# Patient Record
Sex: MALE | Race: WHITE | NOT HISPANIC OR LATINO | Employment: OTHER | URBAN - METROPOLITAN AREA
[De-identification: names, ages, dates, MRNs, and addresses within clinical notes are randomized per-mention and may not be internally consistent; named-entity substitution may affect disease eponyms.]

---

## 2017-09-21 ENCOUNTER — RECORDS - HEALTHEAST (OUTPATIENT)
Dept: LAB | Facility: CLINIC | Age: 67
End: 2017-09-21

## 2017-09-21 LAB
CHOLEST SERPL-MCNC: 130 MG/DL
FASTING STATUS PATIENT QL REPORTED: NO
HDLC SERPL-MCNC: 39 MG/DL
LDLC SERPL CALC-MCNC: 56 MG/DL
TRIGL SERPL-MCNC: 173 MG/DL

## 2018-10-29 ENCOUNTER — RECORDS - HEALTHEAST (OUTPATIENT)
Dept: LAB | Facility: CLINIC | Age: 68
End: 2018-10-29

## 2018-10-29 LAB
ALBUMIN SERPL-MCNC: 4 G/DL (ref 3.5–5)
ALP SERPL-CCNC: 72 U/L (ref 45–120)
ALT SERPL W P-5'-P-CCNC: 28 U/L (ref 0–45)
ANION GAP SERPL CALCULATED.3IONS-SCNC: 10 MMOL/L (ref 5–18)
AST SERPL W P-5'-P-CCNC: 14 U/L (ref 0–40)
BILIRUB SERPL-MCNC: 0.4 MG/DL (ref 0–1)
BUN SERPL-MCNC: 19 MG/DL (ref 8–22)
CALCIUM SERPL-MCNC: 9.5 MG/DL (ref 8.5–10.5)
CHLORIDE BLD-SCNC: 109 MMOL/L (ref 98–107)
CHOLEST SERPL-MCNC: 136 MG/DL
CO2 SERPL-SCNC: 22 MMOL/L (ref 22–31)
CREAT SERPL-MCNC: 0.93 MG/DL (ref 0.7–1.3)
FASTING STATUS PATIENT QL REPORTED: YES
GFR SERPL CREATININE-BSD FRML MDRD: >60 ML/MIN/1.73M2
GLUCOSE BLD-MCNC: 123 MG/DL (ref 70–125)
HDLC SERPL-MCNC: 43 MG/DL
LDLC SERPL CALC-MCNC: 64 MG/DL
POTASSIUM BLD-SCNC: 4.7 MMOL/L (ref 3.5–5)
PROT SERPL-MCNC: 6.8 G/DL (ref 6–8)
SODIUM SERPL-SCNC: 141 MMOL/L (ref 136–145)
TRIGL SERPL-MCNC: 147 MG/DL

## 2019-11-18 ENCOUNTER — RECORDS - HEALTHEAST (OUTPATIENT)
Dept: LAB | Facility: CLINIC | Age: 69
End: 2019-11-18

## 2019-11-18 LAB
CHOLEST SERPL-MCNC: 145 MG/DL
FASTING STATUS PATIENT QL REPORTED: YES
HDLC SERPL-MCNC: 43 MG/DL
LDLC SERPL CALC-MCNC: 73 MG/DL
TRIGL SERPL-MCNC: 147 MG/DL

## 2020-01-21 ENCOUNTER — RECORDS - HEALTHEAST (OUTPATIENT)
Dept: LAB | Facility: CLINIC | Age: 70
End: 2020-01-21

## 2020-01-21 LAB
ANION GAP SERPL CALCULATED.3IONS-SCNC: 13 MMOL/L (ref 5–18)
BUN SERPL-MCNC: 18 MG/DL (ref 8–22)
CALCIUM SERPL-MCNC: 9.4 MG/DL (ref 8.5–10.5)
CHLORIDE BLD-SCNC: 106 MMOL/L (ref 98–107)
CO2 SERPL-SCNC: 23 MMOL/L (ref 22–31)
CREAT SERPL-MCNC: 1.03 MG/DL (ref 0.7–1.3)
GFR SERPL CREATININE-BSD FRML MDRD: >60 ML/MIN/1.73M2
GLUCOSE BLD-MCNC: 86 MG/DL (ref 70–125)
POTASSIUM BLD-SCNC: 5.1 MMOL/L (ref 3.5–5)
SODIUM SERPL-SCNC: 142 MMOL/L (ref 136–145)
TSH SERPL DL<=0.005 MIU/L-ACNC: 1.81 UIU/ML (ref 0.3–5)

## 2020-01-31 ENCOUNTER — RECORDS - HEALTHEAST (OUTPATIENT)
Dept: ADMINISTRATIVE | Facility: OTHER | Age: 70
End: 2020-01-31

## 2020-01-31 ENCOUNTER — AMBULATORY - HEALTHEAST (OUTPATIENT)
Dept: CARDIOLOGY | Facility: CLINIC | Age: 70
End: 2020-01-31

## 2020-02-18 ENCOUNTER — OFFICE VISIT - HEALTHEAST (OUTPATIENT)
Dept: CARDIOLOGY | Facility: CLINIC | Age: 70
End: 2020-02-18

## 2020-02-18 DIAGNOSIS — I48.0 PAROXYSMAL ATRIAL FIBRILLATION (H): ICD-10-CM

## 2020-02-18 DIAGNOSIS — E11.9 TYPE 2 DIABETES MELLITUS WITHOUT COMPLICATION, WITHOUT LONG-TERM CURRENT USE OF INSULIN (H): ICD-10-CM

## 2020-02-18 DIAGNOSIS — Z91.81 AT RISK FOR FALLS: ICD-10-CM

## 2020-02-18 ASSESSMENT — MIFFLIN-ST. JEOR: SCORE: 1713.31

## 2020-02-20 LAB
ATRIAL RATE - MUSE: 87 BPM
DIASTOLIC BLOOD PRESSURE - MUSE: NORMAL
INTERPRETATION ECG - MUSE: NORMAL
P AXIS - MUSE: 18 DEGREES
PR INTERVAL - MUSE: 118 MS
QRS DURATION - MUSE: 72 MS
QT - MUSE: 356 MS
QTC - MUSE: 428 MS
R AXIS - MUSE: 7 DEGREES
SYSTOLIC BLOOD PRESSURE - MUSE: NORMAL
T AXIS - MUSE: 43 DEGREES
VENTRICULAR RATE- MUSE: 87 BPM

## 2020-02-26 ENCOUNTER — AMBULATORY - HEALTHEAST (OUTPATIENT)
Dept: CARDIOLOGY | Facility: CLINIC | Age: 70
End: 2020-02-26

## 2020-02-28 ENCOUNTER — HOSPITAL ENCOUNTER (OUTPATIENT)
Dept: CARDIOLOGY | Facility: HOSPITAL | Age: 70
Discharge: HOME | End: 2020-02-28
Attending: INTERNAL MEDICINE

## 2020-02-28 DIAGNOSIS — I48.0 PAROXYSMAL ATRIAL FIBRILLATION (H): ICD-10-CM

## 2020-02-28 ASSESSMENT — MIFFLIN-ST. JEOR: SCORE: 1719.83

## 2020-03-02 LAB
AORTIC ROOT: 3.3 CM
AORTIC VALVE MEAN VELOCITY: 114 CM/S
AV CUSP SEPERATION: 1.9 CM
AV CUSP SEPERATION: 1.9 CM
AV DIMENSIONLESS INDEX VTI: 0.8
AV MEAN GRADIENT: 6 MMHG
AV PEAK GRADIENT: 9.7 MMHG
AV VALVE AREA: 3.5 CM2
AV VELOCITY RATIO: 0.6
BSA FOR ECHO PROCEDURE: 2.16 M2
CV ECHO HEIGHT: 71.8 IN
CV ECHO WEIGHT: 203 LBS
DOP CALC AO PEAK VEL: 156 CM/S
DOP CALC AO VTI: 31.1 CM
DOP CALC LVOT AREA: 4.52 CM2
DOP CALC LVOT DIAMETER: 2.4 CM
DOP CALC LVOT PEAK VEL: 97.5 CM/S
DOP CALC LVOT STROKE VOLUME: 109 CM3
DOP CALC MV VTI: 26.6 CM
DOP CALCLVOT PEAK VEL VTI: 24.1 CM
EJECTION FRACTION: 67 % (ref 55–75)
FRACTIONAL SHORTENING: 37.5 % (ref 28–44)
INTERVENTRICULAR SEPTUM IN END DIASTOLE: 1.05 CM (ref 0.6–1)
IVS/PW RATIO: 1.1
LA AREA 1: 20.7 CM2
LA AREA 2: 26.1 CM2
LEFT ATRIUM SIZE: 3.5 CM
LEFT VENTRICLE CARDIAC INDEX: 3.4 L/MIN/M2
LEFT VENTRICLE CARDIAC OUTPUT: 7.3 L/MIN
LEFT VENTRICLE DIASTOLIC VOLUME INDEX: 39.4 CM3/M2 (ref 34–74)
LEFT VENTRICLE DIASTOLIC VOLUME: 85 CM3 (ref 62–150)
LEFT VENTRICLE HEART RATE: 67 BPM
LEFT VENTRICLE MASS INDEX: 76.5 G/M2
LEFT VENTRICLE SYSTOLIC VOLUME INDEX: 13 CM3/M2 (ref 11–31)
LEFT VENTRICLE SYSTOLIC VOLUME: 28 CM3 (ref 21–61)
LEFT VENTRICULAR INTERNAL DIMENSION IN DIASTOLE: 4.75 CM (ref 4.2–5.8)
LEFT VENTRICULAR INTERNAL DIMENSION IN SYSTOLE: 2.97 CM (ref 2.5–4)
LEFT VENTRICULAR MASS: 165.2 G
LEFT VENTRICULAR OUTFLOW TRACT MEAN GRADIENT: 2 MMHG
LEFT VENTRICULAR OUTFLOW TRACT MEAN VELOCITY: 72.4 CM/S
LEFT VENTRICULAR OUTFLOW TRACT PEAK GRADIENT: 4 MMHG
LEFT VENTRICULAR POSTERIOR WALL IN END DIASTOLE: 0.93 CM (ref 0.6–1)
LV STROKE VOLUME INDEX: 50.4 ML/M2
MITRAL VALVE DECELERATION SLOPE: 4610 MM/S2
MITRAL VALVE E/A RATIO: 0.6
MITRAL VALVE MEAN INFLOW VELOCITY: 47.6 CM/S
MITRAL VALVE PEAK VELOCITY: 111 CM/S
MITRAL VALVE PRESSURE HALF-TIME: 59 MS
MV AREA VTI: 4.1 CM2
MV AVERAGE E/E' RATIO: 8.8 CM/S
MV DECELERATION TIME: 215 MS
MV E'TISSUE VEL-LAT: 7.64 CM/S
MV E'TISSUE VEL-MED: 7.83 CM/S
MV LATERAL E/E' RATIO: 8.9
MV MEAN GRADIENT: 1 MMHG
MV MEDIAL E/E' RATIO: 8.7
MV PEAK A VELOCITY: 108 CM/S
MV PEAK E VELOCITY: 67.9 CM/S
MV PEAK GRADIENT: 4.9 MMHG
MV VALVE AREA BY CONTINUITY EQUATION: 4.1 CM2
MV VALVE AREA PRESSURE 1/2 METHOD: 3.7 CM2
NUC REST DIASTOLIC VOLUME INDEX: 3248 LBS
NUC REST SYSTOLIC VOLUME INDEX: 71.75 IN
TRICUSPID REGURGITATION PEAK PRESSURE GRADIENT: 36.2 MMHG
TRICUSPID VALVE ANULAR PLANE SYSTOLIC EXCURSION: 2.1 CM
TRICUSPID VALVE PEAK REGURGITANT VELOCITY: 301 CM/S

## 2020-03-24 ENCOUNTER — AMBULATORY - HEALTHEAST (OUTPATIENT)
Dept: CARDIOLOGY | Facility: CLINIC | Age: 70
End: 2020-03-24

## 2020-03-24 ENCOUNTER — RECORDS - HEALTHEAST (OUTPATIENT)
Dept: ADMINISTRATIVE | Facility: OTHER | Age: 70
End: 2020-03-24

## 2020-03-25 ENCOUNTER — RECORDS - HEALTHEAST (OUTPATIENT)
Dept: LAB | Facility: CLINIC | Age: 70
End: 2020-03-25

## 2020-03-25 LAB
ALBUMIN SERPL-MCNC: 4 G/DL (ref 3.5–5)
ALP SERPL-CCNC: 60 U/L (ref 45–120)
ALT SERPL W P-5'-P-CCNC: 15 U/L (ref 0–45)
ANION GAP SERPL CALCULATED.3IONS-SCNC: 14 MMOL/L (ref 5–18)
AST SERPL W P-5'-P-CCNC: 12 U/L (ref 0–40)
BILIRUB SERPL-MCNC: 0.6 MG/DL (ref 0–1)
BUN SERPL-MCNC: 15 MG/DL (ref 8–22)
CALCIUM SERPL-MCNC: 9.3 MG/DL (ref 8.5–10.5)
CHLORIDE BLD-SCNC: 104 MMOL/L (ref 98–107)
CO2 SERPL-SCNC: 24 MMOL/L (ref 22–31)
CREAT SERPL-MCNC: 0.81 MG/DL (ref 0.7–1.3)
GFR SERPL CREATININE-BSD FRML MDRD: >60 ML/MIN/1.73M2
GLUCOSE BLD-MCNC: 142 MG/DL (ref 70–125)
LIPASE SERPL-CCNC: 29 U/L (ref 0–52)
POTASSIUM BLD-SCNC: 5.2 MMOL/L (ref 3.5–5)
PROT SERPL-MCNC: 6.5 G/DL (ref 6–8)
SODIUM SERPL-SCNC: 142 MMOL/L (ref 136–145)

## 2020-04-02 ENCOUNTER — RECORDS - HEALTHEAST (OUTPATIENT)
Dept: LAB | Facility: CLINIC | Age: 70
End: 2020-04-02

## 2020-04-03 LAB — BACTERIA SPEC CULT: NO GROWTH

## 2020-04-09 ENCOUNTER — RECORDS - HEALTHEAST (OUTPATIENT)
Dept: LAB | Facility: CLINIC | Age: 70
End: 2020-04-09

## 2020-04-09 LAB
ALBUMIN UR-MCNC: ABNORMAL MG/DL
APPEARANCE UR: ABNORMAL
BACTERIA #/AREA URNS HPF: ABNORMAL HPF
BILIRUB UR QL STRIP: NEGATIVE
COLOR UR AUTO: YELLOW
GLUCOSE UR STRIP-MCNC: NEGATIVE MG/DL
GRAN CASTS #/AREA URNS LPF: ABNORMAL LPF
HGB UR QL STRIP: ABNORMAL
KETONES UR STRIP-MCNC: NEGATIVE MG/DL
LEUKOCYTE ESTERASE UR QL STRIP: ABNORMAL
MUCOUS THREADS #/AREA URNS LPF: ABNORMAL LPF
NITRATE UR QL: NEGATIVE
PH UR STRIP: 5.5 [PH] (ref 4.5–8)
RBC #/AREA URNS AUTO: ABNORMAL HPF
SP GR UR STRIP: 1.02 (ref 1–1.03)
SQUAMOUS #/AREA URNS AUTO: ABNORMAL LPF
UROBILINOGEN UR STRIP-ACNC: ABNORMAL
WBC #/AREA URNS AUTO: ABNORMAL HPF
WBC CLUMPS #/AREA URNS HPF: PRESENT /[HPF]

## 2020-04-12 ENCOUNTER — ANESTHESIA - HEALTHEAST (OUTPATIENT)
Dept: SURGERY | Facility: HOSPITAL | Age: 70
End: 2020-04-12

## 2020-04-12 ENCOUNTER — SURGERY - HEALTHEAST (OUTPATIENT)
Dept: SURGERY | Facility: HOSPITAL | Age: 70
End: 2020-04-12

## 2020-04-12 ENCOUNTER — HOSPITAL ENCOUNTER (INPATIENT)
Dept: MEDSURG UNIT | Facility: HOSPITAL | Age: 70
Discharge: NURSING, REHAB OR LONG-TERM CARE | End: 2020-04-18
Attending: INTERNAL MEDICINE | Admitting: INTERNAL MEDICINE

## 2020-04-12 DIAGNOSIS — E11.22 TYPE 2 DIABETES MELLITUS WITH CHRONIC KIDNEY DISEASE, WITHOUT LONG-TERM CURRENT USE OF INSULIN, UNSPECIFIED CKD STAGE (H): ICD-10-CM

## 2020-04-12 DIAGNOSIS — F32.9 MAJOR DEPRESSIVE DISORDER, REMISSION STATUS UNSPECIFIED, UNSPECIFIED WHETHER RECURRENT: ICD-10-CM

## 2020-04-12 DIAGNOSIS — N17.9 ACUTE RENAL FAILURE, UNSPECIFIED ACUTE RENAL FAILURE TYPE (H): ICD-10-CM

## 2020-04-12 DIAGNOSIS — N20.1 CALCULUS OF URETER: ICD-10-CM

## 2020-04-12 DIAGNOSIS — K92.0 HEMATEMESIS, PRESENCE OF NAUSEA NOT SPECIFIED: ICD-10-CM

## 2020-04-12 DIAGNOSIS — E87.20 LACTIC ACIDOSIS: ICD-10-CM

## 2020-04-12 DIAGNOSIS — J96.90 RESPIRATORY FAILURE REQUIRING INTUBATION (H): ICD-10-CM

## 2020-04-12 DIAGNOSIS — Z87.19 HISTORY OF HEMATEMESIS: ICD-10-CM

## 2020-04-12 DIAGNOSIS — K62.5 BRBPR (BRIGHT RED BLOOD PER RECTUM): ICD-10-CM

## 2020-04-12 DIAGNOSIS — E83.42 HYPOMAGNESEMIA: ICD-10-CM

## 2020-04-12 LAB
ABO/RH(D): NORMAL
ALBUMIN SERPL-MCNC: 2.3 G/DL (ref 3.5–5)
ALBUMIN SERPL-MCNC: 3.5 G/DL (ref 3.5–5)
ALBUMIN UR-MCNC: ABNORMAL MG/DL
ALP SERPL-CCNC: 52 U/L (ref 45–120)
ALP SERPL-CCNC: 75 U/L (ref 45–120)
ALT SERPL W P-5'-P-CCNC: 13 U/L (ref 0–45)
ALT SERPL W P-5'-P-CCNC: <9 U/L (ref 0–45)
ANION GAP SERPL CALCULATED.3IONS-SCNC: 31 MMOL/L (ref 5–18)
ANION GAP SERPL CALCULATED.3IONS-SCNC: 36 MMOL/L (ref 5–18)
ANION GAP SERPL CALCULATED.3IONS-SCNC: >33 MMOL/L (ref 5–18)
ANION GAP SERPL CALCULATED.3IONS-SCNC: >41 MMOL/L (ref 5–18)
ANTIBODY SCREEN: NEGATIVE
APPEARANCE UR: ABNORMAL
APTT PPP: 30 SECONDS (ref 24–37)
AST SERPL W P-5'-P-CCNC: 14 U/L (ref 0–40)
AST SERPL W P-5'-P-CCNC: 18 U/L (ref 0–40)
BACTERIA #/AREA URNS HPF: ABNORMAL HPF
BASE EXCESS BLDA CALC-SCNC: -12.4 MMOL/L
BASE EXCESS BLDA CALC-SCNC: -25 MMOL/L
BASE EXCESS BLDA CALC-SCNC: -28.9 MMOL/L
BASE EXCESS BLDA CALC-SCNC: -29.8 MMOL/L
BASE EXCESS BLDA CALC-SCNC: -9.5 MMOL/L
BASOPHILS # BLD AUTO: 0.1 THOU/UL (ref 0–0.2)
BASOPHILS NFR BLD AUTO: 1 % (ref 0–2)
BILIRUB DIRECT SERPL-MCNC: 0.1 MG/DL
BILIRUB SERPL-MCNC: 0.2 MG/DL (ref 0–1)
BILIRUB SERPL-MCNC: 0.3 MG/DL (ref 0–1)
BILIRUB UR QL STRIP: NEGATIVE
BUN SERPL-MCNC: 34 MG/DL (ref 8–22)
BUN SERPL-MCNC: 63 MG/DL (ref 8–22)
BUN SERPL-MCNC: 66 MG/DL (ref 8–22)
BUN SERPL-MCNC: 72 MG/DL (ref 8–22)
CALCIUM SERPL-MCNC: 10.3 MG/DL (ref 8.5–10.5)
CALCIUM SERPL-MCNC: 8.1 MG/DL (ref 8.5–10.5)
CALCIUM SERPL-MCNC: 8.1 MG/DL (ref 8.5–10.5)
CALCIUM SERPL-MCNC: 8.4 MG/DL (ref 8.5–10.5)
CALCIUM, IONIZED MEASURED: 1.06 MMOL/L (ref 1.11–1.3)
CHLORIDE BLD-SCNC: 102 MMOL/L (ref 98–107)
CHLORIDE BLD-SCNC: 96 MMOL/L (ref 98–107)
CHLORIDE BLD-SCNC: 96 MMOL/L (ref 98–107)
CHLORIDE BLD-SCNC: 99 MMOL/L (ref 98–107)
CK SERPL-CCNC: 47 U/L (ref 30–190)
CO2 SERPL-SCNC: 12 MMOL/L (ref 22–31)
CO2 SERPL-SCNC: 7 MMOL/L (ref 22–31)
CO2 SERPL-SCNC: <6 MMOL/L (ref 22–31)
CO2 SERPL-SCNC: <6 MMOL/L (ref 22–31)
COHGB MFR BLD: 100 % (ref 95–96)
COHGB MFR BLD: 100 % (ref 95–96)
COHGB MFR BLD: 99.4 % (ref 95–96)
COHGB MFR BLD: 99.6 % (ref 95–96)
COHGB MFR BLD: 99.8 % (ref 95–96)
COLOR UR AUTO: ABNORMAL
CREAT BLD-MCNC: 6.4 MG/DL (ref 0.7–1.3)
CREAT SERPL-MCNC: 2.86 MG/DL (ref 0.7–1.3)
CREAT SERPL-MCNC: 5.17 MG/DL (ref 0.7–1.3)
CREAT SERPL-MCNC: 5.23 MG/DL (ref 0.7–1.3)
CREAT SERPL-MCNC: 6.14 MG/DL (ref 0.7–1.3)
EOSINOPHIL # BLD AUTO: 0 THOU/UL (ref 0–0.4)
EOSINOPHIL NFR BLD AUTO: 0 % (ref 0–6)
ERYTHROCYTE [DISTWIDTH] IN BLOOD BY AUTOMATED COUNT: 15 % (ref 11–14.5)
ERYTHROCYTE [DISTWIDTH] IN BLOOD BY AUTOMATED COUNT: 15.2 % (ref 11–14.5)
GFR SERPL CREATININE-BSD FRML MDRD: 11 ML/MIN/1.73M2
GFR SERPL CREATININE-BSD FRML MDRD: 11 ML/MIN/1.73M2
GFR SERPL CREATININE-BSD FRML MDRD: 22 ML/MIN/1.73M2
GFR SERPL CREATININE-BSD FRML MDRD: 9 ML/MIN/1.73M2
GFR SERPL CREATININE-BSD FRML MDRD: 9 ML/MIN/1.73M2
GLUCOSE BLD-MCNC: 222 MG/DL (ref 70–125)
GLUCOSE BLD-MCNC: 243 MG/DL (ref 70–125)
GLUCOSE BLD-MCNC: 351 MG/DL (ref 70–125)
GLUCOSE BLD-MCNC: 360 MG/DL (ref 70–125)
GLUCOSE BLDC GLUCOMTR-MCNC: 143 MG/DL (ref 70–139)
GLUCOSE BLDC GLUCOMTR-MCNC: 151 MG/DL (ref 70–139)
GLUCOSE BLDC GLUCOMTR-MCNC: 161 MG/DL (ref 70–139)
GLUCOSE BLDC GLUCOMTR-MCNC: 165 MG/DL (ref 70–139)
GLUCOSE BLDC GLUCOMTR-MCNC: 173 MG/DL (ref 70–139)
GLUCOSE BLDC GLUCOMTR-MCNC: 193 MG/DL (ref 70–139)
GLUCOSE BLDC GLUCOMTR-MCNC: 194 MG/DL (ref 70–139)
GLUCOSE BLDC GLUCOMTR-MCNC: 195 MG/DL (ref 70–139)
GLUCOSE BLDC GLUCOMTR-MCNC: 212 MG/DL (ref 70–139)
GLUCOSE BLDC GLUCOMTR-MCNC: 236 MG/DL (ref 70–139)
GLUCOSE BLDC GLUCOMTR-MCNC: 257 MG/DL (ref 70–139)
GLUCOSE BLDC GLUCOMTR-MCNC: 286 MG/DL (ref 70–139)
GLUCOSE UR STRIP-MCNC: ABNORMAL MG/DL
HBV SURFACE AG SERPL QL IA: NEGATIVE
HCO3, ARTERIAL CALC - HISTORICAL: 1.7 MMOL/L (ref 23–29)
HCO3, ARTERIAL CALC - HISTORICAL: 15.3 MMOL/L (ref 23–29)
HCO3, ARTERIAL CALC - HISTORICAL: 17.5 MMOL/L (ref 23–29)
HCO3, ARTERIAL CALC - HISTORICAL: 2.4 MMOL/L (ref 23–29)
HCO3, ARTERIAL CALC - HISTORICAL: 5.5 MMOL/L (ref 23–29)
HCT VFR BLD AUTO: 33.5 % (ref 40–54)
HCT VFR BLD AUTO: 45.1 % (ref 40–54)
HGB BLD-MCNC: 10.1 G/DL (ref 14–18)
HGB BLD-MCNC: 11.6 G/DL (ref 14–18)
HGB BLD-MCNC: 13.3 G/DL (ref 14–18)
HGB UR QL STRIP: ABNORMAL
INR PPP: 1.81 (ref 0.9–1.1)
INR PPP: 1.98 (ref 0.9–1.1)
ION CA PH 7.4: 0.91 MMOL/L (ref 1.11–1.3)
KETONES UR STRIP-MCNC: ABNORMAL MG/DL
LACTATE SERPL-SCNC: 15 MMOL/L (ref 0.5–2.2)
LACTATE SERPL-SCNC: 17.3 MMOL/L (ref 0.5–2.2)
LEUKOCYTE ESTERASE UR QL STRIP: ABNORMAL
LYMPHOCYTES # BLD AUTO: 2.1 THOU/UL (ref 0.8–4.4)
LYMPHOCYTES NFR BLD AUTO: 12 % (ref 20–40)
MAGNESIUM SERPL-MCNC: 2.8 MG/DL (ref 1.8–2.6)
MCH RBC QN AUTO: 31.7 PG (ref 27–34)
MCH RBC QN AUTO: 32 PG (ref 27–34)
MCHC RBC AUTO-ENTMCNC: 29.5 G/DL (ref 32–36)
MCHC RBC AUTO-ENTMCNC: 30.1 G/DL (ref 32–36)
MCV RBC AUTO: 106 FL (ref 80–100)
MCV RBC AUTO: 108 FL (ref 80–100)
MONOCYTES # BLD AUTO: 0.6 THOU/UL (ref 0–0.9)
MONOCYTES NFR BLD AUTO: 3 % (ref 2–10)
NEUTROPHILS # BLD AUTO: 14.5 THOU/UL (ref 2–7.7)
NEUTROPHILS NFR BLD AUTO: 82 % (ref 50–70)
NITRATE UR QL: NEGATIVE
O2/TOTAL GAS SETTING VFR VENT: 40 %
O2/TOTAL GAS SETTING VFR VENT: 40 %
O2/TOTAL GAS SETTING VFR VENT: 50 %
O2/TOTAL GAS SETTING VFR VENT: 50 %
O2/TOTAL GAS SETTING VFR VENT: ABNORMAL %
OXYHEMOGLOBIN - HISTORICAL: 96.9 % (ref 95–96)
OXYHEMOGLOBIN - HISTORICAL: 97.1 % (ref 95–96)
OXYHEMOGLOBIN - HISTORICAL: 97.4 % (ref 95–96)
OXYHEMOGLOBIN - HISTORICAL: 97.4 % (ref 95–96)
OXYHEMOGLOBIN - HISTORICAL: 98 % (ref 95–96)
PCO2 BLD: 19 MM HG (ref 35–45)
PCO2 BLD: 20 MM HG (ref 35–45)
PCO2 BLD: 24 MM HG (ref 35–45)
PCO2 BLD: 26 MM HG (ref 35–45)
PCO2 BLD: <19 MM HG (ref 35–45)
PEEP: 5 CM H2O
PEEP: 8 CM H2O
PH BLD: 6.82 [PH] (ref 7.37–7.44)
PH BLD: 6.85 [PH] (ref 7.37–7.44)
PH BLD: 7 [PH] (ref 7.37–7.44)
PH BLD: 7.31 [PH] (ref 7.37–7.44)
PH BLD: 7.41 [PH] (ref 7.37–7.44)
PH UR STRIP: 5.5 [PH] (ref 4.5–8)
PH: 7.03 (ref 7.35–7.45)
PHOSPHATE SERPL-MCNC: 3.8 MG/DL (ref 2.5–4.5)
PLATELET # BLD AUTO: 302 THOU/UL (ref 140–440)
PLATELET # BLD AUTO: 445 THOU/UL (ref 140–440)
PMV BLD AUTO: 9.4 FL (ref 8.5–12.5)
PMV BLD AUTO: 9.6 FL (ref 8.5–12.5)
PO2 BLD: 127 MM HG (ref 75–85)
PO2 BLD: 141 MM HG (ref 75–85)
PO2 BLD: 141 MM HG (ref 75–85)
PO2 BLD: 160 MM HG (ref 75–85)
PO2 BLD: 182 MM HG (ref 75–85)
POTASSIUM BLD-SCNC: 4.4 MMOL/L (ref 3.5–5)
POTASSIUM BLD-SCNC: 5.7 MMOL/L (ref 3.5–5)
POTASSIUM BLD-SCNC: 5.8 MMOL/L (ref 3.5–5)
POTASSIUM BLD-SCNC: 6.8 MMOL/L (ref 3.5–5)
POTASSIUM BLD-SCNC: 7 MMOL/L (ref 3.5–5)
PROT SERPL-MCNC: 4.6 G/DL (ref 6–8)
PROT SERPL-MCNC: 6.8 G/DL (ref 6–8)
RATE: 24 RR/MIN
RATE: 28 RR/MIN
RATE: 30 RR/MIN
RATE: 30 RR/MIN
RBC # BLD AUTO: 3.16 MILL/UL (ref 4.4–6.2)
RBC # BLD AUTO: 4.19 MILL/UL (ref 4.4–6.2)
RBC #/AREA URNS AUTO: >100 HPF
SODIUM SERPL-SCNC: 139 MMOL/L (ref 136–145)
SODIUM SERPL-SCNC: 141 MMOL/L (ref 136–145)
SODIUM SERPL-SCNC: 142 MMOL/L (ref 136–145)
SODIUM SERPL-SCNC: 143 MMOL/L (ref 136–145)
SP GR UR STRIP: 1.01 (ref 1–1.03)
SQUAMOUS #/AREA URNS AUTO: ABNORMAL LPF
TEMPERATURE: 37 DEGREES C
TROPONIN I SERPL-MCNC: 0.07 NG/ML (ref 0–0.29)
UROBILINOGEN UR STRIP-ACNC: ABNORMAL
VENTILATION MODE: ABNORMAL
VENTILATOR TIDAL VOLUME: 550 ML
WBC #/AREA URNS AUTO: ABNORMAL HPF
WBC CLUMPS #/AREA URNS HPF: PRESENT /[HPF]
WBC: 16.1 THOU/UL (ref 4–11)
WBC: 17.6 THOU/UL (ref 4–11)

## 2020-04-12 ASSESSMENT — MIFFLIN-ST. JEOR
SCORE: 1614.47
SCORE: 1619.47
SCORE: 1614.47

## 2020-04-13 ENCOUNTER — SURGERY - HEALTHEAST (OUTPATIENT)
Dept: GASTROENTEROLOGY | Facility: HOSPITAL | Age: 70
End: 2020-04-13

## 2020-04-13 LAB
ALBUMIN SERPL-MCNC: 2.6 G/DL (ref 3.5–5)
ANION GAP SERPL CALCULATED.3IONS-SCNC: 21 MMOL/L (ref 5–18)
ANION GAP SERPL CALCULATED.3IONS-SCNC: 23 MMOL/L (ref 5–18)
BACTERIA SPEC CULT: NO GROWTH
BASE EXCESS BLDA CALC-SCNC: -0.3 MMOL/L
BASE EXCESS BLDA CALC-SCNC: 0.2 MMOL/L
BLD PROD TYP BPU: NORMAL
BLOOD TYPE: 6200
BUN SERPL-MCNC: 32 MG/DL (ref 8–22)
BUN SERPL-MCNC: 34 MG/DL (ref 8–22)
CALCIUM SERPL-MCNC: 7.6 MG/DL (ref 8.5–10.5)
CALCIUM SERPL-MCNC: 7.7 MG/DL (ref 8.5–10.5)
CHLORIDE BLD-SCNC: 95 MMOL/L (ref 98–107)
CHLORIDE BLD-SCNC: 98 MMOL/L (ref 98–107)
CO2 SERPL-SCNC: 19 MMOL/L (ref 22–31)
CO2 SERPL-SCNC: 21 MMOL/L (ref 22–31)
CODING SYSTEM: NORMAL
COHGB MFR BLD: 99.8 % (ref 95–96)
COHGB MFR BLD: 99.8 % (ref 95–96)
COMPONENT (HISTORICAL CONVERSION): NORMAL
CREAT SERPL-MCNC: 2.92 MG/DL (ref 0.7–1.3)
CREAT SERPL-MCNC: 2.98 MG/DL (ref 0.7–1.3)
CROSSMATCH: NORMAL
ERYTHROCYTE [DISTWIDTH] IN BLOOD BY AUTOMATED COUNT: 15.6 % (ref 11–14.5)
GFR SERPL CREATININE-BSD FRML MDRD: 21 ML/MIN/1.73M2
GFR SERPL CREATININE-BSD FRML MDRD: 22 ML/MIN/1.73M2
GLUCOSE BLD-MCNC: 112 MG/DL (ref 70–125)
GLUCOSE BLD-MCNC: 158 MG/DL (ref 70–125)
GLUCOSE BLDC GLUCOMTR-MCNC: 100 MG/DL (ref 70–139)
GLUCOSE BLDC GLUCOMTR-MCNC: 101 MG/DL (ref 70–139)
GLUCOSE BLDC GLUCOMTR-MCNC: 101 MG/DL (ref 70–139)
GLUCOSE BLDC GLUCOMTR-MCNC: 103 MG/DL (ref 70–139)
GLUCOSE BLDC GLUCOMTR-MCNC: 105 MG/DL (ref 70–139)
GLUCOSE BLDC GLUCOMTR-MCNC: 106 MG/DL (ref 70–139)
GLUCOSE BLDC GLUCOMTR-MCNC: 106 MG/DL (ref 70–139)
GLUCOSE BLDC GLUCOMTR-MCNC: 109 MG/DL (ref 70–139)
GLUCOSE BLDC GLUCOMTR-MCNC: 111 MG/DL (ref 70–139)
GLUCOSE BLDC GLUCOMTR-MCNC: 115 MG/DL (ref 70–139)
GLUCOSE BLDC GLUCOMTR-MCNC: 117 MG/DL (ref 70–139)
GLUCOSE BLDC GLUCOMTR-MCNC: 118 MG/DL (ref 70–139)
GLUCOSE BLDC GLUCOMTR-MCNC: 119 MG/DL (ref 70–139)
GLUCOSE BLDC GLUCOMTR-MCNC: 119 MG/DL (ref 70–139)
GLUCOSE BLDC GLUCOMTR-MCNC: 122 MG/DL (ref 70–139)
GLUCOSE BLDC GLUCOMTR-MCNC: 140 MG/DL (ref 70–139)
GLUCOSE BLDC GLUCOMTR-MCNC: 82 MG/DL (ref 70–139)
GLUCOSE BLDC GLUCOMTR-MCNC: 89 MG/DL (ref 70–139)
GLUCOSE BLDC GLUCOMTR-MCNC: 91 MG/DL (ref 70–139)
GLUCOSE BLDC GLUCOMTR-MCNC: 97 MG/DL (ref 70–139)
GLUCOSE BLDC GLUCOMTR-MCNC: 98 MG/DL (ref 70–139)
GLUCOSE BLDC GLUCOMTR-MCNC: 99 MG/DL (ref 70–139)
HCO3, ARTERIAL CALC - HISTORICAL: 24.7 MMOL/L (ref 23–29)
HCO3, ARTERIAL CALC - HISTORICAL: 25.1 MMOL/L (ref 23–29)
HCT VFR BLD AUTO: 34.1 % (ref 40–54)
HGB BLD-MCNC: 11.8 G/DL (ref 14–18)
ISSUE DATE AND TIME: NORMAL
LACTATE SERPL-SCNC: 1.7 MMOL/L (ref 0.5–2.2)
LACTATE SERPL-SCNC: 5.3 MMOL/L (ref 0.5–2.2)
MAGNESIUM SERPL-MCNC: 1.6 MG/DL (ref 1.8–2.6)
MCH RBC QN AUTO: 31.6 PG (ref 27–34)
MCHC RBC AUTO-ENTMCNC: 34.6 G/DL (ref 32–36)
MCV RBC AUTO: 91 FL (ref 80–100)
O2/TOTAL GAS SETTING VFR VENT: 30 %
O2/TOTAL GAS SETTING VFR VENT: 30 %
OXYHEMOGLOBIN - HISTORICAL: 97.4 % (ref 95–96)
OXYHEMOGLOBIN - HISTORICAL: 97.7 % (ref 95–96)
PCO2 BLD: 22 MM HG (ref 35–45)
PCO2 BLD: 26 MM HG (ref 35–45)
PEEP: 8 CM H2O
PEEP: 8 CM H2O
PH BLD: 7.53 [PH] (ref 7.37–7.44)
PH BLD: 7.57 [PH] (ref 7.37–7.44)
PHOSPHATE SERPL-MCNC: 2.6 MG/DL (ref 2.5–4.5)
PLATELET # BLD AUTO: 263 THOU/UL (ref 140–440)
PMV BLD AUTO: 9.1 FL (ref 8.5–12.5)
PO2 BLD: 132 MM HG (ref 75–85)
PO2 BLD: 144 MM HG (ref 75–85)
POTASSIUM BLD-SCNC: 3.8 MMOL/L (ref 3.5–5)
POTASSIUM BLD-SCNC: 3.9 MMOL/L (ref 3.5–5)
RATE: 16 RR/MIN
RATE: 20 RR/MIN
RBC # BLD AUTO: 3.73 MILL/UL (ref 4.4–6.2)
SODIUM SERPL-SCNC: 137 MMOL/L (ref 136–145)
SODIUM SERPL-SCNC: 140 MMOL/L (ref 136–145)
STATUS (HISTORICAL CONVERSION): NORMAL
TEMPERATURE: 37 DEGREES C
TEMPERATURE: 37 DEGREES C
UNIT ABO/RH (HISTORICAL CONVERSION): NORMAL
UNIT NUMBER: NORMAL
VENTILATION MODE: ABNORMAL
VENTILATION MODE: ABNORMAL
VENTILATOR TIDAL VOLUME: 550 ML
VENTILATOR TIDAL VOLUME: 550 ML
WBC: 12.6 THOU/UL (ref 4–11)

## 2020-04-13 ASSESSMENT — MIFFLIN-ST. JEOR
SCORE: 1702
SCORE: 1702

## 2020-04-14 LAB
ANION GAP SERPL CALCULATED.3IONS-SCNC: 9 MMOL/L (ref 5–18)
ATRIAL RATE - MUSE: 94 BPM
BLD PROD TYP BPU: NORMAL
BLOOD TYPE: 6200
BUN SERPL-MCNC: 28 MG/DL (ref 8–22)
BUN SERPL-MCNC: 32 MG/DL (ref 8–22)
CALCIUM SERPL-MCNC: 6.8 MG/DL (ref 8.5–10.5)
CALCIUM SERPL-MCNC: 7 MG/DL (ref 8.5–10.5)
CHLORIDE BLD-SCNC: 104 MMOL/L (ref 98–107)
CHLORIDE BLD-SCNC: 107 MMOL/L (ref 98–107)
CO2 SERPL-SCNC: 28 MMOL/L (ref 22–31)
CO2 SERPL-SCNC: 30 MMOL/L (ref 22–31)
CODING SYSTEM: NORMAL
COMPONENT (HISTORICAL CONVERSION): NORMAL
CREAT SERPL-MCNC: 2.3 MG/DL (ref 0.7–1.3)
CREAT SERPL-MCNC: 2.64 MG/DL (ref 0.7–1.3)
CROSSMATCH: NORMAL
DIASTOLIC BLOOD PRESSURE - MUSE: NORMAL
ERYTHROCYTE [DISTWIDTH] IN BLOOD BY AUTOMATED COUNT: 15.6 % (ref 11–14.5)
GFR SERPL CREATININE-BSD FRML MDRD: 24 ML/MIN/1.73M2
GFR SERPL CREATININE-BSD FRML MDRD: 28 ML/MIN/1.73M2
GLUCOSE BLD-MCNC: 118 MG/DL (ref 70–125)
GLUCOSE BLD-MCNC: 125 MG/DL (ref 70–125)
GLUCOSE BLDC GLUCOMTR-MCNC: 100 MG/DL (ref 70–139)
GLUCOSE BLDC GLUCOMTR-MCNC: 106 MG/DL (ref 70–139)
GLUCOSE BLDC GLUCOMTR-MCNC: 106 MG/DL (ref 70–139)
GLUCOSE BLDC GLUCOMTR-MCNC: 107 MG/DL (ref 70–139)
GLUCOSE BLDC GLUCOMTR-MCNC: 110 MG/DL (ref 70–139)
GLUCOSE BLDC GLUCOMTR-MCNC: 110 MG/DL (ref 70–139)
GLUCOSE BLDC GLUCOMTR-MCNC: 111 MG/DL (ref 70–139)
GLUCOSE BLDC GLUCOMTR-MCNC: 113 MG/DL (ref 70–139)
GLUCOSE BLDC GLUCOMTR-MCNC: 116 MG/DL (ref 70–139)
GLUCOSE BLDC GLUCOMTR-MCNC: 118 MG/DL (ref 70–139)
GLUCOSE BLDC GLUCOMTR-MCNC: 120 MG/DL (ref 70–139)
GLUCOSE BLDC GLUCOMTR-MCNC: 128 MG/DL (ref 70–139)
GLUCOSE BLDC GLUCOMTR-MCNC: 82 MG/DL (ref 70–139)
HCT VFR BLD AUTO: 32.7 % (ref 40–54)
HGB BLD-MCNC: 11.2 G/DL (ref 14–18)
INTERPRETATION ECG - MUSE: NORMAL
ISSUE DATE AND TIME: NORMAL
MAGNESIUM SERPL-MCNC: 1.8 MG/DL (ref 1.8–2.6)
MCH RBC QN AUTO: 31.5 PG (ref 27–34)
MCHC RBC AUTO-ENTMCNC: 34.3 G/DL (ref 32–36)
MCV RBC AUTO: 92 FL (ref 80–100)
P AXIS - MUSE: NORMAL
PHOSPHATE SERPL-MCNC: 2.6 MG/DL (ref 2.5–4.5)
PLATELET # BLD AUTO: 158 THOU/UL (ref 140–440)
PMV BLD AUTO: 8.8 FL (ref 8.5–12.5)
POTASSIUM BLD-SCNC: 2.9 MMOL/L (ref 3.5–5)
POTASSIUM BLD-SCNC: 3.2 MMOL/L (ref 3.5–5)
POTASSIUM BLD-SCNC: 3.3 MMOL/L (ref 3.5–5)
POTASSIUM BLD-SCNC: 3.5 MMOL/L (ref 3.5–5)
POTASSIUM BLD-SCNC: 3.5 MMOL/L (ref 3.5–5)
PR INTERVAL - MUSE: NORMAL
QRS DURATION - MUSE: 88 MS
QT - MUSE: 372 MS
QTC - MUSE: 467 MS
R AXIS - MUSE: 35 DEGREES
RBC # BLD AUTO: 3.56 MILL/UL (ref 4.4–6.2)
SODIUM SERPL-SCNC: 145 MMOL/L (ref 136–145)
SODIUM SERPL-SCNC: 146 MMOL/L (ref 136–145)
STATUS (HISTORICAL CONVERSION): NORMAL
SYSTOLIC BLOOD PRESSURE - MUSE: NORMAL
T AXIS - MUSE: 75 DEGREES
UNIT ABO/RH (HISTORICAL CONVERSION): NORMAL
UNIT NUMBER: NORMAL
VENTRICULAR RATE- MUSE: 95 BPM
WBC: 8.3 THOU/UL (ref 4–11)

## 2020-04-14 ASSESSMENT — MIFFLIN-ST. JEOR
SCORE: 1674
SCORE: 1679
SCORE: 1674

## 2020-04-15 LAB
ANION GAP SERPL CALCULATED.3IONS-SCNC: 13 MMOL/L (ref 5–18)
ANION GAP SERPL CALCULATED.3IONS-SCNC: 9 MMOL/L (ref 5–18)
BUN SERPL-MCNC: 24 MG/DL (ref 8–22)
BUN SERPL-MCNC: 24 MG/DL (ref 8–22)
CALCIUM SERPL-MCNC: 6 MG/DL (ref 8.5–10.5)
CALCIUM SERPL-MCNC: 6.9 MG/DL (ref 8.5–10.5)
CALCIUM, IONIZED MEASURED: 0.83 MMOL/L (ref 1.11–1.3)
CHLORIDE BLD-SCNC: 107 MMOL/L (ref 98–107)
CHLORIDE BLD-SCNC: 115 MMOL/L (ref 98–107)
CO2 SERPL-SCNC: 24 MMOL/L (ref 22–31)
CO2 SERPL-SCNC: 24 MMOL/L (ref 22–31)
CREAT SERPL-MCNC: 1.57 MG/DL (ref 0.7–1.3)
CREAT SERPL-MCNC: 1.57 MG/DL (ref 0.7–1.3)
GFR SERPL CREATININE-BSD FRML MDRD: 44 ML/MIN/1.73M2
GFR SERPL CREATININE-BSD FRML MDRD: 44 ML/MIN/1.73M2
GLUCOSE BLD-MCNC: 103 MG/DL (ref 70–125)
GLUCOSE BLD-MCNC: 212 MG/DL (ref 70–125)
GLUCOSE BLDC GLUCOMTR-MCNC: 101 MG/DL (ref 70–139)
GLUCOSE BLDC GLUCOMTR-MCNC: 133 MG/DL (ref 70–139)
GLUCOSE BLDC GLUCOMTR-MCNC: 167 MG/DL (ref 70–139)
GLUCOSE BLDC GLUCOMTR-MCNC: 222 MG/DL (ref 70–139)
GLUCOSE BLDC GLUCOMTR-MCNC: 248 MG/DL (ref 70–139)
GLUCOSE BLDC GLUCOMTR-MCNC: 87 MG/DL (ref 70–139)
GLUCOSE BLDC GLUCOMTR-MCNC: 88 MG/DL (ref 70–139)
GLUCOSE BLDC GLUCOMTR-MCNC: 96 MG/DL (ref 70–139)
ION CA PH 7.4: 0.83 MMOL/L (ref 1.11–1.3)
MAGNESIUM SERPL-MCNC: 1.3 MG/DL (ref 1.8–2.6)
PH: 7.39 (ref 7.35–7.45)
PHOSPHATE SERPL-MCNC: 3.3 MG/DL (ref 2.5–4.5)
POTASSIUM BLD-SCNC: 3.3 MMOL/L (ref 3.5–5)
POTASSIUM BLD-SCNC: 4.2 MMOL/L (ref 3.5–5)
POTASSIUM BLD-SCNC: 4.2 MMOL/L (ref 3.5–5)
SODIUM SERPL-SCNC: 144 MMOL/L (ref 136–145)
SODIUM SERPL-SCNC: 148 MMOL/L (ref 136–145)

## 2020-04-15 ASSESSMENT — MIFFLIN-ST. JEOR
SCORE: 1694.31
SCORE: 1670
SCORE: 1694.31
SCORE: 1699.31
SCORE: 1675
SCORE: 1670

## 2020-04-16 ENCOUNTER — COMMUNICATION - HEALTHEAST (OUTPATIENT)
Dept: CARDIOLOGY | Facility: CLINIC | Age: 70
End: 2020-04-16

## 2020-04-16 LAB
ALBUMIN SERPL-MCNC: 2.4 G/DL (ref 3.5–5)
ANION GAP SERPL CALCULATED.3IONS-SCNC: 10 MMOL/L (ref 5–18)
APTT PPP: 26 SECONDS (ref 24–37)
BUN SERPL-MCNC: 18 MG/DL (ref 8–22)
CALCIUM SERPL-MCNC: 7 MG/DL (ref 8.5–10.5)
CHLORIDE BLD-SCNC: 110 MMOL/L (ref 98–107)
CO2 SERPL-SCNC: 26 MMOL/L (ref 22–31)
CREAT SERPL-MCNC: 1.26 MG/DL (ref 0.7–1.3)
ERYTHROCYTE [DISTWIDTH] IN BLOOD BY AUTOMATED COUNT: 14.9 % (ref 11–14.5)
ERYTHROCYTE [DISTWIDTH] IN BLOOD BY AUTOMATED COUNT: 15.1 % (ref 11–14.5)
GFR SERPL CREATININE-BSD FRML MDRD: 57 ML/MIN/1.73M2
GLUCOSE BLD-MCNC: 163 MG/DL (ref 70–125)
GLUCOSE BLDC GLUCOMTR-MCNC: 140 MG/DL (ref 70–139)
GLUCOSE BLDC GLUCOMTR-MCNC: 146 MG/DL (ref 70–139)
GLUCOSE BLDC GLUCOMTR-MCNC: 158 MG/DL (ref 70–139)
GLUCOSE BLDC GLUCOMTR-MCNC: 203 MG/DL (ref 70–139)
HCT VFR BLD AUTO: 34.9 % (ref 40–54)
HCT VFR BLD AUTO: 35.7 % (ref 40–54)
HGB BLD-MCNC: 11.2 G/DL (ref 14–18)
HGB BLD-MCNC: 11.4 G/DL (ref 14–18)
HGB BLD-MCNC: 11.6 G/DL (ref 14–18)
INR PPP: 1.22 (ref 0.9–1.1)
MAGNESIUM SERPL-MCNC: 1.5 MG/DL (ref 1.8–2.6)
MCH RBC QN AUTO: 31.4 PG (ref 27–34)
MCH RBC QN AUTO: 31.5 PG (ref 27–34)
MCHC RBC AUTO-ENTMCNC: 32.5 G/DL (ref 32–36)
MCHC RBC AUTO-ENTMCNC: 32.7 G/DL (ref 32–36)
MCV RBC AUTO: 96 FL (ref 80–100)
MCV RBC AUTO: 97 FL (ref 80–100)
PHOSPHATE SERPL-MCNC: 2.6 MG/DL (ref 2.5–4.5)
PLATELET # BLD AUTO: 174 THOU/UL (ref 140–440)
PLATELET # BLD AUTO: 189 THOU/UL (ref 140–440)
PMV BLD AUTO: 9 FL (ref 8.5–12.5)
PMV BLD AUTO: 9.3 FL (ref 8.5–12.5)
POTASSIUM BLD-SCNC: 3.5 MMOL/L (ref 3.5–5)
POTASSIUM BLD-SCNC: 4.2 MMOL/L (ref 3.5–5)
RBC # BLD AUTO: 3.63 MILL/UL (ref 4.4–6.2)
RBC # BLD AUTO: 3.68 MILL/UL (ref 4.4–6.2)
SODIUM SERPL-SCNC: 146 MMOL/L (ref 136–145)
UFH PPP CHRO-ACNC: 0.25 IU/ML (ref 0.3–0.7)
WBC: 6.7 THOU/UL (ref 4–11)
WBC: 7 THOU/UL (ref 4–11)

## 2020-04-16 ASSESSMENT — MIFFLIN-ST. JEOR
SCORE: 1702.03
SCORE: 1697.03
SCORE: 1655.3
SCORE: 1697.03
SCORE: 1655.3
SCORE: 1660.3

## 2020-04-17 LAB
AEROBIC BLOOD CULTURE BOTTLE: NO GROWTH
AEROBIC BLOOD CULTURE BOTTLE: NO GROWTH
ANAEROBIC BLOOD CULTURE BOTTLE: NO GROWTH
ANAEROBIC BLOOD CULTURE BOTTLE: NO GROWTH
ANION GAP SERPL CALCULATED.3IONS-SCNC: 8 MMOL/L (ref 5–18)
BUN SERPL-MCNC: 12 MG/DL (ref 8–22)
CALCIUM SERPL-MCNC: 7 MG/DL (ref 8.5–10.5)
CHLORIDE BLD-SCNC: 113 MMOL/L (ref 98–107)
CO2 SERPL-SCNC: 27 MMOL/L (ref 22–31)
CREAT SERPL-MCNC: 0.97 MG/DL (ref 0.7–1.3)
GFR SERPL CREATININE-BSD FRML MDRD: >60 ML/MIN/1.73M2
GLUCOSE BLD-MCNC: 130 MG/DL (ref 70–125)
GLUCOSE BLDC GLUCOMTR-MCNC: 115 MG/DL (ref 70–139)
GLUCOSE BLDC GLUCOMTR-MCNC: 122 MG/DL (ref 70–139)
GLUCOSE BLDC GLUCOMTR-MCNC: 146 MG/DL (ref 70–139)
GLUCOSE BLDC GLUCOMTR-MCNC: 154 MG/DL (ref 70–139)
HGB BLD-MCNC: 11.2 G/DL (ref 14–18)
MAGNESIUM SERPL-MCNC: 1.5 MG/DL (ref 1.8–2.6)
POTASSIUM BLD-SCNC: 4 MMOL/L (ref 3.5–5)
SODIUM SERPL-SCNC: 148 MMOL/L (ref 136–145)
UFH PPP CHRO-ACNC: 0.24 IU/ML (ref 0.3–0.7)
UFH PPP CHRO-ACNC: 0.4 IU/ML (ref 0.3–0.7)

## 2020-04-18 LAB
GLUCOSE BLDC GLUCOMTR-MCNC: 125 MG/DL (ref 70–139)
GLUCOSE BLDC GLUCOMTR-MCNC: 170 MG/DL (ref 70–139)
HGB BLD-MCNC: 10.6 G/DL (ref 14–18)
MAGNESIUM SERPL-MCNC: 1.7 MG/DL (ref 1.8–2.6)
POTASSIUM BLD-SCNC: 3.6 MMOL/L (ref 3.5–5)
SODIUM SERPL-SCNC: 147 MMOL/L (ref 136–145)

## 2020-04-20 ENCOUNTER — RECORDS - HEALTHEAST (OUTPATIENT)
Dept: LAB | Facility: CLINIC | Age: 70
End: 2020-04-20

## 2020-04-20 LAB
ANION GAP SERPL CALCULATED.3IONS-SCNC: 7 MMOL/L (ref 5–18)
BASOPHILS # BLD AUTO: 0 THOU/UL (ref 0–0.2)
BASOPHILS NFR BLD AUTO: 0 % (ref 0–2)
BUN SERPL-MCNC: 12 MG/DL (ref 8–22)
CALCIUM SERPL-MCNC: 7.8 MG/DL (ref 8.5–10.5)
CHLORIDE BLD-SCNC: 112 MMOL/L (ref 98–107)
CO2 SERPL-SCNC: 27 MMOL/L (ref 22–31)
CREAT SERPL-MCNC: 0.94 MG/DL (ref 0.7–1.3)
EOSINOPHIL # BLD AUTO: 0.3 THOU/UL (ref 0–0.4)
EOSINOPHIL NFR BLD AUTO: 5 % (ref 0–6)
ERYTHROCYTE [DISTWIDTH] IN BLOOD BY AUTOMATED COUNT: 14.4 % (ref 11–14.5)
GFR SERPL CREATININE-BSD FRML MDRD: >60 ML/MIN/1.73M2
GLUCOSE BLD-MCNC: 123 MG/DL (ref 70–125)
HCT VFR BLD AUTO: 32.9 % (ref 40–54)
HGB BLD-MCNC: 10.3 G/DL (ref 14–18)
LYMPHOCYTES # BLD AUTO: 1.3 THOU/UL (ref 0.8–4.4)
LYMPHOCYTES NFR BLD AUTO: 18 % (ref 20–40)
MAGNESIUM SERPL-MCNC: 1.7 MG/DL (ref 1.8–2.6)
MCH RBC QN AUTO: 31.1 PG (ref 27–34)
MCHC RBC AUTO-ENTMCNC: 31.3 G/DL (ref 32–36)
MCV RBC AUTO: 99 FL (ref 80–100)
MONOCYTES # BLD AUTO: 0.8 THOU/UL (ref 0–0.9)
MONOCYTES NFR BLD AUTO: 12 % (ref 2–10)
NEUTROPHILS # BLD AUTO: 4.7 THOU/UL (ref 2–7.7)
NEUTROPHILS NFR BLD AUTO: 65 % (ref 50–70)
PLATELET # BLD AUTO: 195 THOU/UL (ref 140–440)
PMV BLD AUTO: 9.6 FL (ref 8.5–12.5)
POTASSIUM BLD-SCNC: 3.9 MMOL/L (ref 3.5–5)
RBC # BLD AUTO: 3.31 MILL/UL (ref 4.4–6.2)
SODIUM SERPL-SCNC: 146 MMOL/L (ref 136–145)
WBC: 7.3 THOU/UL (ref 4–11)

## 2020-04-21 ENCOUNTER — OFFICE VISIT - HEALTHEAST (OUTPATIENT)
Dept: GERIATRICS | Facility: CLINIC | Age: 70
End: 2020-04-21

## 2020-04-21 DIAGNOSIS — E11.22 TYPE 2 DIABETES MELLITUS WITH CHRONIC KIDNEY DISEASE, WITHOUT LONG-TERM CURRENT USE OF INSULIN, UNSPECIFIED CKD STAGE (H): ICD-10-CM

## 2020-04-21 DIAGNOSIS — I48.0 PAROXYSMAL ATRIAL FIBRILLATION (H): ICD-10-CM

## 2020-04-21 DIAGNOSIS — J96.01 ACUTE RESPIRATORY FAILURE WITH HYPOXEMIA (H): ICD-10-CM

## 2020-04-21 DIAGNOSIS — R65.10 SIRS (SYSTEMIC INFLAMMATORY RESPONSE SYNDROME) (H): ICD-10-CM

## 2020-04-21 DIAGNOSIS — K92.2 ACUTE UPPER GI BLEED: ICD-10-CM

## 2020-04-21 DIAGNOSIS — N20.1 CALCULUS OF URETER: ICD-10-CM

## 2020-04-21 DIAGNOSIS — N17.0 ACUTE RENAL FAILURE WITH TUBULAR NECROSIS (H): ICD-10-CM

## 2020-04-21 DIAGNOSIS — K92.0 HEMATEMESIS, PRESENCE OF NAUSEA NOT SPECIFIED: ICD-10-CM

## 2020-04-22 ENCOUNTER — RECORDS - HEALTHEAST (OUTPATIENT)
Dept: LAB | Facility: CLINIC | Age: 70
End: 2020-04-22

## 2020-04-22 ENCOUNTER — OFFICE VISIT - HEALTHEAST (OUTPATIENT)
Dept: GERIATRICS | Facility: CLINIC | Age: 70
End: 2020-04-22

## 2020-04-22 DIAGNOSIS — Z87.39 HX OF GOUT: ICD-10-CM

## 2020-04-22 DIAGNOSIS — M79.672 LEFT FOOT PAIN: ICD-10-CM

## 2020-04-22 LAB
BASOPHILS # BLD AUTO: 0.1 THOU/UL (ref 0–0.2)
BASOPHILS NFR BLD AUTO: 1 % (ref 0–2)
EOSINOPHIL # BLD AUTO: 0.2 THOU/UL (ref 0–0.4)
EOSINOPHIL NFR BLD AUTO: 2 % (ref 0–6)
LYMPHOCYTES # BLD AUTO: 1.5 THOU/UL (ref 0.8–4.4)
LYMPHOCYTES NFR BLD AUTO: 16 % (ref 20–40)
MONOCYTES # BLD AUTO: 1 THOU/UL (ref 0–0.9)
MONOCYTES NFR BLD AUTO: 11 % (ref 2–10)
NEUTROPHILS # BLD AUTO: 6.8 THOU/UL (ref 2–7.7)
NEUTROPHILS NFR BLD AUTO: 71 % (ref 50–70)
URATE SERPL-MCNC: 5.1 MG/DL (ref 3–8)
WBC: 9.7 THOU/UL (ref 4–11)

## 2020-04-23 ENCOUNTER — OFFICE VISIT - HEALTHEAST (OUTPATIENT)
Dept: GERIATRICS | Facility: CLINIC | Age: 70
End: 2020-04-23

## 2020-04-23 DIAGNOSIS — L03.116 CELLULITIS OF LEFT FOOT: ICD-10-CM

## 2020-04-23 DIAGNOSIS — R65.10 SIRS (SYSTEMIC INFLAMMATORY RESPONSE SYNDROME) (H): ICD-10-CM

## 2020-04-23 DIAGNOSIS — E11.22 TYPE 2 DIABETES MELLITUS WITH CHRONIC KIDNEY DISEASE, WITHOUT LONG-TERM CURRENT USE OF INSULIN, UNSPECIFIED CKD STAGE (H): ICD-10-CM

## 2020-04-23 DIAGNOSIS — K92.2 ACUTE UPPER GI BLEED: ICD-10-CM

## 2020-04-23 DIAGNOSIS — N17.0 ACUTE RENAL FAILURE WITH TUBULAR NECROSIS (H): ICD-10-CM

## 2020-04-23 DIAGNOSIS — I48.0 PAROXYSMAL ATRIAL FIBRILLATION (H): ICD-10-CM

## 2020-04-24 ENCOUNTER — OFFICE VISIT - HEALTHEAST (OUTPATIENT)
Dept: GERIATRICS | Facility: CLINIC | Age: 70
End: 2020-04-24

## 2020-04-24 DIAGNOSIS — K21.9 GASTROESOPHAGEAL REFLUX DISEASE WITHOUT ESOPHAGITIS: ICD-10-CM

## 2020-04-24 DIAGNOSIS — E11.22 TYPE 2 DIABETES MELLITUS WITH CHRONIC KIDNEY DISEASE, WITHOUT LONG-TERM CURRENT USE OF INSULIN, UNSPECIFIED CKD STAGE (H): ICD-10-CM

## 2020-04-24 DIAGNOSIS — R53.1 GENERAL WEAKNESS: ICD-10-CM

## 2020-04-28 ENCOUNTER — OFFICE VISIT - HEALTHEAST (OUTPATIENT)
Dept: GERIATRICS | Facility: CLINIC | Age: 70
End: 2020-04-28

## 2020-04-28 DIAGNOSIS — K92.2 ACUTE UPPER GI BLEED: ICD-10-CM

## 2020-04-28 DIAGNOSIS — L03.116 CELLULITIS OF LEFT FOOT: ICD-10-CM

## 2020-04-28 DIAGNOSIS — N17.0 ACUTE RENAL FAILURE WITH TUBULAR NECROSIS (H): ICD-10-CM

## 2020-04-28 DIAGNOSIS — I48.0 PAROXYSMAL ATRIAL FIBRILLATION (H): ICD-10-CM

## 2020-04-28 DIAGNOSIS — E11.22 TYPE 2 DIABETES MELLITUS WITH CHRONIC KIDNEY DISEASE, WITHOUT LONG-TERM CURRENT USE OF INSULIN, UNSPECIFIED CKD STAGE (H): ICD-10-CM

## 2020-04-28 DIAGNOSIS — R65.10 SIRS (SYSTEMIC INFLAMMATORY RESPONSE SYNDROME) (H): ICD-10-CM

## 2020-04-29 ENCOUNTER — COMMUNICATION - HEALTHEAST (OUTPATIENT)
Dept: UROLOGY | Facility: CLINIC | Age: 70
End: 2020-04-29

## 2020-04-30 ENCOUNTER — OFFICE VISIT - HEALTHEAST (OUTPATIENT)
Dept: GERIATRICS | Facility: CLINIC | Age: 70
End: 2020-04-30

## 2020-04-30 ENCOUNTER — COMMUNICATION - HEALTHEAST (OUTPATIENT)
Dept: GERIATRICS | Facility: CLINIC | Age: 70
End: 2020-04-30

## 2020-04-30 DIAGNOSIS — K92.2 ACUTE UPPER GI BLEED: ICD-10-CM

## 2020-04-30 DIAGNOSIS — I48.0 PAROXYSMAL ATRIAL FIBRILLATION (H): ICD-10-CM

## 2020-04-30 DIAGNOSIS — E11.22 TYPE 2 DIABETES MELLITUS WITH CHRONIC KIDNEY DISEASE, WITHOUT LONG-TERM CURRENT USE OF INSULIN, UNSPECIFIED CKD STAGE (H): ICD-10-CM

## 2020-04-30 DIAGNOSIS — R65.10 SIRS (SYSTEMIC INFLAMMATORY RESPONSE SYNDROME) (H): ICD-10-CM

## 2020-04-30 DIAGNOSIS — L03.116 CELLULITIS OF LEFT FOOT: ICD-10-CM

## 2020-04-30 DIAGNOSIS — N17.0 ACUTE RENAL FAILURE WITH TUBULAR NECROSIS (H): ICD-10-CM

## 2020-05-01 ENCOUNTER — OFFICE VISIT - HEALTHEAST (OUTPATIENT)
Dept: UROLOGY | Facility: CLINIC | Age: 70
End: 2020-05-01

## 2020-05-01 ENCOUNTER — AMBULATORY - HEALTHEAST (OUTPATIENT)
Dept: GERIATRICS | Facility: CLINIC | Age: 70
End: 2020-05-01

## 2020-05-01 ENCOUNTER — AMBULATORY - HEALTHEAST (OUTPATIENT)
Dept: UROLOGY | Facility: CLINIC | Age: 70
End: 2020-05-01

## 2020-05-01 DIAGNOSIS — N20.1 CALCULUS OF URETER: ICD-10-CM

## 2020-05-01 RX ORDER — ASPIRIN 81 MG/1
81 TABLET, CHEWABLE ORAL DAILY
Status: SHIPPED | COMMUNITY
Start: 2020-05-01 | End: 2024-07-26

## 2020-05-11 ENCOUNTER — AMBULATORY - HEALTHEAST (OUTPATIENT)
Dept: SURGERY | Facility: CLINIC | Age: 70
End: 2020-05-11

## 2020-05-11 DIAGNOSIS — Z11.59 ENCOUNTER FOR SCREENING FOR OTHER VIRAL DISEASES: ICD-10-CM

## 2020-05-12 ENCOUNTER — OFFICE VISIT - HEALTHEAST (OUTPATIENT)
Dept: FAMILY MEDICINE | Facility: CLINIC | Age: 70
End: 2020-05-12

## 2020-05-12 DIAGNOSIS — Z11.59 ENCOUNTER FOR SCREENING FOR OTHER VIRAL DISEASES: ICD-10-CM

## 2020-05-14 ENCOUNTER — SURGERY - HEALTHEAST (OUTPATIENT)
Dept: SURGERY | Facility: CLINIC | Age: 70
End: 2020-05-14

## 2020-05-14 ENCOUNTER — ANESTHESIA - HEALTHEAST (OUTPATIENT)
Dept: SURGERY | Facility: CLINIC | Age: 70
End: 2020-05-14

## 2020-05-14 ASSESSMENT — MIFFLIN-ST. JEOR: SCORE: 1593.61

## 2020-05-20 ENCOUNTER — RECORDS - HEALTHEAST (OUTPATIENT)
Dept: LAB | Facility: CLINIC | Age: 70
End: 2020-05-20

## 2020-05-20 LAB
ALBUMIN SERPL-MCNC: 3.5 G/DL (ref 3.5–5)
ALP SERPL-CCNC: 64 U/L (ref 45–120)
ALT SERPL W P-5'-P-CCNC: 13 U/L (ref 0–45)
ANION GAP SERPL CALCULATED.3IONS-SCNC: 10 MMOL/L (ref 5–18)
AST SERPL W P-5'-P-CCNC: 9 U/L (ref 0–40)
BILIRUB SERPL-MCNC: 0.5 MG/DL (ref 0–1)
BUN SERPL-MCNC: 11 MG/DL (ref 8–22)
CALCIUM SERPL-MCNC: 9 MG/DL (ref 8.5–10.5)
CHLORIDE BLD-SCNC: 110 MMOL/L (ref 98–107)
CO2 SERPL-SCNC: 25 MMOL/L (ref 22–31)
CREAT SERPL-MCNC: 0.94 MG/DL (ref 0.7–1.3)
GFR SERPL CREATININE-BSD FRML MDRD: >60 ML/MIN/1.73M2
GLUCOSE BLD-MCNC: 144 MG/DL (ref 70–125)
POTASSIUM BLD-SCNC: 4.8 MMOL/L (ref 3.5–5)
PROT SERPL-MCNC: 6 G/DL (ref 6–8)
SODIUM SERPL-SCNC: 145 MMOL/L (ref 136–145)

## 2020-05-28 ENCOUNTER — AMBULATORY - HEALTHEAST (OUTPATIENT)
Dept: UROLOGY | Facility: CLINIC | Age: 70
End: 2020-05-28

## 2020-05-28 DIAGNOSIS — N20.1 CALCULUS OF URETER: ICD-10-CM

## 2020-06-22 ENCOUNTER — RECORDS - HEALTHEAST (OUTPATIENT)
Dept: RADIOLOGY | Facility: CLINIC | Age: 70
End: 2020-06-22

## 2020-06-23 ENCOUNTER — OFFICE VISIT - HEALTHEAST (OUTPATIENT)
Dept: UROLOGY | Facility: CLINIC | Age: 70
End: 2020-06-23

## 2020-06-23 DIAGNOSIS — N20.0 CALCULUS OF KIDNEY: ICD-10-CM

## 2020-06-23 RX ORDER — ARIPIPRAZOLE 2 MG/1
2 TABLET ORAL AT BEDTIME
Status: SHIPPED | COMMUNITY
Start: 2020-05-01

## 2020-07-02 ENCOUNTER — HOME CARE/HOSPICE - HEALTHEAST (OUTPATIENT)
Dept: HOME HEALTH SERVICES | Facility: HOME HEALTH | Age: 70
End: 2020-07-02

## 2021-01-25 ENCOUNTER — COMMUNICATION - HEALTHEAST (OUTPATIENT)
Dept: CARDIOLOGY | Facility: CLINIC | Age: 71
End: 2021-01-25

## 2021-01-25 DIAGNOSIS — I48.0 PAROXYSMAL ATRIAL FIBRILLATION (H): ICD-10-CM

## 2021-01-29 ENCOUNTER — AMBULATORY - HEALTHEAST (OUTPATIENT)
Dept: CARDIOLOGY | Facility: CLINIC | Age: 71
End: 2021-01-29

## 2021-01-29 ENCOUNTER — RECORDS - HEALTHEAST (OUTPATIENT)
Dept: ADMINISTRATIVE | Facility: OTHER | Age: 71
End: 2021-01-29

## 2021-02-04 ENCOUNTER — COMMUNICATION - HEALTHEAST (OUTPATIENT)
Dept: CARDIOLOGY | Facility: CLINIC | Age: 71
End: 2021-02-04

## 2021-02-05 ENCOUNTER — COMMUNICATION - HEALTHEAST (OUTPATIENT)
Dept: ANTICOAGULATION | Facility: CLINIC | Age: 71
End: 2021-02-05

## 2021-02-05 ENCOUNTER — OFFICE VISIT - HEALTHEAST (OUTPATIENT)
Dept: CARDIOLOGY | Facility: CLINIC | Age: 71
End: 2021-02-05

## 2021-02-05 DIAGNOSIS — Z91.89 INTERMEDIATE RISK FOR CORONARY ARTERY DISEASE: ICD-10-CM

## 2021-02-05 DIAGNOSIS — I48.0 PAROXYSMAL ATRIAL FIBRILLATION (H): ICD-10-CM

## 2021-02-05 DIAGNOSIS — I25.10 CORONARY ARTERY CALCIFICATION SEEN ON CT SCAN: ICD-10-CM

## 2021-02-05 DIAGNOSIS — R55 SYNCOPE AND COLLAPSE: ICD-10-CM

## 2021-02-05 ASSESSMENT — MIFFLIN-ST. JEOR: SCORE: 1687.05

## 2021-02-08 ENCOUNTER — COMMUNICATION - HEALTHEAST (OUTPATIENT)
Dept: CARDIOLOGY | Facility: CLINIC | Age: 71
End: 2021-02-08

## 2021-02-12 ENCOUNTER — HOSPITAL ENCOUNTER (OUTPATIENT)
Dept: CARDIOLOGY | Facility: HOSPITAL | Age: 71
Discharge: HOME | End: 2021-02-12
Attending: INTERNAL MEDICINE

## 2021-02-12 DIAGNOSIS — I48.0 PAROXYSMAL ATRIAL FIBRILLATION (H): ICD-10-CM

## 2021-02-12 DIAGNOSIS — R55 SYNCOPE AND COLLAPSE: ICD-10-CM

## 2021-02-17 ENCOUNTER — COMMUNICATION - HEALTHEAST (OUTPATIENT)
Dept: CARDIOLOGY | Facility: CLINIC | Age: 71
End: 2021-02-17

## 2021-02-18 ENCOUNTER — COMMUNICATION - HEALTHEAST (OUTPATIENT)
Dept: ANTICOAGULATION | Facility: CLINIC | Age: 71
End: 2021-02-18

## 2021-02-18 ENCOUNTER — AMBULATORY - HEALTHEAST (OUTPATIENT)
Dept: CARDIOLOGY | Facility: CLINIC | Age: 71
End: 2021-02-18

## 2021-02-18 DIAGNOSIS — I48.0 PAROXYSMAL ATRIAL FIBRILLATION (H): ICD-10-CM

## 2021-02-18 LAB — POC INR - HE - HISTORICAL: 1 (ref 0.9–1.1)

## 2021-02-19 ENCOUNTER — COMMUNICATION - HEALTHEAST (OUTPATIENT)
Dept: CARDIOLOGY | Facility: CLINIC | Age: 71
End: 2021-02-19

## 2021-02-22 ENCOUNTER — COMMUNICATION - HEALTHEAST (OUTPATIENT)
Dept: CARDIOLOGY | Facility: CLINIC | Age: 71
End: 2021-02-22

## 2021-02-22 ENCOUNTER — COMMUNICATION - HEALTHEAST (OUTPATIENT)
Dept: ANTICOAGULATION | Facility: CLINIC | Age: 71
End: 2021-02-22

## 2021-02-22 ENCOUNTER — AMBULATORY - HEALTHEAST (OUTPATIENT)
Dept: CARDIOLOGY | Facility: CLINIC | Age: 71
End: 2021-02-22

## 2021-02-22 DIAGNOSIS — I48.0 PAROXYSMAL ATRIAL FIBRILLATION (H): ICD-10-CM

## 2021-02-22 LAB — POC INR - HE - HISTORICAL: 2 (ref 0.9–1.1)

## 2021-02-24 ENCOUNTER — COMMUNICATION - HEALTHEAST (OUTPATIENT)
Dept: CARDIOLOGY | Facility: CLINIC | Age: 71
End: 2021-02-24

## 2021-02-25 ENCOUNTER — AMBULATORY - HEALTHEAST (OUTPATIENT)
Dept: CARDIOLOGY | Facility: CLINIC | Age: 71
End: 2021-02-25

## 2021-02-25 ENCOUNTER — COMMUNICATION - HEALTHEAST (OUTPATIENT)
Dept: ANTICOAGULATION | Facility: CLINIC | Age: 71
End: 2021-02-25

## 2021-02-25 DIAGNOSIS — I48.0 PAROXYSMAL ATRIAL FIBRILLATION (H): ICD-10-CM

## 2021-02-25 LAB — POC INR - HE - HISTORICAL: 2.3 (ref 0.9–1.1)

## 2021-03-01 ENCOUNTER — COMMUNICATION - HEALTHEAST (OUTPATIENT)
Dept: CARDIOLOGY | Facility: CLINIC | Age: 71
End: 2021-03-01

## 2021-03-02 ENCOUNTER — AMBULATORY - HEALTHEAST (OUTPATIENT)
Dept: CARDIOLOGY | Facility: CLINIC | Age: 71
End: 2021-03-02

## 2021-03-02 ENCOUNTER — COMMUNICATION - HEALTHEAST (OUTPATIENT)
Dept: ANTICOAGULATION | Facility: CLINIC | Age: 71
End: 2021-03-02

## 2021-03-02 DIAGNOSIS — I48.0 PAROXYSMAL ATRIAL FIBRILLATION (H): ICD-10-CM

## 2021-03-02 LAB — POC INR - HE - HISTORICAL: 2.7 (ref 0.9–1.1)

## 2021-03-06 ENCOUNTER — COMMUNICATION - HEALTHEAST (OUTPATIENT)
Dept: CARDIOLOGY | Facility: CLINIC | Age: 71
End: 2021-03-06

## 2021-03-06 DIAGNOSIS — I48.0 PAROXYSMAL ATRIAL FIBRILLATION (H): ICD-10-CM

## 2021-03-08 ENCOUNTER — COMMUNICATION - HEALTHEAST (OUTPATIENT)
Dept: CARDIOLOGY | Facility: CLINIC | Age: 71
End: 2021-03-08

## 2021-03-09 ENCOUNTER — AMBULATORY - HEALTHEAST (OUTPATIENT)
Dept: CARDIOLOGY | Facility: CLINIC | Age: 71
End: 2021-03-09

## 2021-03-09 ENCOUNTER — COMMUNICATION - HEALTHEAST (OUTPATIENT)
Dept: ANTICOAGULATION | Facility: CLINIC | Age: 71
End: 2021-03-09

## 2021-03-09 DIAGNOSIS — I48.0 PAROXYSMAL ATRIAL FIBRILLATION (H): ICD-10-CM

## 2021-03-09 LAB — POC INR - HE - HISTORICAL: 2.5 (ref 0.9–1.1)

## 2021-03-09 RX ORDER — WARFARIN SODIUM 5 MG/1
5-7.5 TABLET ORAL DAILY
Qty: 120 TABLET | Refills: 1 | Status: SHIPPED | OUTPATIENT
Start: 2021-03-09 | End: 2021-10-14

## 2021-03-22 ENCOUNTER — COMMUNICATION - HEALTHEAST (OUTPATIENT)
Dept: CARDIOLOGY | Facility: CLINIC | Age: 71
End: 2021-03-22

## 2021-03-23 ENCOUNTER — AMBULATORY - HEALTHEAST (OUTPATIENT)
Dept: CARDIOLOGY | Facility: CLINIC | Age: 71
End: 2021-03-23

## 2021-03-23 ENCOUNTER — COMMUNICATION - HEALTHEAST (OUTPATIENT)
Dept: ANTICOAGULATION | Facility: CLINIC | Age: 71
End: 2021-03-23

## 2021-03-23 DIAGNOSIS — I48.0 PAROXYSMAL ATRIAL FIBRILLATION (H): ICD-10-CM

## 2021-03-23 LAB — POC INR - HE - HISTORICAL: 1.7 (ref 0.9–1.1)

## 2021-03-28 ENCOUNTER — COMMUNICATION - HEALTHEAST (OUTPATIENT)
Dept: SCHEDULING | Facility: CLINIC | Age: 71
End: 2021-03-28

## 2021-03-30 ENCOUNTER — HOSPITAL ENCOUNTER (OUTPATIENT)
Dept: CT IMAGING | Facility: CLINIC | Age: 71
Discharge: HOME | End: 2021-03-30
Attending: INTERNAL MEDICINE

## 2021-03-30 ENCOUNTER — RECORDS - HEALTHEAST (OUTPATIENT)
Dept: LAB | Facility: CLINIC | Age: 71
End: 2021-03-30

## 2021-03-30 ENCOUNTER — COMMUNICATION - HEALTHEAST (OUTPATIENT)
Dept: ANTICOAGULATION | Facility: CLINIC | Age: 71
End: 2021-03-30

## 2021-03-30 DIAGNOSIS — I48.0 PAROXYSMAL ATRIAL FIBRILLATION (H): ICD-10-CM

## 2021-03-31 ENCOUNTER — OFFICE VISIT - HEALTHEAST (OUTPATIENT)
Dept: GERIATRICS | Facility: CLINIC | Age: 71
End: 2021-03-31

## 2021-03-31 DIAGNOSIS — N10 ACUTE PYELONEPHRITIS: ICD-10-CM

## 2021-03-31 DIAGNOSIS — R53.1 GENERAL WEAKNESS: ICD-10-CM

## 2021-03-31 DIAGNOSIS — R65.10 SIRS (SYSTEMIC INFLAMMATORY RESPONSE SYNDROME) (H): ICD-10-CM

## 2021-03-31 DIAGNOSIS — I48.0 PAROXYSMAL ATRIAL FIBRILLATION (H): ICD-10-CM

## 2021-03-31 DIAGNOSIS — E11.65 TYPE 2 DIABETES MELLITUS WITH HYPERGLYCEMIA, UNSPECIFIED WHETHER LONG TERM INSULIN USE (H): ICD-10-CM

## 2021-03-31 DIAGNOSIS — I10 ESSENTIAL HYPERTENSION: ICD-10-CM

## 2021-04-01 ENCOUNTER — COMMUNICATION - HEALTHEAST (OUTPATIENT)
Dept: ANTICOAGULATION | Facility: CLINIC | Age: 71
End: 2021-04-01

## 2021-04-01 DIAGNOSIS — I48.0 PAROXYSMAL ATRIAL FIBRILLATION (H): ICD-10-CM

## 2021-04-01 LAB
ANION GAP SERPL CALCULATED.3IONS-SCNC: 9 MMOL/L (ref 5–18)
BUN SERPL-MCNC: 11 MG/DL (ref 8–22)
CALCIUM SERPL-MCNC: 9.1 MG/DL (ref 8.5–10.5)
CHLORIDE BLD-SCNC: 110 MMOL/L (ref 98–107)
CO2 SERPL-SCNC: 25 MMOL/L (ref 22–31)
CREAT SERPL-MCNC: 0.73 MG/DL (ref 0.7–1.3)
GFR SERPL CREATININE-BSD FRML MDRD: >60 ML/MIN/1.73M2
GLUCOSE BLD-MCNC: 71 MG/DL (ref 70–125)
INR PPP: 1.9 (ref 0.9–1.1)
POTASSIUM BLD-SCNC: 4.1 MMOL/L (ref 3.5–5)
SODIUM SERPL-SCNC: 144 MMOL/L (ref 136–145)

## 2021-04-02 ENCOUNTER — OFFICE VISIT - HEALTHEAST (OUTPATIENT)
Dept: GERIATRICS | Facility: CLINIC | Age: 71
End: 2021-04-02

## 2021-04-02 DIAGNOSIS — N20.0 NEPHROLITHIASIS: ICD-10-CM

## 2021-04-02 DIAGNOSIS — G47.33 OBSTRUCTIVE SLEEP APNEA: ICD-10-CM

## 2021-04-02 DIAGNOSIS — E11.9 TYPE 2 DIABETES MELLITUS WITHOUT COMPLICATION, WITHOUT LONG-TERM CURRENT USE OF INSULIN (H): ICD-10-CM

## 2021-04-02 DIAGNOSIS — N12 PYELONEPHRITIS: ICD-10-CM

## 2021-04-02 ASSESSMENT — MIFFLIN-ST. JEOR: SCORE: 1713.36

## 2021-04-05 ENCOUNTER — COMMUNICATION - HEALTHEAST (OUTPATIENT)
Dept: ANTICOAGULATION | Facility: CLINIC | Age: 71
End: 2021-04-05

## 2021-04-05 ENCOUNTER — OFFICE VISIT - HEALTHEAST (OUTPATIENT)
Dept: GERIATRICS | Facility: CLINIC | Age: 71
End: 2021-04-05

## 2021-04-05 DIAGNOSIS — F41.8 DEPRESSION WITH ANXIETY: ICD-10-CM

## 2021-04-05 DIAGNOSIS — N12 PYELONEPHRITIS: ICD-10-CM

## 2021-04-05 DIAGNOSIS — N20.1 CALCULUS OF URETER: ICD-10-CM

## 2021-04-05 DIAGNOSIS — I48.0 PAROXYSMAL ATRIAL FIBRILLATION (H): ICD-10-CM

## 2021-04-05 DIAGNOSIS — E11.9 DIET-CONTROLLED DIABETES MELLITUS (H): ICD-10-CM

## 2021-04-05 LAB — INR PPP: 2.5 (ref 0.9–1.1)

## 2021-04-05 ASSESSMENT — MIFFLIN-ST. JEOR: SCORE: 1713.36

## 2021-04-07 ENCOUNTER — RECORDS - HEALTHEAST (OUTPATIENT)
Dept: ADMINISTRATIVE | Facility: OTHER | Age: 71
End: 2021-04-07

## 2021-04-07 ENCOUNTER — OFFICE VISIT - HEALTHEAST (OUTPATIENT)
Dept: GERIATRICS | Facility: CLINIC | Age: 71
End: 2021-04-07

## 2021-04-07 ENCOUNTER — COMMUNICATION - HEALTHEAST (OUTPATIENT)
Dept: ANTICOAGULATION | Facility: CLINIC | Age: 71
End: 2021-04-07

## 2021-04-07 DIAGNOSIS — N12 PYELONEPHRITIS: ICD-10-CM

## 2021-04-07 DIAGNOSIS — F60.89 MIXED PERSONALITY DISORDER IN ADULT (H): ICD-10-CM

## 2021-04-07 DIAGNOSIS — K80.80 BILIARY CALCULUS OF OTHER SITE WITHOUT OBSTRUCTION: ICD-10-CM

## 2021-04-07 DIAGNOSIS — I48.0 PAROXYSMAL ATRIAL FIBRILLATION (H): ICD-10-CM

## 2021-04-07 DIAGNOSIS — H40.9 GLAUCOMA, UNSPECIFIED GLAUCOMA TYPE, UNSPECIFIED LATERALITY: ICD-10-CM

## 2021-04-07 LAB — INR PPP: 2.8 (ref 0.9–1.1)

## 2021-04-08 ENCOUNTER — AMBULATORY - HEALTHEAST (OUTPATIENT)
Dept: GERIATRICS | Facility: CLINIC | Age: 71
End: 2021-04-08

## 2021-04-15 ENCOUNTER — RECORDS - HEALTHEAST (OUTPATIENT)
Dept: LAB | Facility: CLINIC | Age: 71
End: 2021-04-15

## 2021-04-15 LAB — VIT B12 SERPL-MCNC: 881 PG/ML (ref 213–816)

## 2021-04-20 ENCOUNTER — COMMUNICATION - HEALTHEAST (OUTPATIENT)
Dept: SCHEDULING | Facility: CLINIC | Age: 71
End: 2021-04-20

## 2021-04-20 DIAGNOSIS — I48.0 PAROXYSMAL ATRIAL FIBRILLATION (H): ICD-10-CM

## 2021-04-20 LAB — INR PPP: 3.4 (ref 0.9–1.1)

## 2021-04-27 ENCOUNTER — COMMUNICATION - HEALTHEAST (OUTPATIENT)
Dept: SCHEDULING | Facility: CLINIC | Age: 71
End: 2021-04-27

## 2021-04-27 DIAGNOSIS — I48.0 PAROXYSMAL ATRIAL FIBRILLATION (H): ICD-10-CM

## 2021-04-27 LAB — INR PPP: 2.7 (ref 0.9–1.1)

## 2021-05-04 ENCOUNTER — COMMUNICATION - HEALTHEAST (OUTPATIENT)
Dept: ADMINISTRATIVE | Facility: CLINIC | Age: 71
End: 2021-05-04

## 2021-05-04 DIAGNOSIS — I48.0 PAROXYSMAL ATRIAL FIBRILLATION (H): ICD-10-CM

## 2021-05-04 LAB — INR PPP: 2.3 (ref 0.9–1.1)

## 2021-05-07 ENCOUNTER — COMMUNICATION - HEALTHEAST (OUTPATIENT)
Dept: ADMINISTRATIVE | Facility: CLINIC | Age: 71
End: 2021-05-07

## 2021-05-27 ENCOUNTER — RECORDS - HEALTHEAST (OUTPATIENT)
Dept: ADMINISTRATIVE | Facility: CLINIC | Age: 71
End: 2021-05-27

## 2021-05-28 ENCOUNTER — RECORDS - HEALTHEAST (OUTPATIENT)
Dept: ADMINISTRATIVE | Facility: CLINIC | Age: 71
End: 2021-05-28

## 2021-05-28 ENCOUNTER — AMBULATORY - HEALTHEAST (OUTPATIENT)
Dept: CARDIOLOGY | Facility: CLINIC | Age: 71
End: 2021-05-28

## 2021-05-28 DIAGNOSIS — I48.0 PAROXYSMAL ATRIAL FIBRILLATION (H): ICD-10-CM

## 2021-05-29 ENCOUNTER — RECORDS - HEALTHEAST (OUTPATIENT)
Dept: ADMINISTRATIVE | Facility: CLINIC | Age: 71
End: 2021-05-29

## 2021-05-30 ENCOUNTER — RECORDS - HEALTHEAST (OUTPATIENT)
Dept: ADMINISTRATIVE | Facility: CLINIC | Age: 71
End: 2021-05-30

## 2021-05-31 ENCOUNTER — RECORDS - HEALTHEAST (OUTPATIENT)
Dept: ADMINISTRATIVE | Facility: CLINIC | Age: 71
End: 2021-05-31

## 2021-06-01 ENCOUNTER — RECORDS - HEALTHEAST (OUTPATIENT)
Dept: ADMINISTRATIVE | Facility: CLINIC | Age: 71
End: 2021-06-01

## 2021-06-02 ENCOUNTER — COMMUNICATION - HEALTHEAST (OUTPATIENT)
Dept: ANTICOAGULATION | Facility: CLINIC | Age: 71
End: 2021-06-02

## 2021-06-03 ENCOUNTER — AMBULATORY - HEALTHEAST (OUTPATIENT)
Dept: ANTICOAGULATION | Facility: CLINIC | Age: 71
End: 2021-06-03

## 2021-06-04 VITALS — HEIGHT: 72 IN | WEIGHT: 203 LBS | BODY MASS INDEX: 27.5 KG/M2

## 2021-06-04 VITALS — BODY MASS INDEX: 25.6 KG/M2 | HEIGHT: 72 IN | WEIGHT: 189 LBS

## 2021-06-05 VITALS — BODY MASS INDEX: 27.37 KG/M2 | HEIGHT: 72 IN | WEIGHT: 201.8 LBS | BODY MASS INDEX: 27.37 KG/M2

## 2021-06-06 NOTE — PROGRESS NOTES
Pt referred by general cardiologist for possible LAAC consult.  Pt's chart screened for possible candidacy.   Pt does not currently meet the CMS guidelines for implant.  Provider notified.

## 2021-06-07 NOTE — PROGRESS NOTES
Spiritual Care Note    Spiritual Assessment:      visited patient due to length of stay.  Patient shared about medical condition and history, and was upbeat and optimistic about his recovery.    His wife and two daughters are source of support.  He expressed concern for his wife, who is alone at home while he is in the hospital.     Patient comes from Buddhism garcía background and derives meaning, purpose, and comfort from garcía. He shared about his garcía journey, and his conversion to Catholicism when he got . He has had most recent connection to Infogram but has not been going in recent years due to his wife's physical limitations. He stated that he has had a hard time praying lately, that he would start saying the Our Father but was unable to finish it. He says he has never lost his garcía, and strongly believes in God's goodness and love.     Care Provided:     Introduced self and role of Spiritual Care     Empathic listening and presence     Helped patient in processing of emotions     Guided patient in exploration of beliefs     Offered prayer     Plan of Care: A  will continue to visit as able or per request by patient/family/staff.      Chaplain Zeke Hamilton MDIV, [facility]

## 2021-06-07 NOTE — PROGRESS NOTES
Clinical Nutrition Therapy Follow Up Note    S: pt extubated, diet advancing per MD.     Nutrition History:  Food allergies or intolerances: nkfa     Current Nutrition Prescription:   Diet:   Diet Supplements:   IV dextrose or Fluids:sodium chloride 0.9%, Last Rate: 75 mL/hr (04/15/20 0221)      Current Nutrition Intake:  Pt's diet advanced this morning. No po intake documented.     Anthropometrics:  Height: 6' (182.9 cm)  Admission weight: 199 lb 4.7 oz   Weight: 192 lb 3.9 oz (87.2 kg)    RD Nutrition Focused Physical Exam:  The patient has the following physical signs which could indicate malnutrition: did not assess due to social distancing protocol.     GI Status/Output:   GI symptoms include:WDL per nurse  Bowel Sounds hypoactive per nurse  Last BM: not noted     Skin/Wound:  Eugenio Scale Score: 13  No wounds noted.    Medications:  Medications reviewed.    Labs:  Labs reviewed  Na 148 H  K 3.3 L   Mg 1.3 L  Phos 3.3 WNL    Estimated Nutrition Needs:  Assessment weight is 90 kg, current weight    Energy Needs: 6288-4882 kcals daily, 25-30 kcal/kg  Protein Needs: 135-180 g daily, 1.5-2 g/kg.  Fluid Needs: 2250+ mls daily, 25+ mls/kg    Malnutrition: Not noted    Nutrition Risk Level: moderate risk    Nutrition dx:   Swallowing difficulty r/t current illness  as evidenced by mechanical ventilation. - resolved     Goal:   Meet estimated nutrition needs within 3-5 days.  - met     Intervention:   Encourage PO intake     Monitoring/Evaluation:   Plan of care   Weight/diet history PTA.     Electronically signed by:  Kamala Plasencia RD

## 2021-06-07 NOTE — PROGRESS NOTES
Diabetes Care Screen Note  Situation:  Diabetes blood glucose screen    Background:  Noted blood sugars remain elevated above hospital goal of < 180 for improved outcomes.  Home PTA diabetes meds:  Glipizide ER 5 mg daily  Metformin 1000 mg two times a day  Actos 30 mg daily  Current Inpatient diabetes meds:  10 units Lantus in am  Standard Novolog correction scale at meals  Values;  A1C: 7.4 Sept, need current          GFR:44        BMI:26.8       Intake documented 75-80%    Assessment: Will need mealtime medication as eating now      Recommendations:  Diabetes Care in the Hospital: ADA Standards of Medical Care in Diabetes 2018  See Recommendations for Management of Hospitalized Patient link accessible from the Diabetes Management Order Set for dosing guidelines: Basal, nutrition and correction insulin    An insulin regimen with basal, nutritional, and correction components is the preferred treatment for non critically ill hospitalized patient with good nutritional intake.    [facility] BG goals < 180 for improved outcomes. There is impaired immune function at BG levels above 180 mg/dl.    Thanks.     Jocy Spencer RN, Certified Diabetes Care and   [address] [address]  [email or web address]  [email or web address]   Office: [phone]  Pager: [phone]                                                     no

## 2021-06-07 NOTE — PROGRESS NOTES
Received call from Nanette with admissions at [facility] and they anticipate bed available for pt at [place].  REJI Garza  [facility]  4/16/2020   3:49 PM

## 2021-06-07 NOTE — PROGRESS NOTES
Pharmacy Consult: Vancomycin Dosing    Pharmacist consulted to dose vancomycin for Rakesh Samuels, a 69 y.o. male.    Ordering provider: Dr. Hernandez    Indication for vancomycin therapy: Sepsis    Goal Trough Range:  15-20 mcg/mL based on indication    Other current antimicrobials              vancomycin 1,250 mg in sodium chloride 0.9% 500 mL (VANCOCIN)  Once          piperacillin-tazobactam 3.375 g in NaCl 0.9 % 50 mL (MINI-BAG Plus) (ZOSYN)  Every 12 hours                   Subjective/Objective:    Patient was admitted for Acute renal failure (ARF) (H) on 4/12/2020    Height: 6' (1.829 m)    Actual Body Weight (ABW): 90.4 kg (199 lb 4.7 oz)    Ideal body weight: 77.6 kg (171 lb 1.2 oz)  Adjusted ideal body weight: 82.7 kg (182 lb 5.8 oz)    BMI: Body mass index is 27.03 kg/m .    Allergies   Allergen Reactions     Adhesive Tape-Silicones        Patient Active Problem List   Diagnosis     SIRS (systemic inflammatory response syndrome) (H)     Adrenal incidentaloma (H)     Diet-controlled diabetes mellitus (H)     Pericardial effusion     Lactic acidosis     Diabetes mellitus, type 2 (H)     Paroxysmal atrial fibrillation (H)     Calculus of ureter     Acute renal failure, unspecified acute renal failure type (H)     Acute renal failure (ARF) (H)     ATN (acute tubular necrosis) (H)     Sepsis due to urinary tract infection (H)     Septic shock (H)     Metformin overdose of undetermined intent, initial encounter     Acute respiratory failure with hypoxemia (H)     Metabolic acidosis     Hyperkalemia     Hematemesis, presence of nausea not specified    Past Medical History:   Diagnosis Date     Diabetes mellitus, type 2 (H) 4/12/2020     Paroxysmal atrial fibrillation (H) 2/18/2020        Temp Readings from Current Encounter:     04/13/20 0500 04/13/20 0600 04/13/20 0700   Temp: 99.7  F (37.6  C) 99.5  F (37.5  C) 99.5  F (37.5  C)     Net Intake/Output (last 24 hours):  I/O last 3 completed shifts:  In: 4476  [I.V.:6035; Blood:650; IV Piggyback:50]  Out: 5076 [Urine:3825; Emesis/NG output:100; Other:300; Blood:851]    Recent Labs     04/12/20  0905 04/12/20  1050 04/12/20  1428 04/12/20  1429 04/12/20  1429 04/12/20  1757 04/12/20  2351 04/12/20  2351 04/13/20  0553 04/13/20  0553   WBC 17.6*  --   --   --  16.1*  --   --   --   --  12.6*   NEUTROABS 14.5*  --   --   --   --   --   --   --   --   --    LACTICACID 15.0*  --   --  17.3*  --   --   --  5.3* 1.7  --    BUN 72* 63* 66*  --   --  34* 32*  --   --  34*   CREATININE 6.14* 5.23* 5.17*  --   --  2.86* 2.92*  --   --  2.98*     Estimated Creatinine Clearance: 30.3 mL/min (A) (by C-G formula based on SCr of 2.98 mg/dL (H)).    No results for input(s): CULTURE in the last 72 hours.    No results found for any visits on 04/12/20.    No results for input(s): VANCOMYCIN in the last 168 hours.    Vancomycin administrations: (last 120 hours)     Date/Time Action Medication Dose Rate    04/13/20 1016 New Bag    vancomycin 1,250 mg in sodium chloride 0.9% 500 mL (VANCOCIN) 1,250 mg 262.5 mL/hr    04/12/20 1100 New Bag    vancomycin 1,500 mg in sodium chloride 0.9% 500 mL (VANCOCIN) 1,500 mg 176.7 mL/hr          Assessment/Plan:    Pharmacist consulted to dose vancomycin for Sepsis, goal trough range 15-20 mcg/mL.  1. Received initial dose vancomycin 1500 mg (16 mg/kg) 4/12am, HD 4/12pm without redosing post-HD. 4/13 -- Cre improved, no further plans for HD at this time. Administer 1250 mg (13.8 mg/kg) x1. Anticipate further improvement in renal function, reassess SCr tomorrow to determine further plan.  2. No vancomycin level available for assessment.  3. Pharmacist will plan to check a vancomycin trough level in the next 1-2 days depending on renal function.  4. Pharmacist will continue to follow.    Thank you for the consult.  Anisa Benjamin, PharmD 4/13/2020 12:15 PM

## 2021-06-07 NOTE — H&P
Procedure Name: EGD - Anemia; Upper GI Bleeding  Date/Time: 4/13/2020 1:14 PM    Verbal consent obtained?: Yes  Written consent obtained?: Yes  Risks and benefits: Risks, benefits and alternatives were discussed  Consent given by: spouse  Expected level of sedation: moderate  ASA Class: Class 4- Severe systemic disease, acute unstable problems  Mallampati: N/A- Alternate secured airway  Patient states understanding of procedure being performed: Yes  Patient's understanding of procedure matches consent: Yes  Procedure consent matches procedure scheduled: Yes  Appropriately NPO: yes  Lungs: crackles left base and crackles right base  Heart: normal heart sounds and rate and systolic murmur  History & Physical reviewed: History and physical reviewed and no updates needed  Statement of review: I have reviewed the lab findings, diagnostic data, medications, and the plan for sedation

## 2021-06-07 NOTE — PLAN OF CARE
Goal: Patient s discharge needs are met.  Outcome: Care Progression reviewed with Hospitalist, Care Manager.  Discharge Disposition: Discussed and plan to discharge to: [facility] T[facility]   Planned Discharge Date: 1-2 days  Problem: Barriers to discharge include: monitor for bleeding, increase strength and PO intake  Transportation needs/Ride Time: family (daughter Nanette) will transport, please call wife to arrange time once given OK to discharge    Per Nanette in admissions at [facility] T[facility], facility will accept patient and OK to transfer today or over the weekend- CM provided update that patient NOT ready for discharge today.     CM met with patient, also talked with wife (Safia [phone]) via phone twice. Both now in agreement with TCU placement at [facility].     Patient and wife request for wife to be updated on discharge planning- she will arrange transport with daughter once discharge confirmed, daughter is a nurse and lives close by.     CM provided update to Charge RN and MD.   CM provided update to Nanette in admissions at [facility]- Bonita will be working in admissions over weekend and CM to update regarding discharge date.     Will need PAS.

## 2021-06-07 NOTE — PROGRESS NOTES
Clinical Nutrition Therapy Assessment Note    S: Spoke with RN, no feeding plans today. RD will need consult for TF if enteral nutrition desired.     Reason for Assessment:   Rakesh Samuels is a 69 y.o. male assessed by the registered Dietitian for consult and protocol/pathway order for ICU admission. PMH includes DM, adrenal icidentalomas, afib on eliquis, presented to the ED from home with fall, hypoglycemia, weakness. Found to have severe metabolic acidosis due to high AG, lactic acidosis, CHIDI/ATN, obstructing left ureteral stone. Intubated for worsening mental and resp status.     Nutrition History:  Information from chart. Pt intubated, did not get a hold of wife via telephone. Per chart, pt has lost about 30 lbs recently, no reason noted.   Food allergies or intolerances: nkfa     Current Nutrition Prescription:   Diet: NPO   Diet Supplements:   IV dextrose or Fluids:insulin infusion (1 unit/mL), Last Rate: 1.5 Units/hr (04/13/20 0912)  norepinephrine, Last Rate: 0.06 mcg/kg/min (04/13/20 0752)  pantoprozole (PROTONIX) infusion, Last Rate: 8 mg/hr (04/13/20 0752)  propofol, Last Rate: 30 mcg/kg/min (04/13/20 0552)  propofol, Last Rate: 25 mcg/kg/min (04/13/20 0752)  sodium chloride 0.9%  sodium chloride 0.9%, Last Rate: 100 mL/hr (04/13/20 0800)  vasopressin, Last Rate: Stopped (04/12/20 1500)      Current Nutrition Intake:  NPO does not meet estimated needs  Enteral access is an OG placed 4/12   Propofol at 25 mcg/kg/min provides 358     Anthropometrics:  Height: 6' (182.9 cm)  Admission weight: 199 lb 4.7 oz   Weight: 199 lb 4.7 oz (90.4 kg)  BMI (Calculated): 24.4  BMI indication: 18.5-24.9 normal weight  Ideal body weight 178 lbs +/- 10%   % Ideal body weight 112%   Weight History: Per chart review, wife reports pt has lost 30 lbs in past few months, no reason stated. If this weight loss is unintentional, pt has lost 23 lbs/10% body weight in 7 months, which is not severe weight loss in that timeframe.    Wt Readings from Last 10 Encounters:   04/13/20 199 lb 4.7 oz (90.4 kg)   02/28/20 203 lb (92.1 kg)   02/18/20 203 lb (92.1 kg)   09/09/19 (!) 222 lb 6.4 oz (100.9 kg)     RD Nutrition Focused Physical Exam:  The patient has the following physical signs which could indicate malnutrition: did not assess due to social distancing protocol.     GI Status/Output:   GI symptoms include:WDL per nurse  Bowel Sounds hypoactive per nurse  Last BM: not noted     Skin/Wound:  Eugenio Scale Score: 12    No wounds noted.    Medications:  Medications reviewed.  IV- zozyn, vancomycin, norepinephrine, protonix, propofol  PO - pericolace, senokot    Labs:  Labs reviewed  Mg 1.6 L  Phos 2.6 WNL    Estimated Nutrition Needs:  Assessment weight is 90 kg, current weight    Energy Needs: 9065-2884 kcals daily, 25-30 kcal/kg  Protein Needs: 135-180 g daily, 1.5-2 g/kg.  Fluid Needs: 2250+ mls daily, 25+ mls/kg    Malnutrition: Not noted    Nutrition Risk Level: high risk    Nutrition dx:   Swallowing difficulty r/t current illness  as evidenced by mechanical ventilation.     Goal:   Meet estimated nutrition needs within 3-5 days.      Intervention:   No plans for TF at this time. If plans for TF arise, RD will need consult to initiate and manage.     Monitoring/Evaluation:   Plan of care   Weight/diet history PTA.     Electronically signed by:  Kamala Plasencia RD

## 2021-06-07 NOTE — TELEPHONE ENCOUNTER
----- Message from Maddie Pan sent at 4/16/2020  2:34 PM CDT -----  Regarding: Incoming call re: HARIKA pt  Caller: Afsaneh (spouse)    Primary cardiologist: Dr. Eric Pickett     Detailed reason for call: Afsaneh states Rakesh is currently admitted to [facility] in [place] and that he is just out of the ICU today. They took him off of Eloquis Rx about 3 days ago, he had blood in his stomach and acute kidney failure. He is due for kidney surgery in a few days. Please call back if questions.     Best phone number: Afsaneh  [phone]     Best time to contact: Any    Ok to leave a detailed message? Y    Device? N

## 2021-06-07 NOTE — PLAN OF CARE
Problem: Occupational Therapy  Goal: OT Goals  Description: Patient will demonstrate the following by 4/18/20, in order to maximize independence with ADL/IADL performance:    -Demonstrate safety with Bed/Chair/Toilet transfer with SBA   -Complete LB dressing with SBA, using appropriate adaptive equipment PRN   -Participate in grooming/hygiene tasks with SBA    Goals entered on 4/15/2020 by Winifred Grant OT      Outcome: Completed    Occupational Therapy Discharge Summary    Date of OT Discharge: 4/18/2020  Refer to daily doc flowsheet for equipment issued and current functional status.  Discharge Destination: TCU  Discharge Comments: Pt needs further therapy at a TCU to increase skills to highest level for a safe d/c plan.      4/18/2020 by Analy Finn OT

## 2021-06-07 NOTE — PLAN OF CARE
Goal: Patient s discharge needs are met.  Outcome: Care Progression reviewed with Hospitalist, Care Manager.  Discharge Disposition: Discussed and plan to discharge to: TBD, comes from home with wife who is WC bound and his adult daughters assist as needed  Planned Discharge Date: days  Problem: Barriers to discharge include: ADAT, monitor labs, Urology following, Nephrology following, requiring O2, IV abx  Transportation needs/Ride Time: TBD        Chart reviewed, noted patient off vent, advancing diet, GI signed off. Care Management wondering if patient would be appropriate to start PT/OT soon when medically ready?

## 2021-06-07 NOTE — PROGRESS NOTES
Review chart.  Patient is able to be DC today to [facility].  [facility] has a bed for patient.  Called and spoke with wife.  She agrees with patient DC today.  Her daughter will transport patient at 1 pm.  [facility] is aware of DC plans.  PAS done.  Patient is aware of DC time and plan.    JENNY Cortez [facility] 4/18/2020 9:42 AM

## 2021-06-07 NOTE — PROGRESS NOTES
Code Status:  FULL CODE  Visit Type: Problem Visit     Facility:  [facility] [[id number]]             History of Present Illness: Rakesh Samuels is a 69 y.o. male who I am seeing today for follow-up on the TCU. Pt recently hospitalized on 4/12/2020.  Past medical history includes atrial fibril on Eliquis, diabetes type 2, hyperlipidemia and glaucoma.  Patient presented to the hospital with upper GI bleed.  He underwent EGD on 4/14 showing esophagitis and gastritis with no active bleeding.  Initially his Eliquis was held.  He was treated with heparin.  Also placed on omeprazole 2 times daily.  Patient found also to have circulatory shock felt to be secondary to left nephrolithiasis.  TTE shows normal LV EF.  Questionable source due to sepsis versus GI blood loss.  He was initially treated with IV Zosyn however negative culture results returned so antibiotics discontinued.  He developed acute kidney injury requiring hemodialysis and respiratory failure requiring intubation.  Patient with blood-tinged urine secondary to anticoagulation in the setting of ureteral stent.  He is followed by urology.  History of obstructive uropathy status post cystoscopy with left ureteral stent placement.  Acute injury improved with fluids and hemodialysis.  Acute respiratory failure.  He was extubated on 4/14.  He is off oxygen.  Diabetes mellitus type 2.  His p.o. meds were stopped.  Hospitalization secondary to acute kidney injury.  He was placed on NovoLog sliding scale and Lantus.    Today patient lying in bed.  Patient with recent increasing pain in his left foot at the base of his toes.  X-ray negative for acute fracture.  Venous Doppler negative for DVT.  He did have some cracks between the toes.  Continues on Keflex for cellulitis.  Redness improving.  No warmth on today's exam.  Slight swelling.  Uric acid 5.1.  Patient tolerating antibiotic.  No diarrhea.  Patient voiding adequately.  He did report some blood  x1 yesterday however no further bleeding.  No flank pain or abdominal discomfort.  Recent laboratory unremarkable.  Diabetes mellitus type 2.  Occasionally elevated blood sugars in the 200s.  I did recently increase his insulin.  He was taken off his oral medications during hospitalization secondary acute kidney injury.      Active Ambulatory Problems     Diagnosis Date Noted     SIRS (systemic inflammatory response syndrome) (H) 09/09/2019     Adrenal incidentaloma (H)      Diet-controlled diabetes mellitus (H)      Pericardial effusion      Lactic acidosis      Diabetes mellitus, type 2 (H) 04/12/2020     Paroxysmal atrial fibrillation (H) 02/18/2020     Calculus of ureter 04/12/2020     Acute renal failure, unspecified acute renal failure type (H) 04/12/2020     Acute renal failure (ARF) (H) 04/12/2020     ATN (acute tubular necrosis) (H) 04/12/2020     Sepsis due to urinary tract infection (H)      Shock circulatory (H)      Metformin overdose of undetermined intent, initial encounter      Acute respiratory failure with hypoxemia (H)      Metabolic acidosis      Hyperkalemia      Hematemesis, presence of nausea not specified 04/12/2020     Resolved Ambulatory Problems     Diagnosis Date Noted     No Resolved Ambulatory Problems     No Additional Past Medical History     Current Outpatient Medications   Medication Sig     apixaban ANTICOAGULANT (ELIQUIS) 5 mg Tab tablet Take 1 tablet (5 mg total) by mouth 2 (two) times a day.     ARIPiprazole (ABILIFY) 2 MG tablet Take 1 tablet (2 mg total) by mouth every evening.     atorvastatin (LIPITOR) 10 MG tablet Take 10 mg by mouth at bedtime.     cephalexin (KEFLEX) 500 MG capsule Take 250 mg by mouth 3 (three) times a day.      dorzolamide-timolol (COSOPT) 22.3-6.8 mg/mL ophthalmic solution Administer 1 drop to both eyes 2 (two) times a day.     fluvoxaMINE (LUVOX) 100 MG tablet Take 0.5 tablets (50 mg total) by mouth at bedtime for 14 days.     LANTUS SOLOSTAR U-100  INSULIN 100 unit/mL (3 mL) pen Inject 10 Units under the skin at bedtime. 11.65 Type 2 with hyperglycemia  Contact provider if insulin prescribed is not the preferred insulin per insurance.     latanoprostene bunod 0.024 % Drop Administer 1 drop to both eyes at bedtime.     multivitamin therapeutic tablet Take 1 tablet by mouth daily.     netarsudiL (RHOPRESSA) 0.02 % Drop Administer 1 drop to both eyes daily.     NOVOLOG FLEXPEN U-100 INSULIN 100 unit/mL (3 mL) injection pen Check blood sugar four (4) times daily.  11.65 Type 2 with hyperglycemia  BD Ultra-fine Adina Pen Needles - NDC [address]-97 - dispense 1 case,  refill PRN for 1 year     omeprazole (PRILOSEC OTC) 20 MG tablet Take 1 tablet (20 mg total) by mouth 2 (two) times a day before meals.     VENTOLIN HFA 90 mcg/actuation inhaler Inhale 2 puffs every 4 (four) hours as needed.       Allergies   Allergen Reactions     Adhesive Tape-Silicones          Review of Systems  No fevers or chills. No headache, lightheadedness or dizziness. No SOB, chest pains or palpitations. Appetite is good. No nausea, vomiting, constipation or diarrhea. No dysuria, frequency, burning or pain with urination.  No evidence of acute bleed.  Pain in the left foot.  Reports history of gout.  Otherwise review of systems are negative.     Physical Exam  PHYSICAL EXAMINATION:  Vital signs: /63, pulse 45, respirations 18, temperature 98.1, O2 sat 96% on room air.  Weight 181.2 pounds.  General: Awake, Alert, oriented x3, appropriately, follows simple commands, conversant  HEENT Pink conjunctiva on the left with slightly red conjunctiva on the right.  No drainage, anicteric sclerae, moist oral mucosa.  History of glaucoma with vision loss in the right eye.   NECK: Supple  CARDIOVASCULAR: S1-S2 without murmur gallop.   RESPIRATORY: No wheezes rales or rhonchi  BACK: No kyphosis of the thoracic spine  EXTREMITIES: Good range of motion on both upper and lower extremities, trace pedal  edema on the left lower extremity, no calf tenderness.    SKIN: Left foot with slight redness and swelling at the forefoot.  No warmth.  Small fissures between the second and third and third and fourth toes now closed.  NEUROLOGIC: Intact, pulses palpable  PSYCHIATRIC: Cognition intact      Labs:    Results for orders placed or performed in visit on 04/20/20   Basic Metabolic Panel   Result Value Ref Range    Sodium 146 (H) 136 - 145 mmol/L    Potassium 3.9 3.5 - 5.0 mmol/L    Chloride 112 (H) 98 - 107 mmol/L    CO2 27 22 - 31 mmol/L    Anion Gap, Calculation 7 5 - 18 mmol/L    Glucose 123 70 - 125 mg/dL    Calcium 7.8 (L) 8.5 - 10.5 mg/dL    BUN 12 8 - 22 mg/dL    Creatinine 0.94 0.70 - 1.30 mg/dL    GFR MDRD Af Amer >60 >60 mL/min/1.73m2    GFR MDRD Non Af Amer >60 >60 mL/min/1.73m2     Lab Results   Component Value Date    WBC 9.7 04/22/2020    HGB 10.3 (L) 04/20/2020    HCT 32.9 (L) 04/20/2020    MCV 99 04/20/2020     04/20/2020           Assessment/Plan:  1. Cellulitis of left foot   continue Keflex until 5/2.  Cracks between toes healed.  Redness and swelling dissipating.  Recent laboratory unremarkable.   2. Acute upper GI bleed   no evidence of bleed.   3. SIRS (systemic inflammatory response syndrome) (H)   resolved.   4. Acute renal failure with tubular necrosis (H)   renal function improved.  GFR) within normal limits.   5. Type 2 diabetes mellitus with chronic kidney disease, without long-term current use of insulin, unspecified CKD stage (H)   blood sugar slightly elevated occasionally in the 200s. He continues on Lantus.  Recent increase to 13 units.  He is also on sliding scale.  Will attempt to reintroduce oral medications.   6. Paroxysmal atrial fibrillation (H)   continues on Eliquis.       Electronically signed by: Kylie Gomez, CNP

## 2021-06-07 NOTE — PLAN OF CARE
Denies pain. Up in chair for supper. Amb. the patten with walker and 1 assist x 1.  and 140. Ang. Improving. Ate  25 carbs. Hep. Drip @ 12 u/kg/hr. No sign's of active bleeding. Hgb 11.2, Had a loose brown stool. Urine dark jasvir. Incont. X 1. Recheck K 4.2. Plan: Increase activity. Monitor for sign's of bleeding.

## 2021-06-07 NOTE — PROGRESS NOTES
Pt's spouse updated on pt's current condition.  Notified that visitor policy states no visitors unless situation changes and pt becomes terminal.

## 2021-06-07 NOTE — PLAN OF CARE
Problem: Pain  Goal: Patient's pain/discomfort is manageable  Outcome: Progressing   No c/o pain or discomfort.   Problem: Potential for Compromised Skin Integrity  Goal: Skin integrity is maintained or improved  Outcome: Progressing   Skin integrity maintained. Incontinent skin cares done with each brief change.

## 2021-06-07 NOTE — DISCHARGE SUMMARY
[facility] MEDICINE  DISCHARGE SUMMARY     Primary Care Physician: Michelet Restrepo MD  Admission Date: 4/12/2020   Discharge Provider: Amanda Alvarado Discharge Date: 4/18/2020   Diet:  Discharge Diet Order (720h ago, onward)    None           Code Status: Full Code   Activity:   Discharge Activity Order (720h ago, onward)    None              Condition at Discharge: Stable      REASON FOR PRESENTATION(See Admission Note for Details)   Principal Problem:    Acute renal failure (ARF) (H)  Active Problems:    Diabetes mellitus, type 2 (H)    Paroxysmal atrial fibrillation (H)    Calculus of ureter    Acute renal failure, unspecified acute renal failure type (H)    ATN (acute tubular necrosis) (H)    Sepsis due to urinary tract infection (H)    Shock circulatory (H)    Metformin overdose of undetermined intent, initial encounter    Acute respiratory failure with hypoxemia (H)    Metabolic acidosis    Hyperkalemia    Hematemesis, presence of nausea not specified      PRINCIPAL & ACTIVE DISCHARGE DIAGNOSES     Principal Problem:    Acute renal failure (ARF) (H)  Active Problems:    Diabetes mellitus, type 2 (H)    Paroxysmal atrial fibrillation (H)    Calculus of ureter    Acute renal failure, unspecified acute renal failure type (H)    ATN (acute tubular necrosis) (H)    Sepsis due to urinary tract infection (H)    Shock circulatory (H)    Metformin overdose of undetermined intent, initial encounter    Acute respiratory failure with hypoxemia (H)    Metabolic acidosis    Hyperkalemia    Hematemesis, presence of nausea not specified      SIGNIFICANT FINDINGS (Imaging, labs):   Ct Chest Abdomen Pelvis Without Oral Without Iv Contrast    Result Date: 4/12/2020  EXAM: CT CHEST ABDOMEN PELVIS WO ORAL WO IV CONTRAST LOCATION: [facility] DATE/TIME: 4/12/2020 9:59 AM INDICATION: Syncope. Weakness. Fall. Anticoagulated. COMPARISON: 03/26/2020. TECHNIQUE: CT scan of the chest, abdomen, and pelvis was  performed without IV contrast. Multiplanar reformats were obtained. Dose reduction techniques were used. CONTRAST: None. FINDINGS: LUNGS AND PLEURA: Normal. MEDIASTINUM/AXILLAE: Normal. HEPATOBILIARY: Multiple gallstones. Otherwise negative. PANCREAS: Normal. SPLEEN: Normal. ADRENAL GLANDS: Benign adrenal adenomas bilaterally unchanged. No follow-up needed. KIDNEYS/BLADDER: New moderate left hydronephrosis with obstructing stone at the ureteral pelvic junction measures 5 mm. No other stones on the left side. Couple tiny nonobstructing stones right kidney with moderate atrophy right kidney as seen previously. BOWEL: Normal. LYMPH NODES: Normal. VASCULATURE: Unremarkable. PELVIC ORGANS: Normal. MUSCULOSKELETAL: Generalized degenerative change throughout the spine and hips. No concerning bone lesions.     1.  New moderately obstructing stone proximal left ureter at ureterovesical junction measuring 5 mm. 2.  No other significant new findings. 3.  No hemorrhage. 4.  Nonobstructing intrarenal stones. In the moderately atrophic right kidney unchanged. 5.  Gallstones. NOTE: ABNORMAL REPORT THE DICTATION ABOVE DESCRIBES AN ABNORMALITY FOR WHICH FOLLOW-UP IS NEEDED.     Xr Chest 1 View Portable    Result Date: 4/12/2020  EXAM: XR CHEST 1 VIEW PORTABLE LOCATION: [facility] DATE/TIME: 4/12/2020 11:51 AM INDICATION: intubation COMPARISON: 09/08/2019     ET tube 1.7 cm above the base of the steven. Pulling this back 2 cm would be more ideal. PICC tip in the SVC. Lungs are clear. NOTE: ABNORMAL REPORT THE DICTATION ABOVE DESCRIBES AN ABNORMALITY FOR WHICH FOLLOW-UP IS NEEDED.     Ct Head Without Contrast    Result Date: 4/12/2020  EXAM: CT HEAD WO CONTRAST, CT CERVICAL SPINE WO CONTRAST LOCATION: [facility] DATE/TIME: 4/12/2020 9:45 AM INDICATION: Headache and neck pain after trauma on blood thinners. COMPARISON: CT head dated 12/26/2016 TECHNIQUE: HEAD CT: Without IV contrast. Multiplanar reformats. Dose  reduction techniques were used. CERVICAL SPINE CT: Routine without IV contrast. Multiplanar reformats. Dose reduction techniques were used. FINDINGS: HEAD CT: INTRACRANIAL CONTENTS: No acute intracranial hemorrhage. Bilateral basal ganglia mineralization. No CT evidence of acute infarct. Sequelae of mild chronic microangiopathy. Mild cerebral volume loss without hydrocephalus. No extra-axial fluid collections.   Patent basal cisterns. VISUALIZED ORBITS/SINUSES/MASTOIDS: Right globe prosthetic and postoperative change of the bilateral lenses, otherwise the orbits are unremarkable. The visualized paranasal sinuses and temporal bone structures are well-aerated. BONES/SOFT TISSUES: The calvarium and skull base are unremarkable. CERVICAL SPINE CT: VERTEBRA: The spine is imaged from the skull base through T2. Straightening of the usual cervical lordosis without significant spondylolisthesis. Vertebral body heights are maintained without evidence of acute fracture. No aggressive osseous lesion.   CANAL/FORAMINA: Multilevel degenerative changes without significant spinal canal stenosis or high-grade neural foraminal narrowing. If there is concern for ligamentous or cord injury MRI could be helpful in further evaluation. PARASPINAL: No prevertebral or paravertebral soft tissue swelling.  Visualized lungs are clear. The thyroid gland is unremarkable. Mild carotid artery bifurcation calcification     HEAD CT: 1. Senescent changes and sequelae of chronic microangiopathy without acute intracranial abnormality. CERVICAL SPINE CT: 1. No acute traumatic injury of the cervical spine. If there is concern for ligamentous or cord injury MRI could be helpful in further evaluation.    Ct Cervical Spine Without Contrast    Result Date: 4/12/2020  EXAM: CT HEAD WO CONTRAST, CT CERVICAL SPINE WO CONTRAST LOCATION: [facility] DATE/TIME: 4/12/2020 9:45 AM INDICATION: Headache and neck pain after trauma on blood thinners. COMPARISON:  CT head dated 12/26/2016 TECHNIQUE: HEAD CT: Without IV contrast. Multiplanar reformats. Dose reduction techniques were used. CERVICAL SPINE CT: Routine without IV contrast. Multiplanar reformats. Dose reduction techniques were used. FINDINGS: HEAD CT: INTRACRANIAL CONTENTS: No acute intracranial hemorrhage. Bilateral basal ganglia mineralization. No CT evidence of acute infarct. Sequelae of mild chronic microangiopathy. Mild cerebral volume loss without hydrocephalus. No extra-axial fluid collections.   Patent basal cisterns. VISUALIZED ORBITS/SINUSES/MASTOIDS: Right globe prosthetic and postoperative change of the bilateral lenses, otherwise the orbits are unremarkable. The visualized paranasal sinuses and temporal bone structures are well-aerated. BONES/SOFT TISSUES: The calvarium and skull base are unremarkable. CERVICAL SPINE CT: VERTEBRA: The spine is imaged from the skull base through T2. Straightening of the usual cervical lordosis without significant spondylolisthesis. Vertebral body heights are maintained without evidence of acute fracture. No aggressive osseous lesion.   CANAL/FORAMINA: Multilevel degenerative changes without significant spinal canal stenosis or high-grade neural foraminal narrowing. If there is concern for ligamentous or cord injury MRI could be helpful in further evaluation. PARASPINAL: No prevertebral or paravertebral soft tissue swelling.  Visualized lungs are clear. The thyroid gland is unremarkable. Mild carotid artery bifurcation calcification     HEAD CT: 1. Senescent changes and sequelae of chronic microangiopathy without acute intracranial abnormality. CERVICAL SPINE CT: 1. No acute traumatic injury of the cervical spine. If there is concern for ligamentous or cord injury MRI could be helpful in further evaluation.    Ct Lumbar Spine Without Contrast    Result Date: 4/12/2020  EXAM: CT LUMBAR SPINE WO CONTRAST LOCATION: [facility] DATE/TIME: 4/12/2020 10:00 AM  INDICATION: Back pain COMPARISON: CT of the abdomen pelvis dated 4/12/2020 and 3/26/2020 TECHNIQUE: Routine without IV contrast. Multiplanar reformats.  Dose reduction techniques were used. FINDINGS: VERTEBRA: The spine is imaged from inferior endplate of T12-S3. There are 5 lumbar type vertebral bodies. Straightening of the usual spinal curvature without significant spondylolisthesis. Vertebral body heights are maintained without evidence of acute fracture. No aggressive osseous lesion.   CANAL/FORAMINA: Multilevel degenerative changes without significant spinal canal stenosis or high-grade neural foraminal narrowing. PARASPINAL: 5-6 mm obstructing calculus at the left ureteropelvic junction resulting in severe left renal hydronephrosis, which is better characterized on the CT of the abdomen pelvis. Partially visualized left adrenal myelolipoma. Nonobstructing right renal calculi.     1.  No traumatic injury of the lumbar spine. 2. Development of a 5-6 mm obstructing calculus at the left ureteropelvic junction resulting in severe left hydronephrosis. - - - - - - - - - - - - - - - - - - - - - - - - - - - - - - - - - - - - - - - - - - - - - - - - - - - - - - - - - - - - - - - - - - - - - - - - - - - - The results above were discussed with RONNIE GOLDSMITH on 4/12/2020 10:10 AM by Dr. Hi Noyola. - - - - - - - - - - - - - - - - - - - - - - - - - - - - - - - - - - - - - - - - - - - - - - - - - - - - - - - - - - - - - - - - - - - - - - - - - - - -    Xr Retrograde Pyelogram W Or Wo Kub Intraoperative    Result Date: 4/12/2020  EXAM: XR RETROGRADE PYELOGRAM W OR WO KUB INTRAOPERATIVE LOCATION: [facility] DATE/TIME: 4/12/2020 12:50 PM INDICATION: left ureteral stent COMPARISON: 04/12/2020 CT TECHNIQUE: Exam performed by Urologist. FLUOROSCOPIC TIME: 0.3 minutes NUMBER OF IMAGES: 4 FINDINGS: Left ureteral stent in good position.    Results from last 7 days   Lab Units 04/18/20  0554 04/17/20  0653  04/16/20 2053 04/16/20  1004 04/16/20  0610 04/14/20  0358   LN-WHITE BLOOD CELL COUNT thou/uL  --   --   --  7.0 6.7 8.3   LN-HEMOGLOBIN g/dL 10.6* 11.2* 11.2* 11.6* 11.4* 11.2*   LN-HEMATOCRIT %  --   --   --  35.7* 34.9* 32.7*   LN-PLATELET COUNT thou/uL  --   --   --  189 174 158       Results from last 7 days   Lab Units 04/18/20  0554 04/17/20  0653 04/16/20  1744 04/16/20  0610  04/15/20  1122  04/13/20  0553  04/12/20  1428  04/12/20  0905   LN-SODIUM mmol/L 147* 148*  --  146*  --  144   < > 140   < > 142   < > 143   LN-POTASSIUM mmol/L 3.6 4.0 4.2 3.5   < > 4.2   < > 3.8   < > 7.0*   < > 6.8*   LN-CHLORIDE mmol/L  --  113*  --  110*  --  107   < > 98   < > 99   < > 96*   LN-CO2 mmol/L  --  27  --  26  --  24   < > 21*   < > 7*   < > <6*   LN-BLOOD UREA NITROGEN mg/dL  --  12  --  18  --  24*   < > 34*   < > 66*   < > 72*   LN-CREATININE mg/dL  --  0.97  --  1.26  --  1.57*   < > 2.98*   < > 5.17*   < > 6.14*   LN-CALCIUM mg/dL  --  7.0*  --  7.0*  --  6.9*   < > 7.6*   < > 8.1*   < > 10.3   LN-ALBUMIN g/dL  --   --   --  2.4*  --   --   --  2.6*  --  2.3*  --  3.5   LN-PROTEIN TOTAL g/dL  --   --   --   --   --   --   --   --   --  4.6*  --  6.8   LN-BILIRUBIN TOTAL mg/dL  --   --   --   --   --   --   --   --   --  0.2  --  0.3   LN-ALKALINE PHOSPHATASE U/L  --   --   --   --   --   --   --   --   --  52  --  75   LN-ALT (SGPT) U/L  --   --   --   --   --   --   --   --   --  <9  --  13   LN-AST (SGOT) U/L  --   --   --   --   --   --   --   --   --  18  --  14    < > = values in this interval not displayed.     Results from last 7 days   Lab Units 04/16/20  1026 04/12/20  1428 04/12/20  0905   LN-INR  1.22* 1.81* 1.98*   LN-PARTIAL THROMBOPLASTIN TIME seconds 26  --  30       Recent INR results:   Results from last 7 days   Lab Units 04/16/20  1026 04/12/20  1428 04/12/20  0905   LN-INR  1.22* 1.81* 1.98*      Warfarin doses (if applicable) or name of other anticoagulant : eliquis  PENDING LABS          PROCEDURES ( this hospitalization only)      ESOPHAGOGASTRODUODENOSCOPY (EGD)    RECOMMENDATION FOR F/U VISIT     Discharge Orders   Admission H&P Valid:  Yes     Patient Aware of Diagnosis: Yes     Discharge Potential:  Length of Stay < 30 Days     Level of Care:  Skilled     Give 2-step Mantoux: Yes, unless current or contraindicated     Agency Standing Orders:  Yes     Discharge Condition:  Stabilized     Free of Communicable Disease:   Yes     Rehab Potential:  Good     Weight Bearing Status     Order Specific Question Answer Comments   Weight bearing status WBAT (Weight-bearing As Tolerated)      Continue diet upon discharge: Diet Diet Diabetic   Order Comments:   Diet  Diet Diabetic     Vital Signs Per Facility Protocol     Weights Per Facility Protocol     Blood Glucose Monitoring   Order Comments: Call the Physician if Blood Glucose is over 400 or if 2 more unexplained less than 70 BG in 1 week.     Order Specific Question Answer Comments   Frequency 4 times daily      Treatment Options: Full Resuscitation     Physical Therapy Eval and Treat     Occupational Therapy Eval and Treat     Follow-up: Next Physician Nursing Home Rounds   Order Comments: Restarted Abilify 2mg QHsand Luvox 50mg at bedtime, will need close monitoring due to was on 7mg at bedtime of Abilify PTA and 200mg of Luvox. Mat need to titrate up     Follow Up:  Update Status Report to Physician Within 1 Week     Follow Up:  With Primary MD in 1-2 Weeks   Order Comments: Needs further management on Depression medications and for diabetes management     Future Lab Orders at [facility]   Order Comments: Labs include: Basic Metabolic Profile and CBC, Mag in 2 days     Discharge Follow Up   Order Comments: Dr. Yates will contact to arrange stone removal, He is aware of pink tinged urine and is not concerned.     Electronically Signed       DISPOSITION     Discharge Information     Discharge Provider Date/Time Disposition Destination    (none)  04/18/20 1317 [facility] SNF[facility]TCU         Skilled Nursing Facility    SUMMARY OF HOSPITAL COURSE:    Rakesh Samuels is a 69 y.o. old male A. fib on Eliquis, DM 2 and hyperlipidemia presents with upper GI bleed and circulatory shock thought secondary to left nephrolithiasis.  Hospital course notable for CHIDI requiring hemodialysis and respiratory failure requiring intubation..      Upper GI bleed: Currently resolved.  Patient underwent  showing esophagitis and gastritis with no active bleeding.    - Patient was started on heparin drip 4/16/2020 and has had no clinical signs of GI blood loss  --resumed Eliquis and stop heparin drip  --Continue omeprazole two times a day   -- recheck CBC at TCU       Blood-tinged urine: Likely due to anticoagulation in the setting of a ureteral stent.  -- I Discussed with Dr. Yates, he is not concerned, patient should remain on anticoagulation for now Hgb stable recheck at TCU      Obstructive uropathy: Patient underwent cystoscopy and left ureteral stent placement on admission  --Dr. Yates planning on stone extraction in a few weeks as an outpatient.  He will coordinate this     CHIDI: Resolved. Patient underwent emergent hemodialysis for hyperkalemia.  - recheck BMp at TCU       History of atrial fibrillation: Currently holding Eliquis given above issues  --He otherwise is not on any rate controlling medication  - resumed eliquis      Circulatory shock: Resolved.  TTE shows normal LVEF.  --Question source due to sepsis versus GI blood loss  --Stop IV Zosyn given negative culture results no further abx      Acute respiratory failure requiring intubation, resolved.  Extubated 4/14  - no O2 requirements       DM2:  - stop PO meds  - novolog SS and Lantus 10 units  - may consider re challenging with oral meds in the future but would wait until able to have close f/u with PCP         Discharge Medications with Med changes:        Medication List      START taking these  medications    Lantus Solostar U-100 Insulin 100 unit/mL (3 mL) pen  Quantity:  5 adj dose pen  Dose:  10 Units  Generic drug:  insulin glargine  10 Units, Subcutaneous, Bedtime, 11.65 Type 2 with hyperglycemia Contact provider if insulin prescribed is not the preferred insulin per insurance.     magnesium oxide 400 mg (241.3 mg magnesium) tablet  Dose:  400 mg  Commonly known as:  MAG-OX  400 mg, Oral, 3 times daily     NovoLOG Flexpen U-100 Insulin 100 unit/mL (3 mL) injection pen  Quantity:  5 Pre-filled Pen Syringe  Generic drug:  insulin aspart U-100  Check blood sugar four (4) times daily. 11.65 Type 2 with hyperglycemia BD Ultra-fine Adina Pen Needles - NDC [address]-09 - dispense 1 case, refill PRN for 1 year     omeprazole 20 MG tablet  Quantity:  120 tablet  Dose:  20 mg  Commonly known as:  PriLOSEC OTC  20 mg, Oral, 2 times daily before meals        CHANGE how you take these medications    ARIPiprazole 2 MG tablet  Quantity:  30 tablet  Dose:  2 mg  Commonly known as:  ABILIFY  2 mg, Oral, Every evening  What changed:  Another medication with the same name was removed. Continue taking this medication, and follow the directions you see here.     fluvoxaMINE 100 MG tablet  Quantity:  7 tablet  Dose:  50 mg  Commonly known as:  LUVOX  50 mg, Oral, Bedtime  What changed:  how much to take        CONTINUE taking these medications    apixaban ANTICOAGULANT 5 mg Tab tablet  Quantity:  60 tablet  Dose:  5 mg  Commonly known as:  ELIQUIS  5 mg, Oral, 2 times daily     atorvastatin 10 MG tablet  Dose:  10 mg  Commonly known as:  LIPITOR  10 mg, Oral, Bedtime     dorzolamide-timoloL 22.3-6.8 mg/mL ophthalmic solution  Dose:  1 drop  Commonly known as:  COSOPT  1 drop, Both Eyes, 2 times daily     latanoprostene bunod 0.024 % Drop  Dose:  1 drop  1 drop, Both Eyes, Bedtime     multivitamin therapeutic tablet  Dose:  1 tablet  1 tablet, Oral, DAILY     Rhopressa 0.02 % Drop  Dose:  1 drop  Generic drug:   netarsudiL  1 drop, Both Eyes, DAILY     Ventolin HFA 90 mcg/actuation inhaler  Dose:  2 puff  Generic drug:  albuterol  2 puffs, Inhalation, Every 4 hours PRN        STOP taking these medications    aspirin 81 MG EC tablet     glipiZIDE 5 MG 24 hr tablet  Commonly known as:  GLUCOTROL XL     metFORMIN 1000 MG tablet  Commonly known as:  GLUCOPHAGE     pioglitazone 30 MG tablet  Commonly known as:  ACTOS     UNABLE TO FIND              Rationale for medication changes:    See above      Consults   cardiology, pulmonary/intensive care, GI and nephrology    Immunizations given this encounter         Examination     Vital Signs in last 24 hours:   Temp:  [97.9  F (36.6  C)-98.5  F (36.9  C)] 98.1  F (36.7  C)  Heart Rate:  [66-85] 85  Resp:  [18] 18  BP: (118-130)/(58-68) 118/68  SpO2:  [95 %-98 %] 95 %  General appearance: alert, appears stated age, cooperative and right eye red and tearing  Lungs: clear to auscultation bilaterally  Heart: regular rate and rhythm, S1, S2 normal, no murmur, click, rub or gallop  Abdomen: soft, non-tender; bowel sounds normal; no masses,  no organomegaly  Extremities: extremities normal, atraumatic, no cyanosis or edema  Pulses: 2+ and symmetric  Skin: Skin color, texture, turgor normal. No rashes or lesions  Neurologic: Grossly normal     Please see EMR for more detailed significant labs, imaging, consultant notes etc.  Total time spent on discharge: 35 minutes    Amanda Alvarado MD   [facility]: Ph:[phone]  CC:Michelet Restrepo MD

## 2021-06-07 NOTE — PLAN OF CARE
Problem: Pain  Goal: Patient's pain/discomfort is manageable  Outcome: Adequate for Discharge   Pt denies pain or discomfort.     Problem: Psychosocial Needs  Goal: Collaborate with patient/family/caregiver to identify patient specific goals for this hospitalization  Outcome: Adequate for Discharge   Pt was discharged to [facility] via daughter. Copies of current orders and progress notes sent with pt. Report called and given to nursing staff. Pt has his glasses and clothing and home medications returned to him and sent with.   Wife called and aware of discharge, updated on pt condition.

## 2021-06-07 NOTE — H&P
CRITICAL CARE CONSULT:    Assessment/Plan:  69M w/ hx of DM, adrenal icidentalomas, afib on eliquis, presented to the ED from home with fall, hypoglycemia, weakness. Found to have severe metabolic acidosis due tohigh AG, lactic acidosis, CHIDI/ATN, obstructing left ureteral stone. Intubated for worsening mental and resp status. Went to OR for stone extraction with urology.     NEURO:  Encephalopathy, sedated while on vent. Likely toxic-metabolic.    Cont propofol     RASS goal 0 to -1    Tylenol prn pain    Avoid benzo's    Cautious use narcotics    CV:  Shock, likely septic/vasodilatory state, hypovolemia. Lactate markedly up at 15. Received multiple IVF boluses. Echo Feb 2020 EF 67%, normal RV, RVSP 36, normal LV size/functino, no valve issues. Hx afib on eliquis.     MAP >65, wean NE as able. Add vaso if second pressor needed    Hold eliqiuis     Trend lactic acid q6h    RESP:  Acute resp failure with hypoxemia, intubated for worsening mental status in the setting of severe metabolic acidosis.     Cont current vent settings, 30/550/8/50%    Check ABG q2h for now while pH corrected    Titrate FiO2 for goal O2 sat 94-96%, avoid hyperoxia    GI:  No issues    NPO for now    PPI bolus and drip    Bowel regimen    RENAL:  Severe CHIDI/ATN, metabolic acidosis, lactic acidosis. Partly due to left ureteral stone obstruction with hydro; additionally with hyper K 6.8. possible metformin toxicity.    Emergent HD per renal, appreciate input    Cont bicarb drip    Olsen in place    Avoid nephrotoxins     ID:  Presented with leukocytosis, concern for sepsis, lactic acidosis.     Cont vanco + zosyn    F/u culture data    Check UA/urine culture    HEMATOLOGIC:  GI hemorrhage, possible UGI bleeding, ?ulcer vs. Gastritis vs. AVM. Sig hgb drop 13 -> 10 with coffee ground material coming through OG tube, ~1L. On eliquis.    Reverse eliquis with PCC STAT    2U PRBC STAT    Ensure 2 large bore IV's    Spoke to GI -> don't think he's  actively bleeding now, high risk for EGD with K 7, severe CHIDI and metabolic acidosis, they will do EGD if he has evidence of bleeding    S/p protonix 40mg bolus, start drip @8mg/hr    hgb checks q6h    Transfusion goal hgb >7    ENDOCRINE:  Hyperglycemia likely 2/2 criticla illness, possible DKA superimposed     FSBG checks, insulin SS/drip per ICU protocol    ICU PROPHYLAXIS:    PPI drip    No heparin products    SCDs    peridex    Lines/Drains/Tubes:  ETT 8.0mm  Olsen  Left axillary arterial line  Left femoral HD catheter (double lumen)    Restraints  Progress Note  Restraint Application    I recognize that restraints are physical and/or chemical interventions intended to restrict a person's movements. Restraints are currently needed to ensure the safety of this patient and/or others. My clinical rationale appears below.    Category/Type of Restraint     Non Violent:  Soft limb restraint x2  --  Behavior  Pulling at tubes/lines  --  Root Cause of the Behavior  Sedation/intubation  --  Less-Restrictive Measures that Failed  Non Violent Measures:  Close Observation  --  Response to the Restraint  Patient unable to pull at tubes/lines  --  Criteria for Release from the Restraint  Patient calm and off sedation    DISPO/CODE STATUS: full code    FAMILY COMMUNICATION: did not see family today    Luis (Yariel Hernandez MD  [facility] Pulmonary & Critical Care  Pager [phone]  Clinic [phone]      CCx: severe metabolic acidosis, acute resp failure with hypoxemia, metformin toxicity. Obstructing left ureteral stone with CHIDI    HPI: 69M w/ hx of DM, adrenal icidentalomas, afib on eliquis, presented to the ED from home with fall, hypoglycemia, weakness. Found to have severe metabolic acidosis due tohigh AG, lactic acidosis, CHIDI/ATN, obstructing left ureteral stone. Intubated for worsening mental and resp status.   Taken to OR where left ureteral stone was extracted by Dr. Yates under general  anesthesia.  Came to ICU intubated, hypotensive, fluids, bicarb running.  Multiple pushes of bicarb and calcium chloride given for hypotension.  Emergent HD catheter and arterial line were placed in the ICU.   He also is on eliquis for afib which he took last night.     Past Medical History:  Past Medical History:   Diagnosis Date     Diabetes mellitus, type 2 (H) 4/12/2020     Paroxysmal atrial fibrillation (H) 2/18/2020       Past Surgical History:  History reviewed. No pertinent surgical history.    Social History:  Social History     Socioeconomic History     Marital status:      Spouse name: Not on file     Number of children: Not on file     Years of education: Not on file     Highest education level: Not on file   Occupational History     Not on file   Social Needs     Financial resource strain: Not on file     Food insecurity     Worry: Not on file     Inability: Not on file     Transportation needs     Medical: Not on file     Non-medical: Not on file   Tobacco Use     Smoking status: Never Smoker     Smokeless tobacco: Never Used   Substance and Sexual Activity     Alcohol use: Yes     Frequency: Monthly or less     Binge frequency: Never     Drug use: Never     Sexual activity: Not Currently   Lifestyle     Physical activity     Days per week: Not on file     Minutes per session: Not on file     Stress: Not on file   Relationships     Social connections     Talks on phone: Not on file     Gets together: Not on file     Attends [employer] service: Not on file     Active member of club or organization: Not on file     Attends meetings of clubs or organizations: Not on file     Relationship status: Not on file     Intimate partner violence     Fear of current or ex partner: Not on file     Emotionally abused: Not on file     Physically abused: Not on file     Forced sexual activity: Not on file   Other Topics Concern     Not on file   Social History Narrative     Not on file       Family History:  No  family history on file.    Allergies:  Allergies   Allergen Reactions     Adhesive Tape-Silicones        MAR Reviewed      Physical Exam:  Vent settings for last 24 hours:  Vent Mode: VCV  FiO2 (%):  [40 %-50 %] 50 %  S RR:  [28-30] 30  S VT:  [550 mL] 550 mL  PEEP/CPAP (cm H2O):  [5 cm H2O] 5 cm H2O  Minute Ventilation (L/min):  [15.9 L/min] 15.9 L/min  PIP:  [22 cm H2O] 22 cm H2O  MAP (cm H2O):  [10] 10    /62   Pulse 100   Temp (!) 95.9  F (35.5  C) (Oral)   Resp 18   Ht 6' (1.829 m)   Wt 180 lb (81.6 kg)   SpO2 100%   BMI 24.41 kg/m      Intake/Output last 3 shifts:  No intake/output data recorded.  Intake/Output this shift:  I/O this shift:  In: 2479.7 [I.V.:2479.7]  Out: 1 [Blood:1]    Physical Exam  Gen: intubated, sedated  HEENT: NT, no TYSON  CV: RRR, no m/g/r  Resp: clear ant no wheezing or rhonchi.   Abd: soft, nontender, BS+  Skin: no rashes or lesions  Ext: no edema  Neuro: PERRL, nonfocal exam    LAB:  Recent Results (from the past 24 hour(s))   POCT Glucose    Specimen: Capillary; Blood   Result Value Ref Range    Glucose 151 (H) 70 - 139 mg/dL   Basic Metabolic Panel   Result Value Ref Range    Sodium 143 136 - 145 mmol/L    Potassium 6.8 (HH) 3.5 - 5.0 mmol/L    Chloride 96 (L) 98 - 107 mmol/L    CO2 <6 (LL) 22 - 31 mmol/L    Anion Gap, Calculation >41 (H) 5 - 18 mmol/L    Glucose 222 (H) 70 - 125 mg/dL    Calcium 10.3 8.5 - 10.5 mg/dL    BUN 72 (H) 8 - 22 mg/dL    Creatinine 6.14 (HH) 0.70 - 1.30 mg/dL    GFR MDRD Af Amer 11 (L) >60 mL/min/1.73m2    GFR MDRD Non Af Amer 9 (L) >60 mL/min/1.73m2   Hepatic Profile   Result Value Ref Range    Bilirubin, Total 0.3 0.0 - 1.0 mg/dL    Bilirubin, Direct 0.1 <=0.5 mg/dL    Protein, Total 6.8 6.0 - 8.0 g/dL    Albumin 3.5 3.5 - 5.0 g/dL    Alkaline Phosphatase 75 45 - 120 U/L    AST 14 0 - 40 U/L    ALT 13 0 - 45 U/L   APTT(PTT)   Result Value Ref Range    PTT 30 24 - 37 seconds   INR   Result Value Ref Range    INR 1.98 (H) 0.90 - 1.10   Type and  Screen   Result Value Ref Range    ABORh A POS     Antibody Screen Negative Negative   Troponin I   Result Value Ref Range    Troponin I 0.07 0.00 - 0.29 ng/mL   Magnesium   Result Value Ref Range    Magnesium 2.8 (H) 1.8 - 2.6 mg/dL   HM1 (CBC with Diff)   Result Value Ref Range    WBC 17.6 (H) 4.0 - 11.0 thou/uL    RBC 4.19 (L) 4.40 - 6.20 mill/uL    Hemoglobin 13.3 (L) 14.0 - 18.0 g/dL    Hematocrit 45.1 40.0 - 54.0 %     (H) 80 - 100 fL    MCH 31.7 27.0 - 34.0 pg    MCHC 29.5 (L) 32.0 - 36.0 g/dL    RDW 15.2 (H) 11.0 - 14.5 %    Platelets 445 (H) 140 - 440 thou/uL    MPV 9.4 8.5 - 12.5 fL    Neutrophils % 82 (H) 50 - 70 %    Lymphocytes % 12 (L) 20 - 40 %    Monocytes % 3 2 - 10 %    Eosinophils % 0 0 - 6 %    Basophils % 1 0 - 2 %    Neutrophils Absolute 14.5 (H) 2.0 - 7.7 thou/uL    Lymphocytes Absolute 2.1 0.8 - 4.4 thou/uL    Monocytes Absolute 0.6 0.0 - 0.9 thou/uL    Eosinophils Absolute 0.0 0.0 - 0.4 thou/uL    Basophils Absolute 0.1 0.0 - 0.2 thou/uL   Lactic Acid   Result Value Ref Range    Lactic Acid 15.0 (HH) 0.5 - 2.2 mmol/L   CK Total - evaluate for rhabdomyolysis   Result Value Ref Range    CK, Total 47 30 - 190 U/L   POCT Creatinine   Result Value Ref Range    iSTAT Creatinine 6.4 (HH) 0.7 - 1.3 mg/dL    iSTAT GFR MDRD Af Amer 11 (L) >60 mL/min/1.73m2    iSTAT GFR MDRD Non Af Amer 9 (L) >60 mL/min/1.73m2   POCT Glucose    Specimen: Blood   Result Value Ref Range    Glucose 143 (H) 70 - 139 mg/dL   Crossmatch   Result Value Ref Range    Crossmatch Compatible     Unit Type A Pos     Unit Number [id number]     Status Ready     Component Red Blood Cells     PRODUCT CODE [id number]     Blood Type 6200     CODING SYSTEM [id number]    Crossmatch   Result Value Ref Range    Crossmatch Compatible     Unit Type A Pos     Unit Number [id number]     Status Ready     Component Red Blood Cells     PRODUCT CODE [id number]     Blood Type 6200     CODING SYSTEM [id number]    Blood Gases, Arterial   Result  Value Ref Range    pH, Arterial 6.85 (LL) 7.37 - 7.44    pCO2, Arterial <19 (LL) 35 - 45 mm Hg    pO2, Arterial 127 (H) 75 - 85 mm Hg    Bicarbonate, Arterial Calc 1.7 (L) 23.0 - 29.0 mmol/L    O2 Sat, Arterial 99.4 (H) 95.0 - 96.0 %    Oxyhemoglobin 96.9 (H) 95.0 - 96.0 %    Base Excess, Arterial Calc -29.8 mmol/L    Ventilation Mode Room Air     FIO2      Sample Stabilized Temperature 37.0 degrees C   Potassium   Result Value Ref Range    Potassium 5.8 (H) 3.5 - 5.0 mmol/L   Basic Metabolic Panel   Result Value Ref Range    Sodium 141 136 - 145 mmol/L    Potassium 5.7 (H) 3.5 - 5.0 mmol/L    Chloride 102 98 - 107 mmol/L    CO2 <6 (LL) 22 - 31 mmol/L    Anion Gap, Calculation >33 (H) 5 - 18 mmol/L    Glucose 351 (H) 70 - 125 mg/dL    Calcium 8.4 (L) 8.5 - 10.5 mg/dL    BUN 63 (H) 8 - 22 mg/dL    Creatinine 5.23 (H) 0.70 - 1.30 mg/dL    GFR MDRD Af Amer 13 (L) >60 mL/min/1.73m2    GFR MDRD Non Af Amer 11 (L) >60 mL/min/1.73m2   POCT Glucose    Specimen: Blood   Result Value Ref Range    Glucose 194 (H) 70 - 139 mg/dL   Blood Gases, Arterial   Result Value Ref Range    pH, Arterial 6.82 (LL) 7.37 - 7.44    pCO2, Arterial 26 (L) 35 - 45 mm Hg    pO2, Arterial 141 (H) 75 - 85 mm Hg    Bicarbonate, Arterial Calc 2.4 (L) 23.0 - 29.0 mmol/L    O2 Sat, Arterial 99.6 (H) 95.0 - 96.0 %    Oxyhemoglobin 97.4 (H) 95.0 - 96.0 %    Base Excess, Arterial Calc -28.9 mmol/L    Ventilation Mode VCV     Rate 28 rr/min    FIO2 50.00     Peep 5 cm H2O    Sample Stabilized Temperature 37.0 degrees C    Ventilator Tidal Volume 550 mL   POCT Glucose    Specimen: Blood   Result Value Ref Range    Glucose 257 (H) 70 - 139 mg/dL       Micro  Blood cx pending    IMAGING:  Ct Chest Abdomen Pelvis Without Oral Without Iv Contrast    Result Date: 4/12/2020  EXAM: CT CHEST ABDOMEN PELVIS WO ORAL WO IV CONTRAST LOCATION: [facility] DATE/TIME: 4/12/2020 9:59 AM INDICATION: Syncope. Weakness. Fall. Anticoagulated. COMPARISON: 03/26/2020.  TECHNIQUE: CT scan of the chest, abdomen, and pelvis was performed without IV contrast. Multiplanar reformats were obtained. Dose reduction techniques were used. CONTRAST: None. FINDINGS: LUNGS AND PLEURA: Normal. MEDIASTINUM/AXILLAE: Normal. HEPATOBILIARY: Multiple gallstones. Otherwise negative. PANCREAS: Normal. SPLEEN: Normal. ADRENAL GLANDS: Benign adrenal adenomas bilaterally unchanged. No follow-up needed. KIDNEYS/BLADDER: New moderate left hydronephrosis with obstructing stone at the ureteral pelvic junction measures 5 mm. No other stones on the left side. Couple tiny nonobstructing stones right kidney with moderate atrophy right kidney as seen previously. BOWEL: Normal. LYMPH NODES: Normal. VASCULATURE: Unremarkable. PELVIC ORGANS: Normal. MUSCULOSKELETAL: Generalized degenerative change throughout the spine and hips. No concerning bone lesions.     1.  New moderately obstructing stone proximal left ureter at ureterovesical junction measuring 5 mm. 2.  No other significant new findings. 3.  No hemorrhage. 4.  Nonobstructing intrarenal stones. In the moderately atrophic right kidney unchanged. 5.  Gallstones. NOTE: ABNORMAL REPORT THE DICTATION ABOVE DESCRIBES AN ABNORMALITY FOR WHICH FOLLOW-UP IS NEEDED.     Xr Chest 1 View Portable    Result Date: 4/12/2020  EXAM: XR CHEST 1 VIEW PORTABLE LOCATION: [facility] DATE/TIME: 4/12/2020 11:51 AM INDICATION: intubation COMPARISON: 09/08/2019     ET tube 1.7 cm above the base of the steven. Pulling this back 2 cm would be more ideal. PICC tip in the SVC. Lungs are clear. NOTE: ABNORMAL REPORT THE DICTATION ABOVE DESCRIBES AN ABNORMALITY FOR WHICH FOLLOW-UP IS NEEDED.     Ct Head Without Contrast    Result Date: 4/12/2020  EXAM: CT HEAD WO CONTRAST, CT CERVICAL SPINE WO CONTRAST LOCATION: [facility] DATE/TIME: 4/12/2020 9:45 AM INDICATION: Headache and neck pain after trauma on blood thinners. COMPARISON: CT head dated 12/26/2016 TECHNIQUE: HEAD  CT: Without IV contrast. Multiplanar reformats. Dose reduction techniques were used. CERVICAL SPINE CT: Routine without IV contrast. Multiplanar reformats. Dose reduction techniques were used. FINDINGS: HEAD CT: INTRACRANIAL CONTENTS: No acute intracranial hemorrhage. Bilateral basal ganglia mineralization. No CT evidence of acute infarct. Sequelae of mild chronic microangiopathy. Mild cerebral volume loss without hydrocephalus. No extra-axial fluid collections.   Patent basal cisterns. VISUALIZED ORBITS/SINUSES/MASTOIDS: Right globe prosthetic and postoperative change of the bilateral lenses, otherwise the orbits are unremarkable. The visualized paranasal sinuses and temporal bone structures are well-aerated. BONES/SOFT TISSUES: The calvarium and skull base are unremarkable. CERVICAL SPINE CT: VERTEBRA: The spine is imaged from the skull base through T2. Straightening of the usual cervical lordosis without significant spondylolisthesis. Vertebral body heights are maintained without evidence of acute fracture. No aggressive osseous lesion.   CANAL/FORAMINA: Multilevel degenerative changes without significant spinal canal stenosis or high-grade neural foraminal narrowing. If there is concern for ligamentous or cord injury MRI could be helpful in further evaluation. PARASPINAL: No prevertebral or paravertebral soft tissue swelling.  Visualized lungs are clear. The thyroid gland is unremarkable. Mild carotid artery bifurcation calcification     HEAD CT: 1. Senescent changes and sequelae of chronic microangiopathy without acute intracranial abnormality. CERVICAL SPINE CT: 1. No acute traumatic injury of the cervical spine. If there is concern for ligamentous or cord injury MRI could be helpful in further evaluation.    Ct Cervical Spine Without Contrast    Result Date: 4/12/2020  EXAM: CT HEAD WO CONTRAST, CT CERVICAL SPINE WO CONTRAST LOCATION: [facility] DATE/TIME: 4/12/2020 9:45 AM INDICATION: Headache and  neck pain after trauma on blood thinners. COMPARISON: CT head dated 12/26/2016 TECHNIQUE: HEAD CT: Without IV contrast. Multiplanar reformats. Dose reduction techniques were used. CERVICAL SPINE CT: Routine without IV contrast. Multiplanar reformats. Dose reduction techniques were used. FINDINGS: HEAD CT: INTRACRANIAL CONTENTS: No acute intracranial hemorrhage. Bilateral basal ganglia mineralization. No CT evidence of acute infarct. Sequelae of mild chronic microangiopathy. Mild cerebral volume loss without hydrocephalus. No extra-axial fluid collections.   Patent basal cisterns. VISUALIZED ORBITS/SINUSES/MASTOIDS: Right globe prosthetic and postoperative change of the bilateral lenses, otherwise the orbits are unremarkable. The visualized paranasal sinuses and temporal bone structures are well-aerated. BONES/SOFT TISSUES: The calvarium and skull base are unremarkable. CERVICAL SPINE CT: VERTEBRA: The spine is imaged from the skull base through T2. Straightening of the usual cervical lordosis without significant spondylolisthesis. Vertebral body heights are maintained without evidence of acute fracture. No aggressive osseous lesion.   CANAL/FORAMINA: Multilevel degenerative changes without significant spinal canal stenosis or high-grade neural foraminal narrowing. If there is concern for ligamentous or cord injury MRI could be helpful in further evaluation. PARASPINAL: No prevertebral or paravertebral soft tissue swelling.  Visualized lungs are clear. The thyroid gland is unremarkable. Mild carotid artery bifurcation calcification     HEAD CT: 1. Senescent changes and sequelae of chronic microangiopathy without acute intracranial abnormality. CERVICAL SPINE CT: 1. No acute traumatic injury of the cervical spine. If there is concern for ligamentous or cord injury MRI could be helpful in further evaluation.    Ct Lumbar Spine Without Contrast    Result Date: 4/12/2020  EXAM: CT LUMBAR SPINE WO CONTRAST LOCATION: [facility] DATE/TIME: 4/12/2020 10:00 AM INDICATION: Back pain COMPARISON: CT of the abdomen pelvis dated 4/12/2020 and 3/26/2020 TECHNIQUE: Routine without IV contrast. Multiplanar reformats.  Dose reduction techniques were used. FINDINGS: VERTEBRA: The spine is imaged from inferior endplate of T12-S3. There are 5 lumbar type vertebral bodies. Straightening of the usual spinal curvature without significant spondylolisthesis. Vertebral body heights are maintained without evidence of acute fracture. No aggressive osseous lesion.   CANAL/FORAMINA: Multilevel degenerative changes without significant spinal canal stenosis or high-grade neural foraminal narrowing. PARASPINAL: 5-6 mm obstructing calculus at the left ureteropelvic junction resulting in severe left renal hydronephrosis, which is better characterized on the CT of the abdomen pelvis. Partially visualized left adrenal myelolipoma. Nonobstructing right renal calculi.     1.  No traumatic injury of the lumbar spine. 2. Development of a 5-6 mm obstructing calculus at the left ureteropelvic junction resulting in severe left hydronephrosis. - - - - - - - - - - - - - - - - - - - - - - - - - - - - - - - - - - - - - - - - - - - - - - - - - - - - - - - - - - - - - - - - - - - - - - - - - - - - The results above were discussed with RONNIE GOLDSMITH on 4/12/2020 10:10 AM by Dr. Hi Noyola. - - - - - - - - - - - - - - - - - - - - - - - - - - - - - - - - - - - - - - - - - - - - - - - - - - - - - - - - - - - - - - - - - - - - - - - - - - - -    Xr Retrograde Pyelogram W Or Wo Kub Intraoperative    Result Date: 4/12/2020  EXAM: XR RETROGRADE PYELOGRAM W OR WO KUB INTRAOPERATIVE LOCATION: [facility] DATE/TIME: 4/12/2020 12:50 PM INDICATION: left ureteral stent COMPARISON: 04/12/2020 CT TECHNIQUE: Exam performed by Urologist. FLUOROSCOPIC TIME: 0.3 minutes NUMBER OF IMAGES: 4 FINDINGS: Left ureteral stent in good position.      Critical care  attestation: 45 minutes spent managing the following issues: acute respiratory failure requiring intubation/IMV, circulatory shock requiring continuous vasopressor infusions, acute kidney injury, hyperkalemia requiring emergent hemodialysis, GI bleeding possible hemorrhagic shock, NOAC therapy with life-threatening bleedingHigh risk for organ deterioration and death requiring ICU level care.

## 2021-06-07 NOTE — PLAN OF CARE
Problem: Hemodynamic Status  Goal: Patient's vitals signs are stable  Outcome: Progressing  - Heparin infusion initiated per order.    Problem: Daily Care  Goal: Daily care needs are met  Outcome: Progressing  - Patient worked with therapy this shift.   - Denied pain this shift.  - Magnesium and potassium replaced per protocol.  - Intermittently incontinent of urine this shift.  - Water intake encouraged throughout shift.

## 2021-06-07 NOTE — CONSULTS
[facility] Consult       Name: Rakesh Samuels    Medical Record #: [id number]    YOB: 1950    Date/Time: 4/12/2020/3:06 PM    Reason for Consultation: Luis Hernandez MD has asked me to evaluate Rakesh Samuels regarding upper gi bleed.    HPI: Patient is 70 yo M w/ pmh sf dm, paroxysmal afib on asa/eliquis, presented following syncopal episode, found to be hypoglycemic by EMS. In ER an episode of bloody emesis was reported. Patient also c/o left flank pain, and hematuria. CT revealed 10 mm left UPJ stone with severe left hydronephrosis. He was intubated for worsening mental status and respiratory status. He was urgently taken to OR and is s/p left ureteral stent placement. Upon xfer to ICU OG tube was placed for further eval of reported bloody emesis with ~ 1L coffee ground output. He is currently intubated, sedated, on pressor support with hgb 10. Lactic acid 17. INR is 1.8. Normal platelets. His potassium is 7. BUN/Cr are elevated. Plan is to initiate dialysis. He is receiving prbc and kcentra to reverse eliquis. I called and spoke to wife. She denied any prior history of upper gi bleed. He has had history of intermittent brbpr for many years which has been attributed to hemorrhoids. She reported he has had 30 lb weight loss over the last few months. He does not drink etoh. Other than asa/eliquis no additional blood thinners. Last colonoscopy was 2011 and showed precancerous polyps, diverticulosis in the entire colon, internal and external hemorrhoids. Repeat was recommended in 3 years but this does not appear to have been done.     Patient Active Problem List   Diagnosis     SIRS (systemic inflammatory response syndrome) (H)     Adrenal incidentaloma (H)     Diet-controlled diabetes mellitus (H)     Pericardial effusion     Lactic acidosis     Diabetes mellitus, type 2 (H)     Paroxysmal atrial fibrillation (H)     Calculus of ureter     Acute renal failure, unspecified acute renal  failure type (H)     Acute renal failure (ARF) (H)     ATN (acute tubular necrosis) (H)     Sepsis due to urinary tract infection (H)     Septic shock (H)     Metformin overdose of undetermined intent, initial encounter     Acute respiratory failure with hypoxemia (H)     Metabolic acidosis     Hyperkalemia          Review of Systems (ROS): Review of systems not obtained due to inability to communicate with the patient.     Past Medical History:  Past Medical History:   Diagnosis Date     Diabetes mellitus, type 2 (H) 4/12/2020     Paroxysmal atrial fibrillation (H) 2/18/2020       Medications:   Current Facility-Administered Medications   Medication Dose Route Frequency Provider Last Rate Last Dose     bacitracin ointment packet 1 packet  1 packet Topical Once PRN Christopher Yates MD         benzocaine-menthoL lozenge 1 lozenge (CEPACOL)  1 lozenge Oral Q1H PRN Christopher Yates MD         bisacodyL suppository 10 mg (DULCOLAX)  10 mg Rectal Daily PRN Christopher Yates MD         calcium chloride 100 mg/mL (10 %) injection 1 g  1 g Intravenous Once Luis Hernandez MD         chlorhexidine 0.12 % solution 15 mL (PERIDEX)  15 mL Swish & Spit BID Christopher Yates MD         [MAR Hold] dextrose 10%  75 mL/hr Intravenous Continuous Carmen, Rosy Stanley MD   Stopped at 04/12/20 1400     dextrose 50 % (D50W) syringe 20-50 mL  20-50 mL Intravenous Q15 Min PRN Christopher Yates MD         dextrose 50 % (D50W) syringe 20-50 mL  20-50 mL Intravenous Q15 Min PRN Christopher Yates MD         famotidine 20 mg injection  20 mg Intravenous DAILY Christopher Yates MD        Or     famotidine tablet 20 mg (PEPCID)  20 mg Oral DAILY Christopher Yates MD         glucagon (human recombinant) injection 1 mg  1 mg Subcutaneous Q15 Min PRN Christopher Yates MD         glucagon (human recombinant) injection 1 mg  1 mg Subcutaneous Q15 Min PRN Christopher Yates MD         heparin ANTICOAGULANT injection 1,000-6,000 Units  1,000-6,000  Units Intracatheter PRN for dialysis Homer Ricci MD         insulin regular 1 Units/mL in sodium chloride 0.9% 250 mL  0-24 Units/hr Intravenous Continuous PRN [place], Christopher LAZO MD         magnesium hydroxide suspension 30 mL (MILK OF MAG)  30 mL Oral Daily PRN TrudyChristopher funk MD         naloxone injection 0.2-0.4 mg (NARCAN)  0.2-0.4 mg Intravenous PRN TrudyChristopher funk MD        Or     naloxone injection 0.2-0.4 mg (NARCAN)  0.2-0.4 mg Intramuscular PRN [place], Christopher LAZO MD         norepinephrine 4 mg/250 ml in NS (0.016 mg/ml)  0.01-0.4 mcg/kg/min Intravenous Continuous TrudyChristopher funk MD 45.9 mL/hr at 04/12/20 1430 0.15 mcg/kg/min at 04/12/20 1430     ondansetron injection 4 mg (ZOFRAN)  4 mg Intravenous Once PRN CarmenRosy MD         ondansetron injection 4 mg (ZOFRAN)  4 mg Intravenous Q4H PRN Christopher Yates MD        Or     ondansetron tablet 8 mg (ZOFRAN)  8 mg Oral Q8H PRN Christopher Yates MD         piperacillin-tazobactam 3.375 g in NaCl 0.9 % 50 mL (MINI-BAG Plus) (ZOSYN)  3.375 g Intravenous Q12H Luis Hernandez MD         polyvinyl alcohol 1.4 % ophthalmic solution 1-2 drop (LIQUIFILM TEARS)  1-2 drop Both Eyes Q1H PRN Christopher Yates MD         propofoL injection (DIPRIVAN)  5-75 mcg/kg/min Intravenous Continuous TrudyChristopher funk MD 2.4 mL/hr at 04/12/20 1140 5 mcg/kg/min at 04/12/20 1140     propofoL injection (DIPRIVAN)  5-75 mcg/kg/min Intravenous Continuous [place] fun[place] MD 19.6 mL/hr at 04/12/20 1320 40 mcg/kg/min at 04/12/20 1320     prothrombin complex human 50 Units/kg = 4,080 Units in sterile water for injection (PF)  50 Units/kg Intravenous Once Luis Hernandez MD         senna-docusate 8.6-50 mg tablet 1 tablet (PERICOLACE)  1 tablet Oral BID Christopher Yates MD        Or     sennosides syrup 8.8 mg (for SENOKOT)  8.8 mg Enteral Tube BID Christopher Yates MD         sodium bicarbonate 150 mEq in dextrose 5% 1,000 mL  1-500 mL/hr Intravenous Continuous [place],  Christopher LAZO  mL/hr at 04/12/20 1208 248 mL/hr at 04/12/20 1208     sodium chloride 0.9% 100-500 mL  100-500 mL Intravenous PRN Homer Ricci MD         sodium chloride 0.9%  10 mL/hr Intravenous Continuous TrudyChristopher funk MD         sodium chloride bacteriostatic 0.9 % injection 1-5 mL  1-5 mL Intradermal Once PRN [place] funk MD         sodium chloride flush 10-20 mL (NS)  10-20 mL Intravenous PRN TrudyChristopher MD         sodium chloride flush 10-30 mL (NS)  10-30 mL Intravenous PRN Trudy, Christopher LAZO MD         sodium chloride flush 10-30 mL (NS)  10-30 mL Intravenous Q8H FIXED TIMES TrudyChristopher funk MD         sodium chloride flush 20 mL (NS)  20 mL Intravenous PRN TrudyChristopher funk MD         vancomycin intermittent dosing   Other Med Consult or Protocol Luis Hernandez MD         vasopressin standard infusion 20 units in D5W 100 mL  2.4 Units/hr Intravenous Continuous Luis Hernandez MD           Allergies: Adhesive tape-silicones    Family History:  No family history on file.    Social History:  Social History     Socioeconomic History     Marital status:      Spouse name: Not on file     Number of children: Not on file     Years of education: Not on file     Highest education level: Not on file   Occupational History     Not on file   Social Needs     Financial resource strain: Not on file     Food insecurity     Worry: Not on file     Inability: Not on file     Transportation needs     Medical: Not on file     Non-medical: Not on file   Tobacco Use     Smoking status: Never Smoker     Smokeless tobacco: Never Used   Substance and Sexual Activity     Alcohol use: Yes     Frequency: Monthly or less     Binge frequency: Never     Drug use: Never     Sexual activity: Not Currently   Lifestyle     Physical activity     Days per week: Not on file     Minutes per session: Not on file     Stress: Not on file   Relationships     Social connections     Talks on phone: Not on file     Gets  together: Not on file     Attends [employer] service: Not on file     Active member of club or organization: Not on file     Attends meetings of clubs or organizations: Not on file     Relationship status: Not on file     Intimate partner violence     Fear of current or ex partner: Not on file     Emotionally abused: Not on file     Physically abused: Not on file     Forced sexual activity: Not on file   Other Topics Concern     Not on file   Social History Narrative     Not on file       PHYSICAL EXAMINATION:  /62   Pulse 100   Temp (!) 95.9  F (35.5  C) (Oral)   Resp 18   Ht 6' (1.829 m)   Wt 180 lb (81.6 kg)   SpO2 100%   BMI 24.41 kg/m   Body mass index is 24.41 kg/m .  General: intubated, sedated  HEENT: Sclera non-icteric.  Moist mucous membranes. Oropharynx clear.   Lymph: No cervical lymphadenopathy.  Pulm: Lungs clear to ausculation bilaterally.  Cardio: Regular rate and rhythm   Gastrointestinal:  +bs, soft, nt, nd  Musculoskeletal: no lower extremity edema..  Skin: No suspicious lesions or rashes.    I have reviewed recent laboratory studies:    LABORATORY DATA:  Results from last 7 days   Lab Units 04/12/20  1429 04/12/20  0905   LN-WHITE BLOOD CELL COUNT thou/uL 16.1* 17.6*   LN-HEMOGLOBIN g/dL 10.1* 13.3*   LN-HEMATOCRIT % 33.5* 45.1   LN-PLATELET COUNT thou/uL 302 445*       Results from last 7 days   Lab Units 04/12/20  1428 04/12/20  1050 04/12/20  0905   LN-SODIUM mmol/L 142 141 143   LN-POTASSIUM mmol/L 7.0* 5.7*  5.8* 6.8*   LN-CHLORIDE mmol/L 99 102 96*   LN-CO2 mmol/L 7* <6* <6*   LN-BLOOD UREA NITROGEN mg/dL 66* 63* 72*   LN-CREATININE mg/dL 5.17* 5.23* 6.14*   LN-CALCIUM mg/dL 8.1* 8.4* 10.3       Results from last 7 days   Lab Units 04/12/20  1428 04/12/20  0905   LN-ALKALINE PHOSPHATASE U/L 52 75   LN-BILIRUBIN TOTAL mg/dL 0.2 0.3   LN-BILIRUBIN DIRECT mg/dL  --  0.1   LN-PROTEIN TOTAL g/dL 4.6* 6.8   LN-ALT (SGPT) U/L <9 13   LN-AST (SGOT) U/L 18 14     Results from last 7  days   Lab Units 04/12/20  1428 04/12/20  0905   LN-INR  1.81* 1.98*   LN-PARTIAL THROMBOPLASTIN TIME seconds  --  30         Radiology:   EXAM: CT CHEST ABDOMEN PELVIS WO ORAL WO IV CONTRAST   LOCATION: [facility]   DATE/TIME: 4/12/2020 9:59 AM     INDICATION: Syncope. Weakness. Fall. Anticoagulated.   COMPARISON: 03/26/2020.   TECHNIQUE: CT scan of the chest, abdomen, and pelvis was performed without IV contrast. Multiplanar reformats were obtained. Dose reduction techniques were used.   CONTRAST: None.     FINDINGS:   LUNGS AND PLEURA: Normal.     MEDIASTINUM/AXILLAE: Normal.     HEPATOBILIARY: Multiple gallstones. Otherwise negative.     PANCREAS: Normal.     SPLEEN: Normal.     ADRENAL GLANDS: Benign adrenal adenomas bilaterally unchanged. No follow-up needed.     KIDNEYS/BLADDER: New moderate left hydronephrosis with obstructing stone at the ureteral pelvic junction measures 5 mm. No other stones on the left side. Couple tiny nonobstructing stones right kidney with moderate atrophy right kidney as seen   previously.     BOWEL: Normal.     LYMPH NODES: Normal.     VASCULATURE: Unremarkable.     PELVIC ORGANS: Normal.     MUSCULOSKELETAL: Generalized degenerative change throughout the spine and hips. No concerning bone lesions.    Impression:      1.  New moderately obstructing stone proximal left ureter at ureterovesical junction measuring 5 mm.     2.  No other significant new findings.     3.  No hemorrhage.     4.  Nonobstructing intrarenal stones. In the moderately atrophic right kidney unchanged.     5.  Gallstones.          Impression:   1. Upper gi bleed - on asa/eliquis with hgb 10, nl plts, inr 1.8; no history cirrhosis, no other nsaids. There was ~1L coffee ground OG output suggestive of old blood. Possible etiologies include PUD, gastritis, esophagitis, malignancy not excluded. Plan was for EGD today however with K 7 and plan for dialysis will defer until his K is corrected and he is  resuscitated  2. Sepsis - possible urosepsis in setting of severe L hydro with obstructing stone s/p L ureteral stent; wbc 16, lactate 17  3. CHIDI - bun/cr 66/5.17 plan for emergent dialysis per renal, upper gi bleed likely contributing to elevation in BUN  4. Weight loss       Recommendation:   1. Follow hgb, xfuse as per primary  2. ICU reversing eliquis with kcentra  3. Give FFP  4. Continue PPI  5. EGD once K (7) corrected and he has been resuscitated - if he remains stable would plan for EGD tomorrow; if evidence active bleed with fresh blood from OG and/or hemodynamic instability felt due to gi bleed let us know for more urgent eval  5. Antibiotics, supportive care as per ICU  6. Likely outpatient colonoscopy (appears overdue for surveillance, history weight loss)    Will follow.    Marci Roberts MD  4/12/2020/3:06 PM  [facility]

## 2021-06-07 NOTE — PROGRESS NOTES
Code Status:  FULL CODE  Visit Type: Discharge Summary     Facility:  [facility] [[id number]]             History of Present Illness: Rakesh Samuels is a 69 y.o. male who I am seeing today for discharge from the TCU. Pt recently hospitalized on 4/12/2020.  Past medical history includes atrial fibril on Eliquis, diabetes type 2, hyperlipidemia and glaucoma.  Patient presented to the hospital with upper GI bleed.  He underwent EGD on 4/14 showing esophagitis and gastritis with no active bleeding.  Initially his Eliquis was held.  He was treated with heparin.  Also placed on omeprazole 2 times daily.  Patient found also to have circulatory shock felt to be secondary to left nephrolithiasis.  TTE shows normal LV EF.  Questionable source due to sepsis versus GI blood loss.  He was initially treated with IV Zosyn however negative culture results returned so antibiotics discontinued.  He developed acute kidney injury requiring hemodialysis and respiratory failure requiring intubation.  Patient with blood-tinged urine secondary to anticoagulation in the setting of ureteral stent.  He is followed by urology.  History of obstructive uropathy status post cystoscopy with left ureteral stent placement.  Acute injury improved with fluids and hemodialysis.  Acute respiratory failure.  He was extubated on 4/14.  He is off oxygen.  Diabetes mellitus type 2.  His p.o. meds were stopped.  Hospitalization secondary to acute kidney injury.  He was placed on NovoLog sliding scale and Lantus.    Today patient lying in bed. Pt with recent cellulitis of the left foot due to open area between toes. He continues on Keflex. Stop date of 5/2/20. Redness, swelling and pain greatly improved. VS doppler negative for DVT. Uric acid level normal. Pt with DM type II. His oral diabetic meds was discontinued during hospitalization due to CHIDI. He has continued on Lantus. Initially blood sugars in the 200s. He continues with occasionally  elevated blood sugars. His renal function has returned to normal. I talked with his wife on the phone today. She has declined home care services including nursing to come in to the home due to COVID-19. She prefers to remain quarantined and reduce risk. I explained with switching to orals he will need close monitoring. I will for now leave him on insulin. He has demonstrated ability to dial up and administer. He needs to follow up with PCP and access returning to oral diabetic meds. I have asked his wife to take BS four times a day and log to follow up with his PCP in 1 week.   Patient voiding adequately.   No flank pain or abdominal discomfort.  Recent laboratory unremarkable.     Active Ambulatory Problems     Diagnosis Date Noted     SIRS (systemic inflammatory response syndrome) (H) 09/09/2019     Adrenal incidentaloma (H)      Diet-controlled diabetes mellitus (H)      Pericardial effusion      Lactic acidosis      Diabetes mellitus, type 2 (H) 04/12/2020     Paroxysmal atrial fibrillation (H) 02/18/2020     Calculus of ureter 04/12/2020     Acute renal failure, unspecified acute renal failure type (H) 04/12/2020     Acute renal failure (ARF) (H) 04/12/2020     ATN (acute tubular necrosis) (H) 04/12/2020     Sepsis due to urinary tract infection (H)      Shock circulatory (H)      Metformin overdose of undetermined intent, initial encounter      Acute respiratory failure with hypoxemia (H)      Metabolic acidosis      Hyperkalemia      Hematemesis, presence of nausea not specified 04/12/2020     Resolved Ambulatory Problems     Diagnosis Date Noted     No Resolved Ambulatory Problems     No Additional Past Medical History     Current Outpatient Medications   Medication Sig     apixaban ANTICOAGULANT (ELIQUIS) 5 mg Tab tablet Take 1 tablet (5 mg total) by mouth 2 (two) times a day.     ARIPiprazole (ABILIFY) 2 MG tablet Take 1 tablet (2 mg total) by mouth every evening.     atorvastatin (LIPITOR) 10 MG tablet  Take 10 mg by mouth at bedtime.     blood glucose test (ONETOUCH ULTRA BLUE TEST STRIP) strips Use 1 each As Directed 4 (four) times a day. Test BS four times a day     cephalexin (KEFLEX) 500 MG capsule Take 250 mg by mouth 3 (three) times a day.      dorzolamide-timolol (COSOPT) 22.3-6.8 mg/mL ophthalmic solution Administer 1 drop to both eyes 2 (two) times a day.     fluvoxaMINE (LUVOX) 100 MG tablet Take 0.5 tablets (50 mg total) by mouth at bedtime for 14 days.     lancets (ULTRA THIN LANCETS) 30 gauge Misc Use 1 each As Directed 4 (four) times a day. Test BS four times a day     LANTUS SOLOSTAR U-100 INSULIN 100 unit/mL (3 mL) pen Inject 10 Units under the skin at bedtime. 11.65 Type 2 with hyperglycemia  Contact provider if insulin prescribed is not the preferred insulin per insurance.     latanoprostene bunod 0.024 % Drop Administer 1 drop to both eyes at bedtime.     multivitamin therapeutic tablet Take 1 tablet by mouth daily.     netarsudiL (RHOPRESSA) 0.02 % Drop Administer 1 drop to both eyes daily.     NOVOLOG FLEXPEN U-100 INSULIN 100 unit/mL (3 mL) injection pen Check blood sugar four (4) times daily.  11.65 Type 2 with hyperglycemia  BD Ultra-fine Adina Pen Needles - NDC [address]-78 - dispense 1 case,  refill PRN for 1 year     omeprazole (PRILOSEC OTC) 20 MG tablet Take 1 tablet (20 mg total) by mouth 2 (two) times a day before meals.     VENTOLIN HFA 90 mcg/actuation inhaler Inhale 2 puffs every 4 (four) hours as needed.       Allergies   Allergen Reactions     Adhesive Tape-Silicones          Review of Systems  No fevers or chills. No headache, lightheadedness or dizziness. No SOB, chest pains or palpitations. Appetite is good. No nausea, vomiting, constipation or diarrhea. No dysuria, frequency, burning or pain with urination.  No evidence of acute bleed.  Pain in the left foot.  Reports history of gout.  Otherwise review of systems are negative.     Physical Exam  PHYSICAL EXAMINATION:  Vital  signs: /68, pulse 73, respirations 18, temperature 97.9, O2 sat 96% on room air.  Weight 180.1 pounds.  General: Awake, Alert, oriented x3, appropriately, follows simple commands, conversant  HEENT Pink conjunctiva on the left with slightly red conjunctiva on the right.  No drainage, anicteric sclerae, moist oral mucosa.  History of glaucoma with vision loss in the right eye.   NECK: Supple  CARDIOVASCULAR: S1-S2 without murmur gallop.   RESPIRATORY: No wheezes rales or rhonchi  BACK: No kyphosis of the thoracic spine  EXTREMITIES: Good range of motion on both upper and lower extremities, trace pedal edema on the left lower extremity, no calf tenderness.    SKIN: Left foot with slight redness and swelling at the forefoot.  No warmth.  Small fissures between the second and third and third and fourth toes now closed.  NEUROLOGIC: Intact, pulses palpable  PSYCHIATRIC: Cognition intact      Labs:    Results for orders placed or performed in visit on 04/20/20   Basic Metabolic Panel   Result Value Ref Range    Sodium 146 (H) 136 - 145 mmol/L    Potassium 3.9 3.5 - 5.0 mmol/L    Chloride 112 (H) 98 - 107 mmol/L    CO2 27 22 - 31 mmol/L    Anion Gap, Calculation 7 5 - 18 mmol/L    Glucose 123 70 - 125 mg/dL    Calcium 7.8 (L) 8.5 - 10.5 mg/dL    BUN 12 8 - 22 mg/dL    Creatinine 0.94 0.70 - 1.30 mg/dL    GFR MDRD Af Amer >60 >60 mL/min/1.73m2    GFR MDRD Non Af Amer >60 >60 mL/min/1.73m2     Lab Results   Component Value Date    WBC 9.7 04/22/2020    HGB 10.3 (L) 04/20/2020    HCT 32.9 (L) 04/20/2020    MCV 99 04/20/2020     04/20/2020           Assessment/Plan:  1. Cellulitis of left foot   continue Keflex until 5/2.  Cracks between toes healed.  Redness and swelling dissipating.  Recent laboratory unremarkable.   2. Acute upper GI bleed   no evidence of bleed.   3. SIRS (systemic inflammatory response syndrome) (H)   resolved.   4. Acute renal failure with tubular necrosis (H)   renal function improved.   GFR) within normal limits.  Follow up with nephrology tomorrow.    5. Type 2 diabetes mellitus with chronic kidney disease, without long-term current use of insulin, unspecified CKD stage (H)   blood sugar slightly elevated occasionally in the 200s. D/c home on Lantus and SS.   Log BS four times a day at home.   PCP to decide restarting of oral diabetic meds.   If so he will need follow up of renal function.    6. Paroxysmal atrial fibrillation (H)   continues on Eliquis.     Ok to D/C home with current meds and treatments. Recommending home care however wife declined. Follow up PCP in 1 week. Follow up with nephrology in am.     DISCHARGE PLAN/FACE TO FACE:  I certify that this patient is under my care and that I, or a nurse practitioner or physician's assistant working with me, had a face-to-face encounter that meets the physician face-to-face encounter requirements with this patient.       I certify that, based on my findings, the following services are medically necessary home health services.    My clinical findings support the need for the above skilled services.    This patient is homebound because: recent GI bleed.     The patient is, or has been, under my care and I have initiated the establishment of the plan of care. This patient will be followed by a physician who will periodically review the plan of care.      Total time spent for this visit was 45 minutes which included counseling and coordination of care, reviewing with pt diabetes management. Also speaking with wife regarding home care, diabetes management and follow up.     Electronically signed by: Kylie Gomez CNP

## 2021-06-07 NOTE — ED NOTES
This nurse spoke to patient's daughter, Nanette, at [phone], she is updated regarding patient's status and regards to surgery.  She states that the patient is on special eye drops for the pressure in his eyes and when he is admitted, she usually has to bring them in.  She left her number and I will call the pharmacist.

## 2021-06-07 NOTE — PROGRESS NOTES
69 y.o. male with a. Fib, DM 2, HPL admitted with UGIB, shock with obstructing left ureteral stone, acute renal failure, severe lactic acidosis.  Intubated in ED taken to OR for left ureteral stent then ICU where HD catheter placed and received emergency dialysis.  Issues:    Circulatory Shock. [id number], now normal. Received multiple IVF boluses. Echo Feb 2020 EF 67%, normal RV, RVSP 36, normal LV size/functino, no valve issues. Hx afib on eliquis.   Likely hemorrhagic shock from bleeding + septic/vasoldilatory, + hypovolemic shock from dehydration.  Off vasopressors    Acute resp failure with hypoxemia, intubated for worsening mental status in the setting of severe metabolic acidosis.  ICU team managing. Weaining trials daily.    Possible sepsis: leukocytosis, lactic acidosis, shock, obstructing left ureterolithiasis: s/p left ureteral stent.  UC x2 negative. BC's NGTD.   vanc/zosyn    Severe CHIDI/ATN, metabolic acidosis, lactic acidosis. Partly due to left ureteral stone obstruction with hydro; additionally with hyper K 6.8. possible metformin toxicity. All resolved with HD yesterday and vent management. Nephrology following for any additional HD need.    GI hemorrhage: Sig hgb drop 13 -> 10 with coffee ground material coming through OG tube, ~1L. On eliquis s/p Kcentra for reversal + pRBC.  EGD 4/14 Esophagitis at the distal esophagus, small superficial esophageal ulcerations, mild gastritis, normal duodenum to D2. No active bleeding seen during exam. GI followqing. PPI.     ICU team managing. Assistance appreciated. HMS will follow.

## 2021-06-07 NOTE — PROGRESS NOTES
[facility] Progress Note       ASSESSMENT:    Principal Problem:    Acute renal failure (ARF) (H)  Active Problems:    Diabetes mellitus, type 2 (H)    Paroxysmal atrial fibrillation (H)    Calculus of ureter    Acute renal failure, unspecified acute renal failure type (H)    ATN (acute tubular necrosis) (H)    Sepsis due to urinary tract infection (H)    Shock circulatory (H)    Metformin overdose of undetermined intent, initial encounter    Acute respiratory failure with hypoxemia (H)    Metabolic acidosis    Hyperkalemia    Hematemesis, presence of nausea not specified      PLAN:   69-year-old male with history of A. fib on Eliquis, DM 2 and hyperlipidemia presents with upper GI bleed and circulatory shock thought secondary to left nephrolithiasis.  Hospital course notable for CHIDI requiring hemodialysis and respiratory failure requiring intubation..      Upper GI bleed: Currently resolved.  Patient underwent  showing esophagitis and gastritis with no active bleeding.  --Start IV heparin drip, monitor for any recurrence of GI bleeding while on anticoagulation for atrial fibrillation.-  --Continue PPI  --Monitor hemoglobin every 12 hours for now      CHIDI: Resolving patient underwent emergent hemodialysis for hyperkalemia.  --Renal function recovering, trend GFR in the a.m.      History of atrial fibrillation: Currently holding Eliquis given above issues  --Start IV heparin drip without bolus and monitor for any GI blood loss, if stable over next 24 hours could restart Eliquis and discharge to home  --He otherwise is not on any rate controlling medication    Circulatory shock: Resolved.  TTE shows normal LVEF.  --Question source due to sepsis versus GI blood loss  --Stop IV Zosyn given negative culture results\      Acute respiratory failure requiring intubation, resolved.  Extubated 414      Obstructive uropathy: Patient underwent cystoscopy and left ureteral stent placement on admission  --Stop  Zosyn, urine cultures remain negative-  --we will discuss with urology regarding stone clearance, patient currently medically stable.  If he is stable today while on IV heparin, we could easily stop heparin drip in the a.m. for stone extraction and then start Eliquis and discharge home      DM2: Continue current management        DVT PPX:  Starting IV heparin    Needs for Discharge: No GI bleeding on anticoagulation, stone clearance    ESTIMATED DISCHARGE: ?1 to 2 days        Tu Ruby D.O.  [phone]             -------------------------------------------------------------------------------------------------------------  SUBJECTIVE: NAD. Denies any nausea, vomiting, abdominal pain, chest pain, SOB, ROSARIO, orthopnea, new swelling, fevers, chills, confusion or headache.     Exam:  /78 (Patient Position: Sitting)   Pulse 99   Temp 97.5  F (36.4  C) (Oral)   Resp 18   Ht 6' (1.829 m)   Wt 198 lb 3.2 oz (89.9 kg)   SpO2 99%   BMI 26.88 kg/m    General: NAD  RESPIRATORY: Clear to auscultation   CARDIOVASCULAR: S1, S2, without murmur. No le edema bilat.   ABDOMEN: soft and non-tender  NEUROLOGIC: NMotor and sensory intact, speech clear  PSYCHIATRIC: Oriented X 3, without confusion     Diagnostics Reviewed:      Recent Results (from the past 24 hour(s))   Potassium    Collection Time: 04/15/20  3:50 PM   Result Value Ref Range    Potassium 4.2 3.5 - 5.0 mmol/L   POCT Glucose    Collection Time: 04/15/20  4:31 PM    Specimen: Blood   Result Value Ref Range    Glucose 248 (H) 70 - 139 mg/dL   POCT Glucose    Collection Time: 04/15/20  9:19 PM    Specimen: Blood   Result Value Ref Range    Glucose 222 (H) 70 - 139 mg/dL   Magnesium    Collection Time: 04/16/20  6:10 AM   Result Value Ref Range    Magnesium 1.5 (L) 1.8 - 2.6 mg/dL   Renal Function Profile    Collection Time: 04/16/20  6:10 AM   Result Value Ref Range    Albumin 2.4 (L) 3.5 - 5.0 g/dL    Calcium 7.0 (L) 8.5 - 10.5 mg/dL    Phosphorus 2.6  2.5 - 4.5 mg/dL    Glucose 163 (H) 70 - 125 mg/dL    BUN 18 8 - 22 mg/dL    Creatinine 1.26 0.70 - 1.30 mg/dL    Sodium 146 (H) 136 - 145 mmol/L    Potassium 3.5 3.5 - 5.0 mmol/L    Chloride 110 (H) 98 - 107 mmol/L    CO2 26 22 - 31 mmol/L    Anion Gap, Calculation 10 5 - 18 mmol/L    GFR MDRD Af Amer >60 >60 mL/min/1.73m2    GFR MDRD Non Af Amer 57 (L) >60 mL/min/1.73m2   HM2(CBC w/o Differential)    Collection Time: 04/16/20  6:10 AM   Result Value Ref Range    WBC 6.7 4.0 - 11.0 thou/uL    RBC 3.63 (L) 4.40 - 6.20 mill/uL    Hemoglobin 11.4 (L) 14.0 - 18.0 g/dL    Hematocrit 34.9 (L) 40.0 - 54.0 %    MCV 96 80 - 100 fL    MCH 31.4 27.0 - 34.0 pg    MCHC 32.7 32.0 - 36.0 g/dL    RDW 14.9 (H) 11.0 - 14.5 %    Platelets 174 140 - 440 thou/uL    MPV 9.3 8.5 - 12.5 fL   POCT Glucose    Collection Time: 04/16/20  8:13 AM    Specimen: Blood   Result Value Ref Range    Glucose 158 (H) 70 - 139 mg/dL   HM2 (CBC W/O DIFF)    Collection Time: 04/16/20 10:04 AM   Result Value Ref Range    WBC 7.0 4.0 - 11.0 thou/uL    RBC 3.68 (L) 4.40 - 6.20 mill/uL    Hemoglobin 11.6 (L) 14.0 - 18.0 g/dL    Hematocrit 35.7 (L) 40.0 - 54.0 %    MCV 97 80 - 100 fL    MCH 31.5 27.0 - 34.0 pg    MCHC 32.5 32.0 - 36.0 g/dL    RDW 15.1 (H) 11.0 - 14.5 %    Platelets 189 140 - 440 thou/uL    MPV 9.0 8.5 - 12.5 fL   Protime-INR    Collection Time: 04/16/20 10:26 AM   Result Value Ref Range    INR 1.22 (H) 0.90 - 1.10   aPTT    Collection Time: 04/16/20 10:26 AM   Result Value Ref Range    PTT 26 24 - 37 seconds   POCT Glucose    Collection Time: 04/16/20 12:01 PM    Specimen: Blood   Result Value Ref Range    Glucose 203 (H) 70 - 139 mg/dL

## 2021-06-07 NOTE — PROGRESS NOTES
RESPIRATORY CARE NOTE     Patient Name: Rakesh Samuels  Today's Date: 4/13/2020     Pt continues on the following settings:  Vent Mode: VCV  FiO2 (%):  [30 %-50 %] 30 %  S RR:  [16-30] 16  S VT:  [550 mL] 550 mL  PEEP/CPAP (cm H2O):  [5 cm H2O-8 cm H2O] 8 cm H2O  Minute Ventilation (L/min):  [9.2 L/min-18 L/min] 9.2 L/min  PIP:  [22 cm H2O-26 cm H2O] 24 cm H2O  MAP (cm H2O):  [10-13] 11   Plateau pressure: 16 cm H2O     Pt is intubated with  # 8.0 ETT secured  26 at the teeth. Pt's respiratory status is stable. RT will continue to follow per MD's orders.     /55   Pulse 79   Temp 99.7  F (37.6  C) (Esophageal)   Resp 16   Ht 6' (1.829 m)   Wt 199 lb 4.7 oz (90.4 kg)   SpO2 100%   BMI 27.03 kg/m        MARA TylerT

## 2021-06-07 NOTE — PLAN OF CARE
Problem: Impaired Gas Exchange  Goal: Demonstrate improved ventilation and adequate oxygenation of tissues as evidenced by absence of respiratory distress  Outcome: Progressing     Problem: Mechanical Ventilation  Goal: Patient will maintain patent airway  Outcome: Progressing     Problem: Mechanical Ventilation  Goal: ET tube will be managed safely  Outcome: Progressing    Patient remains on ventilator settings of VCV 14, 550, +5, 30%. PIP 20/21, plateau pressures 12/10, RR 14, Spo2 100%, ETCO2 27/28. Breath sounds clear and diminished. Suctioning scant-small amounts of thick, tan secretions. 8.0 ETT/26 cm at teeth, re-positioning Q2H per RT/RN. Will continue to monitor closely.

## 2021-06-07 NOTE — PLAN OF CARE
Problem: Pain  Goal: Patient's pain/discomfort is manageable  Outcome: Progressing   Pt denies pain or discomfort this shift.     Problem: Discharge Barriers  Goal: Patient's discharge needs are met  Outcome: Progressing   IV heparin was discontinued this am and Eliquis restarted. Pt is on potassium and magnesium protocols, medication given per orders. Blood sugars monitored, sliding scale insulin given. Pt is tolerating a diabetic diet.   Pt has had pink tinged urine, is incontinent at times. Md aware, pt has been increasing his oral intake. Will continue to monitor.

## 2021-06-07 NOTE — PATIENT INSTRUCTIONS - HE
Patient Stated Goal: Know what to expect after surgery  Ureteroscopy    Ureteroscopy is a procedure which is done for clearance of stones from the ureter, kidney or both. There are no incisions involved. The procedure involves your surgeon placing a small scope into your urethra. This is the opening where urine leaves your body.  The surgeon watches as they carefully guide the scope to the stone(s).  Modern flexible ureteroscopes can be used to reach virtually any location within the urinary tract.     The size, shape and location of the stone determines how best to treat the stone(s).  Whenever possible, stones are removed in one piece.  Larger stones need to be broken using a laser before removing in smaller pieces.  The goal is to remove all stones and stone fragments from that side of the body in a single treatment.  Complete stone clearance is an important step to prevent future kidney stone episodes.    Surgery:    Same day outpatient procedure    30-60 minutes    Procedure done in hospital surgical suite    General anesthesia (you will be asleep during the procedure)     Antibiotic prior to surgery to prevent infection    Physician will visit with you and respond to any questions or concerns and consent will be signed prior to going to the operating room    Risks:    Infection - Preoperative antibiotics should prevent new infections but it is possible that unanticipated bacteria may be introduced at time of surgery or that the stones were actually chronically infected before surgery      Injury - The ureter may be injured during this procedure.  This is most likely to happen if the ureter was very inflamed before surgery or if a stone is very tightly impacted.  The surgeon will not aggressively treat a stone if this creates a risk of injury.        Inaccessible Stones -A single procedure is effective in 95% of cases, but if your ureter is very narrow or your kidney stone is very impacted, a stent will be  placed and the procedure stopped.  In 1-2 weeks after the ureter has relaxed, the patient will be brought back to surgery and the procedure can be safely performed.      Incomplete stone clearance -Occasionally stone or stone fragments may not be completely cleared.  These may pass on their own, which may cause discomfort.  Our goal is to remove all possible stones and fragments.    Stent:      An internal soft tube will be placed between the kidney and the bladder while in surgery (after the stone is cleared). The stent will keep the kidney draining.    What should I expect?     It is common for a stent to cause some irritation and discomfort.   You may have:      The need to urinate suddenly     The need to urinate often     Pain during urination     A dull backache, which may get worse during urination     Blood stained urine (like fruit punch) and occasional small clots    It s important to remember the stent is necessary and only temporary. To feel more comfortable:      Drink more than you normally would but you do not have to constantly  flush your kidneys     Limiting your activity may decrease irritation or bleeding    Ibuprofen - 2 tablets every 6-8 hours     Use pain medications as directed.    When is the stent removed?    Most stents are removed within 5 days to 2 weeks after a procedure.     How is the stent removed?     Your stent will be removed in the [facility] with a small telescope and a grasping tool.  It usually takes less than 1 minute to remove the stent.    What should I expect after the stent is removed?     You should feel normal by the next day    Some patients find:    An increase in back pain about an hour after the stent is removed as the kidney fills up with urine before it starts to empty.  It can be as uncomfortable as your initial stone episode.  Taking pain medications before stent removal may be helpful, but you would need someone else to drive you to and from your  appointment.    Bladder symptoms usually disappear by the next morning.    Small amounts of blood in the urine may be seen occasionally for up to a week.    Diet:      After surgery, there are no dietary restrictions - Drink to thirst, there is no need to increase intake of fluids, as this may increase nausea symptoms. Try to eat smaller, more frequent snacks, instead of large meals.    Activity:    Many people return to work within 1-2 days. Fatigue is normal for a couple of weeks following surgery. With increased activity you may experience more discomfort and you may notice more blood in your urine.      Post-Operative Symptom Control    While you recover from your procedure, you can take steps to ease your recovery.    Medications that prevent further episodes of severe pain and help stones pass: Take these as prescribed on a regular basis even if you are NOT in pain      Ibuprofen (Advil or Motrin) - Is available over the counter Take 2 (200mg) tablets every 6 hours until the stone passes.  o prevents spasm of the ureter.    o Decreases pain      Dramamine - (drowsy version, non-generic formulation) Is available over the counter and decreases spasm of the ureter.  Take 50mg at bedtime every night until the stone passes. In addition, take every 6 hours as needed.  Dramamine:  o Decreases nausea  o Decreases acute pain  o Decreases recurrence of pain for next 24 hours  o Will help you sleep        *This medication will cause increased drowsiness, do not drive or operate machinery for 6 hours      Flomax- Studies show that Flomax decreases irritation from stents.   o Take every day with food until stone passes even if you do not have pain  o Flomax does not relieve pain.        *This medication may cause nasal congestion or light-headedness      Detrol ( Tolterodine) - After surgery Detrol may decrease stent irritation and pelvic pain  o Take as prescribed     *This medication may cause dry mouth, constipation or  blurry vision. Stop medication if unable to urinate.    Medication that are taken as needed to manage break through symptoms: Take these ONLY as required and hopefully not at all      Narcotics (Percocet, Vicodin, Dilaudid)- take as prescribed for severe pain unrelieved by ibuprofen and dramamine  o Take as prescribed for severe pain  o Narcotics have significant side effects and only  cover-up  pain. They have no effect on cause of pain.  o Common side effects:  - Confusion, disorientation and sedation - DO NOT DRIVE OR OPERATE MACHINERY WITHIN 24 HOURS  - Nausea - take Dramamine or Zofran  or Haldol to help control  - Constipation  - Sleep disturbances      Ondansetron (Zofran)-  o Take as prescribed  o Reserve for severe nausea  o May cause constipation, start over the counter Miralax if needed to treat this    Haldol-  o Take as prescribed  o Reserve for severe nausea    Warning Signs/Symptoms - Please call the [facility] 24 hours a day at [phone] IMMEDIATELY if you experience any of these:    Fever greater than 100.1     Chills    Pain NOT CONTROLLED by pain medications    Heavy bleeding or large clots in urine (small clots can be normal)    Persistent nausea and/or vomiting    Post-Operative Follow up:    The stone(s) will be sent from surgery to a lab for composition analysis.  These results are usually available before a one month post-operative visit.  If you had laser treatment to break up your stone, you will usually be scheduled for a low dose CT scan prior to your one month appointment.  This scan allows your surgeon to confirm that all stone fragments were cleared at time of surgery and that there have been no complications.  These results along with possible labs and urine studies will help us develop an individualized plan to prevent new stones from forming and keep existing stones from enlarging.  This visit is usually scheduled about 1 month after your original surgery.    The  [facility] can respond to your questions or concerns 24 hours a day at [phone].

## 2021-06-07 NOTE — PROGRESS NOTES
Physical Therapy         04/16/20 1030   Visit Specifics   Eval Type Initial eval   Inital PT Consult 04/16/20   Bed/Tabs/Pad Alarm Applied Yes   Subjective Patient Comments tired, but agreeable   General   Onset date 04/12/20   Chart Reviewed Yes   PT/OT Patient/Caregiver Stated Goals none stated   Family/Caregiver Present No  (asking for phone number of wife- RN found it)   Treatment Time   Gait Training 10   Home Living   Type of Home House   Home Layout Ramped entrance  (goes downstairs for bed/bath. 2 sets 6 steps with rail)   Mobility Equipment Cane   Additional Comments reports he uses cane due to issues with vision  (see OT eval for more detail)   Prior Status   Independent With All ADL's;Laundry;Driving  (frozen meals, drives less and less due to vision)   Needs Assistance With Medication set up  (wife sets up medications)   Lives With Spouse  ( 46 years later this month)   Receives Help From Family  (no home care, 2 kids close by- 2 blocks away and 5 min)   Comments wife is wheelchair bound  (see OT eval for more detail)   Cognition   Overall Cognitive Status WFL   RLE Assessment   RLE Assessment WFL  (MMT grossly 4/5)   LLE Assessment   LLE Assessment WFL  (MMT grossly 4/5)   Sensory   Sensory Impairment Light touch   Light Touch No apparent deficits  (B LE quick screen)   Skin Integrity   Edema Generalized swelling  (R foot>L foot. also noted poor foot care/toe nails)   Bed Mobility   Supine to Sit Min assist;With rail   Bed Mobility Comments increased time and effort to get to EOB   Transfer    Sit To Stand Min assist;Verbal cues   Stand To Sit Min assist;Verbal cues   Transfer Comments instructed patient in safe hand placement for transfers   Ambulation    Distance (ft)  50   Assistance CGA;Chair follow   Assistive Device Rolling walker   Quality of Gait/Comment unsteady, no overt LOB   Pattern Decreased step length;Decreased pace;Path deviation  (mild path deviation)   Neuro Re-Ed   Neuro Re-Ed  Static Sitting Balance   Static Sitting Balance   Level of Assistance SBA   Location Edge of bed   Time 5 min   Patient Response  tolerated well, no LOB, no dizziness.   Fall Risk   Fall Risk Medium   Other Comments   Comments tired, declined sitting up in chair wanted to lay back down   Plan   Treatment/Interventions Functional transfer training;Gait/stair training;Strengthening/ROM   PT Frequency Daily   Assessment   Prognosis Good   Problem List Decreased endurance   Barriers to Discharge Decreased caregiver support  (wife is WC bound)   Recommendation   PT Discharge Recommendation TCU   PT Equipment Recommendation Rolling walker / FWW   Treatment Suggestions for Next Session ambulate FWW/chair follow. fatigues easily.    PT Care Plan REVIEWED DAILY Yes, goals remain appropriate

## 2021-06-07 NOTE — PROGRESS NOTES
Pharmacy Consult: Vancomycin Dosing    Pharmacist consulted to dose vancomycin for Rakesh Samuels, a 69 y.o. male.    Ordering provider: Dr. Hernandez    Indication for vancomycin therapy: Sepsis    Goal Trough Range:  15-20 mcg/mL based on indication    Other current antimicrobials              piperacillin-tazobactam 3.375 g in NaCl 0.9 % 50 mL (MINI-BAG Plus) (ZOSYN)  Every 12 hours                   Subjective/Objective:    Patient was admitted for Acute renal failure (ARF) (H) on 4/12/2020    Height: 6' (1.829 m)    Actual Body Weight (ABW): 81.6 kg (180 lb)    Ideal body weight: 77.6 kg (171 lb 1.2 oz)  Adjusted ideal body weight: 79.2 kg (174 lb 10.3 oz)    BMI: Body mass index is 24.41 kg/m .    Allergies   Allergen Reactions     Adhesive Tape-Silicones        Patient Active Problem List   Diagnosis     SIRS (systemic inflammatory response syndrome) (H)     Adrenal incidentaloma (H)     Diet-controlled diabetes mellitus (H)     Pericardial effusion     Lactic acidosis     Diabetes mellitus, type 2 (H)     Paroxysmal atrial fibrillation (H)     Calculus of ureter     Acute renal failure, unspecified acute renal failure type (H)     Acute renal failure (ARF) (H)     ATN (acute tubular necrosis) (H)    Past Medical History:   Diagnosis Date     Diabetes mellitus, type 2 (H) 4/12/2020     Paroxysmal atrial fibrillation (H) 2/18/2020        Temp Readings from Current Encounter:     04/12/20 1030 04/12/20 1322   Temp: (!) 94.5  F (34.7  C) (!) 95.9  F (35.5  C)     Net Intake/Output (last 24 hours):  No intake/output data recorded.    Recent Labs     04/12/20  0905 04/12/20  1050 04/12/20  1428 04/12/20  1429 04/12/20  1429   WBC 17.6*  --   --   --  16.1*   NEUTROABS 14.5*  --   --   --   --    LACTICACID 15.0*  --   --  17.3*  --    BUN 72* 63* 66*  --   --    CREATININE 6.14* 5.23* 5.17*  --   --      Estimated Creatinine Clearance: 15.8 mL/min (A) (by C-G formula based on SCr of 5.17 mg/dL (H)).    No results for  input(s): CULTURE in the last 72 hours.    No results found for any visits on 04/12/20.    No results for input(s): VANCOMYCIN in the last 168 hours.    Vancomycin administrations: (last 120 hours)     Date/Time Action Medication Dose Rate    04/12/20 1100 New Bag    vancomycin 1,500 mg in sodium chloride 0.9% 500 mL (VANCOCIN) 1,500 mg 176.7 mL/hr          Assessment/Plan:    Pharmacist consulted to dose vancomycin for Sepsis, goal trough range 15-20 mcg/mL.  1. Continue vancomycin therapy with intermittent dosing.  2. Anticipate renal function to improve post ureteral stent.  Pharmacy will evaluate renal function in AM 4/13 to determine whether to redose or check a vanco level.  3. Pharmacist will continue to follow.    Thank you for the consult.  Sean Buckner, PharmD 4/12/2020 2:57 PM

## 2021-06-07 NOTE — PROGRESS NOTES
Appreciate Critical Care and Nephrology efforts    Will stand by for stone clearance when current medical issues resolve    Olsen catheter may be removed when accurate monitoring of urine output no longer required

## 2021-06-07 NOTE — PROGRESS NOTES
RESPIRATORY CARE NOTE    Pt. was placed on CPAP 5/ PS 5-10 multiple times this evening, due to long periods of apnea/ RR 2-4, pt. was placed back on full vent support, RT following     RJ Buck

## 2021-06-07 NOTE — PLAN OF CARE
Problem: Pain  Goal: Patient's pain/discomfort is manageable  Outcome: Progressing   Pt denies any pain or discomfort overnight. When rounded upon pt has been sleeping.     Problem: Potential for Compromised Skin Integrity  Goal: Skin integrity is maintained or improved  Outcome: Progressing  Pt is able to reposition in bed independently. Stood at the side of the bed with A-1. Brief changed as needed as pt can be incontinent at times.    Anti Xa 0.24. Received bolus dose of heparin and then rate increased to 13u/k/hr. Recheck antixa this am at 0700.    Will continue to monitor pt status.  Nohemy Lindquist RN

## 2021-06-07 NOTE — PLAN OF CARE
Problem: Potential for Compromised Skin Integrity  Goal: Nutritional status is improving  Outcome: Progressing       Problem: Insufficient Nutritional Intake  Goal: Patient's nutritional intake is adequate  Outcome: Progressing    Tolerated a diabetic diet well. PO fluids provided and encouraged.     Up to chair for meals with 2 assist and walker.     Transferred to P2 via wheelchair. Report called to Kira MCGRAW. Belongings sent with patient.

## 2021-06-07 NOTE — PLAN OF CARE
Goal: Patient s discharge needs are met.  Outcome: Care Progression reviewed with Hospitalist, Care Manager.  Discharge Disposition: Discussed and plan to discharge to: TBD, comes from home with wife who is WC bound and his adult daughters assist as needed  Planned Discharge Date: days  Problem: Barriers to discharge include: medical ICU status, NPO and intubated- extubated successfully today, IV antibiotics, labs  Transportation needs/Ride Time: [facility]

## 2021-06-07 NOTE — ANESTHESIA CARE TRANSFER NOTE
Last vitals:   Vitals:    04/12/20 1322   BP: 138/62   Pulse: 96   Resp: 18   Temp: (!) 35.5  C (95.9  F)   SpO2: 100%     Patient's level of consciousness is sedated on propofol infusion and remains intubated  Spontaneous respirations: no: ETT in place and he is synchronized to the ventilator settings  Maintains airway independently: no: ETT in place  Dentition unchanged: yes  Oropharynx: oropharynx clear of all foreign objects and endotracheal tube in place    QCDR Measures:  ASA# 20 - Surgical Safety Checklist: WHO surgical safety checklist completed prior to induction    PQRS# 430 - Adult PONV Prevention: 4558F - Pt received => 2 anti-emetic agents (different classes) preop & intraop  ASA# 8 - Peds PONV Prevention: NA - Not pediatric patient, not GA or 2 or more risk factors NOT present  PQRS# 424 - Ambar-op Temp Management: 4559F - At least one body temp DOCUMENTED => 35.5C or 95.9F within required timeframe  PQRS# 426 - PACU Transfer Protocol: - Transfer of care checklist used  ASA# 14 - Acute Post-op Pain: ASA14B - Patient did NOT experience pain >= 7 out of 10

## 2021-06-07 NOTE — PLAN OF CARE
Problem: Insufficient Fluid Volume  Goal: Fluid and electrolyte balance are achieved/maintained  Outcome: Progressing  Note: Potassium replaced per protocol, will follow      Problem: Mechanical Ventilation  Goal: Mobility/activity is maintained at optimum level for patient  Outcome: Progressing  Note: Patient tolerating ventilator well. Propofol remains off, patient calm and appropriate. Follows command and is able to answer yes/no by nodding or giving thumbs up. Lung sounds clear/diminished, scant secretions from ET tube.      Problem: Hemodynamic Status  Goal: Patient's vitals signs are stable  Outcome: Progressing  Note: Levophed remains off, vitals stable. BPs had a brief drop with repositioning, recovered quickly with MAP >65.

## 2021-06-07 NOTE — PLAN OF CARE
Physical Therapy Discharge Summary    Date of PT Discharge: 4/18/2020  Recommended Equipment: FWW  Discharge Destination: TCU  Discharge Comments: goals partially met, to progress at TCU.      4/19/2020 by Anisa Garcia, PT    Problem: Physical Therapy  Goal: PT Goals  Description: Patient will demonstrate the following by 4/23/2020, in order to maximize independence with functional mobility to facilitate safe discharge:   -Supine<>sit with head of bed flat, no rail, I  -Sit<>stand with least restrictive assistive device, SBA  -Ambulate 300 feet with least restrictive assistive device, SBA     Goals entered on 4/16/2020 by Anisa Garcia, PT     Outcome: Completed

## 2021-06-07 NOTE — PROGRESS NOTES
RESPIRATORY CARE NOTE    Arterial Blood Gas result:  pH 7.41; pCO2 20; pO2 182; HCO3 18, %O2 Sat 97.    Vent Mode: VCV  FiO2 (%):  [40 %-50 %] 40 %  S RR:  [20-30] 20  S VT:  [550 mL] 550 mL  PEEP/CPAP (cm H2O):  [5 cm H2O-8 cm H2O] 8 cm H2O  Minute Ventilation (L/min):  [11.4 L/min-18 L/min] 11.4 L/min  PIP:  [22 cm H2O-26 cm H2O] 23 cm H2O  MAP (cm H2O):  [10-13] 11    BS clear and diminished, Sxn scent blood ting, Pt tolerated well.       Hilario Hastings, LRT

## 2021-06-07 NOTE — PLAN OF CARE
Problem: Pain  Goal: Patient's pain/discomfort is manageable  Outcome: Progressing   Patient denies pain or discomfort, continue to monitor.  Problem: Safety  Goal: Patient will be injury free during hospitalization  Outcome: Progressing   Patient is safety aware, expresses preferences and needs appropriately.   Problem: Daily Care  Goal: Daily care needs are met  Outcome: Progressing   ADL's completed, assist as needed.   Problem: Psychosocial Needs  Goal: Demonstrates ability to cope with hospitalization/illness  Outcome: Progressing  Patient expresses gratitude for all assistance.   Goal: Collaborate with patient/family/caregiver to identify patient specific goals for this hospitalization  Outcome: Progressing     Problem: Discharge Barriers  Goal: Patient's discharge needs are met  Outcome: Progressing     Problem: Knowledge Deficit  Goal: Patient/family/caregiver demonstrates understanding of disease process, treatment plan, medications, and discharge instructions  Outcome: Progressing   Explain via AIDET who we are, what we are there to do, what that means and how long it will take to do it. Offer to assist with any concerns or needs.   Problem: Potential for Compromised Skin Integrity  Goal: Skin integrity is maintained or improved  Outcome: Progressing  Goal: Nutritional status is improving  Outcome: Progressing   Patient states the texture of meat was difficult to swallow due to tenderness at back of mouth. Redness noted and documented in patient chart.   Problem: Urinary Incontinence  Goal: Perineal skin integrity is maintained or improved  Outcome: Progressing     Problem: Potential for Falls  Goal: Patient will remain free of falls  Outcome: Progressing     Problem: Insufficient Fluid Volume  Goal: Fluid and electrolyte balance are achieved/maintained  Outcome: Progressing     Problem: Infection  Goal: Signs and symptoms of infections are decreased or avoided  Outcome: Progressing     Problem:  Insufficient Nutritional Intake  Goal: Patient's nutritional intake is adequate  Outcome: Progressing     Problem: Breathing  Goal: Patient will utilize incentive spirometer  Outcome: Progressing     Problem: Knowlegde Deficit  Goal: Verbalize understanding of condition/disease process and treatment, participate in lifestyle changes and treatment regimen  Outcome: Progressing  Goal: Demonstrate technique for CPAP/BiPAP  Outcome: Progressing     Problem: Risk for Infection  Goal: Identify and demonstrate techniques, lifestyle changes to prevent/reduce risk of infection and promote safe environment  Outcome: Progressing     Problem: Excessive Fluid Volume  Goal: Patient will achieve/maintain normal respiratory rate/effort  Outcome: Progressing     Problem: Hemodynamic Status  Goal: Patient's vitals signs are stable  Outcome: Progressing     Problem: Potential for Infection  Goal: Remains infection free  Outcome: Progressing     Problem: Glucose Imbalance  Goal: Achieve optimal glucose control  Outcome: Progressing

## 2021-06-07 NOTE — PLAN OF CARE
Problem: Ineffective Airway Clearance  Goal: Maintain airway patency  Outcome: Progressing     Problem: Breathing  Goal: Patient will maintain patent airway  Outcome: Completed     Problem: Mechanical Ventilation  Goal: Patient will maintain patent airway  Outcome: Completed  Goal: Respiratory status - ventilation  Description: Movement of air in and out of the lungs.    Liberate from ventilator  Outcome: Completed  Goal: ET tube will be managed safely  Outcome: Completed   69yr M, patient admitted on 4/12/2020 for fall and CHIDI.  Patient was intubated in ED then  To surgery for removal of left uretal calculus.  Remained intubated post-op.  Patient weaned and extubated to to a 4 L NC.  RR 11, Vt 322, RSBI 31 this morning.  Now on 4 L NC SaO2 100%.    Monitor for respiratory distress and hypoxemia.

## 2021-06-07 NOTE — PROGRESS NOTES
Code Status:  FULL CODE  Visit Type: H & P     Facility:  [facility] [[id number]]             History of Present Illness: Rakesh Samuels is a 69 y.o. male who I am seeing today for follow admit to the tcu. Pt recently hospitalized on 4/12/2020.  Past medical history includes atrial fibril on Eliquis, diabetes type 2, hyperlipidemia and glaucoma.  Patient presented to the hospital with upper GI bleed.  He underwent EGD on 4/14 showing esophagitis and gastritis with no active bleeding.  Initially his Eliquis was held.  He was treated with heparin.  Also placed on omeprazole 2 times daily.  Patient found also to have circulatory shock felt to be secondary to left nephrolithiasis.  TTE shows normal LV EF.  Questionable source due to sepsis versus GI blood loss.  He was initially treated with IV Zosyn however negative culture results returned so antibiotics discontinued.  He developed acute kidney injury requiring hemodialysis and respiratory failure requiring intubation.  Patient with blood-tinged urine secondary to anticoagulation in the setting of ureteral stent.  He is followed by urology.  History of obstructive uropathy status post cystoscopy with left ureteral stent placement.  Acute injury improved with fluids and hemodialysis.  Acute respiratory failure.  He was extubated on 4/14.  He is off oxygen.  Diabetes mellitus type 2.  His p.o. meds were stopped.  Hospitalization secondary to acute kidney injury.  He was placed on NovoLog sliding scale and Lantus.    Today patient sitting up on the side of the bed.  Underlying diabetes type 2.  Blood sugars consistently 200s.  I also note pulses in the 40s to 50s.  He is asymptomatic.  Currently on no beta-blocker or rate control medication.  He does have underlying atrial fib.  Continues on Eliquis.  No evidence of acute bleed.  He tells me he is voiding adequately.  Patient complaining of some pain in his left foot at the base of the toes.  He does  have some slight swelling in that foot.  He tells me he has had gout in the past.  No redness.  No warmth.  However this could have been exacerbated secondary to his acute kidney injury.  Patient denies any shortness of breath or chest pain.  No overt respiratory symptoms including cough, fever chills or sore throat.  Hemoglobin 10.3.  Sodium continues to be slightly elevated dated at 146.  Creatinine 0.94.  GFR greater than 60.    Active Ambulatory Problems     Diagnosis Date Noted     SIRS (systemic inflammatory response syndrome) (H) 09/09/2019     Adrenal incidentaloma (H)      Diet-controlled diabetes mellitus (H)      Pericardial effusion      Lactic acidosis      Diabetes mellitus, type 2 (H) 04/12/2020     Paroxysmal atrial fibrillation (H) 02/18/2020     Calculus of ureter 04/12/2020     Acute renal failure, unspecified acute renal failure type (H) 04/12/2020     Acute renal failure (ARF) (H) 04/12/2020     ATN (acute tubular necrosis) (H) 04/12/2020     Sepsis due to urinary tract infection (H)      Shock circulatory (H)      Metformin overdose of undetermined intent, initial encounter      Acute respiratory failure with hypoxemia (H)      Metabolic acidosis      Hyperkalemia      Hematemesis, presence of nausea not specified 04/12/2020     Resolved Ambulatory Problems     Diagnosis Date Noted     No Resolved Ambulatory Problems     No Additional Past Medical History     Current Outpatient Medications   Medication Sig     apixaban ANTICOAGULANT (ELIQUIS) 5 mg Tab tablet Take 1 tablet (5 mg total) by mouth 2 (two) times a day.     ARIPiprazole (ABILIFY) 2 MG tablet Take 1 tablet (2 mg total) by mouth every evening.     atorvastatin (LIPITOR) 10 MG tablet Take 10 mg by mouth at bedtime.     dorzolamide-timolol (COSOPT) 22.3-6.8 mg/mL ophthalmic solution Administer 1 drop to both eyes 2 (two) times a day.     fluvoxaMINE (LUVOX) 100 MG tablet Take 0.5 tablets (50 mg total) by mouth at bedtime for 14 days.      LANTUS SOLOSTAR U-100 INSULIN 100 unit/mL (3 mL) pen Inject 10 Units under the skin at bedtime. 11.65 Type 2 with hyperglycemia  Contact provider if insulin prescribed is not the preferred insulin per insurance.     latanoprostene bunod 0.024 % Drop Administer 1 drop to both eyes at bedtime.     multivitamin therapeutic tablet Take 1 tablet by mouth daily.     netarsudiL (RHOPRESSA) 0.02 % Drop Administer 1 drop to both eyes daily.     NOVOLOG FLEXPEN U-100 INSULIN 100 unit/mL (3 mL) injection pen Check blood sugar four (4) times daily.  11.65 Type 2 with hyperglycemia  BD Ultra-fine Adina Pen Needles - NDC [address]-82 - dispense 1 case,  refill PRN for 1 year     omeprazole (PRILOSEC OTC) 20 MG tablet Take 1 tablet (20 mg total) by mouth 2 (two) times a day before meals.     VENTOLIN HFA 90 mcg/actuation inhaler Inhale 2 puffs every 4 (four) hours as needed.       Allergies   Allergen Reactions     Adhesive Tape-Silicones          Review of Systems  No fevers or chills. No headache, lightheadedness or dizziness. No SOB, chest pains or palpitations. Appetite is good. No nausea, vomiting, constipation or diarrhea. No dysuria, frequency, burning or pain with urination.  No evidence of acute bleed.  Pain in the left foot.  Reports history of gout.  Otherwise review of systems are negative.     Physical Exam  PHYSICAL EXAMINATION:  Vital signs: /64, pulse 42, respirations 16, temperature 98.4, O2 sat 96% on room air.  Weight 187 pounds.  General: Awake, Alert, oriented x3, appropriately, follows simple commands, conversant  HEENT:PERRLA, Pink conjunctiva on the left with slightly red conjunctiva on the right.  No drainage, anicteric sclerae, moist oral mucosa.  History of glaucoma with vision loss in the right eye.   NECK: Supple, without any lymphadenopathy, or masses  CVS:  S1  S2, without murmur or gallop.   LUNG: Clear to auscultation, No wheezes, rales or rhonci.  BACK: No kyphosis of the thoracic  spine  ABDOMEN: Soft, nontender to palpation, with positive bowel sounds  EXTREMITIES: Good range of motion on both upper and lower extremities, 1+ pedal edema on the left lower extremity, no calf tenderness.  Tenderness at the base of the toes on the left foot with some swelling.  No redness or warmth.  SKIN: Warm and dry, no rashes or erythema noted  NEUROLOGIC: Intact, pulses palpable  PSYCHIATRIC: Cognition intact      Labs:    Results for orders placed or performed in visit on 04/20/20   Basic Metabolic Panel   Result Value Ref Range    Sodium 146 (H) 136 - 145 mmol/L    Potassium 3.9 3.5 - 5.0 mmol/L    Chloride 112 (H) 98 - 107 mmol/L    CO2 27 22 - 31 mmol/L    Anion Gap, Calculation 7 5 - 18 mmol/L    Glucose 123 70 - 125 mg/dL    Calcium 7.8 (L) 8.5 - 10.5 mg/dL    BUN 12 8 - 22 mg/dL    Creatinine 0.94 0.70 - 1.30 mg/dL    GFR MDRD Af Amer >60 >60 mL/min/1.73m2    GFR MDRD Non Af Amer >60 >60 mL/min/1.73m2     Lab Results   Component Value Date    WBC 7.3 04/20/2020    HGB 10.3 (L) 04/20/2020    HCT 32.9 (L) 04/20/2020    MCV 99 04/20/2020     04/20/2020           Assessment/Plan:  1. Acute renal failure with tubular necrosis (H)   temporary dialysis during hospitalization.  GFR greater than 60, creatinine 0.94.  Slight elevated sodium at 146.  Fluids encouraged.   2. Acute upper GI bleed   continues on PPI twice daily.  No evidence of bleed.  Recent hemoglobin 10.3.   3. SIRS (systemic inflammatory response syndrome) (H)   resolved.   4. Type 2 diabetes mellitus with chronic kidney disease, without long-term current use of insulin, unspecified CKD stage (H)   blood sugars elevated in the 200s.  He was started on Lantus and sliding scale during hospitalization.  His oral medications were discontinued secondary to acute kidney failure.  Increase Lantus to 13 units.   5. Paroxysmal atrial fibrillation (H)   continues on Eliquis.  Heart rates occasionally in the 40s.  Asymptomatic.  Currently on med  beta-blocker or Rate control meds.     consult cardiology.   6. Calculus of ureter   status post stent placement.  Voiding adequately.   7. Acute respiratory failure with hypoxemia (H)   off oxygen.   8.     Foot pain            differentials include muscle skeletal pain versus gout.  Obtain uric acid level.  Start muscle rub 4 times daily to left foot.         35 minutes spent of which greater than 50% was face to face interviewing patient, nursing staff and therapy.    Electronically signed by: Kylie Gomez CNP

## 2021-06-07 NOTE — PLAN OF CARE
Problem: Insufficient Fluid Volume  Goal: Fluid and electrolyte balance are achieved/maintained  Outcome: Progressing     Problem: Infection  Goal: Signs and symptoms of infections are decreased or avoided  Outcome: Progressing     Problem: Hemodynamic Status  Goal: Patient's vitals signs are stable  Outcome: Progressing

## 2021-06-07 NOTE — OR NURSING
Information for patient admit completed through verification with ICU RN, second GI RN, and GI MD since patient is sedated due to intubation/vent

## 2021-06-07 NOTE — PLAN OF CARE
Problem: Potential for Compromised Skin Integrity  Goal: Skin integrity is maintained or improved  Outcome: Progressing  Note: Offered repositioning every 2 hours, at times patient states he is comfortable and does not want to be turned, has used call light when he is ready to move. Skin remains intact, no breakdown noted.     Problem: Insufficient Fluid Volume  Goal: Fluid and electrolyte balance are achieved/maintained  Outcome: Progressing  Note: Potassium replaced per protocol.     Problem: Potential for Compromised Skin Integrity  Goal: Nutritional status is improving  Outcome: Not Progressing  Note: Patient failed bedside swallow following extubation, repeated this AM with no coughing and clear speech noted. CNP updated, diet advanced to full liquid. Insulin drip off at 0200 per insulin infusion protocol. Per CNP drip was discontinued and accuchecks changed to Q4H with sliding scale coverage. Lantus ordered for AM.

## 2021-06-07 NOTE — PROGRESS NOTES
Care Management [facility] Assessment Note:    ED assessed.     Patient lives with his wife who is wheelchair bound. They live in a single family home and he has assist as needed from adult daughters.     In September 2019 when discharged, referral to TCU was advised, but patient was able to ambulate and thus went home with his wife.    He has had significant decline in past 3 -4 months.       Patricia Colmenares RN  Care Manager [facility]ETH/ED  [facility].

## 2021-06-07 NOTE — PROGRESS NOTES
Code Status:  FULL CODE  Visit Type: Problem Visit (left foot pain )     Facility:  [facility] [[id number]]             History of Present Illness: Rakesh Samuels is a 69 y.o. male who I am seeing today at the request of the nursing staff due to pain in his left foot. Pt recently hospitalized on 4/12/2020.  Past medical history includes atrial fibril on Eliquis, diabetes type 2, hyperlipidemia and glaucoma.  Patient presented to the hospital with upper GI bleed.  He underwent EGD on 4/14 showing esophagitis and gastritis with no active bleeding.  Initially his Eliquis was held.  He was treated with heparin.  Also placed on omeprazole 2 times daily.  Patient found also to have circulatory shock felt to be secondary to left nephrolithiasis.  TTE shows normal LV EF.  Questionable source due to sepsis versus GI blood loss.  He was initially treated with IV Zosyn however negative culture results returned so antibiotics discontinued.  He developed acute kidney injury requiring hemodialysis and respiratory failure requiring intubation.  Patient with blood-tinged urine secondary to anticoagulation in the setting of ureteral stent.  He is followed by urology.  History of obstructive uropathy status post cystoscopy with left ureteral stent placement.  Acute injury improved with fluids and hemodialysis.  Acute respiratory failure.  He was extubated on 4/14.  He is off oxygen.  Diabetes mellitus type 2.  His p.o. meds were stopped.  Hospitalization secondary to acute kidney injury.  He was placed on NovoLog sliding scale and Lantus.    Today patient lying in bed. Pt with increasing pain in his left foot at the base of his toes. He denies any injury. He is a febrile. Yesterday muscle rub ordered four times a day. Today foot with swelling, redness and warmth. Pt reports hx of gout. He is able to bear weight and tolerated dorsi and plantar flexion with little pain. Pulses palpable.       Active Ambulatory Problems      Diagnosis Date Noted     SIRS (systemic inflammatory response syndrome) (H) 09/09/2019     Adrenal incidentaloma (H)      Diet-controlled diabetes mellitus (H)      Pericardial effusion      Lactic acidosis      Diabetes mellitus, type 2 (H) 04/12/2020     Paroxysmal atrial fibrillation (H) 02/18/2020     Calculus of ureter 04/12/2020     Acute renal failure, unspecified acute renal failure type (H) 04/12/2020     Acute renal failure (ARF) (H) 04/12/2020     ATN (acute tubular necrosis) (H) 04/12/2020     Sepsis due to urinary tract infection (H)      Shock circulatory (H)      Metformin overdose of undetermined intent, initial encounter      Acute respiratory failure with hypoxemia (H)      Metabolic acidosis      Hyperkalemia      Hematemesis, presence of nausea not specified 04/12/2020     Resolved Ambulatory Problems     Diagnosis Date Noted     No Resolved Ambulatory Problems     No Additional Past Medical History     Current Outpatient Medications   Medication Sig     cephalexin (KEFLEX) 500 MG capsule Take 500 mg by mouth 3 (three) times a day.     apixaban ANTICOAGULANT (ELIQUIS) 5 mg Tab tablet Take 1 tablet (5 mg total) by mouth 2 (two) times a day.     ARIPiprazole (ABILIFY) 2 MG tablet Take 1 tablet (2 mg total) by mouth every evening.     atorvastatin (LIPITOR) 10 MG tablet Take 10 mg by mouth at bedtime.     dorzolamide-timolol (COSOPT) 22.3-6.8 mg/mL ophthalmic solution Administer 1 drop to both eyes 2 (two) times a day.     fluvoxaMINE (LUVOX) 100 MG tablet Take 0.5 tablets (50 mg total) by mouth at bedtime for 14 days.     LANTUS SOLOSTAR U-100 INSULIN 100 unit/mL (3 mL) pen Inject 10 Units under the skin at bedtime. 11.65 Type 2 with hyperglycemia  Contact provider if insulin prescribed is not the preferred insulin per insurance.     latanoprostene bunod 0.024 % Drop Administer 1 drop to both eyes at bedtime.     multivitamin therapeutic tablet Take 1 tablet by mouth daily.     netarsudiL  (RHOPRESSA) 0.02 % Drop Administer 1 drop to both eyes daily.     NOVOLOG FLEXPEN U-100 INSULIN 100 unit/mL (3 mL) injection pen Check blood sugar four (4) times daily.  11.65 Type 2 with hyperglycemia  BD Ultra-fine Adina Pen Needles - NDC [address]-53 - dispense 1 case,  refill PRN for 1 year     omeprazole (PRILOSEC OTC) 20 MG tablet Take 1 tablet (20 mg total) by mouth 2 (two) times a day before meals.     VENTOLIN HFA 90 mcg/actuation inhaler Inhale 2 puffs every 4 (four) hours as needed.       Allergies   Allergen Reactions     Adhesive Tape-Silicones          Review of Systems  No fevers or chills. No headache, lightheadedness or dizziness. No SOB, chest pains or palpitations. Appetite is good. No nausea, vomiting, constipation or diarrhea. No dysuria, frequency, burning or pain with urination.  No evidence of acute bleed.  Pain in the left foot.  Reports history of gout.  Otherwise review of systems are negative.     Physical Exam  PHYSICAL EXAMINATION:  Vital signs: /64, pulse 76, respirations 16, temperature 97.7, O2 sat 97% on room air.  Weight 187 pounds.  General: Awake, Alert, oriented x3, appropriately, follows simple commands, conversant  HEENT Pink conjunctiva on the left with slightly red conjunctiva on the right.  No drainage, anicteric sclerae, moist oral mucosa.  History of glaucoma with vision loss in the right eye.   NECK: Supple  BACK: No kyphosis of the thoracic spine  EXTREMITIES: Good range of motion on both upper and lower extremities, 1+ pedal edema on the left lower extremity, no calf tenderness.  Tenderness at the base of the toes on the left foot. Today with increased swelling, redness and warmth.   SKIN: see above.   NEUROLOGIC: Intact, pulses palpable  PSYCHIATRIC: Cognition intact      Labs:    Results for orders placed or performed in visit on 04/20/20   Basic Metabolic Panel   Result Value Ref Range    Sodium 146 (H) 136 - 145 mmol/L    Potassium 3.9 3.5 - 5.0 mmol/L     Chloride 112 (H) 98 - 107 mmol/L    CO2 27 22 - 31 mmol/L    Anion Gap, Calculation 7 5 - 18 mmol/L    Glucose 123 70 - 125 mg/dL    Calcium 7.8 (L) 8.5 - 10.5 mg/dL    BUN 12 8 - 22 mg/dL    Creatinine 0.94 0.70 - 1.30 mg/dL    GFR MDRD Af Amer >60 >60 mL/min/1.73m2    GFR MDRD Non Af Amer >60 >60 mL/min/1.73m2     Lab Results   Component Value Date    WBC 9.7 04/22/2020    HGB 10.3 (L) 04/20/2020    HCT 32.9 (L) 04/20/2020    MCV 99 04/20/2020     04/20/2020           Assessment/Plan:  1. Left foot pain  Increasing left foot pain.   Xray of left foot.   Tylenol 650 mg Q 4 hours prn.   Increasing redness and warmth.   Check CBC with diff.   Give 500 mg of Keflex X 1 now after blood draw.    2. Hx of gout  Check Uric acid.      Electronically signed by: Kylie Gomez, CNP

## 2021-06-07 NOTE — ED TRIAGE NOTES
"He has been dealing with fatigue and weight loss for the last four months. He has been following with primary for this. Today he was feeling quite weak, fell and called for EMS. They found him to have blood sugar of \"low.\" EMS started IV gave D10 and blood sugar returned to 214 before they arrived in the ED.    He was up to use the restroom and felt strange and fell. He did not strike his head. Denies LOC. Denies cough. Denies abdominal pain. He did vomit this morning. He is on a blood thinner. Provider in the room called for trauma alert.  "

## 2021-06-07 NOTE — PROGRESS NOTES
Called to PT bedside to assist in intubation.  PT was intubated by ED physician with an 8.0 ETT 26@T later pulled to 25@T.  Placed on initial vent settings of  30 +5 40% with an SpO2 of 100%.  Breath sounds clear bilaterally.  Transported to OR via Ambu bag without incident.    Kiko Carney  4/12/2020

## 2021-06-07 NOTE — OP NOTE
UPPER ENDOSCOPY PROCEDURE NOTE     PATIENT NAME   Rakesh Samuels     ENDOSCOPIST   Robert Rico MD     PROCEDURE   Upper Endoscopy     PREOPERATIVE DIAGNOSIS   Hematemesis     POSTOPERATIVE DIAGNOSIS   Severe esophagitis  Mild to moderate gastritis     MEDICATIONS ADMINISTERED    Monitored anesthesia care per anesthesia.     PROCEDURE DETAILS   The patient was informed of the risks, benefits and alternatives and gave informed consent. The patient had stable cardiovascular status and was judged to be adequate for sedation. A timeout was performed.    The patient was placed in the left lateral decubitus position. The endoscope was introduced through the mouth and advanced to second duodenum. Careful inspection was made upon withdrawal. Retroflexion was performed.     FINDINGS   Esophagus  Esophagitis at the distal esophagus.  Small superficial ulcerations.  GE junction at 39 cm from the incisors.    Stomach       Mild gastritis throughout the stomach.     Duodenum  Normal examined duodenum to the second duodenum.    No active bleeding was seen during this examination.  A small amount of old red blood was seen in the stomach.       ESTIMATED BLOOD LOSS   None     SPECIMENS   None     COMPLICATIONS   None     IMPRESSION   Moderate esophagitis at the distal esophagus with small superficial ulcerations.  Gastritis throughout the stomach.     RECOMMENDATIONS   Ongoing ICU cares.  Continue to monitor the patient.  Monitor hemoglobin and transfuse to keep greater than 7.0.     Robert Rico M.D.  [facility]  Thank you for the opportunity to participate in the care of this patient.   Please feel free to call me with any questions or concerns.

## 2021-06-07 NOTE — ED NOTES
I spoke to the pharmacist, she does need the daughter to bring the eye drops in, I updated the daughter regarding this.

## 2021-06-07 NOTE — PROGRESS NOTES
Chart reviewed. ICU team managing at this time. [facility] will continue to follow.

## 2021-06-07 NOTE — PLAN OF CARE
Goal: Patient s discharge needs are met.  Outcome: Care Progression reviewed with Hospitalist, Care Manager.  Discharge Disposition: Discussed and plan to discharge to: home with spouse vs TCU  Planned Discharge Date: 4/17-4/18  Problem: Barriers to discharge include: medical progression  Transportation needs/Ride Time:  family    CM spoke with patient via telephone to discuss discharge planning & reintroduce care management. CM informed patient that physical & occupational therapy are recommending a transitional care unit at time of discharge. Patient was agreeable to a referral being made to [facility] for placement. He voiced he would like CM to touch base with his wife related to plan. CM spoke with patient's wife, Safia via telephone to update that a transitional care unit is recommended at time of discharge. Safia was also in agreement that a referral may be sent to [facility]. She voiced that she will be talking with patient as family is not sure if they are in agreement with patient going to a transitional care unit due to COVID 19 & patient being at high risk. Safia voiced she is aware that patient needs assist & is weak. She shared that family would be able to help him with ADL's & a home exercise program. Safia stated if patient is to return home they are not open to any homecare services coming in the home again due to the risk of COVID 19. CM will continue to follow to assist with discharge planning.     REJI Lindsay,   Care Manager  4/16/2020   11:39 AM

## 2021-06-07 NOTE — PROCEDURES
CENTRAL LINE INSERTION PROCEDURE NOTE  (NON-OR)    Procedure Date: 4/12/2020   Performing Physician: Luis Hernandez    Procedure: insertion of left femoral  vein double lumen dialysis line   Indications: metabolic acidosis, renal failure, hyperkalemia    Estimated Blood Loss: minimal  Complications: none immediate    Findings: normal compressible left femoral vein with adjacent visualized carotid artery medially    Procedure Details:   Procedure was done as an emergency: yes  The patient was identified as Rakesh Samuels with date of birth 1950 and the procedure verified as insertion of left femoral vein double lumen central line. A time out was held and the above information confirmed.    Under sterile conditions the skin over the left femoral region  was prepped with chlorhexidine and covered with a sterile drape. Strict sterile conditions were maintained: cap, mask, and sterile gloves were worn by all participants. 5 ml of Lidocaine 1% local anesthetic was infiltrated into the skin and subcutaneous tissues. Under continuous ultrasound guidance using a sterile probe cover, the left femoral vein was accessed with a needle. A wire was fed over the needle. The needle was removed. A nick was made in the skin adjacent to the wire with a scalpel. The tract was dilated. A pre-flushed triple-lumen catheter was placed into the vein to a depth of 18 cm. The wire was removed. The line was sutured in place with 4 sutures. A bio patch and sterile transparent dressing were placed.    Number of attempts: 1    No CXR needed.  The line is ready for immediate use.       Condition: critical and unchanged    Luis (Fabrizio) MD [facility]  [facility]  Pager [phone]  Clinic [phone]

## 2021-06-07 NOTE — PROGRESS NOTES
PULMONARY / CRITICAL CARE PROGRESS NOTE    Date / Time of Admission:  4/12/2020  8:46 AM    Assessment:   Principal Problem:    Acute renal failure (ARF) (H)  Active Problems:    Diabetes mellitus, type 2 (H)    Paroxysmal atrial fibrillation (H)    Calculus of ureter    Acute renal failure, unspecified acute renal failure type (H)    ATN (acute tubular necrosis) (H)    Sepsis due to urinary tract infection (H)    Shock circulatory (H)    Metformin overdose of undetermined intent, initial encounter    Acute respiratory failure with hypoxemia (H)    Metabolic acidosis    Hyperkalemia    Hematemesis, presence of nausea not specified        Advance Directives:  Full code      Plan:   Neuro:  Off sedation.  Awake and following commands.    Cardiovascular:  Off pressors.  Shock resolved.  History of A. fib on apixaban.    Respiratory:  Weaned yesterday but was having frequent respiratory pauses.  He has been off sedation overnight and we are reattempting wean.  Is to extubate today.    GI:  Hematemesis secondary to esophagitis and gastritis.  On Protonix twice daily.  Will discuss with GI when we can resume apixaban    :  Acute renal failure secondary to ATN complicated by severe lactic acidosis.  Concern for metformin toxicity.  Status post hemodialysis.  This point he is making urine and his electrolytes are looking good.  I presume the hemodialysis catheter is going to be removed today.  Will discuss with nephrology.    Obstructive uropathy status post cystoscopy and left ureteral stent placement.  Cultures remain negative.  Stop vancomycin.  Continue Zosyn.    Heme:  Acute blood loss anemia secondary to upper GI bleed.  Patient on apixaban at home.  Received Kcentra and PRBCs.  Hemoglobin stable.        ICU DAILY CHECKLIST                           Can patient transfer out of MICU? no    FAST HUG:    Feeding:  Feeding: No.  Patient is receiving NPO    Olsen:Yes  Analgesia/Sedation: no  Thromboembolic prophylaxis:  yes; Mode:  SCDs  HOB>30:  Yes  Stress Ulcer Protocol Active: yes; Mode: PPI  Glycemic Control: Any glucose > 180 no; Mode of Insulin Therapy: Sliding Scale Insulin    INTUBATED:  Can patient have daily waking:  not applicable  Can patient have spontaneous breathing trial:  yes    Restraints? Yes    PROVIDER RESTRAINT FOR NON-VIOLENT BEHAVIOR FACE TO FACE EVALUATION    Patient's Immediate Situation:  Patient demonstrated the following behaviors: Pulling/tugging at invasive lines or tubes and does not respond to verbal/non-verbal redirection    Patient's Reaction to the intervention:  Does patient understand the reason for restraint/seclusion? Yes    Medical Condition:  Is there any evidence of compromise of Skin integrity, Respiratory, Cardiovascular, Musculoskeletal, Hydration? No    Behavioral Condition:  In consultation with the RN, is there a need to continue this restraint or seclusion? Yes    See Restraint Flowsheet for complete restraint documentation and assessment.    Noah Rivera MD        PHYSICAL THERAPY AND MOBILITY:  Can patient have PT and mobility trial: no  Activity: Bed Rest    Critical Care Time greater than: 45 Minutes  Total time spent with patient greater than: 45 Minutes        Subjective:   HPI:  Rakesh Samuels is a 69 y.o. male with A. fib, DM, hyperlipidemia who presented with upper GI bleed, shock, lactic acidosis.  He was found to have an obstructive left ureteral stone.  Intubated in the ED and taken to the OR where he had a left ureteral stent placed.  After he was brought to the ICU he had a hemodialysis catheter placed and got dialyzed.    Principal Problem:    Acute renal failure (ARF) (H)  Active Problems:    Diabetes mellitus, type 2 (H)    Paroxysmal atrial fibrillation (H)    Calculus of ureter    Acute renal failure, unspecified acute renal failure type (H)    ATN (acute tubular necrosis) (H)    Sepsis due to urinary tract infection (H)    Shock circulatory (H)     Metformin overdose of undetermined intent, initial encounter    Acute respiratory failure with hypoxemia (H)    Metabolic acidosis    Hyperkalemia    Hematemesis, presence of nausea not specified      Allergies: Adhesive tape-silicones     MEDS:  Scheduled Meds:    chlorhexidine  15 mL Swish & Spit BID     dorzolamide-timoloL  1 drop Both Eyes BID     latanoprostene bunod  1 drop Both Eyes QHS     netarsudiL  1 drop Both Eyes DAILY     pantoprazole  40 mg Intravenous Q12H     piperacillin-tazobactam  3.375 g Intravenous Q8H     senna-docusate  1 tablet Oral BID    Or     senna (SENOKOT) syrup  8.8 mg Enteral Tube BID     sodium chloride  10-30 mL Intravenous Q8H FIXED TIMES     Continuous Infusions:    insulin infusion (1 unit/mL) 1.5 Units/hr (04/14/20 0700)     norepinephrine Stopped (04/13/20 1354)     propofol 30 mcg/kg/min (04/13/20 0552)     sodium chloride 0.9%       sodium chloride 0.9% 75 mL/hr (04/14/20 0700)     vasopressin Stopped (04/12/20 1500)     PRN Meds:.bacitracin, benzocaine-menthoL, bisacodyL, dextrose 50 % (D50W), glucagon (human recombinant), insulin infusion (1 unit/mL), magnesium hydroxide, naloxone **OR** naloxone, ondansetron **OR** ondansetron, polyvinyl alcohol, sodium chloride, sodium chloride, sodium chloride      Objective:   VITALS:  /74   Pulse 90   Temp 98.1  F (36.7  C) (Oral)   Resp 11   Ht 6' (1.829 m)   Wt 193 lb 2 oz (87.6 kg)   SpO2 100%   BMI 26.19 kg/m    EXAM:  General appearance: alert, appears stated age and cooperative  Head: Normocephalic, without obvious abnormality, atraumatic  Lungs: clear to auscultation bilaterally  Heart: irregularly irregular rhythm  Abdomen: soft, non-tender; bowel sounds normal; no masses,  no organomegaly  Extremities: extremities normal, atraumatic, no cyanosis or edema  Neurologic: Grossly normal    I&O:      Intake/Output Summary (Last 24 hours) at 4/14/2020 0959  Last data filed at 4/14/2020 0800  Gross per 24 hour   Intake  2843.23 ml   Output 4950 ml   Net -2106.77 ml       Data Review:    CBC:   Lab Results   Component Value Date    WBC 8.3 04/14/2020    WBC 7.7 04/10/2015    RBC 3.56 (L) 04/14/2020     BMP:   Lab Results   Component Value Date    CO2 28 04/14/2020    BUN 32 (H) 04/14/2020    CREATININE 2.64 (H) 04/14/2020    CALCIUM 7.0 (L) 04/14/2020     Serum Glucose range:Invalid input(s): GLUCOSEPOCT      By:  Noah Rivera, 4/14/2020, 9:59 AM    Primary Care Physician:  Michelet Restrepo MD

## 2021-06-07 NOTE — PROCEDURES
Hemodialysis procedure:   Lungs are clear anterior    Initial treatment today.    Access:  Patient has a Left non-tunneled catheter in groin.  Lumens aspirated and flushed well.    BFR of 300 easily achieved and maintained.  Handing over treatment to MARILUZ Garza from dialysis to finish treatment

## 2021-06-07 NOTE — CONSULTS
RENAL CONSULTATION:    Date of Consultation:  4/12/2020    Requesting Physician: Dr. Carmen in [facility] ED    Reason for Consult:  CHIDI    Assessment/ Recommendations:  1. CHIDI: pre-renal vs. ATN. Obstructing stone in left ureter with left hydronephrosis   -Continue bicarb gtt for IVF until able to have HD ASAP following OR with urology   -HD for metformin toxicity.   -Daily renal function labs, weights, I/Os    2. Hyperkalemia: in setting of CHIDI and metabolic acidosis, improved with medical management. ?GI B also contributing.   -Treated with medical management protocol in the ED(Discussed with Dr. Carmen)   -Repeat renal panel    3. Metabolic acidosis: high anion gap due to lactic acidosis from hypoperfusion. This is probably from metformin use in setting of unrecognized CHIDI leading up to hospitalization. No hypotension. No reported ingestions.    -Continue bicarb gtt until able to have HD   -Repeat renal panel    -Arrange for HD today given severe metformin toxicity.    4. DM2: hold metformin, actos, and glipizide. Hypoglycemia due to metformin accumulation from CHIDI leading up to hospitalization. Insulin per hospitalist team.    5. BP is up. Not on BP meds at home. Trend.     6. Nephrolithiasis: with Obstructing stone in left ureter with left hydronephrosis. Urology consulted. Going to OR.    Discussed with Marti aCrmen and Mary.     Homer Ricci MD  [facility]  Pager: [phone]   Office: [phone]        History of present illness:  Mr. Rakesh Samuels is a 69 year-old man who I am asked to see for CHIDI/hyperkalemia/metabolic acidosis. He presented to the ED with generalized weakness via EMS. He reported a fall in the bathroom today with LOC. EMS found him to be hypoglycemic-treated. He also reported bloody emesis X 1 today. GI consulted by ED. His creatinine was 0.81mg/dl on 3/25 and now up to 6.14mg/dl today. His serum potassium is elevated at 6.8. His serum bicarb is  undetectable. CK normal. Lactic acid is high at 15.   He is currently being intubated in the ED. He was on metformin for his DM2. noncontrast CT showed New moderate left hydronephrosis with obstructing stone at the ureteral pelvic junction measures 5 mm. No other stones on the left side. Couple tiny nonobstructing stones right kidney with moderate atrophy right kidney.   ROS not obtainable due to sedation/intubation.     Past Medical History:   Diagnosis Date     Diabetes mellitus, type 2 (H) 4/12/2020     Paroxysmal atrial fibrillation (H) 2/18/2020       Medications: Scheduled Meds:    HYDROmorphone  0.5 mg Intravenous Once     piperacillin-tazobactam  3.375 g Intravenous Once     vancomycin  1,500 mg Intravenous Once     Continuous Infusions:    dextrose 10%       sodium bicarbonate IV infusion in D5W       PRN Meds:.lidocaine (PF), naloxone **OR** naloxone, sodium chloride bacteriostatic    Allergies   Allergen Reactions     Adhesive Tape-Silicones        Social History     Socioeconomic History     Marital status:      Spouse name: Not on file     Number of children: Not on file     Years of education: Not on file     Highest education level: Not on file   Occupational History     Not on file   Social Needs     Financial resource strain: Not on file     Food insecurity     Worry: Not on file     Inability: Not on file     Transportation needs     Medical: Not on file     Non-medical: Not on file   Tobacco Use     Smoking status: Never Smoker     Smokeless tobacco: Never Used   Substance and Sexual Activity     Alcohol use: Yes     Frequency: Monthly or less     Binge frequency: Never     Drug use: Never     Sexual activity: Not Currently   Lifestyle     Physical activity     Days per week: Not on file     Minutes per session: Not on file     Stress: Not on file   Relationships     Social connections     Talks on phone: Not on file     Gets together: Not on file     Attends Orthodox service: Not on file      Active member of club or organization: Not on file     Attends meetings of clubs or organizations: Not on file     Relationship status: Not on file     Intimate partner violence     Fear of current or ex partner: Not on file     Emotionally abused: Not on file     Physically abused: Not on file     Forced sexual activity: Not on file   Other Topics Concern     Not on file   Social History Narrative     Not on file       Family History:  No family history on file.      Review of Systems:ROS not obtainable due to sedation/intubation    BP (!) 175/99   Pulse 97   Resp (!) 32   Ht 6' (1.829 m)   Wt 180 lb (81.6 kg)   SpO2 99%   BMI 24.41 kg/m    No intake or output data in the 24 hours ending 04/12/20 1027  Physical Exam:   GENERAL: Calm, sedated  HEENT: NC/AT, ETT in place, sclerae not icteric.  RESP: Coarse breath sounds bilat.  CV: Regular rhythm, normal rate, no rub. no leg edema.    GI:  Soft, NT/ND, no masses or HSM  Musculoskeletal: Normal muscle bulk/ tone; No gross joint abnormalities  SKIN: No rash, warm/ dry  PSYCH: Deferred due to sedation/intubation.  Lymph: No cervical adenopathy    LABS:  Results from last 7 days   Lab Units 04/12/20  0905   LN-SODIUM mmol/L 143   LN-POTASSIUM mmol/L 6.8*   LN-CHLORIDE mmol/L 96*   LN-CO2 mmol/L <6*   LN-BLOOD UREA NITROGEN mg/dL 72*   LN-CREATININE mg/dL 6.14*   LN-CALCIUM mg/dL 10.3   LN-ALBUMIN g/dL 3.5   LN-PROTEIN TOTAL g/dL 6.8   LN-BILIRUBIN TOTAL mg/dL 0.3   LN-ALKALINE PHOSPHATASE U/L 75   LN-ALT (SGPT) U/L 13   LN-AST (SGOT) U/L 14     Results from last 7 days   Lab Units 04/12/20  0905   LN-WHITE BLOOD CELL COUNT thou/uL 17.6*   LN-HEMOGLOBIN g/dL 13.3*   LN-HEMATOCRIT % 45.1   LN-PLATELET COUNT thou/uL 445*

## 2021-06-07 NOTE — PROGRESS NOTES
PULMONARY / CRITICAL CARE PROGRESS NOTE    Date / Time of Admission:  4/12/2020  8:46 AM    Assessment:   Principal Problem:    Acute renal failure (ARF) (H)  Active Problems:    Diabetes mellitus, type 2 (H)    Paroxysmal atrial fibrillation (H)    Calculus of ureter    Acute renal failure, unspecified acute renal failure type (H)    ATN (acute tubular necrosis) (H)    Sepsis due to urinary tract infection (H)    Shock circulatory (H)    Metformin overdose of undetermined intent, initial encounter    Acute respiratory failure with hypoxemia (H)    Metabolic acidosis    Hyperkalemia    Hematemesis, presence of nausea not specified        Advance Directives:  Full code      Plan:   Neuro:  Alert, oriented.     Cardiovascular:  Shock resolved.  History of A. fib on apixaban.  Can't restart yet due to hematuria    Respiratory:  Extubated yesterday. Doing fine respiratory wise    GI:  Hematemesis secondary to esophagitis and gastritis.  On Protonix twice daily.  GI OK re introducing anticoagulation carefully.     :  Acute renal failure secondary to ATN complicated by severe lactic acidosis.  Concern for metformin toxicity.  Status post hemodialysis.  Making good urine.   HD catheter removed.  Hematuria. Will hold off starting anticoagulation.    Obstructive uropathy status post cystoscopy and left ureteral stent placement.  Cultures remain negative.  Stop vancomycin.  Continue Zosyn.    Heme:  Acute blood loss anemia secondary to upper GI bleed.  Patient on apixaban at home.  Received Kcentra and PRBCs.  Hemoglobin stable.        25 min spent with the patient.      Subjective:   HPI:  Rakesh Samuels is a 69 y.o. male with A. fib, DM, hyperlipidemia who presented with upper GI bleed, shock, lactic acidosis.  He was found to have an obstructive left ureteral stone.  Intubated in the ED and taken to the OR where he had a left ureteral stent placed.  After he was brought to the ICU he had a hemodialysis catheter placed  and got dialyzed.    Principal Problem:    Acute renal failure (ARF) (H)  Active Problems:    Diabetes mellitus, type 2 (H)    Paroxysmal atrial fibrillation (H)    Calculus of ureter    Acute renal failure, unspecified acute renal failure type (H)    ATN (acute tubular necrosis) (H)    Sepsis due to urinary tract infection (H)    Shock circulatory (H)    Metformin overdose of undetermined intent, initial encounter    Acute respiratory failure with hypoxemia (H)    Metabolic acidosis    Hyperkalemia    Hematemesis, presence of nausea not specified      Allergies: Adhesive tape-silicones     MEDS:  Scheduled Meds:    dorzolamide-timoloL  1 drop Both Eyes BID     insulin aspart (NovoLOG) injection   Subcutaneous Q4H     insulin glargine  10 Units Subcutaneous QAM     latanoprostene bunod  1 drop Both Eyes QHS     magnesium sulfate IVPB  4 g Intravenous Once     netarsudiL  1 drop Both Eyes DAILY     pantoprazole  40 mg Intravenous Q12H     piperacillin-tazobactam  3.375 g Intravenous Q8H     senna-docusate  1 tablet Oral BID    Or     senna (SENOKOT) syrup  8.8 mg Enteral Tube BID     sodium chloride  10-30 mL Intravenous Q8H FIXED TIMES     Continuous Infusions:    sodium chloride 0.9% 75 mL/hr (04/15/20 0221)     PRN Meds:.bacitracin, benzocaine-menthoL, bisacodyL, dextrose 50 % (D50W), glucagon (human recombinant), magnesium hydroxide, naloxone **OR** naloxone, ondansetron **OR** ondansetron, polyvinyl alcohol, sodium chloride, sodium chloride, sodium chloride      Objective:   VITALS:  /84   Pulse 97   Temp 98  F (36.7  C) (Axillary)   Resp 14   Ht 6' (1.829 m)   Wt 192 lb 3.9 oz (87.2 kg)   SpO2 97%   BMI 26.07 kg/m    EXAM:  General appearance: alert, appears stated age and cooperative  Head: Normocephalic, without obvious abnormality, atraumatic  Lungs: clear to auscultation bilaterally  Heart: irregularly irregular rhythm  Abdomen: soft, non-tender; bowel sounds normal; no masses,  no  organomegaly  Extremities: extremities normal, atraumatic, no cyanosis or edema  Neurologic: Grossly normal    I&O:      Intake/Output Summary (Last 24 hours) at 4/15/2020 1258  Last data filed at 4/15/2020 0930  Gross per 24 hour   Intake 2814.5 ml   Output 2735 ml   Net 79.5 ml       Data Review:    CBC:   Lab Results   Component Value Date    WBC 8.3 04/14/2020    WBC 7.7 04/10/2015    RBC 3.56 (L) 04/14/2020     BMP:   Lab Results   Component Value Date    CO2 24 04/15/2020    BUN 24 (H) 04/15/2020    CREATININE 1.57 (H) 04/15/2020    CALCIUM 6.9 (L) 04/15/2020     Serum Glucose range:Invalid input(s): GLUCOSEPOCT      By:  Noah Rivera, 4/15/2020, 9:59 AM    Primary Care Physician:  Michelet Restrepo MD

## 2021-06-07 NOTE — PROGRESS NOTES
Code Status:  FULL CODE  Visit Type: Problem Visit     Facility:  [facility] operated by [facility] [[id number]]             History of Present Illness: Rakesh Samuels is a 69 y.o. male who I am seeing today for follow-up on the TCU. Pt recently hospitalized on 4/12/2020.  Past medical history includes atrial fibril on Eliquis, diabetes type 2, hyperlipidemia and glaucoma.  Patient presented to the hospital with upper GI bleed.  He underwent EGD on 4/14 showing esophagitis and gastritis with no active bleeding.  Initially his Eliquis was held.  He was treated with heparin.  Also placed on omeprazole 2 times daily.  Patient found also to have circulatory shock felt to be secondary to left nephrolithiasis.  TTE shows normal LV EF.  Questionable source due to sepsis versus GI blood loss.  He was initially treated with IV Zosyn however negative culture results returned so antibiotics discontinued.  He developed acute kidney injury requiring hemodialysis and respiratory failure requiring intubation.  Patient with blood-tinged urine secondary to anticoagulation in the setting of ureteral stent.  He is followed by urology.  History of obstructive uropathy status post cystoscopy with left ureteral stent placement.  Acute injury improved with fluids and hemodialysis.  Acute respiratory failure.  He was extubated on 4/14.  He is off oxygen.  Diabetes mellitus type 2.  His p.o. meds were stopped.  Hospitalization secondary to acute kidney injury.  He was placed on NovoLog sliding scale and Lantus.    Today patient lying in bed.  Patient with rate since increasing pain in his left foot at the base of his toes.  He is continued with some redness and swelling.  Uric acid obtained which was normal at 5.1.  X-ray also obtained which was negative for any acute injury or fracture.  Patient spiked a temp yesterday of 100.4.  He has been given 2 doses of Keflex.  Earlier this a.m. had some diarrhea.  No blood in his stools.  Today he reports  he was able to stand on his foot.  Pulses are palpable and bounding.  Edema appears somewhat less.  He does have 2 open areas between the toes 2 and 3 and 3 and 4.  Afebrile this morning.  White count obtained which was 7.3.  Patient voiding adequately.  Denies any hematuria.  Diabetes.  Blood sugar slightly elevated in the 200s.  Recent increase in his insulin.  He was taken off his p.o. meds secondary to acute kidney injury.  Kidney function has returned to normal.  I did talk with his wife on the phone.  Reviewed the laboratory results as well as x-ray.  She is very worried patient has a blood clot.  I did review the fact that he is on anticoagulation.  Pulses are palpable.    Active Ambulatory Problems     Diagnosis Date Noted     SIRS (systemic inflammatory response syndrome) (H) 09/09/2019     Adrenal incidentaloma (H)      Diet-controlled diabetes mellitus (H)      Pericardial effusion      Lactic acidosis      Diabetes mellitus, type 2 (H) 04/12/2020     Paroxysmal atrial fibrillation (H) 02/18/2020     Calculus of ureter 04/12/2020     Acute renal failure, unspecified acute renal failure type (H) 04/12/2020     Acute renal failure (ARF) (H) 04/12/2020     ATN (acute tubular necrosis) (H) 04/12/2020     Sepsis due to urinary tract infection (H)      Shock circulatory (H)      Metformin overdose of undetermined intent, initial encounter      Acute respiratory failure with hypoxemia (H)      Metabolic acidosis      Hyperkalemia      Hematemesis, presence of nausea not specified 04/12/2020     Resolved Ambulatory Problems     Diagnosis Date Noted     No Resolved Ambulatory Problems     No Additional Past Medical History     Current Outpatient Medications   Medication Sig     apixaban ANTICOAGULANT (ELIQUIS) 5 mg Tab tablet Take 1 tablet (5 mg total) by mouth 2 (two) times a day.     ARIPiprazole (ABILIFY) 2 MG tablet Take 1 tablet (2 mg total) by mouth every evening.     atorvastatin (LIPITOR) 10 MG tablet Take  10 mg by mouth at bedtime.     cephalexin (KEFLEX) 500 MG capsule Take 250 mg by mouth 3 (three) times a day.      dorzolamide-timolol (COSOPT) 22.3-6.8 mg/mL ophthalmic solution Administer 1 drop to both eyes 2 (two) times a day.     fluvoxaMINE (LUVOX) 100 MG tablet Take 0.5 tablets (50 mg total) by mouth at bedtime for 14 days.     LANTUS SOLOSTAR U-100 INSULIN 100 unit/mL (3 mL) pen Inject 10 Units under the skin at bedtime. 11.65 Type 2 with hyperglycemia  Contact provider if insulin prescribed is not the preferred insulin per insurance.     latanoprostene bunod 0.024 % Drop Administer 1 drop to both eyes at bedtime.     multivitamin therapeutic tablet Take 1 tablet by mouth daily.     netarsudiL (RHOPRESSA) 0.02 % Drop Administer 1 drop to both eyes daily.     NOVOLOG FLEXPEN U-100 INSULIN 100 unit/mL (3 mL) injection pen Check blood sugar four (4) times daily.  11.65 Type 2 with hyperglycemia  BD Ultra-fine Adina Pen Needles - NDC [address]-97 - dispense 1 case,  refill PRN for 1 year     omeprazole (PRILOSEC OTC) 20 MG tablet Take 1 tablet (20 mg total) by mouth 2 (two) times a day before meals.     VENTOLIN HFA 90 mcg/actuation inhaler Inhale 2 puffs every 4 (four) hours as needed.       Allergies   Allergen Reactions     Adhesive Tape-Silicones          Review of Systems  No fevers or chills. No headache, lightheadedness or dizziness. No SOB, chest pains or palpitations. Appetite is good. No nausea, vomiting, constipation or diarrhea. No dysuria, frequency, burning or pain with urination.  No evidence of acute bleed.  Pain in the left foot.  Reports history of gout.  Otherwise review of systems are negative.     Physical Exam  PHYSICAL EXAMINATION:  Vital signs: /56, pulse 52, respirations 18, temperature 98.2, O2 sat 97% on room air.  Weight 187 pounds.  General: Awake, Alert, oriented x3, appropriately, follows simple commands, conversant  HEENT Pink conjunctiva on the left with slightly red  conjunctiva on the right.  No drainage, anicteric sclerae, moist oral mucosa.  History of glaucoma with vision loss in the right eye.   NECK: Supple  CARDIOVASCULAR: S1-S2 without murmur gallop.   RESPIRATORY: No wheezes rales or rhonchi  BACK: No kyphosis of the thoracic spine  EXTREMITIES: Good range of motion on both upper and lower extremities, 1+ pedal edema on the left lower extremity, no calf tenderness.  Tenderness at the base of the toes on the left foot.   SKIN: Left foot with redness and swelling at the forefoot.  Small fissures between the second and third and third and fourth toes.  NEUROLOGIC: Intact, pulses palpable  PSYCHIATRIC: Cognition intact      Labs:    Results for orders placed or performed in visit on 04/20/20   Basic Metabolic Panel   Result Value Ref Range    Sodium 146 (H) 136 - 145 mmol/L    Potassium 3.9 3.5 - 5.0 mmol/L    Chloride 112 (H) 98 - 107 mmol/L    CO2 27 22 - 31 mmol/L    Anion Gap, Calculation 7 5 - 18 mmol/L    Glucose 123 70 - 125 mg/dL    Calcium 7.8 (L) 8.5 - 10.5 mg/dL    BUN 12 8 - 22 mg/dL    Creatinine 0.94 0.70 - 1.30 mg/dL    GFR MDRD Af Amer >60 >60 mL/min/1.73m2    GFR MDRD Non Af Amer >60 >60 mL/min/1.73m2     Lab Results   Component Value Date    WBC 9.7 04/22/2020    HGB 10.3 (L) 04/20/2020    HCT 32.9 (L) 04/20/2020    MCV 99 04/20/2020     04/20/2020           Assessment/Plan:  1. Cellulitis of left foot   give 1 g of IM Rocephin with lidocaine x1.  Keflex 250 mg 3 times daily x10 days.  Follow-up CBC and BMP on Monday.  Obtain venous Doppler to left lower extremity.   2. Acute upper GI bleed   no evidence of bleed.   3. SIRS (systemic inflammatory response syndrome) (H)   resolved.   4. Acute renal failure with tubular necrosis (H)   renal function improved.  GFR) within normal limits.   5. Type 2 diabetes mellitus with chronic kidney disease, without long-term current use of insulin, unspecified CKD stage (H)   blood sugar slightly elevated  occasionally in the 200s.  This could be due to infection.  He was taken off his orals during hospitalization.  He continues on Lantus.  Recent increase to 13 units.  He is also on sliding scale.   6. Paroxysmal atrial fibrillation (H)   continues on Eliquis.       Electronically signed by: Kylie Gomez, LUANA

## 2021-06-07 NOTE — ANESTHESIA PREPROCEDURE EVALUATION
Anesthesia Evaluation      Patient summary reviewed   No history of anesthetic complications     Airway   Comment: ETT in situ   Pulmonary - normal exam     ROS comment: Severe metabolic acidosis, unable to compensate despite marked tachypnea, pCO2 19. Intubated in ED. Lungs clear on CT.                         Cardiovascular      ROS comment: Hemodynamically stable, not on pressors  Currently.  PE comment: Sinus tachycardia 120s, BP normal range,     Neuro/Psych      Comments: Sedated, on propofol    Endo/Other    (+) diabetes mellitus,      GI/Hepatic/Renal    (+)   chronic renal disease (Kidney stone with CHIDI & hydronephrosis, sepsis, severe acidosis),     Comments: K 6.8 in ED, received 1g Ca, amp of bicarb, 10 units insulin +D50, now on bicarb drip.     Other findings: Results for orders placed or performed during the hospital encounter of 20  -Basic Metabolic Panel      Result                      Value             Ref Range           Sodium                      143               136 - 145 mm*       Potassium                   6.8 (HH)          3.5 - 5.0 mm*       Chloride                    96 (L)            98 - 107 mmo*       CO2                         <6 (LL)           22 - 31 mmol*       Anion Gap, Calculation      >41 (H)           5 - 18 mmol/L       Glucose                     222 (H)           70 - 125 mg/*       Calcium                     10.3              8.5 - 10.5 m*       BUN                         72 (H)            8 - 22 mg/dL        Creatinine                  6.14 (HH)         0.70 - 1.30 *       GFR MDRD Af Amer            11 (L)            >60 mL/min/1*       GFR MDRD Non Af Amer        9 (L)             >60 mL/min    Lab             20                       0905          LN-LACTIC A* 15.0*           AB/12/20                       1010          PHART        6.85*         OXYHB        96.9*         BEARTCALC    -29.8         TEMP         37.0                 Dental                         Anesthesia Plan  Planned anesthetic: general endotracheal  HME in-line. GA infectious precautions in OR per [place] policy. Not a covid PUI at this point -- respiratory failure appears secondary to severe metabolic acidosis & urosepsis, negative pulmonary findings on chest CT.    Propofol + inhaled agent as needed    PICC in place. To ICU intubated direct transfer postop.  ASA 4 - emergent   Induction: intravenous   Anesthetic plan and risks discussed with: spouse (phone consent)    Post-op plan: extended intubation/vent support

## 2021-06-07 NOTE — PROGRESS NOTES
[facility] PROGRESS NOTE    Assessment/Plan  Principal Problem:    Acute renal failure (ARF) (H)  Active Problems:    Diabetes mellitus, type 2 (H)    Paroxysmal atrial fibrillation (H)    Calculus of ureter    Acute renal failure, unspecified acute renal failure type (H)    ATN (acute tubular necrosis) (H)    Sepsis due to urinary tract infection (H)    Shock circulatory (H)    Metformin overdose of undetermined intent, initial encounter    Acute respiratory failure with hypoxemia (H)    Metabolic acidosis    Hyperkalemia    Hematemesis, presence of nausea not specified    69 y.o. old male with a. Fib, DM 2, HPL admitted with UGIB, shock with obstructing left ureteral stone, acute renal failure, severe lactic acidosis.  Intubated in ED taken to OR for left ureteral stent then ICU where HD catheter placed and received emergency dialysis.      Circulatory Shock. [id number], now normal. Received multiple IVF boluses. Echo Feb 2020 EF 67%, normal RV, RVSP 36, normal LV size/functino, no valve issues. Hx afib on eliquis.   Likely hemorrhagic shock from bleeding + septic/vasoldilatory, + hypovolemic shock from dehydration.    - now off vasopressors  - continue Zosyn vanco stopped   - no fluids   -Blood cx NGTD      Acute resp failure with hypoxemia, intubated for worsening mental status in the setting of severe metabolic acidosis.   - extubated 4/14 no O2 needed       Severe CHIDI/ATN, metabolic acidosis, lactic acidosis. hyper K 6.8. possible metformin toxicity.   - emergent HD and only one run and all resolved with HD yesterday   - nephrology pulled dialysis catheter   - trend renal function and potasium        GI hemorrhage: Sig hgb drop 13 -> 10 with coffee ground material coming through OG tube, ~1L. On eliquis s/p Kcentra for reversal + pRBC.  EGD 4/14 Esophagitis at the distal esophagus, small superficial esophageal ulcerations, mild gastritis, normal duodenum to D2. No active bleeding seen during exam.   - GI followqing.    - continue PPI.   - monitor Hgb    PAF   - on apixaban which has been on hold due to hematuria and can not restart yet  - Start Heparin drip without bolus tomorrow if no further hematuria and monitor closely     Obstructive uropathy status post cystoscopy and left ureteral stent placement.  Cultures remain negative.  Continue Zosyn.  - urology following  - james out     DM  - holding home meds  - novolog SS     Diet: Dm  Drains/tubes: PICC  Weight bearing restrictions: WBAT PT/OT   Disposition/Barriers to discharge: pending resolution of blood loss and tolerating anticoagulation   Consults: pulmonary/intensive care, GI, nephrology and urology  Full Code  Subjective  Patient weepy during discussion hes grateful for his speedy recovery.     Objective  Vital signs in last 24 hours  Vitals:    04/15/20 1715   BP: 112/77   Pulse: 98   Resp: 18   Temp: 98.5  F (36.9  C)   SpO2: 96%     Wt Readings from Last 1 Encounters:   04/15/20 0000 192 lb 3.9 oz (87.2 kg)   04/14/20 0000 193 lb 2 oz (87.6 kg)   04/13/20 0000 199 lb 4.7 oz (90.4 kg)   04/12/20 1550 180 lb (81.6 kg)   04/12/20 0854 180 lb (81.6 kg)   Weight change: -14.1 oz (-0.4 kg)  Body mass index is 26.07 kg/m .  Intake/Output this shift:    Intake/Output Summary (Last 24 hours) at 4/15/2020 1741  Last data filed at 4/15/2020 1551  Gross per 24 hour   Intake 4406.5 ml   Output 2910 ml   Net 1496.5 ml     I/O last 3 completed shifts:  In: 4166.5 [P.O.:1882; I.V.:1777.5; Other:150; IV Piggyback:357]  Out: 2560 [Urine:2560]  Physical Exam  General appearance: alert, appears stated age and cooperative  HEENT:  Normocephalic without obvious abnormality, atraumatic  Lungs: CTA b/l No W/R/R  Heart: irreg irreg no M/R/G  Abdomen: soft,  +bowel sounds tender  Extremities: No E/C/C  Pulses: 2+ and symmetric  Skin: Skin color, texture, turgor normal. No rashes or lesions  Neurologic: Grossly normal  Current Medications    dorzolamide-timoloL  1 drop Both Eyes BID      insulin aspart (NovoLOG) injection   Subcutaneous TID with meals     insulin glargine  10 Units Subcutaneous QAM     latanoprostene bunod  1 drop Both Eyes QHS     netarsudiL  1 drop Both Eyes DAILY     pantoprazole  40 mg Intravenous Q12H     piperacillin-tazobactam  3.375 g Intravenous Q8H     senna-docusate  1 tablet Oral BID    Or     senna (SENOKOT) syrup  8.8 mg Enteral Tube BID     sodium chloride  10-30 mL Intravenous Q8H FIXED TIMES       acetaminophen, bacitracin, benzocaine-menthoL, bisacodyL, dextrose 50 % (D50W), glucagon (human recombinant), magnesium hydroxide, melatonin, naloxone **OR** naloxone, ondansetron **OR** ondansetron, polyvinyl alcohol, sodium chloride, sodium chloride, sodium chloride  Pertinent Labs   Results from last 7 days   Lab Units 04/14/20  0358 04/13/20  0553 04/12/20  2007 04/12/20  1429   LN-WHITE BLOOD CELL COUNT thou/uL 8.3 12.6*  --  16.1*   LN-HEMOGLOBIN g/dL 11.2* 11.8* 11.6* 10.1*   LN-HEMATOCRIT % 32.7* 34.1*  --  33.5*   LN-PLATELET COUNT thou/uL 158 263  --  302       Results from last 7 days   Lab Units 04/15/20  1550 04/15/20  1122 04/15/20  0358  04/14/20  1342  04/13/20  0553  04/12/20  1428  04/12/20  0905   LN-SODIUM mmol/L  --  144 148*  --  146*   < > 140   < > 142   < > 143   LN-POTASSIUM mmol/L 4.2 4.2 3.3*   < > 3.3*   < > 3.8   < > 7.0*   < > 6.8*   LN-CHLORIDE mmol/L  --  107 115*  --  107   < > 98   < > 99   < > 96*   LN-CO2 mmol/L  --  24 24  --  30   < > 21*   < > 7*   < > <6*   LN-BLOOD UREA NITROGEN mg/dL  --  24* 24*  --  28*   < > 34*   < > 66*   < > 72*   LN-CREATININE mg/dL  --  1.57* 1.57*  --  2.30*   < > 2.98*   < > 5.17*   < > 6.14*   LN-CALCIUM mg/dL  --  6.9* 6.0*  --  6.8*   < > 7.6*   < > 8.1*   < > 10.3   LN-ALBUMIN g/dL  --   --   --   --   --   --  2.6*  --  2.3*  --  3.5   LN-PROTEIN TOTAL g/dL  --   --   --   --   --   --   --   --  4.6*  --  6.8   LN-BILIRUBIN TOTAL mg/dL  --   --   --   --   --   --   --   --  0.2  --  0.3    LN-ALKALINE PHOSPHATASE U/L  --   --   --   --   --   --   --   --  52  --  75   LN-ALT (SGPT) U/L  --   --   --   --   --   --   --   --  <9  --  13   LN-AST (SGOT) U/L  --   --   --   --   --   --   --   --  18  --  14    < > = values in this interval not displayed.     Results from last 7 days   Lab Units 04/12/20  1428 04/12/20  0905   LN-INR  1.81* 1.98*   LN-PARTIAL THROMBOPLASTIN TIME seconds  --  30         Total time for this visit is 35 minutes with greater than 50% of time spent in counseling and coordination of care with patient/family, discussion with RN, consultants, reviewing labs and chart.    Amanda Alvarado MD.   [facility] Medicine Service   [phone]   Pager [phone]

## 2021-06-07 NOTE — PLAN OF CARE
Problem: Pain  Goal: Patient's pain/discomfort is manageable  Outcome: Progressing   Denied pain.   Problem: Safety  Goal: Patient will be injury free during hospitalization  Outcome: Progressing   Bed alarm on. Pt uses call light for help.   Problem: Daily Care  Goal: Daily care needs are met  Outcome: Progressing   Care needs met. Anticipating dc today to tcu.

## 2021-06-07 NOTE — H&P
ADMISSION HISTORY & PHYSICAL      Michelet Restrepo MD, [phone]  ASSESSMENT AND PLAN:  69 y.o. male presenting with:    Generalized weakness/fall:  -Due to acute medical conditions as below.  -CT head- no acute intracranial abnormalities  -PT/OT assessment when appropriate    Sepsis: Likely urosepsis  -CT abdomen and pelvis showed new left hydronephrosis (severe) with 5-6 mm obstructing stone at the left UPJ  -Abnormal UA noted. U/C pending. Started on empiric antibiotics  -Urology consulted. Going to OR for urgent urologic procedure  -Hypothermia likely due to the same.    Severe AG metabolic acidosis:  -With non-detectable bicarb, lactate of 15 and pH of 6.8.   -Suspect all due to sepsis, CHIDI, metformin use  -Started on bicarb infusion. Appreciate nephrology consult.   -Monitor labs closely  -Patient intubated in ED for urologic procedure/airway protection. Will go to ICU intubated. Discussed with Dr Dinh. Will likely extubate him soon.    CHIDI/hyperkalemia:  -Pre-renal vs ATN. Olsen for u/o monitoring  -Hyperkalemia treated in ED. Last potassium 5.8  -Nephrology planning for HD today after the urologic procedure  -Hold metformin. Avoid nephrotoxic meds    GI bleeding:  -Patient noted to have an episode of hematemesis and BRBPR in ED.  -Patient on eliquis for h/o PAF  -Hold eliquis due to GI bleeding. GI consulted from ED. GI signed off (?)  -Monitor hgb. Will insert OG tube and see if there is any ongoing bleeding. Will re-tag GI if there is e/o continued bleeding    Addendum:  OG tube with coffee ground material. Case discussed with Dr Roberts. Dr Roberts arranging for urgent EGD. Case discussed with Dr Hernandez. Will give him K centra to reverse eliquis.    DM-II, with documented hypoglycemia:  -Hypoglycemia at home, treated on the site by EMS  -Accuchecks and SSI per ICU protocol    H/O PAF:  -Seen by Dr Pickett on 2/28/20, the note of whom was reviewed  -Not on any rate controlling meds. Hold eliquis for GI  "bleeding    Disposition:  -Anticipated Length of Stay in midnights and medical necessity (including a midnight in the Emergency Department after triage if applicable): >2  -Discharge barriers: several days      CHIEF COMPLAINT:  Generalized weakness and fall    HISTORY OF PRESENTING ILLNESS:  Patient is a 69 y.o. male with history significant for diabetes mellitus, PAF on eliquis and dyslipidemia who was brought to our ED for evaluation of weakness, fall and hypoglycemia.    Patient is intubated. So, most of the information in this note has been obtained from the ED physician and chart review. Apparently, the patient has been experiencing weakness and fatigue for months. He reported 30lbs weight loss since Nov 2019. He reported seeing some blood in his urine about couple of wks ago. This morning, he was sit to his stomach, and vomited once at home. His wife called EMS as the patient fell in the bathroom, and was unable to assist him up. When paramedics arrived he was laying on the bathroom floor. He was awake and able to talk to them. He was noted to have \"low\" blood sugar for which he was given D10. Blood sugar came up to 200s. When he was brought to our ED, he was hypertensive, but hypothermic, tachypneic and tachycardic. On further work up, he was found to have urosepsis due to an obstructed stone in the left PUJ, CHIDI, hyperkalemia, severe AG metabolic acidosis.     PMH/PSH:  Patient Active Problem List   Diagnosis     SIRS (systemic inflammatory response syndrome) (H)     Adrenal incidentaloma (H)     Diet-controlled diabetes mellitus (H)     Pericardial effusion     Lactic acidosis     Diabetes mellitus, type 2 (H)     Paroxysmal atrial fibrillation (H)     Calculus of ureter     Acute renal failure, unspecified acute renal failure type (H)     Acute renal failure (ARF) (H)       ALLERGIES:  Allergies   Allergen Reactions     Adhesive Tape-Silicones        MEDICATIONS:  Reviewed.  No current " facility-administered medications on file prior to encounter.      Current Outpatient Medications on File Prior to Encounter   Medication Sig Dispense Refill     apixaban ANTICOAGULANT (ELIQUIS) 5 mg Tab tablet Take 1 tablet (5 mg total) by mouth 2 (two) times a day. 60 tablet 11     ARIPiprazole (ABILIFY) 5 MG tablet Take 5 mg by mouth daily.  2     aspirin 81 MG EC tablet Take 81 mg by mouth daily.       atorvastatin (LIPITOR) 10 MG tablet Take 10 mg by mouth at bedtime.  1     buPROPion (WELLBUTRIN XL) 150 MG 24 hr tablet Take 150 mg by mouth daily.  1     dorzolamide-timolol (COSOPT) 22.3-6.8 mg/mL ophthalmic solution Administer 1 drop to both eyes 2 (two) times a day.  3     fluvoxaMINE (LUVOX) 100 MG tablet Take 200 mg by mouth at bedtime.  1     glipiZIDE (GLUCOTROL XL) 5 MG 24 hr tablet Take 5 mg by mouth 2 (two) times a day with meals.       latanoprost (XALATAN) 0.005 % ophthalmic solution Administer 1 drop to both eyes at bedtime.  2     latanoprostene bunod 0.024 % Drop Administer 1 drop to both eyes at bedtime.       metFORMIN (GLUCOPHAGE) 1000 MG tablet Take 1,000 mg by mouth 2 (two) times a day with meals.  3     multivitamin therapeutic tablet Take 1 tablet by mouth daily.       netarsudiL (RHOPRESSA) 0.02 % Drop Administer 1 drop to both eyes daily.       pioglitazone (ACTOS) 30 MG tablet Take 30 mg by mouth daily.  0     UNABLE TO FIND Med Name: I-methylfolate 15 mg daily       VENTOLIN HFA 90 mcg/actuation inhaler Inhale 2 puffs every 4 (four) hours as needed.  11       SOCIAL HISTORY:  Social History     Socioeconomic History     Marital status:      Spouse name: Not on file     Number of children: Not on file     Years of education: Not on file     Highest education level: Not on file   Occupational History     Not on file   Social Needs     Financial resource strain: Not on file     Food insecurity     Worry: Not on file     Inability: Not on file     Transportation needs     Medical:  Not on file     Non-medical: Not on file   Tobacco Use     Smoking status: Never Smoker     Smokeless tobacco: Never Used   Substance and Sexual Activity     Alcohol use: Yes     Frequency: Monthly or less     Binge frequency: Never     Drug use: Never     Sexual activity: Not Currently   Lifestyle     Physical activity     Days per week: Not on file     Minutes per session: Not on file     Stress: Not on file   Relationships     Social connections     Talks on phone: Not on file     Gets together: Not on file     Attends Rastafarian service: Not on file     Active member of club or organization: Not on file     Attends meetings of clubs or organizations: Not on file     Relationship status: Not on file     Intimate partner violence     Fear of current or ex partner: Not on file     Emotionally abused: Not on file     Physically abused: Not on file     Forced sexual activity: Not on file   Other Topics Concern     Not on file   Social History Narrative     Not on file       FAMILY HISTORY:  Unobtainable due to patient being intubated    ROS:  Review of systems is unobtainable because of patient's mental status.        PHYSICAL EXAM:  /75   Pulse (!) 116   Temp (!) 94.5  F (34.7  C) (Rectal)   Resp (!) 30   Ht 6' (1.829 m)   Wt 180 lb (81.6 kg)   SpO2 99%   BMI 24.41 kg/m    No intake/output data recorded.  I/O this shift:  In: 800 [I.V.:800]  Out: -   GENRL: Intubated and sedated. Ill appearing.  HEENT: NC/AT      Pupils- round and reactive to light bilaterally      Neck- supple, no JVP elevation, no lymphadenopathy or thyromegaly      Sclera- anicteric  CHEST: Clear to auscultation bilaterally  HEART: S1S2 regular. No murmurs, rubs or gallops  ABDMN: Soft. Non-tender, non-distended. No organomegaly. No guarding or rigidity. Bowel sounds- active  EXTRM: No pedal oedema  NEURO: Intubated and sedated.      DIAGNOSTIC DATA:  Recent Results (from the past 24 hour(s))   POCT Glucose    Collection Time:  04/12/20  8:54 AM    Specimen: Capillary; Blood   Result Value Ref Range    Glucose 151 (H) 70 - 139 mg/dL   Basic Metabolic Panel    Collection Time: 04/12/20  9:05 AM   Result Value Ref Range    Sodium 143 136 - 145 mmol/L    Potassium 6.8 (HH) 3.5 - 5.0 mmol/L    Chloride 96 (L) 98 - 107 mmol/L    CO2 <6 (LL) 22 - 31 mmol/L    Anion Gap, Calculation >41 (H) 5 - 18 mmol/L    Glucose 222 (H) 70 - 125 mg/dL    Calcium 10.3 8.5 - 10.5 mg/dL    BUN 72 (H) 8 - 22 mg/dL    Creatinine 6.14 (HH) 0.70 - 1.30 mg/dL    GFR MDRD Af Amer 11 (L) >60 mL/min/1.73m2    GFR MDRD Non Af Amer 9 (L) >60 mL/min/1.73m2   Hepatic Profile    Collection Time: 04/12/20  9:05 AM   Result Value Ref Range    Bilirubin, Total 0.3 0.0 - 1.0 mg/dL    Bilirubin, Direct 0.1 <=0.5 mg/dL    Protein, Total 6.8 6.0 - 8.0 g/dL    Albumin 3.5 3.5 - 5.0 g/dL    Alkaline Phosphatase 75 45 - 120 U/L    AST 14 0 - 40 U/L    ALT 13 0 - 45 U/L   APTT(PTT)    Collection Time: 04/12/20  9:05 AM   Result Value Ref Range    PTT 30 24 - 37 seconds   INR    Collection Time: 04/12/20  9:05 AM   Result Value Ref Range    INR 1.98 (H) 0.90 - 1.10   Type and Screen    Collection Time: 04/12/20  9:05 AM   Result Value Ref Range    ABORh A POS     Antibody Screen Negative Negative   Troponin I    Collection Time: 04/12/20  9:05 AM   Result Value Ref Range    Troponin I 0.07 0.00 - 0.29 ng/mL   Magnesium    Collection Time: 04/12/20  9:05 AM   Result Value Ref Range    Magnesium 2.8 (H) 1.8 - 2.6 mg/dL   HM1 (CBC with Diff)    Collection Time: 04/12/20  9:05 AM   Result Value Ref Range    WBC 17.6 (H) 4.0 - 11.0 thou/uL    RBC 4.19 (L) 4.40 - 6.20 mill/uL    Hemoglobin 13.3 (L) 14.0 - 18.0 g/dL    Hematocrit 45.1 40.0 - 54.0 %     (H) 80 - 100 fL    MCH 31.7 27.0 - 34.0 pg    MCHC 29.5 (L) 32.0 - 36.0 g/dL    RDW 15.2 (H) 11.0 - 14.5 %    Platelets 445 (H) 140 - 440 thou/uL    MPV 9.4 8.5 - 12.5 fL    Neutrophils % 82 (H) 50 - 70 %    Lymphocytes % 12 (L) 20 - 40 %     Monocytes % 3 2 - 10 %    Eosinophils % 0 0 - 6 %    Basophils % 1 0 - 2 %    Neutrophils Absolute 14.5 (H) 2.0 - 7.7 thou/uL    Lymphocytes Absolute 2.1 0.8 - 4.4 thou/uL    Monocytes Absolute 0.6 0.0 - 0.9 thou/uL    Eosinophils Absolute 0.0 0.0 - 0.4 thou/uL    Basophils Absolute 0.1 0.0 - 0.2 thou/uL   Lactic Acid    Collection Time: 04/12/20  9:05 AM   Result Value Ref Range    Lactic Acid 15.0 (HH) 0.5 - 2.2 mmol/L   CK Total - evaluate for rhabdomyolysis    Collection Time: 04/12/20  9:05 AM   Result Value Ref Range    CK, Total 47 30 - 190 U/L   POCT Creatinine    Collection Time: 04/12/20  9:32 AM   Result Value Ref Range    iSTAT Creatinine 6.4 (HH) 0.7 - 1.3 mg/dL    iSTAT GFR MDRD Af Amer 11 (L) >60 mL/min/1.73m2    iSTAT GFR MDRD Non Af Amer 9 (L) >60 mL/min/1.73m2   POCT Glucose    Collection Time: 04/12/20 10:03 AM    Specimen: Blood   Result Value Ref Range    Glucose 143 (H) 70 - 139 mg/dL   Crossmatch    Collection Time: 04/12/20 10:09 AM   Result Value Ref Range    Crossmatch Compatible     Unit Type A Pos     Unit Number [id number]     Status Ready     Component Red Blood Cells     PRODUCT CODE [id number]     Blood Type 6200     CODING SYSTEM [id number]    Crossmatch    Collection Time: 04/12/20 10:09 AM   Result Value Ref Range    Crossmatch Compatible     Unit Type A Pos     Unit Number [id number]     Status Ready     Component Red Blood Cells     PRODUCT CODE [id number]     Blood Type 6200     CODING SYSTEM [id number]    Blood Gases, Arterial    Collection Time: 04/12/20 10:10 AM   Result Value Ref Range    pH, Arterial 6.85 (LL) 7.37 - 7.44    pCO2, Arterial <19 (LL) 35 - 45 mm Hg    pO2, Arterial 127 (H) 75 - 85 mm Hg    Bicarbonate, Arterial Calc 1.7 (L) 23.0 - 29.0 mmol/L    O2 Sat, Arterial 99.4 (H) 95.0 - 96.0 %    Oxyhemoglobin 96.9 (H) 95.0 - 96.0 %    Base Excess, Arterial Calc -29.8 mmol/L    Ventilation Mode Room Air     FIO2      Sample Stabilized Temperature 37.0 degrees C    Potassium    Collection Time: 04/12/20 10:50 AM   Result Value Ref Range    Potassium 5.8 (H) 3.5 - 5.0 mmol/L   POCT Glucose    Collection Time: 04/12/20 11:26 AM    Specimen: Blood   Result Value Ref Range    Glucose 194 (H) 70 - 139 mg/dL     All lab studies reviewed personally  Radiology report reviewed.

## 2021-06-07 NOTE — PROGRESS NOTES
[facility] [place] Internal Medicine Progress Note       ASSESSMENT:    Principal Problem:    Acute renal failure (ARF) (H)  Active Problems:    Diabetes mellitus, type 2 (H)    Paroxysmal atrial fibrillation (H)    Calculus of ureter    Acute renal failure, unspecified acute renal failure type (H)    ATN (acute tubular necrosis) (H)    Sepsis due to urinary tract infection (H)    Shock circulatory (H)    Metformin overdose of undetermined intent, initial encounter    Acute respiratory failure with hypoxemia (H)    Metabolic acidosis    Hyperkalemia    Hematemesis, presence of nausea not specified      PLAN:   69-year-old male with history of A. fib on Eliquis, DM 2 and hyperlipidemia presents with upper GI bleed and circulatory shock thought secondary to left nephrolithiasis.  Hospital course notable for CHIDI requiring hemodialysis and respiratory failure requiring intubation..      Upper GI bleed: Currently resolved.  Patient underwent  showing esophagitis and gastritis with no active bleeding.  Patient was started on heparin drip 4/16/2020 and has had no clinical signs of GI blood loss  --Start Eliquis, stop heparin drip  --Continue PPI  --Monitor hemoglobin in the a.m.      Blood-tinged urine: Likely due to anticoagulation in the setting of a ureteral stent.  --Discussed with Dr. Yates, he is not concerned, patient should remain on anticoagulation for now      Obstructive uropathy: Patient underwent cystoscopy and left ureteral stent placement on admission  --Dr. Yates planning on stone extraction in a few weeks as an outpatient.  He will coordinate this      CHIDI: Resolved. Patient underwent emergent hemodialysis for hyperkalemia.      History of atrial fibrillation: Currently holding Eliquis given above issues  --Restart home dose Eliquis, stop heparin drip  --He otherwise is not on any rate controlling medication    Circulatory shock: Resolved.  TTE shows normal LVEF.  --Question source due to sepsis versus GI blood  loss  --Stop IV Zosyn given negative culture results\      Acute respiratory failure requiring intubation, resolved.  Extubated 4/14      DM2: Continue current management      Dispo: Both PT and OT are recommending TCU.  Patient and family are hesitant to pursue TCU placement as they do not feel the patient needs it.  Patient states he is gaining strength every day and feels he is near his baseline and safe to return home however late in the day on 4/17 they have decided on TCU placement so will plan DC to TCU tomorrow      DVT PPX:  On Eliquis    Needs for Discharge: No GI bleeding on anticoagulation, improved strength    ESTIMATED DISCHARGE: 1 to 2 days to home        Tu Ruby D.O.  [phone]             -------------------------------------------------------------------------------------------------------------  SUBJECTIVE: NAD. Denies any nausea, vomiting, abdominal pain, chest pain, SOB, ROSARIO, orthopnea, new swelling, fevers, chills, confusion or headache. Having some blood tinged urine today    Exam:  /55 (Patient Position: Lying)   Pulse 78   Temp 97.8  F (36.6  C) (Oral)   Resp 19   Ht 6' (1.829 m)   Wt 189 lb (85.7 kg)   SpO2 95%   BMI 25.63 kg/m    General: NAD  RESPIRATORY: Clear to auscultation   CARDIOVASCULAR: S1, S2, without murmur. No le edema bilat.   ABDOMEN: soft and non-tender  NEUROLOGIC: NMotor and sensory intact, speech clear  PSYCHIATRIC: Oriented X 3, without confusion     Diagnostics Reviewed:      Recent Results (from the past 24 hour(s))   POCT Glucose    Collection Time: 04/16/20  4:18 PM    Specimen: Blood   Result Value Ref Range    Glucose 146 (H) 70 - 139 mg/dL   Anti-Xa Heparin Level    Collection Time: 04/16/20  5:44 PM   Result Value Ref Range    Anti-Xa Heparin Assay 0.25 (L) 0.30 - 0.70 IU/mL   Potassium    Collection Time: 04/16/20  5:44 PM   Result Value Ref Range    Potassium 4.2 3.5 - 5.0 mmol/L   Hemoglobin    Collection Time: 04/16/20  8:53 PM    Result Value Ref Range    Hemoglobin 11.2 (L) 14.0 - 18.0 g/dL   POCT Glucose    Collection Time: 04/16/20  9:03 PM    Specimen: Blood   Result Value Ref Range    Glucose 140 (H) 70 - 139 mg/dL   Anti-Xa Heparin Level    Collection Time: 04/17/20 12:13 AM   Result Value Ref Range    Anti-Xa Heparin Assay 0.24 (L) 0.30 - 0.70 IU/mL   Basic Metabolic Panel    Collection Time: 04/17/20  6:53 AM   Result Value Ref Range    Sodium 148 (H) 136 - 145 mmol/L    Potassium 4.0 3.5 - 5.0 mmol/L    Chloride 113 (H) 98 - 107 mmol/L    CO2 27 22 - 31 mmol/L    Anion Gap, Calculation 8 5 - 18 mmol/L    Glucose 130 (H) 70 - 125 mg/dL    Calcium 7.0 (L) 8.5 - 10.5 mg/dL    BUN 12 8 - 22 mg/dL    Creatinine 0.97 0.70 - 1.30 mg/dL    GFR MDRD Af Amer >60 >60 mL/min/1.73m2    GFR MDRD Non Af Amer >60 >60 mL/min/1.73m2   Magnesium    Collection Time: 04/17/20  6:53 AM   Result Value Ref Range    Magnesium 1.5 (L) 1.8 - 2.6 mg/dL   Anti-Xa Heparin Level    Collection Time: 04/17/20  6:53 AM   Result Value Ref Range    Anti-Xa Heparin Assay 0.40 0.30 - 0.70 IU/mL   Hemoglobin    Collection Time: 04/17/20  6:53 AM   Result Value Ref Range    Hemoglobin 11.2 (L) 14.0 - 18.0 g/dL   POCT Glucose    Collection Time: 04/17/20  7:47 AM    Specimen: Blood   Result Value Ref Range    Glucose 122 70 - 139 mg/dL   POCT Glucose    Collection Time: 04/17/20 11:37 AM    Specimen: Blood   Result Value Ref Range    Glucose 146 (H) 70 - 139 mg/dL

## 2021-06-07 NOTE — ANESTHESIA POSTPROCEDURE EVALUATION
Patient: Rakesh Samuels  Procedure(s):  CYSTOSCOPY, WITH URETERAL STENT INSERTION (Left)  Anesthesia type: general    Patient location: ICU  Last vitals:   Vitals Value   /61   Temp 37.5  C (99.5  F)   Pulse 83   Resp 14   SpO2 100 %   Vitals shown include unvalidated device data.  Post vital signs: stable on norepi  Level of consciousness: sedated  Post-anesthesia pain: pain controlled  Post-anesthesia nausea and vomiting: no (sedated & intubated)  Pulmonary: ETT, ventilator  Cardiovascular: on norepi  Hydration: adequate  Anesthetic events: no    QCDR Measures:  ASA# 11 - Ambar-op Cardiac Arrest: ASA11B - Patient did NOT experience unanticipated cardiac arrest  ASA# 12 - Ambar-op Mortality Rate: ASA12B - Patient did NOT die  ASA# 13 - PACU Re-Intubation Rate: ASA13X - Exclusion: organ donor or direct ICU transfer  ASA# 10 - Composite Anes Safety: ASA10A - No serious adverse event    Additional Notes:  Getting dialysis for CHIDI and severe acidosis

## 2021-06-07 NOTE — OP NOTE
Operative Note    Name:  Rakesh Samuels  Location: [facility] Main OR  Procedure Date:  4/12/2020  PCP:  Michelet Restrepo MD      CYSTOSCOPY, WITH URETERAL STENT INSERTION (Left)  1. Cystoscopy        2. Ureteral stent insertion  :left        Pre-Procedure Diagnosis:  Calculus of ureter [N20.1]  Acute renal failure, unspecified acute renal failure type (H) [N17.9]     Post-Procedure Diagnosis:    1. Stone: left ureter (proximal)  2.   Acute renal failure      Surgeon(s):  Crhistopher Yates MD    Anesthesia Type:  General    Procedural Summary:      Estimation of stone clearance: drainage procedure only  Subjective stone composition: calcium  Renal papillae involvement with Adrian's plaque:  not observed  Unanticipated event/findings: none  Post-operative plan: Return to OR in 2 weeks for definitive stone clearance.    Narrative:     Successful insertion of left ureteral stent for severe acute renal failure and obstructing stone. Stone is moderately impacted but retropulses nicely back into kidney. To ICU for further management and nephrology care.    Procedural Details:    Patient is brought to operating room where anesthesia is induced.  They are prepped and draped in standard fashion for cystoscopic procedure in lithotomy position.    Cystoscopy: Flexible cystoscopy is performed.  Anterior, posterior urethra are normal.  Prostate demonstrates moderate adenopathy.  Bladder is normal.    Ureteral Access: Left ureteral access is initiated with Sensor wire. Ureteral stone is moderately impacted.  Guide wire access to the kidney is assured. 8F dilator is inserted with minimal resistance. 10F dilator is inserted with minimal resistance.     Ureteral Stent Insertion: Left 7F 28 cm stent is inserted with good coil in kidney and bladder under fluoroscopic guidance.     Olsen catheter is inserted.    The patient was then taken to the recovery room in good condition.     Past Medical History:   Diagnosis Date     Diabetes  mellitus, type 2 (H) 4/12/2020     Paroxysmal atrial fibrillation (H) 2/18/2020       Patient Active Problem List    Diagnosis Date Noted     Diabetes mellitus, type 2 (H) 04/12/2020     Calculus of ureter 04/12/2020     Acute renal failure, unspecified acute renal failure type (H) 04/12/2020     Acute renal failure (ARF) (H) 04/12/2020     Paroxysmal atrial fibrillation (H) 02/18/2020     SIRS (systemic inflammatory response syndrome) (H) 09/09/2019     Adrenal incidentaloma (H)      Diet-controlled diabetes mellitus (H)      Pericardial effusion      Lactic acidosis        Estimated Blood Loss:   * No blood loss documented between In Room and Out of Room log events - 4/12/2020 12:07 PM to 4/12/2020  1:01 PM *    Specimens:    [unfilled]       Urethral Catheter Straight-tip 16 Fr. (Active)   Site Skin Assessment Clean;Intact 04/12/20 1044   Securement Method Stabilization device 04/12/20 1044       Complications:    None    Christopher Yates     Date: 4/12/2020  Time: 1:01 PM

## 2021-06-07 NOTE — PROCEDURES
Hemodialysis Treatment Note:    Access:   Alarmed frequently with high arterial pressure, but managed to maintain .     Run Summary:   K2 bath 3hr treatment with no net UF. Pt was hemodynamically stable during dialysis.  Pt completed dialysis treatment without issues.  Report given to primary nurse, MARILUZ Tafoya.      Access (post dialysis) :   Ports flushed with NS, and locked with Heparin 1:1000 for specific amount for cathter. Ports wrapped and secured with tape.     Interventions:  VS check q15min   Critline used for blood volume monitoring.    Plans:  Per renal team.    Greg Stevens RN   [facility] Acute Dialysis ............ 4/12/2020   7:20PM

## 2021-06-07 NOTE — H&P
Assessment/Plan:        Acute renal failure and left proximal ureteral stone -> emergent ureteral stent, ICU, deferred stone clearaance        Subjective:      HPI  Mr. Rakesh Samuels is a 69 y.o.  male presenting to the [facility] for a new problem.    He is a remotely recurrent unidentified composition stone former who has required stone clearance procedures. He has participated in stone risk evaluation in the remote past with uncertain findings. He has no identified modifiable stone risk factors. He has identified non-modifiable stone risks including:  multiple stones at presentation and bilateral stones.    Mr Samuels presented to ED with left flank pain, hematuria, and increasing distress. In ED he was found to have severe acute renal failure and required intubation for respiratory distress secondary to tachypnea associated with severe acidosis. No COVID concerns. History obtained from wife as patient is unable to communicate. Long history of stones with last treatment in 2008 by me. Known solitary functional left kidney. Has passed several stones in the interim and is reluctant to seek medical attention. Has had hematuria, left flank pain and concerns while self quarantining because of COVID concerns for at least 2 weeks. Wife reports that he has been voiding very sparsely for 2-3 days. Has been in tele-contact with primary care.    CT scan is personally reviewed and demonstrates a 10 mm left UPJ stone with severe left hydronephrosis. Right kidney is atrophic and has a few small peripheral calcifications.    Significant labs from presentation include markedly elevated creatinine (6.1) and markedly elevated potassium (6.8). Creatinine was 0.8 on 3/25/2020. Urine demonstrated no obvious signs of UTI on 4/9/2020.    PLAN    Will place stent urgently and transfer to ICU. Anticipate good recovery of renal function. Will clear stone when clinically appropriate.     ROS   Review of  Systems  Review of systems not obtained due to inability to communicate with the patient.     Past Medical History:   Diagnosis Date     Diabetes mellitus, type 2 (H) 4/12/2020     Paroxysmal atrial fibrillation (H) 2/18/2020       History reviewed. No pertinent surgical history.    Current Facility-Administered Medications   Medication Dose Route Frequency Provider Last Rate Last Dose     [MAR Hold] bacitracin ointment packet 1 packet  1 packet Topical Once PRN Rosy Carmen MD         [MAR Hold] dextrose 10%  75 mL/hr Intravenous Continuous Rosy Carmen MD 75 mL/hr at 04/12/20 1208       [MAR Hold] naloxone injection 0.2-0.4 mg (NARCAN)  0.2-0.4 mg Intravenous PRN Rosy Carmen MD        Or     [MAR Hold] naloxone injection 0.2-0.4 mg (NARCAN)  0.2-0.4 mg Intramuscular PRN Rosy Carmen MD         [MAR Hold] propofoL injection (DIPRIVAN)  5-75 mcg/kg/min Intravenous Continuous Rosy Carmen MD 2.4 mL/hr at 04/12/20 1140 5 mcg/kg/min at 04/12/20 1140     [MAR Hold] propofoL injection (DIPRIVAN)  5-75 mcg/kg/min Intravenous Continuous Rosy Carmen MD 19.6 mL/hr at 04/12/20 1221 40 mcg/kg/min at 04/12/20 1221     [MAR Hold] sodium bicarbonate 150 mEq in dextrose 5% 1,000 mL  1-500 mL/hr Intravenous Continuous Rosy Carmen MD 75 mL/hr at 04/12/20 1208 75 mL/hr at 04/12/20 1208     [MAR Hold] sodium chloride bacteriostatic 0.9 % injection 1-5 mL  1-5 mL Intradermal Once PRN Rosy Carmen MD         [MAR Hold] sodium chloride flush 10-20 mL (NS)  10-20 mL Intravenous PRN Rosy Carmen MD         [MAR Hold] sodium chloride flush 10-30 mL (NS)  10-30 mL Intravenous PRN Rosy Carmen MD         [MAR Hold] sodium chloride flush 10-30 mL (NS)  10-30 mL Intravenous Q8H FIXED TIMES Rosy Carmen MD         [MAR Hold] sodium chloride flush 20 mL (NS)  20 mL Intravenous PRN Rosy Carmen MD         [MAR Hold] vancomycin  1,500 mg in sodium chloride 0.9% 500 mL (VANCOCIN)  1,500 mg Intravenous Once Rosy Carmen MD         Facility-Administered Medications Ordered in Other Encounters   Medication Dose Route Frequency Provider Last Rate Last Dose     dexamethasone injection (DECADRON)    PRN Mabis-Costello, Helio L, CRNA   4 mg at 04/12/20 1211     fentaNYL pf injection (SUBLIMAZE)    PRN Mabis-Costello, Helio L, CRNA   50 mcg at 04/12/20 1231     lidocaine 20 mg/mL (2 %) injection    PRN Mabis-Costello, Helio L, CRNA   60 mg at 04/12/20 1211     ondansetron injection (ZOFRAN)    PRN Mabis-Costello, Helio L, CRNA   4 mg at 04/12/20 1211     phenylephrine in 0.9% NaCl (PF) syringe    PRN Mabis-Costello, Helio L, CRNA   100 mcg at 04/12/20 1238       Allergies   Allergen Reactions     Adhesive Tape-Silicones        Social History     Socioeconomic History     Marital status:      Spouse name: Not on file     Number of children: Not on file     Years of education: Not on file     Highest education level: Not on file   Occupational History     Not on file   Social Needs     Financial resource strain: Not on file     Food insecurity     Worry: Not on file     Inability: Not on file     Transportation needs     Medical: Not on file     Non-medical: Not on file   Tobacco Use     Smoking status: Never Smoker     Smokeless tobacco: Never Used   Substance and Sexual Activity     Alcohol use: Yes     Frequency: Monthly or less     Binge frequency: Never     Drug use: Never     Sexual activity: Not Currently   Lifestyle     Physical activity     Days per week: Not on file     Minutes per session: Not on file     Stress: Not on file   Relationships     Social connections     Talks on phone: Not on file     Gets together: Not on file     Attends [employer] service: Not on file     Active member of club or organization: Not on file     Attends meetings of clubs or organizations: Not on file     Relationship status: Not on file     Intimate partner  violence     Fear of current or ex partner: Not on file     Emotionally abused: Not on file     Physically abused: Not on file     Forced sexual activity: Not on file   Other Topics Concern     Not on file   Social History Narrative     Not on file       No family history on file.    Objective:      Physical Exam  Vitals:    04/12/20 1153   BP:    Pulse:    Resp:    Temp:    SpO2: 99%     General - well developed, well nourished, appropriate for age. Non-responsive at this time and intubated   Heart - regular rate and rhythm, no murmur  Respiratory - intubated and ventilated  Abdomen - moderately obese soft, non-tender, no hepatosplenomegaly, no masses.   - non-responsive  Skin - intact, no bruising, no gouty tophi        Labs  Urinalysis POC (Office):  Nitrite, UA   Date Value Ref Range Status   04/09/2020 Negative Negative Final   09/08/2019 Negative Negative Final       Lab Urinalysis:  Blood, UA   Date Value Ref Range Status   04/09/2020 Large (!) Negative Final   09/08/2019 Small (!) Negative Final     Nitrite, UA   Date Value Ref Range Status   04/09/2020 Negative Negative Final   09/08/2019 Negative Negative Final     Leukocytes, UA   Date Value Ref Range Status   04/09/2020 Moderate (!) Negative Final   09/08/2019 Negative Negative Final     pH, UA   Date Value Ref Range Status   04/09/2020 5.5 4.5 - 8.0 Final   09/08/2019 5.5 4.5 - 8.0 Final    and Acute Labs   CBC   WBC   Date Value Ref Range Status   04/12/2020 17.6 (H) 4.0 - 11.0 thou/uL Final   09/10/2019 7.9 4.0 - 11.0 thou/uL Final   09/09/2019 12.4 (H) 4.0 - 11.0 thou/uL Final   04/10/2015 7.7 4.0 - 11.0 thou/uL Final     Hemoglobin   Date Value Ref Range Status   04/12/2020 13.3 (L) 14.0 - 18.0 g/dL Final   09/10/2019 11.4 (L) 14.0 - 18.0 g/dL Final   09/09/2019 12.1 (L) 14.0 - 18.0 g/dL Final     Platelets   Date Value Ref Range Status   04/12/2020 445 (H) 140 - 440 thou/uL Final   09/10/2019 166 140 - 440 thou/uL Final   09/09/2019 203 503 - 379  thou/uL Final   , C Reactive Protein  No results found for: CRP, Renal Panel  K[facility]  Creatinine   Date Value Ref Range Status   04/12/2020 6.14 (HH) 0.70 - 1.30 mg/dL Final   03/25/2020 0.81 0.70 - 1.30 mg/dL Final   01/21/2020 1.03 0.70 - 1.30 mg/dL Final     Potassium   Date Value Ref Range Status   04/12/2020 5.8 (H) 3.5 - 5.0 mmol/L Final   04/12/2020 6.8 (HH) 3.5 - 5.0 mmol/L Final   03/25/2020 5.2 (H) 3.5 - 5.0 mmol/L Final     Calcium   Date Value Ref Range Status   04/12/2020 10.3 8.5 - 10.5 mg/dL Final   03/25/2020 9.3 8.5 - 10.5 mg/dL Final   01/21/2020 9.4 8.5 - 10.5 mg/dL Final    and Urine Culture    Culture   Date Value Ref Range Status   04/02/2020 No Growth  Final

## 2021-06-07 NOTE — PROGRESS NOTES
"Assessment/Plan:        Diagnoses and all orders for this visit:    Calculus of ureter  -     Patient Stated Goal: Know what to expect after surgery  -     Ureteroscopy Education    Other orders  -     aspirin 81 mg chewable tablet; 1 tab(s)  -     dorzolamide-timolol, PF, 2-0.5 % Drop; 1 gtt  -     latanoprost (XALATAN) 0.005 % ophthalmic solution; 1 gtt  -     mv-min-folic acid-lutein (CENTRUM SILVER) 400-250 mcg Chew; 1 tab(s)  -     levomefolate calcium 15 mg Tab; 1 tab(s)      Stone Management Plan  [facility] 5/1/2020   Urinary Tract Infection No suspicion of infection   Renal Colic Well controlled symptoms   Renal Failure No suspicion of renal failure   Current CT date 4/12/2020   Left sided stones? Yes   L Number of ureteral stones 1   L GSD of ureteral stones 5   L Location of ureteral stone Proximal   L Hydronephrosis Moderate             Phone call duration: 10 minutes    Christopher Yates MD     Subjective:      The patient has been notified of following:     \"This telephone visit will be conducted via a call between you and your physician/provider. We have found that certain health care needs can be provided without the need for a physical exam.  This service lets us provide the care you need with a short phone conversation.  If a prescription is necessary we can send it directly to your pharmacy.  If labs and/or imaging are needed, we can place orders so you can have the test (s) done at a later time.    If during the course of the call the physician/provider feels a telephone visit is not appropriate, you will not be charged for this service.\"     HPI  Mr. Rakesh Samuels is a 69 y.o.  male returning to the [facility] for follow up of his stone disease.    He has improved remarkably since his very serious emergent presentation with acute renal failure associated with obstructing stone in his solitary left kidney. Minimal symptoms with his indwelling ureteral " stent.    Will proceed with stone clearance in the next week or two. OK to remain on Eliquis throughout. He will have an encounter with his PCP next week.       ROS   Review of systems is negative except for HPI.    Past Medical History:   Diagnosis Date     Diabetes mellitus, type 2 (H) 4/12/2020     Paroxysmal atrial fibrillation (H) 2/18/2020       Past Surgical History:   Procedure Laterality Date     TN CYSTOSCOPY,INSERT URETERAL STENT Left 4/12/2020    Procedure: CYSTOSCOPY, WITH URETERAL STENT INSERTION;  Surgeon: Christopher Yates MD;  Location: [facility] OR;  Service: Urology     TN ESOPHAGOGASTRODUODENOSCOPY TRANSORAL DIAGNOSTIC N/A 4/13/2020    Procedure: ESOPHAGOGASTRODUODENOSCOPY (EGD);  Surgeon: Robert Rico MD;  Location: [facility] GI;  Service: Gastroenterology       Current Outpatient Medications   Medication Sig Dispense Refill     apixaban ANTICOAGULANT (ELIQUIS) 5 mg Tab tablet Take 1 tablet (5 mg total) by mouth 2 (two) times a day. 60 tablet 11     ARIPiprazole (ABILIFY) 2 MG tablet Take 1 tablet (2 mg total) by mouth every evening. 30 tablet 0     aspirin 81 mg chewable tablet 1 tab(s)       atorvastatin (LIPITOR) 10 MG tablet Take 10 mg by mouth at bedtime.  1     cephalexin (KEFLEX) 500 MG capsule Take 250 mg by mouth 3 (three) times a day.        dorzolamide-timolol (COSOPT) 22.3-6.8 mg/mL ophthalmic solution Administer 1 drop to both eyes 2 (two) times a day.  3     dorzolamide-timolol, PF, 2-0.5 % Drop 1 gtt       fluvoxaMINE (LUVOX) 100 MG tablet Take 0.5 tablets (50 mg total) by mouth at bedtime for 14 days. 7 tablet 0     LANTUS SOLOSTAR U-100 INSULIN 100 unit/mL (3 mL) pen Inject 10 Units under the skin at bedtime. 11.65 Type 2 with hyperglycemia  Contact provider if insulin prescribed is not the preferred insulin per insurance. 5 adj dose pen PRN     latanoprost (XALATAN) 0.005 % ophthalmic solution 1 gtt       latanoprostene bunod 0.024 % Drop Administer 1 drop to both  eyes at bedtime.       levomefolate calcium 15 mg Tab 1 tab(s)       multivitamin therapeutic tablet Take 1 tablet by mouth daily.       mv-min-folic acid-lutein (CENTRUM SILVER) 400-250 mcg Chew 1 tab(s)       netarsudiL (RHOPRESSA) 0.02 % Drop Administer 1 drop to both eyes daily.       NOVOLOG FLEXPEN U-100 INSULIN 100 unit/mL (3 mL) injection pen Check blood sugar four (4) times daily.  11.65 Type 2 with hyperglycemia  BD Ultra-fine Adina Pen Needles - NDC [address]-57 - dispense 1 case,  refill PRN for 1 year 5 Pre-filled Pen Syringe PRN     omeprazole (PRILOSEC OTC) 20 MG tablet Take 1 tablet (20 mg total) by mouth 2 (two) times a day before meals. 120 tablet 0     VENTOLIN HFA 90 mcg/actuation inhaler Inhale 2 puffs every 4 (four) hours as needed.  11     No current facility-administered medications for this visit.        Allergies   Allergen Reactions     Adhesive Tape-Silicones        Social History     Socioeconomic History     Marital status:      Spouse name: Not on file     Number of children: Not on file     Years of education: Not on file     Highest education level: Not on file   Occupational History     Not on file   Social Needs     Financial resource strain: Not on file     Food insecurity     Worry: Not on file     Inability: Not on file     Transportation needs     Medical: Not on file     Non-medical: Not on file   Tobacco Use     Smoking status: Never Smoker     Smokeless tobacco: Never Used   Substance and Sexual Activity     Alcohol use: Yes     Frequency: Monthly or less     Binge frequency: Never     Drug use: Never     Sexual activity: Not Currently   Lifestyle     Physical activity     Days per week: Not on file     Minutes per session: Not on file     Stress: Not on file   Relationships     Social connections     Talks on phone: Not on file     Gets together: Not on file     Attends [employer] service: Not on file     Active member of club or organization: Not on file     Attends  meetings of clubs or organizations: Not on file     Relationship status: Not on file     Intimate partner violence     Fear of current or ex partner: Not on file     Emotionally abused: Not on file     Physically abused: Not on file     Forced sexual activity: Not on file   Other Topics Concern     Not on file   Social History Narrative     Not on file       No family history on file.    Objective:      Labs   Urinalysis POC (Office):  Nitrite, UA   Date Value Ref Range Status   04/12/2020 Negative Negative Final   04/09/2020 Negative Negative Final   09/08/2019 Negative Negative Final       Lab Urinalysis:  Blood, UA   Date Value Ref Range Status   04/12/2020 Large (!) Negative Final   04/09/2020 Large (!) Negative Final   09/08/2019 Small (!) Negative Final     Nitrite, UA   Date Value Ref Range Status   04/12/2020 Negative Negative Final   04/09/2020 Negative Negative Final   09/08/2019 Negative Negative Final     Leukocytes, UA   Date Value Ref Range Status   04/12/2020 Moderate (!) Negative Final   04/09/2020 Moderate (!) Negative Final   09/08/2019 Negative Negative Final     pH, UA   Date Value Ref Range Status   04/12/2020 5.5 4.5 - 8.0 Final   04/09/2020 5.5 4.5 - 8.0 Final   09/08/2019 5.5 4.5 - 8.0 Final    and Acute Labs   CBC   WBC   Date Value Ref Range Status   04/22/2020 9.7 4.0 - 11.0 thou/uL Final   04/20/2020 7.3 4.0 - 11.0 thou/uL Final   04/16/2020 7.0 4.0 - 11.0 thou/uL Final   04/10/2015 7.7 4.0 - 11.0 thou/uL Final     Hemoglobin   Date Value Ref Range Status   04/20/2020 10.3 (L) 14.0 - 18.0 g/dL Final   04/18/2020 10.6 (L) 14.0 - 18.0 g/dL Final   04/17/2020 11.2 (L) 14.0 - 18.0 g/dL Final     Platelets   Date Value Ref Range Status   04/20/2020 195 140 - 440 thou/uL Final   04/16/2020 189 140 - 440 thou/uL Final   04/16/2020 174 140 - 440 thou/uL Final   , C Reactive Protein  No results found for: CRP, Renal Panel  [facility]  Creatinine   Date Value Ref Range Status   04/20/2020 0.94 0.70 - 1.30  mg/dL Final   04/17/2020 0.97 0.70 - 1.30 mg/dL Final   04/16/2020 1.26 0.70 - 1.30 mg/dL Final     Potassium   Date Value Ref Range Status   04/20/2020 3.9 3.5 - 5.0 mmol/L Final   04/18/2020 3.6 3.5 - 5.0 mmol/L Final   04/17/2020 4.0 3.5 - 5.0 mmol/L Final     Calcium   Date Value Ref Range Status   04/20/2020 7.8 (L) 8.5 - 10.5 mg/dL Final   04/17/2020 7.0 (L) 8.5 - 10.5 mg/dL Final   04/16/2020 7.0 (L) 8.5 - 10.5 mg/dL Final    and Urine Culture    Culture   Date Value Ref Range Status   04/12/2020 No Growth  Final   04/02/2020 No Growth  Final

## 2021-06-07 NOTE — ED PROVIDER NOTES
EMERGENCY DEPARTMENT ENCOUNTER      NAME: Rakesh Samuels  AGE: 69 y.o. male  YOB: 1950  MRN: [id number]  EVALUATION DATE & TIME: 2020  8:46 AM    PCP: Michelet Restrepo MD    ED PROVIDER: Rosy Carmen M.D.      CHIEF COMPLAINT     Chief Complaint   Patient presents with     Fall         FINAL IMPRESSION:     1. Calculus of ureter    2. Acute renal failure, unspecified acute renal failure type (H)    3. Calculus of ureter    4. Acute renal failure, unspecified acute renal failure type (H)    5. Lactic acidosis    6. BRBPR (bright red blood per rectum)    7. History of hematemesis    8. Respiratory failure requiring intubation (H)          MEDICAL DECISION MAKING:       Pertinent Labs & Imaging studies reviewed. (See chart for details)    69 y.o. male presents to the Emergency Department for evaluation of weakness.  Presents via EMS from home.  He said he was feeling very weak tried to go to the bathroom and fell.  Stating passed out.  Wife opened the door per EMS.  They found him to be hypoglycemic.  Was given glucose.  Glucose increased.  Patient state he has not felt well since November.  Generalized weakness.  Noticed one episode of vomiting today that was bloody.  Perhaps some blood in the urine but denies any black stools or blood in the stools.    During the examination  and much older than stated age ill.  Pale cyanotic at times slow answering questions but answer questions appropriately.  Very dry mucous membranes.  Planing of lower lumbar abdominal pain.  No abdominal tenderness palpation.  Gross blood on rectal examination.  Patient hypothermic.    2 large-bore IV was stopped this patient was placed in cardiac pulse ox and blood pressure monitor.  Trauma letter placed.  Patient consented for blood transfusion.  Currently on Eliquis awaiting lab to see if PCC will be started.  To obtain a CT head CT C-spine chest and abdomen and lower back.  Patient's complaint is lower back pain  "given morphine and Zofran.    CT head CT C-spine negative for acute CT chest negative CT abdomen and pelvis reveals obstructing stone negative lumbar spine.  Patient notified of this.  Given Dilaudid for pain.  Notify of findings.  Appears more awake alert more comfortable after pain medications.  I spoke with Dr. Yates who plans to take patient to the operating room.  Also spoke with Dr. Ricci nephrologist who thinks is likely the metformin that is causing acute renal failure and he plans to dialyze patient.    I was going to admit patient he appears more altered.  Not is awake and responsive as before therefore he was intubated due altered mental status.  No hypotensive episodes.  No hypoxic episodes.  Patient tachypneic vent was adjusted to the new with this tachypnea.  Into the operating room for stenting.  Subsequently will be dialyzed.  And will go to the ICU.  Nurse updated patient's wife.  Patient admitted to the OR in guarded condition.      Differential Diagnosis (include but not limited to)  Upper GI bleed, lower GI bleed, sepsis, ruptured AAA, aortic dissection, intracranial hemorrhage, fracture, among others.      Vital Signs: Hypertensive hypothermic tachypneic   EKG: A. fib, hyperacute T waves in V2 V3 V4.  ST segment depression V5 V6  Imaging: CT lumbar spine negative obstructing left UVJ stone hydro-lungs clear the head and CT C-spine negative for acute trauma.  Home Meds: Reviewed  ED meds/abx: Hyperkalemia protocol morphine Dilaudid  Fluids: 2 L normal saline    Labs  K 6.8  HCO3 <6  AG >41  Cr 6.14  Mg 2.8  INR 1.98  Wbc 17.6  hgb 13.3  Platelets 445  Lactic acid 15  Glucose 222         Review of Previous Records  Patient presented to [facility] ED on 9/8/2019 for SIRS criteria and was admitted for adrenal incidentaloma. Per hospital course summary, \"SIRS: source unclear. Suspect viral URI.  CT of the neck is unrevealing. CTA chest shows small small pericardial effusion with no evidence of " "infiltrate.  --Patient improved with IV ceftriaxone.  White count fell from 12.6-7.9.  The patient remained afebrile 24 hours prior to discharge.  Given improvement with IV ceftriaxone and suspicion of tonsillitis we will place him on a limited course of oral Augmentin after discharge.  --Follow-up final blood cultures as outpatient    H/O DM2: Continue current SSI and home oral hypoglycemics  Bilateral adrenal incidentaloma: outpatient workup  Small pericardial effusion: Monitor clinically for any signs of hemodynamic instability  HLP: continue statin  Depression: continue home meds\"    At Cardiology Consult on 2/18/2020, Dr Pickett noted that \"Mr. Samuels had a recent sleep study and was noted to have an irregular rhythm with possible SVT during the study. He was advised to see Dr. Restrepo where he was discovered to have atrial fibrillation with controlled heart rate. He was started on xarelto for stroke prevention. He is asymptomatic from the afib without palpitations or light headedness. He does have ROSARIO, likely due to RADHA. He is in normal rhythm today.\"  Assessment:  \"1. Paroxysmal atrial fibrillation - asymptomatic. In sinus rhythm on anticoagulation that is changing to eliquis  2. DMII - on oral therapy  3. 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   CHADS2-Vasc score - 2 (age, DM)  Plan:  1. Discussed the pathophysiology, natural progression, and treatment options for atrial fibrillation. This discussion included, but was not limited to, rate vs rhythm control, stroke risk, and anticoagulation recommendations based on CHADS2-Vasc score compared with bleeding risk, and the risks and benefits of each of these treatment options.  2. Change xarelto to eliquis due to cost  3. Refer to [facility] group for consideration of watchman  4. TTE  5. Follow up in 3 months or sooner if needed\"    Consults  Pharmacy  GI - Dr Roberts, [facility]  Intensivist - Dr" Mary  Radiology  Nephrology - Dr Ricci and   Urologist, Dr Wheeler  Hospitalist - Dr Gracia    ED COURSE   8:44 AM I met patient and EMS and performed my initial exam. Diagnostic and treatment options in the emergency department were discussed at this time.    9:04 AM I rechecked and updated patient. He reports low back pain at this time.    9:07 AM Patient consented for blood transfusion.    9:12 AM Spoke to pharmacist on the phone.     9:42 AM I paged GI, nephrology, intensivist.    9:44 AM I rechecked patient in CT.    9:51 AM Spoke to Dr Roberts, [facility], on the phone.     9:52 AM I rechecked and updated patient. Reports he is full code.     9:57 AM Spoke to Dr Hernandez, Intensivist, on the phone.     10:03 AM Spoke to Dr Ricci, Nephrology, on the phone.     10:06 AM I rechecked patient.    10:11 AM I rechecked and updated patient.    10:13 AM Spoke to Radiology on the phone.     10:14 AM paged DENISHA. Spoke to Dr Hernandez again on the phone.     10:16 AM Updated patient.     10:20 AM Paged Dr Hernandez. He will call me back.    10:22 AM Spoke to DENISHA [place]    10:26 AM Spoke to Dr Hernandez again on the phone. Discussed plan to admit to OR.  Patient was notified of the plan.  He is comfortable with that, he appears more comfortable after Dilaudid    10:35 AM Olsen placed, no urine output.     10:48 AM I spoke to Dr Gracia, hospitalist, on the phone and they accept patient for admission    11:13 AM I rechecked and updated patient.    12:00 PM Dr. Hernandez updated     At the conclusion of the encounter I discussed the results of all of the tests and the disposition. The questions were answered. The patient (and wife over the phone) acknowledged understanding and was agreeable with the care plan.       90  minutes of critical care time     MEDICATIONS GIVEN IN THE EMERGENCY:     Medications   naloxone injection 0.2-0.4 mg (NARCAN) ( Intravenous MAR Hold 4/12/20 9539)     Or   naloxone injection 0.2-0.4 mg (NARCAN) ( Intramuscular MAR  Hold 4/12/20 1226)   dextrose 10% ( Intravenous MAR Hold 4/12/20 1226)   sodium chloride bacteriostatic 0.9 % injection 1-5 mL ( Intradermal MAR Hold 4/12/20 1226)   sodium bicarbonate 150 mEq in dextrose 5% 1,000 mL ( Intravenous MAR Hold 4/12/20 1226)   vancomycin 1,500 mg in sodium chloride 0.9% 500 mL (VANCOCIN) ( Intravenous MAR Hold 4/12/20 1226)   sodium chloride flush 10-20 mL (NS) ( Intravenous MAR Hold 4/12/20 1226)   sodium chloride flush 20 mL (NS) ( Intravenous MAR Hold 4/12/20 1226)   sodium chloride flush 10-30 mL (NS) ( Intravenous MAR Hold 4/12/20 1226)   sodium chloride flush 10-30 mL (NS) ( Intravenous Automatically Held 4/16/20 2200)   bacitracin ointment packet 1 packet ( Topical MAR Hold 4/12/20 1226)   propofoL injection (DIPRIVAN) ( Intravenous MAR Hold 4/12/20 1226)   propofoL injection (DIPRIVAN) ( Intravenous MAR Hold 4/12/20 1226)   norepinephrine 4 mg/250 ml in NS (0.016 mg/ml) (has no administration in time range)   sodium chloride 0.9% 1,000 mL (1,000 mL Intravenous New Bag 4/12/20 0905)   pantoprazole 40 mg injection (40 mg Intravenous Given 4/12/20 0912)   ondansetron injection 4 mg (ZOFRAN) (4 mg Intravenous Given 4/12/20 0915)   morphine injection 2 mg (2 mg Intravenous Given 4/12/20 0918)   calcium gluconate 100 mg/mL (10%) injection 1 g (1 g Intravenous Given 4/12/20 1003)   albuterol nebulizer solution 10 mg (10 mg Nebulization Given 4/12/20 1017)   sodium bicarbonate 1 mEq/mL (8.4 %) IV Infusion 50 mEq (50 mEq Intravenous Given 4/12/20 1009)   insulin regular injection 10 Units (NovoLIN R) (10 Units Intravenous Given 4/12/20 1013)   dextrose 50 % (D50W) syringe 50 mL (50 mL Intravenous Given 4/12/20 1013)   piperacillin-tazobactam 3.375 g in NaCl 0.9 % 50 mL (MINI-BAG Plus) (ZOSYN) (0 g Intravenous Stopped 4/12/20 8680)   sodium chloride 0.9% 1,000 mL (1,000 mL Intravenous New Bag 4/12/20 5884)   lidocaine (PF) 10 mg/mL (1 %) injection 1-5 mL (XYLOCAINE-MPF) (1 mL Intradermal  Given 4/12/20 0900)   HYDROmorphone injection 0.5 mg (DILAUDID) (0.5 mg Intravenous Given 4/12/20 1031)   sodium chloride 0.9% 1,000 mL ( Intravenous Anesthesia Volume Adjustment 4/12/20 1242)   etomidate injection 24.4 mg (AMIDATE) (20 mg Intravenous Given 4/12/20 1135)   rocuronium injection 100 mg (100 mg Intravenous Given 4/12/20 1135)       NEW PRESCRIPTIONS STARTED AT TODAY'S ER VISIT     Current Discharge Medication List      CONTINUE these medications which have NOT CHANGED    Details   apixaban ANTICOAGULANT (ELIQUIS) 5 mg Tab tablet Take 1 tablet (5 mg total) by mouth 2 (two) times a day.  Qty: 60 tablet, Refills: 11    Associated Diagnoses: Paroxysmal atrial fibrillation (H)      !! ARIPiprazole (ABILIFY) 2 MG tablet Take 2 mg by mouth every evening.      !! ARIPiprazole (ABILIFY) 5 MG tablet Take 5 mg by mouth every evening.   Refills: 2      aspirin 81 MG EC tablet Take 81 mg by mouth daily.      atorvastatin (LIPITOR) 10 MG tablet Take 10 mg by mouth at bedtime.  Refills: 1      dorzolamide-timolol (COSOPT) 22.3-6.8 mg/mL ophthalmic solution Administer 1 drop to both eyes 2 (two) times a day.  Refills: 3      fluvoxaMINE (LUVOX) 100 MG tablet Take 200 mg by mouth at bedtime.  Refills: 1      glipiZIDE (GLUCOTROL XL) 5 MG 24 hr tablet Take 5 mg by mouth daily.       latanoprostene bunod 0.024 % Drop Administer 1 drop to both eyes at bedtime.      metFORMIN (GLUCOPHAGE) 1000 MG tablet Take 1,000 mg by mouth 2 (two) times a day with meals.  Refills: 3      multivitamin therapeutic tablet Take 1 tablet by mouth daily.      netarsudiL (RHOPRESSA) 0.02 % Drop Administer 1 drop to both eyes daily.      pioglitazone (ACTOS) 30 MG tablet Take 30 mg by mouth daily.  Refills: 0      UNABLE TO FIND Take 1.25 mg by mouth daily. Med Name: I-methylfolate      VENTOLIN HFA 90 mcg/actuation inhaler Inhale 2 puffs every 4 (four) hours as needed.  Refills: 11    Comments: May substitute the equivalent medication per  "insurance preference.       !! - Potential duplicate medications found. Please discuss with provider.             =================================================================    HPI     Patient information was obtained from: EMS and patient     Use of : N/A       Rakesh Samuels is a 69 y.o. male who presents by EMS from home for evaluation of fall.    Per EMS, patient called EMS today for weakness after falling and being unable to get up off the floor. Patient's wife let EMS in and they found patient on the floor in the basement.   Patient denied loss of consciousness.     Patient told EMS he has lost 30 lbs. since November 2019 and has had generalized weakness and fatigue for months.   Patient also reported to EMS he has had blood in his urine for the past 2 weeks.   Because he felt \"ill,\" patient reported not sleeping well last night.  This morning, he had nausea and 1x episode of vomiting.    EMS found patient's blood sugar to be \"low\" and his blood pressure to be 160s/90s.   Patient was alert and oriented for EMS.   EMS started an IV, gave patient D10 and as a result, patient's blood sugar returned to 214 prior to arrival in the ED.    Per patient, he vomited this morning and noted some blood in emesis.  Shortly after, he was going to the bathroom when he fainted and fell.     Patient reports low back pain at this time.  He denies loss of consciousness, head trauma, headache, chest pain, cough, shortness of breath, diarrhea, leg swelling, rashes, or any other medical concerns at this time.    Patient's socks are wet from walking outside on the wet ground.    Patient is full code.    PCP: Michelet Restrepo MD. Phone: [phone]; Fax: [phone].    REVIEW OF SYSTEMS   Review of Systems   Constitutional: Positive for fatigue and unexpected weight change (reported weight loss of 30 lbs in the last 6 months).        Positive for generalized weakness   HENT:        Negative for head trauma "   Respiratory: Negative for cough and shortness of breath.    Cardiovascular: Negative for chest pain and leg swelling.   Gastrointestinal: Positive for nausea and vomiting (1x episode of bloody emesis this morning). Negative for diarrhea.   Genitourinary: Positive for hematuria (for approximately 2x weeks).   Musculoskeletal: Positive for back pain (low back pain secondary to fall).        Positive for fall   Skin: Negative for rash.   Neurological: Positive for syncope. Negative for headaches.        Negative for loss of consciousness.   All other systems reviewed and are negative.       PAST MEDICAL HISTORY:     Past Medical History:   Diagnosis Date     Diabetes mellitus, type 2 (H) 4/12/2020     Paroxysmal atrial fibrillation (H) 2/18/2020       PAST SURGICAL HISTORY:   History reviewed. No pertinent surgical history.      CURRENT MEDICATIONS:     No current facility-administered medications on file prior to encounter.      Current Outpatient Medications on File Prior to Encounter   Medication Sig     apixaban ANTICOAGULANT (ELIQUIS) 5 mg Tab tablet Take 1 tablet (5 mg total) by mouth 2 (two) times a day.     ARIPiprazole (ABILIFY) 2 MG tablet Take 2 mg by mouth every evening.     ARIPiprazole (ABILIFY) 5 MG tablet Take 5 mg by mouth every evening.      aspirin 81 MG EC tablet Take 81 mg by mouth daily.     atorvastatin (LIPITOR) 10 MG tablet Take 10 mg by mouth at bedtime.     dorzolamide-timolol (COSOPT) 22.3-6.8 mg/mL ophthalmic solution Administer 1 drop to both eyes 2 (two) times a day.     fluvoxaMINE (LUVOX) 100 MG tablet Take 200 mg by mouth at bedtime.     glipiZIDE (GLUCOTROL XL) 5 MG 24 hr tablet Take 5 mg by mouth daily.      latanoprostene bunod 0.024 % Drop Administer 1 drop to both eyes at bedtime.     metFORMIN (GLUCOPHAGE) 1000 MG tablet Take 1,000 mg by mouth 2 (two) times a day with meals.     multivitamin therapeutic tablet Take 1 tablet by mouth daily.     netarsudiL (RHOPRESSA) 0.02 % Drop  Administer 1 drop to both eyes daily.     pioglitazone (ACTOS) 30 MG tablet Take 30 mg by mouth daily.     UNABLE TO FIND Take 1.25 mg by mouth daily. Med Name: I-methylfolate     VENTOLIN HFA 90 mcg/actuation inhaler Inhale 2 puffs every 4 (four) hours as needed.     [DISCONTINUED] buPROPion (WELLBUTRIN XL) 150 MG 24 hr tablet Take 150 mg by mouth daily.     [DISCONTINUED] latanoprost (XALATAN) 0.005 % ophthalmic solution Administer 1 drop to both eyes at bedtime.       ALLERGIES:     Allergies   Allergen Reactions     Adhesive Tape-Silicones        FAMILY HISTORY:   No family history on file.    SOCIAL HISTORY:     Social History     Socioeconomic History     Marital status:      Spouse name: None     Number of children: None     Years of education: None     Highest education level: None   Occupational History     None   Social Needs     Financial resource strain: None     Food insecurity     Worry: None     Inability: None     Transportation needs     Medical: None     Non-medical: None   Tobacco Use     Smoking status: Never Smoker     Smokeless tobacco: Never Used   Substance and Sexual Activity     Alcohol use: Yes     Frequency: Monthly or less     Binge frequency: Never     Drug use: Never     Sexual activity: Not Currently   Lifestyle     Physical activity     Days per week: None     Minutes per session: None     Stress: None   Relationships     Social connections     Talks on phone: None     Gets together: None     Attends Worship service: None     Active member of club or organization: None     Attends meetings of clubs or organizations: None     Relationship status: None     Intimate partner violence     Fear of current or ex partner: None     Emotionally abused: None     Physically abused: None     Forced sexual activity: None   Other Topics Concern     None   Social History Narrative     None       VITALS:     Patient Vitals for the past 24 hrs:   BP Temp Temp src Pulse Resp SpO2 Height Weight    04/12/20 1153 -- -- -- -- -- 99 % -- --   04/12/20 1145 145/75 -- -- (!) 116 (!) 30 99 % -- --   04/12/20 1130 157/80 -- -- (!) 106 26 100 % -- --   04/12/20 1115 163/87 -- -- (!) 105 (!) 56 97 % -- --   04/12/20 1100 168/83 -- -- (!) 105 (!) 44 97 % -- --   04/12/20 1045 150/76 -- -- (!) 106 (!) 54 98 % -- --   04/12/20 1030 161/82 (!) 94.5  F (34.7  C) Rectal (!) 104 (!) 44 99 % -- --   04/12/20 1015 (!) 177/95 -- -- (!) 107 (!) 40 99 % -- --   04/12/20 1012 -- -- -- -- -- 99 % -- --   04/12/20 1000 (!) 181/97 -- -- 97 (!) 40 99 % -- --   04/12/20 0915 (!) 175/99 -- -- 97 (!) 32 98 % -- --   04/12/20 0900 -- -- -- 91 (!) 30 99 % -- --   04/12/20 0854 (!) 176/100 -- -- 90 28 99 % 6' (1.829 m) 180 lb (81.6 kg)       PHYSICAL EXAM     Physical Exam   Constitutional:   Pale, older than stated age, not chronically ill-appearing   HENT:   Very dry mucous membranes.  No Hemotympanum     Eyes:   Matting of bilateral eyes.  No conjunctiva injection.  No hyphema   Genitourinary:    Genitourinary Comments: Bright red blood per rectum.     Nursing note and vitals reviewed.      Physical Exam   Constitutional: chronically ill appearing     Head: Atraumatic.     Nose: Nose normal.     Mouth/Throat: Oropharynx is clear poor dentition very dry mucus membranes     Eyes: cloudiness right eye (states previous surgery) wearing glasses, no hyphema    Neck: Normal range of motion. Neck supple.     Cardiovascular: Irregularly irregular    Pulmonary/Chest:  clear breath sounds bilaterally, tachypneic    Abdominal: Soft. Bowel sounds are normal.  No masses    Musculoskeletal: Normal range of motion.  No Midline cervical thoracic tenderness palpation. +lower lumbar tenderness palpation no step-off    Neurological: Answer questions appropriately but slowly move upper and lower extremities equally.    Lymphatics: No edema    Skin: Skin is warm and dry.  Pale, cool lower extremities.  cyanosis of the extremities.  His socks are  wet.    Psychiatric: Normal mood and affect. Behavior is normal.       LAB:     All pertinent labs reviewed and interpreted.  Results for orders placed or performed during the hospital encounter of 04/12/20   Basic Metabolic Panel   Result Value Ref Range    Sodium 143 136 - 145 mmol/L    Potassium 6.8 (HH) 3.5 - 5.0 mmol/L    Chloride 96 (L) 98 - 107 mmol/L    CO2 <6 (LL) 22 - 31 mmol/L    Anion Gap, Calculation >41 (H) 5 - 18 mmol/L    Glucose 222 (H) 70 - 125 mg/dL    Calcium 10.3 8.5 - 10.5 mg/dL    BUN 72 (H) 8 - 22 mg/dL    Creatinine 6.14 (HH) 0.70 - 1.30 mg/dL    GFR MDRD Af Amer 11 (L) >60 mL/min/1.73m2    GFR MDRD Non Af Amer 9 (L) >60 mL/min/1.73m2   Hepatic Profile   Result Value Ref Range    Bilirubin, Total 0.3 0.0 - 1.0 mg/dL    Bilirubin, Direct 0.1 <=0.5 mg/dL    Protein, Total 6.8 6.0 - 8.0 g/dL    Albumin 3.5 3.5 - 5.0 g/dL    Alkaline Phosphatase 75 45 - 120 U/L    AST 14 0 - 40 U/L    ALT 13 0 - 45 U/L   APTT(PTT)   Result Value Ref Range    PTT 30 24 - 37 seconds   INR   Result Value Ref Range    INR 1.98 (H) 0.90 - 1.10   Type and Screen   Result Value Ref Range    ABORh A POS     Antibody Screen Negative Negative   Troponin I   Result Value Ref Range    Troponin I 0.07 0.00 - 0.29 ng/mL   Magnesium   Result Value Ref Range    Magnesium 2.8 (H) 1.8 - 2.6 mg/dL   HM1 (CBC with Diff)   Result Value Ref Range    WBC 17.6 (H) 4.0 - 11.0 thou/uL    RBC 4.19 (L) 4.40 - 6.20 mill/uL    Hemoglobin 13.3 (L) 14.0 - 18.0 g/dL    Hematocrit 45.1 40.0 - 54.0 %     (H) 80 - 100 fL    MCH 31.7 27.0 - 34.0 pg    MCHC 29.5 (L) 32.0 - 36.0 g/dL    RDW 15.2 (H) 11.0 - 14.5 %    Platelets 445 (H) 140 - 440 thou/uL    MPV 9.4 8.5 - 12.5 fL    Neutrophils % 82 (H) 50 - 70 %    Lymphocytes % 12 (L) 20 - 40 %    Monocytes % 3 2 - 10 %    Eosinophils % 0 0 - 6 %    Basophils % 1 0 - 2 %    Neutrophils Absolute 14.5 (H) 2.0 - 7.7 thou/uL    Lymphocytes Absolute 2.1 0.8 - 4.4 thou/uL    Monocytes Absolute 0.6 0.0  - 0.9 thou/uL    Eosinophils Absolute 0.0 0.0 - 0.4 thou/uL    Basophils Absolute 0.1 0.0 - 0.2 thou/uL   Lactic Acid   Result Value Ref Range    Lactic Acid 15.0 (HH) 0.5 - 2.2 mmol/L   CK Total - evaluate for rhabdomyolysis   Result Value Ref Range    CK, Total 47 30 - 190 U/L   Blood Gases, Arterial   Result Value Ref Range    pH, Arterial 6.85 (LL) 7.37 - 7.44    pCO2, Arterial <19 (LL) 35 - 45 mm Hg    pO2, Arterial 127 (H) 75 - 85 mm Hg    Bicarbonate, Arterial Calc 1.7 (L) 23.0 - 29.0 mmol/L    O2 Sat, Arterial 99.4 (H) 95.0 - 96.0 %    Oxyhemoglobin 96.9 (H) 95.0 - 96.0 %    Base Excess, Arterial Calc -29.8 mmol/L    Ventilation Mode Room Air     FIO2      Sample Stabilized Temperature 37.0 degrees C   Crossmatch   Result Value Ref Range    Crossmatch Compatible     Unit Type A Pos     Unit Number [id number]     Status Ready     Component Red Blood Cells     PRODUCT CODE I3957P09     Blood Type 6200     CODING SYSTEM [id number]    Crossmatch   Result Value Ref Range    Crossmatch Compatible     Unit Type A Pos     Unit Number [id number]     Status Ready     Component Red Blood Cells     PRODUCT CODE C1368A57     Blood Type 6200     CODING SYSTEM [id number]    Potassium   Result Value Ref Range    Potassium 5.8 (H) 3.5 - 5.0 mmol/L   Basic Metabolic Panel   Result Value Ref Range    Sodium 141 136 - 145 mmol/L    Potassium 5.7 (H) 3.5 - 5.0 mmol/L    Chloride 102 98 - 107 mmol/L    CO2 <6 (LL) 22 - 31 mmol/L    Anion Gap, Calculation >33 (H) 5 - 18 mmol/L    Glucose 351 (H) 70 - 125 mg/dL    Calcium 8.4 (L) 8.5 - 10.5 mg/dL    BUN 63 (H) 8 - 22 mg/dL    Creatinine 5.23 (H) 0.70 - 1.30 mg/dL    GFR MDRD Af Amer 13 (L) >60 mL/min/1.73m2    GFR MDRD Non Af Amer 11 (L) >60 mL/min/1.73m2   POCT Glucose    Specimen: Capillary; Blood   Result Value Ref Range    Glucose 151 (H) 70 - 139 mg/dL   POCT Creatinine   Result Value Ref Range    iSTAT Creatinine 6.4 (HH) 0.7 - 1.3 mg/dL    iSTAT GFR MDRD Af Amer 11 (L) >60  mL/min/1.73m2    iSTAT GFR MDRD Non Af Amer 9 (L) >60 mL/min/1.73m2   POCT Glucose    Specimen: Blood   Result Value Ref Range    Glucose 143 (H) 70 - 139 mg/dL   POCT Glucose    Specimen: Blood   Result Value Ref Range    Glucose 194 (H) 70 - 139 mg/dL       RADIOLOGY:     Reviewed all pertinent imaging. Please see official radiology report.  Ct Chest Abdomen Pelvis Without Oral Without Iv Contrast    Result Date: 4/12/2020  EXAM: CT CHEST ABDOMEN PELVIS WO ORAL WO IV CONTRAST LOCATION: [facility] DATE/TIME: 4/12/2020 9:59 AM INDICATION: Syncope. Weakness. Fall. Anticoagulated. COMPARISON: 03/26/2020. TECHNIQUE: CT scan of the chest, abdomen, and pelvis was performed without IV contrast. Multiplanar reformats were obtained. Dose reduction techniques were used. CONTRAST: None. FINDINGS: LUNGS AND PLEURA: Normal. MEDIASTINUM/AXILLAE: Normal. HEPATOBILIARY: Multiple gallstones. Otherwise negative. PANCREAS: Normal. SPLEEN: Normal. ADRENAL GLANDS: Benign adrenal adenomas bilaterally unchanged. No follow-up needed. KIDNEYS/BLADDER: New moderate left hydronephrosis with obstructing stone at the ureteral pelvic junction measures 5 mm. No other stones on the left side. Couple tiny nonobstructing stones right kidney with moderate atrophy right kidney as seen previously. BOWEL: Normal. LYMPH NODES: Normal. VASCULATURE: Unremarkable. PELVIC ORGANS: Normal. MUSCULOSKELETAL: Generalized degenerative change throughout the spine and hips. No concerning bone lesions.     1.  New moderately obstructing stone proximal left ureter at ureterovesical junction measuring 5 mm. 2.  No other significant new findings. 3.  No hemorrhage. 4.  Nonobstructing intrarenal stones. In the moderately atrophic right kidney unchanged. 5.  Gallstones. NOTE: ABNORMAL REPORT THE DICTATION ABOVE DESCRIBES AN ABNORMALITY FOR WHICH FOLLOW-UP IS NEEDED.     Xr Chest 1 View Portable    Result Date: 4/12/2020  EXAM: XR CHEST 1 VIEW PORTABLE LOCATION:  [facility] DATE/TIME: 4/12/2020 11:51 AM INDICATION: intubation COMPARISON: 09/08/2019     ET tube 1.7 cm above the base of the steven. Pulling this back 2 cm would be more ideal. PICC tip in the SVC. Lungs are clear. NOTE: ABNORMAL REPORT THE DICTATION ABOVE DESCRIBES AN ABNORMALITY FOR WHICH FOLLOW-UP IS NEEDED.     Ct Head Without Contrast    Result Date: 4/12/2020  EXAM: CT HEAD WO CONTRAST, CT CERVICAL SPINE WO CONTRAST LOCATION: [facility] DATE/TIME: 4/12/2020 9:45 AM INDICATION: Headache and neck pain after trauma on blood thinners. COMPARISON: CT head dated 12/26/2016 TECHNIQUE: HEAD CT: Without IV contrast. Multiplanar reformats. Dose reduction techniques were used. CERVICAL SPINE CT: Routine without IV contrast. Multiplanar reformats. Dose reduction techniques were used. FINDINGS: HEAD CT: INTRACRANIAL CONTENTS: No acute intracranial hemorrhage. Bilateral basal ganglia mineralization. No CT evidence of acute infarct. Sequelae of mild chronic microangiopathy. Mild cerebral volume loss without hydrocephalus. No extra-axial fluid collections.   Patent basal cisterns. VISUALIZED ORBITS/SINUSES/MASTOIDS: Right globe prosthetic and postoperative change of the bilateral lenses, otherwise the orbits are unremarkable. The visualized paranasal sinuses and temporal bone structures are well-aerated. BONES/SOFT TISSUES: The calvarium and skull base are unremarkable. CERVICAL SPINE CT: VERTEBRA: The spine is imaged from the skull base through T2. Straightening of the usual cervical lordosis without significant spondylolisthesis. Vertebral body heights are maintained without evidence of acute fracture. No aggressive osseous lesion.   CANAL/FORAMINA: Multilevel degenerative changes without significant spinal canal stenosis or high-grade neural foraminal narrowing. If there is concern for ligamentous or cord injury MRI could be helpful in further evaluation. PARASPINAL: No prevertebral or paravertebral  soft tissue swelling.  Visualized lungs are clear. The thyroid gland is unremarkable. Mild carotid artery bifurcation calcification     HEAD CT: 1. Senescent changes and sequelae of chronic microangiopathy without acute intracranial abnormality. CERVICAL SPINE CT: 1. No acute traumatic injury of the cervical spine. If there is concern for ligamentous or cord injury MRI could be helpful in further evaluation.    Ct Cervical Spine Without Contrast    Result Date: 4/12/2020  EXAM: CT HEAD WO CONTRAST, CT CERVICAL SPINE WO CONTRAST LOCATION: [facility] DATE/TIME: 4/12/2020 9:45 AM INDICATION: Headache and neck pain after trauma on blood thinners. COMPARISON: CT head dated 12/26/2016 TECHNIQUE: HEAD CT: Without IV contrast. Multiplanar reformats. Dose reduction techniques were used. CERVICAL SPINE CT: Routine without IV contrast. Multiplanar reformats. Dose reduction techniques were used. FINDINGS: HEAD CT: INTRACRANIAL CONTENTS: No acute intracranial hemorrhage. Bilateral basal ganglia mineralization. No CT evidence of acute infarct. Sequelae of mild chronic microangiopathy. Mild cerebral volume loss without hydrocephalus. No extra-axial fluid collections.   Patent basal cisterns. VISUALIZED ORBITS/SINUSES/MASTOIDS: Right globe prosthetic and postoperative change of the bilateral lenses, otherwise the orbits are unremarkable. The visualized paranasal sinuses and temporal bone structures are well-aerated. BONES/SOFT TISSUES: The calvarium and skull base are unremarkable. CERVICAL SPINE CT: VERTEBRA: The spine is imaged from the skull base through T2. Straightening of the usual cervical lordosis without significant spondylolisthesis. Vertebral body heights are maintained without evidence of acute fracture. No aggressive osseous lesion.   CANAL/FORAMINA: Multilevel degenerative changes without significant spinal canal stenosis or high-grade neural foraminal narrowing. If there is concern for ligamentous or cord  injury MRI could be helpful in further evaluation. PARASPINAL: No prevertebral or paravertebral soft tissue swelling.  Visualized lungs are clear. The thyroid gland is unremarkable. Mild carotid artery bifurcation calcification     HEAD CT: 1. Senescent changes and sequelae of chronic microangiopathy without acute intracranial abnormality. CERVICAL SPINE CT: 1. No acute traumatic injury of the cervical spine. If there is concern for ligamentous or cord injury MRI could be helpful in further evaluation.    Ct Lumbar Spine Without Contrast    Result Date: 4/12/2020  EXAM: CT LUMBAR SPINE WO CONTRAST LOCATION: [facility] DATE/TIME: 4/12/2020 10:00 AM INDICATION: Back pain COMPARISON: CT of the abdomen pelvis dated 4/12/2020 and 3/26/2020 TECHNIQUE: Routine without IV contrast. Multiplanar reformats.  Dose reduction techniques were used. FINDINGS: VERTEBRA: The spine is imaged from inferior endplate of T12-S3. There are 5 lumbar type vertebral bodies. Straightening of the usual spinal curvature without significant spondylolisthesis. Vertebral body heights are maintained without evidence of acute fracture. No aggressive osseous lesion.   CANAL/FORAMINA: Multilevel degenerative changes without significant spinal canal stenosis or high-grade neural foraminal narrowing. PARASPINAL: 5-6 mm obstructing calculus at the left ureteropelvic junction resulting in severe left renal hydronephrosis, which is better characterized on the CT of the abdomen pelvis. Partially visualized left adrenal myelolipoma. Nonobstructing right renal calculi.     1.  No traumatic injury of the lumbar spine. 2. Development of a 5-6 mm obstructing calculus at the left ureteropelvic junction resulting in severe left hydronephrosis. - - - - - - - - - - - - - - - - - - - - - - - - - - - - - - - - - - - - - - - - - - - - - - - - - - - - - - - - - - - - - - - - - - - - - - - - - - - - The results above were discussed with RONNIE GOLDSMITH on  4/12/2020 10:10 AM by Dr. Hi Noyola. - - - - - - - - - - - - - - - - - - - - - - - - - - - - - - - - - - - - - - - - - - - - - - - - - - - - - - - - - - - - - - - - - - - - - - - - - - - -    Xr Retrograde Pyelogram W Or Wo Kub Intraoperative    Result Date: 4/12/2020  EXAM: XR RETROGRADE PYELOGRAM W OR WO KUB INTRAOPERATIVE LOCATION: [facility] DATE/TIME: 4/12/2020 12:50 PM INDICATION: left ureteral stent COMPARISON: 04/12/2020 CT TECHNIQUE: Exam performed by Urologist. FLUOROSCOPIC TIME: 0.3 minutes NUMBER OF IMAGES: 4 FINDINGS: Left ureteral stent in good position.      EKG:     EKG #1  Atrial fibrillation.  ST segment depression in V3 V4 V5 V6.    Time:[id number]    Ventricular rate 95 bmp  Axis normal axis  MI interval ms  QRS duration 88 ms  QT//467 ms    Compared to previous EKG on February 18, 2020 A. fib.  ST segment depression laterally not as seen as before.  PACs.  I have independently reviewed and interpreted the EKG(s) documented above.      PROCEDURES:     -Intubation    Date/Time: 4/12/2020 1:13 PM  Performed by: Rosy Carmen MD  Authorized by: Rosy Carmen MD       Pre-procedure details:   Patient status:  Altered mental status  Mallampati score:  II  Pretreatment medications:  None  Paralytics:  Rocuronium    Procedure details:   Preoxygenation:  Nonrebreather mask    CPR in progress: no    Intubation method:  Oral  Oral intubation technique:  Video-assisted  Tube size (mm):  8.0  Tube type:  Cuffed  Number of attempts:  1    Ventilation between attempts: no      Cricoid pressure: no    Tube visualized through cords: yes      Placement assessment:   ETT to lip:  27  ETT to teeth:  26  Tube secured with:  Adhesive tape and ETT monae  Breath sounds:  Equal  Placement verification: chest rise, condensation, CXR verification, direct visualization, ETCO2 detector, fiberoptic scope and tube exhalation    CXR findings:  ETT in proper place (see  comment)    Post-procedure    Description of procedure: ETT withdraw 1 cm    Patient tolerance: Patient tolerated the procedure well with no immediate complications   Length of time physician present for 1:1 monitoring during sedation: 10Critical Care  Performed by: Rosy Carmen MD  Authorized by: Rosy Carmen MD   Total critical care time: 90 minutes  Critical care time was exclusive of separately billable procedures and treating other patients and teaching time.  Critical care was necessary to treat or prevent imminent or life-threatening deterioration of the following conditions: metabolic crisis, sepsis, CNS failure or compromise, renal failure and respiratory failure.  Critical care was time spent personally by me on the following activities: blood draw for specimens, development of treatment plan with patient or surrogate, discussions with consultants, discussions with primary provider, interpretation of cardiac output measurements, evaluation of patient's response to treatment, examination of patient, obtaining history from patient or surrogate, ordering and performing treatments and interventions, ordering and review of laboratory studies, ordering and review of radiographic studies, pulse oximetry, re-evaluation of patient's condition and review of old charts.            I, Yeni Fragoso, am serving as a scribe to document services personally performed by Dr. Carmen based on my observation and the provider's statements to me. I, Rosy Carmen MD attest that eYni Fragoso is acting in a scribe capacity, has observed my performance of the services and has documented them in accordance with my direction.    Rosy Carmen M.D.  Emergency Medicine  [facility] [place]  [address] [place] [address]  Dept: [phone]  Loc: [phone]       Rosy Carmen MD  04/12/20 1323

## 2021-06-07 NOTE — PROGRESS NOTES
[facility]    Facility:   [facility] [[id number]]   Code Status: FULL CODE      CHIEF COMPLAINT/REASON FOR VISIT:  Chief Complaint   Patient presents with     Problem Visit     cell, foot, doppler,       HISTORY:      HPI: Rakesh is a 69 y.o. male who I was asked to see not only secondary to his hospitalization April 12 through April 18, 2020 secondary to presenting with an upper GI bleed and circulatory shock thought to be secondary to left nephrolithiasis along with acute kidney injury requiring hemodialysis and respiratory failure requiring intubation but also in discussion of his left foot cellulitis.  Had a chance to talk about the current rehabilitation process and he does feel that he is still about 30% better but making slow progressive improvements with his rehabilitation strength and energy.  He is a very delightful gentleman.  He did work for [employer] and had a wonderful conversation about that plus he talks nearly about his wife of 46 years he also does have some disabilities.  At any rate his appetite is slowly improving.  He is being treated for left foot cellulitis he currently is on cephalexin 250 mg 3 times daily through March 2, 2020.  The left foot actually looks really good there is no obvious sores or other issues with the left foot he does have some redness and puffiness to the dorsal foot but otherwise it does look much improved in comparison to previous presentation.  The Doppler ultrasound was negative for DVT yesterday.  He has been normotensive and afebrile and also on room air.  He does have diabetes currently on Lantus 13 units at bedtime as well as sliding scale insulin and his morning time sugars have been running 117-190 9 in the AM and then towards the p.m. the range 177-275 so we will keep a close eye on that without any further changes.  He is on omeprazole twice daily he is not having any abdominal issues at this time.   There is no current pain issues.  Denies any colds or flus.  His lungs remain clear and he is in good spirits.    Past Medical History:   Diagnosis Date     Diabetes mellitus, type 2 (H) 4/12/2020     Paroxysmal atrial fibrillation (H) 2/18/2020             No family history on file.  Social History     Socioeconomic History     Marital status:      Spouse name: Not on file     Number of children: Not on file     Years of education: Not on file     Highest education level: Not on file   Occupational History     Not on file   Social Needs     Financial resource strain: Not on file     Food insecurity     Worry: Not on file     Inability: Not on file     Transportation needs     Medical: Not on file     Non-medical: Not on file   Tobacco Use     Smoking status: Never Smoker     Smokeless tobacco: Never Used   Substance and Sexual Activity     Alcohol use: Yes     Frequency: Monthly or less     Binge frequency: Never     Drug use: Never     Sexual activity: Not Currently   Lifestyle     Physical activity     Days per week: Not on file     Minutes per session: Not on file     Stress: Not on file   Relationships     Social connections     Talks on phone: Not on file     Gets together: Not on file     Attends Sabianism service: Not on file     Active member of club or organization: Not on file     Attends meetings of clubs or organizations: Not on file     Relationship status: Not on file     Intimate partner violence     Fear of current or ex partner: Not on file     Emotionally abused: Not on file     Physically abused: Not on file     Forced sexual activity: Not on file   Other Topics Concern     Not on file   Social History Narrative     Not on file         Review of Systems  He currently denies chills and fever coughing wheezing chest pain dizziness or vertigo nausea vomiting diarrhea dysuria flulike symptoms headache or stiff neck.  History of diabetes current left foot cellulitis A. fib upper GI bleed  obstructive uropathy.  CPAP.      Current Outpatient Medications:      apixaban ANTICOAGULANT (ELIQUIS) 5 mg Tab tablet, Take 1 tablet (5 mg total) by mouth 2 (two) times a day., Disp: 60 tablet, Rfl: 11     ARIPiprazole (ABILIFY) 2 MG tablet, Take 1 tablet (2 mg total) by mouth every evening., Disp: 30 tablet, Rfl: 0     atorvastatin (LIPITOR) 10 MG tablet, Take 10 mg by mouth at bedtime., Disp: , Rfl: 1     cephalexin (KEFLEX) 500 MG capsule, Take 250 mg by mouth 3 (three) times a day. , Disp: , Rfl:      dorzolamide-timolol (COSOPT) 22.3-6.8 mg/mL ophthalmic solution, Administer 1 drop to both eyes 2 (two) times a day., Disp: , Rfl: 3     fluvoxaMINE (LUVOX) 100 MG tablet, Take 0.5 tablets (50 mg total) by mouth at bedtime for 14 days., Disp: 7 tablet, Rfl: 0     LANTUS SOLOSTAR U-100 INSULIN 100 unit/mL (3 mL) pen, Inject 10 Units under the skin at bedtime. 11.65 Type 2 with hyperglycemia Contact provider if insulin prescribed is not the preferred insulin per insurance., Disp: 5 adj dose pen, Rfl: PRN     latanoprostene bunod 0.024 % Drop, Administer 1 drop to both eyes at bedtime., Disp: , Rfl:      multivitamin therapeutic tablet, Take 1 tablet by mouth daily., Disp: , Rfl:      netarsudiL (RHOPRESSA) 0.02 % Drop, Administer 1 drop to both eyes daily., Disp: , Rfl:      NOVOLOG FLEXPEN U-100 INSULIN 100 unit/mL (3 mL) injection pen, Check blood sugar four (4) times daily. 11.65 Type 2 with hyperglycemia BD Ultra-fine Adina Pen Needles - NDC [address]-39 - dispense 1 case, refill PRN for 1 year, Disp: 5 Pre-filled Pen Syringe, Rfl: PRN     omeprazole (PRILOSEC OTC) 20 MG tablet, Take 1 tablet (20 mg total) by mouth 2 (two) times a day before meals., Disp: 120 tablet, Rfl: 0     VENTOLIN HFA 90 mcg/actuation inhaler, Inhale 2 puffs every 4 (four) hours as needed., Disp: , Rfl: 11    There were no vitals filed for this visit.  Blood pressure 122/63 pulse 90 respirations 18 temperature 98.8 saturation room air  95%  Physical Exam  Head is normocephalic.  Neck is supple without adenopathy.  Lung sounds are clear throughout.  Cardiovascular is normal without murmurs.  No significant lower extremity edema otherwise he does have left dorsal foot puffiness and redness but nothing too significant it does appear to be resolving.  Gastrointestinal soft nontender.  Musculoskeletal strong upper and lower extremities able to sit and stand for psychiatric: Pleasant affect.  LABS:   Lab Results   Component Value Date    WBC 9.7 04/22/2020    HGB 10.3 (L) 04/20/2020    HCT 32.9 (L) 04/20/2020    MCV 99 04/20/2020     04/20/2020     Results for orders placed or performed in visit on 04/20/20   Basic Metabolic Panel   Result Value Ref Range    Sodium 146 (H) 136 - 145 mmol/L    Potassium 3.9 3.5 - 5.0 mmol/L    Chloride 112 (H) 98 - 107 mmol/L    CO2 27 22 - 31 mmol/L    Anion Gap, Calculation 7 5 - 18 mmol/L    Glucose 123 70 - 125 mg/dL    Calcium 7.8 (L) 8.5 - 10.5 mg/dL    BUN 12 8 - 22 mg/dL    Creatinine 0.94 0.70 - 1.30 mg/dL    GFR MDRD Af Amer >60 >60 mL/min/1.73m2    GFR MDRD Non Af Amer >60 >60 mL/min/1.73m2       Lab Results   Component Value Date    HGBA1C 7.4 (H) 09/09/2019         ASSESSMENT:      ICD-10-CM    1. Abscess or cellulitis of foot  L03.119     L02.619    2. Type 2 diabetes mellitus with chronic kidney disease, without long-term current use of insulin, unspecified CKD stage (H)  E11.22    3. Gastroesophageal reflux disease without esophagitis  K21.9    4. General weakness  R53.1        PLAN:    Continue with the cephalexin and continue with the monitoring of his blood sugars making a change as necessary.  He does feel comfortable with the rehabilitation process as well as the current plan of care he is also aware of the negative Doppler ultrasound of his leg.  He did not have any other questions.  Continue to monitor and follow.    For documentation purposes total visit 35 minutes which were 50% was spent  with the patient going over his care his medications treatment modalities therapy and coordination of care.        Electronically signed by: Michael Duane Johnson, CNP

## 2021-06-07 NOTE — PLAN OF CARE
Problem: Pain  Goal: Patient's pain/discomfort is manageable  Outcome: Progressing     Problem: Ineffective Airway Clearance  Goal: Maintain airway patency  Outcome: Completed     Problem: Impaired Gas Exchange  Goal: Demonstrate improved ventilation and adequate oxygenation of tissues as evidenced by absence of respiratory distress  Outcome: Completed     Problem: Breathing  Goal: Patient is able to maintain stable respiratory status with long term  tracheostomy  Outcome: Completed     Patient extubated at 1115 4/14/2020 to 4L Nasal canula.  Tolerated well.  Patient able to vocalize almost immediately, after an hour he was asking when he could eat or drink.  Dysphagia screening to be completed.  Patient denies pain throughout the shift, will continue to monitor.

## 2021-06-07 NOTE — PLAN OF CARE
Problem: Risk for Infection  Goal: Identify and demonstrate techniques, lifestyle changes to prevent/reduce risk of infection and promote safe environment  Outcome: Progressing  Note: On vanco and zosyn     Problem: Hemodynamic Status  Goal: Patient's vitals signs are stable  Outcome: Progressing  Note: Levophed off, afebrile HR WNL.     Weaning trial at 1445 today.   Insulin drip at 1.5 units per hour.

## 2021-06-07 NOTE — PLAN OF CARE
Problem: Discharge Barriers  Goal: Patient's discharge needs are met  Outcome: Progressing   Goal: Patient s discharge needs are met.  Outcome: Care Progression reviewed with Hospitalist, Care Manager.  Discharge Disposition: Discussed and plan to discharge to: TBD, comes from home with wife   Planned Discharge Date: days   Problem: Barriers to discharge include: intubated, IV abx  Transportation needs/Ride Time: [facility]

## 2021-06-07 NOTE — PROGRESS NOTES
CRITICAL CARE PROGRESS NOTE:    Assessment/Plan:  69M w/ hx of DM, adrenal incidentalomas, afib on eliquis, presented to the ED from home with fall, hypoglycemia, weakness. Found to have severe metabolic acidosis due tohigh AG, lactic acidosis, CHIDI/ATN, obstructing left ureteral stone. Intubated for worsening mental and resp status. Went to OR for stone extraction with urology.      NEURO:  Encephalopathy, sedated while on vent. Likely toxic-metabolic. Per report he does awake briefly and follow commands when sedation lightened.     Cont propofol     RASS goal 0 to -1    Sedation vacation today for extubation after EGD    Tylenol prn pain    Avoid benzo's    Cautious use narcotics    Holding home abilify, fluvox for now.     CV:  Circulator Shock. Lactate markedly up at 15, now normal. Received multiple IVF boluses. Echo Feb 2020 EF 67%, normal RV, RVSP 36, normal LV size/functino, no valve issues. Hx afib on eliquis. Likely hemorrhagic shock from bleeding + septic/vasoldilatory, + hypovolemic shock from dehydration    MAP >65, wean NE as able. Has not needed Vaso. NE coming down slowly.    Continue to hold Eliquis    No need to trend LA further    Hold any home anti-hypertensives    Holding home statin for now.      RESP:  Acute resp failure with hypoxemia, intubated for worsening mental status in the setting of severe metabolic acidosis.     On minimal vent settings, ABG alkalemic, oxygenation OK. Reduced RR to 14bpm.    No need for further ABG's at this point as anticipate SBT and extubation after EGD today.    Titrate FiO2 for goal O2 sat 94-96%, avoid hyperoxia     GI:  No issues    NPO for now    Appreciate GI input - plan for EGD today.    Continue PPI drip    Bowel regimen     RENAL:  Severe CHIDI/ATN, metabolic acidosis, lactic acidosis. Partly due to left ureteral stone obstruction with hydro; additionally with hyper K 6.8. possible metformin toxicity. All resolved with HD yesterday and vent management.   Still has slighrt AG of 21, much better than >41 yesterday, unclear what this is from as lactic acidosis resolved, could be some remaining DKA/organic acids    Appreciate renal input, metabolic derangements appear to have resolved, no plans for additional HD.     Bicarb drip stopped.     Olsen in place - maintain and follow UOP. Watch for post-obstructive diuresis.     Avoid nephrotoxins     Cont to hold metformin.     ID:  Presented with leukocytosis, concern for sepsis, lactic acidosis.     Cont vanco + zosyn empirically.    F/u culture data     HEMATOLOGIC:  GI hemorrhage, possible UGI bleeding, ?ulcer vs. Gastritis vs. AVM. Sig hgb drop 13 -> 10 with coffee ground material coming through OG tube, ~1L. On eliquis s/p Kcentra for reversal + pRBC    Check hgb daily, stable for now    EGD by GI as above.     Ensure 2 large bore IV's     Cont PPI drip as above    Transfusion goal hgb >7. We did give 2U pRBC yesterday and hgb only went from 10.1 -> 11.6 so he probably was bleeding abit.      ENDOCRINE:  Hyperglycemia likely 2/2 critical illness, possible DKA superimposed. This AM glucose 112 but still has AG, though improved.    FSBG checks, insulin SS/drip per ICU protocol (non-DKA protocol for now)     ICU PROPHYLAXIS:    PPI drip    SCDs, no heparin products    peridex     Lines/Drains/Tubes:  ETT 8.0mm  Olsen  Left axillary arterial line - not working, remove today.  Left femoral HD catheter (double lumen) - keep for now, d/c later today if no plans for HD and extubated/stable.      Restraints  Progress Note  Restraint Application     I recognize that restraints are physical and/or chemical interventions intended to restrict a person's movements. Restraints are currently needed to ensure the safety of this patient and/or others. My clinical rationale appears below.     Category/Type of Restraint     Non Violent:  Soft limb restraint x2  --  Behavior  Pulling at tubes/lines  --  Root Cause of the  Behavior  Sedation/intubation  --  Less-Restrictive Measures that Failed  Non Violent Measures:  Close Observation  --  Response to the Restraint  Patient unable to pull at tubes/lines  --  Criteria for Release from the Restraint  Patient calm and off sedation     DISPO/CODE STATUS: full code     FAMILY COMMUNICATION: did not see family today     MD LALO Carlson (Avi) [facility]  Pager [phone]  Clinic [phone]    Overnight events:  No major events  On low dose NE  Making urine  Sedated on prop  No obvious GI bleeding  Lactate normalized, hgb stable  ABG now alkalemic.    Subjective:  Unable to assess    Objective:  Physical Exam:  Vent settings for last 24 hours:  Vent Mode: VCV  FiO2 (%):  [30 %-50 %] 30 %  S RR:  [14-30] 14  S VT:  [550 mL] 550 mL  PEEP/CPAP (cm H2O):  [5 cm H2O-8 cm H2O] 5 cm H2O  Minute Ventilation (L/min):  [8.1 L/min-18 L/min] 8.2 L/min  PIP:  [20 cm H2O-26 cm H2O] 21 cm H2O  MAP (cm H2O):  [7-13] 7    /58   Pulse 82   Temp 99.5  F (37.5  C) (Esophageal)   Resp 14   Ht 6' (1.829 m)   Wt 199 lb 4.7 oz (90.4 kg)   SpO2 99%   BMI 27.03 kg/m      Intake/Output last 3 shifts:  I/O last 3 completed shifts:  In: 6735 [I.V.:6035; Blood:650; IV Piggyback:50]  Out: 5076 [Urine:3825; Emesis/NG output:100; Other:300; Blood:851]  Intake/Output this shift:  I/O this shift:  In: 774.6 [I.V.:704.9; IV Piggyback:69.7]  Out: 1425 [Urine:1375; Emesis/NG output:50]    Physical Exam  Gen: intubated, sedated  HEENT: no OP lesions, no TYSON  CV: RRR, no m/g/r  Resp: clear ant no wheezing, no rhonchi  Abd: soft, nontender, BS+  Neuro: PERRL, nonfocal  Ext: no edema    LAB:  Results from last 7 days   Lab Units 04/13/20  0553   LN-WHITE BLOOD CELL COUNT thou/uL 12.6*   LN-HEMOGLOBIN g/dL 11.8*   LN-HEMATOCRIT % 34.1*   LN-PLATELET COUNT thou/uL 263     Results from last 7 days   Lab Units 04/13/20  0553 04/12/20  2351 04/12/20  1757 04/12/20  1428   04/12/20  0905   LN-SODIUM mmol/L 140 137 139 142   < > 143   LN-POTASSIUM mmol/L 3.8 3.9 4.4 7.0*   < > 6.8*   LN-CHLORIDE mmol/L 98 95* 96* 99   < > 96*   LN-CO2 mmol/L 21* 19* 12* 7*   < > <6*   LN-BLOOD UREA NITROGEN mg/dL 34* 32* 34* 66*   < > 72*   LN-CREATININE mg/dL 2.98* 2.92* 2.86* 5.17*   < > 6.14*   LN-CALCIUM mg/dL 7.6* 7.7* 8.1* 8.1*   < > 10.3   LN-PROTEIN TOTAL g/dL  --   --   --  4.6*  --  6.8   LN-BILIRUBIN TOTAL mg/dL  --   --   --  0.2  --  0.3   LN-ALKALINE PHOSPHATASE U/L  --   --   --  52  --  75   LN-ALT (SGPT) U/L  --   --   --  <9  --  13   LN-AST (SGOT) U/L  --   --   --  18  --  14    < > = values in this interval not displayed.       Micro  Blood pending  Urine pending    Current Facility-Administered Medications   Medication Dose Route Frequency Provider Last Rate Last Dose     bacitracin ointment packet 1 packet  1 packet Topical Once PRN Christopher Yates MD         benzocaine-menthoL lozenge 1 lozenge (CEPACOL)  1 lozenge Oral Q1H PRN Christopher Yates MD         bisacodyL suppository 10 mg (DULCOLAX)  10 mg Rectal Daily PRN Christopher Yates MD         chlorhexidine 0.12 % solution 15 mL (PERIDEX)  15 mL Swish & Spit BID Christopher Yates MD   15 mL at 04/13/20 0920     dextrose 50 % (D50W) syringe 20-50 mL  20-50 mL Intravenous Q15 Min PRN Christopher Yates MD         dextrose 50 % (D50W) syringe 20-50 mL  20-50 mL Intravenous Q15 Min PRN Christopher Yates MD         dorzolamide-timoloL 22.3-6.8 mg/mL ophthalmic solution 1 drop (COSOPT)  1 drop Both Eyes BID Luis Hernandez MD   1 drop at 04/13/20 0921     glucagon (human recombinant) injection 1 mg  1 mg Subcutaneous Q15 Min PRN Christopher Yates MD         glucagon (human recombinant) injection 1 mg  1 mg Subcutaneous Q15 Min PRN Christopher Yates MD         insulin regular 1 Units/mL in sodium chloride 0.9% 250 mL  0-24 Units/hr Intravenous Continuous PRN Christopher Yates MD 1.5 mL/hr at 04/13/20 1016 1.5 Units/hr at 04/13/20  1016     latanoprostene bunod 0.024 % Drop 1 drop  1 drop Both Eyes QHS Luis Hernandez MD   1 drop at 04/12/20 2152     magnesium hydroxide suspension 30 mL (MILK OF MAG)  30 mL Oral Daily PRN Christopher Yates MD         naloxone injection 0.2-0.4 mg (NARCAN)  0.2-0.4 mg Intravenous PRN Christopher Yates MD        Or     naloxone injection 0.2-0.4 mg (NARCAN)  0.2-0.4 mg Intramuscular PRN Christopher Yates MD         netarsudiL 0.02 % Drop 1 drop  1 drop Both Eyes DAILY Luis Hernandez MD   1 drop at 04/13/20 0921     norepinephrine 4 mg/250 ml in NS (0.016 mg/ml)  0.01-0.4 mcg/kg/min Intravenous Continuous Christopher Yates MD 18.4 mL/hr at 04/13/20 1018 0.06 mcg/kg/min at 04/13/20 1018     ondansetron injection 4 mg (ZOFRAN)  4 mg Intravenous Q4H PRN Christopher Yates MD        Or     ondansetron tablet 8 mg (ZOFRAN)  8 mg Oral Q8H PRN Christopher Yates MD         pantoprazole 80 mg in sodium chloride 0.9% 100 mL (PROTONIX) infusion  8 mg/hr Intravenous Continuous Luis Hernandez MD 10 mL/hr at 04/13/20 1018 8 mg/hr at 04/13/20 1018     piperacillin-tazobactam 3.375 g in NaCl 0.9 % 50 mL (MINI-BAG Plus) (ZOSYN)  3.375 g Intravenous Q12H Luis Hernandez MD 12.5 mL/hr at 04/13/20 0601 3.375 g at 04/13/20 0601     polyvinyl alcohol 1.4 % ophthalmic solution 1-2 drop (LIQUIFILM TEARS)  1-2 drop Both Eyes Q1H PRN Christopher Yates MD   2 drop at 04/12/20 2015     propofoL injection (DIPRIVAN)  5-75 mcg/kg/min Intravenous Continuous Christopher Yates MD 14.7 mL/hr at 04/13/20 0552 30 mcg/kg/min at 04/13/20 0552     propofoL injection (DIPRIVAN)  5-75 mcg/kg/min Intravenous Continuous Christopher Yates MD 9.8 mL/hr at 04/13/20 1016 20 mcg/kg/min at 04/13/20 1016     senna-docusate 8.6-50 mg tablet 1 tablet (PERICOLACE)  1 tablet Oral BID Christopher Yates MD        Or     sennosides syrup 8.8 mg (for SENOKOT)  8.8 mg Enteral Tube BID Christopher Yates MD         sodium chloride 0.9%  10 mL/hr Intravenous Continuous  Christopher Yates MD         sodium chloride 0.9%  100 mL/hr Intravenous Continuous Leroy Hannah,  mL/hr at 04/13/20 1102 100 mL/hr at 04/13/20 1102     sodium chloride bacteriostatic 0.9 % injection 1-5 mL  1-5 mL Intradermal Once PRN Christopher Yates MD         sodium chloride flush 10-20 mL (NS)  10-20 mL Intravenous PRN BerwindChristopher funk MD         sodium chloride flush 10-30 mL (NS)  10-30 mL Intravenous PRN BerwindChristopher funk MD         sodium chloride flush 10-30 mL (NS)  10-30 mL Intravenous Q8H FIXED TIMES Christopher Yates MD         sodium chloride flush 20 mL (NS)  20 mL Intravenous PRN TrudyChristopher funk MD         vancomycin 1,250 mg in sodium chloride 0.9% 500 mL (VANCOCIN)  1,250 mg Intravenous Once Luis Hernandez .5 mL/hr at 04/13/20 1016 1,250 mg at 04/13/20 1016     vancomycin intermittent dosing   Other Med Consult or Protocol Luis Hernandez MD         vasopressin standard infusion 20 units in D5W 100 mL  2.4 Units/hr Intravenous Continuous Luis Hernandez MD   Stopped at 04/12/20 1500       Critical care attestation: 40 minutes spent managing the following issues: acute respiratory failure requiring intubation/IMV, circulatory shock requiring continuous vasopressor infusions, severe metabolic acidosis and acute kidney injury requiring emergent hemodialysis, life-threatning upper GI bleeding. High risk for organ deterioration and death requiring ICU level care.

## 2021-06-07 NOTE — PROGRESS NOTES
GI CHART NOTE  4/12/2020  Rakesh Samuels  1950  [id number]    We were called by the ED to see Mr. Samuels for hematemesis. Per chart review, he presented after a fall due to loss of consciousness and was found to be hypoglycemic with CHIDI. Lactate elevated at 15. CT scan showed left hydronephrosis with left ureter obstructing stone. He was intubated with plans for surgery today.     He was intubated before I could see him. ED notes report one episode of vomiting with blood. Hemoglobin 13.3. No signs of GI bleed in ER. Suspect LOC due to hypoglycemia as opposed to acute blood loss.     We will not formally see Mr. Samuels at this time. Please consult us if there are any further questions or signs of overt GI bleeding.     Stephania Singh PA-C  [facility]  [phone]

## 2021-06-07 NOTE — PROGRESS NOTES
.rx  Pharmacy Consult: Vancomycin Dosing in the Emergency Department    Pharmacist consulted by Dr Carmen to dose vancomycin for Rakesh Samuels, a 69 y.o. male.    Indication for vancomycin therapy: Sepsis    Other current antimicrobials              vancomycin 1,500 mg in sodium chloride 0.9% 500 mL (VANCOCIN)  Once          piperacillin-tazobactam 3.375 g in NaCl 0.9 % 50 mL (MINI-BAG Plus) (ZOSYN)  Once                   Assessment/Plan    1. Vancomycin 1500 mg IV once in the ED (18.4 mg/kg actual body weight).  2. If the patient is admitted to the hospital and vancomycin therapy should continue, please re-consult pharmacy.    Subjective/Objective    Rakesh Samuels presented to the ED on 4/12/2020 for Fall    Height: 6' (1.829 m)  Actual Body Weight (ABW): 81.6 kg (180 lb)    Ideal body weight: 77.6 kg (171 lb 1.2 oz)  Adjusted ideal body weight: 79.2 kg (174 lb 10.3 oz)    BMI: Body mass index is 24.41 kg/m .    Allergies   Allergen Reactions     Adhesive Tape-Silicones        Patient Active Problem List   Diagnosis     SIRS (systemic inflammatory response syndrome) (H)     Adrenal incidentaloma (H)     Diet-controlled diabetes mellitus (H)     Pericardial effusion     Lactic acidosis     Diabetes mellitus, type 2 (H)     Paroxysmal atrial fibrillation (H)    Past Medical History:   Diagnosis Date     Diabetes mellitus, type 2 (H) 4/12/2020     Paroxysmal atrial fibrillation (H) 2/18/2020        There were no vitals filed for this visit.    Recent Labs     04/12/20  0905   WBC 17.6*   NEUTROABS 14.5*   LACTICACID 15.0*     Recent Labs     04/12/20  0905   BUN 72*   CREATININE 6.14*       Estimated Creatinine Clearance: 13.3 mL/min (A) (by C-G formula based on SCr of 6.14 mg/dL (HH)).    Thank you for the consult,  Chapis Higgins RPh  4/12/2020  10:06 AM

## 2021-06-07 NOTE — PLAN OF CARE
Problem: Mechanical Ventilation  Goal: Mobility/activity is maintained at optimum level for patient  Outcome: Progressing     Problem: Mechanical Ventilation  Goal: ET tube will be managed safely  Outcome: Progressing     Vent Mode: VCV  FiO2 (%):  [30 %-50 %] 30 %  S RR:  [14-30] 14  S VT:  [550 mL] 550 mL  PEEP/CPAP (cm H2O):  [5 cm H2O-8 cm H2O] 5 cm H2O  Minute Ventilation (L/min):  [8.1 L/min-18 L/min] 8.2 L/min  PIP:  [20 cm H2O-26 cm H2O] 21 cm H2O  MAP (cm H2O):  [7-13] 7     Pt. remains on full vent support, settings above, ETT 8.0, 26@ teeth. BS diminished, suctioning scant amount of secretions via ETT, PIP 21, plats 13, RT following    Azeb Hook, LRT

## 2021-06-07 NOTE — PROCEDURES
ARTERIAL LINE INSERTION PROCEDURE NOTE  (NON-OR)    Procedure Date:  4/12/2020   Performing Physician:  Luis Hernandez    Procedure: placement of left axillary arterial line  Indications: hypotension, hemodynamic shock      Estimated Blood Loss: minimal   Complications: none immediate    Procedure Details: emergent procedure  The patient was identified as Rakesh Samuels with date of birth 1950 and the procedure verified as placement of left axillary arterial line. A time out was held and the above information confirmed.    In sterile fashion, the skin over the left axillary region was sterilized with chlorhexidine. Strict sterile conditions were maintained: cap, mask, and sterile gloves were worn by all participants. Under continuous ultrasound guidance using a sterile probe cover, the right radial artery cather was placed with an all-in-one needle-wire-catheter. The line was sutured in place. A bio patch and sterile dressing were placed. The total number of needle stick attempts was 1.    Condition: critical and unchanged    Luis (Fabrizio) MD Mary  [facility]  Pager [phone]  Clinic [phone]

## 2021-06-08 NOTE — ANESTHESIA PREPROCEDURE EVALUATION
Anesthesia Evaluation      Patient summary reviewed   No history of anesthetic complications     Airway   Mallampati: II  Neck ROM: full   Pulmonary - normal exam   (+) sleep apnea,                          Cardiovascular   Rhythm: irregular        Neuro/Psych      Endo/Other    (+) diabetes mellitus,      GI/Hepatic/Renal    (+)   chronic renal disease,           Dental - normal exam                        Anesthesia Plan  Planned anesthetic: general LMA    ASA 2   Induction: intravenous   Anesthetic plan and risks discussed with: patient  Anesthesia plan special considerations: antiemetics,   Post-op plan: routine recovery

## 2021-06-08 NOTE — ANESTHESIA POSTPROCEDURE EVALUATION
Patient: Rakesh Samuels  Procedure(s):  CYSTOSCOPY, WITH FLEXIBLE URETEROSCOPIC CALCULUS REMOVAL LASER LITHOTRIPSY  AND STENT INSERTION (Left)  Anesthesia type: general    Patient location: PACU  Last vitals:   Vitals Value Taken Time   /84 5/14/2020 11:45 AM   Temp 36.9  C (98.5  F) 5/14/2020 11:30 AM   Pulse 74 5/14/2020 11:47 AM   Resp 20 5/14/2020 11:45 AM   SpO2 97 % 5/14/2020 11:47 AM   Vitals shown include unvalidated device data.  Post vital signs: stable  Level of consciousness: awake and responds to simple questions  Post-anesthesia pain: pain controlled  Post-anesthesia nausea and vomiting: no  Pulmonary: unassisted, return to baseline  Cardiovascular: stable and blood pressure at baseline  Hydration: adequate  Anesthetic events: no    QCDR Measures:  ASA# 11 - Ambar-op Cardiac Arrest: ASA11B - Patient did NOT experience unanticipated cardiac arrest  ASA# 12 - Ambar-op Mortality Rate: ASA12B - Patient did NOT die  ASA# 13 - PACU Re-Intubation Rate: ASA13B - Patient did NOT require a new airway mgmt  ASA# 10 - Composite Anes Safety: ASA10A - No serious adverse event    Additional Notes:

## 2021-06-08 NOTE — PROGRESS NOTES
Assessment/Plan:        Diagnoses and all orders for this visit:    Calculus of ureter  -     Cancel: Urinalysis Macroscopic  -     Cancel: Culture, Urine- Future; Future; Expected date: 06/27/2020  -     ciprofloxacin HCl tablet 500 mg (CIPRO)  -     lidocaine HCL 2 % topical jelly 10 mL (UROJET)  -     Culture, Urine- Future  -     Cystoscopy with Stent Removal Education  -     Patient Stated Goal: Prevent further stones  -     CT Abdomen Pelvis Without Oral Without IV Contrast; Future; Expected date: 06/27/2020  -     CT Abdomen Pelvis Without Oral Without IV Contrast    Other orders  -     ciprofloxacin HCl (CIPRO) 500 MG tablet  -     lidocaine HCL (UROJET) 2 % topical jelly      Stone Management Plan  [facility] Stone Management 5/1/2020 5/28/2020   Urinary Tract Infection No suspicion of infection No suspicion of infection   Renal Colic Well controlled symptoms Well controlled symptoms   Renal Failure No suspicion of renal failure No suspicion of renal failure   Current CT date 4/12/2020 -   Left sided stones? Yes -   L Number of ureteral stones 1 -   L GSD of ureteral stones 5 -   L Location of ureteral stone Proximal -   L Hydronephrosis Moderate -   L Stone Event - Established event   Post-op status - Stent Removal             Subjective:      HPI  Mr. Rakesh Samuels is a 69 y.o.  male returning to the [facility] [place] for early postoperative follow up for anticipated stent removal.     He returns status post left ureteroscopic laser lithotripsy for proximal ureteral stone. He has had no unanticipated post-operative events.    He has had no symptoms suspicious for infection and stent was very well tolerated.     Flexible cystoscopy is performed and indwelling stent is removed without incident.    He will follow up in the office in one month with imaging.    Will carefully follow his solitary kidney and stone disease.     ROS   Review of systems is negative except for HPI.    Past Medical  History:   Diagnosis Date     A-fib (H)      Acute hemodialysis encounter (H)     Due to CHIDI     Acute kidney injury (H)      Acute respiratory failure with hypoxemia (H)      Adrenal incidentaloma (H)      Asbestosis (H)      ATN (acute tubular necrosis) (H)      Atrophy of right kidney      Basal cell carcinoma      BPH without urinary obstruction      Cellulitis     Left foot     Cholelithiasis      Depression with anxiety      Diabetes mellitus, type 2 (H) 4/12/2020     Esophagitis      Gastritis      Glaucoma      Hematemesis, presence of nausea not specified      Hyperkalemia      Hyperlipemia      Kidney stone      Lactic acidosis      Metabolic acidosis      Metformin overdose of undetermined intent      Paroxysmal atrial fibrillation (H) 2/18/2020     Pericardial effusion      PTSD (post-traumatic stress disorder)      Seasonal allergies      Sepsis due to urinary tract infection (H)      Shock circulatory (H)      SIRS (systemic inflammatory response syndrome) (H)      Sleep apnea     uses a machine at night.      Upper GI bleed        Past Surgical History:   Procedure Laterality Date     EYE SURGERY      congenital ptosis right upper lid     [place] CYSTOSCOPY,INSERT URETERAL STENT Left 4/12/2020    Procedure: CYSTOSCOPY, WITH URETERAL STENT INSERTION;  Surgeon: Christopher Yates MD;  Location: [facility] OR;  Service: Urology     [place] ESOPHAGOGASTRODUODENOSCOPY TRANSORAL DIAGNOSTIC N/A 4/13/2020    Procedure: ESOPHAGOGASTRODUODENOSCOPY (EGD);  Surgeon: Robert Rico MD;  Location: [facility] GI;  Service: Gastroenterology     REPLACEMENT TOTAL KNEE Left        Current Outpatient Medications   Medication Sig Dispense Refill     apixaban ANTICOAGULANT (ELIQUIS) 5 mg Tab tablet Take 1 tablet (5 mg total) by mouth 2 (two) times a day. 60 tablet 11     aspirin 81 mg chewable tablet 1 tab(s)       atorvastatin (LIPITOR) 10 MG tablet Take 10 mg by mouth at bedtime.  1     dorzolamide-timolol (COSOPT) 22.3-6.8  mg/mL ophthalmic solution Administer 1 drop to both eyes 2 (two) times a day.  3     dorzolamide-timolol, PF, 2-0.5 % Drop 1 gtt       fluvoxaMINE (LUVOX) 50 MG tablet Take 50 mg by mouth at bedtime. 14 days        glipiZIDE (GLUCOTROL) 5 MG tablet Take 5 mg by mouth 2 (two) times a day before meals.       LANTUS SOLOSTAR U-100 INSULIN 100 unit/mL (3 mL) pen Inject 10 Units under the skin at bedtime. 11.65 Type 2 with hyperglycemia  Contact provider if insulin prescribed is not the preferred insulin per insurance. 5 adj dose pen PRN     latanoprostene bunod 0.024 % Drop Administer 1 drop to both eyes at bedtime.       levomefolate calcium 15 mg Tab 15 mg daily.        multivitamin therapeutic tablet Take 1 tablet by mouth daily.       netarsudiL (RHOPRESSA) 0.02 % Drop Administer 1 drop to both eyes every evening.        omeprazole (PRILOSEC OTC) 20 MG tablet Take 1 tablet (20 mg total) by mouth 2 (two) times a day before meals. 120 tablet 0     VENTOLIN HFA 90 mcg/actuation inhaler Inhale 2 puffs every 4 (four) hours as needed.  11     Current Facility-Administered Medications   Medication Dose Route Frequency Provider Last Rate Last Dose     ciprofloxacin HCl (CIPRO) 500 MG tablet              lidocaine HCL (UROJET) 2 % topical jelly                No Known Allergies    Social History     Socioeconomic History     Marital status:      Spouse name: Not on file     Number of children: Not on file     Years of education: Not on file     Highest education level: Not on file   Occupational History     Not on file   Social Needs     Financial resource strain: Not on file     Food insecurity     Worry: Not on file     Inability: Not on file     Transportation needs     Medical: Not on file     Non-medical: Not on file   Tobacco Use     Smoking status: Never Smoker     Smokeless tobacco: Never Used   Substance and Sexual Activity     Alcohol use: Not Currently     Frequency: Monthly or less     Binge frequency:  Never     Drug use: Never     Sexual activity: Not Currently   Lifestyle     Physical activity     Days per week: Not on file     Minutes per session: Not on file     Stress: Not on file   Relationships     Social connections     Talks on phone: Not on file     Gets together: Not on file     Attends Yarsanism service: Not on file     Active member of club or organization: Not on file     Attends meetings of clubs or organizations: Not on file     Relationship status: Not on file     Intimate partner violence     Fear of current or ex partner: Not on file     Emotionally abused: Not on file     Physically abused: Not on file     Forced sexual activity: Not on file   Other Topics Concern     Not on file   Social History Narrative     Not on file       No family history on file.  Objective:      Physical Exam  Vitals:    05/28/20 1028   BP: 122/64   Pulse: 80   Temp: 98.4  F (36.9  C)     General - well developed, well nourished, appropriate for age. Appears no distress at this time  Abdomen - mildly obese soft, non-tender, no hepatosplenomegaly, no masses.   - no flank tenderness, no suprapubic tenderness, kidney and bladder non-palpable  MSK - normal spinal curvature. no spinal tenderness. normal gait. muscular strength intact.  Psych - oriented to time, place, and person, normal mood and affect.      Labs   Urinalysis POC (Office):  Nitrite, UA   Date Value Ref Range Status   04/12/2020 Negative Negative Final   04/09/2020 Negative Negative Final   09/08/2019 Negative Negative Final       Lab Urinalysis:  Blood, UA   Date Value Ref Range Status   04/12/2020 Large (!) Negative Final   04/09/2020 Large (!) Negative Final   09/08/2019 Small (!) Negative Final     Nitrite, UA   Date Value Ref Range Status   04/12/2020 Negative Negative Final   04/09/2020 Negative Negative Final   09/08/2019 Negative Negative Final     Leukocytes, UA   Date Value Ref Range Status   04/12/2020 Moderate (!) Negative Final   04/09/2020  Moderate (!) Negative Final   09/08/2019 Negative Negative Final     pH, UA   Date Value Ref Range Status   04/12/2020 5.5 4.5 - 8.0 Final   04/09/2020 5.5 4.5 - 8.0 Final   09/08/2019 5.5 4.5 - 8.0 Final    and Acute Labs   Urine Culture    Culture   Date Value Ref Range Status   04/12/2020 No Growth  Final   04/02/2020 No Growth  Final

## 2021-06-08 NOTE — PROGRESS NOTES
Patient educated regarding stent removal procedure and possible symptoms after removal.  Patient voiced understanding of information.  Handout given to patient.  Consent form signed.  Erika Yanes RN    [facility] Timeout    Correct patient?: Yes  Correct site?:  Yes  Correct procedure?:  Yes  Correct laterality?:  Left  Consents verified?:  Yes  Relevant lab results available?:  Yes        Erika Yanes RN

## 2021-06-08 NOTE — PATIENT INSTRUCTIONS - HE
Patient Stated Goal: Prevent further stones  Cystoscopy with Stent Removal    Cystoscopy is used to help diagnose urinary problems, or to remove a ureteral stent.    During a cystoscopy, your doctor examines the inside of your bladder with an instrument called a cystoscope. A cystoscope is a long, thin flexible tube with a camera at the end.    Your doctor will insert the scope into your urethra allowing him to visualize and evaluate the inside of the bladder for possible abnormalities. The urethra is the tube that carries urine to the outside of your body.    How is the stent removed?    Your stent will be removed in the [facility] with a small telescope and a grasping tool.  It usually takes less than 1 minute to remove the stent.    What should I expect after the stent is removed?     You should feel normal by the next day.    Some patients find:      An increase in back pain about an hour after the stent is removed as the kidney fills up with urine before it starts to empty.  It can be as uncomfortable as your initial stone episode.  Taking pain medications before stent removal may be helpful, but you would need someone else to drive you to and from your appointment.    Bladder symptoms usually disappear by the next morning.    Small amounts of blood in the urine may be seen occasionally for up to a week.    At Home:      It is important to drink plenty of fluids after your procedure    You may continue to use your pain medications as prescribed    What symptoms should I watch for?    Fever     Chills    Increasing back pain that is not relieved with pain medications    Large amounts of blood in the urine or large clots    Leakage of urine (incontinence)     Are not able to urinate for 8 hours    These symptoms may mean you have a blockage or infection. Call the [facility] 24 hours a day at [phone] immediately.

## 2021-06-08 NOTE — ANESTHESIA CARE TRANSFER NOTE
Last vitals:   Vitals:    05/14/20 0957   BP: 147/75   Pulse: 68   Resp: 16   Temp: 36.8  C (98.2  F)   SpO2: 98%     Patient's level of consciousness is awake and drowsy  Spontaneous respirations: yes  Maintains airway independently: yes  Dentition unchanged: yes  Oropharynx: oropharynx clear of all foreign objects    QCDR Measures:  ASA# 20 - Surgical Safety Checklist: WHO surgical safety checklist completed prior to induction    PQRS# 430 - Adult PONV Prevention: 4558F - Pt received => 2 anti-emetic agents (different classes) preop & intraop  ASA# 8 - Peds PONV Prevention: NA - Not pediatric patient, not GA or 2 or more risk factors NOT present  PQRS# 424 - Ambar-op Temp Management: 4559F - At least one body temp DOCUMENTED => 35.5C or 95.9F within required timeframe  PQRS# 426 - PACU Transfer Protocol: - Transfer of care checklist used  ASA# 14 - Acute Post-op Pain: ASA14B - Patient did NOT experience pain >= 7 out of 10

## 2021-06-09 NOTE — PROGRESS NOTES
"Assessment/Plan:        Diagnoses and all orders for this visit:    Calculus of kidney  -     Patient Stated Goal: Prevent further stones  -     24 Hour Urine Collection Steps Education  -     Magnesium, 24 Hour Urine; Future; Expected date: 07/07/2020  -     Stone Formation, 24 Hour Urine (does not include Magnesium); Standing  -     Uric Acid; Future; Expected date: 07/07/2020    Other orders  -     ARIPiprazole (ABILIFY) 2 MG tablet; 1 tab(s)  -     omeprazole (PRILOSEC) 20 MG capsule; 1 cap(s)      Stone Management Plan  [facility] Stone Management 5/1/2020 5/28/2020 6/23/2020   Urinary Tract Infection No suspicion of infection No suspicion of infection No suspicion of infection   Renal Colic Well controlled symptoms Well controlled symptoms Asymptomatic at this time   Renal Failure No suspicion of renal failure No suspicion of renal failure No suspicion of renal failure   Current CT date 4/12/2020 - 6/22/2020   Left sided stones? Yes - No   L Number of ureteral stones 1 - -   L GSD of ureteral stones 5 - -   L Location of ureteral stone Proximal - -   L Hydronephrosis Moderate - None   L Stone Event - Established event Resolved event   Resolved date - - 6/23/2020   Post-op status - Stent Removal -             Phone call duration: 14 minutes    Christopher Yates MD     Subjective:      The patient has been notified of following:     \"This telephone visit will be conducted via a call between you and your physician/provider. We have found that certain health care needs can be provided without the need for a physical exam.  This service lets us provide the care you need with a short phone conversation.  If a prescription is necessary we can send it directly to your pharmacy.  If labs and/or imaging are needed, we can place orders so you can have the test (s) done at a later time.    If during the course of the call the physician/provider feels a telephone visit is not appropriate, you will not be charged for this service.\" "     HPI  Mr. Rakesh Samuels is a 69 y.o.  male who is being evaluated via a billable telephone visit by [facility] [place] for late postoperative follow-up.     He returns status post Left ureteroscopic laser lithotripsy for proximal ureteral stone. He has had no unanticipated events.     He is asymptomatic at present. He denies symptoms of fever, chills, flank pain, nausea, vomiting, urinary frequency and dysuria.    New CT scan was personally reviewed and demonstrates complete clearance of targeted stone  with no hydronephrosis.     Stone composition was 70 % calcium oxalate and 30 % uric acid.     He is at risk for ongoing active stone disease and will initiate stone risk evaluation. Serum stone risk chemistries will be obtained before next visit. Two 24 hour urine collections and dietary journal will be obtained after waiting at least two weeks..       ROS   Review of systems is negative except for HPI.    Past Medical History:   Diagnosis Date     A-fib (H)      Acute hemodialysis encounter (H)     Due to CHIDI     Acute kidney injury (H)      Acute respiratory failure with hypoxemia (H)      Adrenal incidentaloma (H)      Asbestosis (H)      ATN (acute tubular necrosis) (H)      Atrophy of right kidney      Basal cell carcinoma      BPH without urinary obstruction      Cellulitis     Left foot     Cholelithiasis      Depression with anxiety      Diabetes mellitus, type 2 (H) 4/12/2020     Esophagitis      Gastritis      Glaucoma      Hematemesis, presence of nausea not specified      Hyperkalemia      Hyperlipemia      Kidney stone      Lactic acidosis      Metabolic acidosis      Metformin overdose of undetermined intent      Paroxysmal atrial fibrillation (H) 2/18/2020     Pericardial effusion      PTSD (post-traumatic stress disorder)      Seasonal allergies      Sepsis due to urinary tract infection (H)      Shock circulatory (H)      SIRS (systemic inflammatory response  syndrome) (H)      Sleep apnea     uses a machine at night.      Upper GI bleed        Past Surgical History:   Procedure Laterality Date     EYE SURGERY      congenital ptosis right upper lid     OR CYSTOSCOPY,INSERT URETERAL STENT Left 4/12/2020    Procedure: CYSTOSCOPY, WITH URETERAL STENT INSERTION;  Surgeon: Christopher Yates MD;  Location: [facility] OR;  Service: Urology     OR ESOPHAGOGASTRODUODENOSCOPY TRANSORAL DIAGNOSTIC N/A 4/13/2020    Procedure: ESOPHAGOGASTRODUODENOSCOPY (EGD);  Surgeon: Robert Rico MD;  Location: [facility] GI;  Service: Gastroenterology     REPLACEMENT TOTAL KNEE Left        Current Outpatient Medications   Medication Sig Dispense Refill     apixaban ANTICOAGULANT (ELIQUIS) 5 mg Tab tablet Take 1 tablet (5 mg total) by mouth 2 (two) times a day. 60 tablet 11     ARIPiprazole (ABILIFY) 2 MG tablet 1 tab(s)       aspirin 81 mg chewable tablet 1 tab(s)       atorvastatin (LIPITOR) 10 MG tablet Take 10 mg by mouth at bedtime.  1     dorzolamide-timolol (COSOPT) 22.3-6.8 mg/mL ophthalmic solution Administer 1 drop to both eyes 2 (two) times a day.  3     dorzolamide-timolol, PF, 2-0.5 % Drop 1 gtt       fluvoxaMINE (LUVOX) 50 MG tablet Take 50 mg by mouth at bedtime. 14 days        glipiZIDE (GLUCOTROL) 5 MG tablet Take 5 mg by mouth 2 (two) times a day before meals.       LANTUS SOLOSTAR U-100 INSULIN 100 unit/mL (3 mL) pen Inject 10 Units under the skin at bedtime. 11.65 Type 2 with hyperglycemia  Contact provider if insulin prescribed is not the preferred insulin per insurance. 5 adj dose pen PRN     latanoprostene bunod 0.024 % Drop Administer 1 drop to both eyes at bedtime.       levomefolate calcium 15 mg Tab 15 mg daily.        multivitamin therapeutic tablet Take 1 tablet by mouth daily.       netarsudiL (RHOPRESSA) 0.02 % Drop Administer 1 drop to both eyes every evening.        omeprazole (PRILOSEC) 20 MG capsule 1 cap(s)       VENTOLIN HFA 90 mcg/actuation inhaler  Inhale 2 puffs every 4 (four) hours as needed.  11     No current facility-administered medications for this visit.        No Known Allergies    Social History     Socioeconomic History     Marital status:      Spouse name: Not on file     Number of children: Not on file     Years of education: Not on file     Highest education level: Not on file   Occupational History     Not on file   Social Needs     Financial resource strain: Not on file     Food insecurity     Worry: Not on file     Inability: Not on file     Transportation needs     Medical: Not on file     Non-medical: Not on file   Tobacco Use     Smoking status: Never Smoker     Smokeless tobacco: Never Used   Substance and Sexual Activity     Alcohol use: Not Currently     Frequency: Monthly or less     Binge frequency: Never     Drug use: Never     Sexual activity: Not Currently   Lifestyle     Physical activity     Days per week: Not on file     Minutes per session: Not on file     Stress: Not on file   Relationships     Social connections     Talks on phone: Not on file     Gets together: Not on file     Attends Voodoo service: Not on file     Active member of club or organization: Not on file     Attends meetings of clubs or organizations: Not on file     Relationship status: Not on file     Intimate partner violence     Fear of current or ex partner: Not on file     Emotionally abused: Not on file     Physically abused: Not on file     Forced sexual activity: Not on file   Other Topics Concern     Not on file   Social History Narrative     Not on file       No family history on file.    Objective:      Labs   Urinalysis POC (Office):  Nitrite, UA   Date Value Ref Range Status   04/12/2020 Negative Negative Final   04/09/2020 Negative Negative Final   09/08/2019 Negative Negative Final       Lab Urinalysis:  Blood, UA   Date Value Ref Range Status   04/12/2020 Large (!) Negative Final   04/09/2020 Large (!) Negative Final   09/08/2019 Small  (!) Negative Final     Nitrite, UA   Date Value Ref Range Status   04/12/2020 Negative Negative Final   04/09/2020 Negative Negative Final   09/08/2019 Negative Negative Final     Leukocytes, UA   Date Value Ref Range Status   04/12/2020 Moderate (!) Negative Final   04/09/2020 Moderate (!) Negative Final   09/08/2019 Negative Negative Final     pH, UA   Date Value Ref Range Status   04/12/2020 5.5 4.5 - 8.0 Final   04/09/2020 5.5 4.5 - 8.0 Final   09/08/2019 5.5 4.5 - 8.0 Final    and Stone prevention labs   Serum chemistries   Creatinine   Date Value Ref Range Status   05/20/2020 0.94 0.70 - 1.30 mg/dL Final   04/20/2020 0.94 0.70 - 1.30 mg/dL Final   04/17/2020 0.97 0.70 - 1.30 mg/dL Final     Potassium   Date Value Ref Range Status   05/20/2020 4.8 3.5 - 5.0 mmol/L Final   04/20/2020 3.9 3.5 - 5.0 mmol/L Final   04/18/2020 3.6 3.5 - 5.0 mmol/L Final     Calcium   Date Value Ref Range Status   05/20/2020 9.0 8.5 - 10.5 mg/dL Final   04/20/2020 7.8 (L) 8.5 - 10.5 mg/dL Final   04/17/2020 7.0 (L) 8.5 - 10.5 mg/dL Final     Phosphorus   Date Value Ref Range Status   04/16/2020 2.6 2.5 - 4.5 mg/dL Final   04/15/2020 3.3 2.5 - 4.5 mg/dL Final   04/14/2020 2.6 2.5 - 4.5 mg/dL Final     Uric Acid   Date Value Ref Range Status   04/22/2020 5.1 3.0 - 8.0 mg/dL Final    and 24 hour urine No results found for: HDGCT45LVLS, CALCIUMUR, UW80PFQ, [id number], LNMND91R, LABPH, LABURIN

## 2021-06-10 ENCOUNTER — RECORDS - HEALTHEAST (OUTPATIENT)
Dept: LAB | Facility: CLINIC | Age: 71
End: 2021-06-10

## 2021-06-10 LAB
ALBUMIN SERPL-MCNC: 3.8 G/DL (ref 3.5–5)
ALP SERPL-CCNC: 74 U/L (ref 45–120)
ALT SERPL W P-5'-P-CCNC: 22 U/L (ref 0–45)
ANION GAP SERPL CALCULATED.3IONS-SCNC: 16 MMOL/L (ref 5–18)
AST SERPL W P-5'-P-CCNC: 14 U/L (ref 0–40)
BILIRUB SERPL-MCNC: 0.3 MG/DL (ref 0–1)
BUN SERPL-MCNC: 19 MG/DL (ref 8–22)
CALCIUM SERPL-MCNC: 9 MG/DL (ref 8.5–10.5)
CHLORIDE BLD-SCNC: 106 MMOL/L (ref 98–107)
CHOLEST SERPL-MCNC: 154 MG/DL
CO2 SERPL-SCNC: 21 MMOL/L (ref 22–31)
CREAT SERPL-MCNC: 0.96 MG/DL (ref 0.7–1.3)
FASTING STATUS PATIENT QL REPORTED: NO
GFR SERPL CREATININE-BSD FRML MDRD: >60 ML/MIN/1.73M2
GLUCOSE BLD-MCNC: 163 MG/DL (ref 70–125)
HDLC SERPL-MCNC: 50 MG/DL
LDLC SERPL CALC-MCNC: 90 MG/DL
POTASSIUM BLD-SCNC: 4.4 MMOL/L (ref 3.5–5)
PROT SERPL-MCNC: 6.7 G/DL (ref 6–8)
SODIUM SERPL-SCNC: 143 MMOL/L (ref 136–145)
TRIGL SERPL-MCNC: 72 MG/DL
VIT B12 SERPL-MCNC: 922 PG/ML (ref 213–816)

## 2021-06-11 ENCOUNTER — COMMUNICATION - HEALTHEAST (OUTPATIENT)
Dept: ANTICOAGULATION | Facility: CLINIC | Age: 71
End: 2021-06-11

## 2021-06-14 NOTE — TELEPHONE ENCOUNTER
----- Message from KRISTI Morse sent at 1/25/2021 12:29 PM CST -----  Regarding: HARIKA PATIENT  General phone call:    Caller: Patients wife    Primary cardiologist: HARIKA    Detailed reason for call: can not affordapixaban ANTICOAGULANT (ELIQUIS) 5 mg Tab tablet     Best phone number: [phone] or [phone]    Best time to contact: any    Ok to leave a detailed message? Yes    Device? no    Additional Info:        Spoke with patient's wife. They recently switched insurance plans and the cost of Eliquis went up from 120 to 350/month. Informed her that Pradaxa or Xarelto may have better coverage and suggested reaching out to her insurance to find out this information. Reviewed coumadin as an alternative as well. Lastly, mentioned the option of applying for financial assistance through [employer]. Safia will do some investigating and call back. Additionally, pt is way overdo to see Dr. Pickett for follow-up. Msg sent to scheduling team to arrange. -kcl

## 2021-06-15 NOTE — TELEPHONE ENCOUNTER
ANTICOAGULATION  MANAGEMENT    Assessment     Today's INR result of 2.0 is Therapeutic (goal INR of 2.0-3.0)    Day # 7 since warfarin start      Warfarin taken as previously instructed    No new diet changes affecting INR    No new medication/supplements affecting INR    Continues to tolerate warfarin with no reported s/s of bleeding or thromboembolism     Previous INR was Subtherapeutic    Plan:     Spoke on phone with patient's spouse Safia regarding INR result and instructed:      Warfarin Dosing Instructions:  Continue current warfarin dose    7.5 mg every Tue, Thu, Sat; 5 mg all other days      (0 % change)    Instructed patient to follow up no later than: 2/25 appointment made.    Education provided: importance of therapeutic range, target INR goal and significance of current INR result, importance of following up for INR monitoring at instructed interval and importance of taking warfarin as instructed    Safia verbalizes understanding and agrees to warfarin dosing plan.    Instructed to call the [facility] for any changes, questions or concerns. (#[phone])   ?   Ruby Manzanares RN    Subjective/Objective:      Rakesh Hendricksonabbie, a 70 y.o. male is on warfarin. Rakesh Cameron reports:     Home warfarin dose: verbally confirmed home dose with Safia and updated on anticoagulation calendar     Missed doses: No     Medication changes:  No     S/S of bleeding or thromboembolism:  No     New Injury or illness:  No     Changes in diet or alcohol consumption:  No     Upcoming surgery, procedure or cardioversion:  No    Anticoagulation Episode Summary     Current INR goal:  2.0-3.0   TTR:  0.0 % (4 d)   Next INR check:  2/25/2021   INR from last check:  2.00 (2/22/2021)   Weekly max warfarin dose:     Target end date:     INR check location:     Preferred lab:     Send INR reminders to:  [facility]    Indications    Paroxysmal atrial fibrillation (H) [I48.0]           Comments:            Anticoagulation Care Providers     Provider Role Specialty Phone number    Eric Pickett MD Referring Cardiology [phone]

## 2021-06-15 NOTE — TELEPHONE ENCOUNTER
ANTICOAGULATION  MANAGEMENT    Assessment     Today's INR result of 2.7 is Therapeutic (goal INR of 2.0-3.0)        Warfarin taken as previously instructed    No new diet changes affecting INR    No new medication/supplements affecting INR    Continues to tolerate warfarin with no reported s/s of bleeding or thromboembolism     Previous INR was Therapeutic     Provided information that the patient needs to be on warfarin for at least 3 months to qualify for home monitor- Safia stated that one their daughter is willing to pay out pocket- given contact information for [employer].    Plan:     Spoke on phone with patient's spouse Safia regarding INR result and instructed:      Warfarin Dosing Instructions:  Continue current warfarin dose    7.5 mg every Tue, Thu, Sat; 5 mg all other days      (0 % change)      Instructed patient to follow up no later than: one week- appointment made.    Education provided: importance of therapeutic range, target INR goal and significance of current INR result and information on home INR monitoring    Safia verbalizes understanding and agrees to warfarin dosing plan.    Instructed to call the [facility] for any changes, questions or concerns. (#[phone])   ?   Ruby Manzanares RN    Subjective/Objective:      Rakesh Samuels, a 70 y.o. male is on warfarin. Rakesh Cameron reports:     Home warfarin dose: verbally confirmed home dose with Safia and updated on anticoagulation calendar     Missed doses: No     Medication changes:  No     S/S of bleeding or thromboembolism:  No     New Injury or illness:  No     Changes in diet or alcohol consumption:  No     Upcoming surgery, procedure or cardioversion:  No    Anticoagulation Episode Summary     Current INR goal:  2.0-3.0   TTR:  64.5 % (1.6 wk)   Next INR check:  3/9/2021   INR from last check:  2.70 (3/2/2021)   Weekly max warfarin dose:     Target end date:     INR check location:     Preferred lab:     Send INR reminders to:   [facility]    Indications    Paroxysmal atrial fibrillation (H) [I48.0]           Comments:           Anticoagulation Care Providers     Provider Role Specialty Phone number    Eric Pickett MD Referring Cardiology [phone]

## 2021-06-15 NOTE — TELEPHONE ENCOUNTER
ANTICOAGULATION  MANAGEMENT    Assessment     Today's INR result of 2.5 is Therapeutic (goal INR of 2.0-3.0)        Warfarin taken as previously instructed    No new diet changes affecting INR    No new medication/supplements affecting INR    Continues to tolerate warfarin with no reported s/s of bleeding or thromboembolism     Previous INR was Therapeutic    Plan:     Spoke on phone with patient's spouse Safia regarding INR result and instructed:      Warfarin Dosing Instructions:  Continue current warfarin dose    7.5 mg every Tue, Thu, Sat; 5 mg all other day       (0 % change)    Instructed patient to follow up no later than: 2 weeks - appointment made.    Education provided: importance of therapeutic range, target INR goal and significance of current INR result, importance of following up for INR monitoring at instructed interval, importance of taking warfarin as instructed and monitoring for bleeding signs and symptoms    Safia verbalizes understanding and agrees to warfarin dosing plan.    Instructed to call the [facility] for any changes, questions or concerns. (#[phone])   ?   Ruby Manzanares RN    Subjective/Objective:      Rakesh Samuels, a 70 y.o. male is on warfarin. aRkesh Cameron reports:     Home warfarin dose: as updated on anticoagulation calendar per template     Missed doses: No     Medication changes:  No     S/S of bleeding or thromboembolism:  No     New Injury or illness:  No     Changes in diet or alcohol consumption:  No     Upcoming surgery, procedure or cardioversion:  No    Anticoagulation Episode Summary     Current INR goal:  2.0-3.0   TTR:  77.6 % (2.6 wk)   Next INR check:  3/23/2021   INR from last check:  2.50 (3/9/2021)   Weekly max warfarin dose:     Target end date:     INR check location:     Preferred lab:     Send INR reminders to:  [facility]ART [facility]    Indications    Paroxysmal atrial fibrillation (H) [I48.0]           Comments:            Anticoagulation Care Providers     Provider Role Specialty Phone number    Eric Pickett MD Referring Cardiology [phone]

## 2021-06-15 NOTE — TELEPHONE ENCOUNTER
ACN called and spoke with patient's spouse Afsaneh and she reported that the patient will complete Eliquis doses on Sunday Am ( 2/14)    Instructed Safia to have the patient start warfarin 5 mg daily on 2/15  then recheck INR on 2/18. Appointment made at  [facility].    Made aware that warfarin rx will be sent to patient's pharmacy today.    Per Safia, she will look into having the patient go to [facility] in [place] for his INR due to it is closer to their location. She will update ACN once she gets information with [employer].    Instructed to call ACN at  for any questions or concerns.    Safia verbalized understanding and agrees to plan.    Ruby Manzanares RN

## 2021-06-15 NOTE — TELEPHONE ENCOUNTER
Who is calling:  Safia   Reason for Call:  Safia was calling back to speak with ACN regarding patient's Warfarin dose for tonight (02/22) as she had questions.   Date of last appointment with primary care:   Okay to leave a detailed message: Yes

## 2021-06-15 NOTE — TELEPHONE ENCOUNTER

## 2021-06-15 NOTE — TELEPHONE ENCOUNTER

## 2021-06-15 NOTE — TELEPHONE ENCOUNTER
Who is calling:  Safia  Reason for Call:  Safia returning call to Ruby Cameron.  Date of last appointment with primary care: n/a  Okay to leave a detailed message: Yes

## 2021-06-15 NOTE — TELEPHONE ENCOUNTER

## 2021-06-15 NOTE — TELEPHONE ENCOUNTER
Spoke with wife and let her know that a pharm from [facility] will be calling. Wife agreed with plan.

## 2021-06-15 NOTE — TELEPHONE ENCOUNTER
----- Message from KRISTI Morse sent at 2/8/2021  4:22 PM CST -----  Regarding: HARIKA PATIENT  General phone call:    Caller: GERARD, PATIENT WIFE    Primary cardiologist: HARIKA    Detailed reason for call: PATIENT WANTING TO KNOW WHEN THE ELIQUIS WILL BE CHANGED TO WARFARIN.     Best phone number: [phone]    Best time to contact: ANY    Ok to leave a detailed message? YES    Device? NO    Additional Info:

## 2021-06-15 NOTE — TELEPHONE ENCOUNTER
ACN called and spoke with patient's spouse Safia.  She reported that the patient has 14 tablets of Eliquis left at this time.    Made aware that the patient needs to have INR's done at least 2 x a week in the first weeks of starting warfarin - she said that she was told that the patient can have a home monitor to use. Explained to Afsaneh that patient needs to be on warfarin for at least 3 months before he qualifies to have the home monitor.    Safia stated that they use [facility] for their appointments and she stated that patient can go to [facility] lab for his INR's.     Instructed Afsaneh to have the patient continue taking Eliquis and then ACN or [facility] Pharmacist will follow up with her the warfarin transition plan next week.    Safia verbalized understanding and agrees to plan.    Ruby Manzanares RN

## 2021-06-15 NOTE — TELEPHONE ENCOUNTER
Anticoagulation Management    Rakesh Samuels, 70 y.o., male is on Eliquis. Requested to create plan for conversion to warfarin due to cost of Eliquis    Indication for anticoagulation:  Atrial Fibrillation with [id number] = 2 (Age, DM +/- CAD)    Goal INR Range: 2-3    Wt Readings from Last 2 Encounters:   02/05/21 196 lb (88.9 kg)   07/02/20 168 lb 3.2 oz (76.3 kg)     Lab Results   Component Value Date    HGB 12.0 (L) 06/30/2020    HCT 36.4 (L) 06/30/2020     06/30/2020     Lab Results   Component Value Date    CREATININE 0.94 06/30/2020    CREATININE 0.94 05/20/2020     Lab Results   Component Value Date    ALT 13 05/20/2020    ALT <9 04/12/2020     Lab Results   Component Value Date    ALBUMIN 3.5 05/20/2020     Potential medication Interactions with warfarin: glipizide, omeprazlke, MVI; aspirin and fluvoxamine (due to anit-platelet effects)    Pertinent hx: Falls risk noted by Dr. Pickett 2/5/21      Recommendation     Complete remaining supply of Eliquis then start warfarin 5 mg daily.  Check INR 3-4 days after starting warfarin.  No overlap of blood thinners planned due to noted falls risk and [id number] 2-3.    Tati Devine, PharmD

## 2021-06-15 NOTE — TELEPHONE ENCOUNTER
Who is calling:  Afsaneh  Reason for Call:  Patients wife returning call to Ruby Jeong.  Date of last appointment with primary care: n/a  Okay to leave a detailed message: Yes

## 2021-06-15 NOTE — TELEPHONE ENCOUNTER

## 2021-06-15 NOTE — TELEPHONE ENCOUNTER
Lab Results   Component Value Date    INR 2.50 (!) 03/09/2021    INR 2.70 (!) 03/02/2021    INR 2.30 (!) 02/25/2021       Patient's current Warfarin doses: 7.5 mg on Tues,Thurs,Sat;5 mg all other days      Next INR check is on 3/23/2021      Patient's last OV with HCC was on 2/5/2021    Warfarin prescription 3 month supply and one refill sent to patient's pharmacy today.    Ruby Manzanares RN

## 2021-06-15 NOTE — TELEPHONE ENCOUNTER
ACN called Safia back and she just wanted to confirm doses as she is inputting it on an excel file.Given informtion below:    Warfarin Dosing Instructions:  Continue current warfarin dose     7.5 mg every Tue, Thu, Sat; 5 mg all other days     She has has no other concern at this time.    Ruby Manzanares RN

## 2021-06-15 NOTE — TELEPHONE ENCOUNTER
ANTICOAGULATION  MANAGEMENT    Assessment     Today's INR result of 2.3 is Therapeutic (goal INR of 2.0-3.0)        Warfarin taken as previously instructed    No new diet changes affecting INR    No new medication/supplements affecting INR    Continues to tolerate warfarin with no reported s/s of bleeding or thromboembolism     Previous INR was Therapeutic    Plan:     Spoke on phone with patient's spouse Safia regarding INR result and instructed:      Warfarin Dosing Instructions:  Continue current warfarin dose    7.5 mg every Tue, Thu, Sat; 5 mg all other days        (0 % change)  Safia stated that she wrote dosing instructions in the calendar that  patient brought in today.    Instructed patient to follow up no later than: 3/2 appointment made    Education provided: importance of therapeutic range and target INR goal and significance of current INR result    Safia verbalizes understanding and agrees to warfarin dosing plan.    Instructed to call the [facility] for any changes, questions or concerns. (#[phone])   ?   Ruby Manzanares RN    Subjective/Objective:      Rakesh Samuels, a 70 y.o. male is on warfarin. aRkesh Cameron reports:     Home warfarin dose: as updated on scanned calendar attached with template.     Missed doses: No     Medication changes:  No     S/S of bleeding or thromboembolism:  No     New Injury or illness:  No     Changes in diet or alcohol consumption:  No     Upcoming surgery, procedure or cardioversion:  No    Anticoagulation Episode Summary     Current INR goal:  2.0-3.0   TTR:  39.4 % (1 wk)   Next INR check:  3/2/2021   INR from last check:  2.30 (2/25/2021)   Weekly max warfarin dose:     Target end date:     INR check location:     Preferred lab:     Send INR reminders to:  [facility] HEART CARE    Indications    Paroxysmal atrial fibrillation (H) [I48.0]           Comments:           Anticoagulation Care Providers     Provider Role Specialty Phone number     Eric Pickett MD Referring Cardiology [phone]

## 2021-06-16 PROBLEM — G47.33 OBSTRUCTIVE SLEEP APNEA: Status: ACTIVE | Noted: 2021-03-27

## 2021-06-16 PROBLEM — R55 SYNCOPE AND COLLAPSE: Status: ACTIVE | Noted: 2020-06-30

## 2021-06-16 PROBLEM — R63.0 LOSS OF APPETITE: Status: ACTIVE | Noted: 2021-03-27

## 2021-06-16 PROBLEM — K92.0 HEMATEMESIS, PRESENCE OF NAUSEA NOT SPECIFIED: Status: ACTIVE | Noted: 2020-04-12

## 2021-06-16 PROBLEM — R65.10 SIRS (SYSTEMIC INFLAMMATORY RESPONSE SYNDROME) (H): Status: ACTIVE | Noted: 2019-09-09

## 2021-06-16 PROBLEM — K64.9 HEMORRHOIDS WITHOUT COMPLICATION: Status: ACTIVE | Noted: 2021-03-27

## 2021-06-16 PROBLEM — G47.61 PERIODIC LIMB MOVEMENT DISORDER: Status: ACTIVE | Noted: 2021-03-27

## 2021-06-16 PROBLEM — F60.89 MIXED PERSONALITY DISORDER IN ADULT (H): Status: ACTIVE | Noted: 2018-06-08

## 2021-06-16 PROBLEM — F33.41 MAJOR DEPRESSIVE DISORDER, RECURRENT EPISODE, IN PARTIAL REMISSION (H): Status: ACTIVE | Noted: 2018-06-08

## 2021-06-16 PROBLEM — N17.9 ACUTE RENAL FAILURE, UNSPECIFIED ACUTE RENAL FAILURE TYPE (H): Status: ACTIVE | Noted: 2020-04-12

## 2021-06-16 PROBLEM — H25.9 AGE-RELATED CATARACT: Status: ACTIVE | Noted: 2021-03-27

## 2021-06-16 PROBLEM — I48.0 PAROXYSMAL ATRIAL FIBRILLATION (H): Status: ACTIVE | Noted: 2020-02-18

## 2021-06-16 PROBLEM — N17.0 ATN (ACUTE TUBULAR NECROSIS) (H): Status: ACTIVE | Noted: 2020-04-12

## 2021-06-16 PROBLEM — F33.9 RECURRENT MAJOR DEPRESSION (H): Status: ACTIVE | Noted: 2021-03-27

## 2021-06-16 PROBLEM — N17.9 ACUTE RENAL FAILURE (ARF) (H): Status: ACTIVE | Noted: 2020-04-12

## 2021-06-16 PROBLEM — N12 PYELONEPHRITIS: Status: ACTIVE | Noted: 2021-03-27

## 2021-06-16 PROBLEM — N20.0 NEPHROLITHIASIS: Status: ACTIVE | Noted: 2021-03-27

## 2021-06-16 PROBLEM — M17.9 OSTEOARTHRITIS OF KNEE: Status: ACTIVE | Noted: 2021-03-27

## 2021-06-16 PROBLEM — K80.20 CHOLELITHIASIS WITHOUT OBSTRUCTION: Status: ACTIVE | Noted: 2021-03-27

## 2021-06-16 PROBLEM — Z96.0 S/P URETERAL STENT PLACEMENT: Status: ACTIVE | Noted: 2021-03-27

## 2021-06-16 PROBLEM — N27.0 UNILATERAL SMALL KIDNEY: Status: ACTIVE | Noted: 2021-03-27

## 2021-06-16 PROBLEM — N48.1 BALANITIS: Status: ACTIVE | Noted: 2021-03-27

## 2021-06-16 PROBLEM — N20.1 CALCULUS OF URETER: Status: ACTIVE | Noted: 2020-04-12

## 2021-06-16 NOTE — TELEPHONE ENCOUNTER
ANTICOAGULATION  MANAGEMENT: Discharge Review    Rakesh Samuels chart reviewed for anticoagulation continuity of care    Hospital admission on  3/27 to 3/30 for Pyelonephritis.    Discharge disposition: Long Term Care Facility [facility]    INR Results:       Recent labs: (last 7 days)     03/27/21  1709 03/28/21  0535 03/29/21  0610 03/30/21  0601   INR 2.06* 2.34* 2.96* 2.67*       Warfarin inpatient management: less warfarin administered than maintenance regimen    Warfarin discharge instructions: home regimen continued     Medication Changes Affecting Anticoagulation: Yes: cefdnir    Additional Factors Affecting Anticoagulation: No    Plan     Recommend to check INR on 4/1 due to less warfarin given while hospitalized    Spoke with nurse Callie at [facility]      Anticoagulation calendar updated    Xochitl Moody, RN

## 2021-06-16 NOTE — PROGRESS NOTES
[facility]    Facility:   [facility] [place] [facility] [[id number]]   Code Status: DNR      CHIEF COMPLAINT/REASON FOR VISIT:  Chief Complaint   Patient presents with     Problem Visit     dm, rehab,kidneys       HISTORY:      HPI: Rakesh is a 70 y.o. male who was hospitalized March 27, 2021 through March 30, 2021 secondary to pyelonephritis with the urine culture with mixed microorganisms.  The CT of the abdomen and pelvis did show right kidney is diminutive with multiple nonobstructing calculi which was present similar to the ones in June 2020.  He does have normal size left kidney without nephrolithiasis or obstructive uropathy.  He also has a history of diabetes and the Actos was discontinued.  History of A. fib currently on warfarin.  History of hypertension as well as GERD, sleep apnea-uses CPAP, dyslipidemia.  He is now in the transitional care unit and is actually feeling good and making excellent progress.  He has been normotensive with systolic blood pressures ranging 120-150 also on room air and afebrile.  Is finishing up his antibiotic on April 9.  Blood sugars in the morning ranging 106-202 and towards the p.m. the range of 2-196.  No pain although he takes about 1 Tylenol per day.  His BMP on the April 1 was unremarkable and see results below.  Getting a regular diet and also no issues with constipation and no issues with urination no hematuria and no noticed stones.  He is also on warfarin secondary to A. fib which is being managed by the Coumadin clinic.  Does have a CPAP machine secondary to sleep apnea.    Past Medical History:   Diagnosis Date     A-fib (H)      Acute hemodialysis encounter (H)     Due to CHIDI     Acute kidney injury (H)      Acute respiratory failure with hypoxemia (H)      Adrenal incidentaloma (H)      Asbestosis (H)      ATN (acute tubular necrosis) (H)      Atrophy of right kidney      Basal cell carcinoma      BPH without urinary obstruction       Cellulitis     Left foot     Cholelithiasis      Depression with anxiety      Diabetes mellitus, type 2 (H) 4/12/2020     Esophagitis      Gastritis      Glaucoma      Hematemesis, presence of nausea not specified      Hyperkalemia      Hyperlipemia      Kidney stone      Lactic acidosis      Metabolic acidosis      Metformin overdose of undetermined intent      Paroxysmal atrial fibrillation (H) 2/18/2020     Pericardial effusion      PTSD (post-traumatic stress disorder)      Seasonal allergies      Sepsis due to urinary tract infection (H)      Shock circulatory (H)      SIRS (systemic inflammatory response syndrome) (H)      Sleep apnea     uses a machine at night.      Upper GI bleed              No family history on file.  Social History     Socioeconomic History     Marital status:      Spouse name: Not on file     Number of children: Not on file     Years of education: Not on file     Highest education level: Not on file   Occupational History     Not on file   Social Needs     Financial resource strain: Not on file     Food insecurity     Worry: Not on file     Inability: Not on file     Transportation needs     Medical: Not on file     Non-medical: Not on file   Tobacco Use     Smoking status: Never Smoker     Smokeless tobacco: Never Used   Substance and Sexual Activity     Alcohol use: Not Currently     Frequency: Monthly or less     Binge frequency: Never     Drug use: Never     Sexual activity: Not Currently   Lifestyle     Physical activity     Days per week: Not on file     Minutes per session: Not on file     Stress: Not on file   Relationships     Social connections     Talks on phone: Not on file     Gets together: Not on file     Attends Bahai service: Not on file     Active member of club or organization: Not on file     Attends meetings of clubs or organizations: Not on file     Relationship status: Not on file     Intimate partner violence     Fear of current or ex partner: Not on  file     Emotionally abused: Not on file     Physically abused: Not on file     Forced sexual activity: Not on file   Other Topics Concern     Not on file   Social History Narrative     Not on file         Review of Systems  He currently denies any new symptoms of fever chills fatigue cough or cold sore throat postnasal drip wheezing chest pain dizziness vertigo nausea vomiting diarrhea dysuria unusual myalgias or arthralgias.      Current Outpatient Medications:      acetaminophen (TYLENOL) 500 MG tablet, Take 500 mg by mouth every 6 (six) hours as needed for pain., Disp: , Rfl:      amoxicillin (AMOXIL) 500 MG capsule, Take 2,000 mg by mouth once as needed (Prior to dental work)., Disp: , Rfl:      ARIPiprazole (ABILIFY) 2 MG tablet, Take 2 mg by mouth at bedtime. , Disp: , Rfl:      aspirin 81 mg chewable tablet, Chew 81 mg at bedtime. , Disp: , Rfl:      atorvastatin (LIPITOR) 10 MG tablet, Take 10 mg by mouth at bedtime., Disp: , Rfl: 1     brimonidine (ALPHAGAN) 0.2 % ophthalmic solution, Administer 1 drop to both eyes 2 (two) times a day. , Disp: , Rfl:      cefdinir (OMNICEF) 300 MG capsule, Take 1 capsule (300 mg total) by mouth 2 (two) times a day for 10 days., Disp: 20 capsule, Rfl: 0     dorzolamide-timolol (COSOPT) 22.3-6.8 mg/mL ophthalmic solution, Administer 1 drop to both eyes 2 (two) times a day. , Disp: , Rfl: 3     fluvoxaMINE (LUVOX) 50 MG tablet, Take 50 mg by mouth at bedtime. , Disp: , Rfl:      glipiZIDE (GLUCOTROL XL) 5 MG 24 hr tablet, Take 5 mg by mouth 2 (two) times a day before meals. , Disp: , Rfl:      latanoprostene bunod 0.024 % Drop, Administer 1 drop to both eyes at bedtime., Disp: , Rfl:      levomefolate calcium (L-METHYLFOLATE ORAL), Take 1.25 mg by mouth daily. , Disp: , Rfl:      metFORMIN (GLUCOPHAGE) 1000 MG tablet, Take 1,000 mg by mouth 2 (two) times a day. , Disp: , Rfl:      multivitamin therapeutic tablet, Take 1 tablet by mouth daily., Disp: , Rfl:      netarsudiL  (RHOPRESSA) 0.02 % Drop, Administer 1 drop to both eyes every evening. , Disp: , Rfl:      omeprazole (PRILOSEC) 20 MG capsule, Take 20 mg by mouth daily before breakfast. , Disp: , Rfl:      warfarin ANTICOAGULANT (COUMADIN/JANTOVEN) 5 MG tablet, Take 1-1.5 tablets (5-7.5 mg total) by mouth daily. Adjust dose per INR results as instructed. (Patient taking differently: Take 5-7.5 mg by mouth daily. Adjust dose per INR results as instructed. 5 mg Mon, Wed, Fri and 7.5 mg all other days of the week), Disp: 120 tablet, Rfl: 1    Vitals:    04/02/21 1138   BP: 158/77   Pulse: 81   Resp: 16   Temp: 98.8  F (37.1  C)   SpO2: 99%   Weight: 201 lb 12.8 oz (91.5 kg)   Height: 6' (1.829 m)       Physical Exam  Pleasant gentleman in no acute distress.  Head is normocephalic.  Conjunctiva is pink and sclerae clear.  Neck is supple without adenopathy.  Lung sounds are clear throughout.  Cardiovascular does have an irregularity.  No lower extremity edema.  Gastrointestinal soft nontender with positive bowel sounds and nondistended.  Musculoskeletal he does have a history of osteoarthrosis through his major joints otherwise no new pain issues.  Psychiatric: Pleasant affect.  LABS:   Results for orders placed or performed in visit on 04/01/21   Basic Metabolic Panel   Result Value Ref Range    Sodium 144 136 - 145 mmol/L    Potassium 4.1 3.5 - 5.0 mmol/L    Chloride 110 (H) 98 - 107 mmol/L    CO2 25 22 - 31 mmol/L    Anion Gap, Calculation 9 5 - 18 mmol/L    Glucose 71 70 - 125 mg/dL    Calcium 9.1 8.5 - 10.5 mg/dL    BUN 11 8 - 22 mg/dL    Creatinine 0.73 0.70 - 1.30 mg/dL    GFR MDRD Af Amer >60 >60 mL/min/1.73m2    GFR MDRD Non Af Amer >60 >60 mL/min/1.73m2           ASSESSMENT:      ICD-10-CM    1. Pyelonephritis  N12    2. Nephrolithiasis  N20.0    3. Obstructive sleep apnea  G47.33    4. Type 2 diabetes mellitus without complication, without long-term current use of insulin (H)  E11.9        PLAN:    Had a chance to go over  his stay on the transitional care unit as well as recent laboratory studies and blood sugars as well as blood pressures.  Once again he has been asymptomatic without any hematuria or noticed stones.  He will follow up with urology as previously arranged.  Apparently there will be a discharge coming up soon early next week.  He did not have any other questions.      Electronically signed by: Michael Duane Johnson, CNP

## 2021-06-16 NOTE — TELEPHONE ENCOUNTER
Incoming fax from [facility]    INR of 1.9 today    Confirms 7.5 mg on 3/30 and 5 mg 3/31    Did not answer questions.

## 2021-06-16 NOTE — TELEPHONE ENCOUNTER
FYI - Status Update  Who is Calling: Spouse  Update: Returned call, ACN not availble for transfer at this time. Spouse is leaving for an appointment so wont be available until after. She will call again for ACN later when she returns home, probably between 3:00 and 4:00.  Okay to leave a detailed message?:  No return call needed at this time.

## 2021-06-16 NOTE — TELEPHONE ENCOUNTER
ANTICOAGULATION  MANAGEMENT    Assessment     Today's INR result of 2.5 is Therapeutic (goal INR of 2.0-3.0)        Previous INR was Therapeutic    Warfarin given as previously instructed    No new health/diet changes affecting INR    Interaction between cefdnir and warfarin may be affecting INR - will finish 4/9    Continues to tolerate warfarin with no reported s/s of bleeding or thromboembolism       Plan:     Warfarin Dosing Orders:  Continue current warfarin dose 5 mg daily on Mon, Wed, Fri; and 7.5 mg daily rest of week  (0 % change)    Next INR: u 4/8    Telephone orders given to nurseChanda.  Orders read back correctly.     Xochitl Moody RN    Subjective/Objective:      Rakesh Samuels, a 70 y.o. male is on warfarin. Facility nurse reports for Rakesh:    Other anticoagulants: Yes: ASA    Medication changes: Yes: cefdnir until 4/9     Missed warfarin doses since last INR: No     Abnormal bleeding since last INR: No    New symptoms, injury or illness: No     Upcoming surgery, procedure or cardioversion: No    Recent INR Results:    Lab Results   Component Value Date    INR 2.50 (!) 04/05/2021    INR 1.90 (!) 04/01/2021    INR 2.67 (H) 03/30/2021       Anticoagulation Episode Summary     Current INR goal:  2.0-3.0   TTR:  66.7 % (1.4 mo)   Next INR check:  4/8/2021   INR from last check:  2.50 (4/5/2021)   Weekly max warfarin dose:     Target end date:     INR check location:     Preferred lab:     Send INR reminders to:  [facility] (TCU/L[facility])    Indications    Paroxysmal atrial fibrillation (H) [I48.0]           Comments:           Anticoagulation Care Providers     Provider Role Specialty Phone number    Eric Pickett MD Referring Cardiology [phone]

## 2021-06-16 NOTE — PROGRESS NOTES
[facility]      Facility:    [facility] [[id number]]  Code Status: UNKNOWN      Chief Complaint/Reason for Visit:  Chief Complaint   Patient presents with     H & P     Inflammatory response syndrome, acute pyelonephritis, type 2 diabetes without complications, paroxysmal atrial fibrillation, history of kidney calculus, obstructive sleep apnea,.       HPI:   Rakesh is a 70 y.o. male who was admitted to the hospital on 3/27/2021.  He does a history of nonobstructive calculi present and right perinephritic and periureteral inflammatory stranding on the right kidney.  He is admitted to the hospital for pyelonephritis urine culture mixed organisms with CT scan did show features of pyelonephritis.  Signs of subjective at that time and 10-day course of cefdinir was recommended.  He does have type 2 diabetes this did stop Actos continue Metformin and sliding scale insulin.  He does atrial fibrillation Coumadin management and essential hypertension.  He did improve however he still remains weakness and his weakness declined bilateral in his hands and arms progression of cervical spinal stenosis is likely.  He does have GERD stable with a PPI and he was treated properly and transferred here to the TCU at [facility] in stable condition.    Patient is doing fine however he feels frustrated because he has a TV problem here.  We will straighten that out at this time but he says his pain is well managed and he urinated without difficulty moving his bowels without difficulties no fevers chills nausea vomiting diarrhea.  He says he is doing well he is going to undergo physical therapy here.    Past Medical History:  Past Medical History:   Diagnosis Date     A-fib (H)      Acute hemodialysis encounter (H)     Due to CHIDI     Acute kidney injury (H)      Acute respiratory failure with hypoxemia (H)      Adrenal incidentaloma (H)      Asbestosis (H)      ATN (acute tubular  necrosis) (H)      Atrophy of right kidney      Basal cell carcinoma      BPH without urinary obstruction      Cellulitis     Left foot     Cholelithiasis      Depression with anxiety      Diabetes mellitus, type 2 (H) 4/12/2020     Esophagitis      Gastritis      Glaucoma      Hematemesis, presence of nausea not specified      Hyperkalemia      Hyperlipemia      Kidney stone      Lactic acidosis      Metabolic acidosis      Metformin overdose of undetermined intent      Paroxysmal atrial fibrillation (H) 2/18/2020     Pericardial effusion      PTSD (post-traumatic stress disorder)      Seasonal allergies      Sepsis due to urinary tract infection (H)      Shock circulatory (H)      SIRS (systemic inflammatory response syndrome) (H)      Sleep apnea     uses a machine at night.      Upper GI bleed            Surgical History:  Past Surgical History:   Procedure Laterality Date     EYE SURGERY      congenital ptosis right upper lid     NV CYSTOSCOPY,INSERT URETERAL STENT Left 4/12/2020    Procedure: CYSTOSCOPY, WITH URETERAL STENT INSERTION;  Surgeon: Christopher Yates MD;  Location: [facility];  Service: Urology     NV ESOPHAGOGASTRODUODENOSCOPY TRANSORAL DIAGNOSTIC N/A 4/13/2020    Procedure: ESOPHAGOGASTRODUODENOSCOPY (EGD);  Surgeon: Robert Rico MD;  Location: [facility];  Service: Gastroenterology     REPLACEMENT TOTAL KNEE Left        Family History:   History reviewed. No pertinent family history.    Social History:    Social History     Socioeconomic History     Marital status:      Spouse name: None     Number of children: None     Years of education: None     Highest education level: None   Occupational History     None   Social Needs     Financial resource strain: None     Food insecurity     Worry: None     Inability: None     Transportation needs     Medical: None     Non-medical: None   Tobacco Use     Smoking status: Never Smoker     Smokeless tobacco: Never Used   Substance and  Sexual Activity     Alcohol use: Not Currently     Frequency: Monthly or less     Binge frequency: Never     Drug use: Never     Sexual activity: Not Currently   Lifestyle     Physical activity     Days per week: None     Minutes per session: None     Stress: None   Relationships     Social connections     Talks on phone: None     Gets together: None     Attends Caodaism service: None     Active member of club or organization: None     Attends meetings of clubs or organizations: None     Relationship status: None     Intimate partner violence     Fear of current or ex partner: None     Emotionally abused: None     Physically abused: None     Forced sexual activity: None   Other Topics Concern     None   Social History Narrative     None          Review of Systems   Constitutional:        Patient denies any pain fevers chills nausea vomit diarrhea change in vision hearing taste or smell weakness one-sided chest pain shortness of breath.  Denies any current shortness stool polyphagia polydipsia polyuria depression or anxiety and the main review of systems is negative.       Vitals:    03/31/21 0935   BP: 144/78   Pulse: (!) 55   Resp: 16   Temp: 98.6  F (37  C)   SpO2: 100%       Physical Exam  Constitutional:       General: He is not in acute distress.     Appearance: He is not ill-appearing, toxic-appearing or diaphoretic.   HENT:      Head: Normocephalic.      Nose: Nose normal.   Eyes:      General:         Right eye: No discharge.         Left eye: No discharge.   Cardiovascular:      Comments: Heart sounds were irregularly irregular with adequate rate control.  Pulmonary:      Effort: Pulmonary effort is normal. No respiratory distress.      Breath sounds: Normal breath sounds. No wheezing.   Abdominal:      General: There is no distension.      Tenderness: There is no abdominal tenderness.   Musculoskeletal:      Right lower leg: No edema.      Left lower leg: No edema.   Skin:     General: Skin is warm and  dry.   Neurological:      Mental Status: He is alert. Mental status is at baseline.   Psychiatric:         Mood and Affect: Mood normal.         Behavior: Behavior normal.         Medication List:  Current Outpatient Medications   Medication Sig     acetaminophen (TYLENOL) 500 MG tablet Take 500 mg by mouth every 6 (six) hours as needed for pain.     amoxicillin (AMOXIL) 500 MG capsule Take 2,000 mg by mouth once as needed (Prior to dental work).     ARIPiprazole (ABILIFY) 2 MG tablet Take 2 mg by mouth at bedtime.      aspirin 81 mg chewable tablet Chew 81 mg at bedtime.      atorvastatin (LIPITOR) 10 MG tablet Take 10 mg by mouth at bedtime.     brimonidine (ALPHAGAN) 0.2 % ophthalmic solution Administer 1 drop to both eyes 2 (two) times a day.      cefdinir (OMNICEF) 300 MG capsule Take 1 capsule (300 mg total) by mouth 2 (two) times a day for 10 days.     dorzolamide-timolol (COSOPT) 22.3-6.8 mg/mL ophthalmic solution Administer 1 drop to both eyes 2 (two) times a day.      fluvoxaMINE (LUVOX) 50 MG tablet Take 50 mg by mouth at bedtime.      glipiZIDE (GLUCOTROL XL) 5 MG 24 hr tablet Take 5 mg by mouth 2 (two) times a day before meals.      latanoprostene bunod 0.024 % Drop Administer 1 drop to both eyes at bedtime.     levomefolate calcium (L-METHYLFOLATE ORAL) Take 1.25 mg by mouth daily.      metFORMIN (GLUCOPHAGE) 1000 MG tablet Take 1,000 mg by mouth 2 (two) times a day.      multivitamin therapeutic tablet Take 1 tablet by mouth daily.     netarsudiL (RHOPRESSA) 0.02 % Drop Administer 1 drop to both eyes every evening.      omeprazole (PRILOSEC) 20 MG capsule Take 20 mg by mouth daily before breakfast.      warfarin ANTICOAGULANT (COUMADIN/JANTOVEN) 5 MG tablet Take 1-1.5 tablets (5-7.5 mg total) by mouth daily. Adjust dose per INR results as instructed. (Patient taking differently: Take 5-7.5 mg by mouth daily. Adjust dose per INR results as instructed.  5 mg Mon, Wed, Fri and 7.5 mg all other days of  the week)       Labs: INR in the hospital 2.67, 2.96, 2.342.06.      Assessment:    ICD-10-CM    1. SIRS (systemic inflammatory response syndrome) (H)  R65.10    2. Acute pyelonephritis  N10    3. Type 2 diabetes mellitus with hyperglycemia, unspecified whether long term insulin use (H)  E11.65    4. Essential hypertension  I10    5. Paroxysmal atrial fibrillation (H)  I48.0    6. General weakness  R53.1        Plan: He is frustrated over the TV which is his main problem and I will have maintenance fix that.  We will get the labs from the hospital done but in the meantime I would recommend that we do a basic metabolic profile at some point.  I will wait to see the labs so do not repeat labs at this time.  But we need to get them from the hospital.  Otherwise she seems to be doing okay at this time we will continue with physical and occupational therapy and Coumadin clinic will manage his INR.  We will check his blood sugars 4 times daily at this time and no other changes to care plan at this time.        Electronically signed by: Robert Richmond DO

## 2021-06-16 NOTE — TELEPHONE ENCOUNTER
ANTICOAGULATION  MANAGEMENT    Assessment     Today's INR result of 2.8 is Therapeutic (goal INR of 2.0-3.0)        Previous INR was Therapeutic    Warfarin given as previously instructed    No new health/diet changes affecting INR    No new medication/supplements affecting INR    Continues to tolerate warfarin with no reported s/s of bleeding or thromboembolism     Discharging today from [facility], will have [facility], Jany is ordering next inr with them      Plan:     Warfarin Dosing Orders:  Continue current warfarin dose 5 mg daily on mon/wed/fri; and 7.5 mg daily rest of week  (0 % change)    Next INR: two weeks with home care ([facility])    Telephone orders given to nurseJany.  Orders read back correctly.     Maryuri Moore RN    Subjective/Objective:      Rakesh Samuels, a 70 y.o. male is on warfarin. Facility nurse reports for Rakesh:    Other anticoagulants: No    Medication changes: No     Missed warfarin doses since last INR: No     Abnormal bleeding since last INR: No    New symptoms, injury or illness: No     Upcoming surgery, procedure or cardioversion: No    Recent INR Results:    Lab Results   Component Value Date    INR 2.80 (!) 04/07/2021    INR 2.50 (!) 04/05/2021    INR 1.90 (!) 04/01/2021       Anticoagulation Episode Summary     Current INR goal:  2.0-3.0   TTR:  68.2 % (1.4 mo)   Next INR check:  4/21/2021   INR from last check:  2.80 (4/7/2021)   Weekly max warfarin dose:     Target end date:     INR check location:     Preferred lab:     Send INR reminders to:  [facility]    Indications    Paroxysmal atrial fibrillation (H) [I48.0]           Comments:           Anticoagulation Care Providers     Provider Role Specialty Phone number    Eric Pickett MD Referring Cardiology [phone]

## 2021-06-16 NOTE — TELEPHONE ENCOUNTER
INR result is 3.4   INR   Date Value Ref Range Status   04/07/2021 2.80 (!) 0.90 - 1.10 Final       Will the patient be seen, or did they already see, MD or CNP today? No    Most Recent Warfarin dose day/week  Sunday Monday Tuesday Wednesday Thursday Friday Saturday     7.5 mg 5 mg 7.5 mg 5 mg 7.5 mg     Sunday Monday Tuesday Wednesday Thursday Friday Saturday   7.5 mg 5 mg            Has the patient missed any doses of Coumadin, Warfarin, Jantoven in the past 7 days? No    Has the patients medications changed since the last visit? No    Has the patient experienced any bleeding recently? No    Has the patient experienced any injuries or illness recently? No    Has the patient experienced any 'new' shortness of breath, severe headaches, or changes in vision recently? No    Has the patient had any changes in their diet, or alcohol consumption? No    Is the patient here today to prepare for any type of upcoming surgery, procedure, or for a cardioversion procedure? No    What phone number can we reach the patient at today? [phone] Zaira.

## 2021-06-16 NOTE — TELEPHONE ENCOUNTER
Incoming fax from [facility]    INR OF 2.5 today    Confirms 5 mg on Fri; 7.5 mg Thu/Sat/Sun    Reports ASA and Cefdnir until 4/9

## 2021-06-16 NOTE — TELEPHONE ENCOUNTER
Call from Callie, nurse at [facility],  Pt was just admitted to their facility after discharge from [facility].  Reports warfarin orders are unclear. Looking for warfarin orders.

## 2021-06-16 NOTE — PROGRESS NOTES
[facility]    Facility:   [facility] [[id number]]   Code Status: DNR      CHIEF COMPLAINT/REASON FOR VISIT:  Chief Complaint   Patient presents with     Problem Visit     dm, rehab, htn       HISTORY:      HPI: Rakesh is a 70 y.o. male who I had the pleasure of revisiting with once again today not only secondary to his hospitalization March 27 through March 30, 2021 secondary to pyelonephritis but also discussion of his rehabilitation, diabetes and blood pressures.  Regarding therapy he feels like he is making pretty good progress and there is a rumor that he will be discharging back to the assisted living facility this week.  Finishing up his cefdinir on April 9.  Otherwise asymptomatic.  Morning time blood sugars ranging  and towards the p.m. the range  and is on Metformin and glipizide.  No heartburn or reflux currently on omeprazole 20 mg.  For pain is on Tylenol as needed usually 1-2 doses per day.  Is also on warfarin which is being managed by the Coumadin clinic.  For sleep apnea is on CPAP machine.  Had a pleasant visit today.  He does feel pretty good overall about the progress that he has made.    Past Medical History:   Diagnosis Date     A-fib (H)      Acute hemodialysis encounter (H)     Due to CHIDI     Acute kidney injury (H)      Acute respiratory failure with hypoxemia (H)      Adrenal incidentaloma (H)      Asbestosis (H)      ATN (acute tubular necrosis) (H)      Atrophy of right kidney      Basal cell carcinoma      BPH without urinary obstruction      Cellulitis     Left foot     Cholelithiasis      Depression with anxiety      Diabetes mellitus, type 2 (H) 4/12/2020     Esophagitis      Gastritis      Glaucoma      Hematemesis, presence of nausea not specified      Hyperkalemia      Hyperlipemia      Kidney stone      Lactic acidosis      Metabolic acidosis      Metformin overdose of undetermined intent      Paroxysmal atrial fibrillation (H)  2/18/2020     Pericardial effusion      PTSD (post-traumatic stress disorder)      Seasonal allergies      Sepsis due to urinary tract infection (H)      Shock circulatory (H)      SIRS (systemic inflammatory response syndrome) (H)      Sleep apnea     uses a machine at night.      Upper GI bleed              No family history on file.  Social History     Socioeconomic History     Marital status:      Spouse name: Not on file     Number of children: Not on file     Years of education: Not on file     Highest education level: Not on file   Occupational History     Not on file   Social Needs     Financial resource strain: Not on file     Food insecurity     Worry: Not on file     Inability: Not on file     Transportation needs     Medical: Not on file     Non-medical: Not on file   Tobacco Use     Smoking status: Never Smoker     Smokeless tobacco: Never Used   Substance and Sexual Activity     Alcohol use: Not Currently     Frequency: Monthly or less     Binge frequency: Never     Drug use: Never     Sexual activity: Not Currently   Lifestyle     Physical activity     Days per week: Not on file     Minutes per session: Not on file     Stress: Not on file   Relationships     Social connections     Talks on phone: Not on file     Gets together: Not on file     Attends [employer] service: Not on file     Active member of club or organization: Not on file     Attends meetings of clubs or organizations: Not on file     Relationship status: Not on file     Intimate partner violence     Fear of current or ex partner: Not on file     Emotionally abused: Not on file     Physically abused: Not on file     Forced sexual activity: Not on file   Other Topics Concern     Not on file   Social History Narrative     Not on file         Review of Systems  He currently denies any new symptoms of fever chills fatigue cough or cold sore throat postnasal drip wheezing chest pain dizziness vertigo nausea vomiting diarrhea dysuria  unusual myalgias or arthralgias.    Current Outpatient Medications   Medication Sig     acetaminophen (TYLENOL) 500 MG tablet Take 500 mg by mouth every 6 (six) hours as needed for pain.     amoxicillin (AMOXIL) 500 MG capsule Take 2,000 mg by mouth once as needed (Prior to dental work).     ARIPiprazole (ABILIFY) 2 MG tablet Take 2 mg by mouth at bedtime.      aspirin 81 mg chewable tablet Chew 81 mg at bedtime.      atorvastatin (LIPITOR) 10 MG tablet Take 10 mg by mouth at bedtime.     brimonidine (ALPHAGAN) 0.2 % ophthalmic solution Administer 1 drop to both eyes 2 (two) times a day.      cefdinir (OMNICEF) 300 MG capsule Take 1 capsule (300 mg total) by mouth 2 (two) times a day for 10 days.     dorzolamide-timolol (COSOPT) 22.3-6.8 mg/mL ophthalmic solution Administer 1 drop to both eyes 2 (two) times a day.      fluvoxaMINE (LUVOX) 50 MG tablet Take 50 mg by mouth at bedtime.      glipiZIDE (GLUCOTROL XL) 5 MG 24 hr tablet Take 5 mg by mouth 2 (two) times a day before meals.      latanoprostene bunod 0.024 % Drop Administer 1 drop to both eyes at bedtime.     levomefolate calcium (L-METHYLFOLATE ORAL) Take 1.25 mg by mouth daily.      metFORMIN (GLUCOPHAGE) 1000 MG tablet Take 1,000 mg by mouth 2 (two) times a day.      multivitamin therapeutic tablet Take 1 tablet by mouth daily.     netarsudiL (RHOPRESSA) 0.02 % Drop Administer 1 drop to both eyes every evening.      omeprazole (PRILOSEC) 20 MG capsule Take 20 mg by mouth daily before breakfast.      warfarin ANTICOAGULANT (COUMADIN/JANTOVEN) 5 MG tablet Take 1-1.5 tablets (5-7.5 mg total) by mouth daily. Adjust dose per INR results as instructed. (Patient taking differently: Take 5-7.5 mg by mouth daily. Adjust dose per INR results as instructed.  5 mg Mon, Wed, Fri and 7.5 mg all other days of the week)       Vitals:    04/05/21 0837   BP: (!) 137/96   Pulse: 89   Resp: 16   Temp: 97.9  F (36.6  C)   SpO2: 98%   Weight: 201 lb 12.8 oz (91.5 kg)   Height:  6' (1.829 m)       Physical Exam  Pleasant gentleman in no acute distress.  Head is normocephalic.    Neck is supple without adenopathy.  Lung sounds are clear throughout.  Cardiovascular does have an irregularity.  No lower extremity edema.  Gastrointestinal soft nontender with positive bowel sounds and nondistended.  Musculoskeletal he does have a history of osteoarthrosis through his major joints otherwise no new pain issues.  Psychiatric: Pleasant affect.    LABS:   Lab Results   Component Value Date    WBC 6.7 03/30/2021    HGB 11.1 (L) 03/30/2021    HCT 34.7 (L) 03/30/2021    MCV 98 03/30/2021     03/30/2021         ASSESSMENT:      ICD-10-CM    1. Calculus of ureter  N20.1    2. Depression with anxiety  F41.8    3. Diet-controlled diabetes mellitus (H)  E11.9    4. Pyelonephritis  N12        PLAN:    He does have warfarin and he does have an INR for today which is being called into the Coumadin clinic.  Blood sugars look good.  Blood pressures look good.  Asymptomatic regarding his nephrolithiasis and finishing up his antibiotics on the ninth.  Looks like there is a potential for discharge later on this week.    Electronically signed by: Michael Duane Johnson, CNP

## 2021-06-16 NOTE — PROGRESS NOTES
[facility]    Facility:   [facility] [[id number]]   Code Status: DNR  PCP: Michelet Restrepo MD   Phone: [phone]   Fax: [phone]      CHIEF COMPLAINT/REASON FOR VISIT:  Chief Complaint   Patient presents with     Discharge Summary       HISTORY COURSE:  Rakesh is a 70 y.o. male who was hospitalized March 27, 2021 through March 30, 2021 secondary to pyelonephritis with the urine culture with mixed microorganisms.  The CT of the abdomen and pelvis did show right kidney is diminutive with multiple nonobstructing calculi which was present similar to the ones in June 2020.  He does have normal size left kidney without nephrolithiasis or obstructive uropathy.  He also has a history of diabetes and the Actos was discontinued.  History of A. fib currently on warfarin.  History of hypertension as well as GERD, sleep apnea-uses CPAP, dyslipidemia.  He has been able to successfully participate with the rehabilitation services.  He at this point is independent and able to ambulate with a cane up and down the hallway.  He is not having any discomfort.  No Tylenol as needed.  Blood sugars in the morning ranging 104-115 and then towards the p.m. the range 109-174.  Moods have been stable.  Finishing up his cefdinir on April 9.  Otherwise asymptomatic.  Getting eyedrops for glaucoma.  Also on warfarin which is being managed by the Coumadin clinic.  He has been a pleasure to get to know.  He did not have any questions or complications.  Review of Systems  He currently denies any new symptoms of fever chills fatigue cough or cold sore throat postnasal drip wheezing chest pain dizziness vertigo nausea vomiting diarrhea dysuria unusual myalgias or arthralgias.     There were no vitals filed for this visit.  Blood pressure 126/75, pulse 76, temperature 97.0  Physical Exam  Pleasant gentleman in no acute distress.  Head is normocephalic.  Conjunctiva is pink and sclerae clear.  Neck is  supple without adenopathy.  Lung sounds are clear throughout.  Cardiovascular does have an irregularity.  No lower extremity edema.  Gastrointestinal soft nontender with positive bowel sounds and nondistended.  Musculoskeletal he does have a history of osteoarthrosis through his major joints otherwise no new pain issues.  Psychiatric: Pleasant affect.  Lab Results   Component Value Date    WBC 6.7 03/30/2021    HGB 11.1 (L) 03/30/2021    HCT 34.7 (L) 03/30/2021    MCV 98 03/30/2021     03/30/2021     ,  Results for orders placed or performed in visit on 04/01/21   Basic Metabolic Panel   Result Value Ref Range    Sodium 144 136 - 145 mmol/L    Potassium 4.1 3.5 - 5.0 mmol/L    Chloride 110 (H) 98 - 107 mmol/L    CO2 25 22 - 31 mmol/L    Anion Gap, Calculation 9 5 - 18 mmol/L    Glucose 71 70 - 125 mg/dL    Calcium 9.1 8.5 - 10.5 mg/dL    BUN 11 8 - 22 mg/dL    Creatinine 0.73 0.70 - 1.30 mg/dL    GFR MDRD Af Amer >60 >60 mL/min/1.73m2    GFR MDRD Non Af Amer >60 >60 mL/min/1.73m2       Lab Results   Component Value Date    CHOL 145 11/18/2019    CHOL 136 10/29/2018    CHOL 130 09/21/2017     Lab Results   Component Value Date    HDL 43 11/18/2019    HDL 43 10/29/2018    HDL 39 (L) 09/21/2017     Lab Results   Component Value Date    LDLCALC 73 11/18/2019    LDLCALC 64 10/29/2018    LDLCALC 56 09/21/2017     Lab Results   Component Value Date    TRIG 147 11/18/2019    TRIG 147 10/29/2018    TRIG 173 (H) 09/21/2017     No components found for: CHOLHDL  Lab Results   Component Value Date    ALT 10 03/30/2021    AST 10 03/30/2021    ALKPHOS 47 03/30/2021    BILITOT 0.3 03/30/2021       MEDICATION LIST:  Current Outpatient Medications   Medication Sig     acetaminophen (TYLENOL) 500 MG tablet Take 500 mg by mouth every 6 (six) hours as needed for pain.     amoxicillin (AMOXIL) 500 MG capsule Take 2,000 mg by mouth once as needed (Prior to dental work).     ARIPiprazole (ABILIFY) 2 MG tablet Take 2 mg by mouth at  bedtime.      aspirin 81 mg chewable tablet Chew 81 mg at bedtime.      atorvastatin (LIPITOR) 10 MG tablet Take 10 mg by mouth at bedtime.     brimonidine (ALPHAGAN) 0.2 % ophthalmic solution Administer 1 drop to both eyes 2 (two) times a day.      dorzolamide-timolol (COSOPT) 22.3-6.8 mg/mL ophthalmic solution Administer 1 drop to both eyes 2 (two) times a day.      fluvoxaMINE (LUVOX) 50 MG tablet Take 50 mg by mouth at bedtime.      glipiZIDE (GLUCOTROL XL) 5 MG 24 hr tablet Take 5 mg by mouth 2 (two) times a day before meals.      latanoprostene bunod 0.024 % Drop Administer 1 drop to both eyes at bedtime.     levomefolate calcium (L-METHYLFOLATE ORAL) Take 1.25 mg by mouth daily.      metFORMIN (GLUCOPHAGE) 1000 MG tablet Take 1,000 mg by mouth 2 (two) times a day.      multivitamin therapeutic tablet Take 1 tablet by mouth daily.     netarsudiL (RHOPRESSA) 0.02 % Drop Administer 1 drop to both eyes every evening.      omeprazole (PRILOSEC) 20 MG capsule Take 20 mg by mouth daily before breakfast.      warfarin ANTICOAGULANT (COUMADIN/JANTOVEN) 5 MG tablet Take 1-1.5 tablets (5-7.5 mg total) by mouth daily. Adjust dose per INR results as instructed. (Patient taking differently: Take 5-7.5 mg by mouth daily. Adjust dose per INR results as instructed.  5 mg Mon, Wed, Fri and 7.5 mg all other days of the week)       DISCHARGE DIAGNOSIS:    ICD-10-CM    1. Biliary calculus of other site without obstruction  K80.80    2. Glaucoma, unspecified glaucoma type, unspecified laterality  H40.9    3. 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 Pyelonephritis  N12        MEDICAL EQUIPMENT NEEDS:  None    DISCHARGE PLAN/FACE TO FACE:  I certify that services are/were furnished while this patient was under the care of a physician and that a physician or an allowed non-physician practitioner (NPP), had a face-to-face encounter that meets the physician face-to-face encounter requirements. The encounter was in whole,  or in part, related to the primary reason for home health. The patient is confined to his/her home and needs intermittent skilled nursing, physical therapy, speech-language pathology, or the continued need for occupational therapy. A plan of care has been established by a physician and is periodically reviewed by a physician.  Date of Face-to-Face Encounter: April 7, 2021    I certify that, based on my findings, the following services are medically necessary home health services: He will be discharging to home with current medications with physical and occupational therapy home health aide and nursing.  The anticipated discharge date is Wednesday, April 7, 2021    My clinical findings support the need for the above skilled services because: (Please write a brief narrative summary that describes what the RN, PT, SLP, or other services will be doing in the home. A list of diagnoses in this section does not meet the CMS requirements.)  He will require the skilled services for continuation of the rehabilitation program and process along with having some basic self-care deficits as well as nursing for medication management.    This patient is homebound because: (Please write a brief narrative summary describing the functional limitations as to why this patient is homebound and specifically what makes this patient homebound.)  Secondary to multiple chronic medical conditions including his most recent hospitalization for pyelonephritis.  We will also require therapy for basic cares along with continuation of therapy process and home safety along with nursing for medication management including diabetes and glaucoma.    The patient is, or has been, under my care and I have initiated the establishment of the plan of care. This patient will be followed by a physician who will periodically review the plan of care.    Schedule follow up visit with primary care provider within 7 days to reestablish care.  He will follow up with  the primary care team to go over his medications as well as any future laboratory studies.  He has been a delight to get to know.  He will follow up with urology as previously arranged.  He did not have any other further questions.    Discharge coordination care greater than 30 minutes  Electronically signed by: Michael Duane Johnson, CNP

## 2021-06-17 NOTE — TELEPHONE ENCOUNTER
INR result is 2.3  INR   Date Value Ref Range Status   04/27/2021 2.70 (!) 0.90 - 1.10 Final       Will the patient be seen, or did they already see, MD or CNP today? No    Most Recent Warfarin dose day/week  Sunday Monday Tuesday Wednesday Thursday Friday Saturday     5 5 7.5 5 5     Sunday Monday Tuesday Wednesday Thursday Friday Saturday   7.5 5            Has the patient missed any doses of Coumadin, Warfarin, Jantoven in the past 7 days? No    Has the patients medications changed since the last visit? No    Has the patient experienced any bleeding recently? No    Has the patient experienced any injuries or illness recently? No    Has the patient experienced any 'new' shortness of breath, severe headaches, or changes in vision recently? No    Has the patient had any changes in their diet, or alcohol consumption? No    Is the patient here today to prepare for any type of upcoming surgery, procedure, or for a cardioversion procedure? No    What phone number can we reach the patient at today? home phone listed in demographics.

## 2021-06-17 NOTE — TELEPHONE ENCOUNTER
Telephone Encounter by Xochitl Moody RN at 4/1/2021 11:57 AM     Author: Xochitl Moody RN Service: -- Author Type: Registered Nurse    Filed: 4/1/2021 11:59 AM Encounter Date: 4/1/2021 Status: Signed    : Xochitl Moody RN (Registered Nurse)       ANTICOAGULATION  MANAGEMENT    Assessment     Today's INR result of 1.9 is Subtherapeutic (goal INR of 2.0-3.0)        Previous INR was Therapeutic    Warfarin given as previously instructed    No new health/diet changes affecting INR    Interaction between cefdnir and warfarin may be affecting INR    Continues to tolerate warfarin with no reported s/s of bleeding or thromboembolism       Plan:     Warfarin Dosing Orders:  Continue current warfarin dose 5 mg daily on Mon, Wed, Fri; and 7.5 mg daily rest of week  (0 % change)    Next INR: Mon 4/5    Telephone orders given to nurseIda.  Orders read back correctly.     Xochitl Moody RN    Subjective/Objective:      Rakesh Samuels, a 70 y.o. male is on warfarin. Facility nurse reports for Rakesh:    Other anticoagulants: No    Medication changes: No     Missed warfarin doses since last INR: No     Abnormal bleeding since last INR: No    New symptoms, injury or illness: No     Upcoming surgery, procedure or cardioversion: No    Recent INR Results:    Lab Results   Component Value Date    INR 1.90 (!) 04/01/2021    INR 2.67 (H) 03/30/2021    INR 2.96 (H) 03/29/2021       Anticoagulation Episode Summary     Current INR goal:  2.0-3.0   TTR:  64.9 % (1.2 mo)   Next INR check:  4/5/2021   INR from last check:  1.90 (4/1/2021)   Weekly max warfarin dose:     Target end date:     INR check location:     Preferred lab:     Send INR reminders to:  [facility] (TC[facility]/LTC/[facility])    Indications    Paroxysmal atrial fibrillation (H) [I48.0]           Comments:           Anticoagulation Care Providers     Provider Role Specialty Phone number    Eric Pickett MD Referring Cardiology  [phone]

## 2021-06-17 NOTE — TELEPHONE ENCOUNTER
INR result is   2.7    Will the patient be seen, or did they already see, MD or CNP today? No    Most Recent Warfarin dose day/week  Sunday Monday Tuesday Wednesday Thursday Friday Saturday     5 5 7.5 5 5     Sunday Monday Tuesday Wednesday Thursday Friday Saturday   7.5 5            Has the patient missed any doses of Coumadin, Warfarin, Jantoven in the past 7 days? No    Has the patients medications changed since the last visit? No    Has the patient experienced any bleeding recently? No    Has the patient experienced any injuries or illness recently? No    Has the patient experienced any 'new' shortness of breath, severe headaches, or changes in vision recently? No    Has the patient had any changes in their diet, or alcohol consumption? No    Is the patient here today to prepare for any type of upcoming surgery, procedure, or for a cardioversion procedure? No    What phone number can we reach the patient at today? [phone]

## 2021-06-17 NOTE — TELEPHONE ENCOUNTER
Who is calling:  Kaylin  Reason for Call:  Patient is being discharged from home care as of today, 5/7. Please advise nurse on patients next INR date.  Date of last appointment with primary care: n/a  Okay to leave a detailed message: Yes

## 2021-06-17 NOTE — TELEPHONE ENCOUNTER
Telephone Encounter by Ruby Manzanares RN at 2/9/2021  3:03 PM     Author: Ruby Manzanares RN Service: -- Author Type: Registered Nurse    Filed: 2/9/2021  3:03 PM Encounter Date: 2/5/2021 Status: Signed    : Ruby Manzanares RN (Registered Nurse)        Eric Pickett MD Draz, Tati CHILD, PharmD 6 hours ago (8:23 AM)     I agree with the plan.   Thank you   CLIFTON Pickett.    Message text

## 2021-06-17 NOTE — TELEPHONE ENCOUNTER
Telephone Encounter by Ruby Manzanares RN at 3/23/2021 11:10 AM     Author: Ruby Manzanares RN Service: -- Author Type: Registered Nurse    Filed: 3/23/2021  4:20 PM Encounter Date: 3/23/2021 Status: Signed    : Ruby Manzanares RN (Registered Nurse)       ANTICOAGULATION  MANAGEMENT    Assessment     Today's INR result of 1.7 is Subtherapeutic (goal INR of 2.0-3.0)        Warfarin taken as previously instructed    No new diet changes affecting INR    No new medication/supplements affecting INR    Continues to tolerate warfarin with no reported s/s of bleeding or thromboembolism     Previous INR was Therapeutic    Plan:     Spoke on phone with patient's spouse Safia regarding INR result and instructed:      Warfarin Dosing Instructions:  10 mg booster dose today then change warfarin dose to    5 mg every Mon, Wed, Fri; 7.5 mg all other days      (6 % change)    Safia likes to be given daily doses due to she to enters information on her excel spreadsheet - this helps help her be more organized in managing the patients meds.    Instructed patient to follow up no later than: 1-2 weeks appointment made for 4/5 at 0915    Education provided: importance of therapeutic range, target INR goal and significance of current INR result, importance of following up for INR monitoring at instructed interval and importance of taking warfarin as instructed    Safia verbalizes understanding and agrees to warfarin dosing plan.    Instructed to call the [facility] for any changes, questions or concerns. (#[phone])   ?   Ruby Manzanares RN    Subjective/Objective:      Rakesh SWANSON Emekaabbie, a 70 y.o. male is on warfarin. Rakesh Cameron reports:     Home warfarin dose: verbally confirmed home dose with Safia and updated on anticoagulation calendar     Missed doses: No     Medication changes:  No     S/S of bleeding or thromboembolism:  No     New Injury or illness:  No     Changes in diet or alcohol consumption:  No      Upcoming surgery, procedure or cardioversion:  CT Angio next Tues    Anticoagulation Episode Summary     Current INR goal:  2.0-3.0   TTR:  71.2 % (1.1 mo)   Next INR check:  4/6/2021   INR from last check:  1.70 (3/23/2021)   Weekly max warfarin dose:     Target end date:     INR check location:     Preferred lab:     Send INR reminders to:  [facility]    Indications    Paroxysmal atrial fibrillation (H) [I48.0]           Comments:           Anticoagulation Care Providers     Provider Role Specialty Phone number    Eric Pickett MD Referring Cardiology [phone]

## 2021-06-17 NOTE — TELEPHONE ENCOUNTER
Telephone Encounter by Ruby Manzanares RN at 2/18/2021  9:25 AM     Author: Ruby Manzanares RN Service: -- Author Type: Registered Nurse    Filed: 2/18/2021  2:22 PM Encounter Date: 2/18/2021 Status: Signed    : Ruby Manzanares RN (Registered Nurse)       ANTICOAGULATION  MANAGEMENT    Assessment     Today's INR result of 1.0 is Subtherapeutic (goal INR of 2.0-3.0)   Patient transitioned from Eliquis to warfarin.     Per Safia the patient took his last dose of Eliquis on 2/15 PM. Starrted warfarin 5 mg on 2/16    No new diet changes affecting INR    No new medication/supplements affecting INR    Continues to tolerate warfarin with no reported s/s of bleeding or thromboembolism     Previous INR was no baseline INR done     Discussed with Safia the importance of tracking the medication doses taken by patient- for safety and accuracy in dosing /adjustment    Plan:     Spoke on phone with Afsaneh regarding INR result and instructed:      Warfarin Dosing Instructions:  Change warfarin dose to    7.5 mg every Tue, Thu, Sat; 5 mg all other days     (21 % change)  Safia stated that she wrote the doses in the calendar for tracking.    Instructed patient to follow up no later than: 2/22 appointment made for 3 PM due to Safia still has to call [facility].    Education provided: importance of therapeutic range, target INR goal and significance of current INR result and importance of taking warfarin as instructed    Safia verbalizes understanding and agrees to warfarin dosing plan.    Instructed to call the [facility] for any changes, questions or concerns. (#[phone])   ?   Ruby Manzanares RN    Subjective/Objective:      Rakesh Samuels, a 70 y.o. male is on warfarin. Rakesh Cameron reports:     Home warfarin dose: started first dose of warfarin on 2/16     Missed doses: No     Medication changes:  No     S/S of bleeding or thromboembolism:  No     New Injury or illness:  No     Changes in diet or  alcohol consumption:  No     Upcoming surgery, procedure or cardioversion:  No    Anticoagulation Episode Summary     Current INR goal:  2.0-3.0   TTR:  --   Next INR check:  2/22/2021   INR from last check:  1.00 (2/18/2021)   Weekly max warfarin dose:     Target end date:     INR check location:     Preferred lab:     Send INR reminders to:  [facility]    Indications    Paroxysmal atrial fibrillation (H) [I48.0]           Comments:           Anticoagulation Care Providers     Provider Role Specialty Phone number    Eric Pickett MD Referring Cardiology [phone]

## 2021-06-17 NOTE — TELEPHONE ENCOUNTER
ANTICOAGULATION  MANAGEMENT    Assessment     Today's INR result of 2.7 is Therapeutic (goal INR of 2.0-3.0)        Warfarin taken as previously instructed    No new diet changes affecting INR    No new medication/supplements affecting INR    Continues to tolerate warfarin with no reported s/s of bleeding or thromboembolism     Previous INR was Supratherapeutic    Plan:     Spoke on phone with home care nurse Kaylin regarding INR result and instructed:      Warfarin Dosing Instructions:  Continue current warfarin dose 7.5 mg daily on Sunday/Thursday; and 5 mg daily rest of week  (0 % change)    Instructed patient to follow up no later than: 1 week    Education provided: importance of therapeutic range, target INR goal and significance of current INR result, importance of following up for INR monitoring at instructed interval and importance of taking warfarin as instructed    Kaylin home care nurse verbalizes understanding and agrees to warfarin dosing plan.    Instructed to call the [facility] for any changes, questions or concerns. (#[phone])   ?   Anisa Christian RN    Subjective/Objective:      Rakesh Samuels, a 70 y.o. male is on warfarin. Rakesh Cameron reports:     Home warfarin dose: verbally confirmed home dose with Kaylinhome care nurse and updated on anticoagulation calendar     Missed doses: No     Medication changes:  No     S/S of bleeding or thromboembolism:  No     New Injury or illness:  Yes: Seen in ED on 4/8/21 for head injury, no LOC. CT negative for hemorrhage     Changes in diet or alcohol consumption:  No     Upcoming surgery, procedure or cardioversion:  No    Anticoagulation Episode Summary     Current INR goal:  2.0-3.0   TTR:  58.2 % (2.1 mo)   Next INR check:  4/27/2021   INR from last check:  2.70 (4/27/2021)   Weekly max warfarin dose:     Target end date:     INR check location:     Preferred lab:     Send INR reminders to:  [facility]    Indications     Paroxysmal atrial fibrillation (H) [I48.0]           Comments:           Anticoagulation Care Providers     Provider Role Specialty Phone number    Eric Pickett MD Referring Cardiology [phone]

## 2021-06-17 NOTE — TELEPHONE ENCOUNTER
Telephone Encounter by Ruby Manzanares RN at 4/20/2021 12:50 PM     Author: Ruby Manzanares RN Service: -- Author Type: Registered Nurse    Filed: 4/20/2021 12:51 PM Encounter Date: 4/20/2021 Status: Signed    : Ruby Manzanares RN (Registered Nurse)       ANTICOAGULATION  MANAGEMENT- Home Care/Care Facility Result    Assessment     Today's INR result of 3.4 is Supratherapeutic (goal INR of 2.0-3.0)        Warfarin taken as previously instructed    No new diet changes affecting INR    No new medication/supplements affecting INR    Continues to tolerate warfarin with no reported s/s of bleeding or thromboembolism     Previous INR was Therapeutic    Plan:     Spoke with Home Care nurse Tequila discussed INR result and instructed:     Warfarin Dosing Instructions: Change warfarin dose to    7.5 mg every Sun, Thu; 5 mg all other days      (11 % change)    Next INR to be drawn: one week    Education provided: importance of therapeutic range, target INR goal and significance of current INR result and importance of following up for INR monitoring at instructed interval    Tequila verbalizes understanding and agrees to warfarin dosing plan.   ?   Ruby Manzanares RN    Subjective/Objective:      Rakesh Samuels, a 70 y.o. male is established on warfarin.     Home care/care facility RN's report of Rakesh INR, recent warfarin dosing, diet changes, medication changes, and symptoms is documented below.    Additional findings: none    Anticoagulation Episode Summary     Current INR goal:  2.0-3.0   TTR:  60.1 % (1.9 mo)   Next INR check:  4/27/2021   INR from last check:  3.40 (4/20/2021)   Weekly max warfarin dose:     Target end date:     INR check location:     Preferred lab:     [facility]    Indications    Paroxysmal atrial fibrillation (H) [I48.0]           Comments:           Anticoagulation Care Providers     Provider Role Specialty Phone number    NievesEric  MD [facility] Cardiology [phone]

## 2021-06-17 NOTE — TELEPHONE ENCOUNTER
ANTICOAGULATION  MANAGEMENT- Home Care/Care Facility Result    Assessment     Today's INR result of 2.3 is Therapeutic (goal INR of 2.0-3.0)        Warfarin taken as previously instructed    No new diet changes affecting INR    No new medication/supplements affecting INR    Continues to tolerate warfarin with no reported s/s of bleeding or thromboembolism     Previous INR was Therapeutic     Per Kaylin the patient will be discharging from Home Care after visit next week    Plan:     Spoke with Home Care Nurse Kaylin discussed INR result and instructed:     Warfarin Dosing Instructions: Continue current warfarin dose    7.5 mg every Sun, Thu; 5 mg all other days     (0 % change)    Next INR to be drawn: one week- last home care visit.    Education provided: importance of therapeutic range, target INR goal and significance of current INR result and importance of following up for INR monitoring at instructed interval    Kaylin verbalizes understanding and agrees to warfarin dosing plan.   ?   Ruby Manzanares RN    Subjective/Objective:      Rakesh Samuels, a 70 y.o. male is established on warfarin.     Home care/care facility RN's report of Rakesh INR, recent warfarin dosing, diet changes, medication changes, and symptoms is documented below.    Additional findings: none    Anticoagulation Episode Summary     Current INR goal:  2.0-3.0   TTR:  62.4 % (2.3 mo)   Next INR check:  5/11/2021   INR from last check:  2.30 (5/4/2021)   Weekly max warfarin dose:     Target end date:     INR check location:     Preferred lab:     Send INR reminders to:  [facility]    Indications    Paroxysmal atrial fibrillation (H) [I48.0]           Comments:           Anticoagulation Care Providers     Provider Role Specialty Phone number    Eric Pickett MD Referring Cardiology [phone]

## 2021-06-18 NOTE — PATIENT INSTRUCTIONS - HE
Patient Instructions by Eric Pickett MD at 2/5/2021 11:30 AM     Author: Eric Pickett MD Service: -- Author Type: Physician    Filed: 2/5/2021 12:06 PM Encounter Date: 2/5/2021 Status: Addendum    : Eric Pickett MD (Physician)    Related Notes: Original Note by Eric Pickett MD (Physician) filed at 2/5/2021 12:02 PM       It was a pleasure to meet with you today.      Below is a summary of your visit.   1. I have sent a referral to our anticoagulation clinic to change your blood thinner to warfarin  2. Continue your eliquis until you run out or you are changed to warfarin.  3. Schedule a CT coronary angiogram to look for coronary artery disease.  4. Wear a heart rhythm monitor for a week to look for a rhythm problem to explain your wooziness.  5. Follow up with me in 3 months or sooner if needed.    You should receive a phone call from this office informing you of test or procedure results within 3 business days of the test being performed.  If you do not hear from our office with the test results within 1 week please do not hesitate to call asking for these results.     Please do not hesitate to call the [facility] clinic with any questions or concerns at [phone]. You can also reach my nurse, Jael, during normal business hours at [phone].    Sincerely,

## 2021-06-18 NOTE — PATIENT INSTRUCTIONS - HE
Patient Instructions by Eric Pickett MD at 2/18/2020  9:10 AM     Author: Eric Pickett MD Service: -- Author Type: Physician    Filed: 2/18/2020 10:10 AM Encounter Date: 2/18/2020 Status: Signed    : Eric Pickett MD (Physician)       Below is a list of instructions we discussed today in clinic:   1. Finish your supply of xarelto but make sure you take it with a meal.  2. When your xarelto is finished, start taking Eliquis 5 mg twice daily.  3. Continue all other medications as prescribed.  4. I have placed a referral to our left atrial appendage closure team to consider placement of a Watchman device. You can find more information at [email or web address]  5. Schedule an echocardiogram to look at the structure and function of your heart.  6. When you get your CPAP to treat sleep apnea, it is VERY important that you wear it.   7. Follow up with me in about 3 months or sooner if needed.    You should receive a phone call from this office informing you of test or procedure results within 3 business days of the test being performed.  If you do not hear from our office with the test results within 1 week please do not hesitate to call asking for these results.     It was a pleasure to meet with you today in clinic.  Please do not hesitate to call the [facility] clinic with any questions or concerns at [phone].    Sincerely,

## 2021-06-20 NOTE — LETTER
Letter by Kylie Gomez CNP at      Author: Kylie Gomez CNP Service: -- Author Type: --    Filed:  Encounter Date: 4/23/2020 Status: (Other)         Patient: Rakesh Samuels   MR Number: [id number]   YOB: 1950   Date of Visit: 4/23/2020     Code Status:  FULL CODE  Visit Type: Problem Visit     Facility:  [facility] [[id number]]             History of Present Illness: Rakesh Samuels is a 69 y.o. male who I am seeing today for follow-up on the TCU. Pt recently hospitalized on 4/12/2020.  Past medical history includes atrial fibril on Eliquis, diabetes type 2, hyperlipidemia and glaucoma.  Patient presented to the hospital with upper GI bleed.  He underwent EGD on 4/14 showing esophagitis and gastritis with no active bleeding.  Initially his Eliquis was held.  He was treated with heparin.  Also placed on omeprazole 2 times daily.  Patient found also to have circulatory shock felt to be secondary to left nephrolithiasis.  TTE shows normal LV EF.  Questionable source due to sepsis versus GI blood loss.  He was initially treated with IV Zosyn however negative culture results returned so antibiotics discontinued.  He developed acute kidney injury requiring hemodialysis and respiratory failure requiring intubation.  Patient with blood-tinged urine secondary to anticoagulation in the setting of ureteral stent.  He is followed by urology.  History of obstructive uropathy status post cystoscopy with left ureteral stent placement.  Acute injury improved with fluids and hemodialysis.  Acute respiratory failure.  He was extubated on 4/14.  He is off oxygen.  Diabetes mellitus type 2.  His p.o. meds were stopped.  Hospitalization secondary to acute kidney injury.  He was placed on NovoLog sliding scale and Lantus.    Today patient lying in bed.  Patient with rate since increasing pain in his left foot at the base of his toes.  He is continued with some redness and swelling.  Uric  acid obtained which was normal at 5.1.  X-ray also obtained which was negative for any acute injury or fracture.  Patient spiked a temp yesterday of 100.4.  He has been given 2 doses of Keflex.  Earlier this a.m. had some diarrhea.  No blood in his stools.  Today he reports he was able to stand on his foot.  Pulses are palpable and bounding.  Edema appears somewhat less.  He does have 2 open areas between the toes 2 and 3 and 3 and 4.  Afebrile this morning.  White count obtained which was 7.3.  Patient voiding adequately.  Denies any hematuria.  Diabetes.  Blood sugar slightly elevated in the 200s.  Recent increase in his insulin.  He was taken off his p.o. meds secondary to acute kidney injury.  Kidney function has returned to normal.  I did talk with his wife on the phone.  Reviewed the laboratory results as well as x-ray.  She is very worried patient has a blood clot.  I did review the fact that he is on anticoagulation.  Pulses are palpable.    Active Ambulatory Problems     Diagnosis Date Noted   ? SIRS (systemic inflammatory response syndrome) (H) 09/09/2019   ? Adrenal incidentaloma (H)    ? Diet-controlled diabetes mellitus (H)    ? Pericardial effusion    ? Lactic acidosis    ? Diabetes mellitus, type 2 (H) 04/12/2020   ? Paroxysmal atrial fibrillation (H) 02/18/2020   ? Calculus of ureter 04/12/2020   ? Acute renal failure, unspecified acute renal failure type (H) 04/12/2020   ? Acute renal failure (ARF) (H) 04/12/2020   ? ATN (acute tubular necrosis) (H) 04/12/2020   ? Sepsis due to urinary tract infection (H)    ? Shock circulatory (H)    ? Metformin overdose of undetermined intent, initial encounter    ? Acute respiratory failure with hypoxemia (H)    ? Metabolic acidosis    ? Hyperkalemia    ? Hematemesis, presence of nausea not specified 04/12/2020     Resolved Ambulatory Problems     Diagnosis Date Noted   ? No Resolved Ambulatory Problems     No Additional Past Medical History     Current  Outpatient Medications   Medication Sig   ? apixaban ANTICOAGULANT (ELIQUIS) 5 mg Tab tablet Take 1 tablet (5 mg total) by mouth 2 (two) times a day.   ? ARIPiprazole (ABILIFY) 2 MG tablet Take 1 tablet (2 mg total) by mouth every evening.   ? atorvastatin (LIPITOR) 10 MG tablet Take 10 mg by mouth at bedtime.   ? cephalexin (KEFLEX) 500 MG capsule Take 250 mg by mouth 3 (three) times a day.    ? dorzolamide-timolol (COSOPT) 22.3-6.8 mg/mL ophthalmic solution Administer 1 drop to both eyes 2 (two) times a day.   ? fluvoxaMINE (LUVOX) 100 MG tablet Take 0.5 tablets (50 mg total) by mouth at bedtime for 14 days.   ? LANTUS SOLOSTAR U-100 INSULIN 100 unit/mL (3 mL) pen Inject 10 Units under the skin at bedtime. 11.65 Type 2 with hyperglycemia  Contact provider if insulin prescribed is not the preferred insulin per insurance.   ? latanoprostene bunod 0.024 % Drop Administer 1 drop to both eyes at bedtime.   ? multivitamin therapeutic tablet Take 1 tablet by mouth daily.   ? netarsudiL (RHOPRESSA) 0.02 % Drop Administer 1 drop to both eyes daily.   ? NOVOLOG FLEXPEN U-100 INSULIN 100 unit/mL (3 mL) injection pen Check blood sugar four (4) times daily.  11.65 Type 2 with hyperglycemia  BD Ultra-fine Adina Pen Needles - NDC [address]-81 - dispense 1 case,  refill PRN for 1 year   ? omeprazole (PRILOSEC OTC) 20 MG tablet Take 1 tablet (20 mg total) by mouth 2 (two) times a day before meals.   ? VENTOLIN HFA 90 mcg/actuation inhaler Inhale 2 puffs every 4 (four) hours as needed.       Allergies   Allergen Reactions   ? Adhesive Tape-Silicones          Review of Systems  No fevers or chills. No headache, lightheadedness or dizziness. No SOB, chest pains or palpitations. Appetite is good. No nausea, vomiting, constipation or diarrhea. No dysuria, frequency, burning or pain with urination.  No evidence of acute bleed.  Pain in the left foot.  Reports history of gout.  Otherwise review of systems are negative.     Physical  Exam  PHYSICAL EXAMINATION:  Vital signs: /56, pulse 52, respirations 18, temperature 98.2, O2 sat 97% on room air.  Weight 187 pounds.  General: Awake, Alert, oriented x3, appropriately, follows simple commands, conversant  HEENT Pink conjunctiva on the left with slightly red conjunctiva on the right.  No drainage, anicteric sclerae, moist oral mucosa.  History of glaucoma with vision loss in the right eye.   NECK: Supple  CARDIOVASCULAR: S1-S2 without murmur gallop.   RESPIRATORY: No wheezes rales or rhonchi  BACK: No kyphosis of the thoracic spine  EXTREMITIES: Good range of motion on both upper and lower extremities, 1+ pedal edema on the left lower extremity, no calf tenderness.  Tenderness at the base of the toes on the left foot.   SKIN: Left foot with redness and swelling at the forefoot.  Small fissures between the second and third and third and fourth toes.  NEUROLOGIC: Intact, pulses palpable  PSYCHIATRIC: Cognition intact      Labs:    Results for orders placed or performed in visit on 04/20/20   Basic Metabolic Panel   Result Value Ref Range    Sodium 146 (H) 136 - 145 mmol/L    Potassium 3.9 3.5 - 5.0 mmol/L    Chloride 112 (H) 98 - 107 mmol/L    CO2 27 22 - 31 mmol/L    Anion Gap, Calculation 7 5 - 18 mmol/L    Glucose 123 70 - 125 mg/dL    Calcium 7.8 (L) 8.5 - 10.5 mg/dL    BUN 12 8 - 22 mg/dL    Creatinine 0.94 0.70 - 1.30 mg/dL    GFR MDRD Af Amer >60 >60 mL/min/1.73m2    GFR MDRD Non Af Amer >60 >60 mL/min/1.73m2     Lab Results   Component Value Date    WBC 9.7 04/22/2020    HGB 10.3 (L) 04/20/2020    HCT 32.9 (L) 04/20/2020    MCV 99 04/20/2020     04/20/2020           Assessment/Plan:  1. Cellulitis of left foot   give 1 g of IM Rocephin with lidocaine x1.  Keflex 250 mg 3 times daily x10 days.  Follow-up CBC and BMP on Monday.  Obtain venous Doppler to left lower extremity.   2. Acute upper GI bleed   no evidence of bleed.   3. SIRS (systemic inflammatory response syndrome) (H)    resolved.   4. Acute renal failure with tubular necrosis (H)   renal function improved.  GFR) within normal limits.   5. Type 2 diabetes mellitus with chronic kidney disease, without long-term current use of insulin, unspecified CKD stage (H)   blood sugar slightly elevated occasionally in the 200s.  This could be due to infection.  He was taken off his orals during hospitalization.  He continues on Lantus.  Recent increase to 13 units.  He is also on sliding scale.   6. Paroxysmal atrial fibrillation (H)   continues on Eliquis.       Electronically signed by: Kylie Gomez, CNP

## 2021-06-20 NOTE — LETTER
Letter by Kylie Gomez CNP at      Author: Kylie Gomez CNP Service: -- Author Type: --    Filed:  Encounter Date: 4/30/2020 Status: (Other)         Patient: Rakesh Samuels   MR Number: [id number]   YOB: 1950   Date of Visit: 4/30/2020     Code Status:  FULL CODE  Visit Type: Discharge Summary     Facility:  [facility] [[id number]]             History of Present Illness: Rakesh Samuels is a 69 y.o. male who I am seeing today for discharge from the TCU. Pt recently hospitalized on 4/12/2020.  Past medical history includes atrial fibril on Eliquis, diabetes type 2, hyperlipidemia and glaucoma.  Patient presented to the hospital with upper GI bleed.  He underwent EGD on 4/14 showing esophagitis and gastritis with no active bleeding.  Initially his Eliquis was held.  He was treated with heparin.  Also placed on omeprazole 2 times daily.  Patient found also to have circulatory shock felt to be secondary to left nephrolithiasis.  TTE shows normal LV EF.  Questionable source due to sepsis versus GI blood loss.  He was initially treated with IV Zosyn however negative culture results returned so antibiotics discontinued.  He developed acute kidney injury requiring hemodialysis and respiratory failure requiring intubation.  Patient with blood-tinged urine secondary to anticoagulation in the setting of ureteral stent.  He is followed by urology.  History of obstructive uropathy status post cystoscopy with left ureteral stent placement.  Acute injury improved with fluids and hemodialysis.  Acute respiratory failure.  He was extubated on 4/14.  He is off oxygen.  Diabetes mellitus type 2.  His p.o. meds were stopped.  Hospitalization secondary to acute kidney injury.  He was placed on NovoLog sliding scale and Lantus.    Today patient lying in bed. Pt with recent cellulitis of the left foot due to open area between toes. He continues on Keflex. Stop date of 5/2/20. Redness,  swelling and pain greatly improved. VS doppler negative for DVT. Uric acid level normal. Pt with DM type II. His oral diabetic meds was discontinued during hospitalization due to CHIDI. He has continued on Lantus. Initially blood sugars in the 200s. He continues with occasionally elevated blood sugars. His renal function has returned to normal. I talked with his wife on the phone today. She has declined home care services including nursing to come in to the home due to COVID-19. She prefers to remain quarantined and reduce risk. I explained with switching to orals he will need close monitoring. I will for now leave him on insulin. He has demonstrated ability to dial up and administer. He needs to follow up with PCP and access returning to oral diabetic meds. I have asked his wife to take BS four times a day and log to follow up with his PCP in 1 week.   Patient voiding adequately.   No flank pain or abdominal discomfort.  Recent laboratory unremarkable.     Active Ambulatory Problems     Diagnosis Date Noted   ? SIRS (systemic inflammatory response syndrome) (H) 09/09/2019   ? Adrenal incidentaloma (H)    ? Diet-controlled diabetes mellitus (H)    ? Pericardial effusion    ? Lactic acidosis    ? Diabetes mellitus, type 2 (H) 04/12/2020   ? Paroxysmal atrial fibrillation (H) 02/18/2020   ? Calculus of ureter 04/12/2020   ? Acute renal failure, unspecified acute renal failure type (H) 04/12/2020   ? Acute renal failure (ARF) (H) 04/12/2020   ? ATN (acute tubular necrosis) (H) 04/12/2020   ? Sepsis due to urinary tract infection (H)    ? Shock circulatory (H)    ? Metformin overdose of undetermined intent, initial encounter    ? Acute respiratory failure with hypoxemia (H)    ? Metabolic acidosis    ? Hyperkalemia    ? Hematemesis, presence of nausea not specified 04/12/2020     Resolved Ambulatory Problems     Diagnosis Date Noted   ? No Resolved Ambulatory Problems     No Additional Past Medical History     Current  Outpatient Medications   Medication Sig   ? apixaban ANTICOAGULANT (ELIQUIS) 5 mg Tab tablet Take 1 tablet (5 mg total) by mouth 2 (two) times a day.   ? ARIPiprazole (ABILIFY) 2 MG tablet Take 1 tablet (2 mg total) by mouth every evening.   ? atorvastatin (LIPITOR) 10 MG tablet Take 10 mg by mouth at bedtime.   ? blood glucose test (ONETOUCH ULTRA BLUE TEST STRIP) strips Use 1 each As Directed 4 (four) times a day. Test BS four times a day   ? cephalexin (KEFLEX) 500 MG capsule Take 250 mg by mouth 3 (three) times a day.    ? dorzolamide-timolol (COSOPT) 22.3-6.8 mg/mL ophthalmic solution Administer 1 drop to both eyes 2 (two) times a day.   ? fluvoxaMINE (LUVOX) 100 MG tablet Take 0.5 tablets (50 mg total) by mouth at bedtime for 14 days.   ? lancets (ULTRA THIN LANCETS) 30 gauge Misc Use 1 each As Directed 4 (four) times a day. Test BS four times a day   ? LANTUS SOLOSTAR U-100 INSULIN 100 unit/mL (3 mL) pen Inject 10 Units under the skin at bedtime. 11.65 Type 2 with hyperglycemia  Contact provider if insulin prescribed is not the preferred insulin per insurance.   ? latanoprostene bunod 0.024 % Drop Administer 1 drop to both eyes at bedtime.   ? multivitamin therapeutic tablet Take 1 tablet by mouth daily.   ? netarsudiL (RHOPRESSA) 0.02 % Drop Administer 1 drop to both eyes daily.   ? NOVOLOG FLEXPEN U-100 INSULIN 100 unit/mL (3 mL) injection pen Check blood sugar four (4) times daily.  11.65 Type 2 with hyperglycemia  BD Ultra-fine Adina Pen Needles - NDC [address]-06 - dispense 1 case,  refill PRN for 1 year   ? omeprazole (PRILOSEC OTC) 20 MG tablet Take 1 tablet (20 mg total) by mouth 2 (two) times a day before meals.   ? VENTOLIN HFA 90 mcg/actuation inhaler Inhale 2 puffs every 4 (four) hours as needed.       Allergies   Allergen Reactions   ? Adhesive Tape-Silicones          Review of Systems  No fevers or chills. No headache, lightheadedness or dizziness. No SOB, chest pains or palpitations. Appetite  is good. No nausea, vomiting, constipation or diarrhea. No dysuria, frequency, burning or pain with urination.  No evidence of acute bleed.  Pain in the left foot.  Reports history of gout.  Otherwise review of systems are negative.     Physical Exam  PHYSICAL EXAMINATION:  Vital signs: /68, pulse 73, respirations 18, temperature 97.9, O2 sat 96% on room air.  Weight 180.1 pounds.  General: Awake, Alert, oriented x3, appropriately, follows simple commands, conversant  HEENT Pink conjunctiva on the left with slightly red conjunctiva on the right.  No drainage, anicteric sclerae, moist oral mucosa.  History of glaucoma with vision loss in the right eye.   NECK: Supple  CARDIOVASCULAR: S1-S2 without murmur gallop.   RESPIRATORY: No wheezes rales or rhonchi  BACK: No kyphosis of the thoracic spine  EXTREMITIES: Good range of motion on both upper and lower extremities, trace pedal edema on the left lower extremity, no calf tenderness.    SKIN: Left foot with slight redness and swelling at the forefoot.  No warmth.  Small fissures between the second and third and third and fourth toes now closed.  NEUROLOGIC: Intact, pulses palpable  PSYCHIATRIC: Cognition intact      Labs:    Results for orders placed or performed in visit on 04/20/20   Basic Metabolic Panel   Result Value Ref Range    Sodium 146 (H) 136 - 145 mmol/L    Potassium 3.9 3.5 - 5.0 mmol/L    Chloride 112 (H) 98 - 107 mmol/L    CO2 27 22 - 31 mmol/L    Anion Gap, Calculation 7 5 - 18 mmol/L    Glucose 123 70 - 125 mg/dL    Calcium 7.8 (L) 8.5 - 10.5 mg/dL    BUN 12 8 - 22 mg/dL    Creatinine 0.94 0.70 - 1.30 mg/dL    GFR MDRD Af Amer >60 >60 mL/min/1.73m2    GFR MDRD Non Af Amer >60 >60 mL/min/1.73m2     Lab Results   Component Value Date    WBC 9.7 04/22/2020    HGB 10.3 (L) 04/20/2020    HCT 32.9 (L) 04/20/2020    MCV 99 04/20/2020     04/20/2020           Assessment/Plan:  1. Cellulitis of left foot   continue Keflex until 5/2.  Cracks between  toes healed.  Redness and swelling dissipating.  Recent laboratory unremarkable.   2. Acute upper GI bleed   no evidence of bleed.   3. SIRS (systemic inflammatory response syndrome) (H)   resolved.   4. Acute renal failure with tubular necrosis (H)   renal function improved.  GFR) within normal limits.  Follow up with nephrology tomorrow.    5. Type 2 diabetes mellitus with chronic kidney disease, without long-term current use of insulin, unspecified CKD stage (H)   blood sugar slightly elevated occasionally in the 200s. D/c home on Lantus and SS.   Log BS four times a day at home.   PCP to decide restarting of oral diabetic meds.   If so he will need follow up of renal function.    6. Paroxysmal atrial fibrillation (H)   continues on Eliquis.     Ok to D/C home with current meds and treatments. Recommending home care however wife declined. Follow up PCP in 1 week. Follow up with nephrology in am.     DISCHARGE PLAN/FACE TO FACE:  I certify that this patient is under my care and that I, or a nurse practitioner or physician's assistant working with me, had a face-to-face encounter that meets the physician face-to-face encounter requirements with this patient.       I certify that, based on my findings, the following services are medically necessary home health services.    My clinical findings support the need for the above skilled services.    This patient is homebound because: recent GI bleed.     The patient is, or has been, under my care and I have initiated the establishment of the plan of care. This patient will be followed by a physician who will periodically review the plan of care.      Total time spent for this visit was 45 minutes which included counseling and coordination of care, reviewing with pt diabetes management. Also speaking with wife regarding home care, diabetes management and follow up.     Electronically signed by: Kylie Gomez, LUANA

## 2021-06-20 NOTE — LETTER
Letter by Kylie Gomez CNP at      Author: Kylie Gomez CNP Service: -- Author Type: --    Filed:  Encounter Date: 4/21/2020 Status: (Other)         Patient: Rakesh Samuels   MR Number: [id number]   YOB: 1950   Date of Visit: 4/21/2020     Code Status:  FULL CODE  Visit Type: H & P     Facility:  [facility] [[id number]]             History of Present Illness: Rakesh Samuels is a 69 y.o. male who I am seeing today for follow admit to the tcu. Pt recently hospitalized on 4/12/2020.  Past medical history includes atrial fibril on Eliquis, diabetes type 2, hyperlipidemia and glaucoma.  Patient presented to the hospital with upper GI bleed.  He underwent EGD on 4/14 showing esophagitis and gastritis with no active bleeding.  Initially his Eliquis was held.  He was treated with heparin.  Also placed on omeprazole 2 times daily.  Patient found also to have circulatory shock felt to be secondary to left nephrolithiasis.  TTE shows normal LV EF.  Questionable source due to sepsis versus GI blood loss.  He was initially treated with IV Zosyn however negative culture results returned so antibiotics discontinued.  He developed acute kidney injury requiring hemodialysis and respiratory failure requiring intubation.  Patient with blood-tinged urine secondary to anticoagulation in the setting of ureteral stent.  He is followed by urology.  History of obstructive uropathy status post cystoscopy with left ureteral stent placement.  Acute injury improved with fluids and hemodialysis.  Acute respiratory failure.  He was extubated on 4/14.  He is off oxygen.  Diabetes mellitus type 2.  His p.o. meds were stopped.  Hospitalization secondary to acute kidney injury.  He was placed on NovoLog sliding scale and Lantus.    Today patient sitting up on the side of the bed.  Underlying diabetes type 2.  Blood sugars consistently 200s.  I also note pulses in the 40s to 50s.  He is asymptomatic.   Currently on no beta-blocker or rate control medication.  He does have underlying atrial fib.  Continues on Eliquis.  No evidence of acute bleed.  He tells me he is voiding adequately.  Patient complaining of some pain in his left foot at the base of the toes.  He does have some slight swelling in that foot.  He tells me he has had gout in the past.  No redness.  No warmth.  However this could have been exacerbated secondary to his acute kidney injury.  Patient denies any shortness of breath or chest pain.  No overt respiratory symptoms including cough, fever chills or sore throat.  Hemoglobin 10.3.  Sodium continues to be slightly elevated dated at 146.  Creatinine 0.94.  GFR greater than 60.    Active Ambulatory Problems     Diagnosis Date Noted   ? SIRS (systemic inflammatory response syndrome) (H) 09/09/2019   ? Adrenal incidentaloma (H)    ? Diet-controlled diabetes mellitus (H)    ? Pericardial effusion    ? Lactic acidosis    ? Diabetes mellitus, type 2 (H) 04/12/2020   ? Paroxysmal atrial fibrillation (H) 02/18/2020   ? Calculus of ureter 04/12/2020   ? Acute renal failure, unspecified acute renal failure type (H) 04/12/2020   ? Acute renal failure (ARF) (H) 04/12/2020   ? ATN (acute tubular necrosis) (H) 04/12/2020   ? Sepsis due to urinary tract infection (H)    ? Shock circulatory (H)    ? Metformin overdose of undetermined intent, initial encounter    ? Acute respiratory failure with hypoxemia (H)    ? Metabolic acidosis    ? Hyperkalemia    ? Hematemesis, presence of nausea not specified 04/12/2020     Resolved Ambulatory Problems     Diagnosis Date Noted   ? No Resolved Ambulatory Problems     No Additional Past Medical History     Current Outpatient Medications   Medication Sig   ? apixaban ANTICOAGULANT (ELIQUIS) 5 mg Tab tablet Take 1 tablet (5 mg total) by mouth 2 (two) times a day.   ? ARIPiprazole (ABILIFY) 2 MG tablet Take 1 tablet (2 mg total) by mouth every evening.   ? atorvastatin (LIPITOR)  10 MG tablet Take 10 mg by mouth at bedtime.   ? dorzolamide-timolol (COSOPT) 22.3-6.8 mg/mL ophthalmic solution Administer 1 drop to both eyes 2 (two) times a day.   ? fluvoxaMINE (LUVOX) 100 MG tablet Take 0.5 tablets (50 mg total) by mouth at bedtime for 14 days.   ? LANTUS SOLOSTAR U-100 INSULIN 100 unit/mL (3 mL) pen Inject 10 Units under the skin at bedtime. 11.65 Type 2 with hyperglycemia  Contact provider if insulin prescribed is not the preferred insulin per insurance.   ? latanoprostene bunod 0.024 % Drop Administer 1 drop to both eyes at bedtime.   ? multivitamin therapeutic tablet Take 1 tablet by mouth daily.   ? netarsudiL (RHOPRESSA) 0.02 % Drop Administer 1 drop to both eyes daily.   ? NOVOLOG FLEXPEN U-100 INSULIN 100 unit/mL (3 mL) injection pen Check blood sugar four (4) times daily.  11.65 Type 2 with hyperglycemia  BD Ultra-fine Adina Pen Needles - NDC [address]-75 - dispense 1 case,  refill PRN for 1 year   ? omeprazole (PRILOSEC OTC) 20 MG tablet Take 1 tablet (20 mg total) by mouth 2 (two) times a day before meals.   ? VENTOLIN HFA 90 mcg/actuation inhaler Inhale 2 puffs every 4 (four) hours as needed.       Allergies   Allergen Reactions   ? Adhesive Tape-Silicones          Review of Systems  No fevers or chills. No headache, lightheadedness or dizziness. No SOB, chest pains or palpitations. Appetite is good. No nausea, vomiting, constipation or diarrhea. No dysuria, frequency, burning or pain with urination.  No evidence of acute bleed.  Pain in the left foot.  Reports history of gout.  Otherwise review of systems are negative.     Physical Exam  PHYSICAL EXAMINATION:  Vital signs: /64, pulse 42, respirations 16, temperature 98.4, O2 sat 96% on room air.  Weight 187 pounds.  General: Awake, Alert, oriented x3, appropriately, follows simple commands, conversant  HEENT:PERRLA, Pink conjunctiva on the left with slightly red conjunctiva on the right.  No drainage, anicteric sclerae, moist  oral mucosa.  History of glaucoma with vision loss in the right eye.   NECK: Supple, without any lymphadenopathy, or masses  CVS:  S1  S2, without murmur or gallop.   LUNG: Clear to auscultation, No wheezes, rales or rhonci.  BACK: No kyphosis of the thoracic spine  ABDOMEN: Soft, nontender to palpation, with positive bowel sounds  EXTREMITIES: Good range of motion on both upper and lower extremities, 1+ pedal edema on the left lower extremity, no calf tenderness.  Tenderness at the base of the toes on the left foot with some swelling.  No redness or warmth.  SKIN: Warm and dry, no rashes or erythema noted  NEUROLOGIC: Intact, pulses palpable  PSYCHIATRIC: Cognition intact      Labs:    Results for orders placed or performed in visit on 04/20/20   Basic Metabolic Panel   Result Value Ref Range    Sodium 146 (H) 136 - 145 mmol/L    Potassium 3.9 3.5 - 5.0 mmol/L    Chloride 112 (H) 98 - 107 mmol/L    CO2 27 22 - 31 mmol/L    Anion Gap, Calculation 7 5 - 18 mmol/L    Glucose 123 70 - 125 mg/dL    Calcium 7.8 (L) 8.5 - 10.5 mg/dL    BUN 12 8 - 22 mg/dL    Creatinine 0.94 0.70 - 1.30 mg/dL    GFR MDRD Af Amer >60 >60 mL/min/1.73m2    GFR MDRD Non Af Amer >60 >60 mL/min/1.73m2     Lab Results   Component Value Date    WBC 7.3 04/20/2020    HGB 10.3 (L) 04/20/2020    HCT 32.9 (L) 04/20/2020    MCV 99 04/20/2020     04/20/2020           Assessment/Plan:  1. Acute renal failure with tubular necrosis (H)   temporary dialysis during hospitalization.  GFR greater than 60, creatinine 0.94.  Slight elevated sodium at 146.  Fluids encouraged.   2. Acute upper GI bleed   continues on PPI twice daily.  No evidence of bleed.  Recent hemoglobin 10.3.   3. SIRS (systemic inflammatory response syndrome) (H)   resolved.   4. Type 2 diabetes mellitus with chronic kidney disease, without long-term current use of insulin, unspecified CKD stage (H)   blood sugars elevated in the 200s.  He was started on Lantus and sliding scale  during hospitalization.  His oral medications were discontinued secondary to acute kidney failure.  Increase Lantus to 13 units.   5. Paroxysmal atrial fibrillation (H)   continues on Eliquis.  Heart rates occasionally in the 40s.  Asymptomatic.  Currently on med beta-blocker or Rate control meds.     consult cardiology.   6. Calculus of ureter   status post stent placement.  Voiding adequately.   7. Acute respiratory failure with hypoxemia (H)   off oxygen.   8.     Foot pain            differentials include muscle skeletal pain versus gout.  Obtain uric acid level.  Start muscle rub 4 times daily to left foot.         35 minutes spent of which greater than 50% was face to face interviewing patient, nursing staff and therapy.    Electronically signed by: Kylie Gomez, LUANA

## 2021-06-20 NOTE — LETTER
Letter by Kylie Gomez CNP at      Author: Kylie Gomez CNP Service: -- Author Type: --    Filed:  Encounter Date: 4/28/2020 Status: (Other)         Patient: Rakesh Samuels   MR Number: [id number]   YOB: 1950   Date of Visit: 4/28/2020     Code Status:  FULL CODE  Visit Type: Problem Visit     Facility:  [facility] [[id number]]             History of Present Illness: Rakesh Samuels is a 69 y.o. male who I am seeing today for follow-up on the TCU. Pt recently hospitalized on 4/12/2020.  Past medical history includes atrial fibril on Eliquis, diabetes type 2, hyperlipidemia and glaucoma.  Patient presented to the hospital with upper GI bleed.  He underwent EGD on 4/14 showing esophagitis and gastritis with no active bleeding.  Initially his Eliquis was held.  He was treated with heparin.  Also placed on omeprazole 2 times daily.  Patient found also to have circulatory shock felt to be secondary to left nephrolithiasis.  TTE shows normal LV EF.  Questionable source due to sepsis versus GI blood loss.  He was initially treated with IV Zosyn however negative culture results returned so antibiotics discontinued.  He developed acute kidney injury requiring hemodialysis and respiratory failure requiring intubation.  Patient with blood-tinged urine secondary to anticoagulation in the setting of ureteral stent.  He is followed by urology.  History of obstructive uropathy status post cystoscopy with left ureteral stent placement.  Acute injury improved with fluids and hemodialysis.  Acute respiratory failure.  He was extubated on 4/14.  He is off oxygen.  Diabetes mellitus type 2.  His p.o. meds were stopped.  Hospitalization secondary to acute kidney injury.  He was placed on NovoLog sliding scale and Lantus.    Today patient lying in bed.  Patient with recent increasing pain in his left foot at the base of his toes.  X-ray negative for acute fracture.  Venous Doppler  negative for DVT.  He did have some cracks between the toes.  Continues on Keflex for cellulitis.  Redness improving.  No warmth on today's exam.  Slight swelling.  Uric acid 5.1.  Patient tolerating antibiotic.  No diarrhea.  Patient voiding adequately.  He did report some blood x1 yesterday however no further bleeding.  No flank pain or abdominal discomfort.  Recent laboratory unremarkable.  Diabetes mellitus type 2.  Occasionally elevated blood sugars in the 200s.  I did recently increase his insulin.  He was taken off his oral medications during hospitalization secondary acute kidney injury.      Active Ambulatory Problems     Diagnosis Date Noted   ? SIRS (systemic inflammatory response syndrome) (H) 09/09/2019   ? Adrenal incidentaloma (H)    ? Diet-controlled diabetes mellitus (H)    ? Pericardial effusion    ? Lactic acidosis    ? Diabetes mellitus, type 2 (H) 04/12/2020   ? Paroxysmal atrial fibrillation (H) 02/18/2020   ? Calculus of ureter 04/12/2020   ? Acute renal failure, unspecified acute renal failure type (H) 04/12/2020   ? Acute renal failure (ARF) (H) 04/12/2020   ? ATN (acute tubular necrosis) (H) 04/12/2020   ? Sepsis due to urinary tract infection (H)    ? Shock circulatory (H)    ? Metformin overdose of undetermined intent, initial encounter    ? Acute respiratory failure with hypoxemia (H)    ? Metabolic acidosis    ? Hyperkalemia    ? Hematemesis, presence of nausea not specified 04/12/2020     Resolved Ambulatory Problems     Diagnosis Date Noted   ? No Resolved Ambulatory Problems     No Additional Past Medical History     Current Outpatient Medications   Medication Sig   ? apixaban ANTICOAGULANT (ELIQUIS) 5 mg Tab tablet Take 1 tablet (5 mg total) by mouth 2 (two) times a day.   ? ARIPiprazole (ABILIFY) 2 MG tablet Take 1 tablet (2 mg total) by mouth every evening.   ? atorvastatin (LIPITOR) 10 MG tablet Take 10 mg by mouth at bedtime.   ? cephalexin (KEFLEX) 500 MG capsule Take 250 mg by  mouth 3 (three) times a day.    ? dorzolamide-timolol (COSOPT) 22.3-6.8 mg/mL ophthalmic solution Administer 1 drop to both eyes 2 (two) times a day.   ? fluvoxaMINE (LUVOX) 100 MG tablet Take 0.5 tablets (50 mg total) by mouth at bedtime for 14 days.   ? LANTUS SOLOSTAR U-100 INSULIN 100 unit/mL (3 mL) pen Inject 10 Units under the skin at bedtime. 11.65 Type 2 with hyperglycemia  Contact provider if insulin prescribed is not the preferred insulin per insurance.   ? latanoprostene bunod 0.024 % Drop Administer 1 drop to both eyes at bedtime.   ? multivitamin therapeutic tablet Take 1 tablet by mouth daily.   ? netarsudiL (RHOPRESSA) 0.02 % Drop Administer 1 drop to both eyes daily.   ? NOVOLOG FLEXPEN U-100 INSULIN 100 unit/mL (3 mL) injection pen Check blood sugar four (4) times daily.  11.65 Type 2 with hyperglycemia  BD Ultra-fine Adina Pen Needles - NDC [address]-89 - dispense 1 case,  refill PRN for 1 year   ? omeprazole (PRILOSEC OTC) 20 MG tablet Take 1 tablet (20 mg total) by mouth 2 (two) times a day before meals.   ? VENTOLIN HFA 90 mcg/actuation inhaler Inhale 2 puffs every 4 (four) hours as needed.       Allergies   Allergen Reactions   ? Adhesive Tape-Silicones          Review of Systems  No fevers or chills. No headache, lightheadedness or dizziness. No SOB, chest pains or palpitations. Appetite is good. No nausea, vomiting, constipation or diarrhea. No dysuria, frequency, burning or pain with urination.  No evidence of acute bleed.  Pain in the left foot.  Reports history of gout.  Otherwise review of systems are negative.     Physical Exam  PHYSICAL EXAMINATION:  Vital signs: /63, pulse 45, respirations 18, temperature 98.1, O2 sat 96% on room air.  Weight 181.2 pounds.  General: Awake, Alert, oriented x3, appropriately, follows simple commands, conversant  HEENT Pink conjunctiva on the left with slightly red conjunctiva on the right.  No drainage, anicteric sclerae, moist oral mucosa.   History of glaucoma with vision loss in the right eye.   NECK: Supple  CARDIOVASCULAR: S1-S2 without murmur gallop.   RESPIRATORY: No wheezes rales or rhonchi  BACK: No kyphosis of the thoracic spine  EXTREMITIES: Good range of motion on both upper and lower extremities, trace pedal edema on the left lower extremity, no calf tenderness.    SKIN: Left foot with slight redness and swelling at the forefoot.  No warmth.  Small fissures between the second and third and third and fourth toes now closed.  NEUROLOGIC: Intact, pulses palpable  PSYCHIATRIC: Cognition intact      Labs:    Results for orders placed or performed in visit on 04/20/20   Basic Metabolic Panel   Result Value Ref Range    Sodium 146 (H) 136 - 145 mmol/L    Potassium 3.9 3.5 - 5.0 mmol/L    Chloride 112 (H) 98 - 107 mmol/L    CO2 27 22 - 31 mmol/L    Anion Gap, Calculation 7 5 - 18 mmol/L    Glucose 123 70 - 125 mg/dL    Calcium 7.8 (L) 8.5 - 10.5 mg/dL    BUN 12 8 - 22 mg/dL    Creatinine 0.94 0.70 - 1.30 mg/dL    GFR MDRD Af Amer >60 >60 mL/min/1.73m2    GFR MDRD Non Af Amer >60 >60 mL/min/1.73m2     Lab Results   Component Value Date    WBC 9.7 04/22/2020    HGB 10.3 (L) 04/20/2020    HCT 32.9 (L) 04/20/2020    MCV 99 04/20/2020     04/20/2020           Assessment/Plan:  1. Cellulitis of left foot   continue Keflex until 5/2.  Cracks between toes healed.  Redness and swelling dissipating.  Recent laboratory unremarkable.   2. Acute upper GI bleed   no evidence of bleed.   3. SIRS (systemic inflammatory response syndrome) (H)   resolved.   4. Acute renal failure with tubular necrosis (H)   renal function improved.  GFR) within normal limits.   5. Type 2 diabetes mellitus with chronic kidney disease, without long-term current use of insulin, unspecified CKD stage (H)   blood sugar slightly elevated occasionally in the 200s. He continues on Lantus.  Recent increase to 13 units.  He is also on sliding scale.  Will attempt to reintroduce oral  medications.   6. Paroxysmal atrial fibrillation (H)   continues on Eliquis.       Electronically signed by: Kylie Gomez CNP

## 2021-06-20 NOTE — LETTER
Letter by Kylie Gomez CNP at      Author: Kylie Gomez CNP Service: -- Author Type: --    Filed:  Encounter Date: 4/22/2020 Status: (Other)         Patient: Rakesh Samuels   MR Number: [id number]   YOB: 1950   Date of Visit: 4/22/2020     Code Status:  FULL CODE  Visit Type: Problem Visit (left foot pain )     Facility:  [facility] [[id number]]             History of Present Illness: Rakesh Samuels is a 69 y.o. male who I am seeing today at the request of the nursing staff due to pain in his left foot. Pt recently hospitalized on 4/12/2020.  Past medical history includes atrial fibril on Eliquis, diabetes type 2, hyperlipidemia and glaucoma.  Patient presented to the hospital with upper GI bleed.  He underwent EGD on 4/14 showing esophagitis and gastritis with no active bleeding.  Initially his Eliquis was held.  He was treated with heparin.  Also placed on omeprazole 2 times daily.  Patient found also to have circulatory shock felt to be secondary to left nephrolithiasis.  TTE shows normal LV EF.  Questionable source due to sepsis versus GI blood loss.  He was initially treated with IV Zosyn however negative culture results returned so antibiotics discontinued.  He developed acute kidney injury requiring hemodialysis and respiratory failure requiring intubation.  Patient with blood-tinged urine secondary to anticoagulation in the setting of ureteral stent.  He is followed by urology.  History of obstructive uropathy status post cystoscopy with left ureteral stent placement.  Acute injury improved with fluids and hemodialysis.  Acute respiratory failure.  He was extubated on 4/14.  He is off oxygen.  Diabetes mellitus type 2.  His p.o. meds were stopped.  Hospitalization secondary to acute kidney injury.  He was placed on NovoLog sliding scale and Lantus.    Today patient lying in bed. Pt with increasing pain in his left foot at the base of his toes. He denies any  injury. He is a febrile. Yesterday muscle rub ordered four times a day. Today foot with swelling, redness and warmth. Pt reports hx of gout. He is able to bear weight and tolerated dorsi and plantar flexion with little pain. Pulses palpable.       Active Ambulatory Problems     Diagnosis Date Noted   ? SIRS (systemic inflammatory response syndrome) (H) 09/09/2019   ? Adrenal incidentaloma (H)    ? Diet-controlled diabetes mellitus (H)    ? Pericardial effusion    ? Lactic acidosis    ? Diabetes mellitus, type 2 (H) 04/12/2020   ? Paroxysmal atrial fibrillation (H) 02/18/2020   ? Calculus of ureter 04/12/2020   ? Acute renal failure, unspecified acute renal failure type (H) 04/12/2020   ? Acute renal failure (ARF) (H) 04/12/2020   ? ATN (acute tubular necrosis) (H) 04/12/2020   ? Sepsis due to urinary tract infection (H)    ? Shock circulatory (H)    ? Metformin overdose of undetermined intent, initial encounter    ? Acute respiratory failure with hypoxemia (H)    ? Metabolic acidosis    ? Hyperkalemia    ? Hematemesis, presence of nausea not specified 04/12/2020     Resolved Ambulatory Problems     Diagnosis Date Noted   ? No Resolved Ambulatory Problems     No Additional Past Medical History     Current Outpatient Medications   Medication Sig   ? cephalexin (KEFLEX) 500 MG capsule Take 500 mg by mouth 3 (three) times a day.   ? apixaban ANTICOAGULANT (ELIQUIS) 5 mg Tab tablet Take 1 tablet (5 mg total) by mouth 2 (two) times a day.   ? ARIPiprazole (ABILIFY) 2 MG tablet Take 1 tablet (2 mg total) by mouth every evening.   ? atorvastatin (LIPITOR) 10 MG tablet Take 10 mg by mouth at bedtime.   ? dorzolamide-timolol (COSOPT) 22.3-6.8 mg/mL ophthalmic solution Administer 1 drop to both eyes 2 (two) times a day.   ? fluvoxaMINE (LUVOX) 100 MG tablet Take 0.5 tablets (50 mg total) by mouth at bedtime for 14 days.   ? LANTUS SOLOSTAR U-100 INSULIN 100 unit/mL (3 mL) pen Inject 10 Units under the skin at bedtime. 11.65  Type 2 with hyperglycemia  Contact provider if insulin prescribed is not the preferred insulin per insurance.   ? latanoprostene bunod 0.024 % Drop Administer 1 drop to both eyes at bedtime.   ? multivitamin therapeutic tablet Take 1 tablet by mouth daily.   ? netarsudiL (RHOPRESSA) 0.02 % Drop Administer 1 drop to both eyes daily.   ? NOVOLOG FLEXPEN U-100 INSULIN 100 unit/mL (3 mL) injection pen Check blood sugar four (4) times daily.  11.65 Type 2 with hyperglycemia  BD Ultra-fine Adina Pen Needles - NDC [address]-22 - dispense 1 case,  refill PRN for 1 year   ? omeprazole (PRILOSEC OTC) 20 MG tablet Take 1 tablet (20 mg total) by mouth 2 (two) times a day before meals.   ? VENTOLIN HFA 90 mcg/actuation inhaler Inhale 2 puffs every 4 (four) hours as needed.       Allergies   Allergen Reactions   ? Adhesive Tape-Silicones          Review of Systems  No fevers or chills. No headache, lightheadedness or dizziness. No SOB, chest pains or palpitations. Appetite is good. No nausea, vomiting, constipation or diarrhea. No dysuria, frequency, burning or pain with urination.  No evidence of acute bleed.  Pain in the left foot.  Reports history of gout.  Otherwise review of systems are negative.     Physical Exam  PHYSICAL EXAMINATION:  Vital signs: /64, pulse 76, respirations 16, temperature 97.7, O2 sat 97% on room air.  Weight 187 pounds.  General: Awake, Alert, oriented x3, appropriately, follows simple commands, conversant  HEENT Pink conjunctiva on the left with slightly red conjunctiva on the right.  No drainage, anicteric sclerae, moist oral mucosa.  History of glaucoma with vision loss in the right eye.   NECK: Supple  BACK: No kyphosis of the thoracic spine  EXTREMITIES: Good range of motion on both upper and lower extremities, 1+ pedal edema on the left lower extremity, no calf tenderness.  Tenderness at the base of the toes on the left foot. Today with increased swelling, redness and warmth.   SKIN: see  above.   NEUROLOGIC: Intact, pulses palpable  PSYCHIATRIC: Cognition intact      Labs:    Results for orders placed or performed in visit on 04/20/20   Basic Metabolic Panel   Result Value Ref Range    Sodium 146 (H) 136 - 145 mmol/L    Potassium 3.9 3.5 - 5.0 mmol/L    Chloride 112 (H) 98 - 107 mmol/L    CO2 27 22 - 31 mmol/L    Anion Gap, Calculation 7 5 - 18 mmol/L    Glucose 123 70 - 125 mg/dL    Calcium 7.8 (L) 8.5 - 10.5 mg/dL    BUN 12 8 - 22 mg/dL    Creatinine 0.94 0.70 - 1.30 mg/dL    GFR MDRD Af Amer >60 >60 mL/min/1.73m2    GFR MDRD Non Af Amer >60 >60 mL/min/1.73m2     Lab Results   Component Value Date    WBC 9.7 04/22/2020    HGB 10.3 (L) 04/20/2020    HCT 32.9 (L) 04/20/2020    MCV 99 04/20/2020     04/20/2020           Assessment/Plan:  1. Left foot pain  Increasing left foot pain.   Xray of left foot.   Tylenol 650 mg Q 4 hours prn.   Increasing redness and warmth.   Check CBC with diff.   Give 500 mg of Keflex X 1 now after blood draw.    2. Hx of gout  Check Uric acid.      Electronically signed by: Kylie Gomez, LUANA

## 2021-06-20 NOTE — LETTER
Letter by Johnson, Michael Duane, CNP at      Author: Johnson, Michael Duane, CNP Service: -- Author Type: --    Filed:  Encounter Date: 4/24/2020 Status: (Other)         Patient: Rakesh Samuels   MR Number: [id number]   YOB: 1950   Date of Visit: 4/24/2020     [facility]    Facility:   [facility] [[id number]]   Code Status: FULL CODE      CHIEF COMPLAINT/REASON FOR VISIT:  Chief Complaint   Patient presents with   ? Problem Visit     cell, foot, doppler,       HISTORY:      HPI: Rakesh is a 69 y.o. male who I was asked to see not only secondary to his hospitalization April 12 through April 18, 2020 secondary to presenting with an upper GI bleed and circulatory shock thought to be secondary to left nephrolithiasis along with acute kidney injury requiring hemodialysis and respiratory failure requiring intubation but also in discussion of his left foot cellulitis.  Had a chance to talk about the current rehabilitation process and he does feel that he is still about 30% better but making slow progressive improvements with his rehabilitation strength and energy.  He is a very delightful gentleman.  He did work for [employer] and had a wonderful conversation about that plus he talks nearly about his wife of 46 years he also does have some disabilities.  At any rate his appetite is slowly improving.  He is being treated for left foot cellulitis he currently is on cephalexin 250 mg 3 times daily through March 2, 2020.  The left foot actually looks really good there is no obvious sores or other issues with the left foot he does have some redness and puffiness to the dorsal foot but otherwise it does look much improved in comparison to previous presentation.  The Doppler ultrasound was negative for DVT yesterday.  He has been normotensive and afebrile and also on room air.  He does have diabetes currently on Lantus 13 units at bedtime as well as  sliding scale insulin and his morning time sugars have been running 117-190 9 in the AM and then towards the p.m. the range 177-275 so we will keep a close eye on that without any further changes.  He is on omeprazole twice daily he is not having any abdominal issues at this time.  There is no current pain issues.  Denies any colds or flus.  His lungs remain clear and he is in good spirits.    Past Medical History:   Diagnosis Date   ? Diabetes mellitus, type 2 (H) 4/12/2020   ? Paroxysmal atrial fibrillation (H) 2/18/2020             No family history on file.  Social History     Socioeconomic History   ? Marital status:      Spouse name: Not on file   ? Number of children: Not on file   ? Years of education: Not on file   ? Highest education level: Not on file   Occupational History   ? Not on file   Social Needs   ? Financial resource strain: Not on file   ? Food insecurity     Worry: Not on file     Inability: Not on file   ? Transportation needs     Medical: Not on file     Non-medical: Not on file   Tobacco Use   ? Smoking status: Never Smoker   ? Smokeless tobacco: Never Used   Substance and Sexual Activity   ? Alcohol use: Yes     Frequency: Monthly or less     Binge frequency: Never   ? Drug use: Never   ? Sexual activity: Not Currently   Lifestyle   ? Physical activity     Days per week: Not on file     Minutes per session: Not on file   ? Stress: Not on file   Relationships   ? Social connections     Talks on phone: Not on file     Gets together: Not on file     Attends Voodoo service: Not on file     Active member of club or organization: Not on file     Attends meetings of clubs or organizations: Not on file     Relationship status: Not on file   ? Intimate partner violence     Fear of current or ex partner: Not on file     Emotionally abused: Not on file     Physically abused: Not on file     Forced sexual activity: Not on file   Other Topics Concern   ? Not on file   Social History Narrative    ? Not on file         Review of Systems  He currently denies chills and fever coughing wheezing chest pain dizziness or vertigo nausea vomiting diarrhea dysuria flulike symptoms headache or stiff neck.  History of diabetes current left foot cellulitis A. fib upper GI bleed obstructive uropathy.  CPAP.      Current Outpatient Medications:   ?  apixaban ANTICOAGULANT (ELIQUIS) 5 mg Tab tablet, Take 1 tablet (5 mg total) by mouth 2 (two) times a day., Disp: 60 tablet, Rfl: 11  ?  ARIPiprazole (ABILIFY) 2 MG tablet, Take 1 tablet (2 mg total) by mouth every evening., Disp: 30 tablet, Rfl: 0  ?  atorvastatin (LIPITOR) 10 MG tablet, Take 10 mg by mouth at bedtime., Disp: , Rfl: 1  ?  cephalexin (KEFLEX) 500 MG capsule, Take 250 mg by mouth 3 (three) times a day. , Disp: , Rfl:   ?  dorzolamide-timolol (COSOPT) 22.3-6.8 mg/mL ophthalmic solution, Administer 1 drop to both eyes 2 (two) times a day., Disp: , Rfl: 3  ?  fluvoxaMINE (LUVOX) 100 MG tablet, Take 0.5 tablets (50 mg total) by mouth at bedtime for 14 days., Disp: 7 tablet, Rfl: 0  ?  LANTUS SOLOSTAR U-100 INSULIN 100 unit/mL (3 mL) pen, Inject 10 Units under the skin at bedtime. 11.65 Type 2 with hyperglycemia Contact provider if insulin prescribed is not the preferred insulin per insurance., Disp: 5 adj dose pen, Rfl: PRN  ?  latanoprostene bunod 0.024 % Drop, Administer 1 drop to both eyes at bedtime., Disp: , Rfl:   ?  multivitamin therapeutic tablet, Take 1 tablet by mouth daily., Disp: , Rfl:   ?  netarsudiL (RHOPRESSA) 0.02 % Drop, Administer 1 drop to both eyes daily., Disp: , Rfl:   ?  NOVOLOG FLEXPEN U-100 INSULIN 100 unit/mL (3 mL) injection pen, Check blood sugar four (4) times daily. 11.65 Type 2 with hyperglycemia BD Ultra-fine Adina Pen Needles - NDC [address]-32 - dispense 1 case, refill PRN for 1 year, Disp: 5 Pre-filled Pen Syringe, Rfl: PRN  ?  omeprazole (PRILOSEC OTC) 20 MG tablet, Take 1 tablet (20 mg total) by mouth 2 (two) times a day  before meals., Disp: 120 tablet, Rfl: 0  ?  VENTOLIN HFA 90 mcg/actuation inhaler, Inhale 2 puffs every 4 (four) hours as needed., Disp: , Rfl: 11    There were no vitals filed for this visit.  Blood pressure 122/63 pulse 90 respirations 18 temperature 98.8 saturation room air 95%  Physical Exam  Head is normocephalic.  Neck is supple without adenopathy.  Lung sounds are clear throughout.  Cardiovascular is normal without murmurs.  No significant lower extremity edema otherwise he does have left dorsal foot puffiness and redness but nothing too significant it does appear to be resolving.  Gastrointestinal soft nontender.  Musculoskeletal strong upper and lower extremities able to sit and stand for psychiatric: Pleasant affect.  LABS:   Lab Results   Component Value Date    WBC 9.7 04/22/2020    HGB 10.3 (L) 04/20/2020    HCT 32.9 (L) 04/20/2020    MCV 99 04/20/2020     04/20/2020     Results for orders placed or performed in visit on 04/20/20   Basic Metabolic Panel   Result Value Ref Range    Sodium 146 (H) 136 - 145 mmol/L    Potassium 3.9 3.5 - 5.0 mmol/L    Chloride 112 (H) 98 - 107 mmol/L    CO2 27 22 - 31 mmol/L    Anion Gap, Calculation 7 5 - 18 mmol/L    Glucose 123 70 - 125 mg/dL    Calcium 7.8 (L) 8.5 - 10.5 mg/dL    BUN 12 8 - 22 mg/dL    Creatinine 0.94 0.70 - 1.30 mg/dL    GFR MDRD Af Amer >60 >60 mL/min/1.73m2    GFR MDRD Non Af Amer >60 >60 mL/min/1.73m2       Lab Results   Component Value Date    HGBA1C 7.4 (H) 09/09/2019         ASSESSMENT:      ICD-10-CM    1. Abscess or cellulitis of foot  L03.119     L02.619    2. Type 2 diabetes mellitus with chronic kidney disease, without long-term current use of insulin, unspecified CKD stage (H)  E11.22    3. Gastroesophageal reflux disease without esophagitis  K21.9    4. General weakness  R53.1        PLAN:    Continue with the cephalexin and continue with the monitoring of his blood sugars making a change as necessary.  He does feel comfortable  with the rehabilitation process as well as the current plan of care he is also aware of the negative Doppler ultrasound of his leg.  He did not have any other questions.  Continue to monitor and follow.    For documentation purposes total visit 35 minutes which were 50% was spent with the patient going over his care his medications treatment modalities therapy and coordination of care.        Electronically signed by: Michael Duane Johnson, CNP

## 2021-06-21 ENCOUNTER — COMMUNICATION - HEALTHEAST (OUTPATIENT)
Dept: ANTICOAGULATION | Facility: CLINIC | Age: 71
End: 2021-06-21

## 2021-06-21 DIAGNOSIS — I48.0 PAROXYSMAL ATRIAL FIBRILLATION (H): ICD-10-CM

## 2021-06-21 NOTE — LETTER
Letter by Robert Richmond DO at      Author: Robert Richmond DO Service: -- Author Type: --    Filed:  Encounter Date: 3/31/2021 Status: (Other)         Patient: Rakesh Samuels   MR Number: [id number]   YOB: 1950   Date of Visit: 3/31/2021     [facility]      Facility:    [facility] [[id number]]  Code Status: UNKNOWN      Chief Complaint/Reason for Visit:  Chief Complaint   Patient presents with   ? H & P     Inflammatory response syndrome, acute pyelonephritis, type 2 diabetes without complications, paroxysmal atrial fibrillation, history of kidney calculus, obstructive sleep apnea,.       HPI:   Rakesh is a 70 y.o. male who was admitted to the hospital on 3/27/2021.  He does a history of nonobstructive calculi present and right perinephritic and periureteral inflammatory stranding on the right kidney.  He is admitted to the hospital for pyelonephritis urine culture mixed organisms with CT scan did show features of pyelonephritis.  Signs of subjective at that time and 10-day course of cefdinir was recommended.  He does have type 2 diabetes this did stop Actos continue Metformin and sliding scale insulin.  He does atrial fibrillation Coumadin management and essential hypertension.  He did improve however he still remains weakness and his weakness declined bilateral in his hands and arms progression of cervical spinal stenosis is likely.  He does have GERD stable with a PPI and he was treated properly and transferred here to the TCU at [facility] in stable condition.    Patient is doing fine however he feels frustrated because he has a TV problem here.  We will straighten that out at this time but he says his pain is well managed and he urinated without difficulty moving his bowels without difficulties no fevers chills nausea vomiting diarrhea.  He says he is doing well he is going to undergo physical therapy here.    Past Medical  History:  Past Medical History:   Diagnosis Date   ? A-fib (H)    ? Acute hemodialysis encounter (H)     Due to CHIDI   ? Acute kidney injury (H)    ? Acute respiratory failure with hypoxemia (H)    ? Adrenal incidentaloma (H)    ? Asbestosis (H)    ? ATN (acute tubular necrosis) (H)    ? Atrophy of right kidney    ? Basal cell carcinoma    ? BPH without urinary obstruction    ? Cellulitis     Left foot   ? Cholelithiasis    ? Depression with anxiety    ? Diabetes mellitus, type 2 (H) 4/12/2020   ? Esophagitis    ? Gastritis    ? Glaucoma    ? Hematemesis, presence of nausea not specified    ? Hyperkalemia    ? Hyperlipemia    ? Kidney stone    ? Lactic acidosis    ? Metabolic acidosis    ? Metformin overdose of undetermined intent    ? Paroxysmal atrial fibrillation (H) 2/18/2020   ? Pericardial effusion    ? PTSD (post-traumatic stress disorder)    ? Seasonal allergies    ? Sepsis due to urinary tract infection (H)    ? Shock circulatory (H)    ? SIRS (systemic inflammatory response syndrome) (H)    ? Sleep apnea     uses a machine at night.    ? Upper GI bleed            Surgical History:  Past Surgical History:   Procedure Laterality Date   ? EYE SURGERY      congenital ptosis right upper lid   ? WA CYSTOSCOPY,INSERT URETERAL STENT Left 4/12/2020    Procedure: CYSTOSCOPY, WITH URETERAL STENT INSERTION;  Surgeon: Christopher Yates MD;  Location: [facility] OR;  Service: Urology   ? WA ESOPHAGOGASTRODUODENOSCOPY TRANSORAL DIAGNOSTIC N/A 4/13/2020    Procedure: ESOPHAGOGASTRODUODENOSCOPY (EGD);  Surgeon: Robert Rico MD;  Location: [facility];  Service: Gastroenterology   ? REPLACEMENT TOTAL KNEE Left        Family History:   History reviewed. No pertinent family history.    Social History:    Social History     Socioeconomic History   ? Marital status:      Spouse name: None   ? Number of children: None   ? Years of education: None   ? Highest education level: None   Occupational History   ? None    Social Needs   ? Financial resource strain: None   ? Food insecurity     Worry: None     Inability: None   ? Transportation needs     Medical: None     Non-medical: None   Tobacco Use   ? Smoking status: Never Smoker   ? Smokeless tobacco: Never Used   Substance and Sexual Activity   ? Alcohol use: Not Currently     Frequency: Monthly or less     Binge frequency: Never   ? Drug use: Never   ? Sexual activity: Not Currently   Lifestyle   ? Physical activity     Days per week: None     Minutes per session: None   ? Stress: None   Relationships   ? Social connections     Talks on phone: None     Gets together: None     Attends Church service: None     Active member of club or organization: None     Attends meetings of clubs or organizations: None     Relationship status: None   ? Intimate partner violence     Fear of current or ex partner: None     Emotionally abused: None     Physically abused: None     Forced sexual activity: None   Other Topics Concern   ? None   Social History Narrative   ? None          Review of Systems   Constitutional:        Patient denies any pain fevers chills nausea vomit diarrhea change in vision hearing taste or smell weakness one-sided chest pain shortness of breath.  Denies any current shortness stool polyphagia polydipsia polyuria depression or anxiety and the main review of systems is negative.       Vitals:    03/31/21 0935   BP: 144/78   Pulse: (!) 55   Resp: 16   Temp: 98.6  F (37  C)   SpO2: 100%       Physical Exam  Constitutional:       General: He is not in acute distress.     Appearance: He is not ill-appearing, toxic-appearing or diaphoretic.   HENT:      Head: Normocephalic.      Nose: Nose normal.   Eyes:      General:         Right eye: No discharge.         Left eye: No discharge.   Cardiovascular:      Comments: Heart sounds were irregularly irregular with adequate rate control.  Pulmonary:      Effort: Pulmonary effort is normal. No respiratory distress.       Breath sounds: Normal breath sounds. No wheezing.   Abdominal:      General: There is no distension.      Tenderness: There is no abdominal tenderness.   Musculoskeletal:      Right lower leg: No edema.      Left lower leg: No edema.   Skin:     General: Skin is warm and dry.   Neurological:      Mental Status: He is alert. Mental status is at baseline.   Psychiatric:         Mood and Affect: Mood normal.         Behavior: Behavior normal.         Medication List:  Current Outpatient Medications   Medication Sig   ? acetaminophen (TYLENOL) 500 MG tablet Take 500 mg by mouth every 6 (six) hours as needed for pain.   ? amoxicillin (AMOXIL) 500 MG capsule Take 2,000 mg by mouth once as needed (Prior to dental work).   ? ARIPiprazole (ABILIFY) 2 MG tablet Take 2 mg by mouth at bedtime.    ? aspirin 81 mg chewable tablet Chew 81 mg at bedtime.    ? atorvastatin (LIPITOR) 10 MG tablet Take 10 mg by mouth at bedtime.   ? brimonidine (ALPHAGAN) 0.2 % ophthalmic solution Administer 1 drop to both eyes 2 (two) times a day.    ? cefdinir (OMNICEF) 300 MG capsule Take 1 capsule (300 mg total) by mouth 2 (two) times a day for 10 days.   ? dorzolamide-timolol (COSOPT) 22.3-6.8 mg/mL ophthalmic solution Administer 1 drop to both eyes 2 (two) times a day.    ? fluvoxaMINE (LUVOX) 50 MG tablet Take 50 mg by mouth at bedtime.    ? glipiZIDE (GLUCOTROL XL) 5 MG 24 hr tablet Take 5 mg by mouth 2 (two) times a day before meals.    ? latanoprostene bunod 0.024 % Drop Administer 1 drop to both eyes at bedtime.   ? levomefolate calcium (L-METHYLFOLATE ORAL) Take 1.25 mg by mouth daily.    ? metFORMIN (GLUCOPHAGE) 1000 MG tablet Take 1,000 mg by mouth 2 (two) times a day.    ? multivitamin therapeutic tablet Take 1 tablet by mouth daily.   ? netarsudiL (RHOPRESSA) 0.02 % Drop Administer 1 drop to both eyes every evening.    ? omeprazole (PRILOSEC) 20 MG capsule Take 20 mg by mouth daily before breakfast.    ? warfarin ANTICOAGULANT  (COUMADIN/JANTOVEN) 5 MG tablet Take 1-1.5 tablets (5-7.5 mg total) by mouth daily. Adjust dose per INR results as instructed. (Patient taking differently: Take 5-7.5 mg by mouth daily. Adjust dose per INR results as instructed.  5 mg Mon, Wed, Fri and 7.5 mg all other days of the week)       Labs: INR in the hospital 2.67, 2.96, 2.342.06.      Assessment:    ICD-10-CM    1. SIRS (systemic inflammatory response syndrome) (H)  R65.10    2. Acute pyelonephritis  N10    3. Type 2 diabetes mellitus with hyperglycemia, unspecified whether long term insulin use (H)  E11.65    4. Essential hypertension  I10    5. Paroxysmal atrial fibrillation (H)  I48.0    6. General weakness  R53.1        Plan: He is frustrated over the TV which is his main problem and I will have maintenance fix that.  We will get the labs from the hospital done but in the meantime I would recommend that we do a basic metabolic profile at some point.  I will wait to see the labs so do not repeat labs at this time.  But we need to get them from the hospital.  Otherwise she seems to be doing okay at this time we will continue with physical and occupational therapy and Coumadin clinic will manage his INR.  We will check his blood sugars 4 times daily at this time and no other changes to care plan at this time.        Electronically signed by: Robert Richmond DO

## 2021-06-21 NOTE — LETTER
Letter by Johnson, Michael Duane, CNP at      Author: Johnson, Michael Duane, CNP Service: -- Author Type: --    Filed:  Encounter Date: 4/7/2021 Status: (Other)         Patient: Rakesh Samuels   MR Number: [id number]   YOB: 1950   Date of Visit: 4/7/2021     [facility]    Facility:   [facility] [[id number]]   Code Status: DNR  PCP: Michelet Restrepo MD   Phone: [phone]   Fax: [phone]      CHIEF COMPLAINT/REASON FOR VISIT:  Chief Complaint   Patient presents with   ? Discharge Summary       HISTORY COURSE:  Rakesh is a 70 y.o. male who was hospitalized March 27, 2021 through March 30, 2021 secondary to pyelonephritis with the urine culture with mixed microorganisms.  The CT of the abdomen and pelvis did show right kidney is diminutive with multiple nonobstructing calculi which was present similar to the ones in June 2020.  He does have normal size left kidney without nephrolithiasis or obstructive uropathy.  He also has a history of diabetes and the Actos was discontinued.  History of A. fib currently on warfarin.  History of hypertension as well as GERD, sleep apnea-uses CPAP, dyslipidemia.  He has been able to successfully participate with the rehabilitation services.  He at this point is independent and able to ambulate with a cane up and down the hallway.  He is not having any discomfort.  No Tylenol as needed.  Blood sugars in the morning ranging 104-115 and then towards the p.m. the range 109-174.  Moods have been stable.  Finishing up his cefdinir on April 9.  Otherwise asymptomatic.  Getting eyedrops for glaucoma.  Also on warfarin which is being managed by the Coumadin clinic.  He has been a pleasure to get to know.  He did not have any questions or complications.  Review of Systems  He currently denies any new symptoms of fever chills fatigue cough or cold sore throat postnasal drip wheezing chest pain dizziness vertigo nausea vomiting  diarrhea dysuria unusual myalgias or arthralgias.     There were no vitals filed for this visit.  Blood pressure 126/75, pulse 76, temperature 97.0  Physical Exam  Pleasant gentleman in no acute distress.  Head is normocephalic.  Conjunctiva is pink and sclerae clear.  Neck is supple without adenopathy.  Lung sounds are clear throughout.  Cardiovascular does have an irregularity.  No lower extremity edema.  Gastrointestinal soft nontender with positive bowel sounds and nondistended.  Musculoskeletal he does have a history of osteoarthrosis through his major joints otherwise no new pain issues.  Psychiatric: Pleasant affect.  Lab Results   Component Value Date    WBC 6.7 03/30/2021    HGB 11.1 (L) 03/30/2021    HCT 34.7 (L) 03/30/2021    MCV 98 03/30/2021     03/30/2021     ,  Results for orders placed or performed in visit on 04/01/21   Basic Metabolic Panel   Result Value Ref Range    Sodium 144 136 - 145 mmol/L    Potassium 4.1 3.5 - 5.0 mmol/L    Chloride 110 (H) 98 - 107 mmol/L    CO2 25 22 - 31 mmol/L    Anion Gap, Calculation 9 5 - 18 mmol/L    Glucose 71 70 - 125 mg/dL    Calcium 9.1 8.5 - 10.5 mg/dL    BUN 11 8 - 22 mg/dL    Creatinine 0.73 0.70 - 1.30 mg/dL    GFR MDRD Af Amer >60 >60 mL/min/1.73m2    GFR MDRD Non Af Amer >60 >60 mL/min/1.73m2       Lab Results   Component Value Date    CHOL 145 11/18/2019    CHOL 136 10/29/2018    CHOL 130 09/21/2017     Lab Results   Component Value Date    HDL 43 11/18/2019    HDL 43 10/29/2018    HDL 39 (L) 09/21/2017     Lab Results   Component Value Date    LDLCALC 73 11/18/2019    LDLCALC 64 10/29/2018    LDLCALC 56 09/21/2017     Lab Results   Component Value Date    TRIG 147 11/18/2019    TRIG 147 10/29/2018    TRIG 173 (H) 09/21/2017     No components found for: CHOLHDL  Lab Results   Component Value Date    ALT 10 03/30/2021    AST 10 03/30/2021    ALKPHOS 47 03/30/2021    BILITOT 0.3 03/30/2021       MEDICATION LIST:  Current Outpatient Medications    Medication Sig   ? acetaminophen (TYLENOL) 500 MG tablet Take 500 mg by mouth every 6 (six) hours as needed for pain.   ? amoxicillin (AMOXIL) 500 MG capsule Take 2,000 mg by mouth once as needed (Prior to dental work).   ? ARIPiprazole (ABILIFY) 2 MG tablet Take 2 mg by mouth at bedtime.    ? aspirin 81 mg chewable tablet Chew 81 mg at bedtime.    ? atorvastatin (LIPITOR) 10 MG tablet Take 10 mg by mouth at bedtime.   ? brimonidine (ALPHAGAN) 0.2 % ophthalmic solution Administer 1 drop to both eyes 2 (two) times a day.    ? dorzolamide-timolol (COSOPT) 22.3-6.8 mg/mL ophthalmic solution Administer 1 drop to both eyes 2 (two) times a day.    ? fluvoxaMINE (LUVOX) 50 MG tablet Take 50 mg by mouth at bedtime.    ? glipiZIDE (GLUCOTROL XL) 5 MG 24 hr tablet Take 5 mg by mouth 2 (two) times a day before meals.    ? latanoprostene bunod 0.024 % Drop Administer 1 drop to both eyes at bedtime.   ? levomefolate calcium (L-METHYLFOLATE ORAL) Take 1.25 mg by mouth daily.    ? metFORMIN (GLUCOPHAGE) 1000 MG tablet Take 1,000 mg by mouth 2 (two) times a day.    ? multivitamin therapeutic tablet Take 1 tablet by mouth daily.   ? netarsudiL (RHOPRESSA) 0.02 % Drop Administer 1 drop to both eyes every evening.    ? omeprazole (PRILOSEC) 20 MG capsule Take 20 mg by mouth daily before breakfast.    ? warfarin ANTICOAGULANT (COUMADIN/JANTOVEN) 5 MG tablet Take 1-1.5 tablets (5-7.5 mg total) by mouth daily. Adjust dose per INR results as instructed. (Patient taking differently: Take 5-7.5 mg by mouth daily. Adjust dose per INR results as instructed.  5 mg Mon, Wed, Fri and 7.5 mg all other days of the week)       DISCHARGE DIAGNOSIS:    ICD-10-CM    1. Biliary calculus of other site without obstruction  K80.80    2. Glaucoma, unspecified glaucoma type, unspecified laterality  H40.9    3. 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 Pyelonephritis  N12        MEDICAL EQUIPMENT NEEDS:  None    DISCHARGE PLAN/FACE TO FACE:  I  certify that services are/were furnished while this patient was under the care of a physician and that a physician or an allowed non-physician practitioner (NPP), had a face-to-face encounter that meets the physician face-to-face encounter requirements. The encounter was in whole, or in part, related to the primary reason for home health. The patient is confined to his/her home and needs intermittent skilled nursing, physical therapy, speech-language pathology, or the continued need for occupational therapy. A plan of care has been established by a physician and is periodically reviewed by a physician.  Date of Face-to-Face Encounter: April 7, 2021    I certify that, based on my findings, the following services are medically necessary home health services: He will be discharging to home with current medications with physical and occupational therapy home health aide and nursing.  The anticipated discharge date is Wednesday, April 7, 2021    My clinical findings support the need for the above skilled services because: (Please write a brief narrative summary that describes what the RN, PT, SLP, or other services will be doing in the home. A list of diagnoses in this section does not meet the CMS requirements.)  He will require the skilled services for continuation of the rehabilitation program and process along with having some basic self-care deficits as well as nursing for medication management.    This patient is homebound because: (Please write a brief narrative summary describing the functional limitations as to why this patient is homebound and specifically what makes this patient homebound.)  Secondary to multiple chronic medical conditions including his most recent hospitalization for pyelonephritis.  We will also require therapy for basic cares along with continuation of therapy process and home safety along with nursing for medication management including diabetes and glaucoma.    The patient is, or has  been, under my care and I have initiated the establishment of the plan of care. This patient will be followed by a physician who will periodically review the plan of care.    Schedule follow up visit with primary care provider within 7 days to reestablish care.  He will follow up with the primary care team to go over his medications as well as any future laboratory studies.  He has been a delight to get to know.  He will follow up with urology as previously arranged.  He did not have any other further questions.    Discharge coordination care greater than 30 minutes  Electronically signed by: Michael Duane Johnson, CNP

## 2021-06-25 NOTE — TELEPHONE ENCOUNTER
ANTICOAGULATION  MANAGEMENT PROGRAM    Rakesh Hendricksonabbie is overdue for INR check.     Left message to call and schedule INR appointment as soon as possible.      Yaima Ramsey RN

## 2021-06-25 NOTE — TELEPHONE ENCOUNTER
ANTICOAGULATION  MANAGEMENT PROGRAM    Rakesh Samuels is overdue for INR check.     Left message to call and schedule INR appointment as soon as possible.      Ruby Manzanares RN

## 2021-06-26 NOTE — TELEPHONE ENCOUNTER
Patient's spouse called back and told ACN that she is currently in a nursing home and will not be able to take care of the patient.  She advised to call the patient or their dtr Eloisa directly.She was not able to provide ACN with Dtr's contact information a the time of call.  Rakesh's number is     Ruby Manzanares RN

## 2021-06-26 NOTE — TELEPHONE ENCOUNTER
No call back received from patient after multiple messages left on voicemail.    Instructed to call  to schedule appointment.    Ruby Manzanares RN

## 2021-06-26 NOTE — TELEPHONE ENCOUNTER
ACN reviewed patient chart for overdue INR and noted that INR result dated 5/28/2021 on patient's chart. This result was not received by [facility] on date it was done.    Anticoagulation template was scanned under media with result of 2.2 ( goal range is 2.0-3.0 )

## 2021-06-28 NOTE — PROGRESS NOTES
Progress Notes by Eric Pickett MD at 2/18/2020  9:10 AM     Author: Eric Pickett MD Service: -- Author Type: Physician    Filed: 2/18/2020 10:22 AM Encounter Date: 2/18/2020 Status: Signed    : Eric Pickett MD (Physician)           Thank you, Michelet Gonzales MD, for asking the [facility] team to see Mr. Rakesh Samuels to evaluate Atrial Fibrillation.      Assessment/Recommendations   Assessment:    1. Paroxysmal atrial fibrillation - asymptomatic. In sinus rhythm on anticoagulation that is changing to eliquis  2. DMII - on oral therapy  3. 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     CHADS2-Vasc score - 2 (age, DM)    Plan:  1. Discussed the pathophysiology, natural progression, and treatment options for atrial fibrillation. This discussion included, but was not limited to, rate vs rhythm control, stroke risk, and anticoagulation recommendations based on CHADS2-Vasc score compared with bleeding risk, and the risks and benefits of each of these treatment options.  2. Change xarelto to eliquis due to cost  3. Refer to [facility] group for consideration of watchman  4. TTE  5. Follow up in 3 months or sooner if needed         History of Present Illness   Mr. Rakesh Samuels is a 69 y.o. male with a significant past history of DMII   who presents for evaluation of new onset atrial fibrillation.     Mr. Samuels had a recent sleep study and was noted to have an irregular rhythm with possible SVT during the study. He was advised to see Dr. Restrepo where he was discovered to have atrial fibrillation with controlled heart rate. He was started on xarelto for stroke prevention. He is asymptomatic from the afib without palpitations or light headedness. He does have ROSARIO, likely due to RADHA. He is in normal rhythm today.    He ambulates with a cane and is nearly blind in his right eye. He has poor depth perception. He  has not fallen but has had some close calls.       Other than noted above, Mr. Samuels denies any chest pain/pressure/tightness, shortness of breath at rest or with exertion, light headedness/dizziness, pre-syncope, syncope, lower extremity swelling, palpitations, paroxysmal nocturnal dyspnea (PND), or orthopnea.     Cardiac Problems and Cardiac Diagnostics     Most Recent Cardiac testing:  ECG dated 1/21/2020 (personaly reviewed and interpreted): atrial fibrillation, HR 81 bpm  ECG today demonstrates sinus rhythm with sinus arrhythmia and PACs.       Medications  Allergies   Current Outpatient Medications   Medication Sig Dispense Refill   ? ARIPiprazole (ABILIFY) 5 MG tablet Take 5 mg by mouth daily.  2   ? aspirin 81 MG EC tablet Take 81 mg by mouth daily.     ? atorvastatin (LIPITOR) 10 MG tablet Take 10 mg by mouth at bedtime.  1   ? dorzolamide-timolol (COSOPT) 22.3-6.8 mg/mL ophthalmic solution Administer 1 drop to both eyes 2 (two) times a day.  3   ? fluvoxaMINE (LUVOX) 100 MG tablet Take 200 mg by mouth at bedtime.  1   ? glipiZIDE (GLUCOTROL XL) 5 MG 24 hr tablet Take 5 mg by mouth 2 (two) times a day with meals.     ? latanoprost (XALATAN) 0.005 % ophthalmic solution Administer 1 drop to both eyes at bedtime.  2   ? latanoprostene bunod 0.024 % Drop Administer 1 drop to both eyes at bedtime.     ? metFORMIN (GLUCOPHAGE) 1000 MG tablet Take 1,000 mg by mouth 2 (two) times a day with meals.  3   ? multivitamin therapeutic tablet Take 1 tablet by mouth daily.     ? netarsudiL (RHOPRESSA) 0.02 % Drop Administer 1 drop to both eyes daily.     ? pioglitazone (ACTOS) 30 MG tablet Take 30 mg by mouth daily.  0   ? UNABLE TO FIND Med Name: I-methylfolate 15 mg daily     ? VENTOLIN HFA 90 mcg/actuation inhaler Inhale 2 puffs every 4 (four) hours as needed.  11   ? apixaban ANTICOAGULANT (ELIQUIS) 5 mg Tab tablet Take 1 tablet (5 mg total) by mouth 2 (two) times a day. 60 tablet 11   ? buPROPion (WELLBUTRIN XL) 150  MG 24 hr tablet Take 150 mg by mouth daily.  1     No current facility-administered medications for this visit.       Allergies   Allergen Reactions   ? Adhesive Tape-Silicones         Physical Examination Review of Systems   Vitals:    02/18/20 0932   BP: 106/70   Pulse: (!) 54   Resp: 14   SpO2: 98%     Body mass index is 27.8 kg/m .  Wt Readings from Last 3 Encounters:   02/18/20 203 lb (92.1 kg)   09/09/19 (!) 222 lb 6.4 oz (100.9 kg)       General Appearance:   Pleasant male, appears stated age. no acute distress, normal body habitus   ENT/Mouth: membranes moist, no apparent gingival bleeding.      EYES:  Right eye slightly injected.    Neck: no carotid bruits.    Respiratory:   lungs are clear to auscultation, no rales or wheezing, equal chest wall expansion    Cardiovascular:   Regular rhythm, normal rate. Normal first and second heart sounds with no murmurs, rubs, or gallops; the carotid, radial and posterior tibial pulses are intact, Jugular venous pressure normal, no edema bilaterally    Abdomen/GI:  Soft, non-tender   Extremities: no cyanosis or clubbing   Skin: no xanthelasma, warm.    Heme/lymph/ Immunology No apparent bleeding noted.   Neurologic: Alert and oriented. Ambulates with cane. no tremors     Psychiatric: Pleasant, calm, appropriate affect.    A complete 10 system review of systems was performed and is negative except as mentioned in the HPI or below:  General: Weight Loss  Eyes: WNL  Ears/Nose/Throat: Nosebleeds  Lungs: Shortness of Breath, Snoring  Heart: Shortness of Breath with activity, Irregular Heartbeat  Stomach: Heartburn  Bladder: WNL  Muscle/Joints: WNL  Skin: WNL  Nervous System: Daytime Sleepiness, Dizziness, Loss of Balance  Mental Health: Depression, Anxiety     Blood: Easy Bleeding       Past History   Past Medical History:   DMII  Right eye blindness  Atrial fibrillation    Past Surgical History:   Denies prior surgery    Family History:   Denies family history of any heart  disease.    Social History:   Social History     Socioeconomic History   ? Marital status:      Spouse name: Not on file   ? Number of children: Not on file   ? Years of education: Not on file   ? Highest education level: Not on file   Occupational History   ? Not on file   Social Needs   ? Financial resource strain: Not on file   ? Food insecurity:     Worry: Not on file     Inability: Not on file   ? Transportation needs:     Medical: Not on file     Non-medical: Not on file   Tobacco Use   ? Smoking status: Never Smoker   ? Smokeless tobacco: Never Used   Substance and Sexual Activity   ? Alcohol use: Yes     Frequency: Monthly or less     Binge frequency: Never   ? Drug use: Never   ? Sexual activity: Not Currently   Lifestyle   ? Physical activity:     Days per week: Not on file     Minutes per session: Not on file   ? Stress: Not on file   Relationships   ? Social connections:     Talks on phone: Not on file     Gets together: Not on file     Attends Cheondo[employer] service: Not on file     Active member of club or organization: Not on file     Attends meetings of clubs or organizations: Not on file     Relationship status: Not on file   ? Intimate partner violence:     Fear of current or ex partner: Not on file     Emotionally abused: Not on file     Physically abused: Not on file     Forced sexual activity: Not on file   Other Topics Concern   ? Not on file   Social History Narrative   ? Not on file              Lab Results    Chemistry/lipid CBC Cardiac Enzymes/BNP/TSH/INR   Lab Results   Component Value Date    CHOL 145 11/18/2019    HDL 43 11/18/2019    LDLCALC 73 11/18/2019    TRIG 147 11/18/2019    CREATININE 1.03 01/21/2020    BUN 18 01/21/2020    K 5.1 (H) 01/21/2020     01/21/2020     01/21/2020    CO2 23 01/21/2020    Lab Results   Component Value Date    WBC 7.9 09/10/2019    HGB 11.4 (L) 09/10/2019    HCT 34.7 (L) 09/10/2019    MCV 98 09/10/2019     09/10/2019    Lab Results    Component Value Date    TSH 1.81 01/21/2020    INR 0.99 04/10/2015

## 2021-06-29 NOTE — PROGRESS NOTES
Progress Notes by Aguila Wheeler MD at 4/15/2020  9:13 AM     Author: Aguila Wheeler MD Service: Nephrology Author Type: Physician    Filed: 4/15/2020  9:25 AM Date of Service: 4/15/2020  9:13 AM Status: Addendum    : Aguila Wheeler MD (Physician)    Related Notes: Original Note by Aguila Wheeler MD (Physician) filed at 4/15/2020  9:24 AM              RENAL ([facility]) progress note  CC: F/U CHIDI  S: Since last visit, patient feels better overall today with less throat pain.  No dyspnea.  Acknowledges that he has not chronically been drinking enough water given recurrent kidney stones.  Denies nausea/vomiting/flank pain.  Gross hematuria persists     A/P:   1. CHIDI: pre-renal vs. ATN as well as obstructing stone in left ureter with left hydronephrosis              - HD for metformin toxicity 4/12.  No further dialysis needed so femoral dialysis catheter removed 4/14/2020.              - Daily renal function labs, weights, I/Os     2. Hyperkalemia (K+ 7.0) : Resolved. Now low in setting of autodiuresis and resolving CHIDI. Now on K+ Replacement protocol.                 3. Metabolic acidosis: RESOLVED. High anion gap due to lactic acidosis from hypoperfusion/ metformin use in setting of unrecognized CHIDI leading up to hospitalization. No hypotension. No reported ingestions.               -S/P HD 4/12 x 1 --> resolved.                    4. DM2: holding metformin, actos, and glipizide.  Insulin per hospitalist team.   - OK to resume Metformin at time of D/C only if creatinine 1.5 or lower.     5. Shock. RESOLVED. UCX/BCx negative thus far.  On Zosyn empirically.        6. Nephrolithiasis with obstructing stone in left ureter with left hydronephrosis. S/P stenting 4/12.  Good UO but persistent gross hematuria.  Future stone extraction per Dr. Yates.  Okpravin to JASON Olsen from renal standpoint.  Encourage the patient to drink 3 L of water daily in order to attain 2 L urine output.    7.  Hyponatremia: DC IV  saline and increase water intake.  May need to start hypotonic saline tomorrow if continues to worsen.    Aguila Wheeler MD  [facility]  [phone] (off)       No interval changes to past medical history, social history or family history to report.    /72 (Patient Position: Sitting)   Pulse 83   Temp 98  F (36.7  C) (Axillary)   Resp 14   Ht 6' (1.829 m)   Wt 192 lb 3.9 oz (87.2 kg)   SpO2 100%   BMI 26.07 kg/m      I/O last 3 completed shifts:  In: 2354.5 [I.V.:1897.5; Other:150; IV Piggyback:307]  Out: 2985 [Urine:2985]    Physical Exam:   GENERAL: alert, NAD  EYES: pupils equal, sclerae not icteric.  ENT: Moist oral mucosa  RESP: Clear to auscultation bilaterally with no respiratory distress    CV: Bigeminy on monitor. Regular, no murmurs. no leg edema.    GI: Active BS, Soft, NT/ND, no masses or HSM  : Olsen with gross hematuria  SKIN: No rash, warm/ dry  NEURO: Moves all extremities, no tremor. Sensation grossly intact to LT  PSYCH: appropriate mood and affect    Results from last 7 days   Lab Units 04/15/20  0358 04/14/20  2125 04/14/20  1746 04/14/20  1342  04/14/20  0358 04/13/20  0553  04/12/20  1428  04/12/20  0905   LN-SODIUM mmol/L 148*  --   --  146*  --  145 140   < > 142   < > 143   LN-POTASSIUM mmol/L 3.3* 3.5 3.5 3.3*   < > 2.9* 3.8   < > 7.0*   < > 6.8*   LN-CHLORIDE mmol/L 115*  --   --  107  --  104 98   < > 99   < > 96*   LN-CO2 mmol/L 24  --   --  30  --  28 21*   < > 7*   < > <6*   LN-BLOOD UREA NITROGEN mg/dL 24*  --   --  28*  --  32* 34*   < > 66*   < > 72*   LN-CREATININE mg/dL 1.57*  --   --  2.30*  --  2.64* 2.98*   < > 5.17*   < > 6.14*   LN-CALCIUM mg/dL 6.0*  --   --  6.8*  --  7.0* 7.6*   < > 8.1*   < > 10.3   LN-ALBUMIN g/dL  --   --   --   --   --   --  2.6*  --  2.3*  --  3.5   LN-PROTEIN TOTAL g/dL  --   --   --   --   --   --   --   --  4.6*  --  6.8   LN-BILIRUBIN TOTAL mg/dL  --   --   --   --   --   --   --   --  0.2  --  0.3    LN-ALKALINE PHOSPHATASE U/L  --   --   --   --   --   --   --   --  52  --  75   LN-ALT (SGPT) U/L  --   --   --   --   --   --   --   --  <9  --  13   LN-AST (SGOT) U/L  --   --   --   --   --   --   --   --  18  --  14    < > = values in this interval not displayed.     Results from last 7 days   Lab Units 04/14/20  0358 04/13/20  0553 04/12/20 2007 04/12/20  1429   LN-WHITE BLOOD CELL COUNT thou/uL 8.3 12.6*  --  16.1*   LN-HEMOGLOBIN g/dL 11.2* 11.8* 11.6* 10.1*   LN-HEMATOCRIT % 32.7* 34.1*  --  33.5*   LN-PLATELET COUNT thou/uL 158 263  --  302     EXAM: CT CHEST ABDOMEN PELVIS WO ORAL WO IV CONTRAST  LOCATION: [facility]  DATE/TIME: 4/12/2020 9:59 AM     INDICATION: Syncope. Weakness. Fall. Anticoagulated.  COMPARISON: 03/26/2020.  TECHNIQUE: CT scan of the chest, abdomen, and pelvis was performed without IV contrast. Multiplanar reformats were obtained. Dose reduction techniques were used.   CONTRAST: None.     FINDINGS:   LUNGS AND PLEURA: Normal.     MEDIASTINUM/AXILLAE: Normal.     HEPATOBILIARY: Multiple gallstones. Otherwise negative.     PANCREAS: Normal.     SPLEEN: Normal.     ADRENAL GLANDS: Benign adrenal adenomas bilaterally unchanged. No follow-up needed.     KIDNEYS/BLADDER: New moderate left hydronephrosis with obstructing stone at the ureteral pelvic junction measures 5 mm. No other stones on the left side. Couple tiny nonobstructing stones right kidney with moderate atrophy right kidney as seen   previously.     BOWEL: Normal.     LYMPH NODES: Normal.     VASCULATURE: Unremarkable.     PELVIC ORGANS: Normal.     MUSCULOSKELETAL: Generalized degenerative change throughout the spine and hips. No concerning bone lesions.     IMPRESSION:   1.  New moderately obstructing stone proximal left ureter at ureterovesical junction measuring 5 mm.     2.  No other significant new findings.     3.  No hemorrhage.     4.  Nonobstructing intrarenal stones. In the moderately atrophic right kidney  unchanged.     5.  Gallstones.    Scheduled Meds:  ? dorzolamide-timoloL  1 drop Both Eyes BID   ? insulin aspart (NovoLOG) injection   Subcutaneous Q4H   ? insulin glargine  10 Units Subcutaneous QAM   ? latanoprostene bunod  1 drop Both Eyes QHS   ? magnesium sulfate IVPB  4 g Intravenous Once   ? netarsudiL  1 drop Both Eyes DAILY   ? pantoprazole  40 mg Intravenous Q12H   ? piperacillin-tazobactam  3.375 g Intravenous Q8H   ? senna-docusate  1 tablet Oral BID    Or   ? senna (SENOKOT) syrup  8.8 mg Enteral Tube BID   ? sodium chloride  10-30 mL Intravenous Q8H FIXED TIMES     Continuous Infusions:  ? sodium chloride 0.9% 75 mL/hr (04/15/20 0221)     PRN Meds:.bacitracin, benzocaine-menthoL, bisacodyL, dextrose 50 % (D50W), glucagon (human recombinant), magnesium hydroxide, naloxone **OR** naloxone, ondansetron **OR** ondansetron, polyvinyl alcohol, sodium chloride, sodium chloride, sodium chloride      Labs personally reviewed today during this evaluation at 10:42 AM

## 2021-06-29 NOTE — PROGRESS NOTES
Progress Notes by Aguila Wheeler MD at 4/13/2020 10:42 AM     Author: Aguila Wheeler MD Service: Nephrology Author Type: Physician    Filed: 4/13/2020 11:04 AM Date of Service: 4/13/2020 10:42 AM Status: Signed    : Aguila Wheeler MD (Physician)              RENAL ([facility]) progress note  CC: F/U CHIDI  S: Since last visit, patient remains intubated/sedated. Care d/w RN. Good UO overnight noted. BP stable.     A/P:   1. CHIDI: pre-renal vs. ATN as well as obstructing stone in left ureter with left hydronephrosis              - HD for metformin toxicity 4/12.              - Daily renal function labs, weights, I/Os     2. Hyperkalemia: IMPROVED.  in setting of CHIDI and metabolic acidosis, improved with medical management/ dialysis 4/12. GIB also likely contributed.               - Monitor off dialysis for now.                 3. Metabolic acidosis: high anion gap due to lactic acidosis from hypoperfusion. This is probably from metformin use in setting of unrecognized CHIDI leading up to hospitalization. No hypotension. No reported ingestions.               -S/P HD. Stable for now - trend.                   4. DM2: hold metformin, actos, and glipizide. Hypoglycemia due to metformin accumulation from CHIDI leading up to hospitalization. Insulin per hospitalist team.     5. Shock. Still ow dose levophed Not on BP meds at home. Trend.      6. Nephrolithiasis: with Obstructing stone in left ureter with left hydronephrosis. S/P stenting 4/12.  Good UO - trend.     Aguila Wheeler MD  [facility]  [phone] (off)       No interval changes to past medical history, social history or family history to report.    /74   Pulse 81   Temp 99.5  F (37.5  C) (Esophageal)   Resp 14   Ht 6' (1.829 m)   Wt 199 lb 4.7 oz (90.4 kg)   SpO2 99%   BMI 27.03 kg/m      I/O last 3 completed shifts:  In: 6735 [I.V.:6035; Blood:650; IV Piggyback:50]  Out: 5076 [Urine:3825; Emesis/NG output:100;  Other:300; Blood:851]    Physical Exam:   GENERAL: intubated/sedated.    EYES: pupils equal, sclerae not icteric.  ENT: ETT secure.  RESP: Clear to auscultation bilaterally with no respiratory distress on vent   CV: RRR, no murmurs. no leg edema.    GI: Active BS, Soft, NT/ND, no masses or HSM  : Olsen with gross hematuria  SKIN: No rash, warm/ dry  NEURO: Moves all extremities, no tremor. Sensation grossly intact to LT  PSYCH: sedated on vent - deferred    Results from last 7 days   Lab Units 04/13/20  0553 04/12/20  2351 04/12/20  1757 04/12/20  1428  04/12/20  0905   LN-SODIUM mmol/L 140 137 139 142   < > 143   LN-POTASSIUM mmol/L 3.8 3.9 4.4 7.0*   < > 6.8*   LN-CHLORIDE mmol/L 98 95* 96* 99   < > 96*   LN-CO2 mmol/L 21* 19* 12* 7*   < > <6*   LN-BLOOD UREA NITROGEN mg/dL 34* 32* 34* 66*   < > 72*   LN-CREATININE mg/dL 2.98* 2.92* 2.86* 5.17*   < > 6.14*   LN-CALCIUM mg/dL 7.6* 7.7* 8.1* 8.1*   < > 10.3   LN-ALBUMIN g/dL 2.6*  --   --  2.3*  --  3.5   LN-PROTEIN TOTAL g/dL  --   --   --  4.6*  --  6.8   LN-BILIRUBIN TOTAL mg/dL  --   --   --  0.2  --  0.3   LN-ALKALINE PHOSPHATASE U/L  --   --   --  52  --  75   LN-ALT (SGPT) U/L  --   --   --  <9  --  13   LN-AST (SGOT) U/L  --   --   --  18  --  14    < > = values in this interval not displayed.     Results from last 7 days   Lab Units 04/13/20  0553 04/12/20  2007 04/12/20  1429 04/12/20  0905   LN-WHITE BLOOD CELL COUNT thou/uL 12.6*  --  16.1* 17.6*   LN-HEMOGLOBIN g/dL 11.8* 11.6* 10.1* 13.3*   LN-HEMATOCRIT % 34.1*  --  33.5* 45.1   LN-PLATELET COUNT thou/uL 263  --  302 445*         Scheduled Meds:  ? chlorhexidine  15 mL Swish & Spit BID   ? dorzolamide-timoloL  1 drop Both Eyes BID   ? latanoprostene bunod  1 drop Both Eyes QHS   ? netarsudiL  1 drop Both Eyes DAILY   ? piperacillin-tazobactam  3.375 g Intravenous Q12H   ? senna-docusate  1 tablet Oral BID    Or   ? senna (SENOKOT) syrup  8.8 mg Enteral Tube BID   ? sodium chloride  10-30 mL  Intravenous Q8H FIXED TIMES   ? vancomycin  1,250 mg Intravenous Once   ? vancomycin intermittent dosing   Other Med Consult or Protocol     Continuous Infusions:  ? insulin infusion (1 unit/mL) 1.5 Units/hr (04/13/20 1016)   ? norepinephrine 0.06 mcg/kg/min (04/13/20 1018)   ? pantoprozole (PROTONIX) infusion 8 mg/hr (04/13/20 1018)   ? propofol 30 mcg/kg/min (04/13/20 0552)   ? propofol 20 mcg/kg/min (04/13/20 1016)   ? sodium chloride 0.9%     ? sodium chloride 0.9% 100 mL/hr (04/13/20 1018)   ? vasopressin Stopped (04/12/20 1500)     PRN Meds:.bacitracin, benzocaine-menthoL, bisacodyL, dextrose 50 % (D50W), dextrose 50 % (D50W), glucagon (human recombinant), glucagon (human recombinant), insulin infusion (1 unit/mL), magnesium hydroxide, naloxone **OR** naloxone, ondansetron **OR** ondansetron, polyvinyl alcohol, sodium chloride bacteriostatic, sodium chloride, sodium chloride, sodium chloride      Labs personally reviewed today during this evaluation at 10:42 AM

## 2021-06-29 NOTE — PROGRESS NOTES
Progress Notes by Yelitza Tipton MD at 4/18/2020 12:44 PM     Author: Yelitza Tipton MD Service: Nephrology Author Type: Physician    Filed: 4/18/2020 12:47 PM Date of Service: 4/18/2020 12:44 PM Status: Signed    : Yelitza Tipton MD (Physician)              RENAL ([facility]) progress note  CC: F/U CHIDI  S: Since last visit, patient feels anxious about d/c today.  No shortness of breath, tolerating po.  Going to tcu, discussed need to increase water intake.  A/P:   1. CHIDI: RESOLVED. Pre-renal vs. ATN as well as obstructing stone in left ureter with left hydronephrosis              - s/p HD for metformin toxicity 4/12.  No further dialysis needed so femoral dialysis catheter removed 4/14/2020.  Improved.       2. Hyperkalemia (K+ 7.0) : RESOLVED.              3. Metabolic acidosis: RESOLVED. High anion gap due to lactic acidosis from hypoperfusion/ metformin use in setting of unrecognized CHIID leading up to hospitalization. No hypotension. No reported ingestions.               -S/P HD 4/12 x 1 for metformin toxicity                    4. DM2: holding metformin, actos, and glipizide.  Insulin per hospitalist team.   - OK to resume Metformin at time of D/C. Defer to Dr Ruby.     5. Shock. RESOLVED. UCX/BCx from 4/12 negative thus far.  On Zosyn empirically.        6. Nephrolithiasis with obstructing stone in left ureter with left hydronephrosis. S/P stenting 4/12.  Encourage the patient to drink 3 L of water daily in order to attain 2 L urine output. Dr. Ruby will discuss timing of stone removal with Dr. Yates. (On heparin gtt and holding Xarelto - afib)    7.  Hypernatremia: encouraged more po water intake.  Ok for d/c from renal perspective.  Yelitza Tipton MD  [facility]  [phone]     No interval changes to past medical history, social history or family history to report.    /68 (Patient Position: Semi-langston)   Pulse 85   Temp 98.1  F (36.7  C) (Oral)   Resp 18    Ht 6' (1.829 m)   Wt 189 lb (85.7 kg)   SpO2 95%   BMI 25.63 kg/m      I/O last 3 completed shifts:  In: 2550.7 [P.O.:2400; I.V.:95.7; IV Piggyback:55]  Out: 350 [Urine:350]    Physical Exam:   GENERAL: alert, NAD  EYES: right conjunctival hemorrhage.  ENT: Moist oral mucosa  RESP: Clear to auscultation bilaterally with no respiratory distress    CV: Irreg, rate controlled No murmurs. no leg edema.    GI: Active BS, Soft, NT/ND, no masses or HSM  SKIN: No rash, warm/ dry  NEURO: Moves all extremities, no tremor. Sensation grossly intact to LT  PSYCH: appropriate mood and affect    Results from last 7 days   Lab Units 04/18/20  0554 04/17/20  0653 04/16/20  1744 04/16/20  0610  04/15/20  1122  04/13/20  0553  04/12/20  1428  04/12/20  0905   LN-SODIUM mmol/L 147* 148*  --  146*  --  144   < > 140   < > 142   < > 143   LN-POTASSIUM mmol/L 3.6 4.0 4.2 3.5   < > 4.2   < > 3.8   < > 7.0*   < > 6.8*   LN-CHLORIDE mmol/L  --  113*  --  110*  --  107   < > 98   < > 99   < > 96*   LN-CO2 mmol/L  --  27  --  26  --  24   < > 21*   < > 7*   < > <6*   LN-BLOOD UREA NITROGEN mg/dL  --  12  --  18  --  24*   < > 34*   < > 66*   < > 72*   LN-CREATININE mg/dL  --  0.97  --  1.26  --  1.57*   < > 2.98*   < > 5.17*   < > 6.14*   LN-CALCIUM mg/dL  --  7.0*  --  7.0*  --  6.9*   < > 7.6*   < > 8.1*   < > 10.3   LN-ALBUMIN g/dL  --   --   --  2.4*  --   --   --  2.6*  --  2.3*  --  3.5   LN-PROTEIN TOTAL g/dL  --   --   --   --   --   --   --   --   --  4.6*  --  6.8   LN-BILIRUBIN TOTAL mg/dL  --   --   --   --   --   --   --   --   --  0.2  --  0.3   LN-ALKALINE PHOSPHATASE U/L  --   --   --   --   --   --   --   --   --  52  --  75   LN-ALT (SGPT) U/L  --   --   --   --   --   --   --   --   --  <9  --  13   LN-AST (SGOT) U/L  --   --   --   --   --   --   --   --   --  18  --  14    < > = values in this interval not displayed.     Results from last 7 days   Lab Units 04/18/20  0554 04/17/20  0653 04/16/20  3701 04/16/20  1005  04/16/20  0610 04/14/20  0358   LN-WHITE BLOOD CELL COUNT thou/uL  --   --   --  7.0 6.7 8.3   LN-HEMOGLOBIN g/dL 10.6* 11.2* 11.2* 11.6* 11.4* 11.2*   LN-HEMATOCRIT %  --   --   --  35.7* 34.9* 32.7*   LN-PLATELET COUNT thou/uL  --   --   --  189 174 158     EXAM: CT CHEST ABDOMEN PELVIS WO ORAL WO IV CONTRAST  LOCATION: [facility]  DATE/TIME: 4/12/2020 9:59 AM     INDICATION: Syncope. Weakness. Fall. Anticoagulated.  COMPARISON: 03/26/2020.  TECHNIQUE: CT scan of the chest, abdomen, and pelvis was performed without IV contrast. Multiplanar reformats were obtained. Dose reduction techniques were used.   CONTRAST: None.     FINDINGS:   LUNGS AND PLEURA: Normal.     MEDIASTINUM/AXILLAE: Normal.     HEPATOBILIARY: Multiple gallstones. Otherwise negative.     PANCREAS: Normal.     SPLEEN: Normal.     ADRENAL GLANDS: Benign adrenal adenomas bilaterally unchanged. No follow-up needed.     KIDNEYS/BLADDER: New moderate left hydronephrosis with obstructing stone at the ureteral pelvic junction measures 5 mm. No other stones on the left side. Couple tiny nonobstructing stones right kidney with moderate atrophy right kidney as seen   previously.     BOWEL: Normal.     LYMPH NODES: Normal.     VASCULATURE: Unremarkable.     PELVIC ORGANS: Normal.     MUSCULOSKELETAL: Generalized degenerative change throughout the spine and hips. No concerning bone lesions.     IMPRESSION:   1.  New moderately obstructing stone proximal left ureter at ureterovesical junction measuring 5 mm.     2.  No other significant new findings.     3.  No hemorrhage.     4.  Nonobstructing intrarenal stones. In the moderately atrophic right kidney unchanged.     5.  Gallstones.    Scheduled Meds:  ? apixaban ANTICOAGULANT  5 mg Oral BID   ? dorzolamide-timoloL  1 drop Both Eyes BID   ? insulin aspart (NovoLOG) injection   Subcutaneous TID with meals   ? insulin glargine  10 Units Subcutaneous QAM   ? latanoprostene bunod  1 drop Both Eyes QHS   ?  magnesium oxide  400 mg Oral TID   ? netarsudiL  1 drop Both Eyes DAILY   ? pantoprazole  40 mg Intravenous Q12H   ? senna-docusate  1 tablet Oral BID    Or   ? senna (SENOKOT) syrup  8.8 mg Enteral Tube BID   ? sodium chloride  10-30 mL Intravenous Q8H FIXED TIMES     Continuous Infusions:    PRN Meds:.acetaminophen, bacitracin, benzocaine-menthoL, bisacodyL, dextrose 50 % (D50W), glucagon (human recombinant), magnesium hydroxide, melatonin, naloxone **OR** naloxone, ondansetron **OR** ondansetron, polyvinyl alcohol, sodium chloride, sodium chloride, sodium chloride      Labs personally reviewed today during this evaluation at 10:42 AM

## 2021-06-29 NOTE — PROGRESS NOTES
Progress Notes by Aguila Wheeler MD at 4/16/2020  1:07 PM     Author: Aguila Wheeler MD Service: Nephrology Author Type: Physician    Filed: 4/16/2020  1:41 PM Date of Service: 4/16/2020  1:07 PM Status: Signed    : Aguila Wheeler MD (Physician)              RENAL ([facility]) progress note  CC: F/U CHIDI  S: Since last visit, patient feels better overall. Denies pain, nausea, dizziness or dyspnea. Discussed timing of stone removal and further duration of hospitalization. Care d/w Dr. Ruby - suspect safe to proceed with stone removal if Dr. Yates agrees.       A/P:   1. CHIDI: RESOLVING. Pre-renal vs. ATN as well as obstructing stone in left ureter with left hydronephrosis              - HD for metformin toxicity 4/12.  No further dialysis needed so femoral dialysis catheter removed 4/14/2020.              - Daily renal function labs, weights, I/Os     2. Hyperkalemia (K+ 7.0) : RESOLVED. Now low in setting of autodiuresis and resolving CHIDI. Now on K+ Replacement protocol.                 3. Metabolic acidosis: RESOLVED. High anion gap due to lactic acidosis from hypoperfusion/ metformin use in setting of unrecognized CHIDI leading up to hospitalization. No hypotension. No reported ingestions.               -S/P HD 4/12 x 1 for metformin toxicity                    4. DM2: holding metformin, actos, and glipizide.  Insulin per hospitalist team.   - OK to resume Metformin at time of D/C. Defer to Dr Ruby.     5. Shock. RESOLVED. UCX/BCx from 4/12 negative thus far.  On Zosyn empirically.        6. Nephrolithiasis with obstructing stone in left ureter with left hydronephrosis. S/P stenting 4/12.  Encourage the patient to drink 3 L of water daily in order to attain 2 L urine output. Dr. Ruby will discuss timing of stone removal with Dr. Yates. (On heparin gtt and holding Xarelto - afib)    7.  Hypernatremia: Push p.o. water!    Aguila Wheeler MD  [facility]  [phone]  (off)       No interval changes to past medical history, social history or family history to report.    /78 (Patient Position: Sitting)   Pulse 99   Temp 97.5  F (36.4  C) (Oral)   Resp 18   Ht 6' (1.829 m)   Wt 198 lb 3.2 oz (89.9 kg)   SpO2 99%   BMI 26.88 kg/m      I/O last 3 completed shifts:  In: 3525 [P.O.:3082; I.V.:393; IV Piggyback:50]  Out: 1930 [Urine:1930]    Physical Exam:   GENERAL: alert, NAD  EYES: right conjunctival hemorrhage.  ENT: Moist oral mucosa  RESP: Clear to auscultation bilaterally with no respiratory distress    CV: Irreg, rate controlled No murmurs. no leg edema.    GI: Active BS, Soft, NT/ND, no masses or HSM  SKIN: No rash, warm/ dry  NEURO: Moves all extremities, no tremor. Sensation grossly intact to LT  PSYCH: appropriate mood and affect    Results from last 7 days   Lab Units 04/16/20  0610 04/15/20  1550 04/15/20  1122 04/15/20  0358  04/13/20  0553  04/12/20  1428  04/12/20  0905   LN-SODIUM mmol/L 146*  --  144 148*   < > 140   < > 142   < > 143   LN-POTASSIUM mmol/L 3.5 4.2 4.2 3.3*   < > 3.8   < > 7.0*   < > 6.8*   LN-CHLORIDE mmol/L 110*  --  107 115*   < > 98   < > 99   < > 96*   LN-CO2 mmol/L 26  --  24 24   < > 21*   < > 7*   < > <6*   LN-BLOOD UREA NITROGEN mg/dL 18  --  24* 24*   < > 34*   < > 66*   < > 72*   LN-CREATININE mg/dL 1.26  --  1.57* 1.57*   < > 2.98*   < > 5.17*   < > 6.14*   LN-CALCIUM mg/dL 7.0*  --  6.9* 6.0*   < > 7.6*   < > 8.1*   < > 10.3   LN-ALBUMIN g/dL 2.4*  --   --   --   --  2.6*  --  2.3*  --  3.5   LN-PROTEIN TOTAL g/dL  --   --   --   --   --   --   --  4.6*  --  6.8   LN-BILIRUBIN TOTAL mg/dL  --   --   --   --   --   --   --  0.2  --  0.3   LN-ALKALINE PHOSPHATASE U/L  --   --   --   --   --   --   --  52  --  75   LN-ALT (SGPT) U/L  --   --   --   --   --   --   --  <9  --  13   LN-AST (SGOT) U/L  --   --   --   --   --   --   --  18  --  14    < > = values in this interval not displayed.     Results from last 7 days   Lab Units  04/16/20  1004 04/16/20  0610 04/14/20  0358   LN-WHITE BLOOD CELL COUNT thou/uL 7.0 6.7 8.3   LN-HEMOGLOBIN g/dL 11.6* 11.4* 11.2*   LN-HEMATOCRIT % 35.7* 34.9* 32.7*   LN-PLATELET COUNT thou/uL 189 174 158     EXAM: CT CHEST ABDOMEN PELVIS WO ORAL WO IV CONTRAST  LOCATION: [facility]  DATE/TIME: 4/12/2020 9:59 AM     INDICATION: Syncope. Weakness. Fall. Anticoagulated.  COMPARISON: 03/26/2020.  TECHNIQUE: CT scan of the chest, abdomen, and pelvis was performed without IV contrast. Multiplanar reformats were obtained. Dose reduction techniques were used.   CONTRAST: None.     FINDINGS:   LUNGS AND PLEURA: Normal.     MEDIASTINUM/AXILLAE: Normal.     HEPATOBILIARY: Multiple gallstones. Otherwise negative.     PANCREAS: Normal.     SPLEEN: Normal.     ADRENAL GLANDS: Benign adrenal adenomas bilaterally unchanged. No follow-up needed.     KIDNEYS/BLADDER: New moderate left hydronephrosis with obstructing stone at the ureteral pelvic junction measures 5 mm. No other stones on the left side. Couple tiny nonobstructing stones right kidney with moderate atrophy right kidney as seen   previously.     BOWEL: Normal.     LYMPH NODES: Normal.     VASCULATURE: Unremarkable.     PELVIC ORGANS: Normal.     MUSCULOSKELETAL: Generalized degenerative change throughout the spine and hips. No concerning bone lesions.     IMPRESSION:   1.  New moderately obstructing stone proximal left ureter at ureterovesical junction measuring 5 mm.     2.  No other significant new findings.     3.  No hemorrhage.     4.  Nonobstructing intrarenal stones. In the moderately atrophic right kidney unchanged.     5.  Gallstones.    Scheduled Meds:  ? dorzolamide-timoloL  1 drop Both Eyes BID   ? insulin aspart (NovoLOG) injection   Subcutaneous TID with meals   ? insulin glargine  10 Units Subcutaneous QAM   ? latanoprostene bunod  1 drop Both Eyes QHS   ? magnesium oxide  400 mg Oral TID   ? netarsudiL  1 drop Both Eyes DAILY   ? pantoprazole   40 mg Intravenous Q12H   ? piperacillin-tazobactam  3.375 g Intravenous Q8H   ? potassium chloride liquid/packet  30 mEq Oral Q4H   ? senna-docusate  1 tablet Oral BID    Or   ? senna (SENOKOT) syrup  8.8 mg Enteral Tube BID   ? sodium chloride  10-30 mL Intravenous Q8H FIXED TIMES     Continuous Infusions:  ? heparin 12 Units/kg/hr (04/16/20 1100)     PRN Meds:.acetaminophen, bacitracin, benzocaine-menthoL, bisacodyL, dextrose 50 % (D50W), glucagon (human recombinant), magnesium hydroxide, melatonin, naloxone **OR** naloxone, ondansetron **OR** ondansetron, polyvinyl alcohol, sodium chloride, sodium chloride, sodium chloride      Labs personally reviewed today during this evaluation at 10:42 AM

## 2021-06-29 NOTE — PROGRESS NOTES
Progress Notes by Aguila Wheeler MD at 4/14/2020 11:51 AM     Author: Aguila Wheeler MD Service: Nephrology Author Type: Physician    Filed: 4/15/2020  9:24 AM Date of Service: 4/14/2020 11:51 AM Status: Addendum    : Aguila Wheeler MD (Physician)    Related Notes: Original Note by Aguila Wheeler MD (Physician) filed at 4/14/2020 12:26 PM              RENAL ([facility]) progress note  CC: F/U CHIDI  S: Since last visit, patient extubated earlier today.  States mild dyspnea and has a fullness/irritation in his throat from endotracheal tube but otherwise feels better.  Denies any pain, nausea or vomiting.  Wants to lay on his side.  Olsen catheter remains in place with gross hematuria evident.  No flank pain reported.    A/P:   1. CHIDI: pre-renal vs. ATN as well as obstructing stone in left ureter with left hydronephrosis              - HD for metformin toxicity 4/12.  No further dialysis needed so femoral dialysis catheter removed 4/14/2020.              - Daily renal function labs, weights, I/Os     2. Hyperkalemia (K+ 7.0) : Resolved. Now low in setting of autodiuresis and resolving CHIDI. Now on K+ Replacement protocol.                 3. Metabolic acidosis: RESOLVED. High anion gap due to lactic acidosis from hypoperfusion/ metformin use in setting of unrecognized CHIDI leading up to hospitalization. No hypotension. No reported ingestions.               -S/P HD 4/12 x 1 --> resolved.                    4. DM2: holding metformin, actos, and glipizide.  Insulin per hospitalist team.   - OK to resume Metformin at time of D/C only if creatinine 1.5 or lower.     5. Shock. RESOLVED. UCX/BCx negative thus far.  On Zosyn empirically.        6. Nephrolithiasis with obstructing stone in left ureter with left hydronephrosis. S/P stenting 4/12.  Good UO but persistent gross hematuria. Management per Urology. Continue  ml/hr.     Aguila Wheeler MD  [facility]  [phone] (off)        No interval changes to past medical history, social history or family history to report.    /67   Pulse 80   Temp 98.4  F (36.9  C) (Rectal)   Resp (!) 3   Ht 6' (1.829 m)   Wt 193 lb 2 oz (87.6 kg)   SpO2 100%   BMI 26.19 kg/m      I/O last 3 completed shifts:  In: 3193.3 [I.V.:2357.6; IV Piggyback:835.7]  Out: 5125 [Urine:5075; Emesis/NG output:50]    Physical Exam:   GENERAL: alert, AD but weak  EYES: pupils equal, sclerae not icteric.  ENT: Moist oral mucosa  RESP: Clear to auscultation bilaterally with no respiratory distress   CV: RRR, no murmurs. no leg edema.    GI: Active BS, Soft, NT/ND, no masses or HSM  : Olsen with gross hematuria  SKIN: No rash, warm/ dry  NEURO: Moves all extremities, no tremor. Sensation grossly intact to LT  PSYCH: appropriate mood and affect    Results from last 7 days   Lab Units 04/14/20  0811 04/14/20  0358 04/13/20  0553 04/12/20  2351  04/12/20  1428  04/12/20  0905   LN-SODIUM mmol/L  --  145 140 137   < > 142   < > 143   LN-POTASSIUM mmol/L 3.2* 2.9* 3.8 3.9   < > 7.0*   < > 6.8*   LN-CHLORIDE mmol/L  --  104 98 95*   < > 99   < > 96*   LN-CO2 mmol/L  --  28 21* 19*   < > 7*   < > <6*   LN-BLOOD UREA NITROGEN mg/dL  --  32* 34* 32*   < > 66*   < > 72*   LN-CREATININE mg/dL  --  2.64* 2.98* 2.92*   < > 5.17*   < > 6.14*   LN-CALCIUM mg/dL  --  7.0* 7.6* 7.7*   < > 8.1*   < > 10.3   LN-ALBUMIN g/dL  --   --  2.6*  --   --  2.3*  --  3.5   LN-PROTEIN TOTAL g/dL  --   --   --   --   --  4.6*  --  6.8   LN-BILIRUBIN TOTAL mg/dL  --   --   --   --   --  0.2  --  0.3   LN-ALKALINE PHOSPHATASE U/L  --   --   --   --   --  52  --  75   LN-ALT (SGPT) U/L  --   --   --   --   --  <9  --  13   LN-AST (SGOT) U/L  --   --   --   --   --  18  --  14    < > = values in this interval not displayed.     Results from last 7 days   Lab Units 04/14/20  0358 04/13/20  0553 04/12/20 2007 04/12/20  1429   LN-WHITE BLOOD CELL COUNT thou/uL 8.3 12.6*  --  16.1*   LN-HEMOGLOBIN  g/dL 11.2* 11.8* 11.6* 10.1*   LN-HEMATOCRIT % 32.7* 34.1*  --  33.5*   LN-PLATELET COUNT thou/uL 158 263  --  302     EXAM: CT CHEST ABDOMEN PELVIS WO ORAL WO IV CONTRAST  LOCATION: [facility]  DATE/TIME: 4/12/2020 9:59 AM     INDICATION: Syncope. Weakness. Fall. Anticoagulated.  COMPARISON: 03/26/2020.  TECHNIQUE: CT scan of the chest, abdomen, and pelvis was performed without IV contrast. Multiplanar reformats were obtained. Dose reduction techniques were used.   CONTRAST: None.     FINDINGS:   LUNGS AND PLEURA: Normal.     MEDIASTINUM/AXILLAE: Normal.     HEPATOBILIARY: Multiple gallstones. Otherwise negative.     PANCREAS: Normal.     SPLEEN: Normal.     ADRENAL GLANDS: Benign adrenal adenomas bilaterally unchanged. No follow-up needed.     KIDNEYS/BLADDER: New moderate left hydronephrosis with obstructing stone at the ureteral pelvic junction measures 5 mm. No other stones on the left side. Couple tiny nonobstructing stones right kidney with moderate atrophy right kidney as seen   previously.     BOWEL: Normal.     LYMPH NODES: Normal.     VASCULATURE: Unremarkable.     PELVIC ORGANS: Normal.     MUSCULOSKELETAL: Generalized degenerative change throughout the spine and hips. No concerning bone lesions.     IMPRESSION:   1.  New moderately obstructing stone proximal left ureter at ureterovesical junction measuring 5 mm.     2.  No other significant new findings.     3.  No hemorrhage.     4.  Nonobstructing intrarenal stones. In the moderately atrophic right kidney unchanged.     5.  Gallstones.    Scheduled Meds:  ? chlorhexidine  15 mL Swish & Spit BID   ? dorzolamide-timoloL  1 drop Both Eyes BID   ? latanoprostene bunod  1 drop Both Eyes QHS   ? netarsudiL  1 drop Both Eyes DAILY   ? pantoprazole  40 mg Intravenous Q12H   ? piperacillin-tazobactam  3.375 g Intravenous Q8H   ? potassium chloride  10 mEq Intravenous Q1H   ? senna-docusate  1 tablet Oral BID    Or   ? senna (SENOKOT) syrup  8.8 mg  Enteral Tube BID   ? sodium chloride  10-30 mL Intravenous Q8H FIXED TIMES     Continuous Infusions:  ? insulin infusion (1 unit/mL) 1.5 Units/hr (04/14/20 0700)   ? norepinephrine Stopped (04/13/20 1354)   ? propofol 30 mcg/kg/min (04/13/20 0552)   ? sodium chloride 0.9%     ? sodium chloride 0.9% 75 mL/hr (04/14/20 0700)   ? vasopressin Stopped (04/12/20 1500)     PRN Meds:.bacitracin, benzocaine-menthoL, bisacodyL, dextrose 50 % (D50W), glucagon (human recombinant), insulin infusion (1 unit/mL), magnesium hydroxide, naloxone **OR** naloxone, ondansetron **OR** ondansetron, polyvinyl alcohol, sodium chloride, sodium chloride, sodium chloride      Labs personally reviewed today during this evaluation at 10:42 AM

## 2021-06-29 NOTE — PROGRESS NOTES
Progress Notes by Robert Rico MD at 4/14/2020  2:16 PM     Author: Robert Rico MD Service: Gastroenterology Author Type: Physician    Filed: 4/14/2020  2:40 PM Date of Service: 4/14/2020  2:16 PM Status: Signed    : Robert Rico MD (Physician)         GASTROENTEROLOGY PROGRESS NOTE     SUBJECTIVE   The patient was extubated yesterday and seems to be doing well off of the vent.  He has not had any further issues with GI blood loss and his hemoglobin is remained stable.     OBJECTIVE     Vitals Blood pressure 103/56, pulse 86, temperature 98  F (36.7  C), temperature source Oral, resp. rate 21, height 6' (1.829 m), weight 193 lb 2 oz (87.6 kg), SpO2 99 %.          Physical Exam ? General: awake but very sleepy  ? Cardiovascular: Systolic murmur, mild lower extremity edema  ? Chest: Crackles at bases otherwise clear  ? Abdomen: soft, non-tender, non-distended, bowel sounds present  ? Neurologic: Moves all 4 extremities        LABORATORY    ELECTROLYTE PANEL   Results from last 7 days   Lab Units 04/14/20  1342 04/14/20  0811 04/14/20  0358 04/13/20  0553   LN-SODIUM mmol/L 146*  --  145 140   LN-POTASSIUM mmol/L 3.3* 3.2* 2.9* 3.8   LN-CHLORIDE mmol/L 107  --  104 98   LN-CO2 mmol/L 30  --  28 21*   LN-BLOOD UREA NITROGEN mg/dL 28*  --  32* 34*   LN-CREATININE mg/dL 2.30*  --  2.64* 2.98*   LN-CALCIUM mg/dL 6.8*  --  7.0* 7.6*        HEMATOLOGY PANEL   Results from last 7 days   Lab Units 04/14/20  0358 04/13/20  0553 04/12/20  2007 04/12/20  1429 04/12/20  1428 04/12/20  0905   LN-HEMOGLOBIN g/dL 11.2* 11.8* 11.6* 10.1*  --  13.3*   LN-MEAN CORPUSCULAR VOLUME fL 92 91  --  106*  --  108*   LN-WHITE BLOOD CELL COUNT thou/uL 8.3 12.6*  --  16.1*  --  17.6*   LN-PLATELET COUNT thou/uL 158 263  --  302  --  445*   LN-INR   --   --   --   --  1.81* 1.98*      LIVER AND PANCREAS PANEL   Results from last 7 days   Lab Units 04/12/20  1428 04/12/20  0905   LN-ALKALINE PHOSPHATASE U/L 52 75   LN-BILIRUBIN  TOTAL mg/dL 0.2 0.3   LN-BILIRUBIN DIRECT mg/dL  --  0.1   LN-PROTEIN TOTAL g/dL 4.6* 6.8   LN-ALT (SGPT) U/L <9 13   LN-AST (SGOT) U/L 18 14     IMAGING STUDIES      I have reviewed the current diagnostic and laboratory tests.           TRAVIS Samuels is a pleasant 69 y.o. male with shock, metabolic acidosis, hyperkalemia, acute kidney injury, and hematemesis.     RECOMMENDATION   ? Hematemesis   Status post EGD without any active bleeding.  The patient had esophagitis and gastritis which in the setting of anticoagulation likely resulted in the patient's upper GI bleeding.  PPI twice daily for now.  Monitor hemoglobin carefully.     ? Anticoagulation   From a GI standpoint it would be reasonable to restart anticoagulation very cautiously.  The patient should be monitored for signs of repeat GI bleeding.     ? Acute kidney injury   Careful monitoring of renal function as well as I's and O's.  Management per nephrology.     GASTROENTEROLOGY SIGN OFF      The gastroenterology service will sign off at this time. Please call us with any questions or concerns related to the care of this patient.             Robert Rico MD  Thank you for the opportunity to participate in the care of this patient.   Please feel free to call me with any questions or concerns.  Phone number [phone].

## 2021-06-29 NOTE — PROGRESS NOTES
Progress Notes by Aguila Wheeler MD at 4/17/2020  1:10 PM     Author: Aguila Wheeler MD Service: Nephrology Author Type: Physician    Filed: 4/17/2020  1:32 PM Date of Service: 4/17/2020  1:10 PM Status: Signed    : Aguila Wheeler MD (Physician)              RENAL ([facility]) progress note  CC: F/U CHIDI  S: Since last visit, patient feels better overall. Denies pain, nausea, dizziness or dyspnea. Discussed timing of stone removal and further duration of hospitalization. Care d/w Dr. Ruby - suspect safe to proceed with stone removal if Dr. Yates agrees.       A/P:   1. CHIDI: RESOLVED. Pre-renal vs. ATN as well as obstructing stone in left ureter with left hydronephrosis              - s/p HD for metformin toxicity 4/12.  No further dialysis needed so femoral dialysis catheter removed 4/14/2020.              - Daily renal function labs, weights, I/Os     2. Hyperkalemia (K+ 7.0) : RESOLVED. Now low in setting of autodiuresis and resolving CHIDI. Now on K+ Replacement protocol.                 3. Metabolic acidosis: RESOLVED. High anion gap due to lactic acidosis from hypoperfusion/ metformin use in setting of unrecognized CHIDI leading up to hospitalization. No hypotension. No reported ingestions.               -S/P HD 4/12 x 1 for metformin toxicity                    4. DM2: holding metformin, actos, and glipizide.  Insulin per hospitalist team.   - OK to resume Metformin at time of D/C. Defer to Dr Ruby.     5. Shock. RESOLVED. UCX/BCx from 4/12 negative thus far.  On Zosyn empirically.        6. Nephrolithiasis with obstructing stone in left ureter with left hydronephrosis. S/P stenting 4/12.  Encourage the patient to drink 3 L of water daily in order to attain 2 L urine output. Dr. Ruby will discuss timing of stone removal with Dr. Yates. (On heparin gtt and holding Xarelto - afib)    7.  Hypernatremia: Push more water! May need D5W if sodium continues to rise.        Aguila Wheeler MD  Kidney  [facility]  [phone] (off)       No interval changes to past medical history, social history or family history to report.    /55 (Patient Position: Lying)   Pulse 78   Temp 97.8  F (36.6  C) (Oral)   Resp 19   Ht 6' (1.829 m)   Wt 189 lb (85.7 kg)   SpO2 95%   BMI 25.63 kg/m      I/O last 3 completed shifts:  In: 2138.6 [P.O.:1990; I.V.:148.6]  Out: 481 [Urine:480; Stool:1]    Physical Exam:   GENERAL: alert, NAD  EYES: right conjunctival hemorrhage.  ENT: Moist oral mucosa  RESP: Clear to auscultation bilaterally with no respiratory distress    CV: Irreg, rate controlled No murmurs. no leg edema.    GI: Active BS, Soft, NT/ND, no masses or HSM  SKIN: No rash, warm/ dry  NEURO: Moves all extremities, no tremor. Sensation grossly intact to LT  PSYCH: appropriate mood and affect    Results from last 7 days   Lab Units 04/17/20  0653 04/16/20  1744 04/16/20  0610  04/15/20  1122  04/13/20  0553  04/12/20  1428  04/12/20  0905   LN-SODIUM mmol/L 148*  --  146*  --  144   < > 140   < > 142   < > 143   LN-POTASSIUM mmol/L 4.0 4.2 3.5   < > 4.2   < > 3.8   < > 7.0*   < > 6.8*   LN-CHLORIDE mmol/L 113*  --  110*  --  107   < > 98   < > 99   < > 96*   LN-CO2 mmol/L 27  --  26  --  24   < > 21*   < > 7*   < > <6*   LN-BLOOD UREA NITROGEN mg/dL 12  --  18  --  24*   < > 34*   < > 66*   < > 72*   LN-CREATININE mg/dL 0.97  --  1.26  --  1.57*   < > 2.98*   < > 5.17*   < > 6.14*   LN-CALCIUM mg/dL 7.0*  --  7.0*  --  6.9*   < > 7.6*   < > 8.1*   < > 10.3   LN-ALBUMIN g/dL  --   --  2.4*  --   --   --  2.6*  --  2.3*  --  3.5   LN-PROTEIN TOTAL g/dL  --   --   --   --   --   --   --   --  4.6*  --  6.8   LN-BILIRUBIN TOTAL mg/dL  --   --   --   --   --   --   --   --  0.2  --  0.3   LN-ALKALINE PHOSPHATASE U/L  --   --   --   --   --   --   --   --  52  --  75   LN-ALT (SGPT) U/L  --   --   --   --   --   --   --   --  <9  --  13   LN-AST (SGOT) U/L  --   --   --   --   --   --   --   --  18   --  14    < > = values in this interval not displayed.     Results from last 7 days   Lab Units 04/17/20  0653 04/16/20 2053 04/16/20  1004 04/16/20  0610 04/14/20  0358   LN-WHITE BLOOD CELL COUNT thou/uL  --   --  7.0 6.7 8.3   LN-HEMOGLOBIN g/dL 11.2* 11.2* 11.6* 11.4* 11.2*   LN-HEMATOCRIT %  --   --  35.7* 34.9* 32.7*   LN-PLATELET COUNT thou/uL  --   --  189 174 158     EXAM: CT CHEST ABDOMEN PELVIS WO ORAL WO IV CONTRAST  LOCATION: [facility]  DATE/TIME: 4/12/2020 9:59 AM     INDICATION: Syncope. Weakness. Fall. Anticoagulated.  COMPARISON: 03/26/2020.  TECHNIQUE: CT scan of the chest, abdomen, and pelvis was performed without IV contrast. Multiplanar reformats were obtained. Dose reduction techniques were used.   CONTRAST: None.     FINDINGS:   LUNGS AND PLEURA: Normal.     MEDIASTINUM/AXILLAE: Normal.     HEPATOBILIARY: Multiple gallstones. Otherwise negative.     PANCREAS: Normal.     SPLEEN: Normal.     ADRENAL GLANDS: Benign adrenal adenomas bilaterally unchanged. No follow-up needed.     KIDNEYS/BLADDER: New moderate left hydronephrosis with obstructing stone at the ureteral pelvic junction measures 5 mm. No other stones on the left side. Couple tiny nonobstructing stones right kidney with moderate atrophy right kidney as seen   previously.     BOWEL: Normal.     LYMPH NODES: Normal.     VASCULATURE: Unremarkable.     PELVIC ORGANS: Normal.     MUSCULOSKELETAL: Generalized degenerative change throughout the spine and hips. No concerning bone lesions.     IMPRESSION:   1.  New moderately obstructing stone proximal left ureter at ureterovesical junction measuring 5 mm.     2.  No other significant new findings.     3.  No hemorrhage.     4.  Nonobstructing intrarenal stones. In the moderately atrophic right kidney unchanged.     5.  Gallstones.    Scheduled Meds:  ? apixaban ANTICOAGULANT  5 mg Oral BID   ? dorzolamide-timoloL  1 drop Both Eyes BID   ? insulin aspart (NovoLOG) injection    Subcutaneous TID with meals   ? insulin glargine  10 Units Subcutaneous QAM   ? latanoprostene bunod  1 drop Both Eyes QHS   ? magnesium sulfate IVPB  4 g Intravenous Once   ? netarsudiL  1 drop Both Eyes DAILY   ? pantoprazole  40 mg Intravenous Q12H   ? senna-docusate  1 tablet Oral BID    Or   ? senna (SENOKOT) syrup  8.8 mg Enteral Tube BID   ? sodium chloride  10-30 mL Intravenous Q8H FIXED TIMES     Continuous Infusions:    PRN Meds:.acetaminophen, bacitracin, benzocaine-menthoL, bisacodyL, dextrose 50 % (D50W), glucagon (human recombinant), magnesium hydroxide, melatonin, naloxone **OR** naloxone, ondansetron **OR** ondansetron, polyvinyl alcohol, sodium chloride, sodium chloride, sodium chloride      Labs personally reviewed today during this evaluation at 10:42 AM

## 2021-06-30 NOTE — PROGRESS NOTES
"Progress Notes by Eric Pickett MD at 2/5/2021 11:30 AM     Author: Eric Pickett MD Service: -- Author Type: Physician    Filed: 2/5/2021 12:17 PM Encounter Date: 2/5/2021 Status: Signed    : Eric Pickett MD (Physician)           Thank you, Michelet Gonazles MD, for asking the [facility] team to see Mr. Rakesh Samuels to evaluate Follow-up.      Assessment/Recommendations   Assessment:    1. Paroxysmal atrial fibrillation - asymptomatic. In sinus rhythm on anticoagulation that is changing to eliquis  2. DMII - on oral therapy  3. 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 He had two falls over the past year with associated syncope. He is at high risk for bleeding complications from anticoagulation.  4. Coronary calcification - identified on CTA PE study in 2019. No symptoms of angina, but further clarifying the extent of his CAD could have several implications for treatment (ie intensification of statin therapy, risk for stroke from afib and qualification for watchman procedure).     CHADS2-Vasc score - 2 (age, DM) may have CAD    Plan:  1. CTA coronary angiogram to identify extent of coronary artery disease  2. 1 week MELISSA to look for arrhythmogenic cause of his syncope and recurrent \"wooziness\"  3. Transition eliquis to warfarin. Placed referral to anticoagluation clinic to initiate this.  4. If moderate or greater CAD is identified would refer for DOMENICO closure with Watchman device.  5. Follow up with me in 3 months or sooner if needed.         History of Present Illness   Mr. Rakesh Samuels is a 70 y.o. male with a significant past history of DMII   who presents for evaluation of new onset atrial fibrillation.     Mr. Samuels had a recent sleep study and was noted to have an irregular rhythm with possible SVT during the study. He was advised to see Dr. Restrepo where he was discovered to have atrial " "fibrillation with controlled heart rate. He was started on xarelto for stroke prevention. He ambulates with a cane and is nearly blind in his right eye. He has poor depth perception. He was hospitalized for syncope causing a fall in July of last year. Telemetry in the hospital did not reveal any arrhythmias.     Today, he reports significant financial burden with Eliquis. He has limited ability to exercise and does not report overt anginal symptoms. He does have nearly daily episodes of feeling \"woozy\". This is not vertigo, but a light headed/pre-syncope type symptom. He has chronic fatigue and lack of energy and does not sleep well at night despite using his BiPAP.    Other than noted above, Mr. Samuels denies any chest pain/pressure/tightness, shortness of breath at rest or with exertion, light headedness/dizziness, pre-syncope, syncope, lower extremity swelling, palpitations, paroxysmal nocturnal dyspnea (PND), or orthopnea.     Cardiac Problems and Cardiac Diagnostics     Most Recent Cardiac testing:  ECG dated 1/21/2020 (personaly reviewed and interpreted): atrial fibrillation, HR 81 bpm  ECG today demonstrates sinus rhythm with sinus arrhythmia and PACs.    TTE 2/28/2020 - reviewed today, 2/5/2021    No previous study for comparison.    Normal left ventricular size.    Left ventricle ejection fraction is normal. The calculated left ventricular ejection fraction is 67%.    Normal right ventricular systolic function. Cannot exclude mild right ventricular enlargement    Mild biatrial enlargement    Mild to moderate tricuspid regurgitation. Estimate of RV systolic pressure 36 mmHg plus right atrial pressure         Medications  Allergies   Current Outpatient Medications   Medication Sig Dispense Refill   ? apixaban ANTICOAGULANT (ELIQUIS) 5 mg Tab tablet Take 1 tablet (5 mg total) by mouth 2 (two) times a day. 180 tablet 0   ? ARIPiprazole (ABILIFY) 2 MG tablet Take 2 mg by mouth daily.      ? aspirin 81 mg " chewable tablet Chew 81 mg daily.      ? atorvastatin (LIPITOR) 10 MG tablet Take 10 mg by mouth at bedtime.  1   ? dorzolamide-timolol (COSOPT) 22.3-6.8 mg/mL ophthalmic solution Administer 1 drop to both eyes 2 (two) times a day.  3   ? fluvoxaMINE (LUVOX) 50 MG tablet Take 50 mg by mouth at bedtime. 14 days      ? glipiZIDE (GLUCOTROL XL) 5 MG 24 hr tablet Take 5 mg by mouth 2 (two) times a day before meals.      ? latanoprostene bunod 0.024 % Drop Administer 1 drop to both eyes at bedtime.     ? levomefolate calcium 15 mg Tab 15 mg daily.      ? metFORMIN (GLUCOPHAGE) 1000 MG tablet Take 1,000 mg by mouth 2 (two) times a day.     ? multivitamin therapeutic tablet Take 1 tablet by mouth daily.     ? netarsudiL (RHOPRESSA) 0.02 % Drop Administer 1 drop to both eyes every evening.      ? omeprazole (PRILOSEC) 20 MG capsule Take 20 mg by mouth daily before breakfast.      ? pioglitazone (ACTOS) 30 MG tablet Take 30 mg by mouth daily.     ? LANTUS SOLOSTAR U-100 INSULIN 100 unit/mL (3 mL) pen Inject 10 Units under the skin at bedtime. 11.65 Type 2 with hyperglycemia  Contact provider if insulin prescribed is not the preferred insulin per insurance. 5 adj dose pen PRN   ? VENTOLIN HFA 90 mcg/actuation inhaler Inhale 2 puffs every 4 (four) hours as needed.  11     No current facility-administered medications for this visit.       No Known Allergies     Physical Examination Review of Systems   Vitals:    02/05/21 1120   BP: 116/70   Pulse: 76   Resp: 16     Body mass index is 26.58 kg/m .  Wt Readings from Last 3 Encounters:   02/05/21 196 lb (88.9 kg)   07/02/20 168 lb 3.2 oz (76.3 kg)   05/14/20 175 lb 6.4 oz (79.6 kg)       General Appearance:   Pleasant male, appears stated age. no acute distress, normal body habitus   ENT/Mouth: membranes moist, no apparent gingival bleeding.      EYES:  Right eye slightly injected.    Neck: no carotid bruits.    Respiratory:   lungs are clear to auscultation, no rales or wheezing,  equal chest wall expansion    Cardiovascular:   Regular rhythm, normal rate. Normal first and second heart sounds with no murmurs, rubs, or gallops; the carotid, radial and posterior tibial pulses are intact, Jugular venous pressure normal, no edema bilaterally    Abdomen/GI:  Soft, non-tender   Extremities: no cyanosis or clubbing   Skin: no xanthelasma, warm.    Heme/lymph/ Immunology No apparent bleeding noted.   Neurologic: Alert and oriented. Ambulates with cane. no tremors     Psychiatric: Pleasant, calm, appropriate affect.    A complete 10 system review of systems was performed and is negative except as mentioned in the HPI or below:  General: WNL  Eyes: WNL  Ears/Nose/Throat: Hearing Loss  Lungs: WNL  Heart: Arm Pain  Stomach: WNL  Bladder: WNL  Muscle/Joints: Joint Pain, Muscle Weakness, Muscle Pain  Skin: WNL  Nervous System: Daytime Sleepiness, Dizziness, Loss of Balance  Mental Health: Confusion     Blood: WNL       Past History   Past Medical History:   DMII  Right eye blindness  Atrial fibrillation    Past Surgical History:   Denies prior surgery    Family History:   Denies family history of any heart disease.    Social History:   Social History     Socioeconomic History   ? Marital status:      Spouse name: Not on file   ? Number of children: Not on file   ? Years of education: Not on file   ? Highest education level: Not on file   Occupational History   ? Not on file   Social Needs   ? Financial resource strain: Not on file   ? Food insecurity     Worry: Not on file     Inability: Not on file   ? Transportation needs     Medical: Not on file     Non-medical: Not on file   Tobacco Use   ? Smoking status: Never Smoker   ? Smokeless tobacco: Never Used   Substance and Sexual Activity   ? Alcohol use: Not Currently     Frequency: Monthly or less     Binge frequency: Never   ? Drug use: Never   ? Sexual activity: Not Currently   Lifestyle   ? Physical activity     Days per week: Not on file      Minutes per session: Not on file   ? Stress: Not on file   Relationships   ? Social connections     Talks on phone: Not on file     Gets together: Not on file     Attends Mandaeism service: Not on file     Active member of club or organization: Not on file     Attends meetings of clubs or organizations: Not on file     Relationship status: Not on file   ? Intimate partner violence     Fear of current or ex partner: Not on file     Emotionally abused: Not on file     Physically abused: Not on file     Forced sexual activity: Not on file   Other Topics Concern   ? Not on file   Social History Narrative   ? Not on file              Lab Results    Chemistry/lipid CBC Cardiac Enzymes/BNP/TSH/INR   Lab Results   Component Value Date    CHOL 145 11/18/2019    HDL 43 11/18/2019    LDLCALC 73 11/18/2019    TRIG 147 11/18/2019    CREATININE 0.94 06/30/2020    BUN 17 06/30/2020    K 4.0 06/30/2020     06/30/2020     (H) 06/30/2020    CO2 29 06/30/2020    Lab Results   Component Value Date    WBC 8.9 06/30/2020    HGB 12.0 (L) 06/30/2020    HCT 36.4 (L) 06/30/2020    MCV 97 06/30/2020     06/30/2020    Lab Results   Component Value Date    CKTOTAL 47 04/12/2020    TROPONINI <0.01 06/30/2020    TSH 1.81 01/21/2020    INR 1.22 (H) 04/16/2020

## 2021-07-01 NOTE — PROCEDURES
"Procedures by Luz Jeong RN at 4/12/2020 11:42 AM     Author: Luz Jeong RN Service: -- Author Type: Registered Nurse    Filed: 4/12/2020 11:44 AM Date of Service: 4/12/2020 11:42 AM Status: Signed    : Luz Jeong RN (Registered Nurse)     Procedures    1. PICC AND MIDLINE TEAM LINE INSERTION [IVT34 (Custom)]             PICC Line Insertion Procedure Note  Pt. Name: Rakesh Samuels  MRN:        [id number]    Procedure: Insertion of a  triple Lumen  5 fr  Bard SOLO (valved) Power PICC, Lot number [id number]    Indications: Sepsis protocol    Contraindications : none    Procedure Details   Patient identified with 2 identifiers and \"Time Out\" conducted.  .     Central line insertion bundle followed: hand hygeine performed prior to procedure, site cleansed with cholraprep, hat, mask, sterile gloves,sterile gown worn, patient draped with maximum barrier head to toe drape, sterile field maintained.    The vein was assessed and found to be compressible and of adequate size. 1 ml 1% Lidocaine administered sq to the insertion site. A 5 Fr PICC was inserted into the brachial vein of the right arm with ultrasound guidance. 1 attempt(s) required to access vein.   Catheter threaded without difficulty. Good blood return noted.    Modified Seldinger Technique used for insertion.    The 8 sharps that are included in the PICC insertion kit were accounted for and disposed of in the sharps container prior to breakdown of the sterile field.    Catheter secured with Statlock, biopatch and Tegaderm dressing applied.    Findings:  Total catheter length  42 cm, with 0 cm exposed. Mid upper arm circumference is 30 cm. Catheter was flushed with 30 cc NS. Patient  tolerated procedure well.    Tip placement verified by 3CG technology . Tip placement in the SVC.    CLABSI prevention brochure left at bedside.    Patient's primary RN notified PICC is ready for use.    Comments:          Luz Jeong RN,BSN,[facility] " access [phone]

## 2021-07-02 ENCOUNTER — COMMUNICATION - HEALTHEAST (OUTPATIENT)
Dept: ANTICOAGULATION | Facility: CLINIC | Age: 71
End: 2021-07-02

## 2021-07-04 NOTE — TELEPHONE ENCOUNTER
Telephone Encounter by Ruby Manzanares RN at 7/2/2021 11:26 AM     Author: Ruby Manzanares RN Service: -- Author Type: Registered Nurse    Filed: 7/2/2021 11:55 AM Encounter Date: 7/2/2021 Status: Signed    : Ruby Manzanares RN (Registered Nurse)       ANTICOAGULATION  MANAGEMENT PROGRAM    Rakehs Samuels is overdue for INR check.     Left message to call and schedule INR appointment as soon as possible.      Ruby Manzanares RN

## 2021-07-08 ENCOUNTER — AMBULATORY - HEALTHEAST (OUTPATIENT)
Dept: CARDIOLOGY | Facility: CLINIC | Age: 71
End: 2021-07-08

## 2021-07-08 ENCOUNTER — COMMUNICATION - HEALTHEAST (OUTPATIENT)
Dept: ANTICOAGULATION | Facility: CLINIC | Age: 71
End: 2021-07-08

## 2021-07-08 DIAGNOSIS — I48.0 PAROXYSMAL ATRIAL FIBRILLATION (H): Primary | ICD-10-CM

## 2021-07-08 DIAGNOSIS — I48.0 PAROXYSMAL ATRIAL FIBRILLATION (H): ICD-10-CM

## 2021-07-08 LAB — POC INR - HE - HISTORICAL: 2.9 (ref 0.9–1.1)

## 2021-07-08 NOTE — TELEPHONE ENCOUNTER
Telephone Encounter by Kiah Bermudez at 7/8/2021  2:12 PM     Author: Kiah Bermudez Service: -- Author Type: Patient Access    Filed: 7/8/2021  2:14 PM Encounter Date: 7/8/2021 Status: Signed    : Kiah Bermudez (Patient Access)       Who is calling:  Patient's daughterNanette  Reason for Call:  Returning ACN's phone call with dosing information for past 7 days, daughter states patient took 5.0 on Monday, Wednesday and Friday and 7.5 all other days, daughter states patient might have missed one dose, unsure of what day.  Date of last appointment with primary care:   Okay to leave a detailed message: Yes

## 2021-07-08 NOTE — TELEPHONE ENCOUNTER
Telephone Encounter by Ruby Manzanares RN at 7/8/2021  1:13 PM     Author: Ruby Manzanares RN Service: -- Author Type: Registered Nurse    Filed: 7/8/2021  2:32 PM Encounter Date: 7/8/2021 Status: Signed    : Ruby Manzanares RN (Registered Nurse)       ANTICOAGULATION MANAGEMENT     Rakesh Samuels 70 y.o., male is on warfarin with Therapeutic INR result (goal range 2.0-3.0)    Recent labs: (last 7 days)     07/08/21  1047   INR 2.90*       ASSESSMENT     Source: Patient/Caregiver Call and Template      Warfarin dosing taken: Patient's dtr Nanette reported that the patient has been taking 5 mg Mon, Wed, Fri then 7.5 mg all other days since he was dichraged from TCU. She also reported that the patient most likely missed taking a dose this past week    Diet: No new diet changes affecting INR    Illness, Injury or hospitalization: No    Medication changes: None    Signs or symptoms of bleeding or clotting: No    Previous INR: therapeutic last 2(+) visits    Additional findings: Dtr Nanette is now managing patient's medications due to Afsaneh is now in nursing home     PLAN     Recommended plan for temporary change(s) affecting INR:     Dosing instructions: Continue your current warfarin dose 5 mg daily on Mon,Wed, Fri; and 7.5 mg daily rest of week (0% change from total taken by patient)    Follow up no later than: 3 weeks     Telephone call with patient's dtr. Nanette who verbalizes understanding and agrees to plan    Lab visit scheduled    Education provided: importance of therapeutic range, target INR goal and significance of current INR result and importance of following up for INR monitoring at instructed interval    Plan made per [facility] anticoagulation protocol    Ruby Manzanares  Anticoagulation Clinic   [phone]    Anticoagulation Episode Summary     Current INR goal:  2.0-3.0   TTR:  80.5 % (4.5 mo)   Next INR check:  7/29/2021   INR from last check:  2.90 (7/8/2021)   Weekly max warfarin dose:     Target  end date:     INR check location:     Preferred lab:     Send INR reminders to:  [facility]    Indications    Paroxysmal atrial fibrillation (H) [I48.0]           Comments:           Anticoagulation Care Providers     Provider Role Specialty Phone number    Eric Pickett MD Referring Cardiology [phone]

## 2021-07-29 ENCOUNTER — LAB (OUTPATIENT)
Dept: CARDIOLOGY | Facility: CLINIC | Age: 71
End: 2021-07-29

## 2021-07-29 ENCOUNTER — ANTICOAGULATION THERAPY VISIT (OUTPATIENT)
Dept: ANTICOAGULATION | Facility: CLINIC | Age: 71
End: 2021-07-29

## 2021-07-29 DIAGNOSIS — I48.0 PAROXYSMAL ATRIAL FIBRILLATION (H): Primary | ICD-10-CM

## 2021-07-29 DIAGNOSIS — Z79.01 LONG TERM CURRENT USE OF ANTICOAGULANT THERAPY: ICD-10-CM

## 2021-07-29 LAB — INR BLD: 3.3 (ref 2–3)

## 2021-07-29 PROCEDURE — 36416 COLLJ CAPILLARY BLOOD SPEC: CPT

## 2021-07-29 PROCEDURE — 85610 PROTHROMBIN TIME: CPT

## 2021-07-29 NOTE — PROGRESS NOTES
ANTICOAGULATION MANAGEMENT     Rakesh Samuels 70 year old male is on warfarin with supratherapeutic INR result. (Goal INR 2.0-3.0)    Recent labs: (last 7 days)     07/29/21  1058   INR 3.3*       ASSESSMENT     Source(s): Chart Review, Patient/Caregiver Call and Template       Warfarin doses taken: Warfarin taken as instructed    Diet: No new diet changes identified    New illness, injury, or hospitalization: No    Medication/supplement changes: None noted    Signs or symptoms of bleeding or clotting: No    Previous INR: Therapeutic last 2(+) visits    Additional findings: Will plan to adjust dose due to INR noted to be trending up.     PLAN     Recommended plan for no diet, medication or health factor changes affecting INR     Dosing Instructions: Partial hold then Decrease your warfarin dose (6% change) with next INR in 1- 2 weeks       Summary  As of 7/29/2021    Full warfarin instructions:  7/29: 5 mg; Otherwise 7.5 mg every Tue, Thu, Sat; 5 mg all other days   Next INR check:  8/12/2021             Telephone call with  patient's dtr Nanette who verbalizes understanding and agrees to plan    Lab visit scheduled    Education provided: Importance of therapeutic range, Importance of following up for INR monitoring at instructed interval and Importance of taking warfarin as instructed    Plan made per [facility] anticoagulation protocol    Ruby Manzanares RN  Anticoagulation Clinic  7/29/2021    _______________________________________________________________________     Anticoagulation Episode Summary     Current INR goal:  2.0-3.0   TTR:  68.1 % (4.1 mo)   Target end date:     Send INR reminders to:  [facility]    Indications    Paroxysmal atrial fibrillation (H) [I48.0]           Comments:           Anticoagulation Care Providers     Provider Role Specialty Phone number    Eric Pickett MD Referring Cardiovascular Disease [phone]

## 2021-08-12 ENCOUNTER — ANTICOAGULATION THERAPY VISIT (OUTPATIENT)
Dept: ANTICOAGULATION | Facility: CLINIC | Age: 71
End: 2021-08-12

## 2021-08-12 ENCOUNTER — LAB (OUTPATIENT)
Dept: CARDIOLOGY | Facility: CLINIC | Age: 71
End: 2021-08-12

## 2021-08-12 DIAGNOSIS — I48.0 PAROXYSMAL ATRIAL FIBRILLATION (H): Primary | ICD-10-CM

## 2021-08-12 DIAGNOSIS — I48.0 PAROXYSMAL ATRIAL FIBRILLATION (H): ICD-10-CM

## 2021-08-12 PROCEDURE — 36416 COLLJ CAPILLARY BLOOD SPEC: CPT

## 2021-08-12 PROCEDURE — 85610 PROTHROMBIN TIME: CPT

## 2021-08-12 NOTE — PROGRESS NOTES
ANTICOAGULATION MANAGEMENT     Rakesh Samuels 70 year old male is on warfarin with therapeutic INR result. (Goal INR 2.0-3.0)    INR result 3.0 per anticoagulation template    ASSESSMENT     Source(s): Chart Review and Patient/Caregiver Call       Warfarin doses taken: Warfarin taken as instructed and Template incorrect; verbally confirmed home dose with patient's dtr Nanette     Diet: No new diet changes identified    New illness, injury, or hospitalization: No    Medication/supplement changes: None noted    Signs or symptoms of bleeding or clotting: No    Previous INR: Supratherapeutic    Additional findings: None     PLAN     Recommended plan for no diet, medication or health factor changes affecting INR     Dosing Instructions: Continue your current warfarin dose with next INR in 2 weeks       Summary  As of 8/12/2021    Full warfarin instructions:  7.5 mg every Tue, Thu, Sat; 5 mg all other days   Next INR check:  8/26/2021             Telephone call with  patient's dtr. Nanette who verbalizes understanding and agrees to plan    Lab visit scheduled    Education provided: Importance of consistent vitamin K intake, Impact of vitamin K foods on INR, Importance of therapeutic range, Importance of following up for INR monitoring at instructed interval and Importance of taking warfarin as instructed    Plan made per [facility] anticoagulation protocol    Ruby Manzanares RN  Anticoagulation Clinic  8/12/2021    _______________________________________________________________________     Anticoagulation Episode Summary     Current INR goal:  2.0-3.0   TTR:  68.1 % (4.1 mo)   Target end date:     Send INR reminders to:  [facility]    Indications    Paroxysmal atrial fibrillation (H) [I48.0]           Comments:           Anticoagulation Care Providers     Provider Role Specialty Phone number    Eric Pickett MD Referring Cardiovascular Disease [phone]

## 2021-08-23 ENCOUNTER — LAB (OUTPATIENT)
Dept: CARDIOLOGY | Facility: CLINIC | Age: 71
End: 2021-08-23
Payer: MEDICARE

## 2021-08-23 ENCOUNTER — TELEPHONE (OUTPATIENT)
Dept: SCHEDULING | Facility: CLINIC | Age: 71
End: 2021-08-23

## 2021-08-23 ENCOUNTER — ANTICOAGULATION THERAPY VISIT (OUTPATIENT)
Dept: ANTICOAGULATION | Facility: CLINIC | Age: 71
End: 2021-08-23

## 2021-08-23 DIAGNOSIS — I48.0 PAROXYSMAL ATRIAL FIBRILLATION (H): Primary | ICD-10-CM

## 2021-08-23 DIAGNOSIS — Z79.01 LONG TERM (CURRENT) USE OF ANTICOAGULANTS: Primary | ICD-10-CM

## 2021-08-23 LAB — INR BLD: 2.8 (ref 2–3)

## 2021-08-23 PROCEDURE — 36416 COLLJ CAPILLARY BLOOD SPEC: CPT

## 2021-08-23 PROCEDURE — 85610 PROTHROMBIN TIME: CPT

## 2021-08-23 NOTE — TELEPHONE ENCOUNTER
Reason for Call:  Other returning call    Detailed comments: iNR     Phone Number Patient can be reached at: Other phone number:  [phone]    Best Time: ANY    Can we leave a detailed message on this number? YES    Call taken on 8/23/2021 at 3:20 PM by Kellen Ayala

## 2021-08-23 NOTE — PROGRESS NOTES
ANTICOAGULATION MANAGEMENT     Rakesh Samuels 70 year old male is on warfarin with therapeutic INR result. (Goal INR 2.0-3.0)    Recent labs: (last 7 days)     08/23/21  1124   INR 2.8       ASSESSMENT     Source(s): Chart Review and template       Warfarin doses taken: Warfarin taken as instructed as confirmed by dtr Nanette    Diet: 3 meals a day instead of 2 may be affecting diet and INR    New illness, injury, or hospitalization: No    Medication/supplement changes: None noted    Signs or symptoms of bleeding or clotting: No    Previous INR: Therapeutic last visit; previously outside of goal range    Additional findings: None     PLAN     Recommended plan for ongoing change(s) affecting INR     Dosing Instructions: Continue your current warfarin dose with next INR in 4 weeks       Summary  As of 8/23/2021    Full warfarin instructions:  7.5 mg every Tue, Thu, Sat; 5 mg all other days   Next INR check:  9/20/2021             Telephone call with  patient's dtr Nanette who verbalizes understanding and agrees to plan    Lab visit scheduled    Education provided: Goal range and significance of current result, Importance of therapeutic range, Importance of following up at instructed interval, Importance of taking warfarin as instructed and Importance of notifying clinic for changes in medications; a sooner lab recheck maybe needed.    Plan made per [facility] anticoagulation protocol    Ruby Manzanares RN  Anticoagulation Clinic  8/23/2021    _______________________________________________________________________     Anticoagulation Episode Summary     Current INR goal:  2.0-3.0   TTR:  63.4 % (4.9 mo)   Target end date:     Send INR reminders to:  [facility]    Indications    Paroxysmal atrial fibrillation (H) [I48.0]           Comments:           Anticoagulation Care Providers     Provider Role Specialty Phone number    Eric Pickett MD Referring Cardiovascular Disease [phone]

## 2021-08-23 NOTE — TELEPHONE ENCOUNTER
Anticoagulation Therapy encounter updated.    Ruby Manzanares RN [facility]    [phone]

## 2021-08-26 LAB — INR BLD: 3 (ref 2–3)

## 2021-09-20 ENCOUNTER — LAB (OUTPATIENT)
Dept: CARDIOLOGY | Facility: CLINIC | Age: 71
End: 2021-09-20
Payer: MEDICARE

## 2021-09-20 ENCOUNTER — ANTICOAGULATION THERAPY VISIT (OUTPATIENT)
Dept: ANTICOAGULATION | Facility: CLINIC | Age: 71
End: 2021-09-20

## 2021-09-20 DIAGNOSIS — I48.0 PAROXYSMAL ATRIAL FIBRILLATION (H): Primary | ICD-10-CM

## 2021-09-20 DIAGNOSIS — Z79.01 LONG TERM (CURRENT) USE OF ANTICOAGULANTS: Primary | ICD-10-CM

## 2021-09-20 LAB — INR BLD: 2.5 (ref 2–3)

## 2021-09-20 PROCEDURE — 36416 COLLJ CAPILLARY BLOOD SPEC: CPT

## 2021-09-20 PROCEDURE — 85610 PROTHROMBIN TIME: CPT

## 2021-09-20 NOTE — PROGRESS NOTES
ANTICOAGULATION MANAGEMENT     Rakesh Samuels 71 year old male is on warfarin with therapeutic INR result. (Goal INR 2.0-3.0)    Recent labs: (last 7 days)     09/20/21  1512   INR 2.5       ASSESSMENT     Source(s): Chart Review and Patient/Caregiver Call       Warfarin doses taken: Warfarin taken as instructed    Diet: No new diet changes identified    New illness, injury, or hospitalization: No    Medication/supplement changes: None noted    Signs or symptoms of bleeding or clotting: No    Previous INR: Therapeutic last 2(+) visits    Additional findings: None     PLAN     Recommended plan for no diet, medication or health factor changes affecting INR     Dosing Instructions: Continue your current warfarin dose with next INR in 4 weeks       Summary  As of 9/20/2021    Full warfarin instructions:  7.5 mg every Tue, Thu, Sat; 5 mg all other days   Next INR check:  10/18/2021             Telephone call with  Nanette who verbalizes understanding and agrees to plan    Lab visit scheduled    Education provided: Contact [phone] with any changes, questions or concerns.     Plan made per [facility] anticoagulation protocol    Carine Chi RN  Anticoagulation Clinic  9/20/2021    _______________________________________________________________________     Anticoagulation Episode Summary     Current INR goal:  2.0-3.0   TTR:  69.7 % (5.9 mo)   Target end date:     Send INR reminders to:  [facility]    Indications    Paroxysmal atrial fibrillation (H) [I48.0]           Comments:           Anticoagulation Care Providers     Provider Role Specialty Phone number    Eric Pickett MD Referring Cardiovascular Disease [phone]

## 2021-10-14 ENCOUNTER — DOCUMENTATION ONLY (OUTPATIENT)
Dept: ANTICOAGULATION | Facility: CLINIC | Age: 71
End: 2021-10-14

## 2021-10-14 DIAGNOSIS — I48.0 PAROXYSMAL ATRIAL FIBRILLATION (H): ICD-10-CM

## 2021-10-14 RX ORDER — WARFARIN SODIUM 5 MG/1
5-7.5 TABLET ORAL DAILY
Qty: 120 TABLET | Refills: 1 | Status: ON HOLD | OUTPATIENT
Start: 2021-10-14 | End: 2022-05-05

## 2021-10-14 NOTE — PROGRESS NOTES
Patient's current warfarin dose 7.5mg Tues, Thurs, Sat; 5 mg all other days    Next INR check is on 10/18/2021    Patient last OV with HCC was on 2/5/2021    Warfarin prescription 3 month supply and one refill ent to patient's pharmacy today      Ruby Manzanares RN [facility]     [phone]

## 2021-10-14 NOTE — PROGRESS NOTES
Received warfarin 5 mg refill request for Rakesh Samuels   from [id number]

## 2021-10-21 ENCOUNTER — LAB (OUTPATIENT)
Dept: CARDIOLOGY | Facility: CLINIC | Age: 71
End: 2021-10-21
Payer: MEDICARE

## 2021-10-21 ENCOUNTER — ANTICOAGULATION THERAPY VISIT (OUTPATIENT)
Dept: ANTICOAGULATION | Facility: CLINIC | Age: 71
End: 2021-10-21

## 2021-10-21 DIAGNOSIS — Z79.01 LONG TERM CURRENT USE OF ANTICOAGULANT THERAPY: Primary | ICD-10-CM

## 2021-10-21 DIAGNOSIS — I48.0 PAROXYSMAL ATRIAL FIBRILLATION (H): Primary | ICD-10-CM

## 2021-10-21 LAB — INR BLD: 1.6 (ref 2–3)

## 2021-10-21 PROCEDURE — 85610 PROTHROMBIN TIME: CPT

## 2021-10-21 PROCEDURE — 36416 COLLJ CAPILLARY BLOOD SPEC: CPT

## 2021-10-21 NOTE — PROGRESS NOTES
ANTICOAGULATION MANAGEMENT     Rakesh Samuels 71 year old male is on warfarin with subtherapeutic INR result. (Goal INR 2.0-3.0)    Recent labs: (last 7 days)     10/21/21  1322   INR 1.6*       ASSESSMENT     Source(s): Chart Review, Patient/Caregiver Call and Template       Warfarin doses taken: Warfarin taken as instructed    Diet: No new diet changes identified    New illness, injury, or hospitalization: No    Medication/supplement changes: None noted    Signs or symptoms of bleeding or clotting: No    Previous INR: Therapeutic last 2(+) visits    Additional findings: None     PLAN     Recommended plan for no diet, medication or health factor changes affecting INR     Dosing Instructions: Booster dose then Increase your warfarin dose (6% change) with next INR in 2 weeks       Summary  As of 10/21/2021    Full warfarin instructions:  10/21: 10 mg; Otherwise 5 mg every Mon, Wed, Fri; 7.5 mg all other days   Next INR check:  11/4/2021             Telephone call with  daughter Nanette who verbalizes understanding and agrees to plan    Lab visit scheduled    Education provided: Goal range and significance of current result    Plan made per [facility] anticoagulation protocol    Yaima Ramsey, RN  Anticoagulation Clinic  10/21/2021    _______________________________________________________________________     Anticoagulation Episode Summary     Current INR goal:  2.0-3.0   TTR:  67.6 % (6.9 mo)   Target end date:     Send INR reminders to:  [facility] HEART [facility]    Indications    Paroxysmal atrial fibrillation (H) [I48.0]           Comments:           Anticoagulation Care Providers     Provider Role Specialty Phone number    Eric Pickett MD Referring Cardiovascular Disease [phone]

## 2021-10-25 ENCOUNTER — TELEPHONE (OUTPATIENT)
Dept: SCHEDULING | Facility: CLINIC | Age: 71
End: 2021-10-25

## 2021-10-25 DIAGNOSIS — I48.0 PAROXYSMAL ATRIAL FIBRILLATION (H): Primary | ICD-10-CM

## 2021-10-25 NOTE — TELEPHONE ENCOUNTER
Addendum created for 10/21 anticoagulation encounter and dosing updated per daughter's report.   The difference between previously ordered dosing and what was being administered was 11%, which is within the range of dose adjustment for Rakesh's goal range of 2-3.  ACN advises to return to previously ordered dosing of 7.5mg Tu/Th/Sa and 5mg AOD, with recheck on 11/3 as previously planned.     Called and LVM for Nanette with this information.   Advised to call back if she had any further questions/concerns.      Luz Granda, RN  [facility] Anticoagulation  [phone]

## 2021-10-25 NOTE — TELEPHONE ENCOUNTER
Reason for Call:  Other dosing questions    Detailed comments: had inr on thursday the 21st- Has concerns that he might not have been dosed properly prior to the 21st- would like to verify the correct dosing for him.    He was supposed to be on 7.5 on Saturday, Tuesday and Thursday, but the other family member only gave him the 7.5 on Saturday, and 5 mg on all other days. This incorrect dose was going on for approximately 2 weeks prior to the 21st-    Phone Number Patient can be reached at: Other phone number:  [phone]    Best Time: after 11:30    Can we leave a detailed message on this number? YES    Call taken on 10/25/2021 at 10:26 AM by Tiffany Ferrer

## 2021-10-25 NOTE — PROGRESS NOTES
Daughter Nanette called 10/25/2021 and reported dosing was done incorrectly for the 2 weeks prior to the check on 10/21-- administered dosing updated in calendar.    Luz Granda RN  [facility]  [phone]

## 2021-11-03 ENCOUNTER — TELEPHONE (OUTPATIENT)
Dept: PULMONOLOGY | Facility: OTHER | Age: 71
End: 2021-11-03

## 2021-11-03 ENCOUNTER — DOCUMENTATION ONLY (OUTPATIENT)
Dept: ANTICOAGULATION | Facility: CLINIC | Age: 71
End: 2021-11-03

## 2021-11-03 ENCOUNTER — ANTICOAGULATION THERAPY VISIT (OUTPATIENT)
Dept: ANTICOAGULATION | Facility: CLINIC | Age: 71
End: 2021-11-03

## 2021-11-03 ENCOUNTER — LAB (OUTPATIENT)
Dept: CARDIOLOGY | Facility: CLINIC | Age: 71
End: 2021-11-03
Payer: MEDICARE

## 2021-11-03 DIAGNOSIS — I48.0 PAROXYSMAL ATRIAL FIBRILLATION (H): Primary | ICD-10-CM

## 2021-11-03 DIAGNOSIS — I48.0 PAROXYSMAL ATRIAL FIBRILLATION (H): ICD-10-CM

## 2021-11-03 LAB — INR POINT OF CARE: 2.3 (ref 0.86–1.14)

## 2021-11-03 PROCEDURE — 85610 PROTHROMBIN TIME: CPT | Performed by: INTERNAL MEDICINE

## 2021-11-03 PROCEDURE — 36416 COLLJ CAPILLARY BLOOD SPEC: CPT | Performed by: INTERNAL MEDICINE

## 2021-11-03 NOTE — PROGRESS NOTES
ANTICOAGULATION MANAGEMENT     Rakesh Samuels 71 year old male is on warfarin with therapeutic INR result. (Goal INR 2.0-3.0)    Recent labs: (last 7 days)     11/03/21  1343   INR 2.3*       ASSESSMENT     Source(s): Chart Review, Patient/Caregiver Call and Template       Warfarin doses taken: Warfarin taken differently, but did not change total weekly dose as confirmed by patient's dtr Nanette    Diet: No new diet changes identified    New illness, injury, or hospitalization: No    Medication/supplement changes: None noted    Signs or symptoms of bleeding or clotting: No    Previous INR: Subtherapeutic    Additional findings: Interested in Home monitor- application process started.     PLAN     Recommended plan for no diet, medication or health factor changes affecting INR     Dosing Instructions: Continue your current warfarin dose with next INR in 2 weeks       Summary  As of 11/3/2021    Full warfarin instructions:  7.5 mg every Sun, Tue, Thu; 5 mg all other days   Next INR check:  11/17/2021             Telephone call with  patient's dtr Nanette who verbalizes understanding and agrees to plan    Lab visit scheduled    Education provided: Goal range and significance of current result, Importance of therapeutic range, Importance of following up at instructed interval and Information on home INR monitoring    Plan made per [facility] anticoagulation protocol    Ruby Manzanares RN  Anticoagulation Clinic  11/3/2021    _______________________________________________________________________     Anticoagulation Episode Summary     Current INR goal:  2.0-3.0   TTR:  66.1 % (7.3 mo)   Target end date:     Send INR reminders to:  [facility] HEART [facility]    Indications    Paroxysmal atrial fibrillation (H) [I48.0]           Comments:           Anticoagulation Care Providers     Provider Role Specialty Phone number    Eric Pickett MD Referring Cardiovascular Disease [phone]

## 2021-11-03 NOTE — TELEPHONE ENCOUNTER
Reason for Call:  INR    Who is calling?  PT's daughter looking for results and dosage    Phone number:  [phone]    Fax number:  N/A    Name of caller: Nanette Drew    INR Value:  Unknown    Are there any other concerns:  Yes: PT's duaghter would like to know dosage to give    Can we leave a detailed message on this number? YES    Phone number patient can be reached at: Other phone number:  [phone]      Call taken on 11/3/2021 at 2:24 PM by Anita Gilbert

## 2021-11-03 NOTE — PROGRESS NOTES
Anticoagulation Management    Discussed INR home monitoring program with Rakesh Samuels reviewing:      [employer] requirements: >= 3 months of anticoagulation therapy, indication for chronic anticoagulation and order from provider    Required testing frequency (q1-2 weeks)    Home meters, testing supplies, meter training, and reporting of INR results done through an outside company. Patient would be contacted by home monitoring company to review insurance coverage with home monitoring company prior to enrolling.    [facility] would continue to receive and manage INR results.    Home monitoring application may take several weeks and must continue to follow up with recommended INR monitoring in clinic until receives monitor and training completed.     Home monitoring terms reviewed with patient      Patient agrees to frequency of testing as directed by referring provider ( weekly or biweekly) Yes    Testing to be performed during business hours of [facility] Yes    Patient agrees they have the skill (or a designated caregiver) necessary to perform the self test Yes    Patient agrees to report all INR results to INR home monitoring company Yes    Patient agrees to have additional INR test in clinic if a home result is critical Yes    Patient agrees to schedule an INR test at a [facility] clinic yearly for technique observation and quality check of INR results with their home meter Yes    Patient agrees to use a [facility] approved service provider and device for home monitoring Yes    [facility]    Referring provider: Dr Eric Pickett    Referring providers Clinic Fax number [phone]    Rakesh Samuels is interested home INR monitoring and requests order be submitted.

## 2021-11-03 NOTE — TELEPHONE ENCOUNTER
See Anticoagulation Therapy encounter dated today.    Ruby Manzanares RN [facility] Anticoagulation Clinic

## 2021-11-05 NOTE — PROGRESS NOTES
They declined services back in June/July because he didn't want to check his INR more than once a month.  Has this changed?  If so, they can call [facility] [phone] option 1 to review benefits again.

## 2021-11-08 ENCOUNTER — TELEPHONE (OUTPATIENT)
Dept: CARDIOLOGY | Facility: CLINIC | Age: 71
End: 2021-11-08
Payer: MEDICARE

## 2021-11-08 DIAGNOSIS — I48.0 PAROXYSMAL ATRIAL FIBRILLATION (H): Primary | ICD-10-CM

## 2021-11-08 NOTE — TELEPHONE ENCOUNTER
Reason for Call: Request for an order or referral:    Order or referral being requested: needing Hold/Bridging orders for patient as he is scheduled 12/02/21 for a Colonoscopy.    Date needed: at your convenience    Has the patient been seen by the PCP for this problem? Not Applicable    Additional comments: per Shirley with [facility] patient is scheduled 12/02 for colonoscopy. [facility] is seeking 4 day hold/bridging orders     Phone number caller can be reached at:  Other phone number:  [phone]    Best Time:  any    Can we leave a detailed message on this number?  YES    Call taken on 11/8/2021 at 11:22 AM by Jadyn Parada

## 2021-11-10 NOTE — PROGRESS NOTES
ACN called and spoke with patient's dtr Nanette and instructed to call [employer] to have them review benefits `give contact information as outlined below.    Ruby Manzanares RN Pending sale to [facility]ticoagulation [facility]

## 2021-11-12 NOTE — TELEPHONE ENCOUNTER
IVANIA-PROCEDURAL ANTICOAGULATION  MANAGEMENT    ASSESSMENT     Warfarin interruption plan for Colonoscopy on 12/2/21.    Indication for Anticoagulation: Atrial Fibrillation      Risk stratification for thromboembolism: low (2017 [facility] periprocedure pathway for NVAF Expert Consensus)    o [id number] = 2-3 (Age 65-74 and Diabetes +/- CAD)    NVAF: 2017 [facility] periprocedure pathway for NVAF advises NO bridge for low risk stratification ([id number] score <=4 and no prior hx of stroke, TIA or systemic embolism)     RECOMMENDATION       Pre-Procedure:  o Hold warfarin for 4 days, until after procedure starting: Sunday 11/28   o No Bridge      Post-Procedure:  o Resume warfarin dose if okay with provider doing procedure on night of procedure, 12/2 PM: 10 mg x 1 then resume current dose  o Recheck INR ~ 7 days after resuming warfarin     Plan routed to referring provider for approval  ?   Ttai Devine, [facility]    SUBJECTIVE/OBJECTIVE     Rakesh Samuels, a 71 year old male    Goal INR Range: 2.0-3.0     Patient bridged in past: No    Wt Readings from Last 3 Encounters:   04/07/21 91.5 kg (201 lb 12.8 oz)   04/05/21 91.5 kg (201 lb 12.8 oz)   04/02/21 91.5 kg (201 lb 12.8 oz)      Estimated body mass index is 27.37 kg/m  as calculated from the following:    Height as of 4/7/21: 1.829 m (6').    Weight as of 4/7/21: 91.5 kg (201 lb 12.8 oz).    Lab Results   Component Value Date    INR 2.3 (A) 11/03/2021    INR 1.6 (A) 10/21/2021    INR 2.5 09/20/2021     Lab Results   Component Value Date    HGB 11.1 03/30/2021    HCT 34.7 03/30/2021     03/30/2021     Lab Results   Component Value Date    CR 0.96 06/10/2021    CR 0.73 04/01/2021    CR 0.71 03/30/2021

## 2021-11-15 NOTE — TELEPHONE ENCOUNTER
Okay to hold warfarin as requested prior to GI procedure. No bridging anticoagulation needed.    HARIKA

## 2021-11-15 NOTE — TELEPHONE ENCOUNTER
IVANIA-PROCEDURAL ANTICOAGULATION MANAGEMENT    Returned call to [facility]. Spoke with MARILUZ Young and relayed pre-procedure orders:      Okay to hold warfarin 4 days prior to procedure    No bridge    Tati Devine, [facility] Anticoagulation

## 2021-11-16 NOTE — TELEPHONE ENCOUNTER
ACN called and spoke with patient's dtr Nanette and she confirmed that the patient is scheduled for INR check tomorrow.    Made aware that patient will be holding warfarin 4 days prior to Colonoscopy procedure and will give detailed instructions after his INR appointment.    Nanette verbalized understanding and agrees to plan.    Ruby Manzanares RN [facility] Anticoagulation Clinic

## 2021-11-17 ENCOUNTER — ANTICOAGULATION THERAPY VISIT (OUTPATIENT)
Dept: ANTICOAGULATION | Facility: CLINIC | Age: 71
End: 2021-11-17

## 2021-11-17 ENCOUNTER — LAB (OUTPATIENT)
Dept: CARDIOLOGY | Facility: CLINIC | Age: 71
End: 2021-11-17
Payer: MEDICARE

## 2021-11-17 DIAGNOSIS — Z79.01 LONG TERM CURRENT USE OF ANTICOAGULANT THERAPY: Primary | ICD-10-CM

## 2021-11-17 DIAGNOSIS — I48.0 PAROXYSMAL ATRIAL FIBRILLATION (H): Primary | ICD-10-CM

## 2021-11-17 LAB — INR BLD: 2.2 (ref 2–3)

## 2021-11-17 PROCEDURE — 85610 PROTHROMBIN TIME: CPT

## 2021-11-17 PROCEDURE — 36416 COLLJ CAPILLARY BLOOD SPEC: CPT

## 2021-11-17 NOTE — TELEPHONE ENCOUNTER
Instruction given to patient's dtr Nanette- see encounter dated 11/17/2021    Ruby Manzanares RN [facility] Anticoagulation Clinic    [phone]

## 2021-11-17 NOTE — PROGRESS NOTES
ANTICOAGULATION MANAGEMENT     Rakesh Samuels 71 year old male is on warfarin with therapeutic INR result. (Goal INR 2.0-3.0)    Recent labs: (last 7 days)     11/17/21  1417   INR 2.2       ASSESSMENT     Source(s): Chart Review and Patient/Caregiver Call       Warfarin doses taken: Warfarin taken as instructed    Diet: No new diet changes identified    New illness, injury, or hospitalization: No    Medication/supplement changes: None noted    Signs or symptoms of bleeding or clotting: No    Previous INR: Therapeutic last 2(+) visits    Additional findings: Upcoming surgery/procedure Colonoscopy on 12/2~ warfarin interruption plan is on encounter dated 11/8/2021     PLAN     Recommended plan for no diet, medication or health factor changes affecting INR     Dosing Instructions: Continue your current warfarin dose     Pre colonoscopy procedure: hold warfarin doses starting 11/28 to 12/1 12/2 Post procedure if given okay by proceduralist take 1 time booster dose of 10 mg then resume current doses then recheck INR 7 days after resuming warfarin.    Summary  As of 11/17/2021    Full warfarin instructions:  11/28: Hold; 11/29: Hold; 11/30: Hold; 12/1: Hold; 12/2: 10 mg; Otherwise 7.5 mg every Sun, Tue, Thu; 5 mg all other days   Next INR check:  12/9/2021             Telephone call with  patient's dtr Nanette who verbalizes understanding and agrees to plan    Lab visit scheduled    Education provided: Importance of therapeutic range, Importance of following up at instructed interval, Importance of taking warfarin as instructed, Monitoring for clotting signs and symptoms, When to seek medical attention/emergency care and Contact [phone] with any changes, questions or concerns.     Plan made per [facility] anticoagulation protocol    Ruby Manzanares, MARILUZ  Anticoagulation Clinic  11/17/2021    _______________________________________________________________________     Anticoagulation Episode Summary     Current INR goal:   2.0-3.0   TTR:  68.2 % (7.8 mo)   Target end date:     Send INR reminders to:  [facility]    Indications    Paroxysmal atrial fibrillation (H) [I48.0]           Comments:           Anticoagulation Care Providers     Provider Role Specialty Phone number    Eric Pickett MD Referring Cardiovascular Disease [phone]

## 2021-12-03 ENCOUNTER — TELEPHONE (OUTPATIENT)
Dept: SCHEDULING | Facility: CLINIC | Age: 71
End: 2021-12-03
Payer: MEDICARE

## 2021-12-03 DIAGNOSIS — I48.0 PAROXYSMAL ATRIAL FIBRILLATION (H): Primary | ICD-10-CM

## 2021-12-03 NOTE — TELEPHONE ENCOUNTER
ACN called patient's dtr back and she stated that the patient's Colonoscopy procedure scheduled on 12/2 was cancelled and will be rescheduled in January.    She also reported that the patient did not hold warfarin doses prior to scheduled procedure as planned.    INR appointment made for 12/15 which is 4 weeks from last INR was done.    Instructed to update [facility] once Colonoscopy is rescheduled.    She has no other concerns at this time.    Ruby Manzanares RN [facility] Anticoagulation Clinic    [phone]

## 2021-12-15 ENCOUNTER — ANTICOAGULATION THERAPY VISIT (OUTPATIENT)
Dept: ANTICOAGULATION | Facility: CLINIC | Age: 71
End: 2021-12-15

## 2021-12-15 ENCOUNTER — LAB (OUTPATIENT)
Dept: CARDIOLOGY | Facility: CLINIC | Age: 71
End: 2021-12-15
Payer: MEDICARE

## 2021-12-15 DIAGNOSIS — I48.0 PAROXYSMAL ATRIAL FIBRILLATION (H): Primary | ICD-10-CM

## 2021-12-15 DIAGNOSIS — Z79.01 LONG TERM (CURRENT) USE OF ANTICOAGULANTS: Primary | ICD-10-CM

## 2021-12-15 LAB — INR POINT OF CARE: 2.3 (ref 0.86–1.14)

## 2021-12-15 PROCEDURE — 36416 COLLJ CAPILLARY BLOOD SPEC: CPT

## 2021-12-15 PROCEDURE — 85610 PROTHROMBIN TIME: CPT | Mod: QW

## 2021-12-15 NOTE — PROGRESS NOTES
ANTICOAGULATION MANAGEMENT     Rakesh Samuels 71 year old male is on warfarin with therapeutic INR result. (Goal INR 2.0-3.0)    Recent labs: (last 7 days)     12/15/21  1322   INR 2.3*       ASSESSMENT     Source(s): Chart Review and Patient/Caregiver Call       Warfarin doses taken: Warfarin taken as instructed- as confirmed by Nanette    Diet: No new diet changes identified    New illness, injury, or hospitalization: No    Medication/supplement changes: None noted    Signs or symptoms of bleeding or clotting: No    Previous INR: Therapeutic last 2(+) visits    Additional findings: Colonoscopy procedure scheduled on 12/2 was cancelled and rescheduled   on Feb 6 th will confirm with [facility] Pharm D if warfarin interruption plan as outlined in encounter dated 11/8/2021 is still applicable for 2/6 procedure.     PLAN     Recommended plan for no diet, medication or health factor changes affecting INR     Dosing Instructions: Continue your current warfarin dose with next INR in 4 weeks       Summary  As of 12/15/2021    Full warfarin instructions:  7.5 mg every Sun, Tue, Thu; 5 mg all other days   Next INR check:  1/12/2022             Telephone call with  patient's dtr Nanette who verbalizes understanding and agrees to plan    Lab visit scheduled    Education provided: Importance of therapeutic range, Importance of following up at instructed interval and Importance of taking warfarin as instructed    Plan made per [facility] anticoagulation protocol    Ruby Manzanares RN  Anticoagulation Clinic  12/15/2021    _______________________________________________________________________     Anticoagulation Episode Summary     Current INR goal:  2.0-3.0   TTR:  71.5 % (8.7 mo)   Target end date:     Send INR reminders to:  [facility] HEART [facility]    Indications    Paroxysmal atrial fibrillation (H) [I48.0]           Comments:           Anticoagulation Care Providers     Provider Role Specialty Phone number    Eric Pickett MD  Referring Cardiovascular Disease [phone]

## 2021-12-27 ENCOUNTER — TRANSFERRED RECORDS (OUTPATIENT)
Dept: HEALTH INFORMATION MANAGEMENT | Facility: CLINIC | Age: 71
End: 2021-12-27
Payer: MEDICARE

## 2021-12-27 ENCOUNTER — ANTICOAGULATION THERAPY VISIT (OUTPATIENT)
Dept: ANTICOAGULATION | Facility: CLINIC | Age: 71
End: 2021-12-27
Payer: MEDICARE

## 2021-12-27 DIAGNOSIS — I48.0 PAROXYSMAL ATRIAL FIBRILLATION (H): Primary | ICD-10-CM

## 2021-12-27 LAB — INR (EXTERNAL): 2.7 (ref 0.9–1.1)

## 2021-12-27 NOTE — PROGRESS NOTES
ANTICOAGULATION MANAGEMENT     Rakesh Samuels 71 year old male is on warfarin with therapeutic INR result. (Goal INR 2.0-3.0)    Recent labs: (last 7 days)     12/27/21  0000   INR 2.7*       ASSESSMENT     Source(s): Chart Review and Patient/Caregiver Call       Warfarin doses taken: Warfarin taken as instructed    Diet: No new diet changes identified    New illness, injury, or hospitalization: No    Medication/supplement changes: None noted    Signs or symptoms of bleeding or clotting: No    Previous INR: Therapeutic last 2(+) visits    Additional findings: Received first INR result from [employer] ( [employer] ) ` made aware that ACN will continue to call regarding INR results until they are used to the home monitor process.     PLAN     Recommended plan for no diet, medication or health factor changes affecting INR     Dosing Instructions: Continue your current warfarin dose with next INR in1- 2 weeks       Summary  As of 12/27/2021    Full warfarin instructions:  7.5 mg every Sun, Tue, Thu; 5 mg all other days   Next INR check:  1/10/2022             Telephone call with  patient's dtr Nanette who verbalizes understanding and agrees to plan    Patient to recheck with home meter    Education provided: Importance of therapeutic range and Importance of following up at instructed interval    Plan made per [facility] anticoagulation protocol    Ruby Manzanares RN  Anticoagulation Clinic  12/27/2021    _______________________________________________________________________     Anticoagulation Episode Summary     Current INR goal:  2.0-3.0   TTR:  72.7 % (9.1 mo)   Target end date:     Send INR reminders to:  [facility]    Indications    Paroxysmal atrial fibrillation (H) [I48.0]           Comments:           Anticoagulation Care Providers     Provider Role Specialty Phone number    Eric Pickett MD Referring Cardiovascular Disease [phone]

## 2021-12-27 NOTE — PROGRESS NOTES
"ANTICOAGULATION  MANAGEMENT    Rakesh Samuels received home monitor and has submitted first result.  Reviewed home monitoring program management:      INR testing:    Ordered INR testing frequency is:  1-2 weeks    INR should be tested Monday-Friday prior to 2 pm and submitted directly to home monitoring company on day of testing    INR test and monitor review at [facility] required annually for technique observation and quality check.     Venous INR drawn in clinic/lab required if INR > 8    Patient will be discharged from home monitoring if they do not meet requirements of home monitoring company or in clinic check.       Management and Communication with [facility]:    Patient will be called regarding all out of range INRS on the business day result is received for assessment and dosing instructions. If no call received by 4 PM; please contact [facility] at: [phone]    [facility] typically does not call patient for therapeutic results. Patient is to continue current warfarin maintenance dose and continue testing INR at ordered frequency.     Patient must call and notify [facility] if new medication started, dose missed, s/sx of bleeding or clotting or upcoming procedure    Patient will receive a check in call at least once every 12 weeks if INRs remain in range.      Nanette verbalizes understanding and agrees to INR testing requirements; requests continued calls for all INR results    [facility] Health Maintenance Modifier: \"Anticoagulation: Home Monitoring\" added to chart    Ruby Manzanares RN              "

## 2021-12-27 NOTE — PROGRESS NOTES
"ANTICOAGULATION  MANAGEMENT    Rakesh Samuels received home monitor and has submitted first result.  Reviewed home monitoring program management:      INR testing:    Ordered INR testing frequency is:  1-2 weeks    INR should be tested Monday-Friday prior to 2 pm and submitted directly to home monitoring company on day of testing    INR test and monitor review at [facility] required annually for technique observation and quality check.     Venous INR drawn in clinic/lab required if INR > 8    Patient will be discharged from home monitoring if they do not meet requirements of home monitoring company or in clinic check.       Management and Communication with [facility]:    Patient will be called regarding all out of range INRS on the business day result is received for assessment and dosing instructions. If no call received by 4 PM; please contact [facility] at: [phone]    [facility] typically does not call patient for therapeutic results. Patient is to continue current warfarin maintenance dose and continue testing INR at ordered frequency.     Patient must call and notify [facility] if new medication started, dose missed, s/sx of bleeding or clotting or upcoming procedure    Patient will receive a check in call at least once every 12 weeks if INRs remain in range.      Nanette verbalizes understanding and agrees to INR testing requirements; requests continued calls for all INR results    [facility] Health Maintenance Modifier: \"Anticoagulation: Home Monitoring\" added to chart    Ruby Manzanares RN              "

## 2021-12-27 NOTE — PROGRESS NOTES
Received a faxed INR result for Rakesh Samuels    From [facility]    Result 12/27/2021 is 2.7 first result   Also due for renewal

## 2021-12-28 ENCOUNTER — DOCUMENTATION ONLY (OUTPATIENT)
Dept: ANTICOAGULATION | Facility: CLINIC | Age: 71
End: 2021-12-28
Payer: MEDICARE

## 2021-12-28 DIAGNOSIS — I48.0 PAROXYSMAL ATRIAL FIBRILLATION (H): Primary | ICD-10-CM

## 2021-12-28 LAB — INR HOME MONITORING: 2.7 (ref 2–3)

## 2021-12-28 NOTE — PROGRESS NOTES
ANTICOAGULATION MANAGEMENT      Rakesh Samuels due for annual renewal of referral to anticoagulation monitoring. Order pended for your review and signature.      ANTICOAGULATION SUMMARY      Warfarin indication(s)     Atrial fibrillation    Heart valve present?  NO       Current goal range   INR: 2.0-3.0     Goal appropriate for indication? Yes, INR 2-3 appropriate for hx of DVT, PE, hypercoagulable state, Afib, LVAD, or bileaflet AVR without risk factors     Current duration of therapy Indefinite/long term therapy   Time in Therapeutic Range (TTR)  (Goal > 60%) 73%       Office visit with referring provider's group within last year yes on 2/5/2021       Ruby Manzanares RN

## 2021-12-28 NOTE — PROGRESS NOTES
[employer] result from 12/27/2021 automatically populated into epic. [facility] should now automatically be receiving [employer] results.

## 2022-01-09 ENCOUNTER — TRANSFERRED RECORDS (OUTPATIENT)
Dept: HEALTH INFORMATION MANAGEMENT | Facility: CLINIC | Age: 72
End: 2022-01-09
Payer: MEDICARE

## 2022-01-09 LAB — INR HOME MONITORING: 2.8 (ref 2–3)

## 2022-01-10 ENCOUNTER — ANTICOAGULATION THERAPY VISIT (OUTPATIENT)
Dept: ANTICOAGULATION | Facility: CLINIC | Age: 72
End: 2022-01-10
Payer: MEDICARE

## 2022-01-10 DIAGNOSIS — I48.0 PAROXYSMAL ATRIAL FIBRILLATION (H): Primary | ICD-10-CM

## 2022-01-10 NOTE — PROGRESS NOTES
ANTICOAGULATION MANAGEMENT     Rakesh Samuels 71 year old male is on warfarin with Therapeutic INR result. (Goal INR 2.0-3.0)    Recent labs: (last 7 days)     01/09/22  0000   INR 2.80       ASSESSMENT     Source(s): Chart Review and Patient/Caregiver Call       Warfarin doses taken: Warfarin taken as instructed    Diet: No new diet changes identified    New illness, injury, or hospitalization: No    Medication/supplement changes: Patient prescribed hydroxyzine PRN by psychiatrist 2 weeks ago. Has not taken any of the medication.    Signs or symptoms of bleeding or clotting: No    Previous INR: Therapeutic last 2(+) visits    Additional findings: Patient plans to reschedule Colonoscopy for February. Nanette will call back when date has been determined.     PLAN     Recommended plan for no diet, medication or health factor changes affecting INR     Dosing Instructions: Continue your current warfarin dose with next INR in 2 weeks       Summary  As of 1/10/2022    Full warfarin instructions:  7.5 mg every Sun, Tue, Thu; 5 mg all other days   Next INR check:  1/31/2022             Telephone call with Rakesh who agrees to plan and repeated back plan correctly    Patient to recheck with home meter    Education provided: Please call back if any changes to your diet, medications or how you've been taking warfarin, Importance of therapeutic range, Potential interaction between warfarin and hydroxyzine not expected per [employer] and Importance of notifying clinic of upcoming surgeries and procedures 2 weeks in advance    Plan made per [facility] anticoagulation protocol    Alaina Linder RN  Anticoagulation Clinic  1/10/2022    _______________________________________________________________________     Anticoagulation Episode Summary     Current INR goal:  2.0-3.0   TTR:  74.0 % (9.6 mo)   Target end date:  Indefinite   Send INR reminders to:  DAYANNA RIVERS    Indications    Paroxysmal atrial fibrillation (H) [I48.0]            Comments:           Anticoagulation Care Providers     Provider Role Specialty Phone number    Eric Pickett MD Referring Cardiovascular Disease [phone]

## 2022-01-18 VITALS
HEIGHT: 72 IN | SYSTOLIC BLOOD PRESSURE: 144 MMHG | DIASTOLIC BLOOD PRESSURE: 96 MMHG | TEMPERATURE: 98.6 F | HEART RATE: 55 BPM | OXYGEN SATURATION: 98 % | DIASTOLIC BLOOD PRESSURE: 78 MMHG | TEMPERATURE: 97.9 F | BODY MASS INDEX: 27.33 KG/M2 | OXYGEN SATURATION: 100 % | WEIGHT: 201.8 LBS | SYSTOLIC BLOOD PRESSURE: 137 MMHG | HEART RATE: 89 BPM | RESPIRATION RATE: 16 BRPM | RESPIRATION RATE: 16 BRPM

## 2022-01-18 VITALS
SYSTOLIC BLOOD PRESSURE: 116 MMHG | BODY MASS INDEX: 26.55 KG/M2 | DIASTOLIC BLOOD PRESSURE: 70 MMHG | WEIGHT: 196 LBS | RESPIRATION RATE: 16 BRPM | HEART RATE: 76 BPM | HEIGHT: 72 IN

## 2022-01-18 VITALS
OXYGEN SATURATION: 98 % | DIASTOLIC BLOOD PRESSURE: 70 MMHG | RESPIRATION RATE: 14 BRPM | BODY MASS INDEX: 27.5 KG/M2 | HEART RATE: 54 BPM | SYSTOLIC BLOOD PRESSURE: 106 MMHG | WEIGHT: 203 LBS | HEIGHT: 72 IN

## 2022-01-18 VITALS — HEIGHT: 72 IN | BODY MASS INDEX: 23.76 KG/M2 | WEIGHT: 175.4 LBS

## 2022-01-18 VITALS
BODY MASS INDEX: 25.6 KG/M2 | BODY MASS INDEX: 25.6 KG/M2 | HEIGHT: 72 IN | HEIGHT: 72 IN | WEIGHT: 189 LBS | WEIGHT: 189 LBS

## 2022-01-18 VITALS
HEIGHT: 72 IN | BODY MASS INDEX: 27.33 KG/M2 | TEMPERATURE: 98.8 F | RESPIRATION RATE: 16 BRPM | SYSTOLIC BLOOD PRESSURE: 158 MMHG | DIASTOLIC BLOOD PRESSURE: 77 MMHG | OXYGEN SATURATION: 99 % | WEIGHT: 201.8 LBS | HEART RATE: 81 BPM

## 2022-01-18 VITALS — TEMPERATURE: 98.4 F | SYSTOLIC BLOOD PRESSURE: 122 MMHG | HEART RATE: 80 BPM | DIASTOLIC BLOOD PRESSURE: 64 MMHG

## 2022-01-23 ENCOUNTER — TRANSFERRED RECORDS (OUTPATIENT)
Dept: HEALTH INFORMATION MANAGEMENT | Facility: CLINIC | Age: 72
End: 2022-01-23
Payer: MEDICARE

## 2022-01-23 LAB — INR HOME MONITORING: 2.8 (ref 2–3)

## 2022-01-24 ENCOUNTER — ANTICOAGULATION THERAPY VISIT (OUTPATIENT)
Dept: ANTICOAGULATION | Facility: CLINIC | Age: 72
End: 2022-01-24
Payer: MEDICARE

## 2022-01-24 DIAGNOSIS — I48.0 PAROXYSMAL ATRIAL FIBRILLATION (H): Primary | ICD-10-CM

## 2022-01-24 NOTE — PROGRESS NOTES
Anticoagulation Management    Unable to reach Rakesh  today.    Today's INR result of 2.80 is therapeutic (goal INR of 2.0-3.0).  Result received from: Home Monitor    Follow up required to confirm warfarin dose taken and assess for changes    Left message requesting patient return call to [facility] for INR follow up assessment.       Anticoagulation clinic to follow up    Alaina Linder RN

## 2022-01-24 NOTE — PROGRESS NOTES
ANTICOAGULATION MANAGEMENT     Rakesh Samuels 71 year old male is on warfarin with therapeutic INR result. (Goal INR 2.0-3.0)    Recent labs: (last 7 days)     01/23/22  0000   INR 2.80       ASSESSMENT     Source(s): Chart Review and Patient/Caregiver Call       Warfarin doses taken: Warfarin taken as instructed    Diet: No new diet changes identified    New illness, injury, or hospitalization: No    Medication/supplement changes: None noted    Signs or symptoms of bleeding or clotting: No    Previous INR: Therapeutic last 2(+) visits    Additional findings: None     PLAN     Recommended plan for no diet, medication or health factor changes affecting INR     Dosing Instructions: Continue your current warfarin dose with next INR in 2 weeks       Summary  As of 1/24/2022    Full warfarin instructions:  7.5 mg every Sun, Tue, Thu; 5 mg all other days   Next INR check:  2/6/2022             Telephone call with  Nanette, daughter who verbalizes understanding and agrees to plan    Patient to recheck with home meter    Education provided: Please call back if any changes to your diet, medications or how you've been taking warfarin, Goal range and significance of current result and Importance of notifying clinic for changes in medications; a sooner lab recheck maybe needed.    Plan made per [facility] anticoagulation protocol    Alaina Linder RN  Anticoagulation Clinic  1/24/2022    _______________________________________________________________________     Anticoagulation Episode Summary     Current INR goal:  2.0-3.0   TTR:  75.2 % (10 mo)   Target end date:  Indefinite   Send INR reminders to:  DAYANNA RIVERS    Indications    Paroxysmal atrial fibrillation (H) [I48.0]           Comments:           Anticoagulation Care Providers     Provider Role Specialty Phone number    Eric Pickett MD Referring Cardiovascular Disease [phone]

## 2022-01-28 ENCOUNTER — TELEPHONE (OUTPATIENT)
Facility: CLINIC | Age: 72
End: 2022-01-28
Payer: MEDICARE

## 2022-01-28 NOTE — TELEPHONE ENCOUNTER
Spoke with Nanette. Pt is having colonoscopy on 02/02/2022 with MNMISBAH. This procedure was supposed to happen 12/02/2021 but it was canceled. A hold/bridge assessment had already been completed for that procedure (see TE from 11/08/2022). Nanette instructed to follow the same directions for the 02/02 colonoscopy:    4 day hold with no bridging. If ok with provider, take 10mg the evening of the procedure, then resume maintenance dose.     Instructions given to re check INR 02/07/2022.

## 2022-01-28 NOTE — TELEPHONE ENCOUNTER
Pt's daughter Nanette Drew left a voicemail at 2:21 pm stating that pt was scheduled for a colonoscopy on 2/2/22. Nanette wondering about warfarin dosing. Nanette's call back number is [phone]    Left a voicemail for Nanette that we did not have record of the colonoscopy and that usually pt's are required to HOLD warfarin 3-5 days before the procedure. Requested a call back to confirm and discuss.    Will route to [facility] for review and advisement of warfarin interruption plan

## 2022-01-28 NOTE — TELEPHONE ENCOUNTER
Reason for Call:  Other returning call    Detailed comments: Patient's daughter returning call regarding INR results/Medication.  Central Scheduling could not get through to number listed on encounter (Kept getting an off the hook sound/Busy signal).    Phone Number Patient can be reached at: Cell number on file:    Caren Fitzpatrick - [phone]    Best Time: Anytime    Can we leave a detailed message on this number? YES    Call taken on 1/28/2022 at 12:33 PM by Kylie Britton

## 2022-02-07 ENCOUNTER — ANTICOAGULATION THERAPY VISIT (OUTPATIENT)
Dept: ANTICOAGULATION | Facility: CLINIC | Age: 72
End: 2022-02-07
Payer: MEDICARE

## 2022-02-07 DIAGNOSIS — I48.0 PAROXYSMAL ATRIAL FIBRILLATION (H): Primary | ICD-10-CM

## 2022-02-07 LAB — INR HOME MONITORING: 2.3 (ref 2–3)

## 2022-02-07 NOTE — PROGRESS NOTES
ANTICOAGULATION MANAGEMENT     Rakesh Samuels 71 year old male is on warfarin with therapeutic INR result. (Goal INR 2.0-3.0)    Recent labs: (last 7 days)     02/07/22  0000   INR 2.30       ASSESSMENT     Source(s): Chart Review and Patient/Caregiver Call       Warfarin doses taken: Warfarin taken as instructed    Diet: No new diet changes identified    New illness, injury, or hospitalization: No    Medication/supplement changes: None noted    Signs or symptoms of bleeding or clotting: No    Previous INR: Therapeutic last 2(+) visits    Additional findings: Patient was scheduled to have a colonscopy on 2/2/22, patient held warfarin as instructed, but it was canceled the night before due to issues with prep. Daughter states the colonscopy has not been rescheduled yet. Daughter states she will be out of town for the next 10 days, so she cannot recheck patient's INR until 2/21/22     PLAN     Recommended plan for no diet, medication or health factor changes affecting INR     Dosing Instructions: Continue your current warfarin dose with next INR in 2 weeks. Writer recommended recheck sooner, but daughter will be out of town and unable to recheck INR until 2/21/22.    Summary  As of 2/7/2022    Full warfarin instructions:  7.5 mg every Sun, Tue, Thu; 5 mg all other days   Next INR check:               Telephone call with  Nanette who verbalizes understanding and agrees to plan    Patient to recheck with home meter    Education provided: Please call back if any changes to your diet, medications or how you've been taking warfarin, Monitoring for bleeding signs and symptoms, Monitoring for clotting signs and symptoms and When to seek medical attention/emergency care    Plan made per [facility] anticoagulation protocol    Ruby Brody, RN  Anticoagulation Clinic  2/7/2022    _______________________________________________________________________     Anticoagulation Episode Summary     Current INR goal:  2.0-3.0   TTR:  76.4 %  (10.5 mo)   Target end date:  Indefinite   Send INR reminders to:  DAYANNA RIVERS    Indications    Paroxysmal atrial fibrillation (H) [I48.0]           Comments:           Anticoagulation Care Providers     Provider Role Specialty Phone number    Eric Pickett MD Referring Cardiovascular Disease [phone]

## 2022-02-21 LAB — INR HOME MONITORING: 3.7 (ref 2–3)

## 2022-02-22 ENCOUNTER — ANTICOAGULATION THERAPY VISIT (OUTPATIENT)
Dept: ANTICOAGULATION | Facility: CLINIC | Age: 72
End: 2022-02-22
Payer: MEDICARE

## 2022-02-22 DIAGNOSIS — I48.0 PAROXYSMAL ATRIAL FIBRILLATION (H): Primary | ICD-10-CM

## 2022-02-22 NOTE — PROGRESS NOTES
ANTICOAGULATION MANAGEMENT     Rakesh Samuels 71 year old male is on warfarin with supratherapeutic INR result. (Goal INR 2.0-3.0)    Recent labs: (last 7 days)     02/21/22  0000   INR 3.70*       ASSESSMENT     Source(s): Chart Review and Patient/Caregiver Call       Warfarin doses taken: Warfarin taken as instructed    Diet: No new diet changes identified    New illness, injury, or hospitalization: No    Medication/supplement changes: None noted    Signs or symptoms of bleeding or clotting: No    Previous INR: Therapeutic last 2(+) visits    Additional findings: Very stable INR. No changes overall noted. Nanette used 2 test strips yesterday. she is concerned that machine may be acting up. Will do Machine Variance check at next INR. Patient to check INR at home and then clinic.      PLAN     Recommended plan for no diet, medication or health factor changes affecting INR     Dosing Instructions: Hold dose then continue your current warfarin dose with next INR in 2 weeks       Summary  As of 2/22/2022    Full warfarin instructions:  2/22: Hold; Otherwise 7.5 mg every Sun, Tue, Thu; 5 mg all other days   Next INR check:  3/7/2022             Telephone call with  Nanette Daughter who verbalizes understanding and agrees to plan    Patient to recheck with home meter    Education provided: Please call back if any changes to your diet, medications or how you've been taking warfarin, Monitoring for bleeding signs and symptoms and Monitoring for clotting signs and symptoms    Plan made per [facility] anticoagulation protocol    Isabela Fonseca, RN  Anticoagulation Clinic  2/22/2022    _______________________________________________________________________     Anticoagulation Episode Summary     Current INR goal:  2.0-3.0   TTR:  75.2 % (11 mo)   Target end date:  Indefinite   Send INR reminders to:  DAYANNA RIVERS    Indications    Paroxysmal atrial fibrillation (H) [I48.0]           Comments:           Anticoagulation Care  Providers     Provider Role Specialty Phone number    Eric Pickett MD Referring Cardiovascular Disease [phone]

## 2022-03-07 ENCOUNTER — ANTICOAGULATION THERAPY VISIT (OUTPATIENT)
Dept: ANTICOAGULATION | Facility: CLINIC | Age: 72
End: 2022-03-07

## 2022-03-07 ENCOUNTER — LAB (OUTPATIENT)
Dept: CARDIOLOGY | Facility: CLINIC | Age: 72
End: 2022-03-07
Payer: MEDICARE

## 2022-03-07 DIAGNOSIS — Z79.01 LONG TERM (CURRENT) USE OF ANTICOAGULANTS: Primary | ICD-10-CM

## 2022-03-07 LAB
INR BLD: 4 (ref 2–3)
INR HOME MONITORING: 3.8 (ref 2–3)

## 2022-03-07 PROCEDURE — 85610 PROTHROMBIN TIME: CPT

## 2022-03-07 PROCEDURE — 36416 COLLJ CAPILLARY BLOOD SPEC: CPT

## 2022-03-07 NOTE — PROGRESS NOTES
ANTICOAGULATION MANAGEMENT     Rakesh Samuels 71 year old male is on warfarin with supratherapeutic INR result. (Goal INR 2.0-3.0)    Recent labs: (last 7 days)     03/07/22  1551   INR 4.0*       ASSESSMENT       Source(s): Chart Review and Patients Nanette franco       Warfarin doses taken: Warfarin taken as instructed, patients daughter manages patients meds    Diet: No new diet changes identified, patient lives at facility that provides meals, declines any changes     New illness, injury, or hospitalization: No    Medication/supplement changes: None noted    Signs or symptoms of bleeding or clotting: No    Previous INR: Supratherapeutic    Additional findings:  Patients daughter was questioning the accuracy of home INR machine due to recent elevated INR's.  INR checked at home today and was 3.8 and then did one in the clinic.        PLAN     Recommended plan for no diet, medication or health factor changes affecting INR     Dosing Instructions:  Decrease your warfarin dose (11% change) with next INR in 7-10 days       Summary  As of 3/7/2022    Full warfarin instructions:  7.5 mg every Sun; 5 mg all other days   Next INR check:  3/21/2022             Telephone call with  patients daughterNanette who verbalizes understanding and agrees to plan    Patient to recheck with home meter    Education provided: Importance of consistent vitamin K intake, Goal range and significance of current result, Importance of therapeutic range and Monitoring for bleeding signs and symptoms    Plan made per [facility] anticoagulation protocol    Eliana Nina, RN  Anticoagulation Clinic  3/7/2022    _______________________________________________________________________     Anticoagulation Episode Summary     Current INR goal:  2.0-3.0   TTR:  72.2 % (11.5 mo)   Target end date:  Indefinite   Send INR reminders to:  DAYANNA RIVERS    Indications    Paroxysmal atrial fibrillation (H) [I48.0]           Comments:           Anticoagulation  Care Providers     Provider Role Specialty Phone number    Eric Pickett MD Referring Cardiovascular Disease [phone]

## 2022-03-14 ENCOUNTER — TELEPHONE (OUTPATIENT)
Dept: SCHEDULING | Facility: CLINIC | Age: 72
End: 2022-03-14
Payer: MEDICARE

## 2022-03-14 NOTE — TELEPHONE ENCOUNTER
Patient was 4.0 on 3/7 check, okay to recheck on 4/15 per protocol of 7-10days.  Left message for Nanette, daughter, to okay this recheck date and to have her call us back at [facility] if any bleeding or symptoms that would warrant a check sooner.    Heather Yanez RN

## 2022-03-14 NOTE — TELEPHONE ENCOUNTER
Reason for call:  Other   Patient called regarding (reason for call): call back  Additional comments: Nanette is wondering if the patients INR can wait until tomorrow, 3/15/22, to be checked.     Phone number to reach patient:  Other phone number:  [phone]    Best Time:  After 2    Can we leave a detailed message on this number?  YES    Travel screening: Not Applicable

## 2022-03-15 ENCOUNTER — ANTICOAGULATION THERAPY VISIT (OUTPATIENT)
Dept: ANTICOAGULATION | Facility: CLINIC | Age: 72
End: 2022-03-15
Payer: MEDICARE

## 2022-03-15 DIAGNOSIS — I48.0 PAROXYSMAL ATRIAL FIBRILLATION (H): Primary | ICD-10-CM

## 2022-03-15 LAB — INR HOME MONITORING: 3.3 (ref 2–3)

## 2022-03-15 NOTE — PROGRESS NOTES
ANTICOAGULATION MANAGEMENT     Rakesh Samuels 71 year old male is on warfarin with subtherapeutic INR result. (Goal INR 2.0-3.0)    Recent labs: (last 7 days)     03/15/22  0000   INR 3.30*       ASSESSMENT       Source(s): Chart Review and Patient/Caregiver Call       Warfarin doses taken: Warfarin taken as instructed    Diet: No new diet changes identified    New illness, injury, or hospitalization: No    Medication/supplement changes: None noted    Signs or symptoms of bleeding or clotting: No    Previous INR: Supratherapeutic    Additional findings: patient out of strips. Will check in Lab for 3/22.        PLAN     Recommended plan for no diet, medication or health factor changes affecting INR     Dosing Instructions: Hold 3/15 dose with next INR in 1 week       Summary  As of 3/15/2022    Full warfarin instructions:  3/15: Hold; Otherwise 7.5 mg every Sun; 5 mg all other days   Next INR check:  3/22/2022             Telephone call with Rakesh who verbalizes understanding and agrees to plan    Lab visit scheduled    Education provided: Please call back if any changes to your diet, medications or how you've been taking warfarin, Goal range and significance of current result and Importance of notifying clinic for changes in medications; a sooner lab recheck maybe needed.    Plan made per [facility] anticoagulation protocol    Alaina Linder RN  Anticoagulation Clinic  3/15/2022    _______________________________________________________________________     Anticoagulation Episode Summary     Current INR goal:  2.0-3.0   TTR:  70.5 % (11.7 mo)   Target end date:  Indefinite   Send INR reminders to:  [facility]    Indications    Paroxysmal atrial fibrillation (H) [I48.0]           Comments:           Anticoagulation Care Providers     Provider Role Specialty Phone number    Eric Pickett MD Referring Cardiovascular Disease [phone]

## 2022-03-22 ENCOUNTER — LAB (OUTPATIENT)
Dept: CARDIOLOGY | Facility: CLINIC | Age: 72
End: 2022-03-22
Payer: MEDICARE

## 2022-03-22 ENCOUNTER — ANTICOAGULATION THERAPY VISIT (OUTPATIENT)
Dept: ANTICOAGULATION | Facility: CLINIC | Age: 72
End: 2022-03-22

## 2022-03-22 DIAGNOSIS — I48.0 PAROXYSMAL ATRIAL FIBRILLATION (H): Primary | ICD-10-CM

## 2022-03-22 DIAGNOSIS — Z79.01 LONG TERM CURRENT USE OF ANTICOAGULANT THERAPY: Primary | ICD-10-CM

## 2022-03-22 LAB — INR BLD: 3.2 (ref 2–3)

## 2022-03-22 PROCEDURE — 36416 COLLJ CAPILLARY BLOOD SPEC: CPT

## 2022-03-22 PROCEDURE — 85610 PROTHROMBIN TIME: CPT

## 2022-03-22 NOTE — PROGRESS NOTES
ANTICOAGULATION MANAGEMENT     Rakesh Samuels 71 year old male is on warfarin with supratherapeutic INR result. (Goal INR 2.0-3.0)    Recent labs: (last 7 days)     03/22/22  1556   INR 3.2*       ASSESSMENT       Source(s): Chart Review and Patient/Caregiver Call       Warfarin doses taken: Warfarin taken as instructed    Diet: No new diet changes identified    New illness, injury, or hospitalization: No    Medication/supplement changes: None noted    Signs or symptoms of bleeding or clotting: No    Previous INR: Supratherapeutic    Additional findings: None       PLAN     Recommended plan for no diet, medication or health factor changes affecting INR     Dosing Instructions: Partial hold then Decrease your warfarin dose (7% change) with next INR in 1 week       Summary  As of 3/22/2022    Full warfarin instructions:  3/22: 2.5 mg; Otherwise 5 mg every day   Next INR check:  3/29/2022             Telephone call with  Daughter Nanette who verbalizes understanding and agrees to plan    Patient to recheck with home meter    Education provided: Goal range and significance of current result    Plan made with [facility] Pharmacist Zoraida Sky RN  Anticoagulation Clinic  3/22/2022    _______________________________________________________________________     Anticoagulation Episode Summary     Current INR goal:  2.0-3.0   TTR:  69.0 % (12 mo)   Target end date:  Indefinite   Send INR reminders to:  DAYANNA RIVERS    Indications    Paroxysmal atrial fibrillation (H) [I48.0]           Comments:           Anticoagulation Care Providers     Provider Role Specialty Phone number    Eric Pickett MD Referring Cardiovascular Disease [phone]

## 2022-03-22 NOTE — CONFIDENTIAL NOTE
Reason for Call:  Daughter, Nanette returning call    Detailed comments: Nanette is returning a call to [facility] regarding patient INR today 3/22. Please advise at the number provided.    Phone Number Patient can be reached at: Other phone number:  [phone] Nanette franco    Best Time: as soon as possible    Can we leave a detailed message on this number? YES    Call taken on 3/22/2022 at 4:25 PM by [employer]

## 2022-03-22 NOTE — PROGRESS NOTES
ANTICOAGULATION MANAGEMENT     Rakesh Samuels 71 year old male is on warfarin with supratherapeutic INR result. (Goal INR 2.0-3.0)    Recent labs: (last 7 days)     03/22/22  1556   INR 3.2*       ASSESSMENT       Source(s): Chart Review    Previous INR was Supratherapeutic    Medication, diet, health changes since last INR chart reviewed; none identified           PLAN     Unable to reach Rakesh today.    Left message to continue current dose of warfarin 5 mg tonight. Request call back for assessment.    Follow up required to confirm warfarin dose taken and assess for changes, looking at dropping to 5 mg daily.    Shirley Cruz, RN  Anticoagulation Clinic  3/22/2022

## 2022-03-29 ENCOUNTER — ANTICOAGULATION THERAPY VISIT (OUTPATIENT)
Dept: ANTICOAGULATION | Facility: CLINIC | Age: 72
End: 2022-03-29

## 2022-03-29 ENCOUNTER — LAB (OUTPATIENT)
Dept: CARDIOLOGY | Facility: CLINIC | Age: 72
End: 2022-03-29
Payer: MEDICARE

## 2022-03-29 DIAGNOSIS — I48.0 PAROXYSMAL ATRIAL FIBRILLATION (H): Primary | ICD-10-CM

## 2022-03-29 DIAGNOSIS — Z79.01 LONG TERM CURRENT USE OF ANTICOAGULANT THERAPY: Primary | ICD-10-CM

## 2022-03-29 LAB — INR POINT OF CARE: 2.4 (ref 0.9–1.1)

## 2022-03-29 PROCEDURE — 85610 PROTHROMBIN TIME: CPT

## 2022-03-29 PROCEDURE — 36416 COLLJ CAPILLARY BLOOD SPEC: CPT

## 2022-03-29 NOTE — PROGRESS NOTES
ANTICOAGULATION MANAGEMENT     Rakesh Samuels 71 year old male is on warfarin with therapeutic INR result. (Goal INR 2.0-3.0)    Recent labs: (last 7 days)     03/29/22  1600   INR 2.4*       ASSESSMENT       Source(s): Chart Review and Patient/Caregiver Call       Warfarin doses taken: Warfarin taken as instructed    Diet: No new diet changes identified    New illness, injury, or hospitalization: No    Medication/supplement changes: None noted    Signs or symptoms of bleeding or clotting: No    Previous INR: Supratherapeutic    Additional findings: pt still out of meter strips. Will go to [facility] in 2 weeks--if strips come in will check next week to ensure INR stays in range.       PLAN     Recommended plan for no diet, medication or health factor changes affecting INR     Dosing Instructions: Continue your current warfarin dose with next INR in 2 weeks       Summary  As of 3/29/2022    Full warfarin instructions:  5 mg every day   Next INR check:  4/12/2022             Telephone call with  Nanette who verbalizes understanding and agrees to plan and who agrees to plan and repeated back plan correctly    Patient elected to schedule next visit 4/12    Education provided: Please call back if any changes to your diet, medications or how you've been taking warfarin    Plan made per [facility] anticoagulation protocol    Carole Salinas, RN  Anticoagulation Clinic  3/29/2022    _______________________________________________________________________     Anticoagulation Episode Summary     Current INR goal:  2.0-3.0   TTR:  69.0 % (1 y)   Target end date:  Indefinite   Send INR reminders to:  DAYANNA RIVERS    Indications    Paroxysmal atrial fibrillation (H) [I48.0]           Comments:           Anticoagulation Care Providers     Provider Role Specialty Phone number    Eric Pickett MD Referring Cardiovascular Disease [phone]

## 2022-04-09 LAB — INR HOME MONITORING: 2.6 (ref 2–3)

## 2022-04-11 ENCOUNTER — ANTICOAGULATION THERAPY VISIT (OUTPATIENT)
Dept: ANTICOAGULATION | Facility: CLINIC | Age: 72
End: 2022-04-11
Payer: MEDICARE

## 2022-04-11 DIAGNOSIS — I48.0 PAROXYSMAL ATRIAL FIBRILLATION (H): Primary | ICD-10-CM

## 2022-04-11 NOTE — PROGRESS NOTES
ANTICOAGULATION MANAGEMENT     Rakesh Samuels 71 year old male is on warfarin with therapeutic INR result. (Goal INR 2.0-3.0)    Recent labs: (last 7 days)     04/09/22  0000   INR 2.60       ASSESSMENT       Source(s): Chart Review and Patient/Caregiver Call       Warfarin doses taken: Warfarin taken as instructed    Diet: No new diet changes identified    New illness, injury, or hospitalization: No    Medication/supplement changes: None noted    Signs or symptoms of bleeding or clotting: No    Previous INR: Therapeutic last visit; previously outside of goal range    Additional findings: None       PLAN     Recommended plan for no diet, medication or health factor changes affecting INR     Dosing Instructions: continue your current warfarin dose with next INR in 2 weeks       Summary  As of 4/11/2022    Full warfarin instructions:  5 mg every day   Next INR check:  4/21/2022             Telephone call with  Nanette, Daughter who agrees to plan and repeated back plan correctly    Patient to recheck with home meter    Education provided: Please call back if any changes to your diet, medications or how you've been taking warfarin and Contact [phone]  with any changes, questions or concerns.     Plan made per [facility] anticoagulation protocol    Alaina Linder RN  Anticoagulation Clinic  4/11/2022    _______________________________________________________________________     Anticoagulation Episode Summary     Current INR goal:  2.0-3.0   TTR:  69.1 % (1 y)   Target end date:  Indefinite   Send INR reminders to:  DAYANNA RIVERS    Indications    Paroxysmal atrial fibrillation (H) [I48.0]           Comments:           Anticoagulation Care Providers     Provider Role Specialty Phone number    Eric Pickett MD Referring Cardiovascular Disease [phone]

## 2022-04-18 LAB — PHQ9 SCORE: 8

## 2022-04-21 ENCOUNTER — ANTICOAGULATION THERAPY VISIT (OUTPATIENT)
Dept: ANTICOAGULATION | Facility: CLINIC | Age: 72
End: 2022-04-21
Payer: MEDICARE

## 2022-04-21 DIAGNOSIS — I48.0 PAROXYSMAL ATRIAL FIBRILLATION (H): Primary | ICD-10-CM

## 2022-04-21 LAB — INR HOME MONITORING: 3.5 (ref 2–3)

## 2022-04-21 NOTE — PROGRESS NOTES
ANTICOAGULATION MANAGEMENT     Rakesh Samuels 71 year old male is on warfarin with supratherapeutic INR result. (Goal INR 2.0-3.0)    Recent labs: (last 7 days)     04/21/22  0000   INR 3.50*       ASSESSMENT       Source(s): Chart Review and Patient/Caregiver Call       Warfarin doses taken: Warfarin taken as instructed    Diet: No new diet changes identified    New illness, injury, or hospitalization: No    Medication/supplement changes: None noted    Signs or symptoms of bleeding or clotting: No    Previous INR: Therapeutic last visit; previously outside of goal range    Additional findings: None       PLAN     Recommended plan for no diet, medication or health factor changes affecting INR     Dosing Instructions: partial hold then decrease your warfarin dose (7.1% change) with next INR in 1 week       Summary  As of 4/21/2022    Full warfarin instructions:  4/21: 2.5 mg; Otherwise 2.5 mg every Sat; 5 mg all other days   Next INR check:  4/28/2022             Telephone call with  Nanette, daughter, who agrees to plan and repeated back plan correctly    Patient to recheck with home meter    Education provided: Please call back if any changes to your diet, medications or how you've been taking warfarin    Plan made per [facility] anticoagulation protocol    Heather Yanez RN  Anticoagulation Clinic  4/21/2022    _______________________________________________________________________     Anticoagulation Episode Summary     Current INR goal:  2.0-3.0   TTR:  70.1 % (1 y)   Target end date:  Indefinite   Send INR reminders to:  DAYANNA RIVERS    Indications    Paroxysmal atrial fibrillation (H) [I48.0]           Comments:  [employer] home meter- Managed by [employer]         Anticoagulation Care Providers     Provider Role Specialty Phone number    Eric Pickett MD Referring Cardiovascular Disease [phone]

## 2022-04-29 ENCOUNTER — ANTICOAGULATION THERAPY VISIT (OUTPATIENT)
Dept: ANTICOAGULATION | Facility: CLINIC | Age: 72
End: 2022-04-29
Payer: MEDICARE

## 2022-04-29 DIAGNOSIS — I48.0 PAROXYSMAL ATRIAL FIBRILLATION (H): Primary | ICD-10-CM

## 2022-04-29 LAB — INR HOME MONITORING: 1.8 (ref 2–3)

## 2022-04-29 NOTE — PROGRESS NOTES
ANTICOAGULATION MANAGEMENT     Rakesh Samuels 71 year old male is on warfarin with subtherapeutic INR result. (Goal INR 2.0-3.0)    Recent labs: (last 7 days)     04/29/22  0000   INR 1.80*       ASSESSMENT       Source(s): Chart Review and Patient/Caregiver Call       Warfarin doses taken: Warfarin taken as instructed    Diet: Increased greens/vitamin K in diet; plans to resume previous intake  Patient had a caesar salad yesterday which he normally does not.    New illness, injury, or hospitalization: Yes: patient had a little stomach upset on Wednesday but its back to baseline    Medication/supplement changes: None noted    Signs or symptoms of bleeding or clotting: No    Previous INR: Supratherapeutic    Additional findings: None       PLAN     Recommended plan for temporary change(s) affecting INR     Dosing Instructions: continue your current warfarin dose with next INR in 1 week       Summary  As of 4/29/2022    Full warfarin instructions:  2.5 mg every Sat; 5 mg all other days   Next INR check:  5/6/2022             Telephone call with  chetan Fitzpatrick who agrees to plan and repeated back plan correctly    Patient to recheck with home meter    Education provided: Importance of consistent vitamin K intake, Impact of vitamin K foods on INR and Vitamin K content of foods    Plan made per [facility] anticoagulation protocol    Anisa Christian, RN  Anticoagulation Clinic  4/29/2022    _______________________________________________________________________     Anticoagulation Episode Summary     Current INR goal:  2.0-3.0   TTR:  69.9 % (1 y)   Target end date:  Indefinite   Send INR reminders to:  DAYANNA RIVERS    Indications    Paroxysmal atrial fibrillation (H) [I48.0]           Comments:  [employer] home meter- Managed by Exception         Anticoagulation Care Providers     Provider Role Specialty Phone number    Eric Pickett MD Referring Cardiovascular Disease [phone]

## 2022-04-30 ENCOUNTER — HOSPITAL ENCOUNTER (INPATIENT)
Facility: HOSPITAL | Age: 72
LOS: 5 days | Discharge: HOME | DRG: 872 | End: 2022-05-05
Attending: EMERGENCY MEDICINE | Admitting: INTERNAL MEDICINE
Payer: MEDICARE

## 2022-04-30 ENCOUNTER — APPOINTMENT (OUTPATIENT)
Dept: RADIOLOGY | Facility: HOSPITAL | Age: 72
DRG: 872 | End: 2022-04-30
Attending: EMERGENCY MEDICINE
Payer: MEDICARE

## 2022-04-30 ENCOUNTER — APPOINTMENT (OUTPATIENT)
Dept: CT IMAGING | Facility: HOSPITAL | Age: 72
DRG: 872 | End: 2022-04-30
Attending: EMERGENCY MEDICINE
Payer: MEDICARE

## 2022-04-30 DIAGNOSIS — N30.00 ACUTE CYSTITIS WITHOUT HEMATURIA: Primary | ICD-10-CM

## 2022-04-30 DIAGNOSIS — N39.0 URINARY TRACT INFECTION WITHOUT HEMATURIA, SITE UNSPECIFIED: ICD-10-CM

## 2022-04-30 DIAGNOSIS — I48.0 PAROXYSMAL ATRIAL FIBRILLATION (H): ICD-10-CM

## 2022-04-30 DIAGNOSIS — A41.9 SEPSIS, DUE TO UNSPECIFIED ORGANISM, UNSPECIFIED WHETHER ACUTE ORGAN DYSFUNCTION PRESENT (H): ICD-10-CM

## 2022-04-30 LAB
ALBUMIN SERPL-MCNC: 3.4 G/DL (ref 3.5–5)
ALBUMIN UR-MCNC: 30 MG/DL
ALP SERPL-CCNC: 94 U/L (ref 45–120)
ALT SERPL W P-5'-P-CCNC: 12 U/L (ref 0–45)
ANION GAP SERPL CALCULATED.3IONS-SCNC: 13 MMOL/L (ref 5–18)
APPEARANCE UR: CLEAR
AST SERPL W P-5'-P-CCNC: 9 U/L (ref 0–40)
ATRIAL RATE - MUSE: 241 BPM
BASOPHILS # BLD AUTO: 0 10E3/UL (ref 0–0.2)
BASOPHILS NFR BLD AUTO: 0 %
BILIRUB SERPL-MCNC: 0.5 MG/DL (ref 0–1)
BILIRUB UR QL STRIP: NEGATIVE
BNP SERPL-MCNC: 53 PG/ML (ref 0–67)
BUN SERPL-MCNC: 17 MG/DL (ref 8–28)
CALCIUM SERPL-MCNC: 8.8 MG/DL (ref 8.5–10.5)
CHLORIDE BLD-SCNC: 107 MMOL/L (ref 98–107)
CO2 SERPL-SCNC: 21 MMOL/L (ref 22–31)
COLOR UR AUTO: ABNORMAL
CREAT SERPL-MCNC: 0.95 MG/DL (ref 0.7–1.3)
DIASTOLIC BLOOD PRESSURE - MUSE: NORMAL MMHG
EOSINOPHIL # BLD AUTO: 0.2 10E3/UL (ref 0–0.7)
EOSINOPHIL NFR BLD AUTO: 1 %
ERYTHROCYTE [DISTWIDTH] IN BLOOD BY AUTOMATED COUNT: 13.8 % (ref 10–15)
FLUAV RNA SPEC QL NAA+PROBE: NEGATIVE
FLUBV RNA RESP QL NAA+PROBE: NEGATIVE
GFR SERPL CREATININE-BSD FRML MDRD: 86 ML/MIN/1.73M2
GLUCOSE BLD-MCNC: 153 MG/DL (ref 70–125)
GLUCOSE BLDC GLUCOMTR-MCNC: 102 MG/DL (ref 70–99)
GLUCOSE BLDC GLUCOMTR-MCNC: 168 MG/DL (ref 70–99)
GLUCOSE UR STRIP-MCNC: NEGATIVE MG/DL
HBA1C MFR BLD: 6.7 %
HCT VFR BLD AUTO: 40 % (ref 40–53)
HGB BLD-MCNC: 13 G/DL (ref 13.3–17.7)
HGB UR QL STRIP: ABNORMAL
IMM GRANULOCYTES # BLD: 0 10E3/UL
IMM GRANULOCYTES NFR BLD: 0 %
INR PPP: 1.58 (ref 0.85–1.15)
INTERPRETATION ECG - MUSE: NORMAL
KETONES UR STRIP-MCNC: NEGATIVE MG/DL
LACTATE SERPL-SCNC: 2.3 MMOL/L (ref 0.7–2)
LACTATE SERPL-SCNC: 3.5 MMOL/L (ref 0.7–2)
LEUKOCYTE ESTERASE UR QL STRIP: ABNORMAL
LIPASE SERPL-CCNC: 32 U/L (ref 0–52)
LYMPHOCYTES # BLD AUTO: 1.4 10E3/UL (ref 0.8–5.3)
LYMPHOCYTES NFR BLD AUTO: 12 %
MAGNESIUM SERPL-MCNC: 1.6 MG/DL (ref 1.8–2.6)
MCH RBC QN AUTO: 32 PG (ref 26.5–33)
MCHC RBC AUTO-ENTMCNC: 32.5 G/DL (ref 31.5–36.5)
MCV RBC AUTO: 99 FL (ref 78–100)
MONOCYTES # BLD AUTO: 1.1 10E3/UL (ref 0–1.3)
MONOCYTES NFR BLD AUTO: 10 %
MUCOUS THREADS #/AREA URNS LPF: PRESENT /LPF
NEUTROPHILS # BLD AUTO: 8.7 10E3/UL (ref 1.6–8.3)
NEUTROPHILS NFR BLD AUTO: 77 %
NITRATE UR QL: POSITIVE
NRBC # BLD AUTO: 0 10E3/UL
NRBC BLD AUTO-RTO: 0 /100
P AXIS - MUSE: NORMAL DEGREES
PH UR STRIP: 5.5 [PH] (ref 5–7)
PLATELET # BLD AUTO: 236 10E3/UL (ref 150–450)
POTASSIUM BLD-SCNC: 4 MMOL/L (ref 3.5–5)
PR INTERVAL - MUSE: NORMAL MS
PROT SERPL-MCNC: 6.6 G/DL (ref 6–8)
QRS DURATION - MUSE: 84 MS
QT - MUSE: 362 MS
QTC - MUSE: 422 MS
R AXIS - MUSE: -7 DEGREES
RBC # BLD AUTO: 4.06 10E6/UL (ref 4.4–5.9)
RBC URINE: 42 /HPF
SARS-COV-2 RNA RESP QL NAA+PROBE: NEGATIVE
SODIUM SERPL-SCNC: 141 MMOL/L (ref 136–145)
SP GR UR STRIP: 1.02 (ref 1–1.03)
SYSTOLIC BLOOD PRESSURE - MUSE: NORMAL MMHG
T AXIS - MUSE: 50 DEGREES
TROPONIN I SERPL-MCNC: <0.01 NG/ML (ref 0–0.29)
TSH SERPL DL<=0.005 MIU/L-ACNC: 3.68 UIU/ML (ref 0.3–5)
UROBILINOGEN UR STRIP-MCNC: <2 MG/DL
VENTRICULAR RATE- MUSE: 82 BPM
WBC # BLD AUTO: 11.3 10E3/UL (ref 4–11)
WBC CLUMPS #/AREA URNS HPF: PRESENT /HPF
WBC URINE: 135 /HPF

## 2022-04-30 PROCEDURE — 96365 THER/PROPH/DIAG IV INF INIT: CPT

## 2022-04-30 PROCEDURE — 85610 PROTHROMBIN TIME: CPT | Performed by: EMERGENCY MEDICINE

## 2022-04-30 PROCEDURE — 99223 1ST HOSP IP/OBS HIGH 75: CPT | Performed by: INTERNAL MEDICINE

## 2022-04-30 PROCEDURE — 93005 ELECTROCARDIOGRAM TRACING: CPT | Performed by: EMERGENCY MEDICINE

## 2022-04-30 PROCEDURE — 87636 SARSCOV2 & INF A&B AMP PRB: CPT | Performed by: EMERGENCY MEDICINE

## 2022-04-30 PROCEDURE — 87040 BLOOD CULTURE FOR BACTERIA: CPT | Performed by: EMERGENCY MEDICINE

## 2022-04-30 PROCEDURE — 250N000011 HC RX IP 250 OP 636: Performed by: EMERGENCY MEDICINE

## 2022-04-30 PROCEDURE — 120N000001 HC R&B MED SURG/OB

## 2022-04-30 PROCEDURE — 36415 COLL VENOUS BLD VENIPUNCTURE: CPT | Performed by: EMERGENCY MEDICINE

## 2022-04-30 PROCEDURE — 999N000157 HC STATISTIC RCP TIME EA 10 MIN

## 2022-04-30 PROCEDURE — 84484 ASSAY OF TROPONIN QUANT: CPT | Performed by: EMERGENCY MEDICINE

## 2022-04-30 PROCEDURE — 74177 CT ABD & PELVIS W/CONTRAST: CPT

## 2022-04-30 PROCEDURE — 250N000013 HC RX MED GY IP 250 OP 250 PS 637: Performed by: EMERGENCY MEDICINE

## 2022-04-30 PROCEDURE — 258N000003 HC RX IP 258 OP 636: Performed by: EMERGENCY MEDICINE

## 2022-04-30 PROCEDURE — 83880 ASSAY OF NATRIURETIC PEPTIDE: CPT | Performed by: EMERGENCY MEDICINE

## 2022-04-30 PROCEDURE — 83735 ASSAY OF MAGNESIUM: CPT | Performed by: EMERGENCY MEDICINE

## 2022-04-30 PROCEDURE — 94660 CPAP INITIATION&MGMT: CPT

## 2022-04-30 PROCEDURE — 5A09457 ASSISTANCE WITH RESPIRATORY VENTILATION, 24-96 CONSECUTIVE HOURS, CONTINUOUS POSITIVE AIRWAY PRESSURE: ICD-10-PCS | Performed by: INTERNAL MEDICINE

## 2022-04-30 PROCEDURE — 80053 COMPREHEN METABOLIC PANEL: CPT | Performed by: EMERGENCY MEDICINE

## 2022-04-30 PROCEDURE — 71045 X-RAY EXAM CHEST 1 VIEW: CPT

## 2022-04-30 PROCEDURE — 83605 ASSAY OF LACTIC ACID: CPT | Performed by: EMERGENCY MEDICINE

## 2022-04-30 PROCEDURE — 250N000013 HC RX MED GY IP 250 OP 250 PS 637: Performed by: INTERNAL MEDICINE

## 2022-04-30 PROCEDURE — 96368 THER/DIAG CONCURRENT INF: CPT

## 2022-04-30 PROCEDURE — 83690 ASSAY OF LIPASE: CPT | Performed by: EMERGENCY MEDICINE

## 2022-04-30 PROCEDURE — 83036 HEMOGLOBIN GLYCOSYLATED A1C: CPT | Performed by: INTERNAL MEDICINE

## 2022-04-30 PROCEDURE — 81001 URINALYSIS AUTO W/SCOPE: CPT | Performed by: EMERGENCY MEDICINE

## 2022-04-30 PROCEDURE — C9803 HOPD COVID-19 SPEC COLLECT: HCPCS

## 2022-04-30 PROCEDURE — 258N000003 HC RX IP 258 OP 636: Performed by: INTERNAL MEDICINE

## 2022-04-30 PROCEDURE — 85025 COMPLETE CBC W/AUTO DIFF WBC: CPT | Performed by: EMERGENCY MEDICINE

## 2022-04-30 PROCEDURE — 84443 ASSAY THYROID STIM HORMONE: CPT | Performed by: EMERGENCY MEDICINE

## 2022-04-30 PROCEDURE — 99285 EMERGENCY DEPT VISIT HI MDM: CPT | Mod: 25

## 2022-04-30 PROCEDURE — 87088 URINE BACTERIA CULTURE: CPT | Performed by: EMERGENCY MEDICINE

## 2022-04-30 RX ORDER — AMOXICILLIN 250 MG
1 CAPSULE ORAL 2 TIMES DAILY PRN
Status: DISCONTINUED | OUTPATIENT
Start: 2022-04-30 | End: 2022-05-05 | Stop reason: HOSPADM

## 2022-04-30 RX ORDER — ONDANSETRON 2 MG/ML
4 INJECTION INTRAMUSCULAR; INTRAVENOUS EVERY 6 HOURS PRN
Status: DISCONTINUED | OUTPATIENT
Start: 2022-04-30 | End: 2022-05-05 | Stop reason: HOSPADM

## 2022-04-30 RX ORDER — ACETAMINOPHEN 325 MG/1
650 TABLET ORAL EVERY 6 HOURS PRN
Status: DISCONTINUED | OUTPATIENT
Start: 2022-04-30 | End: 2022-05-05 | Stop reason: HOSPADM

## 2022-04-30 RX ORDER — MAGNESIUM OXIDE 400 MG/1
400 TABLET ORAL ONCE
Status: COMPLETED | OUTPATIENT
Start: 2022-04-30 | End: 2022-04-30

## 2022-04-30 RX ORDER — ARIPIPRAZOLE 2 MG/1
2 TABLET ORAL AT BEDTIME
Status: DISCONTINUED | OUTPATIENT
Start: 2022-04-30 | End: 2022-05-05 | Stop reason: HOSPADM

## 2022-04-30 RX ORDER — CEFTRIAXONE 1 G/1
1 INJECTION, POWDER, FOR SOLUTION INTRAMUSCULAR; INTRAVENOUS EVERY 24 HOURS
Status: DISCONTINUED | OUTPATIENT
Start: 2022-05-01 | End: 2022-05-04

## 2022-04-30 RX ORDER — ATORVASTATIN CALCIUM 10 MG/1
10 TABLET, FILM COATED ORAL AT BEDTIME
Status: DISCONTINUED | OUTPATIENT
Start: 2022-04-30 | End: 2022-05-05 | Stop reason: HOSPADM

## 2022-04-30 RX ORDER — SODIUM CHLORIDE 9 MG/ML
INJECTION, SOLUTION INTRAVENOUS CONTINUOUS
Status: DISCONTINUED | OUTPATIENT
Start: 2022-04-30 | End: 2022-05-01

## 2022-04-30 RX ORDER — PIPERACILLIN SODIUM, TAZOBACTAM SODIUM 3; .375 G/15ML; G/15ML
3.38 INJECTION, POWDER, LYOPHILIZED, FOR SOLUTION INTRAVENOUS ONCE
Status: COMPLETED | OUTPATIENT
Start: 2022-04-30 | End: 2022-04-30

## 2022-04-30 RX ORDER — WARFARIN SODIUM 5 MG/1
5 TABLET ORAL
Status: COMPLETED | OUTPATIENT
Start: 2022-04-30 | End: 2022-04-30

## 2022-04-30 RX ORDER — ASPIRIN 81 MG/1
81 TABLET, CHEWABLE ORAL DAILY
Status: DISCONTINUED | OUTPATIENT
Start: 2022-05-01 | End: 2022-05-05 | Stop reason: HOSPADM

## 2022-04-30 RX ORDER — IOPAMIDOL 755 MG/ML
100 INJECTION, SOLUTION INTRAVASCULAR ONCE
Status: COMPLETED | OUTPATIENT
Start: 2022-04-30 | End: 2022-04-30

## 2022-04-30 RX ORDER — NICOTINE POLACRILEX 4 MG
15-30 LOZENGE BUCCAL
Status: DISCONTINUED | OUTPATIENT
Start: 2022-04-30 | End: 2022-05-05 | Stop reason: HOSPADM

## 2022-04-30 RX ORDER — AMOXICILLIN 250 MG
2 CAPSULE ORAL 2 TIMES DAILY PRN
Status: DISCONTINUED | OUTPATIENT
Start: 2022-04-30 | End: 2022-05-05 | Stop reason: HOSPADM

## 2022-04-30 RX ORDER — FLUVOXAMINE MALEATE 50 MG
50 TABLET ORAL AT BEDTIME
Status: DISCONTINUED | OUTPATIENT
Start: 2022-04-30 | End: 2022-05-05 | Stop reason: HOSPADM

## 2022-04-30 RX ORDER — ACETAMINOPHEN 325 MG/1
650 TABLET ORAL ONCE
Status: COMPLETED | OUTPATIENT
Start: 2022-04-30 | End: 2022-04-30

## 2022-04-30 RX ORDER — BRIMONIDINE TARTRATE 2 MG/ML
1 SOLUTION/ DROPS OPHTHALMIC 2 TIMES DAILY
Status: DISCONTINUED | OUTPATIENT
Start: 2022-04-30 | End: 2022-05-05 | Stop reason: HOSPADM

## 2022-04-30 RX ORDER — ACETAMINOPHEN 650 MG/1
650 SUPPOSITORY RECTAL EVERY 6 HOURS PRN
Status: DISCONTINUED | OUTPATIENT
Start: 2022-04-30 | End: 2022-05-05 | Stop reason: HOSPADM

## 2022-04-30 RX ORDER — LIDOCAINE 40 MG/G
CREAM TOPICAL
Status: DISCONTINUED | OUTPATIENT
Start: 2022-04-30 | End: 2022-05-05 | Stop reason: HOSPADM

## 2022-04-30 RX ORDER — DEXTROSE MONOHYDRATE 25 G/50ML
25-50 INJECTION, SOLUTION INTRAVENOUS
Status: DISCONTINUED | OUTPATIENT
Start: 2022-04-30 | End: 2022-05-05 | Stop reason: HOSPADM

## 2022-04-30 RX ORDER — CEFTRIAXONE 1 G/1
1 INJECTION, POWDER, FOR SOLUTION INTRAMUSCULAR; INTRAVENOUS ONCE
Status: COMPLETED | OUTPATIENT
Start: 2022-04-30 | End: 2022-04-30

## 2022-04-30 RX ORDER — DORZOLAMIDE HYDROCHLORIDE AND TIMOLOL MALEATE 20; 5 MG/ML; MG/ML
1 SOLUTION/ DROPS OPHTHALMIC 2 TIMES DAILY
Status: DISCONTINUED | OUTPATIENT
Start: 2022-04-30 | End: 2022-05-05 | Stop reason: HOSPADM

## 2022-04-30 RX ORDER — ONDANSETRON 4 MG/1
4 TABLET, ORALLY DISINTEGRATING ORAL EVERY 6 HOURS PRN
Status: DISCONTINUED | OUTPATIENT
Start: 2022-04-30 | End: 2022-05-05 | Stop reason: HOSPADM

## 2022-04-30 RX ORDER — PIOGLITAZONEHYDROCHLORIDE 30 MG/1
10 TABLET ORAL DAILY
COMMUNITY
Start: 2022-03-08 | End: 2024-07-25

## 2022-04-30 RX ADMIN — IOPAMIDOL 100 ML: 755 INJECTION, SOLUTION INTRAVENOUS at 04:21

## 2022-04-30 RX ADMIN — FLUVOXAMINE MALEATE 50 MG: 50 TABLET, COATED ORAL at 21:07

## 2022-04-30 RX ADMIN — SODIUM CHLORIDE 1000 ML: 9 INJECTION, SOLUTION INTRAVENOUS at 04:04

## 2022-04-30 RX ADMIN — PIPERACILLIN AND TAZOBACTAM 3.38 G: 3; .375 INJECTION, POWDER, LYOPHILIZED, FOR SOLUTION INTRAVENOUS at 04:06

## 2022-04-30 RX ADMIN — LATANOPROSTENE BUNOD 1 DROP: 0.24 SOLUTION/ DROPS OPHTHALMIC at 22:15

## 2022-04-30 RX ADMIN — SODIUM CHLORIDE 500 ML: 9 INJECTION, SOLUTION INTRAVENOUS at 06:17

## 2022-04-30 RX ADMIN — ACETAMINOPHEN 650 MG: 325 TABLET ORAL at 03:24

## 2022-04-30 RX ADMIN — CEFTRIAXONE SODIUM 1 G: 1 INJECTION, POWDER, FOR SOLUTION INTRAMUSCULAR; INTRAVENOUS at 14:31

## 2022-04-30 RX ADMIN — ARIPIPRAZOLE 2 MG: 2 TABLET ORAL at 21:08

## 2022-04-30 RX ADMIN — Medication 400 MG: at 05:53

## 2022-04-30 RX ADMIN — SODIUM CHLORIDE: 9 INJECTION, SOLUTION INTRAVENOUS at 16:30

## 2022-04-30 RX ADMIN — ATORVASTATIN CALCIUM 10 MG: 10 TABLET, FILM COATED ORAL at 21:15

## 2022-04-30 RX ADMIN — BRIMONIDINE TARTRATE 1 DROP: 2 SOLUTION/ DROPS OPHTHALMIC at 22:14

## 2022-04-30 RX ADMIN — DORZOLAMIDE HYDROCHLORIDE AND TIMOLOL MALEATE 1 DROP: 20; 5 SOLUTION/ DROPS OPHTHALMIC at 22:15

## 2022-04-30 RX ADMIN — WARFARIN SODIUM 5 MG: 5 TABLET ORAL at 21:07

## 2022-04-30 ASSESSMENT — ACTIVITIES OF DAILY LIVING (ADL)
ADLS_ACUITY_SCORE: 13
ADLS_ACUITY_SCORE: 14
ADLS_ACUITY_SCORE: 13
ADLS_ACUITY_SCORE: 14
ADLS_ACUITY_SCORE: 14
ADLS_ACUITY_SCORE: 13

## 2022-04-30 ASSESSMENT — ENCOUNTER SYMPTOMS
ABDOMINAL PAIN: 0
DIARRHEA: 0
NAUSEA: 0
SHORTNESS OF BREATH: 0
VOMITING: 0
DIFFICULTY URINATING: 0
WEAKNESS: 1
COUGH: 0
FEVER: 0
CHILLS: 0

## 2022-04-30 NOTE — PHARMACY-ADMISSION MEDICATION HISTORY
Pharmacy Note - Admission Medication History    Pertinent Provider Information: Patient has not seen a dentist in 4 years.      ______________________________________________________________________    Prior To Admission (PTA) med list completed and updated in EMR.       PTA Med List   Medication Sig Last Dose     acetaminophen (TYLENOL) 500 MG tablet [ACETAMINOPHEN (TYLENOL) 500 MG TABLET] Take 500 mg by mouth every 6 (six) hours as needed for pain. Past Month at PRN     amoxicillin (AMOXIL) 500 MG capsule [AMOXICILLIN (AMOXIL) 500 MG CAPSULE] Take 2,000 mg by mouth once as needed (Prior to dental work). More than a month at 4 years ago     ARIPiprazole (ABILIFY) 2 MG tablet [ARIPIPRAZOLE (ABILIFY) 2 MG TABLET] Take 2 mg by mouth at bedtime.  4/29/2022 at HS     aspirin 81 mg chewable tablet Take 81 mg by mouth daily 4/30/2022 at AM     atorvastatin (LIPITOR) 10 MG tablet [ATORVASTATIN (LIPITOR) 10 MG TABLET] Take 10 mg by mouth at bedtime. 4/29/2022 at HS     brimonidine (ALPHAGAN) 0.2 % ophthalmic solution [BRIMONIDINE (ALPHAGAN) 0.2 % OPHTHALMIC SOLUTION] Administer 1 drop to both eyes 2 (two) times a day.  4/30/2022 at AM     dorzolamide-timolol (COSOPT) 22.3-6.8 mg/mL ophthalmic solution [DORZOLAMIDE-TIMOLOL (COSOPT) 22.3-6.8 MG/ML OPHTHALMIC SOLUTION] Administer 1 drop to both eyes 2 (two) times a day.  4/30/2022 at AM     fluvoxaMINE (LUVOX) 50 MG tablet [FLUVOXAMINE (LUVOX) 50 MG TABLET] Take 50 mg by mouth at bedtime.  4/29/2022 at HS     latanoprostene bunod 0.024 % Drop Place 1 drop into the right eye At Bedtime 4/29/2022 at HS     levomefolate calcium (L-METHYLFOLATE ORAL) [LEVOMEFOLATE CALCIUM (L-METHYLFOLATE ORAL)] Take 1.25 mg by mouth daily.  4/30/2022 at AM     metFORMIN (GLUCOPHAGE) 1000 MG tablet Take 1,000 mg by mouth 2 times daily (with meals) 4/30/2022 at AM     multivitamin therapeutic tablet [MULTIVITAMIN THERAPEUTIC TABLET] Take 1 tablet by mouth daily. 4/30/2022 at AM     netarsudiL  (RHOPRESSA) 0.02 % Drop [NETARSUDIL (RHOPRESSA) 0.02 % DROP] Administer 1 drop to both eyes every evening.  4/29/2022 at HS     pioglitazone (ACTOS) 30 MG tablet Take 30 mg by mouth daily 4/30/2022 at AM     warfarin ANTICOAGULANT (COUMADIN) 5 MG tablet Take 1-1.5 tablets (5-7.5 mg) by mouth daily Adjust dose per INR results as instructed. (Patient taking differently: Take 2.5-5 mg by mouth daily Take 1/2 tablet (2.5mg) by mouth on Saturdays & take 1 tablet (5mg) daily on all other days. Adjust dose per INR results as instructed.) 4/29/2022 at PM       Information source(s): Patient and CareEverywhere/SureScripts  Method of interview communication: in-person with surgical mask and faceshield    Summary of Changes to PTA Med List  New: Actos  Discontinued: omeprazole, glipizide  Changed: Latanoprostene both eyes to right eye, warfarin 5-7.5mg daily to 2.5mg Sat & 5mg other days    Patient was asked about OTC/herbal products specifically.  PTA med list reflects this.    Allergies were reviewed, assessed, and updated with the patient.      Medications currently not available for use during hospital stay. Family/Patient representative states they will bring 4 eye drops to [facility]. ED RN will call daughter to bring in 4 eye drops.     The information provided in this note is only as accurate as the sources available at the time of the update(s).    Thank you for the opportunity to participate in the care of this patient.    Jany Gonzalez RPH  4/30/2022 4:41 PM

## 2022-04-30 NOTE — ED TRIAGE NOTES
"Patient here via EMS for generalized weakness the last few days. Pt states \"I had cow pies for stools yesterday but not today\". Pt has been weak and had to \"drop to a knee because he couldn't walk with his walker\". Pt c/o right shoulder discomfort after having difficulty getting up earlier. Denies LOC or other injuries. Low grade fevers noted also. Has had more incontinence issues than normal.      Triage Assessment     Row Name 04/30/22 0150       Triage Assessment (Adult)    Airway WDL WDL       Respiratory WDL    Respiratory WDL WDL       Skin Circulation/Temperature WDL    Skin Circulation/Temperature WDL WDL       Cardiac WDL    Cardiac WDL WDL       Peripheral/Neurovascular WDL    Peripheral Neurovascular WDL WDL       Cognitive/Neuro/Behavioral WDL    Cognitive/Neuro/Behavioral WDL WDL              "

## 2022-04-30 NOTE — ED NOTES
[facility] ED Handoff Report    ED Chief Complaint: weakness    ED Diagnosis:  (N39.0) Urinary tract infection without hematuria, site unspecified  Comment:   Plan:     (A41.9) Sepsis, due to unspecified organism, unspecified whether acute organ dysfunction present (H)  Comment:   Plan:        PMH:    Past Medical History:   Diagnosis Date     A-fib (H)      Acute hemodialysis encounter (H)     Due to CHIDI     Acute kidney injury (H)      Acute respiratory failure with hypoxemia (H)      Adrenal incidentaloma (H)      Asbestosis (H)      ATN (acute tubular necrosis) (H)      Atrophy of right kidney      Basal cell carcinoma      BPH without urinary obstruction      Cellulitis     Left foot     Cholelithiasis      Depression with anxiety      Diabetes mellitus, type 2 (H) 4/12/2020     Esophagitis      Gastritis      Glaucoma      Hematemesis, presence of nausea not specified      Hyperkalemia      Hyperlipemia      Kidney stone      Lactic acidosis      Metabolic acidosis      Metformin overdose of undetermined intent      Paroxysmal atrial fibrillation (H) 2/18/2020     Pericardial effusion      PTSD (post-traumatic stress disorder)      Seasonal allergies      Sepsis due to urinary tract infection (H)      Shock circulatory (H)      SIRS (systemic inflammatory response syndrome) (H)      Sleep apnea     uses a machine at night.      Upper GI bleed         Code Status:  No CPR- Do NOT Intubate     Falls Risk: Yes Band: Applied    Current Living Situation/Residence: lives in an apartment     Elimination Status: Continent: external male catheter in place     Activity Level: SBA w/ walker    Patients Preferred Language:  English     Needed: No    Vital Signs:  /61   Pulse 73   Temp 99.2  F (37.3  C) (Temporal)   Resp 17   Ht 1.829 m (6')   Wt 106.1 kg (234 lb)   SpO2 96%   BMI 31.74 kg/m       Cardiac Rhythm: n/a    Pain Score: 1/10    Is the Patient Confused:  No    Last Food or Drink:  04/30/22 at 1100    Focused Assessment:  Pt here with generalized weakness, dx UTI. No pain at this time. Purewick in place and pt tolerating that well.     Tests Performed: Done: Labs and Imaging    Treatments Provided:  Fluids, anitibiotics    Family Dynamics/Concerns: No    Family Updated On Visitor Policy: Yes    Plan of Care Communicated to Family: Yes    Who Was Updated about Plan of Care: Daughters Nanette and Eloisa    Belongings Checklist Done and Signed by Patient: Yes    Medications sent with patient: no, daughter Eloisa brought them home. Daughter Nanette will be bringing pts eye drops to the hospital due to inability to get them here at the hospital for inpatient stay.     Covid: asymptomatic , negative    Additional Information:     RN: Anel Heath RN   4/30/2022 5:21 PM

## 2022-04-30 NOTE — H&P
ADMISSION HISTORY & PHYSICAL      Michelet Restrepo, [phone]  ASSESSMENT AND PLAN:  71 year old male with a history of gastritis and GI bleed, pyelonephritis, HLD, A. fib on warfarin, diabetes type 2, RADHA, atrophic right kidney, HTN, nephrolithiasis, depression with anxiety who presents with generlized weakness from his independent senior living facility.      Urinary tract infection   Follow-up urine culture   Received dose of Zosyn and ceftriaxone on 4/30.  Continue ceftriaxone to complete total of 5 days   IV fluids   Tylenol as needed   Review of chart reveals prior pyelonephritis in 2021 which grew mixed organisms   CT abdomen with no evidence of pyelonephritis.  Atrophic right kidney.  Nonobstructive nephrolithiasis.  Cholelithiasis with no cholecystitis.  Again noted benign adenoma of adrenal glands.   PT/OT     Lactic acidosis   IV fluids, trend    Chronic atrial fibrillation on warfarin   Currently rate controlled    Continue warfarin    Hypomagnesemia-replace per protocol    Known GERD with history of GI bleed   Continue PPI    HLD-continue home med    Diabetes type 2   Diabetes order set   Last A1c of 5.9 on 6/30/2020    HTN-well-controlled   Continue home meds    RADHA-CPAP    Mood disorder with depression anxiety    Code Status: DNR/DNI  DVT prophylaxis: Warfarin    Disposition:  -Anticipated Length of Stay in midnights and medical necessity (including a midnight in the Emergency Department after triage if applicable): 2 midnights  -Discharge barriers: IV antibiotics, urine culture      CHIEF COMPLAINT:  Generalized weakness    HISTORY OF PRESENTING ILLNESS:  Patient is a 71 year old male with history significant for gastritis and GI bleed, pyelonephritis, HLD, A. fib on warfarin, diabetes type 2, RADHA, atrophic right kidney, HTN, nephrolithiasis, depression with anxiety who presents with generlized weakness from his independent senior living facility.  Overnight patient states that he attempted to  urinate twice and was found to have significant weakness when trying to get out of bed.  He denies falling.  He notes no pain with urination.  Does complain that he was urinating more frequently.  Notes also 2 days ago a loose stool.  Denies any fever but complains of some chills.  No other complaints at this time.    PMH/PSH:  Patient Active Problem List   Diagnosis     SIRS (systemic inflammatory response syndrome) (H)     Adrenal incidentaloma (H)     Diet-controlled diabetes mellitus (H)     Pericardial effusion     Lactic acidosis     Type 2 diabetes mellitus without complication, without long-term current use of insulin (H)     Paroxysmal atrial fibrillation (H)     Calculus of ureter     Acute renal failure, unspecified acute renal failure type (H)     Acute renal failure (ARF) (H)     ATN (acute tubular necrosis) (H)     Sepsis due to urinary tract infection (H)     Shock circulatory (H)     Metformin overdose of undetermined intent, initial encounter     Acute respiratory failure with hypoxemia (H)     Metabolic acidosis     Hyperkalemia     Hematemesis, presence of nausea not specified     Calculus of kidney     Syncope and collapse     Hyperglycemia     After care     Age-related cataract     Anemia associated with acute blood loss     Balanitis     Cholelithiasis without obstruction     Constipation     Depression with anxiety     Glaucoma     Hemorrhoids without complication     Hyperlipidemia     Loss of appetite     Major depressive disorder, recurrent episode, in partial remission (H)     Mixed personality disorder in adult (H)     Obstructive sleep apnea     Osteoarthrosis involving lower leg     Periodic limb movement disorder     Recurrent major depression (H)     S/P ureteral stent placement     Unilateral small kidney     Nephrolithiasis     Type 2 diabetes mellitus (H)     Osteoarthritis of knee     Persistent atrial fibrillation (H)     Pyelonephritis     Urinary tract infection without  hematuria, site unspecified     Sepsis, due to unspecified organism, unspecified whether acute organ dysfunction present (H)       ALLERGIES:  No Known Allergies    MEDICATIONS:  Reviewed.  Current Facility-Administered Medications   Medication     acetaminophen (TYLENOL) tablet 650 mg    Or     acetaminophen (TYLENOL) Suppository 650 mg     [START ON 5/1/2022] cefTRIAXone (ROCEPHIN) 1 g vial to attach to  mL bag for ADULTS or NS 50 mL bag for PEDS     lidocaine (LMX4) cream     lidocaine 1 % 0.1-1 mL     melatonin tablet 1 mg     ondansetron (ZOFRAN-ODT) ODT tab 4 mg    Or     ondansetron (ZOFRAN) injection 4 mg     Patient is already receiving anticoagulation with heparin, enoxaparin (LOVENOX), warfarin (COUMADIN)  or other anticoagulant medication     senna-docusate (SENOKOT-S/PERICOLACE) 8.6-50 MG per tablet 1 tablet    Or     senna-docusate (SENOKOT-S/PERICOLACE) 8.6-50 MG per tablet 2 tablet     sodium chloride (PF) 0.9% PF flush 3 mL     sodium chloride (PF) 0.9% PF flush 3 mL     sodium chloride 0.9% infusion     Current Outpatient Medications   Medication     acetaminophen (TYLENOL) 500 MG tablet     amoxicillin (AMOXIL) 500 MG capsule     ARIPiprazole (ABILIFY) 2 MG tablet     aspirin 81 mg chewable tablet     atorvastatin (LIPITOR) 10 MG tablet     brimonidine (ALPHAGAN) 0.2 % ophthalmic solution     dorzolamide-timolol (COSOPT) 22.3-6.8 mg/mL ophthalmic solution     fluvoxaMINE (LUVOX) 50 MG tablet     glipiZIDE (GLUCOTROL XL) 5 MG 24 hr tablet     latanoprostene bunod 0.024 % Drop     levomefolate calcium (L-METHYLFOLATE ORAL)     metFORMIN (GLUCOPHAGE) 1000 MG tablet     multivitamin therapeutic tablet     netarsudiL (RHOPRESSA) 0.02 % Drop     omeprazole (PRILOSEC) 20 MG capsule     warfarin ANTICOAGULANT (COUMADIN) 5 MG tablet       SOCIAL HISTORY:  Social History     Socioeconomic History     Marital status:      Spouse name: Not on file     Number of children: Not on file     Years of  education: Not on file     Highest education level: Not on file   Occupational History     Not on file   Tobacco Use     Smoking status: Never Smoker     Smokeless tobacco: Never Used   Substance and Sexual Activity     Alcohol use: Not Currently     Drug use: Never     Sexual activity: Not Currently   Other Topics Concern     Not on file   Social History Narrative     Not on file     Social Determinants of Health     Financial Resource Strain: Not on file   Food Insecurity: Not on file   Transportation Needs: Not on file   Physical Activity: Not on file   Stress: Not on file   Social Connections: Not on file   Intimate Partner Violence: Not on file   Housing Stability: Not on file       FAMILY HISTORY:  Family History   Problem Relation Age of Onset     Diabetes Mother          ROS:  12 point review of systems is reviewed and is negative except for what has already been mention in the history of presenting illness.     PHYSICAL EXAM:  /61   Pulse 73   Temp 99.2  F (37.3  C) (Temporal)   Resp 17   Ht 1.829 m (6')   Wt 106.1 kg (234 lb)   SpO2 96%   BMI 31.74 kg/m    I/O last 3 completed shifts:  In: 500 [IV Piggyback:500]  Out: -   No intake/output data recorded.  GENRL: Alert and answering questions appropriately. Not in acute distress. Lying in bed   HEENT: no JVP elevation, no lymphadenopathy or thyromegaly  CHEST: Clear to auscultation bilaterally. No wheezes, rhonchi or crackles. Breathing easily   HEART: Irregular rhythm.  Rate controlled.  No murmur.     ABDMN: Soft. Non-tender, non-distended. No organomegaly. No guarding or rigidity. Bowel sounds present   EXTRM: No pedal edema, DP pulses 2+.   NEURO: Cranial nerves II-XII grossly intact. No focal neurological deficit. No involuntary movements. Normal mentation  PSYCH: Normal affect and mood.   INTGM: No skin rash, no cyanosis or clubbing      DIAGNOSTIC DATA:  Recent Results (from the past 24 hour(s))   ECG 12-LEAD WITH MUSE (E)    Collection  Time: 04/30/22  2:46 AM   Result Value Ref Range    Systolic Blood Pressure  mmHg    Diastolic Blood Pressure  mmHg    Ventricular Rate 82 BPM    Atrial Rate 241 BPM    KS Interval  ms    QRS Duration 84 ms     ms    QTc 422 ms    P Axis  degrees    R AXIS -7 degrees    T Axis 50 degrees    Interpretation ECG       Atrial fibrillation  Abnormal ECG  When compared with ECG of 30-JUN-2020 08:03,  Atrial fibrillation has replaced Sinus rhythm  Questionable change in QRS axis  Confirmed by SEE ED PROVIDER NOTE FOR, ECG INTERPRETATION ([id number]),  MOAN JAIN ([id number]) on 4/30/2022 11:49:21 AM     Comprehensive metabolic panel    Collection Time: 04/30/22  3:04 AM   Result Value Ref Range    Sodium 141 136 - 145 mmol/L    Potassium 4.0 3.5 - 5.0 mmol/L    Chloride 107 98 - 107 mmol/L    Carbon Dioxide (CO2) 21 (L) 22 - 31 mmol/L    Anion Gap 13 5 - 18 mmol/L    Urea Nitrogen 17 8 - 28 mg/dL    Creatinine 0.95 0.70 - 1.30 mg/dL    Calcium 8.8 8.5 - 10.5 mg/dL    Glucose 153 (H) 70 - 125 mg/dL    Alkaline Phosphatase 94 45 - 120 U/L    AST 9 0 - 40 U/L    ALT 12 0 - 45 U/L    Protein Total 6.6 6.0 - 8.0 g/dL    Albumin 3.4 (L) 3.5 - 5.0 g/dL    Bilirubin Total 0.5 0.0 - 1.0 mg/dL    GFR Estimate 86 >60 mL/min/1.73m2   Lipase    Collection Time: 04/30/22  3:04 AM   Result Value Ref Range    Lipase 32 0 - 52 U/L   Troponin I (now)    Collection Time: 04/30/22  3:04 AM   Result Value Ref Range    Troponin I <0.01 0.00 - 0.29 ng/mL   B-Type Natriuretic Peptide ([facility] Only)    Collection Time: 04/30/22  3:04 AM   Result Value Ref Range    BNP 53 0 - 67 pg/mL   Magnesium    Collection Time: 04/30/22  3:04 AM   Result Value Ref Range    Magnesium 1.6 (L) 1.8 - 2.6 mg/dL   TSH with free T4 reflex    Collection Time: 04/30/22  3:04 AM   Result Value Ref Range    TSH 3.68 0.30 - 5.00 uIU/mL   CBC with platelets and differential    Collection Time: 04/30/22  3:04 AM   Result Value Ref Range    WBC Count 11.3 (H) 4.0 -  11.0 10e3/uL    RBC Count 4.06 (L) 4.40 - 5.90 10e6/uL    Hemoglobin 13.0 (L) 13.3 - 17.7 g/dL    Hematocrit 40.0 40.0 - 53.0 %    MCV 99 78 - 100 fL    MCH 32.0 26.5 - 33.0 pg    MCHC 32.5 31.5 - 36.5 g/dL    RDW 13.8 10.0 - 15.0 %    Platelet Count 236 150 - 450 10e3/uL    % Neutrophils 77 %    % Lymphocytes 12 %    % Monocytes 10 %    % Eosinophils 1 %    % Basophils 0 %    % Immature Granulocytes 0 %    NRBCs per 100 WBC 0 <1 /100    Absolute Neutrophils 8.7 (H) 1.6 - 8.3 10e3/uL    Absolute Lymphocytes 1.4 0.8 - 5.3 10e3/uL    Absolute Monocytes 1.1 0.0 - 1.3 10e3/uL    Absolute Eosinophils 0.2 0.0 - 0.7 10e3/uL    Absolute Basophils 0.0 0.0 - 0.2 10e3/uL    Absolute Immature Granulocytes 0.0 <=0.4 10e3/uL    Absolute NRBCs 0.0 10e3/uL   INR    Collection Time: 04/30/22  3:12 AM   Result Value Ref Range    INR 1.58 (H) 0.85 - 1.15   Lactic acid whole blood    Collection Time: 04/30/22  3:12 AM   Result Value Ref Range    Lactic Acid 3.5 (H) 0.7 - 2.0 mmol/L   Symptomatic; Unknown Influenza A/B & SARS-CoV2 (COVID-19) Virus PCR Multiplex Nasopharyngeal    Collection Time: 04/30/22  3:16 AM    Specimen: Nasopharyngeal; Swab   Result Value Ref Range    Influenza A PCR Negative Negative    Influenza B PCR Negative Negative    SARS CoV2 PCR Negative Negative   UA with Microscopic reflex to Culture    Collection Time: 04/30/22  4:07 AM    Specimen: Urine, Catheter   Result Value Ref Range    Color Urine Light Yellow Colorless, Straw, Light Yellow, Yellow    Appearance Urine Clear Clear    Glucose Urine Negative Negative mg/dL    Bilirubin Urine Negative Negative    Ketones Urine Negative Negative mg/dL    Specific Gravity Urine 1.019 1.001 - 1.030    Blood Urine 0.2 mg/dL (A) Negative    pH Urine 5.5 5.0 - 7.0    Protein Albumin Urine 30  (A) Negative mg/dL    Urobilinogen Urine <2.0 <2.0 mg/dL    Nitrite Urine Positive (A) Negative    Leukocyte Esterase Urine 500 Janis/uL (A) Negative    WBC Clumps Urine Present (A)  None Seen /HPF    Mucus Urine Present (A) None Seen /LPF    RBC Urine 42 (H) <=2 /HPF    WBC Urine 135 (H) <=5 /HPF   Lactic acid whole blood    Collection Time: 04/30/22  5:53 AM   Result Value Ref Range    Lactic Acid 2.3 (H) 0.7 - 2.0 mmol/L     All lab studies reviewed personally  Radiology report reviewed.      Anisa Vasquez DO  [facility] Medicine Service  [phone]

## 2022-04-30 NOTE — ED PROVIDER NOTES
ED SIGNOUT  Date/Time:4/30/2022 7:00 AM    Patient signed out to me by my colleague, Zaira Cantor MD.  Please see their note for complete history and physical. Plan to follow up on admission     The creation of this record is based on the scribe s observations of the work being performed by Smooth Yanes DO and the provider s statements to them. It was created on their behalf by Rashida Everett a trained medical scribe. This document has been checked and approved by the attending provider.      REMAINING ED WORKUP:  Admission    We were not able to find bed placement elsewhere so the patient was ultimately kept here at [facility] and admitted to the hospital service.        Diagnosis:  1. Acute cystitis without hematuria    2. Urinary tract infection without hematuria, site unspecified    3. Sepsis, due to unspecified organism, unspecified whether acute organ dysfunction present (H)    4. Paroxysmal atrial fibrillation (H)        Smooth Yanes DO  [facility] Emergency Department          Smooth Yanes MD  05/13/22 0031

## 2022-04-30 NOTE — ED PROVIDER NOTES
EMERGENCY DEPARTMENT ENCOUNTER      NAME: Rakseh Samuels  AGE: 71 year old male  YOB: 1950  MRN: [id number]  EVALUATION DATE & TIME: 4/30/2022  2:38 AM    PCP: Michelet Restrepo    ED PROVIDER: Zaira Cantor M.D.      Chief Complaint   Patient presents with     Generalized Weakness         FINAL IMPRESSION:  1. Urinary tract infection without hematuria, site unspecified    2. Sepsis, due to unspecified organism, unspecified whether acute organ dysfunction present (H)        MEDICAL DECISION MAKING:    Pertinent Labs & Imaging studies reviewed. (See chart for details)  ED Course as of 04/30/22 2338   Sat Apr 30, 2022   0258 Oral temperature here is 99.2, otherwise vital signs with mild hypertension, pulse rate of 90.  Patient is coming in with generalized weakness.  Does report some looser stools yesterday, not diarrhea.  No pain associated with it.  No nausea or vomiting.  No difficulty urinating.  No dark or tarry stools or bloody stools.  States he has felt rundown over the last couple of days.  Lives in an assisted living and states that many people in his building have been sick with non-COVID illness.  No chest pain or shortness of breath.  No runny nose or sore throat.  No headaches.  With his weakness earlier today he did fall down to one of his knees while he was holding onto his walker.  Did not fall all the way to the ground or hit his head or lose consciousness.  Weakness is bilateral.  No recent changes to his medications.    Physical exam for patient here without any focal findings.    Will check labs for patient here, chest x-ray.  EKG, urinalysis.    Patient's EKG here shows atrial fibrillation a rate of 82.  There is no ST elevations or depressions.  No signs for acute ischemia.  QTC is 422.  As compared to his previous EKG from June 30, 2020 sinus rhythm at that time with PACs.  Atrial fibrillation has replaced sinus rhythm.   0301 Patient also has a history of paroxysmal atrial  fibrillation.  He is on warfarin for blood thinner medications.   0533 Patient's labs back here with lactic acid 3.5, started on broad-spectrum antibiotics, given fluids as well.  Magnesium slightly low, will replete orally.  Urinalysis did come back positive for infection with nitrate, leuk esterase positive.  Had already received broad-spectrum antibiotics, slight leukocytosis.  We will repeat lactic acid, given weakness will need admission for sepsis secondary to UTI and generalized weakness.   0534 Attempting to call bedboard for bed availability.   0534 Patient's lactic acid is coming down, 2.3 after liter of fluid.  Vital signs of improved.  Afebrile, heart rate down to the 70s.  We will give additional fluid bolus.       Patient was signed out to dayshift physician pending possible bed placement.  They are awaiting call to the bed management office at 730 to see if there are any beds available in the system.  Otherwise patient would be admitted in-house   Otherwise he is resting comfortably.    Critical care: 0 minutes excluding separately billable procedures.  Includes bedside management, time reviewing test results, review of records, discussing the case with staff, documenting the medical record and time spent with family members (or surrogate decision makers) discussing specific treatment issues.          ED COURSE:  2:35 AMI met with the patient, obtained history, performed an initial exam, and discussed options and plan for diagnostics and treatment here in the ED. PPE worn: N95 mask, surgical mask, hair cap and gloves.     The importance of close follow up was discussed. We reviewed warning signs and symptoms, and I instructed Mr. Samuels to return to the emergency department immediately if he develops any new or worsening symptoms. I provided additional verbal discharge instructions. Mr. Samuels expressed understanding and agreement with this plan of care, his questions were answered, and he was  discharged in stable condition.     MEDICATIONS GIVEN IN THE EMERGENCY:  Medications   lidocaine 1 % 0.1-1 mL (has no administration in time range)   lidocaine (LMX4) cream (has no administration in time range)   sodium chloride (PF) 0.9% PF flush 3 mL (3 mLs Intracatheter Not Given 4/30/22 1725)   sodium chloride (PF) 0.9% PF flush 3 mL (has no administration in time range)   acetaminophen (TYLENOL) tablet 650 mg (has no administration in time range)     Or   acetaminophen (TYLENOL) Suppository 650 mg (has no administration in time range)   melatonin tablet 1 mg (has no administration in time range)   ondansetron (ZOFRAN-ODT) ODT tab 4 mg (has no administration in time range)     Or   ondansetron (ZOFRAN) injection 4 mg (has no administration in time range)   Patient is already receiving anticoagulation with heparin, enoxaparin (LOVENOX), warfarin (COUMADIN)  or other anticoagulant medication (has no administration in time range)   sodium chloride 0.9% infusion ( Intravenous New Bag 4/30/22 1630)   senna-docusate (SENOKOT-S/PERICOLACE) 8.6-50 MG per tablet 1 tablet (has no administration in time range)     Or   senna-docusate (SENOKOT-S/PERICOLACE) 8.6-50 MG per tablet 2 tablet (has no administration in time range)   cefTRIAXone (ROCEPHIN) 1 g vial to attach to  mL bag for ADULTS or NS 50 mL bag for PEDS (has no administration in time range)   Warfarin Therapy Reminder (Check START DATE - warfarin may be starting in the FUTURE) (has no administration in time range)   glucose gel 15-30 g (has no administration in time range)     Or   dextrose 50 % injection 25-50 mL (has no administration in time range)     Or   glucagon injection 1 mg (has no administration in time range)   insulin aspart (NovoLOG) injection (RAPID ACTING) (1 Units Subcutaneous Not Given 4/30/22 2047)   insulin aspart (NovoLOG) injection (RAPID ACTING) (1 Units Subcutaneous Not Given 4/30/22 2106)   ARIPiprazole (ABILIFY) tablet 2 mg (2 mg  Oral Given 4/30/22 2108)   aspirin (ASA) chewable tablet 81 mg (has no administration in time range)   atorvastatin (LIPITOR) tablet 10 mg (10 mg Oral Given 4/30/22 2115)   brimonidine (ALPHAGAN) 0.2 % ophthalmic solution 1 drop (1 drop Both Eyes Given 4/30/22 2214)   dorzolamide-timolol (COSOPT) ophthalmic solution 1 drop (1 drop Both Eyes Given 4/30/22 2215)   fluvoxaMINE (LUVOX) tablet 50 mg (50 mg Oral Given 4/30/22 2107)   latanoprostene bunod (VYZULTA) 0.024 % ophthalmic solution 1 drop (1 drop Both Eyes Given 4/30/22 2215)   netarsudil (RHOPRESSA) 0.02 % ophthalmic solution 1 drop (1 drop Right Eye Given 4/30/22 2215)   acetaminophen (TYLENOL) tablet 650 mg (650 mg Oral Given 4/30/22 0324)   piperacillin-tazobactam (ZOSYN) 3.375 g vial to attach to  mL bag (0 g Intravenous Stopped 4/30/22 0454)   0.9% sodium chloride BOLUS (0 mLs Intravenous Stopped 4/30/22 0553)   iopamidol (ISOVUE-370) solution 100 mL (100 mLs Intravenous Given 4/30/22 0421)   magnesium oxide (MAG-OX) tablet 400 mg (400 mg Oral Given 4/30/22 0553)   0.9% sodium chloride BOLUS (0 mLs Intravenous Stopped 4/30/22 0720)   cefTRIAXone (ROCEPHIN) 1 g vial to attach to  mL bag for ADULTS or NS 50 mL bag for PEDS (0 g Intravenous Stopped 4/30/22 1506)   warfarin ANTICOAGULANT (COUMADIN) tablet 5 mg (5 mg Oral Given 4/30/22 2107)       NEW PRESCRIPTIONS STARTED AT TODAY'S ER VISIT:  Current Discharge Medication List             =================================================================    HPI    Patient information was obtained from: patient     Use of : N/A         Rakesh Samuels is a 71 year old male with a pertinent medical history of upper GI bleed, pyelonephritis, hyperlipidemia, atrial fibrillation anticoagulated on warfarin, gastritis, T2DM, who presents via EMS with generalized weakness, and looser stools.     Patient is being brought in by EMS from his independent senior living facility with generalized  "weakness since this evening. He felt so weak that he had fell onto his knees while trying to get up to bed without his walker. He felt like his legs were also very \"wobbly\". He also states that yesterday, he had \"cow platter\" stools. Not specifically diarrhea but softer. No dark or tarry stool. He also says he has gotten \"slight shivers\" but not exactly chills. His facility has had sick residents with illness going around, but no known recent COVID cases. Otherwise he denies any associated fever, nausea, vomiting, abdominal pain, cough, chest pain, shortness of breath, difficulty urinating. No other medical complaints at this time.       REVIEW OF SYSTEMS   Review of Systems   Constitutional: Negative for chills and fever.   Respiratory: Negative for cough and shortness of breath.    Cardiovascular: Negative for chest pain.   Gastrointestinal: Negative for abdominal pain, diarrhea, nausea and vomiting.        Positive looser stools   Genitourinary: Negative for difficulty urinating.   Neurological: Positive for weakness (generalized).   All other systems reviewed and are negative.        PAST MEDICAL HISTORY:  Past Medical History:   Diagnosis Date     A-fib (H)      Acute hemodialysis encounter (H)     Due to CHIDI     Acute kidney injury (H)      Acute respiratory failure with hypoxemia (H)      Adrenal incidentaloma (H)      Asbestosis (H)      ATN (acute tubular necrosis) (H)      Atrophy of right kidney      Basal cell carcinoma      BPH without urinary obstruction      Cellulitis     Left foot     Cholelithiasis      Depression with anxiety      Diabetes mellitus, type 2 (H) 4/12/2020     Esophagitis      Gastritis      Glaucoma      Hematemesis, presence of nausea not specified      Hyperkalemia      Hyperlipemia      Kidney stone      Lactic acidosis      Metabolic acidosis      Metformin overdose of undetermined intent      Paroxysmal atrial fibrillation (H) 2/18/2020     Pericardial effusion      PTSD " (post-traumatic stress disorder)      Seasonal allergies      Sepsis due to urinary tract infection (H)      Shock circulatory (H)      SIRS (systemic inflammatory response syndrome) (H)      Sleep apnea     uses a machine at night.      Upper GI bleed        PAST SURGICAL HISTORY:  Past Surgical History:   Procedure Laterality Date     EYE SURGERY      congenital ptosis right upper lid     HC CYSTOSCOPY,INSERT URETERAL STENT Left 4/12/2020    Procedure: CYSTOSCOPY, WITH URETERAL STENT INSERTION;  Surgeon: Christopher Yates MD;  Location: [facility] OR;  Service: Urology     SD ESOPHAGOGASTRODUODENOSCOPY TRANSORAL DIAGNOSTIC N/A 4/13/2020    Procedure: ESOPHAGOGASTRODUODENOSCOPY (EGD);  Surgeon: Robert Rico MD;  Location: [facility];  Service: Gastroenterology     REPLACEMENT TOTAL KNEE Left        CURRENT MEDICATIONS:      Current Facility-Administered Medications:      acetaminophen (TYLENOL) tablet 650 mg, 650 mg, Oral, Q6H PRN **OR** acetaminophen (TYLENOL) Suppository 650 mg, 650 mg, Rectal, Q6H PRN, Anisa Vasquez DO     ARIPiprazole (ABILIFY) tablet 2 mg, 2 mg, Oral, At Bedtime, Anisa Vasquez DO, 2 mg at 04/30/22 2108     [START ON 5/1/2022] aspirin (ASA) chewable tablet 81 mg, 81 mg, Oral, Daily, Anisa Vasquez DO     atorvastatin (LIPITOR) tablet 10 mg, 10 mg, Oral, At Bedtime, Anisa Vasquez DO, 10 mg at 04/30/22 2115     brimonidine (ALPHAGAN) 0.2 % ophthalmic solution 1 drop, 1 drop, Both Eyes, BID, Anisa Vasquez DO, 1 drop at 04/30/22 2214     [START ON 5/1/2022] cefTRIAXone (ROCEPHIN) 1 g vial to attach to  mL bag for ADULTS or NS 50 mL bag for PEDS, 1 g, Intravenous, Q24H, Anisa Vasquez DO     glucose gel 15-30 g, 15-30 g, Oral, Q15 Min PRN **OR** dextrose 50 % injection 25-50 mL, 25-50 mL, Intravenous, Q15 Min PRN **OR** glucagon injection 1 mg, 1 mg, Subcutaneous, Q15 Min PRN, Anisa Vasquez,  DO     dorzolamide-timolol (COSOPT) ophthalmic solution 1 drop, 1 drop, Both Eyes, BID, Anisa Vasquez DO, 1 drop at 04/30/22 2215     fluvoxaMINE (LUVOX) tablet 50 mg, 50 mg, Oral, At Bedtime, Anisa Vasquez DO, 50 mg at 04/30/22 2107     insulin aspart (NovoLOG) injection (RAPID ACTING), 1-3 Units, Subcutaneous, TID AC, Anisa Vasquez DO     insulin aspart (NovoLOG) injection (RAPID ACTING), 1-3 Units, Subcutaneous, At Bedtime, Anisa Vasquez DO     latanoprostene bunod (VYZULTA) 0.024 % ophthalmic solution 1 drop, 1 drop, Both Eyes, At Bedtime, Anisa Vasquez DO, 1 drop at 04/30/22 2215     lidocaine (LMX4) cream, , Topical, Q1H PRN, Anisa Vasquez DO     lidocaine 1 % 0.1-1 mL, 0.1-1 mL, Other, Q1H PRN, Anisa Vasquez DO     melatonin tablet 1 mg, 1 mg, Oral, At Bedtime PRN, Anisa Vasquez DO     netarsudil (RHOPRESSA) 0.02 % ophthalmic solution 1 drop, 1 drop, Right Eye, QPM, Anisa Vasquez DO, 1 drop at 04/30/22 2215     ondansetron (ZOFRAN-ODT) ODT tab 4 mg, 4 mg, Oral, Q6H PRN **OR** ondansetron (ZOFRAN) injection 4 mg, 4 mg, Intravenous, Q6H PRN, Anisa Vasquez DO     Patient is already receiving anticoagulation with heparin, enoxaparin (LOVENOX), warfarin (COUMADIN)  or other anticoagulant medication, , Does not apply, Continuous PRN, Anisa Vasquez DO     senna-docusate (SENOKOT-S/PERICOLACE) 8.6-50 MG per tablet 1 tablet, 1 tablet, Oral, BID PRN **OR** senna-docusate (SENOKOT-S/PERICOLACE) 8.6-50 MG per tablet 2 tablet, 2 tablet, Oral, BID PRN, Anisa Vasquez DO     sodium chloride (PF) 0.9% PF flush 3 mL, 3 mL, Intracatheter, Q8H, Anisa Vasquez DO     sodium chloride (PF) 0.9% PF flush 3 mL, 3 mL, Intracatheter, q1 min prn, Anisa Vasquez DO     sodium chloride 0.9% infusion, , Intravenous, Continuous, Anisa Vasquez DO, Last  Rate: 100 mL/hr at 04/30/22 1630, New Bag at 04/30/22 1630     Warfarin Therapy Reminder (Check START DATE - warfarin may be starting in the FUTURE), 1 each, Does not apply, Continuous ANTHONYN, Anisa Vasquez DO    ALLERGIES:  No Known Allergies    FAMILY HISTORY:  Family History   Problem Relation Age of Onset     Diabetes Mother        SOCIAL HISTORY:   Social History     Socioeconomic History     Marital status:    Tobacco Use     Smoking status: Never Smoker     Smokeless tobacco: Never Used   Substance and Sexual Activity     Alcohol use: Not Currently     Drug use: Never     Sexual activity: Not Currently       PHYSICAL EXAM:    Vitals: /59 (BP Location: Left arm)   Pulse 78   Temp 97.7  F (36.5  C) (Oral)   Resp 18   Ht 1.829 m (6')   Wt 109.3 kg (241 lb)   SpO2 93%   BMI 32.69 kg/m     General:. Alert and interactive, comfortable appearing.  HENT: Oropharynx without erythema or exudates. MMM.  TMs clear bilaterally.  Eyes: Pupils mid-sized and equally reactive.   Neck: Full AROM.  No midline tenderness to palpation.  Cardiovascular: Regular rate and rhythm. Peripheral pulses 2+ bilaterally.  Chest/Pulmonary: Normal work of breathing. Lung sounds clear and equal throughout, no wheezes or crackles. No chest wall tenderness or deformities.  Abdomen: Soft, nondistended. Nontender without guarding or rebound.  Back/Spine: No CVA or midline tenderness.  Extremities: Normal ROM of all major joints. No lower extremity edema.   Skin: Warm and dry. Normal skin color.   Neuro: Speech clear. CNs grossly intact. Moves all extremities appropriately. Strength and sensation grossly intact to all extremities.   Psych: Normal affect/mood, cooperative, memory appropriate.     LAB:  All pertinent labs reviewed and interpreted.  Labs Ordered and Resulted from Time of ED Arrival to Time of ED Departure   COMPREHENSIVE METABOLIC PANEL - Abnormal       Result Value    Sodium 141      Potassium 4.0       Chloride 107      Carbon Dioxide (CO2) 21 (*)     Anion Gap 13      Urea Nitrogen 17      Creatinine 0.95      Calcium 8.8      Glucose 153 (*)     Alkaline Phosphatase 94      AST 9      ALT 12      Protein Total 6.6      Albumin 3.4 (*)     Bilirubin Total 0.5      GFR Estimate 86     MAGNESIUM - Abnormal    Magnesium 1.6 (*)    ROUTINE UA WITH MICROSCOPIC REFLEX TO CULTURE - Abnormal    Color Urine Light Yellow      Appearance Urine Clear      Glucose Urine Negative      Bilirubin Urine Negative      Ketones Urine Negative      Specific Gravity Urine 1.019      Blood Urine 0.2 mg/dL (*)     pH Urine 5.5      Protein Albumin Urine 30  (*)     Urobilinogen Urine <2.0      Nitrite Urine Positive (*)     Leukocyte Esterase Urine 500 Janis/uL (*)     WBC Clumps Urine Present (*)     Mucus Urine Present (*)     RBC Urine 42 (*)     WBC Urine 135 (*)    CBC WITH PLATELETS AND DIFFERENTIAL - Abnormal    WBC Count 11.3 (*)     RBC Count 4.06 (*)     Hemoglobin 13.0 (*)     Hematocrit 40.0      MCV 99      MCH 32.0      MCHC 32.5      RDW 13.8      Platelet Count 236      % Neutrophils 77      % Lymphocytes 12      % Monocytes 10      % Eosinophils 1      % Basophils 0      % Immature Granulocytes 0      NRBCs per 100 WBC 0      Absolute Neutrophils 8.7 (*)     Absolute Lymphocytes 1.4      Absolute Monocytes 1.1      Absolute Eosinophils 0.2      Absolute Basophils 0.0      Absolute Immature Granulocytes 0.0      Absolute NRBCs 0.0     INR - Abnormal    INR 1.58 (*)    LACTIC ACID WHOLE BLOOD - Abnormal    Lactic Acid 3.5 (*)    LACTIC ACID WHOLE BLOOD - Abnormal    Lactic Acid 2.3 (*)    HEMOGLOBIN A1C - Abnormal    Hemoglobin A1C 6.7 (*)    LIPASE - Normal    Lipase 32     TROPONIN I - Normal    Troponin I <0.01     B-TYPE NATRIURETIC PEPTIDE ([facility] ONLY) - Normal    BNP 53     TSH WITH FREE T4 REFLEX - Normal    TSH 3.68     INFLUENZA A/B & SARS-COV2 PCR MULTIPLEX - Normal    Influenza A PCR Negative      Influenza B  PCR Negative      SARS CoV2 PCR Negative     GLUCOSE MONITOR NURSING POCT   GLUCOSE MONITOR NURSING POCT   GLUCOSE MONITOR NURSING POCT   URINE CULTURE       RADIOLOGY:  XR Chest 1 View   Final Result   IMPRESSION: Given differences in positioning there've been no significant changes, specifically there are no acute cardiopulmonary abnormalities identified.      CT Abdomen Pelvis w Contrast   Final Result   IMPRESSION:    1.  Since prior CT 03/27/2021 the abnormal stranding surrounding the atrophic right kidney has resolved, otherwise there've been no other significant changes. There are no acute abnormalities on this study to explain patient's findings clinically.      2.  Cholelithiasis with no cholecystitis.      3.  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      4.  Atrophic right kidney.      5.  Mild findings of diverticulosis.      6.  Nonobstructive nephrolithiasis.          EKG:  See MDM  Performed at: 30-APR-2022 02:46  Impression: Atrial fibrillation  Rate: 82 BPM  Rhythm: Atrial fibrillation  QRS Interval: 84 ms  QTc Interval: 422 ms  Comparison: 30-JUN-2020 08:03, atrial fibrillation has replaced sinus rhythm. Questionable change in QRS axis.     I have independently reviewed and interpreted the EKG(s) documented above.       I, Bridget Arias, am serving as a scribe to document services personally performed by Dr. Zaira Cantor  based on my observation and the provider's statements to me. I, Zaira Cantor MD attest that Bridget Arias is acting in a scribe capacity, has observed my performance of the services and has documented them in accordance with my direction.      Zaira Cantor M.D.  Emergency Medicine  [facility] EMERGENCY DEPARTMENT  [facility] [facility] [address]  [phone]  Dept: [phone]     Jayna Cantor MD  04/30/22 1855

## 2022-04-30 NOTE — ED NOTES
Dr. Yanes, ED Provider  agreed to let pt have his AM pills and eye drops .  Meds brought by daughter Eloisa and administered by her.  Pt was finally able to eat his breakfast

## 2022-05-01 ENCOUNTER — APPOINTMENT (OUTPATIENT)
Dept: OCCUPATIONAL THERAPY | Facility: HOSPITAL | Age: 72
DRG: 872 | End: 2022-05-01
Attending: INTERNAL MEDICINE
Payer: MEDICARE

## 2022-05-01 ENCOUNTER — APPOINTMENT (OUTPATIENT)
Dept: PHYSICAL THERAPY | Facility: HOSPITAL | Age: 72
DRG: 872 | End: 2022-05-01
Attending: INTERNAL MEDICINE
Payer: MEDICARE

## 2022-05-01 PROBLEM — N39.0 UTI (URINARY TRACT INFECTION): Status: ACTIVE | Noted: 2022-05-01

## 2022-05-01 LAB
ALBUMIN SERPL-MCNC: 2.9 G/DL (ref 3.5–5)
ALP SERPL-CCNC: 75 U/L (ref 45–120)
ALT SERPL W P-5'-P-CCNC: <9 U/L (ref 0–45)
ANION GAP SERPL CALCULATED.3IONS-SCNC: 8 MMOL/L (ref 5–18)
AST SERPL W P-5'-P-CCNC: 10 U/L (ref 0–40)
BASOPHILS # BLD AUTO: 0 10E3/UL (ref 0–0.2)
BASOPHILS NFR BLD AUTO: 1 %
BILIRUB SERPL-MCNC: 0.4 MG/DL (ref 0–1)
BUN SERPL-MCNC: 11 MG/DL (ref 8–28)
CALCIUM SERPL-MCNC: 8.1 MG/DL (ref 8.5–10.5)
CHLORIDE BLD-SCNC: 112 MMOL/L (ref 98–107)
CO2 SERPL-SCNC: 23 MMOL/L (ref 22–31)
CREAT SERPL-MCNC: 0.77 MG/DL (ref 0.7–1.3)
EOSINOPHIL # BLD AUTO: 0.2 10E3/UL (ref 0–0.7)
EOSINOPHIL NFR BLD AUTO: 4 %
ERYTHROCYTE [DISTWIDTH] IN BLOOD BY AUTOMATED COUNT: 13.9 % (ref 10–15)
GFR SERPL CREATININE-BSD FRML MDRD: >90 ML/MIN/1.73M2
GLUCOSE BLD-MCNC: 126 MG/DL (ref 70–125)
GLUCOSE BLDC GLUCOMTR-MCNC: 116 MG/DL (ref 70–99)
GLUCOSE BLDC GLUCOMTR-MCNC: 120 MG/DL (ref 70–99)
GLUCOSE BLDC GLUCOMTR-MCNC: 142 MG/DL (ref 70–99)
GLUCOSE BLDC GLUCOMTR-MCNC: 142 MG/DL (ref 70–99)
HCT VFR BLD AUTO: 38.3 % (ref 40–53)
HGB BLD-MCNC: 12.3 G/DL (ref 13.3–17.7)
IMM GRANULOCYTES # BLD: 0 10E3/UL
IMM GRANULOCYTES NFR BLD: 0 %
INR PPP: 1.68 (ref 0.85–1.15)
LYMPHOCYTES # BLD AUTO: 1.2 10E3/UL (ref 0.8–5.3)
LYMPHOCYTES NFR BLD AUTO: 21 %
MAGNESIUM SERPL-MCNC: 1.6 MG/DL (ref 1.8–2.6)
MCH RBC QN AUTO: 32.1 PG (ref 26.5–33)
MCHC RBC AUTO-ENTMCNC: 32.1 G/DL (ref 31.5–36.5)
MCV RBC AUTO: 100 FL (ref 78–100)
MONOCYTES # BLD AUTO: 0.6 10E3/UL (ref 0–1.3)
MONOCYTES NFR BLD AUTO: 10 %
NEUTROPHILS # BLD AUTO: 3.8 10E3/UL (ref 1.6–8.3)
NEUTROPHILS NFR BLD AUTO: 64 %
NRBC # BLD AUTO: 0 10E3/UL
NRBC BLD AUTO-RTO: 0 /100
PLATELET # BLD AUTO: 205 10E3/UL (ref 150–450)
POTASSIUM BLD-SCNC: 4.2 MMOL/L (ref 3.5–5)
PROT SERPL-MCNC: 5.7 G/DL (ref 6–8)
RBC # BLD AUTO: 3.83 10E6/UL (ref 4.4–5.9)
SODIUM SERPL-SCNC: 143 MMOL/L (ref 136–145)
WBC # BLD AUTO: 6 10E3/UL (ref 4–11)

## 2022-05-01 PROCEDURE — 94660 CPAP INITIATION&MGMT: CPT

## 2022-05-01 PROCEDURE — 999N000157 HC STATISTIC RCP TIME EA 10 MIN

## 2022-05-01 PROCEDURE — 36415 COLL VENOUS BLD VENIPUNCTURE: CPT | Performed by: INTERNAL MEDICINE

## 2022-05-01 PROCEDURE — 97535 SELF CARE MNGMENT TRAINING: CPT | Mod: GO

## 2022-05-01 PROCEDURE — 250N000013 HC RX MED GY IP 250 OP 250 PS 637: Performed by: INTERNAL MEDICINE

## 2022-05-01 PROCEDURE — 85610 PROTHROMBIN TIME: CPT | Performed by: INTERNAL MEDICINE

## 2022-05-01 PROCEDURE — 99232 SBSQ HOSP IP/OBS MODERATE 35: CPT | Performed by: INTERNAL MEDICINE

## 2022-05-01 PROCEDURE — 258N000003 HC RX IP 258 OP 636: Performed by: INTERNAL MEDICINE

## 2022-05-01 PROCEDURE — 97161 PT EVAL LOW COMPLEX 20 MIN: CPT | Mod: GP

## 2022-05-01 PROCEDURE — 83735 ASSAY OF MAGNESIUM: CPT | Performed by: INTERNAL MEDICINE

## 2022-05-01 PROCEDURE — 80053 COMPREHEN METABOLIC PANEL: CPT | Performed by: INTERNAL MEDICINE

## 2022-05-01 PROCEDURE — 250N000012 HC RX MED GY IP 250 OP 636 PS 637: Performed by: INTERNAL MEDICINE

## 2022-05-01 PROCEDURE — 120N000001 HC R&B MED SURG/OB

## 2022-05-01 PROCEDURE — 85025 COMPLETE CBC W/AUTO DIFF WBC: CPT | Performed by: INTERNAL MEDICINE

## 2022-05-01 PROCEDURE — 97116 GAIT TRAINING THERAPY: CPT | Mod: GP

## 2022-05-01 PROCEDURE — 82040 ASSAY OF SERUM ALBUMIN: CPT | Performed by: INTERNAL MEDICINE

## 2022-05-01 PROCEDURE — 250N000011 HC RX IP 250 OP 636: Performed by: INTERNAL MEDICINE

## 2022-05-01 PROCEDURE — 97165 OT EVAL LOW COMPLEX 30 MIN: CPT | Mod: GO

## 2022-05-01 RX ORDER — SODIUM CHLORIDE 9 MG/ML
INJECTION, SOLUTION INTRAVENOUS CONTINUOUS
Status: DISCONTINUED | OUTPATIENT
Start: 2022-05-01 | End: 2022-05-01

## 2022-05-01 RX ORDER — POLYETHYLENE GLYCOL 3350 17 G/17G
17 POWDER, FOR SOLUTION ORAL DAILY PRN
Status: DISCONTINUED | OUTPATIENT
Start: 2022-05-01 | End: 2022-05-05 | Stop reason: HOSPADM

## 2022-05-01 RX ORDER — WARFARIN SODIUM 5 MG/1
5 TABLET ORAL
Status: COMPLETED | OUTPATIENT
Start: 2022-05-01 | End: 2022-05-01

## 2022-05-01 RX ORDER — DEXTROSE MONOHYDRATE 25 G/50ML
25-50 INJECTION, SOLUTION INTRAVENOUS
Status: DISCONTINUED | OUTPATIENT
Start: 2022-05-01 | End: 2022-05-01

## 2022-05-01 RX ORDER — DORZOLAMIDE HYDROCHLORIDE AND TIMOLOL MALEATE 20; 5 MG/ML; MG/ML
1 SOLUTION/ DROPS OPHTHALMIC 2 TIMES DAILY
Status: DISCONTINUED | OUTPATIENT
Start: 2022-05-01 | End: 2022-05-01

## 2022-05-01 RX ORDER — PANTOPRAZOLE SODIUM 40 MG/1
40 TABLET, DELAYED RELEASE ORAL
Status: DISCONTINUED | OUTPATIENT
Start: 2022-05-01 | End: 2022-05-01 | Stop reason: CLARIF

## 2022-05-01 RX ORDER — BRIMONIDINE TARTRATE 2 MG/ML
1 SOLUTION/ DROPS OPHTHALMIC 2 TIMES DAILY
Status: DISCONTINUED | OUTPATIENT
Start: 2022-05-01 | End: 2022-05-01

## 2022-05-01 RX ORDER — ARIPIPRAZOLE 2 MG/1
2 TABLET ORAL AT BEDTIME
Status: DISCONTINUED | OUTPATIENT
Start: 2022-05-01 | End: 2022-05-01

## 2022-05-01 RX ORDER — ATORVASTATIN CALCIUM 10 MG/1
10 TABLET, FILM COATED ORAL EVERY EVENING
Status: DISCONTINUED | OUTPATIENT
Start: 2022-05-01 | End: 2022-05-01

## 2022-05-01 RX ORDER — ONDANSETRON 4 MG/1
4 TABLET, ORALLY DISINTEGRATING ORAL EVERY 6 HOURS PRN
Status: DISCONTINUED | OUTPATIENT
Start: 2022-05-01 | End: 2022-05-01

## 2022-05-01 RX ORDER — MULTIVITAMIN,THERAPEUTIC
1 TABLET ORAL DAILY
Status: DISCONTINUED | OUTPATIENT
Start: 2022-05-01 | End: 2022-05-01 | Stop reason: CLARIF

## 2022-05-01 RX ORDER — ONDANSETRON 2 MG/ML
4 INJECTION INTRAMUSCULAR; INTRAVENOUS EVERY 6 HOURS PRN
Status: DISCONTINUED | OUTPATIENT
Start: 2022-05-01 | End: 2022-05-01

## 2022-05-01 RX ORDER — GLIPIZIDE 5 MG/1
5 TABLET, FILM COATED, EXTENDED RELEASE ORAL
Status: DISCONTINUED | OUTPATIENT
Start: 2022-05-01 | End: 2022-05-01 | Stop reason: CLARIF

## 2022-05-01 RX ORDER — POLYETHYLENE GLYCOL 3350 17 G/17G
17 POWDER, FOR SOLUTION ORAL DAILY PRN
Status: DISCONTINUED | OUTPATIENT
Start: 2022-05-01 | End: 2022-05-01 | Stop reason: CLARIF

## 2022-05-01 RX ORDER — LIDOCAINE 40 MG/G
CREAM TOPICAL
Status: DISCONTINUED | OUTPATIENT
Start: 2022-05-01 | End: 2022-05-01 | Stop reason: CLARIF

## 2022-05-01 RX ORDER — NICOTINE POLACRILEX 4 MG
15-30 LOZENGE BUCCAL
Status: DISCONTINUED | OUTPATIENT
Start: 2022-05-01 | End: 2022-05-01

## 2022-05-01 RX ORDER — ACETAMINOPHEN 325 MG/1
650 TABLET ORAL EVERY 4 HOURS PRN
Status: DISCONTINUED | OUTPATIENT
Start: 2022-05-01 | End: 2022-05-01

## 2022-05-01 RX ORDER — LATANOPROST 50 UG/ML
1 SOLUTION/ DROPS OPHTHALMIC AT BEDTIME
Status: DISCONTINUED | OUTPATIENT
Start: 2022-05-01 | End: 2022-05-01

## 2022-05-01 RX ORDER — CEFTRIAXONE 1 G/1
1 INJECTION, POWDER, FOR SOLUTION INTRAMUSCULAR; INTRAVENOUS EVERY 24 HOURS
Status: DISCONTINUED | OUTPATIENT
Start: 2022-05-01 | End: 2022-05-01

## 2022-05-01 RX ORDER — FLUVOXAMINE MALEATE 50 MG
50 TABLET ORAL AT BEDTIME
Status: DISCONTINUED | OUTPATIENT
Start: 2022-05-01 | End: 2022-05-01

## 2022-05-01 RX ADMIN — DORZOLAMIDE HYDROCHLORIDE AND TIMOLOL MALEATE 1 DROP: 20; 5 SOLUTION/ DROPS OPHTHALMIC at 08:25

## 2022-05-01 RX ADMIN — ARIPIPRAZOLE 2 MG: 2 TABLET ORAL at 21:16

## 2022-05-01 RX ADMIN — BRIMONIDINE TARTRATE 1 DROP: 2 SOLUTION/ DROPS OPHTHALMIC at 08:24

## 2022-05-01 RX ADMIN — POLYETHYLENE GLYCOL 3350 17 G: 17 POWDER, FOR SOLUTION ORAL at 18:01

## 2022-05-01 RX ADMIN — BRIMONIDINE TARTRATE 1 DROP: 2 SOLUTION/ DROPS OPHTHALMIC at 21:15

## 2022-05-01 RX ADMIN — DORZOLAMIDE HYDROCHLORIDE AND TIMOLOL MALEATE 1 DROP: 20; 5 SOLUTION/ DROPS OPHTHALMIC at 21:15

## 2022-05-01 RX ADMIN — FLUVOXAMINE MALEATE 50 MG: 50 TABLET, COATED ORAL at 21:14

## 2022-05-01 RX ADMIN — ASPIRIN 81 MG CHEWABLE TABLET 81 MG: 81 TABLET CHEWABLE at 08:24

## 2022-05-01 RX ADMIN — INSULIN ASPART 1 UNITS: 100 INJECTION, SOLUTION INTRAVENOUS; SUBCUTANEOUS at 12:53

## 2022-05-01 RX ADMIN — SODIUM CHLORIDE: 9 INJECTION, SOLUTION INTRAVENOUS at 00:11

## 2022-05-01 RX ADMIN — CEFTRIAXONE SODIUM 1 G: 1 INJECTION, POWDER, FOR SOLUTION INTRAMUSCULAR; INTRAVENOUS at 13:20

## 2022-05-01 RX ADMIN — ATORVASTATIN CALCIUM 10 MG: 10 TABLET, FILM COATED ORAL at 21:14

## 2022-05-01 RX ADMIN — WARFARIN SODIUM 5 MG: 5 TABLET ORAL at 17:52

## 2022-05-01 RX ADMIN — SODIUM CHLORIDE: 9 INJECTION, SOLUTION INTRAVENOUS at 10:19

## 2022-05-01 ASSESSMENT — ACTIVITIES OF DAILY LIVING (ADL)
ADLS_ACUITY_SCORE: 14
ADLS_ACUITY_SCORE: 14
ADLS_ACUITY_SCORE: 15
ADLS_ACUITY_SCORE: 16
ADLS_ACUITY_SCORE: 16
ADLS_ACUITY_SCORE: 13
ADLS_ACUITY_SCORE: 15
ADLS_ACUITY_SCORE: 14
ADLS_ACUITY_SCORE: 15
ADLS_ACUITY_SCORE: 15
ADLS_ACUITY_SCORE: 13
ADLS_ACUITY_SCORE: 13
ADLS_ACUITY_SCORE: 15
ADLS_ACUITY_SCORE: 15
ADLS_ACUITY_SCORE: 14
ADLS_ACUITY_SCORE: 15
ADLS_ACUITY_SCORE: 15
ADLS_ACUITY_SCORE: 16
ADLS_ACUITY_SCORE: 15
ADLS_ACUITY_SCORE: 14
ADLS_ACUITY_SCORE: 15
ADLS_ACUITY_SCORE: 14
ADLS_ACUITY_SCORE: 15
ADLS_ACUITY_SCORE: 15
DEPENDENT_IADLS:: TRANSPORTATION

## 2022-05-01 NOTE — CONSULTS
Care Management Initial Consult    General Information  Assessment completed with: Patient,    Type of CM/SW Visit: Initial Assessment    Primary Care Provider verified and updated as needed:     Readmission within the last 30 days:        Reason for Consult: discharge planning  Advance Care Planning:            Communication Assessment  Patient's communication style: spoken language (English or Bilingual)    Hearing Difficulty or Deaf: yes   Wear Glasses or Blind: yes    Cognitive  Cognitive/Neuro/Behavioral: WDL                      Living Environment:   People in home: alone     Current living Arrangements: independent living facility ([facility]      Able to return to prior arrangements: yes       Family/Social Support:  Care provided by: self  Provides care for: no one     Children          Description of Support System: Supportive, Involved    Support Assessment: Adequate family and caregiver support, Adequate social supports    Current Resources:   Patient receiving home care services: No     Community Resources: Housekeeping/Chore Agency (Meals in dining room at facility)  Equipment currently used at home: walker, rolling  Supplies currently used at home:      Employment/Financial:  Employment Status:          Financial Concerns:             Lifestyle & Psychosocial Needs:  Social Determinants of Health     Tobacco Use: Low Risk      Smoking Tobacco Use: Never Smoker     Smokeless Tobacco Use: Never Used   Alcohol Use: Not on file   Financial Resource Strain: Not on file   Food Insecurity: Not on file   Transportation Needs: Not on file   Physical Activity: Not on file   Stress: Not on file   Social Connections: Not on file   Intimate Partner Violence: Not on file   Depression: Not on file   Housing Stability: Not on file       Functional Status:  Prior to admission patient needed assistance:   Dependent ADLs:: Ambulation-walker  Dependent IADLs:: Transportation      Additional Information:  SW  met with pt for initial assessment. He reports that he lives alone in an independent senior apartment at the [facility]. He reports he receives meals at the facility but is otherwise independent. Pt reports that his daughters provide transportation for him. He reports that he is supposed to get cleaning services but the facility is short staffed so he is not always getting those. He reports his daughters help him with this if needed. Discussed PT/OT recommendation for home care and pt agreeable. HE reports no preference in home care agency at this time. He denies other needs.    SW sent initial home care referrals and will follow.    Lay Landry, [facility]    Addendum: JENNY received acceptance of pt by [facility]. They request orders be faxed tomorrow.  3:44 PM

## 2022-05-01 NOTE — PLAN OF CARE
Outcome: Ongoing, Progressing  Flowsheets (Taken 4/30/2022 1820)  Anxieties, Fears or Concerns: PTSD, major depression, anxiety attack  Goal: Optimal Comfort and Wellbeing  Outcome: Ongoing, Progressing  Intervention: Monitor Pain and Promote Comfort  Recent Flowsheet Documentation  Taken 4/30/2022 1820 by Carloz Santamaria RN  Pain Management Interventions: emotional support     Problem: Risk for Delirium  Goal: Optimal Coping  Outcome: Ongoing, Progressing  Goal: Improved Behavioral Control  Outcome: Ongoing, Progressing  Goal: Improved Attention and Thought Clarity  Outcome: Ongoing, Progressing  Goal: Improved Sleep  Outcome: Ongoing, Progressing     Pt denies pain or nausea. Tolerating diet. External catheter in place. IVF infusing.

## 2022-05-01 NOTE — DISCHARGE INSTRUCTIONS
Home care services have been arranged for the patient.  Home Care Agency: [facility]  Home Care Phone Number: [phone]  Services: Physical therapy and occupational therapy  Instructions: Home care will call to schedule first visit.

## 2022-05-01 NOTE — PROGRESS NOTES
[facility]    PROGRESS NOTE - Hospitalist Service    ASSESSMENT AND PLAN     Active Problems:    Urinary tract infection without hematuria, site unspecified    Sepsis, due to unspecified organism, unspecified whether acute organ dysfunction present (H)    UTI (urinary tract infection)    Rakesh Samuels is a 71 year old male with h/o gastritis and GI bleed, pyelonephritis, HLD, A. fib on warfarin, diabetes type 2, RADHA, atrophic right kidney, HTN, nephrolithiasis, depression with anxiety who presents with generlized weakness from his independent senior living facility.       Urinary tract infection              Follow-up urine culture- NGTD              Received dose of Zosyn and ceftriaxone on 4/30.  Continue ceftriaxone to complete total of 5 days              Review of chart reveals prior pyelonephritis in 2021 which grew mixed organisms              CT abdomen with no evidence of pyelonephritis.  Atrophic right kidney.  Nonobstructive nephrolithiasis.  Cholelithiasis with no cholecystitis.  Again noted benign adenoma of adrenal glands.              PT/OT      Lactic acidosis     Chronic atrial fibrillation on warfarin              Currently rate controlled                       Continue warfarin     Hypomagnesemia-replace per protocol     Known GERD with history of GI bleed              Continue PPI     HLD-continue home med     Diabetes type 2              Diabetes order set              Last A1c of 5.9 on 6/30/2020     HTN-well-controlled              Continue home meds     RADHA-CPAP     Mood disorder with depression anxiety     Code Status: DNR/DNI  DVT prophylaxis: Warfarin    Barriers to discharge: Improvement in weakness, IV antibiotics, urine culture    Anticipated length of stay: 1 more day and then return to independent senior living    Subjective:  Patient resting comfortably in bed.  Feels his strength returning.  No other complaints.    PHYSICAL EXAM  Temp:  [97.4  F (36.3   C)-98.5  F (36.9  C)] 98.5  F (36.9  C)  Pulse:  [73-92] 83  Resp:  [15-23] 18  BP: (109-180)/(57-89) 131/65  SpO2:  [93 %-97 %] 95 %  Wt Readings from Last 1 Encounters:   04/30/22 109.3 kg (241 lb)       Intake/Output Summary (Last 24 hours) at 5/1/2022 1026  Last data filed at 5/1/2022 1019  Gross per 24 hour   Intake 838 ml   Output 200 ml   Net 638 ml      Body mass index is 32.69 kg/m .    GENRL: Alert and answering questions appropriately. Not in acute distress. Lying in bed   CHEST: Clear to auscultation bilaterally. No wheezes, rhonchi or crackles. Breathing easily   HEART: Irregular rhythm.  Rate controlled.  No murmur.     ABDMN: Soft. Non-tender, non-distended. No organomegaly. No guarding or rigidity. Bowel sounds present   EXTRM: No pedal edema, DP pulses 2+.   NEURO: Cranial nerves II-XII grossly intact. No focal neurological deficit. No involuntary movements. Normal mentation  PSYCH: Normal affect and mood.   INTGM: No skin rash, no cyanosis or clubbing    PERTINENT LABS/IMAGING:  Results for orders placed or performed during the hospital encounter of 04/30/22   CT Abdomen Pelvis w Contrast    Impression    IMPRESSION:   1.  Since prior CT 03/27/2021 the abnormal stranding surrounding the atrophic right kidney has resolved, otherwise there've been no other significant changes. There are no acute abnormalities on this study to explain patient's findings clinically.    2.  Cholelithiasis with no cholecystitis.    3.  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    4.  Atrophic right kidney.    5.  Mild findings of diverticulosis.    6.  Nonobstructive nephrolithiasis.   XR Chest 1 View    Impression    IMPRESSION: Given differences in positioning there've been no significant changes, specifically there are no acute cardiopulmonary abnormalities identified.     Most Recent 3 CBC's:Recent Labs   Lab Test 05/01/22  0749 04/30/22  0304 03/30/21  0601   WBC 6.0 11.3* 6.7   HGB 12.3* 13.0* 11.1*     99 98    236 181       Recent Labs   Lab Test 06/10/21  1145   CHOL 154   HDL 50   LDL 90   TRIG 72     Recent Labs   Lab Test 06/10/21  1145 11/18/19  1358 10/29/18  1348   LDL 90 73 64     Recent Labs   Lab Test 05/01/22  0758 05/01/22  0749   NA  --  143   POTASSIUM  --  4.2   CHLORIDE  --  112*   CO2  --  23   * 126*   BUN  --  11   CR  --  0.77   GFRESTIMATED  --  >90   APRYL  --  8.1*     Recent Labs   Lab Test 04/30/22  0304 06/30/20  0823 09/09/19  0617   A1C 6.7* 5.9* 7.4*     Recent Labs   Lab Test 05/01/22  0749 04/30/22  0304 03/30/21  0601   HGB 12.3* 13.0* 11.1*     Recent Labs   Lab Test 04/30/22  0304 06/30/20  0818 04/12/20  0905   TROPONINI <0.01 <0.01 0.07     Recent Labs   Lab Test 04/30/22  0304   BNP 53     Recent Labs   Lab Test 04/30/22  0304   TSH 3.68     Recent Labs   Lab Test 05/01/22  0749 04/30/22  0312 04/29/22  0000   INR 1.68* 1.58* 1.80*       Anisa Vasquez DO  [facility] Medicine Service  [phone]

## 2022-05-01 NOTE — PLAN OF CARE
Problem: Risk for Delirium  Goal: Improved Sleep  Outcome: Ongoing, Progressing     Pt slept well. CPAP in place. IVF infusing. Denies pain, nausea.

## 2022-05-01 NOTE — PLAN OF CARE
Problem: Plan of Care - These are the overarching goals to be used throughout the patient stay.    Goal: Readiness for Transition of Care  Outcome: Ongoing, Progressing   Goal Outcome Evaluation:  Denies pain.  Up in chair with assist x1 with walker.  External catheter in place.  Continues on IV rocephin.

## 2022-05-01 NOTE — PROGRESS NOTES
05/01/22 1020   Quick Adds   Type of Visit Initial Occupational Therapy Evaluation       Present no   Living Environment   People in Home alone   Current Living Arrangements independent living facility   Home Accessibility no concerns   Self-Care   Usual Activity Tolerance moderate   Current Activity Tolerance moderate   Equipment Currently Used at Home walker, rolling   Activity/Exercise/Self-Care Comment Pt is independent with basic ADLs, assist with cooking, cleaning, laundry, driving, shopping   General Information   Onset of Illness/Injury or Date of Surgery 04/30/22   Referring Physician Christina   Patient/Family Therapy Goal Statement (OT) home   Existing Precautions/Restrictions fall   Cognitive Status Examination   Orientation Status person;place  (confused on date)   Visual Perception   Visual Impairment/Limitations WFL   Sensory   Sensory Quick Adds No deficits were identified   Range of Motion Comprehensive   Comment, General Range of Motion limited motion B shoulders   Strength Comprehensive (MMT)   Comment, General Manual Muscle Testing (MMT) Assessment generalized weakness   Coordination   Upper Extremity Coordination No deficits were identified   Bed Mobility   Comment (Bed Mobility) Mod A   Transfers   Transfer Comments CGA with FWW   Balance   Balance Comments CGA in standing   Activities of Daily Living   BADL Assessment/Intervention   (Min A dressing)   Clinical Impression   Criteria for Skilled Therapeutic Interventions Met (OT) Yes, treatment indicated   OT Diagnosis Impaired ADL independence   OT Problem List-Impairments impacting ADL balance;cognition;flexibility;mobility;range of motion (ROM);strength   Assessment of Occupational Performance 5 or more Performance Deficits   Planned Therapy Interventions (OT) ADL retraining;balance training;cognition;bed mobility training;strengthening;transfer training   Clinical Decision Making Complexity (OT) low complexity   Risk  & Benefits of therapy have been explained evaluation/treatment results reviewed;participants included;patient   Clinical Impression Comments Pt seen bedside for OT eval and treatment.  Pt demonstrates decreased independence with ADLs and mobility as well as decreased strength and UE ROM.  Pt appears to have cogvitive deficit as well.  OT to continue to address.  Recommend home with home OT eval at discharge.   OT Discharge Planning   OT Discharge Recommendation (DC Rec) home with assist;home with home care occupational therapy   OT Rationale for DC Rec Assist for higher level ADLs.  Pt needs Min A for LB dressing.  Recommend home OT   Total Evaluation Time (Minutes)   Total Evaluation Time (Minutes) 10   OT Goals   Therapy Frequency (OT) Daily   OT Predicted Duration/Target Date for Goal Attainment 05/06/22   OT Goals Lower Body Dressing;Transfers;Hygiene/Grooming;Toilet Transfer/Toileting   OT: Hygiene/Grooming supervision/stand-by assist   OT: Lower Body Dressing Supervision/stand-by assist;using adaptive equipment   OT: Transfer Modified independent   OT: Toilet Transfer/Toileting Modified independent

## 2022-05-01 NOTE — PROGRESS NOTES
Have received 2 pages, tried to call back but  Cannot call back the number.pt now admitted to [facility] and not being transferred to [facility] . Please call admitting doctor at [facility] with questions   I was able to reach patient 's nurse and conveyed above     Kathe Justice MD

## 2022-05-01 NOTE — UTILIZATION REVIEW
Admission Status; Secondary Review Determination   Under the authority of the Utilization Management Committee, the utilization review process indicated a secondary review on Rakesh Samuels. The review outcome is based on review of the medical records, discussions with staff, and applying clinical experience noted on the date of the review.   (x) Inpatient Status Appropriate - This patient's medical care is consistent with medical management for inpatient care and reasonable inpatient medical practice.     RATIONALE FOR DETERMINATION   71-year-old male admitted with symptomatic urinary tract infection with elevated white count, lactate of 3.5 and weakness.  Continuing IV antibiotics while awaiting culture results.  Lactic acidosis improved with IV fluids.  Magnesium is low and being replaced.  Also has a history of obstructive sleep apnea on CPAP, type 2 diabetes, atrophic right kidney with history of pyelonephritis/nephrolithiasis and hypertension.    At the time of admission with the information available to the attending physician more than 2 nights Hospital complex care was anticipated, based on patient risk of adverse outcome if treated as outpatient and complex care required. Inpatient admission is appropriate based on the Medicare guidelines.   The information on this document is developed by the utilization review team in order for the business office to ensure compliance. This only denotes the appropriateness of proper admission status and does not reflect the quality of care rendered.   The definitions of Inpatient Status and Observation Status used in making the determination above are those provided in the CMS Coverage Manual, Chapter 1 and Chapter 6, section 70.4.   Sincerely,   Armaan Serrato MD  Utilization Review  Physician Advisor  [facility]

## 2022-05-02 ENCOUNTER — APPOINTMENT (OUTPATIENT)
Dept: PHYSICAL THERAPY | Facility: HOSPITAL | Age: 72
DRG: 872 | End: 2022-05-02
Payer: MEDICARE

## 2022-05-02 ENCOUNTER — APPOINTMENT (OUTPATIENT)
Dept: OCCUPATIONAL THERAPY | Facility: HOSPITAL | Age: 72
DRG: 872 | End: 2022-05-02
Payer: MEDICARE

## 2022-05-02 LAB
ALBUMIN SERPL-MCNC: 3.1 G/DL (ref 3.5–5)
ALP SERPL-CCNC: 79 U/L (ref 45–120)
ALT SERPL W P-5'-P-CCNC: 12 U/L (ref 0–45)
ANION GAP SERPL CALCULATED.3IONS-SCNC: 8 MMOL/L (ref 5–18)
AST SERPL W P-5'-P-CCNC: 13 U/L (ref 0–40)
ATRIAL RATE - MUSE: 68 BPM
BASOPHILS # BLD AUTO: 0 10E3/UL (ref 0–0.2)
BASOPHILS NFR BLD AUTO: 1 %
BILIRUB SERPL-MCNC: 0.5 MG/DL (ref 0–1)
BUN SERPL-MCNC: 11 MG/DL (ref 8–28)
CALCIUM SERPL-MCNC: 8.6 MG/DL (ref 8.5–10.5)
CHLORIDE BLD-SCNC: 109 MMOL/L (ref 98–107)
CO2 SERPL-SCNC: 25 MMOL/L (ref 22–31)
CREAT SERPL-MCNC: 0.89 MG/DL (ref 0.7–1.3)
DIASTOLIC BLOOD PRESSURE - MUSE: NORMAL MMHG
EOSINOPHIL # BLD AUTO: 0.2 10E3/UL (ref 0–0.7)
EOSINOPHIL NFR BLD AUTO: 4 %
ERYTHROCYTE [DISTWIDTH] IN BLOOD BY AUTOMATED COUNT: 13.7 % (ref 10–15)
GFR SERPL CREATININE-BSD FRML MDRD: >90 ML/MIN/1.73M2
GLUCOSE BLD-MCNC: 133 MG/DL (ref 70–125)
GLUCOSE BLDC GLUCOMTR-MCNC: 121 MG/DL (ref 70–99)
GLUCOSE BLDC GLUCOMTR-MCNC: 124 MG/DL (ref 70–99)
GLUCOSE BLDC GLUCOMTR-MCNC: 141 MG/DL (ref 70–99)
GLUCOSE BLDC GLUCOMTR-MCNC: 142 MG/DL (ref 70–99)
GLUCOSE BLDC GLUCOMTR-MCNC: 148 MG/DL (ref 70–99)
HCT VFR BLD AUTO: 39.8 % (ref 40–53)
HGB BLD-MCNC: 12.7 G/DL (ref 13.3–17.7)
IMM GRANULOCYTES # BLD: 0 10E3/UL
IMM GRANULOCYTES NFR BLD: 1 %
INR PPP: 1.76 (ref 0.85–1.15)
INTERPRETATION ECG - MUSE: NORMAL
LYMPHOCYTES # BLD AUTO: 1.3 10E3/UL (ref 0.8–5.3)
LYMPHOCYTES NFR BLD AUTO: 20 %
MAGNESIUM SERPL-MCNC: 1.8 MG/DL (ref 1.8–2.6)
MCH RBC QN AUTO: 31.6 PG (ref 26.5–33)
MCHC RBC AUTO-ENTMCNC: 31.9 G/DL (ref 31.5–36.5)
MCV RBC AUTO: 99 FL (ref 78–100)
MONOCYTES # BLD AUTO: 0.6 10E3/UL (ref 0–1.3)
MONOCYTES NFR BLD AUTO: 10 %
NEUTROPHILS # BLD AUTO: 4.1 10E3/UL (ref 1.6–8.3)
NEUTROPHILS NFR BLD AUTO: 64 %
NRBC # BLD AUTO: 0 10E3/UL
NRBC BLD AUTO-RTO: 0 /100
P AXIS - MUSE: NORMAL DEGREES
PLATELET # BLD AUTO: 208 10E3/UL (ref 150–450)
POTASSIUM BLD-SCNC: 4.2 MMOL/L (ref 3.5–5)
PR INTERVAL - MUSE: NORMAL MS
PROT SERPL-MCNC: 6.1 G/DL (ref 6–8)
QRS DURATION - MUSE: 74 MS
QT - MUSE: 372 MS
QTC - MUSE: 395 MS
R AXIS - MUSE: -12 DEGREES
RBC # BLD AUTO: 4.02 10E6/UL (ref 4.4–5.9)
SODIUM SERPL-SCNC: 142 MMOL/L (ref 136–145)
SYSTOLIC BLOOD PRESSURE - MUSE: NORMAL MMHG
T AXIS - MUSE: 28 DEGREES
TROPONIN I SERPL-MCNC: 0.01 NG/ML (ref 0–0.29)
TROPONIN I SERPL-MCNC: <0.01 NG/ML (ref 0–0.29)
VENTRICULAR RATE- MUSE: 68 BPM
WBC # BLD AUTO: 6.2 10E3/UL (ref 4–11)

## 2022-05-02 PROCEDURE — 93005 ELECTROCARDIOGRAM TRACING: CPT

## 2022-05-02 PROCEDURE — 36415 COLL VENOUS BLD VENIPUNCTURE: CPT | Performed by: INTERNAL MEDICINE

## 2022-05-02 PROCEDURE — 120N000001 HC R&B MED SURG/OB

## 2022-05-02 PROCEDURE — 250N000013 HC RX MED GY IP 250 OP 250 PS 637: Performed by: INTERNAL MEDICINE

## 2022-05-02 PROCEDURE — 999N000157 HC STATISTIC RCP TIME EA 10 MIN

## 2022-05-02 PROCEDURE — 93010 ELECTROCARDIOGRAM REPORT: CPT | Mod: HIP | Performed by: INTERNAL MEDICINE

## 2022-05-02 PROCEDURE — 97535 SELF CARE MNGMENT TRAINING: CPT | Mod: GO

## 2022-05-02 PROCEDURE — 250N000011 HC RX IP 250 OP 636: Performed by: INTERNAL MEDICINE

## 2022-05-02 PROCEDURE — 97116 GAIT TRAINING THERAPY: CPT | Mod: GP

## 2022-05-02 PROCEDURE — 83735 ASSAY OF MAGNESIUM: CPT | Performed by: INTERNAL MEDICINE

## 2022-05-02 PROCEDURE — 93005 ELECTROCARDIOGRAM TRACING: CPT | Performed by: INTERNAL MEDICINE

## 2022-05-02 PROCEDURE — 85610 PROTHROMBIN TIME: CPT | Performed by: INTERNAL MEDICINE

## 2022-05-02 PROCEDURE — 97530 THERAPEUTIC ACTIVITIES: CPT | Mod: GP

## 2022-05-02 PROCEDURE — 84484 ASSAY OF TROPONIN QUANT: CPT | Performed by: INTERNAL MEDICINE

## 2022-05-02 PROCEDURE — 94660 CPAP INITIATION&MGMT: CPT

## 2022-05-02 PROCEDURE — 80053 COMPREHEN METABOLIC PANEL: CPT | Performed by: INTERNAL MEDICINE

## 2022-05-02 PROCEDURE — 97110 THERAPEUTIC EXERCISES: CPT | Mod: GP

## 2022-05-02 PROCEDURE — 85025 COMPLETE CBC W/AUTO DIFF WBC: CPT | Performed by: INTERNAL MEDICINE

## 2022-05-02 PROCEDURE — 99221 1ST HOSP IP/OBS SF/LOW 40: CPT | Performed by: INTERNAL MEDICINE

## 2022-05-02 PROCEDURE — 99232 SBSQ HOSP IP/OBS MODERATE 35: CPT | Performed by: INTERNAL MEDICINE

## 2022-05-02 RX ORDER — SULFAMETHOXAZOLE/TRIMETHOPRIM 800-160 MG
1 TABLET ORAL 2 TIMES DAILY
Status: DISCONTINUED | OUTPATIENT
Start: 2022-05-02 | End: 2022-05-04

## 2022-05-02 RX ORDER — WARFARIN SODIUM 5 MG/1
5 TABLET ORAL
Status: COMPLETED | OUTPATIENT
Start: 2022-05-02 | End: 2022-05-02

## 2022-05-02 RX ORDER — CALCIUM CARBONATE 500 MG/1
500 TABLET, CHEWABLE ORAL DAILY PRN
Status: DISCONTINUED | OUTPATIENT
Start: 2022-05-02 | End: 2022-05-02

## 2022-05-02 RX ADMIN — ATORVASTATIN CALCIUM 10 MG: 10 TABLET, FILM COATED ORAL at 21:37

## 2022-05-02 RX ADMIN — SULFAMETHOXAZOLE AND TRIMETHOPRIM 1 TABLET: 800; 160 TABLET ORAL at 13:52

## 2022-05-02 RX ADMIN — BRIMONIDINE TARTRATE 1 DROP: 2 SOLUTION/ DROPS OPHTHALMIC at 08:45

## 2022-05-02 RX ADMIN — SULFAMETHOXAZOLE AND TRIMETHOPRIM 1 TABLET: 800; 160 TABLET ORAL at 21:34

## 2022-05-02 RX ADMIN — INSULIN ASPART 1 UNITS: 100 INJECTION, SOLUTION INTRAVENOUS; SUBCUTANEOUS at 08:55

## 2022-05-02 RX ADMIN — LATANOPROSTENE BUNOD 1 DROP: 0.24 SOLUTION/ DROPS OPHTHALMIC at 21:41

## 2022-05-02 RX ADMIN — DORZOLAMIDE HYDROCHLORIDE AND TIMOLOL MALEATE 1 DROP: 20; 5 SOLUTION/ DROPS OPHTHALMIC at 20:29

## 2022-05-02 RX ADMIN — ARIPIPRAZOLE 2 MG: 2 TABLET ORAL at 21:38

## 2022-05-02 RX ADMIN — ASPIRIN 81 MG CHEWABLE TABLET 81 MG: 81 TABLET CHEWABLE at 08:44

## 2022-05-02 RX ADMIN — INSULIN ASPART 1 UNITS: 100 INJECTION, SOLUTION INTRAVENOUS; SUBCUTANEOUS at 17:18

## 2022-05-02 RX ADMIN — FLUVOXAMINE MALEATE 50 MG: 50 TABLET, COATED ORAL at 21:38

## 2022-05-02 RX ADMIN — BRIMONIDINE TARTRATE 1 DROP: 2 SOLUTION/ DROPS OPHTHALMIC at 20:47

## 2022-05-02 RX ADMIN — DORZOLAMIDE HYDROCHLORIDE AND TIMOLOL MALEATE 1 DROP: 20; 5 SOLUTION/ DROPS OPHTHALMIC at 08:45

## 2022-05-02 RX ADMIN — WARFARIN SODIUM 5 MG: 5 TABLET ORAL at 17:17

## 2022-05-02 RX ADMIN — INSULIN ASPART 1 UNITS: 100 INJECTION, SOLUTION INTRAVENOUS; SUBCUTANEOUS at 13:52

## 2022-05-02 RX ADMIN — CEFTRIAXONE SODIUM 1 G: 1 INJECTION, POWDER, FOR SOLUTION INTRAMUSCULAR; INTRAVENOUS at 13:52

## 2022-05-02 ASSESSMENT — ACTIVITIES OF DAILY LIVING (ADL)
ADLS_ACUITY_SCORE: 17
ADLS_ACUITY_SCORE: 15
ADLS_ACUITY_SCORE: 15
ADLS_ACUITY_SCORE: 17
ADLS_ACUITY_SCORE: 15
ADLS_ACUITY_SCORE: 17
ADLS_ACUITY_SCORE: 15
ADLS_ACUITY_SCORE: 17
ADLS_ACUITY_SCORE: 17
ADLS_ACUITY_SCORE: 15
ADLS_ACUITY_SCORE: 15
ADLS_ACUITY_SCORE: 17
ADLS_ACUITY_SCORE: 15
ADLS_ACUITY_SCORE: 15
ADLS_ACUITY_SCORE: 17
ADLS_ACUITY_SCORE: 15
ADLS_ACUITY_SCORE: 17
ADLS_ACUITY_SCORE: 15
ADLS_ACUITY_SCORE: 17

## 2022-05-02 NOTE — PROGRESS NOTES
Patient doesn't want to wear CPAP tonight. Hospital CPAP is at bedside and ready for use if patient decides to use tonight. RT will continue to monitor.

## 2022-05-02 NOTE — PLAN OF CARE
Problem: Plan of Care - These are the overarching goals to be used throughout the patient stay.    Goal: Optimal Comfort and Wellbeing  Outcome: Ongoing, Progressing  Pt continuing to get his strength back. Pt stating he is feeling much better and stronger. Continuing to promote activity. Up and ambulating 300 ft in the patten with SBA and walker.     TING HILL RN

## 2022-05-02 NOTE — CONSULTS
Consultation - Infectious Disease  [facility]  Rakesh Samuels,  1950, MRN [id number]    Admitting Dx: Urinary tract infection without hematuria, site unspecified [N39.0]  Sepsis, due to unspecified organism, unspecified whether acute organ dysfunction present (H) [A41.9]  UTI (urinary tract infection) [N39.0]    PCP: Michelet Restrepo, [phone]       ASSESSMENT   72 yo man with history of hyperlipidemia, afib, dm, hypertension admitted with weakness and concern for UTI.    1. UTI. Weakness without other symptoms. No dysuria. CT a month ago with stranding around the right atrophic kidney, but resolved now. UA with nitrites and white blood cells. Urine culture with Staph haemolyticus. No recent instrumentation. Blood culture negative to date    Active Problems:    Urinary tract infection without hematuria, site unspecified    Sepsis, due to unspecified organism, unspecified whether acute organ dysfunction present (H)    UTI (urinary tract infection)       PLAN   -f/up on susceptibilities from urine  -start po TMP/SMX DS bid today, recommend completing 2 week course  -continue ceftriaxone pending susceptibilities  -close f/up with INR while on antibiotics    Thank you for this consult. Will follow.    Epifanio Mena MD  [facility]  Direct messaging: Valentin Uzhun Paging  On-Call ID provider: [phone], option: 9      ===========================================      Chief Complaint   <principal problem not specified>       HPI     We have been requested by Anisa Vasquez to evaluate Rakesh Samuels for the above.    History obtained by patient    Rakesh Samuels is a 71 year old man with history of hyperlipidemia, dm, hypertension, and a.fib, admitted with weakness. He called EMS due to difficulty getting up at home. No other symptoms. Denies fevers or dysuria. He was started on antibiotics for possible UTI based on UA and culture. He is feeling better today, but  still needs some assistance getting up.          Review of Systems   Ten systems reviewed and negative except for what is noted in the HPI         Medical History  Past Medical History:   Diagnosis Date     A-fib (H)      Acute hemodialysis encounter (H)     Due to CHIDI     Acute kidney injury (H)      Acute respiratory failure with hypoxemia (H)      Adrenal incidentaloma (H)      Asbestosis (H)      ATN (acute tubular necrosis) (H)      Atrophy of right kidney      Basal cell carcinoma      BPH without urinary obstruction      Cellulitis     Left foot     Cholelithiasis      Depression with anxiety      Diabetes mellitus, type 2 (H) 4/12/2020     Esophagitis      Gastritis      Glaucoma      Hematemesis, presence of nausea not specified      Hyperkalemia      Hyperlipemia      Kidney stone      Lactic acidosis      Metabolic acidosis      Metformin overdose of undetermined intent      Paroxysmal atrial fibrillation (H) 2/18/2020     Pericardial effusion      PTSD (post-traumatic stress disorder)      Seasonal allergies      Sepsis due to urinary tract infection (H)      Shock circulatory (H)      SIRS (systemic inflammatory response syndrome) (H)      Sleep apnea     uses a machine at night.      Upper GI bleed     Surgical History  He  has a past surgical history that includes CYSTOSCOPY,INSERT URETERAL STENT (Left, 4/12/2020); Pr Esophagogastroduodenoscopy Transoral Diagnostic (N/A, 4/13/2020); Eye surgery; and Replacement Total Knee (Left).     Social History  Reviewed, and he  reports that he has never smoked. He has never used smokeless tobacco. He reports previous alcohol use. He reports that he does not use drugs.  Social History     Social History Narrative     Not on file     Family History  family history includes Diabetes in his mother.  family history reviewed and is not pertinent to the presenting problem.            Allergies   No Known Allergies      Antibiotics   Ceftriaxone  4/30-    Previous:  Pipercillin/tazobactam 4/30      Physical Exam     Temp:  [97.5  F (36.4  C)-98.6  F (37  C)] 98.6  F (37  C)  Pulse:  [63-80] 68  Resp:  [18] 18  BP: (130-143)/(62-77) 143/65  SpO2:  [93 %-96 %] 94 %    BP (!) 143/65 (BP Location: Right arm)   Pulse 68   Temp 98.6  F (37  C) (Oral)   Resp 18   Ht 1.829 m (6')   Wt 109.3 kg (241 lb)   SpO2 94%   BMI 32.69 kg/m      GENERAL:  well-developed, well-nourished, sitting in bed in no acute distress.   HENT:  Head is normocephalic, atraumatic. Oropharynx is moist without exudates or ulcers. Right sided tonsolith with some surrounding swelling.  EYES:  Eyes have anicteric sclerae without conjunctival injection or stigmata of endocarditis.   NECK:  Supple.  LUNGS:  Clear to auscultation.  CARDIOVASCULAR:  Regular rate and rhythm with no murmurs, gallops or rubs.  ABDOMEN:  Normal bowel sounds, soft, nontender. No appreciable hepatosplenomegaly. No CVA tenderness  EXT: Extremities warm and without edema.  SKIN:  No acute rashes. No stigmata of endocarditis.  NEUROLOGIC:  Grossly nonfocal.      Cultures   4/30 urine culture: 50-100K S.haemolyticus  4/30 blood culture x 2: no growth to date       Laboratory results     Recent Labs   Lab 05/02/22  0713 05/01/22  0749 04/30/22  0304   WBC 6.2 6.0 11.3*   HGB 12.7* 12.3* 13.0*    205 236       Recent Labs   Lab 05/02/22  0713 05/01/22  0749 04/30/22  0304    143 141   CO2 25 23 21*   BUN 11 11 17   ALBUMIN 3.1* 2.9* 3.4*   ALKPHOS 79 75 94   ALT 12 <9 12   AST 13 10 9       No results for input(s): CRP in the last 168 hours.    Invalid input(s): SEDRATE        Imaging   Radiology results reviewed    XR Chest 1 View    Result Date: 4/30/2022  EXAM: XR CHEST 1 VIEW LOCATION: [facility] DATE/TIME: 4/30/2022 4:25 AM INDICATION: weakness COMPARISON: 06/30/2020     IMPRESSION: Given differences in positioning there've been no significant changes, specifically there are no  acute cardiopulmonary abnormalities identified.    CT Abdomen Pelvis w Contrast    Result Date: 4/30/2022  EXAM: CT ABDOMEN PELVIS W CONTRAST LOCATION: [facility] DATE/TIME: 4/30/2022 4:19 AM INDICATION: Sepsis with loose stools COMPARISON: 03/27/2021 TECHNIQUE: CT scan of the abdomen and pelvis was performed following injection of IV contrast. Multiplanar reformats were obtained. Dose reduction techniques were used. CONTRAST: isovue 370 100ml FINDINGS: LOWER CHEST: No acute abnormalities. HEPATOBILIARY: The gallbladder is mildly contracted and contains numerous stones, but is otherwise normal with no gallbladder wall thickening or pericholecystic fluid. The liver and bile ducts are normal. PANCREAS: Normal. SPLEEN: Normal. ADRENAL GLANDS: There is a stable benign adenoma involving the right adrenal gland measuring 1.0 cm x 1.2 cm. There is benign enlargement of the left adrenal gland to include underlying changes of a myelolipoma. KIDNEYS/BLADDER: There is significant atrophy seen of the right kidney. The left kidney. There is a 3 nonobstructive stone seen in the right kidney with the largest measuring approximate 4.5 mm x 4 mm. Normal variant prominent left extrarenal pelvis. Minute simple cyst left kidney with no follow-up needed. The kidneys, ureters, bladder are otherwise normal. BOWEL: There are mild findings of diverticulosis with no evidence for diverticulitis. LYMPH NODES: Normal. VASCULATURE: Mild calcification. PELVIC ORGANS: Normal. MUSCULOSKELETAL: Mild to moderate scattered hypertrophic changes of the spine most marked in the facet joints.     IMPRESSION: 1.  Since prior CT 03/27/2021 the abnormal stranding surrounding the atrophic right kidney has resolved, otherwise there've been no other significant changes. There are no acute abnormalities on this study to explain patient's findings clinically. 2.  Cholelithiasis with no cholecystitis. 3.  Benign adenoma and myelolipoma in  the adrenal glands. 4.  Atrophic right kidney. 5.  Mild findings of diverticulosis. 6.  Nonobstructive nephrolithiasis.      Data reviewed today: I reviewed all medications, new labs and imaging results over the last 24 hours. I personally reviewed the abdominal CT image(s) showing right atrophic kidney.  The patient's care was discussed with the Bedside Nurse and Patient.

## 2022-05-02 NOTE — PLAN OF CARE
Occupational Therapy Discharge Summary    Reason for therapy discharge:    All goals and outcomes met, no further needs identified.    Progress towards therapy goal(s). See goals on Care Plan in [facility] electronic health record for goal details.  Goals met    Therapy recommendation(s):    Recommend home wth assist for higher levl ADLs

## 2022-05-02 NOTE — PROGRESS NOTES
[facility]    PROGRESS NOTE - Hospitalist Service    ASSESSMENT AND PLAN     Active Problems:    Urinary tract infection without hematuria, site unspecified    Sepsis, due to unspecified organism, unspecified whether acute organ dysfunction present (H)    UTI (urinary tract infection)    Rakesh Samuels is a 71 year old male with h/o gastritis and GI bleed, pyelonephritis, HLD, A. fib on warfarin, diabetes type 2, RADHA, atrophic right kidney, HTN, nephrolithiasis, depression with anxiety who presents with generlized weakness from his independent senior living facility.       Urinary tract infection              Follow-up urine culture-with staph haemolyticus              Received dose of Zosyn and ceftriaxone on 4/30.    ID recs: Continue ceftriaxone. Add bactrim               Review of chart reveals prior pyelonephritis in 2021 which grew mixed organisms              CT abdomen with no evidence of pyelonephritis.  Atrophic right kidney.  Nonobstructive nephrolithiasis.  Cholelithiasis with no cholecystitis.  Again noted benign adenoma of adrenal glands.              PT/OT    Chest pain 5/2- resolved    Troponin negative x2.  EKG with no acute findings.     Lactic acidosis     Chronic atrial fibrillation on warfarin              Currently rate controlled                       Continue warfarin. Monitor INR on abx      Hypomagnesemia-replace per protocol     Known GERD with history of GI bleed              Continue PPI     HLD-continue home med     Diabetes type 2              Diabetes order set              Last A1c of 5.9 on 6/30/2020     HTN-well-controlled              Continue home meds     RADHA-CPAP     Mood disorder with depression anxiety     Code Status: DNR/DNI  DVT prophylaxis: Warfarin     Barriers to discharge: Improvement in weakness, IV antibiotics, urine culture, ID recs      Anticipated length of stay: 1- 2 more days and then return to independent senior  living    Subjective:  Patient notes  he is starting to feel stronger but is not quite strong enough to leave the hospital at this time.  No other complaints today.    PHYSICAL EXAM  Temp:  [97.5  F (36.4  C)-98.6  F (37  C)] 98.4  F (36.9  C)  Pulse:  [63-80] 80  Resp:  [18] 18  BP: (130-143)/(62-77) 132/77  SpO2:  [93 %-98 %] 98 %  Wt Readings from Last 1 Encounters:   04/30/22 109.3 kg (241 lb)       Intake/Output Summary (Last 24 hours) at 5/2/2022 1326  Last data filed at 5/2/2022 0620  Gross per 24 hour   Intake 280 ml   Output 1450 ml   Net -1170 ml      Body mass index is 32.69 kg/m .    GENRL: Alert and answering questions appropriately. Not in acute distress. Lying in bed   CHEST: Clear to auscultation bilaterally. No wheezes, rhonchi or crackles. Breathing easily   HEART: Irregular rhythm.  Rate controlled.  No murmur.     ABDMN: Soft. Non-tender, non-distended. No organomegaly. No guarding or rigidity. Bowel sounds present   EXTRM: No pedal edema, DP pulses 2+.   NEURO: Cranial nerves II-XII grossly intact. No focal neurological deficit. No involuntary movements. Normal mentation  PSYCH: Normal affect and mood.   INTGM: No skin rash, no cyanosis or clubbing    PERTINENT LABS/IMAGING:  Results for orders placed or performed during the hospital encounter of 04/30/22   CT Abdomen Pelvis w Contrast    Impression    IMPRESSION:   1.  Since prior CT 03/27/2021 the abnormal stranding surrounding the atrophic right kidney has resolved, otherwise there've been no other significant changes. There are no acute abnormalities on this study to explain patient's findings clinically.    2.  Cholelithiasis with no cholecystitis.    3.  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    4.  Atrophic right kidney.    5.  Mild findings of diverticulosis.    6.  Nonobstructive nephrolithiasis.   XR Chest 1 View    Impression    IMPRESSION: Given differences in positioning there've been no significant changes,  specifically there are no acute cardiopulmonary abnormalities identified.     Most Recent 3 CBC's:Recent Labs   Lab Test 05/02/22  0713 05/01/22  0749 04/30/22  0304   WBC 6.2 6.0 11.3*   HGB 12.7* 12.3* 13.0*   MCV 99 100 99    205 236       Recent Labs   Lab Test 06/10/21  1145   CHOL 154   HDL 50   LDL 90   TRIG 72     Recent Labs   Lab Test 06/10/21  1145 11/18/19  1358 10/29/18  1348   LDL 90 73 64     Recent Labs   Lab Test 05/02/22  1308 05/02/22  0850 05/02/22  0713   NA  --   --  142   POTASSIUM  --   --  4.2   CHLORIDE  --   --  109*   CO2  --   --  25   *   < > 133*   BUN  --   --  11   CR  --   --  0.89   GFRESTIMATED  --   --  >90   APRYL  --   --  8.6    < > = values in this interval not displayed.     Recent Labs   Lab Test 04/30/22  0304 06/30/20  0823 09/09/19  0617   A1C 6.7* 5.9* 7.4*     Recent Labs   Lab Test 05/02/22  0713 05/01/22  0749 04/30/22  0304   HGB 12.7* 12.3* 13.0*     Recent Labs   Lab Test 05/02/22  0935 05/02/22  0713 04/30/22  0304   TROPONINI <0.01 0.01 <0.01     Recent Labs   Lab Test 04/30/22  0304   BNP 53     Recent Labs   Lab Test 04/30/22  0304   TSH 3.68     Recent Labs   Lab Test 05/02/22  0713 05/01/22  0749 04/30/22  0312   INR 1.76* 1.68* 1.58*       Anisa Vasquez,   [facility]  [phone]

## 2022-05-02 NOTE — PLAN OF CARE
Goal: Absence of Hospital-Acquired Illness or Injury  Outcome: Ongoing, Progressing  Intervention: Identify and Manage Fall Risk  Recent Flowsheet Documentation  Taken 5/1/2022 1730 by Carloz Santamaria RN  Safety Promotion/Fall Prevention:   activity supervised   bed alarm on   clutter free environment maintained   fall prevention program maintained   lighting adjusted   nonskid shoes/slippers when out of bed   patient and family education   room door open   safety round/check completed  Intervention: Prevent and Manage VTE (Venous Thromboembolism) Risk  Recent Flowsheet Documentation  Taken 5/1/2022 2110 by Carloz Santamaria, RN  Activity Management: activity adjusted per tolerance  Goal: Optimal Comfort and Wellbeing  Outcome: Ongoing, Progressing     Problem: Risk for Delirium  Goal: Optimal Coping  Outcome: Ongoing, Progressing  Goal: Improved Behavioral Control  Outcome: Ongoing, Progressing  Goal: Improved Attention and Thought Clarity  Outcome: Ongoing, Progressing  Goal: Improved Sleep  Outcome: Ongoing, Progressing     Pt denies pain or nausea. Tolerating diet. External catheter in place. Mag and K protocol - rechecks in AM.

## 2022-05-02 NOTE — PLAN OF CARE
Problem: Risk for Delirium  Goal: Improved Sleep  Outcome: Ongoing, Progressing    Goal: Optimal Comfort and Wellbeing  Outcome: Ongoing, Progressing    Pt slept well. Did not want CPAP on tonight. Denies pain, nausea. Vitals stable. K+ and mag protocols, re-check this am.

## 2022-05-03 ENCOUNTER — APPOINTMENT (OUTPATIENT)
Dept: PHYSICAL THERAPY | Facility: HOSPITAL | Age: 72
DRG: 872 | End: 2022-05-03
Payer: MEDICARE

## 2022-05-03 PROBLEM — E78.5 HYPERLIPIDEMIA: Status: ACTIVE | Noted: 2018-06-07

## 2022-05-03 PROBLEM — E11.9 TYPE 2 DIABETES MELLITUS WITHOUT COMPLICATION, WITHOUT LONG-TERM CURRENT USE OF INSULIN (H): Status: ACTIVE | Noted: 2020-04-12

## 2022-05-03 PROBLEM — I48.19 PERSISTENT ATRIAL FIBRILLATION (H): Status: ACTIVE | Noted: 2021-03-27

## 2022-05-03 LAB
ATRIAL RATE - MUSE: 97 BPM
DIASTOLIC BLOOD PRESSURE - MUSE: NORMAL MMHG
GLUCOSE BLDC GLUCOMTR-MCNC: 121 MG/DL (ref 70–99)
GLUCOSE BLDC GLUCOMTR-MCNC: 123 MG/DL (ref 70–99)
GLUCOSE BLDC GLUCOMTR-MCNC: 148 MG/DL (ref 70–99)
GLUCOSE BLDC GLUCOMTR-MCNC: 235 MG/DL (ref 70–99)
HOLD SPECIMEN: NORMAL
INR PPP: 2.06 (ref 0.85–1.15)
INTERPRETATION ECG - MUSE: NORMAL
MAGNESIUM SERPL-MCNC: 1.9 MG/DL (ref 1.8–2.6)
P AXIS - MUSE: 32 DEGREES
POTASSIUM BLD-SCNC: 4.1 MMOL/L (ref 3.5–5)
PR INTERVAL - MUSE: 148 MS
QRS DURATION - MUSE: 78 MS
QT - MUSE: 392 MS
QTC - MUSE: 420 MS
R AXIS - MUSE: 36 DEGREES
SYSTOLIC BLOOD PRESSURE - MUSE: NORMAL MMHG
T AXIS - MUSE: 54 DEGREES
TROPONIN I SERPL-MCNC: <0.01 NG/ML (ref 0–0.29)
VENTRICULAR RATE- MUSE: 69 BPM

## 2022-05-03 PROCEDURE — 250N000013 HC RX MED GY IP 250 OP 250 PS 637: Performed by: INTERNAL MEDICINE

## 2022-05-03 PROCEDURE — 93005 ELECTROCARDIOGRAM TRACING: CPT | Performed by: INTERNAL MEDICINE

## 2022-05-03 PROCEDURE — 99232 SBSQ HOSP IP/OBS MODERATE 35: CPT | Performed by: INTERNAL MEDICINE

## 2022-05-03 PROCEDURE — 83735 ASSAY OF MAGNESIUM: CPT | Performed by: INTERNAL MEDICINE

## 2022-05-03 PROCEDURE — 36415 COLL VENOUS BLD VENIPUNCTURE: CPT | Performed by: INTERNAL MEDICINE

## 2022-05-03 PROCEDURE — 85610 PROTHROMBIN TIME: CPT | Performed by: INTERNAL MEDICINE

## 2022-05-03 PROCEDURE — 97116 GAIT TRAINING THERAPY: CPT | Mod: GP

## 2022-05-03 PROCEDURE — 93010 ELECTROCARDIOGRAM REPORT: CPT | Mod: HIP | Performed by: GENERAL ACUTE CARE HOSPITAL

## 2022-05-03 PROCEDURE — 84132 ASSAY OF SERUM POTASSIUM: CPT | Performed by: INTERNAL MEDICINE

## 2022-05-03 PROCEDURE — 250N000011 HC RX IP 250 OP 636: Performed by: INTERNAL MEDICINE

## 2022-05-03 PROCEDURE — 999N000215 HC STATISTIC HFNC ADULT NON-CPAP

## 2022-05-03 PROCEDURE — 84484 ASSAY OF TROPONIN QUANT: CPT | Performed by: INTERNAL MEDICINE

## 2022-05-03 PROCEDURE — 120N000001 HC R&B MED SURG/OB

## 2022-05-03 PROCEDURE — 93005 ELECTROCARDIOGRAM TRACING: CPT

## 2022-05-03 PROCEDURE — 97110 THERAPEUTIC EXERCISES: CPT | Mod: GP

## 2022-05-03 RX ORDER — WARFARIN SODIUM 2.5 MG/1
2.5 TABLET ORAL
Status: COMPLETED | OUTPATIENT
Start: 2022-05-03 | End: 2022-05-03

## 2022-05-03 RX ADMIN — DORZOLAMIDE HYDROCHLORIDE AND TIMOLOL MALEATE 1 DROP: 20; 5 SOLUTION/ DROPS OPHTHALMIC at 21:37

## 2022-05-03 RX ADMIN — DORZOLAMIDE HYDROCHLORIDE AND TIMOLOL MALEATE 1 DROP: 20; 5 SOLUTION/ DROPS OPHTHALMIC at 09:33

## 2022-05-03 RX ADMIN — WARFARIN SODIUM 2.5 MG: 2.5 TABLET ORAL at 17:36

## 2022-05-03 RX ADMIN — LATANOPROSTENE BUNOD 1 DROP: 0.24 SOLUTION/ DROPS OPHTHALMIC at 21:39

## 2022-05-03 RX ADMIN — INSULIN ASPART 1 UNITS: 100 INJECTION, SOLUTION INTRAVENOUS; SUBCUTANEOUS at 09:34

## 2022-05-03 RX ADMIN — FLUVOXAMINE MALEATE 50 MG: 50 TABLET, COATED ORAL at 21:46

## 2022-05-03 RX ADMIN — ASPIRIN 81 MG CHEWABLE TABLET 81 MG: 81 TABLET CHEWABLE at 09:32

## 2022-05-03 RX ADMIN — ARIPIPRAZOLE 2 MG: 2 TABLET ORAL at 21:44

## 2022-05-03 RX ADMIN — SULFAMETHOXAZOLE AND TRIMETHOPRIM 1 TABLET: 800; 160 TABLET ORAL at 09:35

## 2022-05-03 RX ADMIN — BRIMONIDINE TARTRATE 1 DROP: 2 SOLUTION/ DROPS OPHTHALMIC at 09:33

## 2022-05-03 RX ADMIN — BRIMONIDINE TARTRATE 1 DROP: 2 SOLUTION/ DROPS OPHTHALMIC at 21:37

## 2022-05-03 RX ADMIN — INSULIN ASPART 1 UNITS: 100 INJECTION, SOLUTION INTRAVENOUS; SUBCUTANEOUS at 13:17

## 2022-05-03 RX ADMIN — ATORVASTATIN CALCIUM 10 MG: 10 TABLET, FILM COATED ORAL at 21:33

## 2022-05-03 RX ADMIN — SULFAMETHOXAZOLE AND TRIMETHOPRIM 1 TABLET: 800; 160 TABLET ORAL at 21:33

## 2022-05-03 RX ADMIN — CEFTRIAXONE SODIUM 1 G: 1 INJECTION, POWDER, FOR SOLUTION INTRAMUSCULAR; INTRAVENOUS at 14:44

## 2022-05-03 ASSESSMENT — ACTIVITIES OF DAILY LIVING (ADL)
ADLS_ACUITY_SCORE: 21
ADLS_ACUITY_SCORE: 19
ADLS_ACUITY_SCORE: 21
ADLS_ACUITY_SCORE: 17
ADLS_ACUITY_SCORE: 21
ADLS_ACUITY_SCORE: 17
ADLS_ACUITY_SCORE: 17
ADLS_ACUITY_SCORE: 21
ADLS_ACUITY_SCORE: 17
ADLS_ACUITY_SCORE: 17
ADLS_ACUITY_SCORE: 21
ADLS_ACUITY_SCORE: 17
ADLS_ACUITY_SCORE: 21
ADLS_ACUITY_SCORE: 17

## 2022-05-03 NOTE — PLAN OF CARE
Problem: Plan of Care - These are the overarching goals to be used throughout the patient stay.    Goal: Plan of Care Review/Shift Note  Description: The Plan of Care Review/Shift note should be completed every shift.  The Outcome Evaluation is a brief statement about your assessment that the patient is improving, declining, or no change.  This information will be displayed automatically on your shift note.  Outcome: Ongoing, Progressing   Goal Outcome Evaluation:        Moving up to chair a little better. Continue with therapy, assess for best discharge plan.  Problem: Plan of Care - These are the overarching goals to be used throughout the patient stay.    Goal: Optimal Comfort and Wellbeing  Outcome: Ongoing, Progressing        Denies any pain. Fatigues  easily.

## 2022-05-03 NOTE — PROGRESS NOTES
Infectious Diseases Progress Note  [facility]    Date of visit: 05/03/2022       ASSESSMENT   70 yo man with history of hyperlipidemia, afib, dm, hypertension admitted with weakness and concern for UTI.    1. UTI. Weakness without other symptoms. No dysuria. CT a month ago with stranding around the right atrophic kidney, but resolved now. UA with nitrites and white blood cells. Urine culture with Staph haemolyticus. No recent instrumentation. Blood culture negative to date    Active Problems:    Urinary tract infection without hematuria, site unspecified    Sepsis, due to unspecified organism, unspecified whether acute organ dysfunction present (H)    UTI (urinary tract infection)       PLAN   -f/up on susceptibilities from urine  -continue TMP/SMX DS bid, recommend completing 2 week course  -continue ceftriaxone pending susceptibilities  -close f/up with INR while on antibiotics  -I'm not sure infection entirely explains weakness. Additional work-up per primary team      Epifanio Mena MD  [facility]  Direct messaging: [employer] Paging  On-Call ID provider: [phone], option: 9      ===========================================      SUBJECTIVE / INTERVAL HISTORY:     No events. Feels weaker than yesterday. Difficulty getting out of bed. Hard time getting arms up for eating. No dysuria or back pain.      Review of Systems     No fevers, no rashes           Antibiotics   Ceftriaxone 4/30-  TMP/SMX 5/2-    Previous:  Pipercillin/tazobactam 4/30      Physical Exam     Temp:  [98.2  F (36.8  C)-98.5  F (36.9  C)] 98.2  F (36.8  C)  Pulse:  [74-80] 74  Resp:  [18-20] 18  BP: (125-132)/(62-77) 129/62  SpO2:  [93 %-98 %] 93 %    /62 (BP Location: Left arm)   Pulse 74   Temp 98.2  F (36.8  C) (Oral)   Resp 18   Ht 1.829 m (6')   Wt 106.1 kg (234 lb)   SpO2 93%   BMI 31.74 kg/m      GENERAL:  well-developed, well-nourished, sitting in chair in no acute distress.   HENT:  Head  is normocephalic, atraumatic.  exudates or ulcers. Right sided tonsolith with some surrounding swelling.  EYES:  Right conjunctival injection, chronic  LUNGS:  Clear to auscultation.  CARDIOVASCULAR:  Regular rate and rhythm with no murmurs, gallops or rubs.  ABDOMEN:  Normal bowel sounds, soft, nontender.   EXT: Extremities warm and without edema.  SKIN:  No acute rashes.   NEUROLOGIC:  Grossly nonfocal.      Cultures   4/30 urine culture: 50-100K S.haemolyticus  4/30 blood culture x 2: no growth to date       Pertinent Labs:     Recent Labs   Lab 05/02/22  0713 05/01/22  0749 04/30/22  0304   WBC 6.2 6.0 11.3*   HGB 12.7* 12.3* 13.0*    205 236       Recent Labs   Lab 05/02/22  0713 05/01/22  0749 04/30/22  0304    143 141   CO2 25 23 21*   BUN 11 11 17   ALBUMIN 3.1* 2.9* 3.4*   ALKPHOS 79 75 94   ALT 12 <9 12   AST 13 10 9       No results for input(s): CRP in the last 168 hours.    Invalid input(s): SEDRATE        Imaging:     No results found.      Data reviewed today: I reviewed all medications, new labs and imaging results over the last 24 hours. I personally reviewed no images or EKG's today.  The patient's care was discussed with the Bedside Nurse and Patient.

## 2022-05-03 NOTE — PLAN OF CARE
Problem: Plan of Care - These are the overarching goals to be used throughout the patient stay.    Goal: Optimal Comfort and Wellbeing  Outcome: Ongoing, Progressing   Patient denied pain. Up with walker and assist of 1. Denies burning or urinary frequency.    Problem: Hyperglycemia  Goal: Blood Glucose Level Within Targeted Range  Outcome: Ongoing, Progressing   Blood sugar before dinner requiring 1 unit of insulin.     Goal Outcome Evaluation:

## 2022-05-03 NOTE — PROGRESS NOTES
[facility]    Medicine Progress Note - Hospitalist Service    Date of Admission:  4/30/2022    Assessment & Plan          Rakesh Samuels is a 71 year old male with h/o gastritis and GI bleed, pyelonephritis, HLD, A. fib on warfarin, diabetes type 2, RADHA, HTN, nephrolithiasis, depression with anxiety who presents with generlized weakness from his independent senior living facility.  He was found to have UTI.     Urinary tract infection, sepsis: with leukocytosis and lactic acidosis on admission. Now resolved. CT abdomen with no evidence of pyelonephritis.  Atrophic right kidney.  Nonobstructive nephrolithiasis.  Urine culture with staph haemolyticus.   - Received dose of Zosyn and ceftriaxone on 4/30. Per ID: Continue ceftriaxone. Add bactrim. Follow up senstivity result.      Chest pain: developed acute chest pain on 5/2. Now resolved .Troponin negative x2.  EKG with no acute findings.     Chronic atrial fibrillation on warfarin: Currently rate controlled. Continue warfarin. Monitor INR on abx      Hypomagnesemia: replace per protocol     GERD with history of GI bleed: Continue PPI     HLD: continue home med     Diabetes type 2: Last A1c of 5.9 on 6/30/2020. Hold PTA metformin and actos. Diabetes diet and insulin sliding scale.            HTN: BP controlled.      RADHA: CPAP     Mood disorder with depression anxiety: Continue home medication       Diet: Consistent Carbohydrate Diet Moderate Consistent Carb (60 g CHO per Meal) Diet  Room Service    DVT Prophylaxis: Warfarin  Olsen Catheter: Not present  Central Lines: None  Cardiac Monitoring: None  Code Status: No CPR- Do NOT Intubate      Disposition Plan   Expected Discharge: 05/04/2022     Anticipated discharge location: home with help/services           The patient's care was discussed with the Bedside Nurse, Care Coordinator/ and Patient.    Marialuisa Fonseca MD  Hospitalist Service  [facility]  Securely  message with the Vocera Web Console (learn more here)  Text page via [employer] Paging/Directory     ______________________________________________________________________    Interval History   Patient reports that he continues to have frequent urination.He goes to the bathroom almost every one half and a half. No burning and no abdominal pain. He also feels karlee weak. He lives alone. He worries that he is not able to take care of himself at home.     Data reviewed today: I reviewed all medications, new labs and imaging results over the last 24 hours.    Physical Exam   Vital Signs: Temp: 98.2  F (36.8  C) Temp src: Oral BP: 129/62 Pulse: 74   Resp: 18 SpO2: 93 % O2 Device: None (Room air)    Weight: 234 lbs 0 oz    General appearance: not in acute distress  HEENT: PERRL, EOMI  Lungs: Clear breath sounds in bilateral lung fields  Cardiovascular: Regular rate and rhythm, normal S1-S2  Abdomen: Soft, non tender, no distension  Musculoskeletal: No joint swelling  Skin: No rash and no edema  Neurology: AAO ×3.  Cranial nerves II - XII normal.  Normal muscle strength in all four extremities.    Data   Recent Labs   Lab 05/03/22  1229 05/03/22  0922 05/03/22  0644 05/02/22  2110 05/02/22  0850 05/02/22  0713 05/01/22  0758 05/01/22  0749 04/30/22  0312 04/30/22  0304   WBC  --   --   --   --   --  6.2  --  6.0  --  11.3*   HGB  --   --   --   --   --  12.7*  --  12.3*  --  13.0*   MCV  --   --   --   --   --  99  --  100  --  99   PLT  --   --   --   --   --  208  --  205  --  236   INR  --   --  2.06*  --   --  1.76*  --  1.68*   < >  --    NA  --   --   --   --   --  142  --  143  --  141   POTASSIUM  --   --  4.1  --   --  4.2  --  4.2  --  4.0   CHLORIDE  --   --   --   --   --  109*  --  112*  --  107   CO2  --   --   --   --   --  25  --  23  --  21*   BUN  --   --   --   --   --  11  --  11  --  17   CR  --   --   --   --   --  0.89  --  0.77  --  0.95   ANIONGAP  --   --   --   --   --  8  --  8  --  13   APRYL  --   --    --   --   --  8.6  --  8.1*  --  8.8   * 148*  --  124*   < > 133*   < > 126*   < > 153*   ALBUMIN  --   --   --   --   --  3.1*  --  2.9*  --  3.4*   PROTTOTAL  --   --   --   --   --  6.1  --  5.7*  --  6.6   BILITOTAL  --   --   --   --   --  0.5  --  0.4  --  0.5   ALKPHOS  --   --   --   --   --  79  --  75  --  94   ALT  --   --   --   --   --  12  --  <9  --  12   AST  --   --   --   --   --  13  --  10  --  9   LIPASE  --   --   --   --   --   --   --   --   --  32    < > = values in this interval not displayed.

## 2022-05-04 ENCOUNTER — APPOINTMENT (OUTPATIENT)
Dept: PHYSICAL THERAPY | Facility: HOSPITAL | Age: 72
DRG: 872 | End: 2022-05-04
Payer: MEDICARE

## 2022-05-04 LAB
BACTERIA UR CULT: ABNORMAL
GLUCOSE BLDC GLUCOMTR-MCNC: 107 MG/DL (ref 70–99)
GLUCOSE BLDC GLUCOMTR-MCNC: 119 MG/DL (ref 70–99)
GLUCOSE BLDC GLUCOMTR-MCNC: 122 MG/DL (ref 70–99)
GLUCOSE BLDC GLUCOMTR-MCNC: 136 MG/DL (ref 70–99)
GLUCOSE BLDC GLUCOMTR-MCNC: 154 MG/DL (ref 70–99)
INR PPP: 2.17 (ref 0.85–1.15)
MAGNESIUM SERPL-MCNC: 1.9 MG/DL (ref 1.8–2.6)
POTASSIUM BLD-SCNC: 4.4 MMOL/L (ref 3.5–5)

## 2022-05-04 PROCEDURE — 97530 THERAPEUTIC ACTIVITIES: CPT | Mod: GP

## 2022-05-04 PROCEDURE — 250N000013 HC RX MED GY IP 250 OP 250 PS 637: Performed by: INTERNAL MEDICINE

## 2022-05-04 PROCEDURE — 84132 ASSAY OF SERUM POTASSIUM: CPT | Performed by: INTERNAL MEDICINE

## 2022-05-04 PROCEDURE — 85610 PROTHROMBIN TIME: CPT | Performed by: INTERNAL MEDICINE

## 2022-05-04 PROCEDURE — 120N000001 HC R&B MED SURG/OB

## 2022-05-04 PROCEDURE — 99232 SBSQ HOSP IP/OBS MODERATE 35: CPT | Performed by: INTERNAL MEDICINE

## 2022-05-04 PROCEDURE — 97116 GAIT TRAINING THERAPY: CPT | Mod: GP

## 2022-05-04 PROCEDURE — 83735 ASSAY OF MAGNESIUM: CPT | Performed by: INTERNAL MEDICINE

## 2022-05-04 PROCEDURE — 36415 COLL VENOUS BLD VENIPUNCTURE: CPT | Performed by: INTERNAL MEDICINE

## 2022-05-04 RX ORDER — DOXYCYCLINE 100 MG/1
100 CAPSULE ORAL EVERY 12 HOURS SCHEDULED
Status: DISCONTINUED | OUTPATIENT
Start: 2022-05-04 | End: 2022-05-05 | Stop reason: HOSPADM

## 2022-05-04 RX ORDER — WARFARIN SODIUM 2.5 MG/1
2.5 TABLET ORAL
Status: COMPLETED | OUTPATIENT
Start: 2022-05-04 | End: 2022-05-04

## 2022-05-04 RX ADMIN — LATANOPROSTENE BUNOD 1 DROP: 0.24 SOLUTION/ DROPS OPHTHALMIC at 21:59

## 2022-05-04 RX ADMIN — BRIMONIDINE TARTRATE 1 DROP: 2 SOLUTION/ DROPS OPHTHALMIC at 09:41

## 2022-05-04 RX ADMIN — DOXYCYCLINE 100 MG: 100 CAPSULE ORAL at 19:55

## 2022-05-04 RX ADMIN — ARIPIPRAZOLE 2 MG: 2 TABLET ORAL at 21:20

## 2022-05-04 RX ADMIN — INSULIN ASPART 1 UNITS: 100 INJECTION, SOLUTION INTRAVENOUS; SUBCUTANEOUS at 13:18

## 2022-05-04 RX ADMIN — DORZOLAMIDE HYDROCHLORIDE AND TIMOLOL MALEATE 1 DROP: 20; 5 SOLUTION/ DROPS OPHTHALMIC at 09:40

## 2022-05-04 RX ADMIN — ATORVASTATIN CALCIUM 10 MG: 10 TABLET, FILM COATED ORAL at 21:19

## 2022-05-04 RX ADMIN — DORZOLAMIDE HYDROCHLORIDE AND TIMOLOL MALEATE 1 DROP: 20; 5 SOLUTION/ DROPS OPHTHALMIC at 19:56

## 2022-05-04 RX ADMIN — BRIMONIDINE TARTRATE 1 DROP: 2 SOLUTION/ DROPS OPHTHALMIC at 19:58

## 2022-05-04 RX ADMIN — SULFAMETHOXAZOLE AND TRIMETHOPRIM 1 TABLET: 800; 160 TABLET ORAL at 09:42

## 2022-05-04 RX ADMIN — ASPIRIN 81 MG CHEWABLE TABLET 81 MG: 81 TABLET CHEWABLE at 09:41

## 2022-05-04 RX ADMIN — WARFARIN SODIUM 2.5 MG: 2.5 TABLET ORAL at 17:28

## 2022-05-04 RX ADMIN — FLUVOXAMINE MALEATE 50 MG: 50 TABLET, COATED ORAL at 21:20

## 2022-05-04 ASSESSMENT — ACTIVITIES OF DAILY LIVING (ADL)
ADLS_ACUITY_SCORE: 21
ADLS_ACUITY_SCORE: 22
ADLS_ACUITY_SCORE: 21
ADLS_ACUITY_SCORE: 21
ADLS_ACUITY_SCORE: 22
ADLS_ACUITY_SCORE: 21
ADLS_ACUITY_SCORE: 22
ADLS_ACUITY_SCORE: 21
ADLS_ACUITY_SCORE: 22
ADLS_ACUITY_SCORE: 22
ADLS_ACUITY_SCORE: 19
ADLS_ACUITY_SCORE: 19
ADLS_ACUITY_SCORE: 22

## 2022-05-04 NOTE — CONSULTS
[facility] CON[facility]ULT[facility]    Type of Consult: inpatient  Place of Service: [facility]   Reason for consult: Urinary frequency and UTI  Request for consult by: Dr. Marialuisa Fonseca    History of present illness:   Rakesh Samuels is a 71 year old male with h/o gastritis and GI bleed, pyelonephritis, HLD, A. fib on warfarin, diabetes type 2, RADHA, HTN, nephrolithiasis, depression with anxiety who presents with generlized weakness from his independent senior living facility.  He was found to have UTI.     Urinary tract infection, sepsis: with leukocytosis and lactic acidosis on admission. Now resolved. CT abdomen with no evidence of pyelonephritis.  Atrophic right kidney.  Nonobstructive nephrolithiasis.  Urine culture with staph haemolyticus.   - Received dose of Zosyn and ceftriaxone on 4/30, which was then switched to ceftriaxone and bactrim. Change to doxycycline today based on culture senstivity result.   - ID input appreciated     Frequent urination: Patient reports that he continues to have urination every one hour and a half, which has not improved despite his UTI was treated. Likely due to BPH.    He has no prior history of BPH or urinary retention.  No slowing of the stream.  Normally he voids every 4-6 hours with nocturia x1 but now he is voiding about every 1-2 hours day and night.  He has had slight dysuria.  No history of urinary retention or incontinence.  Urgency is not very severe.  Patient states that since being treated for his UTI his voiding symptoms have improved a bit.  He has never required any medication for prostate symptoms.    Does have a prior history of kidney stones resulting in chronic atrophy of the right kidney.          Past Medical History:  Past Medical History:   Diagnosis Date     A-fib (H)      Acute hemodialysis encounter (H)     Due to CHIDI     Acute kidney injury (H)      Acute respiratory failure with hypoxemia (H)      Adrenal incidentaloma (H)      Asbestosis (H)       ATN (acute tubular necrosis) (H)      Atrophy of right kidney      Basal cell carcinoma      BPH without urinary obstruction      Cellulitis     Left foot     Cholelithiasis      Depression with anxiety      Diabetes mellitus, type 2 (H) 4/12/2020     Esophagitis      Gastritis      Glaucoma      Hematemesis, presence of nausea not specified      Hyperkalemia      Hyperlipemia      Kidney stone      Lactic acidosis      Metabolic acidosis      Metformin overdose of undetermined intent      Paroxysmal atrial fibrillation (H) 2/18/2020     Pericardial effusion      PTSD (post-traumatic stress disorder)      Seasonal allergies      Sepsis due to urinary tract infection (H)      Shock circulatory (H)      SIRS (systemic inflammatory response syndrome) (H)      Sleep apnea     uses a machine at night.      Upper GI bleed        Past Surgical History:  Past Surgical History:   Procedure Laterality Date     EYE SURGERY      congenital ptosis right upper lid     HC CYSTOSCOPY,INSERT URETERAL STENT Left 4/12/2020    Procedure: CYSTOSCOPY, WITH URETERAL STENT INSERTION;  Surgeon: Christopher Yates MD;  Location: [facility];  Service: Urology     MT ESOPHAGOGASTRODUODENOSCOPY TRANSORAL DIAGNOSTIC N/A 4/13/2020    Procedure: ESOPHAGOGASTRODUODENOSCOPY (EGD);  Surgeon: Robert Rico MD;  Location: [facility];  Service: Gastroenterology     REPLACEMENT TOTAL KNEE Left        Social History:  Social History     Socioeconomic History     Marital status:      Spouse name: Not on file     Number of children: Not on file     Years of education: Not on file     Highest education level: Not on file   Occupational History     Not on file   Tobacco Use     Smoking status: Never Smoker     Smokeless tobacco: Never Used   Substance and Sexual Activity     Alcohol use: Not Currently     Drug use: Never     Sexual activity: Not Currently   Other Topics Concern     Not on file   Social History Narrative     Not on file      Social Determinants of Health     Financial Resource Strain: Not on file   Food Insecurity: Not on file   Transportation Needs: Not on file   Physical Activity: Not on file   Stress: Not on file   Social Connections: Not on file   Intimate Partner Violence: Not on file   Housing Stability: Not on file       Medications:  Current Facility-Administered Medications   Medication     acetaminophen (TYLENOL) tablet 650 mg    Or     acetaminophen (TYLENOL) Suppository 650 mg     ARIPiprazole (ABILIFY) tablet 2 mg     aspirin (ASA) chewable tablet 81 mg     atorvastatin (LIPITOR) tablet 10 mg     brimonidine (ALPHAGAN) 0.2 % ophthalmic solution 1 drop     glucose gel 15-30 g    Or     dextrose 50 % injection 25-50 mL    Or     glucagon injection 1 mg     dorzolamide-timolol (COSOPT) ophthalmic solution 1 drop     doxycycline hyclate (VIBRAMYCIN) capsule 100 mg     fluvoxaMINE (LUVOX) tablet 50 mg     insulin aspart (NovoLOG) injection (RAPID ACTING)     insulin aspart (NovoLOG) injection (RAPID ACTING)     latanoprostene bunod (VYZULTA) 0.024 % ophthalmic solution 1 drop     lidocaine (LMX4) cream     lidocaine 1 % 0.1-1 mL     melatonin tablet 1 mg     netarsudil (RHOPRESSA) 0.02 % ophthalmic solution 1 drop     ondansetron (ZOFRAN-ODT) ODT tab 4 mg    Or     ondansetron (ZOFRAN) injection 4 mg     Patient is already receiving anticoagulation with heparin, enoxaparin (LOVENOX), warfarin (COUMADIN)  or other anticoagulant medication     polyethylene glycol (MIRALAX) Packet 17 g     senna-docusate (SENOKOT-S/PERICOLACE) 8.6-50 MG per tablet 1 tablet    Or     senna-docusate (SENOKOT-S/PERICOLACE) 8.6-50 MG per tablet 2 tablet     sodium chloride (PF) 0.9% PF flush 3 mL     sodium chloride (PF) 0.9% PF flush 3 mL     Warfarin Therapy Reminder (Check START DATE - warfarin may be starting in the FUTURE)       Allergies:   No Known Allergies    Review of Systems:   A full 12 point review of systems was taken and is negative  aside from what is noted above in the HPI    Physical Exam:  Temp:  [97.7  F (36.5  C)-98.1  F (36.7  C)] 98  F (36.7  C)  Pulse:  [69-76] 69  Resp:  [16-18] 16  BP: (109-120)/(54-65) 120/65  SpO2:  [92 %-96 %] 95 %  General: NAD, alert, cooperative.  He is overweight.  Head: normocephalic, without abnormality / atraumatic  Neck supple without masses.  No thyromegaly.  Abdomen: soft, non tender, not distended. no suprapubic fullness/tenderness. no CVA tenderness noted  Geniturinary: Normal circumcised phallus.  Testes bilaterally descended and nontender.  Scrotum normal.    Examination of the anus and perineum is normal.  Rectal exam reveals good sphincter tone with no masses.  Prostate is 1-2+ enlarged without nodules or tenderness.  Skin: no rashes or lesions  Musculoskeletal: moves all extremities equally; no calf edema or tenderness  Psychological: alert and oriented, answers questions appropriately      Labs:   Lab Results   Component Value Date    WBC 6.2 05/02/2022    HGB 12.7 (L) 05/02/2022    HCT 39.8 (L) 05/02/2022     05/02/2022    CHOL 154 06/10/2021    TRIG 72 06/10/2021    HDL 50 06/10/2021    ALT 12 05/02/2022    AST 13 05/02/2022     05/02/2022    BUN 11 05/02/2022    CO2 25 05/02/2022    TSH 3.68 04/30/2022    PSA 2.1 07/31/2014    INR 2.17 (H) 05/04/2022        Lab Results   Component Value Date    UROBILINOGEN <2.0 E.U./dL 04/12/2020    NITRITE Positive (A) 04/30/2022    BACTERIA None Seen 04/12/2020      CT scan was reviewed by me.  CT 4/30/2022 showed significant atrophy of the right kidney.  Left kidney appears normal.  No significant hydronephrosis.  There are 3 nonobstructing stone seen in the right kidney with the largest measuring approximately 4.5 mm x 4 mm.  Normal variant prominent left extrarenal pelvis.    Urine culture showed 10-50,000 colonies of staph hemolyticus that is only susceptible to nitrofurantoin, tetracycline and vancomycin.  Lab Results: personally reviewed      Imaging:    I have personally reviewed the images and report and agree with their interpretation.     Assessment / Plan : Rakesh Samuels is being seen by [facility] for urinary frequency, nocturia and lower urinary tract symptoms likely due to UTI.  According to the patient this is not been a chronic condition.    He just had a postvoid bladder scan showing a residual urine of only 34 mL.  No sign of retention or obstruction.    I think these urinary symptoms will likely improve with time.  At this point I do not think he needs any medical therapy but I told him to follow-up with me in 4 to 6 weeks.  If no improvement we would consider doing a cystoscopy in office once his infection clears.    Anticholinergics would be an option here but there are significant side effects with that so I would hold off for now.  I advised him to avoid potential dietary irritants such as caffeine and soda pop citrus juices alcohol etc.  No additional work-up at this time.      Thank you for consulting [facility] regarding this patient's care. Please contact us with questions or concerns.         Haim Iqbal M.D.  May 4, 2022  Pager [phone]  Cell: [phone]

## 2022-05-04 NOTE — PROGRESS NOTES
[facility]    Medicine Progress Note - Hospitalist Service    Date of Admission:  4/30/2022    Assessment & Plan          Rakesh Samuels is a 71 year old male with h/o gastritis and GI bleed, pyelonephritis, HLD, A. fib on warfarin, diabetes type 2, RADHA, HTN, nephrolithiasis, depression with anxiety who presents with generlized weakness from his independent senior living facility.  He was found to have UTI.     Urinary tract infection, sepsis: with leukocytosis and lactic acidosis on admission. Now resolved. CT abdomen with no evidence of pyelonephritis.  Atrophic right kidney.  Nonobstructive nephrolithiasis.  Urine culture with staph haemolyticus.   - Received dose of Zosyn and ceftriaxone on 4/30, which was then switched to ceftriaxone and bactrim. Change to doxycycline today based on culture senstivity result.   - ID input appreciated    Frequent urination: Patient reports that he continues to have urination every one hour and a half, which has not improved despite his UTI was treated. Likely due to BPH. Will consult urology.       Chest pain: developed acute chest pain on 5/2. Now resolved .Troponin negative x2.  EKG with no acute findings.     Chronic atrial fibrillation on warfarin: Currently rate controlled. Continue warfarin. Monitor INR on abx      Hypomagnesemia: replace per protocol     GERD with history of GI bleed: Continue PPI     HLD: continue home med     Diabetes type 2: Last A1c of 5.9 on 6/30/2020. Hold PTA metformin and actos. Diabetes diet and insulin sliding scale.            HTN: BP controlled.      RADHA: CPAP     Mood disorder with depression anxiety: Continue home medication       Diet: Consistent Carbohydrate Diet Moderate Consistent Carb (60 g CHO per Meal) Diet  Room Service    DVT Prophylaxis: Warfarin  Olsen Catheter: Not present  Central Lines: None  Cardiac Monitoring: None  Code Status: No CPR- Do NOT Intubate      Disposition Plan   Expected Discharge:  05/05/2022     Anticipated discharge location: home with help/services           The patient's care was discussed with the Bedside Nurse, Care Coordinator/ and Patient.    I called his daughter Eloisa today to discuss about plan to discharge. She concerns that patient will not be safe returning home if he is still weak. Patient lives alone in a senior independent living apartment. Family is not able to provide any help.      Marialuisa Fonseca MD  Hospitalist Service  [facility]  Securely message with the Vocera Web Console (learn more here)  Text page via [employer] Paging/Directory     ______________________________________________________________________    Interval History   Patient reports that he continues to have frequent urination every hour and a half. No dysuria and no urgency. He feels too weak to get out of bed without help. He does not feel comfortable going home.     Data reviewed today: I reviewed all medications, new labs and imaging results over the last 24 hours.    Physical Exam   Vital Signs: Temp: 97.7  F (36.5  C) Temp src: Oral BP: 117/54 Pulse: 73   Resp: 16 SpO2: 92 % O2 Device: None (Room air)    Weight: 234 lbs 0 oz    General appearance: not in acute distress  HEENT: PERRL, EOMI  Lungs: Clear breath sounds in bilateral lung fields  Cardiovascular: Regular rate and rhythm, normal S1-S2  Abdomen: Soft, non tender, no distension  Musculoskeletal: No joint swelling  Skin: No rash and no edema  Neurology: AAO ×3.  Cranial nerves II - XII normal.  Normal muscle strength in all four extremities.    Data   Recent Labs   Lab 05/04/22  1313 05/04/22  0825 05/04/22  0741 05/04/22  0255 05/03/22  0922 05/03/22  0644 05/02/22  0850 05/02/22  0713 05/01/22  0758 05/01/22  0749 04/30/22  0312 04/30/22  0304   WBC  --   --   --   --   --   --   --  6.2  --  6.0  --  11.3*   HGB  --   --   --   --   --   --   --  12.7*  --  12.3*  --  13.0*   MCV  --   --   --   --   --   --    --  99  --  100  --  99   PLT  --   --   --   --   --   --   --  208  --  205  --  236   INR  --   --  2.17*  --   --  2.06*  --  1.76*  --  1.68*   < >  --    NA  --   --   --   --   --   --   --  142  --  143  --  141   POTASSIUM  --   --  4.4  --   --  4.1  --  4.2  --  4.2  --  4.0   CHLORIDE  --   --   --   --   --   --   --  109*  --  112*  --  107   CO2  --   --   --   --   --   --   --  25  --  23  --  21*   BUN  --   --   --   --   --   --   --  11  --  11  --  17   CR  --   --   --   --   --   --   --  0.89  --  0.77  --  0.95   ANIONGAP  --   --   --   --   --   --   --  8  --  8  --  13   APRYL  --   --   --   --   --   --   --  8.6  --  8.1*  --  8.8   * 119*  --  122*   < >  --    < > 133*   < > 126*   < > 153*   ALBUMIN  --   --   --   --   --   --   --  3.1*  --  2.9*  --  3.4*   PROTTOTAL  --   --   --   --   --   --   --  6.1  --  5.7*  --  6.6   BILITOTAL  --   --   --   --   --   --   --  0.5  --  0.4  --  0.5   ALKPHOS  --   --   --   --   --   --   --  79  --  75  --  94   ALT  --   --   --   --   --   --   --  12  --  <9  --  12   AST  --   --   --   --   --   --   --  13  --  10  --  9   LIPASE  --   --   --   --   --   --   --   --   --   --   --  32    < > = values in this interval not displayed.

## 2022-05-04 NOTE — PROGRESS NOTES
Care Management Follow Up    Length of Stay (days): 4    Expected Discharge Date: 05/05/2022     Concerns to be Addressed: discharge planning     Patient plan of care discussed at interdisciplinary rounds: Yes    Anticipated Discharge Disposition: Home, Home Care      Anticipated Discharge Services:  Home PT/OT  Anticipated Discharge DME:  Pt has rolling walker    Patient/family educated on Medicare website which has current facility and service quality ratings:    Education Provided on the Discharge Plan:  Yes  Patient/Family in Agreement with the Plan:  Yes    Referrals Placed by CM/SW: Homecare- accepted with [facility]  Private pay costs discussed: Not applicable    Additional Information:  Chart review and MD update.  Also discussed updates with PT.  Therapy continues to recommend home PT/OT and has observed Pt to be walking the halls without concerns.    CM spoke with Pt's daughter Eloisa ([phone]) to provide update on Pt's discharge plan. Eloisa expressed more comfort with plan after learning Pt has been ambulating well per PT and that PT will be seeing Pt again today.  [facility] as of now is pending lab result.  Eloisa is transportation resource upon discharge and needs some notice to be able to come  Pt.  Eloisa had additional questions related to Pt's medical care; CM recommended Eloisa talk with nursing and offered transfer to nursing station.  CM provided Eloisa with CM office phone number then transferred to nursing station.  CM to continue following for progression and discharge planning.     AMY Duque

## 2022-05-04 NOTE — PLAN OF CARE
"  Problem: Plan of Care - These are the overarching goals to be used throughout the patient stay.    Goal: Plan of Care Review/Shift Note  Description: The Plan of Care Review/Shift note should be completed every shift.  The Outcome Evaluation is a brief statement about your assessment that the patient is improving, declining, or no change.  This information will be displayed automatically on your shift note.  Outcome: Ongoing, Progressing   Goal Outcome Evaluation:           Sticky note left for  to reach out to daughter regarding discharge plan.  Problem: Plan of Care - These are the overarching goals to be used throughout the patient stay.    Goal: Patient-Specific Goal (Individualized)  Description: You can add care plan individualizations to a care plan. Examples of Individualization might be:  \"Parent requests to be called daily at 9am for status\", \"I have a hard time hearing out of my right ear\", or \"Do not touch me to wake me up as it startles me\".  Outcome: Ongoing, Progressing   Rakesh wants to discharge home but understands a short time with rehab. Facility would benefit him in gaining more strength.  Problem: Plan of Care - These are the overarching goals to be used throughout the patient stay.    Goal: Optimal Comfort and Wellbeing  Outcome: Ongoing, Progressing   Denies any pain. Moves self about in bed well. Needs minimal help to sit up on side of bed.           "

## 2022-05-04 NOTE — PROGRESS NOTES
Rakesh would like to go home if he can just get in and out of bed safely. He complains of always having poor strength in his arms. Tries to not use the side rails because he doesn't have them at home. Dr. Fonseca aware of this and will talk with daughter about options. Therapy recommends home therapy.

## 2022-05-04 NOTE — PROGRESS NOTES
Infectious Diseases Progress Note  [facility]    Date of visit: 05/04/2022       ASSESSMENT   70 yo man with history of hyperlipidemia, afib, dm, hypertension admitted with weakness and concern for UTI.    1. UTI. Weakness without other symptoms. No dysuria. CT a month ago with stranding around the right atrophic kidney, but resolved now. UA with nitrites and white blood cells. Urine culture with Staph haemolyticus, oxacillin resistant. No recent instrumentation. Blood culture negative to date    Active Problems:    Type 2 diabetes mellitus without complication, without long-term current use of insulin (H)    Depression with anxiety    Hyperlipidemia    Persistent atrial fibrillation (H)    Urinary tract infection without hematuria, site unspecified    Sepsis, due to unspecified organism, unspecified whether acute organ dysfunction present (H)    UTI (urinary tract infection)       PLAN   -stop ceftriaxone and TMP/SMX  -start doxycycline x 2 weeks  -close f/up with INR while on antibiotics    I will sign-off at this time. Please do not hesitate to call if there are any further questions or if there is a change in the patient's clinical status.       Epifanio Mena MD  [facility]  Direct messaging: [employer] Paging  On-Call ID provider: [phone], option: 9      ===========================================      SUBJECTIVE / INTERVAL HISTORY:     No events. Less weakness today. Walking with nurses. No dysuria.    Review of Systems     No fevers, no rashes       Antibiotics   Ceftriaxone 4/30-5/3  TMP/SMX 5/2-5/4  Doxycycline 5/4-    Previous:  Pipercillin/tazobactam 4/30      Physical Exam     Temp:  [97.7  F (36.5  C)-98.5  F (36.9  C)] 97.7  F (36.5  C)  Pulse:  [73-89] 73  Resp:  [16-18] 16  BP: (102-117)/(54-61) 117/54  SpO2:  [92 %-96 %] 92 %    /54 (BP Location: Right arm, Patient Position: Left side, Cuff Size: Adult Regular)   Pulse 73   Temp 97.7  F (36.5  C)  (Oral)   Resp 16   Ht 1.829 m (6')   Wt 106.1 kg (234 lb)   SpO2 92%   BMI 31.74 kg/m      GENERAL:  well-developed, well-nourished, sitting in chair in no acute distress.   HENT:  Head is normocephalic, atraumatic.  exudates or ulcers. Right sided tonsolith with some surrounding swelling.  EYES:  Right conjunctival injection, chronic  EXT: Extremities warm and without edema.  SKIN:  No acute rashes.   NEUROLOGIC:  Grossly nonfocal.      Cultures   4/30 urine culture: 50-100K S.haemolyticus  4/30 blood culture x 2: no growth to date       Pertinent Labs:     Recent Labs   Lab 05/02/22  0713 05/01/22  0749 04/30/22  0304   WBC 6.2 6.0 11.3*   HGB 12.7* 12.3* 13.0*    205 236       Recent Labs   Lab 05/02/22  0713 05/01/22  0749 04/30/22  0304    143 141   CO2 25 23 21*   BUN 11 11 17   ALBUMIN 3.1* 2.9* 3.4*   ALKPHOS 79 75 94   ALT 12 <9 12   AST 13 10 9       No results for input(s): CRP in the last 168 hours.    Invalid input(s): SEDRATE        Imaging:     No results found.      Data reviewed today: I reviewed all medications, new labs and imaging results over the last 24 hours. I personally reviewed no images or EKG's today.  The patient's care was discussed with the Patient.

## 2022-05-04 NOTE — PLAN OF CARE
Alert and oriented x 4. On room air. Incontinent of bowel and bladder. Assist of 1 with walker and gait belt. IV saline lock. Blood glucose at 0200 was 122 mg/dl. Patient denies pain. INR this AM. Potassium and Magnesium protocol, recheck this morning.

## 2022-05-05 ENCOUNTER — APPOINTMENT (OUTPATIENT)
Dept: PHYSICAL THERAPY | Facility: HOSPITAL | Age: 72
DRG: 872 | End: 2022-05-05
Payer: MEDICARE

## 2022-05-05 VITALS
HEART RATE: 69 BPM | SYSTOLIC BLOOD PRESSURE: 125 MMHG | HEIGHT: 72 IN | BODY MASS INDEX: 31.69 KG/M2 | OXYGEN SATURATION: 95 % | RESPIRATION RATE: 17 BRPM | WEIGHT: 234 LBS | DIASTOLIC BLOOD PRESSURE: 64 MMHG | TEMPERATURE: 98.9 F

## 2022-05-05 LAB
BACTERIA BLD CULT: NO GROWTH
BACTERIA BLD CULT: NO GROWTH
GLUCOSE BLDC GLUCOMTR-MCNC: 131 MG/DL (ref 70–99)
GLUCOSE BLDC GLUCOMTR-MCNC: 148 MG/DL (ref 70–99)
HOLD SPECIMEN: NORMAL
INR PPP: 2.22 (ref 0.9–1.15)
MAGNESIUM SERPL-MCNC: 1.8 MG/DL (ref 1.8–2.6)
POTASSIUM BLD-SCNC: 4.4 MMOL/L (ref 3.5–5)

## 2022-05-05 PROCEDURE — 97116 GAIT TRAINING THERAPY: CPT | Mod: GP

## 2022-05-05 PROCEDURE — 85610 PROTHROMBIN TIME: CPT | Performed by: INTERNAL MEDICINE

## 2022-05-05 PROCEDURE — 250N000013 HC RX MED GY IP 250 OP 250 PS 637: Performed by: INTERNAL MEDICINE

## 2022-05-05 PROCEDURE — 97530 THERAPEUTIC ACTIVITIES: CPT | Mod: GP

## 2022-05-05 PROCEDURE — 83735 ASSAY OF MAGNESIUM: CPT | Performed by: INTERNAL MEDICINE

## 2022-05-05 PROCEDURE — 36415 COLL VENOUS BLD VENIPUNCTURE: CPT | Performed by: INTERNAL MEDICINE

## 2022-05-05 PROCEDURE — 99239 HOSP IP/OBS DSCHRG MGMT >30: CPT | Performed by: INTERNAL MEDICINE

## 2022-05-05 PROCEDURE — 84132 ASSAY OF SERUM POTASSIUM: CPT | Performed by: INTERNAL MEDICINE

## 2022-05-05 RX ORDER — DOXYCYCLINE 100 MG/1
100 CAPSULE ORAL EVERY 12 HOURS
Qty: 28 CAPSULE | Refills: 0 | Status: SHIPPED | OUTPATIENT
Start: 2022-05-05 | End: 2022-05-19

## 2022-05-05 RX ORDER — WARFARIN SODIUM 5 MG/1
5 TABLET ORAL
Status: DISCONTINUED | OUTPATIENT
Start: 2022-05-05 | End: 2022-05-05 | Stop reason: HOSPADM

## 2022-05-05 RX ORDER — WARFARIN SODIUM 5 MG/1
2.5-5 TABLET ORAL DAILY
Start: 2022-05-05 | End: 2022-07-25

## 2022-05-05 RX ADMIN — BRIMONIDINE TARTRATE 1 DROP: 2 SOLUTION/ DROPS OPHTHALMIC at 09:25

## 2022-05-05 RX ADMIN — DORZOLAMIDE HYDROCHLORIDE AND TIMOLOL MALEATE 1 DROP: 20; 5 SOLUTION/ DROPS OPHTHALMIC at 09:25

## 2022-05-05 RX ADMIN — INSULIN ASPART 1 UNITS: 100 INJECTION, SOLUTION INTRAVENOUS; SUBCUTANEOUS at 09:26

## 2022-05-05 RX ADMIN — ASPIRIN 81 MG CHEWABLE TABLET 81 MG: 81 TABLET CHEWABLE at 09:34

## 2022-05-05 RX ADMIN — DOXYCYCLINE 100 MG: 100 CAPSULE ORAL at 09:33

## 2022-05-05 ASSESSMENT — ACTIVITIES OF DAILY LIVING (ADL)
ADLS_ACUITY_SCORE: 22
ADLS_ACUITY_SCORE: 18
ADLS_ACUITY_SCORE: 22
ADLS_ACUITY_SCORE: 18
ADLS_ACUITY_SCORE: 22
ADLS_ACUITY_SCORE: 22
ADLS_ACUITY_SCORE: 18
ADLS_ACUITY_SCORE: 22
ADLS_ACUITY_SCORE: 18
ADLS_ACUITY_SCORE: 22
ADLS_ACUITY_SCORE: 22
ADLS_ACUITY_SCORE: 18
ADLS_ACUITY_SCORE: 22

## 2022-05-05 NOTE — PLAN OF CARE
Patient is discharge to home. Patient  received discharge instructions. Patient's daughter called and her questions related to discharge answered to her satisfaction. Patient was given his home medications 2 eye drops from the refrigerator and  two from the drawer. Patient also received Rx doxycycline from pharmacy. Otherwise he has all his belongings brought via wheel chair and his daughter picked him up from main entrance.

## 2022-05-05 NOTE — PLAN OF CARE
Physical Therapy Discharge Summary    Reason for therapy discharge:    Discharged to home with home therapy.    Progress towards therapy goal(s). See goals on Care Plan in [facility] electronic health record for goal details.  Goals partially met.  Barriers to achieving goals:   discharge from facility.    Therapy recommendation(s):    Continued therapy is recommended.  Rationale/Recommendations:  to progress with mobility and 4WW safety..

## 2022-05-05 NOTE — DISCHARGE SUMMARY
[facility]  Hospitalist Discharge Summary      Date of Admission:  4/30/2022  Date of Discharge:  5/5/2022  Discharging Provider: Marialuisa Fonseca MD  Discharge Service: Hospitalist Service    Discharge Diagnoses     Principal Problem:    Urinary tract infection without hematuria, site unspecified  Active Problems:    Type 2 diabetes mellitus without complication, without long-term current use of insulin (H)    Sepsis due to urinary tract infection (H)    Depression with anxiety    Hyperlipidemia    Persistent atrial fibrillation (H)    Sepsis without acute organ dysfunction (H)    Benign prostatic hyperplasia with urinary frequency    Hypomagnesemia      Follow-ups Needed After Discharge   Follow-up Appointments     Follow-up and recommended labs and tests      Please follow up with Dr. Iqbal in 4-6 week. You may call to confirm   appointment as needed. [facility], [phone]         Follow-up and recommended labs and tests       Follow up with primary care provider, Michelet Restrepo, within 3-5 days for   hospital follow- up.  The following labs/tests are recommended: Check INR   and dose coumadin closely. The INR can be easily affected when taking   antibiotic.           Discharge Disposition   Discharged to home  Condition at discharge: Stable      Hospital Course     Rakesh Samuels is a 71 year old male with h/o gastritis and GI bleed, pyelonephritis, HLD, A. fib on warfarin, diabetes type 2, RADHA, HTN, nephrolithiasis, depression with anxiety who presents with generlized weakness from his independent senior living facility.  He was found to have UTI.     Urinary tract infection, sepsis: Patient presents with leukocytosis and lactic acidosis on admission. CT scan of the abdomen shows no evidence of pyelonephritis, atrophic right kidney and nonobstructive nephrolithiasis.  Urine culture grows staph haemolyticus. Patient was given Zosyn and ceftriaxone and bactrim.  Antibiotic later was changed to doxycycline based on culture sensitivity.      Frequent urination: Patient reports that he continues to have urination almost every one hour and a half, which has not improved despite his UTI was treated. Likely due to BPH. Urology was consulted and recommends no additional intervention at this time. If problem persists after discharge, patient can follow up in the clinic.      Chest pain: Patient developed acute chest pain on 5/2. Troponin was normal. EKG shows no acute findings. Chest pain later resolved without intervention.     Chronic atrial fibrillation on warfarin: Currently rate controlled. Continue warfarin. INR needs too be closely monitored while on antibiotics.     Hypomagnesemia: replaced.     GERD with history of GI bleed: Continue PPI     HLD: continue home med     Diabetes type 2: Last A1c was 5.9 on 6/30/2020. Resume PTA metformin and actos after discharge.       Consultations This Hospital Stay   PHYSICAL THERAPY ADULT IP CONSULT  OCCUPATIONAL THERAPY ADULT IP CONSULT  PHARMACY TO DOSE WARFARIN  PHARMACY TO DOSE WARFARIN  MEDICATION HISTORY IP PHARMACY CONSULT  PHYSICAL THERAPY ADULT IP CONSULT  CARE MANAGEMENT / SOCIAL WORK IP CONSULT  INFECTIOUS DISEASES IP CONSULT  UROLOGY IP CONSULT    Code Status   No CPR- Do NOT Intubate    Time Spent on this Encounter   I, Marialuisa Fonseca MD, personally saw the patient today and spent greater than 30 minutes discharging this patient.       Marialuisa Fonseca MD  [address] [address]  Phone: [phone]  Fax: [phone]  ______________________________________________________________________    Physical Exam   Vital Signs: Temp: 98.2  F (36.8  C) Temp src: Oral BP: 113/75 Pulse: 72   Resp: 18 SpO2: 93 % O2 Device: None (Room air)    Weight: 234 lbs 0 oz    General appearance: not in acute distress  HEENT: PERRL, EOMI  Lungs: Clear breath sounds in bilateral lung  fields  Cardiovascular: Regular rate and rhythm, normal S1-S2  Abdomen: Soft, non tender, no distension  Musculoskeletal: No joint swelling  Skin: No rash and no edema  Neurology: AAO ×3.  Cranial nerves II - XII normal.  Normal muscle strength in all four extremities.       Primary Care Physician   Michelet Restrepo    Discharge Orders      Home Care Referral      Follow-up and recommended labs and tests    Please follow up with Dr. Iqbal in 4-6 week. You may call to confirm appointment as needed. [facility], [phone]     Reason for your hospital stay    * Admitted for urinary tract infection.     Follow-up and recommended labs and tests     Follow up with primary care provider, Michelet Restrepo, within 3-5 days for hospital follow- up.  The following labs/tests are recommended: Check INR and dose coumadin closely. The INR can be easily affected when taking antibiotic.     Activity    Your activity upon discharge: activity as tolerated     Diet    Follow this diet upon discharge: Consistent Carbohydrate Diet Moderate Consistent Carb (60 g CHO per Meal) Diet       Significant Results and Procedures   Most Recent 3 CBC's:Recent Labs   Lab Test 05/02/22  0713 05/01/22  0749 04/30/22  0304   WBC 6.2 6.0 11.3*   HGB 12.7* 12.3* 13.0*   MCV 99 100 99    205 236     Most Recent 3 BMP's:Recent Labs   Lab Test 05/05/22  1303 05/05/22  0805 05/05/22  0735 05/04/22  2117 05/04/22  0825 05/04/22  0741 05/03/22  0922 05/03/22  0644 05/02/22  0850 05/02/22  0713 05/01/22  0758 05/01/22  0749 04/30/22  1849 04/30/22  0304   NA  --   --   --   --   --   --   --   --   --  142  --  143  --  141   POTASSIUM  --   --  4.4  --   --  4.4  --  4.1  --  4.2  --  4.2  --  4.0   CHLORIDE  --   --   --   --   --   --   --   --   --  109*  --  112*  --  107   CO2  --   --   --   --   --   --   --   --   --  25  --  23  --  21*   BUN  --   --   --   --   --   --   --   --   --  11  --  11  --  17   CR  --   --   --    --   --   --   --   --   --  0.89  --  0.77  --  0.95   ANIONGAP  --   --   --   --   --   --   --   --   --  8  --  8  --  13   APRYL  --   --   --   --   --   --   --   --   --  8.6  --  8.1*  --  8.8   * 148*  --  136*   < >  --    < >  --    < > 133*   < > 126*   < > 153*    < > = values in this interval not displayed.       CT ABDOMEN PELVIS W CONTRAST    FINDINGS:   LOWER CHEST: No acute abnormalities.     HEPATOBILIARY: The gallbladder is mildly contracted and contains numerous stones, but is otherwise normal with no gallbladder wall thickening or pericholecystic fluid. The liver and bile ducts are normal.     PANCREAS: Normal.     SPLEEN: Normal.     ADRENAL GLANDS: There is a stable benign adenoma involving the right adrenal gland measuring 1.0 cm x 1.2 cm. There is benign enlargement of the left adrenal gland to include underlying changes of a myelolipoma.     KIDNEYS/BLADDER: There is significant atrophy seen of the right kidney. The left kidney. There is a 3 nonobstructive stone seen in the right kidney with the largest measuring approximate 4.5 mm x 4 mm. Normal variant prominent left extrarenal pelvis.   Minute simple cyst left kidney with no follow-up needed. The kidneys, ureters, bladder are otherwise normal.     BOWEL: There are mild findings of diverticulosis with no evidence for diverticulitis.     LYMPH NODES: Normal.     VASCULATURE: Mild calcification.     PELVIC ORGANS: Normal.     MUSCULOSKELETAL: Mild to moderate scattered hypertrophic changes of the spine most marked in the facet joints.                                                                      IMPRESSION:   1.  Since prior CT 03/27/2021 the abnormal stranding surrounding the atrophic right kidney has resolved, otherwise there've been no other significant changes. There are no acute abnormalities on this study to explain patient's findings clinically.   2.  Cholelithiasis with no cholecystitis.   3.  Benign adenoma and  myelolipoma in the adrenal glands.   4.  Atrophic right kidney.   5.  Mild findings of diverticulosis.   6.  Nonobstructive nephrolithiasis.      XR CHEST 1 VIEW    IMPRESSION: Given differences in positioning there've been no significant changes, specifically there are no acute cardiopulmonary abnormalities identified.      Discharge Medications   Current Discharge Medication List      START taking these medications    Details   doxycycline hyclate (VIBRAMYCIN) 100 MG capsule Take 1 capsule (100 mg) by mouth every 12 hours for 14 days  Qty: 28 capsule, Refills: 0    Associated Diagnoses: Acute cystitis without hematuria         CONTINUE these medications which have CHANGED    Details   warfarin ANTICOAGULANT (COUMADIN) 5 MG tablet Take 0.5-1 tablets (2.5-5 mg) by mouth daily Take 1/2 tablet (2.5mg) by mouth on Saturdays & take 1 tablet (5mg) daily on all other days. Adjust dose per INR results as instructed.    Associated Diagnoses: Paroxysmal atrial fibrillation (H)         CONTINUE these medications which have NOT CHANGED    Details   acetaminophen (TYLENOL) 500 MG tablet [ACETAMINOPHEN (TYLENOL) 500 MG TABLET] Take 500 mg by mouth every 6 (six) hours as needed for pain.      amoxicillin (AMOXIL) 500 MG capsule [AMOXICILLIN (AMOXIL) 500 MG CAPSULE] Take 2,000 mg by mouth once as needed (Prior to dental work).      ARIPiprazole (ABILIFY) 2 MG tablet [ARIPIPRAZOLE (ABILIFY) 2 MG TABLET] Take 2 mg by mouth at bedtime.       aspirin 81 mg chewable tablet Take 81 mg by mouth daily      atorvastatin (LIPITOR) 10 MG tablet [ATORVASTATIN (LIPITOR) 10 MG TABLET] Take 10 mg by mouth at bedtime.  Refills: 1      brimonidine (ALPHAGAN) 0.2 % ophthalmic solution [BRIMONIDINE (ALPHAGAN) 0.2 % OPHTHALMIC SOLUTION] Administer 1 drop to both eyes 2 (two) times a day.       dorzolamide-timolol (COSOPT) 22.3-6.8 mg/mL ophthalmic solution [DORZOLAMIDE-TIMOLOL (COSOPT) 22.3-6.8 MG/ML OPHTHALMIC SOLUTION] Administer 1 drop to both  eyes 2 (two) times a day.   Refills: 3      fluvoxaMINE (LUVOX) 50 MG tablet [FLUVOXAMINE (LUVOX) 50 MG TABLET] Take 50 mg by mouth at bedtime.       latanoprostene bunod 0.024 % Drop Place 1 drop into both eyes At Bedtime      levomefolate calcium (L-METHYLFOLATE ORAL) [LEVOMEFOLATE CALCIUM (L-METHYLFOLATE ORAL)] Take 1.25 mg by mouth daily.       metFORMIN (GLUCOPHAGE) 1000 MG tablet Take 1,000 mg by mouth 2 times daily (with meals)      multivitamin therapeutic tablet [MULTIVITAMIN THERAPEUTIC TABLET] Take 1 tablet by mouth daily.      netarsudiL (RHOPRESSA) 0.02 % Drop Place 1 drop into the right eye every evening      pioglitazone (ACTOS) 30 MG tablet Take 30 mg by mouth daily           Allergies   No Known Allergies

## 2022-05-05 NOTE — PLAN OF CARE
Problem: Plan of Care - These are the overarching goals to be used throughout the patient stay.    Goal: Plan of Care Review/Shift Note  Description: The Plan of Care Review/Shift note should be completed every shift.  The Outcome Evaluation is a brief statement about your assessment that the patient is improving, declining, or no change.  This information will be displayed automatically on your shift note.  Outcome: Ongoing, Progressing   Goal Outcome Evaluation:        Continue to move self about in bed to get out on own.monitor blood glucose and INR.  Problem: Plan of Care - These are the overarching goals to be used throughout the patient stay.    Goal: Absence of Hospital-Acquired Illness or Injury  Intervention: Identify and Manage Fall Risk  Recent Flowsheet Documentation  Taken 5/5/2022 0900 by Josefa Walters RN  Safety Promotion/Fall Prevention:   activity supervised   nonskid shoes/slippers when out of bed   patient and family education   room door open      Using call light appropriately. Stronger gait each day.  Problem: Plan of Care - These are the overarching goals to be used throughout the patient stay.    Goal: Optimal Comfort and Wellbeing  Outcome: Ongoing, Progressing   Excercises legs and arms indep.

## 2022-05-05 NOTE — PROGRESS NOTES
Care Management Discharge Note    Discharge Date: 05/05/2022       Discharge Disposition: Home, Home Care    Discharge Services:  Home PT and OT with [facility]     Discharge DME:  2WW    Discharge Transportation: family or friend will provide    Private pay costs discussed: Not applicable    PAS Confirmation Code:    Patient/family educated on Medicare website which has current facility and service quality ratings:      Education Provided on the Discharge Plan:    Persons Notified of Discharge Plans: patient, daughter Eloisa  Patient/Family in Agreement with the Plan: yes    Handoff Referral Completed: Yes    Additional Information:  Chart reviewed. JENNY met with MD for updates  Pt medically stable for discharge. JENNY met with patient in his room.  Patient [facility] a senior [facility] at [facility], this is an independent apartment. Has assist with light housekeeping and meals.   PT notes indicate pt is ambulating 200 feet x 2.  JENNY spoke to patient's daughter Maryana Amin states she has been updated by MD and feels comfortable with the discharge plan.   Daughter will transport at 5:00pm. Provided daughter with nursing station phone number.   [facility] has been updated on patient's discharge date today.  CM will send orders when placed       MARTIN Sarabia

## 2022-05-05 NOTE — PLAN OF CARE
Goal Outcome Evaluation: A/o x 4. Denied pain/discomfort. No advers behavior noted this shift. Incont of urine.   Problem: Plan of Care - These are the overarching goals to be used throughout the patient stay.    Goal: Absence of Hospital-Acquired Illness or Injury  Outcome: Ongoing, Progressing  Intervention: Identify and Manage Fall Risk  Recent Flowsheet Documentation  Taken 5/5/2022 0355 by Bandar Pro RN  Safety Promotion/Fall Prevention:   activity supervised   bed alarm on   room door open   nonskid shoes/slippers when out of bed  Taken 5/4/2022 2341 by Bandar Pro RN  Safety Promotion/Fall Prevention:   activity supervised   bed alarm on   room door open   nonskid shoes/slippers when out of bed  Taken 5/4/2022 1949 by Bandar Pro RN  Safety Promotion/Fall Prevention:   activity supervised   bed alarm on   room door open   nonskid shoes/slippers when out of bed  Intervention: Prevent Skin Injury  Recent Flowsheet Documentation  Taken 5/5/2022 0355 by Bandar Pro RN  Body Position: position changed independently  Taken 5/4/2022 2341 by Bandar Pro RN  Body Position: position changed independently  Taken 5/4/2022 1949 by Bandar Pro RN  Body Position: position changed independently  Intervention: Prevent and Manage VTE (Venous Thromboembolism) Risk  Recent Flowsheet Documentation  Taken 5/5/2022 0355 by Bandar Pro RN  Activity Management: activity encouraged  Taken 5/4/2022 2341 by Bandar Pro RN  Activity Management: activity encouraged  Taken 5/4/2022 1949 by Bandar Pro RN  Activity Management: activity encouraged     Problem: Plan of Care - These are the overarching goals to be used throughout the patient stay.    Goal: Absence of Hospital-Acquired Illness or Injury  Intervention: Prevent Skin Injury  Recent Flowsheet Documentation  Taken 5/5/2022 0355 by Bandar rPo RN  Body Position: position changed independently  Taken 5/4/2022 2341  by Bandar Pro RN  Body Position: position changed independently  Taken 5/4/2022 1949 by Bandar Pro RN  Body Position: position changed independently

## 2022-05-05 NOTE — PLAN OF CARE
Problem: Plan of Care - These are the overarching goals to be used throughout the patient stay.    Goal: Absence of Hospital-Acquired Illness or Injury  Intervention: Identify and Manage Fall Risk  Recent Flowsheet Documentation  Taken 5/4/2022 1949 by Bandar Pro RN  Safety Promotion/Fall Prevention:   activity supervised   bed alarm on   room door open   nonskid shoes/slippers when out of bed  Intervention: Prevent Skin Injury  Recent Flowsheet Documentation  Taken 5/4/2022 1949 by Bandar Pro RN  Body Position: position changed independently  Intervention: Prevent and Manage VTE (Venous Thromboembolism) Risk  Recent Flowsheet Documentation  Taken 5/4/2022 1949 by Bandar Pro RN  Activity Management: activity encouraged     Problem: Plan of Care - These are the overarching goals to be used throughout the patient stay.    Goal: Absence of Hospital-Acquired Illness or Injury  Intervention: Prevent and Manage VTE (Venous Thromboembolism) Risk  Recent Flowsheet Documentation  Taken 5/4/2022 1949 by Bandar Pro RN  Activity Management: activity encouraged   Goal Outcome Evaluation:

## 2022-05-06 ENCOUNTER — TELEPHONE (OUTPATIENT)
Dept: ANTICOAGULATION | Facility: CLINIC | Age: 72
End: 2022-05-06
Payer: MEDICARE

## 2022-05-06 DIAGNOSIS — I48.0 PAROXYSMAL ATRIAL FIBRILLATION (H): Primary | ICD-10-CM

## 2022-05-06 NOTE — TELEPHONE ENCOUNTER
ANTICOAGULATION  MANAGEMENT: Discharge Review    Rakesh Samuels chart reviewed for anticoagulation continuity of care    Hospital Admission on 04/30/2022-05/05/2022 for UTI.    Discharge disposition: Home with Home Care    Results:    Recent labs: (last 7 days)     04/30/22  0312 05/01/22  0749 05/02/22  0713 05/03/22  0644 05/04/22  0741 05/05/22  0735   INR 1.58* 1.68* 1.76* 2.06* 2.17* 2.22*     Anticoagulation inpatient management:     less warfarin administered than maintenance regimen    Anticoagulation discharge instructions:     Warfarin dosing: home regimen continued   Bridging: No   INR goal change: No      Medication changes affecting anticoagulation: Yes: Received zosyn and rocephin while inpatient. Discharged on doxycyline though 05/19/2022. Doxycyline may increase risk of bleeding.    Additional factors affecting anticoagulation: No    Plan     Pt will be having home care through [facility]. Called [facility]. An appointment is not set up yet.    LVM for daughter Nanette with instructions to check INR on 05/09 with home meter if home care will not be out until later in the week.    Vincent Sky RN

## 2022-05-07 PROBLEM — N40.1 BENIGN PROSTATIC HYPERPLASIA WITH URINARY FREQUENCY: Status: ACTIVE | Noted: 2022-05-07

## 2022-05-07 PROBLEM — R35.0 BENIGN PROSTATIC HYPERPLASIA WITH URINARY FREQUENCY: Status: ACTIVE | Noted: 2022-05-07

## 2022-05-07 PROBLEM — E83.42 HYPOMAGNESEMIA: Status: ACTIVE | Noted: 2022-05-07

## 2022-05-09 ENCOUNTER — ANTICOAGULATION THERAPY VISIT (OUTPATIENT)
Dept: ANTICOAGULATION | Facility: CLINIC | Age: 72
End: 2022-05-09
Payer: MEDICARE

## 2022-05-09 DIAGNOSIS — I48.0 PAROXYSMAL ATRIAL FIBRILLATION (H): Primary | ICD-10-CM

## 2022-05-09 LAB — INR HOME MONITORING: 2.5 (ref 2–3)

## 2022-05-09 NOTE — PROGRESS NOTES
ANTICOAGULATION MANAGEMENT     Rakesh Samuels 71 year old male is on warfarin with therapeutic INR result. (Goal INR 2.0-3.0)    Recent labs: (last 7 days)     05/09/22  0000   INR 2.50       ASSESSMENT       Source(s): Chart Review and Patient/Caregiver Call       Warfarin doses taken: More warfarin taken than planned which may be affecting INR    Diet: No new diet changes identified    New illness, injury, or hospitalization: Recent hospitalization for 04/30-05/05 for UTI.    Medication/supplement changes: Doxcycline 05/05/2022 through 05/19/2022. This medication can increase risk of bleeding. Does not appear to be effecting INR significantly.    Signs or symptoms of bleeding or clotting: No    Previous INR: Therapeutic last 2(+) visits    Additional findings: None       PLAN     Recommended plan for temporary change(s) affecting INR     Dosing Instructions: continue your current warfarin dose with next INR in 1 week       Summary  As of 5/9/2022    Full warfarin instructions:  2.5 mg every Sat; 5 mg all other days   Next INR check:  5/16/2022             Telephone call with  Nanette who verbalizes understanding and agrees to plan    Patient to recheck with home meter    Education provided: Goal range and significance of current result and Potential interaction between warfarin and doxycyline    Plan made per [facility] anticoagulation protocol    Vincent Sky RN  Anticoagulation Clinic  5/9/2022    _______________________________________________________________________     Anticoagulation Episode Summary     Current INR goal:  2.0-3.0   TTR:  68.9 % (11.9 mo)   Target end date:  Indefinite   Send INR reminders to:  DAYANNA RIVERS    Indications    Paroxysmal atrial fibrillation (H) [I48.0]           Comments:  [employer] home meter- Managed by [employer]         Anticoagulation Care Providers     Provider Role Specialty Phone number    Eric Pickett MD Referring Cardiovascular Disease [phone]

## 2022-05-16 ENCOUNTER — ANTICOAGULATION THERAPY VISIT (OUTPATIENT)
Dept: ANTICOAGULATION | Facility: CLINIC | Age: 72
End: 2022-05-16
Payer: MEDICARE

## 2022-05-16 DIAGNOSIS — I48.0 PAROXYSMAL ATRIAL FIBRILLATION (H): Primary | ICD-10-CM

## 2022-05-16 LAB — INR HOME MONITORING: 3.1 (ref 2–3)

## 2022-05-16 NOTE — PROGRESS NOTES
ANTICOAGULATION MANAGEMENT     Rakesh Samuels 71 year old male is on warfarin with supratherapeutic INR result. (Goal INR 2.0-3.0)    Recent labs: (last 7 days)     05/16/22  0000   INR 3.10*       ASSESSMENT       Source(s): Chart Review    Previous INR was Therapeutic last 2(+) visits    Medication, diet, health changes since last INR chart reviewed; none identified     PLAN     Recommended plan for no diet, medication or health factor changes affecting INR     Dosing Instructions: continue your current warfarin dose with next INR in 1 week       Summary  As of 5/16/2022    Full warfarin instructions:  2.5 mg every Sat; 5 mg all other days   Next INR check:  5/23/2022             Detailed voice message left for  daughter, Nanette with dosing instructions and follow up date.     Patient to recheck with home meter    Education provided: Please call back if any changes to your diet, medications or how you've been taking warfarin and Goal range and significance of current result    Plan made per [facility] anticoagulation protocol    Luz Granda RN  Anticoagulation Clinic  5/16/2022    _______________________________________________________________________     Anticoagulation Episode Summary     Current INR goal:  2.0-3.0   TTR:  68.5 % (11.9 mo)   Target end date:  Indefinite   Send INR reminders to:  DAYANNA RIVERS    Indications    Paroxysmal atrial fibrillation (H) [I48.0]           Comments:  [employer] home meter- Managed by [employer]         Anticoagulation Care Providers     Provider Role Specialty Phone number    Eric Pickett MD Referring Cardiovascular Disease [phone]

## 2022-05-23 ENCOUNTER — ANTICOAGULATION THERAPY VISIT (OUTPATIENT)
Dept: ANTICOAGULATION | Facility: CLINIC | Age: 72
End: 2022-05-23
Payer: MEDICARE

## 2022-05-23 DIAGNOSIS — I48.0 PAROXYSMAL ATRIAL FIBRILLATION (H): Primary | ICD-10-CM

## 2022-05-23 LAB — INR HOME MONITORING: 3.7 (ref 2–3)

## 2022-05-23 NOTE — PROGRESS NOTES
ANTICOAGULATION MANAGEMENT     Rakesh Samuels 71 year old male is on warfarin with supratherapeutic INR result. (Goal INR 2.0-3.0)    Recent labs: (last 7 days)     05/23/22  0000   INR 3.70*       ASSESSMENT       Source(s): Chart Review and Patient/Caregiver Call       Warfarin doses taken: Warfarin taken as instructed    Diet: No new diet changes identified    New illness, injury, or hospitalization: No    Medication/supplement changes: None noted    Signs or symptoms of bleeding or clotting: No    Previous INR: Supratherapeutic    Additional findings: None       PLAN     Recommended plan for no diet, medication or health factor changes affecting INR     Dosing Instructions: hold dose then decrease your warfarin dose (7.7% change) with next INR in 1 week       Summary  As of 5/23/2022    Full warfarin instructions:  5/23: Hold; Otherwise 2.5 mg every Tue, Sat; 5 mg all other days   Next INR check:  5/31/2022             Telephone call with  Nanette who verbalizes understanding and agrees to plan    Patient to recheck with home meter    Education provided: Please call back if any changes to your diet, medications or how you've been taking warfarin    Plan made per [facility] anticoagulation protocol    Heather Yanez, RN  Anticoagulation Clinic  5/23/2022    _______________________________________________________________________     Anticoagulation Episode Summary     Current INR goal:  2.0-3.0   TTR:  66.5 % (11.9 mo)   Target end date:  Indefinite   Send INR reminders to:  DAYANNA RIVERS    Indications    Paroxysmal atrial fibrillation (H) [I48.0]           Comments:  [employer] home meter- Managed by [employer]         Anticoagulation Care Providers     Provider Role Specialty Phone number    Eric Pickett MD Referring Cardiovascular Disease [phone]

## 2022-05-31 ENCOUNTER — ANTICOAGULATION THERAPY VISIT (OUTPATIENT)
Dept: ANTICOAGULATION | Facility: CLINIC | Age: 72
End: 2022-05-31
Payer: MEDICARE

## 2022-05-31 DIAGNOSIS — I48.0 PAROXYSMAL ATRIAL FIBRILLATION (H): Primary | ICD-10-CM

## 2022-05-31 LAB — INR HOME MONITORING: 2.3 (ref 2–3)

## 2022-05-31 NOTE — PROGRESS NOTES
ANTICOAGULATION MANAGEMENT     Rakesh Samuels 71 year old male is on warfarin with therapeutic INR result. (Goal INR 2.0-3.0)    Recent labs: (last 7 days)     05/31/22  0000   INR 2.30       ASSESSMENT       Source(s): Chart Review and Patient/Caregiver Call       Warfarin doses taken: Warfarin taken as instructed    Diet: No new diet changes identified    New illness, injury, or hospitalization: No    Medication/supplement changes: None noted    Signs or symptoms of bleeding or clotting: No    Previous INR: Supratherapeutic    Additional findings: completed doxycycline 05/19/2022, reasonable to move warfarin dose back towards dose prior to admission and antibiotics       PLAN     Recommended plan for ongoing change(s) affecting INR     Dosing Instructions: Increase your warfarin dose (8.3% change) with next INR in 2 weeks       Summary  As of 5/31/2022    Full warfarin instructions:  2.5 mg every Tue, Sat; 5 mg all other days   Next INR check:               Telephone call with  Nanette, daughter who verbalizes understanding and agrees to plan    Patient to recheck with home meter    Education provided: Potential interaction between warfarin and doxycycline    Plan made per [facility] anticoagulation protocol    Zoraida Ugalde [facility]  Anticoagulation Clinic  5/31/2022    _______________________________________________________________________     Anticoagulation Episode Summary     Current INR goal:  2.0-3.0   TTR:  65.4 % (11.9 mo)   Target end date:  Indefinite   Send INR reminders to:  DAYANNA RIVERS    Indications    Paroxysmal atrial fibrillation (H) [I48.0]           Comments:  [employer] home meter- Managed by Exception         Anticoagulation Care Providers     Provider Role Specialty Phone number    Eric Pickett MD Referring Cardiovascular Disease [phone]

## 2022-06-15 ENCOUNTER — DOCUMENTATION ONLY (OUTPATIENT)
Dept: ANTICOAGULATION | Facility: CLINIC | Age: 72
End: 2022-06-15
Payer: MEDICARE

## 2022-06-15 DIAGNOSIS — I48.0 PAROXYSMAL ATRIAL FIBRILLATION (H): Primary | ICD-10-CM

## 2022-06-15 LAB — INR HOME MONITORING: 2.3 (ref 2–3)

## 2022-06-15 NOTE — PROGRESS NOTES
ANTICOAGULATION  MANAGEMENT-Home Monitor Managed by Aranza SWANSON Abril 71 year old male is on warfarin with therapeutic INR result. (Goal INR 2.0-3.0)    Recent labs: (last 7 days)     06/15/22  0000   INR 2.3         Previous INR was Therapeutic    Medication, diet, health changes since last INR:chart reviewed; none identified    Contacted within the last 12 weeks by phone on 5/31      NISHA     Rakesh was NOT contacted regarding therapeutic result today per home monitoring policy manage by exception agreement.   Current warfarin dose is to be continued:     Summary  As of 6/15/2022    Full warfarin instructions:  2.5 mg every Sat; 5 mg all other days   Next INR check:  6/29/2022           ?   Carole Salinas, RN  Anticoagulation Clinic  6/15/2022    _______________________________________________________________________     Anticoagulation Episode Summary     Current INR goal:  2.0-3.0   TTR:  65.4 % (11.9 mo)   Target end date:  Indefinite   Send INR reminders to:  DAYANNA RIVERS    Indications    Paroxysmal atrial fibrillation (H) [I48.0]           Comments:  [employer] home meter- Managed by Exception         Anticoagulation Care Providers     Provider Role Specialty Phone number    Eric Pickett MD Referring Cardiovascular Disease [phone]

## 2022-06-30 ENCOUNTER — DOCUMENTATION ONLY (OUTPATIENT)
Dept: ANTICOAGULATION | Facility: CLINIC | Age: 72
End: 2022-06-30

## 2022-06-30 DIAGNOSIS — I48.0 PAROXYSMAL ATRIAL FIBRILLATION (H): Primary | ICD-10-CM

## 2022-06-30 LAB — INR HOME MONITORING: 2.5 (ref 2–3)

## 2022-06-30 NOTE — PROGRESS NOTES
ANTICOAGULATION  MANAGEMENT-Home Monitor Managed by Aranza SWANSON Abril 71 year old male is on warfarin with therapeutic INR result. (Goal INR 2.0-3.0)    Recent labs: (last 7 days)     06/30/22  0000   INR 2.5         Previous INR was Therapeutic    Medication, diet, health changes since last INR:chart reviewed; none identified    Contacted within the last 12 weeks by phone on 05/31/2022      NISHA     Rakesh was NOT contacted regarding therapeutic result today per home monitoring policy manage by exception agreement.   Current warfarin dose is to be continued:     Summary  As of 6/30/2022    Full warfarin instructions:  2.5 mg every Sat; 5 mg all other days   Next INR check:  7/14/2022           ?   Shirley Cruz, RN  Anticoagulation Clinic  6/30/2022    _______________________________________________________________________     Anticoagulation Episode Summary     Current INR goal:  2.0-3.0   TTR:  65.4 % (11.9 mo)   Target end date:  Indefinite   Send INR reminders to:  DAYANNA RIVERS    Indications    Paroxysmal atrial fibrillation (H) [I48.0]           Comments:  [employer] home meter- Managed by Exception         Anticoagulation Care Providers     Provider Role Specialty Phone number    Eric Pickett MD Referring Cardiovascular Disease [phone]

## 2022-07-15 ENCOUNTER — DOCUMENTATION ONLY (OUTPATIENT)
Dept: ANTICOAGULATION | Facility: CLINIC | Age: 72
End: 2022-07-15

## 2022-07-15 DIAGNOSIS — I48.0 PAROXYSMAL ATRIAL FIBRILLATION (H): Primary | ICD-10-CM

## 2022-07-15 LAB — INR HOME MONITORING: 2.3 (ref 2–3)

## 2022-07-15 NOTE — PROGRESS NOTES
ANTICOAGULATION  MANAGEMENT-Home Monitor Managed by Aranza SWANSON Abril 71 year old male is on warfarin with therapeutic INR result. (Goal INR 2.0-3.0)    Recent labs: (last 7 days)     07/15/22  0000   INR 2.3         Previous INR was Therapeutic    Medication, diet, health changes since last INR:chart reviewed; none identified    Contacted within the last 12 weeks by phone on 5/31/22      NISHA     Rakesh was NOT contacted regarding therapeutic result today per home monitoring policy manage by exception agreement.   Current warfarin dose is to be continued:     Summary  As of 7/15/2022    Full warfarin instructions:  2.5 mg every Sat; 5 mg all other days   Next INR check:  7/29/2022           ?   Ruby Brody RN  Anticoagulation Clinic  7/15/2022    _______________________________________________________________________     Anticoagulation Episode Summary     Current INR goal:  2.0-3.0   TTR:  68.7 % (11.9 mo)   Target end date:  Indefinite   Send INR reminders to:  DAYANNA RIVERS    Indications    Paroxysmal atrial fibrillation (H) [I48.0]           Comments:  [employer] home meter- Managed by Exception         Anticoagulation Care Providers     Provider Role Specialty Phone number    Eric Pickett MD Referring Cardiovascular Disease [phone]

## 2022-07-17 DIAGNOSIS — I48.0 PAROXYSMAL ATRIAL FIBRILLATION (H): ICD-10-CM

## 2022-07-19 ENCOUNTER — TRANSFERRED RECORDS (OUTPATIENT)
Dept: HEALTH INFORMATION MANAGEMENT | Facility: CLINIC | Age: 72
End: 2022-07-19

## 2022-07-19 ENCOUNTER — LAB REQUISITION (OUTPATIENT)
Dept: LAB | Facility: CLINIC | Age: 72
End: 2022-07-19
Payer: MEDICARE

## 2022-07-19 DIAGNOSIS — E78.5 HYPERLIPIDEMIA, UNSPECIFIED: ICD-10-CM

## 2022-07-19 DIAGNOSIS — E11.9 TYPE 2 DIABETES MELLITUS WITHOUT COMPLICATIONS (H): ICD-10-CM

## 2022-07-19 LAB
ALBUMIN SERPL BCG-MCNC: 4.3 G/DL (ref 3.5–5.2)
ALP SERPL-CCNC: 79 U/L (ref 40–129)
ALT SERPL W P-5'-P-CCNC: 16 U/L (ref 10–50)
ANION GAP SERPL CALCULATED.3IONS-SCNC: 15 MMOL/L (ref 7–15)
AST SERPL W P-5'-P-CCNC: 14 U/L (ref 10–50)
BILIRUB SERPL-MCNC: 0.3 MG/DL
BUN SERPL-MCNC: 17.7 MG/DL (ref 8–23)
CALCIUM SERPL-MCNC: 9 MG/DL (ref 8.8–10.2)
CHLORIDE SERPL-SCNC: 104 MMOL/L (ref 98–107)
CHOLEST SERPL-MCNC: 149 MG/DL
CREAT SERPL-MCNC: 0.86 MG/DL (ref 0.67–1.17)
DEPRECATED HCO3 PLAS-SCNC: 22 MMOL/L (ref 22–29)
GFR SERPL CREATININE-BSD FRML MDRD: >90 ML/MIN/1.73M2
GLUCOSE SERPL-MCNC: 232 MG/DL (ref 70–99)
HDLC SERPL-MCNC: 45 MG/DL
LDLC SERPL CALC-MCNC: 71 MG/DL
NONHDLC SERPL-MCNC: 104 MG/DL
PHQ9 SCORE: 10
POTASSIUM SERPL-SCNC: 4.1 MMOL/L (ref 3.4–5.3)
PROT SERPL-MCNC: 6.7 G/DL (ref 6.4–8.3)
SODIUM SERPL-SCNC: 141 MMOL/L (ref 136–145)
TRIGL SERPL-MCNC: 163 MG/DL

## 2022-07-19 PROCEDURE — 80053 COMPREHEN METABOLIC PANEL: CPT | Mod: ORL | Performed by: FAMILY MEDICINE

## 2022-07-19 PROCEDURE — 80061 LIPID PANEL: CPT | Mod: ORL | Performed by: FAMILY MEDICINE

## 2022-07-25 DIAGNOSIS — I48.0 PAROXYSMAL ATRIAL FIBRILLATION (H): ICD-10-CM

## 2022-07-25 RX ORDER — WARFARIN SODIUM 5 MG/1
TABLET ORAL
Qty: 120 TABLET | Refills: 0 | OUTPATIENT
Start: 2022-07-25

## 2022-07-25 RX ORDER — WARFARIN SODIUM 5 MG/1
TABLET ORAL
Qty: 28 TABLET | Refills: 0 | Status: SHIPPED | OUTPATIENT
Start: 2022-07-25 | End: 2022-08-09

## 2022-07-25 NOTE — TELEPHONE ENCOUNTER
ANTICOAGULATION MANAGEMENT:  Medication Refill    Anticoagulation Summary  As of 7/15/2022    Warfarin maintenance plan:  2.5 mg (5 mg x 0.5) every Sat; 5 mg (5 mg x 1) all other days   Next INR check:  7/29/2022   Target end date:  Indefinite    Indications    Paroxysmal atrial fibrillation (H) [I48.0]             Anticoagulation Care Providers     Provider Role Specialty Phone number    Eric Pickett MD Referring Cardiovascular Disease [phone]          Visit with referring provider/group within last year: No, last visit date: 02/12/2021    [facility] referral signed within last year: Yes    Rakesh does NOT meet all criteria for refill: Office visit with referring provider's group was >= 1 year ago. 30 day phil fill approved; patient notified to schedule per [facility] protocol. Spoke with daughter Nanette.     Vincent Sky RN  Anticoagulation Clinic

## 2022-08-04 ENCOUNTER — DOCUMENTATION ONLY (OUTPATIENT)
Dept: ANTICOAGULATION | Facility: CLINIC | Age: 72
End: 2022-08-04

## 2022-08-04 DIAGNOSIS — I48.0 PAROXYSMAL ATRIAL FIBRILLATION (H): Primary | ICD-10-CM

## 2022-08-04 LAB — INR HOME MONITORING: 2.3 (ref 2–3)

## 2022-08-04 NOTE — PROGRESS NOTES
ANTICOAGULATION  MANAGEMENT-Home Monitor Managed by Aranza SWANSON Abril 71 year old male is on warfarin with therapeutic INR result. (Goal INR 2.0-3.0)    Recent labs: (last 7 days)     08/04/22  0000   INR 2.3         Previous INR was Therapeutic    Medication, diet, health changes since last INR:chart reviewed; none identified    Contacted within the last 12 weeks by phone on 5/31/22      NISHA     Rakesh was NOT contacted regarding therapeutic result today per home monitoring policy manage by exception agreement.   Current warfarin dose is to be continued:     Summary  As of 8/4/2022    Full warfarin instructions:  2.5 mg every Sat; 5 mg all other days   Next INR check:  8/18/2022           ?   Isabela Fonseca, RN  Anticoagulation Clinic  8/4/2022    _______________________________________________________________________     Anticoagulation Episode Summary     Current INR goal:  2.0-3.0   TTR:  74.3 % (11.9 mo)   Target end date:  Indefinite   Send INR reminders to:  DAYANNA RIVERS    Indications    Paroxysmal atrial fibrillation (H) [I48.0]           Comments:  [employer] home meter- Managed by Exception         Anticoagulation Care Providers     Provider Role Specialty Phone number    Eric Pickett MD Referring Cardiovascular Disease [phone]

## 2022-08-09 ENCOUNTER — TELEPHONE (OUTPATIENT)
Dept: CARDIOLOGY | Facility: CLINIC | Age: 72
End: 2022-08-09

## 2022-08-09 DIAGNOSIS — I48.0 PAROXYSMAL ATRIAL FIBRILLATION (H): ICD-10-CM

## 2022-08-09 RX ORDER — WARFARIN SODIUM 5 MG/1
TABLET ORAL
Qty: 78 TABLET | Refills: 0 | Status: SHIPPED | OUTPATIENT
Start: 2022-08-09 | End: 2022-11-23

## 2022-08-09 NOTE — TELEPHONE ENCOUNTER
ANTICOAGULATION MANAGEMENT:  Medication Refill    Anticoagulation Summary  As of 8/4/2022    Warfarin maintenance plan:  2.5 mg (5 mg x 0.5) every Sat; 5 mg (5 mg x 1) all other days   Next INR check:  8/18/2022   Target end date:  Indefinite    Indications    Paroxysmal atrial fibrillation (H) [I48.0]             Anticoagulation Care Providers     Provider Role Specialty Phone number    Eric Pickett MD Referring Cardiovascular Disease [phone]          Visit with referring provider/group within last year: No, last visit date: 2/12/21 - however, pt has scheduled appt soonest available.     [facility] referral signed within last year: Yes    Rakesh does NOT meet all criteria for refill: Office visit with referring provider's group was >= 1 year ago.   90 day phil fisher approved - pt scheduled to see Dr. Pickett 10/6/22    Shanta Guidry RN  Anticoagulation Clinic

## 2022-08-09 NOTE — TELEPHONE ENCOUNTER
[facility] Call Center    Phone Message    May a detailed message be left on voicemail: yes     Reason for Call: Medication Refill Request    Has the patient contacted the pharmacy for the refill? Yes   Name of medication being requested: warfarin ANTICOAGULANT (COUMADIN) 5 MG tablet  Provider who prescribed the medication: Dr Pickett  Pharmacy: [facility] [address]  Date medication is needed: 08.17.22    Daughter Nanette called and scheduled next available with Dr Pickett because the pharmacy would only give them a few warfarin tablets. Rakesh will need a refill to get him to his appointment on 10.06.22. Please send a refill. Thank you!    Action Taken: Other: Cardiology    Travel Screening: Not Applicable

## 2022-08-20 LAB — INR HOME MONITORING: 2.1 (ref 2–3)

## 2022-08-22 ENCOUNTER — DOCUMENTATION ONLY (OUTPATIENT)
Dept: ANTICOAGULATION | Facility: CLINIC | Age: 72
End: 2022-08-22

## 2022-08-22 DIAGNOSIS — I48.0 PAROXYSMAL ATRIAL FIBRILLATION (H): Primary | ICD-10-CM

## 2022-08-22 NOTE — PROGRESS NOTES
ANTICOAGULATION  MANAGEMENT-Home Monitor Managed by Aranza SWANSON Abril 71 year old male is on warfarin with therapeutic INR result. (Goal INR 2.0-3.0)    Recent labs: (last 7 days)     08/20/22  0000   INR 2.1         Previous INR was Therapeutic    Medication, diet, health changes since last INR:chart reviewed; none identified    Contacted within the last 12 weeks by phone on 5/31      NISHA     Rakesh was NOT contacted regarding therapeutic result today per home monitoring policy manage by exception agreement.   Current warfarin dose is to be continued:     Summary  As of 8/22/2022    Full warfarin instructions:  2.5 mg every Sat; 5 mg all other days   Next INR check:             ?   Carole Salinas, RN  Anticoagulation Clinic  8/22/2022    _______________________________________________________________________     Anticoagulation Episode Summary     Current INR goal:  2.0-3.0   TTR:  76.5 % (11.8 mo)   Target end date:  Indefinite   Send INR reminders to:  DAYANNA RIVERS    Indications    Paroxysmal atrial fibrillation (H) [I48.0]           Comments:  [employer] home meter- Managed by Exception         Anticoagulation Care Providers     Provider Role Specialty Phone number    Eric Pickett MD Referring Cardiovascular Disease [phone]

## 2022-08-31 LAB — RETINOPATHY: NORMAL

## 2022-09-09 ENCOUNTER — DOCUMENTATION ONLY (OUTPATIENT)
Dept: ANTICOAGULATION | Facility: CLINIC | Age: 72
End: 2022-09-09

## 2022-09-09 DIAGNOSIS — I48.0 PAROXYSMAL ATRIAL FIBRILLATION (H): Primary | ICD-10-CM

## 2022-09-09 LAB — INR HOME MONITORING: 2.3 (ref 2–3)

## 2022-09-09 NOTE — PROGRESS NOTES
ANTICOAGULATION MANAGEMENT     Rakesh Samuels 72 year old male is on warfarin with therapeutic INR result. (Goal INR 2.0-3.0)    Recent labs: (last 7 days)     09/09/22  0000   INR 2.3       ASSESSMENT       Source(s): Chart Review and Patient/Caregiver Call       Warfarin doses taken: Warfarin taken as instructed    Diet: No new diet changes identified    New illness, injury, or hospitalization: No    Medication/supplement changes: None noted    Signs or symptoms of bleeding or clotting: No    Previous INR: Therapeutic last 2(+) visits    Additional findings: None       PLAN     Recommended plan for no diet, medication or health factor changes affecting INR     Dosing Instructions: Continue your current warfarin dose with next INR in 2 weeks       Summary  As of 9/9/2022    Full warfarin instructions:  2.5 mg every Sat; 5 mg all other days   Next INR check:  9/23/2022             Telephone call with  Nanette, daughter, who verbalizes understanding and agrees to plan    Patient to recheck with home meter    Education provided: Please call back if any changes to your diet, medications or how you've been taking warfarin    Plan made per [facility] anticoagulation protocol    Heather Yanez RN  Anticoagulation Clinic  9/9/2022    _______________________________________________________________________     Anticoagulation Episode Summary     Current INR goal:  2.0-3.0   TTR:  76.7 % (11.9 mo)   Target end date:  Indefinite   Send INR reminders to:  DAYANNA RIVERS    Indications    Paroxysmal atrial fibrillation (H) [I48.0]           Comments:  [employer] home meter- Managed by Exception         Anticoagulation Care Providers     Provider Role Specialty Phone number    Eric Pickett MD Referring Cardiovascular Disease [phone]

## 2022-09-09 NOTE — PROGRESS NOTES
ANTICOAGULATION     Rakesh Samuels is overdue for INR check.      Left message reminding patient to check INR with their home meter and call results to the home monitoring company as soon as possible.     Heather Yanez RN

## 2022-09-29 ENCOUNTER — DOCUMENTATION ONLY (OUTPATIENT)
Dept: ANTICOAGULATION | Facility: CLINIC | Age: 72
End: 2022-09-29

## 2022-09-29 DIAGNOSIS — I48.0 PAROXYSMAL ATRIAL FIBRILLATION (H): Primary | ICD-10-CM

## 2022-09-29 LAB — INR HOME MONITORING: 2.3 (ref 2–3)

## 2022-09-29 NOTE — PROGRESS NOTES
ANTICOAGULATION  MANAGEMENT-Home Monitor Managed by Aranza SWANSON Abril 72 year old male is on warfarin with therapeutic INR result. (Goal INR 2.0-3.0)    Recent labs: (last 7 days)     09/29/22  0000   INR 2.3         Previous INR was Therapeutic    Medication, diet, health changes since last INR:chart reviewed; none identified    Contacted within the last 12 weeks by phone on 09/09/2022      NISHA     Rakesh was NOT contacted regarding therapeutic result today per home monitoring policy manage by exception agreement.   Current warfarin dose is to be continued:     Summary  As of 9/29/2022    Full warfarin instructions:  2.5 mg every Sat; 5 mg all other days   Next INR check:  10/13/2022           ?   Shirley Cruz RN  Anticoagulation Clinic  9/29/2022    _______________________________________________________________________     Anticoagulation Episode Summary     Current INR goal:  2.0-3.0   TTR:  76.7 % (11.9 mo)   Target end date:  Indefinite   Send INR reminders to:  [facility] HOME MONITORING    Indications    Paroxysmal atrial fibrillation (H) [I48.0]           Comments:  [employer] home meter- Managed by Exception         Anticoagulation Care Providers     Provider Role Specialty Phone number    Eric Pickett MD Referring Cardiovascular Disease [phone]

## 2022-10-13 ENCOUNTER — DOCUMENTATION ONLY (OUTPATIENT)
Dept: ANTICOAGULATION | Facility: CLINIC | Age: 72
End: 2022-10-13

## 2022-10-13 DIAGNOSIS — I48.0 PAROXYSMAL ATRIAL FIBRILLATION (H): Primary | ICD-10-CM

## 2022-10-13 LAB — INR HOME MONITORING: 2.1 (ref 2–3)

## 2022-10-13 NOTE — PROGRESS NOTES
ANTICOAGULATION  MANAGEMENT-Home Monitor Managed by Aranza SWANSON Abril 72 year old male is on warfarin with therapeutic INR result. (Goal INR 2.0-3.0)    Recent labs: (last 7 days)     10/13/22  0000   INR 2.1         Previous INR was Therapeutic    Medication, diet, health changes since last INR:chart reviewed; none identified    Contacted within the last 12 weeks by phone on 9/9/22      NISHA     Rakesh was NOT contacted regarding therapeutic result today per home monitoring policy manage by exception agreement.   Current warfarin dose is to be continued:     Summary  As of 10/13/2022    Full warfarin instructions:  2.5 mg every Sat; 5 mg all other days   Next INR check:  10/27/2022           ?   Isabela Fonseca, RN  Anticoagulation Clinic  10/13/2022    _______________________________________________________________________     Anticoagulation Episode Summary     Current INR goal:  2.0-3.0   TTR:  78.2 % (11.9 mo)   Target end date:  Indefinite   Send INR reminders to:  ANT[facility]AG HOME MONITORING    Indications    Paroxysmal atrial fibrillation (H) [I48.0]           Comments:  [employer] home meter- Managed by Exception         Anticoagulation Care Providers     Provider Role Specialty Phone number    Eric Pickett MD Referring Cardiovascular Disease [phone]

## 2022-10-25 ENCOUNTER — DOCUMENTATION ONLY (OUTPATIENT)
Dept: ANTICOAGULATION | Facility: CLINIC | Age: 72
End: 2022-10-25

## 2022-10-25 DIAGNOSIS — I48.0 PAROXYSMAL ATRIAL FIBRILLATION (H): Primary | ICD-10-CM

## 2022-10-25 LAB — INR HOME MONITORING: 2.1 (ref 2–3)

## 2022-10-25 NOTE — PROGRESS NOTES
ANTICOAGULATION  MANAGEMENT-Home Monitor Managed by Aranza SWANSON Abril 72 year old male is on warfarin with therapeutic INR result. (Goal INR 2.0-3.0)    Recent labs: (last 7 days)     10/25/22  0000   INR 2.1         Previous INR was Therapeutic    Medication, diet, health changes since last INR:chart reviewed; none identified    Contacted within the last 12 weeks by phone on 9/9/22      NISHA     Rakesh was NOT contacted regarding therapeutic result today per home monitoring policy manage by exception agreement.   Current warfarin dose is to be continued:     Summary  As of 10/25/2022    Full warfarin instructions:  2.5 mg every Sat; 5 mg all other days; Starting 10/25/2022   Next INR check:  11/8/2022           ?   Isabela Fonseca RN  Anticoagulation Clinic  10/25/2022    _______________________________________________________________________     Anticoagulation Episode Summary     Current INR goal:  2.0-3.0   TTR:  81.6 % (11.9 mo)   Target end date:  Indefinite   Send INR reminders to:  [facility] HOME MONITORING    Indications    Paroxysmal atrial fibrillation (H) [I48.0]           Comments:  [employer] home meter- Managed by Exception         Anticoagulation Care Providers     Provider Role Specialty Phone number    Eric Pickett MD Referring Cardiovascular Disease [phone]

## 2022-11-07 ENCOUNTER — VIRTUAL VISIT (OUTPATIENT)
Dept: CARDIOLOGY | Facility: CLINIC | Age: 72
End: 2022-11-07
Payer: MEDICARE

## 2022-11-07 DIAGNOSIS — I48.0 PAROXYSMAL ATRIAL FIBRILLATION (H): Primary | ICD-10-CM

## 2022-11-07 PROCEDURE — 99214 OFFICE O/P EST MOD 30 MIN: CPT | Mod: GT | Performed by: INTERNAL MEDICINE

## 2022-11-07 NOTE — PROGRESS NOTES
"    The patient has been notified of following:     \"This video visit will be conducted via a call between you and your physician/provider. We have found that certain health care needs can be provided without the need for an in-person physical exam.  This service lets us provide the care you need with a video conversation.  If a prescription is necessary we can send it directly to your pharmacy.  If lab work is needed we can place an order for that and you can then stop by our lab to have the test done at a later time.      Patient has given verbal consent to a Video visit? Yes    HEART CARE VIDEO ENCOUNTER        The patient has chosen to have the visit conducted as a video visit, to reduce risk of exposure given the current status of Coronavirus in our community. This video visit is being conducted via a call between the patient and physician/provider. Health care needs are being provided without a physical exam.     Assessment/Recommendations   Assessment  1. Paroxysmal atrial fibrillation - asymptomatic. On warfarin for stroke prevention. CHADS2-vasc score of 3 (age, DMII, vascular disease [coronary calcifications and aortoiliac atheromatous calcification]).  2. DMII - on oral therapy  3. 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 He had two falls over the past year with associated syncope. He is at increased risk for bleeding complications from anticoagulation.  4. Coronary calcification - identified on CTA PE study in 2019. No symptoms of angina, but further clarifying the extent of his CAD could have several implications for treatment (ie intensification of statin therapy, risk for stroke from afib and qualification for watchman procedure).     Plan:  1. Continue medications without changes  2. Could consider DOMENICO closure if he desires. He will consider it and let us know if he wants to proceed..  3. Follow up in 1 year or sooner if " needed.      Follow Up Plan:   I have reviewed the note as documented.  This accurately captures the substance of my conversation with the patient.    Total time of video between patient and provider was 8 minutes   Start Time: 1032   Stop Time: 1040    Originating Location (pt. Location): Home    Distant Location (provider location):  [facility] HEART [facility]     Mode of Communication:  Video Conference via [email or web address]       History of Present Illness/Subjective    Rakesh Samuels is a 72 year old male who is being evaluated via a billable video visit and has consented to a video visit. Rakesh Samuels has a history of atrial fibrillation and diabetes.  He has historically been asymptomatic from his atrial fibrillation.  He currently denies any active cardiorespiratory symptoms.  He does have some concerns about his balance.  He is taking warfarin for stroke prevention for A. fib and is generally tolerating this.  He states that his INR is routinely within the target therapeutic range.  He denies any specific cardiac complaints today.      I have reviewed and updated the patient's Past Medical History, Social History, Family History and Medication List.     Physical Examination performed via live video encounter Review of Systems   General Appearance:   no distress, overweight body habitus, upright.   Chest/Lungs:   No audible wheezing equal chest wall expansion. Non labored breathing.  No cough.   Skin: no xanthelasma, normal skin color. No evidence of facial lacerations.      Psychiatric: alert and oriented x3, calm                                               Medical History  Surgical History Family History Social History   Past Medical History:   Diagnosis Date     A-fib (H)      Acute hemodialysis encounter (H)     Due to CHIDI     Acute kidney injury (H)      Acute respiratory failure with hypoxemia (H)      Adrenal incidentaloma (H)      Asbestosis (H)      ATN (acute tubular necrosis) (H)       Atrophy of right kidney      Basal cell carcinoma      BPH without urinary obstruction      Cellulitis     Left foot     Cholelithiasis      Depression with anxiety      Diabetes mellitus, type 2 (H) 4/12/2020     Esophagitis      Gastritis      Glaucoma      Hematemesis, presence of nausea not specified      Hyperkalemia      Hyperlipemia      Kidney stone      Lactic acidosis      Metabolic acidosis      Metformin overdose of undetermined intent      Paroxysmal atrial fibrillation (H) 2/18/2020     Pericardial effusion      PTSD (post-traumatic stress disorder)      Seasonal allergies      Sepsis due to urinary tract infection (H)      Shock circulatory (H)      SIRS (systemic inflammatory response syndrome) (H)      Sleep apnea     uses a machine at night.      Upper GI bleed     Past Surgical History:   Procedure Laterality Date     EYE SURGERY      congenital ptosis right upper lid     HC CYSTOSCOPY,INSERT URETERAL STENT Left 4/12/2020    Procedure: CYSTOSCOPY, WITH URETERAL STENT INSERTION;  Surgeon: Christopher Yates MD;  Location: [facility] OR;  Service: Urology     MT ESOPHAGOGASTRODUODENOSCOPY TRANSORAL DIAGNOSTIC N/A 4/13/2020    Procedure: ESOPHAGOGASTRODUODENOSCOPY (EGD);  Surgeon: Robert Rico MD;  Location: [facility] GI;  Service: Gastroenterology     REPLACEMENT TOTAL KNEE Left     Family History   Problem Relation Age of Onset     Diabetes Mother       Social History     Socioeconomic History     Marital status:      Spouse name: Not on file     Number of children: Not on file     Years of education: Not on file     Highest education level: Not on file   Occupational History     Not on file   Tobacco Use     Smoking status: Never     Smokeless tobacco: Never   Substance and Sexual Activity     Alcohol use: Not Currently     Drug use: Never     Sexual activity: Not Currently   Other Topics Concern     Not on file   Social History Narrative     Not on file     Social Determinants of  Health     Financial Resource Strain: Not on file   Food Insecurity: Not on file   Transportation Needs: Not on file   Physical Activity: Not on file   Stress: Not on file   Social Connections: Not on file   Intimate Partner Violence: Not on file   Housing Stability: Not on file          Medications  Allergies   Current Outpatient Medications   Medication Sig Dispense Refill     acetaminophen (TYLENOL) 500 MG tablet [ACETAMINOPHEN (TYLENOL) 500 MG TABLET] Take 500 mg by mouth every 6 (six) hours as needed for pain.       amoxicillin (AMOXIL) 500 MG capsule [AMOXICILLIN (AMOXIL) 500 MG CAPSULE] Take 2,000 mg by mouth once as needed (Prior to dental work).       ARIPiprazole (ABILIFY) 2 MG tablet [ARIPIPRAZOLE (ABILIFY) 2 MG TABLET] Take 2 mg by mouth at bedtime.        aspirin 81 mg chewable tablet Take 81 mg by mouth daily       atorvastatin (LIPITOR) 10 MG tablet [ATORVASTATIN (LIPITOR) 10 MG TABLET] Take 10 mg by mouth at bedtime.  1     brimonidine (ALPHAGAN) 0.2 % ophthalmic solution [BRIMONIDINE (ALPHAGAN) 0.2 % OPHTHALMIC SOLUTION] Administer 1 drop to both eyes 2 (two) times a day.        dorzolamide-timolol (COSOPT) 22.3-6.8 mg/mL ophthalmic solution [DORZOLAMIDE-TIMOLOL (COSOPT) 22.3-6.8 MG/ML OPHTHALMIC SOLUTION] Administer 1 drop to both eyes 2 (two) times a day.   3     fluvoxaMINE (LUVOX) 50 MG tablet [FLUVOXAMINE (LUVOX) 50 MG TABLET] Take 50 mg by mouth at bedtime.        latanoprostene bunod 0.024 % Drop Place 1 drop into both eyes At Bedtime       levomefolate calcium (L-METHYLFOLATE ORAL) [LEVOMEFOLATE CALCIUM (L-METHYLFOLATE ORAL)] Take 1.25 mg by mouth daily.        metFORMIN (GLUCOPHAGE) 1000 MG tablet Take 1,000 mg by mouth 2 times daily (with meals)       multivitamin therapeutic tablet [MULTIVITAMIN THERAPEUTIC TABLET] Take 1 tablet by mouth daily.       netarsudiL (RHOPRESSA) 0.02 % Drop Place 1 drop into the right eye every evening       pioglitazone (ACTOS) 30 MG tablet Take 30 mg by mouth  daily       warfarin ANTICOAGULANT (COUMADIN) 5 MG tablet Take 2.5mg on Saturdays; 5mg all other days OR as directed by INR clinic. 78 tablet 0    No Known Allergies      Lab Results    Chemistry/lipid CBC Cardiac Enzymes/BNP/TSH/INR   Lab Results   Component Value Date    CHOL 149 07/19/2022    HDL 45 07/19/2022    TRIG 163 (H) 07/19/2022    BUN 17.7 07/19/2022     07/19/2022    CO2 22 07/19/2022    Lab Results   Component Value Date    WBC 6.2 05/02/2022    HGB 12.7 (L) 05/02/2022    HCT 39.8 (L) 05/02/2022    MCV 99 05/02/2022     05/02/2022    Lab Results   Component Value Date    TROPONINI <0.01 05/03/2022    BNP 53 04/30/2022    TSH 3.68 04/30/2022    INR 2.1 10/25/2022

## 2022-11-07 NOTE — LETTER
"11/7/2022    Michelet Restrepo MD  [address] [place] [address]    RE: Rakesh Samuels       Dear Colleague,     I had the pleasure of seeing Rakesh Samuels in the [facility].      The patient has been notified of following:     \"This video visit will be conducted via a call between you and your physician/provider. We have found that certain health care needs can be provided without the need for an in-person physical exam.  This service lets us provide the care you need with a video conversation.  If a prescription is necessary we can send it directly to your pharmacy.  If lab work is needed we can place an order for that and you can then stop by our lab to have the test done at a later time.      Patient has given verbal consent to a Video visit? Yes    HEART CARE VIDEO ENCOUNTER        The patient has chosen to have the visit conducted as a video visit, to reduce risk of exposure given the current status of Coronavirus in our community. This video visit is being conducted via a call between the patient and physician/provider. Health care needs are being provided without a physical exam.     Assessment/Recommendations   Assessment  1. Paroxysmal atrial fibrillation - asymptomatic. On warfarin for stroke prevention. CHADS2-vasc score of 3 (age, DMII, vascular disease [coronary calcifications and aortoiliac atheromatous calcification]).  2. DMII - on oral therapy  3. 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 He had two falls over the past year with associated syncope. He is at increased risk for bleeding complications from anticoagulation.  4. Coronary calcification - identified on CTA PE study in 2019. No symptoms of angina, but further clarifying the extent of his CAD could have several implications for treatment (ie intensification of statin therapy, risk for stroke from afib and qualification for watchman procedure). "     Plan:  1. Continue medications without changes  2. Could consider DOMENICO closure if he desires. He will consider it and let us know if he wants to proceed..  3. Follow up in 1 year or sooner if needed.      Follow Up Plan:   I have reviewed the note as documented.  This accurately captures the substance of my conversation with the patient.    Total time of video between patient and provider was 8 minutes   Start Time: 1032   Stop Time: 1040    Originating Location (pt. Location): Home    Distant Location (provider location):  [facility] HEART [facility]     Mode of Communication:  Video Conference via Nippo       History of Present Illness/Subjective    Rakesh Samuels is a 72 year old male who is being evaluated via a billable video visit and has consented to a video visit. Rakesh Samuels has a history of atrial fibrillation and diabetes.  He has historically been asymptomatic from his atrial fibrillation.  He currently denies any active cardiorespiratory symptoms.  He does have some concerns about his balance.  He is taking warfarin for stroke prevention for A. fib and is generally tolerating this.  He states that his INR is routinely within the target therapeutic range.  He denies any specific cardiac complaints today.      I have reviewed and updated the patient's Past Medical History, Social History, Family History and Medication List.     Physical Examination performed via live video encounter Review of Systems   General Appearance:   no distress, overweight body habitus, upright.   Chest/Lungs:   No audible wheezing equal chest wall expansion. Non labored breathing.  No cough.   Skin: no xanthelasma, normal skin color. No evidence of facial lacerations.      Psychiatric: alert and oriented x3, calm                                               Medical History  Surgical History Family History Social History   Past Medical History:   Diagnosis Date     A-fib (H)      Acute hemodialysis encounter  (H)     Due to CHIDI     Acute kidney injury (H)      Acute respiratory failure with hypoxemia (H)      Adrenal incidentaloma (H)      Asbestosis (H)      ATN (acute tubular necrosis) (H)      Atrophy of right kidney      Basal cell carcinoma      BPH without urinary obstruction      Cellulitis     Left foot     Cholelithiasis      Depression with anxiety      Diabetes mellitus, type 2 (H) 4/12/2020     Esophagitis      Gastritis      Glaucoma      Hematemesis, presence of nausea not specified      Hyperkalemia      Hyperlipemia      Kidney stone      Lactic acidosis      Metabolic acidosis      Metformin overdose of undetermined intent      Paroxysmal atrial fibrillation (H) 2/18/2020     Pericardial effusion      PTSD (post-traumatic stress disorder)      Seasonal allergies      Sepsis due to urinary tract infection (H)      Shock circulatory (H)      SIRS (systemic inflammatory response syndrome) (H)      Sleep apnea     uses a machine at night.      Upper GI bleed     Past Surgical History:   Procedure Laterality Date     EYE SURGERY      congenital ptosis right upper lid     HC CYSTOSCOPY,INSERT URETERAL STENT Left 4/12/2020    Procedure: CYSTOSCOPY, WITH URETERAL STENT INSERTION;  Surgeon: Christopher Yates MD;  Location: [facility] OR;  Service: Urology     AK ESOPHAGOGASTRODUODENOSCOPY TRANSORAL DIAGNOSTIC N/A 4/13/2020    Procedure: ESOPHAGOGASTRODUODENOSCOPY (EGD);  Surgeon: Robert Rico MD;  Location: [facility] GI;  Service: Gastroenterology     REPLACEMENT TOTAL KNEE Left     Family History   Problem Relation Age of Onset     Diabetes Mother       Social History     Socioeconomic History     Marital status:      Spouse name: Not on file     Number of children: Not on file     Years of education: Not on file     Highest education level: Not on file   Occupational History     Not on file   Tobacco Use     Smoking status: Never     Smokeless tobacco: Never   Substance and Sexual Activity      Alcohol use: Not Currently     Drug use: Never     Sexual activity: Not Currently   Other Topics Concern     Not on file   Social History Narrative     Not on file     Social Determinants of Health     Financial Resource Strain: Not on file   Food Insecurity: Not on file   Transportation Needs: Not on file   Physical Activity: Not on file   Stress: Not on file   Social Connections: Not on file   Intimate Partner Violence: Not on file   Housing Stability: Not on file          Medications  Allergies   Current Outpatient Medications   Medication Sig Dispense Refill     acetaminophen (TYLENOL) 500 MG tablet [ACETAMINOPHEN (TYLENOL) 500 MG TABLET] Take 500 mg by mouth every 6 (six) hours as needed for pain.       amoxicillin (AMOXIL) 500 MG capsule [AMOXICILLIN (AMOXIL) 500 MG CAPSULE] Take 2,000 mg by mouth once as needed (Prior to dental work).       ARIPiprazole (ABILIFY) 2 MG tablet [ARIPIPRAZOLE (ABILIFY) 2 MG TABLET] Take 2 mg by mouth at bedtime.        aspirin 81 mg chewable tablet Take 81 mg by mouth daily       atorvastatin (LIPITOR) 10 MG tablet [ATORVASTATIN (LIPITOR) 10 MG TABLET] Take 10 mg by mouth at bedtime.  1     brimonidine (ALPHAGAN) 0.2 % ophthalmic solution [BRIMONIDINE (ALPHAGAN) 0.2 % OPHTHALMIC SOLUTION] Administer 1 drop to both eyes 2 (two) times a day.        dorzolamide-timolol (COSOPT) 22.3-6.8 mg/mL ophthalmic solution [DORZOLAMIDE-TIMOLOL (COSOPT) 22.3-6.8 MG/ML OPHTHALMIC SOLUTION] Administer 1 drop to both eyes 2 (two) times a day.   3     fluvoxaMINE (LUVOX) 50 MG tablet [FLUVOXAMINE (LUVOX) 50 MG TABLET] Take 50 mg by mouth at bedtime.        latanoprostene bunod 0.024 % Drop Place 1 drop into both eyes At Bedtime       levomefolate calcium (L-METHYLFOLATE ORAL) [LEVOMEFOLATE CALCIUM (L-METHYLFOLATE ORAL)] Take 1.25 mg by mouth daily.        metFORMIN (GLUCOPHAGE) 1000 MG tablet Take 1,000 mg by mouth 2 times daily (with meals)       multivitamin therapeutic tablet [MULTIVITAMIN  THERAPEUTIC TABLET] Take 1 tablet by mouth daily.       netarsudiL (RHOPRESSA) 0.02 % Drop Place 1 drop into the right eye every evening       pioglitazone (ACTOS) 30 MG tablet Take 30 mg by mouth daily       warfarin ANTICOAGULANT (COUMADIN) 5 MG tablet Take 2.5mg on Saturdays; 5mg all other days OR as directed by INR clinic. 78 tablet 0    No Known Allergies      Lab Results    Chemistry/lipid CBC Cardiac Enzymes/BNP/TSH/INR   Lab Results   Component Value Date    CHOL 149 07/19/2022    HDL 45 07/19/2022    TRIG 163 (H) 07/19/2022    BUN 17.7 07/19/2022     07/19/2022    CO2 22 07/19/2022    Lab Results   Component Value Date    WBC 6.2 05/02/2022    HGB 12.7 (L) 05/02/2022    HCT 39.8 (L) 05/02/2022    MCV 99 05/02/2022     05/02/2022    Lab Results   Component Value Date    TROPONINI <0.01 05/03/2022    BNP 53 04/30/2022    TSH 3.68 04/30/2022    INR 2.1 10/25/2022              Thank you for allowing me to participate in the care of your patient.      Sincerely,     Eric Pickett MD     [facility]  cc:   Referred Self,

## 2022-11-12 LAB — INR HOME MONITORING: 2.4 (ref 2–3)

## 2022-11-14 ENCOUNTER — DOCUMENTATION ONLY (OUTPATIENT)
Dept: ANTICOAGULATION | Facility: CLINIC | Age: 72
End: 2022-11-14

## 2022-11-14 DIAGNOSIS — I48.0 PAROXYSMAL ATRIAL FIBRILLATION (H): Primary | ICD-10-CM

## 2022-11-14 NOTE — PROGRESS NOTES
ANTICOAGULATION  MANAGEMENT-Home Monitor Managed by Aranza SWANSON Abril 72 year old male is on warfarin with therapeutic INR result. (Goal INR 2.0-3.0)    Recent labs: (last 7 days)     11/12/22  0000   INR 2.4         Previous INR was Therapeutic    Medication, diet, health changes since last INR:chart reviewed; none identified    Contacted within the last 12 weeks by phone on 9/9/22      NISHA     Rakesh was NOT contacted regarding therapeutic result today per home monitoring policy manage by exception agreement.   Current warfarin dose is to be continued:     Summary  As of 11/14/2022    Full warfarin instructions:  2.5 mg every Sat; 5 mg all other days; Starting 11/14/2022   Next INR check:  11/28/2022           ?   Ruby Brody RN  Anticoagulation Clinic  11/14/2022    _______________________________________________________________________     Anticoagulation Episode Summary     Current INR goal:  2.0-3.0   TTR:  82.5 % (11.8 mo)   Target end date:  Indefinite   Send INR reminders to:  ANTICOAG HOME MONITORING    Indications    Paroxysmal atrial fibrillation (H) [I48.0]           Comments:  [employer] home meter- Managed by Exception         Anticoagulation Care Providers     Provider Role Specialty Phone number    Eric Pickett MD Referring Cardiovascular Disease [phone]

## 2022-11-23 DIAGNOSIS — I48.0 PAROXYSMAL ATRIAL FIBRILLATION (H): ICD-10-CM

## 2022-11-23 RX ORDER — WARFARIN SODIUM 5 MG/1
TABLET ORAL
Qty: 78 TABLET | Refills: 1 | Status: SHIPPED | OUTPATIENT
Start: 2022-11-23 | End: 2023-04-25

## 2022-11-23 NOTE — TELEPHONE ENCOUNTER
ANTICOAGULATION MANAGEMENT:  Medication Refill    Anticoagulation Summary  As of 11/14/2022    Warfarin maintenance plan:  2.5 mg (5 mg x 0.5) every Sat; 5 mg (5 mg x 1) all other days; Starting 11/14/2022   Next INR check:  11/28/2022   Target end date:  Indefinite    Indications    Paroxysmal atrial fibrillation (H) [I48.0]             Anticoagulation Care Providers     Provider Role Specialty Phone number    Eric Pickett MD Referring Cardiovascular Disease [phone]          Visit with referring provider/group within last year: Yes    [facility] referral signed within last year: Yes    Rakesh meets all criteria for refill (current [facility] referral, office visit with referring provider/group in last year, lab monitoring up to date or not exceeding 2 weeks overdue). Rx instructions and quantity supplied updated to match patient's current dosing plan. Warfarin 90 day supply with 1 refill granted per [facility] protocol     Isabela Fonseca RN  Anticoagulation Clinic

## 2022-11-26 LAB — INR HOME MONITORING: 2.6 (ref 2–3)

## 2022-11-28 ENCOUNTER — DOCUMENTATION ONLY (OUTPATIENT)
Dept: ANTICOAGULATION | Facility: CLINIC | Age: 72
End: 2022-11-28

## 2022-11-28 DIAGNOSIS — I48.0 PAROXYSMAL ATRIAL FIBRILLATION (H): Primary | ICD-10-CM

## 2022-11-28 NOTE — PROGRESS NOTES
ANTICOAGULATION  MANAGEMENT-Home Monitor Managed by Aranza SWANSON Abril 72 year old male is on warfarin with therapeutic INR result. (Goal INR 2.0-3.0)    Recent labs: (last 7 days)     11/26/22  0000   INR 2.6         Previous INR was Therapeutic    Medication, diet, health changes since last INR:chart reviewed; none identified    Contacted within the last 12 weeks by phone on 9/9/22      NISHA Cameron was NOT contacted regarding therapeutic result today per home monitoring policy manage by exception agreement.   Current warfarin dose is to be continued:     Summary  As of 11/28/2022    Full warfarin instructions:  2.5 mg every Sat; 5 mg all other days; Starting 11/28/2022   Next INR check:  12/9/2022           ?   Yaima Ramsey RN  Anticoagulation Clinic  11/28/2022    _______________________________________________________________________     Anticoagulation Episode Summary     Current INR goal:  2.0-3.0   TTR:  82.5 % (11.8 mo)   Target end date:  Indefinite   Send INR reminders to:  ANTICOFLORENTIN HOME MONITORING    Indications    Paroxysmal atrial fibrillation (H) [I48.0]           Comments:  [employer] home meter- Managed by Exception         Anticoagulation Care Providers     Provider Role Specialty Phone number    Eric Pickett MD Referring Cardiovascular Disease [phone]

## 2022-12-01 DIAGNOSIS — I48.0 PAROXYSMAL ATRIAL FIBRILLATION (H): Primary | ICD-10-CM

## 2022-12-11 LAB — INR HOME MONITORING: 2.6 (ref 2–3)

## 2022-12-12 ENCOUNTER — DOCUMENTATION ONLY (OUTPATIENT)
Dept: ANTICOAGULATION | Facility: CLINIC | Age: 72
End: 2022-12-12

## 2022-12-12 ENCOUNTER — ANTICOAGULATION THERAPY VISIT (OUTPATIENT)
Dept: ANTICOAGULATION | Facility: CLINIC | Age: 72
End: 2022-12-12

## 2022-12-12 DIAGNOSIS — I48.0 PAROXYSMAL ATRIAL FIBRILLATION (H): Primary | ICD-10-CM

## 2022-12-12 NOTE — PROGRESS NOTES
ANTICOAGULATION CLINIC REFERRAL RENEWAL REQUEST       An annual renewal order is required for all patients referred to [facility]tico[facility].?  Please review and sign the pended referral order for Rakesh Samuels.       ANTICOAGULATION SUMMARY      Warfarin indication(s)   Atrial Fibrillation    Mechanical heart valve present?  NO       Current goal range   INR: 2.0-3.0     Goal appropriate for indication? Goal INR 2-3, standard for indication(s) above     Time in Therapeutic Range (TTR)  (Goal > 60%) 83%       Office visit with referring provider's group within last year yes on 11/7/22       Ruby Brody RN  [facility]ticoagulation [facility]

## 2022-12-12 NOTE — PROGRESS NOTES
ANTICOAGULATION MANAGEMENT     Rakesh Samuels 72 year old male is on warfarin with therapeutic INR result. (Goal INR 2.0-3.0)    Recent labs: (last 7 days)     12/11/22  0000   INR 2.6       ASSESSMENT       Source(s): Chart Review and Patient/Caregiver Call       Warfarin doses taken: Warfarin taken as instructed    Diet: No new diet changes identified    New illness, injury, or hospitalization: No    Medication/supplement changes: None noted    Signs or symptoms of bleeding or clotting: No    Previous INR: Therapeutic last 2(+) visits    Additional findings: None       PLAN     Recommended plan for no diet, medication or health factor changes affecting INR     Dosing Instructions: Continue your current warfarin dose with next INR in 2 weeks       Summary  As of 12/12/2022    Full warfarin instructions:  2.5 mg every Sat; 5 mg all other days; Starting 12/12/2022   Next INR check:  12/26/2022             Telephone call with  Nanette who verbalizes understanding and agrees to plan    Patient to recheck with home meter    Education provided:     Please call back if any changes to your diet, medications or how you've been taking warfarin    Resume manage by exception with home monitor. Continue to submit INR results to home monitor company.You will only be called when your result is out of range. Please call and notify [facility] if new medication started, dose missed, signs or symptoms of bleeding or clotting, or a surgery/procedure is scheduled.    Plan made per [facility] anticoagulation protocol    Ruby Brody RN  Anticoagulation Clinic  12/12/2022    _______________________________________________________________________     Anticoagulation Episode Summary     Current INR goal:  2.0-3.0   TTR:  82.6 % (11.8 mo)   Target end date:  Indefinite   Send INR reminders to:  [facility] HOME MONITORING    Indications    Paroxysmal atrial fibrillation (H) [I48.0]           Comments:  [employer] home meter- Managed by Exception          Anticoagulation Care Providers     Provider Role Specialty Phone number    Erci Pickett MD Referring Cardiovascular Disease [phone]

## 2022-12-15 ENCOUNTER — TELEPHONE (OUTPATIENT)
Dept: ANTICOAGULATION | Facility: CLINIC | Age: 72
End: 2022-12-15

## 2022-12-15 DIAGNOSIS — I48.0 PAROXYSMAL ATRIAL FIBRILLATION (H): Primary | ICD-10-CM

## 2022-12-28 ENCOUNTER — DOCUMENTATION ONLY (OUTPATIENT)
Dept: ANTICOAGULATION | Facility: CLINIC | Age: 72
End: 2022-12-28

## 2022-12-28 DIAGNOSIS — I48.0 PAROXYSMAL ATRIAL FIBRILLATION (H): Primary | ICD-10-CM

## 2022-12-28 LAB — INR HOME MONITORING: 2.5 (ref 2–3)

## 2022-12-29 ENCOUNTER — TELEPHONE (OUTPATIENT)
Dept: CARDIOLOGY | Facility: CLINIC | Age: 72
End: 2022-12-29

## 2022-12-29 NOTE — TELEPHONE ENCOUNTER
[facility] Call Center    Phone Message    May a detailed message be left on voicemail: yes     Reason for Call: Other: Rashida from [facility] would like an order for 4 day hold on warfrin and bridging orders prior to colonoscopy ,Colonoscopy is being done January 26th     Action Taken: Message routed to:  [facility] ([facility] Cardio    Travel Screening: Not Applicable

## 2023-01-03 NOTE — TELEPHONE ENCOUNTER
Indication for warfarin is atrial fibrillation.  Okay to hold warfarin for 4 days prior to colonoscopy. No bridging required.  Please inform [facility] clinic so appropriate INR monitoring can be coordinated per protocol.  Thank you  HARIKA

## 2023-01-05 NOTE — TELEPHONE ENCOUNTER
Returned call to Kaylin with home care and advised patient's next INR should be checked on or before 5/28/21.    Anisa Norman RN, BSN  Anticoagulation Clinic     Face Mask

## 2023-01-10 NOTE — TELEPHONE ENCOUNTER
AMBAR-PROCEDURAL ANTICOAGULATION  MANAGEMENT    ASSESSMENT     Warfarin interruption plan for colonoscopy on 2023.    Indication for Anticoagulation: Atrial Fibrillation      [id number] = 3 (Age 65-74, Diabetes and Vascular- CAD)      [place]-Procedure Risk stratification for thromboembolism: low (2017 ACC periprocedure pathway for NVAF Expert Consensus)    NVAF: 2017 ACC periprocedure pathway for NVAF advises NO bridge for low risk stratification ([id number] score <=4 and no prior hx of stroke, TIA or systemic embolism)     RECOMMENDATION       Pre-Procedure:  o Hold warfarin for 4 days, until after procedure startin2023   o No Bridge      Post-Procedure:  o Resume warfarin dose if okay with provider doing procedure on night of procedure, 2023 PM: 7.5mg  o Recheck INR ~ 7 days after resuming warfarin       Plan not routed to referring provider for approval, already has OK'd hold see 2022 DUNIA GARCIA called back to provider directly  ?   Zoraida Ugalde [facility]    SUBJECTIVE/OBJECTIVE     Rakesh Samuels, a 72 year old male    Goal INR Range: 2.0-3.0     Patient bridged in past: No      Wt Readings from Last 3 Encounters:   22 106.1 kg (234 lb)   21 91.5 kg (201 lb 12.8 oz)   21 91.5 kg (201 lb 12.8 oz)      Patient weight not recorded     Estimated body mass index is 31.74 kg/m  as calculated from the following:    Height as of 22: 1.829 m (6').    Weight as of 22: 106.1 kg (234 lb).    Lab Results   Component Value Date    INR 2.5 2022    INR 2.6 2022    INR 2.6 2022     Lab Results   Component Value Date    HGB 12.7 2022    HCT 39.8 2022     2022     Lab Results   Component Value Date    CR 0.86 2022    CR 0.89 2022    CR 0.77 2022     CrCl cannot be calculated (Patient's most recent lab result is older than the maximum 30 days allowed.).

## 2023-01-12 NOTE — TELEPHONE ENCOUNTER
Left message for daughter, Nanette, to return call to [facility] to discuss procedure plan below.    Hold for 4 days (1/22-1/25/23) then boost on 1/26 with 7.5 mg of warfarin and recheck INR with home meter on 2/2/23 with no bridge necessary.    Heather Yanez RN

## 2023-01-15 LAB — INR HOME MONITORING: 2.2 (ref 2–3)

## 2023-01-16 ENCOUNTER — DOCUMENTATION ONLY (OUTPATIENT)
Dept: ANTICOAGULATION | Facility: CLINIC | Age: 73
End: 2023-01-16

## 2023-01-16 DIAGNOSIS — I48.0 PAROXYSMAL ATRIAL FIBRILLATION (H): Primary | ICD-10-CM

## 2023-01-16 NOTE — PROGRESS NOTES
ANTICOAGULATION  MANAGEMENT-Home Monitor Managed by Aranza SWANSON Abril 72 year old male is on warfarin with therapeutic INR result. (Goal INR 2.0-3.0)    Recent labs: (last 7 days)     01/11/23  0000   INR 2.2         Previous INR was Therapeutic    Medication, diet, health changes since last INR:chart reviewed; none identified    Contacted within the last 12 weeks by phone on 12/12      PLAN     Rakesh was NOT contacted regarding therapeutic result today per home monitoring policy manage by exception agreement.   Current warfarin dose is to be continued:     Summary  As of 1/16/2023    Full warfarin instructions:  2.5 mg every Sat; 5 mg all other days   Next INR check:  1/25/2023           ?   Carole Salinas, RN  Anticoagulation Clinic  1/16/2023    _______________________________________________________________________     Anticoagulation Episode Summary     Current INR goal:  2.0-3.0   TTR:  82.4 % (11.7 mo)   Target end date:  Indefinite   Send INR reminders to:  [facility]    Indications    Paroxysmal atrial fibrillation (H) [I48.0]           Comments:  [employer] home meter- Managed by Exception         Anticoagulation Care Providers     Provider Role Specialty Phone number    Eric Pickett MD Referring Cardiovascular Disease [phone]

## 2023-01-17 ENCOUNTER — TELEPHONE (OUTPATIENT)
Dept: NURSING | Facility: CLINIC | Age: 73
End: 2023-01-17
Payer: MEDICARE

## 2023-01-17 NOTE — TELEPHONE ENCOUNTER
Attempted to reach daughter again, VM left requesting call back to [facility] to go over warfarin interruption plan for Rakesh's upcoming colonoscopy.     Wallace Mckee RN

## 2023-01-17 NOTE — TELEPHONE ENCOUNTER
Caller: Daughter calling for patient and consent is on file.    Coloscopy is rescheduled and [facility] team has been trying to get ahold of the daughter Nanette about the instructions, as it was to be this coming Monday.  The patients wife passed away and the  is on Monday, so the test was postponed.  They will call back when they have confirmation of the exact dates in February for the rescheduled one.      Routing message to [facility] clinic as EULALIO.     Anisa Blair RN on 2023 at 5:57 PM

## 2023-01-18 NOTE — TELEPHONE ENCOUNTER
See 01/17/2023 TE, due to spouse passing away procedure has been postponed, will update with new plan once have new date.    Zoraida Ugalde, PharmD BCACP  Anticoagulation Clinical Pharmacist

## 2023-01-24 ENCOUNTER — ANTICOAGULATION THERAPY VISIT (OUTPATIENT)
Dept: ANTICOAGULATION | Facility: CLINIC | Age: 73
End: 2023-01-24
Payer: MEDICARE

## 2023-01-24 DIAGNOSIS — I48.0 PAROXYSMAL ATRIAL FIBRILLATION (H): Primary | ICD-10-CM

## 2023-01-24 LAB — INR HOME MONITORING: 1.9 (ref 2–3)

## 2023-01-24 NOTE — PROGRESS NOTES
ANTICOAGULATION MANAGEMENT     Rakesh Samuels 72 year old male is on warfarin with subtherapeutic INR result. (Goal INR 2.0-3.0)    Recent labs: (last 7 days)     23  0000   INR 1.9*       ASSESSMENT       Source(s): Chart Review and Patient/Caregiver Call       Warfarin doses taken: Warfarin taken as instructed    Diet: No new diet changes identified    New illness, injury, or hospitalization: No    Medication/supplement changes: None noted    Signs or symptoms of bleeding or clotting: No    Previous INR: Therapeutic last 2(+) visits    Additional findings: stress, due to spouse death,  yesterday.       PLAN     Recommended plan for no diet, medication or health factor changes affecting INR     Dosing Instructions: Continue your current warfarin dose with next INR in 2 weeks       Summary  As of 2023    Full warfarin instructions:  2.5 mg every Sat; 5 mg all other days   Next INR check:  2023             Telephone call with Rakesh who agrees to plan and repeated back plan correctly    Patient to recheck with home meter    Education provided:     Please call back if any changes to your diet, medications or how you've been taking warfarin    Goal range and lab monitoring: goal range and significance of current result    Plan made per [facility] anticoagulation protocol    Alaina Linder RN  Anticoagulation Clinic  2023    _______________________________________________________________________     Anticoagulation Episode Summary     Current INR goal:  2.0-3.0   TTR:  81.4 % (11.9 mo)   Target end date:  Indefinite   Send INR reminders to:  ANTICOAG HOME MONITORING    Indications    Paroxysmal atrial fibrillation (H) [I48.0]           Comments:  [employer] home meter- Managed by Exception         Anticoagulation Care Providers     Provider Role Specialty Phone number    Eric Pickett MD Referring Cardiovascular Disease [phone]

## 2023-02-09 ENCOUNTER — ANTICOAGULATION THERAPY VISIT (OUTPATIENT)
Dept: ANTICOAGULATION | Facility: CLINIC | Age: 73
End: 2023-02-09
Payer: MEDICARE

## 2023-02-09 LAB — INR HOME MONITORING: 2.3 (ref 2–3)

## 2023-02-09 NOTE — PROGRESS NOTES
ANTICOAGULATION MANAGEMENT     Rakesh Samuels 72 year old male is on warfarin with therapeutic INR result. (Goal INR 2.0-3.0)    Recent labs: (last 7 days)     02/09/23  0000   INR 2.3       ASSESSMENT       Source(s): Chart Review and Patient/Caregiver Call       Warfarin doses taken: Warfarin taken as instructed    Diet: No new diet changes identified    New illness, injury, or hospitalization: No    Medication/supplement changes: None noted    Signs or symptoms of bleeding or clotting: No    Previous INR: Subtherapeutic    Additional findings: Pt's daughter Nanette states that pt is doing all right. Pt's wife was in memory care and passed away in January 2023.       PLAN       Dosing Instructions: Continue your current warfarin dose with next INR in 2 weeks       Summary  As of 2/9/2023    Full warfarin instructions:  2.5 mg every Sat; 5 mg all other days   Next INR check:  2/23/2023             Telephone call with  pt's daughter Nanette who verbalizes understanding and agrees to plan    Patient to recheck with home meter    Education provided:     Contact [phone]  with any changes, questions or concerns.     Plan made per [facility] anticoagulation protocol    Brittany Breaux RN  Anticoagulation Clinic  2/9/2023    _______________________________________________________________________     Anticoagulation Episode Summary     Current INR goal:  2.0-3.0   TTR:  80.3 % (11.9 mo)   Target end date:  Indefinite   Send INR reminders to:  ANTICOAG HOME MONITORING    Indications    Paroxysmal atrial fibrillation (H) [I48.0]           Comments:  [employer] home meter- Managed by Exception         Anticoagulation Care Providers     Provider Role Specialty Phone number    Eric Pickett MD Referring Cardiovascular Disease [phone]

## 2023-02-23 ENCOUNTER — TELEPHONE (OUTPATIENT)
Dept: ANTICOAGULATION | Facility: CLINIC | Age: 73
End: 2023-02-23
Payer: MEDICARE

## 2023-02-23 DIAGNOSIS — I48.0 PAROXYSMAL ATRIAL FIBRILLATION (H): Primary | ICD-10-CM

## 2023-02-23 NOTE — TELEPHONE ENCOUNTER
Called daughter Nanette and reviewed hold plan as listed below.  Nanette read-back instructions, stated understanding and was agreeable with plan.    ABDULKADIR Calderon, RN  Anticoagulation Clinic

## 2023-02-23 NOTE — TELEPHONE ENCOUNTER
Patient is having a colonoscopy on 3/2/23.  Patient was scheduled to have this done in January but his spouse passed away and it was rescheduled.  There is a plan from 12/15/23.    Routing to [facility] for review.    REUBEN CalderonN, RN  Anticoagulation Clinic

## 2023-02-23 NOTE — TELEPHONE ENCOUNTER
Patient Returning Call    Reason for call:  INR    Information relayed to patient:  CALL BACK FROM INR    Patient has additional questions:  Yes    What are your questions/concerns:  PLEASE CALL PATIENT    Okay to leave a detailed message?: Yes at Cell number on file:    Telephone Information:   Mobile [phone]

## 2023-02-23 NOTE — TELEPHONE ENCOUNTER
IVANIA-PROCEDURAL ANTICOAGULATION  MANAGEMENT    ASSESSMENT     Warfarin interruption plan for colonoscopy on 2023.    Indication for Anticoagulation: Atrial Fibrillation      [id number] = 3 (Age 65-74, Diabetes and Vascular- CAD)      [place]-Procedure Risk stratification for thromboembolism: low (2017 ACC periprocedure pathway for NVAF Expert Consensus)    NVAF: 2017 ACC periprocedure pathway for NVAF advises NO bridge for low risk stratification ([id number] score <=4 and no prior hx of stroke, TIA or systemic embolism)     RECOMMENDATION       Pre-Procedure:  o Hold warfarin for 4 days, until after procedure startin2023   o No Bridge      Post-Procedure:  o Resume warfarin dose if okay with provider doing procedure on night of procedure, 2023 PM: 7.5mg  o Recheck INR ~ 7 days after resuming warfarin       Plan not routed to referring provider for approval, previously approved and per 2022 TE cardiology already noted cleared for 4 day hold in call to [facility]  ?   Zoraida Ugalde [facility]    SUBJECTIVE/OBJECTIVE     Rakesh Samuels, a 72 year old male    Goal INR Range: 2.0-3.0     Patient bridged in past: No    Wt Readings from Last 3 Encounters:   22 106.1 kg (234 lb)   21 91.5 kg (201 lb 12.8 oz)   21 91.5 kg (201 lb 12.8 oz)      Patient weight not recorded     Estimated body mass index is 31.74 kg/m  as calculated from the following:    Height as of 22: 1.829 m (6').    Weight as of 22: 106.1 kg (234 lb).    Lab Results   Component Value Date    INR 2.3 2023    INR 1.9 (L) 2023    INR 2.2 2023     Lab Results   Component Value Date    HGB 12.7 2022    HCT 39.8 2022     2022     Lab Results   Component Value Date    CR 0.86 2022    CR 0.89 2022    CR 0.77 2022     CrCl cannot be calculated (Patient's most recent lab result is older than the maximum 30 days allowed.).

## 2023-02-24 ENCOUNTER — ANTICOAGULATION THERAPY VISIT (OUTPATIENT)
Dept: ANTICOAGULATION | Facility: CLINIC | Age: 73
End: 2023-02-24
Payer: MEDICARE

## 2023-02-24 DIAGNOSIS — I48.0 PAROXYSMAL ATRIAL FIBRILLATION (H): Primary | ICD-10-CM

## 2023-02-24 LAB — INR HOME MONITORING: 2.5 (ref 2–3)

## 2023-02-24 NOTE — PROGRESS NOTES
ANTICOAGULATION MANAGEMENT     Rakesh Samuels 72 year old male is on warfarin with therapeutic INR result. (Goal INR 2.0-3.0)    Recent labs: (last 7 days)     02/24/23  0000   INR 2.5       ASSESSMENT       Source(s): Chart Review and Patient/Caregiver Call       Warfarin doses taken: Warfarin taken as instructed    Diet: No new diet changes identified    New illness, injury, or hospitalization: No    Medication/supplement changes: None noted    Signs or symptoms of bleeding or clotting: No    Previous INR: Therapeutic last 2(+) visits    Additional findings: Upcoming surgery/procedure 3/2/23         PLAN     Recommended plan for no diet, medication or health factor changes affecting INR     Dosing Instructions: Continue your current warfarin dose with next INR in 2 weeks. Patient will begin hold on 2/26 for procedure on 3/2. Patient has instructions previously reviewed.        Summary  As of 2/24/2023    Full warfarin instructions:  2/26: Hold; 2/27: Hold; 2/28: Hold; 3/1: Hold; 3/2: 7.5 mg; Otherwise 2.5 mg every Sat; 5 mg all other days   Next INR check:  3/9/2023             Telephone call with  Nanette who verbalizes understanding and agrees to plan    Patient to recheck with home meter    Education provided:     Please call back if any changes to your diet, medications or how you've been taking warfarin    Contact [phone]  with any changes, questions or concerns.     Plan made per [facility] anticoagulation protocol    Alec SPENCE RN  Anticoagulation Clinic  2/24/2023    _______________________________________________________________________     Anticoagulation Episode Summary     Current INR goal:  2.0-3.0   TTR:  83.1 % (11.9 mo)   Target end date:  Indefinite   Send INR reminders to:  ANTICOAG HOME MONITORING    Indications    Paroxysmal atrial fibrillation (H) [I48.0]           Comments:  [employer] home meter- Managed by Exception         Anticoagulation Care Providers     Provider Role Specialty Phone number     Eric Pickett MD Referring Cardiovascular Disease [phone]

## 2023-03-07 ENCOUNTER — TELEPHONE (OUTPATIENT)
Dept: SCHEDULING | Facility: CLINIC | Age: 73
End: 2023-03-07
Payer: MEDICARE

## 2023-03-07 ENCOUNTER — ANTICOAGULATION THERAPY VISIT (OUTPATIENT)
Dept: ANTICOAGULATION | Facility: CLINIC | Age: 73
End: 2023-03-07
Payer: MEDICARE

## 2023-03-07 DIAGNOSIS — I48.0 PAROXYSMAL ATRIAL FIBRILLATION (H): Primary | ICD-10-CM

## 2023-03-07 NOTE — TELEPHONE ENCOUNTER
General Call    Contacts       Type Contact Phone/Fax    03/07/2023 01:35 PM CST Phone (Incoming) Nanette Drew (Emergency Contact) [phone]        Reason for Call:  Speak to [facility]    What are your questions or concerns:  Calling to speak to [facility] about dosing    Date of last appointment with provider:     Okay to leave a detailed message?: Yes at Other phone number:  [phone]

## 2023-03-07 NOTE — PROGRESS NOTES
Patients daughter Nanette called and was advised OK to restart warfarin today via [facility] team, was not given reason why patient needed to hold for 5-6 more days.     Advised since caller is unable to get out to patient to check INR with home meter today to take prior restart dose of 7.5mg tonight and resume 5mg until able to check INR - caller stated she should be able to check patients INR on Thursday or Friday at the latest this week.    All questions answered at this time and [facility] calendar updated.     Tigist Paige, RN, BSN, PHN  Anticoagulation Nurse  [phone]

## 2023-03-09 ENCOUNTER — ANTICOAGULATION THERAPY VISIT (OUTPATIENT)
Dept: ANTICOAGULATION | Facility: CLINIC | Age: 73
End: 2023-03-09
Payer: MEDICARE

## 2023-03-09 DIAGNOSIS — I48.0 PAROXYSMAL ATRIAL FIBRILLATION (H): Primary | ICD-10-CM

## 2023-03-09 LAB — INR HOME MONITORING: 1.1 (ref 2–3)

## 2023-03-09 NOTE — PROGRESS NOTES
ANTICOAGULATION MANAGEMENT     Rakesh Samuels 72 year old male is on warfarin with therapeutic INR result. (Goal INR 2.0-3.0)    Recent labs: (last 7 days)     03/09/23  0000   INR 1.1*       ASSESSMENT       Additional findings: None      Source(s): Chart Review and Patient/Caregiver Call       Warfarin doses taken: Held for colonscopy  recently which may be affecting INR    Diet: No new diet changes identified    New illness, injury, or hospitalization: No    Medication/supplement changes: None noted    Signs or symptoms of bleeding or clotting: No    Previous INR: Therapeutic last visit; previously outside of goal range    Additional findings: None     Daughter Nanette was setting up medication in pill box while on phone         PLAN     Recommended plan for temporary change(s) affecting INR     Dosing Instructions: booster dose then continue your current warfarin dose with next INR in 4 days       Summary  As of 3/9/2023    Full warfarin instructions:  3/9: 7.5 mg; Otherwise 2.5 mg every Sat; 5 mg all other days   Next INR check:  3/13/2023             Telephone call with  Nanette who agrees to plan and repeated back plan correctly    Patient to recheck with home meter    Education provided:     Symptom monitoring: monitoring for clotting signs and symptoms, monitoring for stroke signs and symptoms and when to seek medical attention/emergency care    Plan made per [facility] anticoagulation protocol    Anisa Christian, RN  Anticoagulation Clinic  3/9/2023    _______________________________________________________________________     Anticoagulation Episode Summary     Current INR goal:  2.0-3.0   TTR:  84.3 % (11.9 mo)   Target end date:  Indefinite   Send INR reminders to:  ANTICOAG HOME MONITORING    Indications    Paroxysmal atrial fibrillation (H) [I48.0]           Comments:  [employer] home meter- Managed by Exception         Anticoagulation Care Providers     Provider Role Specialty Phone number    Eric Pickett MD  Referring Cardiovascular Disease [phone]

## 2023-03-14 ENCOUNTER — ANTICOAGULATION THERAPY VISIT (OUTPATIENT)
Dept: ANTICOAGULATION | Facility: CLINIC | Age: 73
End: 2023-03-14
Payer: MEDICARE

## 2023-03-14 DIAGNOSIS — I48.0 PAROXYSMAL ATRIAL FIBRILLATION (H): Primary | ICD-10-CM

## 2023-03-14 LAB
INR (EXTERNAL): 1.8 (ref 0.9–1.1)
INR HOME MONITORING: 1.8 (ref 2–3)

## 2023-03-14 NOTE — PROGRESS NOTES
ANTICOAGULATION MANAGEMENT     Rakesh Samuels 72 year old male is on warfarin with subtherapeutic INR result. (Goal INR 2.0-3.0)    Recent labs: (last 7 days)     03/14/23  1346   INR 1.8*       ASSESSMENT       Source(s): Chart Review and Patient/Caregiver Call       Warfarin doses taken: Warfarin recently held for colonoscopy. INR still coming back up into range.    Diet: No new diet changes identified    New illness, injury, or hospitalization: No    Medication/supplement changes: None noted    Signs or symptoms of bleeding or clotting: No    Previous INR: Subtherapeutic    Additional findings: None         PLAN     Recommended plan for temporary change(s) affecting INR     Dosing Instructions: booster dose then continue your current warfarin dose with next INR in 1 week       Summary  As of 3/14/2023    Full warfarin instructions:  3/14: 7.5 mg; Otherwise 2.5 mg every Sat; 5 mg all other days   Next INR check:  3/21/2023             Telephone call with  Nanette who verbalizes understanding and agrees to plan    Patient to recheck with home meter    Education provided:     Goal range and lab monitoring: goal range and significance of current result    Plan made per [facility] anticoagulation protocol    Vincent Sky RN  Anticoagulation Clinic  3/14/2023    _______________________________________________________________________     Anticoagulation Episode Summary     Current INR goal:  2.0-3.0   TTR:  84.3 % (11.9 mo)   Target end date:  Indefinite   Send INR reminders to:  ANTICOAG HOME MONITORING    Indications    Paroxysmal atrial fibrillation (H) [I48.0]           Comments:  [employer] home meter- Managed by Exception         Anticoagulation Care Providers     Provider Role Specialty Phone number    Eric Pickett MD Referring Cardiovascular Disease [phone]

## 2023-03-22 ENCOUNTER — DOCUMENTATION ONLY (OUTPATIENT)
Dept: ANTICOAGULATION | Facility: CLINIC | Age: 73
End: 2023-03-22
Payer: MEDICARE

## 2023-03-22 LAB — INR HOME MONITORING: 2.4 (ref 2–3)

## 2023-03-22 NOTE — PROGRESS NOTES
ANTICOAGULATION     Rakesh Samuels is overdue for INR check.      Spoke with Nanette instructed to test INR with home meter and call results to home monitoring company as soon as possible.    Heather Yanez RN

## 2023-03-23 ENCOUNTER — ANTICOAGULATION THERAPY VISIT (OUTPATIENT)
Dept: ANTICOAGULATION | Facility: CLINIC | Age: 73
End: 2023-03-23
Payer: MEDICARE

## 2023-03-23 DIAGNOSIS — I48.0 PAROXYSMAL ATRIAL FIBRILLATION (H): Primary | ICD-10-CM

## 2023-03-23 NOTE — PROGRESS NOTES
ANTICOAGULATION MANAGEMENT     Rakesh Samuels 72 year old male is on warfarin with therapeutic INR result. (Goal INR 2.0-3.0)    Recent labs: (last 7 days)     03/22/23  0000   INR 2.4       ASSESSMENT       Source(s): Chart Review and Patient/Caregiver Call       Warfarin doses taken: Warfarin taken as instructed    Diet: No new diet changes identified    New illness, injury, or hospitalization: No    Medication/supplement changes: None noted    Signs or symptoms of bleeding or clotting: No    Previous INR: Subtherapeutic    Additional findings: None         PLAN     Recommended plan for no diet, medication or health factor changes affecting INR     Dosing Instructions: Continue your current warfarin dose with next INR in 2 weeks       Summary  As of 3/23/2023    Full warfarin instructions:  2.5 mg every Sat; 5 mg all other days   Next INR check:  4/5/2023             Telephone call with  Nanette, daughter who verbalizes understanding and agrees to plan    Patient to recheck with home meter    Education provided:     Please call back if any changes to your diet, medications or how you've been taking warfarin    Resume manage by exception with home monitor. Continue to submit INR results to home monitor company.You will only be called when your result is out of range. Please call and notify [facility] if new medication started, dose missed, signs or symptoms of bleeding or clotting, or a surgery/procedure is scheduled.    Contact [phone]  with any changes, questions or concerns.     Plan made per [facility] anticoagulation protocol    Shirley Cruz, RN  Anticoagulation Clinic  3/23/2023    _______________________________________________________________________     Anticoagulation Episode Summary     Current INR goal:  2.0-3.0   TTR:  86.1 % (11.8 mo)   Target end date:  Indefinite   Send INR reminders to:  [facility]IT[facility]    Indications    Paroxysmal atrial fibrillation (H) [I48.0]           Comments:  [employer]  home meter- Managed by Exception         Anticoagulation Care Providers     Provider Role Specialty Phone number    Eric Pickett MD Referring Cardiovascular Disease [phone]

## 2023-04-05 ENCOUNTER — DOCUMENTATION ONLY (OUTPATIENT)
Dept: ANTICOAGULATION | Facility: CLINIC | Age: 73
End: 2023-04-05
Payer: MEDICARE

## 2023-04-05 DIAGNOSIS — I48.0 PAROXYSMAL ATRIAL FIBRILLATION (H): Primary | ICD-10-CM

## 2023-04-05 LAB — INR HOME MONITORING: 2.3 (ref 2–3)

## 2023-04-05 NOTE — PROGRESS NOTES
ANTICOAGULATION  MANAGEMENT-Home Monitor Managed by Aranza SWANSON Abril 72 year old male is on warfarin with therapeutic INR result. (Goal INR 2.0-3.0)    Recent labs: (last 7 days)     04/05/23  0000   INR 2.3         Previous INR was Therapeutic    Medication, diet, health changes since last INR:chart reviewed; none identified    Contacted within the last 12 weeks by phone on 03/23/2023      NSIHA     Rakesh was NOT contacted regarding therapeutic result today per home monitoring policy manage by exception agreement.   Current warfarin dose is to be continued:     Summary  As of 4/5/2023    Full warfarin instructions:  2.5 mg every Sat; 5 mg all other days   Next INR check:  4/19/2023           ?   Shirley Cruz, RN  Anticoagulation Clinic  4/5/2023    _______________________________________________________________________     Anticoagulation Episode Summary     Current INR goal:  2.0-3.0   TTR:  86.5 % (11.9 mo)   Target end date:  Indefinite   Send INR reminders to:  [facility] HOME [facility]ITOR[facility]    Indications    Paroxysmal atrial fibrillation (H) [I48.0]           Comments:  [employer] home meter- Managed by Exception         Anticoagulation Care Providers     Provider Role Specialty Phone number    Eric Pickett MD Referring Cardiovascular Disease [phone]

## 2023-04-21 ENCOUNTER — DOCUMENTATION ONLY (OUTPATIENT)
Dept: ANTICOAGULATION | Facility: CLINIC | Age: 73
End: 2023-04-21
Payer: MEDICARE

## 2023-04-21 DIAGNOSIS — I48.0 PAROXYSMAL ATRIAL FIBRILLATION (H): Primary | ICD-10-CM

## 2023-04-21 LAB — INR HOME MONITORING: 2.8 (ref 2–3)

## 2023-04-21 NOTE — PROGRESS NOTES
ANTICOAGULATION  MANAGEMENT-Home Monitor Managed by Aranza SWANSON Abril 72 year old male is on warfarin with therapeutic INR result. (Goal INR 2.0-3.0)    Recent labs: (last 7 days)     04/21/23  0000   INR 2.8         Previous INR was Therapeutic    Medication, diet, health changes since last INR:chart reviewed; none identified    Contacted within the last 12 weeks by phone on 03/23/2023      NISHA     Rakesh was NOT contacted regarding therapeutic result today per home monitoring policy manage by exception agreement.   Current warfarin dose is to be continued:     Summary  As of 4/21/2023    Full warfarin instructions:  2.5 mg every Sat; 5 mg all other days   Next INR check:  5/5/2023           ?   Vincent Sky RN  Anticoagulation Clinic  4/21/2023    _______________________________________________________________________     Anticoagulation Episode Summary     Current INR goal:  2.0-3.0   TTR:  88.4 % (11.9 mo)   Target end date:  Indefinite   Send INR reminders to:  [facility]    Indications    Paroxysmal atrial fibrillation (H) [I48.0]           Comments:  [employer] home meter- Managed by Exception         Anticoagulation Care Providers     Provider Role Specialty Phone number    Eric Pickett MD Referring Cardiovascular Disease [phone]

## 2023-04-23 DIAGNOSIS — I48.0 PAROXYSMAL ATRIAL FIBRILLATION (H): ICD-10-CM

## 2023-04-25 RX ORDER — WARFARIN SODIUM 5 MG/1
TABLET ORAL
Qty: 80 TABLET | Refills: 1 | Status: SHIPPED | OUTPATIENT
Start: 2023-04-25 | End: 2023-05-12

## 2023-04-25 NOTE — TELEPHONE ENCOUNTER
ANTICOAGULATION MANAGEMENT:  Medication Refill    Anticoagulation Summary  As of 4/21/2023    Warfarin maintenance plan:  2.5 mg (5 mg x 0.5) every Sat; 5 mg (5 mg x 1) all other days   Next INR check:  5/5/2023   Target end date:  Indefinite    Indications    Paroxysmal atrial fibrillation (H) [I48.0]             Anticoagulation Care Providers     Provider Role Specialty Phone number    Eric Pickett MD Referring Cardiovascular Disease [phone]          Visit with referring provider/group within last year: Yes    [facility] referral signed within last year: Yes    Rakesh meets all criteria for refill (current [facility] referral, office visit with referring provider/group in last year, lab monitoring up to date or not exceeding 2 weeks overdue). Rx instructions and quantity supplied updated to match patient's current dosing plan. Warfarin 90 day supply with 1 refill granted per [facility] protocol     Yisel Corrales RN  Anticoagulation Clinic

## 2023-05-02 ENCOUNTER — ANTICOAGULATION THERAPY VISIT (OUTPATIENT)
Dept: ANTICOAGULATION | Facility: CLINIC | Age: 73
End: 2023-05-02
Payer: MEDICARE

## 2023-05-02 ENCOUNTER — TRANSFERRED RECORDS (OUTPATIENT)
Dept: HEALTH INFORMATION MANAGEMENT | Facility: CLINIC | Age: 73
End: 2023-05-02

## 2023-05-02 DIAGNOSIS — I48.0 PAROXYSMAL ATRIAL FIBRILLATION (H): Primary | ICD-10-CM

## 2023-05-02 LAB
INR HOME MONITORING: 1.7 (ref 2–3)
PHQ9 SCORE: 13

## 2023-05-02 NOTE — PROGRESS NOTES
ANTICOAGULATION MANAGEMENT     Rakehs Samuels 72 year old male is on warfarin with subtherapeutic INR result. (Goal INR 2.0-3.0)    Recent labs: (last 7 days)     05/02/23  0000   INR 1.7*       ASSESSMENT       Source(s): Chart Review and Patient/Caregiver Call       Warfarin doses taken: Warfarin taken as instructed    Diet: No new diet changes identified    Medication/supplement changes: None noted    New illness, injury, or hospitalization: No    Signs or symptoms of bleeding or clotting: No    Previous result: Therapeutic last 2(+) visits    Additional findings: None         PLAN     Recommended plan for no diet, medication or health factor changes affecting INR     Dosing Instructions: Continue your current warfarin dose with next INR in 1 week       Summary  As of 5/2/2023    Full warfarin instructions:  2.5 mg every Sat; 5 mg all other days   Next INR check:  5/9/2023             Telephone call with  Nanette, daughter, who verbalizes understanding and agrees to plan    Patient to recheck with home meter    Education provided:     None required    Plan made per [facility] anticoagulation protocol    Shirley Cruz, RN  Anticoagulation Clinic  5/2/2023    _______________________________________________________________________     Anticoagulation Episode Summary     Current INR goal:  2.0-3.0   TTR:  88.8 % (11.9 mo)   Target end date:  Indefinite   Send INR reminders to:  [facility]IT[facility]    Indications    Paroxysmal atrial fibrillation (H) [I48.0]           Comments:  [employer] home meter- Managed by Exception         Anticoagulation Care Providers     Provider Role Specialty Phone number    Eric Pickett MD Referring Cardiovascular Disease [phone]

## 2023-05-10 ENCOUNTER — ANTICOAGULATION THERAPY VISIT (OUTPATIENT)
Dept: ANTICOAGULATION | Facility: CLINIC | Age: 73
End: 2023-05-10
Payer: MEDICARE

## 2023-05-10 DIAGNOSIS — I48.0 PAROXYSMAL ATRIAL FIBRILLATION (H): Primary | ICD-10-CM

## 2023-05-10 LAB — INR HOME MONITORING: 2.4 (ref 2–3)

## 2023-05-10 NOTE — PROGRESS NOTES
ANTICOAGULATION MANAGEMENT     Rakesh Samuels 72 year old male is on warfarin with therapeutic INR result. (Goal INR 2.0-3.0)    Recent labs: (last 7 days)     05/10/23  0000   INR 2.4       ASSESSMENT       Source(s): Chart Review    Previous INR was Subtherapeutic    Medication, diet, health changes since last INR chart reviewed; none identified             PLAN     Recommended plan for no diet, medication or health factor changes affecting INR     Dosing Instructions: Continue your current warfarin dose with next INR in 2 weeks       Summary  As of 5/10/2023    Full warfarin instructions:  2.5 mg every Sat; 5 mg all other days   Next INR check:  5/24/2023             Detailed voice message left for  Nanette, daughter with dosing instructions and follow up date.     Patient to recheck with home meter    Education provided:     Please call back if any changes to your diet, medications or how you've been taking warfarin    Resume manage by exception with home monitor. Continue to submit INR results to home monitor company.You will only be called when your result is out of range. Please call and notify [facility] if new medication started, dose missed, signs or symptoms of bleeding or clotting, or a surgery/procedure is scheduled.    Contact [phone]  with any changes, questions or concerns.     Plan made per [facility] anticoagulation protocol    Shirley Cruz, RN  Anticoagulation Clinic  5/10/2023    _______________________________________________________________________     Anticoagulation Episode Summary     Current INR goal:  2.0-3.0   TTR:  88.1 % (1 y)   Target end date:  Indefinite   Send INR reminders to:  [facility]IT[facility]    Indications    Paroxysmal atrial fibrillation (H) [I48.0]           Comments:  [employer] home meter- Managed by Exception         Anticoagulation Care Providers     Provider Role Specialty Phone number    Eric Pickett MD Referring Cardiovascular Disease [phone]

## 2023-05-12 DIAGNOSIS — I48.0 PAROXYSMAL ATRIAL FIBRILLATION (H): ICD-10-CM

## 2023-05-12 RX ORDER — WARFARIN SODIUM 5 MG/1
TABLET ORAL
Qty: 78 TABLET | Refills: 1 | Status: SHIPPED | OUTPATIENT
Start: 2023-05-12 | End: 2023-11-02

## 2023-05-12 NOTE — TELEPHONE ENCOUNTER
ANTICOAGULATION MANAGEMENT:  Medication Refill    Anticoagulation Summary  As of 5/10/2023    Warfarin maintenance plan:  2.5 mg (5 mg x 0.5) every Sat; 5 mg (5 mg x 1) all other days   Next INR check:  5/24/2023   Target end date:  Indefinite    Indications    Paroxysmal atrial fibrillation (H) [I48.0]             Anticoagulation Care Providers     Provider Role Specialty Phone number    Eric Pickett MD Referring Cardiovascular Disease [phone]          Visit with referring provider/group within last year: Yes    [facility] referral signed within last year: Yes    Rakesh meets all criteria for refill (current [facility] referral, office visit with referring provider/group in last year, lab monitoring up to date or not exceeding 2 weeks overdue). Rx instructions and quantity supplied updated to match patient's current dosing plan. Warfarin 90 day supply with 1 refill granted per [facility] protocol     Isabela Fonseca, RN  Anticoagulation Clinic

## 2023-05-27 LAB — INR HOME MONITORING: 2.3 (ref 2–3)

## 2023-05-30 ENCOUNTER — ANTICOAGULATION THERAPY VISIT (OUTPATIENT)
Dept: ANTICOAGULATION | Facility: CLINIC | Age: 73
End: 2023-05-30
Payer: MEDICARE

## 2023-05-30 DIAGNOSIS — I48.0 PAROXYSMAL ATRIAL FIBRILLATION (H): Primary | ICD-10-CM

## 2023-05-30 NOTE — PROGRESS NOTES
ANTICOAGULATION MANAGEMENT     Rakesh Samuels 72 year old male is on warfarin with therapeutic INR result. (Goal INR 2.0-3.0)    Recent labs: (last 7 days)     05/26/23  0000   INR 2.3       ASSESSMENT       Source(s): Chart Review    Previous INR was Therapeutic last visit; previously outside of goal range    Medication, diet, health changes since last INR chart reviewed; none identified        I left a detailed voicemail with the orders reflected in flowsheet. I have also requested a call back if there have been any missed doses, concerns, illness, fever, or if there have been any changes in medications, activity level, or diet       PLAN     Recommended plan for no diet, medication or health factor changes affecting INR     Dosing Instructions: Continue your current warfarin dose with next INR in 2 weeks       Summary  As of 5/30/2023    Full warfarin instructions:  2.5 mg every Sat; 5 mg all other days   Next INR check:  6/9/2023             Detailed voice message left for Rakesh with dosing instructions and follow up date.     Patient to recheck with home meter    Education provided:     Please call back if any changes to your diet, medications or how you've been taking warfarin    Plan made per [facility] anticoagulation protocol    Yisel Corrales, RN  Anticoagulation Clinic  5/30/2023    _______________________________________________________________________     Anticoagulation Episode Summary     Current INR goal:  2.0-3.0   TTR:  91.4 % (1 y)   Target end date:  Indefinite   Send INR reminders to:  [facility]IT[facility]    Indications    Paroxysmal atrial fibrillation (H) [I48.0]           Comments:  [employer] home meter- Managed by Exception         Anticoagulation Care Providers     Provider Role Specialty Phone number    Eric Pickett MD Referring Cardiovascular Disease [phone]

## 2023-06-10 LAB — INR HOME MONITORING: 1.7 (ref 2–3)

## 2023-06-12 ENCOUNTER — ANTICOAGULATION THERAPY VISIT (OUTPATIENT)
Dept: ANTICOAGULATION | Facility: CLINIC | Age: 73
End: 2023-06-12
Payer: MEDICARE

## 2023-06-12 DIAGNOSIS — I48.0 PAROXYSMAL ATRIAL FIBRILLATION (H): Primary | ICD-10-CM

## 2023-06-12 NOTE — PROGRESS NOTES
ANTICOAGULATION MANAGEMENT     Rakesh Samuels 72 year old male is on warfarin with subtherapeutic INR result. (Goal INR 2.0-3.0)    Recent labs: (last 7 days)     06/10/23  0000   INR 1.7*       ASSESSMENT       Source(s): Chart Review and Patient/Caregiver Call       Warfarin doses taken: Warfarin taken as instructed    Diet: No new diet changes identified    Medication/supplement changes: None noted    New illness, injury, or hospitalization: No    Signs or symptoms of bleeding or clotting: No    Previous result: Therapeutic last 2(+) visits    Additional findings: None         PLAN     Recommended plan for no diet, medication or health factor changes affecting INR     Dosing Instructions: Continue your current warfarin dose with next INR in 10 days       Summary  As of 6/12/2023    Full warfarin instructions:  2.5 mg every Sat; 5 mg all other days   Next INR check:  6/19/2023             Telephone call with  daughter Nanette who verbalizes understanding and agrees to plan    Patient to recheck with home meter    Education provided:     Goal range and lab monitoring: goal range and significance of current result and Importance of therapeutic range    Plan made per [facility] anticoagulation protocol    Wallace Mckee RN  Anticoagulation Clinic  6/12/2023    _______________________________________________________________________     Anticoagulation Episode Summary     Current INR goal:  2.0-3.0   TTR:  89.3 % (1 y)   Target end date:  Indefinite   Send INR reminders to:  [facility]IT[facility]    Indications    Paroxysmal atrial fibrillation (H) [I48.0]           Comments:  [employer] home meter- Managed by Exception         Anticoagulation Care Providers     Provider Role Specialty Phone number    Eric Pickett MD Referring Cardiovascular Disease [phone]

## 2023-06-18 LAB — INR HOME MONITORING: 1.4 (ref 2–3)

## 2023-06-19 ENCOUNTER — ANTICOAGULATION THERAPY VISIT (OUTPATIENT)
Dept: ANTICOAGULATION | Facility: CLINIC | Age: 73
End: 2023-06-19
Payer: MEDICARE

## 2023-06-19 DIAGNOSIS — I48.0 PAROXYSMAL ATRIAL FIBRILLATION (H): Primary | ICD-10-CM

## 2023-06-19 NOTE — PROGRESS NOTES
ANTICOAGULATION MANAGEMENT     Rakesh Samuels 72 year old male is on warfarin with subtherapeutic INR result. (Goal INR 2.0-3.0)    Recent labs: (last 7 days)     06/18/23  0000   INR 1.4*       ASSESSMENT       Source(s): Chart Review and Patient/Caregiver Call       Warfarin doses taken: Warfarin taken as instructed    Diet: No new diet changes identified    Medication/supplement changes: None noted    New illness, injury, or hospitalization: No    Signs or symptoms of bleeding or clotting: No    Previous result: Therapeutic last 2(+) visits    Additional findings: None         PLAN     Recommended plan for no diet, medication or health factor changes affecting INR     Dosing Instructions: booster dose then Increase your warfarin dose (15.4% change) with next INR in 5-7 days       Summary  As of 6/19/2023    Full warfarin instructions:  6/19: 10 mg; Otherwise 7.5 mg every Mon; 5 mg all other days   Next INR check:  6/26/2023             Telephone call with  daughter Nanette  who verbalizes understanding and agrees to plan    Patient to recheck with home meter    Education provided:     Goal range and lab monitoring: goal range and significance of current result and Importance of therapeutic range    Plan made per [facility] anticoagulation protocol    Wallace Mckee RN  Anticoagulation Clinic  6/19/2023    _______________________________________________________________________     Anticoagulation Episode Summary     Current INR goal:  2.0-3.0   TTR:  87.1 % (1 y)   Target end date:  Indefinite   Send INR reminders to:  [facility] MONIT[facility]    Indications    Paroxysmal atrial fibrillation (H) [I48.0]           Comments:  [employer] home meter- Managed by Exception         Anticoagulation Care Providers     Provider Role Specialty Phone number    Eric Pickett MD Referring Cardiovascular Disease [phone]

## 2023-06-25 LAB — INR HOME MONITORING: 1.9 (ref 2–3)

## 2023-06-26 ENCOUNTER — TELEPHONE (OUTPATIENT)
Dept: SCHEDULING | Facility: CLINIC | Age: 73
End: 2023-06-26
Payer: MEDICARE

## 2023-06-26 ENCOUNTER — ANTICOAGULATION THERAPY VISIT (OUTPATIENT)
Dept: ANTICOAGULATION | Facility: CLINIC | Age: 73
End: 2023-06-26
Payer: MEDICARE

## 2023-06-26 DIAGNOSIS — I48.0 PAROXYSMAL ATRIAL FIBRILLATION (H): Primary | ICD-10-CM

## 2023-06-26 NOTE — PROGRESS NOTES
ANTICOAGULATION MANAGEMENT     Rakesh Samuels 72 year old male is on warfarin with subtherapeutic INR result. (Goal INR 2.0-3.0)    Recent labs: (last 7 days)     06/25/23  0000   INR 1.9*       ASSESSMENT       Source(s): Chart Review and Patient/Caregiver Call       Warfarin doses taken: Warfarin taken as instructed    Diet: No new diet changes identified    Medication/supplement changes: None noted    New illness, injury, or hospitalization: No    Signs or symptoms of bleeding or clotting: No    Previous result: Subtherapeutic    Additional findings: None         PLAN     Recommended plan for no diet, medication or health factor changes affecting INR     Dosing Instructions: Increase your warfarin dose (6.7% change) with next INR in 1 week       Summary  As of 6/26/2023    Full warfarin instructions:  7.5 mg every Mon, Thu; 5 mg all other days   Next INR check:  7/3/2023             Detailed voice message left for  Nanette Daughter with dosing instructions and follow up date.     Patient to recheck with home meter    Education provided:     Please call back if any changes to your diet, medications or how you've been taking warfarin    Plan made per [facility] anticoagulation protocol    Isabela Fonseca, RN  Anticoagulation Clinic  6/26/2023    _______________________________________________________________________     Anticoagulation Episode Summary     Current INR goal:  2.0-3.0   TTR:  85.4 % (1 y)   Target end date:  Indefinite   Send INR reminders to:  [facility] HOME MONITOR[facility]    Indications    Paroxysmal atrial fibrillation (H) [I48.0]           Comments:  [employer] home meter- Managed by Exception         Anticoagulation Care Providers     Provider Role Specialty Phone number    Eric Pickett MD Referring Cardiovascular Disease [phone]

## 2023-06-26 NOTE — PROGRESS NOTES
Patient's daughter called back to confirm message as she was unable to plan message in car. Confirmed dosing and assessment.   No changes to plan below.  Isabela Fonseca, RN  Anticoagulation Nurse - [facility] [place]

## 2023-06-26 NOTE — TELEPHONE ENCOUNTER
Patient Returning Call    Reason for call:  Nanette [phone]    Information relayed to patient:  RN to return call    Patient has additional questions:  Yes        Okay to leave a detailed message?: Yes at Other phone number:  [phone]    Chelsey Bain

## 2023-06-26 NOTE — TELEPHONE ENCOUNTER
See [facility] encounter for details.  Isabela Fonseca, RN  Anticoagulation Nurse - [facility] [place]

## 2023-07-02 LAB — INR HOME MONITORING: 2.1 (ref 2–3)

## 2023-07-03 ENCOUNTER — ANTICOAGULATION THERAPY VISIT (OUTPATIENT)
Dept: ANTICOAGULATION | Facility: CLINIC | Age: 73
End: 2023-07-03
Payer: MEDICARE

## 2023-07-03 DIAGNOSIS — I48.0 PAROXYSMAL ATRIAL FIBRILLATION (H): Primary | ICD-10-CM

## 2023-07-03 NOTE — PROGRESS NOTES
ANTICOAGULATION MANAGEMENT     Rakesh Samuels 72 year old male is on warfarin with therapeutic INR result. (Goal INR 2.0-3.0)    Recent labs: (last 7 days)     07/02/23  0000   INR 2.1       ASSESSMENT       Source(s): Chart Review and Patient/Caregiver Call       Warfarin doses taken: Warfarin taken as instructed    Diet: No new diet changes identified    Medication/supplement changes: None noted    New illness, injury, or hospitalization: No    Signs or symptoms of bleeding or clotting: No    Previous result: Subtherapeutic    Additional findings: None         PLAN     Recommended plan for no diet, medication or health factor changes affecting INR     Dosing Instructions: Continue your current warfarin dose with next INR in 1 week if within range, can extend interval to 2 weeks thereafter.    Summary  As of 7/3/2023    Full warfarin instructions:  7.5 mg every Mon, Thu; 5 mg all other days   Next INR check:  7/10/2023             Telephone call with  patient's dtr. Nanette who verbalizes understanding and agrees to plan and who agrees to plan and repeated back plan correctly    Patient to recheck with home meter    Education provided:     Goal range and lab monitoring: goal range and significance of current result and Importance of following up at instructed interval    Plan made per [facility] anticoagulation protocol    Ruby Manzanares RN  Anticoagulation Clinic  7/3/2023    _______________________________________________________________________     Anticoagulation Episode Summary     Current INR goal:  2.0-3.0   TTR:  84.4 % (1 y)   Target end date:  Indefinite   Send INR reminders to:  [facility] MONIT[facility]    Indications    Paroxysmal atrial fibrillation (H) [I48.0]           Comments:  [employer] home meter- Managed by Exception         Anticoagulation Care Providers     Provider Role Specialty Phone number    Eric Pickett MD Referring Cardiovascular Disease [phone]

## 2023-07-10 ENCOUNTER — DOCUMENTATION ONLY (OUTPATIENT)
Dept: ANTICOAGULATION | Facility: CLINIC | Age: 73
End: 2023-07-10
Payer: MEDICARE

## 2023-07-10 LAB — INR HOME MONITORING: 2.6 (ref 2–3)

## 2023-07-10 NOTE — PROGRESS NOTES
ANTICOAGULATION  MANAGEMENT-Home Monitor Managed by Aranza SWANSON Abril 72 year old male is on warfarin with therapeutic INR result. (Goal INR 2.0-3.0)    Recent labs: (last 7 days)     07/10/23  0000   INR 2.6         Previous INR was Therapeutic    Medication, diet, health changes since last INR:chart reviewed; none identified    Contacted within the last 12 weeks by phone on 7/3/23      NISHA     Rakesh was NOT contacted regarding therapeutic result today per home monitoring policy manage by exception agreement.   Current warfarin dose is to be continued:     Summary  As of 7/10/2023    Full warfarin instructions:  7.5 mg every Mon, Thu; 5 mg all other days   Next INR check:  7/24/2023           ?   Alaina Linder RN  Anticoagulation Clinic  7/10/2023    _______________________________________________________________________     Anticoagulation Episode Summary     Current INR goal:  2.0-3.0   TTR:  84.4 % (1 y)   Target end date:  Indefinite   Send INR reminders to:  [facility]IT[facility]    Indications    Paroxysmal atrial fibrillation (H) [I48.0]           Comments:  [employer] home meter- Managed by Exception         Anticoagulation Care Providers     Provider Role Specialty Phone number    Eric Pickett MD Referring Cardiovascular Disease [phone]

## 2023-07-26 ENCOUNTER — ANTICOAGULATION THERAPY VISIT (OUTPATIENT)
Dept: ANTICOAGULATION | Facility: CLINIC | Age: 73
End: 2023-07-26
Payer: MEDICARE

## 2023-07-26 DIAGNOSIS — I48.0 PAROXYSMAL ATRIAL FIBRILLATION (H): Primary | ICD-10-CM

## 2023-07-26 LAB — INR HOME MONITORING: 3 (ref 2–3)

## 2023-07-26 NOTE — PROGRESS NOTES
ANTICOAGULATION MANAGEMENT     Rakesh Samuels 72 year old male is on warfarin with therapeutic INR result. (Goal INR 2.0-3.0)    Recent labs: (last 7 days)     07/26/23  0000   INR 3.0       ASSESSMENT     Source(s): Chart Review and Patient/Caregiver Call     Warfarin doses taken: Warfarin taken as instructed  Diet: No new diet changes identified  Medication/supplement changes: None noted  New illness, injury, or hospitalization: No  Signs or symptoms of bleeding or clotting: No  Previous result: Therapeutic last 2(+) visits  Additional findings: None       PLAN     Recommended plan for no diet, medication or health factor changes affecting INR     Dosing Instructions: Continue your current warfarin dose with next INR in 2 weeks       Summary  As of 7/26/2023      Full warfarin instructions:  7.5 mg every Mon, Thu; 5 mg all other days   Next INR check:  8/9/2023               Telephone call with patient's dtr Nanette who verbalizes understanding and agrees to plan    Patient to recheck with home meter    Education provided:   Resume manage by exception with home monitor. Continue to submit INR results to home monitor company.You will only be called when your result is out of range. Please call and notify [facility] if new medication started, dose missed, signs or symptoms of bleeding or clotting, or a surgery/procedure is scheduled.  Contact [phone]  with any changes, questions or concerns.     Plan made per [facility] anticoagulation protocol    Ruby Manzanares RN  Anticoagulation Clinic  7/26/2023    _______________________________________________________________________     Anticoagulation Episode Summary       Current INR goal:  2.0-3.0   TTR:  84.4 % (1 y)   Target end date:  Indefinite   Send INR reminders to:  [facility] HOME MONITORING    Indications    Paroxysmal atrial fibrillation (H) [I48.0]             Comments:  [employer] home meter- Managed by Exception             Anticoagulation Care Providers       Provider Role  Specialty Phone number    Eric Pickett MD Referring Cardiovascular Disease [phone]

## 2023-07-31 ENCOUNTER — APPOINTMENT (OUTPATIENT)
Dept: RADIOLOGY | Facility: HOSPITAL | Age: 73
End: 2023-07-31
Attending: EMERGENCY MEDICINE
Payer: MEDICARE

## 2023-07-31 ENCOUNTER — APPOINTMENT (OUTPATIENT)
Dept: MRI IMAGING | Facility: HOSPITAL | Age: 73
End: 2023-07-31
Attending: EMERGENCY MEDICINE
Payer: MEDICARE

## 2023-07-31 ENCOUNTER — HOSPITAL ENCOUNTER (OUTPATIENT)
Facility: HOSPITAL | Age: 73
Setting detail: OBSERVATION
Discharge: NURSING, REHAB OR LONG-TERM CARE | End: 2023-08-03
Attending: EMERGENCY MEDICINE | Admitting: HOSPITALIST
Payer: MEDICARE

## 2023-07-31 ENCOUNTER — APPOINTMENT (OUTPATIENT)
Dept: CT IMAGING | Facility: HOSPITAL | Age: 73
End: 2023-07-31
Attending: EMERGENCY MEDICINE
Payer: MEDICARE
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
## 2023-07-31 DIAGNOSIS — B96.20 E. COLI URINARY TRACT INFECTION: Primary | ICD-10-CM

## 2023-07-31 DIAGNOSIS — S09.90XA INJURY OF HEAD, INITIAL ENCOUNTER: ICD-10-CM

## 2023-07-31 DIAGNOSIS — N39.0 E. COLI URINARY TRACT INFECTION: Primary | ICD-10-CM

## 2023-07-31 LAB
ALBUMIN SERPL BCG-MCNC: 4 G/DL (ref 3.5–5.2)
ALBUMIN UR-MCNC: 70 MG/DL
ALP SERPL-CCNC: 81 U/L (ref 40–129)
ALT SERPL W P-5'-P-CCNC: 12 U/L (ref 0–70)
ANION GAP SERPL CALCULATED.3IONS-SCNC: 11 MMOL/L (ref 7–15)
APPEARANCE UR: ABNORMAL
APTT PPP: 45 SECONDS (ref 22–38)
AST SERPL W P-5'-P-CCNC: 18 U/L (ref 0–45)
BACTERIA #/AREA URNS HPF: ABNORMAL /HPF
BASOPHILS # BLD AUTO: 0.1 10E3/UL (ref 0–0.2)
BASOPHILS NFR BLD AUTO: 0 %
BILIRUB SERPL-MCNC: 0.7 MG/DL
BILIRUB UR QL STRIP: NEGATIVE
BUN SERPL-MCNC: 11.9 MG/DL (ref 8–23)
CALCIUM SERPL-MCNC: 9.1 MG/DL (ref 8.8–10.2)
CHLORIDE SERPL-SCNC: 104 MMOL/L (ref 98–107)
CK SERPL-CCNC: 91 U/L (ref 39–308)
COLOR UR AUTO: YELLOW
CREAT SERPL-MCNC: 0.83 MG/DL (ref 0.67–1.17)
D DIMER PPP FEU-MCNC: 0.47 UG/ML FEU (ref 0–0.5)
DEPRECATED HCO3 PLAS-SCNC: 26 MMOL/L (ref 22–29)
EOSINOPHIL # BLD AUTO: 0.1 10E3/UL (ref 0–0.7)
EOSINOPHIL NFR BLD AUTO: 1 %
ERYTHROCYTE [DISTWIDTH] IN BLOOD BY AUTOMATED COUNT: 14.4 % (ref 10–15)
ETHANOL SERPL-MCNC: <0.01 G/DL
GFR SERPL CREATININE-BSD FRML MDRD: >90 ML/MIN/1.73M2
GLUCOSE BLDC GLUCOMTR-MCNC: 142 MG/DL (ref 70–99)
GLUCOSE BLDC GLUCOMTR-MCNC: 193 MG/DL (ref 70–99)
GLUCOSE SERPL-MCNC: 158 MG/DL (ref 70–99)
GLUCOSE UR STRIP-MCNC: NEGATIVE MG/DL
HBA1C MFR BLD: 6.8 %
HCT VFR BLD AUTO: 41.8 % (ref 40–53)
HGB BLD-MCNC: 13.6 G/DL (ref 13.3–17.7)
HGB UR QL STRIP: ABNORMAL
HOLD SPECIMEN: NORMAL
IMM GRANULOCYTES # BLD: 0 10E3/UL
IMM GRANULOCYTES NFR BLD: 0 %
INR PPP: 3.26 (ref 0.85–1.15)
KETONES UR STRIP-MCNC: 10 MG/DL
LACTATE SERPL-SCNC: 1.6 MMOL/L (ref 0.7–2)
LEUKOCYTE ESTERASE UR QL STRIP: ABNORMAL
LYMPHOCYTES # BLD AUTO: 1.3 10E3/UL (ref 0.8–5.3)
LYMPHOCYTES NFR BLD AUTO: 10 %
MCH RBC QN AUTO: 31.6 PG (ref 26.5–33)
MCHC RBC AUTO-ENTMCNC: 32.5 G/DL (ref 31.5–36.5)
MCV RBC AUTO: 97 FL (ref 78–100)
MONOCYTES # BLD AUTO: 1.1 10E3/UL (ref 0–1.3)
MONOCYTES NFR BLD AUTO: 9 %
NEUTROPHILS # BLD AUTO: 10.6 10E3/UL (ref 1.6–8.3)
NEUTROPHILS NFR BLD AUTO: 80 %
NITRATE UR QL: NEGATIVE
NRBC # BLD AUTO: 0 10E3/UL
NRBC BLD AUTO-RTO: 0 /100
PH UR STRIP: 6 [PH] (ref 5–7)
PLATELET # BLD AUTO: 229 10E3/UL (ref 150–450)
POTASSIUM SERPL-SCNC: 4.5 MMOL/L (ref 3.4–5.3)
PROT SERPL-MCNC: 7 G/DL (ref 6.4–8.3)
RBC # BLD AUTO: 4.3 10E6/UL (ref 4.4–5.9)
RBC URINE: 30 /HPF
SODIUM SERPL-SCNC: 141 MMOL/L (ref 136–145)
SP GR UR STRIP: 1.02 (ref 1–1.03)
TROPONIN T SERPL HS-MCNC: 32 NG/L
TROPONIN T SERPL HS-MCNC: 35 NG/L
TSH SERPL DL<=0.005 MIU/L-ACNC: 2.5 UIU/ML (ref 0.3–4.2)
UROBILINOGEN UR STRIP-MCNC: <2 MG/DL
WBC # BLD AUTO: 13.1 10E3/UL (ref 4–11)
WBC URINE: >182 /HPF

## 2023-07-31 PROCEDURE — 84484 ASSAY OF TROPONIN QUANT: CPT | Performed by: EMERGENCY MEDICINE

## 2023-07-31 PROCEDURE — 87040 BLOOD CULTURE FOR BACTERIA: CPT | Performed by: EMERGENCY MEDICINE

## 2023-07-31 PROCEDURE — 99285 EMERGENCY DEPT VISIT HI MDM: CPT | Mod: 25

## 2023-07-31 PROCEDURE — 120N000001 HC R&B MED SURG/OB

## 2023-07-31 PROCEDURE — 250N000009 HC RX 250: Performed by: INTERNAL MEDICINE

## 2023-07-31 PROCEDURE — 82077 ASSAY SPEC XCP UR&BREATH IA: CPT | Performed by: EMERGENCY MEDICINE

## 2023-07-31 PROCEDURE — 81001 URINALYSIS AUTO W/SCOPE: CPT | Performed by: EMERGENCY MEDICINE

## 2023-07-31 PROCEDURE — 99222 1ST HOSP IP/OBS MODERATE 55: CPT | Performed by: INTERNAL MEDICINE

## 2023-07-31 PROCEDURE — 87186 SC STD MICRODIL/AGAR DIL: CPT | Performed by: EMERGENCY MEDICINE

## 2023-07-31 PROCEDURE — 72131 CT LUMBAR SPINE W/O DYE: CPT | Mod: ME

## 2023-07-31 PROCEDURE — G1010 CDSM STANSON: HCPCS

## 2023-07-31 PROCEDURE — 85610 PROTHROMBIN TIME: CPT | Performed by: EMERGENCY MEDICINE

## 2023-07-31 PROCEDURE — 36415 COLL VENOUS BLD VENIPUNCTURE: CPT | Performed by: EMERGENCY MEDICINE

## 2023-07-31 PROCEDURE — 93005 ELECTROCARDIOGRAM TRACING: CPT | Performed by: EMERGENCY MEDICINE

## 2023-07-31 PROCEDURE — 83036 HEMOGLOBIN GLYCOSYLATED A1C: CPT | Performed by: INTERNAL MEDICINE

## 2023-07-31 PROCEDURE — 82962 GLUCOSE BLOOD TEST: CPT

## 2023-07-31 PROCEDURE — 85014 HEMATOCRIT: CPT | Performed by: EMERGENCY MEDICINE

## 2023-07-31 PROCEDURE — 85730 THROMBOPLASTIN TIME PARTIAL: CPT | Performed by: EMERGENCY MEDICINE

## 2023-07-31 PROCEDURE — 250N000012 HC RX MED GY IP 250 OP 636 PS 637: Performed by: INTERNAL MEDICINE

## 2023-07-31 PROCEDURE — 84484 ASSAY OF TROPONIN QUANT: CPT | Mod: 91 | Performed by: INTERNAL MEDICINE

## 2023-07-31 PROCEDURE — 250N000013 HC RX MED GY IP 250 OP 250 PS 637: Performed by: INTERNAL MEDICINE

## 2023-07-31 PROCEDURE — 83605 ASSAY OF LACTIC ACID: CPT | Performed by: EMERGENCY MEDICINE

## 2023-07-31 PROCEDURE — 71046 X-RAY EXAM CHEST 2 VIEWS: CPT

## 2023-07-31 PROCEDURE — 80053 COMPREHEN METABOLIC PANEL: CPT | Performed by: EMERGENCY MEDICINE

## 2023-07-31 PROCEDURE — 84443 ASSAY THYROID STIM HORMONE: CPT | Performed by: EMERGENCY MEDICINE

## 2023-07-31 PROCEDURE — 250N000013 HC RX MED GY IP 250 OP 250 PS 637: Performed by: EMERGENCY MEDICINE

## 2023-07-31 PROCEDURE — 82550 ASSAY OF CK (CPK): CPT | Performed by: EMERGENCY MEDICINE

## 2023-07-31 PROCEDURE — 85379 FIBRIN DEGRADATION QUANT: CPT | Performed by: EMERGENCY MEDICINE

## 2023-07-31 RX ORDER — HYDROXYZINE HYDROCHLORIDE 10 MG/1
10 TABLET, FILM COATED ORAL 3 TIMES DAILY PRN
Status: DISCONTINUED | OUTPATIENT
Start: 2023-07-31 | End: 2023-08-03 | Stop reason: HOSPADM

## 2023-07-31 RX ORDER — POLYETHYLENE GLYCOL 3350 17 G/17G
17 POWDER, FOR SOLUTION ORAL DAILY PRN
Status: DISCONTINUED | OUTPATIENT
Start: 2023-07-31 | End: 2023-08-03 | Stop reason: HOSPADM

## 2023-07-31 RX ORDER — LIDOCAINE 40 MG/G
CREAM TOPICAL
Status: DISCONTINUED | OUTPATIENT
Start: 2023-07-31 | End: 2023-08-01

## 2023-07-31 RX ORDER — FLUVOXAMINE MALEATE 50 MG
50 TABLET ORAL AT BEDTIME
Status: DISCONTINUED | OUTPATIENT
Start: 2023-07-31 | End: 2023-08-03 | Stop reason: HOSPADM

## 2023-07-31 RX ORDER — ACETAMINOPHEN 325 MG/1
650 TABLET ORAL EVERY 4 HOURS PRN
Status: DISCONTINUED | OUTPATIENT
Start: 2023-07-31 | End: 2023-08-03 | Stop reason: HOSPADM

## 2023-07-31 RX ORDER — NICOTINE POLACRILEX 4 MG
15-30 LOZENGE BUCCAL
Status: DISCONTINUED | OUTPATIENT
Start: 2023-07-31 | End: 2023-08-03 | Stop reason: HOSPADM

## 2023-07-31 RX ORDER — LORAZEPAM 0.5 MG/1
1 TABLET ORAL ONCE
Status: COMPLETED | OUTPATIENT
Start: 2023-07-31 | End: 2023-07-31

## 2023-07-31 RX ORDER — ARIPIPRAZOLE 2 MG/1
2 TABLET ORAL AT BEDTIME
Status: DISCONTINUED | OUTPATIENT
Start: 2023-07-31 | End: 2023-08-03 | Stop reason: HOSPADM

## 2023-07-31 RX ORDER — LIDOCAINE 40 MG/G
CREAM TOPICAL
Status: DISCONTINUED | OUTPATIENT
Start: 2023-07-31 | End: 2023-08-03 | Stop reason: HOSPADM

## 2023-07-31 RX ORDER — AMOXICILLIN 250 MG
1 CAPSULE ORAL 2 TIMES DAILY PRN
Status: DISCONTINUED | OUTPATIENT
Start: 2023-07-31 | End: 2023-08-03 | Stop reason: HOSPADM

## 2023-07-31 RX ORDER — ASPIRIN 81 MG/1
81 TABLET, CHEWABLE ORAL DAILY
Status: DISCONTINUED | OUTPATIENT
Start: 2023-07-31 | End: 2023-08-03 | Stop reason: HOSPADM

## 2023-07-31 RX ORDER — MULTIVIT-MIN/FOLIC ACID/LUTEIN 400-250MCG
1 TABLET,CHEWABLE ORAL DAILY
COMMUNITY

## 2023-07-31 RX ORDER — DORZOLAMIDE HYDROCHLORIDE AND TIMOLOL MALEATE 20; 5 MG/ML; MG/ML
1 SOLUTION/ DROPS OPHTHALMIC 2 TIMES DAILY
Status: DISCONTINUED | OUTPATIENT
Start: 2023-07-31 | End: 2023-08-03 | Stop reason: HOSPADM

## 2023-07-31 RX ORDER — DEXTROSE MONOHYDRATE 25 G/50ML
25-50 INJECTION, SOLUTION INTRAVENOUS
Status: DISCONTINUED | OUTPATIENT
Start: 2023-07-31 | End: 2023-08-03 | Stop reason: HOSPADM

## 2023-07-31 RX ORDER — BRIMONIDINE TARTRATE 2 MG/ML
1 SOLUTION/ DROPS OPHTHALMIC 2 TIMES DAILY
Status: DISCONTINUED | OUTPATIENT
Start: 2023-07-31 | End: 2023-08-03 | Stop reason: HOSPADM

## 2023-07-31 RX ORDER — HYDRALAZINE HYDROCHLORIDE 20 MG/ML
10 INJECTION INTRAMUSCULAR; INTRAVENOUS EVERY 6 HOURS PRN
Status: DISCONTINUED | OUTPATIENT
Start: 2023-07-31 | End: 2023-08-03 | Stop reason: HOSPADM

## 2023-07-31 RX ORDER — ATORVASTATIN CALCIUM 10 MG/1
10 TABLET, FILM COATED ORAL AT BEDTIME
Status: DISCONTINUED | OUTPATIENT
Start: 2023-07-31 | End: 2023-08-03 | Stop reason: HOSPADM

## 2023-07-31 RX ORDER — AMOXICILLIN 250 MG
2 CAPSULE ORAL 2 TIMES DAILY PRN
Status: DISCONTINUED | OUTPATIENT
Start: 2023-07-31 | End: 2023-08-03 | Stop reason: HOSPADM

## 2023-07-31 RX ORDER — HYDROXYZINE HYDROCHLORIDE 10 MG/1
10 TABLET, FILM COATED ORAL 3 TIMES DAILY PRN
COMMUNITY
End: 2024-08-08

## 2023-07-31 RX ADMIN — ATORVASTATIN CALCIUM 10 MG: 10 TABLET, FILM COATED ORAL at 21:35

## 2023-07-31 RX ADMIN — INSULIN ASPART 1 UNITS: 100 INJECTION, SOLUTION INTRAVENOUS; SUBCUTANEOUS at 18:38

## 2023-07-31 RX ADMIN — DORZOLAMIDE HYDROCHLORIDE AND TIMOLOL MALEATE 1 DROP: 22.3; 6.8 SOLUTION/ DROPS OPHTHALMIC at 19:04

## 2023-07-31 RX ADMIN — LORAZEPAM 1 MG: 0.5 TABLET ORAL at 15:08

## 2023-07-31 RX ADMIN — FLUVOXAMINE MALEATE 50 MG: 50 TABLET ORAL at 21:36

## 2023-07-31 RX ADMIN — ASPIRIN 81 MG CHEWABLE TABLET 81 MG: 81 TABLET CHEWABLE at 18:39

## 2023-07-31 RX ADMIN — BRIMONIDINE TARTRATE 1 DROP: 2 SOLUTION OPHTHALMIC at 19:04

## 2023-07-31 RX ADMIN — ARIPIPRAZOLE 2 MG: 2 TABLET ORAL at 21:36

## 2023-07-31 ASSESSMENT — ACTIVITIES OF DAILY LIVING (ADL)
ADLS_ACUITY_SCORE: 35
DEPENDENT_IADLS:: CLEANING;COOKING;LAUNDRY;SHOPPING;MEAL PREPARATION;TRANSPORTATION;MEDICATION MANAGEMENT
ADLS_ACUITY_SCORE: 35

## 2023-07-31 NOTE — MEDICATION SCRIBE - ADMISSION MEDICATION HISTORY
Medication Scribe Admission Medication History    Admission medication history is complete. The information provided in this note is only as accurate as the sources available at the time of the update.    Medication reconciliation/reorder completed by provider prior to medication history? No    Information Source(s): Family member via phone    Pertinent Information: Patient reports that daughter Nanette Drew, sets up his medications. Called Nanette at [phone] and conducted interview.     Patient reports not taking AM medications today and yesterday. Patient reports taking medications yesterday evening.     Patient reports that eye drops are to be given for both eyes.       Changes made to PTA medication list:  Added: Hydroxyzine  Deleted: None  Changed: None    Medication Affordability:  Not including over the counter (OTC) medications, was there a time in the past 3 months when you did not take your medications as prescribed because of cost?: No    Allergies reviewed with patient and updates made in EHR: yes    Medication History Completed By: Rustam Gauthier 7/31/2023 2:04 PM    Prior to Admission medications    Medication Sig Last Dose Taking? Auth Provider Long Term End Date   acetaminophen (TYLENOL) 500 MG tablet [ACETAMINOPHEN (TYLENOL) 500 MG TABLET] Take 500 mg by mouth every 6 (six) hours as needed for pain. Unknown at prn Yes Provider, Historical     amoxicillin (AMOXIL) 500 MG capsule [AMOXICILLIN (AMOXIL) 500 MG CAPSULE] Take 2,000 mg by mouth once as needed (Prior to dental work). Unknown at prn Yes Provider, Historical     ARIPiprazole (ABILIFY) 2 MG tablet [ARIPIPRAZOLE (ABILIFY) 2 MG TABLET] Take 2 mg by mouth at bedtime.  7/30/2023 at pm Yes Provider, Historical     aspirin 81 mg chewable tablet Take 81 mg by mouth daily 7/29/2023 at am Yes Provider, Historical     atorvastatin (LIPITOR) 10 MG tablet [ATORVASTATIN (LIPITOR) 10 MG TABLET] Take 10 mg by mouth at bedtime. 7/30/2023 at pm Yes Provider,  Historical Yes    brimonidine (ALPHAGAN) 0.2 % ophthalmic solution [BRIMONIDINE (ALPHAGAN) 0.2 % OPHTHALMIC SOLUTION] Administer 1 drop to both eyes 2 (two) times a day.  7/30/2023 at pm Yes Provider, Historical     dorzolamide-timolol (COSOPT) 22.3-6.8 mg/mL ophthalmic solution [DORZOLAMIDE-TIMOLOL (COSOPT) 22.3-6.8 MG/ML OPHTHALMIC SOLUTION] Administer 1 drop to both eyes 2 (two) times a day.  7/30/2023 at pm Yes Provider, Historical     fluvoxaMINE (LUVOX) 50 MG tablet [FLUVOXAMINE (LUVOX) 50 MG TABLET] Take 50 mg by mouth at bedtime.  7/30/2023 at pm Yes Provider, Historical     hydrOXYzine (ATARAX) 10 MG tablet Take 10 mg by mouth 3 times daily as needed Unknown at prn Yes Reported, Patient     latanoprostene bunod 0.024 % Drop Place 1 drop into both eyes At Bedtime 7/30/2023 at pm Yes Provider, Historical     levomefolate calcium (L-METHYLFOLATE ORAL) [LEVOMEFOLATE CALCIUM (L-METHYLFOLATE ORAL)] Take 1.25 mg by mouth daily.  7/29/2023 at am Yes Provider, Historical     metFORMIN (GLUCOPHAGE) 1000 MG tablet Take 1,000 mg by mouth 2 times daily (with meals) 7/30/2023 at pm Yes Provider, Historical     Multiple Vitamins-Minerals (CENTRUM SILVER) CHEW Take 1 tablet by mouth daily 7/29/2023 at am Yes Reported, Patient     netarsudiL (RHOPRESSA) 0.02 % Drop Place 1 drop into both eyes every evening 7/30/2023 at pm Yes Provider, Historical     pioglitazone (ACTOS) 30 MG tablet Take 30 mg by mouth daily 7/29/2023 at am Yes Unknown, Entered By History Yes    warfarin ANTICOAGULANT (COUMADIN) 5 MG tablet TAKE 1/2 (ONE-HALF) TABLET BY MOUTH EVERY SATURDAY AND THEN 1 ONCE DAILY ALL  OTHER  DAYS  OR  AS  DIRECTED  BY  [facility]  [facility]  Patient taking differently: See Admin Instructions 7/26/23 Instructions: 7.5 mg every Mon, Thu; 5 mg all other days 7/30/2023 at pm Yes Eric Pickett MD Yes

## 2023-07-31 NOTE — PROGRESS NOTES
Neurosurgery Treatment Plan:   Called by [place] [facility] 72 year old male presented after a fall today notes progressive weakness, denies LOC but on Warfarin, per provider patient with complaint of low back pain no tenderness to palpation or cervical or thoracic spine, good strength in all extremities        Images:   IMPRESSION:  HEAD CT:  1.  No acute traumatic intracranial abnormality.     CERVICAL SPINE CT:  1.  No convincing CT findings of an acute osseous abnormality. Osseous demineralization can limit this assessment.     THORACIC SPINE CT:  1.  Age indeterminate superior endplate compression fractures at T2, T4, and T5, new since comparison chest CT 04/12/2020 - correlate for attributable pain on exam.     LUMBAR SPINE CT:  1.  No convincing CT findings of an acute osseous abnormality. Osseous demineralization can limit this assessment.  2.  Chronic extraspinal findings, as detailed.     Plan:   Thoracic MRI to be completed to determine age of fractures  Could offer  brace if fractures acute   If chronic fractures no neurosurgical intervention presently indicated  Further recommendations once MRI completed       Bhavani Everett PA-C  [facility]surgery  O: [phone]

## 2023-07-31 NOTE — H&P
[facility]    History and Physical - Hospitalist Service       Date of Admission:  7/31/2023    Assessment & Plan      Rakesh Samuels is a 72 year old male admitted on 7/31/2023 for evaluation of a fall.    Generalized weakness, frequent falls:   Presents with a fall and hitting head on the floor. Reports having increased pain from his right knee due to osteoarthritis. As a result, he has increased bilateral leg weakness and imbalance. He lives in senior independent living. He uses a walker at baseline.  CT head shows no acute intracranial abnormality. TSH normal.   - Check UA  - PT/OT  - Knee pain control: tylenol prn  - Patient reports that he will follow-up outpatient with orthopedic surgery about right knee replacement.  - Patient is anticoagulated with Coumadin.  INR is supratherapeutic at 3.26. Will repeat CT head in 24 hours.    Elevated troponin: Troponin is slightly high at 35. No anginal chest pain. No cardiac history. Normal proBN and d dimer. He reports having some dyspnea on exertion.   - Telemetry  - Monitor troponin  - Echocardiogram    Superior endplate compression fractures at T2, T4, and T5: this is likely chronic. Patient reports no back pain.   - MRI thoracic spine ordered in ER.    Paroxysmal atrial fibrillation: HR is controlled.  Not on rate control medication.    - Continue Coumadin.  Pharmacy to dose.    Essential hypertension: Not on medication at home. Hydralazine prn for now.     Diabetes, type II: Hold PTA metformin and Actos.  NovoLog sliding scale.    Anxiety and depression: Continue PTA Abilify, fluvoxamine, and hydroxyzine prn    Hyperlipidemia: on statin       Diet: Diabetes diet  DVT Prophylaxis: Warfarin  Olsen Catheter: Not present  Lines: None     Cardiac Monitoring: None  Code Status:  DNR/DNI    Clinically Significant Risk Factors Present on Admission               # Drug Induced Coagulation Defect: home medication list includes an anticoagulant  medication  # Drug Induced Platelet Defect: home medication list includes an antiplatelet medication        # Obesity: Estimated body mass index is 33.23 kg/m  as calculated from the following:    Height as of this encounter: 1.829 m (6').    Weight as of this encounter: 111.1 kg (245 lb).            Disposition Plan      Expected Discharge Date: 08/02/2023                  Marialuisa Fonseca MD  Hospitalist Service  [facility]  Securely message with [employer] (more info)  Text page via [employer] Paging/Directory     ______________________________________________________________________    Chief Complaint   Evaluation after a fall    History is obtained from the patient    History of Present Illness   Rakesh Samuels is a 72 year old male with a pertinent medical history of diabetes, paroxysmal atrial fibrillation on Coumadin, who presents to the ED for evaluation after a fall.  Patient reports that he normally walks with a walker.  Recently, he has worsening right knee pain due to osteoarthritis.  He feels that he has increased weakness and imbalance. He has been having frequent falls. Today, when he walked with his walker, he fell his legs just gave out and he fell.  He fell forward, hitting his head on the floor.  He reports no loss of consciousness.  Patient also reports having shortness of breath for the past few weeks.  When he walks from his room to the dining room, which is about 80 feet, he feels out of breath.  He reports no ever, chest pain, nauseous, vomiting and abdominal pain.  He also reports no urinary symptoms.  In ER, CT head reveals no acute intracranial abnormality.  CT scan of the thoracic spine showed age indeterminate superior endplate compression fractures at T2, T4, and T5.  Patient reports no back pain.      Past Medical History    Past Medical History:   Diagnosis Date    A-fib (H)     Acute hemodialysis encounter (H)     Due to CHIDI    Acute kidney injury (H)     Acute  respiratory failure with hypoxemia (H)     Adrenal incidentaloma (H)     Asbestosis (H)     ATN (acute tubular necrosis) (H)     Atrophy of right kidney     Basal cell carcinoma     BPH without urinary obstruction     Cellulitis     Left foot    Cholelithiasis     Depression with anxiety     Diabetes mellitus, type 2 (H) 4/12/2020    Esophagitis     Gastritis     Glaucoma     Hematemesis, presence of nausea not specified     Hyperkalemia     Hyperlipemia     Kidney stone     Lactic acidosis     Metabolic acidosis     Metformin overdose of undetermined intent     Paroxysmal atrial fibrillation (H) 2/18/2020    Pericardial effusion     PTSD (post-traumatic stress disorder)     Seasonal allergies     Sepsis due to urinary tract infection (H)     Shock circulatory (H)     SIRS (systemic inflammatory response syndrome) (H)     Sleep apnea     uses a machine at night.     Upper GI bleed        Past Surgical History   Past Surgical History:   Procedure Laterality Date    EYE SURGERY      congenital ptosis right upper lid    HC CYSTOSCOPY,INSERT URETERAL STENT Left 4/12/2020    Procedure: CYSTOSCOPY, WITH URETERAL STENT INSERTION;  Surgeon: Christopher Yates MD;  Location: [facility] OR;  Service: Urology    HI ESOPHAGOGASTRODUODENOSCOPY TRANSORAL DIAGNOSTIC N/A 4/13/2020    Procedure: ESOPHAGOGASTRODUODENOSCOPY (EGD);  Surgeon: Robert Rico MD;  Location: [facility];  Service: Gastroenterology    REPLACEMENT TOTAL KNEE Left        Prior to Admission Medications   Prior to Admission Medications   Prescriptions Last Dose Informant Patient Reported? Taking?   ARIPiprazole (ABILIFY) 2 MG tablet 7/30/2023 at pm  Yes Yes   Sig: [ARIPIPRAZOLE (ABILIFY) 2 MG TABLET] Take 2 mg by mouth at bedtime.    Multiple Vitamins-Minerals (CENTRUM SILVER) CHEW 7/29/2023 at am  Yes Yes   Sig: Take 1 tablet by mouth daily   acetaminophen (TYLENOL) 500 MG tablet Unknown at prn  Yes Yes   Sig: [ACETAMINOPHEN (TYLENOL) 500 MG TABLET]  Take 500 mg by mouth every 6 (six) hours as needed for pain.   amoxicillin (AMOXIL) 500 MG capsule Unknown at prn  Yes Yes   Sig: [AMOXICILLIN (AMOXIL) 500 MG CAPSULE] Take 2,000 mg by mouth once as needed (Prior to dental work).   aspirin 81 mg chewable tablet 7/29/2023 at am  Yes Yes   Sig: Take 81 mg by mouth daily   atorvastatin (LIPITOR) 10 MG tablet 7/30/2023 at pm  Yes Yes   Sig: [ATORVASTATIN (LIPITOR) 10 MG TABLET] Take 10 mg by mouth at bedtime.   brimonidine (ALPHAGAN) 0.2 % ophthalmic solution 7/30/2023 at pm  Yes Yes   Sig: [BRIMONIDINE (ALPHAGAN) 0.2 % OPHTHALMIC SOLUTION] Administer 1 drop to both eyes 2 (two) times a day.    dorzolamide-timolol (COSOPT) 22.3-6.8 mg/mL ophthalmic solution 7/30/2023 at pm  Yes Yes   Sig: [DORZOLAMIDE-TIMOLOL (COSOPT) 22.3-6.8 MG/ML OPHTHALMIC SOLUTION] Administer 1 drop to both eyes 2 (two) times a day.    fluvoxaMINE (LUVOX) 50 MG tablet 7/30/2023 at pm  Yes Yes   Sig: [FLUVOXAMINE (LUVOX) 50 MG TABLET] Take 50 mg by mouth at bedtime.    hydrOXYzine (ATARAX) 10 MG tablet Unknown at prn  Yes Yes   Sig: Take 10 mg by mouth 3 times daily as needed   latanoprostene bunod 0.024 % Drop 7/30/2023 at pm  Yes Yes   Sig: Place 1 drop into both eyes At Bedtime   levomefolate calcium (L-METHYLFOLATE ORAL) 7/29/2023 at am  Yes Yes   Sig: [LEVOMEFOLATE CALCIUM (L-METHYLFOLATE ORAL)] Take 1.25 mg by mouth daily.    metFORMIN (GLUCOPHAGE) 1000 MG tablet 7/30/2023 at pm  Yes Yes   Sig: Take 1,000 mg by mouth 2 times daily (with meals)   netarsudiL (RHOPRESSA) 0.02 % Drop 7/30/2023 at pm  Yes Yes   Sig: Place 1 drop into both eyes every evening   pioglitazone (ACTOS) 30 MG tablet 7/29/2023 at am  Yes Yes   Sig: Take 30 mg by mouth daily   warfarin ANTICOAGULANT (COUMADIN) 5 MG tablet 7/30/2023 at pm  No Yes   Sig: TAKE 1/2 (ONE-HALF) TABLET BY MOUTH EVERY SATURDAY AND THEN 1 ONCE DAILY ALL  OTHER  DAYS  OR  AS  DIRECTED  BY  INR  CLINIC   Patient taking differently: See Admin  Instructions 7/26/23 Instructions: 7.5 mg every Mon, Thu; 5 mg all other days      Facility-Administered Medications: None        Review of Systems    The 10 point Review of Systems is negative other than noted in the HPI or here.      Physical Exam   Vital Signs: Temp: 99.8  F (37.7  C) Temp src: Oral BP: (!) 149/100 Pulse: 90   Resp: 23 SpO2: 95 % O2 Device: None (Room air)    Weight: 245 lbs 0 oz    General appearance: not in acute distress  HEENT: PERRL, EOMI  Lungs: Clear breath sounds in bilateral lung fields  Cardiovascular: Regular rate and rhythm, normal S1-S2  Abdomen: Soft, non tender, no distension  Musculoskeletal: No joint swelling  Skin: No rash and no edema  Neurology: AAO ×3.  Cranial nerves II - XII normal.  Normal muscle strength in all four extremities.     Medical Decision Making       60 MINUTES SPENT BY ME on the date of service doing chart review, history, exam, documentation & further activities per the note.      Data     I have personally reviewed the following data over the past 24 hrs:    13.1 (H)  \   13.6   / 229     141 104 11.9 /  158 (H)   4.5 26 0.83 \     ALT: 12 AST: 18 AP: 81 TBILI: 0.7   ALB: 4.0 TOT PROTEIN: 7.0 LIPASE: N/A     Trop: 35 (H) BNP: N/A     TSH: 2.50 T4: N/A A1C: N/A     Procal: N/A CRP: N/A Lactic Acid: 1.6       INR:  3.26 (H) PTT:  45 (H)   D-dimer:  0.47 Fibrinogen:  N/A       Imaging results reviewed over the past 24 hrs:   Recent Results (from the past 24 hour(s))   CT Head w/o Contrast    Narrative    EXAM: CT HEAD W/O CONTRAST, CT THORACIC SPINE W/O CONTRAST, CT CERVICAL SPINE W/O CONTRAST, CT LUMBAR SPINE W/O CONTRAST  LOCATION: [facility]  DATE/TIME: 7/31/2023 12:55 PM CDT    INDICATION: Head trauma fall  COMPARISON: CT abdomen pelvis 03/27/2021. CTA head 04/08/2021. CT chest abdomen pelvis 04/12/2020.  TECHNIQUE:  1) Routine CT Head without IV contrast. Multiplanar reformats. Dose reduction techniques were used.  2) Routine CT  Cervical Spine without IV contrast. Multiplanar reformats. Multiplanar reformats. Dose reduction techniques were used.   3) Routine CT Thoracic Spine without IV contrast. Multiplanar reformats. Dose reduction techniques were used.   4) Routine CT Lumbar Spine without IV contrast. Multiplanar reformats. Dose reduction techniques were used.     FINDINGS:    HEAD CT:  INTRACRANIAL CONTENTS: Gray-white matter differentiation is maintained. No hemorrhage or extraaxial collection. No mass effect. Subarachnoid cisterns are patent. Patchy white matter hypodensities are, while nonspecific, most compatible with chronic   microvascular ischemic changes. Unchanged proportional prominence of the ventricles and sulci is compatible with diffuse cerebral volume loss.    VISUALIZED ORBITS/SINUSES/MASTOIDS: Bilateral lens replacements and right sided glaucoma shunt. Paranasal sinuses are clear. No middle ear or mastoid effusion.    BONES/SOFT TISSUES: No acute abnormality.    CERVICAL SPINE CT:  VERTEBRA: Straightening of lordotic curvature and mild generalized dextrocurvature may be positional and/or related to muscle spasm. Osseous structures appear diffusely demineralized. No displaced fracture or vertebral body compression. The dens,   atlantoaxial joint, and facets are intact.    CANAL/FORAMINA: Multilevel discogenic, uncovertebral, and facet degenerative changes. Severe disc space narrowing C3-C4 and C5-C6 as well as moderate at C6-C7. Disc osteophyte complexes contribute to mild spinal canal narrowing at C3-C4, C5-C6, and   C6-C7. Multifocal foraminal stenosis includes moderate-severe narrowing on the left C3-C4 with additional scattered mild to moderate foraminal stenosis. No high-grade spinal canal stenosis.    PARASPINAL: No visible soft tissue abnormality. Small/subcentimeter hypodense nodule in the posterior right lobe of the thyroid gland.    THORACIC SPINE CT:  VERTEBRA: Normal alignment. Visualized osseous structures  appear diffusely demineralized. Age indeterminate superior endplate compression fractures at T2, T4, and T5 with mild height loss up to approximately 25% at T5, new since comparison CT chest   04/12/2020. Facets are intact.    CANAL/FORAMINA: Mild multilevel discogenic degenerative changes and multifocal right anterior marginal osteophytes. No high-grade spinal canal or foraminal stenosis.    PARASPINAL: No visible soft tissue abnormality. Hypoplastic 12th ribs. Dependent atelectasis in the visualized lungs.    LUMBAR SPINE CT:  VERTEBRA: Normal alignment. Osseous structures appear diffusely demineralized. No displaced fracture or vertebral body compression. Facets are intact.    CANAL/FORAMINA: Lower lumbar predominant multilevel facet arthropathy and milder multilevel discogenic degenerative changes. Although obscured by artifact, suspect mild to moderate spinal canal stenosis at L4-L5 and mild at L3-L4. Multifocal mild   foraminal narrowing.    PARASPINAL: No visible soft tissue abnormality. Bilateral adrenal nodules partially visualized as seen to better advantage on comparison CT abdomen/pelvis 04/30/2022. Likewise, redemonstrated markedly atrophic right kidney with nonspecific periureteral   indistinctness/stranding also partially visualized. Prominent extrarenal pelvis on the left. Scattered atherosclerotic vascular calcifications.      Impression    IMPRESSION:  HEAD CT:  1.  No acute traumatic intracranial abnormality.    CERVICAL SPINE CT:  1.  No convincing CT findings of an acute osseous abnormality. Osseous demineralization can limit this assessment.    THORACIC SPINE CT:  1.  Age indeterminate superior endplate compression fractures at T2, T4, and T5, new since comparison chest CT 04/12/2020 - correlate for attributable pain on exam.    LUMBAR SPINE CT:  1.  No convincing CT findings of an acute osseous abnormality. Osseous demineralization can limit this assessment.  2.  Chronic extraspinal findings,  as detailed.         CT Cervical Spine w/o Contrast    Narrative    EXAM: CT HEAD W/O CONTRAST, CT THORACIC SPINE W/O CONTRAST, CT CERVICAL SPINE W/O CONTRAST, CT LUMBAR SPINE W/O CONTRAST  LOCATION: [facility]  DATE/TIME: 7/31/2023 12:55 PM CDT    INDICATION: Head trauma fall  COMPARISON: CT abdomen pelvis 03/27/2021. CTA head 04/08/2021. CT chest abdomen pelvis 04/12/2020.  TECHNIQUE:  1) Routine CT Head without IV contrast. Multiplanar reformats. Dose reduction techniques were used.  2) Routine CT Cervical Spine without IV contrast. Multiplanar reformats. Multiplanar reformats. Dose reduction techniques were used.   3) Routine CT Thoracic Spine without IV contrast. Multiplanar reformats. Dose reduction techniques were used.   4) Routine CT Lumbar Spine without IV contrast. Multiplanar reformats. Dose reduction techniques were used.     FINDINGS:    HEAD CT:  INTRACRANIAL CONTENTS: Gray-white matter differentiation is maintained. No hemorrhage or extraaxial collection. No mass effect. Subarachnoid cisterns are patent. Patchy white matter hypodensities are, while nonspecific, most compatible with chronic   microvascular ischemic changes. Unchanged proportional prominence of the ventricles and sulci is compatible with diffuse cerebral volume loss.    VISUALIZED ORBITS/SINUSES/MASTOIDS: Bilateral lens replacements and right sided glaucoma shunt. Paranasal sinuses are clear. No middle ear or mastoid effusion.    BONES/SOFT TISSUES: No acute abnormality.    CERVICAL SPINE CT:  VERTEBRA: Straightening of lordotic curvature and mild generalized dextrocurvature may be positional and/or related to muscle spasm. Osseous structures appear diffusely demineralized. No displaced fracture or vertebral body compression. The dens,   atlantoaxial joint, and facets are intact.    CANAL/FORAMINA: Multilevel discogenic, uncovertebral, and facet degenerative changes. Severe disc space narrowing C3-C4 and C5-C6  as well as moderate at C6-C7. Disc osteophyte complexes contribute to mild spinal canal narrowing at C3-C4, C5-C6, and   C6-C7. Multifocal foraminal stenosis includes moderate-severe narrowing on the left C3-C4 with additional scattered mild to moderate foraminal stenosis. No high-grade spinal canal stenosis.    PARASPINAL: No visible soft tissue abnormality. Small/subcentimeter hypodense nodule in the posterior right lobe of the thyroid gland.    THORACIC SPINE CT:  VERTEBRA: Normal alignment. Visualized osseous structures appear diffusely demineralized. Age indeterminate superior endplate compression fractures at T2, T4, and T5 with mild height loss up to approximately 25% at T5, new since comparison CT chest   04/12/2020. Facets are intact.    CANAL/FORAMINA: Mild multilevel discogenic degenerative changes and multifocal right anterior marginal osteophytes. No high-grade spinal canal or foraminal stenosis.    PARASPINAL: No visible soft tissue abnormality. Hypoplastic 12th ribs. Dependent atelectasis in the visualized lungs.    LUMBAR SPINE CT:  VERTEBRA: Normal alignment. Osseous structures appear diffusely demineralized. No displaced fracture or vertebral body compression. Facets are intact.    CANAL/FORAMINA: Lower lumbar predominant multilevel facet arthropathy and milder multilevel discogenic degenerative changes. Although obscured by artifact, suspect mild to moderate spinal canal stenosis at L4-L5 and mild at L3-L4. Multifocal mild   foraminal narrowing.    PARASPINAL: No visible soft tissue abnormality. Bilateral adrenal nodules partially visualized as seen to better advantage on comparison CT abdomen/pelvis 04/30/2022. Likewise, redemonstrated markedly atrophic right kidney with nonspecific periureteral   indistinctness/stranding also partially visualized. Prominent extrarenal pelvis on the left. Scattered atherosclerotic vascular calcifications.      Impression    IMPRESSION:  HEAD CT:  1.  No acute  traumatic intracranial abnormality.    CERVICAL SPINE CT:  1.  No convincing CT findings of an acute osseous abnormality. Osseous demineralization can limit this assessment.    THORACIC SPINE CT:  1.  Age indeterminate superior endplate compression fractures at T2, T4, and T5, new since comparison chest CT 04/12/2020 - correlate for attributable pain on exam.    LUMBAR SPINE CT:  1.  No convincing CT findings of an acute osseous abnormality. Osseous demineralization can limit this assessment.  2.  Chronic extraspinal findings, as detailed.         CT Thoracic Spine w/o Contrast    Narrative    EXAM: CT HEAD W/O CONTRAST, CT THORACIC SPINE W/O CONTRAST, CT CERVICAL SPINE W/O CONTRAST, CT LUMBAR SPINE W/O CONTRAST  LOCATION: [facility]  DATE/TIME: 7/31/2023 12:55 PM CDT    INDICATION: Head trauma fall  COMPARISON: CT abdomen pelvis 03/27/2021. CTA head 04/08/2021. CT chest abdomen pelvis 04/12/2020.  TECHNIQUE:  1) Routine CT Head without IV contrast. Multiplanar reformats. Dose reduction techniques were used.  2) Routine CT Cervical Spine without IV contrast. Multiplanar reformats. Multiplanar reformats. Dose reduction techniques were used.   3) Routine CT Thoracic Spine without IV contrast. Multiplanar reformats. Dose reduction techniques were used.   4) Routine CT Lumbar Spine without IV contrast. Multiplanar reformats. Dose reduction techniques were used.     FINDINGS:    HEAD CT:  INTRACRANIAL CONTENTS: Gray-white matter differentiation is maintained. No hemorrhage or extraaxial collection. No mass effect. Subarachnoid cisterns are patent. Patchy white matter hypodensities are, while nonspecific, most compatible with chronic   microvascular ischemic changes. Unchanged proportional prominence of the ventricles and sulci is compatible with diffuse cerebral volume loss.    VISUALIZED ORBITS/SINUSES/MASTOIDS: Bilateral lens replacements and right sided glaucoma shunt. Paranasal sinuses are  clear. No middle ear or mastoid effusion.    BONES/SOFT TISSUES: No acute abnormality.    CERVICAL SPINE CT:  VERTEBRA: Straightening of lordotic curvature and mild generalized dextrocurvature may be positional and/or related to muscle spasm. Osseous structures appear diffusely demineralized. No displaced fracture or vertebral body compression. The dens,   atlantoaxial joint, and facets are intact.    CANAL/FORAMINA: Multilevel discogenic, uncovertebral, and facet degenerative changes. Severe disc space narrowing C3-C4 and C5-C6 as well as moderate at C6-C7. Disc osteophyte complexes contribute to mild spinal canal narrowing at C3-C4, C5-C6, and   C6-C7. Multifocal foraminal stenosis includes moderate-severe narrowing on the left C3-C4 with additional scattered mild to moderate foraminal stenosis. No high-grade spinal canal stenosis.    PARASPINAL: No visible soft tissue abnormality. Small/subcentimeter hypodense nodule in the posterior right lobe of the thyroid gland.    THORACIC SPINE CT:  VERTEBRA: Normal alignment. Visualized osseous structures appear diffusely demineralized. Age indeterminate superior endplate compression fractures at T2, T4, and T5 with mild height loss up to approximately 25% at T5, new since comparison CT chest   04/12/2020. Facets are intact.    CANAL/FORAMINA: Mild multilevel discogenic degenerative changes and multifocal right anterior marginal osteophytes. No high-grade spinal canal or foraminal stenosis.    PARASPINAL: No visible soft tissue abnormality. Hypoplastic 12th ribs. Dependent atelectasis in the visualized lungs.    LUMBAR SPINE CT:  VERTEBRA: Normal alignment. Osseous structures appear diffusely demineralized. No displaced fracture or vertebral body compression. Facets are intact.    CANAL/FORAMINA: Lower lumbar predominant multilevel facet arthropathy and milder multilevel discogenic degenerative changes. Although obscured by artifact, suspect mild to moderate spinal canal  stenosis at L4-L5 and mild at L3-L4. Multifocal mild   foraminal narrowing.    PARASPINAL: No visible soft tissue abnormality. Bilateral adrenal nodules partially visualized as seen to better advantage on comparison CT abdomen/pelvis 04/30/2022. Likewise, redemonstrated markedly atrophic right kidney with nonspecific periureteral   indistinctness/stranding also partially visualized. Prominent extrarenal pelvis on the left. Scattered atherosclerotic vascular calcifications.      Impression    IMPRESSION:  HEAD CT:  1.  No acute traumatic intracranial abnormality.    CERVICAL SPINE CT:  1.  No convincing CT findings of an acute osseous abnormality. Osseous demineralization can limit this assessment.    THORACIC SPINE CT:  1.  Age indeterminate superior endplate compression fractures at T2, T4, and T5, new since comparison chest CT 04/12/2020 - correlate for attributable pain on exam.    LUMBAR SPINE CT:  1.  No convincing CT findings of an acute osseous abnormality. Osseous demineralization can limit this assessment.  2.  Chronic extraspinal findings, as detailed.         Lumbar spine CT w/o contrast    Narrative    EXAM: CT HEAD W/O CONTRAST, CT THORACIC SPINE W/O CONTRAST, CT CERVICAL SPINE W/O CONTRAST, CT LUMBAR SPINE W/O CONTRAST  LOCATION: [facility]  DATE/TIME: 7/31/2023 12:55 PM CDT    INDICATION: Head trauma fall  COMPARISON: CT abdomen pelvis 03/27/2021. CTA head 04/08/2021. CT chest abdomen pelvis 04/12/2020.  TECHNIQUE:  1) Routine CT Head without IV contrast. Multiplanar reformats. Dose reduction techniques were used.  2) Routine CT Cervical Spine without IV contrast. Multiplanar reformats. Multiplanar reformats. Dose reduction techniques were used.   3) Routine CT Thoracic Spine without IV contrast. Multiplanar reformats. Dose reduction techniques were used.   4) Routine CT Lumbar Spine without IV contrast. Multiplanar reformats. Dose reduction techniques were used.      FINDINGS:    HEAD CT:  INTRACRANIAL CONTENTS: Gray-white matter differentiation is maintained. No hemorrhage or extraaxial collection. No mass effect. Subarachnoid cisterns are patent. Patchy white matter hypodensities are, while nonspecific, most compatible with chronic   microvascular ischemic changes. Unchanged proportional prominence of the ventricles and sulci is compatible with diffuse cerebral volume loss.    VISUALIZED ORBITS/SINUSES/MASTOIDS: Bilateral lens replacements and right sided glaucoma shunt. Paranasal sinuses are clear. No middle ear or mastoid effusion.    BONES/SOFT TISSUES: No acute abnormality.    CERVICAL SPINE CT:  VERTEBRA: Straightening of lordotic curvature and mild generalized dextrocurvature may be positional and/or related to muscle spasm. Osseous structures appear diffusely demineralized. No displaced fracture or vertebral body compression. The dens,   atlantoaxial joint, and facets are intact.    CANAL/FORAMINA: Multilevel discogenic, uncovertebral, and facet degenerative changes. Severe disc space narrowing C3-C4 and C5-C6 as well as moderate at C6-C7. Disc osteophyte complexes contribute to mild spinal canal narrowing at C3-C4, C5-C6, and   C6-C7. Multifocal foraminal stenosis includes moderate-severe narrowing on the left C3-C4 with additional scattered mild to moderate foraminal stenosis. No high-grade spinal canal stenosis.    PARASPINAL: No visible soft tissue abnormality. Small/subcentimeter hypodense nodule in the posterior right lobe of the thyroid gland.    THORACIC SPINE CT:  VERTEBRA: Normal alignment. Visualized osseous structures appear diffusely demineralized. Age indeterminate superior endplate compression fractures at T2, T4, and T5 with mild height loss up to approximately 25% at T5, new since comparison CT chest   04/12/2020. Facets are intact.    CANAL/FORAMINA: Mild multilevel discogenic degenerative changes and multifocal right anterior marginal osteophytes.  No high-grade spinal canal or foraminal stenosis.    PARASPINAL: No visible soft tissue abnormality. Hypoplastic 12th ribs. Dependent atelectasis in the visualized lungs.    LUMBAR SPINE CT:  VERTEBRA: Normal alignment. Osseous structures appear diffusely demineralized. No displaced fracture or vertebral body compression. Facets are intact.    CANAL/FORAMINA: Lower lumbar predominant multilevel facet arthropathy and milder multilevel discogenic degenerative changes. Although obscured by artifact, suspect mild to moderate spinal canal stenosis at L4-L5 and mild at L3-L4. Multifocal mild   foraminal narrowing.    PARASPINAL: No visible soft tissue abnormality. Bilateral adrenal nodules partially visualized as seen to better advantage on comparison CT abdomen/pelvis 04/30/2022. Likewise, redemonstrated markedly atrophic right kidney with nonspecific periureteral   indistinctness/stranding also partially visualized. Prominent extrarenal pelvis on the left. Scattered atherosclerotic vascular calcifications.      Impression    IMPRESSION:  HEAD CT:  1.  No acute traumatic intracranial abnormality.    CERVICAL SPINE CT:  1.  No convincing CT findings of an acute osseous abnormality. Osseous demineralization can limit this assessment.    THORACIC SPINE CT:  1.  Age indeterminate superior endplate compression fractures at T2, T4, and T5, new since comparison chest CT 04/12/2020 - correlate for attributable pain on exam.    LUMBAR SPINE CT:  1.  No convincing CT findings of an acute osseous abnormality. Osseous demineralization can limit this assessment.  2.  Chronic extraspinal findings, as detailed.         Chest XR,  PA & LAT    Narrative    EXAM: XR CHEST 2 VIEWS  LOCATION: [facility]  DATE: 7/31/2023    INDICATION: weakness, sirs criteria  COMPARISON: Chest x-ray 4/30/2022.      Impression    IMPRESSION: Stable cardiomediastinal silhouette. No new focal airspace consolidation. No pleural effusion  or pneumothorax.

## 2023-07-31 NOTE — ED NOTES
Bed: JNED-02  Expected date:   Expected time:   Means of arrival: Ambulance  Comments:  [facility]: Fall, hit head, on thinners

## 2023-07-31 NOTE — ED PROVIDER NOTES
EMERGENCY DEPARTMENT ENCOUNTER      NAME: Rakesh Samuels  AGE: 72 year old male  YOB: 1950  MRN: [id number]  EVALUATION DATE & TIME: 7/31/2023 12:09 PM    PCP: Michelet Restrepo    ED PROVIDER: Skye Thibodeaux MD      Chief Complaint   Patient presents with    Fall    Generalized Weakness         FINAL IMPRESSION:  No diagnosis found.      ED COURSE & MEDICAL DECISION MAKING:    Pertinent Labs & Imaging studies reviewed. (See chart for details)  72 year old male presents to the Emergency Department for evaluation of head injury after a fall as well as progressive weakness.  Patient has no lightheadedness, chest pain, shortness of breath or think cardiac in nature for his legs to give out.  However he does have progressive weakness, so we will complete a full work-up.  EKG showed sinus rhythm, with no ST or T wave changes to suggest ischemia or infarction, normal QTc.  Troponin was was slightly elevated however patient is not having any chest pain shortness of breath lightheadedness at this time, we will repeat a troponin and keep patient on the monitor.  D-dimer is negative.       Patient does have a leukocytosis and new weakness, so infectious work-up was completed.  Urinalysis is pending at this time however I let the hospitalist know to evaluate.  Patient is leukocytosis of 13.1 with increased from 1 year ago at 6.2.  His CMP showed a hyperglycemia blood sugar of 158, decreased from 232 where it was 1 year ago.  TSH was normal, as well as CK.    In addition with patient's fall, and exam findings, head CT, C-spine L-spine and T-spine CTs were completed.  All were unremarkable except for the T-spine CT which showed superior endplate fracture of the age indeterminate at T2, T4 and T5 new since previous CT from April 12, 2020.  I consulted neurosurgery and they recommended an MRI for further characterization to determine if this is new or not.  If the injury is new patient will need a brace that  encompasses also the C-spine since it is a high T-spine injury.    Given history, exam, and workup in the emergency department, patient is unsafe to be discharged to home at this time and will be admitted to the hospital for further evaluation and treatment.         At the conclusion of the encounter I discussed the results of all of the tests and the disposition. The questions were answered. The patient or family acknowledged understanding and was agreeable with the care plan.       MEDICATIONS GIVEN IN THE EMERGENCY:  Medications   lidocaine 1 % 0.1-1 mL (has no administration in time range)   lidocaine (LMX4) cream (has no administration in time range)   sodium chloride (PF) 0.9% PF flush 3 mL (has no administration in time range)   sodium chloride (PF) 0.9% PF flush 3 mL (has no administration in time range)       NEW PRESCRIPTIONS STARTED AT TODAY'S ER VISIT  New Prescriptions    No medications on file          =================================================================    HPI    Patient information was obtained from: patient and EMS report    Use of : N/A ,         Rakesh SWANSON Abril is a 72 year old male with a pertinent history of diabetes, sepsis, renal failure, hyperkalemia, who presents to this ED for evaluation of of worsening weakness, and then a fall today at approximately 4 AM.  Patient states his legs gave out and he fell straight forward hitting his head.  He had no loss of consciousness, no nausea or vomiting, no focal neurologic deficits, no visual changes.  He denies any other pain or injuries, he said he was not lightheaded, denies chest pain, shortness of breath, abdominal pain, fevers or chills or any other symptoms.  He states he did not feel dizzy before the fall, but all of his extremities are sore he states.      REVIEW OF SYSTEMS   Review of Systems .10 point ROS negative with exception of those listed in the HPI.  `    PAST MEDICAL HISTORY:  Past Medical History:   Diagnosis  Date    A-fib (H)     Acute hemodialysis encounter (H)     Due to CHIDI    Acute kidney injury (H)     Acute respiratory failure with hypoxemia (H)     Adrenal incidentaloma (H)     Asbestosis (H)     ATN (acute tubular necrosis) (H)     Atrophy of right kidney     Basal cell carcinoma     BPH without urinary obstruction     Cellulitis     Left foot    Cholelithiasis     Depression with anxiety     Diabetes mellitus, type 2 (H) 4/12/2020    Esophagitis     Gastritis     Glaucoma     Hematemesis, presence of nausea not specified     Hyperkalemia     Hyperlipemia     Kidney stone     Lactic acidosis     Metabolic acidosis     Metformin overdose of undetermined intent     Paroxysmal atrial fibrillation (H) 2/18/2020    Pericardial effusion     PTSD (post-traumatic stress disorder)     Seasonal allergies     Sepsis due to urinary tract infection (H)     Shock circulatory (H)     SIRS (systemic inflammatory response syndrome) (H)     Sleep apnea     uses a machine at night.     Upper GI bleed        PAST SURGICAL HISTORY:  Past Surgical History:   Procedure Laterality Date    EYE SURGERY      congenital ptosis right upper lid    HC CYSTOSCOPY,INSERT URETERAL STENT Left 4/12/2020    Procedure: CYSTOSCOPY, WITH URETERAL STENT INSERTION;  Surgeon: Christopher Yates MD;  Location: [facility] OR;  Service: Urology    IL ESOPHAGOGASTRODUODENOSCOPY TRANSORAL DIAGNOSTIC N/A 4/13/2020    Procedure: ESOPHAGOGASTRODUODENOSCOPY (EGD);  Surgeon: Robert Rico MD;  Location: [facility] GI;  Service: Gastroenterology    REPLACEMENT TOTAL KNEE Left            CURRENT MEDICATIONS:    acetaminophen (TYLENOL) 500 MG tablet  amoxicillin (AMOXIL) 500 MG capsule  ARIPiprazole (ABILIFY) 2 MG tablet  aspirin 81 mg chewable tablet  atorvastatin (LIPITOR) 10 MG tablet  brimonidine (ALPHAGAN) 0.2 % ophthalmic solution  dorzolamide-timolol (COSOPT) 22.3-6.8 mg/mL ophthalmic solution  fluvoxaMINE (LUVOX) 50 MG tablet  latanoprostene bunod 0.024  % Drop  levomefolate calcium (L-METHYLFOLATE ORAL)  metFORMIN (GLUCOPHAGE) 1000 MG tablet  multivitamin therapeutic tablet  netarsudiL (RHOPRESSA) 0.02 % Drop  pioglitazone (ACTOS) 30 MG tablet  warfarin ANTICOAGULANT (COUMADIN) 5 MG tablet        ALLERGIES:  No Known Allergies    FAMILY HISTORY:  Family History   Problem Relation Age of Onset    Diabetes Mother        SOCIAL HISTORY:   Social History     Socioeconomic History    Marital status:    Tobacco Use    Smoking status: Never    Smokeless tobacco: Never   Substance and Sexual Activity    Alcohol use: Not Currently    Drug use: Never    Sexual activity: Not Currently       VITALS:  /74   Pulse 83   Temp 99.8  F (37.7  C) (Oral)   Resp 18   Ht 1.829 m (6')   Wt 111.1 kg (245 lb)   SpO2 94%   BMI 33.23 kg/m      PHYSICAL EXAM    General: alert, interactive   Head:  atraumatic    Nose: no epistaxis   Ears:  No ottorrhea   Eyes: PERRL, EOMI   Mouth: mmm   Neck:  C-collar in place, +midline cervical tenderness, no crepitus   Chest: atraumatic; no palpable deformity; no tenderness of the chest wall; BS are  equal bilaterally,    CV:  RRR, normal s1 s2   Abdomen: soft, nt, nd, no guarding or rebound   Back: + midline bony T & L tenderness   Pelvis: stable; no pain on AP or lateral compression   Extremites:  AROM of all major joints without pain   Skin: no rash or diaphoresis   Neuro: Awake alert & O x 3, face  symmetrical, moving extremities x 4, sensation intact to light touch throughout;        LAB:  All pertinent labs reviewed and interpreted.       RADIOLOGY:  Reviewed all pertinent imaging. Please see official radiology report.  CT Head w/o Contrast    (Results Pending)   CT Cervical Spine w/o Contrast    (Results Pending)   Lumbar spine CT w/o contrast    (Results Pending)   CT Thoracic Spine w/o Contrast    (Results Pending)           Skye Thibodeaux MD  [facility] EMERGENCY DEPARTMENT  [address]  [facility] [address]  [phone]       Skye Thibodeaux MD  08/01/23 [id number]

## 2023-07-31 NOTE — ED NOTES
Patient's daughter visiting, informed RN that she brought in all of patient's bedtime eye drops and administered them to patient.

## 2023-07-31 NOTE — PHARMACY-ANTICOAGULATION SERVICE
Clinical Pharmacy - Warfarin Dosing Consult     Pharmacy has been consulted to manage this patient s warfarin therapy.  Indication: Atrial Fibrillation  Therapy Goal: INR 2-3  Provider/Team: [facility]  OP [facility]: [facility] Sophy Matta  Warfarin Prior to Admission: Yes  Warfarin PTA Regimen: 7.5 mg every Mon, Thu; 5 mg all other days  Significant drug interactions: PTA Fluvoxamine  Recent documented change in oral intake/nutrition: Unknown  Dose Comments: INR supratherapeutic today. Will hold today's dose.    INR   Date Value Ref Range Status   07/31/2023 3.26 (H) 0.85 - 1.15 Final     INR HOME MONITORING   Date Value Ref Range Status   07/26/2023 3.0 2.000 - 3.000 Final       Recommend hold warfarin dose today.  Pharmacy will monitor Rakesh Samuels daily and order warfarin doses to achieve specified goal.      Please contact pharmacy as soon as possible if the warfarin needs to be held for a procedure or if the warfarin goals change.

## 2023-07-31 NOTE — ED TRIAGE NOTES
Patient arrived via EMS from nursing home, per EMS report patient had a fall around 0430 where he hit his head, patient is on Warfarin. Kat any LOC at the time of fall. States that he has felt increasingly weak recently.

## 2023-08-01 ENCOUNTER — APPOINTMENT (OUTPATIENT)
Dept: CT IMAGING | Facility: HOSPITAL | Age: 73
End: 2023-08-01
Attending: INTERNAL MEDICINE
Payer: MEDICARE

## 2023-08-01 ENCOUNTER — APPOINTMENT (OUTPATIENT)
Dept: CARDIOLOGY | Facility: HOSPITAL | Age: 73
End: 2023-08-01
Attending: INTERNAL MEDICINE
Payer: MEDICARE

## 2023-08-01 LAB
GLUCOSE BLDC GLUCOMTR-MCNC: 131 MG/DL (ref 70–99)
GLUCOSE BLDC GLUCOMTR-MCNC: 159 MG/DL (ref 70–99)
GLUCOSE BLDC GLUCOMTR-MCNC: 175 MG/DL (ref 70–99)
GLUCOSE BLDC GLUCOMTR-MCNC: 213 MG/DL (ref 70–99)
HGB BLD-MCNC: 13.1 G/DL (ref 13.3–17.7)
INR PPP: 2.75 (ref 0.85–1.15)
LVEF ECHO: NORMAL

## 2023-08-01 PROCEDURE — G1010 CDSM STANSON: HCPCS

## 2023-08-01 PROCEDURE — 96365 THER/PROPH/DIAG IV INF INIT: CPT

## 2023-08-01 PROCEDURE — 96366 THER/PROPH/DIAG IV INF ADDON: CPT

## 2023-08-01 PROCEDURE — 82962 GLUCOSE BLOOD TEST: CPT

## 2023-08-01 PROCEDURE — 93306 TTE W/DOPPLER COMPLETE: CPT | Mod: 26 | Performed by: INTERNAL MEDICINE

## 2023-08-01 PROCEDURE — 250N000011 HC RX IP 250 OP 636: Mod: JZ | Performed by: HOSPITALIST

## 2023-08-01 PROCEDURE — 36415 COLL VENOUS BLD VENIPUNCTURE: CPT | Performed by: INTERNAL MEDICINE

## 2023-08-01 PROCEDURE — 99232 SBSQ HOSP IP/OBS MODERATE 35: CPT | Performed by: HOSPITALIST

## 2023-08-01 PROCEDURE — 120N000001 HC R&B MED SURG/OB

## 2023-08-01 PROCEDURE — 255N000002 HC RX 255 OP 636: Performed by: INTERNAL MEDICINE

## 2023-08-01 PROCEDURE — 250N000013 HC RX MED GY IP 250 OP 250 PS 637: Performed by: INTERNAL MEDICINE

## 2023-08-01 PROCEDURE — 85610 PROTHROMBIN TIME: CPT | Performed by: INTERNAL MEDICINE

## 2023-08-01 PROCEDURE — 85018 HEMOGLOBIN: CPT | Performed by: INTERNAL MEDICINE

## 2023-08-01 PROCEDURE — 999N000157 HC STATISTIC RCP TIME EA 10 MIN

## 2023-08-01 RX ORDER — WARFARIN SODIUM 5 MG/1
5 TABLET ORAL
Status: COMPLETED | OUTPATIENT
Start: 2023-08-01 | End: 2023-08-01

## 2023-08-01 RX ORDER — CEFTRIAXONE 1 G/1
1 INJECTION, POWDER, FOR SOLUTION INTRAMUSCULAR; INTRAVENOUS EVERY 24 HOURS
Status: DISCONTINUED | OUTPATIENT
Start: 2023-08-01 | End: 2023-08-02

## 2023-08-01 RX ADMIN — ASPIRIN 81 MG CHEWABLE TABLET 81 MG: 81 TABLET CHEWABLE at 08:02

## 2023-08-01 RX ADMIN — ARIPIPRAZOLE 2 MG: 2 TABLET ORAL at 22:08

## 2023-08-01 RX ADMIN — ATORVASTATIN CALCIUM 10 MG: 10 TABLET, FILM COATED ORAL at 22:07

## 2023-08-01 RX ADMIN — INSULIN ASPART 1 UNITS: 100 INJECTION, SOLUTION INTRAVENOUS; SUBCUTANEOUS at 12:52

## 2023-08-01 RX ADMIN — PERFLUTREN 3 ML: 6.52 INJECTION, SUSPENSION INTRAVENOUS at 10:50

## 2023-08-01 RX ADMIN — INSULIN ASPART 2 UNITS: 100 INJECTION, SOLUTION INTRAVENOUS; SUBCUTANEOUS at 16:41

## 2023-08-01 RX ADMIN — FLUVOXAMINE MALEATE 50 MG: 50 TABLET ORAL at 22:07

## 2023-08-01 RX ADMIN — BRIMONIDINE TARTRATE 1 DROP: 2 SOLUTION OPHTHALMIC at 20:24

## 2023-08-01 RX ADMIN — BRIMONIDINE TARTRATE 1 DROP: 2 SOLUTION OPHTHALMIC at 08:08

## 2023-08-01 RX ADMIN — ACETAMINOPHEN 650 MG: 325 TABLET ORAL at 12:51

## 2023-08-01 RX ADMIN — DORZOLAMIDE HYDROCHLORIDE AND TIMOLOL MALEATE 1 DROP: 22.3; 6.8 SOLUTION/ DROPS OPHTHALMIC at 08:04

## 2023-08-01 RX ADMIN — CEFTRIAXONE SODIUM 1 G: 1 INJECTION, POWDER, FOR SOLUTION INTRAMUSCULAR; INTRAVENOUS at 05:13

## 2023-08-01 RX ADMIN — DORZOLAMIDE HYDROCHLORIDE AND TIMOLOL MALEATE 1 DROP: 22.3; 6.8 SOLUTION/ DROPS OPHTHALMIC at 20:26

## 2023-08-01 RX ADMIN — WARFARIN SODIUM 5 MG: 5 TABLET ORAL at 17:13

## 2023-08-01 RX ADMIN — ACETAMINOPHEN 650 MG: 325 TABLET ORAL at 08:01

## 2023-08-01 ASSESSMENT — ACTIVITIES OF DAILY LIVING (ADL)
WALKING_OR_CLIMBING_STAIRS: AMBULATION DIFFICULTY, DEPENDENT;STAIR CLIMBING DIFFICULTY, DEPENDENT;TRANSFERRING DIFFICULTY, DEPENDENT
TRANSFERRING: 1-->ASSISTANCE (EQUIPMENT/PERSON) NEEDED (NOT DEVELOPMENTALLY APPROPRIATE)
ADLS_ACUITY_SCORE: 45
ADLS_ACUITY_SCORE: 54
TOILETING_ISSUES: YES
CHANGE_IN_FUNCTIONAL_STATUS_SINCE_ONSET_OF_CURRENT_ILLNESS/INJURY: YES
BATHING: 1-->ASSISTANCE NEEDED
DOING_ERRANDS_INDEPENDENTLY_DIFFICULTY: YES
DRESS: 1-->ASSISTANCE (EQUIPMENT/PERSON) NEEDED (NOT DEVELOPMENTALLY APPROPRIATE)
WALKING_OR_CLIMBING_STAIRS_DIFFICULTY: YES
DIFFICULTY_EATING/SWALLOWING: NO
ADLS_ACUITY_SCORE: 35
NUMBER_OF_TIMES_PATIENT_HAS_FALLEN_WITHIN_LAST_SIX_MONTHS: 3
ADLS_ACUITY_SCORE: 35
TOILETING: 1-->ASSISTANCE (EQUIPMENT/PERSON) NEEDED
DRESSING/BATHING: BATHING DIFFICULTY, DEPENDENT;DRESSING DIFFICULTY, DEPENDENT
ADLS_ACUITY_SCORE: 45
ADLS_ACUITY_SCORE: 43
EQUIPMENT_CURRENTLY_USED_AT_HOME: WALKER, ROLLING
ADLS_ACUITY_SCORE: 54
ADLS_ACUITY_SCORE: 45
WEAR_GLASSES_OR_BLIND: YES
DIFFICULTY_COMMUNICATING: NO
CONCENTRATING,_REMEMBERING_OR_MAKING_DECISIONS_DIFFICULTY: NO
ADLS_ACUITY_SCORE: 45
ADLS_ACUITY_SCORE: 45
FALL_HISTORY_WITHIN_LAST_SIX_MONTHS: YES
DRESS: 1-->ASSISTANCE (EQUIPMENT/PERSON) NEEDED
ADLS_ACUITY_SCORE: 45
DRESSING/BATHING_DIFFICULTY: YES
ADLS_ACUITY_SCORE: 43
TOILETING_ASSISTANCE: TOILETING DIFFICULTY, ASSISTANCE 1 PERSON
TRANSFERRING: 1-->ASSISTANCE (EQUIPMENT/PERSON) NEEDED
TOILETING: 1-->ASSISTANCE (EQUIPMENT/PERSON) NEEDED (NOT DEVELOPMENTALLY APPROPRIATE)
VISION_MANAGEMENT: EYE DROPS

## 2023-08-01 NOTE — PROGRESS NOTES
Patient arrived on  via cart. Patient transferred with [facility]. Patient is alert and oriented x 4. He is vitally stable. Denies  pain at this time. Patient oriented to room and unit. Patient is in bed, bed alarm on and patient has his call light. Monitor.

## 2023-08-01 NOTE — PLAN OF CARE
"States that pain is worse than his norm in his knees and back. Denies headache, denies neck pain. Tylenol helped pain \"a little bit\"  able to stand with 2 assist and transfer belt, unable to pivot to chair (used hover mat for transfers)  oriented x 4, a little sleepy (daughter Nanette will bring home CPAP) eating and drinking well. Falls prevention plan in place.  Tolerated turning program with encouragement (prefers left side lying or back)  states right eye does not close and crusting is normal. Shannon Gallardo RN    Problem: Pain Chronic (Persistent) (Comorbidity Management)  Goal: Acceptable Pain Control and Functional Ability  Outcome: Progressing     Problem: Obstructive Sleep Apnea Risk or Actual Comorbidity Management  Goal: Unobstructed Breathing During Sleep  Outcome: Progressing     Problem: Plan of Care - These are the overarching goals to be used throughout the patient stay.    Goal: Absence of Hospital-Acquired Illness or Injury  Intervention: Identify and Manage Fall Risk  Recent Flowsheet Documentation  Taken 8/1/2023 1126 by Shannon Gallardo RN  Safety Promotion/Fall Prevention:   nonskid shoes/slippers when out of bed   room door open   room near nurse's station    Problem: Plan of Care - These are the overarching goals to be used throughout the patient stay.    Goal: Absence of Hospital-Acquired Illness or Injury  Intervention: Prevent Skin Injury  Taken 8/1/2023 0801 by Shannon Gallardo RN  Body Position: (upright for breakfast) --        "

## 2023-08-01 NOTE — PROGRESS NOTES
Daughter of the pt called and told CM that they have pt top TCU choices:  [facility]) [facility]  [facility]  [facility]) [facility]  4) [facility]    No recs yet so CM will wait for final recs before sending TCU referrals.

## 2023-08-01 NOTE — PROGRESS NOTES
[facility]    Medicine Progress Note - Hospitalist Service    Date of Admission:  7/31/2023    Assessment & Plan     Rakesh Samuels is a 72 year old male admitted on 7/31/2023 for evaluation of a fall.     Debility/generalized weakness  frequent falls on high risk medication Coumadin  Presents with a fall and hitting head on the floor. Reports having increased pain from his right knee due to osteoarthritis. As a result, he has increased bilateral leg weakness and imbalance. He lives in [facility]. He uses a walker at baseline.  -Interval imaging with CT head shows no acute intracranial abnormality. TSH normal.   -UA within normal limits  - PT/OT evaluate.  - Knee pain control: tylenol prn  - Patient reports that he will follow-up outpatient with orthopedic surgery about right knee replacement.  - Patient is anticoagulated with Coumadin.  INR therapeutic at 2.7     Elevated troponin: Troponin is slightly high at 35. No anginal chest pain. No cardiac history. Normal proBN and d dimer. He reports having some dyspnea on exertion.   - Telemetry  - Monitor troponin  - Echocardiogram     Superior endplate compression fractures at T2, T4, and T5:   - MRI thoracic spine consistent with chronic fractures, patient asymptomatic     Paroxysmal atrial fibrillation:   HR is controlled.  Not on rate control medication.    - Continue Coumadin.  Pharmacy to dose.     Essential hypertension:   Not on medication at home. Hydralazine prn for now.      Diabetes, type II:   -Hold PTA metformin and Actos.  NovoLog sliding scale.     Anxiety and depression:   Continue PTA Abilify, fluvoxamine, and hydroxyzine prn     Hyperlipidemia: on statin     Diet: Moderate Consistent Carb (60 g CHO per Meal) Diet    DVT Prophylaxis: Warfarin  Olsen Catheter: Not present  Lines: None     Cardiac Monitoring: ACTIVE order. Indication: Syncope- low cardiac risk (24 hours)  Code Status: No CPR- Do NOT Intubate      Clinically  Significant Risk Factors               # Coagulation Defect: INR = 2.75 (Ref range: 0.85 - 1.15) and/or PTT = 45 Seconds (Ref range: 22 - 38 Seconds), will monitor for bleeding          # DMII: A1C = 6.8 % (Ref range: <5.7 %) within past 6 months, PRESENT ON ADMISSION  # Obesity: Estimated body mass index is 33.23 kg/m  as calculated from the following:    Height as of this encounter: 1.829 m (6').    Weight as of this encounter: 111.1 kg (245 lb)., PRESENT ON ADMISSION          Disposition Plan      Expected Discharge Date: 08/02/2023    Discharge Delays: OT Disposition recs needed  PT Disposition recs needed  IV Medication - consider oral or Home Infusion    Discharge Comments: IV antibiotic          Dayton Solis MD  Hospitalist Service  [facility]  Securely message with [employer] (more info)  Text page via [facility] Paging/Directory   ______________________________________________________________________    Interval History   Patient examined at bedside/chart reviewed.  He reports he was living at the [facility] and was ambulatory with a walker and over the last 2 weeks he has fallen a few times and was unable to get up.  Continues to endorse severe weakness.  He reported trauma to his head    Physical Exam   Vital Signs: Temp: 98.5  F (36.9  C) Temp src: Oral BP: 130/67 Pulse: 69   Resp: 18 SpO2: 94 % O2 Device: None (Room air)    Weight: 245 lbs 0 oz    General appearance: not in acute distress  HEENT: PERRL, EOMI  Lungs: Clear breath sounds in bilateral lung fields  Cardiovascular: Regular rate and rhythm, normal S1-S2  Abdomen: Soft, non tender, no distension  Musculoskeletal: No joint swelling  Skin: No rash and no edema  Neurology: AAO ×3.  Cranial nerves II - XII normal.  Normal muscle strength in all four extremities.     Medical Decision Making       35 MINUTES SPENT BY ME on the date of service doing chart review, history, exam, documentation & further activities per the note.       Data     I have personally reviewed the following data over the past 24 hrs:    N/A  \   13.1 (L)   / N/A     N/A N/A N/A /  159 (H)   N/A N/A N/A \     Trop: 32 (H) BNP: N/A     TSH: N/A T4: N/A A1C: N/A     Procal: N/A CRP: N/A Lactic Acid: 1.6       INR:  2.75 (H) PTT:  N/A   D-dimer:  N/A Fibrinogen:  N/A

## 2023-08-01 NOTE — UTILIZATION REVIEW
"Admission Status; Secondary Review Determination    Under the authority of the Utilization Management Committee, the utilization review process indicated a secondary review on the above patient.  The review outcome is based on review of the medical records, discussions with staff, and applying clinical experience noted on the date of the review.       (x) Observation Status Appropriate - This patient does not meet hospital inpatient criteria and is placed in observation status. If this patient's primary payer is Medicare and was admitted as an inpatient, Condition Code 44 should be used and patient status changed to \"observation\".     RATIONALE FOR DETERMINATION     Mr. Samuels is a 71 yo male pt who presents to the ED with generalized weakness and frequent falls.  CT imaging did not reveal any abnormalities in the brain or cervical/lumbar spine. CT of the T-spine revealed age-indeterminate compressions fractures at several levels.  Neurosurg recommended MRI; MRI confirmed that these are old compression fractures and no tx/bracing indicated.  ECHO without significant abnormalities and a normal EF. Vitals are stable. He had mild leukocytosis on admission and abnormal UA but no fevers; on daily abx until UC finalized.     Remaining in the hospital for PT/OT and SW to help determine safe disposition.      The severity of illness, intensity of service provided, expected LOS and risk for adverse outcome make the care appropriate for further observation; however, doesn't meet criteria for hospital inpatient admission. Dr Solis notified of this determination, awaiting call back.    The information on this document is developed by the utilization review team in order for the business office to ensure compliance.  This only denotes the appropriateness of proper admission status and does not reflect the quality of care rendered.         The definitions of Inpatient Status and Observation Status used in making the determination " above are those provided in the CMS Coverage Manual, Chapter 1 and Chapter 6, section 70.4.      Sincerely,    Latasha Arreguin, DO  Utilization Review Physician Advisor

## 2023-08-01 NOTE — PLAN OF CARE
Transferring to room 124. Report called to Paulette MCGRAW. All belongings sent including cell phone and . Has clothing in his duffel bag. Daughter will be bringing his CPAP from home. Will transport via cart- hover mat for transfer.  Shannon Gallardo RN

## 2023-08-01 NOTE — PLAN OF CARE
"Goal Outcome Evaluation:    Problem: Diabetes Comorbidity  Goal: Blood Glucose Level Within Targeted Range  Outcome: Progressing     Problem: Obstructive Sleep Apnea Risk or Actual Comorbidity Management  Goal: Unobstructed Breathing During Sleep  Outcome: Progressing  Intervention: Monitor and Manage Obstructive Sleep Apnea  Recent Flowsheet Documentation  Taken 8/1/2023 0024 by Riana Naylor, RN  Medication Review/Management: medications reviewed     Problem: Osteoarthritis Comorbidity  Goal: Maintenance of Osteoarthritis Symptom Control  Outcome: Progressing  Intervention: Maintain Osteoarthritis Symptom Control  Recent Flowsheet Documentation  Taken 8/1/2023 0024 by Riana Naylor, RN  Activity Management:   activity adjusted per tolerance   bedrest            At best, patient was A&Ox4. When he woke around midnight he was initially disoriented to person and place. He said he briefly thought he was \"at home and I thought you were Nanette.\"   Patient incontinent of bladder, has not demonstrated ability to call for assistance when he needs to void. Primofit was not compatible with anatomy, leaked on bed. Bedding changed. Incontinence care provided.     Patient slept most of shift, declined to turn ceiling light off. \"I sleep with it on at home.\"    Has very dry, flaky feet, otherwise skin is intact.                "

## 2023-08-01 NOTE — CONSULTS
"Care Management Initial Consult    General Information  Assessment completed with: Nanette Paiz daughter via phone  Type of CM/SW Visit: Initial Assessment    Primary Care Provider verified and updated as needed: Yes   Readmission within the last 30 days: no previous admission in last 30 days      Reason for Consult: discharge planning  Advance Care Planning: Advance Care Planning Reviewed: no concerns identified          Communication Assessment  Patient's communication style: spoken language (English or Bilingual)             Cognitive  Cognitive/Neuro/Behavioral:                        Living Environment:   People in home: alone     Current living Arrangements: apartment, independent living facility ([facility])      Able to return to prior arrangements: other (see comments) (unknown at this time)       Family/Social Support:  Care provided by: self  Provides care for: no one  Marital Status:   Children (\"2 daughters\")          Description of Support System: Supportive, Involved    Support Assessment: Adequate family and caregiver support, Adequate social supports, Patient communicates needs well met    Current Resources:   Patient receiving home care services: No     Community Resources: Housekeeping/Chore Agency, Other (see comment) (\"Minimal housekeeping by his apartment facility. 3 meals a day by his apartment facility. He normally skips lunch.\")  Equipment currently used at home: walker, rolling (4WW)  Supplies currently used at home: Oxygen Tubing/Supplies, Hearing Aid Batteries, Other (\"CPAP; Hearing aids at home - rarely wears them; glasses but vision still poor even with them\")    Employment/Financial:  Employment Status: retired     Employment/ Comments: \"no  history\"  Financial Concerns:     Referral to Financial Worker: No       Does the patient's insurance plan have a 3 day qualifying hospital stay waiver?  Yes   Will the waiver be used for post-acute " "placement? No, unknown at this time if he is going to need placement.    Lifestyle & Psychosocial Needs:  Social Determinants of Health     Tobacco Use: Low Risk  (4/30/2022)    Patient History     Smoking Tobacco Use: Never     Smokeless Tobacco Use: Never     Passive Exposure: Not on file   Alcohol Use: Not on file   Financial Resource Strain: Not on file   Food Insecurity: Not on file   Transportation Needs: Not on file   Physical Activity: Not on file   Stress: Not on file   Social Connections: Not on file   Intimate Partner Violence: Not on file   Depression: Not on file   Housing Stability: Not on file       Functional Status:  Prior to admission patient needed assistance:   Dependent ADLs:: Ambulation-walker, Independent (daughter states, \"we think he is not taking showers as often or as well as he should be. But for now he does it on his own\".)  Dependent IADLs:: Cleaning, Cooking, Laundry, Shopping, Meal Preparation, Transportation, Medication Management (\"Apartment facility does light housekeeping and provides meals. Daughter sets up medications and does INR monitoring, and transportation. Also helps with his socks if he asks\".)  Assesssment of Functional Status: Not at baseline with ADL Functioning, Not at baseline with mobility, Not at  functional baseline    Mental Health Status:          Chemical Dependency Status:                Values/Beliefs:  Spiritual, Cultural Beliefs, Zoroastrianism Practices, Values that affect care:                 Additional Information:  Rakesh lives at [facility] alone.    He is independent with ADLs and gets help with all IADLs. He gets help with \"minimal housekeeping by his apartment facility. 3 meals a day by his apartment facility. He normally skips lunch.\" Daughter states, \"we think he is not taking showers as often or as well as he should be. But for now he does it on his own\". His \"daughter sets up medications and does " "INR monitoring, and transportation. Also helps with his socks if he asks\".     He uses a 4WW for mobility. He has a \"CPAP and hearing aids at home - rarely wears them and glasses, but vision still poor even with them\".     Unknown discharge needs at this time, may need TCU. Awaiting PT/OT recommendations. Ordered inpatient, also has [facility].    Daughter to transport at discharge.    CM to follow for medical progression of care, discharge recommendations, and final discharge plan.    Fabienne Pepper RN    "

## 2023-08-01 NOTE — PROGRESS NOTES
Neurosurgery Treatment Plan:   Called from ED yesterday with patient with thoracic compression fractures with only mild low back pain denies thoracic back pain, ordered MRI and completed to show chronic compression fractures, patient noted to have UTI with cultures pending      Images:   Images reviewed personally  FINDINGS:   Mild superior endplate deformities at T2, T3, T4, and T5. No retropulsion or traumatic subluxation. Superior endplate fatty changes at T1, T2, T4, and T5. No marrow edema. Few levels of mild disc height loss in the mid thoracic spine . No spinal canal or neural foraminal stenosis. No abnormal cord signal.      No extraspinal abnormality.                                                                   IMPRESSION:  1.  Chronic compression deformities in the upper thoracic spine.  2.  No MRI findings to suggest an acute compression fracture.     Plan:   No formal consult indicated for chronic compression fractures  No brace required  If continued concern for gait instability or imbalance would recommend neurology evaluation if indicated   Please re-consult if indicated, or call with any questions      Bhavani Everett PA-C  [facility] Neurosurgery  O: [phone]

## 2023-08-01 NOTE — PROGRESS NOTES
"Care Management Follow Up    Length of Stay (days): 1    Expected Discharge Date: 08/02/2023     Concerns to be Addressed:  IV antibiotic, PT/OT eval      Patient plan of care discussed at interdisciplinary rounds: Yes    Anticipated Discharge Disposition:  TBD     Anticipated Discharge Services:  TBD    Anticipated Discharge DME:  per therapy recommendations      Additional Information:  Patient presents with a fall and hitting head on the floor. Reports having increased pain from his right knee due to osteoarthritis. As a result, he has increased bilateral leg weakness and imbalance.  Elevated Troponin. UTI-on IV Rocephin.  Neurosurgery consulted.    PT/OT eval pending.        Social History:  Rakesh lives at [facility] alone.  He is independent with ADLs and gets help with all IADLs. He gets help with \"minimal housekeeping by his apartment facility. 3 meals a day by his apartment facility. He normally skips lunch.\" Daughter states, \"we think he is not taking showers as often or as well as he should be. But for now he does it on his own\". His \"daughter sets up medications and does INR monitoring, and transportation. Also helps with his socks if he asks\".   He uses a 4WW for mobility. He has a \"CPAP and hearing aids at home - rarely wears them and glasses, but vision still poor even with them\".   Daughter Eloisa is primary family contact.  Transportation TBD.       8/1/23:  Final discharge plan pending progression and recommendations.     3:18 PM  Spoke with joan Amin who states [facility] would be their first choice for TCU. [facility] list emailed for her to review for more preferences. PT/OT to eval. CM to send TCU referrals once evals completed. Eloisa states patient has HCD. She will bring to the hospital.            Sonya Cool RN      "

## 2023-08-02 ENCOUNTER — APPOINTMENT (OUTPATIENT)
Dept: OCCUPATIONAL THERAPY | Facility: HOSPITAL | Age: 73
End: 2023-08-02
Attending: INTERNAL MEDICINE
Payer: MEDICARE

## 2023-08-02 ENCOUNTER — APPOINTMENT (OUTPATIENT)
Dept: RADIOLOGY | Facility: HOSPITAL | Age: 73
End: 2023-08-02
Attending: HOSPITALIST
Payer: MEDICARE

## 2023-08-02 ENCOUNTER — APPOINTMENT (OUTPATIENT)
Dept: PHYSICAL THERAPY | Facility: HOSPITAL | Age: 73
End: 2023-08-02
Attending: INTERNAL MEDICINE
Payer: MEDICARE

## 2023-08-02 PROBLEM — B96.20 E. COLI URINARY TRACT INFECTION: Status: ACTIVE | Noted: 2023-08-02

## 2023-08-02 PROBLEM — N39.0 E. COLI URINARY TRACT INFECTION: Status: ACTIVE | Noted: 2023-08-02

## 2023-08-02 LAB
BACTERIA UR CULT: ABNORMAL
BASOPHILS # BLD AUTO: 0.1 10E3/UL (ref 0–0.2)
BASOPHILS NFR BLD AUTO: 1 %
EOSINOPHIL # BLD AUTO: 0.2 10E3/UL (ref 0–0.7)
EOSINOPHIL NFR BLD AUTO: 3 %
ERYTHROCYTE [DISTWIDTH] IN BLOOD BY AUTOMATED COUNT: 14 % (ref 10–15)
GLUCOSE BLDC GLUCOMTR-MCNC: 135 MG/DL (ref 70–99)
GLUCOSE BLDC GLUCOMTR-MCNC: 154 MG/DL (ref 70–99)
GLUCOSE BLDC GLUCOMTR-MCNC: 164 MG/DL (ref 70–99)
GLUCOSE BLDC GLUCOMTR-MCNC: 195 MG/DL (ref 70–99)
GLUCOSE BLDC GLUCOMTR-MCNC: 204 MG/DL (ref 70–99)
HCT VFR BLD AUTO: 42.9 % (ref 40–53)
HGB BLD-MCNC: 14.3 G/DL (ref 13.3–17.7)
HOLD SPECIMEN: NORMAL
IMM GRANULOCYTES # BLD: 0 10E3/UL
IMM GRANULOCYTES NFR BLD: 0 %
INR PPP: 2.2 (ref 0.85–1.15)
LYMPHOCYTES # BLD AUTO: 0.9 10E3/UL (ref 0.8–5.3)
LYMPHOCYTES NFR BLD AUTO: 11 %
MCH RBC QN AUTO: 31.6 PG (ref 26.5–33)
MCHC RBC AUTO-ENTMCNC: 33.3 G/DL (ref 31.5–36.5)
MCV RBC AUTO: 95 FL (ref 78–100)
MONOCYTES # BLD AUTO: 0.9 10E3/UL (ref 0–1.3)
MONOCYTES NFR BLD AUTO: 10 %
NEUTROPHILS # BLD AUTO: 6.7 10E3/UL (ref 1.6–8.3)
NEUTROPHILS NFR BLD AUTO: 75 %
NRBC # BLD AUTO: 0 10E3/UL
NRBC BLD AUTO-RTO: 0 /100
PLATELET # BLD AUTO: 304 10E3/UL (ref 150–450)
RBC # BLD AUTO: 4.52 10E6/UL (ref 4.4–5.9)
WBC # BLD AUTO: 8.9 10E3/UL (ref 4–11)

## 2023-08-02 PROCEDURE — 96376 TX/PRO/DX INJ SAME DRUG ADON: CPT

## 2023-08-02 PROCEDURE — 73560 X-RAY EXAM OF KNEE 1 OR 2: CPT | Mod: RT

## 2023-08-02 PROCEDURE — 250N000011 HC RX IP 250 OP 636: Mod: JZ | Performed by: HOSPITALIST

## 2023-08-02 PROCEDURE — 250N000013 HC RX MED GY IP 250 OP 250 PS 637: Performed by: INTERNAL MEDICINE

## 2023-08-02 PROCEDURE — 250N000013 HC RX MED GY IP 250 OP 250 PS 637: Performed by: HOSPITALIST

## 2023-08-02 PROCEDURE — 999N000157 HC STATISTIC RCP TIME EA 10 MIN

## 2023-08-02 PROCEDURE — 82962 GLUCOSE BLOOD TEST: CPT

## 2023-08-02 PROCEDURE — 99232 SBSQ HOSP IP/OBS MODERATE 35: CPT | Performed by: HOSPITALIST

## 2023-08-02 PROCEDURE — 97530 THERAPEUTIC ACTIVITIES: CPT | Mod: GP

## 2023-08-02 PROCEDURE — 85610 PROTHROMBIN TIME: CPT | Performed by: INTERNAL MEDICINE

## 2023-08-02 PROCEDURE — 97162 PT EVAL MOD COMPLEX 30 MIN: CPT | Mod: GP

## 2023-08-02 PROCEDURE — 97166 OT EVAL MOD COMPLEX 45 MIN: CPT | Mod: GO

## 2023-08-02 PROCEDURE — 97110 THERAPEUTIC EXERCISES: CPT | Mod: GP

## 2023-08-02 PROCEDURE — 36415 COLL VENOUS BLD VENIPUNCTURE: CPT | Performed by: HOSPITALIST

## 2023-08-02 PROCEDURE — 97535 SELF CARE MNGMENT TRAINING: CPT | Mod: GO

## 2023-08-02 PROCEDURE — 36415 COLL VENOUS BLD VENIPUNCTURE: CPT | Performed by: INTERNAL MEDICINE

## 2023-08-02 PROCEDURE — G0378 HOSPITAL OBSERVATION PER HR: HCPCS

## 2023-08-02 PROCEDURE — 85025 COMPLETE CBC W/AUTO DIFF WBC: CPT | Performed by: HOSPITALIST

## 2023-08-02 PROCEDURE — 250N000011 HC RX IP 250 OP 636: Mod: JZ | Performed by: INTERNAL MEDICINE

## 2023-08-02 PROCEDURE — 96375 TX/PRO/DX INJ NEW DRUG ADDON: CPT

## 2023-08-02 RX ORDER — CIPROFLOXACIN 500 MG/1
500 TABLET, FILM COATED ORAL EVERY 12 HOURS SCHEDULED
Status: DISCONTINUED | OUTPATIENT
Start: 2023-08-02 | End: 2023-08-03 | Stop reason: HOSPADM

## 2023-08-02 RX ADMIN — BRIMONIDINE TARTRATE 1 DROP: 2 SOLUTION OPHTHALMIC at 19:35

## 2023-08-02 RX ADMIN — BRIMONIDINE TARTRATE 1 DROP: 2 SOLUTION OPHTHALMIC at 08:11

## 2023-08-02 RX ADMIN — SENNOSIDES AND DOCUSATE SODIUM 2 TABLET: 50; 8.6 TABLET ORAL at 21:06

## 2023-08-02 RX ADMIN — DORZOLAMIDE HYDROCHLORIDE AND TIMOLOL MALEATE 1 DROP: 22.3; 6.8 SOLUTION/ DROPS OPHTHALMIC at 08:11

## 2023-08-02 RX ADMIN — CIPROFLOXACIN 500 MG: 500 TABLET, FILM COATED ORAL at 19:35

## 2023-08-02 RX ADMIN — INSULIN ASPART 3 UNITS: 100 INJECTION, SOLUTION INTRAVENOUS; SUBCUTANEOUS at 12:38

## 2023-08-02 RX ADMIN — INSULIN ASPART 1 UNITS: 100 INJECTION, SOLUTION INTRAVENOUS; SUBCUTANEOUS at 16:46

## 2023-08-02 RX ADMIN — DORZOLAMIDE HYDROCHLORIDE AND TIMOLOL MALEATE 1 DROP: 22.3; 6.8 SOLUTION/ DROPS OPHTHALMIC at 19:35

## 2023-08-02 RX ADMIN — CEFTRIAXONE SODIUM 1 G: 1 INJECTION, POWDER, FOR SOLUTION INTRAMUSCULAR; INTRAVENOUS at 04:58

## 2023-08-02 RX ADMIN — ATORVASTATIN CALCIUM 10 MG: 10 TABLET, FILM COATED ORAL at 21:06

## 2023-08-02 RX ADMIN — FLUVOXAMINE MALEATE 50 MG: 50 TABLET ORAL at 21:06

## 2023-08-02 RX ADMIN — ARIPIPRAZOLE 2 MG: 2 TABLET ORAL at 21:06

## 2023-08-02 RX ADMIN — WARFARIN SODIUM 7.5 MG: 5 TABLET ORAL at 18:29

## 2023-08-02 RX ADMIN — HYDRALAZINE HYDROCHLORIDE 10 MG: 20 INJECTION INTRAMUSCULAR; INTRAVENOUS at 04:58

## 2023-08-02 RX ADMIN — ASPIRIN 81 MG CHEWABLE TABLET 81 MG: 81 TABLET CHEWABLE at 08:10

## 2023-08-02 ASSESSMENT — ACTIVITIES OF DAILY LIVING (ADL)
ADLS_ACUITY_SCORE: 52
ADLS_ACUITY_SCORE: 54
ADLS_ACUITY_SCORE: 52
ADLS_ACUITY_SCORE: 58
ADLS_ACUITY_SCORE: 56
ADLS_ACUITY_SCORE: 52
ADLS_ACUITY_SCORE: 54
ADLS_ACUITY_SCORE: 59
ADLS_ACUITY_SCORE: 52
ADLS_ACUITY_SCORE: 54

## 2023-08-02 NOTE — PROGRESS NOTES
[facility]    Medicine Progress Note - Hospitalist Service    Date of Admission:  7/31/2023    Assessment & Plan     Rakesh Samuels is a 72 year old male admitted on 7/31/2023 for evaluation of a fall.     Debility/generalized weakness  frequent falls on high risk medication Coumadin  Presents with a fall and hitting head on the floor. Reports having increased pain from his right knee due to osteoarthritis. As a result, he has increased bilateral leg weakness and imbalance. He lives in senior [facility]. He uses a walker at baseline.  -Interval imaging with CT head shows no acute intracranial abnormality. TSH normal.   - PT/OT pending TCU placement  - Knee pain x-rays ordered  - Patient reports that he will follow-up outpatient with orthopedic surgery about right knee replacement.  - Patient is anticoagulated with Coumadin.  INR therapeutic at 2.2    E. coli UTI  -De-escalate from ceftriaxone as cultures are back we will switch to ciprofloxacin     Elevated troponin: Troponin is slightly high at 35. No anginal chest pain. No cardiac history. Normal proBN and d dimer. He reports having some dyspnea on exertion.   - Telemetry  - Monitor troponin  - Echo 6/1 with preserved EF of 60- 65%     Superior endplate compression fractures at T2, T4, and T5:   - MRI thoracic spine consistent with chronic fractures, patient asymptomatic     Paroxysmal atrial fibrillation:   HR is controlled.  Not on rate control medication.    - Continue Coumadin.  Pharmacy to dose.     Essential hypertension:   Not on medication at home. Hydralazine prn for now.      Diabetes, type II:   -Hold PTA metformin and Actos.  NovoLog sliding scale.     Anxiety and depression:   Continue PTA Abilify, fluvoxamine, and hydroxyzine prn     Hyperlipidemia: on statin     Diet: Moderate Consistent Carb (60 g CHO per Meal) Diet    DVT Prophylaxis: Warfarin  Olsen Catheter: Not present  Lines: None     Cardiac Monitoring: None  Code Status: No  CPR- Do NOT Intubate      Clinically Significant Risk Factors                        # DMII: A1C = 6.8 % (Ref range: <5.7 %) within past 6 months, PRESENT ON ADMISSION  # Obesity: Estimated body mass index is 33.23 kg/m  as calculated from the following:    Height as of this encounter: 1.829 m (6').    Weight as of this encounter: 111.1 kg (245 lb)., PRESENT ON ADMISSION          Disposition Plan      Expected Discharge Date: 08/03/2023    Discharge Delays: OT Disposition recs needed  PT Disposition recs needed  IV Medication - consider oral or Home Infusion    Discharge Comments: IV antibiotic  TCU          Dayton Solis MD  Hospitalist Service  [facility]  Securely message with [employer] (more info)  Text page via [employer] Paging/Directory   ______________________________________________________________________    Interval History   Patient examined at bedside/chart reviewed.  Reports to be feeling much better today reports that he stronger currently sitting in bedside chair.  Does complain of right knee pain and left shoulder pain.  Reports that these are chronic.  Patient afebrile without leukocytosis    Physical Exam   Vital Signs: Temp: 98.7  F (37.1  C) Temp src: Oral BP: 137/85 Pulse: 89   Resp: 20 SpO2: 94 % O2 Device: Nasal cannula    Weight: 245 lbs 0 oz    General appearance: not in acute distress  HEENT: PERRL, EOMI  Lungs: Clear breath sounds in bilateral lung fields  Cardiovascular: Regular rate and rhythm, normal S1-S2  Abdomen: Soft, non tender, no distension  Musculoskeletal: No joint swelling  Skin: No rash and no edema  Neurology: AAO ×3.  Cranial nerves II - XII normal.  Normal muscle strength in all four extremities.     Medical Decision Making       35 MINUTES SPENT BY ME on the date of service doing chart review, history, exam, documentation & further activities per the note.      Data     I have personally reviewed the following data over the past 24 hrs:    8.9  \   14.3   /  304     N/A N/A N/A /  204 (H)   N/A N/A N/A \     INR:  2.20 (H) PTT:  N/A   D-dimer:  N/A Fibrinogen:  N/A

## 2023-08-02 NOTE — PLAN OF CARE
"  Problem: Plan of Care - These are the overarching goals to be used throughout the patient stay.    Goal: Patient-Specific Goal (Individualized)  Description: You can add care plan individualizations to a care plan. Examples of Individualization might be:  \"Parent requests to be called daily at 9am for status\", \"I have a hard time hearing out of my right ear\", or \"Do not touch me to wake me up as it startles me\".  Outcome: Progressing  Patient is weak. Patient stand with two assist but unable to move legs to turn and sit on commode, attempted bed pan but no BM yet. He is alert and oriented.  Patient reported that he fell in bathroom in his AL facility. Denies pain. No bumps/bruise of swelling noted at this time. Patient is pleasantly cooperative with cares. Vitally stable. Ate 75% of meal. Peripheral vision on right ride is poor. Right eye closes only partially. Has external cathter. Monitor.                        "

## 2023-08-02 NOTE — PROGRESS NOTES
Pt home cpap set up at bedside, pt on room air.  Pt independent with placement.  RT will continue to monitor.

## 2023-08-02 NOTE — UTILIZATION REVIEW
"Admission Status; Secondary Review Determination     Under the authority of the Utilization Management Committee, the utilization review process indicated a secondary review on Rakesh Samuels.  The review outcome is based on review of the medical records, discussions with staff, and applying clinical experience noted on the date of the review.     (x) Observation Status Appropriate - This patient does not meet hospital inpatient criteria and is placed in observation status. If this patient's primary payer is Medicare and was admitted as an inpatient, Condition Code 44 should be used and patient status changed to \"observation\".     RATIONALE FOR DETERMINATION   72 with past medical history of  diabetes, paroxysmal atrial fibrillation on Coumadin, who presents to the ED for evaluation after a fall and generalized weakness . CT imaging did not reveal any abnormalities in the brain or cervical/lumbar spine. CT of the T-spine revealed age-indeterminate compressions fractures at several levels. Neurosurg cosult; MRI confirmed that these are old compression fractures and no tx/bracing indicated. ECHO without significant abnormalities and a normal EF. Vitals are stable. Urine culture is positives for E coli sensitive to antibiotics     The severity of illness, intensity of service provided, expected LOS and risk for adverse outcome make the care appropriate for further observation; however, doesn't meet criteria for hospital inpatient admission. Dr Solis is notified of this determination and agrees with downgrade of status.      The information on this document is developed by the utilization review team in order for the business office to ensure compliance.  This only denotes the appropriateness of proper admission status and does not reflect the quality of care rendered.         The definitions of Inpatient Status and Observation Status used in making the determination above are those provided in the CMS Coverage Manual, " Chapter 1 and Chapter 6, section 70.4.      Sincerely,  Dave Arana MD  Utilization Review  Physician Advisor  [facility]

## 2023-08-02 NOTE — PLAN OF CARE
Problem: Risk for Delirium  Goal: Improved Attention and Thought Clarity  Intervention: Maximize Cognitive Function  Recent Flowsheet Documentation  Taken 8/1/2023 2346 by Kirsten Arnold RN  Reorientation Measures:   clock in view   familiar social contact encouraged   hearing device use encouraged  Taken 8/1/2023 2027 by Kirsten Arnold RN  Reorientation Measures:   clock in view   familiar social contact encouraged   hearing device use encouraged     Problem: Fall Injury Risk  Goal: Absence of Fall and Fall-Related Injury  Intervention: Promote Injury-Free Environment  Recent Flowsheet Documentation  Taken 8/1/2023 2346 by Kirsten Arnold RN  Safety Promotion/Fall Prevention: patient and family education  Taken 8/1/2023 2027 by Kirsten Arnold RN  Safety Promotion/Fall Prevention: patient and family education   Goal Outcome Evaluation:  IV hydralazine given x1 for BP >160 with good effect.    Rakesh A&Ox4. Room air/CPAP at night. Denies pain. Sinus on tele. Did not ambulate this shift, reported he was an assist of 2 pivot to commode. Repositioned as needed.

## 2023-08-02 NOTE — PROGRESS NOTES
08/02/23 0750   Appointment Info   Signing Clinician's Name / Credentials (OT) Winifred Grant MOT OTR/L CLT   Living Environment   People in Home alone   Current Living Arrangements independent living facility   Home Accessibility no concerns   Transportation Anticipated family or friend will provide   Living Environment Comments states he is I with ADLs, goes to a DR for meals   Self-Care   Equipment Currently Used at Home walker, rolling   General Information   Onset of Illness/Injury or Date of Surgery 07/31/23   Referring Physician    Additional Occupational Profile Info/Pertinent History of Current Problem Rakesh Samuels is a 72 year old male with a pertinent medical history of diabetes, paroxysmal atrial fibrillation on Coumadin, who presents to the ED for evaluation after a fall.  Patient reports that he normally walks with a walker.  Recently, he has worsening right knee pain due to osteoarthritis.  He feels that he has increased weakness and imbalance. He has been having frequent falls. Today, when he walked with his walker, he fell his legs just gave out and he fell.  He fell forward, hitting his head on the floor.  He reports no loss of consciousness.  Patient also reports having shortness of breath for the past few weeks.  When he walks from his room to the dining room, which is about 80 feet, he feels out of breath.  He reports no ever, chest pain, nauseous, vomiting and abdominal pain.  He also reports no urinary symptoms.  In ER, CT head reveals no acute intracranial abnormality.  CT scan of the thoracic spine showed age indeterminate superior endplate compression fractures at T2, T4, and T5.  Patient reports no back pain.   Existing Precautions/Restrictions fall   Cognitive Status Examination   Affect/Mental Status (Cognitive) WNL   Follows Commands WNL   Bed Mobility   Bed Mobility supine-sit   Supine-Sit [place] (Bed Mobility) minimum assist (75% patient effort)   Transfers    Transfers sit-stand transfer   Sit-Stand Transfer   Sit-Stand [place] (Transfers) moderate assist (50% patient effort);verbal cues   Balance   Balance Comments sat EOB with min A- leaning to R   Activities of Daily Living   BADL Assessment/Intervention   (unable to assess ADLs 2/2 weakness)   Clinical Impression   Criteria for Skilled Therapeutic Interventions Met (OT) Yes, treatment indicated   OT Diagnosis decreased ADL i'dence   OT Problem List-Impairments impacting ADL problems related to;strength;activity tolerance impaired;balance;pain   Assessment of Occupational Performance 1-3 Performance Deficits   Identified Performance Deficits bed mob, trsfs, standing tolerance, ADLS   Planned Therapy Interventions (OT) ADL retraining;transfer training;strengthening   Clinical Decision Making Complexity (OT) moderate complexity   Risk & Benefits of therapy have been explained care plan/treatment goals reviewed   OT Total Evaluation Time   OT Eval, Moderate Complexity Minutes (36611) 8   OT Goals   Therapy Frequency (OT) Daily   OT Predicted Duration/Target Date for Goal Attainment 08/09/23   OT Goals Hygiene/Grooming;Lower Body Dressing;Transfers   OT: Hygiene/Grooming supervision/stand-by assist  (seated)   OT: Lower Body Dressing Minimal assist   OT: Transfer Supervision/stand-by assist   Interventions   Interventions Quick Adds Self-Care/Home Management   Self-Care/Home Management   Self-Care/Home Mgmt/ADL, Compensatory, Meal Prep Minutes (07968) 15   Treatment Detail/Skilled Intervention Evaluation Completed. Treatment initaited. Patient required seated rest after standing, Continues to have poor balance- leaning to R, min A to get into midline. 2nd STS from bed with bed height elevated. Mod A. Few steps to chair with mod A and FWW. Slow movement c/o of R leg pain. Chair trsf- mod A. Cues for safety for hand placement   OT Discharge Planning   OT Plan EOB sitting-balance/g/h, bed to chair, standing tolerance    OT Discharge Recommendation (DC Rec) Transitional Care Facility   OT Brief overview of current status mod A   Total Session Time   Timed Code Treatment Minutes 15   Total Session Time (sum of timed and untimed services) 23

## 2023-08-02 NOTE — PROGRESS NOTES
08/02/23 1100   Appointment Info   Signing Clinician's Name / Credentials (PT) Diane Washington, PT   Living Environment   People in Home alone   Current Living Arrangements independent living facility   Home Accessibility no concerns   Transportation Anticipated family or friend will provide   Living Environment Comments Pt is indep with mobility and ADLs.  He will occ need assist with dressing.  He does sleep in the recliner or bed.  Pt has a walk in shower with chair with GB and a RTS with GB.   Self-Care   Usual Activity Tolerance moderate   Current Activity Tolerance moderate   Equipment Currently Used at Home walker, rolling   Fall history within last six months yes   Number of times patient has fallen within last six months 3  (Pt said he fell in the apt and in the dinning room.)   Activity/Exercise/Self-Care Comment Pt does walk to 2 meals/day   General Information   Onset of Illness/Injury or Date of Surgery 07/31/23   Referring Physician Redd Morton   Patient/Family Therapy Goals Statement (PT) None stated.   Pertinent History of Current Problem (include personal factors and/or comorbidities that impact the POC) Per the chart, Rakesh Samuels is a 72 year old male with a pertinent medical history of diabetes, paroxysmal atrial fibrillation on Coumadin, who presents to the ED for evaluation after a fall. Patient reports that he normally walks with a walker. Recently, he has worsening right knee pain due to osteoarthritis. He feels that he has increased weakness and imbalance. He has been having frequent falls. Today, when he walked with his walker, he fell his legs just gave out and he fell. He fell forward, hitting his head on the floor. He reports no loss of consciousness. Patient also reports having shortness of breath for the past few weeks. When he walks from his room to the dining room, which is about 80 feet, he feels out of breath. He reports no ever, chest pain, nauseous, vomiting and abdominal  pain. He also reports no urinary symptoms. In ER, CT head reveals no acute intracranial abnormality. CT scan of the thoracic spine showed age indeterminate superior endplate compression fractures at T2, T4, and T5. Patient reports no back pain.   Existing Precautions/Restrictions fall   Weight-Bearing Status - LLE weight-bearing as tolerated   Weight-Bearing Status - RLE weight-bearing as tolerated   Cognition   Orientation Status (Cognition) oriented x 4  (Pt does respond slowly.)   Pain Assessment   Patient Currently in Pain   (Pt did c/o some R Knee pain with ex.)   Range of Motion (ROM)   Range of Motion ROM is WNL   ROM Comment Bilat LEs WFL with some R quad discomfort with LAQ ex.   Strength (Manual Muscle Testing)   Strength Comments Pt is able to WB for gait.  Pt did not c/o pain with ambulating shorter distances.   Bed Mobility   Comment, (Bed Mobility) Supine>sit with pt using the FWW with  min A x 1 with sit scoot.   Transfers   Comment, (Transfers) Sit<>stand from an elevated bed with mod A x 1   Gait/Stairs (Locomotion)   [place] Level (Gait) verbal cues;minimum assist (75% patient effort)   Assistive Device (Gait) walker, front-wheeled   Distance in Feet 3'   Distance in Feet (Gait) 3'   Pattern (Gait) step-to   Deviations/Abnormal Patterns (Gait) gait speed decreased   Balance   Balance Comments Min A x 1 with FWW   Sensory Examination   Sensory Perception WNL   Sensory Perception Comments Bilat LEs   Clinical Impression   Criteria for Skilled Therapeutic Intervention Yes, treatment indicated   PT Diagnosis (PT) Impaired functional mobility   Influenced by the following impairments increased R knee pain, weakness,dec bal, dec endurance.   Functional limitations due to impairments bed mobility, transfers, gait   Clinical Presentation (PT Evaluation Complexity) Evolving/Changing   Clinical Presentation Rationale Impaired functional mobility.   Clinical Decision Making (Complexity) moderate  complexity   Planned Therapy Interventions (PT) bed mobility training;gait training;home exercise program;ROM (range of motion);strengthening;transfer training   Anticipated Equipment Needs at Discharge (PT) walker, rolling  (try 4WW)   Risk & Benefits of therapy have been explained evaluation/treatment results reviewed;care plan/treatment goals reviewed;risks/benefits reviewed;current/potential barriers reviewed;patient;participants voiced agreement with care plan   PT Total Evaluation Time   PT Eval, Moderate Complexity Minutes (54630) 15   Physical Therapy Goals   PT Frequency Daily   PT Predicted Duration/Target Date for Goal Attainment 08/09/23   PT Goals Bed Mobility;Transfers;Gait;PT Goal 1   PT: Bed Mobility Supervision/stand-by assist;Supine to/from sit;Rolling   PT: Transfers Supervision/stand-by assist;Sit to/from stand;Bed to/from chair;Assistive device   PT: Gait Minimal assist;Rolling walker;100 feet   PT: Goal 1 Pt to fei bilat LE ex x 10 to 20 reps to increase strength  for mobility.   Interventions   Interventions Quick Adds Gait Training;Therapeutic Activity;Therapeutic Procedure   Therapeutic Procedure/Exercise   Ther. Procedure: strength, endurance, ROM, flexibillity Minutes (75417) 8   Treatment Detail/Skilled Intervention Seated bilat LE ex x 10 reps with cues for technique and better quality.  Pt did have increased R quad  pain with LAQ.   Therapeutic Activity   Therapeutic Activities: dynamic activities to improve functional performance Minutes (41870) 10   Treatment Detail/Skilled Intervention Supine>sit with min A with sit scoot with cues for technique.  Pt did use the rail.  Sit<>stand with elevated bed with FWW with cues for hand placment. Mod A with sit<>stand.  Bed to the chair with min A x 1 with the FWW.   Gait Training   Treatment Detail/Skilled Intervention Pt walked 3' fromt the bed to the chair with the FWW with cues for sequence and direction.  Pt did take smaller steps and he  did not c/o any LE pain.   [place] Level (Gait Training) minimum assist (75% patient effort)   Physical Assistance Level (Gait Training) verbal cues;1 person assist   Weight Bearing (Gait Training) weight-bearing as tolerated   Assistive Device (Gait Training) rolling walker   Pattern Analysis (Gait Training) swing-through gait   Gait Analysis Deviations decreased ashleigh;decreased step length;decreased toe-to-floor clearance   Impairments (Gait Analysis/Training) balance impaired;strength decreased   PT Discharge Planning   PT Plan bed mobility, transfers with the FWW, gait with WC follow, LE ex.   PT Discharge Recommendation (DC Rec) Transitional Care Facility   PT Rationale for DC Rec Pt does need assist with bed mobility and transfers. He did use the FWW and did not fei much ambulation yet.  TCU is recommended at this point.   PT Brief overview of current status PT eval, supine >sit with min A, transfers with mod A and he walked 3' with FWW with min A , LE ex. with some increased R knee pain.   Total Session Time   Timed Code Treatment Minutes 18   Total Session Time (sum of timed and untimed services) 33

## 2023-08-02 NOTE — PLAN OF CARE
Problem: Plan of Care - These are the overarching goals to be used throughout the patient stay.    Goal: Plan of Care Review  Description: The Plan of Care Review/Shift note should be completed every shift.  The Outcome Evaluation is a brief statement about your assessment that the patient is improving, declining, or no change.  This information will be displayed automatically on your shift note.  Outcome: Progressing   Goal Outcome Evaluation:  Patient is alert and oriented. Afebrile. BP elevated-MD updated. Reports mild pain 1/10 of right knee. Patient stand with one assist and Transfer belt. However, he said that its difficult for him to move her legs. PT/OT working with him.  Patient  was up in the chair for meals. He ate above 75% breakfast and lunch. Monitor.

## 2023-08-02 NOTE — PROGRESS NOTES
ARMSTRONG given to pt. Discussed what it means, pt stated understanding, and signed. Copy placed in pt chart. Original given to pt. Faxed.      Ruby Mirza RN on 8/2/2023 at 4:11 PM

## 2023-08-02 NOTE — PROGRESS NOTES
"CLINICAL NUTRITION NOTE    Pt identified at nutrition risk on nursing nutrition risk screen with \"unsure\" of weight loss. He reported no change in intake.     Hx:  Pt having increased falls at [facility], UTI, spine fxs, DM  3 meals are provided at [facility] and pt consistently eating 2/day.     Current Diet   Diet Moderate consistent CHO   Intake: Pt is eating 100%. Ordered suboptimally yesterday but may be adequate; 1478 kcal, 81 g protein, meeting roughly 75-80% of estimated needs  Pt ordering about the same today.   May be eating more in hospital as he ate 3 meals yesterday and today vs 2/day baseline at [facility]    Pt currently sleeping soundly, did not disturb    Wt Hx  Wt Readings from Last 10 Encounters:   07/31/23 111.1 kg (245 lb)- likely stated in ED   05/02/22 106.1 kg (234 lb)   04/07/21 91.5 kg (201 lb 12.8 oz)   04/05/21 91.5 kg (201 lb 12.8 oz)   04/02/21 91.5 kg (201 lb 12.8 oz)   02/05/21 88.9 kg (196 lb)   05/14/20 79.6 kg (175 lb 6.4 oz)   04/16/20 85.7 kg (189 lb)   04/16/20 85.7 kg (189 lb)   02/18/20 92.1 kg (203 lb)     No ordered weight here and minimal recent weight data. According to stated weight on admit pt has gained 45 lb past 2 years.     Assessment  -Pt appears to be a low nutrition risk and likely will not qualify for malnutrition    Interventions  -Will sign off for now with ongoing fair-good intake and 2 year weight gain.   -Will order new measured weight and monitor  -Likely TCU tomorrow  "

## 2023-08-02 NOTE — PROGRESS NOTES
Care Management Follow Up    Length of Stay (days): 2    Expected Discharge Date: 08/02/2023     Concerns to be Addressed:  TCU placement/discharge planning     Patient plan of care discussed at interdisciplinary rounds: Yes    Anticipated Discharge Disposition:  TCU     Anticipated Discharge Services:  TBD  Anticipated Discharge DME:  TBD    Patient/family educated on Medicare website which has current facility and service quality ratings:  yes  Education Provided on the Discharge Plan:  yes  Patient/Family in Agreement with the Plan:  yes    Referrals Placed by CM/SW: TCU referrals sent 8/1   Private pay costs discussed: TBD    Additional Information:  Per [facility], able to accept patient 8/3 between 5:30-6pm.    Writer left voice mail [facility] asking if able to accept patient tomorrow as daughter prefers this facility.    IF [facility] ACCEPTS PATIENT, PLEASE UPDATE [facility] AND CANCEL ADMISSION.  ALSO, CONFIRM ABILITY DAUGHTER TO TRANSPORT TO FACILITY.     Nanette Tavares CM

## 2023-08-03 ENCOUNTER — APPOINTMENT (OUTPATIENT)
Dept: OCCUPATIONAL THERAPY | Facility: HOSPITAL | Age: 73
End: 2023-08-03
Payer: MEDICARE

## 2023-08-03 ENCOUNTER — DOCUMENTATION ONLY (OUTPATIENT)
Dept: ANTICOAGULATION | Facility: CLINIC | Age: 73
End: 2023-08-03
Payer: MEDICARE

## 2023-08-03 ENCOUNTER — APPOINTMENT (OUTPATIENT)
Dept: PHYSICAL THERAPY | Facility: HOSPITAL | Age: 73
End: 2023-08-03
Payer: MEDICARE

## 2023-08-03 VITALS
SYSTOLIC BLOOD PRESSURE: 134 MMHG | OXYGEN SATURATION: 95 % | WEIGHT: 226.63 LBS | HEIGHT: 72 IN | TEMPERATURE: 98 F | RESPIRATION RATE: 18 BRPM | HEART RATE: 84 BPM | DIASTOLIC BLOOD PRESSURE: 84 MMHG | BODY MASS INDEX: 30.7 KG/M2

## 2023-08-03 LAB
BASOPHILS # BLD AUTO: 0.1 10E3/UL (ref 0–0.2)
BASOPHILS NFR BLD AUTO: 1 %
EOSINOPHIL # BLD AUTO: 0.3 10E3/UL (ref 0–0.7)
EOSINOPHIL NFR BLD AUTO: 4 %
ERYTHROCYTE [DISTWIDTH] IN BLOOD BY AUTOMATED COUNT: 14 % (ref 10–15)
GLUCOSE BLDC GLUCOMTR-MCNC: 151 MG/DL (ref 70–99)
GLUCOSE BLDC GLUCOMTR-MCNC: 155 MG/DL (ref 70–99)
GLUCOSE BLDC GLUCOMTR-MCNC: 183 MG/DL (ref 70–99)
GLUCOSE BLDC GLUCOMTR-MCNC: 224 MG/DL (ref 70–99)
HCT VFR BLD AUTO: 40.7 % (ref 40–53)
HGB BLD-MCNC: 13 G/DL (ref 13.3–17.7)
HOLD SPECIMEN: NORMAL
IMM GRANULOCYTES # BLD: 0.1 10E3/UL
IMM GRANULOCYTES NFR BLD: 1 %
INR PPP: 2.37 (ref 0.85–1.15)
LYMPHOCYTES # BLD AUTO: 1.4 10E3/UL (ref 0.8–5.3)
LYMPHOCYTES NFR BLD AUTO: 18 %
MCH RBC QN AUTO: 31.3 PG (ref 26.5–33)
MCHC RBC AUTO-ENTMCNC: 31.9 G/DL (ref 31.5–36.5)
MCV RBC AUTO: 98 FL (ref 78–100)
MONOCYTES # BLD AUTO: 0.9 10E3/UL (ref 0–1.3)
MONOCYTES NFR BLD AUTO: 11 %
NEUTROPHILS # BLD AUTO: 5.3 10E3/UL (ref 1.6–8.3)
NEUTROPHILS NFR BLD AUTO: 65 %
NRBC # BLD AUTO: 0 10E3/UL
NRBC BLD AUTO-RTO: 0 /100
PLATELET # BLD AUTO: 265 10E3/UL (ref 150–450)
RBC # BLD AUTO: 4.15 10E6/UL (ref 4.4–5.9)
WBC # BLD AUTO: 8.1 10E3/UL (ref 4–11)

## 2023-08-03 PROCEDURE — 82962 GLUCOSE BLOOD TEST: CPT

## 2023-08-03 PROCEDURE — 85610 PROTHROMBIN TIME: CPT | Performed by: INTERNAL MEDICINE

## 2023-08-03 PROCEDURE — G0378 HOSPITAL OBSERVATION PER HR: HCPCS

## 2023-08-03 PROCEDURE — 97535 SELF CARE MNGMENT TRAINING: CPT | Mod: GO

## 2023-08-03 PROCEDURE — 36415 COLL VENOUS BLD VENIPUNCTURE: CPT | Performed by: INTERNAL MEDICINE

## 2023-08-03 PROCEDURE — 250N000013 HC RX MED GY IP 250 OP 250 PS 637: Performed by: HOSPITALIST

## 2023-08-03 PROCEDURE — 999N000157 HC STATISTIC RCP TIME EA 10 MIN

## 2023-08-03 PROCEDURE — 250N000013 HC RX MED GY IP 250 OP 250 PS 637: Performed by: INTERNAL MEDICINE

## 2023-08-03 PROCEDURE — 99239 HOSP IP/OBS DSCHRG MGMT >30: CPT | Performed by: HOSPITALIST

## 2023-08-03 PROCEDURE — 97530 THERAPEUTIC ACTIVITIES: CPT | Mod: GP

## 2023-08-03 PROCEDURE — 85025 COMPLETE CBC W/AUTO DIFF WBC: CPT | Performed by: HOSPITALIST

## 2023-08-03 RX ORDER — CIPROFLOXACIN 500 MG/1
500 TABLET, FILM COATED ORAL EVERY 12 HOURS
Qty: 4 TABLET
Start: 2023-08-03 | End: 2023-08-05

## 2023-08-03 RX ORDER — WARFARIN SODIUM 5 MG/1
5 TABLET ORAL
Status: DISCONTINUED | OUTPATIENT
Start: 2023-08-03 | End: 2023-08-03 | Stop reason: HOSPADM

## 2023-08-03 RX ADMIN — INSULIN ASPART 1 UNITS: 100 INJECTION, SOLUTION INTRAVENOUS; SUBCUTANEOUS at 16:45

## 2023-08-03 RX ADMIN — SENNOSIDES AND DOCUSATE SODIUM 2 TABLET: 50; 8.6 TABLET ORAL at 09:26

## 2023-08-03 RX ADMIN — ASPIRIN 81 MG CHEWABLE TABLET 81 MG: 81 TABLET CHEWABLE at 08:18

## 2023-08-03 RX ADMIN — BRIMONIDINE TARTRATE 1 DROP: 2 SOLUTION OPHTHALMIC at 08:18

## 2023-08-03 RX ADMIN — INSULIN ASPART 4 UNITS: 100 INJECTION, SOLUTION INTRAVENOUS; SUBCUTANEOUS at 12:32

## 2023-08-03 RX ADMIN — CIPROFLOXACIN 500 MG: 500 TABLET, FILM COATED ORAL at 08:18

## 2023-08-03 RX ADMIN — DORZOLAMIDE HYDROCHLORIDE AND TIMOLOL MALEATE 1 DROP: 22.3; 6.8 SOLUTION/ DROPS OPHTHALMIC at 08:18

## 2023-08-03 RX ADMIN — POLYETHYLENE GLYCOL 3350 17 G: 17 POWDER, FOR SOLUTION ORAL at 09:26

## 2023-08-03 RX ADMIN — ACETAMINOPHEN 650 MG: 325 TABLET ORAL at 16:20

## 2023-08-03 RX ADMIN — INSULIN ASPART 2 UNITS: 100 INJECTION, SOLUTION INTRAVENOUS; SUBCUTANEOUS at 08:21

## 2023-08-03 ASSESSMENT — ACTIVITIES OF DAILY LIVING (ADL)
ADLS_ACUITY_SCORE: 62
ADLS_ACUITY_SCORE: 58
ADLS_ACUITY_SCORE: 58
ADLS_ACUITY_SCORE: 62
ADLS_ACUITY_SCORE: 58
ADLS_ACUITY_SCORE: 62
ADLS_ACUITY_SCORE: 58

## 2023-08-03 NOTE — PLAN OF CARE
Physical Therapy Discharge Summary    Reason for therapy discharge:    Discharged to transitional care facility.    Progress towards therapy goal(s). See goals on Care Plan in [facility] electronic health record for goal details.  Goals not met.  Barriers to achieving goals:   Pt was working on the goals yet. .    Therapy recommendation(s):    Continued therapy is recommended.  Rationale/Recommendations:  To improve mobility and strength..

## 2023-08-03 NOTE — PROGRESS NOTES
PRIMARY DIAGNOSIS: Injury of head  OUTPATIENT/OBSERVATION GOALS TO BE MET BEFORE DISCHARGE:  ADLs back to baseline: No    Activity and level of assistance: Up with assist of 1 to 2 with walker and gait belt.     Pain status: Improved-controlled with oral pain medications.    Return to near baseline physical activity: No     Discharge Planner Nurse   Safe discharge environment identified: Yes  Barriers to discharge: No       Entered by: Gregg England RN 08/02/2023 7:39 PM     Pt alert and oriented x4. Weight obtained. Pt up to the chair for dinner. He tolerated his diet. No report of pain on right knee. X-ray to come by later.   Please review provider order for any additional goals.   Nurse to notify provider when observation goals have been met and patient is ready for discharge.

## 2023-08-03 NOTE — PLAN OF CARE
PRIMARY DIAGNOSIS: Fall  OUTPATIENT/OBSERVATION GOALS TO BE MET BEFORE DISCHARGE:  ADLs back to baseline: Yes    Activity and level of assistance: Up with assist of two and walker.     Pain status: Pain free    Return to near baseline physical activity: Yes     Discharge Planner Nurse   Safe discharge environment identified: Yes  Barriers to discharge: Yes       Entered by: Kathe Calhoun RN 08/03/2023 1:51 PM     Please review provider order for any additional goals.   Nurse to notify provider when observation goals have been met and patient is ready for discharge.    Discharge later today

## 2023-08-03 NOTE — PLAN OF CARE
Occupational Therapy Discharge Summary    Reason for therapy discharge:    Discharged to transitional care facility.    Progress towards therapy goal(s). See goals on Care Plan in [facility] electronic health record for goal details.  Goals not met.  Barriers to achieving goals:   discharge from facility.    Therapy recommendation(s):    Continued therapy is recommended.  Rationale/Recommendations:  decreased ADLs.    Goal Outcome Evaluation:             Mary Reyes, OTR/ANDERSON  8/3/2023

## 2023-08-03 NOTE — PROGRESS NOTES
PRIMARY DIAGNOSIS: Head Injury  OUTPATIENT/OBSERVATION GOALS TO BE MET BEFORE DISCHARGE:  ADLs back to baseline: No    Activity and level of assistance: Assist of 2 with GB and walker    Pain status: Improved-controlled with oral pain medications.    Return to near baseline physical activity: No     Discharge Planner Nurse   Safe discharge environment identified: Yes  Barriers to discharge: Yes       Entered by: Betsy Dacosta RN 08/03/2023 3:22 AM     Please review provider order for any additional goals.   Nurse to notify provider when observation goals have been met and patient is ready for discharge.    RA. VSS. Denies pain. FADY eye redness with no acute vision changes. Tele = SR. Slept well throughout the night.

## 2023-08-03 NOTE — PLAN OF CARE
PRIMARY DIAGNOSIS: Fall  OUTPATIENT/OBSERVATION GOALS TO BE MET BEFORE DISCHARGE:  ADLs back to baseline: Yes     Activity and level of assistance: Up with assist of two and walker.      Pain status: Pain free     Return to near baseline physical activity: Yes          Discharge Planner Nurse   Safe discharge environment identified: Yes  Barriers to discharge: Yes  Please review provider order for any additional goals.   Nurse to notify provider when observation goals have been met and patient is ready for discharge.     Discharge later today

## 2023-08-03 NOTE — PLAN OF CARE
Goal Outcome Evaluation:      Plan of Care Reviewed With: patient    Overall Patient Progress: improvingOverall Patient Progress: improving         Problem: Plan of Care - These are the overarching goals to be used throughout the patient stay.    Goal: Readiness for Transition of Care  Outcome: Adequate for Care Transition     Discharge to [facility] at 1700. Pt aware of plan of care.

## 2023-08-03 NOTE — PROGRESS NOTES
Care Management Discharge Note    Discharge Date: 08/03/2023       Discharge Disposition:  [facility] TCU    Discharge Services:      Discharge DME:      Discharge Transportation: [facility] wheelchair transport between 439 PM - 524PM    Private pay costs discussed: transportation costs    Does the patient's insurance plan have a 3 day qualifying hospital stay waiver?  Yes   Will the waiver be used for post-acute placement? Yes    PAS Confirmation Code:  [id number]  Patient/family educated on Medicare website which has current facility and service quality ratings:  yes    Education Provided on the Discharge Plan:  yes  Persons Notified of Discharge Plans: Pt daughter, pt, nursing, MD  Patient/Family in Agreement with the Plan:      Handoff Referral Completed: Yes    Additional Information:   JENNY received voicemail from pt daughter Eloisa requesting a call from JENYN regarding plan for discharge. JENNY spoke to Eloisa about plan to discharge today to [facility] and she is in agreement to this plan.   Eloisa stated she does not feel comfortable transporting pt, and requested medical transport. She stated understanding of costs incurred to pt for transport.   JENNY called Maryann, mundo with [facility], she is able to accept pt after 5 PM today. PAS completed, transport arranged, MD and bedside nurse updated.   Daughter requests pt 4 bottles of eye drops go with pt to TCU. Sticky note placed in chart with reminder.    MICHAEL Ontiveros

## 2023-08-03 NOTE — PROGRESS NOTES
PRIMARY DIAGNOSIS: Head Injury  OUTPATIENT/OBSERVATION GOALS TO BE MET BEFORE DISCHARGE:  ADLs back to baseline: No    Activity and level of assistance: Assist of 2 w/ GB and walker    Pain status: Improved-controlled with oral pain medications.    Return to near baseline physical activity: No     Discharge Planner Nurse   Safe discharge environment identified: Yes  Barriers to discharge: No       Entered by: Betsy Dacosta RN 08/02/2023 11:56 PM     Please review provider order for any additional goals.   Nurse to notify provider when observation goals have been met and patient is ready for discharge.    A&O x4. RA. BP slightly elevated. Denies pain. Both eyes continues to be red with small creamy-like outputs. Denies acute vision changes. Tele = SR. XR done on right leg. Will continue to monitor.     BP (!) 144/65 (BP Location: Left arm)   Pulse 76   Temp 98  F (36.7  C) (Oral)   Resp 20   Ht 1.829 m (6')   Wt 102.8 kg (226 lb 10.1 oz)   SpO2 95%   BMI 30.74 kg/m

## 2023-08-03 NOTE — PROGRESS NOTES
ANTICOAGULATION  MANAGEMENT: Discharge Review    Rakesh Samuels chart reviewed for anticoagulation continuity of care    Hospital Admission on 7/31-8/3/23 for injury of head, fall at home, e.coli UTI.    Discharge disposition:  Assisted Living    Results:    Recent labs: (last 7 days)     07/31/23  1225 08/01/23  0439 08/02/23  0701 08/03/23  0715   INR 3.26* 2.75* 2.20* 2.37*     Anticoagulation inpatient management:     home regimen continued    Anticoagulation discharge instructions:     Warfarin dosing: home regimen continued   Bridging: No   INR goal change: No      Medication changes affecting anticoagulation: Yes: Cipro for 2 days    Additional factors affecting anticoagulation: No     PLAN     Recommend to check INR on 8/9/23 as planned    Patient not contacted    No adjustment to Anticoagulation Calendar was required    Heather Yanez RN

## 2023-08-03 NOTE — PROGRESS NOTES
PRIMARY DIAGNOSIS: Head Injury Secondary to a Fall  OUTPATIENT/OBSERVATION GOALS TO BE MET BEFORE DISCHARGE:  ADLs back to baseline: No    Activity and level of assistance: Assist of 1-2 with GB and Walker    Pain status: Improved-controlled with oral pain medications.    Return to near baseline physical activity: No     Discharge Planner Nurse   Safe discharge environment identified: Yes  Barriers to discharge: Yes       Entered by: Betsy Dacosta RN 08/02/2023 10:10 PM     Please review provider order for any additional goals.   Nurse to notify provider when observation goals have been met and patient is ready for discharge.    A&O x4. RA. VSS. Denies pain at this time. Both eyes are red with no acute vision changes. Taking meds whole. Tele is SR. BG at bedtime is 195, no correction given. Will continue to monitor for any acute changes.     /73 (BP Location: Left arm)   Pulse 86   Temp 97.9  F (36.6  C) (Oral)   Resp 18   Ht 1.829 m (6')   Wt 102.8 kg (226 lb 10.1 oz)   SpO2 94%   BMI 30.74 kg/m

## 2023-08-03 NOTE — DISCHARGE SUMMARY
[facility]  Hospitalist Discharge Summary      Date of Admission:  7/31/2023  Date of Discharge:  8/3/2023  Discharging Provider: Dayton Solis MD  Discharge Service: Hospitalist Service    Discharge Diagnoses   Generalized weakness/debility  Frequent falls  History of paroxysmal A-fib on anticoagulation with Coumadin  E. coli UTI  Elevated troponin  Superior endplate compression fracture at T2/T4/T5  Essential hypertension  Type 2 diabetes  Anxiety depression  Hyperlipidemia    Clinically Significant Risk Factors     # DMII: A1C = 6.8 % (Ref range: <5.7 %) within past 6 months  # Obesity: Estimated body mass index is 30.74 kg/m  as calculated from the following:    Height as of this encounter: 1.829 m (6').    Weight as of this encounter: 102.8 kg (226 lb 10.1 oz).       Follow-ups Needed After Discharge   Follow-up Appointments     Follow Up and recommended labs and tests      Follow up with Nursing home physician.  No follow up labs or test are   needed.            Unresulted Labs Ordered in the Past 30 Days of this Admission       Date and Time Order Name Status Description    7/31/2023  3:57 PM Blood Culture Peripheral Blood Preliminary     7/31/2023  3:57 PM Blood Culture Hand, Right Preliminary         These results will be followed up by     Discharge Disposition   Discharged to home  Condition at discharge: Stable    Hospital Course   Rakesh Samuels is a 72 year old male admitted on 7/31/2023 for evaluation of a fall.     Debility/generalized weakness  frequent falls on high risk medication Coumadin  Presents with a fall and hitting head on the floor. Reports having increased pain from his right knee due to osteoarthritis. As a result, he has increased bilateral leg weakness and imbalance. He lives in [facility]. He uses a walker at baseline.  -Interval imaging with CT head shows no acute intracranial abnormality. TSH normal.   - PT/OT pending TCU placement  - Knee pain x-rays  ordered  - Patient reports that he will follow-up outpatient with orthopedic surgery about right knee replacement.  - Patient is anticoagulated with Coumadin.  INR therapeutic at 2.37    E. coli UTI  -De-escalate from ceftriaxone as cultures are back we will switch to ciprofloxacin     Elevated troponin: Troponin is slightly high at 35. No anginal chest pain. No cardiac history. Normal proBN and d dimer. He reports having some dyspnea on exertion.   - Telemetry  - Monitor troponin  - Echo 6/1 with preserved EF of 60- 65%     Superior endplate compression fractures at T2, T4, and T5:   - MRI thoracic spine consistent with chronic fractures, patient asymptomatic     Paroxysmal atrial fibrillation:   HR is controlled.  Not on rate control medication.    - Continue Coumadin.  Pharmacy to dose.     Essential hypertension:   Not on medication at home. Hydralazine prn for now.      Diabetes, type II:   -Hold PTA metformin and Actos.  NovoLog sliding scale.     Anxiety and depression:   Continue PTA Abilify, fluvoxamine, and hydroxyzine prn     Hyperlipidemia: on statin    Consultations This Hospital Stay   PHARMACY TO DOSE WARFARIN  PHYSICAL THERAPY ADULT IP CONSULT  OCCUPATIONAL THERAPY ADULT IP CONSULT  CARE MANAGEMENT / SOCIAL WORK IP CONSULT    Code Status   No CPR- Do NOT Intubate    Time Spent on this Encounter   I, Dayton Solis MD, personally saw the patient today and spent greater than 30 minutes discharging this patient.       Dayton Solis MD  [address] [address]  Phone: [phone]  Fax: [phone]  ______________________________________________________________________    Physical Exam   Vital Signs: Temp: 98  F (36.7  C) Temp src: Oral BP: 133/68 Pulse: 65   Resp: 19 SpO2: 93 % O2 Device: BiPAP/CPAP    Weight: 226 lbs 10.13 oz  General appearance: not in acute distress  HEENT: PERRL, EOMI  Lungs: Clear breath sounds in bilateral lung  fields  Cardiovascular: Regular rate and rhythm, normal S1-S2  Abdomen: Soft, non tender, no distension  Musculoskeletal: No joint swelling  Skin: No rash and no edema  Neurology: AAO ×3.  Cranial nerves II - XII normal.  Normal muscle strength in all four extremities.        Primary Care Physician   Michelet Restrepo    Discharge Orders      General info for SNF    Length of Stay Estimate: Short Term Care: Estimated # of Days <30  Condition at Discharge: Improving  Level of care:skilled   Rehabilitation Potential: Good  Admission H&P remains valid and up-to-date: Yes  Recent Chemotherapy: N/A  Use Nursing Home Standing Orders: Yes     Follow Up and recommended labs and tests    Follow up with Nursing home physician.  No follow up labs or test are needed.     Reason for your hospital stay    Recurrent falls, Weakness/debility, E. Coli UTI     Activity - Up with nursing assistance     Fall precautions     Diet    Follow this diet upon discharge: Orders Placed This Encounter      Room Service      Moderate Consistent Carb (60 g CHO per Meal) Diet       Significant Results and Procedures   Results for orders placed or performed during the hospital encounter of 07/31/23   CT Head w/o Contrast    Narrative    EXAM: CT HEAD W/O CONTRAST, CT THORACIC SPINE W/O CONTRAST, CT CERVICAL SPINE W/O CONTRAST, CT LUMBAR SPINE W/O CONTRAST  LOCATION: [facility]  DATE/TIME: 7/31/2023 12:55 PM CDT    INDICATION: Head trauma fall  COMPARISON: CT abdomen pelvis 03/27/2021. CTA head 04/08/2021. CT chest abdomen pelvis 04/12/2020.  TECHNIQUE:  1) Routine CT Head without IV contrast. Multiplanar reformats. Dose reduction techniques were used.  2) Routine CT Cervical Spine without IV contrast. Multiplanar reformats. Multiplanar reformats. Dose reduction techniques were used.   3) Routine CT Thoracic Spine without IV contrast. Multiplanar reformats. Dose reduction techniques were used.   4) Routine CT Lumbar Spine  without IV contrast. Multiplanar reformats. Dose reduction techniques were used.     FINDINGS:    HEAD CT:  INTRACRANIAL CONTENTS: Gray-white matter differentiation is maintained. No hemorrhage or extraaxial collection. No mass effect. Subarachnoid cisterns are patent. Patchy white matter hypodensities are, while nonspecific, most compatible with chronic   microvascular ischemic changes. Unchanged proportional prominence of the ventricles and sulci is compatible with diffuse cerebral volume loss.    VISUALIZED ORBITS/SINUSES/MASTOIDS: Bilateral lens replacements and right sided glaucoma shunt. Paranasal sinuses are clear. No middle ear or mastoid effusion.    BONES/SOFT TISSUES: No acute abnormality.    CERVICAL SPINE CT:  VERTEBRA: Straightening of lordotic curvature and mild generalized dextrocurvature may be positional and/or related to muscle spasm. Osseous structures appear diffusely demineralized. No displaced fracture or vertebral body compression. The dens,   atlantoaxial joint, and facets are intact.    CANAL/FORAMINA: Multilevel discogenic, uncovertebral, and facet degenerative changes. Severe disc space narrowing C3-C4 and C5-C6 as well as moderate at C6-C7. Disc osteophyte complexes contribute to mild spinal canal narrowing at C3-C4, C5-C6, and   C6-C7. Multifocal foraminal stenosis includes moderate-severe narrowing on the left C3-C4 with additional scattered mild to moderate foraminal stenosis. No high-grade spinal canal stenosis.    PARASPINAL: No visible soft tissue abnormality. Small/subcentimeter hypodense nodule in the posterior right lobe of the thyroid gland.    THORACIC SPINE CT:  VERTEBRA: Normal alignment. Visualized osseous structures appear diffusely demineralized. Age indeterminate superior endplate compression fractures at T2, T4, and T5 with mild height loss up to approximately 25% at T5, new since comparison CT chest   04/12/2020. Facets are intact.    CANAL/FORAMINA: Mild multilevel  discogenic degenerative changes and multifocal right anterior marginal osteophytes. No high-grade spinal canal or foraminal stenosis.    PARASPINAL: No visible soft tissue abnormality. Hypoplastic 12th ribs. Dependent atelectasis in the visualized lungs.    LUMBAR SPINE CT:  VERTEBRA: Normal alignment. Osseous structures appear diffusely demineralized. No displaced fracture or vertebral body compression. Facets are intact.    CANAL/FORAMINA: Lower lumbar predominant multilevel facet arthropathy and milder multilevel discogenic degenerative changes. Although obscured by artifact, suspect mild to moderate spinal canal stenosis at L4-L5 and mild at L3-L4. Multifocal mild   foraminal narrowing.    PARASPINAL: No visible soft tissue abnormality. Bilateral adrenal nodules partially visualized as seen to better advantage on comparison CT abdomen/pelvis 04/30/2022. Likewise, redemonstrated markedly atrophic right kidney with nonspecific periureteral   indistinctness/stranding also partially visualized. Prominent extrarenal pelvis on the left. Scattered atherosclerotic vascular calcifications.      Impression    IMPRESSION:  HEAD CT:  1.  No acute traumatic intracranial abnormality.    CERVICAL SPINE CT:  1.  No convincing CT findings of an acute osseous abnormality. Osseous demineralization can limit this assessment.    THORACIC SPINE CT:  1.  Age indeterminate superior endplate compression fractures at T2, T4, and T5, new since comparison chest CT 04/12/2020 - correlate for attributable pain on exam.    LUMBAR SPINE CT:  1.  No convincing CT findings of an acute osseous abnormality. Osseous demineralization can limit this assessment.  2.  Chronic extraspinal findings, as detailed.         CT Cervical Spine w/o Contrast    Narrative    EXAM: CT HEAD W/O CONTRAST, CT THORACIC SPINE W/O CONTRAST, CT CERVICAL SPINE W/O CONTRAST, CT LUMBAR SPINE W/O CONTRAST  LOCATION: [facility]  DATE/TIME: 7/31/2023  12:55 PM CDT    INDICATION: Head trauma fall  COMPARISON: CT abdomen pelvis 03/27/2021. CTA head 04/08/2021. CT chest abdomen pelvis 04/12/2020.  TECHNIQUE:  1) Routine CT Head without IV contrast. Multiplanar reformats. Dose reduction techniques were used.  2) Routine CT Cervical Spine without IV contrast. Multiplanar reformats. Multiplanar reformats. Dose reduction techniques were used.   3) Routine CT Thoracic Spine without IV contrast. Multiplanar reformats. Dose reduction techniques were used.   4) Routine CT Lumbar Spine without IV contrast. Multiplanar reformats. Dose reduction techniques were used.     FINDINGS:    HEAD CT:  INTRACRANIAL CONTENTS: Gray-white matter differentiation is maintained. No hemorrhage or extraaxial collection. No mass effect. Subarachnoid cisterns are patent. Patchy white matter hypodensities are, while nonspecific, most compatible with chronic   microvascular ischemic changes. Unchanged proportional prominence of the ventricles and sulci is compatible with diffuse cerebral volume loss.    VISUALIZED ORBITS/SINUSES/MASTOIDS: Bilateral lens replacements and right sided glaucoma shunt. Paranasal sinuses are clear. No middle ear or mastoid effusion.    BONES/SOFT TISSUES: No acute abnormality.    CERVICAL SPINE CT:  VERTEBRA: Straightening of lordotic curvature and mild generalized dextrocurvature may be positional and/or related to muscle spasm. Osseous structures appear diffusely demineralized. No displaced fracture or vertebral body compression. The dens,   atlantoaxial joint, and facets are intact.    CANAL/FORAMINA: Multilevel discogenic, uncovertebral, and facet degenerative changes. Severe disc space narrowing C3-C4 and C5-C6 as well as moderate at C6-C7. Disc osteophyte complexes contribute to mild spinal canal narrowing at C3-C4, C5-C6, and   C6-C7. Multifocal foraminal stenosis includes moderate-severe narrowing on the left C3-C4 with additional scattered mild to moderate  foraminal stenosis. No high-grade spinal canal stenosis.    PARASPINAL: No visible soft tissue abnormality. Small/subcentimeter hypodense nodule in the posterior right lobe of the thyroid gland.    THORACIC SPINE CT:  VERTEBRA: Normal alignment. Visualized osseous structures appear diffusely demineralized. Age indeterminate superior endplate compression fractures at T2, T4, and T5 with mild height loss up to approximately 25% at T5, new since comparison CT chest   04/12/2020. Facets are intact.    CANAL/FORAMINA: Mild multilevel discogenic degenerative changes and multifocal right anterior marginal osteophytes. No high-grade spinal canal or foraminal stenosis.    PARASPINAL: No visible soft tissue abnormality. Hypoplastic 12th ribs. Dependent atelectasis in the visualized lungs.    LUMBAR SPINE CT:  VERTEBRA: Normal alignment. Osseous structures appear diffusely demineralized. No displaced fracture or vertebral body compression. Facets are intact.    CANAL/FORAMINA: Lower lumbar predominant multilevel facet arthropathy and milder multilevel discogenic degenerative changes. Although obscured by artifact, suspect mild to moderate spinal canal stenosis at L4-L5 and mild at L3-L4. Multifocal mild   foraminal narrowing.    PARASPINAL: No visible soft tissue abnormality. Bilateral adrenal nodules partially visualized as seen to better advantage on comparison CT abdomen/pelvis 04/30/2022. Likewise, redemonstrated markedly atrophic right kidney with nonspecific periureteral   indistinctness/stranding also partially visualized. Prominent extrarenal pelvis on the left. Scattered atherosclerotic vascular calcifications.      Impression    IMPRESSION:  HEAD CT:  1.  No acute traumatic intracranial abnormality.    CERVICAL SPINE CT:  1.  No convincing CT findings of an acute osseous abnormality. Osseous demineralization can limit this assessment.    THORACIC SPINE CT:  1.  Age indeterminate superior endplate compression  fractures at T2, T4, and T5, new since comparison chest CT 04/12/2020 - correlate for attributable pain on exam.    LUMBAR SPINE CT:  1.  No convincing CT findings of an acute osseous abnormality. Osseous demineralization can limit this assessment.  2.  Chronic extraspinal findings, as detailed.         Lumbar spine CT w/o contrast    Narrative    EXAM: CT HEAD W/O CONTRAST, CT THORACIC SPINE W/O CONTRAST, CT CERVICAL SPINE W/O CONTRAST, CT LUMBAR SPINE W/O CONTRAST  LOCATION: [facility]  DATE/TIME: 7/31/2023 12:55 PM CDT    INDICATION: Head trauma fall  COMPARISON: CT abdomen pelvis 03/27/2021. CTA head 04/08/2021. CT chest abdomen pelvis 04/12/2020.  TECHNIQUE:  1) Routine CT Head without IV contrast. Multiplanar reformats. Dose reduction techniques were used.  2) Routine CT Cervical Spine without IV contrast. Multiplanar reformats. Multiplanar reformats. Dose reduction techniques were used.   3) Routine CT Thoracic Spine without IV contrast. Multiplanar reformats. Dose reduction techniques were used.   4) Routine CT Lumbar Spine without IV contrast. Multiplanar reformats. Dose reduction techniques were used.     FINDINGS:    HEAD CT:  INTRACRANIAL CONTENTS: Gray-white matter differentiation is maintained. No hemorrhage or extraaxial collection. No mass effect. Subarachnoid cisterns are patent. Patchy white matter hypodensities are, while nonspecific, most compatible with chronic   microvascular ischemic changes. Unchanged proportional prominence of the ventricles and sulci is compatible with diffuse cerebral volume loss.    VISUALIZED ORBITS/SINUSES/MASTOIDS: Bilateral lens replacements and right sided glaucoma shunt. Paranasal sinuses are clear. No middle ear or mastoid effusion.    BONES/SOFT TISSUES: No acute abnormality.    CERVICAL SPINE CT:  VERTEBRA: Straightening of lordotic curvature and mild generalized dextrocurvature may be positional and/or related to muscle spasm. Osseous  structures appear diffusely demineralized. No displaced fracture or vertebral body compression. The dens,   atlantoaxial joint, and facets are intact.    CANAL/FORAMINA: Multilevel discogenic, uncovertebral, and facet degenerative changes. Severe disc space narrowing C3-C4 and C5-C6 as well as moderate at C6-C7. Disc osteophyte complexes contribute to mild spinal canal narrowing at C3-C4, C5-C6, and   C6-C7. Multifocal foraminal stenosis includes moderate-severe narrowing on the left C3-C4 with additional scattered mild to moderate foraminal stenosis. No high-grade spinal canal stenosis.    PARASPINAL: No visible soft tissue abnormality. Small/subcentimeter hypodense nodule in the posterior right lobe of the thyroid gland.    THORACIC SPINE CT:  VERTEBRA: Normal alignment. Visualized osseous structures appear diffusely demineralized. Age indeterminate superior endplate compression fractures at T2, T4, and T5 with mild height loss up to approximately 25% at T5, new since comparison CT chest   04/12/2020. Facets are intact.    CANAL/FORAMINA: Mild multilevel discogenic degenerative changes and multifocal right anterior marginal osteophytes. No high-grade spinal canal or foraminal stenosis.    PARASPINAL: No visible soft tissue abnormality. Hypoplastic 12th ribs. Dependent atelectasis in the visualized lungs.    LUMBAR SPINE CT:  VERTEBRA: Normal alignment. Osseous structures appear diffusely demineralized. No displaced fracture or vertebral body compression. Facets are intact.    CANAL/FORAMINA: Lower lumbar predominant multilevel facet arthropathy and milder multilevel discogenic degenerative changes. Although obscured by artifact, suspect mild to moderate spinal canal stenosis at L4-L5 and mild at L3-L4. Multifocal mild   foraminal narrowing.    PARASPINAL: No visible soft tissue abnormality. Bilateral adrenal nodules partially visualized as seen to better advantage on comparison CT abdomen/pelvis 04/30/2022.  Likewise, redemonstrated markedly atrophic right kidney with nonspecific periureteral   indistinctness/stranding also partially visualized. Prominent extrarenal pelvis on the left. Scattered atherosclerotic vascular calcifications.      Impression    IMPRESSION:  HEAD CT:  1.  No acute traumatic intracranial abnormality.    CERVICAL SPINE CT:  1.  No convincing CT findings of an acute osseous abnormality. Osseous demineralization can limit this assessment.    THORACIC SPINE CT:  1.  Age indeterminate superior endplate compression fractures at T2, T4, and T5, new since comparison chest CT 04/12/2020 - correlate for attributable pain on exam.    LUMBAR SPINE CT:  1.  No convincing CT findings of an acute osseous abnormality. Osseous demineralization can limit this assessment.  2.  Chronic extraspinal findings, as detailed.         CT Thoracic Spine w/o Contrast    Narrative    EXAM: CT HEAD W/O CONTRAST, CT THORACIC SPINE W/O CONTRAST, CT CERVICAL SPINE W/O CONTRAST, CT LUMBAR SPINE W/O CONTRAST  LOCATION: [facility]  DATE/TIME: 7/31/2023 12:55 PM CDT    INDICATION: Head trauma fall  COMPARISON: CT abdomen pelvis 03/27/2021. CTA head 04/08/2021. CT chest abdomen pelvis 04/12/2020.  TECHNIQUE:  1) Routine CT Head without IV contrast. Multiplanar reformats. Dose reduction techniques were used.  2) Routine CT Cervical Spine without IV contrast. Multiplanar reformats. Multiplanar reformats. Dose reduction techniques were used.   3) Routine CT Thoracic Spine without IV contrast. Multiplanar reformats. Dose reduction techniques were used.   4) Routine CT Lumbar Spine without IV contrast. Multiplanar reformats. Dose reduction techniques were used.     FINDINGS:    HEAD CT:  INTRACRANIAL CONTENTS: Gray-white matter differentiation is maintained. No hemorrhage or extraaxial collection. No mass effect. Subarachnoid cisterns are patent. Patchy white matter hypodensities are, while nonspecific, most  compatible with chronic   microvascular ischemic changes. Unchanged proportional prominence of the ventricles and sulci is compatible with diffuse cerebral volume loss.    VISUALIZED ORBITS/SINUSES/MASTOIDS: Bilateral lens replacements and right sided glaucoma shunt. Paranasal sinuses are clear. No middle ear or mastoid effusion.    BONES/SOFT TISSUES: No acute abnormality.    CERVICAL SPINE CT:  VERTEBRA: Straightening of lordotic curvature and mild generalized dextrocurvature may be positional and/or related to muscle spasm. Osseous structures appear diffusely demineralized. No displaced fracture or vertebral body compression. The dens,   atlantoaxial joint, and facets are intact.    CANAL/FORAMINA: Multilevel discogenic, uncovertebral, and facet degenerative changes. Severe disc space narrowing C3-C4 and C5-C6 as well as moderate at C6-C7. Disc osteophyte complexes contribute to mild spinal canal narrowing at C3-C4, C5-C6, and   C6-C7. Multifocal foraminal stenosis includes moderate-severe narrowing on the left C3-C4 with additional scattered mild to moderate foraminal stenosis. No high-grade spinal canal stenosis.    PARASPINAL: No visible soft tissue abnormality. Small/subcentimeter hypodense nodule in the posterior right lobe of the thyroid gland.    THORACIC SPINE CT:  VERTEBRA: Normal alignment. Visualized osseous structures appear diffusely demineralized. Age indeterminate superior endplate compression fractures at T2, T4, and T5 with mild height loss up to approximately 25% at T5, new since comparison CT chest   04/12/2020. Facets are intact.    CANAL/FORAMINA: Mild multilevel discogenic degenerative changes and multifocal right anterior marginal osteophytes. No high-grade spinal canal or foraminal stenosis.    PARASPINAL: No visible soft tissue abnormality. Hypoplastic 12th ribs. Dependent atelectasis in the visualized lungs.    LUMBAR SPINE CT:  VERTEBRA: Normal alignment. Osseous structures appear  diffusely demineralized. No displaced fracture or vertebral body compression. Facets are intact.    CANAL/FORAMINA: Lower lumbar predominant multilevel facet arthropathy and milder multilevel discogenic degenerative changes. Although obscured by artifact, suspect mild to moderate spinal canal stenosis at L4-L5 and mild at L3-L4. Multifocal mild   foraminal narrowing.    PARASPINAL: No visible soft tissue abnormality. Bilateral adrenal nodules partially visualized as seen to better advantage on comparison CT abdomen/pelvis 04/30/2022. Likewise, redemonstrated markedly atrophic right kidney with nonspecific periureteral   indistinctness/stranding also partially visualized. Prominent extrarenal pelvis on the left. Scattered atherosclerotic vascular calcifications.      Impression    IMPRESSION:  HEAD CT:  1.  No acute traumatic intracranial abnormality.    CERVICAL SPINE CT:  1.  No convincing CT findings of an acute osseous abnormality. Osseous demineralization can limit this assessment.    THORACIC SPINE CT:  1.  Age indeterminate superior endplate compression fractures at T2, T4, and T5, new since comparison chest CT 04/12/2020 - correlate for attributable pain on exam.    LUMBAR SPINE CT:  1.  No convincing CT findings of an acute osseous abnormality. Osseous demineralization can limit this assessment.  2.  Chronic extraspinal findings, as detailed.         Chest XR,  PA & LAT    Narrative    EXAM: XR CHEST 2 VIEWS  LOCATION: [facility]  DATE: 7/31/2023    INDICATION: weakness, sirs criteria  COMPARISON: Chest x-ray 4/30/2022.      Impression    IMPRESSION: Stable cardiomediastinal silhouette. No new focal airspace consolidation. No pleural effusion or pneumothorax.   Thoracic spine MRI w/o contrast    Narrative    EXAM: MR THORACIC SPINE W/O CONTRAST  LOCATION: [facility]  DATE: 8/1/2023    INDICATION:  Trauma, fall, thoracic compression deformities on same day  CT  COMPARISON:  Same day CT thoracic spine.  TECHNIQUE: Routine Thoracic Spine MRI without IV contrast.    FINDINGS:   Mild superior endplate deformities at T2, T3, T4, and T5. No retropulsion or traumatic subluxation. Superior endplate fatty changes at T1, T2, T4, and T5. No marrow edema. Few levels of mild disc height loss in the mid thoracic spine . No spinal canal or   neural foraminal stenosis. No abnormal cord signal.     No extraspinal abnormality.      Impression    IMPRESSION:  1.  Chronic compression deformities in the upper thoracic spine.  2.  No MRI findings to suggest an acute compression fracture.   CT Head w/o Contrast    Narrative    EXAM: CT HEAD W/O CONTRAST  LOCATION: [facility] [facility]  DATE: 8/1/2023    INDICATION: Fall and head trauma. On coumadin. Monitor to rule out brain bleed.  COMPARISON: None.  TECHNIQUE: Routine CT Head without IV contrast. Multiplanar reformats. Dose reduction techniques were used.    FINDINGS:  INTRACRANIAL CONTENTS: No intracranial hemorrhage, extraaxial collection, or mass effect.  No CT evidence of acute infarct. Mild presumed chronic small vessel ischemic changes. Moderate generalized volume loss. No hydrocephalus.     VISUALIZED ORBITS/SINUSES/MASTOIDS: Prior bilateral cataract surgery. Implantable device in the medial margin of the right globe. Visualized portions of the orbits are otherwise unremarkable. No paranasal sinus mucosal disease. No middle ear or mastoid   effusion.    BONES/SOFT TISSUES: No acute abnormality.      Impression    IMPRESSION:  1.  Age-related changes as above with no acute intracranial abnormality.   XR Knee Right 1/2 Views    Narrative    EXAM: XR KNEE RIGHT 1/2 VIEWS  LOCATION: [facility]  DATE: 8/2/2023    INDICATION: Knee pain.  COMPARISON: None.      Impression    IMPRESSION: Severe medial compartment predominant, tricompartmental right knee osteoarthritis. No acute displaced right knee  fracture or joint malalignment. Small right knee joint effusion. Arterial calcification.             Echocardiogram Complete     Value    LVEF  60-65%    [facility]    [id number]  [id number]  [id number]  [id number]^JENNIFER^[place] [address]     Name: MALIKA DUGGAN  MRN: [id number]  : 1950  Study Date: 2023 10:30 AM  Age: 72 yrs  Gender: Male  Patient Location: [facility]  Reason For Study: Dyspnea  Ordering Physician: YONY RODRIGUEZ  Performed By: TANIA     BSA: 2.3 m2  Height: 72 in  Weight: 245 lb  HR: 73  ______________________________________________________________________________  Procedure  Complete Portable Echo Adult. Definity (NDC #33440-328) given intravenously.  ______________________________________________________________________________  Interpretation Summary     Left ventricular size, wall motion and function are normal. The ejection  fraction is 60-65%.  Normal right ventricle size and systolic function.  No hemodynamically significant valvular abnormalities on 2D or color flow  imaging. The study was technically difficult.  ______________________________________________________________________________  Left Ventricle  Left ventricular size, wall motion and function are normal. The ejection  fraction is 60-65%. Proximal septal thickening is noted. Echo findings are not  consistent with left ventricular outflow obstruction. There is mild concentric  left ventricular hypertrophy. Left ventricular diastolic function is normal.  No regional wall motion abnormalities noted.     Right Ventricle  Normal right ventricle size and systolic function.     Atria  Normal left atrial size. Right atrial size is normal. There is no color  Doppler evidence of an atrial shunt.     Mitral Valve  Mitral valve leaflets appear normal. There is no evidence of mitral stenosis  or clinically significant mitral regurgitation.     Tricuspid Valve  Tricuspid valve leaflets  appear normal. There is no evidence of tricuspid  stenosis or clinically significant tricuspid regurgitation.     Aortic Valve  Aortic valve leaflets appear normal. There is no evidence of aortic stenosis  or clinically significant aortic regurgitation.     Pulmonic Valve  The pulmonic valve is not well seen, but is grossly normal. This degree of  valvular regurgitation is within normal limits.     Vessels  The aorta root is normal. Normal size ascending aorta. Inferior vena cava not  well visualized for estimation of right atrial pressure.     Pericardium  There is no pericardial effusion.     ______________________________________________________________________________  MMode/2D Measurements & Calculations  IVSd: 1.7 cm  LVIDd: 3.5 cm  LVIDs: 2.4 cm  LVPWd: 1.6 cm     FS: 30.9 %  LV mass(C)d: 235.0 grams  LV mass(C)dI: 101.2 grams/m2  Ao root diam: 3.2 cm  LA dimension: 2.9 cm  asc Aorta Diam: 3.0 cm  LA/Ao: 0.91  LVOT diam: 2.4 cm  LVOT area: 4.5 cm2  LA Volume (BP): 31.8 ml  LA Volume Index (BP): 13.7 ml/m2     LA Volume Indexed (AL/bp): 14.4 ml/m2  RWT: 0.94     Doppler Measurements & Calculations  MV E max levon: 68.7 cm/sec  MV A max levon: 78.1 cm/sec  MV E/A: 0.88  MV max P.9 mmHg  MV mean P.0 mmHg  MV V2 VTI: 21.6 cm  MVA(VTI): 3.6 cm2  MV dec slope: 259.0 cm/sec2  MV dec time: 0.27 sec  LV V1 max P.4 mmHg  LV V1 max: 78.2 cm/sec  LV V1 VTI: 17.2 cm  SV(LVOT): 77.8 ml  SI(LVOT): 33.5 ml/m2  PA acc time: 0.11 sec  E/E': 7.7  E/E' av.9     Lateral E/e': 7.7  Medial E/e': 10.0  Peak E' Levon: 8.9 cm/sec     ______________________________________________________________________________  Report approved by: Sofia Vasquez 2023 11:42 AM             Discharge Medications   Current Discharge Medication List        START taking these medications    Details   ciprofloxacin (CIPRO) 500 MG tablet Take 1 tablet (500 mg) by mouth every 12 hours for 2 days  Qty: 4 tablet    Associated Diagnoses: E.  coli urinary tract infection           CONTINUE these medications which have NOT CHANGED    Details   acetaminophen (TYLENOL) 500 MG tablet [ACETAMINOPHEN (TYLENOL) 500 MG TABLET] Take 500 mg by mouth every 6 (six) hours as needed for pain.      amoxicillin (AMOXIL) 500 MG capsule [AMOXICILLIN (AMOXIL) 500 MG CAPSULE] Take 2,000 mg by mouth once as needed (Prior to dental work).      ARIPiprazole (ABILIFY) 2 MG tablet [ARIPIPRAZOLE (ABILIFY) 2 MG TABLET] Take 2 mg by mouth at bedtime.       aspirin 81 mg chewable tablet Take 81 mg by mouth daily      atorvastatin (LIPITOR) 10 MG tablet [ATORVASTATIN (LIPITOR) 10 MG TABLET] Take 10 mg by mouth at bedtime.  Refills: 1      brimonidine (ALPHAGAN) 0.2 % ophthalmic solution [BRIMONIDINE (ALPHAGAN) 0.2 % OPHTHALMIC SOLUTION] Administer 1 drop to both eyes 2 (two) times a day.       dorzolamide-timolol (COSOPT) 22.3-6.8 mg/mL ophthalmic solution [DORZOLAMIDE-TIMOLOL (COSOPT) 22.3-6.8 MG/ML OPHTHALMIC SOLUTION] Administer 1 drop to both eyes 2 (two) times a day.   Refills: 3      fluvoxaMINE (LUVOX) 50 MG tablet [FLUVOXAMINE (LUVOX) 50 MG TABLET] Take 50 mg by mouth at bedtime.       hydrOXYzine (ATARAX) 10 MG tablet Take 10 mg by mouth 3 times daily as needed      latanoprostene bunod 0.024 % Drop Place 1 drop into both eyes At Bedtime      levomefolate calcium (L-METHYLFOLATE ORAL) [LEVOMEFOLATE CALCIUM (L-METHYLFOLATE ORAL)] Take 1.25 mg by mouth daily.       metFORMIN (GLUCOPHAGE) 1000 MG tablet Take 1,000 mg by mouth 2 times daily (with meals)      Multiple Vitamins-Minerals (CENTRUM SILVER) CHEW Take 1 tablet by mouth daily      netarsudiL (RHOPRESSA) 0.02 % Drop Place 1 drop into both eyes every evening      pioglitazone (ACTOS) 30 MG tablet Take 30 mg by mouth daily      warfarin ANTICOAGULANT (COUMADIN) 5 MG tablet TAKE 1/2 (ONE-HALF) TABLET BY MOUTH EVERY SATURDAY AND THEN 1 ONCE DAILY ALL  OTHER  DAYS  OR  AS  DIRECTED  BY  INR  CLINIC  Qty: 78 tablet, Refills: 1     Associated Diagnoses: Paroxysmal atrial fibrillation (H)           Allergies   No Known Allergies

## 2023-08-04 ENCOUNTER — TRANSFERRED RECORDS (OUTPATIENT)
Dept: HEALTH INFORMATION MANAGEMENT | Facility: CLINIC | Age: 73
End: 2023-08-04
Payer: MEDICARE

## 2023-08-04 ENCOUNTER — ANTICOAGULATION THERAPY VISIT (OUTPATIENT)
Dept: ANTICOAGULATION | Facility: CLINIC | Age: 73
End: 2023-08-04
Payer: MEDICARE

## 2023-08-04 LAB
ATRIAL RATE - MUSE: 95 BPM
DIASTOLIC BLOOD PRESSURE - MUSE: 81 MMHG
INR (EXTERNAL): 4.1 (ref 0.9–1.1)
INTERPRETATION ECG - MUSE: NORMAL
P AXIS - MUSE: 26 DEGREES
PR INTERVAL - MUSE: 158 MS
QRS DURATION - MUSE: 82 MS
QT - MUSE: 354 MS
QTC - MUSE: 444 MS
R AXIS - MUSE: 23 DEGREES
SYSTOLIC BLOOD PRESSURE - MUSE: 152 MMHG
T AXIS - MUSE: 36 DEGREES
VENTRICULAR RATE- MUSE: 95 BPM

## 2023-08-04 NOTE — PROGRESS NOTES
Incoming fax from  [facility]    Date of result 8/4/2023    INR result 4.1    Template assessment questions not completed  Hospital discharge 8/3    ANTICOAGULATION MANAGEMENT     Rakesh Samuels 72 year old male is on warfarin with therapeutic INR result. (Goal INR 2.0-3.0)    Recent labs: (last 7 days)     08/04/23  0000   INR 4.1*       ASSESSMENT     Source(s): Chart Review and Home Care/Facility Nurse     Warfarin doses taken: Warfarin taken as instructed  Diet: No new diet changes identified  Medication/supplement changes: Yes: Discharged on 2 days of Cipro, due to complete 8/5/23, interaction expected  New illness, injury, or hospitalization: Yes: Hosp admit/discharge  7/31-8/3 for eval of fall at home, injury to head, UTI /ecoli  Signs or symptoms of bleeding or clotting: No  Previous result: Therapeutic last 2(+) visits  Additional findings: None       PLAN     Recommended plan for temporary change(s) affecting INR     Dosing Instructions:  8/4/23 HOLD, 8/5/23, 2.5 mgs, then continue current maintenance dose with next INR in 3 days         Summary  As of 8/4/2023      Full warfarin instructions:  8/4: Hold; 8/5: 2.5 mg; Otherwise 7.5 mg every Mon, Thu; 5 mg all other days   Next INR check:  8/7/2023               Telephone call with [facility] nurse (medical care for seniors) who agrees to plan and repeated back plan correctly    Orders given to  Homecare nurse/facility to recheck    Education provided:   Please call back if any changes to your diet, medications or how you've been taking warfarin    Plan made per [facility] anticoagulation protocol    Alaina Linder, MARILUZ  Anticoagulation Clinic  8/4/2023    _______________________________________________________________________     Anticoagulation Episode Summary       Current INR goal:  2.0-3.0   TTR:  82.7 % (1 y)   Target end date:  Indefinite   Send INR reminders to:  [facility]    Indications    Paroxysmal atrial  fibrillation (H) [I48.0]             Comments:  [employer] home meter- Managed by [employer]             Anticoagulation Care Providers       Provider Role Specialty Phone number    Eric Pickett MD Referring Cardiovascular Disease [phone]

## 2023-08-04 NOTE — PLAN OF CARE
Goal Outcome Evaluation:    Pt belongings sent with pt including home CPAP. Blood glucose checked, snack given. Pt is assist of 1 to wheel chair. He will be transported to a TCU.

## 2023-08-04 NOTE — PROGRESS NOTES
[facility] GERIATRIC SERVICES    Code Status:  DNR/DNI   Visit Type:   Chief Complaint   Patient presents with    TCU Admission     [facility] 7/31/2023 - 8/3/2023     Facility:  [facility] ([facility]) [[id number]]         HPI: Rakesh Samuels is a 72 year old male who I am seeing today for admit to the TCU.  Patient recently hospitalized on 7/31/2023 secondary to weakness with debility and frequent falls.  Past medical history includes proximal atrial fib on chronic anticoagulation, hypertension, diabetes mellitus type 2, hyperlipidemia and anxiety with depression.  Patient presented with a fall hitting his head on the floor.  He had increased pain in his right knee due to osteoarthritis.  Increased bilateral lower extremity weakness and imbalance.  He lives in a senior [facility].  He uses a walker at baseline.  Head CT showed no acute intracranial abnormality.  TSH was normal.  Knee x-ray showed DJD.  He was found to have superior endplate compression fractures at T2, T4 and T5.  Patient was asymptomatic.  Recommended outpatient follow-up with orthopedic surgery for possible right knee replacement.  UTI with E. coli.  He was treated with ceftriaxone and switch to oral Cipro.  Elevated troponin.  Patient without chest pain.  No cardiac history.  Normal proBNP and D-dimer.  Chronically patient reports some dyspnea on exertion.  Echo on 6/1 preserved ejection fracture 60 to 65%.  Proximal atrial fib not on rate control medication.  He is on chronic anticoagulation with Coumadin.  Essential hypertension placed on.  Hydralazine during hospitalization.  Diabetes mellitus type 2.  During hospitalization his metformin and Actos were held and he was treated with sliding scale however these were resumed at time discharge.  Anxiety with depression on Abilify, fluvoxamine and hydroxyzine.    Transitional Care Course: Today patient lying in bed.  He continues to report some pain in the right knee.  He tolerates range of motion.  He  denies any pain in his back.  He is denying shortness of breath or chest pain.  Blood sugars look satisfactory control.  He is eating well and having regular bowel movements.      Assessment/Plan:     1. Type 2 diabetes mellitus with hyperosmolarity without coma, without long-term current use of insulin (H)  -Prior to admit metformin and Actos resumed.  -Consistent carb diet.  -Blood sugar checks twice daily.    2. Paroxysmal atrial fibrillation (H)  -Currently not rate controlled.  -Continue Coumadin for anticoagulation.  -INR today managed per the Coumadin clinic.    3. Acute cystitis without hematuria  -Patient treated with ceftriaxone and transition to oral Cipro.  -Patient is incontinent.  Currently asymptomatic.    4. Compression fracture of thoracic vertebra with routine healing, unspecified thoracic vertebral level, subsequent encounter  -MRI of the thoracic spine consistent with chronic fractures.  -Monitor.    5. Essential hypertension  -As needed hydroxyzine.    6.  Osteoarthritis of the right knee  -Follow-up outpatient with Ortho for possible work-up for right knee replacement.  -Tylenol pain.    Okay for PT OT eval and treat for follow-up CBC and BMP in the a.m.        Active Ambulatory Problems     Diagnosis Date Noted    SIRS (systemic inflammatory response syndrome) (H) 09/09/2019    Adrenal incidentaloma (H)     Diet-controlled diabetes mellitus (H)     Pericardial effusion     Lactic acidosis     Type 2 diabetes mellitus without complication, without long-term current use of insulin (H) 04/12/2020    Paroxysmal atrial fibrillation (H) 02/18/2020    Calculus of ureter 04/12/2020    Acute renal failure, unspecified acute renal failure type (H) 04/12/2020    Acute renal failure (ARF) (H) 04/12/2020    ATN (acute tubular necrosis) (H) 04/12/2020    Sepsis due to urinary tract infection (H)     Shock circulatory (H)     Metformin overdose of undetermined intent, initial encounter     Acute respiratory  failure with hypoxemia (H)     Metabolic acidosis     Hyperkalemia     Hematemesis, presence of nausea not specified 04/12/2020    Calculus of kidney     Syncope and collapse 06/30/2020    Hyperglycemia     After care 07/03/2012    Age-related cataract 03/27/2021    Anemia associated with acute blood loss 06/27/2012    Balanitis 03/27/2021    Cholelithiasis without obstruction 03/27/2021    Constipation 07/03/2012    Depression with anxiety 07/03/2012    Glaucoma 06/25/2012    Hemorrhoids without complication 03/27/2021    Hyperlipidemia 06/07/2018    Loss of appetite 03/27/2021    Major depressive disorder, recurrent episode, in partial remission (H) 06/08/2018    Mixed personality disorder in adult (H) 06/08/2018    Obstructive sleep apnea 03/27/2021    Osteoarthrosis involving lower leg 06/23/2012    Periodic limb movement disorder 03/27/2021    Recurrent major depression (H) 03/27/2021    S/P ureteral stent placement 03/27/2021    Unilateral small kidney 03/27/2021    Nephrolithiasis 03/27/2021    Type 2 diabetes mellitus (H) 06/25/2012    Osteoarthritis of knee 03/27/2021    Persistent atrial fibrillation (H) 03/27/2021    Pyelonephritis 03/27/2021    Urinary tract infection without hematuria, site unspecified 04/30/2022    Sepsis without acute organ dysfunction (H) 04/30/2022    UTI (urinary tract infection) 05/01/2022    Benign prostatic hyperplasia with urinary frequency 05/07/2022    Hypomagnesemia 05/07/2022    Injury of head, initial encounter 07/31/2023    Fall at home, initial encounter 07/31/2023    E. coli urinary tract infection 08/02/2023     Resolved Ambulatory Problems     Diagnosis Date Noted    No Resolved Ambulatory Problems     Past Medical History:   Diagnosis Date    A-fib (H)     Acute hemodialysis encounter (H)     Acute kidney injury (H)     Asbestosis (H)     Atrophy of right kidney     Basal cell carcinoma     BPH without urinary obstruction     Cellulitis     Cholelithiasis      Diabetes mellitus, type 2 (H) 4/12/2020    Esophagitis     Gastritis     Hematemesis, presence of nausea not specified     Hyperlipemia     Kidney stone     Metformin overdose of undetermined intent     PTSD (post-traumatic stress disorder)     Seasonal allergies     Sleep apnea     Upper GI bleed      No Known Allergies    All Meds and Allergies reviewed in the record at the facility and is the most up-to-date.    Post Discharge Medication Reconciliation Status: discharge medications reconciled and changed, per note/orders  Current Outpatient Medications   Medication Sig    acetaminophen (TYLENOL) 500 MG tablet [ACETAMINOPHEN (TYLENOL) 500 MG TABLET] Take 500 mg by mouth every 6 (six) hours as needed for pain.    amoxicillin (AMOXIL) 500 MG capsule [AMOXICILLIN (AMOXIL) 500 MG CAPSULE] Take 2,000 mg by mouth once as needed (Prior to dental work).    ARIPiprazole (ABILIFY) 2 MG tablet [ARIPIPRAZOLE (ABILIFY) 2 MG TABLET] Take 2 mg by mouth at bedtime.     aspirin 81 mg chewable tablet Take 81 mg by mouth daily    atorvastatin (LIPITOR) 10 MG tablet [ATORVASTATIN (LIPITOR) 10 MG TABLET] Take 10 mg by mouth at bedtime.    brimonidine (ALPHAGAN) 0.2 % ophthalmic solution [BRIMONIDINE (ALPHAGAN) 0.2 % OPHTHALMIC SOLUTION] Administer 1 drop to both eyes 2 (two) times a day.     dorzolamide-timolol (COSOPT) 2-0.5 % ophthalmic solution Place 1 drop into both eyes 2 times daily    fluvoxaMINE (LUVOX) 50 MG tablet [FLUVOXAMINE (LUVOX) 50 MG TABLET] Take 50 mg by mouth at bedtime.     hydrOXYzine (ATARAX) 10 MG tablet Take 10 mg by mouth 3 times daily as needed    latanoprostene bunod 0.024 % Drop Place 1 drop into both eyes At Bedtime    levomefolate calcium (L-METHYLFOLATE ORAL) [LEVOMEFOLATE CALCIUM (L-METHYLFOLATE ORAL)] Take 1.25 mg by mouth daily.     metFORMIN (GLUCOPHAGE) 1000 MG tablet Take 1,000 mg by mouth 2 times daily (with meals)    Multiple Vitamins-Minerals (CENTRUM SILVER) CHEW Take 1 tablet by mouth daily     netarsudiL (RHOPRESSA) 0.02 % Drop Place 1 drop into both eyes every evening    pioglitazone (ACTOS) 30 MG tablet Take 30 mg by mouth daily    WARFARIN SODIUM PO Take by mouth See Admin Instructions Dosing in coordination with INR results    dorzolamide-timolol (COSOPT) 22.3-6.8 mg/mL ophthalmic solution [DORZOLAMIDE-TIMOLOL (COSOPT) 22.3-6.8 MG/ML OPHTHALMIC SOLUTION] Administer 1 drop to both eyes 2 (two) times a day.  (Patient not taking: Reported on 8/7/2023)    warfarin ANTICOAGULANT (COUMADIN) 5 MG tablet TAKE 1/2 (ONE-HALF) TABLET BY MOUTH EVERY SATURDAY AND THEN 1 ONCE DAILY ALL  OTHER  DAYS  OR  AS  DIRECTED  BY  INR  CLINIC (Patient not taking: Reported on 8/7/2023)     No current facility-administered medications for this visit.       REVIEW OF SYSTEMS:   10 point review of systems reviewed and pertinent positives in the HPI.     PHYSICAL EXAMINATION:  Physical Exam     Vital signs: /74   Pulse 86   Temp 98  F (36.7  C)   Resp 18   Ht 1.829 m (6')   Wt 101.6 kg (224 lb)   SpO2 97%   BMI 30.38 kg/m    General: Awake, Alert, oriented x3, lying in bed, appropriately, follows simple commands, conversant  HEENT:Pink conjunctiva, moist oral mucosa, visual deficit of the right eye  NECK: Supple  CVS:  S1  S2, without murmur or gallop.   LUNG: Clear to auscultation, No wheezes, rales or rhonci.  BACK: No kyphosis of the thoracic spine  ABDOMEN: Soft, nontender to palpation, with positive bowel sounds  EXTREMITIES: Moves both upper and lower extremities, bony prominence of the right knee palpable, no pedal edema, no calf tenderness  SKIN: Warm and dry  NEUROLOGIC: Intact, pulses palpable  PSYCHIATRIC: Pleasant affect.      Labs:  All labs reviewed in the nursing home record and [email or web address]   @  Lab Results   Component Value Date    WBC 8.1 08/03/2023     Lab Results   Component Value Date    RBC 4.15 08/03/2023     Lab Results   Component Value Date    HGB 13.0 08/03/2023     Lab Results   Component Value  Date    HCT 40.7 08/03/2023     Lab Results   Component Value Date    MCV 98 08/03/2023     Lab Results   Component Value Date    MCH 31.3 08/03/2023     Lab Results   Component Value Date    MCHC 31.9 08/03/2023     Lab Results   Component Value Date    RDW 14.0 08/03/2023     Lab Results   Component Value Date     08/03/2023        @Last Comprehensive Metabolic Panel:  Sodium   Date Value Ref Range Status   07/31/2023 141 136 - 145 mmol/L Final     Potassium   Date Value Ref Range Status   07/31/2023 4.5 3.4 - 5.3 mmol/L Final   05/05/2022 4.4 3.5 - 5.0 mmol/L Final     Chloride   Date Value Ref Range Status   07/31/2023 104 98 - 107 mmol/L Final   05/02/2022 109 (H) 98 - 107 mmol/L Final     Carbon Dioxide (CO2)   Date Value Ref Range Status   07/31/2023 26 22 - 29 mmol/L Final   05/02/2022 25 22 - 31 mmol/L Final     Anion Gap   Date Value Ref Range Status   07/31/2023 11 7 - 15 mmol/L Final   05/02/2022 8 5 - 18 mmol/L Final     Glucose   Date Value Ref Range Status   05/02/2022 133 (H) 70 - 125 mg/dL Final     GLUCOSE BY METER POCT   Date Value Ref Range Status   08/03/2023 151 (H) 70 - 99 mg/dL Final     Urea Nitrogen   Date Value Ref Range Status   07/31/2023 11.9 8.0 - 23.0 mg/dL Final   05/02/2022 11 8 - 28 mg/dL Final     Creatinine   Date Value Ref Range Status   07/31/2023 0.83 0.67 - 1.17 mg/dL Final     GFR Estimate   Date Value Ref Range Status   07/31/2023 >90 >60 mL/min/1.73m2 Final   06/10/2021 >60 >60 mL/min/1.73m2 Final     Calcium   Date Value Ref Range Status   07/31/2023 9.1 8.8 - 10.2 mg/dL Final     45 minutes was spent preparing for this visit, reviewing hospital records including medications, imaging, laboratory as well as face-to-face time spent with patient and collaboration with nursing staff.    This note has been dictated using voice recognition software. Any grammatical or context distortions are unintentional and inherent to the software    Electronically signed by: Kylie  Jason, CNP

## 2023-08-05 LAB
BACTERIA BLD CULT: NO GROWTH
BACTERIA BLD CULT: NO GROWTH

## 2023-08-07 ENCOUNTER — LAB REQUISITION (OUTPATIENT)
Dept: LAB | Facility: CLINIC | Age: 73
End: 2023-08-07
Payer: MEDICARE

## 2023-08-07 ENCOUNTER — TRANSITIONAL CARE UNIT VISIT (OUTPATIENT)
Dept: GERIATRICS | Facility: CLINIC | Age: 73
End: 2023-08-07
Payer: MEDICARE

## 2023-08-07 ENCOUNTER — ANTICOAGULATION THERAPY VISIT (OUTPATIENT)
Dept: ANTICOAGULATION | Facility: CLINIC | Age: 73
End: 2023-08-07

## 2023-08-07 ENCOUNTER — TRANSFERRED RECORDS (OUTPATIENT)
Dept: HEALTH INFORMATION MANAGEMENT | Facility: CLINIC | Age: 73
End: 2023-08-07

## 2023-08-07 VITALS
OXYGEN SATURATION: 97 % | RESPIRATION RATE: 18 BRPM | BODY MASS INDEX: 30.34 KG/M2 | HEIGHT: 72 IN | HEART RATE: 86 BPM | TEMPERATURE: 98 F | SYSTOLIC BLOOD PRESSURE: 113 MMHG | DIASTOLIC BLOOD PRESSURE: 74 MMHG | WEIGHT: 224 LBS

## 2023-08-07 DIAGNOSIS — I48.0 PAROXYSMAL ATRIAL FIBRILLATION (H): Primary | ICD-10-CM

## 2023-08-07 DIAGNOSIS — N39.0 URINARY TRACT INFECTION, SITE NOT SPECIFIED: ICD-10-CM

## 2023-08-07 DIAGNOSIS — N30.00 ACUTE CYSTITIS WITHOUT HEMATURIA: ICD-10-CM

## 2023-08-07 DIAGNOSIS — I48.0 PAROXYSMAL ATRIAL FIBRILLATION (H): ICD-10-CM

## 2023-08-07 DIAGNOSIS — I10 ESSENTIAL HYPERTENSION: ICD-10-CM

## 2023-08-07 DIAGNOSIS — S22.000D COMPRESSION FRACTURE OF THORACIC VERTEBRA WITH ROUTINE HEALING, UNSPECIFIED THORACIC VERTEBRAL LEVEL, SUBSEQUENT ENCOUNTER: ICD-10-CM

## 2023-08-07 DIAGNOSIS — E11.00 TYPE 2 DIABETES MELLITUS WITH HYPEROSMOLARITY WITHOUT COMA, WITHOUT LONG-TERM CURRENT USE OF INSULIN (H): Primary | ICD-10-CM

## 2023-08-07 LAB — INR (EXTERNAL): 2.2 (ref 0.9–1.1)

## 2023-08-07 PROCEDURE — 99310 SBSQ NF CARE HIGH MDM 45: CPT | Performed by: NURSE PRACTITIONER

## 2023-08-07 RX ORDER — DORZOLAMIDE HYDROCHLORIDE AND TIMOLOL MALEATE 20; 5 MG/ML; MG/ML
1 SOLUTION/ DROPS OPHTHALMIC 2 TIMES DAILY
COMMUNITY
Start: 2023-08-03

## 2023-08-07 NOTE — LETTER
8/7/2023        RE: Rakesh Samuels  [address]  [facility] [address]         HEALTH GERIATRIC SERVICES    Code Status:  DNR/DNI   Visit Type:   Chief Complaint   Patient presents with     TCU Admission     [facility] 7/31/2023 - 8/3/2023     Facility:  [facility] ([facility]) [[id number]]         HPI: Rakesh Samuels is a 72 year old male who I am seeing today for admit to the TCU.  Patient recently hospitalized on 7/31/2023 secondary to weakness with debility and frequent falls.  Past medical history includes proximal atrial fib on chronic anticoagulation, hypertension, diabetes mellitus type 2, hyperlipidemia and anxiety with depression.  Patient presented with a fall hitting his head on the floor.  He had increased pain in his right knee due to osteoarthritis.  Increased bilateral lower extremity weakness and imbalance.  He lives in a senior [facility].  He uses a walker at baseline.  Head CT showed no acute intracranial abnormality.  TSH was normal.  Knee x-ray showed DJD.  He was found to have superior endplate compression fractures at T2, T4 and T5.  Patient was asymptomatic.  Recommended outpatient follow-up with orthopedic surgery for possible right knee replacement.  UTI with E. coli.  He was treated with ceftriaxone and switch to oral Cipro.  Elevated troponin.  Patient without chest pain.  No cardiac history.  Normal proBNP and D-dimer.  Chronically patient reports some dyspnea on exertion.  Echo on 6/1 preserved ejection fracture 60 to 65%.  Proximal atrial fib not on rate control medication.  He is on chronic anticoagulation with Coumadin.  Essential hypertension placed on.  Hydralazine during hospitalization.  Diabetes mellitus type 2.  During hospitalization his metformin and Actos were held and he was treated with sliding scale however these were resumed at time discharge.  Anxiety with depression on Abilify, fluvoxamine and hydroxyzine.    Transitional Care Course: Today patient lying in  bed.  He continues to report some pain in the right knee.  He tolerates range of motion.  He denies any pain in his back.  He is denying shortness of breath or chest pain.  Blood sugars look satisfactory control.  He is eating well and having regular bowel movements.      Assessment/Plan:     1. Type 2 diabetes mellitus with hyperosmolarity without coma, without long-term current use of insulin (H)  -Prior to admit metformin and Actos resumed.  -Consistent carb diet.  -Blood sugar checks twice daily.    2. Paroxysmal atrial fibrillation (H)  -Currently not rate controlled.  -Continue Coumadin for anticoagulation.  -INR today managed per the Coumadin clinic.    3. Acute cystitis without hematuria  -Patient treated with ceftriaxone and transition to oral Cipro.  -Patient is incontinent.  Currently asymptomatic.    4. Compression fracture of thoracic vertebra with routine healing, unspecified thoracic vertebral level, subsequent encounter  -MRI of the thoracic spine consistent with chronic fractures.  -Monitor.    5. Essential hypertension  -As needed hydroxyzine.    6.  Osteoarthritis of the right knee  -Follow-up outpatient with Ortho for possible work-up for right knee replacement.  -Tylenol pain.    Okay for PT OT eval and treat for follow-up CBC and BMP in the a.m.        Active Ambulatory Problems     Diagnosis Date Noted     SIRS (systemic inflammatory response syndrome) (H) 09/09/2019     Adrenal incidentaloma (H)      Diet-controlled diabetes mellitus (H)      Pericardial effusion      Lactic acidosis      Type 2 diabetes mellitus without complication, without long-term current use of insulin (H) 04/12/2020     Paroxysmal atrial fibrillation (H) 02/18/2020     Calculus of ureter 04/12/2020     Acute renal failure, unspecified acute renal failure type (H) 04/12/2020     Acute renal failure (ARF) (H) 04/12/2020     ATN (acute tubular necrosis) (H) 04/12/2020     Sepsis due to urinary tract infection (H)       Shock circulatory (H)      Metformin overdose of undetermined intent, initial encounter      Acute respiratory failure with hypoxemia (H)      Metabolic acidosis      Hyperkalemia      Hematemesis, presence of nausea not specified 04/12/2020     Calculus of kidney      Syncope and collapse 06/30/2020     Hyperglycemia      After care 07/03/2012     Age-related cataract 03/27/2021     Anemia associated with acute blood loss 06/27/2012     Balanitis 03/27/2021     Cholelithiasis without obstruction 03/27/2021     Constipation 07/03/2012     Depression with anxiety 07/03/2012     Glaucoma 06/25/2012     Hemorrhoids without complication 03/27/2021     Hyperlipidemia 06/07/2018     Loss of appetite 03/27/2021     Major depressive disorder, recurrent episode, in partial remission (H) 06/08/2018     Mixed personality disorder in adult (H) 06/08/2018     Obstructive sleep apnea 03/27/2021     Osteoarthrosis involving lower leg 06/23/2012     Periodic limb movement disorder 03/27/2021     Recurrent major depression (H) 03/27/2021     S/P ureteral stent placement 03/27/2021     Unilateral small kidney 03/27/2021     Nephrolithiasis 03/27/2021     Type 2 diabetes mellitus (H) 06/25/2012     Osteoarthritis of knee 03/27/2021     Persistent atrial fibrillation (H) 03/27/2021     Pyelonephritis 03/27/2021     Urinary tract infection without hematuria, site unspecified 04/30/2022     Sepsis without acute organ dysfunction (H) 04/30/2022     UTI (urinary tract infection) 05/01/2022     Benign prostatic hyperplasia with urinary frequency 05/07/2022     Hypomagnesemia 05/07/2022     Injury of head, initial encounter 07/31/2023     Fall at home, initial encounter 07/31/2023     E. coli urinary tract infection 08/02/2023     Resolved Ambulatory Problems     Diagnosis Date Noted     No Resolved Ambulatory Problems     Past Medical History:   Diagnosis Date     A-fib (H)      Acute hemodialysis encounter (H)      Acute kidney injury (H)       Asbestosis (H)      Atrophy of right kidney      Basal cell carcinoma      BPH without urinary obstruction      Cellulitis      Cholelithiasis      Diabetes mellitus, type 2 (H) 4/12/2020     Esophagitis      Gastritis      Hematemesis, presence of nausea not specified      Hyperlipemia      Kidney stone      Metformin overdose of undetermined intent      PTSD (post-traumatic stress disorder)      Seasonal allergies      Sleep apnea      Upper GI bleed      No Known Allergies    All Meds and Allergies reviewed in the record at the facility and is the most up-to-date.    Post Discharge Medication Reconciliation Status: discharge medications reconciled and changed, per note/orders  Current Outpatient Medications   Medication Sig     acetaminophen (TYLENOL) 500 MG tablet [ACETAMINOPHEN (TYLENOL) 500 MG TABLET] Take 500 mg by mouth every 6 (six) hours as needed for pain.     amoxicillin (AMOXIL) 500 MG capsule [AMOXICILLIN (AMOXIL) 500 MG CAPSULE] Take 2,000 mg by mouth once as needed (Prior to dental work).     ARIPiprazole (ABILIFY) 2 MG tablet [ARIPIPRAZOLE (ABILIFY) 2 MG TABLET] Take 2 mg by mouth at bedtime.      aspirin 81 mg chewable tablet Take 81 mg by mouth daily     atorvastatin (LIPITOR) 10 MG tablet [ATORVASTATIN (LIPITOR) 10 MG TABLET] Take 10 mg by mouth at bedtime.     brimonidine (ALPHAGAN) 0.2 % ophthalmic solution [BRIMONIDINE (ALPHAGAN) 0.2 % OPHTHALMIC SOLUTION] Administer 1 drop to both eyes 2 (two) times a day.      dorzolamide-timolol (COSOPT) 2-0.5 % ophthalmic solution Place 1 drop into both eyes 2 times daily     fluvoxaMINE (LUVOX) 50 MG tablet [FLUVOXAMINE (LUVOX) 50 MG TABLET] Take 50 mg by mouth at bedtime.      hydrOXYzine (ATARAX) 10 MG tablet Take 10 mg by mouth 3 times daily as needed     latanoprostene bunod 0.024 % Drop Place 1 drop into both eyes At Bedtime     levomefolate calcium (L-METHYLFOLATE ORAL) [LEVOMEFOLATE CALCIUM (L-METHYLFOLATE ORAL)] Take 1.25 mg by mouth daily.       metFORMIN (GLUCOPHAGE) 1000 MG tablet Take 1,000 mg by mouth 2 times daily (with meals)     Multiple Vitamins-Minerals (CENTRUM SILVER) CHEW Take 1 tablet by mouth daily     netarsudiL (RHOPRESSA) 0.02 % Drop Place 1 drop into both eyes every evening     pioglitazone (ACTOS) 30 MG tablet Take 30 mg by mouth daily     WARFARIN SODIUM PO Take by mouth See Admin Instructions Dosing in coordination with INR results     dorzolamide-timolol (COSOPT) 22.3-6.8 mg/mL ophthalmic solution [DORZOLAMIDE-TIMOLOL (COSOPT) 22.3-6.8 MG/ML OPHTHALMIC SOLUTION] Administer 1 drop to both eyes 2 (two) times a day.  (Patient not taking: Reported on 8/7/2023)     warfarin ANTICOAGULANT (COUMADIN) 5 MG tablet TAKE 1/2 (ONE-HALF) TABLET BY MOUTH EVERY SATURDAY AND THEN 1 ONCE DAILY ALL  OTHER  DAYS  OR  AS  DIRECTED  BY  INR  CLINIC (Patient not taking: Reported on 8/7/2023)     No current facility-administered medications for this visit.       REVIEW OF SYSTEMS:   10 point review of systems reviewed and pertinent positives in the HPI.     PHYSICAL EXAMINATION:  Physical Exam     Vital signs: /74   Pulse 86   Temp 98  F (36.7  C)   Resp 18   Ht 1.829 m (6')   Wt 101.6 kg (224 lb)   SpO2 97%   BMI 30.38 kg/m    General: Awake, Alert, oriented x3, lying in bed, appropriately, follows simple commands, conversant  HEENT:Pink conjunctiva, moist oral mucosa, visual deficit of the right eye  NECK: Supple  CVS:  S1  S2, without murmur or gallop.   LUNG: Clear to auscultation, No wheezes, rales or rhonci.  BACK: No kyphosis of the thoracic spine  ABDOMEN: Soft, nontender to palpation, with positive bowel sounds  EXTREMITIES: Moves both upper and lower extremities, bony prominence of the right knee palpable, no pedal edema, no calf tenderness  SKIN: Warm and dry  NEUROLOGIC: Intact, pulses palpable  PSYCHIATRIC: Pleasant affect.      Labs:  All labs reviewed in the nursing home record and [employer]   @  Lab Results   Component Value Date     WBC 8.1 08/03/2023     Lab Results   Component Value Date    RBC 4.15 08/03/2023     Lab Results   Component Value Date    HGB 13.0 08/03/2023     Lab Results   Component Value Date    HCT 40.7 08/03/2023     Lab Results   Component Value Date    MCV 98 08/03/2023     Lab Results   Component Value Date    MCH 31.3 08/03/2023     Lab Results   Component Value Date    MCHC 31.9 08/03/2023     Lab Results   Component Value Date    RDW 14.0 08/03/2023     Lab Results   Component Value Date     08/03/2023        @Last Comprehensive Metabolic Panel:  Sodium   Date Value Ref Range Status   07/31/2023 141 136 - 145 mmol/L Final     Potassium   Date Value Ref Range Status   07/31/2023 4.5 3.4 - 5.3 mmol/L Final   05/05/2022 4.4 3.5 - 5.0 mmol/L Final     Chloride   Date Value Ref Range Status   07/31/2023 104 98 - 107 mmol/L Final   05/02/2022 109 (H) 98 - 107 mmol/L Final     Carbon Dioxide (CO2)   Date Value Ref Range Status   07/31/2023 26 22 - 29 mmol/L Final   05/02/2022 25 22 - 31 mmol/L Final     Anion Gap   Date Value Ref Range Status   07/31/2023 11 7 - 15 mmol/L Final   05/02/2022 8 5 - 18 mmol/L Final     Glucose   Date Value Ref Range Status   05/02/2022 133 (H) 70 - 125 mg/dL Final     GLUCOSE BY METER POCT   Date Value Ref Range Status   08/03/2023 151 (H) 70 - 99 mg/dL Final     Urea Nitrogen   Date Value Ref Range Status   07/31/2023 11.9 8.0 - 23.0 mg/dL Final   05/02/2022 11 8 - 28 mg/dL Final     Creatinine   Date Value Ref Range Status   07/31/2023 0.83 0.67 - 1.17 mg/dL Final     GFR Estimate   Date Value Ref Range Status   07/31/2023 >90 >60 mL/min/1.73m2 Final   06/10/2021 >60 >60 mL/min/1.73m2 Final     Calcium   Date Value Ref Range Status   07/31/2023 9.1 8.8 - 10.2 mg/dL Final     45 minutes was spent preparing for this visit, reviewing hospital records including medications, imaging, laboratory as well as face-to-face time spent with patient and collaboration with nursing staff.    This  note has been dictated using voice recognition software. Any grammatical or context distortions are unintentional and inherent to the software    Electronically signed by: Kylie Gomez CNP       Sincerely,        Kylie Gomez NP

## 2023-08-07 NOTE — PROGRESS NOTES
ANTICOAGULATION MANAGEMENT     Rakesh Samuels 72 year old male is on warfarin with therapeutic INR result. (Goal INR 2.0-3.0)    Recent labs: (last 7 days)     08/07/23  0914   INR 2.2*       ASSESSMENT     Source(s): Chart Review and Home Care/Facility Nurse     Warfarin doses taken: Warfarin taken as instructed  Diet: No new diet changes identified  Medication/supplement changes:  Finished Cipro 08/05/2023. This medication can increase INR. Shouldn't be having much effect at this point,  New illness, injury, or hospitalization: Yes: Recent hospitalization for frequent falls, E cloi UTI.  Signs or symptoms of bleeding or clotting: No  Previous result: Supratherapeutic  Additional findings: None       PLAN     Recommended plan for temporary change(s) affecting INR     Dosing Instructions: Continue your current warfarin dose with next INR in 3 days       Summary  As of 8/7/2023      Full warfarin instructions:  7.5 mg every Mon, Thu; 5 mg all other days   Next INR check:  8/10/2023               Telephone call with [facility] nurse (medical care for seniors) who verbalizes understanding and agrees to plan    Orders given to  Homecare nurse/facility to recheck    Education provided:   Goal range and lab monitoring: goal range and significance of current result    Plan made per [facility] anticoagulation protocol    Vincent Sky RN  Anticoagulation Clinic  8/7/2023    _______________________________________________________________________     Anticoagulation Episode Summary       Current INR goal:  2.0-3.0   TTR:  82.2 % (1 y)   Target end date:  Indefinite   Send INR reminders to:  ANTICOAG HOME MONITORING    Indications    Paroxysmal atrial fibrillation (H) [I48.0]             Comments:  [employer] home meter- Managed by Exception             Anticoagulation Care Providers       Provider Role Specialty Phone number    Eric Pickett MD Referring Cardiovascular Disease [phone]

## 2023-08-08 LAB
ANION GAP SERPL CALCULATED.3IONS-SCNC: 11 MMOL/L (ref 7–15)
BUN SERPL-MCNC: 17.4 MG/DL (ref 8–23)
CALCIUM SERPL-MCNC: 9.5 MG/DL (ref 8.8–10.2)
CHLORIDE SERPL-SCNC: 106 MMOL/L (ref 98–107)
CREAT SERPL-MCNC: 0.88 MG/DL (ref 0.67–1.17)
DEPRECATED HCO3 PLAS-SCNC: 25 MMOL/L (ref 22–29)
ERYTHROCYTE [DISTWIDTH] IN BLOOD BY AUTOMATED COUNT: 13.8 % (ref 10–15)
GFR SERPL CREATININE-BSD FRML MDRD: >90 ML/MIN/1.73M2
GLUCOSE SERPL-MCNC: 138 MG/DL (ref 70–99)
HCT VFR BLD AUTO: 41.2 % (ref 40–53)
HGB BLD-MCNC: 12.9 G/DL (ref 13.3–17.7)
MCH RBC QN AUTO: 31.1 PG (ref 26.5–33)
MCHC RBC AUTO-ENTMCNC: 31.3 G/DL (ref 31.5–36.5)
MCV RBC AUTO: 99 FL (ref 78–100)
PLATELET # BLD AUTO: 295 10E3/UL (ref 150–450)
POTASSIUM SERPL-SCNC: 4.6 MMOL/L (ref 3.4–5.3)
RBC # BLD AUTO: 4.15 10E6/UL (ref 4.4–5.9)
SODIUM SERPL-SCNC: 142 MMOL/L (ref 136–145)
WBC # BLD AUTO: 7.8 10E3/UL (ref 4–11)

## 2023-08-08 PROCEDURE — 80048 BASIC METABOLIC PNL TOTAL CA: CPT | Performed by: FAMILY MEDICINE

## 2023-08-08 PROCEDURE — 85027 COMPLETE CBC AUTOMATED: CPT | Performed by: FAMILY MEDICINE

## 2023-08-08 PROCEDURE — P9604 ONE-WAY ALLOW PRORATED TRIP: HCPCS | Performed by: FAMILY MEDICINE

## 2023-08-08 PROCEDURE — 36415 COLL VENOUS BLD VENIPUNCTURE: CPT | Performed by: FAMILY MEDICINE

## 2023-08-09 ENCOUNTER — TRANSITIONAL CARE UNIT VISIT (OUTPATIENT)
Dept: GERIATRICS | Facility: CLINIC | Age: 73
End: 2023-08-09
Payer: MEDICARE

## 2023-08-09 VITALS
HEIGHT: 72 IN | RESPIRATION RATE: 18 BRPM | BODY MASS INDEX: 30.4 KG/M2 | DIASTOLIC BLOOD PRESSURE: 76 MMHG | OXYGEN SATURATION: 94 % | HEART RATE: 77 BPM | WEIGHT: 224.4 LBS | SYSTOLIC BLOOD PRESSURE: 114 MMHG | TEMPERATURE: 98.1 F

## 2023-08-09 DIAGNOSIS — N30.00 ACUTE CYSTITIS WITHOUT HEMATURIA: ICD-10-CM

## 2023-08-09 DIAGNOSIS — I48.0 PAROXYSMAL ATRIAL FIBRILLATION (H): ICD-10-CM

## 2023-08-09 DIAGNOSIS — I10 ESSENTIAL HYPERTENSION: ICD-10-CM

## 2023-08-09 DIAGNOSIS — S22.000D COMPRESSION FRACTURE OF THORACIC VERTEBRA WITH ROUTINE HEALING, UNSPECIFIED THORACIC VERTEBRAL LEVEL, SUBSEQUENT ENCOUNTER: ICD-10-CM

## 2023-08-09 DIAGNOSIS — E11.00 TYPE 2 DIABETES MELLITUS WITH HYPEROSMOLARITY WITHOUT COMA, WITHOUT LONG-TERM CURRENT USE OF INSULIN (H): Primary | ICD-10-CM

## 2023-08-09 PROCEDURE — 99309 SBSQ NF CARE MODERATE MDM 30: CPT | Performed by: NURSE PRACTITIONER

## 2023-08-09 NOTE — PROGRESS NOTES
[facility] GERIATRIC SERVICES    Code Status:  DNR/DNI   Visit Type:   Chief Complaint   Patient presents with    TCU Follow Up     Facility:  [facility] ([facility]) [[id number]]         HPI: Rakesh Samuels is a 72 year old male who I am seeing today for follow up on the TCU.  Patient recently hospitalized on 7/31/2023 secondary to weakness with debility and frequent falls.  Past medical history includes proximal atrial fib on chronic anticoagulation, hypertension, diabetes mellitus type 2, hyperlipidemia and anxiety with depression.  Patient presented with a fall hitting his head on the floor.  He had increased pain in his right knee due to osteoarthritis.  Increased bilateral lower extremity weakness and imbalance.  He lives in a senior [facility].  He uses a walker at baseline.  Head CT showed no acute intracranial abnormality.  TSH was normal.  Knee x-ray showed DJD.  He was found to have superior endplate compression fractures at T2, T4 and T5.  Patient was asymptomatic.  Recommended outpatient follow-up with orthopedic surgery for possible right knee replacement.  UTI with E. coli.  He was treated with ceftriaxone and switch to oral Cipro.  Elevated troponin.  Patient without chest pain.  No cardiac history.  Normal proBNP and D-dimer.  Chronically patient reports some dyspnea on exertion.  Echo on 6/1 preserved ejection fracture 60 to 65%.  Proximal atrial fib not on rate control medication.  He is on chronic anticoagulation with Coumadin.  Essential hypertension placed on.  Hydralazine during hospitalization.  Diabetes mellitus type 2.  During hospitalization his metformin and Actos were held and he was treated with sliding scale however these were resumed at time discharge.  Anxiety with depression on Abilify, fluvoxamine and hydroxyzine.    Transitional Care Course: Today patient sitting up on edge of the bed.  He continues to report some pain in the right knee which is chronic.  Chronic fractures of the spine.  He denies any pain in his back. Pt shortness of breath or chest pain.  Blood sugars satisfactory control.       Assessment/Plan:     1. Type 2 diabetes mellitus with hyperosmolarity without coma, without long-term current use of insulin (H)  -Prior to admit metformin and Actos resumed.  -Consistent carb diet.  -Blood sugar checks twice daily.    2. Paroxysmal atrial fibrillation (H)  -Currently not rate controlled.  -Continue Coumadin for anticoagulation.  -INR today managed per the Coumadin clinic.    3. Acute cystitis without hematuria  -Patient treated with ceftriaxone and transition to oral Cipro.  -Patient is incontinent.  Currently asymptomatic.    4. Compression fracture of thoracic vertebra with routine healing, unspecified thoracic vertebral level, subsequent encounter  -MRI of the thoracic spine consistent with chronic fractures.  -Monitor.    5. Essential hypertension  -As needed hydroxyzine.    6.  Osteoarthritis of the right knee  -Follow-up outpatient with Ortho for possible work-up for right knee replacement.  -Tylenol pain.    Active Ambulatory Problems     Diagnosis Date Noted    SIRS (systemic inflammatory response syndrome) (H) 09/09/2019    Adrenal incidentaloma (H)     Diet-controlled diabetes mellitus (H)     Pericardial effusion     Lactic acidosis     Type 2 diabetes mellitus without complication, without long-term current use of insulin (H) 04/12/2020    Paroxysmal atrial fibrillation (H) 02/18/2020    Calculus of ureter 04/12/2020    Acute renal failure, unspecified acute renal failure type (H) 04/12/2020    Acute renal failure (ARF) (H) 04/12/2020    ATN (acute tubular necrosis) (H) 04/12/2020    Sepsis due to urinary tract infection (H)     Shock circulatory (H)     Metformin overdose of undetermined intent, initial encounter     Acute respiratory failure with hypoxemia (H)     Metabolic acidosis     Hyperkalemia     Hematemesis, presence of nausea not specified 04/12/2020    Calculus of  kidney     Syncope and collapse 06/30/2020    Hyperglycemia     After care 07/03/2012    Age-related cataract 03/27/2021    Anemia associated with acute blood loss 06/27/2012    Balanitis 03/27/2021    Cholelithiasis without obstruction 03/27/2021    Constipation 07/03/2012    Depression with anxiety 07/03/2012    Glaucoma 06/25/2012    Hemorrhoids without complication 03/27/2021    Hyperlipidemia 06/07/2018    Loss of appetite 03/27/2021    Major depressive disorder, recurrent episode, in partial remission (H) 06/08/2018    Mixed personality disorder in adult (H) 06/08/2018    Obstructive sleep apnea 03/27/2021    Osteoarthrosis involving lower leg 06/23/2012    Periodic limb movement disorder 03/27/2021    Recurrent major depression (H) 03/27/2021    S/P ureteral stent placement 03/27/2021    Unilateral small kidney 03/27/2021    Nephrolithiasis 03/27/2021    Type 2 diabetes mellitus (H) 06/25/2012    Osteoarthritis of knee 03/27/2021    Persistent atrial fibrillation (H) 03/27/2021    Pyelonephritis 03/27/2021    Urinary tract infection without hematuria, site unspecified 04/30/2022    Sepsis without acute organ dysfunction (H) 04/30/2022    UTI (urinary tract infection) 05/01/2022    Benign prostatic hyperplasia with urinary frequency 05/07/2022    Hypomagnesemia 05/07/2022    Injury of head, initial encounter 07/31/2023    Fall at home, initial encounter 07/31/2023    E. coli urinary tract infection 08/02/2023     Resolved Ambulatory Problems     Diagnosis Date Noted    No Resolved Ambulatory Problems     Past Medical History:   Diagnosis Date    A-fib (H)     Acute hemodialysis encounter (H)     Acute kidney injury (H)     Asbestosis (H)     Atrophy of right kidney     Basal cell carcinoma     BPH without urinary obstruction     Cellulitis     Cholelithiasis     Diabetes mellitus, type 2 (H) 4/12/2020    Esophagitis     Gastritis     Hematemesis, presence of nausea not specified     Hyperlipemia     Kidney  stone     Metformin overdose of undetermined intent     PTSD (post-traumatic stress disorder)     Seasonal allergies     Sleep apnea     Upper GI bleed      No Known Allergies    All Meds and Allergies reviewed in the record at the facility and is the most up-to-date.    Current Outpatient Medications   Medication Sig    acetaminophen (TYLENOL) 500 MG tablet [ACETAMINOPHEN (TYLENOL) 500 MG TABLET] Take 500 mg by mouth every 6 (six) hours as needed for pain.    amoxicillin (AMOXIL) 500 MG capsule [AMOXICILLIN (AMOXIL) 500 MG CAPSULE] Take 2,000 mg by mouth once as needed (Prior to dental work).    ARIPiprazole (ABILIFY) 2 MG tablet [ARIPIPRAZOLE (ABILIFY) 2 MG TABLET] Take 2 mg by mouth at bedtime.     aspirin 81 mg chewable tablet Take 81 mg by mouth daily    atorvastatin (LIPITOR) 10 MG tablet [ATORVASTATIN (LIPITOR) 10 MG TABLET] Take 10 mg by mouth at bedtime.    brimonidine (ALPHAGAN) 0.2 % ophthalmic solution [BRIMONIDINE (ALPHAGAN) 0.2 % OPHTHALMIC SOLUTION] Administer 1 drop to both eyes 2 (two) times a day.     dorzolamide-timolol (COSOPT) 2-0.5 % ophthalmic solution Place 1 drop into both eyes 2 times daily    dorzolamide-timolol (COSOPT) 22.3-6.8 mg/mL ophthalmic solution [DORZOLAMIDE-TIMOLOL (COSOPT) 22.3-6.8 MG/ML OPHTHALMIC SOLUTION] Administer 1 drop to both eyes 2 (two) times a day.  (Patient not taking: Reported on 8/7/2023)    fluvoxaMINE (LUVOX) 50 MG tablet [FLUVOXAMINE (LUVOX) 50 MG TABLET] Take 50 mg by mouth at bedtime.     hydrOXYzine (ATARAX) 10 MG tablet Take 10 mg by mouth 3 times daily as needed    latanoprostene bunod 0.024 % Drop Place 1 drop into both eyes At Bedtime    levomefolate calcium (L-METHYLFOLATE ORAL) [LEVOMEFOLATE CALCIUM (L-METHYLFOLATE ORAL)] Take 1.25 mg by mouth daily.     metFORMIN (GLUCOPHAGE) 1000 MG tablet Take 1,000 mg by mouth 2 times daily (with meals)    Multiple Vitamins-Minerals (CENTRUM SILVER) CHEW Take 1 tablet by mouth daily    netarsudiL (RHOPRESSA) 0.02 %  Drop Place 1 drop into both eyes every evening    pioglitazone (ACTOS) 30 MG tablet Take 30 mg by mouth daily    warfarin ANTICOAGULANT (COUMADIN) 5 MG tablet TAKE 1/2 (ONE-HALF) TABLET BY MOUTH EVERY SATURDAY AND THEN 1 ONCE DAILY ALL  OTHER  DAYS  OR  AS  DIRECTED  BY  INR  CLINIC (Patient not taking: Reported on 8/7/2023)    WARFARIN SODIUM PO Take by mouth See Admin Instructions Dosing in coordination with INR results     No current facility-administered medications for this visit.       REVIEW OF SYSTEMS:   10 point review of systems reviewed and pertinent positives in the HPI.     PHYSICAL EXAMINATION:  Physical Exam     Vital signs: /76   Pulse 77   Temp 98.1  F (36.7  C)   Resp 18   Ht 1.829 m (6')   Wt 101.8 kg (224 lb 6.4 oz)   SpO2 94%   BMI 30.43 kg/m    General: Awake, Alert, sitting up on edge of the bed, conversant  HEENT:Pink conjunctiva, moist oral mucosa, visual deficit of the right eye with discolor.   NECK: Supple  CVS:  S1  S2, without murmur or gallop.   LUNG: Clear to auscultation, No wheezes, rales or rhonci.  BACK: No kyphosis of the thoracic spine  ABDOMEN: Soft, nontender to palpation, with positive bowel sounds  EXTREMITIES: Moves both upper and lower extremities, bony prominence of the right knee palpable, no pedal edema, no calf tenderness  SKIN: Warm and dry  NEUROLOGIC: Intact  PSYCHIATRIC: Pleasant affect.      Labs:  All labs reviewed in the nursing home record and Epic   @  Lab Results   Component Value Date    WBC 8.1 08/03/2023     Lab Results   Component Value Date    RBC 4.15 08/03/2023     Lab Results   Component Value Date    HGB 13.0 08/03/2023     Lab Results   Component Value Date    HCT 40.7 08/03/2023     Lab Results   Component Value Date    MCV 98 08/03/2023     Lab Results   Component Value Date    MCH 31.3 08/03/2023     Lab Results   Component Value Date    MCHC 31.9 08/03/2023     Lab Results   Component Value Date    RDW 14.0 08/03/2023     Lab  Results   Component Value Date     08/03/2023        @Last Comprehensive Metabolic Panel:  Sodium   Date Value Ref Range Status   08/08/2023 142 136 - 145 mmol/L Final     Potassium   Date Value Ref Range Status   08/08/2023 4.6 3.4 - 5.3 mmol/L Final   05/05/2022 4.4 3.5 - 5.0 mmol/L Final     Chloride   Date Value Ref Range Status   08/08/2023 106 98 - 107 mmol/L Final   05/02/2022 109 (H) 98 - 107 mmol/L Final     Carbon Dioxide (CO2)   Date Value Ref Range Status   08/08/2023 25 22 - 29 mmol/L Final   05/02/2022 25 22 - 31 mmol/L Final     Anion Gap   Date Value Ref Range Status   08/08/2023 11 7 - 15 mmol/L Final   05/02/2022 8 5 - 18 mmol/L Final     Glucose   Date Value Ref Range Status   08/08/2023 138 (H) 70 - 99 mg/dL Final   05/02/2022 133 (H) 70 - 125 mg/dL Final     GLUCOSE BY METER POCT   Date Value Ref Range Status   08/03/2023 151 (H) 70 - 99 mg/dL Final     Urea Nitrogen   Date Value Ref Range Status   08/08/2023 17.4 8.0 - 23.0 mg/dL Final   05/02/2022 11 8 - 28 mg/dL Final     Creatinine   Date Value Ref Range Status   08/08/2023 0.88 0.67 - 1.17 mg/dL Final     GFR Estimate   Date Value Ref Range Status   08/08/2023 >90 >60 mL/min/1.73m2 Final   06/10/2021 >60 >60 mL/min/1.73m2 Final     Calcium   Date Value Ref Range Status   08/08/2023 9.5 8.8 - 10.2 mg/dL Final     This note has been dictated using voice recognition software. Any grammatical or context distortions are unintentional and inherent to the software    Electronically signed by: Kylie Gomez, CNP

## 2023-08-09 NOTE — LETTER
8/9/2023        RE: Rakesh Samuels  [address]  [facility] [id number]         HEALTH GERIATRIC SERVICES    Code Status:  DNR/DNI   Visit Type:   Chief Complaint   Patient presents with     TCU Follow Up     Facility:  [facility] ([facility]) [[id number]]         HPI: Rakesh Samuels is a 72 year old male who I am seeing today for follow up on the TCU.  Patient recently hospitalized on 7/31/2023 secondary to weakness with debility and frequent falls.  Past medical history includes proximal atrial fib on chronic anticoagulation, hypertension, diabetes mellitus type 2, hyperlipidemia and anxiety with depression.  Patient presented with a fall hitting his head on the floor.  He had increased pain in his right knee due to osteoarthritis.  Increased bilateral lower extremity weakness and imbalance.  He lives in a senior [facility].  He uses a walker at baseline.  Head CT showed no acute intracranial abnormality.  TSH was normal.  Knee x-ray showed DJD.  He was found to have superior endplate compression fractures at T2, T4 and T5.  Patient was asymptomatic.  Recommended outpatient follow-up with orthopedic surgery for possible right knee replacement.  UTI with E. coli.  He was treated with ceftriaxone and switch to oral Cipro.  Elevated troponin.  Patient without chest pain.  No cardiac history.  Normal proBNP and D-dimer.  Chronically patient reports some dyspnea on exertion.  Echo on 6/1 preserved ejection fracture 60 to 65%.  Proximal atrial fib not on rate control medication.  He is on chronic anticoagulation with Coumadin.  Essential hypertension placed on.  Hydralazine during hospitalization.  Diabetes mellitus type 2.  During hospitalization his metformin and Actos were held and he was treated with sliding scale however these were resumed at time discharge.  Anxiety with depression on Abilify, fluvoxamine and hydroxyzine.    Transitional Care Course: Today patient sitting up on edge of the bed.  He  continues to report some pain in the right knee which is chronic.  Chronic fractures of the spine. He denies any pain in his back. Pt shortness of breath or chest pain.  Blood sugars satisfactory control.       Assessment/Plan:     1. Type 2 diabetes mellitus with hyperosmolarity without coma, without long-term current use of insulin (H)  -Prior to admit metformin and Actos resumed.  -Consistent carb diet.  -Blood sugar checks twice daily.    2. Paroxysmal atrial fibrillation (H)  -Currently not rate controlled.  -Continue Coumadin for anticoagulation.  -INR today managed per the Coumadin clinic.    3. Acute cystitis without hematuria  -Patient treated with ceftriaxone and transition to oral Cipro.  -Patient is incontinent.  Currently asymptomatic.    4. Compression fracture of thoracic vertebra with routine healing, unspecified thoracic vertebral level, subsequent encounter  -MRI of the thoracic spine consistent with chronic fractures.  -Monitor.    5. Essential hypertension  -As needed hydroxyzine.    6.  Osteoarthritis of the right knee  -Follow-up outpatient with Ortho for possible work-up for right knee replacement.  -Tylenol pain.    Active Ambulatory Problems     Diagnosis Date Noted     SIRS (systemic inflammatory response syndrome) (H) 09/09/2019     Adrenal incidentaloma (H)      Diet-controlled diabetes mellitus (H)      Pericardial effusion      Lactic acidosis      Type 2 diabetes mellitus without complication, without long-term current use of insulin (H) 04/12/2020     Paroxysmal atrial fibrillation (H) 02/18/2020     Calculus of ureter 04/12/2020     Acute renal failure, unspecified acute renal failure type (H) 04/12/2020     Acute renal failure (ARF) (H) 04/12/2020     ATN (acute tubular necrosis) (H) 04/12/2020     Sepsis due to urinary tract infection (H)      Shock circulatory (H)      Metformin overdose of undetermined intent, initial encounter      Acute respiratory failure with hypoxemia (H)       Metabolic acidosis      Hyperkalemia      Hematemesis, presence of nausea not specified 04/12/2020     Calculus of kidney      Syncope and collapse 06/30/2020     Hyperglycemia      After care 07/03/2012     Age-related cataract 03/27/2021     Anemia associated with acute blood loss 06/27/2012     Balanitis 03/27/2021     Cholelithiasis without obstruction 03/27/2021     Constipation 07/03/2012     Depression with anxiety 07/03/2012     Glaucoma 06/25/2012     Hemorrhoids without complication 03/27/2021     Hyperlipidemia 06/07/2018     Loss of appetite 03/27/2021     Major depressive disorder, recurrent episode, in partial remission (H) 06/08/2018     Mixed personality disorder in adult (H) 06/08/2018     Obstructive sleep apnea 03/27/2021     Osteoarthrosis involving lower leg 06/23/2012     Periodic limb movement disorder 03/27/2021     Recurrent major depression (H) 03/27/2021     S/P ureteral stent placement 03/27/2021     Unilateral small kidney 03/27/2021     Nephrolithiasis 03/27/2021     Type 2 diabetes mellitus (H) 06/25/2012     Osteoarthritis of knee 03/27/2021     Persistent atrial fibrillation (H) 03/27/2021     Pyelonephritis 03/27/2021     Urinary tract infection without hematuria, site unspecified 04/30/2022     Sepsis without acute organ dysfunction (H) 04/30/2022     UTI (urinary tract infection) 05/01/2022     Benign prostatic hyperplasia with urinary frequency 05/07/2022     Hypomagnesemia 05/07/2022     Injury of head, initial encounter 07/31/2023     Fall at home, initial encounter 07/31/2023     E. coli urinary tract infection 08/02/2023     Resolved Ambulatory Problems     Diagnosis Date Noted     No Resolved Ambulatory Problems     Past Medical History:   Diagnosis Date     A-fib (H)      Acute hemodialysis encounter (H)      Acute kidney injury (H)      Asbestosis (H)      Atrophy of right kidney      Basal cell carcinoma      BPH without urinary obstruction      Cellulitis       Cholelithiasis      Diabetes mellitus, type 2 (H) 4/12/2020     Esophagitis      Gastritis      Hematemesis, presence of nausea not specified      Hyperlipemia      Kidney stone      Metformin overdose of undetermined intent      PTSD (post-traumatic stress disorder)      Seasonal allergies      Sleep apnea      Upper GI bleed      No Known Allergies    All Meds and Allergies reviewed in the record at the facility and is the most up-to-date.    Current Outpatient Medications   Medication Sig     acetaminophen (TYLENOL) 500 MG tablet [ACETAMINOPHEN (TYLENOL) 500 MG TABLET] Take 500 mg by mouth every 6 (six) hours as needed for pain.     amoxicillin (AMOXIL) 500 MG capsule [AMOXICILLIN (AMOXIL) 500 MG CAPSULE] Take 2,000 mg by mouth once as needed (Prior to dental work).     ARIPiprazole (ABILIFY) 2 MG tablet [ARIPIPRAZOLE (ABILIFY) 2 MG TABLET] Take 2 mg by mouth at bedtime.      aspirin 81 mg chewable tablet Take 81 mg by mouth daily     atorvastatin (LIPITOR) 10 MG tablet [ATORVASTATIN (LIPITOR) 10 MG TABLET] Take 10 mg by mouth at bedtime.     brimonidine (ALPHAGAN) 0.2 % ophthalmic solution [BRIMONIDINE (ALPHAGAN) 0.2 % OPHTHALMIC SOLUTION] Administer 1 drop to both eyes 2 (two) times a day.      dorzolamide-timolol (COSOPT) 2-0.5 % ophthalmic solution Place 1 drop into both eyes 2 times daily     dorzolamide-timolol (COSOPT) 22.3-6.8 mg/mL ophthalmic solution [DORZOLAMIDE-TIMOLOL (COSOPT) 22.3-6.8 MG/ML OPHTHALMIC SOLUTION] Administer 1 drop to both eyes 2 (two) times a day.  (Patient not taking: Reported on 8/7/2023)     fluvoxaMINE (LUVOX) 50 MG tablet [FLUVOXAMINE (LUVOX) 50 MG TABLET] Take 50 mg by mouth at bedtime.      hydrOXYzine (ATARAX) 10 MG tablet Take 10 mg by mouth 3 times daily as needed     latanoprostene bunod 0.024 % Drop Place 1 drop into both eyes At Bedtime     levomefolate calcium (L-METHYLFOLATE ORAL) [LEVOMEFOLATE CALCIUM (L-METHYLFOLATE ORAL)] Take 1.25 mg by mouth daily.      metFORMIN  (GLUCOPHAGE) 1000 MG tablet Take 1,000 mg by mouth 2 times daily (with meals)     Multiple Vitamins-Minerals (CENTRUM SILVER) CHEW Take 1 tablet by mouth daily     netarsudiL (RHOPRESSA) 0.02 % Drop Place 1 drop into both eyes every evening     pioglitazone (ACTOS) 30 MG tablet Take 30 mg by mouth daily     warfarin ANTICOAGULANT (COUMADIN) 5 MG tablet TAKE 1/2 (ONE-HALF) TABLET BY MOUTH EVERY SATURDAY AND THEN 1 ONCE DAILY ALL  OTHER  DAYS  OR  AS  DIRECTED  BY  INR  CLINIC (Patient not taking: Reported on 8/7/2023)     WARFARIN SODIUM PO Take by mouth See Admin Instructions Dosing in coordination with INR results     No current facility-administered medications for this visit.       REVIEW OF SYSTEMS:   10 point review of systems reviewed and pertinent positives in the HPI.     PHYSICAL EXAMINATION:  Physical Exam     Vital signs: /76   Pulse 77   Temp 98.1  F (36.7  C)   Resp 18   Ht 1.829 m (6')   Wt 101.8 kg (224 lb 6.4 oz)   SpO2 94%   BMI 30.43 kg/m    General: Awake, Alert, sitting up on edge of the bed, conversant  HEENT:Pink conjunctiva, moist oral mucosa, visual deficit of the right eye with discolor.   NECK: Supple  CVS:  S1  S2, without murmur or gallop.   LUNG: Clear to auscultation, No wheezes, rales or rhonci.  BACK: No kyphosis of the thoracic spine  ABDOMEN: Soft, nontender to palpation, with positive bowel sounds  EXTREMITIES: Moves both upper and lower extremities, bony prominence of the right knee palpable, no pedal edema, no calf tenderness  SKIN: Warm and dry  NEUROLOGIC: Intact  PSYCHIATRIC: Pleasant affect.      Labs:  All labs reviewed in the nursing home record and Epic   @  Lab Results   Component Value Date    WBC 8.1 08/03/2023     Lab Results   Component Value Date    RBC 4.15 08/03/2023     Lab Results   Component Value Date    HGB 13.0 08/03/2023     Lab Results   Component Value Date    HCT 40.7 08/03/2023     Lab Results   Component Value Date    MCV 98 08/03/2023      Lab Results   Component Value Date    MCH 31.3 08/03/2023     Lab Results   Component Value Date    MCHC 31.9 08/03/2023     Lab Results   Component Value Date    RDW 14.0 08/03/2023     Lab Results   Component Value Date     08/03/2023        @Last Comprehensive Metabolic Panel:  Sodium   Date Value Ref Range Status   08/08/2023 142 136 - 145 mmol/L Final     Potassium   Date Value Ref Range Status   08/08/2023 4.6 3.4 - 5.3 mmol/L Final   05/05/2022 4.4 3.5 - 5.0 mmol/L Final     Chloride   Date Value Ref Range Status   08/08/2023 106 98 - 107 mmol/L Final   05/02/2022 109 (H) 98 - 107 mmol/L Final     Carbon Dioxide (CO2)   Date Value Ref Range Status   08/08/2023 25 22 - 29 mmol/L Final   05/02/2022 25 22 - 31 mmol/L Final     Anion Gap   Date Value Ref Range Status   08/08/2023 11 7 - 15 mmol/L Final   05/02/2022 8 5 - 18 mmol/L Final     Glucose   Date Value Ref Range Status   08/08/2023 138 (H) 70 - 99 mg/dL Final   05/02/2022 133 (H) 70 - 125 mg/dL Final     GLUCOSE BY METER POCT   Date Value Ref Range Status   08/03/2023 151 (H) 70 - 99 mg/dL Final     Urea Nitrogen   Date Value Ref Range Status   08/08/2023 17.4 8.0 - 23.0 mg/dL Final   05/02/2022 11 8 - 28 mg/dL Final     Creatinine   Date Value Ref Range Status   08/08/2023 0.88 0.67 - 1.17 mg/dL Final     GFR Estimate   Date Value Ref Range Status   08/08/2023 >90 >60 mL/min/1.73m2 Final   06/10/2021 >60 >60 mL/min/1.73m2 Final     Calcium   Date Value Ref Range Status   08/08/2023 9.5 8.8 - 10.2 mg/dL Final     This note has been dictated using voice recognition software. Any grammatical or context distortions are unintentional and inherent to the software    Electronically signed by: Kylie Gomez, LUANA       Sincerely,        Kylie Gomez, NP

## 2023-08-10 ENCOUNTER — ANTICOAGULATION THERAPY VISIT (OUTPATIENT)
Dept: ANTICOAGULATION | Facility: CLINIC | Age: 73
End: 2023-08-10
Payer: MEDICARE

## 2023-08-10 DIAGNOSIS — I48.0 PAROXYSMAL ATRIAL FIBRILLATION (H): Primary | ICD-10-CM

## 2023-08-10 LAB — INR (EXTERNAL): 2.2 (ref 0.9–1.1)

## 2023-08-10 NOTE — PROGRESS NOTES
Incoming fax from [facility] [place]    Date of result: 08/10    INR result: 2.2    Provider: Cabrera NGUYEN/Jason NP    Template answers: negative, does take ASA 81 mg    Dose taken since last INR:  unclear on the form    Shirley Cruz RN    [facility]

## 2023-08-10 NOTE — PROGRESS NOTES
ANTICOAGULATION MANAGEMENT     Rakesh Samuels 72 year old male is on warfarin with therapeutic INR result. (Goal INR 2.0-3.0)    Recent labs: (last 7 days)     08/10/23  1322   INR 2.2*       ASSESSMENT     Source(s): Chart Review and Home Care/Facility Nurse     Warfarin doses taken: Warfarin taken as instructed  Diet: No new diet changes identified  Medication/supplement changes: None noted  New illness, injury, or hospitalization: No  Signs or symptoms of bleeding or clotting: No  Previous result: Therapeutic last visit; previously outside of goal range  Additional findings: None       PLAN     Recommended plan for no diet, medication or health factor changes affecting INR     Dosing Instructions: Continue your current warfarin dose with next INR in 6 days       Summary  As of 8/10/2023      Full warfarin instructions:  7.5 mg every Mon, Thu; 5 mg all other days   Next INR check:  8/16/2023               Telephone call with Becky home care nurse who verbalizes understanding and agrees to plan    Orders given to  Homecare nurse/facility to recheck    Education provided:   Please call back if any changes to your diet, medications or how you've been taking warfarin    Plan made per [facility] anticoagulation protocol    Heather Yanez RN  Anticoagulation Clinic  8/10/2023    _______________________________________________________________________     Anticoagulation Episode Summary       Current INR goal:  2.0-3.0   TTR:  82.2 % (1 y)   Target end date:  Indefinite   Send INR reminders to:  ANTICOAG HOME MONITOR[facility]    Indications    Paroxysmal atrial fibrillation (H) [I48.0]             Comments:  [employer] home meter- Managed by [employer]             Anticoagulation Care Providers       Provider Role Specialty Phone number    Eric Pickett MD Referring Cardiovascular Disease [phone]

## 2023-08-15 ENCOUNTER — TRANSITIONAL CARE UNIT VISIT (OUTPATIENT)
Dept: GERIATRICS | Facility: CLINIC | Age: 73
End: 2023-08-15
Payer: MEDICARE

## 2023-08-15 VITALS
SYSTOLIC BLOOD PRESSURE: 135 MMHG | BODY MASS INDEX: 30.4 KG/M2 | WEIGHT: 224.4 LBS | OXYGEN SATURATION: 94 % | RESPIRATION RATE: 16 BRPM | DIASTOLIC BLOOD PRESSURE: 78 MMHG | HEIGHT: 72 IN | TEMPERATURE: 98.1 F | HEART RATE: 67 BPM

## 2023-08-15 DIAGNOSIS — S22.000D COMPRESSION FRACTURE OF THORACIC VERTEBRA WITH ROUTINE HEALING, UNSPECIFIED THORACIC VERTEBRAL LEVEL, SUBSEQUENT ENCOUNTER: Primary | ICD-10-CM

## 2023-08-15 DIAGNOSIS — N30.00 ACUTE CYSTITIS WITHOUT HEMATURIA: ICD-10-CM

## 2023-08-15 DIAGNOSIS — I48.0 PAROXYSMAL ATRIAL FIBRILLATION (H): ICD-10-CM

## 2023-08-15 DIAGNOSIS — E11.00 TYPE 2 DIABETES MELLITUS WITH HYPEROSMOLARITY WITHOUT COMA, WITHOUT LONG-TERM CURRENT USE OF INSULIN (H): ICD-10-CM

## 2023-08-15 DIAGNOSIS — I10 ESSENTIAL HYPERTENSION: ICD-10-CM

## 2023-08-15 PROCEDURE — 99309 SBSQ NF CARE MODERATE MDM 30: CPT | Performed by: NURSE PRACTITIONER

## 2023-08-15 NOTE — PROGRESS NOTES
[facility]    Code Status:  DNR/DNI   Visit Type:   Chief Complaint   Patient presents with    TCU Follow Up     Facility:  [facility] ([facility]) [[id number]]         HPI: Rakesh Samuels is a 72 year old male who I am seeing today for follow up on the TCU.  Patient recently hospitalized on 7/31/2023 secondary to weakness with debility and frequent falls.  Past medical history includes proximal atrial fib on chronic anticoagulation, hypertension, diabetes mellitus type 2, hyperlipidemia and anxiety with depression.  Patient presented with a fall hitting his head on the floor.  He had increased pain in his right knee due to osteoarthritis.  Increased bilateral lower extremity weakness and imbalance.  He lives in a senior [facility].  He uses a walker at baseline.  Head CT showed no acute intracranial abnormality.  TSH was normal.  Knee x-ray showed DJD.  He was found to have superior endplate compression fractures at T2, T4 and T5.  Patient was asymptomatic.  Recommended outpatient follow-up with orthopedic surgery for possible right knee replacement.  UTI with E. coli.  He was treated with ceftriaxone and switch to oral Cipro.  Elevated troponin.  Patient without chest pain.  No cardiac history.  Normal proBNP and D-dimer.  Chronically patient reports some dyspnea on exertion.  Echo on 6/1 preserved ejection fracture 60 to 65%.  Proximal atrial fib not on rate control medication.  He is on chronic anticoagulation with Coumadin.  Essential hypertension placed on.  Hydralazine during hospitalization.  Diabetes mellitus type 2.  During hospitalization his metformin and Actos were held and he was treated with sliding scale however these were resumed at time discharge.  Anxiety with depression on Abilify, fluvoxamine and hydroxyzine.    Transitional Care Course: Today patient sitting up on edge of the bed. Mx compression fractures of the spine. Pt today asking for electronic bed. He reports difficulty  getting OOB and needs grab bars and HOB elevated to assist him to get OOB due to pain in his spine. He continues to report some pain in the right knee which is chronic. Pt reports some SOB with exertion however with rest he is able to recover.       Assessment/Plan:     Compression fracture of thoracic vertebra with routine healing, unspecified thoracic vertebral level, subsequent encounter  -MRI of the thoracic spine consistent with chronic fractures.  -Monitor.    Type 2 diabetes mellitus with hyperosmolarity without coma, without long-term current use of insulin (H)  -Prior to admit metformin and Actos resumed.  -Consistent carb diet.  -Blood sugar checks twice daily.    Paroxysmal atrial fibrillation (H)  -Currently not rate controlled.  -Continue Coumadin for anticoagulation.  -INR today managed per the Coumadin clinic.    Acute cystitis without hematuria  -Patient treated with ceftriaxone and transition to oral Cipro.  -Patient is incontinent.  Currently asymptomatic.    Essential hypertension  -bp stable.   -As needed hydroxyzine.    Osteoarthritis of the right knee  -Follow-up outpatient with Ortho for possible work-up for right knee replacement.  -Tylenol pain.    Active Ambulatory Problems     Diagnosis Date Noted    SIRS (systemic inflammatory response syndrome) (H) 09/09/2019    Adrenal incidentaloma (H)     Diet-controlled diabetes mellitus (H)     Pericardial effusion     Lactic acidosis     Type 2 diabetes mellitus without complication, without long-term current use of insulin (H) 04/12/2020    Paroxysmal atrial fibrillation (H) 02/18/2020    Calculus of ureter 04/12/2020    Acute renal failure, unspecified acute renal failure type (H) 04/12/2020    Acute renal failure (ARF) (H) 04/12/2020    ATN (acute tubular necrosis) (H) 04/12/2020    Sepsis due to urinary tract infection (H)     Shock circulatory (H)     Metformin overdose of undetermined intent, initial encounter     Acute respiratory failure  with hypoxemia (H)     Metabolic acidosis     Hyperkalemia     Hematemesis, presence of nausea not specified 04/12/2020    Calculus of kidney     Syncope and collapse 06/30/2020    Hyperglycemia     After care 07/03/2012    Age-related cataract 03/27/2021    Anemia associated with acute blood loss 06/27/2012    Balanitis 03/27/2021    Cholelithiasis without obstruction 03/27/2021    Constipation 07/03/2012    Depression with anxiety 07/03/2012    Glaucoma 06/25/2012    Hemorrhoids without complication 03/27/2021    Hyperlipidemia 06/07/2018    Loss of appetite 03/27/2021    Major depressive disorder, recurrent episode, in partial remission (H) 06/08/2018    Mixed personality disorder in adult (H) 06/08/2018    Obstructive sleep apnea 03/27/2021    Osteoarthrosis involving lower leg 06/23/2012    Periodic limb movement disorder 03/27/2021    Recurrent major depression (H) 03/27/2021    S/P ureteral stent placement 03/27/2021    Unilateral small kidney 03/27/2021    Nephrolithiasis 03/27/2021    Type 2 diabetes mellitus (H) 06/25/2012    Osteoarthritis of knee 03/27/2021    Persistent atrial fibrillation (H) 03/27/2021    Pyelonephritis 03/27/2021    Urinary tract infection without hematuria, site unspecified 04/30/2022    Sepsis without acute organ dysfunction (H) 04/30/2022    UTI (urinary tract infection) 05/01/2022    Benign prostatic hyperplasia with urinary frequency 05/07/2022    Hypomagnesemia 05/07/2022    Injury of head, initial encounter 07/31/2023    Fall at home, initial encounter 07/31/2023    E. coli urinary tract infection 08/02/2023     Resolved Ambulatory Problems     Diagnosis Date Noted    No Resolved Ambulatory Problems     Past Medical History:   Diagnosis Date    A-fib (H)     Acute hemodialysis encounter (H)     Acute kidney injury (H)     Asbestosis (H)     Atrophy of right kidney     Basal cell carcinoma     BPH without urinary obstruction     Cellulitis     Cholelithiasis     Diabetes  mellitus, type 2 (H) 4/12/2020    Esophagitis     Gastritis     Hematemesis, presence of nausea not specified     Hyperlipemia     Kidney stone     Metformin overdose of undetermined intent     PTSD (post-traumatic stress disorder)     Seasonal allergies     Sleep apnea     Upper GI bleed      No Known Allergies    All Meds and Allergies reviewed in the record at the facility and is the most up-to-date.    Current Outpatient Medications   Medication Sig    acetaminophen (TYLENOL) 500 MG tablet [ACETAMINOPHEN (TYLENOL) 500 MG TABLET] Take 500 mg by mouth every 6 (six) hours as needed for pain.    amoxicillin (AMOXIL) 500 MG capsule [AMOXICILLIN (AMOXIL) 500 MG CAPSULE] Take 2,000 mg by mouth once as needed (Prior to dental work).    ARIPiprazole (ABILIFY) 2 MG tablet [ARIPIPRAZOLE (ABILIFY) 2 MG TABLET] Take 2 mg by mouth at bedtime.     aspirin 81 mg chewable tablet Take 81 mg by mouth daily    atorvastatin (LIPITOR) 10 MG tablet [ATORVASTATIN (LIPITOR) 10 MG TABLET] Take 10 mg by mouth at bedtime.    brimonidine (ALPHAGAN) 0.2 % ophthalmic solution [BRIMONIDINE (ALPHAGAN) 0.2 % OPHTHALMIC SOLUTION] Administer 1 drop to both eyes 2 (two) times a day.     dorzolamide-timolol (COSOPT) 2-0.5 % ophthalmic solution Place 1 drop into both eyes 2 times daily    dorzolamide-timolol (COSOPT) 22.3-6.8 mg/mL ophthalmic solution [DORZOLAMIDE-TIMOLOL (COSOPT) 22.3-6.8 MG/ML OPHTHALMIC SOLUTION] Administer 1 drop to both eyes 2 (two) times a day.  (Patient not taking: Reported on 8/7/2023)    fluvoxaMINE (LUVOX) 50 MG tablet [FLUVOXAMINE (LUVOX) 50 MG TABLET] Take 50 mg by mouth at bedtime.     hydrOXYzine (ATARAX) 10 MG tablet Take 10 mg by mouth 3 times daily as needed    latanoprostene bunod 0.024 % Drop Place 1 drop into both eyes At Bedtime    levomefolate calcium (L-METHYLFOLATE ORAL) [LEVOMEFOLATE CALCIUM (L-METHYLFOLATE ORAL)] Take 1.25 mg by mouth daily.     metFORMIN (GLUCOPHAGE) 1000 MG tablet Take 1,000 mg by mouth 2  times daily (with meals)    Multiple Vitamins-Minerals (CENTRUM SILVER) CHEW Take 1 tablet by mouth daily    netarsudiL (RHOPRESSA) 0.02 % Drop Place 1 drop into both eyes every evening    pioglitazone (ACTOS) 30 MG tablet Take 30 mg by mouth daily    warfarin ANTICOAGULANT (COUMADIN) 5 MG tablet TAKE 1/2 (ONE-HALF) TABLET BY MOUTH EVERY SATURDAY AND THEN 1 ONCE DAILY ALL  OTHER  DAYS  OR  AS  DIRECTED  BY  INR  CLINIC (Patient not taking: Reported on 8/7/2023)    WARFARIN SODIUM PO Take by mouth See Admin Instructions Dosing in coordination with INR results     No current facility-administered medications for this visit.       REVIEW OF SYSTEMS:   10 point review of systems reviewed and pertinent positives in the HPI.     PHYSICAL EXAMINATION:  Physical Exam     Vital signs: /78   Pulse 67   Temp 98.1  F (36.7  C)   Resp 16   Ht 1.829 m (6')   Wt 101.8 kg (224 lb 6.4 oz)   SpO2 94%   BMI 30.43 kg/m    General: Awake, Alert, sitting up on edge of the bed, conversant  HEENT:Pink conjunctiva, moist oral mucosa, visual deficit of the right eye with discolor.   NECK: Supple  CVS:  S1  S2, without murmur or gallop.   LUNG: Clear to auscultation, No wheezes, rales or rhonci.  BACK: No kyphosis of the thoracic spine  ABDOMEN: Soft, nontender to palpation, with positive bowel sounds  EXTREMITIES: Moves both upper and lower extremities, bony prominence of the right knee palpable, no pedal edema, no calf tenderness  SKIN: Warm and dry  NEUROLOGIC: Intact  PSYCHIATRIC: Pleasant affect.      Labs:  All labs reviewed in the nursing home record and [employer]   @  Lab Results   Component Value Date    WBC 8.1 08/03/2023     Lab Results   Component Value Date    RBC 4.15 08/03/2023     Lab Results   Component Value Date    HGB 13.0 08/03/2023     Lab Results   Component Value Date    HCT 40.7 08/03/2023     Lab Results   Component Value Date    MCV 98 08/03/2023     Lab Results   Component Value Date    MCH 31.3 08/03/2023      Lab Results   Component Value Date    MCHC 31.9 08/03/2023     Lab Results   Component Value Date    RDW 14.0 08/03/2023     Lab Results   Component Value Date     08/03/2023        @Last Comprehensive Metabolic Panel:  Sodium   Date Value Ref Range Status   08/08/2023 142 136 - 145 mmol/L Final     Potassium   Date Value Ref Range Status   08/08/2023 4.6 3.4 - 5.3 mmol/L Final   05/05/2022 4.4 3.5 - 5.0 mmol/L Final     Chloride   Date Value Ref Range Status   08/08/2023 106 98 - 107 mmol/L Final   05/02/2022 109 (H) 98 - 107 mmol/L Final     Carbon Dioxide (CO2)   Date Value Ref Range Status   08/08/2023 25 22 - 29 mmol/L Final   05/02/2022 25 22 - 31 mmol/L Final     Anion Gap   Date Value Ref Range Status   08/08/2023 11 7 - 15 mmol/L Final   05/02/2022 8 5 - 18 mmol/L Final     Glucose   Date Value Ref Range Status   08/08/2023 138 (H) 70 - 99 mg/dL Final   05/02/2022 133 (H) 70 - 125 mg/dL Final     GLUCOSE BY METER POCT   Date Value Ref Range Status   08/03/2023 151 (H) 70 - 99 mg/dL Final     Urea Nitrogen   Date Value Ref Range Status   08/08/2023 17.4 8.0 - 23.0 mg/dL Final   05/02/2022 11 8 - 28 mg/dL Final     Creatinine   Date Value Ref Range Status   08/08/2023 0.88 0.67 - 1.17 mg/dL Final     GFR Estimate   Date Value Ref Range Status   08/08/2023 >90 >60 mL/min/1.73m2 Final   06/10/2021 >60 >60 mL/min/1.73m2 Final     Calcium   Date Value Ref Range Status   08/08/2023 9.5 8.8 - 10.2 mg/dL Final       Face to face encounter:   Pt will need an electronic hospital bed upon discharge due to mx compression fractures of the spine and overall weakness. He has difficulty rising OOB unless HOB elevated at 30 degrees and use of grab bars.  Pt requires positioning of the body in ways not feasible with an ordinary bed for comfort and due to above medical conditions including compression fractures of the spine with pain and CHF for ease of breathing. Pt does require the head of the bed to be elevated  more than 30 degrees most of the time due to CHF. Pt will require a bed height different than a fixed height to permit transfers to chair, wheelchair or standing position. Pt will need frequent body positions changes to aide in comfort and ease of breathing. He will need this for lifetime. NPI #[id number]      This note has been dictated using voice recognition software. Any grammatical or context distortions are unintentional and inherent to the software    Electronically signed by: Kylie Gomez CNP

## 2023-08-15 NOTE — LETTER
8/15/2023        RE: Rakesh Samuels  [address]  [facility] [address]         HEALTH GERIATRIC SERVICES    Code Status:  DNR/DNI   Visit Type:   Chief Complaint   Patient presents with     TCU Follow Up     Facility:  [facility] ([facility]) [[id number]]         HPI: Rakesh Samuels is a 72 year old male who I am seeing today for follow up on the TCU.  Patient recently hospitalized on 7/31/2023 secondary to weakness with debility and frequent falls.  Past medical history includes proximal atrial fib on chronic anticoagulation, hypertension, diabetes mellitus type 2, hyperlipidemia and anxiety with depression.  Patient presented with a fall hitting his head on the floor.  He had increased pain in his right knee due to osteoarthritis.  Increased bilateral lower extremity weakness and imbalance.  He lives in a senior [facility].  He uses a walker at baseline.  Head CT showed no acute intracranial abnormality.  TSH was normal.  Knee x-ray showed DJD.  He was found to have superior endplate compression fractures at T2, T4 and T5.  Patient was asymptomatic.  Recommended outpatient follow-up with orthopedic surgery for possible right knee replacement.  UTI with E. coli.  He was treated with ceftriaxone and switch to oral Cipro.  Elevated troponin.  Patient without chest pain.  No cardiac history.  Normal proBNP and D-dimer.  Chronically patient reports some dyspnea on exertion.  Echo on 6/1 preserved ejection fracture 60 to 65%.  Proximal atrial fib not on rate control medication.  He is on chronic anticoagulation with Coumadin.  Essential hypertension placed on.  Hydralazine during hospitalization.  Diabetes mellitus type 2.  During hospitalization his metformin and Actos were held and he was treated with sliding scale however these were resumed at time discharge.  Anxiety with depression on Abilify, fluvoxamine and hydroxyzine.    Transitional Care Course: Today patient sitting up on edge of the bed. Mx  compression fractures of the spine. Pt today asking for electronic bed. He reports difficulty getting OOB and needs grab bars and HOB elevated to assist him to get OOB due to pain in his spine. He continues to report some pain in the right knee which is chronic. Pt reports some SOB with exertion however with rest he is able to recover.       Assessment/Plan:     Compression fracture of thoracic vertebra with routine healing, unspecified thoracic vertebral level, subsequent encounter  -MRI of the thoracic spine consistent with chronic fractures.  -Monitor.    Type 2 diabetes mellitus with hyperosmolarity without coma, without long-term current use of insulin (H)  -Prior to admit metformin and Actos resumed.  -Consistent carb diet.  -Blood sugar checks twice daily.    Paroxysmal atrial fibrillation (H)  -Currently not rate controlled.  -Continue Coumadin for anticoagulation.  -INR today managed per the Coumadin clinic.    Acute cystitis without hematuria  -Patient treated with ceftriaxone and transition to oral Cipro.  -Patient is incontinent.  Currently asymptomatic.    Essential hypertension  -bp stable.   -As needed hydroxyzine.    Osteoarthritis of the right knee  -Follow-up outpatient with Ortho for possible work-up for right knee replacement.  -Tylenol pain.    Active Ambulatory Problems     Diagnosis Date Noted     SIRS (systemic inflammatory response syndrome) (H) 09/09/2019     Adrenal incidentaloma (H)      Diet-controlled diabetes mellitus (H)      Pericardial effusion      Lactic acidosis      Type 2 diabetes mellitus without complication, without long-term current use of insulin (H) 04/12/2020     Paroxysmal atrial fibrillation (H) 02/18/2020     Calculus of ureter 04/12/2020     Acute renal failure, unspecified acute renal failure type (H) 04/12/2020     Acute renal failure (ARF) (H) 04/12/2020     ATN (acute tubular necrosis) (H) 04/12/2020     Sepsis due to urinary tract infection (H)      Shock  circulatory (H)      Metformin overdose of undetermined intent, initial encounter      Acute respiratory failure with hypoxemia (H)      Metabolic acidosis      Hyperkalemia      Hematemesis, presence of nausea not specified 04/12/2020     Calculus of kidney      Syncope and collapse 06/30/2020     Hyperglycemia      After care 07/03/2012     Age-related cataract 03/27/2021     Anemia associated with acute blood loss 06/27/2012     Balanitis 03/27/2021     Cholelithiasis without obstruction 03/27/2021     Constipation 07/03/2012     Depression with anxiety 07/03/2012     Glaucoma 06/25/2012     Hemorrhoids without complication 03/27/2021     Hyperlipidemia 06/07/2018     Loss of appetite 03/27/2021     Major depressive disorder, recurrent episode, in partial remission (H) 06/08/2018     Mixed personality disorder in adult (H) 06/08/2018     Obstructive sleep apnea 03/27/2021     Osteoarthrosis involving lower leg 06/23/2012     Periodic limb movement disorder 03/27/2021     Recurrent major depression (H) 03/27/2021     S/P ureteral stent placement 03/27/2021     Unilateral small kidney 03/27/2021     Nephrolithiasis 03/27/2021     Type 2 diabetes mellitus (H) 06/25/2012     Osteoarthritis of knee 03/27/2021     Persistent atrial fibrillation (H) 03/27/2021     Pyelonephritis 03/27/2021     Urinary tract infection without hematuria, site unspecified 04/30/2022     Sepsis without acute organ dysfunction (H) 04/30/2022     UTI (urinary tract infection) 05/01/2022     Benign prostatic hyperplasia with urinary frequency 05/07/2022     Hypomagnesemia 05/07/2022     Injury of head, initial encounter 07/31/2023     Fall at home, initial encounter 07/31/2023     E. coli urinary tract infection 08/02/2023     Resolved Ambulatory Problems     Diagnosis Date Noted     No Resolved Ambulatory Problems     Past Medical History:   Diagnosis Date     A-fib (H)      Acute hemodialysis encounter (H)      Acute kidney injury (H)       Asbestosis (H)      Atrophy of right kidney      Basal cell carcinoma      BPH without urinary obstruction      Cellulitis      Cholelithiasis      Diabetes mellitus, type 2 (H) 4/12/2020     Esophagitis      Gastritis      Hematemesis, presence of nausea not specified      Hyperlipemia      Kidney stone      Metformin overdose of undetermined intent      PTSD (post-traumatic stress disorder)      Seasonal allergies      Sleep apnea      Upper GI bleed      No Known Allergies    All Meds and Allergies reviewed in the record at the facility and is the most up-to-date.    Current Outpatient Medications   Medication Sig     acetaminophen (TYLENOL) 500 MG tablet [ACETAMINOPHEN (TYLENOL) 500 MG TABLET] Take 500 mg by mouth every 6 (six) hours as needed for pain.     amoxicillin (AMOXIL) 500 MG capsule [AMOXICILLIN (AMOXIL) 500 MG CAPSULE] Take 2,000 mg by mouth once as needed (Prior to dental work).     ARIPiprazole (ABILIFY) 2 MG tablet [ARIPIPRAZOLE (ABILIFY) 2 MG TABLET] Take 2 mg by mouth at bedtime.      aspirin 81 mg chewable tablet Take 81 mg by mouth daily     atorvastatin (LIPITOR) 10 MG tablet [ATORVASTATIN (LIPITOR) 10 MG TABLET] Take 10 mg by mouth at bedtime.     brimonidine (ALPHAGAN) 0.2 % ophthalmic solution [BRIMONIDINE (ALPHAGAN) 0.2 % OPHTHALMIC SOLUTION] Administer 1 drop to both eyes 2 (two) times a day.      dorzolamide-timolol (COSOPT) 2-0.5 % ophthalmic solution Place 1 drop into both eyes 2 times daily     dorzolamide-timolol (COSOPT) 22.3-6.8 mg/mL ophthalmic solution [DORZOLAMIDE-TIMOLOL (COSOPT) 22.3-6.8 MG/ML OPHTHALMIC SOLUTION] Administer 1 drop to both eyes 2 (two) times a day.  (Patient not taking: Reported on 8/7/2023)     fluvoxaMINE (LUVOX) 50 MG tablet [FLUVOXAMINE (LUVOX) 50 MG TABLET] Take 50 mg by mouth at bedtime.      hydrOXYzine (ATARAX) 10 MG tablet Take 10 mg by mouth 3 times daily as needed     latanoprostene bunod 0.024 % Drop Place 1 drop into both eyes At Bedtime      levomefolate calcium (L-METHYLFOLATE ORAL) [LEVOMEFOLATE CALCIUM (L-METHYLFOLATE ORAL)] Take 1.25 mg by mouth daily.      metFORMIN (GLUCOPHAGE) 1000 MG tablet Take 1,000 mg by mouth 2 times daily (with meals)     Multiple Vitamins-Minerals (CENTRUM SILVER) CHEW Take 1 tablet by mouth daily     netarsudiL (RHOPRESSA) 0.02 % Drop Place 1 drop into both eyes every evening     pioglitazone (ACTOS) 30 MG tablet Take 30 mg by mouth daily     warfarin ANTICOAGULANT (COUMADIN) 5 MG tablet TAKE 1/2 (ONE-HALF) TABLET BY MOUTH EVERY SATURDAY AND THEN 1 ONCE DAILY ALL  OTHER  DAYS  OR  AS  DIRECTED  BY  INR  CLINIC (Patient not taking: Reported on 8/7/2023)     WARFARIN SODIUM PO Take by mouth See Admin Instructions Dosing in coordination with INR results     No current facility-administered medications for this visit.       REVIEW OF SYSTEMS:   10 point review of systems reviewed and pertinent positives in the HPI.     PHYSICAL EXAMINATION:  Physical Exam     Vital signs: /78   Pulse 67   Temp 98.1  F (36.7  C)   Resp 16   Ht 1.829 m (6')   Wt 101.8 kg (224 lb 6.4 oz)   SpO2 94%   BMI 30.43 kg/m    General: Awake, Alert, sitting up on edge of the bed, conversant  HEENT:Pink conjunctiva, moist oral mucosa, visual deficit of the right eye with discolor.   NECK: Supple  CVS:  S1  S2, without murmur or gallop.   LUNG: Clear to auscultation, No wheezes, rales or rhonci.  BACK: No kyphosis of the thoracic spine  ABDOMEN: Soft, nontender to palpation, with positive bowel sounds  EXTREMITIES: Moves both upper and lower extremities, bony prominence of the right knee palpable, no pedal edema, no calf tenderness  SKIN: Warm and dry  NEUROLOGIC: Intact  PSYCHIATRIC: Pleasant affect.      Labs:  All labs reviewed in the nursing home record and [facility]   @  Lab Results   Component Value Date    WBC 8.1 08/03/2023     Lab Results   Component Value Date    RBC 4.15 08/03/2023     Lab Results   Component Value Date    HGB 13.0  08/03/2023     Lab Results   Component Value Date    HCT 40.7 08/03/2023     Lab Results   Component Value Date    MCV 98 08/03/2023     Lab Results   Component Value Date    MCH 31.3 08/03/2023     Lab Results   Component Value Date    MCHC 31.9 08/03/2023     Lab Results   Component Value Date    RDW 14.0 08/03/2023     Lab Results   Component Value Date     08/03/2023        @Last Comprehensive Metabolic Panel:  Sodium   Date Value Ref Range Status   08/08/2023 142 136 - 145 mmol/L Final     Potassium   Date Value Ref Range Status   08/08/2023 4.6 3.4 - 5.3 mmol/L Final   05/05/2022 4.4 3.5 - 5.0 mmol/L Final     Chloride   Date Value Ref Range Status   08/08/2023 106 98 - 107 mmol/L Final   05/02/2022 109 (H) 98 - 107 mmol/L Final     Carbon Dioxide (CO2)   Date Value Ref Range Status   08/08/2023 25 22 - 29 mmol/L Final   05/02/2022 25 22 - 31 mmol/L Final     Anion Gap   Date Value Ref Range Status   08/08/2023 11 7 - 15 mmol/L Final   05/02/2022 8 5 - 18 mmol/L Final     Glucose   Date Value Ref Range Status   08/08/2023 138 (H) 70 - 99 mg/dL Final   05/02/2022 133 (H) 70 - 125 mg/dL Final     GLUCOSE BY METER POCT   Date Value Ref Range Status   08/03/2023 151 (H) 70 - 99 mg/dL Final     Urea Nitrogen   Date Value Ref Range Status   08/08/2023 17.4 8.0 - 23.0 mg/dL Final   05/02/2022 11 8 - 28 mg/dL Final     Creatinine   Date Value Ref Range Status   08/08/2023 0.88 0.67 - 1.17 mg/dL Final     GFR Estimate   Date Value Ref Range Status   08/08/2023 >90 >60 mL/min/1.73m2 Final   06/10/2021 >60 >60 mL/min/1.73m2 Final     Calcium   Date Value Ref Range Status   08/08/2023 9.5 8.8 - 10.2 mg/dL Final       Face to face encounter:   Pt will need an electronic hospital bed upon discharge due to mx compression fractures of the spine and overall weakness. He has difficulty rising OOB unless HOB elevated at 30 degrees and use of grab bars.  Pt requires positioning of the body in ways not feasible with an  ordinary bed due to above medical conditions. Pt does require the head of the bed to be elevated more than 30 degrees most of the time due to CHF. Pt will require a bed height different than a fixed height to permit transfers to chair, wheelchair or standing position. Pt will need frequent body positions changes to aide in comfort and ease of breathing. He will need this for lifetime. NPI #[id number]      This note has been dictated using voice recognition software. Any grammatical or context distortions are unintentional and inherent to the software    Electronically signed by: Kylie Gomez CNP       Sincerely,        Kylie Gomez NP

## 2023-08-16 ENCOUNTER — TRANSFERRED RECORDS (OUTPATIENT)
Dept: HEALTH INFORMATION MANAGEMENT | Facility: CLINIC | Age: 73
End: 2023-08-16
Payer: MEDICARE

## 2023-08-16 ENCOUNTER — ANTICOAGULATION THERAPY VISIT (OUTPATIENT)
Dept: ANTICOAGULATION | Facility: CLINIC | Age: 73
End: 2023-08-16
Payer: MEDICARE

## 2023-08-16 ENCOUNTER — DOCUMENTATION ONLY (OUTPATIENT)
Dept: OTHER | Facility: CLINIC | Age: 73
End: 2023-08-16
Payer: MEDICARE

## 2023-08-16 DIAGNOSIS — I48.0 PAROXYSMAL ATRIAL FIBRILLATION (H): Primary | ICD-10-CM

## 2023-08-16 LAB — INR (EXTERNAL): 3 (ref 0.9–1.1)

## 2023-08-16 NOTE — PROGRESS NOTES
Incoming fax from  [facility]    Date of result 8/16/23    INR result 3.0    No to all assessment questions

## 2023-08-16 NOTE — PROGRESS NOTES
ANTICOAGULATION MANAGEMENT     Rakesh Samuels 72 year old male is on warfarin with therapeutic INR result. (Goal INR 2.0-3.0)    Recent labs: (last 7 days)     08/16/23  0829   INR 3.0*       ASSESSMENT     Source(s): Chart Review and Home Care/Facility Nurse     Warfarin doses taken: Warfarin taken as instructed  Diet: No new diet changes identified  Medication/supplement changes: None noted  New illness, injury, or hospitalization: No  Signs or symptoms of bleeding or clotting: No  Previous result: Therapeutic last 2(+) visits  Additional findings: None       PLAN     Recommended plan for no diet, medication or health factor changes affecting INR     Dosing Instructions: Continue your current warfarin dose with next INR in 1 week       Summary  As of 8/16/2023      Full warfarin instructions:  7.5 mg every Mon, Thu; 5 mg all other days   Next INR check:  8/23/2023               Telephone call with [facility] nurse (medical care for seniors) who verbalizes understanding and agrees to plan and who agrees to plan and repeated back plan correctly    Orders given to  Homecare nurse/facility to recheck    Education provided:   Please call back if any changes to your diet, medications or how you've been taking warfarin    Plan made per [facility] anticoagulation protocol    Anisa Christian RN  Anticoagulation Clinic  8/16/2023    _______________________________________________________________________     Anticoagulation Episode Summary       Current INR goal:  2.0-3.0   TTR:  82.2 % (1 y)   Target end date:  Indefinite   Send INR reminders to:   FOR SENIORS (TCU/LTC/ELISA)    Indications    Paroxysmal atrial fibrillation (H) [I48.0]             Comments:  [employer] home meter- Managed by [employer]             Anticoagulation Care Providers       Provider Role Specialty Phone number    Eric Pickett MD Referring Cardiovascular Disease [phone]

## 2023-08-21 ENCOUNTER — LAB REQUISITION (OUTPATIENT)
Dept: LAB | Facility: CLINIC | Age: 73
End: 2023-08-21

## 2023-08-21 ENCOUNTER — LAB REQUISITION (OUTPATIENT)
Dept: LAB | Facility: CLINIC | Age: 73
End: 2023-08-21
Payer: MEDICARE

## 2023-08-21 ENCOUNTER — TELEPHONE (OUTPATIENT)
Dept: GERIATRICS | Facility: CLINIC | Age: 73
End: 2023-08-21
Payer: MEDICARE

## 2023-08-21 DIAGNOSIS — R41.0 DISORIENTATION, UNSPECIFIED: ICD-10-CM

## 2023-08-21 LAB
ALBUMIN UR-MCNC: NEGATIVE MG/DL
APPEARANCE UR: CLEAR
BILIRUB UR QL STRIP: NEGATIVE
COLOR UR AUTO: ABNORMAL
GLUCOSE UR STRIP-MCNC: NEGATIVE MG/DL
HGB UR QL STRIP: ABNORMAL
KETONES UR STRIP-MCNC: NEGATIVE MG/DL
LEUKOCYTE ESTERASE UR QL STRIP: ABNORMAL
MUCOUS THREADS #/AREA URNS LPF: PRESENT /LPF
NITRATE UR QL: NEGATIVE
PH UR STRIP: 5 [PH] (ref 5–7)
RBC URINE: 78 /HPF
SP GR UR STRIP: 1.01 (ref 1–1.03)
TRANSITIONAL EPI: <1 /HPF
UROBILINOGEN UR STRIP-MCNC: NORMAL MG/DL
WBC URINE: 36 /HPF

## 2023-08-21 PROCEDURE — 87086 URINE CULTURE/COLONY COUNT: CPT | Performed by: NURSE PRACTITIONER

## 2023-08-21 PROCEDURE — 81001 URINALYSIS AUTO W/SCOPE: CPT | Performed by: NURSE PRACTITIONER

## 2023-08-22 ENCOUNTER — TRANSITIONAL CARE UNIT VISIT (OUTPATIENT)
Dept: GERIATRICS | Facility: CLINIC | Age: 73
End: 2023-08-22
Payer: MEDICARE

## 2023-08-22 VITALS
RESPIRATION RATE: 16 BRPM | HEART RATE: 99 BPM | SYSTOLIC BLOOD PRESSURE: 122 MMHG | WEIGHT: 219.2 LBS | BODY MASS INDEX: 29.69 KG/M2 | OXYGEN SATURATION: 100 % | TEMPERATURE: 98 F | HEIGHT: 72 IN | DIASTOLIC BLOOD PRESSURE: 79 MMHG

## 2023-08-22 DIAGNOSIS — N30.00 ACUTE CYSTITIS WITHOUT HEMATURIA: ICD-10-CM

## 2023-08-22 DIAGNOSIS — I48.0 PAROXYSMAL ATRIAL FIBRILLATION (H): ICD-10-CM

## 2023-08-22 DIAGNOSIS — E11.00 TYPE 2 DIABETES MELLITUS WITH HYPEROSMOLARITY WITHOUT COMA, WITHOUT LONG-TERM CURRENT USE OF INSULIN (H): ICD-10-CM

## 2023-08-22 DIAGNOSIS — S22.000D COMPRESSION FRACTURE OF THORACIC VERTEBRA WITH ROUTINE HEALING, UNSPECIFIED THORACIC VERTEBRAL LEVEL, SUBSEQUENT ENCOUNTER: Primary | ICD-10-CM

## 2023-08-22 DIAGNOSIS — I10 ESSENTIAL HYPERTENSION: ICD-10-CM

## 2023-08-22 PROCEDURE — 99309 SBSQ NF CARE MODERATE MDM 30: CPT | Performed by: NURSE PRACTITIONER

## 2023-08-22 NOTE — LETTER
8/22/2023        RE: Rakesh Samuels  [address]  [facility] [address]         HEALTH GERIATRIC SERVICES    Code Status:  DNR/DNI   Visit Type:   Chief Complaint   Patient presents with     TCU Follow Up     Facility:  [facility] ([facility]) [[id number]]         HPI: Rakehs Samuels is a 72 year old male who I am seeing today for follow up on the TCU.  Patient recently hospitalized on 7/31/2023 secondary to weakness with debility and frequent falls.  Past medical history includes proximal atrial fib on chronic anticoagulation, hypertension, diabetes mellitus type 2, hyperlipidemia and anxiety with depression.  Patient presented with a fall hitting his head on the floor.  He had increased pain in his right knee due to osteoarthritis.  Increased bilateral lower extremity weakness and imbalance.  He lives in a senior [facility].  He uses a walker at baseline.  Head CT showed no acute intracranial abnormality.  TSH was normal.  Knee x-ray showed DJD.  He was found to have superior endplate compression fractures at T2, T4 and T5.  Patient was asymptomatic.  Recommended outpatient follow-up with orthopedic surgery for possible right knee replacement.  UTI with E. coli.  He was treated with ceftriaxone and switch to oral Cipro.  Elevated troponin.  Patient without chest pain.  No cardiac history.  Normal proBNP and D-dimer.  Chronically patient reports some dyspnea on exertion.  Echo on 6/1 preserved ejection fracture 60 to 65%.  Proximal atrial fib not on rate control medication.  He is on chronic anticoagulation with Coumadin.  Essential hypertension placed on.  Hydralazine during hospitalization.  Diabetes mellitus type 2.  During hospitalization his metformin and Actos were held and he was treated with sliding scale however these were resumed at time discharge.  Anxiety with depression on Abilify, fluvoxamine and hydroxyzine.    Transitional Care Course: Today patient lying in bed.  Yesterday patient with  increased lethargy.  UA UC obtained.  Patient with history of recurrent UTI.  UA appears positive for leukocytes, mucus and bacteria.  Patient reports generalized not feeling well today.  He is currently afebrile.  He is reporting some urinary frequency.  Chronic pain in the right knee secondary to DJD.  Multiple compression fractures of the spine.  Patient denies pain in his back.        Assessment/Plan:     Compression fracture of thoracic vertebra with routine healing, unspecified thoracic vertebral level, subsequent encounter  -MRI of the thoracic spine consistent with chronic fractures.  -Monitor.    Acute cystitis without hematuria  -UA UC with leukocytes, bacteria and mucus.  -UCx pending.  -Patient symptomatic.  Start ciprofloxacin 500 mg daily x3 days.  -Encourage fluids.  -Follow-up CBC and BMP.    Type 2 diabetes mellitus with hyperosmolarity without coma, without long-term current use of insulin (H)  -Prior to admit metformin and Actos resumed.  -Consistent carb diet.  -Blood sugar checks twice daily.    Paroxysmal atrial fibrillation (H)  -Currently not rate controlled.  -Continue Coumadin for anticoagulation.  -INR today managed per the Coumadin clinic.    Acute cystitis without hematuria  -Patient treated with ceftriaxone and transition to oral Cipro.  -Patient is incontinent.  Currently asymptomatic.    Essential hypertension  -bp stable.   -As needed hydroxyzine.    Osteoarthritis of the right knee  -Follow-up outpatient with Ortho for possible work-up for right knee replacement.  -Tylenol pain.    Active Ambulatory Problems     Diagnosis Date Noted     SIRS (systemic inflammatory response syndrome) (H) 09/09/2019     Adrenal incidentaloma (H)      Diet-controlled diabetes mellitus (H)      Pericardial effusion      Lactic acidosis      Type 2 diabetes mellitus without complication, without long-term current use of insulin (H) 04/12/2020     Paroxysmal atrial fibrillation (H) 02/18/2020     Calculus  of ureter 04/12/2020     Acute renal failure, unspecified acute renal failure type (H) 04/12/2020     Acute renal failure (ARF) (H) 04/12/2020     ATN (acute tubular necrosis) (H) 04/12/2020     Sepsis due to urinary tract infection (H)      Shock circulatory (H)      Metformin overdose of undetermined intent, initial encounter      Acute respiratory failure with hypoxemia (H)      Metabolic acidosis      Hyperkalemia      Hematemesis, presence of nausea not specified 04/12/2020     Calculus of kidney      Syncope and collapse 06/30/2020     Hyperglycemia      After care 07/03/2012     Age-related cataract 03/27/2021     Anemia associated with acute blood loss 06/27/2012     Balanitis 03/27/2021     Cholelithiasis without obstruction 03/27/2021     Constipation 07/03/2012     Depression with anxiety 07/03/2012     Glaucoma 06/25/2012     Hemorrhoids without complication 03/27/2021     Hyperlipidemia 06/07/2018     Loss of appetite 03/27/2021     Major depressive disorder, recurrent episode, in partial remission (H) 06/08/2018     Mixed personality disorder in adult (H) 06/08/2018     Obstructive sleep apnea 03/27/2021     Osteoarthrosis involving lower leg 06/23/2012     Periodic limb movement disorder 03/27/2021     Recurrent major depression (H) 03/27/2021     S/P ureteral stent placement 03/27/2021     Unilateral small kidney 03/27/2021     Nephrolithiasis 03/27/2021     Type 2 diabetes mellitus (H) 06/25/2012     Osteoarthritis of knee 03/27/2021     Persistent atrial fibrillation (H) 03/27/2021     Pyelonephritis 03/27/2021     Urinary tract infection without hematuria, site unspecified 04/30/2022     Sepsis without acute organ dysfunction (H) 04/30/2022     UTI (urinary tract infection) 05/01/2022     Benign prostatic hyperplasia with urinary frequency 05/07/2022     Hypomagnesemia 05/07/2022     Injury of head, initial encounter 07/31/2023     Fall at home, initial encounter 07/31/2023     E. coli urinary  tract infection 08/02/2023     Resolved Ambulatory Problems     Diagnosis Date Noted     No Resolved Ambulatory Problems     Past Medical History:   Diagnosis Date     A-fib (H)      Acute hemodialysis encounter (H)      Acute kidney injury (H)      Asbestosis (H)      Atrophy of right kidney      Basal cell carcinoma      BPH without urinary obstruction      Cellulitis      Cholelithiasis      Diabetes mellitus, type 2 (H) 4/12/2020     Esophagitis      Gastritis      Hematemesis, presence of nausea not specified      Hyperlipemia      Kidney stone      Metformin overdose of undetermined intent      PTSD (post-traumatic stress disorder)      Seasonal allergies      Sleep apnea      Upper GI bleed      No Known Allergies    All Meds and Allergies reviewed in the record at the facility and is the most up-to-date.    Current Outpatient Medications   Medication Sig     acetaminophen (TYLENOL) 500 MG tablet [ACETAMINOPHEN (TYLENOL) 500 MG TABLET] Take 500 mg by mouth every 6 (six) hours as needed for pain.     amoxicillin (AMOXIL) 500 MG capsule [AMOXICILLIN (AMOXIL) 500 MG CAPSULE] Take 2,000 mg by mouth once as needed (Prior to dental work).     ARIPiprazole (ABILIFY) 2 MG tablet [ARIPIPRAZOLE (ABILIFY) 2 MG TABLET] Take 2 mg by mouth at bedtime.      aspirin 81 mg chewable tablet Take 81 mg by mouth daily     atorvastatin (LIPITOR) 10 MG tablet [ATORVASTATIN (LIPITOR) 10 MG TABLET] Take 10 mg by mouth at bedtime.     brimonidine (ALPHAGAN) 0.2 % ophthalmic solution [BRIMONIDINE (ALPHAGAN) 0.2 % OPHTHALMIC SOLUTION] Administer 1 drop to both eyes 2 (two) times a day.      dorzolamide-timolol (COSOPT) 2-0.5 % ophthalmic solution Place 1 drop into both eyes 2 times daily     dorzolamide-timolol (COSOPT) 22.3-6.8 mg/mL ophthalmic solution [DORZOLAMIDE-TIMOLOL (COSOPT) 22.3-6.8 MG/ML OPHTHALMIC SOLUTION] Administer 1 drop to both eyes 2 (two) times a day.  (Patient not taking: Reported on 8/7/2023)     fluvoxaMINE (LUVOX)  50 MG tablet [FLUVOXAMINE (LUVOX) 50 MG TABLET] Take 50 mg by mouth at bedtime.      hydrOXYzine (ATARAX) 10 MG tablet Take 10 mg by mouth 3 times daily as needed     latanoprostene bunod 0.024 % Drop Place 1 drop into both eyes At Bedtime     levomefolate calcium (L-METHYLFOLATE ORAL) [LEVOMEFOLATE CALCIUM (L-METHYLFOLATE ORAL)] Take 1.25 mg by mouth daily.      metFORMIN (GLUCOPHAGE) 1000 MG tablet Take 1,000 mg by mouth 2 times daily (with meals)     Multiple Vitamins-Minerals (CENTRUM SILVER) CHEW Take 1 tablet by mouth daily     netarsudiL (RHOPRESSA) 0.02 % Drop Place 1 drop into both eyes every evening     pioglitazone (ACTOS) 30 MG tablet Take 30 mg by mouth daily     warfarin ANTICOAGULANT (COUMADIN) 5 MG tablet TAKE 1/2 (ONE-HALF) TABLET BY MOUTH EVERY SATURDAY AND THEN 1 ONCE DAILY ALL  OTHER  DAYS  OR  AS  DIRECTED  BY  INR  CLINIC (Patient not taking: Reported on 8/7/2023)     WARFARIN SODIUM PO Take by mouth See Admin Instructions Dosing in coordination with INR results     No current facility-administered medications for this visit.       REVIEW OF SYSTEMS:   10 point review of systems reviewed and pertinent positives in the HPI.     PHYSICAL EXAMINATION:  Physical Exam     Vital signs: /79   Pulse 99   Temp 98  F (36.7  C)   Resp 16   Ht 1.829 m (6')   Wt 99.4 kg (219 lb 3.2 oz)   SpO2 100%   BMI 29.73 kg/m    General: Lethargic but arouses,, Alert, lying in bed, conversant  HEENT:Pink conjunctiva, moist oral mucosa, visual deficit of the right eye with discolor.   NECK: Supple  CVS:  S1  S2, without murmur or gallop.   LUNG: Clear to auscultation, No wheezes, rales or rhonci.  BACK: No kyphosis of the thoracic spine  ABDOMEN: Soft, with positive bowel sounds  EXTREMITIES: Moves both upper and lower extremities, no pedal edema  SKIN: Warm and dry  NEUROLOGIC: Intact  PSYCHIATRIC: Flat affect.      Labs:  All labs reviewed in the nursing home record and [facility]   @  Lab Results   Component  Value Date    WBC 8.1 08/03/2023     Lab Results   Component Value Date    RBC 4.15 08/03/2023     Lab Results   Component Value Date    HGB 13.0 08/03/2023     Lab Results   Component Value Date    HCT 40.7 08/03/2023     Lab Results   Component Value Date    MCV 98 08/03/2023     Lab Results   Component Value Date    MCH 31.3 08/03/2023     Lab Results   Component Value Date    MCHC 31.9 08/03/2023     Lab Results   Component Value Date    RDW 14.0 08/03/2023     Lab Results   Component Value Date     08/03/2023        @Last Comprehensive Metabolic Panel:  Sodium   Date Value Ref Range Status   08/08/2023 142 136 - 145 mmol/L Final     Potassium   Date Value Ref Range Status   08/08/2023 4.6 3.4 - 5.3 mmol/L Final   05/05/2022 4.4 3.5 - 5.0 mmol/L Final     Chloride   Date Value Ref Range Status   08/08/2023 106 98 - 107 mmol/L Final   05/02/2022 109 (H) 98 - 107 mmol/L Final     Carbon Dioxide (CO2)   Date Value Ref Range Status   08/08/2023 25 22 - 29 mmol/L Final   05/02/2022 25 22 - 31 mmol/L Final     Anion Gap   Date Value Ref Range Status   08/08/2023 11 7 - 15 mmol/L Final   05/02/2022 8 5 - 18 mmol/L Final     Glucose   Date Value Ref Range Status   08/08/2023 138 (H) 70 - 99 mg/dL Final   05/02/2022 133 (H) 70 - 125 mg/dL Final     GLUCOSE BY METER POCT   Date Value Ref Range Status   08/03/2023 151 (H) 70 - 99 mg/dL Final     Urea Nitrogen   Date Value Ref Range Status   08/08/2023 17.4 8.0 - 23.0 mg/dL Final   05/02/2022 11 8 - 28 mg/dL Final     Creatinine   Date Value Ref Range Status   08/08/2023 0.88 0.67 - 1.17 mg/dL Final     GFR Estimate   Date Value Ref Range Status   08/08/2023 >90 >60 mL/min/1.73m2 Final   06/10/2021 >60 >60 mL/min/1.73m2 Final     Calcium   Date Value Ref Range Status   08/08/2023 9.5 8.8 - 10.2 mg/dL Final       This note has been dictated using voice recognition software. Any grammatical or context distortions are unintentional and inherent to the  software    Electronically signed by: Kylie Gomez CNP       Sincerely,        Kylie Gomez, NP

## 2023-08-23 ENCOUNTER — ANTICOAGULATION THERAPY VISIT (OUTPATIENT)
Dept: ANTICOAGULATION | Facility: CLINIC | Age: 73
End: 2023-08-23
Payer: MEDICARE

## 2023-08-23 ENCOUNTER — LAB REQUISITION (OUTPATIENT)
Dept: LAB | Facility: CLINIC | Age: 73
End: 2023-08-23

## 2023-08-23 DIAGNOSIS — N39.0 URINARY TRACT INFECTION, SITE NOT SPECIFIED: ICD-10-CM

## 2023-08-23 DIAGNOSIS — I48.0 PAROXYSMAL ATRIAL FIBRILLATION (H): Primary | ICD-10-CM

## 2023-08-23 LAB
ANION GAP SERPL CALCULATED.3IONS-SCNC: 13 MMOL/L (ref 7–15)
BACTERIA UR CULT: NO GROWTH
BUN SERPL-MCNC: 17.7 MG/DL (ref 8–23)
CALCIUM SERPL-MCNC: 8.8 MG/DL (ref 8.8–10.2)
CHLORIDE SERPL-SCNC: 106 MMOL/L (ref 98–107)
CREAT SERPL-MCNC: 0.84 MG/DL (ref 0.67–1.17)
DEPRECATED HCO3 PLAS-SCNC: 23 MMOL/L (ref 22–29)
ERYTHROCYTE [DISTWIDTH] IN BLOOD BY AUTOMATED COUNT: 14.2 % (ref 10–15)
GFR SERPL CREATININE-BSD FRML MDRD: >90 ML/MIN/1.73M2
GLUCOSE SERPL-MCNC: 142 MG/DL (ref 70–99)
HCT VFR BLD AUTO: 40.4 % (ref 40–53)
HGB BLD-MCNC: 12.8 G/DL (ref 13.3–17.7)
INR (EXTERNAL): 2.9 (ref 0.9–1.1)
MCH RBC QN AUTO: 31.3 PG (ref 26.5–33)
MCHC RBC AUTO-ENTMCNC: 31.7 G/DL (ref 31.5–36.5)
MCV RBC AUTO: 99 FL (ref 78–100)
PLATELET # BLD AUTO: 272 10E3/UL (ref 150–450)
POTASSIUM SERPL-SCNC: 4.1 MMOL/L (ref 3.4–5.3)
RBC # BLD AUTO: 4.09 10E6/UL (ref 4.4–5.9)
SODIUM SERPL-SCNC: 142 MMOL/L (ref 136–145)
WBC # BLD AUTO: 7.4 10E3/UL (ref 4–11)

## 2023-08-23 PROCEDURE — 85027 COMPLETE CBC AUTOMATED: CPT | Performed by: NURSE PRACTITIONER

## 2023-08-23 PROCEDURE — 80048 BASIC METABOLIC PNL TOTAL CA: CPT | Performed by: NURSE PRACTITIONER

## 2023-08-23 PROCEDURE — P9604 ONE-WAY ALLOW PRORATED TRIP: HCPCS | Performed by: NURSE PRACTITIONER

## 2023-08-23 PROCEDURE — 36415 COLL VENOUS BLD VENIPUNCTURE: CPT | Performed by: NURSE PRACTITIONER

## 2023-08-23 NOTE — PROGRESS NOTES
ANTICOAGULATION MANAGEMENT     Rakesh Samuels 72 year old male is on warfarin with therapeutic INR result. (Goal INR 2.0-3.0)    Recent labs: (last 7 days)     08/23/23  1352   INR 2.9*       ASSESSMENT     Source(s): Chart Review and Home Care/Facility Nurse     Warfarin doses taken: Warfarin taken as instructed  Diet: No new diet changes identified  Medication/supplement changes:  Cipro, last dose today  New illness, injury, or hospitalization: Cystitis  Signs or symptoms of bleeding or clotting: No  Previous result: Therapeutic last 2(+) visits  Additional findings: None       PLAN     Recommended plan for temporary change(s) affecting INR     Dosing Instructions: Continue your current warfarin dose with next INR in 5 days       Summary  As of 8/23/2023      Full warfarin instructions:  7.5 mg every Mon, Thu; 5 mg all other days   Next INR check:  8/28/2023               Telephone call with [facility] nurse who agrees to plan and repeated back plan correctly    Orders given to  Homecare nurse/facility to recheck    Education provided:   Please call back if any changes to your diet, medications or how you've been taking warfarin  Goal range and lab monitoring: goal range and significance of current result    Plan made per [facility] anticoagulation protocol    Alaina Linder RN  Anticoagulation Clinic  8/23/2023    _______________________________________________________________________     Anticoagulation Episode Summary       Current INR goal:  2.0-3.0   TTR:  82.2 % (1 y)   Target end date:  Indefinite   Send INR reminders to:  [facility] (TCU/LTC/[facility])    Indications    Paroxysmal atrial fibrillation (H) [I48.0]             Comments:  [employer] home meter- Managed by Exception             Anticoagulation Care Providers       Provider Role Specialty Phone number    Eric Pickett MD Referring Cardiovascular Disease [phone]

## 2023-08-23 NOTE — PROGRESS NOTES
Incoming fax from [facility] for Rakesh Samuels    Provider Dr. Anneliese Rees     Result date: 08/23/2023     INR result 2.9    Dose/doses taken since last INR : 7.5mg Mon, Thur; 5mg all other days    Assessment information on flowsheet:Cipro x3 days for cystitis, continues on aspirin 81mg.

## 2023-08-23 NOTE — PROGRESS NOTES
[facility] GERIATRIC SERVICES    Code Status:  DNR/DNI   Visit Type:   Chief Complaint   Patient presents with    TCU Follow Up     Facility:  [facility] ([facility]) [[id number]]         HPI: Rakesh Samuels is a 72 year old male who I am seeing today for follow up on the TCU.  Patient recently hospitalized on 7/31/2023 secondary to weakness with debility and frequent falls.  Past medical history includes proximal atrial fib on chronic anticoagulation, hypertension, diabetes mellitus type 2, hyperlipidemia and anxiety with depression.  Patient presented with a fall hitting his head on the floor.  He had increased pain in his right knee due to osteoarthritis.  Increased bilateral lower extremity weakness and imbalance.  He lives in a senior [facility].  He uses a walker at baseline.  Head CT showed no acute intracranial abnormality.  TSH was normal.  Knee x-ray showed DJD.  He was found to have superior endplate compression fractures at T2, T4 and T5.  Patient was asymptomatic.  Recommended outpatient follow-up with orthopedic surgery for possible right knee replacement.  UTI with E. coli.  He was treated with ceftriaxone and switch to oral Cipro.  Elevated troponin.  Patient without chest pain.  No cardiac history.  Normal proBNP and D-dimer.  Chronically patient reports some dyspnea on exertion.  Echo on 6/1 preserved ejection fracture 60 to 65%.  Proximal atrial fib not on rate control medication.  He is on chronic anticoagulation with Coumadin.  Essential hypertension placed on.  Hydralazine during hospitalization.  Diabetes mellitus type 2.  During hospitalization his metformin and Actos were held and he was treated with sliding scale however these were resumed at time discharge.  Anxiety with depression on Abilify, fluvoxamine and hydroxyzine.    Transitional Care Course: Today patient lying in bed.  Yesterday patient with increased lethargy.  UA UC obtained.  Patient with history of recurrent UTI.  UA appears  positive for leukocytes, mucus and bacteria.  Patient reports generalized not feeling well today.  He is currently afebrile.  He is reporting some urinary frequency.  Chronic pain in the right knee secondary to DJD.  Multiple compression fractures of the spine.  Patient denies pain in his back.        Assessment/Plan:     Compression fracture of thoracic vertebra with routine healing, unspecified thoracic vertebral level, subsequent encounter  -MRI of the thoracic spine consistent with chronic fractures.  -Monitor.    Acute cystitis without hematuria  -UA UC with leukocytes, bacteria and mucus.  -UCx pending.  -Patient symptomatic.  Start ciprofloxacin 500 mg daily x3 days.  -Encourage fluids.  -Follow-up CBC and BMP.    Type 2 diabetes mellitus with hyperosmolarity without coma, without long-term current use of insulin (H)  -Prior to admit metformin and Actos resumed.  -Consistent carb diet.  -Blood sugar checks twice daily.    Paroxysmal atrial fibrillation (H)  -Currently not rate controlled.  -Continue Coumadin for anticoagulation.  -INR today managed per the Coumadin clinic.    Acute cystitis without hematuria  -Patient treated with ceftriaxone and transition to oral Cipro.  -Patient is incontinent.  Currently asymptomatic.    Essential hypertension  -bp stable.   -As needed hydroxyzine.    Osteoarthritis of the right knee  -Follow-up outpatient with Ortho for possible work-up for right knee replacement.  -Tylenol pain.    Active Ambulatory Problems     Diagnosis Date Noted    SIRS (systemic inflammatory response syndrome) (H) 09/09/2019    Adrenal incidentaloma (H)     Diet-controlled diabetes mellitus (H)     Pericardial effusion     Lactic acidosis     Type 2 diabetes mellitus without complication, without long-term current use of insulin (H) 04/12/2020    Paroxysmal atrial fibrillation (H) 02/18/2020    Calculus of ureter 04/12/2020    Acute renal failure, unspecified acute renal failure type (H) 04/12/2020     Acute renal failure (ARF) (H) 04/12/2020    ATN (acute tubular necrosis) (H) 04/12/2020    Sepsis due to urinary tract infection (H)     Shock circulatory (H)     Metformin overdose of undetermined intent, initial encounter     Acute respiratory failure with hypoxemia (H)     Metabolic acidosis     Hyperkalemia     Hematemesis, presence of nausea not specified 04/12/2020    Calculus of kidney     Syncope and collapse 06/30/2020    Hyperglycemia     After care 07/03/2012    Age-related cataract 03/27/2021    Anemia associated with acute blood loss 06/27/2012    Balanitis 03/27/2021    Cholelithiasis without obstruction 03/27/2021    Constipation 07/03/2012    Depression with anxiety 07/03/2012    Glaucoma 06/25/2012    Hemorrhoids without complication 03/27/2021    Hyperlipidemia 06/07/2018    Loss of appetite 03/27/2021    Major depressive disorder, recurrent episode, in partial remission (H) 06/08/2018    Mixed personality disorder in adult (H) 06/08/2018    Obstructive sleep apnea 03/27/2021    Osteoarthrosis involving lower leg 06/23/2012    Periodic limb movement disorder 03/27/2021    Recurrent major depression (H) 03/27/2021    S/P ureteral stent placement 03/27/2021    Unilateral small kidney 03/27/2021    Nephrolithiasis 03/27/2021    Type 2 diabetes mellitus (H) 06/25/2012    Osteoarthritis of knee 03/27/2021    Persistent atrial fibrillation (H) 03/27/2021    Pyelonephritis 03/27/2021    Urinary tract infection without hematuria, site unspecified 04/30/2022    Sepsis without acute organ dysfunction (H) 04/30/2022    UTI (urinary tract infection) 05/01/2022    Benign prostatic hyperplasia with urinary frequency 05/07/2022    Hypomagnesemia 05/07/2022    Injury of head, initial encounter 07/31/2023    Fall at home, initial encounter 07/31/2023    E. coli urinary tract infection 08/02/2023     Resolved Ambulatory Problems     Diagnosis Date Noted    No Resolved Ambulatory Problems     Past Medical History:    Diagnosis Date    A-fib (H)     Acute hemodialysis encounter (H)     Acute kidney injury (H)     Asbestosis (H)     Atrophy of right kidney     Basal cell carcinoma     BPH without urinary obstruction     Cellulitis     Cholelithiasis     Diabetes mellitus, type 2 (H) 4/12/2020    Esophagitis     Gastritis     Hematemesis, presence of nausea not specified     Hyperlipemia     Kidney stone     Metformin overdose of undetermined intent     PTSD (post-traumatic stress disorder)     Seasonal allergies     Sleep apnea     Upper GI bleed      No Known Allergies    All Meds and Allergies reviewed in the record at the facility and is the most up-to-date.    Current Outpatient Medications   Medication Sig    acetaminophen (TYLENOL) 500 MG tablet [ACETAMINOPHEN (TYLENOL) 500 MG TABLET] Take 500 mg by mouth every 6 (six) hours as needed for pain.    amoxicillin (AMOXIL) 500 MG capsule [AMOXICILLIN (AMOXIL) 500 MG CAPSULE] Take 2,000 mg by mouth once as needed (Prior to dental work).    ARIPiprazole (ABILIFY) 2 MG tablet [ARIPIPRAZOLE (ABILIFY) 2 MG TABLET] Take 2 mg by mouth at bedtime.     aspirin 81 mg chewable tablet Take 81 mg by mouth daily    atorvastatin (LIPITOR) 10 MG tablet [ATORVASTATIN (LIPITOR) 10 MG TABLET] Take 10 mg by mouth at bedtime.    brimonidine (ALPHAGAN) 0.2 % ophthalmic solution [BRIMONIDINE (ALPHAGAN) 0.2 % OPHTHALMIC SOLUTION] Administer 1 drop to both eyes 2 (two) times a day.     dorzolamide-timolol (COSOPT) 2-0.5 % ophthalmic solution Place 1 drop into both eyes 2 times daily    dorzolamide-timolol (COSOPT) 22.3-6.8 mg/mL ophthalmic solution [DORZOLAMIDE-TIMOLOL (COSOPT) 22.3-6.8 MG/ML OPHTHALMIC SOLUTION] Administer 1 drop to both eyes 2 (two) times a day.  (Patient not taking: Reported on 8/7/2023)    fluvoxaMINE (LUVOX) 50 MG tablet [FLUVOXAMINE (LUVOX) 50 MG TABLET] Take 50 mg by mouth at bedtime.     hydrOXYzine (ATARAX) 10 MG tablet Take 10 mg by mouth 3 times daily as needed     latanoprostene bunod 0.024 % Drop Place 1 drop into both eyes At Bedtime    levomefolate calcium (L-METHYLFOLATE ORAL) [LEVOMEFOLATE CALCIUM (L-METHYLFOLATE ORAL)] Take 1.25 mg by mouth daily.     metFORMIN (GLUCOPHAGE) 1000 MG tablet Take 1,000 mg by mouth 2 times daily (with meals)    Multiple Vitamins-Minerals (CENTRUM SILVER) CHEW Take 1 tablet by mouth daily    netarsudiL (RHOPRESSA) 0.02 % Drop Place 1 drop into both eyes every evening    pioglitazone (ACTOS) 30 MG tablet Take 30 mg by mouth daily    warfarin ANTICOAGULANT (COUMADIN) 5 MG tablet TAKE 1/2 (ONE-HALF) TABLET BY MOUTH EVERY SATURDAY AND THEN 1 ONCE DAILY ALL  OTHER  DAYS  OR  AS  DIRECTED  BY  INR  CLINIC (Patient not taking: Reported on 8/7/2023)    WARFARIN SODIUM PO Take by mouth See Admin Instructions Dosing in coordination with INR results     No current facility-administered medications for this visit.       REVIEW OF SYSTEMS:   10 point review of systems reviewed and pertinent positives in the HPI.     PHYSICAL EXAMINATION:  Physical Exam     Vital signs: /79   Pulse 99   Temp 98  F (36.7  C)   Resp 16   Ht 1.829 m (6')   Wt 99.4 kg (219 lb 3.2 oz)   SpO2 100%   BMI 29.73 kg/m    General: Lethargic but arouses,, Alert, lying in bed, conversant  HEENT:Pink conjunctiva, moist oral mucosa, visual deficit of the right eye with discolor.   NECK: Supple  CVS:  S1  S2, without murmur or gallop.   LUNG: Clear to auscultation, No wheezes, rales or rhonci.  BACK: No kyphosis of the thoracic spine  ABDOMEN: Soft, with positive bowel sounds  EXTREMITIES: Moves both upper and lower extremities, no pedal edema  SKIN: Warm and dry  NEUROLOGIC: Intact  PSYCHIATRIC: Flat affect.      Labs:  All labs reviewed in the nursing home record and [employer]   @  Lab Results   Component Value Date    WBC 8.1 08/03/2023     Lab Results   Component Value Date    RBC 4.15 08/03/2023     Lab Results   Component Value Date    HGB 13.0 08/03/2023     Lab Results    Component Value Date    HCT 40.7 08/03/2023     Lab Results   Component Value Date    MCV 98 08/03/2023     Lab Results   Component Value Date    MCH 31.3 08/03/2023     Lab Results   Component Value Date    MCHC 31.9 08/03/2023     Lab Results   Component Value Date    RDW 14.0 08/03/2023     Lab Results   Component Value Date     08/03/2023        @Last Comprehensive Metabolic Panel:  Sodium   Date Value Ref Range Status   08/08/2023 142 136 - 145 mmol/L Final     Potassium   Date Value Ref Range Status   08/08/2023 4.6 3.4 - 5.3 mmol/L Final   05/05/2022 4.4 3.5 - 5.0 mmol/L Final     Chloride   Date Value Ref Range Status   08/08/2023 106 98 - 107 mmol/L Final   05/02/2022 109 (H) 98 - 107 mmol/L Final     Carbon Dioxide (CO2)   Date Value Ref Range Status   08/08/2023 25 22 - 29 mmol/L Final   05/02/2022 25 22 - 31 mmol/L Final     Anion Gap   Date Value Ref Range Status   08/08/2023 11 7 - 15 mmol/L Final   05/02/2022 8 5 - 18 mmol/L Final     Glucose   Date Value Ref Range Status   08/08/2023 138 (H) 70 - 99 mg/dL Final   05/02/2022 133 (H) 70 - 125 mg/dL Final     GLUCOSE BY METER POCT   Date Value Ref Range Status   08/03/2023 151 (H) 70 - 99 mg/dL Final     Urea Nitrogen   Date Value Ref Range Status   08/08/2023 17.4 8.0 - 23.0 mg/dL Final   05/02/2022 11 8 - 28 mg/dL Final     Creatinine   Date Value Ref Range Status   08/08/2023 0.88 0.67 - 1.17 mg/dL Final     GFR Estimate   Date Value Ref Range Status   08/08/2023 >90 >60 mL/min/1.73m2 Final   06/10/2021 >60 >60 mL/min/1.73m2 Final     Calcium   Date Value Ref Range Status   08/08/2023 9.5 8.8 - 10.2 mg/dL Final       This note has been dictated using voice recognition software. Any grammatical or context distortions are unintentional and inherent to the software    Electronically signed by: Kylie Gomez, CNP

## 2023-08-24 ENCOUNTER — TRANSITIONAL CARE UNIT VISIT (OUTPATIENT)
Dept: GERIATRICS | Facility: CLINIC | Age: 73
End: 2023-08-24
Payer: MEDICARE

## 2023-08-24 VITALS
DIASTOLIC BLOOD PRESSURE: 78 MMHG | BODY MASS INDEX: 30.04 KG/M2 | TEMPERATURE: 97.7 F | OXYGEN SATURATION: 94 % | WEIGHT: 221.8 LBS | RESPIRATION RATE: 20 BRPM | HEART RATE: 92 BPM | SYSTOLIC BLOOD PRESSURE: 140 MMHG | HEIGHT: 72 IN

## 2023-08-24 DIAGNOSIS — S22.000D COMPRESSION FRACTURE OF THORACIC VERTEBRA WITH ROUTINE HEALING, UNSPECIFIED THORACIC VERTEBRAL LEVEL, SUBSEQUENT ENCOUNTER: Primary | ICD-10-CM

## 2023-08-24 DIAGNOSIS — I10 ESSENTIAL HYPERTENSION: ICD-10-CM

## 2023-08-24 DIAGNOSIS — I48.0 PAROXYSMAL ATRIAL FIBRILLATION (H): ICD-10-CM

## 2023-08-24 DIAGNOSIS — E11.00 TYPE 2 DIABETES MELLITUS WITH HYPEROSMOLARITY WITHOUT COMA, WITHOUT LONG-TERM CURRENT USE OF INSULIN (H): ICD-10-CM

## 2023-08-24 DIAGNOSIS — N30.00 ACUTE CYSTITIS WITHOUT HEMATURIA: ICD-10-CM

## 2023-08-24 PROCEDURE — 99309 SBSQ NF CARE MODERATE MDM 30: CPT | Performed by: NURSE PRACTITIONER

## 2023-08-24 NOTE — LETTER
8/24/2023        RE: Rakesh Samuels  [address]  [facility] [address]         HEALTH GERIATRIC SERVICES    Code Status:  DNR/DNI   Visit Type:   Chief Complaint   Patient presents with     TCU Follow Up     Facility:  [facility] ([facility]) [[id number]]         HPI: Rakesh Samuels is a 72 year old male who I am seeing today for follow up on the TCU.  Patient recently hospitalized on 7/31/2023 secondary to weakness with debility and frequent falls.  Past medical history includes proximal atrial fib on chronic anticoagulation, hypertension, diabetes mellitus type 2, hyperlipidemia and anxiety with depression.  Patient presented with a fall hitting his head on the floor.  He had increased pain in his right knee due to osteoarthritis.  Increased bilateral lower extremity weakness and imbalance.  He lives in a senior [facility].  He uses a walker at baseline.  Head CT showed no acute intracranial abnormality.  TSH was normal.  Knee x-ray showed DJD.  He was found to have superior endplate compression fractures at T2, T4 and T5.  Patient was asymptomatic.  Recommended outpatient follow-up with orthopedic surgery for possible right knee replacement.  UTI with E. coli.  He was treated with ceftriaxone and switch to oral Cipro.  Elevated troponin.  Patient without chest pain.  No cardiac history.  Normal proBNP and D-dimer.  Chronically patient reports some dyspnea on exertion.  Echo on 6/1 preserved ejection fracture 60 to 65%.  Proximal atrial fib not on rate control medication.  He is on chronic anticoagulation with Coumadin.  Essential hypertension placed on.  Hydralazine during hospitalization.  Diabetes mellitus type 2.  During hospitalization his metformin and Actos were held and he was treated with sliding scale however these were resumed at time discharge.  Anxiety with depression on Abilify, fluvoxamine and hydroxyzine.    Transitional Care Course: Today patient sitting up on the edge of the bed. He is  more alert. Y UA UC obtained.  Patient with history of recurrent UTI.  UA appears positive for leukocytes, mucus and bacteria. UC showed no growth however pt symptomatic. He continues on Cipro. He is currently afebrile. Urinary frequency improving.  Chronic pain in the right knee secondary to DJD.  Multiple compression fractures of the spine.  Patient denies pain in his back.        Assessment/Plan:     Acute cystitis without hematuria  -UA UC with leukocytes, bacteria and mucus.  -UC showed no piyush.   -Patient symptomatic.  Continue ciprofloxacin 500 mg daily x3 days.  -Encourage fluids.  -Follow-up CBC and BMP unremarkable.     Compression fracture of thoracic vertebra with routine healing, unspecified thoracic vertebral level, subsequent encounter  -MRI of the thoracic spine consistent with chronic fractures.  -Monitor.    Type 2 diabetes mellitus with hyperosmolarity without coma, without long-term current use of insulin (H)  -Prior to admit metformin and Actos resumed.  -Consistent carb diet.  -Blood sugar checks twice daily.    Paroxysmal atrial fibrillation (H)  -Currently not rate controlled.  -Continue Coumadin for anticoagulation.  -INR today managed per the Coumadin clinic.    Essential hypertension  -bp stable.   -As needed hydroxyzine.    Osteoarthritis of the right knee  -Follow-up outpatient with Ortho for possible work-up for right knee replacement.  -Tylenol pain.    Active Ambulatory Problems     Diagnosis Date Noted     SIRS (systemic inflammatory response syndrome) (H) 09/09/2019     Adrenal incidentaloma (H)      Diet-controlled diabetes mellitus (H)      Pericardial effusion      Lactic acidosis      Type 2 diabetes mellitus without complication, without long-term current use of insulin (H) 04/12/2020     Paroxysmal atrial fibrillation (H) 02/18/2020     Calculus of ureter 04/12/2020     Acute renal failure, unspecified acute renal failure type (H) 04/12/2020     Acute renal failure (ARF) (H)  04/12/2020     ATN (acute tubular necrosis) (H) 04/12/2020     Sepsis due to urinary tract infection (H)      Shock circulatory (H)      Metformin overdose of undetermined intent, initial encounter      Acute respiratory failure with hypoxemia (H)      Metabolic acidosis      Hyperkalemia      Hematemesis, presence of nausea not specified 04/12/2020     Calculus of kidney      Syncope and collapse 06/30/2020     Hyperglycemia      After care 07/03/2012     Age-related cataract 03/27/2021     Anemia associated with acute blood loss 06/27/2012     Balanitis 03/27/2021     Cholelithiasis without obstruction 03/27/2021     Constipation 07/03/2012     Depression with anxiety 07/03/2012     Glaucoma 06/25/2012     Hemorrhoids without complication 03/27/2021     Hyperlipidemia 06/07/2018     Loss of appetite 03/27/2021     Major depressive disorder, recurrent episode, in partial remission (H) 06/08/2018     Mixed personality disorder in adult (H) 06/08/2018     Obstructive sleep apnea 03/27/2021     Osteoarthrosis involving lower leg 06/23/2012     Periodic limb movement disorder 03/27/2021     Recurrent major depression (H) 03/27/2021     S/P ureteral stent placement 03/27/2021     Unilateral small kidney 03/27/2021     Nephrolithiasis 03/27/2021     Type 2 diabetes mellitus (H) 06/25/2012     Osteoarthritis of knee 03/27/2021     Persistent atrial fibrillation (H) 03/27/2021     Pyelonephritis 03/27/2021     Urinary tract infection without hematuria, site unspecified 04/30/2022     Sepsis without acute organ dysfunction (H) 04/30/2022     UTI (urinary tract infection) 05/01/2022     Benign prostatic hyperplasia with urinary frequency 05/07/2022     Hypomagnesemia 05/07/2022     Injury of head, initial encounter 07/31/2023     Fall at home, initial encounter 07/31/2023     E. coli urinary tract infection 08/02/2023     Resolved Ambulatory Problems     Diagnosis Date Noted     No Resolved Ambulatory Problems     Past  Medical History:   Diagnosis Date     A-fib (H)      Acute hemodialysis encounter (H)      Acute kidney injury (H)      Asbestosis (H)      Atrophy of right kidney      Basal cell carcinoma      BPH without urinary obstruction      Cellulitis      Cholelithiasis      Diabetes mellitus, type 2 (H) 4/12/2020     Esophagitis      Gastritis      Hematemesis, presence of nausea not specified      Hyperlipemia      Kidney stone      Metformin overdose of undetermined intent      PTSD (post-traumatic stress disorder)      Seasonal allergies      Sleep apnea      Upper GI bleed      No Known Allergies    All Meds and Allergies reviewed in the record at the facility and is the most up-to-date.    Current Outpatient Medications   Medication Sig     acetaminophen (TYLENOL) 500 MG tablet [ACETAMINOPHEN (TYLENOL) 500 MG TABLET] Take 500 mg by mouth every 6 (six) hours as needed for pain.     amoxicillin (AMOXIL) 500 MG capsule [AMOXICILLIN (AMOXIL) 500 MG CAPSULE] Take 2,000 mg by mouth once as needed (Prior to dental work).     ARIPiprazole (ABILIFY) 2 MG tablet [ARIPIPRAZOLE (ABILIFY) 2 MG TABLET] Take 2 mg by mouth at bedtime.      aspirin 81 mg chewable tablet Take 81 mg by mouth daily     atorvastatin (LIPITOR) 10 MG tablet [ATORVASTATIN (LIPITOR) 10 MG TABLET] Take 10 mg by mouth at bedtime.     brimonidine (ALPHAGAN) 0.2 % ophthalmic solution [BRIMONIDINE (ALPHAGAN) 0.2 % OPHTHALMIC SOLUTION] Administer 1 drop to both eyes 2 (two) times a day.      dorzolamide-timolol (COSOPT) 2-0.5 % ophthalmic solution Place 1 drop into both eyes 2 times daily     dorzolamide-timolol (COSOPT) 22.3-6.8 mg/mL ophthalmic solution [DORZOLAMIDE-TIMOLOL (COSOPT) 22.3-6.8 MG/ML OPHTHALMIC SOLUTION] Administer 1 drop to both eyes 2 (two) times a day.  (Patient not taking: Reported on 8/7/2023)     fluvoxaMINE (LUVOX) 50 MG tablet [FLUVOXAMINE (LUVOX) 50 MG TABLET] Take 50 mg by mouth at bedtime.      hydrOXYzine (ATARAX) 10 MG tablet Take 10 mg  by mouth 3 times daily as needed     latanoprostene bunod 0.024 % Drop Place 1 drop into both eyes At Bedtime     levomefolate calcium (L-METHYLFOLATE ORAL) [LEVOMEFOLATE CALCIUM (L-METHYLFOLATE ORAL)] Take 1.25 mg by mouth daily.      metFORMIN (GLUCOPHAGE) 1000 MG tablet Take 1,000 mg by mouth 2 times daily (with meals)     Multiple Vitamins-Minerals (CENTRUM SILVER) CHEW Take 1 tablet by mouth daily     netarsudiL (RHOPRESSA) 0.02 % Drop Place 1 drop into both eyes every evening     pioglitazone (ACTOS) 30 MG tablet Take 30 mg by mouth daily     warfarin ANTICOAGULANT (COUMADIN) 5 MG tablet TAKE 1/2 (ONE-HALF) TABLET BY MOUTH EVERY SATURDAY AND THEN 1 ONCE DAILY ALL  OTHER  DAYS  OR  AS  DIRECTED  BY  INR  CLINIC (Patient not taking: Reported on 8/7/2023)     WARFARIN SODIUM PO Take by mouth See Admin Instructions Dosing in coordination with INR results     No current facility-administered medications for this visit.       REVIEW OF SYSTEMS:   10 point review of systems reviewed and pertinent positives in the HPI.     PHYSICAL EXAMINATION:  Physical Exam     Vital signs: BP (!) 140/78   Pulse 92   Temp 97.7  F (36.5  C)   Resp 20   Ht 1.829 m (6')   Wt 100.6 kg (221 lb 12.8 oz)   SpO2 94%   BMI 30.08 kg/m    General: Awake,  Alert, sitting on edge of bed,  conversant  HEENT:Pink conjunctiva, moist oral mucosa, visual deficit of the right eye with discolor.   NECK: Supple  BACK: No kyphosis of the thoracic spine  ABDOMEN: Soft, with positive bowel sounds  EXTREMITIES: Moves both upper and lower extremities, no pedal edema  SKIN: Warm and dry  NEUROLOGIC: Intact  PSYCHIATRIC: pleasant affect.      Labs:  All labs reviewed in the nursing home record and Epic   @  Lab Results   Component Value Date    WBC 8.1 08/03/2023     Lab Results   Component Value Date    RBC 4.15 08/03/2023     Lab Results   Component Value Date    HGB 13.0 08/03/2023     Lab Results   Component Value Date    HCT 40.7 08/03/2023     Lab  Results   Component Value Date    MCV 98 08/03/2023     Lab Results   Component Value Date    MCH 31.3 08/03/2023     Lab Results   Component Value Date    MCHC 31.9 08/03/2023     Lab Results   Component Value Date    RDW 14.0 08/03/2023     Lab Results   Component Value Date     08/03/2023        @Last Comprehensive Metabolic Panel:  Sodium   Date Value Ref Range Status   08/23/2023 142 136 - 145 mmol/L Final     Potassium   Date Value Ref Range Status   08/23/2023 4.1 3.4 - 5.3 mmol/L Final   05/05/2022 4.4 3.5 - 5.0 mmol/L Final     Chloride   Date Value Ref Range Status   08/23/2023 106 98 - 107 mmol/L Final   05/02/2022 109 (H) 98 - 107 mmol/L Final     Carbon Dioxide (CO2)   Date Value Ref Range Status   08/23/2023 23 22 - 29 mmol/L Final   05/02/2022 25 22 - 31 mmol/L Final     Anion Gap   Date Value Ref Range Status   08/23/2023 13 7 - 15 mmol/L Final   05/02/2022 8 5 - 18 mmol/L Final     Glucose   Date Value Ref Range Status   08/23/2023 142 (H) 70 - 99 mg/dL Final   05/02/2022 133 (H) 70 - 125 mg/dL Final     GLUCOSE BY METER POCT   Date Value Ref Range Status   08/03/2023 151 (H) 70 - 99 mg/dL Final     Urea Nitrogen   Date Value Ref Range Status   08/23/2023 17.7 8.0 - 23.0 mg/dL Final   05/02/2022 11 8 - 28 mg/dL Final     Creatinine   Date Value Ref Range Status   08/23/2023 0.84 0.67 - 1.17 mg/dL Final     GFR Estimate   Date Value Ref Range Status   08/23/2023 >90 >60 mL/min/1.73m2 Final   06/10/2021 >60 >60 mL/min/1.73m2 Final     Calcium   Date Value Ref Range Status   08/23/2023 8.8 8.8 - 10.2 mg/dL Final       This note has been dictated using voice recognition software. Any grammatical or context distortions are unintentional and inherent to the software    Electronically signed by: Kylie Gomez CNP       Sincerely,        Kylie Gomez, NP

## 2023-08-26 NOTE — PROGRESS NOTES
[facility] GERIATRIC SERVICES    Code Status:  DNR/DNI   Visit Type:   Chief Complaint   Patient presents with    TCU Follow Up     Facility:  [facility] ([facility]) [[id number]]         HPI: Rakesh Samuels is a 72 year old male who I am seeing today for follow up on the TCU.  Patient recently hospitalized on 7/31/2023 secondary to weakness with debility and frequent falls.  Past medical history includes proximal atrial fib on chronic anticoagulation, hypertension, diabetes mellitus type 2, hyperlipidemia and anxiety with depression.  Patient presented with a fall hitting his head on the floor.  He had increased pain in his right knee due to osteoarthritis.  Increased bilateral lower extremity weakness and imbalance.  He lives in a senior [facility].  He uses a walker at baseline.  Head CT showed no acute intracranial abnormality.  TSH was normal.  Knee x-ray showed DJD.  He was found to have superior endplate compression fractures at T2, T4 and T5.  Patient was asymptomatic.  Recommended outpatient follow-up with orthopedic surgery for possible right knee replacement.  UTI with E. coli.  He was treated with ceftriaxone and switch to oral Cipro.  Elevated troponin.  Patient without chest pain.  No cardiac history.  Normal proBNP and D-dimer.  Chronically patient reports some dyspnea on exertion.  Echo on 6/1 preserved ejection fracture 60 to 65%.  Proximal atrial fib not on rate control medication.  He is on chronic anticoagulation with Coumadin.  Essential hypertension placed on.  Hydralazine during hospitalization.  Diabetes mellitus type 2.  During hospitalization his metformin and Actos were held and he was treated with sliding scale however these were resumed at time discharge.  Anxiety with depression on Abilify, fluvoxamine and hydroxyzine.    Transitional Care Course: Today patient sitting up on the edge of the bed. He is more alert. Y UA UC obtained.  Patient with history of recurrent UTI.  UA appears positive  for leukocytes, mucus and bacteria. UC showed no growth however pt symptomatic. He continues on Cipro. He is currently afebrile. Urinary frequency improving.  Chronic pain in the right knee secondary to DJD.  Multiple compression fractures of the spine.  Patient denies pain in his back.        Assessment/Plan:     Acute cystitis without hematuria  -UA UC with leukocytes, bacteria and mucus.  -UC showed no piyush.   -Patient symptomatic.  Continue ciprofloxacin 500 mg daily x3 days.  -Encourage fluids.  -Follow-up CBC and BMP unremarkable.     Compression fracture of thoracic vertebra with routine healing, unspecified thoracic vertebral level, subsequent encounter  -MRI of the thoracic spine consistent with chronic fractures.  -Monitor.    Type 2 diabetes mellitus with hyperosmolarity without coma, without long-term current use of insulin (H)  -Prior to admit metformin and Actos resumed.  -Consistent carb diet.  -Blood sugar checks twice daily.    Paroxysmal atrial fibrillation (H)  -Currently not rate controlled.  -Continue Coumadin for anticoagulation.  -INR today managed per the Coumadin clinic.    Essential hypertension  -bp stable.   -As needed hydroxyzine.    Osteoarthritis of the right knee  -Follow-up outpatient with Ortho for possible work-up for right knee replacement.  -Tylenol pain.    Active Ambulatory Problems     Diagnosis Date Noted    SIRS (systemic inflammatory response syndrome) (H) 09/09/2019    Adrenal incidentaloma (H)     Diet-controlled diabetes mellitus (H)     Pericardial effusion     Lactic acidosis     Type 2 diabetes mellitus without complication, without long-term current use of insulin (H) 04/12/2020    Paroxysmal atrial fibrillation (H) 02/18/2020    Calculus of ureter 04/12/2020    Acute renal failure, unspecified acute renal failure type (H) 04/12/2020    Acute renal failure (ARF) (H) 04/12/2020    ATN (acute tubular necrosis) (H) 04/12/2020    Sepsis due to urinary tract infection  (H)     Shock circulatory (H)     Metformin overdose of undetermined intent, initial encounter     Acute respiratory failure with hypoxemia (H)     Metabolic acidosis     Hyperkalemia     Hematemesis, presence of nausea not specified 04/12/2020    Calculus of kidney     Syncope and collapse 06/30/2020    Hyperglycemia     After care 07/03/2012    Age-related cataract 03/27/2021    Anemia associated with acute blood loss 06/27/2012    Balanitis 03/27/2021    Cholelithiasis without obstruction 03/27/2021    Constipation 07/03/2012    Depression with anxiety 07/03/2012    Glaucoma 06/25/2012    Hemorrhoids without complication 03/27/2021    Hyperlipidemia 06/07/2018    Loss of appetite 03/27/2021    Major depressive disorder, recurrent episode, in partial remission (H) 06/08/2018    Mixed personality disorder in adult (H) 06/08/2018    Obstructive sleep apnea 03/27/2021    Osteoarthrosis involving lower leg 06/23/2012    Periodic limb movement disorder 03/27/2021    Recurrent major depression (H) 03/27/2021    S/P ureteral stent placement 03/27/2021    Unilateral small kidney 03/27/2021    Nephrolithiasis 03/27/2021    Type 2 diabetes mellitus (H) 06/25/2012    Osteoarthritis of knee 03/27/2021    Persistent atrial fibrillation (H) 03/27/2021    Pyelonephritis 03/27/2021    Urinary tract infection without hematuria, site unspecified 04/30/2022    Sepsis without acute organ dysfunction (H) 04/30/2022    UTI (urinary tract infection) 05/01/2022    Benign prostatic hyperplasia with urinary frequency 05/07/2022    Hypomagnesemia 05/07/2022    Injury of head, initial encounter 07/31/2023    Fall at home, initial encounter 07/31/2023    E. coli urinary tract infection 08/02/2023     Resolved Ambulatory Problems     Diagnosis Date Noted    No Resolved Ambulatory Problems     Past Medical History:   Diagnosis Date    A-fib (H)     Acute hemodialysis encounter (H)     Acute kidney injury (H)     Asbestosis (H)     Atrophy of  right kidney     Basal cell carcinoma     BPH without urinary obstruction     Cellulitis     Cholelithiasis     Diabetes mellitus, type 2 (H) 4/12/2020    Esophagitis     Gastritis     Hematemesis, presence of nausea not specified     Hyperlipemia     Kidney stone     Metformin overdose of undetermined intent     PTSD (post-traumatic stress disorder)     Seasonal allergies     Sleep apnea     Upper GI bleed      No Known Allergies    All Meds and Allergies reviewed in the record at the facility and is the most up-to-date.    Current Outpatient Medications   Medication Sig    acetaminophen (TYLENOL) 500 MG tablet [ACETAMINOPHEN (TYLENOL) 500 MG TABLET] Take 500 mg by mouth every 6 (six) hours as needed for pain.    amoxicillin (AMOXIL) 500 MG capsule [AMOXICILLIN (AMOXIL) 500 MG CAPSULE] Take 2,000 mg by mouth once as needed (Prior to dental work).    ARIPiprazole (ABILIFY) 2 MG tablet [ARIPIPRAZOLE (ABILIFY) 2 MG TABLET] Take 2 mg by mouth at bedtime.     aspirin 81 mg chewable tablet Take 81 mg by mouth daily    atorvastatin (LIPITOR) 10 MG tablet [ATORVASTATIN (LIPITOR) 10 MG TABLET] Take 10 mg by mouth at bedtime.    brimonidine (ALPHAGAN) 0.2 % ophthalmic solution [BRIMONIDINE (ALPHAGAN) 0.2 % OPHTHALMIC SOLUTION] Administer 1 drop to both eyes 2 (two) times a day.     dorzolamide-timolol (COSOPT) 2-0.5 % ophthalmic solution Place 1 drop into both eyes 2 times daily    dorzolamide-timolol (COSOPT) 22.3-6.8 mg/mL ophthalmic solution [DORZOLAMIDE-TIMOLOL (COSOPT) 22.3-6.8 MG/ML OPHTHALMIC SOLUTION] Administer 1 drop to both eyes 2 (two) times a day.  (Patient not taking: Reported on 8/7/2023)    fluvoxaMINE (LUVOX) 50 MG tablet [FLUVOXAMINE (LUVOX) 50 MG TABLET] Take 50 mg by mouth at bedtime.     hydrOXYzine (ATARAX) 10 MG tablet Take 10 mg by mouth 3 times daily as needed    latanoprostene bunod 0.024 % Drop Place 1 drop into both eyes At Bedtime    levomefolate calcium (L-METHYLFOLATE ORAL) [LEVOMEFOLATE  CALCIUM (L-METHYLFOLATE ORAL)] Take 1.25 mg by mouth daily.     metFORMIN (GLUCOPHAGE) 1000 MG tablet Take 1,000 mg by mouth 2 times daily (with meals)    Multiple Vitamins-Minerals (CENTRUM SILVER) CHEW Take 1 tablet by mouth daily    netarsudiL (RHOPRESSA) 0.02 % Drop Place 1 drop into both eyes every evening    pioglitazone (ACTOS) 30 MG tablet Take 30 mg by mouth daily    warfarin ANTICOAGULANT (COUMADIN) 5 MG tablet TAKE 1/2 (ONE-HALF) TABLET BY MOUTH EVERY SATURDAY AND THEN 1 ONCE DAILY ALL  OTHER  DAYS  OR  AS  DIRECTED  BY  INR  CLINIC (Patient not taking: Reported on 8/7/2023)    WARFARIN SODIUM PO Take by mouth See Admin Instructions Dosing in coordination with INR results     No current facility-administered medications for this visit.       REVIEW OF SYSTEMS:   10 point review of systems reviewed and pertinent positives in the HPI.     PHYSICAL EXAMINATION:  Physical Exam     Vital signs: BP (!) 140/78   Pulse 92   Temp 97.7  F (36.5  C)   Resp 20   Ht 1.829 m (6')   Wt 100.6 kg (221 lb 12.8 oz)   SpO2 94%   BMI 30.08 kg/m    General: Awake,  Alert, sitting on edge of bed,  conversant  HEENT:Pink conjunctiva, moist oral mucosa, visual deficit of the right eye with discolor.   NECK: Supple  BACK: No kyphosis of the thoracic spine  ABDOMEN: Soft, with positive bowel sounds  EXTREMITIES: Moves both upper and lower extremities, no pedal edema  SKIN: Warm and dry  NEUROLOGIC: Intact  PSYCHIATRIC: pleasant affect.      Labs:  All labs reviewed in the nursing home record and Epic   @  Lab Results   Component Value Date    WBC 8.1 08/03/2023     Lab Results   Component Value Date    RBC 4.15 08/03/2023     Lab Results   Component Value Date    HGB 13.0 08/03/2023     Lab Results   Component Value Date    HCT 40.7 08/03/2023     Lab Results   Component Value Date    MCV 98 08/03/2023     Lab Results   Component Value Date    MCH 31.3 08/03/2023     Lab Results   Component Value Date    MCHC 31.9 08/03/2023      Lab Results   Component Value Date    RDW 14.0 08/03/2023     Lab Results   Component Value Date     08/03/2023        @Last Comprehensive Metabolic Panel:  Sodium   Date Value Ref Range Status   08/23/2023 142 136 - 145 mmol/L Final     Potassium   Date Value Ref Range Status   08/23/2023 4.1 3.4 - 5.3 mmol/L Final   05/05/2022 4.4 3.5 - 5.0 mmol/L Final     Chloride   Date Value Ref Range Status   08/23/2023 106 98 - 107 mmol/L Final   05/02/2022 109 (H) 98 - 107 mmol/L Final     Carbon Dioxide (CO2)   Date Value Ref Range Status   08/23/2023 23 22 - 29 mmol/L Final   05/02/2022 25 22 - 31 mmol/L Final     Anion Gap   Date Value Ref Range Status   08/23/2023 13 7 - 15 mmol/L Final   05/02/2022 8 5 - 18 mmol/L Final     Glucose   Date Value Ref Range Status   08/23/2023 142 (H) 70 - 99 mg/dL Final   05/02/2022 133 (H) 70 - 125 mg/dL Final     GLUCOSE BY METER POCT   Date Value Ref Range Status   08/03/2023 151 (H) 70 - 99 mg/dL Final     Urea Nitrogen   Date Value Ref Range Status   08/23/2023 17.7 8.0 - 23.0 mg/dL Final   05/02/2022 11 8 - 28 mg/dL Final     Creatinine   Date Value Ref Range Status   08/23/2023 0.84 0.67 - 1.17 mg/dL Final     GFR Estimate   Date Value Ref Range Status   08/23/2023 >90 >60 mL/min/1.73m2 Final   06/10/2021 >60 >60 mL/min/1.73m2 Final     Calcium   Date Value Ref Range Status   08/23/2023 8.8 8.8 - 10.2 mg/dL Final       This note has been dictated using voice recognition software. Any grammatical or context distortions are unintentional and inherent to the software    Electronically signed by: Kylie Gomez, CNP

## 2023-08-28 ENCOUNTER — ANTICOAGULATION THERAPY VISIT (OUTPATIENT)
Dept: ANTICOAGULATION | Facility: CLINIC | Age: 73
End: 2023-08-28
Payer: MEDICARE

## 2023-08-28 DIAGNOSIS — I48.0 PAROXYSMAL ATRIAL FIBRILLATION (H): Primary | ICD-10-CM

## 2023-08-28 LAB — INR (EXTERNAL): 2.8 (ref 0.9–1.1)

## 2023-08-28 NOTE — PROGRESS NOTES
Incoming fax from [facility] for Rakesh Samuels    Provider Dr. Anneliese Rees     Result date: 08/28/2023     INR result 2.8    Dose/doses taken since last INR : 7.5mg Mon/Thur; 5mg AOD    Assessment information on flowsheet: Questions were not filled out

## 2023-08-28 NOTE — PROGRESS NOTES
ANTICOAGULATION MANAGEMENT     Rakesh Samuels 73 year old male is on warfarin with therapeutic INR result. (Goal INR 2.0-3.0)    Recent labs: (last 7 days)     08/28/23  1004   INR 2.8*       ASSESSMENT     Source(s): Chart Review and Home Care/Facility Nurse     Warfarin doses taken: Warfarin taken as instructed  Diet: No new diet changes identified  Medication/supplement changes: None noted  New illness, injury, or hospitalization: No  Signs or symptoms of bleeding or clotting: No  Previous result: Therapeutic last 2(+) visits  Additional findings: None       PLAN     Recommended plan for no diet, medication or health factor changes affecting INR     Dosing Instructions: Continue your current warfarin dose with next INR in 10 days       Summary  As of 8/28/2023      Full warfarin instructions:  7.5 mg every Mon, Thu; 5 mg all other days   Next INR check:  9/6/2023               Telephone call with [facility] nurse (medical care for seniors) who agrees to plan and repeated back plan correctly    Orders given to  Homecare nurse/facility to recheck    Education provided:   None required    Plan made per [facility] anticoagulation protocol    Shirley Cruz, RN  Anticoagulation Clinic  8/28/2023    _______________________________________________________________________     Anticoagulation Episode Summary       Current INR goal:  2.0-3.0   TTR:  82.2 % (1 y)   Target end date:  Indefinite   Send INR reminders to:  [facility] (TCU/LTC/[facility])    Indications    Paroxysmal atrial fibrillation (H) [I48.0]             Comments:  [employer] home meter- Managed by Exception             Anticoagulation Care Providers       Provider Role Specialty Phone number    Eric Pickett MD Referring Cardiovascular Disease [phone]

## 2023-08-29 ENCOUNTER — TRANSITIONAL CARE UNIT VISIT (OUTPATIENT)
Dept: GERIATRICS | Facility: CLINIC | Age: 73
End: 2023-08-29
Payer: MEDICARE

## 2023-08-29 VITALS
SYSTOLIC BLOOD PRESSURE: 124 MMHG | DIASTOLIC BLOOD PRESSURE: 62 MMHG | TEMPERATURE: 98.1 F | BODY MASS INDEX: 30.12 KG/M2 | HEIGHT: 72 IN | OXYGEN SATURATION: 95 % | WEIGHT: 222.4 LBS | HEART RATE: 101 BPM | RESPIRATION RATE: 18 BRPM

## 2023-08-29 DIAGNOSIS — N30.00 ACUTE CYSTITIS WITHOUT HEMATURIA: ICD-10-CM

## 2023-08-29 DIAGNOSIS — E11.00 TYPE 2 DIABETES MELLITUS WITH HYPEROSMOLARITY WITHOUT COMA, WITHOUT LONG-TERM CURRENT USE OF INSULIN (H): ICD-10-CM

## 2023-08-29 DIAGNOSIS — I10 ESSENTIAL HYPERTENSION: ICD-10-CM

## 2023-08-29 DIAGNOSIS — I48.0 PAROXYSMAL ATRIAL FIBRILLATION (H): ICD-10-CM

## 2023-08-29 DIAGNOSIS — S22.000D COMPRESSION FRACTURE OF THORACIC VERTEBRA WITH ROUTINE HEALING, UNSPECIFIED THORACIC VERTEBRAL LEVEL, SUBSEQUENT ENCOUNTER: Primary | ICD-10-CM

## 2023-08-29 PROCEDURE — 99316 NF DSCHRG MGMT 30 MIN+: CPT | Performed by: NURSE PRACTITIONER

## 2023-08-29 NOTE — LETTER
8/29/2023        RE: Rakesh Samuels  [address]  [facility] [address]         [facility]    Code Status:  DNR/DNI   Visit Type:   Chief Complaint   Patient presents with     Discharge from TCU     Facility:  [facility] ([facility]) [[id number]]         HPI: Rakesh Samuels is a 72 year old male who I am seeing today for discharge from   the TCU.  Patient recently hospitalized on 7/31/2023 secondary to weakness with debility and frequent falls.  Past medical history includes proximal atrial fib on chronic anticoagulation, hypertension, diabetes mellitus type 2, hyperlipidemia and anxiety with depression.  Patient presented with a fall hitting his head on the floor.  He had increased pain in his right knee due to osteoarthritis.  Increased bilateral lower extremity weakness and imbalance.  He lives in a senior [facility].  He uses a walker at baseline.  Head CT showed no acute intracranial abnormality.  TSH was normal.  Knee x-ray showed DJD.  He was found to have superior endplate compression fractures at T2, T4 and T5.  Patient was asymptomatic.  Recommended outpatient follow-up with orthopedic surgery for possible right knee replacement.  UTI with E. coli.  He was treated with ceftriaxone and switch to oral Cipro.  Elevated troponin.  Patient without chest pain.  No cardiac history.  Normal proBNP and D-dimer.  Chronically patient reports some dyspnea on exertion.  Echo on 6/1 preserved ejection fracture 60 to 65%.  Proximal atrial fib not on rate control medication.  He is on chronic anticoagulation with Coumadin.  Essential hypertension placed on.  Hydralazine during hospitalization.  Diabetes mellitus type 2.  During hospitalization his metformin and Actos were held and he was treated with sliding scale however these were resumed at time discharge.  Anxiety with depression on Abilify, fluvoxamine and hydroxyzine.    Transitional Care Course: Today patient is lying in bed. Recent fall.  Chronic right knee pain. He continues on tylenol for pain. Patient with history of recurrent UTI. Pt with increased lethargy at the TCU. UA positive for leukocytes, mucus and bacteria. UC showed no growth however pt symptomatic. He was treated with short course of Cipro.   Multiple compression fractures of the spine.  Patient denies pain in his back.    Assessment/Plan:     Acute cystitis without hematuria  -UA UC with leukocytes, bacteria and mucus.  -UC showed no piyush.   -Patient symptomatic and treated with Cipro.   -Encourage fluids.  -Follow-up CBC and BMP unremarkable.     Compression fracture of thoracic vertebra with routine healing, unspecified thoracic vertebral level, subsequent encounter  -MRI of the thoracic spine consistent with chronic fractures.  -Follow up out pt with spine.     Type 2 diabetes mellitus with hyperosmolarity without coma, without long-term current use of insulin (H)  -Prior to admit metformin and Actos resumed.  -Consistent carb diet.  -Blood sugar checks twice daily.    Paroxysmal atrial fibrillation (H)  -Currently not rate controlled.  -Continue Coumadin for anticoagulation.  -INR today managed per the Coumadin clinic.    Essential hypertension  -bp stable.   -As needed hydroxyzine.    Osteoarthritis of the right knee  -Follow-up outpatient with Ortho for possible work-up for right knee replacement.  -Tylenol pain.    -ok to discharge home with current meds and treatments.   -Home PT, OT, HHA and RN for medication management/INRs draws.   -Follow up with PCP in 1-2 weeks.     Active Ambulatory Problems     Diagnosis Date Noted     SIRS (systemic inflammatory response syndrome) (H) 09/09/2019     Adrenal incidentaloma (H)      Diet-controlled diabetes mellitus (H)      Pericardial effusion      Lactic acidosis      Type 2 diabetes mellitus without complication, without long-term current use of insulin (H) 04/12/2020     Paroxysmal atrial fibrillation (H) 02/18/2020     Calculus of  ureter 04/12/2020     Acute renal failure, unspecified acute renal failure type (H) 04/12/2020     Acute renal failure (ARF) (H) 04/12/2020     ATN (acute tubular necrosis) (H) 04/12/2020     Sepsis due to urinary tract infection (H)      Shock circulatory (H)      Metformin overdose of undetermined intent, initial encounter      Acute respiratory failure with hypoxemia (H)      Metabolic acidosis      Hyperkalemia      Hematemesis, presence of nausea not specified 04/12/2020     Calculus of kidney      Syncope and collapse 06/30/2020     Hyperglycemia      After care 07/03/2012     Age-related cataract 03/27/2021     Anemia associated with acute blood loss 06/27/2012     Balanitis 03/27/2021     Cholelithiasis without obstruction 03/27/2021     Constipation 07/03/2012     Depression with anxiety 07/03/2012     Glaucoma 06/25/2012     Hemorrhoids without complication 03/27/2021     Hyperlipidemia 06/07/2018     Loss of appetite 03/27/2021     Major depressive disorder, recurrent episode, in partial remission (H) 06/08/2018     Mixed personality disorder in adult (H) 06/08/2018     Obstructive sleep apnea 03/27/2021     Osteoarthrosis involving lower leg 06/23/2012     Periodic limb movement disorder 03/27/2021     Recurrent major depression (H) 03/27/2021     S/P ureteral stent placement 03/27/2021     Unilateral small kidney 03/27/2021     Nephrolithiasis 03/27/2021     Type 2 diabetes mellitus (H) 06/25/2012     Osteoarthritis of knee 03/27/2021     Persistent atrial fibrillation (H) 03/27/2021     Pyelonephritis 03/27/2021     Urinary tract infection without hematuria, site unspecified 04/30/2022     Sepsis without acute organ dysfunction (H) 04/30/2022     UTI (urinary tract infection) 05/01/2022     Benign prostatic hyperplasia with urinary frequency 05/07/2022     Hypomagnesemia 05/07/2022     Injury of head, initial encounter 07/31/2023     Fall at home, initial encounter 07/31/2023     E. coli urinary tract  infection 08/02/2023     Resolved Ambulatory Problems     Diagnosis Date Noted     No Resolved Ambulatory Problems     Past Medical History:   Diagnosis Date     A-fib (H)      Acute hemodialysis encounter (H)      Acute kidney injury (H)      Asbestosis (H)      Atrophy of right kidney      Basal cell carcinoma      BPH without urinary obstruction      Cellulitis      Cholelithiasis      Diabetes mellitus, type 2 (H) 4/12/2020     Esophagitis      Gastritis      Hematemesis, presence of nausea not specified      Hyperlipemia      Kidney stone      Metformin overdose of undetermined intent      PTSD (post-traumatic stress disorder)      Seasonal allergies      Sleep apnea      Upper GI bleed      No Known Allergies    All Meds and Allergies reviewed in the record at the facility and is the most up-to-date.    Current Outpatient Medications   Medication Sig     acetaminophen (TYLENOL) 500 MG tablet [ACETAMINOPHEN (TYLENOL) 500 MG TABLET] Take 500 mg by mouth every 6 (six) hours as needed for pain.     amoxicillin (AMOXIL) 500 MG capsule [AMOXICILLIN (AMOXIL) 500 MG CAPSULE] Take 2,000 mg by mouth once as needed (Prior to dental work).     ARIPiprazole (ABILIFY) 2 MG tablet [ARIPIPRAZOLE (ABILIFY) 2 MG TABLET] Take 2 mg by mouth at bedtime.      aspirin 81 mg chewable tablet Take 81 mg by mouth daily     atorvastatin (LIPITOR) 10 MG tablet [ATORVASTATIN (LIPITOR) 10 MG TABLET] Take 10 mg by mouth at bedtime.     brimonidine (ALPHAGAN) 0.2 % ophthalmic solution [BRIMONIDINE (ALPHAGAN) 0.2 % OPHTHALMIC SOLUTION] Administer 1 drop to both eyes 2 (two) times a day.      dorzolamide-timolol (COSOPT) 2-0.5 % ophthalmic solution Place 1 drop into both eyes 2 times daily     dorzolamide-timolol (COSOPT) 22.3-6.8 mg/mL ophthalmic solution [DORZOLAMIDE-TIMOLOL (COSOPT) 22.3-6.8 MG/ML OPHTHALMIC SOLUTION] Administer 1 drop to both eyes 2 (two) times a day.  (Patient not taking: Reported on 8/7/2023)     fluvoxaMINE (LUVOX) 50 MG  tablet [FLUVOXAMINE (LUVOX) 50 MG TABLET] Take 50 mg by mouth at bedtime.      hydrOXYzine (ATARAX) 10 MG tablet Take 10 mg by mouth 3 times daily as needed     latanoprostene bunod 0.024 % Drop Place 1 drop into both eyes At Bedtime     levomefolate calcium (L-METHYLFOLATE ORAL) [LEVOMEFOLATE CALCIUM (L-METHYLFOLATE ORAL)] Take 1.25 mg by mouth daily.      metFORMIN (GLUCOPHAGE) 1000 MG tablet Take 1,000 mg by mouth 2 times daily (with meals)     Multiple Vitamins-Minerals (CENTRUM SILVER) CHEW Take 1 tablet by mouth daily     netarsudiL (RHOPRESSA) 0.02 % Drop Place 1 drop into both eyes every evening     pioglitazone (ACTOS) 30 MG tablet Take 30 mg by mouth daily     warfarin ANTICOAGULANT (COUMADIN) 5 MG tablet TAKE 1/2 (ONE-HALF) TABLET BY MOUTH EVERY SATURDAY AND THEN 1 ONCE DAILY ALL  OTHER  DAYS  OR  AS  DIRECTED  BY  INR  CLINIC (Patient not taking: Reported on 8/7/2023)     WARFARIN SODIUM PO Take by mouth See Admin Instructions Dosing in coordination with INR results     No current facility-administered medications for this visit.       REVIEW OF SYSTEMS:   10 point review of systems reviewed and pertinent positives in the HPI.     PHYSICAL EXAMINATION:  Physical Exam     Vital signs: /62   Pulse 101   Temp 98.1  F (36.7  C)   Resp 18   Ht 1.829 m (6')   Wt 100.9 kg (222 lb 6.4 oz)   SpO2 95%   BMI 30.16 kg/m    General: Awake,  Alert, lying in bed,  conversant  HEENT:Pink conjunctiva, moist oral mucosa, visual deficit of the right eye with discolor.   NECK: Supple  BACK: No kyphosis of the thoracic spine  ABDOMEN: Soft, with positive bowel sounds  EXTREMITIES: Moves both upper and lower extremities, no pedal edema  SKIN: Warm and dry  NEUROLOGIC: Intact  PSYCHIATRIC: pleasant affect.      Labs:  All labs reviewed in the nursing home record and [facility]   @  Lab Results   Component Value Date    WBC 8.1 08/03/2023     Lab Results   Component Value Date    RBC 4.15 08/03/2023     Lab Results    Component Value Date    HGB 13.0 08/03/2023     Lab Results   Component Value Date    HCT 40.7 08/03/2023     Lab Results   Component Value Date    MCV 98 08/03/2023     Lab Results   Component Value Date    MCH 31.3 08/03/2023     Lab Results   Component Value Date    MCHC 31.9 08/03/2023     Lab Results   Component Value Date    RDW 14.0 08/03/2023     Lab Results   Component Value Date     08/03/2023        @Last Comprehensive Metabolic Panel:  Sodium   Date Value Ref Range Status   08/23/2023 142 136 - 145 mmol/L Final     Potassium   Date Value Ref Range Status   08/23/2023 4.1 3.4 - 5.3 mmol/L Final   05/05/2022 4.4 3.5 - 5.0 mmol/L Final     Chloride   Date Value Ref Range Status   08/23/2023 106 98 - 107 mmol/L Final   05/02/2022 109 (H) 98 - 107 mmol/L Final     Carbon Dioxide (CO2)   Date Value Ref Range Status   08/23/2023 23 22 - 29 mmol/L Final   05/02/2022 25 22 - 31 mmol/L Final     Anion Gap   Date Value Ref Range Status   08/23/2023 13 7 - 15 mmol/L Final   05/02/2022 8 5 - 18 mmol/L Final     Glucose   Date Value Ref Range Status   08/23/2023 142 (H) 70 - 99 mg/dL Final   05/02/2022 133 (H) 70 - 125 mg/dL Final     GLUCOSE BY METER POCT   Date Value Ref Range Status   08/03/2023 151 (H) 70 - 99 mg/dL Final     Urea Nitrogen   Date Value Ref Range Status   08/23/2023 17.7 8.0 - 23.0 mg/dL Final   05/02/2022 11 8 - 28 mg/dL Final     Creatinine   Date Value Ref Range Status   08/23/2023 0.84 0.67 - 1.17 mg/dL Final     GFR Estimate   Date Value Ref Range Status   08/23/2023 >90 >60 mL/min/1.73m2 Final   06/10/2021 >60 >60 mL/min/1.73m2 Final     Calcium   Date Value Ref Range Status   08/23/2023 8.8 8.8 - 10.2 mg/dL Final     DISCHARGE PLAN/FACE TO FACE:  I certify that this patient is under my care and that I, or a nurse practitioner or physician's assistant working with me, had a face-to-face encounter that meets the physician face-to-face encounter requirements with this patient.       I  certify that, based on my findings, the following services are medically necessary home health services.    My clinical findings support the need for the above skilled services.    This patient is homebound because: Recent fall with weakness, UTI, chronic compression fractures.     The patient is, or has been, under my care and I have initiated the establishment of the plan of care. This patient will be followed by a physician who will periodically review the plan of care.    35 minutes spent for this visit which included reviewing discharge medications, home care services and follow ups as well as collaborating with nursing and SW.     This note has been dictated using voice recognition software. Any grammatical or context distortions are unintentional and inherent to the software    Electronically signed by: Kylie Gomez CNP       Sincerely,        Kylie Gomez NP

## 2023-08-30 NOTE — PROGRESS NOTES
[facility] GERIATRIC SERVICES    Code Status:  DNR/DNI   Visit Type:   Chief Complaint   Patient presents with    TCU follow up      Facility:  [facility] ([facility]) [[id number]]         HPI: Rakesh Samuels is a 72 year old male who I am seeing today for follow up on  the TCU.  Patient recently hospitalized on 7/31/2023 secondary to weakness with debility and frequent falls.  Past medical history includes proximal atrial fib on chronic anticoagulation, hypertension, diabetes mellitus type 2, hyperlipidemia and anxiety with depression.  Patient presented with a fall hitting his head on the floor.  He had increased pain in his right knee due to osteoarthritis.  Increased bilateral lower extremity weakness and imbalance.  He lives in a senior [facility].  He uses a walker at baseline.  Head CT showed no acute intracranial abnormality.  TSH was normal.  Knee x-ray showed DJD.  He was found to have superior endplate compression fractures at T2, T4 and T5.  Patient was asymptomatic.  Recommended outpatient follow-up with orthopedic surgery for possible right knee replacement.  UTI with E. coli.  He was treated with ceftriaxone and switch to oral Cipro.  Elevated troponin.  Patient without chest pain.  No cardiac history.  Normal proBNP and D-dimer.  Chronically patient reports some dyspnea on exertion.  Echo on 6/1 preserved ejection fracture 60 to 65%.  Proximal atrial fib not on rate control medication.  He is on chronic anticoagulation with Coumadin.  Essential hypertension placed on.  Hydralazine during hospitalization.  Diabetes mellitus type 2.  During hospitalization his metformin and Actos were held and he was treated with sliding scale however these were resumed at time discharge.  Anxiety with depression on Abilify, fluvoxamine and hydroxyzine.    Transitional Care Course: Today patient is lying in bed. Recent fall. Chronic right knee pain. He continues on tylenol for pain. Patient with history of recurrent UTI. Pt  with increased lethargy at the TCU. UA positive for leukocytes, mucus and bacteria. UC showed no growth however pt symptomatic. He was treated with short course of Cipro.   Multiple compression fractures of the spine.  Patient denies pain in his back.    Assessment/Plan:     Acute cystitis without hematuria  -UA UC with leukocytes, bacteria and mucus.  -UC showed no piyush.   -Patient symptomatic and treated with Cipro.   -Encourage fluids.  -Follow-up CBC and BMP unremarkable.     Compression fracture of thoracic vertebra with routine healing, unspecified thoracic vertebral level, subsequent encounter  -MRI of the thoracic spine consistent with chronic fractures.  -Follow up out pt with spine.     Type 2 diabetes mellitus with hyperosmolarity without coma, without long-term current use of insulin (H)  -Prior to admit metformin and Actos resumed.  -Consistent carb diet.  -Blood sugar checks twice daily.    Paroxysmal atrial fibrillation (H)  -Currently not rate controlled.  -Continue Coumadin for anticoagulation.  -INR today managed per the Coumadin clinic.    Essential hypertension  -bp stable.   -As needed hydroxyzine.    Osteoarthritis of the right knee  -Follow-up outpatient with Ortho for possible work-up for right knee replacement.  -Tylenol pain.    Active Ambulatory Problems     Diagnosis Date Noted    SIRS (systemic inflammatory response syndrome) (H) 09/09/2019    Adrenal incidentaloma (H)     Diet-controlled diabetes mellitus (H)     Pericardial effusion     Lactic acidosis     Type 2 diabetes mellitus without complication, without long-term current use of insulin (H) 04/12/2020    Paroxysmal atrial fibrillation (H) 02/18/2020    Calculus of ureter 04/12/2020    Acute renal failure, unspecified acute renal failure type (H) 04/12/2020    Acute renal failure (ARF) (H) 04/12/2020    ATN (acute tubular necrosis) (H) 04/12/2020    Sepsis due to urinary tract infection (H)     Shock circulatory (H)     Metformin  overdose of undetermined intent, initial encounter     Acute respiratory failure with hypoxemia (H)     Metabolic acidosis     Hyperkalemia     Hematemesis, presence of nausea not specified 04/12/2020    Calculus of kidney     Syncope and collapse 06/30/2020    Hyperglycemia     After care 07/03/2012    Age-related cataract 03/27/2021    Anemia associated with acute blood loss 06/27/2012    Balanitis 03/27/2021    Cholelithiasis without obstruction 03/27/2021    Constipation 07/03/2012    Depression with anxiety 07/03/2012    Glaucoma 06/25/2012    Hemorrhoids without complication 03/27/2021    Hyperlipidemia 06/07/2018    Loss of appetite 03/27/2021    Major depressive disorder, recurrent episode, in partial remission (H) 06/08/2018    Mixed personality disorder in adult (H) 06/08/2018    Obstructive sleep apnea 03/27/2021    Osteoarthrosis involving lower leg 06/23/2012    Periodic limb movement disorder 03/27/2021    Recurrent major depression (H) 03/27/2021    S/P ureteral stent placement 03/27/2021    Unilateral small kidney 03/27/2021    Nephrolithiasis 03/27/2021    Type 2 diabetes mellitus (H) 06/25/2012    Osteoarthritis of knee 03/27/2021    Persistent atrial fibrillation (H) 03/27/2021    Pyelonephritis 03/27/2021    Urinary tract infection without hematuria, site unspecified 04/30/2022    Sepsis without acute organ dysfunction (H) 04/30/2022    UTI (urinary tract infection) 05/01/2022    Benign prostatic hyperplasia with urinary frequency 05/07/2022    Hypomagnesemia 05/07/2022    Injury of head, initial encounter 07/31/2023    Fall at home, initial encounter 07/31/2023    E. coli urinary tract infection 08/02/2023     Resolved Ambulatory Problems     Diagnosis Date Noted    No Resolved Ambulatory Problems     Past Medical History:   Diagnosis Date    A-fib (H)     Acute hemodialysis encounter (H)     Acute kidney injury (H)     Asbestosis (H)     Atrophy of right kidney     Basal cell carcinoma     BPH  without urinary obstruction     Cellulitis     Cholelithiasis     Diabetes mellitus, type 2 (H) 4/12/2020    Esophagitis     Gastritis     Hematemesis, presence of nausea not specified     Hyperlipemia     Kidney stone     Metformin overdose of undetermined intent     PTSD (post-traumatic stress disorder)     Seasonal allergies     Sleep apnea     Upper GI bleed      No Known Allergies    All Meds and Allergies reviewed in the record at the facility and is the most up-to-date.    Current Outpatient Medications   Medication Sig    acetaminophen (TYLENOL) 500 MG tablet [ACETAMINOPHEN (TYLENOL) 500 MG TABLET] Take 500 mg by mouth every 6 (six) hours as needed for pain.    amoxicillin (AMOXIL) 500 MG capsule [AMOXICILLIN (AMOXIL) 500 MG CAPSULE] Take 2,000 mg by mouth once as needed (Prior to dental work).    ARIPiprazole (ABILIFY) 2 MG tablet [ARIPIPRAZOLE (ABILIFY) 2 MG TABLET] Take 2 mg by mouth at bedtime.     aspirin 81 mg chewable tablet Take 81 mg by mouth daily    atorvastatin (LIPITOR) 10 MG tablet [ATORVASTATIN (LIPITOR) 10 MG TABLET] Take 10 mg by mouth at bedtime.    brimonidine (ALPHAGAN) 0.2 % ophthalmic solution [BRIMONIDINE (ALPHAGAN) 0.2 % OPHTHALMIC SOLUTION] Administer 1 drop to both eyes 2 (two) times a day.     dorzolamide-timolol (COSOPT) 2-0.5 % ophthalmic solution Place 1 drop into both eyes 2 times daily    dorzolamide-timolol (COSOPT) 22.3-6.8 mg/mL ophthalmic solution [DORZOLAMIDE-TIMOLOL (COSOPT) 22.3-6.8 MG/ML OPHTHALMIC SOLUTION] Administer 1 drop to both eyes 2 (two) times a day.  (Patient not taking: Reported on 8/7/2023)    fluvoxaMINE (LUVOX) 50 MG tablet [FLUVOXAMINE (LUVOX) 50 MG TABLET] Take 50 mg by mouth at bedtime.     hydrOXYzine (ATARAX) 10 MG tablet Take 10 mg by mouth 3 times daily as needed    latanoprostene bunod 0.024 % Drop Place 1 drop into both eyes At Bedtime    levomefolate calcium (L-METHYLFOLATE ORAL) [LEVOMEFOLATE CALCIUM (L-METHYLFOLATE ORAL)] Take 1.25 mg by mouth  daily.     metFORMIN (GLUCOPHAGE) 1000 MG tablet Take 1,000 mg by mouth 2 times daily (with meals)    Multiple Vitamins-Minerals (CENTRUM SILVER) CHEW Take 1 tablet by mouth daily    netarsudiL (RHOPRESSA) 0.02 % Drop Place 1 drop into both eyes every evening    pioglitazone (ACTOS) 30 MG tablet Take 30 mg by mouth daily    warfarin ANTICOAGULANT (COUMADIN) 5 MG tablet TAKE 1/2 (ONE-HALF) TABLET BY MOUTH EVERY SATURDAY AND THEN 1 ONCE DAILY ALL  OTHER  DAYS  OR  AS  DIRECTED  BY  INR  CLINIC (Patient not taking: Reported on 8/7/2023)    WARFARIN SODIUM PO Take by mouth See Admin Instructions Dosing in coordination with INR results     No current facility-administered medications for this visit.       REVIEW OF SYSTEMS:   10 point review of systems reviewed and pertinent positives in the HPI.     PHYSICAL EXAMINATION:  Physical Exam     Vital signs: /62   Pulse 101   Temp 98.1  F (36.7  C)   Resp 18   Ht 1.829 m (6')   Wt 100.9 kg (222 lb 6.4 oz)   SpO2 95%   BMI 30.16 kg/m    General: Awake,  Alert, lying in bed,  conversant  HEENT:Pink conjunctiva, moist oral mucosa, visual deficit of the right eye with discolor.   NECK: Supple  BACK: No kyphosis of the thoracic spine  ABDOMEN: Soft, with positive bowel sounds  EXTREMITIES: Moves both upper and lower extremities, no pedal edema  SKIN: Warm and dry  NEUROLOGIC: Intact  PSYCHIATRIC: pleasant affect.      Labs:  All labs reviewed in the nursing home record and Epic   @  Lab Results   Component Value Date    WBC 8.1 08/03/2023     Lab Results   Component Value Date    RBC 4.15 08/03/2023     Lab Results   Component Value Date    HGB 13.0 08/03/2023     Lab Results   Component Value Date    HCT 40.7 08/03/2023     Lab Results   Component Value Date    MCV 98 08/03/2023     Lab Results   Component Value Date    MCH 31.3 08/03/2023     Lab Results   Component Value Date    MCHC 31.9 08/03/2023     Lab Results   Component Value Date    RDW 14.0 08/03/2023      Lab Results   Component Value Date     08/03/2023        @Last Comprehensive Metabolic Panel:  Sodium   Date Value Ref Range Status   08/23/2023 142 136 - 145 mmol/L Final     Potassium   Date Value Ref Range Status   08/23/2023 4.1 3.4 - 5.3 mmol/L Final   05/05/2022 4.4 3.5 - 5.0 mmol/L Final     Chloride   Date Value Ref Range Status   08/23/2023 106 98 - 107 mmol/L Final   05/02/2022 109 (H) 98 - 107 mmol/L Final     Carbon Dioxide (CO2)   Date Value Ref Range Status   08/23/2023 23 22 - 29 mmol/L Final   05/02/2022 25 22 - 31 mmol/L Final     Anion Gap   Date Value Ref Range Status   08/23/2023 13 7 - 15 mmol/L Final   05/02/2022 8 5 - 18 mmol/L Final     Glucose   Date Value Ref Range Status   08/23/2023 142 (H) 70 - 99 mg/dL Final   05/02/2022 133 (H) 70 - 125 mg/dL Final     GLUCOSE BY METER POCT   Date Value Ref Range Status   08/03/2023 151 (H) 70 - 99 mg/dL Final     Urea Nitrogen   Date Value Ref Range Status   08/23/2023 17.7 8.0 - 23.0 mg/dL Final   05/02/2022 11 8 - 28 mg/dL Final     Creatinine   Date Value Ref Range Status   08/23/2023 0.84 0.67 - 1.17 mg/dL Final     GFR Estimate   Date Value Ref Range Status   08/23/2023 >90 >60 mL/min/1.73m2 Final   06/10/2021 >60 >60 mL/min/1.73m2 Final     Calcium   Date Value Ref Range Status   08/23/2023 8.8 8.8 - 10.2 mg/dL Final       This note has been dictated using voice recognition software. Any grammatical or context distortions are unintentional and inherent to the software    Electronically signed by: Kylie Gomez, CNP

## 2023-08-31 ENCOUNTER — TRANSITIONAL CARE UNIT VISIT (OUTPATIENT)
Dept: GERIATRICS | Facility: CLINIC | Age: 73
End: 2023-08-31
Payer: MEDICARE

## 2023-08-31 VITALS
RESPIRATION RATE: 18 BRPM | HEIGHT: 72 IN | SYSTOLIC BLOOD PRESSURE: 158 MMHG | WEIGHT: 224 LBS | TEMPERATURE: 97.4 F | HEART RATE: 82 BPM | OXYGEN SATURATION: 96 % | BODY MASS INDEX: 30.34 KG/M2 | DIASTOLIC BLOOD PRESSURE: 95 MMHG

## 2023-08-31 DIAGNOSIS — I48.0 PAROXYSMAL ATRIAL FIBRILLATION (H): ICD-10-CM

## 2023-08-31 DIAGNOSIS — R55 SYNCOPE AND COLLAPSE: ICD-10-CM

## 2023-08-31 DIAGNOSIS — S22.000D COMPRESSION FRACTURE OF THORACIC VERTEBRA WITH ROUTINE HEALING, UNSPECIFIED THORACIC VERTEBRAL LEVEL, SUBSEQUENT ENCOUNTER: Primary | ICD-10-CM

## 2023-08-31 DIAGNOSIS — N30.00 ACUTE CYSTITIS WITHOUT HEMATURIA: ICD-10-CM

## 2023-08-31 DIAGNOSIS — I10 ESSENTIAL HYPERTENSION: ICD-10-CM

## 2023-08-31 DIAGNOSIS — E11.00 TYPE 2 DIABETES MELLITUS WITH HYPEROSMOLARITY WITHOUT COMA, WITHOUT LONG-TERM CURRENT USE OF INSULIN (H): ICD-10-CM

## 2023-08-31 PROCEDURE — 99309 SBSQ NF CARE MODERATE MDM 30: CPT | Performed by: NURSE PRACTITIONER

## 2023-08-31 NOTE — LETTER
8/31/2023        RE: Rakesh Samuels  [address]  [facility] [address]         HEALTH GERIATRIC SERVICES    Code Status:  DNR/DNI   Visit Type:   Chief Complaint   Patient presents with     TCU Follow Up     Facility:  [facility] ([facility]) [[id number]]         HPI: Rakesh Samuels is a 72 year old male who I am seeing today for follow up on the TCU.  Patient recently hospitalized on 7/31/2023 secondary to weakness with debility and frequent falls.  Past medical history includes proximal atrial fib on chronic anticoagulation, hypertension, diabetes mellitus type 2, hyperlipidemia and anxiety with depression.  Patient presented with a fall hitting his head on the floor.  He had increased pain in his right knee due to osteoarthritis.  Increased bilateral lower extremity weakness and imbalance.  He lives in a senior [facility].  He uses a walker at baseline.  Head CT showed no acute intracranial abnormality.  TSH was normal.  Knee x-ray showed DJD.  He was found to have superior endplate compression fractures at T2, T4 and T5.  Patient was asymptomatic.  Recommended outpatient follow-up with orthopedic surgery for possible right knee replacement.  UTI with E. coli.  He was treated with ceftriaxone and switch to oral Cipro.  Elevated troponin.  Patient without chest pain.  No cardiac history.  Normal proBNP and D-dimer.  Chronically patient reports some dyspnea on exertion.  Echo on 6/1 preserved ejection fracture 60 to 65%.  Proximal atrial fib not on rate control medication.  He is on chronic anticoagulation with Coumadin.  Essential hypertension placed on.  Hydralazine during hospitalization.  Diabetes mellitus type 2.  During hospitalization his metformin and Actos were held and he was treated with sliding scale however these were resumed at time discharge.  Anxiety with depression on Abilify, fluvoxamine and hydroxyzine.    Transitional Care Course: Today patient is lying in bed. Recent fall. Chronic  right knee pain. He continues on tylenol for pain. Pt completed his anbx for UTI. Currently asymptomatic. Multiple compression fractures of the spine.  Patient denies pain in his back.    Assessment/Plan:     Acute cystitis without hematuria  -UA UC with leukocytes, bacteria and mucus.  -UC showed no piyush.   -Patient symptomatic and treated with Cipro.   -Encourage fluids.  -Follow-up CBC and BMP unremarkable.     Compression fracture of thoracic vertebra with routine healing, unspecified thoracic vertebral level, subsequent encounter  -MRI of the thoracic spine consistent with chronic fractures.  -Follow up out pt with spine.     Type 2 diabetes mellitus with hyperosmolarity without coma, without long-term current use of insulin (H)  -Prior to admit metformin and Actos resumed.  -Consistent carb diet.  -Blood sugar checks twice daily.    Paroxysmal atrial fibrillation (H)  -Currently not rate controlled.  -Continue Coumadin for anticoagulation.  -INR today managed per the Coumadin clinic.    Essential hypertension  -bp stable.   -As needed hydroxyzine.    Osteoarthritis of the right knee  -Follow-up outpatient with Ortho for possible work-up for right knee replacement.  -Tylenol pain.      Active Ambulatory Problems     Diagnosis Date Noted     SIRS (systemic inflammatory response syndrome) (H) 09/09/2019     Adrenal incidentaloma (H)      Diet-controlled diabetes mellitus (H)      Pericardial effusion      Lactic acidosis      Type 2 diabetes mellitus without complication, without long-term current use of insulin (H) 04/12/2020     Paroxysmal atrial fibrillation (H) 02/18/2020     Calculus of ureter 04/12/2020     Acute renal failure, unspecified acute renal failure type (H) 04/12/2020     Acute renal failure (ARF) (H) 04/12/2020     ATN (acute tubular necrosis) (H) 04/12/2020     Sepsis due to urinary tract infection (H)      Shock circulatory (H)      Metformin overdose of undetermined intent, initial encounter       Acute respiratory failure with hypoxemia (H)      Metabolic acidosis      Hyperkalemia      Hematemesis, presence of nausea not specified 04/12/2020     Calculus of kidney      Syncope and collapse 06/30/2020     Hyperglycemia      After care 07/03/2012     Age-related cataract 03/27/2021     Anemia associated with acute blood loss 06/27/2012     Balanitis 03/27/2021     Cholelithiasis without obstruction 03/27/2021     Constipation 07/03/2012     Depression with anxiety 07/03/2012     Glaucoma 06/25/2012     Hemorrhoids without complication 03/27/2021     Hyperlipidemia 06/07/2018     Loss of appetite 03/27/2021     Major depressive disorder, recurrent episode, in partial remission (H) 06/08/2018     Mixed personality disorder in adult (H) 06/08/2018     Obstructive sleep apnea 03/27/2021     Osteoarthrosis involving lower leg 06/23/2012     Periodic limb movement disorder 03/27/2021     Recurrent major depression (H) 03/27/2021     S/P ureteral stent placement 03/27/2021     Unilateral small kidney 03/27/2021     Nephrolithiasis 03/27/2021     Type 2 diabetes mellitus (H) 06/25/2012     Osteoarthritis of knee 03/27/2021     Persistent atrial fibrillation (H) 03/27/2021     Pyelonephritis 03/27/2021     Urinary tract infection without hematuria, site unspecified 04/30/2022     Sepsis without acute organ dysfunction (H) 04/30/2022     UTI (urinary tract infection) 05/01/2022     Benign prostatic hyperplasia with urinary frequency 05/07/2022     Hypomagnesemia 05/07/2022     Injury of head, initial encounter 07/31/2023     Fall at home, initial encounter 07/31/2023     E. coli urinary tract infection 08/02/2023     Resolved Ambulatory Problems     Diagnosis Date Noted     No Resolved Ambulatory Problems     Past Medical History:   Diagnosis Date     A-fib (H)      Acute hemodialysis encounter (H)      Acute kidney injury (H)      Asbestosis (H)      Atrophy of right kidney      Basal cell carcinoma      BPH  without urinary obstruction      Cellulitis      Cholelithiasis      Diabetes mellitus, type 2 (H) 4/12/2020     Esophagitis      Gastritis      Hematemesis, presence of nausea not specified      Hyperlipemia      Kidney stone      Metformin overdose of undetermined intent      PTSD (post-traumatic stress disorder)      Seasonal allergies      Sleep apnea      Upper GI bleed      No Known Allergies    All Meds and Allergies reviewed in the record at the facility and is the most up-to-date.    Current Outpatient Medications   Medication Sig     acetaminophen (TYLENOL) 500 MG tablet [ACETAMINOPHEN (TYLENOL) 500 MG TABLET] Take 500 mg by mouth every 6 (six) hours as needed for pain.     amoxicillin (AMOXIL) 500 MG capsule [AMOXICILLIN (AMOXIL) 500 MG CAPSULE] Take 2,000 mg by mouth once as needed (Prior to dental work).     ARIPiprazole (ABILIFY) 2 MG tablet [ARIPIPRAZOLE (ABILIFY) 2 MG TABLET] Take 2 mg by mouth at bedtime.      aspirin 81 mg chewable tablet Take 81 mg by mouth daily     atorvastatin (LIPITOR) 10 MG tablet [ATORVASTATIN (LIPITOR) 10 MG TABLET] Take 10 mg by mouth at bedtime.     brimonidine (ALPHAGAN) 0.2 % ophthalmic solution [BRIMONIDINE (ALPHAGAN) 0.2 % OPHTHALMIC SOLUTION] Administer 1 drop to both eyes 2 (two) times a day.      dorzolamide-timolol (COSOPT) 2-0.5 % ophthalmic solution Place 1 drop into both eyes 2 times daily     dorzolamide-timolol (COSOPT) 22.3-6.8 mg/mL ophthalmic solution [DORZOLAMIDE-TIMOLOL (COSOPT) 22.3-6.8 MG/ML OPHTHALMIC SOLUTION] Administer 1 drop to both eyes 2 (two) times a day.  (Patient not taking: Reported on 8/7/2023)     fluvoxaMINE (LUVOX) 50 MG tablet [FLUVOXAMINE (LUVOX) 50 MG TABLET] Take 50 mg by mouth at bedtime.      hydrOXYzine (ATARAX) 10 MG tablet Take 10 mg by mouth 3 times daily as needed     latanoprostene bunod 0.024 % Drop Place 1 drop into both eyes At Bedtime     levomefolate calcium (L-METHYLFOLATE ORAL) [LEVOMEFOLATE CALCIUM (L-METHYLFOLATE  ORAL)] Take 1.25 mg by mouth daily.      metFORMIN (GLUCOPHAGE) 1000 MG tablet Take 1,000 mg by mouth 2 times daily (with meals)     Multiple Vitamins-Minerals (CENTRUM SILVER) CHEW Take 1 tablet by mouth daily     netarsudiL (RHOPRESSA) 0.02 % Drop Place 1 drop into both eyes every evening     pioglitazone (ACTOS) 30 MG tablet Take 30 mg by mouth daily     warfarin ANTICOAGULANT (COUMADIN) 5 MG tablet TAKE 1/2 (ONE-HALF) TABLET BY MOUTH EVERY SATURDAY AND THEN 1 ONCE DAILY ALL  OTHER  DAYS  OR  AS  DIRECTED  BY  INR  CLINIC (Patient not taking: Reported on 8/7/2023)     WARFARIN SODIUM PO Take by mouth See Admin Instructions Dosing in coordination with INR results     No current facility-administered medications for this visit.       REVIEW OF SYSTEMS:   10 point review of systems reviewed and pertinent positives in the HPI.     PHYSICAL EXAMINATION:  Physical Exam     Vital signs: BP (!) 158/95   Pulse 82   Temp 97.4  F (36.3  C)   Resp 18   Ht 1.829 m (6')   Wt 101.6 kg (224 lb)   SpO2 96%   BMI 30.38 kg/m    General: Awake,  Alert, lying in bed,  conversant  HEENT:Pink conjunctiva, moist oral mucosa, visual deficit of the right eye with discolor.   NECK: Supple  BACK: No kyphosis of the thoracic spine  ABDOMEN: Soft, with positive bowel sounds  EXTREMITIES: Moves both upper and lower extremities, no pedal edema  SKIN: Warm and dry  NEUROLOGIC: Intact  PSYCHIATRIC: pleasant affect.      Labs:  All labs reviewed in the nursing home record and Epic   @  Lab Results   Component Value Date    WBC 8.1 08/03/2023     Lab Results   Component Value Date    RBC 4.15 08/03/2023     Lab Results   Component Value Date    HGB 13.0 08/03/2023     Lab Results   Component Value Date    HCT 40.7 08/03/2023     Lab Results   Component Value Date    MCV 98 08/03/2023     Lab Results   Component Value Date    MCH 31.3 08/03/2023     Lab Results   Component Value Date    MCHC 31.9 08/03/2023     Lab Results   Component Value  Date    RDW 14.0 08/03/2023     Lab Results   Component Value Date     08/03/2023        @Last Comprehensive Metabolic Panel:  Sodium   Date Value Ref Range Status   08/23/2023 142 136 - 145 mmol/L Final     Potassium   Date Value Ref Range Status   08/23/2023 4.1 3.4 - 5.3 mmol/L Final   05/05/2022 4.4 3.5 - 5.0 mmol/L Final     Chloride   Date Value Ref Range Status   08/23/2023 106 98 - 107 mmol/L Final   05/02/2022 109 (H) 98 - 107 mmol/L Final     Carbon Dioxide (CO2)   Date Value Ref Range Status   08/23/2023 23 22 - 29 mmol/L Final   05/02/2022 25 22 - 31 mmol/L Final     Anion Gap   Date Value Ref Range Status   08/23/2023 13 7 - 15 mmol/L Final   05/02/2022 8 5 - 18 mmol/L Final     Glucose   Date Value Ref Range Status   08/23/2023 142 (H) 70 - 99 mg/dL Final   05/02/2022 133 (H) 70 - 125 mg/dL Final     GLUCOSE BY METER POCT   Date Value Ref Range Status   08/03/2023 151 (H) 70 - 99 mg/dL Final     Urea Nitrogen   Date Value Ref Range Status   08/23/2023 17.7 8.0 - 23.0 mg/dL Final   05/02/2022 11 8 - 28 mg/dL Final     Creatinine   Date Value Ref Range Status   08/23/2023 0.84 0.67 - 1.17 mg/dL Final     GFR Estimate   Date Value Ref Range Status   08/23/2023 >90 >60 mL/min/1.73m2 Final   06/10/2021 >60 >60 mL/min/1.73m2 Final     Calcium   Date Value Ref Range Status   08/23/2023 8.8 8.8 - 10.2 mg/dL Final       This note has been dictated using voice recognition software. Any grammatical or context distortions are unintentional and inherent to the software    Electronically signed by: Kylie Gomez CNP       Sincerely,        Kylie Gomez, NP

## 2023-08-31 NOTE — PROGRESS NOTES
[facility] GERIATRIC SERVICES    Code Status:  DNR/DNI   Visit Type:   Chief Complaint   Patient presents with    TCU Follow Up     Facility:  [facility] ([facility]) [[id number]]         HPI: Rakesh Samuels is a 72 year old male who I am seeing today for follow up on the TCU.  Patient recently hospitalized on 7/31/2023 secondary to weakness with debility and frequent falls.  Past medical history includes proximal atrial fib on chronic anticoagulation, hypertension, diabetes mellitus type 2, hyperlipidemia and anxiety with depression.  Patient presented with a fall hitting his head on the floor.  He had increased pain in his right knee due to osteoarthritis.  Increased bilateral lower extremity weakness and imbalance.  He lives in a senior [facility].  He uses a walker at baseline.  Head CT showed no acute intracranial abnormality.  TSH was normal.  Knee x-ray showed DJD.  He was found to have superior endplate compression fractures at T2, T4 and T5.  Patient was asymptomatic.  Recommended outpatient follow-up with orthopedic surgery for possible right knee replacement.  UTI with E. coli.  He was treated with ceftriaxone and switch to oral Cipro.  Elevated troponin.  Patient without chest pain.  No cardiac history.  Normal proBNP and D-dimer.  Chronically patient reports some dyspnea on exertion.  Echo on 6/1 preserved ejection fracture 60 to 65%.  Proximal atrial fib not on rate control medication.  He is on chronic anticoagulation with Coumadin.  Essential hypertension placed on.  Hydralazine during hospitalization.  Diabetes mellitus type 2.  During hospitalization his metformin and Actos were held and he was treated with sliding scale however these were resumed at time discharge.  Anxiety with depression on Abilify, fluvoxamine and hydroxyzine.    Transitional Care Course: Today patient is lying in bed. Recent fall. Chronic right knee pain. He continues on tylenol for pain. Pt completed his anbx for UTI. Currently  asymptomatic. Multiple compression fractures of the spine.  Patient denies pain in his back.    Assessment/Plan:     Acute cystitis without hematuria  -UA UC with leukocytes, bacteria and mucus.  -UC showed no piyush.   -Patient symptomatic and treated with Cipro.   -Encourage fluids.  -Follow-up CBC and BMP unremarkable.     Compression fracture of thoracic vertebra with routine healing, unspecified thoracic vertebral level, subsequent encounter  -MRI of the thoracic spine consistent with chronic fractures.  -Follow up out pt with spine.     Type 2 diabetes mellitus with hyperosmolarity without coma, without long-term current use of insulin (H)  -Prior to admit metformin and Actos resumed.  -Consistent carb diet.  -Blood sugar checks twice daily.    Paroxysmal atrial fibrillation (H)  -Currently not rate controlled.  -Continue Coumadin for anticoagulation.  -INR today managed per the Coumadin clinic.    Essential hypertension  -bp stable.   -As needed hydroxyzine.    Osteoarthritis of the right knee  -Follow-up outpatient with Ortho for possible work-up for right knee replacement.  -Tylenol pain.      Active Ambulatory Problems     Diagnosis Date Noted    SIRS (systemic inflammatory response syndrome) (H) 09/09/2019    Adrenal incidentaloma (H)     Diet-controlled diabetes mellitus (H)     Pericardial effusion     Lactic acidosis     Type 2 diabetes mellitus without complication, without long-term current use of insulin (H) 04/12/2020    Paroxysmal atrial fibrillation (H) 02/18/2020    Calculus of ureter 04/12/2020    Acute renal failure, unspecified acute renal failure type (H) 04/12/2020    Acute renal failure (ARF) (H) 04/12/2020    ATN (acute tubular necrosis) (H) 04/12/2020    Sepsis due to urinary tract infection (H)     Shock circulatory (H)     Metformin overdose of undetermined intent, initial encounter     Acute respiratory failure with hypoxemia (H)     Metabolic acidosis     Hyperkalemia     Hematemesis,  presence of nausea not specified 04/12/2020    Calculus of kidney     Syncope and collapse 06/30/2020    Hyperglycemia     After care 07/03/2012    Age-related cataract 03/27/2021    Anemia associated with acute blood loss 06/27/2012    Balanitis 03/27/2021    Cholelithiasis without obstruction 03/27/2021    Constipation 07/03/2012    Depression with anxiety 07/03/2012    Glaucoma 06/25/2012    Hemorrhoids without complication 03/27/2021    Hyperlipidemia 06/07/2018    Loss of appetite 03/27/2021    Major depressive disorder, recurrent episode, in partial remission (H) 06/08/2018    Mixed personality disorder in adult (H) 06/08/2018    Obstructive sleep apnea 03/27/2021    Osteoarthrosis involving lower leg 06/23/2012    Periodic limb movement disorder 03/27/2021    Recurrent major depression (H) 03/27/2021    S/P ureteral stent placement 03/27/2021    Unilateral small kidney 03/27/2021    Nephrolithiasis 03/27/2021    Type 2 diabetes mellitus (H) 06/25/2012    Osteoarthritis of knee 03/27/2021    Persistent atrial fibrillation (H) 03/27/2021    Pyelonephritis 03/27/2021    Urinary tract infection without hematuria, site unspecified 04/30/2022    Sepsis without acute organ dysfunction (H) 04/30/2022    UTI (urinary tract infection) 05/01/2022    Benign prostatic hyperplasia with urinary frequency 05/07/2022    Hypomagnesemia 05/07/2022    Injury of head, initial encounter 07/31/2023    Fall at home, initial encounter 07/31/2023    E. coli urinary tract infection 08/02/2023     Resolved Ambulatory Problems     Diagnosis Date Noted    No Resolved Ambulatory Problems     Past Medical History:   Diagnosis Date    A-fib (H)     Acute hemodialysis encounter (H)     Acute kidney injury (H)     Asbestosis (H)     Atrophy of right kidney     Basal cell carcinoma     BPH without urinary obstruction     Cellulitis     Cholelithiasis     Diabetes mellitus, type 2 (H) 4/12/2020    Esophagitis     Gastritis     Hematemesis,  presence of nausea not specified     Hyperlipemia     Kidney stone     Metformin overdose of undetermined intent     PTSD (post-traumatic stress disorder)     Seasonal allergies     Sleep apnea     Upper GI bleed      No Known Allergies    All Meds and Allergies reviewed in the record at the facility and is the most up-to-date.    Current Outpatient Medications   Medication Sig    acetaminophen (TYLENOL) 500 MG tablet [ACETAMINOPHEN (TYLENOL) 500 MG TABLET] Take 500 mg by mouth every 6 (six) hours as needed for pain.    amoxicillin (AMOXIL) 500 MG capsule [AMOXICILLIN (AMOXIL) 500 MG CAPSULE] Take 2,000 mg by mouth once as needed (Prior to dental work).    ARIPiprazole (ABILIFY) 2 MG tablet [ARIPIPRAZOLE (ABILIFY) 2 MG TABLET] Take 2 mg by mouth at bedtime.     aspirin 81 mg chewable tablet Take 81 mg by mouth daily    atorvastatin (LIPITOR) 10 MG tablet [ATORVASTATIN (LIPITOR) 10 MG TABLET] Take 10 mg by mouth at bedtime.    brimonidine (ALPHAGAN) 0.2 % ophthalmic solution [BRIMONIDINE (ALPHAGAN) 0.2 % OPHTHALMIC SOLUTION] Administer 1 drop to both eyes 2 (two) times a day.     dorzolamide-timolol (COSOPT) 2-0.5 % ophthalmic solution Place 1 drop into both eyes 2 times daily    dorzolamide-timolol (COSOPT) 22.3-6.8 mg/mL ophthalmic solution [DORZOLAMIDE-TIMOLOL (COSOPT) 22.3-6.8 MG/ML OPHTHALMIC SOLUTION] Administer 1 drop to both eyes 2 (two) times a day.  (Patient not taking: Reported on 8/7/2023)    fluvoxaMINE (LUVOX) 50 MG tablet [FLUVOXAMINE (LUVOX) 50 MG TABLET] Take 50 mg by mouth at bedtime.     hydrOXYzine (ATARAX) 10 MG tablet Take 10 mg by mouth 3 times daily as needed    latanoprostene bunod 0.024 % Drop Place 1 drop into both eyes At Bedtime    levomefolate calcium (L-METHYLFOLATE ORAL) [LEVOMEFOLATE CALCIUM (L-METHYLFOLATE ORAL)] Take 1.25 mg by mouth daily.     metFORMIN (GLUCOPHAGE) 1000 MG tablet Take 1,000 mg by mouth 2 times daily (with meals)    Multiple Vitamins-Minerals (CENTRUM SILVER) CHEW  Take 1 tablet by mouth daily    netarsudiL (RHOPRESSA) 0.02 % Drop Place 1 drop into both eyes every evening    pioglitazone (ACTOS) 30 MG tablet Take 30 mg by mouth daily    warfarin ANTICOAGULANT (COUMADIN) 5 MG tablet TAKE 1/2 (ONE-HALF) TABLET BY MOUTH EVERY SATURDAY AND THEN 1 ONCE DAILY ALL  OTHER  DAYS  OR  AS  DIRECTED  BY  INR  CLINIC (Patient not taking: Reported on 8/7/2023)    WARFARIN SODIUM PO Take by mouth See Admin Instructions Dosing in coordination with INR results     No current facility-administered medications for this visit.       REVIEW OF SYSTEMS:   10 point review of systems reviewed and pertinent positives in the HPI.     PHYSICAL EXAMINATION:  Physical Exam     Vital signs: BP (!) 158/95   Pulse 82   Temp 97.4  F (36.3  C)   Resp 18   Ht 1.829 m (6')   Wt 101.6 kg (224 lb)   SpO2 96%   BMI 30.38 kg/m    General: Awake,  Alert, lying in bed,  conversant  HEENT:Pink conjunctiva, moist oral mucosa, visual deficit of the right eye with discolor.   NECK: Supple  BACK: No kyphosis of the thoracic spine  ABDOMEN: Soft, with positive bowel sounds  EXTREMITIES: Moves both upper and lower extremities, no pedal edema  SKIN: Warm and dry  NEUROLOGIC: Intact  PSYCHIATRIC: pleasant affect.      Labs:  All labs reviewed in the nursing home record and Epic   @  Lab Results   Component Value Date    WBC 8.1 08/03/2023     Lab Results   Component Value Date    RBC 4.15 08/03/2023     Lab Results   Component Value Date    HGB 13.0 08/03/2023     Lab Results   Component Value Date    HCT 40.7 08/03/2023     Lab Results   Component Value Date    MCV 98 08/03/2023     Lab Results   Component Value Date    MCH 31.3 08/03/2023     Lab Results   Component Value Date    MCHC 31.9 08/03/2023     Lab Results   Component Value Date    RDW 14.0 08/03/2023     Lab Results   Component Value Date     08/03/2023        @Last Comprehensive Metabolic Panel:  Sodium   Date Value Ref Range Status   08/23/2023 142  136 - 145 mmol/L Final     Potassium   Date Value Ref Range Status   08/23/2023 4.1 3.4 - 5.3 mmol/L Final   05/05/2022 4.4 3.5 - 5.0 mmol/L Final     Chloride   Date Value Ref Range Status   08/23/2023 106 98 - 107 mmol/L Final   05/02/2022 109 (H) 98 - 107 mmol/L Final     Carbon Dioxide (CO2)   Date Value Ref Range Status   08/23/2023 23 22 - 29 mmol/L Final   05/02/2022 25 22 - 31 mmol/L Final     Anion Gap   Date Value Ref Range Status   08/23/2023 13 7 - 15 mmol/L Final   05/02/2022 8 5 - 18 mmol/L Final     Glucose   Date Value Ref Range Status   08/23/2023 142 (H) 70 - 99 mg/dL Final   05/02/2022 133 (H) 70 - 125 mg/dL Final     GLUCOSE BY METER POCT   Date Value Ref Range Status   08/03/2023 151 (H) 70 - 99 mg/dL Final     Urea Nitrogen   Date Value Ref Range Status   08/23/2023 17.7 8.0 - 23.0 mg/dL Final   05/02/2022 11 8 - 28 mg/dL Final     Creatinine   Date Value Ref Range Status   08/23/2023 0.84 0.67 - 1.17 mg/dL Final     GFR Estimate   Date Value Ref Range Status   08/23/2023 >90 >60 mL/min/1.73m2 Final   06/10/2021 >60 >60 mL/min/1.73m2 Final     Calcium   Date Value Ref Range Status   08/23/2023 8.8 8.8 - 10.2 mg/dL Final       This note has been dictated using voice recognition software. Any grammatical or context distortions are unintentional and inherent to the software    Electronically signed by: Kylie Gomez, CNP

## 2023-09-05 ENCOUNTER — TRANSITIONAL CARE UNIT VISIT (OUTPATIENT)
Dept: GERIATRICS | Facility: CLINIC | Age: 73
End: 2023-09-05
Payer: MEDICARE

## 2023-09-05 VITALS
RESPIRATION RATE: 16 BRPM | HEART RATE: 97 BPM | DIASTOLIC BLOOD PRESSURE: 81 MMHG | TEMPERATURE: 97.9 F | WEIGHT: 224 LBS | OXYGEN SATURATION: 95 % | HEIGHT: 72 IN | SYSTOLIC BLOOD PRESSURE: 119 MMHG | BODY MASS INDEX: 30.34 KG/M2

## 2023-09-05 DIAGNOSIS — E11.00 TYPE 2 DIABETES MELLITUS WITH HYPEROSMOLARITY WITHOUT COMA, WITHOUT LONG-TERM CURRENT USE OF INSULIN (H): ICD-10-CM

## 2023-09-05 DIAGNOSIS — I10 ESSENTIAL HYPERTENSION: ICD-10-CM

## 2023-09-05 DIAGNOSIS — S22.000D COMPRESSION FRACTURE OF THORACIC VERTEBRA WITH ROUTINE HEALING, UNSPECIFIED THORACIC VERTEBRAL LEVEL, SUBSEQUENT ENCOUNTER: Primary | ICD-10-CM

## 2023-09-05 DIAGNOSIS — N30.00 ACUTE CYSTITIS WITHOUT HEMATURIA: ICD-10-CM

## 2023-09-05 DIAGNOSIS — I48.0 PAROXYSMAL ATRIAL FIBRILLATION (H): ICD-10-CM

## 2023-09-05 PROCEDURE — 99309 SBSQ NF CARE MODERATE MDM 30: CPT | Performed by: NURSE PRACTITIONER

## 2023-09-05 NOTE — LETTER
9/5/2023        RE: Rakesh Samuels  [address]  [facility] [facility] [address]         HEALTH GERIATRIC SERVICES    Code Status:  DNR/DNI   Visit Type:   Chief Complaint   Patient presents with     TCU Follow Up     Facility:  [facility] ([facility]) [[id number]]         HPI: Rakesh Samuels is a 72 year old male who I am seeing today for follow up on the TCU.  Patient recently hospitalized on 7/31/2023 secondary to weakness with debility and frequent falls.  Past medical history includes proximal atrial fib on chronic anticoagulation, hypertension, diabetes mellitus type 2, hyperlipidemia and anxiety with depression.  Patient presented with a fall hitting his head on the floor.  He had increased pain in his right knee due to osteoarthritis.  Increased bilateral lower extremity weakness and imbalance.  He lives in a senior [facility].  He uses a walker at baseline.  Head CT showed no acute intracranial abnormality.  TSH was normal.  Knee x-ray showed DJD.  He was found to have superior endplate compression fractures at T2, T4 and T5.  Patient was asymptomatic.  Recommended outpatient follow-up with orthopedic surgery for possible right knee replacement.  UTI with E. coli.  He was treated with ceftriaxone and switch to oral Cipro.  Elevated troponin.  Patient without chest pain.  No cardiac history.  Normal proBNP and D-dimer.  Chronically patient reports some dyspnea on exertion.  Echo on 6/1 preserved ejection fracture 60 to 65%.  Proximal atrial fib not on rate control medication.  He is on chronic anticoagulation with Coumadin.  Essential hypertension placed on.  Hydralazine during hospitalization.  Diabetes mellitus type 2.  During hospitalization his metformin and Actos were held and he was treated with sliding scale however these were resumed at time discharge.  Anxiety with depression on Abilify, fluvoxamine and hydroxyzine.    Transitional Care Course: Today patient is sitting up in bed.  Recent fall. Chronic  right knee pain. He continues on tylenol for pain. Pt completed his anbx for UTI. Currently asymptomatic. Multiple compression fractures of the spine.  He does have some difficulty with trunk control into the bed.  Patient denies pain in his back.    Assessment/Plan:     Acute cystitis without hematuria  -UA UC with leukocytes, bacteria and mucus.  -UC showed no piyush.   -Patient symptomatic and treated with Cipro.   -Encourage fluids.  -Follow-up CBC and BMP unremarkable.     Compression fracture of thoracic vertebra with routine healing, unspecified thoracic vertebral level, subsequent encounter  -MRI of the thoracic spine consistent with chronic fractures.  -Follow up out pt with spine.     Type 2 diabetes mellitus with hyperosmolarity without coma, without long-term current use of insulin (H)  -Prior to admit metformin and Actos resumed.  -Consistent carb diet.  -Blood sugar checks twice daily.    Paroxysmal atrial fibrillation (H)  -Currently not rate controlled.  -Continue Coumadin for anticoagulation.  -INR today managed per the Coumadin clinic.    Essential hypertension  -bp stable.   -As needed hydroxyzine.    Osteoarthritis of the right knee  -Follow-up outpatient with Ortho for possible work-up for right knee replacement.  -Tylenol pain.      Active Ambulatory Problems     Diagnosis Date Noted     SIRS (systemic inflammatory response syndrome) (H) 09/09/2019     Adrenal incidentaloma (H)      Diet-controlled diabetes mellitus (H)      Pericardial effusion      Lactic acidosis      Type 2 diabetes mellitus without complication, without long-term current use of insulin (H) 04/12/2020     Paroxysmal atrial fibrillation (H) 02/18/2020     Calculus of ureter 04/12/2020     Acute renal failure, unspecified acute renal failure type (H) 04/12/2020     Acute renal failure (ARF) (H) 04/12/2020     ATN (acute tubular necrosis) (H) 04/12/2020     Sepsis due to urinary tract infection (H)      Shock circulatory (H)       Metformin overdose of undetermined intent, initial encounter      Acute respiratory failure with hypoxemia (H)      Metabolic acidosis      Hyperkalemia      Hematemesis, presence of nausea not specified 04/12/2020     Calculus of kidney      Syncope and collapse 06/30/2020     Hyperglycemia      After care 07/03/2012     Age-related cataract 03/27/2021     Anemia associated with acute blood loss 06/27/2012     Balanitis 03/27/2021     Cholelithiasis without obstruction 03/27/2021     Constipation 07/03/2012     Depression with anxiety 07/03/2012     Glaucoma 06/25/2012     Hemorrhoids without complication 03/27/2021     Hyperlipidemia 06/07/2018     Loss of appetite 03/27/2021     Major depressive disorder, recurrent episode, in partial remission (H) 06/08/2018     Mixed personality disorder in adult (H) 06/08/2018     Obstructive sleep apnea 03/27/2021     Osteoarthrosis involving lower leg 06/23/2012     Periodic limb movement disorder 03/27/2021     Recurrent major depression (H) 03/27/2021     S/P ureteral stent placement 03/27/2021     Unilateral small kidney 03/27/2021     Nephrolithiasis 03/27/2021     Type 2 diabetes mellitus (H) 06/25/2012     Osteoarthritis of knee 03/27/2021     Persistent atrial fibrillation (H) 03/27/2021     Pyelonephritis 03/27/2021     Urinary tract infection without hematuria, site unspecified 04/30/2022     Sepsis without acute organ dysfunction (H) 04/30/2022     UTI (urinary tract infection) 05/01/2022     Benign prostatic hyperplasia with urinary frequency 05/07/2022     Hypomagnesemia 05/07/2022     Injury of head, initial encounter 07/31/2023     Fall at home, initial encounter 07/31/2023     E. coli urinary tract infection 08/02/2023     Resolved Ambulatory Problems     Diagnosis Date Noted     No Resolved Ambulatory Problems     Past Medical History:   Diagnosis Date     A-fib (H)      Acute hemodialysis encounter (H)      Acute kidney injury (H)      Asbestosis (H)       Atrophy of right kidney      Basal cell carcinoma      BPH without urinary obstruction      Cellulitis      Cholelithiasis      Diabetes mellitus, type 2 (H) 4/12/2020     Esophagitis      Gastritis      Hematemesis, presence of nausea not specified      Hyperlipemia      Kidney stone      Metformin overdose of undetermined intent      PTSD (post-traumatic stress disorder)      Seasonal allergies      Sleep apnea      Upper GI bleed      No Known Allergies    All Meds and Allergies reviewed in the record at the facility and is the most up-to-date.    Current Outpatient Medications   Medication Sig     dorzolamide-timolol (COSOPT) 22.3-6.8 mg/mL ophthalmic solution Place 1 drop into both eyes 2 times daily     warfarin ANTICOAGULANT (COUMADIN) 5 MG tablet TAKE 1/2 (ONE-HALF) TABLET BY MOUTH EVERY SATURDAY AND THEN 1 ONCE DAILY ALL  OTHER  DAYS  OR  AS  DIRECTED  BY  INR  CLINIC     acetaminophen (TYLENOL) 500 MG tablet [ACETAMINOPHEN (TYLENOL) 500 MG TABLET] Take 500 mg by mouth every 6 (six) hours as needed for pain.     amoxicillin (AMOXIL) 500 MG capsule [AMOXICILLIN (AMOXIL) 500 MG CAPSULE] Take 2,000 mg by mouth once as needed (Prior to dental work).     ARIPiprazole (ABILIFY) 2 MG tablet [ARIPIPRAZOLE (ABILIFY) 2 MG TABLET] Take 2 mg by mouth at bedtime.      aspirin 81 mg chewable tablet Take 81 mg by mouth daily     atorvastatin (LIPITOR) 10 MG tablet [ATORVASTATIN (LIPITOR) 10 MG TABLET] Take 10 mg by mouth at bedtime.     brimonidine (ALPHAGAN) 0.2 % ophthalmic solution [BRIMONIDINE (ALPHAGAN) 0.2 % OPHTHALMIC SOLUTION] Administer 1 drop to both eyes 2 (two) times a day.      dorzolamide-timolol (COSOPT) 2-0.5 % ophthalmic solution Place 1 drop into both eyes 2 times daily     fluvoxaMINE (LUVOX) 50 MG tablet [FLUVOXAMINE (LUVOX) 50 MG TABLET] Take 50 mg by mouth at bedtime.      hydrOXYzine (ATARAX) 10 MG tablet Take 10 mg by mouth 3 times daily as needed     latanoprostene bunod 0.024 % Drop Place 1  drop into both eyes At Bedtime     levomefolate calcium (L-METHYLFOLATE ORAL) [LEVOMEFOLATE CALCIUM (L-METHYLFOLATE ORAL)] Take 1.25 mg by mouth daily.      metFORMIN (GLUCOPHAGE) 1000 MG tablet Take 1,000 mg by mouth 2 times daily (with meals)     Multiple Vitamins-Minerals (CENTRUM SILVER) CHEW Take 1 tablet by mouth daily     netarsudiL (RHOPRESSA) 0.02 % Drop Place 1 drop into both eyes every evening     pioglitazone (ACTOS) 30 MG tablet Take 30 mg by mouth daily     WARFARIN SODIUM PO Take by mouth See Admin Instructions Dosing in coordination with INR results     No current facility-administered medications for this visit.       REVIEW OF SYSTEMS:   10 point review of systems reviewed and pertinent positives in the HPI.     PHYSICAL EXAMINATION:  Physical Exam     Vital signs: /81   Pulse 97   Temp 97.9  F (36.6  C)   Resp 16   Ht 1.829 m (6')   Wt 101.6 kg (224 lb)   SpO2 95%   BMI 30.38 kg/m    General: Awake,  Alert, sitting up in the edge of the bed, conversant  HEENT:Pink conjunctiva, moist oral mucosa, visual deficit of the right eye with discolor.   NECK: Supple  BACK: No kyphosis of the thoracic spine.  Some difficulty with trunk control sitting at the edge of bed.  ABDOMEN: Soft, with positive bowel sounds  EXTREMITIES: Moves both upper and lower extremities, no pedal edema  SKIN: Warm and dry  NEUROLOGIC: Intact  PSYCHIATRIC: pleasant affect.      Labs:  All labs reviewed in the nursing home record and Epic   @  Lab Results   Component Value Date    WBC 8.1 08/03/2023     Lab Results   Component Value Date    RBC 4.15 08/03/2023     Lab Results   Component Value Date    HGB 13.0 08/03/2023     Lab Results   Component Value Date    HCT 40.7 08/03/2023     Lab Results   Component Value Date    MCV 98 08/03/2023     Lab Results   Component Value Date    MCH 31.3 08/03/2023     Lab Results   Component Value Date    MCHC 31.9 08/03/2023     Lab Results   Component Value Date    RDW 14.0  08/03/2023     Lab Results   Component Value Date     08/03/2023        @Last Comprehensive Metabolic Panel:  Sodium   Date Value Ref Range Status   08/23/2023 142 136 - 145 mmol/L Final     Potassium   Date Value Ref Range Status   08/23/2023 4.1 3.4 - 5.3 mmol/L Final   05/05/2022 4.4 3.5 - 5.0 mmol/L Final     Chloride   Date Value Ref Range Status   08/23/2023 106 98 - 107 mmol/L Final   05/02/2022 109 (H) 98 - 107 mmol/L Final     Carbon Dioxide (CO2)   Date Value Ref Range Status   08/23/2023 23 22 - 29 mmol/L Final   05/02/2022 25 22 - 31 mmol/L Final     Anion Gap   Date Value Ref Range Status   08/23/2023 13 7 - 15 mmol/L Final   05/02/2022 8 5 - 18 mmol/L Final     Glucose   Date Value Ref Range Status   08/23/2023 142 (H) 70 - 99 mg/dL Final   05/02/2022 133 (H) 70 - 125 mg/dL Final     GLUCOSE BY METER POCT   Date Value Ref Range Status   08/03/2023 151 (H) 70 - 99 mg/dL Final     Urea Nitrogen   Date Value Ref Range Status   08/23/2023 17.7 8.0 - 23.0 mg/dL Final   05/02/2022 11 8 - 28 mg/dL Final     Creatinine   Date Value Ref Range Status   08/23/2023 0.84 0.67 - 1.17 mg/dL Final     GFR Estimate   Date Value Ref Range Status   08/23/2023 >90 >60 mL/min/1.73m2 Final   06/10/2021 >60 >60 mL/min/1.73m2 Final     Calcium   Date Value Ref Range Status   08/23/2023 8.8 8.8 - 10.2 mg/dL Final       This note has been dictated using voice recognition software. Any grammatical or context distortions are unintentional and inherent to the software    Electronically signed by: Kylie Gomez CNP       Sincerely,        Kylie Gomez, NP

## 2023-09-06 ENCOUNTER — ANTICOAGULATION THERAPY VISIT (OUTPATIENT)
Dept: ANTICOAGULATION | Facility: CLINIC | Age: 73
End: 2023-09-06
Payer: MEDICARE

## 2023-09-06 DIAGNOSIS — I48.0 PAROXYSMAL ATRIAL FIBRILLATION (H): Primary | ICD-10-CM

## 2023-09-06 LAB — INR (EXTERNAL): 2.2 (ref 0.9–1.1)

## 2023-09-06 NOTE — PROGRESS NOTES
ANTICOAGULATION MANAGEMENT     Rakesh Samuels 73 year old male is on warfarin with therapeutic INR result. (Goal INR 2.0-3.0)    Recent labs: (last 7 days)     09/06/23  0814   INR 2.2*       ASSESSMENT     Source(s): Chart Review and Home Care/Facility Nurse     Warfarin doses taken: Warfarin taken as instructed  Diet: No new diet changes identified  Medication/supplement changes: cipro finished  New illness, injury, or hospitalization: Yes: hx: recent UTI, chronic knee pain and multiple compression fractures of the spine  Signs or symptoms of bleeding or clotting: No  Previous result: Therapeutic last 2(+) visits  Additional findings: INR level may be trending down. Will continue current warfarin dose and recheck INR in 1 week       PLAN     Recommended plan for ongoing change(s) affecting INR     Dosing Instructions: Continue your current warfarin dose with next INR in 1 week       Summary  As of 9/6/2023      Full warfarin instructions:  7.5 mg every Mon, Thu; 5 mg all other days   Next INR check:  9/13/2023               Telephone call with Zoraida at [phone] facility nurse who agrees to plan and repeated back plan correctly    Orders given to  Homecare nurse/facility to recheck    Education provided:   Contact [phone]  with any changes, questions or concerns.     Plan made per [facility] anticoagulation protocol    Brittany Breaux RN  Anticoagulation Clinic  9/6/2023    _______________________________________________________________________     Anticoagulation Episode Summary       Current INR goal:  2.0-3.0   TTR:  82.2 % (1 y)   Target end date:  Indefinite   Send INR reminders to:  [facility] (TCU/LTC/[facility])    Indications    Paroxysmal atrial fibrillation (H) [I48.0]             Comments:  [employer] home meter- Managed by Exception             Anticoagulation Care Providers       Provider Role Specialty Phone number    Eric Pickett MD Referring Cardiovascular Disease [phone]

## 2023-09-06 NOTE — PROGRESS NOTES
[facility] GERIATRIC SERVICES    Code Status:  DNR/DNI   Visit Type:   Chief Complaint   Patient presents with    TCU Follow Up     Facility:  [facility] ([facility]) [[id number]]         HPI: Rakesh Samuels is a 72 year old male who I am seeing today for follow up on the TCU.  Patient recently hospitalized on 7/31/2023 secondary to weakness with debility and frequent falls.  Past medical history includes proximal atrial fib on chronic anticoagulation, hypertension, diabetes mellitus type 2, hyperlipidemia and anxiety with depression.  Patient presented with a fall hitting his head on the floor.  He had increased pain in his right knee due to osteoarthritis.  Increased bilateral lower extremity weakness and imbalance.  He lives in a senior [facility].  He uses a walker at baseline.  Head CT showed no acute intracranial abnormality.  TSH was normal.  Knee x-ray showed DJD.  He was found to have superior endplate compression fractures at T2, T4 and T5.  Patient was asymptomatic.  Recommended outpatient follow-up with orthopedic surgery for possible right knee replacement.  UTI with E. coli.  He was treated with ceftriaxone and switch to oral Cipro.  Elevated troponin.  Patient without chest pain.  No cardiac history.  Normal proBNP and D-dimer.  Chronically patient reports some dyspnea on exertion.  Echo on 6/1 preserved ejection fracture 60 to 65%.  Proximal atrial fib not on rate control medication.  He is on chronic anticoagulation with Coumadin.  Essential hypertension placed on.  Hydralazine during hospitalization.  Diabetes mellitus type 2.  During hospitalization his metformin and Actos were held and he was treated with sliding scale however these were resumed at time discharge.  Anxiety with depression on Abilify, fluvoxamine and hydroxyzine.    Transitional Care Course: Today patient is sitting up in bed.  Recent fall. Chronic right knee pain. He continues on tylenol for pain. Pt completed his anbx for UTI.  Currently asymptomatic. Multiple compression fractures of the spine.  He does have some difficulty with trunk control into the bed.  Patient denies pain in his back.    Assessment/Plan:     Acute cystitis without hematuria  -UA UC with leukocytes, bacteria and mucus.  -UC showed no piyush.   -Patient symptomatic and treated with Cipro.   -Encourage fluids.  -Follow-up CBC and BMP unremarkable.     Compression fracture of thoracic vertebra with routine healing, unspecified thoracic vertebral level, subsequent encounter  -MRI of the thoracic spine consistent with chronic fractures.  -Follow up out pt with spine.     Type 2 diabetes mellitus with hyperosmolarity without coma, without long-term current use of insulin (H)  -Prior to admit metformin and Actos resumed.  -Consistent carb diet.  -Blood sugar checks twice daily.    Paroxysmal atrial fibrillation (H)  -Currently not rate controlled.  -Continue Coumadin for anticoagulation.  -INR today managed per the Coumadin clinic.    Essential hypertension  -bp stable.   -As needed hydroxyzine.    Osteoarthritis of the right knee  -Follow-up outpatient with Ortho for possible work-up for right knee replacement.  -Tylenol pain.      Active Ambulatory Problems     Diagnosis Date Noted    SIRS (systemic inflammatory response syndrome) (H) 09/09/2019    Adrenal incidentaloma (H)     Diet-controlled diabetes mellitus (H)     Pericardial effusion     Lactic acidosis     Type 2 diabetes mellitus without complication, without long-term current use of insulin (H) 04/12/2020    Paroxysmal atrial fibrillation (H) 02/18/2020    Calculus of ureter 04/12/2020    Acute renal failure, unspecified acute renal failure type (H) 04/12/2020    Acute renal failure (ARF) (H) 04/12/2020    ATN (acute tubular necrosis) (H) 04/12/2020    Sepsis due to urinary tract infection (H)     Shock circulatory (H)     Metformin overdose of undetermined intent, initial encounter     Acute respiratory failure  with hypoxemia (H)     Metabolic acidosis     Hyperkalemia     Hematemesis, presence of nausea not specified 04/12/2020    Calculus of kidney     Syncope and collapse 06/30/2020    Hyperglycemia     After care 07/03/2012    Age-related cataract 03/27/2021    Anemia associated with acute blood loss 06/27/2012    Balanitis 03/27/2021    Cholelithiasis without obstruction 03/27/2021    Constipation 07/03/2012    Depression with anxiety 07/03/2012    Glaucoma 06/25/2012    Hemorrhoids without complication 03/27/2021    Hyperlipidemia 06/07/2018    Loss of appetite 03/27/2021    Major depressive disorder, recurrent episode, in partial remission (H) 06/08/2018    Mixed personality disorder in adult (H) 06/08/2018    Obstructive sleep apnea 03/27/2021    Osteoarthrosis involving lower leg 06/23/2012    Periodic limb movement disorder 03/27/2021    Recurrent major depression (H) 03/27/2021    S/P ureteral stent placement 03/27/2021    Unilateral small kidney 03/27/2021    Nephrolithiasis 03/27/2021    Type 2 diabetes mellitus (H) 06/25/2012    Osteoarthritis of knee 03/27/2021    Persistent atrial fibrillation (H) 03/27/2021    Pyelonephritis 03/27/2021    Urinary tract infection without hematuria, site unspecified 04/30/2022    Sepsis without acute organ dysfunction (H) 04/30/2022    UTI (urinary tract infection) 05/01/2022    Benign prostatic hyperplasia with urinary frequency 05/07/2022    Hypomagnesemia 05/07/2022    Injury of head, initial encounter 07/31/2023    Fall at home, initial encounter 07/31/2023    E. coli urinary tract infection 08/02/2023     Resolved Ambulatory Problems     Diagnosis Date Noted    No Resolved Ambulatory Problems     Past Medical History:   Diagnosis Date    A-fib (H)     Acute hemodialysis encounter (H)     Acute kidney injury (H)     Asbestosis (H)     Atrophy of right kidney     Basal cell carcinoma     BPH without urinary obstruction     Cellulitis     Cholelithiasis     Diabetes  mellitus, type 2 (H) 4/12/2020    Esophagitis     Gastritis     Hematemesis, presence of nausea not specified     Hyperlipemia     Kidney stone     Metformin overdose of undetermined intent     PTSD (post-traumatic stress disorder)     Seasonal allergies     Sleep apnea     Upper GI bleed      No Known Allergies    All Meds and Allergies reviewed in the record at the facility and is the most up-to-date.    Current Outpatient Medications   Medication Sig    dorzolamide-timolol (COSOPT) 22.3-6.8 mg/mL ophthalmic solution Place 1 drop into both eyes 2 times daily    warfarin ANTICOAGULANT (COUMADIN) 5 MG tablet TAKE 1/2 (ONE-HALF) TABLET BY MOUTH EVERY SATURDAY AND THEN 1 ONCE DAILY ALL  OTHER  DAYS  OR  AS  DIRECTED  BY  INR  CLINIC    acetaminophen (TYLENOL) 500 MG tablet [ACETAMINOPHEN (TYLENOL) 500 MG TABLET] Take 500 mg by mouth every 6 (six) hours as needed for pain.    amoxicillin (AMOXIL) 500 MG capsule [AMOXICILLIN (AMOXIL) 500 MG CAPSULE] Take 2,000 mg by mouth once as needed (Prior to dental work).    ARIPiprazole (ABILIFY) 2 MG tablet [ARIPIPRAZOLE (ABILIFY) 2 MG TABLET] Take 2 mg by mouth at bedtime.     aspirin 81 mg chewable tablet Take 81 mg by mouth daily    atorvastatin (LIPITOR) 10 MG tablet [ATORVASTATIN (LIPITOR) 10 MG TABLET] Take 10 mg by mouth at bedtime.    brimonidine (ALPHAGAN) 0.2 % ophthalmic solution [BRIMONIDINE (ALPHAGAN) 0.2 % OPHTHALMIC SOLUTION] Administer 1 drop to both eyes 2 (two) times a day.     dorzolamide-timolol (COSOPT) 2-0.5 % ophthalmic solution Place 1 drop into both eyes 2 times daily    fluvoxaMINE (LUVOX) 50 MG tablet [FLUVOXAMINE (LUVOX) 50 MG TABLET] Take 50 mg by mouth at bedtime.     hydrOXYzine (ATARAX) 10 MG tablet Take 10 mg by mouth 3 times daily as needed    latanoprostene bunod 0.024 % Drop Place 1 drop into both eyes At Bedtime    levomefolate calcium (L-METHYLFOLATE ORAL) [LEVOMEFOLATE CALCIUM (L-METHYLFOLATE ORAL)] Take 1.25 mg by mouth daily.     metFORMIN  (GLUCOPHAGE) 1000 MG tablet Take 1,000 mg by mouth 2 times daily (with meals)    Multiple Vitamins-Minerals (CENTRUM SILVER) CHEW Take 1 tablet by mouth daily    netarsudiL (RHOPRESSA) 0.02 % Drop Place 1 drop into both eyes every evening    pioglitazone (ACTOS) 30 MG tablet Take 30 mg by mouth daily    WARFARIN SODIUM PO Take by mouth See Admin Instructions Dosing in coordination with INR results     No current facility-administered medications for this visit.       REVIEW OF SYSTEMS:   10 point review of systems reviewed and pertinent positives in the HPI.     PHYSICAL EXAMINATION:  Physical Exam     Vital signs: /81   Pulse 97   Temp 97.9  F (36.6  C)   Resp 16   Ht 1.829 m (6')   Wt 101.6 kg (224 lb)   SpO2 95%   BMI 30.38 kg/m    General: Awake,  Alert, sitting up in the edge of the bed, conversant  HEENT:Pink conjunctiva, moist oral mucosa, visual deficit of the right eye with discolor.   NECK: Supple  BACK: No kyphosis of the thoracic spine.  Some difficulty with trunk control sitting at the edge of bed.  ABDOMEN: Soft, with positive bowel sounds  EXTREMITIES: Moves both upper and lower extremities, no pedal edema  SKIN: Warm and dry  NEUROLOGIC: Intact  PSYCHIATRIC: pleasant affect.      Labs:  All labs reviewed in the nursing home record and Epic   @  Lab Results   Component Value Date    WBC 8.1 08/03/2023     Lab Results   Component Value Date    RBC 4.15 08/03/2023     Lab Results   Component Value Date    HGB 13.0 08/03/2023     Lab Results   Component Value Date    HCT 40.7 08/03/2023     Lab Results   Component Value Date    MCV 98 08/03/2023     Lab Results   Component Value Date    MCH 31.3 08/03/2023     Lab Results   Component Value Date    MCHC 31.9 08/03/2023     Lab Results   Component Value Date    RDW 14.0 08/03/2023     Lab Results   Component Value Date     08/03/2023        @Last Comprehensive Metabolic Panel:  Sodium   Date Value Ref Range Status   08/23/2023 142 136 -  145 mmol/L Final     Potassium   Date Value Ref Range Status   08/23/2023 4.1 3.4 - 5.3 mmol/L Final   05/05/2022 4.4 3.5 - 5.0 mmol/L Final     Chloride   Date Value Ref Range Status   08/23/2023 106 98 - 107 mmol/L Final   05/02/2022 109 (H) 98 - 107 mmol/L Final     Carbon Dioxide (CO2)   Date Value Ref Range Status   08/23/2023 23 22 - 29 mmol/L Final   05/02/2022 25 22 - 31 mmol/L Final     Anion Gap   Date Value Ref Range Status   08/23/2023 13 7 - 15 mmol/L Final   05/02/2022 8 5 - 18 mmol/L Final     Glucose   Date Value Ref Range Status   08/23/2023 142 (H) 70 - 99 mg/dL Final   05/02/2022 133 (H) 70 - 125 mg/dL Final     GLUCOSE BY METER POCT   Date Value Ref Range Status   08/03/2023 151 (H) 70 - 99 mg/dL Final     Urea Nitrogen   Date Value Ref Range Status   08/23/2023 17.7 8.0 - 23.0 mg/dL Final   05/02/2022 11 8 - 28 mg/dL Final     Creatinine   Date Value Ref Range Status   08/23/2023 0.84 0.67 - 1.17 mg/dL Final     GFR Estimate   Date Value Ref Range Status   08/23/2023 >90 >60 mL/min/1.73m2 Final   06/10/2021 >60 >60 mL/min/1.73m2 Final     Calcium   Date Value Ref Range Status   08/23/2023 8.8 8.8 - 10.2 mg/dL Final       This note has been dictated using voice recognition software. Any grammatical or context distortions are unintentional and inherent to the software    Electronically signed by: Kylei Gomez, CNP

## 2023-09-06 NOTE — PROGRESS NOTES
Incoming fax from  [facility]    Date of result 9/6/23    INR result 2.2    7.5 mg on mon/thurs/5 mg all other days    Cipro for 3 days    No other changes

## 2023-09-06 NOTE — PROGRESS NOTES
Tiffany from [facility] called back to clarify warfarin dosing and when to recheck INR. Orders given and Tiffany repeated back correctly.

## 2023-09-07 ENCOUNTER — ANTICOAGULATION THERAPY VISIT (OUTPATIENT)
Dept: ANTICOAGULATION | Facility: CLINIC | Age: 73
End: 2023-09-07

## 2023-09-07 ENCOUNTER — DISCHARGE SUMMARY NURSING HOME (OUTPATIENT)
Dept: GERIATRICS | Facility: CLINIC | Age: 73
End: 2023-09-07
Payer: MEDICARE

## 2023-09-07 VITALS
DIASTOLIC BLOOD PRESSURE: 83 MMHG | RESPIRATION RATE: 18 BRPM | HEIGHT: 72 IN | WEIGHT: 224.8 LBS | BODY MASS INDEX: 30.45 KG/M2 | HEART RATE: 76 BPM | OXYGEN SATURATION: 99 % | SYSTOLIC BLOOD PRESSURE: 135 MMHG | TEMPERATURE: 98.2 F

## 2023-09-07 DIAGNOSIS — E11.00 TYPE 2 DIABETES MELLITUS WITH HYPEROSMOLARITY WITHOUT COMA, WITHOUT LONG-TERM CURRENT USE OF INSULIN (H): ICD-10-CM

## 2023-09-07 DIAGNOSIS — I48.0 PAROXYSMAL ATRIAL FIBRILLATION (H): ICD-10-CM

## 2023-09-07 DIAGNOSIS — S22.000D COMPRESSION FRACTURE OF THORACIC VERTEBRA WITH ROUTINE HEALING, UNSPECIFIED THORACIC VERTEBRAL LEVEL, SUBSEQUENT ENCOUNTER: Primary | ICD-10-CM

## 2023-09-07 DIAGNOSIS — I10 ESSENTIAL HYPERTENSION: ICD-10-CM

## 2023-09-07 DIAGNOSIS — N30.00 ACUTE CYSTITIS WITHOUT HEMATURIA: ICD-10-CM

## 2023-09-07 DIAGNOSIS — I48.0 PAROXYSMAL ATRIAL FIBRILLATION (H): Primary | ICD-10-CM

## 2023-09-07 LAB — INR HOME MONITORING: 2 (ref 2–3)

## 2023-09-07 PROCEDURE — 99316 NF DSCHRG MGMT 30 MIN+: CPT | Performed by: NURSE PRACTITIONER

## 2023-09-07 NOTE — PROGRESS NOTES
ANTICOAGULATION MANAGEMENT     Rakesh Samuels 73 year old male is on warfarin with therapeutic INR result. (Goal INR 2.0-3.0)    Recent labs: (last 7 days)     09/07/23  0000   INR 2.0       ASSESSMENT     Source(s): Chart Review and Patient/Caregiver Call     Warfarin doses taken: Warfarin taken as instructed  Diet: No new diet changes identified  Medication/supplement changes: None noted  New illness, injury, or hospitalization: No  Signs or symptoms of bleeding or clotting: No  Previous result: Therapeutic last 2(+) visits  Additional findings: None       PLAN     Recommended plan for no diet, medication or health factor changes affecting INR     Dosing Instructions: Continue your current warfarin dose with next INR in 1 week       Summary  As of 9/7/2023      Full warfarin instructions:  7.5 mg every Mon, Thu; 5 mg all other days   Next INR check:  9/13/2023               Telephone call with Nanette, daughter, who verbalizes understanding and agrees to plan    Patient to recheck with home meter    Education provided:   Please call back if any changes to your diet, medications or how you've been taking warfarin    Plan made per [facility] anticoagulation protocol    Heather Yanez, RN  Anticoagulation Clinic  9/7/2023    _______________________________________________________________________     Anticoagulation Episode Summary       Current INR goal:  2.0-3.0   TTR:  82.2 % (1 y)   Target end date:  Indefinite   Send INR reminders to:  [facility] (TCU/LTC/[facility])    Indications    Paroxysmal atrial fibrillation (H) [I48.0]             Comments:  [employer] home meter- Managed by [employer]             Anticoagulation Care Providers       Provider Role Specialty Phone number    Eric Pickett MD Referring Cardiovascular Disease [phone]

## 2023-09-07 NOTE — PROGRESS NOTES
[facility] GERIATRIC SERVICES    Code Status:  DNR/DNI   Visit Type:   Chief Complaint   Patient presents with    TCU Discharge     Facility:  [facility] ([facility]) [[id number]]         HPI: Rakesh Samuels is a 72 year old male who I am seeing today for discharge from the TCU.  Patient recently hospitalized on 7/31/2023 secondary to weakness with debility and frequent falls.  Past medical history includes proximal atrial fib on chronic anticoagulation, hypertension, diabetes mellitus type 2, hyperlipidemia and anxiety with depression.  Patient presented with a fall hitting his head on the floor.  He had increased pain in his right knee due to osteoarthritis.  Increased bilateral lower extremity weakness and imbalance.  He lives in a senior [facility].  He uses a walker at baseline.  Head CT showed no acute intracranial abnormality.  TSH was normal.  Knee x-ray showed DJD.  He was found to have superior endplate compression fractures at T2, T4 and T5.  Patient was asymptomatic.  Recommended outpatient follow-up with orthopedic surgery for possible right knee replacement.  UTI with E. coli.  He was treated with ceftriaxone and switch to oral Cipro.  Elevated troponin.  Patient without chest pain.  No cardiac history.  Normal proBNP and D-dimer.  Chronically patient reports some dyspnea on exertion.  Echo on 6/1 preserved ejection fracture 60 to 65%.  Proximal atrial fib not on rate control medication.  He is on chronic anticoagulation with Coumadin.  Essential hypertension placed on.  Hydralazine during hospitalization.  Diabetes mellitus type 2.  During hospitalization his metformin and Actos were held and he was treated with sliding scale however these were resumed at time discharge.  Anxiety with depression on Abilify, fluvoxamine and hydroxyzine.    Transitional Care Course: Today patient is sitting up in bed. Recent fall. Chronic right knee pain. He continues on tylenol for pain. Multiple compression fractures of the  spine.  He does have some difficulty with trunk control into the bed.  Patient denies pain in his back.Pt completed his anbx for UTI. Currently asymptomatic.     Assessment/Plan:     Acute cystitis without hematuria  -UA UC with leukocytes, bacteria and mucus.  -UC showed no piyush.   -Patient symptomatic and treated with Cipro.   -hx of kidney stones.   -Encourage fluids.  -Follow-up CBC and BMP unremarkable.   -Follow up out pt with urology.     Compression fracture of thoracic vertebra with routine healing, unspecified thoracic vertebral level, subsequent encounter  -MRI of the thoracic spine consistent with chronic fractures.  -Follow up out pt with spine.     Type 2 diabetes mellitus with hyperosmolarity without coma, without long-term current use of insulin (H)  -Prior to admit metformin and Actos resumed.  -Consistent carb diet.  -Blood sugar checks twice daily.    Paroxysmal atrial fibrillation (H)  -Currently not rate controlled.  -Continue Coumadin for anticoagulation.  -INR today managed per the Coumadin clinic.    Essential hypertension  -bp stable.   -As needed hydroxyzine.    Osteoarthritis of the right knee  -Follow-up outpatient with Ortho for possible work-up for right knee replacement.  -Tylenol pain.    -Ok to discharge with current meds and treatments.   -Home PT, OT, HHA and RN for medication management.  -Follow up with PCP in 1-2 weeks.     Active Ambulatory Problems     Diagnosis Date Noted    SIRS (systemic inflammatory response syndrome) (H) 09/09/2019    Adrenal incidentaloma (H)     Diet-controlled diabetes mellitus (H)     Pericardial effusion     Lactic acidosis     Type 2 diabetes mellitus without complication, without long-term current use of insulin (H) 04/12/2020    Paroxysmal atrial fibrillation (H) 02/18/2020    Calculus of ureter 04/12/2020    Acute renal failure, unspecified acute renal failure type (H) 04/12/2020    Acute renal failure (ARF) (H) 04/12/2020    ATN (acute tubular  necrosis) (H) 04/12/2020    Sepsis due to urinary tract infection (H)     Shock circulatory (H)     Metformin overdose of undetermined intent, initial encounter     Acute respiratory failure with hypoxemia (H)     Metabolic acidosis     Hyperkalemia     Hematemesis, presence of nausea not specified 04/12/2020    Calculus of kidney     Syncope and collapse 06/30/2020    Hyperglycemia     After care 07/03/2012    Age-related cataract 03/27/2021    Anemia associated with acute blood loss 06/27/2012    Balanitis 03/27/2021    Cholelithiasis without obstruction 03/27/2021    Constipation 07/03/2012    Depression with anxiety 07/03/2012    Glaucoma 06/25/2012    Hemorrhoids without complication 03/27/2021    Hyperlipidemia 06/07/2018    Loss of appetite 03/27/2021    Major depressive disorder, recurrent episode, in partial remission (H) 06/08/2018    Mixed personality disorder in adult (H) 06/08/2018    Obstructive sleep apnea 03/27/2021    Osteoarthrosis involving lower leg 06/23/2012    Periodic limb movement disorder 03/27/2021    Recurrent major depression (H) 03/27/2021    S/P ureteral stent placement 03/27/2021    Unilateral small kidney 03/27/2021    Nephrolithiasis 03/27/2021    Type 2 diabetes mellitus (H) 06/25/2012    Osteoarthritis of knee 03/27/2021    Persistent atrial fibrillation (H) 03/27/2021    Pyelonephritis 03/27/2021    Urinary tract infection without hematuria, site unspecified 04/30/2022    Sepsis without acute organ dysfunction (H) 04/30/2022    UTI (urinary tract infection) 05/01/2022    Benign prostatic hyperplasia with urinary frequency 05/07/2022    Hypomagnesemia 05/07/2022    Injury of head, initial encounter 07/31/2023    Fall at home, initial encounter 07/31/2023    E. coli urinary tract infection 08/02/2023     Resolved Ambulatory Problems     Diagnosis Date Noted    No Resolved Ambulatory Problems     Past Medical History:   Diagnosis Date    A-fib (H)     Acute hemodialysis encounter  (H)     Acute kidney injury (H)     Asbestosis (H)     Atrophy of right kidney     Basal cell carcinoma     BPH without urinary obstruction     Cellulitis     Cholelithiasis     Diabetes mellitus, type 2 (H) 4/12/2020    Esophagitis     Gastritis     Hematemesis, presence of nausea not specified     Hyperlipemia     Kidney stone     Metformin overdose of undetermined intent     PTSD (post-traumatic stress disorder)     Seasonal allergies     Sleep apnea     Upper GI bleed      No Known Allergies    All Meds and Allergies reviewed in the record at the facility and is the most up-to-date.    Current Outpatient Medications   Medication Sig    acetaminophen (TYLENOL) 500 MG tablet [ACETAMINOPHEN (TYLENOL) 500 MG TABLET] Take 500 mg by mouth every 6 (six) hours as needed for pain.    amoxicillin (AMOXIL) 500 MG capsule [AMOXICILLIN (AMOXIL) 500 MG CAPSULE] Take 2,000 mg by mouth once as needed (Prior to dental work).    ARIPiprazole (ABILIFY) 2 MG tablet [ARIPIPRAZOLE (ABILIFY) 2 MG TABLET] Take 2 mg by mouth at bedtime.     aspirin 81 mg chewable tablet Take 81 mg by mouth daily    atorvastatin (LIPITOR) 10 MG tablet [ATORVASTATIN (LIPITOR) 10 MG TABLET] Take 10 mg by mouth at bedtime.    brimonidine (ALPHAGAN) 0.2 % ophthalmic solution [BRIMONIDINE (ALPHAGAN) 0.2 % OPHTHALMIC SOLUTION] Administer 1 drop to both eyes 2 (two) times a day.     dorzolamide-timolol (COSOPT) 2-0.5 % ophthalmic solution Place 1 drop into both eyes 2 times daily    dorzolamide-timolol (COSOPT) 22.3-6.8 mg/mL ophthalmic solution Place 1 drop into both eyes 2 times daily    fluvoxaMINE (LUVOX) 50 MG tablet [FLUVOXAMINE (LUVOX) 50 MG TABLET] Take 50 mg by mouth at bedtime.     hydrOXYzine (ATARAX) 10 MG tablet Take 10 mg by mouth 3 times daily as needed    latanoprostene bunod 0.024 % Drop Place 1 drop into both eyes At Bedtime    levomefolate calcium (L-METHYLFOLATE ORAL) [LEVOMEFOLATE CALCIUM (L-METHYLFOLATE ORAL)] Take 1.25 mg by mouth daily.      metFORMIN (GLUCOPHAGE) 1000 MG tablet Take 1,000 mg by mouth 2 times daily (with meals)    Multiple Vitamins-Minerals (CENTRUM SILVER) CHEW Take 1 tablet by mouth daily    netarsudiL (RHOPRESSA) 0.02 % Drop Place 1 drop into both eyes every evening    pioglitazone (ACTOS) 30 MG tablet Take 30 mg by mouth daily    warfarin ANTICOAGULANT (COUMADIN) 5 MG tablet TAKE 1/2 (ONE-HALF) TABLET BY MOUTH EVERY SATURDAY AND THEN 1 ONCE DAILY ALL  OTHER  DAYS  OR  AS  DIRECTED  BY  INR  CLINIC    WARFARIN SODIUM PO Take by mouth See Admin Instructions Dosing in coordination with INR results     No current facility-administered medications for this visit.       REVIEW OF SYSTEMS:   10 point review of systems reviewed and pertinent positives in the HPI.     PHYSICAL EXAMINATION:  Physical Exam     Vital signs: /83   Pulse 76   Temp 98.2  F (36.8  C)   Resp 18   Ht 1.829 m (6')   Wt 102 kg (224 lb 12.8 oz)   SpO2 99%   BMI 30.49 kg/m    General: Awake,  Alert, lying in  bed, conversant  HEENT:Pink conjunctiva, moist oral mucosa, visual deficit of the right eye with discolor.   NECK: Supple  BACK: No kyphosis of the thoracic spine.   ABDOMEN: Soft, with positive bowel sounds  EXTREMITIES: Moves both upper and lower extremities, no pedal edema  SKIN: Warm and dry  NEUROLOGIC: Intact  PSYCHIATRIC: pleasant affect.      Labs:  All labs reviewed in the nursing home record and Epic   @  Lab Results   Component Value Date    WBC 8.1 08/03/2023     Lab Results   Component Value Date    RBC 4.15 08/03/2023     Lab Results   Component Value Date    HGB 13.0 08/03/2023     Lab Results   Component Value Date    HCT 40.7 08/03/2023     Lab Results   Component Value Date    MCV 98 08/03/2023     Lab Results   Component Value Date    MCH 31.3 08/03/2023     Lab Results   Component Value Date    MCHC 31.9 08/03/2023     Lab Results   Component Value Date    RDW 14.0 08/03/2023     Lab Results   Component Value Date      08/03/2023        @Last Comprehensive Metabolic Panel:  Sodium   Date Value Ref Range Status   08/23/2023 142 136 - 145 mmol/L Final     Potassium   Date Value Ref Range Status   08/23/2023 4.1 3.4 - 5.3 mmol/L Final   05/05/2022 4.4 3.5 - 5.0 mmol/L Final     Chloride   Date Value Ref Range Status   08/23/2023 106 98 - 107 mmol/L Final   05/02/2022 109 (H) 98 - 107 mmol/L Final     Carbon Dioxide (CO2)   Date Value Ref Range Status   08/23/2023 23 22 - 29 mmol/L Final   05/02/2022 25 22 - 31 mmol/L Final     Anion Gap   Date Value Ref Range Status   08/23/2023 13 7 - 15 mmol/L Final   05/02/2022 8 5 - 18 mmol/L Final     Glucose   Date Value Ref Range Status   08/23/2023 142 (H) 70 - 99 mg/dL Final   05/02/2022 133 (H) 70 - 125 mg/dL Final     GLUCOSE BY METER POCT   Date Value Ref Range Status   08/03/2023 151 (H) 70 - 99 mg/dL Final     Urea Nitrogen   Date Value Ref Range Status   08/23/2023 17.7 8.0 - 23.0 mg/dL Final   05/02/2022 11 8 - 28 mg/dL Final     Creatinine   Date Value Ref Range Status   08/23/2023 0.84 0.67 - 1.17 mg/dL Final     GFR Estimate   Date Value Ref Range Status   08/23/2023 >90 >60 mL/min/1.73m2 Final   06/10/2021 >60 >60 mL/min/1.73m2 Final     Calcium   Date Value Ref Range Status   08/23/2023 8.8 8.8 - 10.2 mg/dL Final     DISCHARGE PLAN/FACE TO FACE:  I certify that this patient is under my care and that I, or a nurse practitioner or physician's assistant working with me, had a face-to-face encounter that meets the physician face-to-face encounter requirements with this patient.       I certify that, based on my findings, the following services are medically necessary home health services.    My clinical findings support the need for the above skilled services.    This patient is homebound because: Recent hospitalization with UTI, recurrent fall with right knee pain and chronic spine fractures.     The patient is, or has been, under my care and I have initiated the establishment of  the plan of care. This patient will be followed by a physician who will periodically review the plan of care.    35 minutes spent reviewing medications, home care services and follow ups as well as collaborating with nursing staff and SW.     This note has been dictated using voice recognition software. Any grammatical or context distortions are unintentional and inherent to the software    Electronically signed by: Kylie Gomez CNP

## 2023-09-07 NOTE — LETTER
9/7/2023        RE: Rakesh Samuels  [address]  [facility] [address]         HEALTH GERIATRIC SERVICES    Code Status:  DNR/DNI   Visit Type:   Chief Complaint   Patient presents with     TCU Discharge     Facility:  [facility]) [[id number]]         HPI: Rakesh Samuels is a 72 year old male who I am seeing today for discharge from the TCU.  Patient recently hospitalized on 7/31/2023 secondary to weakness with debility and frequent falls.  Past medical history includes proximal atrial fib on chronic anticoagulation, hypertension, diabetes mellitus type 2, hyperlipidemia and anxiety with depression.  Patient presented with a fall hitting his head on the floor.  He had increased pain in his right knee due to osteoarthritis.  Increased bilateral lower extremity weakness and imbalance.  He lives in a senior [facility].  He uses a walker at baseline.  Head CT showed no acute intracranial abnormality.  TSH was normal.  Knee x-ray showed DJD.  He was found to have superior endplate compression fractures at T2, T4 and T5.  Patient was asymptomatic.  Recommended outpatient follow-up with orthopedic surgery for possible right knee replacement.  UTI with E. coli.  He was treated with ceftriaxone and switch to oral Cipro.  Elevated troponin.  Patient without chest pain.  No cardiac history.  Normal proBNP and D-dimer.  Chronically patient reports some dyspnea on exertion.  Echo on 6/1 preserved ejection fracture 60 to 65%.  Proximal atrial fib not on rate control medication.  He is on chronic anticoagulation with Coumadin.  Essential hypertension placed on.  Hydralazine during hospitalization.  Diabetes mellitus type 2.  During hospitalization his metformin and Actos were held and he was treated with sliding scale however these were resumed at time discharge.  Anxiety with depression on Abilify, fluvoxamine and hydroxyzine.    Transitional Care Course: Today patient is sitting up in bed. Recent fall.  Chronic right knee pain. He continues on tylenol for pain. Multiple compression fractures of the spine.  He does have some difficulty with trunk control into the bed.  Patient denies pain in his back.Pt completed his anbx for UTI. Currently asymptomatic.     Assessment/Plan:     Acute cystitis without hematuria  -UA UC with leukocytes, bacteria and mucus.  -UC showed no piyush.   -Patient symptomatic and treated with Cipro.   -hx of kidney stones.   -Encourage fluids.  -Follow-up CBC and BMP unremarkable.   -Follow up out pt with urology.     Compression fracture of thoracic vertebra with routine healing, unspecified thoracic vertebral level, subsequent encounter  -MRI of the thoracic spine consistent with chronic fractures.  -Follow up out pt with spine.     Type 2 diabetes mellitus with hyperosmolarity without coma, without long-term current use of insulin (H)  -Prior to admit metformin and Actos resumed.  -Consistent carb diet.  -Blood sugar checks twice daily.    Paroxysmal atrial fibrillation (H)  -Currently not rate controlled.  -Continue Coumadin for anticoagulation.  -INR today managed per the Coumadin clinic.    Essential hypertension  -bp stable.   -As needed hydroxyzine.    Osteoarthritis of the right knee  -Follow-up outpatient with Ortho for possible work-up for right knee replacement.  -Tylenol pain.    -Ok to discharge with current meds and treatments.   -Home PT, OT, HHA and RN for medication management.  -Follow up with PCP in 1-2 weeks.     Active Ambulatory Problems     Diagnosis Date Noted     SIRS (systemic inflammatory response syndrome) (H) 09/09/2019     Adrenal incidentaloma (H)      Diet-controlled diabetes mellitus (H)      Pericardial effusion      Lactic acidosis      Type 2 diabetes mellitus without complication, without long-term current use of insulin (H) 04/12/2020     Paroxysmal atrial fibrillation (H) 02/18/2020     Calculus of ureter 04/12/2020     Acute renal failure, unspecified  acute renal failure type (H) 04/12/2020     Acute renal failure (ARF) (H) 04/12/2020     ATN (acute tubular necrosis) (H) 04/12/2020     Sepsis due to urinary tract infection (H)      Shock circulatory (H)      Metformin overdose of undetermined intent, initial encounter      Acute respiratory failure with hypoxemia (H)      Metabolic acidosis      Hyperkalemia      Hematemesis, presence of nausea not specified 04/12/2020     Calculus of kidney      Syncope and collapse 06/30/2020     Hyperglycemia      After care 07/03/2012     Age-related cataract 03/27/2021     Anemia associated with acute blood loss 06/27/2012     Balanitis 03/27/2021     Cholelithiasis without obstruction 03/27/2021     Constipation 07/03/2012     Depression with anxiety 07/03/2012     Glaucoma 06/25/2012     Hemorrhoids without complication 03/27/2021     Hyperlipidemia 06/07/2018     Loss of appetite 03/27/2021     Major depressive disorder, recurrent episode, in partial remission (H) 06/08/2018     Mixed personality disorder in adult (H) 06/08/2018     Obstructive sleep apnea 03/27/2021     Osteoarthrosis involving lower leg 06/23/2012     Periodic limb movement disorder 03/27/2021     Recurrent major depression (H) 03/27/2021     S/P ureteral stent placement 03/27/2021     Unilateral small kidney 03/27/2021     Nephrolithiasis 03/27/2021     Type 2 diabetes mellitus (H) 06/25/2012     Osteoarthritis of knee 03/27/2021     Persistent atrial fibrillation (H) 03/27/2021     Pyelonephritis 03/27/2021     Urinary tract infection without hematuria, site unspecified 04/30/2022     Sepsis without acute organ dysfunction (H) 04/30/2022     UTI (urinary tract infection) 05/01/2022     Benign prostatic hyperplasia with urinary frequency 05/07/2022     Hypomagnesemia 05/07/2022     Injury of head, initial encounter 07/31/2023     Fall at home, initial encounter 07/31/2023     E. coli urinary tract infection 08/02/2023     Resolved Ambulatory Problems      Diagnosis Date Noted     No Resolved Ambulatory Problems     Past Medical History:   Diagnosis Date     A-fib (H)      Acute hemodialysis encounter (H)      Acute kidney injury (H)      Asbestosis (H)      Atrophy of right kidney      Basal cell carcinoma      BPH without urinary obstruction      Cellulitis      Cholelithiasis      Diabetes mellitus, type 2 (H) 4/12/2020     Esophagitis      Gastritis      Hematemesis, presence of nausea not specified      Hyperlipemia      Kidney stone      Metformin overdose of undetermined intent      PTSD (post-traumatic stress disorder)      Seasonal allergies      Sleep apnea      Upper GI bleed      No Known Allergies    All Meds and Allergies reviewed in the record at the facility and is the most up-to-date.    Current Outpatient Medications   Medication Sig     acetaminophen (TYLENOL) 500 MG tablet [ACETAMINOPHEN (TYLENOL) 500 MG TABLET] Take 500 mg by mouth every 6 (six) hours as needed for pain.     amoxicillin (AMOXIL) 500 MG capsule [AMOXICILLIN (AMOXIL) 500 MG CAPSULE] Take 2,000 mg by mouth once as needed (Prior to dental work).     ARIPiprazole (ABILIFY) 2 MG tablet [ARIPIPRAZOLE (ABILIFY) 2 MG TABLET] Take 2 mg by mouth at bedtime.      aspirin 81 mg chewable tablet Take 81 mg by mouth daily     atorvastatin (LIPITOR) 10 MG tablet [ATORVASTATIN (LIPITOR) 10 MG TABLET] Take 10 mg by mouth at bedtime.     brimonidine (ALPHAGAN) 0.2 % ophthalmic solution [BRIMONIDINE (ALPHAGAN) 0.2 % OPHTHALMIC SOLUTION] Administer 1 drop to both eyes 2 (two) times a day.      dorzolamide-timolol (COSOPT) 2-0.5 % ophthalmic solution Place 1 drop into both eyes 2 times daily     dorzolamide-timolol (COSOPT) 22.3-6.8 mg/mL ophthalmic solution Place 1 drop into both eyes 2 times daily     fluvoxaMINE (LUVOX) 50 MG tablet [FLUVOXAMINE (LUVOX) 50 MG TABLET] Take 50 mg by mouth at bedtime.      hydrOXYzine (ATARAX) 10 MG tablet Take 10 mg by mouth 3 times daily as needed      latanoprostene bunod 0.024 % Drop Place 1 drop into both eyes At Bedtime     levomefolate calcium (L-METHYLFOLATE ORAL) [LEVOMEFOLATE CALCIUM (L-METHYLFOLATE ORAL)] Take 1.25 mg by mouth daily.      metFORMIN (GLUCOPHAGE) 1000 MG tablet Take 1,000 mg by mouth 2 times daily (with meals)     Multiple Vitamins-Minerals (CENTRUM SILVER) CHEW Take 1 tablet by mouth daily     netarsudiL (RHOPRESSA) 0.02 % Drop Place 1 drop into both eyes every evening     pioglitazone (ACTOS) 30 MG tablet Take 30 mg by mouth daily     warfarin ANTICOAGULANT (COUMADIN) 5 MG tablet TAKE 1/2 (ONE-HALF) TABLET BY MOUTH EVERY SATURDAY AND THEN 1 ONCE DAILY ALL  OTHER  DAYS  OR  AS  DIRECTED  BY  INR  CLINIC     WARFARIN SODIUM PO Take by mouth See Admin Instructions Dosing in coordination with INR results     No current facility-administered medications for this visit.       REVIEW OF SYSTEMS:   10 point review of systems reviewed and pertinent positives in the HPI.     PHYSICAL EXAMINATION:  Physical Exam     Vital signs: /83   Pulse 76   Temp 98.2  F (36.8  C)   Resp 18   Ht 1.829 m (6')   Wt 102 kg (224 lb 12.8 oz)   SpO2 99%   BMI 30.49 kg/m    General: Awake,  Alert, lying in  bed, conversant  HEENT:Pink conjunctiva, moist oral mucosa, visual deficit of the right eye with discolor.   NECK: Supple  BACK: No kyphosis of the thoracic spine.   ABDOMEN: Soft, with positive bowel sounds  EXTREMITIES: Moves both upper and lower extremities, no pedal edema  SKIN: Warm and dry  NEUROLOGIC: Intact  PSYCHIATRIC: pleasant affect.      Labs:  All labs reviewed in the nursing home record and Epic   @  Lab Results   Component Value Date    WBC 8.1 08/03/2023     Lab Results   Component Value Date    RBC 4.15 08/03/2023     Lab Results   Component Value Date    HGB 13.0 08/03/2023     Lab Results   Component Value Date    HCT 40.7 08/03/2023     Lab Results   Component Value Date    MCV 98 08/03/2023     Lab Results   Component Value Date     MCH 31.3 08/03/2023     Lab Results   Component Value Date    MCHC 31.9 08/03/2023     Lab Results   Component Value Date    RDW 14.0 08/03/2023     Lab Results   Component Value Date     08/03/2023        @Last Comprehensive Metabolic Panel:  Sodium   Date Value Ref Range Status   08/23/2023 142 136 - 145 mmol/L Final     Potassium   Date Value Ref Range Status   08/23/2023 4.1 3.4 - 5.3 mmol/L Final   05/05/2022 4.4 3.5 - 5.0 mmol/L Final     Chloride   Date Value Ref Range Status   08/23/2023 106 98 - 107 mmol/L Final   05/02/2022 109 (H) 98 - 107 mmol/L Final     Carbon Dioxide (CO2)   Date Value Ref Range Status   08/23/2023 23 22 - 29 mmol/L Final   05/02/2022 25 22 - 31 mmol/L Final     Anion Gap   Date Value Ref Range Status   08/23/2023 13 7 - 15 mmol/L Final   05/02/2022 8 5 - 18 mmol/L Final     Glucose   Date Value Ref Range Status   08/23/2023 142 (H) 70 - 99 mg/dL Final   05/02/2022 133 (H) 70 - 125 mg/dL Final     GLUCOSE BY METER POCT   Date Value Ref Range Status   08/03/2023 151 (H) 70 - 99 mg/dL Final     Urea Nitrogen   Date Value Ref Range Status   08/23/2023 17.7 8.0 - 23.0 mg/dL Final   05/02/2022 11 8 - 28 mg/dL Final     Creatinine   Date Value Ref Range Status   08/23/2023 0.84 0.67 - 1.17 mg/dL Final     GFR Estimate   Date Value Ref Range Status   08/23/2023 >90 >60 mL/min/1.73m2 Final   06/10/2021 >60 >60 mL/min/1.73m2 Final     Calcium   Date Value Ref Range Status   08/23/2023 8.8 8.8 - 10.2 mg/dL Final     DISCHARGE PLAN/FACE TO FACE:  I certify that this patient is under my care and that I, or a nurse practitioner or physician's assistant working with me, had a face-to-face encounter that meets the physician face-to-face encounter requirements with this patient.       I certify that, based on my findings, the following services are medically necessary home health services.    My clinical findings support the need for the above skilled services.    This patient is homebound  because: Recent hospitalization with UTI, recurrent fall with right knee pain and chronic spine fractures.     The patient is, or has been, under my care and I have initiated the establishment of the plan of care. This patient will be followed by a physician who will periodically review the plan of care.    35 minutes spent reviewing medications, home care services and follow ups as well as collaborating with nursing staff and SW.     This note has been dictated using voice recognition software. Any grammatical or context distortions are unintentional and inherent to the software    Electronically signed by: Kylie Gomez CNP       Sincerely,        Kylie Gomez NP

## 2023-09-13 ENCOUNTER — ANTICOAGULATION THERAPY VISIT (OUTPATIENT)
Dept: ANTICOAGULATION | Facility: CLINIC | Age: 73
End: 2023-09-13
Payer: MEDICARE

## 2023-09-13 DIAGNOSIS — I48.0 PAROXYSMAL ATRIAL FIBRILLATION (H): Primary | ICD-10-CM

## 2023-09-13 LAB — INR HOME MONITORING: 2.3 (ref 2–3)

## 2023-09-13 NOTE — PROGRESS NOTES
ANTICOAGULATION MANAGEMENT     Rakesh Samuels 73 year old male is on warfarin with therapeutic INR result. (Goal INR 2.0-3.0)    Recent labs: (last 7 days)     09/13/23  0000   INR 2.3       ASSESSMENT     Source(s): Chart Review and Patient/Caregiver Call     Warfarin doses taken: Warfarin taken as instructed  Diet: No new diet changes identified  Medication/supplement changes: None noted  New illness, injury, or hospitalization: No  Signs or symptoms of bleeding or clotting: No  Previous result: Therapeutic last 2(+) visits  Additional findings:  Per Eloisa, the patient has Home Care services but they prefer to handle INR's and dosing - made aware that they will get a call regarding INR result next week and will discuss resuming managing by exception once Nanette is back       PLAN     Recommended plan for no diet, medication or health factor changes affecting INR     Dosing Instructions: Continue your current warfarin dose with next INR in 1 week       Summary  As of 9/13/2023      Full warfarin instructions:  7.5 mg every Mon, Thu; 5 mg all other days   Next INR check:  9/20/2023               Telephone call with Rakesh's dtr Eloisa ( Nanette is out of town) who verbalizes understanding and agrees to plan    Patient to recheck with home meter    Education provided:   Contact [phone]  with any changes, questions or concerns.     Plan made per [facility] anticoagulation protocol    Ruby Manzanares RN  Anticoagulation Clinic  9/13/2023    _______________________________________________________________________     Anticoagulation Episode Summary       Current INR goal:  2.0-3.0   TTR:  82.2 % (1 y)   Target end date:  Indefinite   Send INR reminders to:  [facility] MONITOR[facility]    Indications    Paroxysmal atrial fibrillation (H) [I48.0]             Comments:  [employer] home meter- Managed by Exception             Anticoagulation Care Providers       Provider Role Specialty Phone number    Eric Pickett MD Referring  Cardiovascular Disease [phone]

## 2023-09-20 ENCOUNTER — ANTICOAGULATION THERAPY VISIT (OUTPATIENT)
Dept: ANTICOAGULATION | Facility: CLINIC | Age: 73
End: 2023-09-20
Payer: MEDICARE

## 2023-09-20 DIAGNOSIS — I48.0 PAROXYSMAL ATRIAL FIBRILLATION (H): Primary | ICD-10-CM

## 2023-09-20 LAB — INR HOME MONITORING: 3 (ref 2–3)

## 2023-09-20 NOTE — PROGRESS NOTES
ANTICOAGULATION MANAGEMENT     Rakesh Samuels 73 year old male is on warfarin with therapeutic INR result. (Goal INR 2.0-3.0)    Recent labs: (last 7 days)     09/20/23  0000   INR 3.0       ASSESSMENT     Source(s): Chart Review  Previous INR was Therapeutic last 2(+) visits  Medication, diet, health changes since last INR chart reviewed; none identified         PLAN     Recommended plan for no diet, medication or health factor changes affecting INR     Dosing Instructions: Continue your current warfarin dose with next INR in 1 week       Summary  As of 9/20/2023      Full warfarin instructions:  7.5 mg every Mon, Thu; 5 mg all other days   Next INR check:  9/27/2023               Detailed voice message left for Eloisa with dosing instructions and follow up date.     Patient to recheck with home meter    Education provided:   Please call back if any changes to your diet, medications or how you've been taking warfarin  Contact [phone]  with any changes, questions or concerns.     Plan made per [facility] anticoagulation protocol    Shirley Cruz RN  Anticoagulation Clinic  9/20/2023    _______________________________________________________________________     Anticoagulation Episode Summary       Current INR goal:  2.0-3.0   TTR:  82.2 % (1 y)   Target end date:  Indefinite   Send INR reminders to:  [facility]AG HOME MONITORING    Indications    Paroxysmal atrial fibrillation (H) [I48.0]             Comments:  [employer] home meter- Managed by Exception             Anticoagulation Care Providers       Provider Role Specialty Phone number    Eric Pickett MD Referring Cardiovascular Disease [phone]

## 2023-09-27 ENCOUNTER — DOCUMENTATION ONLY (OUTPATIENT)
Dept: ANTICOAGULATION | Facility: CLINIC | Age: 73
End: 2023-09-27
Payer: MEDICARE

## 2023-09-27 DIAGNOSIS — I48.0 PAROXYSMAL ATRIAL FIBRILLATION (H): Primary | ICD-10-CM

## 2023-09-27 LAB — INR HOME MONITORING: 2.5 (ref 2–3)

## 2023-09-27 NOTE — PROGRESS NOTES
ANTICOAGULATION  MANAGEMENT-Home Monitor Managed by Aranza SWANSON Abril 73 year old male is on warfarin with therapeutic INR result. (Goal INR 2.0-3.0)    Recent labs: (last 7 days)     09/27/23  0000   INR 2.5       Previous INR was Therapeutic  Medication, diet, health changes since last INR:chart reviewed; none identified  Contacted within the last 12 weeks by phone on 9/20/23      NISHA     Rakesh was NOT contacted regarding therapeutic result today per home monitoring policy manage by exception agreement.   Current warfarin dose is to be continued:     Summary  As of 9/27/2023      Full warfarin instructions:  7.5 mg every Mon, Thu; 5 mg all other days   Next INR check:  10/11/2023             ?   Heather Yanez RN  Anticoagulation Clinic  9/27/2023    _______________________________________________________________________     Anticoagulation Episode Summary       Current INR goal:  2.0-3.0   TTR:  82.2 % (1 y)   Target end date:  Indefinite   Send INR reminders to:  [facility]    Indications    Paroxysmal atrial fibrillation (H) [I48.0]             Comments:  [employer] home meter- Managed by Exception             Anticoagulation Care Providers       Provider Role Specialty Phone number    Eric Pickett MD Referring Cardiovascular Disease [phone]

## 2023-10-11 ENCOUNTER — OFFICE VISIT (OUTPATIENT)
Dept: CARDIOLOGY | Facility: CLINIC | Age: 73
End: 2023-10-11
Payer: MEDICARE

## 2023-10-11 ENCOUNTER — DOCUMENTATION ONLY (OUTPATIENT)
Dept: ANTICOAGULATION | Facility: CLINIC | Age: 73
End: 2023-10-11

## 2023-10-11 VITALS
WEIGHT: 226 LBS | OXYGEN SATURATION: 97 % | BODY MASS INDEX: 30.65 KG/M2 | SYSTOLIC BLOOD PRESSURE: 104 MMHG | RESPIRATION RATE: 18 BRPM | DIASTOLIC BLOOD PRESSURE: 68 MMHG | HEART RATE: 75 BPM

## 2023-10-11 DIAGNOSIS — I48.0 PAROXYSMAL ATRIAL FIBRILLATION (H): Primary | ICD-10-CM

## 2023-10-11 DIAGNOSIS — Z79.01 LONG TERM CURRENT USE OF ANTICOAGULANT THERAPY: ICD-10-CM

## 2023-10-11 DIAGNOSIS — R06.09 DYSPNEA ON EXERTION: Primary | ICD-10-CM

## 2023-10-11 LAB
INR HOME MONITORING: 2.5 (ref 2–3)
INR POINT OF CARE: 2.5 (ref 0.9–1.1)

## 2023-10-11 PROCEDURE — 99215 OFFICE O/P EST HI 40 MIN: CPT | Performed by: INTERNAL MEDICINE

## 2023-10-11 PROCEDURE — 36416 COLLJ CAPILLARY BLOOD SPEC: CPT | Performed by: INTERNAL MEDICINE

## 2023-10-11 PROCEDURE — 85610 PROTHROMBIN TIME: CPT | Performed by: INTERNAL MEDICINE

## 2023-10-11 NOTE — PROGRESS NOTES
ANTICOAGULATION  MANAGEMENT-Home Monitor Managed by Aranza SWANSON Abril 73 year old male is on warfarin with therapeutic INR result. (Goal INR 2.0-3.0)    Recent labs: (last 7 days)     10/11/23  1550   INR 2.5*       Previous INR was Therapeutic  Medication, diet, health changes since last INR:chart reviewed; none identified  Contacted within the last 12 weeks by phone on 9/27/23  Also received another INR result of 2.5 done at [facility] lab dated today.      NISHA Cameron was NOT contacted regarding therapeutic result today per home monitoring policy manage by exception agreement.   Current warfarin dose is to be continued:     Summary  As of 10/11/2023      Full warfarin instructions:  7.5 mg every Mon, Thu; 5 mg all other days   Next INR check:  10/25/2023             ?   Ruby Manzanares RN  Anticoagulation Clinic  10/11/2023    _______________________________________________________________________     Anticoagulation Episode Summary       Current INR goal:  2.0-3.0   TTR:  82.2% (1 y)   Target end date:  Indefinite   Send INR reminders to:  [facility]IT[facility]    Indications    Paroxysmal atrial fibrillation (H) [I48.0]             Comments:  [employer] home meter- Managed by Exception             Anticoagulation Care Providers       Provider Role Specialty Phone number    Eric Pickett MD Referring Cardiovascular Disease [phone]

## 2023-10-11 NOTE — LETTER
10/11/2023    Michelet Restrepo MD  [address] [place] [address]    RE: Rakesh Samuels       Dear Colleague,     I had the pleasure of seeing Rakesh Samuels in the [facility].    HEART CARE ENCOUNTER CONSULTATON NOTE      [facility]  [phone]      Assessment/Recommendations   Assessment:  Preoperative cardiac evaluation prior to R TKR: unable to do close to 4 METS as limited by dyspnea and pain.  Very unstable with recurrent falls.  Recommend further risk stratification with Lexiscan nuclear stress testing  Dyspnea on exertion  Paroxysmal atrial fibrillation: asymptomatic  Anticoagulation maintained on coumadin  Obesity  Instability/ recurrent falls      Plan:  May hold coumadin 5 days prior to surgery and then resume  Lexiscan nuclear stress test.  If no high risk findings may proceed with surgery as planned without further cardiac evaluation  Primary cardiologist Dr Pickett     History of Present Illness/Subjective    HPI: Rakesh Samuels is a 73 year old male with history of paroxysmal atrial fibrillation historically asymptomatic maintained on coumadin for anticoagulation, diabetes mellitus type II, recurrent falls who I am seeing for preoperative evaluation prior to right knee replacement surgery.  He is here today accompanied by his daughter.  He was recently hospitalized and in a TCU after a fall.  He has continued to have severe right knee pain and is here for evaluation prior to surgery.  Extremely deconditioned.  Uses a walked for short distances < 1/2 block otherwise needs a wheelchair.  Limited due to pain and dyspnea on exertion.  No increased edema or orthopnea.  No chest pain. Coronary calcifications noted on CT scan.  Has never undergone stress testing.     Echocardiogram 8/1/23  Left ventricular size, wall motion and function are normal. The ejection  fraction is 60-65%.  Normal right ventricle size and systolic function.  No hemodynamically significant  valvular abnormalities on 2D or color flow  imaging. The study was technically difficult.    Today's clinic visit entailed:  Ordering of each unique test  40 minutes spent by me on the date of the encounter doing chart review, history and exam, documentation and further activities per the note  Provider  Link to [facility] Help Grid     The level of medical decision making during this visit was of high complexity.       Physical Examination  Review of Systems   Vitals: /68 (BP Location: Left arm, Patient Position: Sitting, Cuff Size: Adult Large)   Pulse 75   Resp 18   Wt 102.5 kg (226 lb)   SpO2 97%   BMI 30.65 kg/m    BMI= Body mass index is 30.65 kg/m .  Wt Readings from Last 3 Encounters:   10/11/23 102.5 kg (226 lb)   09/07/23 102 kg (224 lb 12.8 oz)   09/05/23 101.6 kg (224 lb)       General Appearance:   no distress, obese, wheelchair   ENT/Mouth: membranes moist, no oral lesions or bleeding gums.      EYES:  no scleral icterus, normal conjunctivae   Neck: no carotid bruits or thyromegaly   Chest/Lungs:   lungs are clear to auscultation   Cardiovascular:   Regular. Normal first and second heart sounds with no murmur no edema bilaterally    Abdomen:   bowel sounds are present   Extremities: no cyanosis or clubbing   Skin: no xanthelasma, warm.    Neurologic: normal  bilateral, no tremors     Psychiatric: alert and oriented x3, calm        Please refer above for cardiac ROS details.        Medical History  Surgical History Family History Social History   Past Medical History:   Diagnosis Date    A-fib (H)     Acute hemodialysis encounter (H24)     Due to CHIDI    Acute kidney injury (H24)     Acute respiratory failure with hypoxemia (H)     Adrenal incidentaloma (H24)     Asbestosis (H)     ATN (acute tubular necrosis) (H24)     Atrophy of right kidney     Basal cell carcinoma     BPH without urinary obstruction     Cellulitis     Left foot    Cholelithiasis     Depression with anxiety     Diabetes  mellitus, type 2 (H) 4/12/2020    Esophagitis     Gastritis     Glaucoma     Hematemesis, presence of nausea not specified     Hyperkalemia     Hyperlipemia     Kidney stone     Lactic acidosis     Metabolic acidosis     Metformin overdose of undetermined intent     Paroxysmal atrial fibrillation (H) 2/18/2020    Pericardial effusion     PTSD (post-traumatic stress disorder)     Seasonal allergies     Sepsis due to urinary tract infection (H)     Shock circulatory (H)     SIRS (systemic inflammatory response syndrome) (H)     Sleep apnea     uses a machine at night.     Upper GI bleed      Past Surgical History:   Procedure Laterality Date    EYE SURGERY      congenital ptosis right upper lid    HC CYSTOSCOPY,INSERT URETERAL STENT Left 4/12/2020    Procedure: CYSTOSCOPY, WITH URETERAL STENT INSERTION;  Surgeon: Christopher Yates MD;  Location: [facility] OR;  Service: Urology    CT ESOPHAGOGASTRODUODENOSCOPY TRANSORAL DIAGNOSTIC N/A 4/13/2020    Procedure: ESOPHAGOGASTRODUODENOSCOPY (EGD);  Surgeon: Robert Rico MD;  Location: [facility] GI;  Service: Gastroenterology    REPLACEMENT TOTAL KNEE Left      Family History   Problem Relation Age of Onset    Diabetes Mother         Social History     Socioeconomic History    Marital status:      Spouse name: Not on file    Number of children: Not on file    Years of education: Not on file    Highest education level: Not on file   Occupational History    Not on file   Tobacco Use    Smoking status: Never    Smokeless tobacco: Never   Substance and Sexual Activity    Alcohol use: Not Currently    Drug use: Never    Sexual activity: Not Currently   Other Topics Concern    Not on file   Social History Narrative    Not on file     Social Determinants of Health     Financial Resource Strain: Not on file   Food Insecurity: Not on file   Transportation Needs: Not on file   Physical Activity: Not on file   Stress: Not on file   Social Connections: Not on file    Interpersonal Safety: Not on file   Housing Stability: Not on file           Medications  Allergies   Current Outpatient Medications   Medication Sig Dispense Refill    acetaminophen (TYLENOL) 500 MG tablet [ACETAMINOPHEN (TYLENOL) 500 MG TABLET] Take 500 mg by mouth every 6 (six) hours as needed for pain.      ARIPiprazole (ABILIFY) 2 MG tablet [ARIPIPRAZOLE (ABILIFY) 2 MG TABLET] Take 2 mg by mouth at bedtime.       aspirin 81 mg chewable tablet Take 81 mg by mouth daily      atorvastatin (LIPITOR) 10 MG tablet [ATORVASTATIN (LIPITOR) 10 MG TABLET] Take 10 mg by mouth at bedtime.  1    brimonidine (ALPHAGAN) 0.2 % ophthalmic solution [BRIMONIDINE (ALPHAGAN) 0.2 % OPHTHALMIC SOLUTION] Administer 1 drop to both eyes 2 (two) times a day.       dorzolamide-timolol (COSOPT) 2-0.5 % ophthalmic solution Place 1 drop into both eyes 2 times daily      fluvoxaMINE (LUVOX) 50 MG tablet [FLUVOXAMINE (LUVOX) 50 MG TABLET] Take 50 mg by mouth at bedtime.       hydrOXYzine (ATARAX) 10 MG tablet Take 10 mg by mouth 3 times daily as needed      latanoprostene bunod 0.024 % Drop Place 1 drop into both eyes At Bedtime      levomefolate calcium (L-METHYLFOLATE ORAL) [LEVOMEFOLATE CALCIUM (L-METHYLFOLATE ORAL)] Take 1.25 mg by mouth daily.       metFORMIN (GLUCOPHAGE) 1000 MG tablet Take 500 mg by mouth 2 times daily (with meals)      Multiple Vitamins-Minerals (CENTRUM SILVER) CHEW Take 1 tablet by mouth daily      netarsudiL (RHOPRESSA) 0.02 % Drop Place 1 drop into both eyes every evening      pioglitazone (ACTOS) 30 MG tablet Take 10 mg by mouth daily      warfarin ANTICOAGULANT (COUMADIN) 5 MG tablet TAKE 1/2 (ONE-HALF) TABLET BY MOUTH EVERY SATURDAY AND THEN 1 ONCE DAILY ALL  OTHER  DAYS  OR  AS  DIRECTED  BY  INR  CLINIC 78 tablet 1    amoxicillin (AMOXIL) 500 MG capsule [AMOXICILLIN (AMOXIL) 500 MG CAPSULE] Take 2,000 mg by mouth once as needed (Prior to dental work). (Patient not taking: Reported on 10/11/2023)       dorzolamide-timolol (COSOPT) 22.3-6.8 mg/mL ophthalmic solution Place 1 drop into both eyes 2 times daily (Patient not taking: Reported on 10/11/2023)  3    WARFARIN SODIUM PO Take by mouth See Admin Instructions Dosing in coordination with INR results (Patient not taking: Reported on 10/11/2023)       No Known Allergies       Lab Results    Chemistry/lipid CBC Cardiac Enzymes/BNP/TSH/INR   Recent Labs   Lab Test 07/19/22  1022   CHOL 149   HDL 45   LDL 71   TRIG 163*     Recent Labs   Lab Test 07/19/22  1022 06/10/21  1145 11/18/19  1358   LDL 71 90 73     Recent Labs   Lab Test 08/23/23  0518      POTASSIUM 4.1   CHLORIDE 106   CO2 23   *   BUN 17.7   CR 0.84   GFRESTIMATED >90   APRYL 8.8     Recent Labs   Lab Test 08/23/23  0518 08/08/23  0709 07/31/23  1225   CR 0.84 0.88 0.83     Recent Labs   Lab Test 07/31/23  1225 04/30/22  0304 06/30/20  0823   A1C 6.8* 6.7* 5.9*          Recent Labs   Lab Test 08/23/23  0518   WBC 7.4   HGB 12.8*   HCT 40.4   MCV 99        Recent Labs   Lab Test 08/23/23  0518 08/08/23  0709 08/03/23  0715   HGB 12.8* 12.9* 13.0*    Recent Labs   Lab Test 05/03/22  1215 05/02/22  0935 05/02/22  0713   TROPONINI <0.01 <0.01 0.01     Recent Labs   Lab Test 04/30/22  0304   BNP 53     Recent Labs   Lab Test 07/31/23  1225   TSH 2.50     Recent Labs   Lab Test 10/11/23  1550 10/11/23  0000 09/27/23  0000   INR 2.5* 2.5 2.5        Dana Philippe MD                Thank you for allowing me to participate in the care of your patient.      Sincerely,     Dana Philippe MD     [facility]  cc:   Referred Self,

## 2023-10-16 ENCOUNTER — HOSPITAL ENCOUNTER (OUTPATIENT)
Dept: NUCLEAR MEDICINE | Facility: HOSPITAL | Age: 73
Discharge: HOME | End: 2023-10-16
Attending: INTERNAL MEDICINE
Payer: MEDICARE

## 2023-10-16 ENCOUNTER — HOSPITAL ENCOUNTER (OUTPATIENT)
Dept: CARDIOLOGY | Facility: HOSPITAL | Age: 73
Discharge: HOME | End: 2023-10-16
Attending: INTERNAL MEDICINE
Payer: MEDICARE

## 2023-10-16 DIAGNOSIS — R06.09 DYSPNEA ON EXERTION: ICD-10-CM

## 2023-10-16 LAB
CV STRESS CURRENT BP HE: NORMAL
CV STRESS CURRENT HR HE: 101
CV STRESS CURRENT HR HE: 102
CV STRESS CURRENT HR HE: 104
CV STRESS CURRENT HR HE: 85
CV STRESS CURRENT HR HE: 86
CV STRESS CURRENT HR HE: 86
CV STRESS CURRENT HR HE: 87
CV STRESS CURRENT HR HE: 87
CV STRESS CURRENT HR HE: 89
CV STRESS CURRENT HR HE: 89
CV STRESS CURRENT HR HE: 90
CV STRESS CURRENT HR HE: 91
CV STRESS CURRENT HR HE: 93
CV STRESS CURRENT HR HE: 93
CV STRESS CURRENT HR HE: 94
CV STRESS CURRENT HR HE: 98
CV STRESS CURRENT HR HE: 99
CV STRESS DEVIATION TIME HE: NORMAL
CV STRESS ECHO PERCENT HR HE: NORMAL
CV STRESS EXERCISE STAGE HE: NORMAL
CV STRESS FINAL RESTING BP HE: NORMAL
CV STRESS FINAL RESTING HR HE: 90
CV STRESS MAX HR HE: 106
CV STRESS MAX TREADMILL GRADE HE: 0
CV STRESS MAX TREADMILL SPEED HE: 0
CV STRESS PEAK DIA BP HE: NORMAL
CV STRESS PEAK SYS BP HE: NORMAL
CV STRESS PHASE HE: NORMAL
CV STRESS PROTOCOL HE: NORMAL
CV STRESS RESTING PT POSITION HE: NORMAL
CV STRESS ST DEVIATION AMOUNT HE: NORMAL
CV STRESS ST DEVIATION ELEVATION HE: NORMAL
CV STRESS ST EVELATION AMOUNT HE: NORMAL
CV STRESS TEST TYPE HE: NORMAL
CV STRESS TOTAL STAGE TIME MIN 1 HE: NORMAL
RATE PRESSURE PRODUCT: NORMAL
STRESS ECHO BASELINE DIASTOLIC HE: 74
STRESS ECHO BASELINE HR: 84
STRESS ECHO BASELINE SYSTOLIC BP: 136
STRESS ECHO CALCULATED PERCENT HR: 72 %
STRESS ECHO LAST STRESS DIASTOLIC BP: 61
STRESS ECHO LAST STRESS HR: 93
STRESS ECHO LAST STRESS SYSTOLIC BP: 123
STRESS ECHO TARGET HR: 148

## 2023-10-16 PROCEDURE — 78452 HT MUSCLE IMAGE SPECT MULT: CPT | Mod: 26 | Performed by: GENERAL ACUTE CARE HOSPITAL

## 2023-10-16 PROCEDURE — 93017 CV STRESS TEST TRACING ONLY: CPT

## 2023-10-16 PROCEDURE — A9500 TC99M SESTAMIBI: HCPCS | Performed by: INTERNAL MEDICINE

## 2023-10-16 PROCEDURE — 93016 CV STRESS TEST SUPVJ ONLY: CPT | Performed by: INTERNAL MEDICINE

## 2023-10-16 PROCEDURE — 343N000001 HC RX 343: Performed by: INTERNAL MEDICINE

## 2023-10-16 PROCEDURE — 93018 CV STRESS TEST I&R ONLY: CPT | Performed by: GENERAL ACUTE CARE HOSPITAL

## 2023-10-16 PROCEDURE — 250N000011 HC RX IP 250 OP 636: Mod: JZ | Performed by: INTERNAL MEDICINE

## 2023-10-16 PROCEDURE — 78452 HT MUSCLE IMAGE SPECT MULT: CPT | Mod: ME

## 2023-10-16 PROCEDURE — G1010 CDSM STANSON: HCPCS | Performed by: GENERAL ACUTE CARE HOSPITAL

## 2023-10-16 RX ORDER — AMINOPHYLLINE 25 MG/ML
50 INJECTION, SOLUTION INTRAVENOUS
Status: DISCONTINUED | OUTPATIENT
Start: 2023-10-16 | End: 2023-10-16 | Stop reason: HOSPADM

## 2023-10-16 RX ORDER — REGADENOSON 0.08 MG/ML
0.4 INJECTION, SOLUTION INTRAVENOUS ONCE
Status: COMPLETED | OUTPATIENT
Start: 2023-10-16 | End: 2023-10-16

## 2023-10-16 RX ADMIN — Medication 8.08 MILLICURIE: at 13:23

## 2023-10-16 RX ADMIN — AMINOPHYLLINE 50 MG: 25 INJECTION, SOLUTION INTRAVENOUS at 14:11

## 2023-10-16 RX ADMIN — Medication 29.8 MILLICURIE: at 14:10

## 2023-10-16 RX ADMIN — REGADENOSON 0.4 MG: 0.08 INJECTION, SOLUTION INTRAVENOUS at 14:07

## 2023-10-16 NOTE — PROGRESS NOTES
HEART CARE ENCOUNTER CONSULTATON NOTE      [facility]  [phone]      Assessment/Recommendations   Assessment:  Preoperative cardiac evaluation prior to R TKR: unable to do close to 4 METS as limited by dyspnea and pain.  Very unstable with recurrent falls.  Recommend further risk stratification with Lexiscan nuclear stress testing  Dyspnea on exertion  Paroxysmal atrial fibrillation: asymptomatic  Anticoagulation maintained on coumadin  Obesity  Instability/ recurrent falls      Plan:  May hold coumadin 5 days prior to surgery and then resume  Lexiscan nuclear stress test.  If no high risk findings may proceed with surgery as planned without further cardiac evaluation  Primary cardiologist Dr Pickett     History of Present Illness/Subjective    HPI: Rakesh Samuels is a 73 year old male with history of paroxysmal atrial fibrillation historically asymptomatic maintained on coumadin for anticoagulation, diabetes mellitus type II, recurrent falls who I am seeing for preoperative evaluation prior to right knee replacement surgery.  He is here today accompanied by his daughter.  He was recently hospitalized and in a TCU after a fall.  He has continued to have severe right knee pain and is here for evaluation prior to surgery.  Extremely deconditioned.  Uses a walked for short distances < 1/2 block otherwise needs a wheelchair.  Limited due to pain and dyspnea on exertion.  No increased edema or orthopnea.  No chest pain. Coronary calcifications noted on CT scan.  Has never undergone stress testing.     Echocardiogram 8/1/23  Left ventricular size, wall motion and function are normal. The ejection  fraction is 60-65%.  Normal right ventricle size and systolic function.  No hemodynamically significant valvular abnormalities on 2D or color flow  imaging. The study was technically difficult.    Today's clinic visit entailed:  Ordering of each unique test  40 minutes spent by me on the date of the  encounter doing chart review, history and exam, documentation and further activities per the note  Provider  Link to [facility] Help Grid     The level of medical decision making during this visit was of high complexity.       Physical Examination  Review of Systems   Vitals: /68 (BP Location: Left arm, Patient Position: Sitting, Cuff Size: Adult Large)   Pulse 75   Resp 18   Wt 102.5 kg (226 lb)   SpO2 97%   BMI 30.65 kg/m    BMI= Body mass index is 30.65 kg/m .  Wt Readings from Last 3 Encounters:   10/11/23 102.5 kg (226 lb)   09/07/23 102 kg (224 lb 12.8 oz)   09/05/23 101.6 kg (224 lb)       General Appearance:   no distress, obese, wheelchair   ENT/Mouth: membranes moist, no oral lesions or bleeding gums.      EYES:  no scleral icterus, normal conjunctivae   Neck: no carotid bruits or thyromegaly   Chest/Lungs:   lungs are clear to auscultation   Cardiovascular:   Regular. Normal first and second heart sounds with no murmur no edema bilaterally    Abdomen:   bowel sounds are present   Extremities: no cyanosis or clubbing   Skin: no xanthelasma, warm.    Neurologic: normal  bilateral, no tremors     Psychiatric: alert and oriented x3, calm        Please refer above for cardiac ROS details.        Medical History  Surgical History Family History Social History   Past Medical History:   Diagnosis Date    A-fib (H)     Acute hemodialysis encounter (H24)     Due to CHIDI    Acute kidney injury (H24)     Acute respiratory failure with hypoxemia (H)     Adrenal incidentaloma (H24)     Asbestosis (H)     ATN (acute tubular necrosis) (H24)     Atrophy of right kidney     Basal cell carcinoma     BPH without urinary obstruction     Cellulitis     Left foot    Cholelithiasis     Depression with anxiety     Diabetes mellitus, type 2 (H) 4/12/2020    Esophagitis     Gastritis     Glaucoma     Hematemesis, presence of nausea not specified     Hyperkalemia     Hyperlipemia     Kidney stone     Lactic acidosis      Metabolic acidosis     Metformin overdose of undetermined intent     Paroxysmal atrial fibrillation (H) 2/18/2020    Pericardial effusion     PTSD (post-traumatic stress disorder)     Seasonal allergies     Sepsis due to urinary tract infection (H)     Shock circulatory (H)     SIRS (systemic inflammatory response syndrome) (H)     Sleep apnea     uses a machine at night.     Upper GI bleed      Past Surgical History:   Procedure Laterality Date    EYE SURGERY      congenital ptosis right upper lid    HC CYSTOSCOPY,INSERT URETERAL STENT Left 4/12/2020    Procedure: CYSTOSCOPY, WITH URETERAL STENT INSERTION;  Surgeon: Christopher Yates MD;  Location: [facility] OR;  Service: Urology    TX ESOPHAGOGASTRODUODENOSCOPY TRANSORAL DIAGNOSTIC N/A 4/13/2020    Procedure: ESOPHAGOGASTRODUODENOSCOPY (EGD);  Surgeon: Robert Rico MD;  Location: [facility] GI;  Service: Gastroenterology    REPLACEMENT TOTAL KNEE Left      Family History   Problem Relation Age of Onset    Diabetes Mother         Social History     Socioeconomic History    Marital status:      Spouse name: Not on file    Number of children: Not on file    Years of education: Not on file    Highest education level: Not on file   Occupational History    Not on file   Tobacco Use    Smoking status: Never    Smokeless tobacco: Never   Substance and Sexual Activity    Alcohol use: Not Currently    Drug use: Never    Sexual activity: Not Currently   Other Topics Concern    Not on file   Social History Narrative    Not on file     Social Determinants of Health     Financial Resource Strain: Not on file   Food Insecurity: Not on file   Transportation Needs: Not on file   Physical Activity: Not on file   Stress: Not on file   Social Connections: Not on file   Interpersonal Safety: Not on file   Housing Stability: Not on file           Medications  Allergies   Current Outpatient Medications   Medication Sig Dispense Refill    acetaminophen (TYLENOL) 500 MG  tablet [ACETAMINOPHEN (TYLENOL) 500 MG TABLET] Take 500 mg by mouth every 6 (six) hours as needed for pain.      ARIPiprazole (ABILIFY) 2 MG tablet [ARIPIPRAZOLE (ABILIFY) 2 MG TABLET] Take 2 mg by mouth at bedtime.       aspirin 81 mg chewable tablet Take 81 mg by mouth daily      atorvastatin (LIPITOR) 10 MG tablet [ATORVASTATIN (LIPITOR) 10 MG TABLET] Take 10 mg by mouth at bedtime.  1    brimonidine (ALPHAGAN) 0.2 % ophthalmic solution [BRIMONIDINE (ALPHAGAN) 0.2 % OPHTHALMIC SOLUTION] Administer 1 drop to both eyes 2 (two) times a day.       dorzolamide-timolol (COSOPT) 2-0.5 % ophthalmic solution Place 1 drop into both eyes 2 times daily      fluvoxaMINE (LUVOX) 50 MG tablet [FLUVOXAMINE (LUVOX) 50 MG TABLET] Take 50 mg by mouth at bedtime.       hydrOXYzine (ATARAX) 10 MG tablet Take 10 mg by mouth 3 times daily as needed      latanoprostene bunod 0.024 % Drop Place 1 drop into both eyes At Bedtime      levomefolate calcium (L-METHYLFOLATE ORAL) [LEVOMEFOLATE CALCIUM (L-METHYLFOLATE ORAL)] Take 1.25 mg by mouth daily.       metFORMIN (GLUCOPHAGE) 1000 MG tablet Take 500 mg by mouth 2 times daily (with meals)      Multiple Vitamins-Minerals (CENTRUM SILVER) CHEW Take 1 tablet by mouth daily      netarsudiL (RHOPRESSA) 0.02 % Drop Place 1 drop into both eyes every evening      pioglitazone (ACTOS) 30 MG tablet Take 10 mg by mouth daily      warfarin ANTICOAGULANT (COUMADIN) 5 MG tablet TAKE 1/2 (ONE-HALF) TABLET BY MOUTH EVERY SATURDAY AND THEN 1 ONCE DAILY ALL  OTHER  DAYS  OR  AS  DIRECTED  BY  INR  CLINIC 78 tablet 1    amoxicillin (AMOXIL) 500 MG capsule [AMOXICILLIN (AMOXIL) 500 MG CAPSULE] Take 2,000 mg by mouth once as needed (Prior to dental work). (Patient not taking: Reported on 10/11/2023)      dorzolamide-timolol (COSOPT) 22.3-6.8 mg/mL ophthalmic solution Place 1 drop into both eyes 2 times daily (Patient not taking: Reported on 10/11/2023)  3    WARFARIN SODIUM PO Take by mouth See Admin  Instructions Dosing in coordination with INR results (Patient not taking: Reported on 10/11/2023)       No Known Allergies       Lab Results    Chemistry/lipid CBC Cardiac Enzymes/BNP/TSH/INR   Recent Labs   Lab Test 07/19/22  1022   CHOL 149   HDL 45   LDL 71   TRIG 163*     Recent Labs   Lab Test 07/19/22  1022 06/10/21  1145 11/18/19  1358   LDL 71 90 73     Recent Labs   Lab Test 08/23/23  0518      POTASSIUM 4.1   CHLORIDE 106   CO2 23   *   BUN 17.7   CR 0.84   GFRESTIMATED >90   APRYL 8.8     Recent Labs   Lab Test 08/23/23  0518 08/08/23  0709 07/31/23  1225   CR 0.84 0.88 0.83     Recent Labs   Lab Test 07/31/23  1225 04/30/22  0304 06/30/20  0823   A1C 6.8* 6.7* 5.9*          Recent Labs   Lab Test 08/23/23  0518   WBC 7.4   HGB 12.8*   HCT 40.4   MCV 99        Recent Labs   Lab Test 08/23/23  0518 08/08/23  0709 08/03/23  0715   HGB 12.8* 12.9* 13.0*    Recent Labs   Lab Test 05/03/22  1215 05/02/22  0935 05/02/22  0713   TROPONINI <0.01 <0.01 0.01     Recent Labs   Lab Test 04/30/22  0304   BNP 53     Recent Labs   Lab Test 07/31/23  1225   TSH 2.50     Recent Labs   Lab Test 10/11/23  1550 10/11/23  0000 09/27/23  0000   INR 2.5* 2.5 2.5        Dana Philippe MD

## 2023-10-16 NOTE — PROGRESS NOTES
Pt here for pre-op clearance.  Multiple cardiac risk factors.  Denies chest pain, pressure or tightness.  States has had ROSARIO for years.  Hx of proximal  A-Fib,  Aletha Zelaya RN

## 2023-10-18 ENCOUNTER — MYC MEDICAL ADVICE (OUTPATIENT)
Dept: CARDIOLOGY | Facility: CLINIC | Age: 73
End: 2023-10-18
Payer: MEDICARE

## 2023-10-30 LAB — INR HOME MONITORING: 3.1 (ref 2–3)

## 2023-10-31 ENCOUNTER — ANTICOAGULATION THERAPY VISIT (OUTPATIENT)
Dept: ANTICOAGULATION | Facility: CLINIC | Age: 73
End: 2023-10-31
Payer: MEDICARE

## 2023-10-31 DIAGNOSIS — I48.0 PAROXYSMAL ATRIAL FIBRILLATION (H): Primary | ICD-10-CM

## 2023-10-31 NOTE — PROGRESS NOTES
ANTICOAGULATION MANAGEMENT     Rakesh Samuels 73 year old male is on warfarin with supratherapeutic INR result. (Goal INR 2.0-3.0)    Recent labs: (last 7 days)     10/30/23  0000   INR 3.1*       ASSESSMENT     Source(s): Chart Review  Previous INR was Therapeutic last 2(+) visits  Medication, diet, health changes since last INR chart reviewed; none identified  [facility] is doing knee surgery on 11/28/2023. His PCP is through [facility].  Eloisa will call back with what [place] will need for the procedure.         PLAN     Recommended plan for no diet, medication or health factor changes affecting INR     Dosing Instructions: Continue your current warfarin dose with next INR in 2 weeks       Summary  As of 10/31/2023      Full warfarin instructions:  7.5 mg every Mon, Thu; 5 mg all other days   Next INR check:  11/13/2023               Detailed voice message left for Eloisa with dosing instructions and follow up date.     Patient to recheck with home meter    Education provided:   Please call back if any changes to your diet, medications or how you've been taking warfarin  Contact [phone]  with any changes, questions or concerns.     Plan made per [facility] anticoagulation protocol    Shirley Cruz RN  Anticoagulation Clinic  10/31/2023    _______________________________________________________________________     Anticoagulation Episode Summary       Current INR goal:  2.0-3.0   TTR:  81.3% (1 y)   Target end date:  Indefinite   Send INR reminders to:  ANTICOAG HOME MONITORING    Indications    Paroxysmal atrial fibrillation (H) [I48.0]             Comments:  [employer] home meter- Managed by [employer]             Anticoagulation Care Providers       Provider Role Specialty Phone number    Eric Pickett MD Referring Cardiovascular Disease [phone]

## 2023-11-02 DIAGNOSIS — I48.0 PAROXYSMAL ATRIAL FIBRILLATION (H): ICD-10-CM

## 2023-11-02 RX ORDER — WARFARIN SODIUM 5 MG/1
TABLET ORAL
Qty: 100 TABLET | Refills: 1 | Status: SHIPPED | OUTPATIENT
Start: 2023-11-02 | End: 2024-09-09

## 2023-11-02 NOTE — TELEPHONE ENCOUNTER
ANTICOAGULATION MANAGEMENT:  Medication Refill    Anticoagulation Summary  As of 10/31/2023      Warfarin maintenance plan:  7.5 mg (5 mg x 1.5) every Mon, Thu; 5 mg (5 mg x 1) all other days   Next INR check:  11/13/2023   Target end date:  Indefinite    Indications    Paroxysmal atrial fibrillation (H) [I48.0]                 Anticoagulation Care Providers       Provider Role Specialty Phone number    Eric Pickett MD Referring Cardiovascular Disease [phone]            Refill Criteria    Visit with referring provider/group: Meets criteria: office visit within referring provider group in the last 1 year on 10/11/23    [facility] referral signed last signed: 12/13/2022; within last year: Yes    Lab monitoring not exceeding 2 weeks overdue: Yes    Rakesh meets all criteria for refill. Rx instructions and quantity supplied updated to match patient's current dosing plan. Warfarin 90 day supply with 1 refill granted per [facility] protocol     Yaima Ramsey RN  Anticoagulation Clinic

## 2023-11-12 ENCOUNTER — HEALTH MAINTENANCE LETTER (OUTPATIENT)
Age: 73
End: 2023-11-12

## 2023-11-16 ENCOUNTER — ANTICOAGULATION THERAPY VISIT (OUTPATIENT)
Dept: ANTICOAGULATION | Facility: CLINIC | Age: 73
End: 2023-11-16
Payer: MEDICARE

## 2023-11-16 ENCOUNTER — TELEPHONE (OUTPATIENT)
Dept: ANTICOAGULATION | Facility: CLINIC | Age: 73
End: 2023-11-16
Payer: MEDICARE

## 2023-11-16 DIAGNOSIS — I48.0 PAROXYSMAL ATRIAL FIBRILLATION (H): Primary | ICD-10-CM

## 2023-11-16 LAB — INR HOME MONITORING: 2.2 (ref 2–3)

## 2023-11-16 NOTE — PROGRESS NOTES
ANTICOAGULATION MANAGEMENT     Rakesh Samuels 73 year old male is on warfarin with therapeutic INR result. (Goal INR 2.0-3.0)    Recent labs: (last 7 days)     11/16/23  0000   INR 2.2       ASSESSMENT     Source(s): Chart Review and Patient/Caregiver Call     Warfarin doses taken: Warfarin taken as instructed  Diet: No new diet changes identified  Medication/supplement changes: None noted  New illness, injury, or hospitalization: No  Signs or symptoms of bleeding or clotting: No  Previous result: Supratherapeutic  Additional findings: Fiordaliza is having procedure on 11/29/23, has preop with [facility] provider on 11/20. Daughter reports they will get bridge orders at this time, advised to call us if no orders are given on Monday as we will need to determine a plan. Also advised to update [facility] with any hold/bridge orders.       PLAN     Recommended plan for no diet, medication or health factor changes affecting INR     Dosing Instructions: Continue your current warfarin dose with next INR in 2 weeks       Summary  As of 11/16/2023      Full warfarin instructions:  7.5 mg every Mon, Thu; 5 mg all other days   Next INR check:  12/6/2023               Telephone call with Rakesh who verbalizes understanding and agrees to plan    Patient to recheck with home meter    Education provided:   Please call back if any changes to your diet, medications or how you've been taking warfarin    Plan made per [facility] anticoagulation protocol    Heather Yanez RN  Anticoagulation Clinic  11/16/2023    _______________________________________________________________________     Anticoagulation Episode Summary       Current INR goal:  2.0-3.0   TTR:  80.8% (1 y)   Target end date:  Indefinite   Send INR reminders to:  ANTICOAG HOME MONITORING    Indications    Paroxysmal atrial fibrillation (H) [I48.0]             Comments:  [employer] home meter- Managed by [employer]             Anticoagulation Care Providers       Provider Role Specialty Phone  number    Eric Pickett MD Referring Cardiovascular Disease [phone]

## 2023-11-16 NOTE — TELEPHONE ENCOUNTER
Patient has upcoming knee surgery on 11/28/23 with [place] [facility]. Per daughter patient is getting bridge plan/direction at preop with Adi on 11/20.  Advised daughter to call [facility] back with instructions given and to update us so we can manage if needed.    Routing to [facility] for review.    Of note, TE from 2/23/23 for colonoscopy no bridge advised for a 4 days hold.    Heather Yanez RN

## 2023-11-22 ENCOUNTER — MYC MEDICAL ADVICE (OUTPATIENT)
Dept: NURSING | Facility: CLINIC | Age: 73
End: 2023-11-22
Payer: MEDICARE

## 2023-11-22 DIAGNOSIS — I48.0 PAROXYSMAL ATRIAL FIBRILLATION (H): Primary | ICD-10-CM

## 2023-11-22 NOTE — TELEPHONE ENCOUNTER
Spoke with Nanette, daughter, she reports that he should hold warfarin 4 days prior [facility]. Patient to hold aspirin for 7 days prior. No bridge.    Huddled with [facility] to determine if 4 or 5 day hold is needed.    Huddled with [facility] and due to anesthesia and cardiology recommendation will want to do a 5 day hold.    Spoke with Nanette, daughter, who reports she will have patient start holding tomorrow.    Heather Yanez RN

## 2023-11-22 NOTE — TELEPHONE ENCOUNTER
AMBAR-PROCEDURAL ANTICOAGULATION  MANAGEMENT    ASSESSMENT     Warfarin interruption plan for TKA on 2023.    Indication for Anticoagulation: Atrial Fibrillation    [id number] = 2 (Age 65-74 and Diabetes)    Ambar-Procedure Risk stratification for thromboembolism: low (2017 [facility] periprocedure pathway for NVAF Expert Consensus)    NVAF: 2017 [facility] periprocedure pathway for NVAF advises NO bridge for low risk stratification ([id number] score <=4 and no prior hx of stroke, TIA or systemic embolism)    RECOMMENDATION     Pre-Procedure:  Hold warfarin for 5 days, until after procedure startin2023   No Bridge    Post-Procedure:  Resume warfarin dose if okay with provider doing procedure on night of procedure, 2023 PM: 5mg  Recheck INR ~ 7 days after resuming warfarin     Plan NOT routed to referring provider for approval, this reflects instructions given by cardiology at 10/11/2023 exam clearing for surgery  ?   Zoraida Ugalde [facility]    SUBJECTIVE/OBJECTIVE     Rakesh W Abril, a 73 year old male    Goal INR Range: 2.0-3.0     Patient bridged in past: No      Wt Readings from Last 3 Encounters:   10/11/23 102.5 kg (226 lb)   23 102 kg (224 lb 12.8 oz)   23 101.6 kg (224 lb)      Ideal body weight: 77.6 kg (171 lb 1.2 oz)  Adjusted ideal body weight: 87.6 kg (193 lb 0.7 oz)     Estimated body mass index is 30.65 kg/m  as calculated from the following:    Height as of 23: 1.829 m (6').    Weight as of 10/11/23: 102.5 kg (226 lb).    Lab Results   Component Value Date    INR 2.2 2023    INR 3.1 (H) 10/30/2023    INR 2.5 (A) 10/11/2023     Lab Results   Component Value Date    HGB 12.8 (L) 2023    HCT 40.4 2023     2023     Lab Results   Component Value Date    CR 0.84 2023    CR 0.88 2023    CR 0.83 2023     Estimated Creatinine Clearance: 98.5 mL/min (based on SCr of 0.84 mg/dL).

## 2023-11-22 NOTE — TELEPHONE ENCOUNTER
Called daughter, Nanette, whose  states to call daughter Eloisa for information regarding Rakesh. Called and left a voicemail for Eloisa with dosing instructions and sent mychart. (Both daughters on C2C) Advised to call back [facility] if [facility]i gave different instructions for procedure or with questions.  Chart review does not show info on appt directions for 11/20 appt.    Heather Yanez RN

## 2024-01-10 ENCOUNTER — ANTICOAGULATION THERAPY VISIT (OUTPATIENT)
Dept: ANTICOAGULATION | Facility: CLINIC | Age: 74
End: 2024-01-10
Payer: MEDICARE

## 2024-01-10 DIAGNOSIS — I48.0 PAROXYSMAL ATRIAL FIBRILLATION (H): Primary | ICD-10-CM

## 2024-01-10 LAB — INR HOME MONITORING: 4.2 (ref 2–3)

## 2024-01-10 NOTE — PROGRESS NOTES
ANTICOAGULATION MANAGEMENT     Rakesh Samuels 73 year old male is on warfarin with supratherapeutic INR result. (Goal INR 2.0-3.0)    Recent labs: (last 7 days)     01/10/24  0000   INR 4.2*       ASSESSMENT     Source(s): Chart Review and Patient/Caregiver Call     Warfarin doses taken: Less warfarin taken than planned which may be affecting INR  Diet: No new diet changes identified  Medication/supplement changes:  has been taking tylenol 1000 mg TID for pain . Last dose of oxycodone on 1/7/24  New illness, injury, or hospitalization: Yes: 11/29/23 for procedure   Signs or symptoms of bleeding or clotting: No  Previous result: Therapeutic last visit; previously outside of goal range  Additional findings:  Patient had knee surgery on 11/29/23 - per daughter Nanette, patient was instructed to held for 5 days prior with no bridge.  Pt was discharged to TCU post procedure and was discharged to [facility] 1/9/24 with full nursing care.  Per discharged paperwork from TCU, patient has been taking warfarin 5 mg daily .  Patient live in [facility] independently prior to procedure.  Post procedure, family upgrade to full nursing care.  Dosing and recheck date still need to call to daughter Nanette as she does the home meter check.  Ongoing will also need to fax order to [facility] (Nanette will call with phone and fax number at a later time).           PLAN     Recommended plan for ongoing change(s) affecting INR     Dosing Instructions: hold dose then decrease your warfarin dose (14% change) from his current dose with next INR in 1 week       Summary  As of 1/10/2024      Full warfarin instructions:  1/10: Hold; Otherwise 2.5 mg every Mon, Fri; 5 mg all other days   Next INR check:  1/17/2024               Telephone call with daughterNanette who agrees to plan and repeated back plan correctly    Patient to recheck with home meter    Education provided:   Please call back if any changes to your diet, medications or  how you've been taking warfarin  Contact [phone]  with any changes, questions or concerns.     Plan made with [facility] Pharmacist Zoraida Baldwin RN  Anticoagulation Clinic  1/10/2024    _______________________________________________________________________     Anticoagulation Episode Summary       Current INR goal:  2.0-3.0   TTR:  71.8% (1 y)   Target end date:  Indefinite   Send INR reminders to:  ANTICOAG HOME MONITORING    Indications    Paroxysmal atrial fibrillation (H) [I48.0]             Comments:  [employer] home meter- Managed by [employer]             Anticoagulation Care Providers       Provider Role Specialty Phone number    Eric Pickett MD Referring Cardiovascular Disease [phone]

## 2024-01-11 ENCOUNTER — DOCUMENTATION ONLY (OUTPATIENT)
Dept: ANTICOAGULATION | Facility: CLINIC | Age: 74
End: 2024-01-11

## 2024-01-11 DIAGNOSIS — I48.0 PAROXYSMAL ATRIAL FIBRILLATION (H): Primary | ICD-10-CM

## 2024-01-11 NOTE — PROGRESS NOTES
ANTICOAGULATION CLINIC REFERRAL RENEWAL REQUEST       An annual renewal order is required for all patients referred to [facility].?  Please review and sign the pended referral order for Rakesh Samuels.       ANTICOAGULATION SUMMARY      Warfarin indication(s)   Atrial Fibrillation    Mechanical heart valve present?  NO       Current goal range   INR: 2.0-3.0     Goal appropriate for indication? Goal INR 2-3, standard for indication(s) above     Time in Therapeutic Range (TTR)  (Goal > 60%) 77.8%       Office visit with referring provider's group within last year yes on 10/23/2023 with Dr. Denae Baldwin, RN  [facility]

## 2024-01-17 LAB — INR HOME MONITORING: 1.5 (ref 2–3)

## 2024-01-18 ENCOUNTER — ANTICOAGULATION THERAPY VISIT (OUTPATIENT)
Dept: ANTICOAGULATION | Facility: CLINIC | Age: 74
End: 2024-01-18
Payer: MEDICARE

## 2024-01-18 DIAGNOSIS — I48.0 PAROXYSMAL ATRIAL FIBRILLATION (H): Primary | ICD-10-CM

## 2024-01-18 NOTE — PROGRESS NOTES
ANTICOAGULATION MANAGEMENT     Rakesh Samuels 73 year old male is on warfarin with subtherapeutic INR result. (Goal INR 2.0-3.0)    Recent labs: (last 7 days)     01/17/24  0000   INR 1.5*       ASSESSMENT     Source(s): Chart Review, Patient/Caregiver Call, and Home Care/Facility Nurse - daughter Nanette, nurse from [facility] ([phone]), and other daughter Carlene    Warfarin doses taken: Warfarin taken as instructed  Diet: No new diet changes identified  Medication/supplement changes: None noted  New illness, injury, or hospitalization: No  Signs or symptoms of bleeding or clotting: No  Previous result: Supratherapeutic  Additional findings:  pt's daughter Nanette usually checks his INR with home monitor, however she is in [place] this week  so I spoke with Eloisa, other daughter, to let her know when pt's INR is due again. Going forward, we need to call [facility] for assessment and med set up, and still call Nanette because she will need to know when she needs to check his INR again.        PLAN     Recommended plan for no diet, medication or health factor changes affecting INR     Dosing Instructions: booster dose then Increase your warfarin dose (8.3% change) with next INR in 1 week       Summary  As of 1/18/2024      Full warfarin instructions:  1/18: 7.5 mg; Otherwise 2.5 mg every Mon; 5 mg all other days   Next INR check:  1/24/2024               Telephone call with Nanette (daughter), Eloisa (nurse), Eloisa (daughter) who verbalizes understanding and agrees to plan    Patient to recheck with home meter    Education provided:   Please call back if any changes to your diet, medications or how you've been taking warfarin  Goal range and lab monitoring: goal range and significance of current result, Importance of therapeutic range, and Importance of following up at instructed interval  Symptom monitoring: monitoring for clotting signs and symptoms and monitoring for stroke signs and symptoms    Plan made per [facility]  anticoagulation protocol    Yisel Corrales, RN  Anticoagulation Clinic  1/18/2024    _______________________________________________________________________     Anticoagulation Episode Summary       Current INR goal:  2.0-3.0   TTR:  70.5% (1 y)   Target end date:  Indefinite   Send INR reminders to:  ANTICOAG HOME MONITORING    Indications    Paroxysmal atrial fibrillation (H) [I48.0]             Comments:  [employer] home meter- Managed by Exception             Anticoagulation Care Providers       Provider Role Specialty Phone number    Eric Pickett MD Referring Cardiovascular Disease [phone]               2 No

## 2024-01-24 ENCOUNTER — ANTICOAGULATION THERAPY VISIT (OUTPATIENT)
Dept: ANTICOAGULATION | Facility: CLINIC | Age: 74
End: 2024-01-24
Payer: MEDICARE

## 2024-01-24 DIAGNOSIS — I48.0 PAROXYSMAL ATRIAL FIBRILLATION (H): Primary | ICD-10-CM

## 2024-01-24 LAB — INR HOME MONITORING: 1 (ref 2–3)

## 2024-01-24 NOTE — PROGRESS NOTES
ANTICOAGULATION MANAGEMENT     Rakesh Samuels 73 year old male is on warfarin with subtherapeutic INR result. (Goal INR 2.0-3.0)    Recent labs: (last 7 days)     01/24/24  0000   INR 1.0*       ASSESSMENT     Source(s): Chart Review, Patient/Caregiver Call, and Home Care/Facility Nurse - Vianey, staff from [facility] in [place] [phone]  and daughter Nanette -- [facility] sets up meds and does assessment, Nanette checks INR with home monitor      Warfarin doses taken: Warfarin taken as instructed  Diet: No new diet changes identified  Medication/supplement changes: None noted  New illness, injury, or hospitalization: No  Signs or symptoms of bleeding or clotting: No  Previous result: Subtherapeutic  Additional findings: per Vinaey, she was not able to say if pt missed any doses or not because she is not a nurse and was unable to find that info.        PLAN     Recommended plan for no diet, medication or health factor changes affecting INR     Dosing Instructions: booster dose then Increase your warfarin dose (15.4% change) with next INR in 5-7 days       Summary  As of 1/24/2024      Full warfarin instructions:  1/24: 10 mg; Otherwise 7.5 mg every Sat; 5 mg all other days   Next INR check:  1/29/2024               Telephone call with Vianey and Nanette who verbalizes understanding and agrees to plan    Patient to recheck with home meter    Education provided:   Please call back if any changes to your diet, medications or how you've been taking warfarin  Goal range and lab monitoring: goal range and significance of current result, Importance of therapeutic range, and Importance of following up at instructed interval  Symptom monitoring: monitoring for clotting signs and symptoms, monitoring for stroke signs and symptoms, and when to seek medical attention/emergency care    Plan made per [facility] anticoagulation protocol    Yisel Corrales, RN  Anticoagulation  Clinic  1/24/2024    _______________________________________________________________________     Anticoagulation Episode Summary       Current INR goal:  2.0-3.0   TTR:  69.8% (1 y)   Target end date:  Indefinite   Send INR reminders to:  ANTICOAG HOME MONITORING    Indications    Paroxysmal atrial fibrillation (H) [I48.0]             Comments:  [employer] home meter- Managed by Exception  Call [facility] ([phone]) for assesment and med setup. Call daughter Nanette for next INR date since she still checks INR             Anticoagulation Care Providers       Provider Role Specialty Phone number    Eric Pickett MD Referring Cardiovascular Disease [phone]

## 2024-01-29 ENCOUNTER — ANTICOAGULATION THERAPY VISIT (OUTPATIENT)
Dept: ANTICOAGULATION | Facility: CLINIC | Age: 74
End: 2024-01-29
Payer: MEDICARE

## 2024-01-29 DIAGNOSIS — I48.0 PAROXYSMAL ATRIAL FIBRILLATION (H): Primary | ICD-10-CM

## 2024-01-29 LAB — INR HOME MONITORING: 1.6 (ref 2–3)

## 2024-01-29 NOTE — PROGRESS NOTES
Called the facility and was transferred to the nursing phone. Voicemail states unable to accept anymore messages.    Called daughter Nanette and she states that the patient gets his warfarin in the am. So patient has already received warfarin for the day.    Will call in the am for assessment.    Shirley Cruz RN    [facility] Anticoagulation Clinic

## 2024-01-30 ENCOUNTER — TELEPHONE (OUTPATIENT)
Dept: SCHEDULING | Facility: CLINIC | Age: 74
End: 2024-01-30
Payer: MEDICARE

## 2024-01-30 NOTE — PROGRESS NOTES
ANTICOAGULATION  MANAGEMENT    Rakesh Samuels is being discharged from the [facility] ([facility]).    Reason for discharge: care has been transferred to in house provider at facility    Anticoagulation episode resolved, [facility] referral closed, and Standing order discontinued    If patient needs warfarin management in the future, please send a new referral    Shirley Cruz RN

## 2024-01-30 NOTE — PROGRESS NOTES
ANTICOAGULATION MANAGEMENT     Rakesh Samuels 73 year old male is on warfarin with subtherapeutic INR result. (Goal INR 2.0-3.0)    Recent labs: (last 7 days)     01/29/24  0000   INR 1.6*       ASSESSMENT     Source(s): Chart Review and Home Care/Facility Nurse     Warfarin doses taken: Warfarin taken as instructed  Diet: No new diet changes identified  Medication/supplement changes: None noted  New illness, injury, or hospitalization: No  Signs or symptoms of bleeding or clotting: No  Previous result: Subtherapeutic  Additional findings:  Eloisa RN  notes that the patient was signed up for an in house provider today. That provider will managing the INR going forward.       PLAN     Recommended plan for no diet, medication or health factor changes affecting INR     Dosing Instructions: Increase your warfarin dose (13.3% change) with next INR in 3 days       Summary  As of 1/29/2024      Full warfarin instructions:  7.5 mg every Tue, Thu, Sat; 5 mg all other days   Next INR check:  2/1/2024               Telephone call with Eloisa MCGRAW facility nurse who agrees to plan and repeated back plan correctly. Called Nanette daughter and informed of the information above and that patient will be discharged from [facility] program.    Orders given to  Homecare nurse/facility to recheck    Education provided:   Discharge of patient from [facility] program.    Plan made per [facility] anticoagulation protocol    Shirley Cruz, RN  Anticoagulation Clinic  1/30/2024    _______________________________________________________________________     Anticoagulation Episode Summary       Current INR goal:  2.0-3.0   TTR:  69.5% (1 y)   Target end date:  Indefinite   Send INR reminders to:  ANTICOAG HOME MONITORING    Indications    Paroxysmal atrial fibrillation (H) [I48.0]             Comments:  [employer] home meter- Managed by [employer]  Call [facility] ([phone]) for assesment and med setup. Call daughter Nanette for next INR  date since she still checks INR             Anticoagulation Care Providers       Provider Role Specialty Phone number    Eric Pickett MD Referring Cardiovascular Disease [phone]

## 2024-01-30 NOTE — TELEPHONE ENCOUNTER
General Call    Contacts         Type Contact Phone/Fax    01/30/2024 12:40 PM CST Phone (Incoming) Nanette Rajendra (Emergency Contact) [phone]          Reason for Call:  anticoagulation     What are your questions or concerns:  Nursing facility is waiting for orders for his Warfarin.     Date of last appointment with provider: 1/29    Could we send this information to you in [employer] or would you prefer to receive a phone call?:   Patient would prefer a phone call   Okay to leave a detailed message?: Yes at Other phone number:     Nanette # [phone]

## 2024-01-30 NOTE — TELEPHONE ENCOUNTER
Please see the January 29, 2024 Anticoagulation encounter for further information.    Shirley Cruz RN    [facility] Anticoagulation Clinic

## 2024-03-13 ENCOUNTER — LAB REQUISITION (OUTPATIENT)
Dept: LAB | Facility: CLINIC | Age: 74
End: 2024-03-13
Payer: MEDICARE

## 2024-03-13 DIAGNOSIS — E11.9 TYPE 2 DIABETES MELLITUS WITHOUT COMPLICATIONS (H): ICD-10-CM

## 2024-03-13 LAB — INR HOME MONITORING: 3.1 (ref 2–3)

## 2024-03-14 LAB — HBA1C MFR BLD: 6.8 %

## 2024-03-14 PROCEDURE — P9603 ONE-WAY ALLOW PRORATED MILES: HCPCS | Mod: ORL | Performed by: PHYSICIAN ASSISTANT

## 2024-03-14 PROCEDURE — 80048 BASIC METABOLIC PNL TOTAL CA: CPT | Mod: ORL | Performed by: PHYSICIAN ASSISTANT

## 2024-03-14 PROCEDURE — 83036 HEMOGLOBIN GLYCOSYLATED A1C: CPT | Mod: ORL | Performed by: PHYSICIAN ASSISTANT

## 2024-03-14 PROCEDURE — 36415 COLL VENOUS BLD VENIPUNCTURE: CPT | Mod: ORL | Performed by: PHYSICIAN ASSISTANT

## 2024-03-15 LAB
ANION GAP SERPL CALCULATED.3IONS-SCNC: 13 MMOL/L (ref 7–15)
BUN SERPL-MCNC: 18.4 MG/DL (ref 8–23)
CALCIUM SERPL-MCNC: 9 MG/DL (ref 8.8–10.2)
CHLORIDE SERPL-SCNC: 103 MMOL/L (ref 98–107)
CREAT SERPL-MCNC: 0.8 MG/DL (ref 0.67–1.17)
DEPRECATED HCO3 PLAS-SCNC: 24 MMOL/L (ref 22–29)
EGFRCR SERPLBLD CKD-EPI 2021: >90 ML/MIN/1.73M2
GLUCOSE SERPL-MCNC: 133 MG/DL (ref 70–99)
POTASSIUM SERPL-SCNC: 4.8 MMOL/L (ref 3.4–5.3)
SODIUM SERPL-SCNC: 140 MMOL/L (ref 135–145)

## 2024-03-17 DIAGNOSIS — I48.0 PAROXYSMAL ATRIAL FIBRILLATION (H): ICD-10-CM

## 2024-03-19 LAB — INR HOME MONITORING: 2.1 (ref 2–3)

## 2024-03-19 RX ORDER — WARFARIN SODIUM 5 MG/1
TABLET ORAL
Qty: 100 TABLET | Refills: 0 | OUTPATIENT
Start: 2024-03-19

## 2024-03-19 NOTE — TELEPHONE ENCOUNTER
"\"ANTICOAGULATION  MANAGEMENT     Rakesh Samuels is being discharged from the [facility] ([facility]).     Reason for discharge: care has been transferred to in house provider at facility     Anticoagulation episode resolved, [facility] referral closed, and Standing order discontinued     If patient needs warfarin management in the future, please send a new referral     Shirley Cruz RN\"    Patient no longer under PCP care. Refill declined.  Isabela Fonseca RN  Anticoagulation Nurse - [facility] [place]    "

## 2024-03-26 LAB
INR HOME MONITORING: 1.4 (ref 2–3)
INR HOME MONITORING: 1.5 (ref 2–3)
INR HOME MONITORING: 1.7 (ref 2–3)
INR HOME MONITORING: 2 (ref 2–3)
INR HOME MONITORING: 2.6 (ref 2–3)
INR HOME MONITORING: 2.9 (ref 2–3)

## 2024-04-02 LAB — INR HOME MONITORING: 2.8 (ref 2–3)

## 2024-04-09 LAB — INR HOME MONITORING: 2.1 (ref 2–3)

## 2024-04-17 LAB — INR HOME MONITORING: 2.6 (ref 2–3)

## 2024-05-02 LAB — INR HOME MONITORING: 3.5 (ref 2–3)

## 2024-05-08 LAB — INR HOME MONITORING: 3.5 (ref 2–3)

## 2024-05-15 LAB — INR HOME MONITORING: 4.4 (ref 2–3)

## 2024-05-18 LAB — INR HOME MONITORING: 2 (ref 2–3)

## 2024-07-25 ENCOUNTER — HOSPITAL ENCOUNTER (INPATIENT)
Facility: HOSPITAL | Age: 74
LOS: 4 days | Discharge: NURSING, REHAB OR LONG-TERM CARE | DRG: 871 | End: 2024-07-29
Attending: EMERGENCY MEDICINE | Admitting: INTERNAL MEDICINE
Payer: MEDICARE

## 2024-07-25 ENCOUNTER — APPOINTMENT (OUTPATIENT)
Dept: RADIOLOGY | Facility: HOSPITAL | Age: 74
DRG: 871 | End: 2024-07-25
Attending: EMERGENCY MEDICINE
Payer: MEDICARE

## 2024-07-25 ENCOUNTER — MEDICAL CORRESPONDENCE (OUTPATIENT)
Dept: HEALTH INFORMATION MANAGEMENT | Facility: CLINIC | Age: 74
End: 2024-07-25

## 2024-07-25 ENCOUNTER — APPOINTMENT (OUTPATIENT)
Dept: CT IMAGING | Facility: HOSPITAL | Age: 74
DRG: 871 | End: 2024-07-25
Attending: EMERGENCY MEDICINE
Payer: MEDICARE

## 2024-07-25 DIAGNOSIS — N20.0 NEPHROLITHIASIS: ICD-10-CM

## 2024-07-25 DIAGNOSIS — N39.0 URINARY TRACT INFECTION WITH HEMATURIA, SITE UNSPECIFIED: ICD-10-CM

## 2024-07-25 DIAGNOSIS — A41.9 SEPSIS, DUE TO UNSPECIFIED ORGANISM, UNSPECIFIED WHETHER ACUTE ORGAN DYSFUNCTION PRESENT (H): ICD-10-CM

## 2024-07-25 DIAGNOSIS — R31.9 URINARY TRACT INFECTION WITH HEMATURIA, SITE UNSPECIFIED: ICD-10-CM

## 2024-07-25 DIAGNOSIS — R41.82 ALTERED MENTAL STATUS, UNSPECIFIED ALTERED MENTAL STATUS TYPE: ICD-10-CM

## 2024-07-25 DIAGNOSIS — R78.81 BACTEREMIA: Primary | ICD-10-CM

## 2024-07-25 PROBLEM — E11.8 DM (DIABETES MELLITUS), TYPE 2 WITH COMPLICATIONS (H): Status: ACTIVE | Noted: 2024-07-25

## 2024-07-25 PROBLEM — G93.41 SEPTIC ENCEPHALOPATHY: Status: ACTIVE | Noted: 2024-07-25

## 2024-07-25 LAB
ALBUMIN SERPL BCG-MCNC: 4 G/DL (ref 3.5–5.2)
ALBUMIN UR-MCNC: 100 MG/DL
ALP SERPL-CCNC: 90 U/L (ref 40–150)
ALT SERPL W P-5'-P-CCNC: 12 U/L (ref 0–70)
ANION GAP SERPL CALCULATED.3IONS-SCNC: 12 MMOL/L (ref 7–15)
APPEARANCE UR: CLEAR
AST SERPL W P-5'-P-CCNC: 11 U/L (ref 0–45)
ATRIAL RATE - MUSE: 96 BPM
BACTERIA #/AREA URNS HPF: ABNORMAL /HPF
BASOPHILS # BLD AUTO: 0 10E3/UL (ref 0–0.2)
BASOPHILS NFR BLD AUTO: 0 %
BILIRUB DIRECT SERPL-MCNC: <0.2 MG/DL (ref 0–0.3)
BILIRUB SERPL-MCNC: 0.7 MG/DL
BILIRUB UR QL STRIP: NEGATIVE
BUN SERPL-MCNC: 17.1 MG/DL (ref 8–23)
CALCIUM SERPL-MCNC: 9.1 MG/DL (ref 8.8–10.4)
CHLORIDE SERPL-SCNC: 103 MMOL/L (ref 98–107)
COLOR UR AUTO: ABNORMAL
CREAT SERPL-MCNC: 1 MG/DL (ref 0.67–1.17)
DIASTOLIC BLOOD PRESSURE - MUSE: 82 MMHG
EGFRCR SERPLBLD CKD-EPI 2021: 80 ML/MIN/1.73M2
EOSINOPHIL # BLD AUTO: 0 10E3/UL (ref 0–0.7)
EOSINOPHIL NFR BLD AUTO: 0 %
ERYTHROCYTE [DISTWIDTH] IN BLOOD BY AUTOMATED COUNT: 13.2 % (ref 10–15)
GLUCOSE SERPL-MCNC: 234 MG/DL (ref 70–99)
GLUCOSE UR STRIP-MCNC: NEGATIVE MG/DL
HCO3 SERPL-SCNC: 25 MMOL/L (ref 22–29)
HCT VFR BLD AUTO: 43 % (ref 40–53)
HGB BLD-MCNC: 13.9 G/DL (ref 13.3–17.7)
HGB UR QL STRIP: ABNORMAL
HYALINE CASTS: 1 /LPF
IMM GRANULOCYTES # BLD: 0.1 10E3/UL
IMM GRANULOCYTES NFR BLD: 1 %
INTERPRETATION ECG - MUSE: NORMAL
KETONES UR STRIP-MCNC: NEGATIVE MG/DL
LACTATE SERPL-SCNC: 2.4 MMOL/L (ref 0.7–2)
LACTATE SERPL-SCNC: 2.5 MMOL/L (ref 0.7–2)
LEUKOCYTE ESTERASE UR QL STRIP: ABNORMAL
LYMPHOCYTES # BLD AUTO: 0.7 10E3/UL (ref 0.8–5.3)
LYMPHOCYTES NFR BLD AUTO: 4 %
MCH RBC QN AUTO: 31.6 PG (ref 26.5–33)
MCHC RBC AUTO-ENTMCNC: 32.3 G/DL (ref 31.5–36.5)
MCV RBC AUTO: 98 FL (ref 78–100)
MONOCYTES # BLD AUTO: 1.5 10E3/UL (ref 0–1.3)
MONOCYTES NFR BLD AUTO: 9 %
MUCOUS THREADS #/AREA URNS LPF: PRESENT /LPF
NEUTROPHILS # BLD AUTO: 14.7 10E3/UL (ref 1.6–8.3)
NEUTROPHILS NFR BLD AUTO: 86 %
NITRATE UR QL: NEGATIVE
NRBC # BLD AUTO: 0 10E3/UL
NRBC BLD AUTO-RTO: 0 /100
P AXIS - MUSE: 50 DEGREES
PH UR STRIP: 7 [PH] (ref 5–7)
PLATELET # BLD AUTO: 255 10E3/UL (ref 150–450)
POTASSIUM SERPL-SCNC: 4.7 MMOL/L (ref 3.4–5.3)
PR INTERVAL - MUSE: 174 MS
PROT SERPL-MCNC: 7.1 G/DL (ref 6.4–8.3)
QRS DURATION - MUSE: 82 MS
QT - MUSE: 344 MS
QTC - MUSE: 434 MS
R AXIS - MUSE: 34 DEGREES
RBC # BLD AUTO: 4.4 10E6/UL (ref 4.4–5.9)
RBC URINE: 31 /HPF
SODIUM SERPL-SCNC: 140 MMOL/L (ref 135–145)
SP GR UR STRIP: 1.02 (ref 1–1.03)
SQUAMOUS EPITHELIAL: <1 /HPF
SYSTOLIC BLOOD PRESSURE - MUSE: 174 MMHG
T AXIS - MUSE: 47 DEGREES
TROPONIN T SERPL HS-MCNC: 18 NG/L
UROBILINOGEN UR STRIP-MCNC: <2 MG/DL
VENTRICULAR RATE- MUSE: 96 BPM
WBC # BLD AUTO: 17 10E3/UL (ref 4–11)
WBC URINE: 89 /HPF

## 2024-07-25 PROCEDURE — 85025 COMPLETE CBC W/AUTO DIFF WBC: CPT | Performed by: EMERGENCY MEDICINE

## 2024-07-25 PROCEDURE — 250N000011 HC RX IP 250 OP 636: Performed by: EMERGENCY MEDICINE

## 2024-07-25 PROCEDURE — 36415 COLL VENOUS BLD VENIPUNCTURE: CPT | Performed by: EMERGENCY MEDICINE

## 2024-07-25 PROCEDURE — 87186 SC STD MICRODIL/AGAR DIL: CPT | Performed by: EMERGENCY MEDICINE

## 2024-07-25 PROCEDURE — 96365 THER/PROPH/DIAG IV INF INIT: CPT

## 2024-07-25 PROCEDURE — 81001 URINALYSIS AUTO W/SCOPE: CPT | Performed by: EMERGENCY MEDICINE

## 2024-07-25 PROCEDURE — 999N000065 XR CHEST PORT 1 VIEW

## 2024-07-25 PROCEDURE — 87086 URINE CULTURE/COLONY COUNT: CPT | Performed by: EMERGENCY MEDICINE

## 2024-07-25 PROCEDURE — 36569 INSJ PICC 5 YR+ W/O IMAGING: CPT

## 2024-07-25 PROCEDURE — 120N000001 HC R&B MED SURG/OB

## 2024-07-25 PROCEDURE — 93005 ELECTROCARDIOGRAM TRACING: CPT | Performed by: EMERGENCY MEDICINE

## 2024-07-25 PROCEDURE — 84484 ASSAY OF TROPONIN QUANT: CPT | Performed by: EMERGENCY MEDICINE

## 2024-07-25 PROCEDURE — 80076 HEPATIC FUNCTION PANEL: CPT | Performed by: EMERGENCY MEDICINE

## 2024-07-25 PROCEDURE — 70450 CT HEAD/BRAIN W/O DYE: CPT | Mod: MG

## 2024-07-25 PROCEDURE — 272N000451 HC KIT SHRLOCK 5FR POWER PICC DOUBLE LUMEN

## 2024-07-25 PROCEDURE — 99223 1ST HOSP IP/OBS HIGH 75: CPT | Mod: AI | Performed by: INTERNAL MEDICINE

## 2024-07-25 PROCEDURE — 250N000009 HC RX 250: Performed by: EMERGENCY MEDICINE

## 2024-07-25 PROCEDURE — 258N000003 HC RX IP 258 OP 636: Performed by: EMERGENCY MEDICINE

## 2024-07-25 PROCEDURE — 99291 CRITICAL CARE FIRST HOUR: CPT | Mod: 25

## 2024-07-25 PROCEDURE — 96361 HYDRATE IV INFUSION ADD-ON: CPT

## 2024-07-25 PROCEDURE — 87149 DNA/RNA DIRECT PROBE: CPT | Performed by: EMERGENCY MEDICINE

## 2024-07-25 PROCEDURE — 83605 ASSAY OF LACTIC ACID: CPT | Performed by: EMERGENCY MEDICINE

## 2024-07-25 PROCEDURE — 250N000013 HC RX MED GY IP 250 OP 250 PS 637: Performed by: EMERGENCY MEDICINE

## 2024-07-25 PROCEDURE — 83036 HEMOGLOBIN GLYCOSYLATED A1C: CPT | Performed by: INTERNAL MEDICINE

## 2024-07-25 RX ORDER — LIDOCAINE 40 MG/G
CREAM TOPICAL
Status: ACTIVE | OUTPATIENT
Start: 2024-07-25 | End: 2024-07-28

## 2024-07-25 RX ORDER — AMOXICILLIN 250 MG
1 CAPSULE ORAL 2 TIMES DAILY PRN
Status: DISCONTINUED | OUTPATIENT
Start: 2024-07-25 | End: 2024-07-29 | Stop reason: HOSPADM

## 2024-07-25 RX ORDER — ENOXAPARIN SODIUM 100 MG/ML
40 INJECTION SUBCUTANEOUS EVERY 24 HOURS
Status: DISCONTINUED | OUTPATIENT
Start: 2024-07-26 | End: 2024-07-26

## 2024-07-25 RX ORDER — PIPERACILLIN SODIUM, TAZOBACTAM SODIUM 3; .375 G/15ML; G/15ML
3.38 INJECTION, POWDER, LYOPHILIZED, FOR SOLUTION INTRAVENOUS EVERY 8 HOURS
Status: DISCONTINUED | OUTPATIENT
Start: 2024-07-26 | End: 2024-07-27

## 2024-07-25 RX ORDER — ACETAMINOPHEN 325 MG/1
650 TABLET ORAL EVERY 4 HOURS PRN
Status: DISCONTINUED | OUTPATIENT
Start: 2024-07-25 | End: 2024-07-29 | Stop reason: HOSPADM

## 2024-07-25 RX ORDER — GABAPENTIN 300 MG/1
300 CAPSULE ORAL 2 TIMES DAILY
COMMUNITY

## 2024-07-25 RX ORDER — LIDOCAINE 40 MG/G
CREAM TOPICAL
Status: DISCONTINUED | OUTPATIENT
Start: 2024-07-25 | End: 2024-07-29 | Stop reason: HOSPADM

## 2024-07-25 RX ORDER — ACETAMINOPHEN 650 MG/1
650 SUPPOSITORY RECTAL ONCE
Status: COMPLETED | OUTPATIENT
Start: 2024-07-25 | End: 2024-07-25

## 2024-07-25 RX ORDER — ACETAMINOPHEN 650 MG/1
650 SUPPOSITORY RECTAL EVERY 4 HOURS PRN
Status: DISCONTINUED | OUTPATIENT
Start: 2024-07-25 | End: 2024-07-29 | Stop reason: HOSPADM

## 2024-07-25 RX ORDER — ONDANSETRON 2 MG/ML
4 INJECTION INTRAMUSCULAR; INTRAVENOUS EVERY 6 HOURS PRN
Status: DISCONTINUED | OUTPATIENT
Start: 2024-07-25 | End: 2024-07-29 | Stop reason: HOSPADM

## 2024-07-25 RX ORDER — AMOXICILLIN 250 MG
2 CAPSULE ORAL 2 TIMES DAILY PRN
Status: DISCONTINUED | OUTPATIENT
Start: 2024-07-25 | End: 2024-07-29 | Stop reason: HOSPADM

## 2024-07-25 RX ORDER — PIPERACILLIN SODIUM, TAZOBACTAM SODIUM 3; .375 G/15ML; G/15ML
3.38 INJECTION, POWDER, LYOPHILIZED, FOR SOLUTION INTRAVENOUS ONCE
Status: COMPLETED | OUTPATIENT
Start: 2024-07-25 | End: 2024-07-25

## 2024-07-25 RX ORDER — ONDANSETRON 4 MG/1
4 TABLET, ORALLY DISINTEGRATING ORAL EVERY 6 HOURS PRN
Status: DISCONTINUED | OUTPATIENT
Start: 2024-07-25 | End: 2024-07-29 | Stop reason: HOSPADM

## 2024-07-25 RX ORDER — CALCIUM CARBONATE 500 MG/1
1000 TABLET, CHEWABLE ORAL 4 TIMES DAILY PRN
Status: DISCONTINUED | OUTPATIENT
Start: 2024-07-25 | End: 2024-07-29 | Stop reason: HOSPADM

## 2024-07-25 RX ADMIN — PIPERACILLIN AND TAZOBACTAM 3.38 G: 3; .375 INJECTION, POWDER, FOR SOLUTION INTRAVENOUS at 19:02

## 2024-07-25 RX ADMIN — LIDOCAINE HYDROCHLORIDE 0.4 ML: 10 INJECTION, SOLUTION EPIDURAL; INFILTRATION; INTRACAUDAL; PERINEURAL at 22:45

## 2024-07-25 RX ADMIN — SODIUM CHLORIDE 1000 ML: 9 INJECTION, SOLUTION INTRAVENOUS at 23:42

## 2024-07-25 RX ADMIN — ACETAMINOPHEN 650 MG: 650 SUPPOSITORY RECTAL at 18:26

## 2024-07-25 RX ADMIN — SODIUM CHLORIDE 500 ML: 9 INJECTION, SOLUTION INTRAVENOUS at 20:21

## 2024-07-25 ASSESSMENT — ACTIVITIES OF DAILY LIVING (ADL)
ADLS_ACUITY_SCORE: 38

## 2024-07-25 ASSESSMENT — COLUMBIA-SUICIDE SEVERITY RATING SCALE - C-SSRS: IS THE PATIENT NOT ABLE TO COMPLETE C-SSRS: UNABLE TO VERBALIZE

## 2024-07-25 NOTE — ED TRIAGE NOTES
The pt arrives via EMS from independent living. The pts daughter noticed that the pt is obtunded and not acting like himself. Yesterday he was at the eye doctor, where he was walking and talking normally, last seen acting normally around 1430 yesterday. She noticed that the pt was leaning to the left and had a right upward gaze today. Has had periods of confusion similar to this due to a UTI.      Triage Assessment (Adult)       Row Name 07/25/24 5707          Triage Assessment    Airway WDL WDL        Cognitive/Neuro/Behavioral WDL    Cognitive/Neuro/Behavioral WDL motor response;mood/behavior;level of consciousness     Level of Consciousness obtunded        Motor Response    Motor Response general motor response     General Motor Response withdraws

## 2024-07-25 NOTE — ED PROVIDER NOTES
EMERGENCY DEPARTMENT ENCOUNTER      NAME: Rakesh Samuels  AGE: 73 year old male  YOB: 1950  MRN: [id number]  EVALUATION DATE & TIME: 2024  5:58 PM    PCP: Michelet Restrepo    ED PROVIDER: Tres Walls D.O.      Chief Complaint   Patient presents with    Altered Mental Status       FINAL IMPRESSION:  1. Sepsis, due to unspecified organism, unspecified whether acute organ dysfunction present (H)    2. Urinary tract infection with hematuria, site unspecified    3. Altered mental status, unspecified altered mental status type        ED COURSE & MEDICAL DECISION MAKIN:15 PM I met with the patient to gather history and to perform my initial exam. I discussed the plan for care while in the Emergency Department.  11:34 PM Spoke to hospitalist Dr. Person and he accepted the patient for admission.          Pertinent Labs & Imaging studies reviewed. (See chart for details)  73 year old male presents to the Emergency Department for evaluation of altered mental status.  Patient was noted to be tachycardic, and febrile in the emergency department.  It was a smell of urine as well on my initial exam.  My initial concern was for sepsis secondary to infectious process likely UTI however with his confusion I did perform CT imaging to ensure there was no evidence of intracranial bleeding.  CT imaging was unremarkable on this patient, and I do not see etiology of other infection.  The patient was somewhat confused to still able to answer questions, and follow commands.  He did have weakness of bilateral lower extremities, but no focalized symptoms to the left or right.  At this time I do not believe this to represent a stroke especially is febrile with a positive UA.  Patient was started on antibiotics for sepsis, and as the patient has had significant severity of illness with similar in the past I did decide to perform a PICC line, as well as concern for potential for the patient to become unstable although he has  remained stable during time in the emergency department.  Discussed with hospitalist and the patient be admitted for further management.    Medical Decision Making  Obtained supplemental history:Supplemental history obtained?: Documented in chart  Reviewed external records: External records reviewed?: Documented in chart  Care impacted by chronic illness:Diabetes, Hyperlipidemia, and Mental Health  Care significantly affected by social determinants of health:N/A  Did you consider but not order tests?: Work up considered but not performed and documented in chart, if applicable  Did you interpret images independently?: Independent interpretation of ECG and images noted in documentation, when applicable.  Consultation discussion with other provider:Did you involve another provider (consultant, , pharmacy, etc.)?: I discussed the care with another health care provider, see documentation for details.  Admit.    At the conclusion of the encounter I discussed the results of all of the tests and the disposition. The questions were answered. The patient or family acknowledged understanding and was agreeable with the care plan.      CRITICAL CARE:  Critical Care  Performed by: SVETLANA VICK  Authorized by: SVETLANA VICK  Total critical care time: 35 minutes  Critical care time was exclusive of separately billable procedures and treating other patients.  Critical care was necessary to treat or prevent imminent or life-threatening deterioration of the following conditions: sepsis  Critical care was time spent personally by me on the following activities: development of treatment plan with patient or surrogate, discussions with consultants, examination of patient, evaluation of patient's response to treatment, obtaining history from patient or surrogate, ordering and performing treatments and interventions, ordering and review of laboratory studies, ordering and review of radiographic studies and re-evaluation of  "patient's condition, this excludes any separately billable procedures.        HPI    Patient information was obtained from: EMS and patient's daughter    Use of : N/A       Rakesh Samuels is a 73 year old male who presents altered mental status.    Per EMS, patient comes from independent living. His daughter had noticed that the patient is obtunded and not acting like himself. He was last seen to be acting normally yesterday around 2:30 PM. Today she noticed that the patient was leaning to the left and had a right upward gaze.    Per patient's daughter, he has been complaining of more vision problems for the past couple days so they took him to his eye doctor. Patient is almost blind in his right eye and has tunnel vision in his left eye. She says the patient has a history of confusion due to UTIs and \"almost \" from a UTI 3 years ago.      PAST MEDICAL HISTORY:  Past Medical History:   Diagnosis Date    A-fib (H)     Acute hemodialysis encounter (H24)     Due to CHIDI    Acute kidney injury (H24)     Acute respiratory failure with hypoxemia (H)     Adrenal incidentaloma (H24)     Asbestosis (H)     ATN (acute tubular necrosis) (H24)     Atrophy of right kidney     Basal cell carcinoma     BPH without urinary obstruction     Cellulitis     Left foot    Cholelithiasis     Depression with anxiety     Diabetes mellitus, type 2 (H) 2020    Esophagitis     Gastritis     Glaucoma     Hematemesis, presence of nausea not specified     Hyperkalemia     Hyperlipemia     Kidney stone     Lactic acidosis     Metabolic acidosis     Metformin overdose of undetermined intent     Paroxysmal atrial fibrillation (H) 2020    Pericardial effusion     PTSD (post-traumatic stress disorder)     Seasonal allergies     Sepsis due to urinary tract infection (H)     Shock circulatory (H)     SIRS (systemic inflammatory response syndrome) (H)     Sleep apnea     uses a machine at night.     Upper GI bleed        PAST " SURGICAL HISTORY:  Past Surgical History:   Procedure Laterality Date    EYE SURGERY      congenital ptosis right upper lid    HC CYSTOSCOPY,INSERT URETERAL STENT Left 4/12/2020    Procedure: CYSTOSCOPY, WITH URETERAL STENT INSERTION;  Surgeon: Christopher Yates MD;  Location: [facility] OR;  Service: Urology    N[place] ESOPHAGOGASTRODUODENOSCOPY TRANSORAL DIAGNOSTIC N/A 4/13/2020    Procedure: ESOPHAGOGASTRODUODENOSCOPY (EGD);  Surgeon: Robert Rico MD;  Location: [facility] GI;  Service: Gastroenterology    REPLACEMENT TOTAL KNEE Left          CURRENT MEDICATIONS:    Current Facility-Administered Medications   Medication Dose Route Frequency Provider Last Rate Last Admin    acetaminophen (TYLENOL) tablet 650 mg  650 mg Oral Q4H PRN Kaleigh Person MD        Or    acetaminophen (TYLENOL) Suppository 650 mg  650 mg Rectal Q4H PRN Kaleigh Person MD        calcium carbonate (TUMS) chewable tablet 1,000 mg  1,000 mg Oral 4x Daily PRN Kaleigh Person MD        enoxaparin ANTICOAGULANT (LOVENOX) injection 40 mg  40 mg Subcutaneous Q24H Kaleigh Person MD        lidocaine (LMX4) cream   Topical Q1H PRN Tres Walls DO        lidocaine (LMX4) cream   Topical Q1H PRN Kaleigh Person MD        lidocaine 1 % 0.1-1 mL  0.1-1 mL Other Q1H PRN Kaleigh Person MD        lidocaine 1 % 0.1-5 mL  0.1-5 mL Other Q1H PRN Tres Walls DO   0.4 mL at 07/25/24 4509    ondansetron (ZOFRAN ODT) ODT tab 4 mg  4 mg Oral Q6H PRN Kaleigh Person MD        Or    ondansetron (ZOFRAN) injection 4 mg  4 mg Intravenous Q6H PRN Kaleigh Person MD        piperacillin-tazobactam (ZOSYN) 3.375 g vial to attach to  mL bag  3.375 g Intravenous Q8H Kaleigh Person MD   3.375 g at 07/26/24 0001    senna-docusate (SENOKOT-S/PERICOLACE) 8.6-50 MG per tablet 1 tablet  1 tablet Oral BID PRN Kaleigh Person MD        Or    senna-docusate (SENOKOT-S/PERICOLACE) 8.6-50 MG per tablet 2 tablet  2 tablet Oral BID PRN Kaleigh Person MD         sodium chloride (PF) 0.9% PF flush 3 mL  3 mL Intracatheter Q8H Kaleigh Person MD        sodium chloride (PF) 0.9% PF flush 3 mL  3 mL Intracatheter q1 min prn Kaleigh Person MD         Current Outpatient Medications   Medication Sig Dispense Refill    acetaminophen (TYLENOL) 500 MG tablet [ACETAMINOPHEN (TYLENOL) 500 MG TABLET] Take 500 mg by mouth every 6 (six) hours as needed for pain.      ARIPiprazole (ABILIFY) 2 MG tablet [ARIPIPRAZOLE (ABILIFY) 2 MG TABLET] Take 2 mg by mouth at bedtime.       aspirin 81 mg chewable tablet Take 81 mg by mouth daily      brimonidine (ALPHAGAN) 0.2 % ophthalmic solution [BRIMONIDINE (ALPHAGAN) 0.2 % OPHTHALMIC SOLUTION] Administer 1 drop to both eyes 2 (two) times a day.       dorzolamide-timolol (COSOPT) 2-0.5 % ophthalmic solution Place 1 drop into both eyes 2 times daily      fluvoxaMINE (LUVOX) 50 MG tablet [FLUVOXAMINE (LUVOX) 50 MG TABLET] Take 50 mg by mouth at bedtime.       gabapentin (NEURONTIN) 300 MG capsule Take 300 mg by mouth 2 times daily      hydrOXYzine (ATARAX) 10 MG tablet Take 10 mg by mouth 3 times daily as needed      latanoprostene bunod 0.024 % Drop Place 1 drop into both eyes At Bedtime      levomefolate calcium (L-METHYLFOLATE ORAL) [LEVOMEFOLATE CALCIUM (L-METHYLFOLATE ORAL)] Take 1.25 mg by mouth daily.       metFORMIN (GLUCOPHAGE) 1000 MG tablet Take 1,000 mg by mouth 2 times daily (with meals)      Multiple Vitamins-Minerals (CENTRUM SILVER) CHEW Take 1 tablet by mouth daily      netarsudiL (RHOPRESSA) 0.02 % Drop Place 1 drop into both eyes every evening      warfarin ANTICOAGULANT (COUMADIN) 5 MG tablet Take 5 to 7.5mg (1 to 1.5 tabs) by mouth daily OR AS DIRECTED.  Adjust dose based on INR. (Patient taking differently: Take 5 mg by mouth daily Adjust dose based on INR.) 100 tablet 1         ALLERGIES:  No Known Allergies    FAMILY HISTORY:  Family History   Problem Relation Age of Onset    Diabetes Mother        SOCIAL HISTORY:  Social  "History     Socioeconomic History    Marital status:    Tobacco Use    Smoking status: Never    Smokeless tobacco: Never   Substance and Sexual Activity    Alcohol use: Not Currently    Drug use: Never    Sexual activity: Not Currently     Social Determinants of Health      Received from [facility], [facility]    Social Connections       VITALS:  Patient Vitals for the past 24 hrs:   BP Temp Temp src Pulse Resp SpO2 Height Weight   07/26/24 0000 (!) 160/72 -- -- 112 -- 93 % -- --   07/25/24 2355 -- -- -- -- -- -- 1.905 m (6' 3\") 104.3 kg (230 lb)   07/25/24 2346 (!) 166/76 -- -- 111 -- 93 % -- --   07/25/24 2315 (!) 154/72 -- -- 108 27 92 % -- --   07/25/24 2306 -- -- -- 108 (!) 36 92 % -- --   07/25/24 2305 -- -- -- 109 (!) 34 92 % -- --   07/25/24 2304 -- -- -- 110 23 90 % -- --   07/25/24 2303 -- -- -- 112 (!) 43 90 % -- --   07/25/24 2302 -- -- -- 113 17 90 % -- --   07/25/24 2301 -- -- -- 112 24 (!) 88 % -- --   07/25/24 2300 (!) 156/74 -- -- 112 23 (!) 89 % -- --   07/25/24 2259 (!) 156/74 99.1  F (37.3  C) -- 111 16 (!) 89 % -- --   07/25/24 2245 (!) 159/75 -- -- 105 24 90 % -- --   07/25/24 2230 (!) 165/65 -- -- 108 30 93 % -- --   07/25/24 2200 (!) 195/116 -- -- 110 22 90 % -- --   07/25/24 2145 (!) 168/115 -- -- 113 22 98 % -- --   07/25/24 2130 (!) 160/95 -- -- 90 19 96 % -- --   07/25/24 2115 (!) 164/91 -- -- 92 14 97 % -- --   07/25/24 2100 (!) 150/80 -- -- 83 17 97 % -- --   07/25/24 2045 (!) 145/74 -- -- 82 22 96 % -- --   07/25/24 2030 (!) 148/77 -- -- 86 25 94 % -- --   07/25/24 2015 (!) 153/81 -- -- 88 12 92 % -- --   07/25/24 2000 126/60 -- -- 95 28 93 % -- --   07/25/24 1945 (!) 154/83 -- -- 102 12 94 % -- --   07/25/24 1930 (!) 155/76 -- -- 103 27 93 % -- --   07/25/24 1915 (!) 146/73 -- -- 103 27 93 % -- --   07/25/24 1906 -- 100.1  F (37.8  C) Oral 103 27 93 % -- --   07/25/24 1905 (!) 171/82 -- -- 103 25 93 % -- -- " "  07/25/24 1900 (!) 171/82 -- -- 99 30 92 % -- --   07/25/24 1845 (!) 168/78 -- -- 100 25 94 % -- --   07/25/24 1840 -- -- -- 96 29 93 % -- --   07/25/24 1830 (!) 174/82 -- -- 104 28 94 % -- --   07/25/24 1822 -- (!) 104  F (40  C) -- -- -- -- -- --   07/25/24 1804 (!) 145/77 99.6  F (37.6  C) Oral 100 18 91 % -- --   07/25/24 1800 (!) 145/77 -- -- -- -- -- -- --       PHYSICAL EXAM    VITAL SIGNS: BP (!) 160/72   Pulse 112   Temp 99.1  F (37.3  C)   Resp 27   Ht 1.905 m (6' 3\")   Wt 104.3 kg (230 lb)   SpO2 93%   BMI 28.75 kg/m      General Appearance: No acute distress Rectal temperature 104. Lethargic.  Head:  Normocephalic, without obvious abnormality, atraumatic  Eyes:  Conjunctiva/corneas clear. Limited ROM of the eyes laterally to the left, otherwise ocular movements intact.  ENT:  Lips, mucosa, and tongue normal, membranes are moist without pallor  Neck:  Normal ROM, symmetrical, trachea midline    Cardio:  Regular rhythm, no murmur, rub or gallop, 2+ pulses symmetric in all extremities. Tachycardia.  Pulm:  Clear to auscultation bilaterally, respirations unlabored,  Abdomen:  Soft, non-tender, no rebound or guarding.  Musculoskeletal: Full ROM, no edema, no cyanosis, good ROM of major joints. Bilateral symmetric lower extremity weakness.  Integument:  Warm, Dry, No erythema, No rash.    Neurologic:  Alert & oriented x 2.  No focal deficits appreciated.    Psychiatric:  Affect normal, Judgment normal, Mood normal.      LABS  Results for orders placed or performed during the hospital encounter of 07/25/24 (from the past 24 hour(s))   UA with Microscopic reflex to Culture    Specimen: Urine, Catheter   Result Value Ref Range    Color Urine Light Yellow Colorless, Straw, Light Yellow, Yellow    Appearance Urine Clear Clear    Glucose Urine Negative Negative mg/dL    Bilirubin Urine Negative Negative    Ketones Urine Negative Negative mg/dL    Specific Gravity Urine 1.019 1.001 - 1.030    Blood Urine 0.1 " mg/dL (A) Negative    pH Urine 7.0 5.0 - 7.0    Protein Albumin Urine 100 (A) Negative mg/dL    Urobilinogen Urine <2.0 <2.0 mg/dL    Nitrite Urine Negative Negative    Leukocyte Esterase Urine 500 Janis/uL (A) Negative    Bacteria Urine Moderate (A) None Seen /HPF    Mucus Urine Present (A) None Seen /LPF    RBC Urine 31 (H) <=2 /HPF    WBC Urine 89 (H) <=5 /HPF    Squamous Epithelials Urine <1 <=1 /HPF    Hyaline Casts Urine 1 <=2 /LPF    Narrative    Urine Culture ordered based on laboratory criteria   CBC with platelets + differential    Narrative    The following orders were created for panel order CBC with platelets + differential.  Procedure                               Abnormality         Status                     ---------                               -----------         ------                     CBC with platelets and d...[[id number]]  Abnormal            Final result                 Please view results for these tests on the individual orders.   Basic metabolic panel   Result Value Ref Range    Sodium 140 135 - 145 mmol/L    Potassium 4.7 3.4 - 5.3 mmol/L    Chloride 103 98 - 107 mmol/L    Carbon Dioxide (CO2) 25 22 - 29 mmol/L    Anion Gap 12 7 - 15 mmol/L    Urea Nitrogen 17.1 8.0 - 23.0 mg/dL    Creatinine 1.00 0.67 - 1.17 mg/dL    GFR Estimate 80 >60 mL/min/1.73m2    Calcium 9.1 8.8 - 10.4 mg/dL    Glucose 234 (H) 70 - 99 mg/dL   Troponin T, High Sensitivity (now)   Result Value Ref Range    Troponin T, High Sensitivity 18 <=22 ng/L   Lactic acid whole blood   Result Value Ref Range    Lactic Acid 2.5 (H) 0.7 - 2.0 mmol/L   Hepatic function panel   Result Value Ref Range    Protein Total 7.1 6.4 - 8.3 g/dL    Albumin 4.0 3.5 - 5.2 g/dL    Bilirubin Total 0.7 <=1.2 mg/dL    Alkaline Phosphatase 90 40 - 150 U/L    AST 11 0 - 45 U/L    ALT 12 0 - 70 U/L    Bilirubin Direct <0.20 0.00 - 0.30 mg/dL   CBC with platelets and differential   Result Value Ref Range    WBC Count 17.0 (H) 4.0 - 11.0 10e3/uL     RBC Count 4.40 4.40 - 5.90 10e6/uL    Hemoglobin 13.9 13.3 - 17.7 g/dL    Hematocrit 43.0 40.0 - 53.0 %    MCV 98 78 - 100 fL    MCH 31.6 26.5 - 33.0 pg    MCHC 32.3 31.5 - 36.5 g/dL    RDW 13.2 10.0 - 15.0 %    Platelet Count 255 150 - 450 10e3/uL    % Neutrophils 86 %    % Lymphocytes 4 %    % Monocytes 9 %    % Eosinophils 0 %    % Basophils 0 %    % Immature Granulocytes 1 %    NRBCs per 100 WBC 0 <1 /100    Absolute Neutrophils 14.7 (H) 1.6 - 8.3 10e3/uL    Absolute Lymphocytes 0.7 (L) 0.8 - 5.3 10e3/uL    Absolute Monocytes 1.5 (H) 0.0 - 1.3 10e3/uL    Absolute Eosinophils 0.0 0.0 - 0.7 10e3/uL    Absolute Basophils 0.0 0.0 - 0.2 10e3/uL    Absolute Immature Granulocytes 0.1 <=0.4 10e3/uL    Absolute NRBCs 0.0 10e3/uL   ECG 12-LEAD WITH MUSE (LHE)   Result Value Ref Range    Systolic Blood Pressure 174 mmHg    Diastolic Blood Pressure 82 mmHg    Ventricular Rate 96 BPM    Atrial Rate 96 BPM    SC Interval 174 ms    QRS Duration 82 ms     ms    QTc 434 ms    P Axis 50 degrees    R AXIS 34 degrees    T Axis 47 degrees    Interpretation ECG       Sinus rhythm  Normal ECG  When compared with ECG of 31-Jul-2023 16:44,  No significant change was found  Confirmed by SEE ED PROVIDER NOTE FOR, ECG INTERPRETATION ([id number]),  IVA MCKEON ([id number]) on 7/25/2024 8:16:50 PM     Head CT w/o contrast    Narrative    EXAM: CT HEAD W/O CONTRAST  LOCATION: [facility]  DATE: 7/25/2024    INDICATION: Altered mental status. Confusion.  COMPARISON: 8/1/2023  TECHNIQUE: Routine CT Head without IV contrast. Multiplanar reformats. Dose reduction techniques were used.    FINDINGS:  INTRACRANIAL CONTENTS: No intracranial hemorrhage, extraaxial collection, or mass effect.  No CT evidence of acute infarct. Mild to moderate presumed chronic small vessel ischemic changes. Mild to moderate generalized volume loss. No hydrocephalus.     VISUALIZED ORBITS/SINUSES/MASTOIDS: Prior bilateral cataract surgery.  Visualized portions of the orbits are otherwise unremarkable. Stable implanted device along the right globe. No paranasal sinus mucosal disease. No middle ear or mastoid effusion.    BONES/SOFT TISSUES: No acute abnormality.      Impression    IMPRESSION:  1.  No CT evidence for acute intracranial process.  2.  Brain atrophy and presumed chronic microvascular ischemic changes as above.   XR Chest Port 1 View    Narrative    EXAM: XR CHEST PORT 1 VIEW  LOCATION: [facility]  DATE: 7/25/2024    INDICATION: PICC placement  COMPARISON: None.      Impression    IMPRESSION: The heart is mildly enlarged. A right PICC is seen in place with the tip in the atrial caval junction. Subsegmental atelectasis seen in the left and less so right lung base.   XR Chest Port 1 View    Narrative    EXAM: XR CHEST PORT 1 VIEW  LOCATION: [facility]  DATE: 7/25/2024    INDICATION: PICC reposition  COMPARISON: Earlier today at 2256 hours.      Impression    IMPRESSION: Right PICC line now in good position with its tip junction of SVC and right atrium.  Heart size likely upper limits of normal. Lungs clear.   Lactic acid whole blood   Result Value Ref Range    Lactic Acid 2.4 (H) 0.7 - 2.0 mmol/L   Double Lumen PICC Placement    Narrative    Scarlett Heath RN     7/25/2024 11:41 PM  [facility]    Double Lumen PICC Placement    Date/Time: 7/25/2024 11:37 PM    Performed by: Scarlett Heath RN  Authorized by: Tres Walls DO  Indications: vascular access      UNIVERSAL PROTOCOL   Site Marked: NA  Prior Images Obtained and Reviewed:  NA  Required items: Required blood products, implants, devices and special   equipment available    Patient identity confirmed:  Verbally with patient, arm band and provided   demographic data  NA - No sedation, light sedation, or local anesthesia  Confirmation Checklist:  Patient's identity using two indicators, relevant    allergies, procedure was appropriate and matched the consent or emergent   situation and correct equipment/implants were available  Time out: Immediately prior to the procedure a time out was called    Universal Protocol: the Joint Commission Universal Protocol was followed    Preparation: Patient was prepped and draped in usual sterile fashion    ESBL (mL):  2     ANESTHESIA    Anesthesia:  Local infiltration  Local Anesthetic:  Lidocaine 1% without epinephrine  Anesthetic Total (mL):  0.4      SEDATION    Patient Sedated: No        Preparation: skin prepped with 2% chlorhexidine  Skin prep agent: skin prep agent completely dried prior to procedure  Sterile barriers: maximum sterile barriers were used: cap, mask, sterile   gown, sterile gloves, and large sterile sheet  Hand hygiene: hand hygiene performed prior to central venous catheter   insertion  Type of line used: PICC  Catheter type: double lumen  Lumen type: valved and power PICC  Lumen Identification: Purple and Red  Catheter size: 5 Fr  Brand: Bard  Lot number: [id number]  Placement method: MST and ultrasound  Number of attempts: 1  Difficulty threading catheter: no  Successful placement: yes  Orientation: right  Catheter to Vein (%): 8  Location: basilic vein  Tip Location: SVC/RA Junction  Arm circumference: adults 10 cm  Extremity circumference: 33  Visible catheter length: 3  Total catheter length: 44  Dressing and securement: statlock, transparent dressing and antibiotic   disc placed  Post procedure assessment: blood return through all ports, free fluid flow   and placement verified by x-ray  PROCEDURE   Patient Tolerance:  Patient tolerated the procedure well with no immediate   complicationsDescribe Procedure: Consent per POA via telephone with ED MD.   Patient gave verbal consent. Accessed right upper arm basilic on first   attempt using ultrasound guidance, catheter advanced easily. Brisk blood   return and flushes well both lumens. Placement  confirmed by xray; read-   Right PICC tip at the junction of the SVC and Right atrium. Patient   tolerated procedure well. Educational material left at bedside. Primary RN   aware PICC is good to use.   Disposal: sharps and needle count correct at the end of procedure, needles   and guidewire disposed in sharps container         RADIOLOGY  XR Chest Port 1 View   Final Result   IMPRESSION: Right PICC line now in good position with its tip junction of SVC and right atrium.   Heart size likely upper limits of normal. Lungs clear.      XR Chest Port 1 View   Final Result   IMPRESSION: The heart is mildly enlarged. A right PICC is seen in place with the tip in the atrial caval junction. Subsegmental atelectasis seen in the left and less so right lung base.      Head CT w/o contrast   Final Result   IMPRESSION:   1.  No CT evidence for acute intracranial process.   2.  Brain atrophy and presumed chronic microvascular ischemic changes as above.               EKG:    Rate: 96 bpm  Rhythm: Normal Sinus Rhythm  Axis: Normal  Interval: Normal  Conduction: Normal  QRS: Normal  ST: Normal  T-wave: Normal  QT: Not prolonged  Comparison EKG: no significant change compared to 31 July 2023  Impression:  No acute ischemic change   I have independently reviewed and interpreted today's EKG, pending Cardiologist read          MEDICATIONS GIVEN IN THE EMERGENCY:  Medications   lidocaine 1 % 0.1-5 mL (0.4 mLs Other $Given 7/25/24 2245)   lidocaine (LMX4) cream (has no administration in time range)   lidocaine 1 % 0.1-1 mL (has no administration in time range)   lidocaine (LMX4) cream (has no administration in time range)   sodium chloride (PF) 0.9% PF flush 3 mL (3 mLs Intracatheter Not Given 7/25/24 2341)   sodium chloride (PF) 0.9% PF flush 3 mL (has no administration in time range)   senna-docusate (SENOKOT-S/PERICOLACE) 8.6-50 MG per tablet 1 tablet (has no administration in time range)     Or   senna-docusate (SENOKOT-S/PERICOLACE)  8.6-50 MG per tablet 2 tablet (has no administration in time range)   calcium carbonate (TUMS) chewable tablet 1,000 mg (has no administration in time range)   enoxaparin ANTICOAGULANT (LOVENOX) injection 40 mg (has no administration in time range)   acetaminophen (TYLENOL) tablet 650 mg (has no administration in time range)     Or   acetaminophen (TYLENOL) Suppository 650 mg (has no administration in time range)   ondansetron (ZOFRAN ODT) ODT tab 4 mg (has no administration in time range)     Or   ondansetron (ZOFRAN) injection 4 mg (has no administration in time range)   piperacillin-tazobactam (ZOSYN) 3.375 g vial to attach to  mL bag (3.375 g Intravenous $Given 7/26/24 0001)   sodium chloride (PF) 0.9% PF flush 10-40 mL (40 mLs Intracatheter $Given 7/25/24 2300)   acetaminophen (TYLENOL) Suppository 650 mg (650 mg Rectal $Given 7/25/24 1826)   piperacillin-tazobactam (ZOSYN) 3.375 g vial to attach to  mL bag (0 g Intravenous Stopped 7/25/24 1932)   sodium chloride 0.9% BOLUS 500 mL (0 mLs Intravenous Stopped 7/25/24 2051)   sodium chloride 0.9% BOLUS 1,000 mL (1,000 mLs Intravenous $New Bag 7/25/24 2342)       NEW PRESCRIPTIONS STARTED AT TODAY'S ER VISIT  New Prescriptions    No medications on file        I, Radha Mirza, am serving as a scribe to document services personally performed by Tres Walls D.O., based on my observations and the provider's statements to me.  I, Tres Walls D.O., attest that Radha Mirza is acting in a scribe capacity, has observed my performance of the services and has documented them in accordance with my direction.     Tres Walls D.O.  Emergency Medicine  [facility] EMERGENCY DEPARTMENT  [address] [place] [id number] [phone]  Dept: [phone]      Tres Walls,   07/26/24 0021

## 2024-07-26 ENCOUNTER — APPOINTMENT (OUTPATIENT)
Dept: CT IMAGING | Facility: HOSPITAL | Age: 74
DRG: 871 | End: 2024-07-26
Attending: STUDENT IN AN ORGANIZED HEALTH CARE EDUCATION/TRAINING PROGRAM
Payer: MEDICARE

## 2024-07-26 PROBLEM — R31.9 URINARY TRACT INFECTION WITH HEMATURIA, SITE UNSPECIFIED: Status: ACTIVE | Noted: 2024-07-26

## 2024-07-26 PROBLEM — R41.82 ALTERED MENTAL STATUS, UNSPECIFIED ALTERED MENTAL STATUS TYPE: Status: ACTIVE | Noted: 2024-07-26

## 2024-07-26 PROBLEM — N39.0 URINARY TRACT INFECTION WITH HEMATURIA, SITE UNSPECIFIED: Status: ACTIVE | Noted: 2024-07-26

## 2024-07-26 PROBLEM — A41.9 SEPSIS, DUE TO UNSPECIFIED ORGANISM, UNSPECIFIED WHETHER ACUTE ORGAN DYSFUNCTION PRESENT (H): Status: ACTIVE | Noted: 2024-07-26

## 2024-07-26 LAB
ENTEROCOCCUS FAECALIS: DETECTED
ENTEROCOCCUS FAECIUM: NOT DETECTED
ERYTHROCYTE [DISTWIDTH] IN BLOOD BY AUTOMATED COUNT: 13.2 % (ref 10–15)
GLUCOSE BLDC GLUCOMTR-MCNC: 149 MG/DL (ref 70–99)
GLUCOSE BLDC GLUCOMTR-MCNC: 172 MG/DL (ref 70–99)
GLUCOSE BLDC GLUCOMTR-MCNC: 194 MG/DL (ref 70–99)
GLUCOSE BLDC GLUCOMTR-MCNC: 197 MG/DL (ref 70–99)
GLUCOSE BLDC GLUCOMTR-MCNC: 264 MG/DL (ref 70–99)
HBA1C MFR BLD: 6.8 %
HCT VFR BLD AUTO: 36.3 % (ref 40–53)
HGB BLD-MCNC: 12 G/DL (ref 13.3–17.7)
INR PPP: 1.78 (ref 0.85–1.15)
LACTATE SERPL-SCNC: 1.9 MMOL/L (ref 0.7–2)
LISTERIA SPECIES (DETECTED/NOT DETECTED): NOT DETECTED
MCH RBC QN AUTO: 32.6 PG (ref 26.5–33)
MCHC RBC AUTO-ENTMCNC: 33.1 G/DL (ref 31.5–36.5)
MCV RBC AUTO: 99 FL (ref 78–100)
MRSA DNA SPEC QL NAA+PROBE: NEGATIVE
PLATELET # BLD AUTO: 200 10E3/UL (ref 150–450)
RBC # BLD AUTO: 3.68 10E6/UL (ref 4.4–5.9)
SA TARGET DNA: NEGATIVE
STAPHYLOCOCCUS AUREUS: NOT DETECTED
STAPHYLOCOCCUS EPIDERMIDIS: NOT DETECTED
STAPHYLOCOCCUS LUGDUNENSIS: NOT DETECTED
STAPHYLOCOCCUS SPECIES: NOT DETECTED
STREPTOCOCCUS AGALACTIAE: NOT DETECTED
STREPTOCOCCUS ANGINOSUS GROUP: NOT DETECTED
STREPTOCOCCUS PNEUMONIAE: NOT DETECTED
STREPTOCOCCUS PYOGENES: NOT DETECTED
STREPTOCOCCUS SPECIES: NOT DETECTED
WBC # BLD AUTO: 15.5 10E3/UL (ref 4–11)

## 2024-07-26 PROCEDURE — 36415 COLL VENOUS BLD VENIPUNCTURE: CPT | Performed by: INTERNAL MEDICINE

## 2024-07-26 PROCEDURE — 87641 MR-STAPH DNA AMP PROBE: CPT | Performed by: STUDENT IN AN ORGANIZED HEALTH CARE EDUCATION/TRAINING PROGRAM

## 2024-07-26 PROCEDURE — 82962 GLUCOSE BLOOD TEST: CPT

## 2024-07-26 PROCEDURE — 85610 PROTHROMBIN TIME: CPT | Performed by: INTERNAL MEDICINE

## 2024-07-26 PROCEDURE — 87640 STAPH A DNA AMP PROBE: CPT | Performed by: STUDENT IN AN ORGANIZED HEALTH CARE EDUCATION/TRAINING PROGRAM

## 2024-07-26 PROCEDURE — 250N000013 HC RX MED GY IP 250 OP 250 PS 637: Performed by: INTERNAL MEDICINE

## 2024-07-26 PROCEDURE — 999N000157 HC STATISTIC RCP TIME EA 10 MIN

## 2024-07-26 PROCEDURE — 258N000003 HC RX IP 258 OP 636: Performed by: STUDENT IN AN ORGANIZED HEALTH CARE EDUCATION/TRAINING PROGRAM

## 2024-07-26 PROCEDURE — 250N000011 HC RX IP 250 OP 636: Performed by: INTERNAL MEDICINE

## 2024-07-26 PROCEDURE — 250N000013 HC RX MED GY IP 250 OP 250 PS 637: Performed by: STUDENT IN AN ORGANIZED HEALTH CARE EDUCATION/TRAINING PROGRAM

## 2024-07-26 PROCEDURE — 120N000001 HC R&B MED SURG/OB

## 2024-07-26 PROCEDURE — 85014 HEMATOCRIT: CPT | Performed by: INTERNAL MEDICINE

## 2024-07-26 PROCEDURE — 250N000012 HC RX MED GY IP 250 OP 636 PS 637: Performed by: INTERNAL MEDICINE

## 2024-07-26 PROCEDURE — 99233 SBSQ HOSP IP/OBS HIGH 50: CPT | Performed by: STUDENT IN AN ORGANIZED HEALTH CARE EDUCATION/TRAINING PROGRAM

## 2024-07-26 PROCEDURE — 250N000011 HC RX IP 250 OP 636: Performed by: STUDENT IN AN ORGANIZED HEALTH CARE EDUCATION/TRAINING PROGRAM

## 2024-07-26 PROCEDURE — 74178 CT ABD&PLV WO CNTR FLWD CNTR: CPT | Mod: MG

## 2024-07-26 PROCEDURE — 83605 ASSAY OF LACTIC ACID: CPT | Performed by: INTERNAL MEDICINE

## 2024-07-26 RX ORDER — ARIPIPRAZOLE 2 MG/1
2 TABLET ORAL AT BEDTIME
Status: DISCONTINUED | OUTPATIENT
Start: 2024-07-26 | End: 2024-07-29 | Stop reason: HOSPADM

## 2024-07-26 RX ORDER — DORZOLAMIDE HYDROCHLORIDE AND TIMOLOL MALEATE 20; 5 MG/ML; MG/ML
1 SOLUTION/ DROPS OPHTHALMIC 2 TIMES DAILY
Status: DISCONTINUED | OUTPATIENT
Start: 2024-07-26 | End: 2024-07-29 | Stop reason: HOSPADM

## 2024-07-26 RX ORDER — IOPAMIDOL 755 MG/ML
90 INJECTION, SOLUTION INTRAVASCULAR ONCE
Status: COMPLETED | OUTPATIENT
Start: 2024-07-26 | End: 2024-07-26

## 2024-07-26 RX ORDER — BRIMONIDINE TARTRATE 2 MG/ML
1 SOLUTION/ DROPS OPHTHALMIC 2 TIMES DAILY
Status: DISCONTINUED | OUTPATIENT
Start: 2024-07-26 | End: 2024-07-29 | Stop reason: HOSPADM

## 2024-07-26 RX ORDER — WARFARIN SODIUM 5 MG/1
5 TABLET ORAL ONCE
Status: COMPLETED | OUTPATIENT
Start: 2024-07-26 | End: 2024-07-26

## 2024-07-26 RX ORDER — DEXTROSE MONOHYDRATE 25 G/50ML
25-50 INJECTION, SOLUTION INTRAVENOUS
Status: DISCONTINUED | OUTPATIENT
Start: 2024-07-26 | End: 2024-07-29 | Stop reason: HOSPADM

## 2024-07-26 RX ORDER — SODIUM CHLORIDE 9 MG/ML
INJECTION, SOLUTION INTRAVENOUS CONTINUOUS
Status: DISCONTINUED | OUTPATIENT
Start: 2024-07-26 | End: 2024-07-27

## 2024-07-26 RX ORDER — GABAPENTIN 300 MG/1
300 CAPSULE ORAL 2 TIMES DAILY
Status: DISCONTINUED | OUTPATIENT
Start: 2024-07-26 | End: 2024-07-29 | Stop reason: HOSPADM

## 2024-07-26 RX ORDER — NICOTINE POLACRILEX 4 MG
15-30 LOZENGE BUCCAL
Status: DISCONTINUED | OUTPATIENT
Start: 2024-07-26 | End: 2024-07-29 | Stop reason: HOSPADM

## 2024-07-26 RX ORDER — FLUVOXAMINE MALEATE 50 MG
50 TABLET ORAL AT BEDTIME
Status: DISCONTINUED | OUTPATIENT
Start: 2024-07-26 | End: 2024-07-29 | Stop reason: HOSPADM

## 2024-07-26 RX ORDER — WARFARIN SODIUM 5 MG/1
5 TABLET ORAL
Status: COMPLETED | OUTPATIENT
Start: 2024-07-26 | End: 2024-07-26

## 2024-07-26 RX ADMIN — WARFARIN SODIUM 5 MG: 5 TABLET ORAL at 18:51

## 2024-07-26 RX ADMIN — FLUVOXAMINE MALEATE 50 MG: 50 TABLET ORAL at 22:47

## 2024-07-26 RX ADMIN — SODIUM CHLORIDE: 9 INJECTION, SOLUTION INTRAVENOUS at 16:10

## 2024-07-26 RX ADMIN — INSULIN ASPART 2 UNITS: 100 INJECTION, SOLUTION INTRAVENOUS; SUBCUTANEOUS at 00:46

## 2024-07-26 RX ADMIN — PIPERACILLIN AND TAZOBACTAM 3.38 G: 3; .375 INJECTION, POWDER, FOR SOLUTION INTRAVENOUS at 08:15

## 2024-07-26 RX ADMIN — INSULIN GLARGINE 5 UNITS: 100 INJECTION, SOLUTION SUBCUTANEOUS at 21:45

## 2024-07-26 RX ADMIN — IOPAMIDOL 90 ML: 755 INJECTION, SOLUTION INTRAVENOUS at 14:24

## 2024-07-26 RX ADMIN — GABAPENTIN 300 MG: 300 CAPSULE ORAL at 22:51

## 2024-07-26 RX ADMIN — BRIMONIDINE TARTRATE 1 DROP: 2 SOLUTION/ DROPS OPHTHALMIC at 22:48

## 2024-07-26 RX ADMIN — DORZOLAMIDE HYDROCHLORIDE AND TIMOLOL MALEATE 1 DROP: 20; 5 SOLUTION/ DROPS OPHTHALMIC at 22:47

## 2024-07-26 RX ADMIN — INSULIN GLARGINE 5 UNITS: 100 INJECTION, SOLUTION SUBCUTANEOUS at 00:45

## 2024-07-26 RX ADMIN — ACETAMINOPHEN 650 MG: 325 TABLET ORAL at 00:50

## 2024-07-26 RX ADMIN — WARFARIN SODIUM 5 MG: 5 TABLET ORAL at 01:54

## 2024-07-26 RX ADMIN — PIPERACILLIN AND TAZOBACTAM 3.38 G: 3; .375 INJECTION, POWDER, FOR SOLUTION INTRAVENOUS at 00:01

## 2024-07-26 RX ADMIN — ARIPIPRAZOLE 2 MG: 2 TABLET ORAL at 22:47

## 2024-07-26 RX ADMIN — ACETAMINOPHEN 650 MG: 325 TABLET ORAL at 13:43

## 2024-07-26 RX ADMIN — ACETAMINOPHEN 650 MG: 325 TABLET ORAL at 07:34

## 2024-07-26 RX ADMIN — PIPERACILLIN AND TAZOBACTAM 3.38 G: 3; .375 INJECTION, POWDER, FOR SOLUTION INTRAVENOUS at 16:09

## 2024-07-26 ASSESSMENT — ACTIVITIES OF DAILY LIVING (ADL)
ADLS_ACUITY_SCORE: 55
ADLS_ACUITY_SCORE: 58
ADLS_ACUITY_SCORE: 38
ADLS_ACUITY_SCORE: 58
ADLS_ACUITY_SCORE: 58
ADLS_ACUITY_SCORE: 48
ADLS_ACUITY_SCORE: 53
ADLS_ACUITY_SCORE: 48
ADLS_ACUITY_SCORE: 55
ADLS_ACUITY_SCORE: 38
ADLS_ACUITY_SCORE: 55
ADLS_ACUITY_SCORE: 38
ADLS_ACUITY_SCORE: 38
DEPENDENT_IADLS:: CLEANING;COOKING;LAUNDRY;SHOPPING;MEAL PREPARATION;MEDICATION MANAGEMENT;TRANSPORTATION
ADLS_ACUITY_SCORE: 38
ADLS_ACUITY_SCORE: 38
ADLS_ACUITY_SCORE: 55
ADLS_ACUITY_SCORE: 56
ADLS_ACUITY_SCORE: 48
ADLS_ACUITY_SCORE: 58

## 2024-07-26 NOTE — PROGRESS NOTES
"[facility]    Medicine Progress Note - Hospitalist Service    Date of Admission:  7/25/2024    Assessment & Plan   Rakesh Samuels is a 73 year old male admitted on 7/25/2024. He presents with altered mental status. ER workup showing e/o UTI.     UTI  Sepsis with fever, lactic acidosis, leukocytosis, ams  Lactic acidosis  Septic encephalopathy  Recurrent falls  -AMS ddx: encephalopathy, CVA, meningitis. No focal neuro deficits and ams can be explained by UTI in elderly, thus CVA unlikely.No neck pain or stiffness, low risk for meningitis.  -head ct: no acute changes. + for atrophy and chronic ischemic microvascular ischemia  -h/o AMS due to UTI  -UA + for leukocyte esterase, bacteria; UC pending  -continue  Zosyn  -lactic acid trending down  -Preliminary blood culture resulted with gram-positive cocci in pairs and chains.  -Check MRSA     DM 2  -hgb A1c: 6.8  -lantus and ssi  -hold metformin     H/o p a fib  -PTA warfarin  -pharmacy to dose to keep inr 2-3           Diet: Advance Diet as Tolerated: Clear Liquid Diet; Moderate Consistent Carb (60 g CHO per Meal) Diet    DVT Prophylaxis: Warfarin  Olsen Catheter: PRESENT, indication:    Lines: PRESENT      PICC 07/25/24 Double Lumen Right Basilic-Site Assessment: WDL      Cardiac Monitoring: None  Code Status: Full Code      Clinically Significant Risk Factors Present on Admission               # Drug Induced Coagulation Defect: home medication list includes an anticoagulant medication      # Acute Respiratory Failure: Documented O2 saturation < 91%.  Continue supplemental oxygen as needed       # DMII: A1C = 6.8 % (Ref range: <5.7 %) within past 6 months    # Overweight: Estimated body mass index is 28.75 kg/m  as calculated from the following:    Height as of this encounter: 1.905 m (6' 3\").    Weight as of this encounter: 104.3 kg (230 lb).              Disposition Plan     Medically Ready for Discharge: Anticipated in 2-4 Days       "       Alejandro Urban MD  Hospitalist Service  [facility]  Securely message with [employer] (more info)  Text page via [employer] Paging/Directory   ______________________________________________________________________    Interval History   Patient boarding in the ER awaiting bed assignment.  Appears to be chronically sick looking.  Alert and oriented x 1.  Management plan discussed with the patient and LVM to his daughter    Physical Exam   Vital Signs: Temp: 99.4  F (37.4  C) Temp src: Oral BP: (!) 143/64 Pulse: 105   Resp: 24 SpO2: 95 % O2 Device: None (Room air) Oxygen Delivery: 2.5 LPM  Weight: 230 lbs 0 oz    General Appearance: Chronically sick looking, appears confused  Respiratory: Good air entry bilaterally  Cardiovascular: S1 and S2 are heard, no murmur or gallop  GI: Soft abdomen, no tenderness, normoactive bowel sounds, Olsen catheter in place  Skin: Intact and warm       Medical Decision Making       50 MINUTES SPENT BY ME on the date of service doing chart review, history, exam, documentation & further activities per the note.      Data

## 2024-07-26 NOTE — ED NOTES
Bed: [place] [address]  Expected date:   Expected time:   Means of arrival:   Comments:  Room 19 before 7 am.

## 2024-07-26 NOTE — CONSULTS
Care Management Initial Consult    General Information  Assessment completed with: Eloisa Paiz  Type of CM/SW Visit: Initial Assessment    Primary Care Provider verified and updated as needed: Yes   Readmission within the last 30 days: no previous admission in last 30 days      Reason for Consult: discharge planning  Advance Care Planning: Advance Care Planning Reviewed: present on chart     General Information Comments: In independent living with some services    Communication Assessment  Patient's communication style: spoken language (English or Bilingual)       Cognitive  Cognitive/Neuro/Behavioral: .WDL except, orientation, mood/behavior, motor response  Level of Consciousness: intermittent confusion, alert  Arousal Level: opens eyes spontaneously  Orientation: disoriented to, time, situation  Mood/Behavior: calm, cooperative          Living Environment:   People in home: alone     Current living Arrangements: independent living facility      Able to return to prior arrangements: yes     Family/Social Support:  Care provided by: child(elisa), other (see comments)  Provides care for: no one  Marital Status:   Children, Facility resident(s)/Staff          Description of Support System: Supportive       Current Resources:   Patient receiving home care services: No     Community Resources: Other (see comment)  Equipment currently used at home: walker, rolling, wheelchair, manual  Supplies currently used at home:      Employment/Financial:  Employment Status: retired        Financial Concerns: none   Referral to Financial Worker: No     Does the patient's insurance plan have a 3 day qualifying hospital stay waiver?  Yes     Which insurance plan 3 day waiver is available? [employer] REACH    Will the waiver be used for post-acute placement? Undetermined at this time    Lifestyle & Psychosocial Needs:  Social Determinants of Health     Food Insecurity: Not on file   Depression: At risk (3/2/2019)    [facility]  [facility]    PHQ-2     PHQ-2 Score: 6   Housing Stability: Not on file   Tobacco Use: Low Risk  (11/29/2023)    [facility] from [facility]    Patient History     Smoking Tobacco Use: Never     Smokeless Tobacco Use: Never     Passive Exposure: Not on file   Financial Resource Strain: Not on file   Alcohol Use: Not on file   Transportation Needs: Not on file   Physical Activity: Not on file   Interpersonal Safety: Not on file   Stress: Not on file   Social Connections: Unknown (11/29/2023)    Received from [facility], [facility]    Social Connections     Frequency of Communication with Friends and Family: Not on file   Health Literacy: Not on file     Functional Status:  Prior to admission patient needed assistance:   Dependent ADLs:: Ambulation-walker, Bathing, Dressing, Wheelchair-with assist  Dependent IADLs:: Cleaning, Cooking, Laundry, Shopping, Meal Preparation, Medication Management, Transportation  Assesssment of Functional Status: Not at  functional baseline    Mental Health Status:  Mental Health Status: No Current Concerns       Chemical Dependency Status:  Chemical Dependency Status: No Current Concerns           Values/Beliefs:  Spiritual, Cultural Beliefs, Religion Practices, Values that affect care: no          Values/Beliefs Comment: Yazidism    Additional Information:  Patient hospitalized with altered mental status. Writer spoke with patient's daughter Eloisa by telephone to review role of care management services, discuss goals of care and assess need for any possible services at discharge. Patient sleeping.  Patient lives in modified independent living at [facility] ([phone]). Per Eloisa, patient is largely independent with activities of daily living at  "baseline. The staff at [facility] assist with bathing once a week, assist with dressing in the AM and provide housekeeping and laundry services. Otherwise, family provides assist with medications and all other instrumental activities of daily living (IADLs). Family sets up patient's medications but he takes them independently. This information was confirmed by Sola at the [facility] ([phone]). Patient usually uses a walker for mobility but on \"bad days\", he does have a wheelchair that he uses.   Anticipated goal will be for patient to return to [facility] when back to baseline.     CM will continue to monitor progression of care, review team recommendations and provide discharge planning assist as needed.      Emily Rosa RN    "

## 2024-07-26 NOTE — PLAN OF CARE
Problem: Adult Inpatient Plan of Care  Goal: Absence of Hospital-Acquired Illness or Injury  Outcome: Progressing  Goal: Optimal Comfort and Wellbeing  Outcome: Progressing     Problem: Risk for Delirium  Goal: Optimal Coping  Outcome: Progressing   Goal Outcome Evaluation:       Patient alert, disoriented to time and situation. Denies having any pain. Administered prn tylenol for fever, which was effective. Heavy assist of 2 for mobility. Chronic james, output 700 this shift.PICC line patent. VSS.           .

## 2024-07-26 NOTE — ED NOTES
"[facility] ED Handoff Report    ED Chief Complaint: AMS    ED Diagnosis:  (A41.9) Sepsis, due to unspecified organism, unspecified whether acute organ dysfunction present (H)    (N39.0,  R31.9) Urinary tract infection with hematuria, site unspecified    (R41.82) Altered mental status, unspecified altered mental status type    PMH:    Past Medical History:   Diagnosis Date    A-fib (H)     Acute hemodialysis encounter (H24)     Due to CHIDI    Acute kidney injury (H24)     Acute respiratory failure with hypoxemia (H)     Adrenal incidentaloma (H24)     Asbestosis (H)     ATN (acute tubular necrosis) (H24)     Atrophy of right kidney     Basal cell carcinoma     BPH without urinary obstruction     Cellulitis     Left foot    Cholelithiasis     Depression with anxiety     Diabetes mellitus, type 2 (H) 4/12/2020    Esophagitis     Gastritis     Glaucoma     Hematemesis, presence of nausea not specified     Hyperkalemia     Hyperlipemia     Kidney stone     Lactic acidosis     Metabolic acidosis     Metformin overdose of undetermined intent     Paroxysmal atrial fibrillation (H) 2/18/2020    Pericardial effusion     PTSD (post-traumatic stress disorder)     Seasonal allergies     Sepsis due to urinary tract infection (H)     Shock circulatory (H)     SIRS (systemic inflammatory response syndrome) (H)     Sleep apnea     uses a machine at night.     Upper GI bleed         Code Status:  Full Code     Falls Risk: Yes Band: Applied    Current Living Situation/Residence: lives in an assisted living facility     Elimination Status: Continent: indwelling catheter     Activity Level: Total assist/lift    Patients Preferred Language:  English     Needed: No    Vital Signs:  BP (!) 158/70   Pulse 109   Temp 99.1  F (37.3  C)   Resp 27   Ht 1.905 m (6' 3\")   Wt 104.3 kg (230 lb)   SpO2 93%   BMI 28.75 kg/m       Cardiac Rhythm: NSR    Pain Score: 0/10    Is the Patient Confused:  Yes    Last Food or Drink: " 07/26/24    Focused Assessment: The pt arrives via EMS from independent living. The pts daughter noticed that the pt is obtunded and not acting like himself. Yesterday he was at the eye doctor, where he was walking and talking normally, last seen acting normally around 1430 yesterday. She noticed that the pt was leaning to the left and had a right upward gaze today. Has had periods of confusion similar to this due to a UTI. He has a right double lumen PICC line and right 18G IV. Alert to self and year. On 2.5L via nasal cannula. Olsen catheter in place, draining yellow urine.     Tests Performed: Done: Labs and Imaging    Treatments Provided: Antibiotics, PICC line, fludis    Family Dynamics/Concerns: No    Family Updated On Visitor Policy: Yes    Plan of Care Communicated to Family: Yes    Who Was Updated about Plan of Care: Daughter    Belongings Checklist Done and Signed by Patient: Yes    Covid: symptomatic, negative    RN: Gertrude Gamboa RN  7/26/2024 1:13 AM

## 2024-07-26 NOTE — PROCEDURES
[facility]    Double Lumen PICC Placement    Date/Time: 7/25/2024 11:37 PM    Performed by: Scarlett Heath RN  Authorized by: Tres Walls DO  Indications: vascular access      UNIVERSAL PROTOCOL   Site Marked: NA  Prior Images Obtained and Reviewed:  NA  Required items: Required blood products, implants, devices and special equipment available    Patient identity confirmed:  Verbally with patient, arm band and provided demographic data  NA - No sedation, light sedation, or local anesthesia  Confirmation Checklist:  Patient's identity using two indicators, relevant allergies, procedure was appropriate and matched the consent or emergent situation and correct equipment/implants were available  Time out: Immediately prior to the procedure a time out was called    Universal Protocol: the Joint Commission Universal Protocol was followed    Preparation: Patient was prepped and draped in usual sterile fashion    ESBL (mL):  2     ANESTHESIA    Anesthesia:  Local infiltration  Local Anesthetic:  Lidocaine 1% without epinephrine  Anesthetic Total (mL):  0.4      SEDATION    Patient Sedated: No        Preparation: skin prepped with 2% chlorhexidine  Skin prep agent: skin prep agent completely dried prior to procedure  Sterile barriers: maximum sterile barriers were used: cap, mask, sterile gown, sterile gloves, and large sterile sheet  Hand hygiene: hand hygiene performed prior to central venous catheter insertion  Type of line used: PICC  Catheter type: double lumen  Lumen type: valved and power PICC  Lumen Identification: Purple and Red  Catheter size: 5 Fr  Brand: Bard  Lot number: [id number]  Placement method: MST and ultrasound  Number of attempts: 1  Difficulty threading catheter: no  Successful placement: yes  Orientation: right  Catheter to Vein (%): 8  Location: basilic vein  Tip Location: SVC/RA Junction  Arm circumference: adults 10 cm  Extremity circumference: 33  Visible  catheter length: 3  Total catheter length: 44  Dressing and securement: statlock, transparent dressing and antibiotic disc placed  Post procedure assessment: blood return through all ports, free fluid flow and placement verified by x-ray  PROCEDURE   Patient Tolerance:  Patient tolerated the procedure well with no immediate complicationsDescribe Procedure: Consent per POA via telephone with ED MD. Patient gave verbal consent. Accessed right upper arm basilic on first attempt using ultrasound guidance, catheter advanced easily. Brisk blood return and flushes well both lumens. Placement confirmed by xray; read- Right PICC tip at the junction of the SVC and Right atrium. Patient tolerated procedure well. Educational material left at bedside. Primary RN aware PICC is good to use.   Disposal: sharps and needle count correct at the end of procedure, needles and guidewire disposed in sharps container

## 2024-07-26 NOTE — PHARMACY-ANTICOAGULATION SERVICE
Clinical Pharmacy - Warfarin Dosing Consult     Pharmacy has been consulted to manage this patient s warfarin therapy.  Indication: Atrial Fibrillation  Therapy Goal: INR 2-3  Provider/Team: [facility]  Warfarin Prior to Admission: Yes  Warfarin PTA Regimen: 5mg daily  Significant drug interactions: Fluvoxamine (home med) and Zosyn may increase INR and/or bleeding risk. Metformin (home med) may decrease INR  Dose Comments: Subtherapeutic INR. Continue home dose 5mg considering starting antibiotic which may increase INR. May need to increase dose if INR does not become therapeutic soon    INR   Date Value Ref Range Status   07/26/2024 1.78 (H) 0.85 - 1.15 Final     INR HOME MONITORING   Date Value Ref Range Status   05/18/2024 2.0 2.000 - 3.000 Final       Recommend warfarin 5 mg today.  Pharmacy will monitor Rakesh Samuels daily and order warfarin doses to achieve specified goal.      Please contact pharmacy as soon as possible if the warfarin needs to be held for a procedure or if the warfarin goals change.       Jany Monroe, PharmD on 7/26/2024 at 01:26 AM

## 2024-07-26 NOTE — PHARMACY-ADMISSION MEDICATION HISTORY
Pharmacist Admission Medication History    Admission medication history is complete. The information provided in this note is only as accurate as the sources available at the time of the update.    Information Source(s): Family member and CareEverywhere/SureScripts via phone    Pertinent Information: Spoke with patient's daughter Nanette    Has all his eye drops with him if needed     Taking warfarin 5mg daily     Changes made to PTA medication list:  Added: gabapentin   Deleted: atorvastatin, pioglitazone   Changed: warfarin     Allergies reviewed with patient and updates made in EHR: yes    Medication History Completed By: KVNG WREN [facility] 7/25/2024 8:11 PM    PTA Med List   Medication Sig Note Last Dose    acetaminophen (TYLENOL) 500 MG tablet [ACETAMINOPHEN (TYLENOL) 500 MG TABLET] Take 500 mg by mouth every 6 (six) hours as needed for pain.  Past Month at prn    ARIPiprazole (ABILIFY) 2 MG tablet [ARIPIPRAZOLE (ABILIFY) 2 MG TABLET] Take 2 mg by mouth at bedtime.   7/24/2024 at hs    aspirin 81 mg chewable tablet Take 81 mg by mouth daily  7/25/2024 at am    brimonidine (ALPHAGAN) 0.2 % ophthalmic solution [BRIMONIDINE (ALPHAGAN) 0.2 % OPHTHALMIC SOLUTION] Administer 1 drop to both eyes 2 (two) times a day.   7/25/2024 at am    dorzolamide-timolol (COSOPT) 2-0.5 % ophthalmic solution Place 1 drop into both eyes 2 times daily  7/25/2024 at am    fluvoxaMINE (LUVOX) 50 MG tablet [FLUVOXAMINE (LUVOX) 50 MG TABLET] Take 50 mg by mouth at bedtime.   7/24/2024 at hs    gabapentin (NEURONTIN) 300 MG capsule Take 300 mg by mouth 2 times daily  7/25/2024 at am    hydrOXYzine (ATARAX) 10 MG tablet Take 10 mg by mouth 3 times daily as needed 7/25/2024: Per Daughter supposed to start taking regularly but has not started yet  Past Month at prn    latanoprostene bunod 0.024 % Drop Place 1 drop into both eyes At Bedtime  7/24/2024 at hs    levomefolate calcium (L-METHYLFOLATE ORAL) [LEVOMEFOLATE CALCIUM (L-METHYLFOLATE ORAL)]  Take 1.25 mg by mouth daily.   7/25/2024 at am    metFORMIN (GLUCOPHAGE) 1000 MG tablet Take 1,000 mg by mouth 2 times daily (with meals)  7/25/2024 at am    Multiple Vitamins-Minerals (CENTRUM SILVER) CHEW Take 1 tablet by mouth daily  7/25/2024 at am    netarsudiL (RHOPRESSA) 0.02 % Drop Place 1 drop into both eyes every evening  7/24/2024 at pm    warfarin ANTICOAGULANT (COUMADIN) 5 MG tablet Take 5 to 7.5mg (1 to 1.5 tabs) by mouth daily OR AS DIRECTED.  Adjust dose based on INR. (Patient taking differently: Take 5 mg by mouth daily Adjust dose based on INR.)  7/24/2024 at pm

## 2024-07-26 NOTE — H&P
[facility]    History and Physical - Hospitalist Service       Date of Admission:  7/25/2024    Assessment & Plan      Rakesh Samuels is a 73 year old male admitted on 7/25/2024. He presents with altered mental status. ER workup showing e/o UTI.    UTI  Sepsis with fever, lactic acidosis, leukocytosis, ams  Lactic acidosis  Septic encephalopathy  -AMS ddx: encephalopathy, CVA, meningitis. No focal neuro deficits and ams can be explained by UTI in elderly, thus CVA unlikely.No neck pain or stiffness, low risk for meningitis.  -head ct: no acute changes. + for atrophy and chronic ischemic microvascular ischemia  -h/o AMS due to UTI  -UA + for leukocyte esterase, bacteria; UC pending  -empirically start on Zosyn  -recheck lactic  -ivf    DM 2  -check hgb A1c  -lantus and ssi  -hold metformin    H/o p a fib  -PTA warfarin  -pharmacy to dose to keep inr 2-3          Diet:  diabetic as tolerated  DVT Prophylaxis: Warfarin  Olsen Catheter: PRESENT, indication:    Lines: PRESENT      PICC 07/25/24 Double Lumen Right Basilic-Site Assessment: WDL      Cardiac Monitoring: None  Code Status:  full    Clinically Significant Risk Factors Present on Admission               # Drug Induced Coagulation Defect: home medication list includes an anticoagulant medication  # Drug Induced Platelet Defect: home medication list includes an antiplatelet medication     # Acute Respiratory Failure: Documented O2 saturation < 91%.  Continue supplemental oxygen as needed       # DMII: A1C = N/A within past 6 months               Disposition Plan     Medically Ready for Discharge: Anticipated in 2-4 Days           Kaleigh Person MD  Hospitalist Service  [facility]  Securely message with [employer]Things (more info)  Text page via [employer] Paging/Directory     ______________________________________________________________________    Chief Complaint   AMS    History is obtained from the patient and emergency  "department physician    History of Present Illness   Rakesh Samuels is a 73 year old male who presents altered mental status.     Per EMS, patient comes from independent living. His daughter had noticed that the patient is obtunded and not acting like himself. He was last seen to be acting normally yesterday around 2:30 PM. Today she noticed that the patient was leaning to the left and had a right upward gaze.     Per patient's daughter, he has been complaining of more vision problems for the past couple days so they took him to his eye doctor. Patient is almost blind in his right eye and has tunnel vision in his left eye. She says the patient has a history of confusion due to UTIs and \"almost \" from a UTI 3 years ago.         Past Medical History    Past Medical History:   Diagnosis Date    A-fib (H)     Acute hemodialysis encounter (H24)     Due to CHIDI    Acute kidney injury (H24)     Acute respiratory failure with hypoxemia (H)     Adrenal incidentaloma (H24)     Asbestosis (H)     ATN (acute tubular necrosis) (H24)     Atrophy of right kidney     Basal cell carcinoma     BPH without urinary obstruction     Cellulitis     Left foot    Cholelithiasis     Depression with anxiety     Diabetes mellitus, type 2 (H) 2020    Esophagitis     Gastritis     Glaucoma     Hematemesis, presence of nausea not specified     Hyperkalemia     Hyperlipemia     Kidney stone     Lactic acidosis     Metabolic acidosis     Metformin overdose of undetermined intent     Paroxysmal atrial fibrillation (H) 2020    Pericardial effusion     PTSD (post-traumatic stress disorder)     Seasonal allergies     Sepsis due to urinary tract infection (H)     Shock circulatory (H)     SIRS (systemic inflammatory response syndrome) (H)     Sleep apnea     uses a machine at night.     Upper GI bleed        Past Surgical History   Past Surgical History:   Procedure Laterality Date    EYE SURGERY      congenital ptosis right upper lid    HC " CYSTOSCOPY,INSERT URETERAL STENT Left 4/12/2020    Procedure: CYSTOSCOPY, WITH URETERAL STENT INSERTION;  Surgeon: Christopher Yates MD;  Location: [facility] OR;  Service: Urology    WA ESOPHAGOGASTRODUODENOSCOPY TRANSORAL DIAGNOSTIC N/A 4/13/2020    Procedure: ESOPHAGOGASTRODUODENOSCOPY (EGD);  Surgeon: Robert Rico MD;  Location: [facility] GI;  Service: Gastroenterology    REPLACEMENT TOTAL KNEE Left        Prior to Admission Medications   Prior to Admission Medications   Prescriptions Last Dose Informant Patient Reported? Taking?   ARIPiprazole (ABILIFY) 2 MG tablet 7/24/2024 at hs  Yes Yes   Sig: [ARIPIPRAZOLE (ABILIFY) 2 MG TABLET] Take 2 mg by mouth at bedtime.    Multiple Vitamins-Minerals (CENTRUM SILVER) CHEW 7/25/2024 at am  Yes Yes   Sig: Take 1 tablet by mouth daily   acetaminophen (TYLENOL) 500 MG tablet Past Month at prn  Yes Yes   Sig: [ACETAMINOPHEN (TYLENOL) 500 MG TABLET] Take 500 mg by mouth every 6 (six) hours as needed for pain.   aspirin 81 mg chewable tablet 7/25/2024 at am  Yes Yes   Sig: Take 81 mg by mouth daily   brimonidine (ALPHAGAN) 0.2 % ophthalmic solution 7/25/2024 at am  Yes Yes   Sig: [BRIMONIDINE (ALPHAGAN) 0.2 % OPHTHALMIC SOLUTION] Administer 1 drop to both eyes 2 (two) times a day.    dorzolamide-timolol (COSOPT) 2-0.5 % ophthalmic solution 7/25/2024 at am  Yes Yes   Sig: Place 1 drop into both eyes 2 times daily   fluvoxaMINE (LUVOX) 50 MG tablet 7/24/2024 at hs  Yes Yes   Sig: [FLUVOXAMINE (LUVOX) 50 MG TABLET] Take 50 mg by mouth at bedtime.    gabapentin (NEURONTIN) 300 MG capsule 7/25/2024 at am  Yes Yes   Sig: Take 300 mg by mouth 2 times daily   hydrOXYzine (ATARAX) 10 MG tablet Past Month at prn  Yes Yes   Sig: Take 10 mg by mouth 3 times daily as needed   latanoprostene bunod 0.024 % Drop 7/24/2024 at hs  Yes Yes   Sig: Place 1 drop into both eyes At Bedtime   levomefolate calcium (L-METHYLFOLATE ORAL) 7/25/2024 at am  Yes Yes   Sig: [LEVOMEFOLATE CALCIUM  (L-METHYLFOLATE ORAL)] Take 1.25 mg by mouth daily.    metFORMIN (GLUCOPHAGE) 1000 MG tablet 7/25/2024 at am  Yes Yes   Sig: Take 1,000 mg by mouth 2 times daily (with meals)   netarsudiL (RHOPRESSA) 0.02 % Drop 7/24/2024 at pm  Yes Yes   Sig: Place 1 drop into both eyes every evening   warfarin ANTICOAGULANT (COUMADIN) 5 MG tablet 7/24/2024 at pm  No Yes   Sig: Take 5 to 7.5mg (1 to 1.5 tabs) by mouth daily OR AS DIRECTED.  Adjust dose based on INR.   Patient taking differently: Take 5 mg by mouth daily Adjust dose based on INR.      Facility-Administered Medications: None        Allergies   No Known Allergies     Physical Exam   Vital Signs: Temp: 99.1  F (37.3  C) Temp src: Oral BP: (!) 154/72 Pulse: 108   Resp: 27 SpO2: 92 % O2 Device: Nasal cannula Oxygen Delivery: 2.5 LPM  Weight: 0 lbs 0 oz    General.  Confused and disoriented, acutely ill  HEENT.   anicteric, EOM intact.   Neck supple no JVD.  CVS regular rhythm no murmur gallops.tachy  Lungs.  Clear to auscultation bilateral no wheezing or rales.  Abdomen.  Soft nontender bowel sounds present.  Extremities.  No edema no calf tenderness.  Neurological.  No focal deficit.  Skin no rash. No pallor.  Psych. flat mood.      Medical Decision Making       78 MINUTES SPENT BY ME on the date of service doing chart review, history, exam, documentation & further activities per the note.      Data     I have personally reviewed the following data over the past 24 hrs:    17.0 (H)  \   13.9   / 255     140 103 17.1 /  234 (H)   4.7 25 1.00 \     ALT: 12 AST: 11 AP: 90 TBILI: 0.7   ALB: 4.0 TOT PROTEIN: 7.1 LIPASE: N/A     Trop: 18 BNP: N/A     Procal: N/A CRP: N/A Lactic Acid: 2.4 (H)         Imaging results reviewed over the past 24 hrs:   Recent Results (from the past 24 hour(s))   Head CT w/o contrast    Narrative    EXAM: CT HEAD W/O CONTRAST  LOCATION: [facility]  DATE: 7/25/2024    INDICATION: Altered mental status.  Confusion.  COMPARISON: 8/1/2023  TECHNIQUE: Routine CT Head without IV contrast. Multiplanar reformats. Dose reduction techniques were used.    FINDINGS:  INTRACRANIAL CONTENTS: No intracranial hemorrhage, extraaxial collection, or mass effect.  No CT evidence of acute infarct. Mild to moderate presumed chronic small vessel ischemic changes. Mild to moderate generalized volume loss. No hydrocephalus.     VISUALIZED ORBITS/SINUSES/MASTOIDS: Prior bilateral cataract surgery. Visualized portions of the orbits are otherwise unremarkable. Stable implanted device along the right globe. No paranasal sinus mucosal disease. No middle ear or mastoid effusion.    BONES/SOFT TISSUES: No acute abnormality.      Impression    IMPRESSION:  1.  No CT evidence for acute intracranial process.  2.  Brain atrophy and presumed chronic microvascular ischemic changes as above.   XR Chest Port 1 View    Narrative    EXAM: XR CHEST PORT 1 VIEW  LOCATION: [facility]  DATE: 7/25/2024    INDICATION: PICC placement  COMPARISON: None.      Impression    IMPRESSION: The heart is mildly enlarged. A right PICC is seen in place with the tip in the atrial caval junction. Subsegmental atelectasis seen in the left and less so right lung base.   XR Chest Port 1 View    Narrative    EXAM: XR CHEST PORT 1 VIEW  LOCATION: [facility]  DATE: 7/25/2024    INDICATION: PICC reposition  COMPARISON: Earlier today at 2256 hours.      Impression    IMPRESSION: Right PICC line now in good position with its tip junction of SVC and right atrium.  Heart size likely upper limits of normal. Lungs clear.

## 2024-07-27 ENCOUNTER — APPOINTMENT (OUTPATIENT)
Dept: SPEECH THERAPY | Facility: HOSPITAL | Age: 74
DRG: 871 | End: 2024-07-27
Attending: STUDENT IN AN ORGANIZED HEALTH CARE EDUCATION/TRAINING PROGRAM
Payer: MEDICARE

## 2024-07-27 ENCOUNTER — APPOINTMENT (OUTPATIENT)
Dept: PHYSICAL THERAPY | Facility: HOSPITAL | Age: 74
DRG: 871 | End: 2024-07-27
Attending: STUDENT IN AN ORGANIZED HEALTH CARE EDUCATION/TRAINING PROGRAM
Payer: MEDICARE

## 2024-07-27 ENCOUNTER — APPOINTMENT (OUTPATIENT)
Dept: OCCUPATIONAL THERAPY | Facility: HOSPITAL | Age: 74
DRG: 871 | End: 2024-07-27
Attending: STUDENT IN AN ORGANIZED HEALTH CARE EDUCATION/TRAINING PROGRAM
Payer: MEDICARE

## 2024-07-27 LAB
BACTERIA UR CULT: ABNORMAL
GLUCOSE BLDC GLUCOMTR-MCNC: 139 MG/DL (ref 70–99)
GLUCOSE BLDC GLUCOMTR-MCNC: 150 MG/DL (ref 70–99)
GLUCOSE BLDC GLUCOMTR-MCNC: 177 MG/DL (ref 70–99)
GLUCOSE BLDC GLUCOMTR-MCNC: 188 MG/DL (ref 70–99)
GLUCOSE BLDC GLUCOMTR-MCNC: 230 MG/DL (ref 70–99)
INR PPP: 2.65 (ref 0.85–1.15)

## 2024-07-27 PROCEDURE — 97162 PT EVAL MOD COMPLEX 30 MIN: CPT | Mod: GP | Performed by: PHYSICAL THERAPIST

## 2024-07-27 PROCEDURE — 999N000157 HC STATISTIC RCP TIME EA 10 MIN

## 2024-07-27 PROCEDURE — 258N000003 HC RX IP 258 OP 636: Performed by: STUDENT IN AN ORGANIZED HEALTH CARE EDUCATION/TRAINING PROGRAM

## 2024-07-27 PROCEDURE — 97165 OT EVAL LOW COMPLEX 30 MIN: CPT | Mod: GO

## 2024-07-27 PROCEDURE — 85610 PROTHROMBIN TIME: CPT | Performed by: INTERNAL MEDICINE

## 2024-07-27 PROCEDURE — 250N000011 HC RX IP 250 OP 636: Performed by: STUDENT IN AN ORGANIZED HEALTH CARE EDUCATION/TRAINING PROGRAM

## 2024-07-27 PROCEDURE — 92526 ORAL FUNCTION THERAPY: CPT | Mod: GN | Performed by: SPEECH-LANGUAGE PATHOLOGIST

## 2024-07-27 PROCEDURE — 97530 THERAPEUTIC ACTIVITIES: CPT | Mod: GP | Performed by: PHYSICAL THERAPIST

## 2024-07-27 PROCEDURE — 120N000001 HC R&B MED SURG/OB

## 2024-07-27 PROCEDURE — 250N000013 HC RX MED GY IP 250 OP 250 PS 637: Performed by: INTERNAL MEDICINE

## 2024-07-27 PROCEDURE — 250N000013 HC RX MED GY IP 250 OP 250 PS 637: Performed by: STUDENT IN AN ORGANIZED HEALTH CARE EDUCATION/TRAINING PROGRAM

## 2024-07-27 PROCEDURE — 97535 SELF CARE MNGMENT TRAINING: CPT | Mod: GO

## 2024-07-27 PROCEDURE — 92610 EVALUATE SWALLOWING FUNCTION: CPT | Mod: GN | Performed by: SPEECH-LANGUAGE PATHOLOGIST

## 2024-07-27 PROCEDURE — 250N000011 HC RX IP 250 OP 636: Performed by: INTERNAL MEDICINE

## 2024-07-27 PROCEDURE — 99233 SBSQ HOSP IP/OBS HIGH 50: CPT | Performed by: STUDENT IN AN ORGANIZED HEALTH CARE EDUCATION/TRAINING PROGRAM

## 2024-07-27 RX ORDER — AMPICILLIN 2 G/1
2 INJECTION, POWDER, FOR SOLUTION INTRAVENOUS EVERY 6 HOURS
Status: DISCONTINUED | OUTPATIENT
Start: 2024-07-27 | End: 2024-07-29

## 2024-07-27 RX ORDER — SODIUM CHLORIDE 9 MG/ML
INJECTION, SOLUTION INTRAVENOUS CONTINUOUS
Status: ACTIVE | OUTPATIENT
Start: 2024-07-27 | End: 2024-07-28

## 2024-07-27 RX ADMIN — FLUVOXAMINE MALEATE 50 MG: 50 TABLET ORAL at 19:45

## 2024-07-27 RX ADMIN — ARIPIPRAZOLE 2 MG: 2 TABLET ORAL at 19:45

## 2024-07-27 RX ADMIN — DORZOLAMIDE HYDROCHLORIDE AND TIMOLOL MALEATE 1 DROP: 20; 5 SOLUTION/ DROPS OPHTHALMIC at 09:18

## 2024-07-27 RX ADMIN — ACETAMINOPHEN 650 MG: 325 TABLET ORAL at 01:44

## 2024-07-27 RX ADMIN — BRIMONIDINE TARTRATE 1 DROP: 2 SOLUTION/ DROPS OPHTHALMIC at 19:47

## 2024-07-27 RX ADMIN — SODIUM CHLORIDE: 9 INJECTION, SOLUTION INTRAVENOUS at 05:22

## 2024-07-27 RX ADMIN — INSULIN ASPART 1 UNITS: 100 INJECTION, SOLUTION INTRAVENOUS; SUBCUTANEOUS at 21:17

## 2024-07-27 RX ADMIN — GABAPENTIN 300 MG: 300 CAPSULE ORAL at 19:45

## 2024-07-27 RX ADMIN — BRIMONIDINE TARTRATE 1 DROP: 2 SOLUTION/ DROPS OPHTHALMIC at 09:19

## 2024-07-27 RX ADMIN — ACETAMINOPHEN 650 MG: 325 TABLET ORAL at 19:51

## 2024-07-27 RX ADMIN — GABAPENTIN 300 MG: 300 CAPSULE ORAL at 09:18

## 2024-07-27 RX ADMIN — AMPICILLIN SODIUM 2 G: 2 INJECTION, POWDER, FOR SOLUTION INTRAMUSCULAR; INTRAVENOUS at 18:15

## 2024-07-27 RX ADMIN — SODIUM CHLORIDE, PRESERVATIVE FREE: 5 INJECTION INTRAVENOUS at 16:24

## 2024-07-27 RX ADMIN — PIPERACILLIN AND TAZOBACTAM 3.38 G: 3; .375 INJECTION, POWDER, FOR SOLUTION INTRAVENOUS at 01:40

## 2024-07-27 RX ADMIN — DORZOLAMIDE HYDROCHLORIDE AND TIMOLOL MALEATE 1 DROP: 20; 5 SOLUTION/ DROPS OPHTHALMIC at 19:46

## 2024-07-27 RX ADMIN — CALCIUM CARBONATE (ANTACID) CHEW TAB 500 MG 1000 MG: 500 CHEW TAB at 19:51

## 2024-07-27 RX ADMIN — AMPICILLIN SODIUM 2 G: 2 INJECTION, POWDER, FOR SOLUTION INTRAMUSCULAR; INTRAVENOUS at 11:29

## 2024-07-27 RX ADMIN — INSULIN GLARGINE 5 UNITS: 100 INJECTION, SOLUTION SUBCUTANEOUS at 21:17

## 2024-07-27 ASSESSMENT — ACTIVITIES OF DAILY LIVING (ADL)
ADLS_ACUITY_SCORE: 56
ADLS_ACUITY_SCORE: 60
ADLS_ACUITY_SCORE: 56
ADLS_ACUITY_SCORE: 58
ADLS_ACUITY_SCORE: 56
ADLS_ACUITY_SCORE: 56
ADLS_ACUITY_SCORE: 57
ADLS_ACUITY_SCORE: 60
ADLS_ACUITY_SCORE: 56
ADLS_ACUITY_SCORE: 57
ADLS_ACUITY_SCORE: 57
ADLS_ACUITY_SCORE: 56
ADLS_ACUITY_SCORE: 56
ADLS_ACUITY_SCORE: 57
ADLS_ACUITY_SCORE: 57
ADLS_ACUITY_SCORE: 58
ADLS_ACUITY_SCORE: 56

## 2024-07-27 NOTE — PROGRESS NOTES
"Speech-Language Pathology: Clinical Swallow Evaluation     07/27/24 0900   Appointment Info   Signing Clinician's Name / Credentials (SLP) Salina Sweeney MA, CCC-SLP   General Information   Onset of Illness/Injury or Date of Surgery 07/25/24   Referring Physician Alejandro Urban MD   Pertinent History of Current Problem Per H&P \"UTI  Sepsis with fever, lactic acidosis, leukocytosis\" - pt admitted for altered mental status, noted at home by daughter.   General Observations Alert and cooperative   Type of Evaluation   Type of Evaluation Swallow Evaluation   Oral Motor   Oral Musculature generally intact   Mucosal Quality good   Dentition (Oral Motor)   Dentition (Oral Motor) adequate dentition   Facial Symmetry (Oral Motor)   Facial Symmetry (Oral Motor) WNL   Lip Function (Oral Motor)   Lip Range of Motion (Oral Motor) WNL   Lip Strength (Oral Motor) WNL   Tongue Function (Oral Motor)   Tongue Strength (Oral Motor) WNL   Tongue Coordination/Speed (Oral Motor) WNL   Tongue ROM (Oral Motor) WNL   Jaw Function (Oral Motor)   Jaw Function (Oral Motor) WNL   Cough/Swallow/Gag Reflex (Oral Motor)   Soft Palate/Velum (Oral Motor) WNL   Volitional Throat Clear/Cough (Oral Motor) WNL   Volitional Swallow (Oral Motor) WNL   Vocal Quality/Secretion Management (Oral Motor)   Vocal Quality (Oral Motor) WNL   Secretion Management (Oral Motor) WNL   General Swallowing Observations   Past History of Dysphagia None per EMR, RN, or patient report.   Respiratory Support room air   Current Diet/Method of Nutritional Intake (General Swallowing Observations, NIS) regular diet;thin liquids (level 0)   Swallowing Evaluation Clinical swallow evaluation   Clinical Swallow Evaluation   Clinical Swallow Evaluation Textures Trialed thin liquids;solid foods   Clinical Swallow Eval: Thin Liquid Texture Trial   Mode of Presentation, Thin Liquids cup;straw   Volume of Liquid or Food Presented 6 oz.   Oral Phase of Swallow WFL   Pharyngeal " Phase of Swallow intact   Diagnostic Statement No overt s/s aspiration; audible swallow with consecutive swallow by straw   Clinical Swallow Evaluation: Solid Food Texture Trial   Mode of Presentation fed by clinician;other (see comments)  (Primarily for visual deficits)   Volume Presented about 1/3 of breakfast tray   Oral Phase WFL   Pharyngeal Phase intact   Diagnostic Statement No overt s/s aspiration   Esophageal Phase of Swallow   Patient reports or presents with symptoms of esophageal dysphagia No   Swallowing Recommendations   Diet Consistency Recommendations regular diet;thin liquids (level 0)   Recommended Feeding/Eating Techniques (Swallow Eval) maintain upright sitting position for eating;other (see comments)  (encourage small sips)   Medication Administration Recommendations, Swallowing (SLP) Per patient preference   Instrumental Assessment Recommendations instrumental evaluation not recommended at this time   Comment, Swallowing Recommendations Patient appears to be at low risk for aspiration when eating/drinking.   General Therapy Interventions   Planned Therapy Interventions Dysphagia Treatment   Clinical Impression   Criteria for Skilled Therapeutic Interventions Met (SLP Eval) Yes, treatment indicated   Risks & Benefits of therapy have been explained evaluation/treatment results reviewed;care plan/treatment goals reviewed;participants voiced agreement with care plan;participants included;patient   Clinical Impression Comments Patient participated in Clinical Swallow Evaluation. No s/s aspiration with any intake. Oral motor was WFL. Mastication was adequate. Hyolaryngeal movement was present. Recommend diet listed above. No anticipated SLP needs at this time. Please contact SLP with any questions or concerns.   SLP Total Evaluation Time   Eval: oral/pharyngeal swallow function, clinical swallow Minutes (50948) 20   SLP Goals   Therapy Frequency (SLP Eval) one time eval and treatment only   SLP  Predicted Duration/Target Date for Goal Attainment 07/27/24   SLP Goals Swallow   SLP: Safely tolerate diet without signs/symptoms of aspiration Regular diet;Thin liquids;With use of swallow precautions   Interventions   Interventions Quick Adds Swallowing Dysfunction   Swallowing Dysfunction &/or Oral Function for Feeding   Treatment of Swallowing Dysfunction &/or Oral Function for Feeding Minutes (99941) 8   Treatment Detail/Skilled Intervention Educated patient on swallow anatomy/physiology and rationale for recommendation for smaller sips. Trained small sip strategy by straw; pt required cues for attempts 1, 2, but then demonstrated ability to perform independently.   SLP Discharge Planning   SLP Plan Discharge from speech tx - no further needs   SLP Discharge Recommendation other (see comments)  (No barriers to previous living situation from swallow standpoint)   SLP Rationale for DC Rec No anticipated SLP needs at this time.   SLP Brief overview of current status  Recommend regular textures and thin liquids. No anticipated SLP needs at this time. Please contact SLP with any questions or concerns.   Total Session Time   Total Session Time (sum of timed and untimed services) 22

## 2024-07-27 NOTE — PLAN OF CARE
Goal Outcome Evaluation:    Patient arrived via cart to P2 at around 1442.  Intermittently confused to situation and time.  Very pleasant man.  Temp was 102.8; updated MD; IVF started.  Zosyn continued.   Turned and rubbed every 2hrs.   Assist of 2 and a lift.   Will need PT/OT ordered tomorrow.

## 2024-07-27 NOTE — PROGRESS NOTES
"   07/27/24 1054   Appointment Info   Signing Clinician's Name / Credentials (PT) Nichol Joya, PT, DPT   Living Environment   People in Home alone   Current Living Arrangements independent living facility   Home Accessibility no concerns   Self-Care   Equipment Currently Used at Home walker, rolling   Fall history within last six months yes   Number of times patient has fallen within last six months 3   Activity/Exercise/Self-Care Comment Patient independent with ADLs and medication management. Patient receives assist with meals, cleaning.   General Information   Onset of Illness/Injury or Date of Surgery 07/25/24   Referring Physician Alejandro Urban MD   Patient/Family Therapy Goals Statement (PT) None stated.   Pertinent History of Current Problem (include personal factors and/or comorbidities that impact the POC) Per H&P: \"73 year old male admitted on 7/25/2024. He presents with altered mental status. ER workup showing e/o UTI.\"   Existing Precautions/Restrictions fall   General Observations Pt notes he is legally blind.   Range of Motion (ROM)   Range of Motion ROM deficits secondary to weakness   Strength (Manual Muscle Testing)   Strength (Manual Muscle Testing) Deficits observed during functional mobility   Bed Mobility   Bed Mobility rolling left   Rolling Left [place] (Bed Mobility) maximum assist (25% patient effort)   Impairments Contributing to Impaired Bed Mobility decreased strength   Assistive Device (Bed Mobility) bed rails   Transfers   Transfers sit-stand transfer   Transfer Safety Concerns Noted decreased weight-shifting ability   Impairments Contributing to Impaired Transfers impaired balance;decreased strength   Sit-Stand Transfer   Sit-Stand [place] (Transfers) moderate assist (50% patient effort);verbal cues   Assistive Device (Sit-Stand Transfers) walker, front-wheeled   Gait/Stairs (Locomotion)   [place] Level (Gait) moderate assist (50% patient effort);verbal " cues   Assistive Device (Gait) walker, front-wheeled   Distance in Feet (Gait) 3' bed > Chair   Pattern (Gait) step-to   Deviations/Abnormal Patterns (Gait) ashleigh decreased;gait speed decreased   Clinical Impression   Criteria for Skilled Therapeutic Intervention Yes, treatment indicated   PT Diagnosis (PT) impaired functional mobility   Influenced by the following impairments decreased strength, impaired balance   Functional limitations due to impairments transfers, ambulation   Clinical Presentation (PT Evaluation Complexity) evolving   Clinical Presentation Rationale Clinical judgment.   Clinical Decision Making (Complexity) moderate complexity   Planned Therapy Interventions (PT) balance training;bed mobility training;gait training;home exercise program;neuromuscular re-education;patient/family education;strengthening;transfer training   Risk & Benefits of therapy have been explained evaluation/treatment results reviewed;participants voiced agreement with care plan;participants included;patient   PT Total Evaluation Time   PT Eval, Moderate Complexity Minutes (17014) 10   Physical Therapy Goals   PT Frequency 5x/week   PT Predicted Duration/Target Date for Goal Attainment 08/03/24   PT Goals Bed Mobility;Transfers;Gait   PT: Bed Mobility Minimal assist;Supine to/from sit   PT: Transfers Minimal assist;Sit to/from stand;Assistive device   PT: Gait Minimal assist;Assistive device;Rolling walker;100 feet   Interventions   Interventions Quick Adds Therapeutic Activity   Therapeutic Activity   Therapeutic Activities: dynamic activities to improve functional performance Minutes (30196) 10   Symptoms Noted During/After Treatment Fatigue   Treatment Detail/Skilled Intervention Patient completes additional sit <> stand from chair with mod assist of 1. Requires max assist to don and pull up brief. Education and cues for techniques to improve ease of sit <> stand transfers including anterior weight shift, increased knee  flexion, and forward rocking. Patient seated in chair at end of session, call light in reach.   PT Discharge Planning   PT Plan transfers, progress to gait with chair follow, LE strengthening   PT Discharge Recommendation (DC Rec) Transitional Care Facility   PT Rationale for DC Rec Patient requiring hands on assist of 1 for all mobility. Pt lives alone at baseline. Recommend TCU at discharge.   PT Brief overview of current status Supine <> sit, mod assist of 1. Sit <> stand, pivot transfer, mod assist of 1.   Total Session Time   Timed Code Treatment Minutes 10   Total Session Time (sum of timed and untimed services) 20

## 2024-07-27 NOTE — PROGRESS NOTES
[facility]    Medicine Progress Note - Hospitalist Service    Date of Admission:  7/25/2024    Assessment & Plan   Rakesh Samuels is a 73 year old male admitted on 7/25/2024. He presents with altered mental status. ER workup showing e/o UTI.     UTI  Sepsis with fever, lactic acidosis, leukocytosis, ams  Lactic acidosis  Septic encephalopathy  Recurrent falls  Enterococcal bacteremia  -AMS ddx: encephalopathy, CVA, meningitis. No focal neuro deficits and ams can be explained by UTI in elderly, thus CVA unlikely.No neck pain or stiffness, low risk for meningitis.  -head ct: no acute changes. + for atrophy and chronic ischemic microvascular ischemia  -h/o AMS due to UTI  -UA + for leukocyte esterase, bacteria; urine culture and blood culture growing Enterococcus  -Change antibiotics to ampicillin  -lactic acid trending down  -Preliminary blood culture resulted with gram-positive cocci in pairs and chains.  -MRSA: -ve   -CT abdomen:  Patchy subtle areas of decreased cortical enhancement within the right kidney, which could either reflect pyelonephritis or perfusional changes related to chronic right renal atrophy and multifocal scars. Correlate with clinical symptoms. No renal abscess.       DM 2  -Hgb A1c: 6.8  -Lantus and ssi  -Hold metformin     H/o p a fib  -PTA warfarin  -pharmacy to dose to keep inr 2-3    Acute urinary retention  -Olsen catheter inserted in the ER  -Plan for voiding trial prior to discharge    Generalized weakness/deconditioning  -PT/OT           Diet: Advance Diet as Tolerated: Regular Diet Adult; Moderate Consistent Carb (60 g CHO per Meal) Diet    DVT Prophylaxis: Warfarin  Olsen Catheter: PRESENT, indication: Other (Comment) (chronic)  Lines: PRESENT      PICC 07/25/24 Double Lumen Right Basilic-Site Assessment: WDL      Cardiac Monitoring: None  Code Status: No CPR- Do NOT Intubate      Clinically Significant Risk Factors                            # DMII: A1C = 6.8  "% (Ref range: <5.7 %) within past 6 months, PRESENT ON ADMISSION  # Overweight: Estimated body mass index is 27.34 kg/m  as calculated from the following:    Height as of this encounter: 1.903 m (6' 2.92\").    Weight as of this encounter: 99 kg (218 lb 4.1 oz)., PRESENT ON ADMISSION     # Financial/Environmental Concerns: none         Disposition Plan     Medically Ready for Discharge: Anticipated in 2-4 Days             Alejandro Urban MD  Hospitalist Service  [facility]  Securely message with [employer] (more info)  Text page via [facility] Paging/Directory   ______________________________________________________________________    Interval History   Patient states he feels relatively better now.  He appears to be more alert and conversant.  Management plan discussed with the patient and he expressed understanding.    Physical Exam   Vital Signs: Temp: 99.2  F (37.3  C) Temp src: Oral BP: 130/58 Pulse: 68   Resp: 18 SpO2: 92 % O2 Device: None (Room air) Oxygen Delivery: 2 LPM  Weight: 218 lbs 4.09 oz    General Appearance:  Chronically sick looking, appears confused  Respiratory: Good air entry bilaterally  Cardiovascular: S1 and S2 are heard, no murmur or gallop  GI: Soft abdomen, no tenderness, normoactive bowel sounds, Olsen catheter in place  Skin: Intact and warm       Medical Decision Making       50 MINUTES SPENT BY ME on the date of service doing chart review, history, exam, documentation & further activities per the note.      Data     "

## 2024-07-27 NOTE — PROGRESS NOTES
"   07/27/24 1400   Appointment Info   Signing Clinician's Name / Credentials (OT) Casandra Elizalderyan, OTR/L   Living Environment   People in Home alone   Current Living Arrangements independent living facility   Home Accessibility no concerns   Self-Care   Equipment Currently Used at Home walker, rolling   Fall history within last six months yes   Number of times patient has fallen within last six months 3   Activity/Exercise/Self-Care Comment Pt states he is mostly independent with ADLs, but does state staff can help him if he calls. Per chart review, pt's staff helps with bathing and AM dressing.   Instrumental Activities of Daily Living (IADL)   IADL Comments Pt states he has assistance with IADLs through the facility he lives in, family also assists with med set up.   General Information   Onset of Illness/Injury or Date of Surgery 07/25/24   Referring Physician Alejandro Urban MD   Patient/Family Therapy Goal Statement (OT) none stated   Additional Occupational Profile Info/Pertinent History of Current Problem Per chart review, pt \"is a 73 year old male admitted on 7/25/2024. He presents with altered mental status. ER workup showing e/o UTI.\"   Existing Precautions/Restrictions fall   Limitations/Impairments safety/cognitive;visual   Cognitive Status Examination   Orientation Status orientation to person, place and time  (A&Ox4)   Visual Perception   Impact of Vision Impairment on Function (Vision) visually impaired at baseline, can see light and shapes but poorly per patient report   Pain Assessment   Patient Currently in Pain No   Range of Motion Comprehensive   General Range of Motion no range of motion deficits identified   Strength Comprehensive (MMT)   Comment, General Manual Muscle Testing (MMT) Assessment Generalized BUE weakness noted functionally.   Bed Mobility   Bed Mobility supine-sit   Supine-Sit Bell (Bed Mobility) maximum assist (25% patient effort)   Assistive Device (Bed Mobility) bed " rails;draw sheet   Transfers   Transfers sit-stand transfer;toilet transfer;bed-chair transfer   Transfer Skill: Bed to Chair/Chair to Bed   Bed-Chair Griggs (Transfers) minimum assist (75% patient effort)   Sit-Stand Transfer   Sit-Stand [place]gs (Transfers) moderate assist (50% patient effort)   Assistive Device (Sit-Stand Transfers) walker, front-wheeled   Toilet Transfer   Griggs Level (Toilet Transfer) not tested   Toilet Transfer Comments Per clinical reasoning, anticipate pt would require Ax1-2 for pivot transfer to [facility] for toileting.   Balance   Balance Comments Min A for static standing balance and taking steps   Activities of Daily Living   BADL Assessment/Intervention lower body dressing;grooming   Lower Body Dressing Assessment/Training   Comment, (Lower Body Dressing) Per clinical reasoning, anticipate pt is dependent for lower body dressing at this time d/t weakness.   Griggs Level (Lower Body Dressing) not tested   Grooming Assessment/Training   Griggs Level (Grooming) not tested   Comment, (Grooming) Per clinical reasoning, anticipate pt's ability to perform G/H will be impacted by weakness and visual impairments.   Clinical Impression   Criteria for Skilled Therapeutic Interventions Met (OT) Yes, treatment indicated   OT Diagnosis Impaired ability to perform ADLs and functional mobility.   Influenced by the following impairments UTI   OT Problem List-Impairments impacting ADL problems related to;activity tolerance impaired;balance;cognition;motor control;strength;vision   Assessment of Occupational Performance 1-3 Performance Deficits   Identified Performance Deficits lower body dressing, grooming/hygiene, toileting   Planned Therapy Interventions (OT) ADL retraining;balance training;bed mobility training;cognition;strengthening;transfer training;home program guidelines;progressive activity/exercise;risk factor education   Clinical Decision Making Complexity (OT) problem  focused assessment/low complexity   Risk & Benefits of therapy have been explained evaluation/treatment results reviewed;care plan/treatment goals reviewed;risks/benefits reviewed;current/potential barriers reviewed;participants voiced agreement with care plan;participants included;patient   OT Total Evaluation Time   OT Eval, Low Complexity Minutes (23330) 15   OT Goals   Therapy Frequency (OT) 5 times/week   OT Predicted Duration/Target Date for Goal Attainment 08/02/24   OT Goals Hygiene/Grooming;Lower Body Dressing;Toilet Transfer/Toileting   OT: Hygiene/Grooming supervision/stand-by assist;using adaptive equipment;while standing   OT: Lower Body Dressing Minimal assist;using adaptive equipment   OT: Toilet Transfer/Toileting Minimal assist;toilet transfer;cleaning and garment management;using adaptive equipment;within precautions   Self-Care/Home Management   Self-Care/Home Mgmt/ADL, Compensatory, Meal Prep Minutes (50494) 8   Symptoms Noted During/After Treatment (Meal Preparation/Planning Training) fatigue   Treatment Detail/Skilled Intervention Pt tolerated unsupported sitting at EOB with CGA. Noted to have L lateral leaning, tactile and verbal cues needed to correct to midline. Pt engaged in static standing with Min A using FWW for ~30 sec, no LOB. Extended time to take steps ~1 foot in distance, required max cueing and Min A, additional assistance managing lines/furniture. Pt very fatigued following. Use of pillows to support trunk and UEs in chair. Pt left in recliner at end of session with chair alarm on and call light in reach.   OT Discharge Planning   OT Plan close transfers, toileting on commode, standing tolerance, G/H at EOB   OT Discharge Recommendation (DC Rec) Transitional Care Facility   OT Rationale for DC Rec Patient requires hands-on assistance of 1-2 people for all ADLs and IADLs, does not have this level of assistance available at [facility].   OT Brief overview of current status Max A bed  mobility, Min A close transfer   Total Session Time   Timed Code Treatment Minutes 8   Total Session Time (sum of timed and untimed services) 23

## 2024-07-27 NOTE — PLAN OF CARE
"Goal Outcome Evaluation:      Plan of Care Reviewed With: patient    Overall Patient Progress: improvingOverall Patient Progress: improving    Outcome Evaluation: No fever over night. Pt denied any discomfort. IVF's running per order and IV antibiotics administered. He slept throughout the night; uneventful.    BP (!) 140/68 (BP Location: Left arm)   Pulse 90   Temp (!) 102.4  F (39.1  C) (Oral)   Resp 18   Ht 1.903 m (6' 2.92\")   Wt 99 kg (218 lb 4.1 oz)   SpO2 94%   BMI 27.34 kg/m      Problem: Adult Inpatient Plan of Care  Goal: Absence of Hospital-Acquired Illness or Injury  Intervention: Identify and Manage Fall Risk  Recent Flowsheet Documentation  Taken 7/27/2024 0144 by Aletha Mcmullen, RN  Safety Promotion/Fall Prevention:   activity supervised   clutter free environment maintained   room door open     Problem: Adult Inpatient Plan of Care  Goal: Absence of Hospital-Acquired Illness or Injury  Intervention: Prevent Skin Injury  Recent Flowsheet Documentation  Taken 7/27/2024 0144 by Aletha Mcmullen, RN  Body Position:   weight shifting   upper extremity elevated     " Addended by: SOLOMON STAHL on: 7/9/2019 08:58 AM     Modules accepted: Orders

## 2024-07-27 NOTE — PROVIDER NOTIFICATION
RC Note:     Patient's home CPAP setup, 2L oxygen bled in. H2O filled to the max line. Patient placed on tolerating well .       07/26/24 2200   Tech Time   $Tech Time (10 minute increments) 1   Mode: CPAP/ BiPAP/ AVAPS/ AVAPS AE   CPAP/BiPAP/ AVAPS/ AVAPS AE Mode CPAP   Equipment   Device Home Cpap   CPAP/BiPAP/Settings   BIPAP/CPAP On Standby On   IPAP/EPAP (cmH2O) Home settings.   O2 Flow Rate (L/min) 2

## 2024-07-28 ENCOUNTER — APPOINTMENT (OUTPATIENT)
Dept: PHYSICAL THERAPY | Facility: HOSPITAL | Age: 74
DRG: 871 | End: 2024-07-28
Payer: MEDICARE

## 2024-07-28 LAB
ANION GAP SERPL CALCULATED.3IONS-SCNC: 10 MMOL/L (ref 7–15)
BACTERIA BLD CULT: ABNORMAL
BACTERIA BLD CULT: ABNORMAL
BUN SERPL-MCNC: 11.5 MG/DL (ref 8–23)
CALCIUM SERPL-MCNC: 7.4 MG/DL (ref 8.8–10.4)
CHLORIDE SERPL-SCNC: 111 MMOL/L (ref 98–107)
CREAT SERPL-MCNC: 0.73 MG/DL (ref 0.67–1.17)
EGFRCR SERPLBLD CKD-EPI 2021: >90 ML/MIN/1.73M2
ERYTHROCYTE [DISTWIDTH] IN BLOOD BY AUTOMATED COUNT: 13.1 % (ref 10–15)
GLUCOSE BLDC GLUCOMTR-MCNC: 156 MG/DL (ref 70–99)
GLUCOSE BLDC GLUCOMTR-MCNC: 159 MG/DL (ref 70–99)
GLUCOSE BLDC GLUCOMTR-MCNC: 176 MG/DL (ref 70–99)
GLUCOSE BLDC GLUCOMTR-MCNC: 238 MG/DL (ref 70–99)
GLUCOSE SERPL-MCNC: 177 MG/DL (ref 70–99)
HCO3 SERPL-SCNC: 23 MMOL/L (ref 22–29)
HCT VFR BLD AUTO: 34.2 % (ref 40–53)
HGB BLD-MCNC: 10.9 G/DL (ref 13.3–17.7)
INR PPP: 2.27 (ref 0.85–1.15)
MCH RBC QN AUTO: 31.4 PG (ref 26.5–33)
MCHC RBC AUTO-ENTMCNC: 31.9 G/DL (ref 31.5–36.5)
MCV RBC AUTO: 99 FL (ref 78–100)
PLATELET # BLD AUTO: 197 10E3/UL (ref 150–450)
POTASSIUM SERPL-SCNC: 4.1 MMOL/L (ref 3.4–5.3)
RBC # BLD AUTO: 3.47 10E6/UL (ref 4.4–5.9)
SODIUM SERPL-SCNC: 144 MMOL/L (ref 135–145)
WBC # BLD AUTO: 7.9 10E3/UL (ref 4–11)

## 2024-07-28 PROCEDURE — 97530 THERAPEUTIC ACTIVITIES: CPT | Mod: GP | Performed by: PHYSICAL THERAPIST

## 2024-07-28 PROCEDURE — 85610 PROTHROMBIN TIME: CPT | Performed by: INTERNAL MEDICINE

## 2024-07-28 PROCEDURE — 120N000001 HC R&B MED SURG/OB

## 2024-07-28 PROCEDURE — 80048 BASIC METABOLIC PNL TOTAL CA: CPT | Performed by: STUDENT IN AN ORGANIZED HEALTH CARE EDUCATION/TRAINING PROGRAM

## 2024-07-28 PROCEDURE — 250N000013 HC RX MED GY IP 250 OP 250 PS 637: Performed by: INTERNAL MEDICINE

## 2024-07-28 PROCEDURE — 250N000011 HC RX IP 250 OP 636: Performed by: STUDENT IN AN ORGANIZED HEALTH CARE EDUCATION/TRAINING PROGRAM

## 2024-07-28 PROCEDURE — 999N000157 HC STATISTIC RCP TIME EA 10 MIN

## 2024-07-28 PROCEDURE — 99232 SBSQ HOSP IP/OBS MODERATE 35: CPT | Performed by: STUDENT IN AN ORGANIZED HEALTH CARE EDUCATION/TRAINING PROGRAM

## 2024-07-28 PROCEDURE — 97110 THERAPEUTIC EXERCISES: CPT | Mod: GP | Performed by: PHYSICAL THERAPIST

## 2024-07-28 PROCEDURE — 85027 COMPLETE CBC AUTOMATED: CPT | Performed by: STUDENT IN AN ORGANIZED HEALTH CARE EDUCATION/TRAINING PROGRAM

## 2024-07-28 PROCEDURE — 250N000013 HC RX MED GY IP 250 OP 250 PS 637: Performed by: STUDENT IN AN ORGANIZED HEALTH CARE EDUCATION/TRAINING PROGRAM

## 2024-07-28 RX ORDER — WARFARIN SODIUM 2 MG/1
4 TABLET ORAL
Status: COMPLETED | OUTPATIENT
Start: 2024-07-28 | End: 2024-07-28

## 2024-07-28 RX ADMIN — DORZOLAMIDE HYDROCHLORIDE AND TIMOLOL MALEATE 1 DROP: 20; 5 SOLUTION/ DROPS OPHTHALMIC at 08:27

## 2024-07-28 RX ADMIN — AMPICILLIN SODIUM 2 G: 2 INJECTION, POWDER, FOR SOLUTION INTRAMUSCULAR; INTRAVENOUS at 00:31

## 2024-07-28 RX ADMIN — INSULIN GLARGINE 5 UNITS: 100 INJECTION, SOLUTION SUBCUTANEOUS at 22:00

## 2024-07-28 RX ADMIN — DORZOLAMIDE HYDROCHLORIDE AND TIMOLOL MALEATE 1 DROP: 20; 5 SOLUTION/ DROPS OPHTHALMIC at 20:21

## 2024-07-28 RX ADMIN — GABAPENTIN 300 MG: 300 CAPSULE ORAL at 08:27

## 2024-07-28 RX ADMIN — AMPICILLIN SODIUM 2 G: 2 INJECTION, POWDER, FOR SOLUTION INTRAMUSCULAR; INTRAVENOUS at 13:51

## 2024-07-28 RX ADMIN — ACETAMINOPHEN 650 MG: 325 TABLET ORAL at 20:33

## 2024-07-28 RX ADMIN — BRIMONIDINE TARTRATE 1 DROP: 2 SOLUTION/ DROPS OPHTHALMIC at 08:27

## 2024-07-28 RX ADMIN — CALCIUM CARBONATE (ANTACID) CHEW TAB 500 MG 1000 MG: 500 CHEW TAB at 14:02

## 2024-07-28 RX ADMIN — GABAPENTIN 300 MG: 300 CAPSULE ORAL at 20:20

## 2024-07-28 RX ADMIN — AMPICILLIN SODIUM 2 G: 2 INJECTION, POWDER, FOR SOLUTION INTRAMUSCULAR; INTRAVENOUS at 20:21

## 2024-07-28 RX ADMIN — AMPICILLIN SODIUM 2 G: 2 INJECTION, POWDER, FOR SOLUTION INTRAMUSCULAR; INTRAVENOUS at 08:26

## 2024-07-28 RX ADMIN — ARIPIPRAZOLE 2 MG: 2 TABLET ORAL at 20:20

## 2024-07-28 RX ADMIN — BRIMONIDINE TARTRATE 1 DROP: 2 SOLUTION/ DROPS OPHTHALMIC at 20:22

## 2024-07-28 RX ADMIN — ACETAMINOPHEN 650 MG: 325 TABLET ORAL at 14:02

## 2024-07-28 RX ADMIN — WARFARIN SODIUM 4 MG: 2 TABLET ORAL at 17:55

## 2024-07-28 RX ADMIN — FLUVOXAMINE MALEATE 50 MG: 50 TABLET ORAL at 20:20

## 2024-07-28 ASSESSMENT — ACTIVITIES OF DAILY LIVING (ADL)
ADLS_ACUITY_SCORE: 58
ADLS_ACUITY_SCORE: 67
ADLS_ACUITY_SCORE: 57
ADLS_ACUITY_SCORE: 58
ADLS_ACUITY_SCORE: 63
ADLS_ACUITY_SCORE: 63
ADLS_ACUITY_SCORE: 58
ADLS_ACUITY_SCORE: 58
ADLS_ACUITY_SCORE: 63
ADLS_ACUITY_SCORE: 57
ADLS_ACUITY_SCORE: 58
ADLS_ACUITY_SCORE: 63
ADLS_ACUITY_SCORE: 67
ADLS_ACUITY_SCORE: 63
ADLS_ACUITY_SCORE: 67
ADLS_ACUITY_SCORE: 58
ADLS_ACUITY_SCORE: 57
ADLS_ACUITY_SCORE: 57
ADLS_ACUITY_SCORE: 67
ADLS_ACUITY_SCORE: 63

## 2024-07-28 NOTE — PROGRESS NOTES
"Care Management Follow Up    Length of Stay (days): 3    Expected Discharge Date: 07/29/2024    Anticipated Discharge Plan:   TCU     Transportation: Anticipate medical transport    PT Recommendations: Transitional Care Facility  OT Recommendations:  Transitional Care Facility     Barriers to Discharge: medical stability    Prior Living Situation: \"Patient lives in modified Independent Living at [facility]. Usually uses a walker and largely independent with ADLs. Family assists with meds so should not be a barrier to return once back to baseline. Transport TBD/ likely medical.\"     Patient/Spokesperson Updated: Yes. Who? Pts daughter Eloisa    Additional Information:  Chart reviewed.    CM updates:  Pt's daughter told bedside RN this AM that pt preferred [facility] as first choice for TCU, bedside RN updated writer.       Writer called pt's daughter Eloisa, and she told writer TCU choices she and pt prefer [facility] as #1 choice. Then either [facility] or [facility] (agreeable to private room cost). All referrals sent this afternoon (pending).         Cm will continue to follow plan of care,review recommendations,and assist with any discharge needs anticipated.       Ruby Mirza, RN      "

## 2024-07-28 NOTE — PLAN OF CARE
"Goal Outcome Evaluation:      Plan of Care Reviewed With: patient    Overall Patient Progress: improvingOverall Patient Progress: improving    Outcome Evaluation: Very pleasant forgetful pt. He slept throughout the night with no complaints. Olsen draining clear dark yellow urine in adequate amounts. IVF's discontinued. Both lumens on PICC patent. Uneventful night.    /66 (BP Location: Left arm)   Pulse 64   Temp 98.8  F (37.1  C) (Oral)   Resp 16   Ht 1.903 m (6' 2.92\")   Wt 99 kg (218 lb 4.1 oz)   SpO2 95%   BMI 27.34 kg/m      Problem: Adult Inpatient Plan of Care  Goal: Absence of Hospital-Acquired Illness or Injury  Intervention: Prevent Infection  Recent Flowsheet Documentation  Taken 7/28/2024 0032 by Aletha Mcmullen RN  Infection Prevention: hand hygiene promoted     Problem: Adult Inpatient Plan of Care  Goal: Optimal Comfort and Wellbeing  Outcome: Progressing     "

## 2024-07-28 NOTE — PROGRESS NOTES
[facility]    Medicine Progress Note - Hospitalist Service    Date of Admission:  7/25/2024    Assessment & Plan   Rakesh Samuels is a 73 year old male admitted on 7/25/2024. He presents with altered mental status. ER workup showing e/o UTI.     UTI  Sepsis with fever, lactic acidosis, leukocytosis, ams  Lactic acidosis  Septic encephalopathy  Recurrent falls  Enterococcal bacteremia  -AMS ddx: encephalopathy, CVA, meningitis. No focal neuro deficits and ams can be explained by UTI in elderly, thus CVA unlikely.No neck pain or stiffness, low risk for meningitis.  -head ct: no acute changes. + for atrophy and chronic ischemic microvascular ischemia  -h/o AMS due to UTI  -UA + for leukocyte esterase, bacteria; urine culture and blood culture growing Enterococcus  -Change antibiotics to ampicillin.  Will switch antibiotics to oral  tomorrow morning  -Final blood culture and urine culture is resulted with pansensitive Enterococcus faecalis  -MRSA: -ve   -CT abdomen:  Patchy subtle areas of decreased cortical enhancement within the right kidney, which could either reflect pyelonephritis or perfusional changes related to chronic right renal atrophy and multifocal scars. Correlate with clinical symptoms. No renal abscess.     DM 2  -Hgb A1c: 6.8  -Lantus and ssi  -Hold metformin     H/o p a fib  -PTA warfarin  -pharmacy to dose to keep inr 2-3    Acute urinary retention  -Olsen catheter inserted in the ER  -Plan for voiding trial prior to discharge  -Urology referral for evaluation of chronic mild left hydronephrosis extending to ureteropelvic junction    Generalized weakness/deconditioning  -PT/OT   -Recommending TCU upon discharge        Diet: Advance Diet as Tolerated: Regular Diet Adult; Moderate Consistent Carb (60 g CHO per Meal) Diet    DVT Prophylaxis: Warfarin  Olsen Catheter: PRESENT, indication: Acute retention or obstruction  Lines: PRESENT      PICC 07/25/24 Double Lumen Right  "Basilic-Site Assessment: WDL      Cardiac Monitoring: None  Code Status: No CPR- Do NOT Intubate      Clinically Significant Risk Factors                          #Precipitous drop in Hgb/Hct: Lowest Hgb this hospitalization: 10.9 g/dL. Will continue to monitor and treat/transfuse as appropriate.    # DMII: A1C = 6.8 % (Ref range: <5.7 %) within past 6 months, PRESENT ON ADMISSION  # Overweight: Estimated body mass index is 27.34 kg/m  as calculated from the following:    Height as of this encounter: 1.903 m (6' 2.92\").    Weight as of this encounter: 99 kg (218 lb 4.1 oz)., PRESENT ON ADMISSION     # Financial/Environmental Concerns: none       Disposition Plan     Medically Ready for Discharge: Anticipated Tomorrow             Alejandro Urban MD  Hospitalist Service  [facility]  Securely message with [employer] (more info)  Text page via [employer] Paging/Directory   ______________________________________________________________________    Interval History   Patient sitting on chair at the bedside.  No distress noted.  He states he is feeling relatively better.  Management plan discussed with the patient and he expressed understanding.  Will do a voiding trial today.  Possible discharge tomorrow to TCU    Physical Exam   Vital Signs: Temp: 98.3  F (36.8  C) Temp src: Oral BP: 130/60 Pulse: 71   Resp: 16 SpO2: 96 % O2 Device: None (Room air)    Weight: 218 lbs 4.09 oz    General Appearance:  Chronically sick looking, appears confused  Respiratory: Good air entry bilaterally  Cardiovascular: S1 and S2 are heard, no murmur or gallop  GI: Soft abdomen, no tenderness, normoactive bowel sounds, Olsen catheter in place  Skin: Intact and warm          Medical Decision Making       45 MINUTES SPENT BY ME on the date of service doing chart review, history, exam, documentation & further activities per the note.      Data     "

## 2024-07-28 NOTE — PLAN OF CARE
Shift from 0700 to 2330-    Goal Outcome Evaluation:    Intermittently confused to situation and time.  Very pleasant man. He was stronger today and able to feed himself and sit in the chair.     He is very weak. Was 2 strong assist the first time he got up and is essentially a pivot to the chair. Later they use the lift to get him back to bed after PT. Pt got up 3 times to chair today.  Continued on abx and IVF.    Turned and rubbed every 2hrs when pt allowed.   Assist of 2 to turn .  Saw PT/OT.  Tolerated diet and ate well.

## 2024-07-29 ENCOUNTER — MEDICAL CORRESPONDENCE (OUTPATIENT)
Dept: HEALTH INFORMATION MANAGEMENT | Facility: CLINIC | Age: 74
End: 2024-07-29

## 2024-07-29 ENCOUNTER — APPOINTMENT (OUTPATIENT)
Dept: OCCUPATIONAL THERAPY | Facility: HOSPITAL | Age: 74
DRG: 871 | End: 2024-07-29
Payer: MEDICARE

## 2024-07-29 ENCOUNTER — DOCUMENTATION ONLY (OUTPATIENT)
Dept: GERIATRICS | Facility: CLINIC | Age: 74
End: 2024-07-29
Payer: MEDICARE

## 2024-07-29 VITALS
TEMPERATURE: 97.3 F | BODY MASS INDEX: 27.14 KG/M2 | HEART RATE: 58 BPM | HEIGHT: 75 IN | SYSTOLIC BLOOD PRESSURE: 148 MMHG | DIASTOLIC BLOOD PRESSURE: 69 MMHG | WEIGHT: 218.26 LBS | RESPIRATION RATE: 18 BRPM | OXYGEN SATURATION: 95 %

## 2024-07-29 PROBLEM — Z96.651 STATUS POST RIGHT KNEE REPLACEMENT: Chronic | Status: ACTIVE | Noted: 2023-11-29

## 2024-07-29 PROBLEM — D64.9 NORMOCYTIC ANEMIA: Status: ACTIVE | Noted: 2023-11-29

## 2024-07-29 LAB
ANION GAP SERPL CALCULATED.3IONS-SCNC: 9 MMOL/L (ref 7–15)
BUN SERPL-MCNC: 12.1 MG/DL (ref 8–23)
CALCIUM SERPL-MCNC: 7.8 MG/DL (ref 8.8–10.4)
CHLORIDE SERPL-SCNC: 110 MMOL/L (ref 98–107)
CREAT SERPL-MCNC: 0.79 MG/DL (ref 0.67–1.17)
EGFRCR SERPLBLD CKD-EPI 2021: >90 ML/MIN/1.73M2
ERYTHROCYTE [DISTWIDTH] IN BLOOD BY AUTOMATED COUNT: 12.9 % (ref 10–15)
GLUCOSE BLDC GLUCOMTR-MCNC: 157 MG/DL (ref 70–99)
GLUCOSE BLDC GLUCOMTR-MCNC: 255 MG/DL (ref 70–99)
GLUCOSE SERPL-MCNC: 177 MG/DL (ref 70–99)
HCO3 SERPL-SCNC: 24 MMOL/L (ref 22–29)
HCT VFR BLD AUTO: 33.2 % (ref 40–53)
HGB BLD-MCNC: 10.6 G/DL (ref 13.3–17.7)
INR PPP: 2.16 (ref 0.85–1.15)
MCH RBC QN AUTO: 31.1 PG (ref 26.5–33)
MCHC RBC AUTO-ENTMCNC: 31.9 G/DL (ref 31.5–36.5)
MCV RBC AUTO: 97 FL (ref 78–100)
PLATELET # BLD AUTO: 210 10E3/UL (ref 150–450)
POTASSIUM SERPL-SCNC: 3.8 MMOL/L (ref 3.4–5.3)
RBC # BLD AUTO: 3.41 10E6/UL (ref 4.4–5.9)
SODIUM SERPL-SCNC: 143 MMOL/L (ref 135–145)
WBC # BLD AUTO: 6.5 10E3/UL (ref 4–11)

## 2024-07-29 PROCEDURE — 97530 THERAPEUTIC ACTIVITIES: CPT | Mod: GO

## 2024-07-29 PROCEDURE — 250N000011 HC RX IP 250 OP 636: Performed by: STUDENT IN AN ORGANIZED HEALTH CARE EDUCATION/TRAINING PROGRAM

## 2024-07-29 PROCEDURE — 85610 PROTHROMBIN TIME: CPT | Performed by: INTERNAL MEDICINE

## 2024-07-29 PROCEDURE — 250N000013 HC RX MED GY IP 250 OP 250 PS 637: Performed by: STUDENT IN AN ORGANIZED HEALTH CARE EDUCATION/TRAINING PROGRAM

## 2024-07-29 PROCEDURE — 250N000013 HC RX MED GY IP 250 OP 250 PS 637: Performed by: INTERNAL MEDICINE

## 2024-07-29 PROCEDURE — 85027 COMPLETE CBC AUTOMATED: CPT | Performed by: STUDENT IN AN ORGANIZED HEALTH CARE EDUCATION/TRAINING PROGRAM

## 2024-07-29 PROCEDURE — 99239 HOSP IP/OBS DSCHRG MGMT >30: CPT | Performed by: STUDENT IN AN ORGANIZED HEALTH CARE EDUCATION/TRAINING PROGRAM

## 2024-07-29 PROCEDURE — 97535 SELF CARE MNGMENT TRAINING: CPT | Mod: GO

## 2024-07-29 PROCEDURE — 80048 BASIC METABOLIC PNL TOTAL CA: CPT | Performed by: STUDENT IN AN ORGANIZED HEALTH CARE EDUCATION/TRAINING PROGRAM

## 2024-07-29 RX ORDER — WARFARIN SODIUM 5 MG/1
5 TABLET ORAL
Status: DISCONTINUED | OUTPATIENT
Start: 2024-07-29 | End: 2024-07-29 | Stop reason: HOSPADM

## 2024-07-29 RX ORDER — AMOXICILLIN 250 MG/1
1000 CAPSULE ORAL EVERY 8 HOURS SCHEDULED
Status: DISCONTINUED | OUTPATIENT
Start: 2024-07-29 | End: 2024-07-29 | Stop reason: HOSPADM

## 2024-07-29 RX ORDER — AMOXICILLIN 500 MG/1
1000 CAPSULE ORAL EVERY 8 HOURS
DISCHARGE
Start: 2024-07-29 | End: 2024-08-06

## 2024-07-29 RX ADMIN — AMPICILLIN SODIUM 2 G: 2 INJECTION, POWDER, FOR SOLUTION INTRAMUSCULAR; INTRAVENOUS at 02:06

## 2024-07-29 RX ADMIN — SENNOSIDES AND DOCUSATE SODIUM 2 TABLET: 8.6; 5 TABLET ORAL at 12:41

## 2024-07-29 RX ADMIN — DORZOLAMIDE HYDROCHLORIDE AND TIMOLOL MALEATE 1 DROP: 20; 5 SOLUTION/ DROPS OPHTHALMIC at 08:37

## 2024-07-29 RX ADMIN — GABAPENTIN 300 MG: 300 CAPSULE ORAL at 08:31

## 2024-07-29 RX ADMIN — ACETAMINOPHEN 650 MG: 325 TABLET ORAL at 08:43

## 2024-07-29 RX ADMIN — AMPICILLIN SODIUM 2 G: 2 INJECTION, POWDER, FOR SOLUTION INTRAMUSCULAR; INTRAVENOUS at 08:27

## 2024-07-29 RX ADMIN — BRIMONIDINE TARTRATE 1 DROP: 2 SOLUTION/ DROPS OPHTHALMIC at 08:35

## 2024-07-29 ASSESSMENT — ACTIVITIES OF DAILY LIVING (ADL)
ADLS_ACUITY_SCORE: 65
ADLS_ACUITY_SCORE: 67
ADLS_ACUITY_SCORE: 67
ADLS_ACUITY_SCORE: 65

## 2024-07-29 NOTE — PLAN OF CARE
Goal Outcome Evaluation:  Patient  picked up in a cart by 2  [place] [facility]  With all belongings and home medication.  Called and left a voice for nurse to nurse report. Awaiting for a call back.

## 2024-07-29 NOTE — PLAN OF CARE
Shift from 0700 to 2330-    Problem: UTI (Urinary Tract Infection)  Goal: Improved Infection Symptoms  Outcome: Progressing     Problem: Activity Intolerance  Goal: Enhanced Capacity and Energy  Outcome: Progressing  Intervention: Optimize Activity Tolerance  Recent Flowsheet Documentation  Taken 7/28/2024 1631 by Kacey Fink RN  Activity Management: activity adjusted per tolerance  Taken 7/28/2024 1629 by Kacey Fink RN  Activity Management: bedrest  Taken 7/28/2024 1442 by Kacey Fink RN  Activity Management: bedrest  Taken 7/28/2024 1315 by Kacey Fink, RN  Activity Management:   activity adjusted per tolerance   back to bed  Taken 7/28/2024 0855 by Kacey Fink RN  Activity Management: up in chair  Taken 7/28/2024 0830 by Kacey Fink, RN  Activity Management: activity adjusted per tolerance       Goal Outcome Evaluation:    Intermittently confused to situation and time. But seems to be improving today.    Again he was stronger today for turning and able to feed himself intermittently;sat in the chair for all meals.      He is very weak. Patient is a lift to get him to chair and back. Continued on abx IV.     Turned and rubbed every 2hrs when pt allowed.     Saw PT/OT.    Tolerated diet and ate well.    Olsen Dc'd at 1700 after pt woke up from nap.    Unable to void; Scanned for 220ml at 2150. No urge or fullness per pt.

## 2024-07-29 NOTE — PLAN OF CARE
"Goal Outcome Evaluation:      Plan of Care Reviewed With: patient    Overall Patient Progress: improvingOverall Patient Progress: improving    Outcome Evaluation: Pt still with intermittent confusion along with forgetfulness. Primofit in place and pt urine output was very good; clear yellow. Bladder scanned x 1. Denied pain. Slept throughout the night. Repositioned at times and self positioned as well. Uneventful night.    /65 (BP Location: Left arm)   Pulse 62   Temp 98.2  F (36.8  C) (Oral)   Resp 14   Ht 1.903 m (6' 2.92\")   Wt 99 kg (218 lb 4.1 oz)   SpO2 95%   BMI 27.34 kg/m      Problem: Adult Inpatient Plan of Care  Goal: Absence of Hospital-Acquired Illness or Injury  Intervention: Prevent Skin Injury  Recent Flowsheet Documentation  Taken 7/29/2024 0527 by Aletha Mcmullen RN  Body Position:   turned   right   side-lying 30 degrees  Taken 7/29/2024 0211 by Aletha Mcmullen RN  Body Position: position changed independently     Problem: Adult Inpatient Plan of Care  Goal: Absence of Hospital-Acquired Illness or Injury  Intervention: Prevent Infection  Recent Flowsheet Documentation  Taken 7/29/2024 0211 by Aletha Mcmullen RN  Infection Prevention: hand hygiene promoted     Problem: UTI (Urinary Tract Infection)  Goal: Improved Infection Symptoms  Outcome: Progressing     "

## 2024-07-29 NOTE — PLAN OF CARE
Physical Therapy Discharge Summary    Reason for therapy discharge:    Discharged to transitional care facility.    Progress towards therapy goal(s). See goals on Care Plan in [facility] electronic health record for goal details.  Goals not met.  Barriers to achieving goals:   discharge from facility.    Therapy recommendation(s):    Continued therapy is recommended.  Rationale/Recommendations:  to improve functional mobility.

## 2024-07-29 NOTE — PROGRESS NOTES
Care Management Discharge Note    Discharge Date: 07/29/2024       Discharge Disposition: Transitional Care    Discharge Services: None    Discharge DME:  (per treatment team)    Discharge Transportation:  [facility] wheel chair transport     Private pay costs discussed: transportation costs    Does the patient's insurance plan have a 3 day qualifying hospital stay waiver?  Yes     Which insurance plan 3 day waiver is available? [employer]    Will the waiver be used for post-acute placement? No    PAS Confirmation Code: [id number]   Patient/family educated on Medicare website which has current facility and service quality ratings: yes    Education Provided on the Discharge Plan: Yes  Persons Notified of Discharge Plans: Eloisa franco   Patient/Family in Agreement with the Plan: yes    Handoff Referral Completed: Yes    Additional Information:  Patient accepted at [facility] with bed available for today between [id number]. SW called and spoke with pt's daughterEloisa to review discharge plan. Eloisa reports agreement with plan requesting [facility] wheel chair transport for pt. Eloisa states understanding of potential out of pocket cost for transport. [facility] wheel chair transport between [phone]. PAS completed and on chart.     SW met with patient to discuss plan. Pt verbalizes agreement with discharge plan.     Pt's daughter wants to confirm pt's eye drops are sent with him; updated bedside RN.     1:03 PM  Received call from [facility] transport indicating they are sending a stretcher for pt as they do not have any wheel chairs available. Transport staff reports no PCS needed and ride will be billed as a wheel chair transport.     REJI Zendejas

## 2024-07-29 NOTE — DISCHARGE SUMMARY
"[facility]  Hospitalist Discharge Summary      Date of Admission:  7/25/2024  Date of Discharge:  7/29/2024  1:32 PM  Discharging Provider: Alejandro Urban MD  Discharge Service: Hospitalist Service    Discharge Diagnoses   Sepsis due to UTI with fever, lactic acidosis, leukocytosis  Altered mental status  Septic encephalopathy  Recurrent falls  Enterococcal bacteremia  Type 2 diabetes mellitus  History of paroxysmal atrial fibrillation  Acute urinary retention  Generalized weakness/deconditioning      Clinically Significant Risk Factors     # DMII: A1C = 6.8 % (Ref range: <5.7 %) within past 6 months  # Overweight: Estimated body mass index is 27.34 kg/m  as calculated from the following:    Height as of this encounter: 1.903 m (6' 2.92\").    Weight as of this encounter: 99 kg (218 lb 4.1 oz).       Follow-ups Needed After Discharge   Follow-up Appointments     Follow Up and recommended labs and tests      Follow up with Nursing home physician.  No follow up labs or test are   needed.            Unresulted Labs Ordered in the Past 30 Days of this Admission       No orders found from 6/25/2024 to 7/26/2024.        These results will be followed up by     Discharge Disposition   Discharged to short-term care facility  Condition at discharge: Stable    Hospital Course   Rakesh Samuels is a 73 year old male admitted on 7/25/2024. He presents with altered mental status. ER workup showing e/o UTI.   UTI  Sepsis with fever, leukocytosis, ams  Lactic acidosis  Septic encephalopathy  Recurrent falls  Enterococcal bacteremia  -AMS ddx: encephalopathy, CVA, meningitis. No focal neuro deficits and ams can be explained by UTI in elderly, thus CVA unlikely.No neck pain or stiffness, low risk for meningitis.  -head ct: no acute changes. + for atrophy and chronic ischemic microvascular ischemia  -h/o AMS due to UTI  -UA + for leukocyte esterase, bacteria; urine culture and blood culture growing " Enterococcus  -Patient was initially on IV Zosyn which was later on switched to IV ampicillin.  Patient discharged to TCU with amoxicillin to complete a total of 10 days of antibiotics course  -CT abdomen:  Patchy subtle areas of decreased cortical enhancement within the right kidney, which could either reflect pyelonephritis or perfusional changes related to chronic right renal atrophy and multifocal scars. No renal abscess.  DM 2  -Hgb A1c: 6.8  -Lantus and ssi  -Hold metformin  H/o p a fib  -PTA warfarin  -pharmacy to dose to keep inr 2-3  Acute urinary retention  -Olsen catheter inserted in the ER  -Passed voiding trial  -Urology referral for evaluation of chronic mild left hydronephrosis extending to ureteropelvic junction  Generalized weakness/deconditioning  -PT/OT   -Recommending TCU upon discharge      Patient is clinically stable enough to be discharged to TCU today.  Medication reconciliation is done.  Urology referral done for evaluation of chronic mild left hydronephrosis with nonobstructing renal stones      Consultations This Hospital Stay   VASCULAR ACCESS ADULT IP CONSULT  PHARMACY TO DOSE WARFARIN  CARE MANAGEMENT / SOCIAL WORK IP CONSULT  SPEECH LANGUAGE PATH ADULT IP CONSULT  PHYSICAL THERAPY ADULT IP CONSULT  OCCUPATIONAL THERAPY ADULT IP CONSULT    Code Status   No CPR- Do NOT Intubate    Time Spent on this Encounter   I, Alejandro Urban MD, personally saw the patient today and spent greater than 30 minutes discharging this patient.       Alejandro Urban MD  [address] [address]  Phone: [phone]  Fax: [phone]  ______________________________________________________________________    Physical Exam   Vital Signs: Temp: 97.3  F (36.3  C) Temp src: Oral BP: (!) 148/69 Pulse: 58   Resp: 18 SpO2: 95 % O2 Device: BiPAP/CPAP    Weight: 218 lbs 4.09 oz    General Appearance:  Chronically sick looking, appears  confused  Respiratory: Good air entry bilaterally  Cardiovascular: S1 and S2 are heard, no murmur or gallop  GI: Soft abdomen, no tenderness, normoactive bowel sounds, Olsen catheter in place  Skin: Intact and warm          Primary Care Physician   Rashida Madison    Discharge Orders      General info for SNF    Length of Stay Estimate: Short Term Care: Estimated # of Days <30  Condition at Discharge: Improving  Level of care:skilled   Rehabilitation Potential: Good  Admission H&P remains valid and up-to-date: Yes  Recent Chemotherapy: N/A  Use Nursing Home Standing Orders: Yes     Follow Up and recommended labs and tests    Follow up with Nursing home physician.  No follow up labs or test are needed.     Reason for your hospital stay    Sepsis     Activity - Up with nursing assistance     Fall precautions     Diet    Follow this diet upon discharge: Orders Placed This Encounter      Advance Diet as Tolerated: Regular Diet Adult; Moderate Consistent Carb (60 g CHO per Meal) Diet       Significant Results and Procedures       Discharge Medications   Current Discharge Medication List        START taking these medications    Details   amoxicillin (AMOXIL) 500 MG capsule Take 2 capsules (1,000 mg) by mouth every 8 hours for 7 days    Associated Diagnoses: Bacteremia           CONTINUE these medications which have NOT CHANGED    Details   acetaminophen (TYLENOL) 500 MG tablet [ACETAMINOPHEN (TYLENOL) 500 MG TABLET] Take 500 mg by mouth every 6 (six) hours as needed for pain.      ARIPiprazole (ABILIFY) 2 MG tablet [ARIPIPRAZOLE (ABILIFY) 2 MG TABLET] Take 2 mg by mouth at bedtime.       brimonidine (ALPHAGAN) 0.2 % ophthalmic solution [BRIMONIDINE (ALPHAGAN) 0.2 % OPHTHALMIC SOLUTION] Administer 1 drop to both eyes 2 (two) times a day.       dorzolamide-timolol (COSOPT) 2-0.5 % ophthalmic solution Place 1 drop into both eyes 2 times daily      fluvoxaMINE (LUVOX) 50 MG tablet [FLUVOXAMINE (LUVOX) 50 MG TABLET] Take 50 mg  by mouth at bedtime.       gabapentin (NEURONTIN) 300 MG capsule Take 300 mg by mouth 2 times daily      hydrOXYzine (ATARAX) 10 MG tablet Take 10 mg by mouth 3 times daily as needed      latanoprostene bunod 0.024 % Drop Place 1 drop into both eyes At Bedtime      levomefolate calcium (L-METHYLFOLATE ORAL) [LEVOMEFOLATE CALCIUM (L-METHYLFOLATE ORAL)] Take 1.25 mg by mouth daily.       metFORMIN (GLUCOPHAGE) 1000 MG tablet Take 1,000 mg by mouth 2 times daily (with meals)      Multiple Vitamins-Minerals (CENTRUM SILVER) CHEW Take 1 tablet by mouth daily      netarsudiL (RHOPRESSA) 0.02 % Drop Place 1 drop into both eyes every evening      warfarin ANTICOAGULANT (COUMADIN) 5 MG tablet Take 5 to 7.5mg (1 to 1.5 tabs) by mouth daily OR AS DIRECTED.  Adjust dose based on INR.  Qty: 100 tablet, Refills: 1    Comments: Current dose is 7.5mg Mon/Thur and 5mg ROW  Associated Diagnoses: Paroxysmal atrial fibrillation (H)           Allergies   No Known Allergies

## 2024-07-29 NOTE — PLAN OF CARE
Occupational Therapy Discharge Summary    Reason for therapy discharge:    Discharged to transitional care facility.    Progress towards therapy goal(s). See goals on Care Plan in [facility] electronic health record for goal details.  Goals partially met.  Barriers to achieving goals:   discharge from facility.    Therapy recommendation(s):    Continued therapy is recommended.  Rationale/Recommendations:  ADL progresssion, endurance and assist with discharge planning needs.

## 2024-07-30 ENCOUNTER — PATIENT OUTREACH (OUTPATIENT)
Dept: CARE COORDINATION | Facility: CLINIC | Age: 74
End: 2024-07-30

## 2024-07-30 ENCOUNTER — LAB REQUISITION (OUTPATIENT)
Dept: LAB | Facility: CLINIC | Age: 74
End: 2024-07-30
Payer: MEDICARE

## 2024-07-30 ENCOUNTER — ANTICOAGULATION THERAPY VISIT (OUTPATIENT)
Dept: ANTICOAGULATION | Facility: CLINIC | Age: 74
End: 2024-07-30

## 2024-07-30 ENCOUNTER — TRANSITIONAL CARE UNIT VISIT (OUTPATIENT)
Dept: GERIATRICS | Facility: CLINIC | Age: 74
End: 2024-07-30
Payer: MEDICARE

## 2024-07-30 ENCOUNTER — DOCUMENTATION ONLY (OUTPATIENT)
Dept: ANTICOAGULATION | Facility: CLINIC | Age: 74
End: 2024-07-30

## 2024-07-30 VITALS
RESPIRATION RATE: 16 BRPM | DIASTOLIC BLOOD PRESSURE: 82 MMHG | OXYGEN SATURATION: 95 % | HEART RATE: 97 BPM | TEMPERATURE: 98 F | WEIGHT: 218.8 LBS | HEIGHT: 72 IN | BODY MASS INDEX: 29.64 KG/M2 | SYSTOLIC BLOOD PRESSURE: 145 MMHG

## 2024-07-30 DIAGNOSIS — N39.0 URINARY TRACT INFECTION, SITE NOT SPECIFIED: ICD-10-CM

## 2024-07-30 DIAGNOSIS — E11.00 TYPE 2 DIABETES MELLITUS WITH HYPEROSMOLARITY WITHOUT COMA, WITHOUT LONG-TERM CURRENT USE OF INSULIN (H): ICD-10-CM

## 2024-07-30 DIAGNOSIS — I48.0 PAROXYSMAL ATRIAL FIBRILLATION (H): ICD-10-CM

## 2024-07-30 DIAGNOSIS — I10 ESSENTIAL HYPERTENSION: ICD-10-CM

## 2024-07-30 DIAGNOSIS — I48.0 PAROXYSMAL ATRIAL FIBRILLATION (H): Primary | ICD-10-CM

## 2024-07-30 DIAGNOSIS — N39.0 URINARY TRACT INFECTION WITHOUT HEMATURIA, SITE UNSPECIFIED: Primary | ICD-10-CM

## 2024-07-30 DIAGNOSIS — A41.9 BACTERIAL SEPSIS (H): ICD-10-CM

## 2024-07-30 LAB — INR (EXTERNAL): 2.4 (ref 0.9–1.1)

## 2024-07-30 PROCEDURE — 99310 SBSQ NF CARE HIGH MDM 45: CPT | Performed by: NURSE PRACTITIONER

## 2024-07-30 NOTE — LETTER
7/30/2024      Rakesh Samuels  [address]  [facility] [address]        [facility]    Code Status:  DNR/DNI   Visit Type:   Chief Complaint   Patient presents with     TCU Admission     [facility] 7/25/2024 - 7/29/2024     Facility:  [facility] ([facility]) [[id number]]         HPI: Rakesh Samuels is a 73 year old male who I am seeing today for admit to the TCU.  Patient recently hospitalized with sepsis due to UTI.  Patient had altered mental status.  Blood culture grew Enterococcus.  Patient treated with IV Zosyn and later switched to IV ampicillin then to oral amoxicillin for 10 days.  C  He initially had a Olsen catheter placed in the ER however passed a voiding trial.    Transitional Care Course: Patient is known to me from previous TCU stay.  He reports he is voiding adequately.  He continues on amoxicillin every 8 hours.  No fever or chills.  Strength is improving.  History of A-fib on warfarin.  Patient reports he is eating well.  He denies any shortness of breath or chest pain.      Assessment/Plan:     Urinary tract infection without hematuria, site unspecified  Bacterial sepsis (H)  -Continue oral amoxicillin for total of 10 days.  -CT of the abdomen showed patchy subtle areas of decreased cortical enhancement within the right kidney which could either reflect pyelonephritis or perfusional changes related to chronic renal atrophy and multifocal scars.  No renal abscess.  -Follow-up with urology outpatient for chronic mild left hydronephrosis extending into the ureteropelvic junction.  -Follow-up CBC.    Type 2 diabetes mellitus with hyperosmolarity without coma, without long-term current use of insulin (H)  -Consistent carb diet.  -Recent hemoglobin A1c 6.8.  -Metformin 1000 mg 2 times daily.  -Blood sugar checks twice daily.    Paroxysmal atrial fibrillation (H)  -Continue with warfarin.  -INRs to be managed per the Coumadin clinic.  -INR 2.4.    Depression with anxiety    -Continue prior to admit Luvox and Abilify.    -Okay for PT OT eval and treat.  -Follow-up CBC and BMP.    Active Ambulatory Problems     Diagnosis Date Noted     SIRS (systemic inflammatory response syndrome) (H) 09/09/2019     Adrenal incidentaloma (H24)      Diet-controlled diabetes mellitus (H)      Pericardial effusion      Lactic acidosis      Type 2 diabetes mellitus without complication, without long-term current use of insulin (H) 04/12/2020     Paroxysmal atrial fibrillation (H) 02/18/2020     Calculus of ureter 04/12/2020     Acute renal failure, unspecified acute renal failure type (H24) 04/12/2020     Acute renal failure (ARF) (H24) 04/12/2020     ATN (acute tubular necrosis) (H24) 04/12/2020     Sepsis due to urinary tract infection (H)      Shock circulatory (H)      Metformin overdose of undetermined intent, initial encounter      Acute respiratory failure with hypoxemia (H)      Metabolic acidosis      Hyperkalemia      Hematemesis, presence of nausea not specified 04/12/2020     Calculus of kidney      Syncope and collapse 06/30/2020     Hyperglycemia      After care 07/03/2012     Age-related cataract 03/27/2021     Anemia associated with acute blood loss 06/27/2012     Balanitis 03/27/2021     Cholelithiasis without obstruction 03/27/2021     Constipation 07/03/2012     Depression with anxiety 07/03/2012     Glaucoma 06/25/2012     Hemorrhoids without complication 03/27/2021     Hyperlipidemia 06/07/2018     Loss of appetite 03/27/2021     Major depressive disorder, recurrent episode, in partial remission (H24) 06/08/2018     Mixed personality disorder in adult (H) 06/08/2018     Obstructive sleep apnea 03/27/2021     Osteoarthrosis involving lower leg 06/23/2012     Periodic limb movement disorder 03/27/2021     Recurrent major depression (H24) 03/27/2021     S/P ureteral stent placement 03/27/2021     Unilateral small kidney 03/27/2021     Nephrolithiasis 03/27/2021     Type 2 diabetes  mellitus (H) 06/25/2012     Osteoarthritis of knee 03/27/2021     Persistent atrial fibrillation (H) 03/27/2021     Pyelonephritis 03/27/2021     Urinary tract infection without hematuria, site unspecified 04/30/2022     Sepsis without acute organ dysfunction (H) 04/30/2022     UTI (urinary tract infection) 05/01/2022     Benign prostatic hyperplasia with urinary frequency 05/07/2022     Hypomagnesemia 05/07/2022     Injury of head, initial encounter 07/31/2023     Fall at home, initial encounter 07/31/2023     E. coli urinary tract infection 08/02/2023     Septic encephalopathy 07/25/2024     DM (diabetes mellitus), type 2 with complications (H) 07/25/2024     Urinary tract infection with hematuria, site unspecified 07/26/2024     Altered mental status, unspecified altered mental status type 07/26/2024     Sepsis, due to unspecified organism, unspecified whether acute organ dysfunction present (H) 07/26/2024     Normocytic anemia 11/29/2023     Status post right knee replacement 11/29/2023     Resolved Ambulatory Problems     Diagnosis Date Noted     No Resolved Ambulatory Problems     Past Medical History:   Diagnosis Date     A-fib (H)      Acute hemodialysis encounter (H24)      Acute kidney injury (H24)      Asbestosis (H)      Atrophy of right kidney      Basal cell carcinoma      BPH without urinary obstruction      Cellulitis      Cholelithiasis      Diabetes mellitus, type 2 (H) 4/12/2020     Esophagitis      Gastritis      Hematemesis, presence of nausea not specified      Hyperlipemia      Kidney stone      Metformin overdose of undetermined intent      PTSD (post-traumatic stress disorder)      Seasonal allergies      Sleep apnea      Upper GI bleed      No Known Allergies    All Meds and Allergies reviewed in the record at the facility and is the most up-to-date.    Post Discharge Medication Reconciliation Status: discharge medications reconciled, continue medications without change  Current Outpatient  Medications   Medication Sig Dispense Refill     acetaminophen (TYLENOL) 500 MG tablet [ACETAMINOPHEN (TYLENOL) 500 MG TABLET] Take 500 mg by mouth every 6 (six) hours as needed for pain.       amoxicillin (AMOXIL) 500 MG capsule Take 2 capsules (1,000 mg) by mouth every 8 hours for 7 days       ARIPiprazole (ABILIFY) 2 MG tablet [ARIPIPRAZOLE (ABILIFY) 2 MG TABLET] Take 2 mg by mouth at bedtime.        brimonidine (ALPHAGAN) 0.2 % ophthalmic solution [BRIMONIDINE (ALPHAGAN) 0.2 % OPHTHALMIC SOLUTION] Administer 1 drop to both eyes 2 (two) times a day.        dorzolamide-timolol (COSOPT) 2-0.5 % ophthalmic solution Place 1 drop into both eyes 2 times daily       fluvoxaMINE (LUVOX) 50 MG tablet [FLUVOXAMINE (LUVOX) 50 MG TABLET] Take 50 mg by mouth at bedtime.        gabapentin (NEURONTIN) 300 MG capsule Take 300 mg by mouth 2 times daily       hydrOXYzine (ATARAX) 10 MG tablet Take 10 mg by mouth 3 times daily as needed       latanoprostene bunod 0.024 % Drop Place 1 drop into both eyes At Bedtime       levomefolate calcium (L-METHYLFOLATE ORAL) [LEVOMEFOLATE CALCIUM (L-METHYLFOLATE ORAL)] Take 1.25 mg by mouth daily.        metFORMIN (GLUCOPHAGE) 1000 MG tablet Take 1,000 mg by mouth 2 times daily (with meals)       Multiple Vitamins-Minerals (CENTRUM SILVER) CHEW Take 1 tablet by mouth daily       netarsudiL (RHOPRESSA) 0.02 % Drop Place 1 drop into both eyes every evening       WARFARIN SODIUM PO Take by mouth See Admin Instructions Dosing in coordination with INR results       warfarin ANTICOAGULANT (COUMADIN) 5 MG tablet Take 5 to 7.5mg (1 to 1.5 tabs) by mouth daily OR AS DIRECTED.  Adjust dose based on INR. (Patient not taking: Reported on 7/30/2024) 100 tablet 1     No current facility-administered medications for this visit.       REVIEW OF SYSTEMS:   10 point review of systems reviewed and pertinent positives in the HPI.     PHYSICAL EXAMINATION:  Physical Exam     Vital signs: BP (!) 145/82   Pulse 97    Temp 98  F (36.7  C)   Resp 16   Ht 1.829 m (6')   Wt 99.2 kg (218 lb 12.8 oz)   SpO2 95%   BMI 29.67 kg/m    General: Awake, Alert, oriented x3, sitting up in bed, appropriately, follows simple commands, conversant  HEENT:Pink conjunctiva, slight redness to right eye, patient legally blind in right eye, moist oral mucosa  NECK: Supple  CVS:  S1  S2, without murmur or gallop.   LUNG: Clear to auscultation, No wheezes, rales or rhonci.  BACK: No kyphosis of the thoracic spine  ABDOMEN: Soft, obese, nontender to palpation, with positive bowel sounds  EXTREMITIES: Moves both upper and lower extremities diffuse weakness, no pedal edema, no calf tenderness  SKIN: Warm and dry  NEUROLOGIC: Intact, pulses palpable  PSYCHIATRIC: Pleasant affect.      Labs:  All labs reviewed in the nursing home record and [employer]   @  Lab Results   Component Value Date    WBC 6.5 07/29/2024     Lab Results   Component Value Date    RBC 3.41 07/29/2024     Lab Results   Component Value Date    HGB 10.6 07/29/2024     Lab Results   Component Value Date    HCT 33.2 07/29/2024     Lab Results   Component Value Date    MCV 97 07/29/2024     Lab Results   Component Value Date    MCH 31.1 07/29/2024     Lab Results   Component Value Date    MCHC 31.9 07/29/2024     Lab Results   Component Value Date    RDW 12.9 07/29/2024     Lab Results   Component Value Date     07/29/2024        @Last Comprehensive Metabolic Panel:  Sodium   Date Value Ref Range Status   07/29/2024 143 135 - 145 mmol/L Final     Potassium   Date Value Ref Range Status   07/29/2024 3.8 3.4 - 5.3 mmol/L Final   05/05/2022 4.4 3.5 - 5.0 mmol/L Final     Chloride   Date Value Ref Range Status   07/29/2024 110 (H) 98 - 107 mmol/L Final   05/02/2022 109 (H) 98 - 107 mmol/L Final     Carbon Dioxide (CO2)   Date Value Ref Range Status   07/29/2024 24 22 - 29 mmol/L Final   05/02/2022 25 22 - 31 mmol/L Final     Anion Gap   Date Value Ref Range Status   07/29/2024 9 7 - 15  mmol/L Final   05/02/2022 8 5 - 18 mmol/L Final     Glucose   Date Value Ref Range Status   05/02/2022 133 (H) 70 - 125 mg/dL Final     GLUCOSE BY METER POCT   Date Value Ref Range Status   07/29/2024 255 (H) 70 - 99 mg/dL Final     Urea Nitrogen   Date Value Ref Range Status   07/29/2024 12.1 8.0 - 23.0 mg/dL Final   05/02/2022 11 8 - 28 mg/dL Final     Creatinine   Date Value Ref Range Status   07/29/2024 0.79 0.67 - 1.17 mg/dL Final     GFR Estimate   Date Value Ref Range Status   07/29/2024 >90 >60 mL/min/1.73m2 Final     Comment:     eGFR calculated using 2021 CKD-EPI equation.   06/10/2021 >60 >60 mL/min/1.73m2 Final     Calcium   Date Value Ref Range Status   07/29/2024 7.8 (L) 8.8 - 10.4 mg/dL Final     Comment:     Reference intervals for this test were updated on 7/16/2024 to reflect our healthy population more accurately. There may be differences in the flagging of prior results with similar values performed with this method. Those prior results can be interpreted in the context of the updated reference intervals.       > 45 minutes spent for this visit which included reviewing hospitalization records, consults, labs, medications, imaging as well as face-to-face consult with patient and collaborating with nursing staff.    This note has been dictated using voice recognition software. Any grammatical or context distortions are unintentional and inherent to the software    Electronically signed by: Kylie Gomez CNP       Sincerely,        Kylie Gomez NP

## 2024-07-30 NOTE — PROGRESS NOTES
[facility]    Background: Transitional Care Management program identified per system criteria and reviewed by [facility] Center team for possible outreach.    Assessment: Upon chart review, CCRC Team member will not proceed with patient outreach related to this episode of Transitional Care Management program due to reason below:    Non-[facility] TCU: CCRC team member noted patient discharged to TCU/ARU/LTACH. Patient is not established with a [facility] currently supported by Primary Care-Care Coordination therefore handoff to Primary Care-Care Coordination is not appropriate at this time.    Plan: Transitional Care Management episode addressed appropriately per reason noted above.      Kylie Flores MA  Connected Care Resource [place], [facility]    *Connected Care Resource Team does NOT follow patient ongoing. Referrals are identified based on internal discharge reports and the outreach is to ensure patient has an understanding of their discharge instructions.

## 2024-07-30 NOTE — PROGRESS NOTES
ANTICOAGULATION MANAGEMENT     Rakesh Samuels 73 year old male is on warfarin with therapeutic INR result. (Goal INR 2.0-3.0)    Recent labs: (last 7 days)     07/30/24  0000   INR 2.4*       ASSESSMENT     Source(s): Chart Review and Home Care/Facility Nurse     Warfarin doses taken: Reviewed in chart  Diet:  recent hospitalization may be affecting diet and INR  Medication/supplement changes:  amoxicillin 7/29/24-8/4/24  New illness, injury, or hospitalization: Yes: hospitalization 7/25-7/29/24 for sepsis due to UTI with fever,lactic acidosis,leukocytosis  Signs or symptoms of bleeding or clotting: No  Previous result: Therapeutic last 2(+) visits  Additional findings: New admit to [facility] on 7/29/24. Referral completed. Prior to hospitalization, pt MD was 7.5 mg on Mondays/Thursdays and 5 mg all other days       PLAN       Dosing Instructions:  Take 5 mg on 7/30/24 and take 5 mg on 7/31/24  with next INR in 2 days       Summary  As of 7/30/2024      Full warfarin instructions:  7.5 mg every Mon, Thu; 5 mg all other days   Next INR check:  8/1/2024               Telephone call with Tiffany at [phone] facility nurse who agrees to plan and repeated back plan correctly    Orders given to  Homecare nurse/facility to recheck    Education provided: Contact [phone] with any changes, questions or concerns.     Plan made with [facility] Pharmacist Zoraida Breaux, RN  Anticoagulation Clinic  7/30/2024    _______________________________________________________________________     Anticoagulation Episode Summary       Current INR goal:  2.0-3.0   TTR:  --   Target end date:  Indefinite   Send INR reminders to:  [facility] (TCU/LTC/custodial)    Indications    Paroxysmal atrial fibrillation (H) [I48.0]             Comments:  [facility]             Anticoagulation Care Providers       Provider Role Specialty Phone number    Kylie Gomez NP Referring Nurse Practitioner  [phone]

## 2024-08-01 ENCOUNTER — TRANSITIONAL CARE UNIT VISIT (OUTPATIENT)
Dept: GERIATRICS | Facility: CLINIC | Age: 74
End: 2024-08-01
Payer: MEDICARE

## 2024-08-01 ENCOUNTER — ANTICOAGULATION THERAPY VISIT (OUTPATIENT)
Dept: ANTICOAGULATION | Facility: CLINIC | Age: 74
End: 2024-08-01

## 2024-08-01 VITALS
DIASTOLIC BLOOD PRESSURE: 83 MMHG | HEART RATE: 63 BPM | BODY MASS INDEX: 29.64 KG/M2 | WEIGHT: 218.8 LBS | HEIGHT: 72 IN | SYSTOLIC BLOOD PRESSURE: 178 MMHG | OXYGEN SATURATION: 98 % | RESPIRATION RATE: 18 BRPM | TEMPERATURE: 98.6 F

## 2024-08-01 DIAGNOSIS — A41.9 BACTERIAL SEPSIS (H): ICD-10-CM

## 2024-08-01 DIAGNOSIS — I48.0 PAROXYSMAL ATRIAL FIBRILLATION (H): Primary | ICD-10-CM

## 2024-08-01 DIAGNOSIS — N39.0 URINARY TRACT INFECTION WITHOUT HEMATURIA, SITE UNSPECIFIED: Primary | ICD-10-CM

## 2024-08-01 DIAGNOSIS — I48.0 PAROXYSMAL ATRIAL FIBRILLATION (H): ICD-10-CM

## 2024-08-01 DIAGNOSIS — E11.00 TYPE 2 DIABETES MELLITUS WITH HYPEROSMOLARITY WITHOUT COMA, WITHOUT LONG-TERM CURRENT USE OF INSULIN (H): ICD-10-CM

## 2024-08-01 DIAGNOSIS — I10 ESSENTIAL HYPERTENSION: ICD-10-CM

## 2024-08-01 LAB
ANION GAP SERPL CALCULATED.3IONS-SCNC: 11 MMOL/L (ref 7–15)
BUN SERPL-MCNC: 13.4 MG/DL (ref 8–23)
CALCIUM SERPL-MCNC: 8.5 MG/DL (ref 8.8–10.4)
CHLORIDE SERPL-SCNC: 108 MMOL/L (ref 98–107)
CREAT SERPL-MCNC: 0.78 MG/DL (ref 0.67–1.17)
EGFRCR SERPLBLD CKD-EPI 2021: >90 ML/MIN/1.73M2
ERYTHROCYTE [DISTWIDTH] IN BLOOD BY AUTOMATED COUNT: 12.8 % (ref 10–15)
GLUCOSE SERPL-MCNC: 178 MG/DL (ref 70–99)
HCO3 SERPL-SCNC: 24 MMOL/L (ref 22–29)
HCT VFR BLD AUTO: 36.4 % (ref 40–53)
HGB BLD-MCNC: 11.7 G/DL (ref 13.3–17.7)
INR (EXTERNAL): 2.6 (ref 0.8–1.1)
MCH RBC QN AUTO: 31.5 PG (ref 26.5–33)
MCHC RBC AUTO-ENTMCNC: 32.1 G/DL (ref 31.5–36.5)
MCV RBC AUTO: 98 FL (ref 78–100)
PLATELET # BLD AUTO: 274 10E3/UL (ref 150–450)
POTASSIUM SERPL-SCNC: 4.5 MMOL/L (ref 3.4–5.3)
RBC # BLD AUTO: 3.71 10E6/UL (ref 4.4–5.9)
SODIUM SERPL-SCNC: 143 MMOL/L (ref 135–145)
WBC # BLD AUTO: 9.1 10E3/UL (ref 4–11)

## 2024-08-01 PROCEDURE — 36415 COLL VENOUS BLD VENIPUNCTURE: CPT | Performed by: NURSE PRACTITIONER

## 2024-08-01 PROCEDURE — 80048 BASIC METABOLIC PNL TOTAL CA: CPT | Performed by: NURSE PRACTITIONER

## 2024-08-01 PROCEDURE — P9604 ONE-WAY ALLOW PRORATED TRIP: HCPCS | Performed by: NURSE PRACTITIONER

## 2024-08-01 PROCEDURE — 99309 SBSQ NF CARE MODERATE MDM 30: CPT | Performed by: NURSE PRACTITIONER

## 2024-08-01 PROCEDURE — 85027 COMPLETE CBC AUTOMATED: CPT | Performed by: NURSE PRACTITIONER

## 2024-08-01 NOTE — LETTER
8/1/2024      Rakesh Samuels  [address]  [facility] [address]        [facility] GERIATRIC SERVICES    Code Status:  DNR/DNI   Visit Type:   Chief Complaint   Patient presents with     TCU Follow Up     Facility:  [facility]) [[id number]]         HPI: Rakesh Samuels is a 73 year old male who I am seeing today for follow up on the TCU.  Patient recently hospitalized with sepsis due to UTI.  Patient had altered mental status.  Blood culture grew Enterococcus.  Patient treated with IV Zosyn and later switched to IV ampicillin then to oral amoxicillin for 10 days.  C  He initially had a Olsen catheter placed in the ER however passed a voiding trial.    Transitional Care Course: Today pt lying in bed. Pt with recent UTI. He continues on oral anbx. Pt denies any dysuria. He is voiding adequately. A fib on chronic AC on warfarin.     Assessment/Plan:     Urinary tract infection without hematuria, site unspecified  Bacterial sepsis (H)  -Continue oral amoxicillin for total of 10 days.  -CT of the abdomen showed patchy subtle areas of decreased cortical enhancement within the right kidney which could either reflect pyelonephritis or perfusional changes related to chronic renal atrophy and multifocal scars.  No renal abscess.  -Follow-up with urology outpatient for chronic mild left hydronephrosis extending into the ureteropelvic junction.  -Follow-up CBC unremarkable.     Type 2 diabetes mellitus with hyperosmolarity without coma, without long-term current use of insulin (H)  -Consistent carb diet.  -Recent hemoglobin A1c 6.8.  -Metformin 1000 mg 2 times daily.  -Blood sugar checks twice daily.  -Blood sugars satisfactory.     Paroxysmal atrial fibrillation (H)  -Continue with warfarin.  -INRs to be managed per the Coumadin clinic.  -INR 2.4.    Depression with anxiety   -Continue prior to admit Luvox and Abilify.      Active Ambulatory Problems     Diagnosis Date Noted     SIRS (systemic inflammatory  response syndrome) (H) 09/09/2019     Adrenal incidentaloma (H24)      Diet-controlled diabetes mellitus (H)      Pericardial effusion      Lactic acidosis      Type 2 diabetes mellitus without complication, without long-term current use of insulin (H) 04/12/2020     Paroxysmal atrial fibrillation (H) 02/18/2020     Calculus of ureter 04/12/2020     Acute renal failure, unspecified acute renal failure type (H24) 04/12/2020     Acute renal failure (ARF) (H24) 04/12/2020     ATN (acute tubular necrosis) (H24) 04/12/2020     Sepsis due to urinary tract infection (H)      Shock circulatory (H)      Metformin overdose of undetermined intent, initial encounter      Acute respiratory failure with hypoxemia (H)      Metabolic acidosis      Hyperkalemia      Hematemesis, presence of nausea not specified 04/12/2020     Calculus of kidney      Syncope and collapse 06/30/2020     Hyperglycemia      After care 07/03/2012     Age-related cataract 03/27/2021     Anemia associated with acute blood loss 06/27/2012     Balanitis 03/27/2021     Cholelithiasis without obstruction 03/27/2021     Constipation 07/03/2012     Depression with anxiety 07/03/2012     Glaucoma 06/25/2012     Hemorrhoids without complication 03/27/2021     Hyperlipidemia 06/07/2018     Loss of appetite 03/27/2021     Major depressive disorder, recurrent episode, in partial remission (H24) 06/08/2018     Mixed personality disorder in adult (H) 06/08/2018     Obstructive sleep apnea 03/27/2021     Osteoarthrosis involving lower leg 06/23/2012     Periodic limb movement disorder 03/27/2021     Recurrent major depression (H24) 03/27/2021     S/P ureteral stent placement 03/27/2021     Unilateral small kidney 03/27/2021     Nephrolithiasis 03/27/2021     Type 2 diabetes mellitus (H) 06/25/2012     Osteoarthritis of knee 03/27/2021     Persistent atrial fibrillation (H) 03/27/2021     Pyelonephritis 03/27/2021     Urinary tract infection without hematuria, site  unspecified 04/30/2022     Sepsis without acute organ dysfunction (H) 04/30/2022     UTI (urinary tract infection) 05/01/2022     Benign prostatic hyperplasia with urinary frequency 05/07/2022     Hypomagnesemia 05/07/2022     Injury of head, initial encounter 07/31/2023     Fall at home, initial encounter 07/31/2023     E. coli urinary tract infection 08/02/2023     Septic encephalopathy 07/25/2024     DM (diabetes mellitus), type 2 with complications (H) 07/25/2024     Urinary tract infection with hematuria, site unspecified 07/26/2024     Altered mental status, unspecified altered mental status type 07/26/2024     Sepsis, due to unspecified organism, unspecified whether acute organ dysfunction present (H) 07/26/2024     Normocytic anemia 11/29/2023     Status post right knee replacement 11/29/2023     Resolved Ambulatory Problems     Diagnosis Date Noted     No Resolved Ambulatory Problems     Past Medical History:   Diagnosis Date     A-fib (H)      Acute hemodialysis encounter (H24)      Acute kidney injury (H24)      Asbestosis (H)      Atrophy of right kidney      Basal cell carcinoma      BPH without urinary obstruction      Cellulitis      Cholelithiasis      Diabetes mellitus, type 2 (H) 4/12/2020     Esophagitis      Gastritis      Hematemesis, presence of nausea not specified      Hyperlipemia      Kidney stone      Metformin overdose of undetermined intent      PTSD (post-traumatic stress disorder)      Seasonal allergies      Sleep apnea      Upper GI bleed      No Known Allergies    All Meds and Allergies reviewed in the record at the facility and is the most up-to-date.    Current Outpatient Medications   Medication Sig Dispense Refill     acetaminophen (TYLENOL) 500 MG tablet [ACETAMINOPHEN (TYLENOL) 500 MG TABLET] Take 500 mg by mouth every 6 (six) hours as needed for pain.       amoxicillin (AMOXIL) 500 MG capsule Take 2 capsules (1,000 mg) by mouth every 8 hours for 7 days       ARIPiprazole  (ABILIFY) 2 MG tablet [ARIPIPRAZOLE (ABILIFY) 2 MG TABLET] Take 2 mg by mouth at bedtime.        brimonidine (ALPHAGAN) 0.2 % ophthalmic solution [BRIMONIDINE (ALPHAGAN) 0.2 % OPHTHALMIC SOLUTION] Administer 1 drop to both eyes 2 (two) times a day.        dorzolamide-timolol (COSOPT) 2-0.5 % ophthalmic solution Place 1 drop into both eyes 2 times daily       fluvoxaMINE (LUVOX) 50 MG tablet [FLUVOXAMINE (LUVOX) 50 MG TABLET] Take 50 mg by mouth at bedtime.        gabapentin (NEURONTIN) 300 MG capsule Take 300 mg by mouth 2 times daily       hydrOXYzine (ATARAX) 10 MG tablet Take 10 mg by mouth 3 times daily as needed       latanoprostene bunod 0.024 % Drop Place 1 drop into both eyes At Bedtime       levomefolate calcium (L-METHYLFOLATE ORAL) [LEVOMEFOLATE CALCIUM (L-METHYLFOLATE ORAL)] Take 1.25 mg by mouth daily.        metFORMIN (GLUCOPHAGE) 1000 MG tablet Take 1,000 mg by mouth 2 times daily (with meals)       Multiple Vitamins-Minerals (CENTRUM SILVER) CHEW Take 1 tablet by mouth daily       netarsudiL (RHOPRESSA) 0.02 % Drop Place 1 drop into both eyes every evening       warfarin ANTICOAGULANT (COUMADIN) 5 MG tablet Take 5 to 7.5mg (1 to 1.5 tabs) by mouth daily OR AS DIRECTED.  Adjust dose based on INR. (Patient not taking: Reported on 7/30/2024) 100 tablet 1     WARFARIN SODIUM PO Take by mouth See Admin Instructions Dosing in coordination with INR results       No current facility-administered medications for this visit.       REVIEW OF SYSTEMS:   10 point review of systems reviewed and pertinent positives in the HPI.     PHYSICAL EXAMINATION:  Physical Exam     Vital signs: BP (!) 178/83   Pulse 63   Temp 98.6  F (37  C)   Resp 18   Ht 1.829 m (6')   Wt 99.2 kg (218 lb 12.8 oz)   SpO2 98%   BMI 29.67 kg/m    General: Awake, Alert, oriented x3, sitting up in wheelchair, appropriately, follows simple commands, conversant  HEENT:Pink conjunctiva, slight redness to right eye, patient legally blind in  right eye, moist oral mucosa  NECK: Supple  CVS:  S1  S2, without murmur or gallop.   LUNG: Clear to auscultation, No wheezes, rales or rhonci.  BACK: No kyphosis of the thoracic spine  ABDOMEN: Soft,with positive bowel sounds  EXTREMITIES: Moves both upper and lower extremities diffuse weakness, no pedal edema, no calf tenderness  SKIN: Warm and dry  NEUROLOGIC: Intact, pulses palpable  PSYCHIATRIC: Pleasant affect.      Labs:  All labs reviewed in the nursing home record and Epic   @  Lab Results   Component Value Date    WBC 6.5 07/29/2024     Lab Results   Component Value Date    RBC 3.41 07/29/2024     Lab Results   Component Value Date    HGB 10.6 07/29/2024     Lab Results   Component Value Date    HCT 33.2 07/29/2024     Lab Results   Component Value Date    MCV 97 07/29/2024     Lab Results   Component Value Date    MCH 31.1 07/29/2024     Lab Results   Component Value Date    MCHC 31.9 07/29/2024     Lab Results   Component Value Date    RDW 12.9 07/29/2024     Lab Results   Component Value Date     07/29/2024        @Last Comprehensive Metabolic Panel:  Sodium   Date Value Ref Range Status   08/01/2024 143 135 - 145 mmol/L Final     Potassium   Date Value Ref Range Status   08/01/2024 4.5 3.4 - 5.3 mmol/L Final   05/05/2022 4.4 3.5 - 5.0 mmol/L Final     Chloride   Date Value Ref Range Status   08/01/2024 108 (H) 98 - 107 mmol/L Final   05/02/2022 109 (H) 98 - 107 mmol/L Final     Carbon Dioxide (CO2)   Date Value Ref Range Status   08/01/2024 24 22 - 29 mmol/L Final   05/02/2022 25 22 - 31 mmol/L Final     Anion Gap   Date Value Ref Range Status   08/01/2024 11 7 - 15 mmol/L Final   05/02/2022 8 5 - 18 mmol/L Final     Glucose   Date Value Ref Range Status   08/01/2024 178 (H) 70 - 99 mg/dL Final   05/02/2022 133 (H) 70 - 125 mg/dL Final     GLUCOSE BY METER POCT   Date Value Ref Range Status   07/29/2024 255 (H) 70 - 99 mg/dL Final     Urea Nitrogen   Date Value Ref Range Status   08/01/2024 13.4  8.0 - 23.0 mg/dL Final   05/02/2022 11 8 - 28 mg/dL Final     Creatinine   Date Value Ref Range Status   08/01/2024 0.78 0.67 - 1.17 mg/dL Final     GFR Estimate   Date Value Ref Range Status   08/01/2024 >90 >60 mL/min/1.73m2 Final   06/10/2021 >60 >60 mL/min/1.73m2 Final     Calcium   Date Value Ref Range Status   08/01/2024 8.5 (L) 8.8 - 10.4 mg/dL Final     Comment:     Reference intervals for this test were updated on 7/16/2024 to reflect our healthy population more accurately. There may be differences in the flagging of prior results with similar values performed with this method. Those prior results can be interpreted in the context of the updated reference intervals.       This note has been dictated using voice recognition software. Any grammatical or context distortions are unintentional and inherent to the software    Electronically signed by: Kylie Gomez CNP       Sincerely,        Kylie Gomez NP

## 2024-08-01 NOTE — PROGRESS NOTES
Incoming fax from [facility]    Date of result 8/1/24    INR result 2.6    Route result to: [facility] (TCU/LTC/detention)

## 2024-08-01 NOTE — PROGRESS NOTES
ANTICOAGULATION MANAGEMENT     Rakesh Samuels 73 year old male is on warfarin with therapeutic INR result. (Goal INR 2.0-3.0)    Recent labs: (last 7 days)     08/01/24  0000   INR 2.6*       ASSESSMENT     Source(s): Chart Review, Home Care/Facility Nurse, and Template     Warfarin doses taken: Warfarin taken as instructed  Diet: No new diet changes identified  Medication/supplement changes:  Amoxicillin 7/29-8/4 may be increasing inr today  New illness, injury, or hospitalization: No  Signs or symptoms of bleeding or clotting: No  Previous result: Therapeutic last 2(+) visits  Additional findings: None       PLAN     Recommended plan for temporary change(s) affecting INR     Dosing Instructions:  5 mg daily until Sunday  with next INR in 4 days       Summary  As of 8/1/2024      Full warfarin instructions:  8/1: 5 mg; Otherwise 7.5 mg every Mon, Thu; 5 mg all other days   Next INR check:  8/5/2024               Telephone call with [facility] nurse ([facility]) who verbalizes understanding and agrees to plan and who agrees to plan and repeated back plan correctly    Orders given to  Homecare nurse/facility to recheck    Education provided: Contact [phone] with any changes, questions or concerns.     Plan made per [facility] anticoagulation protocol    Ruby Manzanares RN  Anticoagulation Clinic  8/1/2024    _______________________________________________________________________     Anticoagulation Episode Summary       Current INR goal:  2.0-3.0   TTR:  --   Target end date:  Indefinite   Send INR reminders to:  [facility] (U/LTC/senior care)    Indications    Paroxysmal atrial fibrillation (H) [I48.0]             Comments:  [facility]             Anticoagulation Care Providers       Provider Role Specialty Phone number    Kylie Gomez NP Referring Nurse Practitioner [phone]

## 2024-08-01 NOTE — PROGRESS NOTES
Update regarding discharge disposition called to Sola at [facility] ([phone]).    Emily Rosa RN

## 2024-08-02 NOTE — PROGRESS NOTES
[facility] GERIATRIC [facility]    Code Status:  DNR/DNI   Visit Type:   Chief Complaint   Patient presents with    TCU Follow Up     Facility:  [facility]) [[id number]]         HPI: Rakesh Samuels is a 73 year old male who I am seeing today for follow up on the TCU.  Patient recently hospitalized with sepsis due to UTI.  Patient had altered mental status.  Blood culture grew Enterococcus.  Patient treated with IV Zosyn and later switched to IV ampicillin then to oral amoxicillin for 10 days.  C  He initially had a Olsen catheter placed in the ER however passed a voiding trial.    Transitional Care Course: Today pt lying in bed. Pt with recent UTI. He continues on oral anbx. Pt denies any dysuria. He is voiding adequately. A fib on chronic AC on warfarin.     Assessment/Plan:     Urinary tract infection without hematuria, site unspecified  Bacterial sepsis (H)  -Continue oral amoxicillin for total of 10 days.  -CT of the abdomen showed patchy subtle areas of decreased cortical enhancement within the right kidney which could either reflect pyelonephritis or perfusional changes related to chronic renal atrophy and multifocal scars.  No renal abscess.  -Follow-up with urology outpatient for chronic mild left hydronephrosis extending into the ureteropelvic junction.  -Follow-up CBC unremarkable.     Type 2 diabetes mellitus with hyperosmolarity without coma, without long-term current use of insulin (H)  -Consistent carb diet.  -Recent hemoglobin A1c 6.8.  -Metformin 1000 mg 2 times daily.  -Blood sugar checks twice daily.  -Blood sugars satisfactory.     Paroxysmal atrial fibrillation (H)  -Continue with warfarin.  -INRs to be managed per the Coumadin clinic.  -INR 2.4.    Depression with anxiety   -Continue prior to admit Luvox and Abilify.      Active Ambulatory Problems     Diagnosis Date Noted    SIRS (systemic inflammatory response syndrome) (H) 09/09/2019    Adrenal incidentaloma (H24)      Diet-controlled diabetes mellitus (H)     Pericardial effusion     Lactic acidosis     Type 2 diabetes mellitus without complication, without long-term current use of insulin (H) 04/12/2020    Paroxysmal atrial fibrillation (H) 02/18/2020    Calculus of ureter 04/12/2020    Acute renal failure, unspecified acute renal failure type (H24) 04/12/2020    Acute renal failure (ARF) (H24) 04/12/2020    ATN (acute tubular necrosis) (H24) 04/12/2020    Sepsis due to urinary tract infection (H)     Shock circulatory (H)     Metformin overdose of undetermined intent, initial encounter     Acute respiratory failure with hypoxemia (H)     Metabolic acidosis     Hyperkalemia     Hematemesis, presence of nausea not specified 04/12/2020    Calculus of kidney     Syncope and collapse 06/30/2020    Hyperglycemia     After care 07/03/2012    Age-related cataract 03/27/2021    Anemia associated with acute blood loss 06/27/2012    Balanitis 03/27/2021    Cholelithiasis without obstruction 03/27/2021    Constipation 07/03/2012    Depression with anxiety 07/03/2012    Glaucoma 06/25/2012    Hemorrhoids without complication 03/27/2021    Hyperlipidemia 06/07/2018    Loss of appetite 03/27/2021    Major depressive disorder, recurrent episode, in partial remission (H24) 06/08/2018    Mixed personality disorder in adult (H) 06/08/2018    Obstructive sleep apnea 03/27/2021    Osteoarthrosis involving lower leg 06/23/2012    Periodic limb movement disorder 03/27/2021    Recurrent major depression (H24) 03/27/2021    S/P ureteral stent placement 03/27/2021    Unilateral small kidney 03/27/2021    Nephrolithiasis 03/27/2021    Type 2 diabetes mellitus (H) 06/25/2012    Osteoarthritis of knee 03/27/2021    Persistent atrial fibrillation (H) 03/27/2021    Pyelonephritis 03/27/2021    Urinary tract infection without hematuria, site unspecified 04/30/2022    Sepsis without acute organ dysfunction (H) 04/30/2022    UTI (urinary tract infection)  05/01/2022    Benign prostatic hyperplasia with urinary frequency 05/07/2022    Hypomagnesemia 05/07/2022    Injury of head, initial encounter 07/31/2023    Fall at home, initial encounter 07/31/2023    E. coli urinary tract infection 08/02/2023    Septic encephalopathy 07/25/2024    DM (diabetes mellitus), type 2 with complications (H) 07/25/2024    Urinary tract infection with hematuria, site unspecified 07/26/2024    Altered mental status, unspecified altered mental status type 07/26/2024    Sepsis, due to unspecified organism, unspecified whether acute organ dysfunction present (H) 07/26/2024    Normocytic anemia 11/29/2023    Status post right knee replacement 11/29/2023     Resolved Ambulatory Problems     Diagnosis Date Noted    No Resolved Ambulatory Problems     Past Medical History:   Diagnosis Date    A-fib (H)     Acute hemodialysis encounter (H24)     Acute kidney injury (H24)     Asbestosis (H)     Atrophy of right kidney     Basal cell carcinoma     BPH without urinary obstruction     Cellulitis     Cholelithiasis     Diabetes mellitus, type 2 (H) 4/12/2020    Esophagitis     Gastritis     Hematemesis, presence of nausea not specified     Hyperlipemia     Kidney stone     Metformin overdose of undetermined intent     PTSD (post-traumatic stress disorder)     Seasonal allergies     Sleep apnea     Upper GI bleed      No Known Allergies    All Meds and Allergies reviewed in the record at the facility and is the most up-to-date.    Current Outpatient Medications   Medication Sig Dispense Refill    acetaminophen (TYLENOL) 500 MG tablet [ACETAMINOPHEN (TYLENOL) 500 MG TABLET] Take 500 mg by mouth every 6 (six) hours as needed for pain.      amoxicillin (AMOXIL) 500 MG capsule Take 2 capsules (1,000 mg) by mouth every 8 hours for 7 days      ARIPiprazole (ABILIFY) 2 MG tablet [ARIPIPRAZOLE (ABILIFY) 2 MG TABLET] Take 2 mg by mouth at bedtime.       brimonidine (ALPHAGAN) 0.2 % ophthalmic solution  [BRIMONIDINE (ALPHAGAN) 0.2 % OPHTHALMIC SOLUTION] Administer 1 drop to both eyes 2 (two) times a day.       dorzolamide-timolol (COSOPT) 2-0.5 % ophthalmic solution Place 1 drop into both eyes 2 times daily      fluvoxaMINE (LUVOX) 50 MG tablet [FLUVOXAMINE (LUVOX) 50 MG TABLET] Take 50 mg by mouth at bedtime.       gabapentin (NEURONTIN) 300 MG capsule Take 300 mg by mouth 2 times daily      hydrOXYzine (ATARAX) 10 MG tablet Take 10 mg by mouth 3 times daily as needed      latanoprostene bunod 0.024 % Drop Place 1 drop into both eyes At Bedtime      levomefolate calcium (L-METHYLFOLATE ORAL) [LEVOMEFOLATE CALCIUM (L-METHYLFOLATE ORAL)] Take 1.25 mg by mouth daily.       metFORMIN (GLUCOPHAGE) 1000 MG tablet Take 1,000 mg by mouth 2 times daily (with meals)      Multiple Vitamins-Minerals (CENTRUM SILVER) CHEW Take 1 tablet by mouth daily      netarsudiL (RHOPRESSA) 0.02 % Drop Place 1 drop into both eyes every evening      warfarin ANTICOAGULANT (COUMADIN) 5 MG tablet Take 5 to 7.5mg (1 to 1.5 tabs) by mouth daily OR AS DIRECTED.  Adjust dose based on INR. (Patient not taking: Reported on 7/30/2024) 100 tablet 1    WARFARIN SODIUM PO Take by mouth See Admin Instructions Dosing in coordination with INR results       No current facility-administered medications for this visit.       REVIEW OF SYSTEMS:   10 point review of systems reviewed and pertinent positives in the HPI.     PHYSICAL EXAMINATION:  Physical Exam     Vital signs: BP (!) 178/83   Pulse 63   Temp 98.6  F (37  C)   Resp 18   Ht 1.829 m (6')   Wt 99.2 kg (218 lb 12.8 oz)   SpO2 98%   BMI 29.67 kg/m    General: Awake, Alert, oriented x3, sitting up in wheelchair, appropriately, follows simple commands, conversant  HEENT:Pink conjunctiva, slight redness to right eye, patient legally blind in right eye, moist oral mucosa  NECK: Supple  CVS:  S1  S2, without murmur or gallop.   LUNG: Clear to auscultation, No wheezes, rales or rhonci.  BACK: No  kyphosis of the thoracic spine  ABDOMEN: Soft,with positive bowel sounds  EXTREMITIES: Moves both upper and lower extremities diffuse weakness, no pedal edema, no calf tenderness  SKIN: Warm and dry  NEUROLOGIC: Intact, pulses palpable  PSYCHIATRIC: Pleasant affect.      Labs:  All labs reviewed in the nursing home record and Epic   @  Lab Results   Component Value Date    WBC 6.5 07/29/2024     Lab Results   Component Value Date    RBC 3.41 07/29/2024     Lab Results   Component Value Date    HGB 10.6 07/29/2024     Lab Results   Component Value Date    HCT 33.2 07/29/2024     Lab Results   Component Value Date    MCV 97 07/29/2024     Lab Results   Component Value Date    MCH 31.1 07/29/2024     Lab Results   Component Value Date    MCHC 31.9 07/29/2024     Lab Results   Component Value Date    RDW 12.9 07/29/2024     Lab Results   Component Value Date     07/29/2024        @Last Comprehensive Metabolic Panel:  Sodium   Date Value Ref Range Status   08/01/2024 143 135 - 145 mmol/L Final     Potassium   Date Value Ref Range Status   08/01/2024 4.5 3.4 - 5.3 mmol/L Final   05/05/2022 4.4 3.5 - 5.0 mmol/L Final     Chloride   Date Value Ref Range Status   08/01/2024 108 (H) 98 - 107 mmol/L Final   05/02/2022 109 (H) 98 - 107 mmol/L Final     Carbon Dioxide (CO2)   Date Value Ref Range Status   08/01/2024 24 22 - 29 mmol/L Final   05/02/2022 25 22 - 31 mmol/L Final     Anion Gap   Date Value Ref Range Status   08/01/2024 11 7 - 15 mmol/L Final   05/02/2022 8 5 - 18 mmol/L Final     Glucose   Date Value Ref Range Status   08/01/2024 178 (H) 70 - 99 mg/dL Final   05/02/2022 133 (H) 70 - 125 mg/dL Final     GLUCOSE BY METER POCT   Date Value Ref Range Status   07/29/2024 255 (H) 70 - 99 mg/dL Final     Urea Nitrogen   Date Value Ref Range Status   08/01/2024 13.4 8.0 - 23.0 mg/dL Final   05/02/2022 11 8 - 28 mg/dL Final     Creatinine   Date Value Ref Range Status   08/01/2024 0.78 0.67 - 1.17 mg/dL Final     GFR  Estimate   Date Value Ref Range Status   08/01/2024 >90 >60 mL/min/1.73m2 Final   06/10/2021 >60 >60 mL/min/1.73m2 Final     Calcium   Date Value Ref Range Status   08/01/2024 8.5 (L) 8.8 - 10.4 mg/dL Final     Comment:     Reference intervals for this test were updated on 7/16/2024 to reflect our healthy population more accurately. There may be differences in the flagging of prior results with similar values performed with this method. Those prior results can be interpreted in the context of the updated reference intervals.       This note has been dictated using voice recognition software. Any grammatical or context distortions are unintentional and inherent to the software    Electronically signed by: Kylie Gomez CNP

## 2024-08-05 ENCOUNTER — ANTICOAGULATION THERAPY VISIT (OUTPATIENT)
Dept: ANTICOAGULATION | Facility: CLINIC | Age: 74
End: 2024-08-05
Payer: MEDICARE

## 2024-08-05 DIAGNOSIS — I48.0 PAROXYSMAL ATRIAL FIBRILLATION (H): Primary | ICD-10-CM

## 2024-08-05 LAB — INR (EXTERNAL): 2.2

## 2024-08-05 NOTE — PROGRESS NOTES
ANTICOAGULATION MANAGEMENT     Rakesh Samuels 73 year old male is on warfarin with therapeutic INR result. (Goal INR 2.0-3.0)    Recent labs: (last 7 days)     08/05/24  0000   INR 2.2       ASSESSMENT     Source(s): Chart Review and Home Care/Facility Nurse     Warfarin doses taken: Warfarin taken as instructed  Diet: No new diet changes identified  Medication/supplement changes:  completed amoxicillin 8/4/24   New illness, injury, or hospitalization: No  Signs or symptoms of bleeding or clotting: No  Previous result: Therapeutic last 2(+) visits  Additional findings: None       PLAN     Recommended plan for temporary change(s) affecting INR     Dosing Instructions: Continue your current warfarin dose with next INR in 1 week       Summary  As of 8/5/2024      Full warfarin instructions:  7.5 mg every Mon, Thu; 5 mg all other days   Next INR check:  8/12/2024               Telephone call with [facility] nurse (medical care for seniors) who agrees to plan and repeated back plan correctly    Orders given to  Homecare nurse/facility to recheck    Education provided: Please call back if any changes to your diet, medications or how you've been taking warfarin    Plan made per [facility] anticoagulation protocol    Jeannie Baldwin RN  Anticoagulation Clinic  8/5/2024    _______________________________________________________________________     Anticoagulation Episode Summary       Current INR goal:  2.0-3.0   TTR:  --   Target end date:  Indefinite   Send INR reminders to:  [facility] (U/LTC/[facility])    Indications    Paroxysmal atrial fibrillation (H) [I48.0]             Comments:  [facility]             Anticoagulation Care Providers       Provider Role Specialty Phone number    Kylie Gomez NP Referring Nurse Practitioner [phone]

## 2024-08-06 ENCOUNTER — TRANSITIONAL CARE UNIT VISIT (OUTPATIENT)
Dept: GERIATRICS | Facility: CLINIC | Age: 74
End: 2024-08-06
Payer: MEDICARE

## 2024-08-06 ENCOUNTER — LAB REQUISITION (OUTPATIENT)
Dept: LAB | Facility: CLINIC | Age: 74
End: 2024-08-06
Payer: MEDICARE

## 2024-08-06 VITALS
DIASTOLIC BLOOD PRESSURE: 79 MMHG | TEMPERATURE: 98.1 F | HEIGHT: 72 IN | BODY MASS INDEX: 26.63 KG/M2 | RESPIRATION RATE: 16 BRPM | OXYGEN SATURATION: 96 % | SYSTOLIC BLOOD PRESSURE: 145 MMHG | HEART RATE: 76 BPM | WEIGHT: 196.6 LBS

## 2024-08-06 DIAGNOSIS — I10 ESSENTIAL HYPERTENSION: ICD-10-CM

## 2024-08-06 DIAGNOSIS — E11.00 TYPE 2 DIABETES MELLITUS WITH HYPEROSMOLARITY WITHOUT COMA, WITHOUT LONG-TERM CURRENT USE OF INSULIN (H): ICD-10-CM

## 2024-08-06 DIAGNOSIS — A41.9 BACTERIAL SEPSIS (H): ICD-10-CM

## 2024-08-06 DIAGNOSIS — N13.30 HYDRONEPHROSIS, UNSPECIFIED HYDRONEPHROSIS TYPE: ICD-10-CM

## 2024-08-06 DIAGNOSIS — N39.0 URINARY TRACT INFECTION WITHOUT HEMATURIA, SITE UNSPECIFIED: Primary | ICD-10-CM

## 2024-08-06 DIAGNOSIS — I48.0 PAROXYSMAL ATRIAL FIBRILLATION (H): ICD-10-CM

## 2024-08-06 DIAGNOSIS — I10 ESSENTIAL (PRIMARY) HYPERTENSION: ICD-10-CM

## 2024-08-06 PROCEDURE — 99309 SBSQ NF CARE MODERATE MDM 30: CPT | Performed by: NURSE PRACTITIONER

## 2024-08-06 NOTE — LETTER
8/6/2024      Rakesh Samuels  [address]  [place] [address]        [facility] GERIATRIC SERVICES    Code Status:  DNR/DNI   Visit Type:   Chief Complaint   Patient presents with     TCU Follow Up     Facility:  [facility]) [[id number]]         HPI: Rakesh Samuels is a 73 year old male who I am seeing today for follow up on the TCU.  Patient recently hospitalized with sepsis due to UTI.  Patient had altered mental status.  Blood culture grew Enterococcus.  Patient treated with IV Zosyn and later switched to IV ampicillin then to oral amoxicillin for 10 days.  C  He initially had a Olsen catheter placed in the ER however passed a voiding trial.    Transitional Care Course: Today pt lying in bed. Pt with recent UTI.  He has completed his oral antibiotics.  He is currently afebrile.  He reports he is emptying his bladder.  No dysuria.  Legally blind.  A-fib on chronic anticoagulation with warfarin.  Patient denies any shortness of breath or chest pain.    Assessment/Plan:     Urinary tract infection without hematuria, site unspecified  Bacterial sepsis (H)  Hydronephrosis   -Amoxicillin complete.  -CT of the abdomen showed patchy subtle areas of decreased cortical enhancement within the right kidney which could either reflect pyelonephritis or perfusional changes related to chronic renal atrophy and multifocal scars.  No renal abscess.  -Follow-up with urology outpatient for chronic mild left hydronephrosis extending into the ureteropelvic junction.  -Repeat CBC and BMP.     Type 2 diabetes mellitus with hyperosmolarity without coma, without long-term current use of insulin (H)  -Consistent carb diet.  -Recent hemoglobin A1c 6.8.  -Metformin 1000 mg 2 times daily.  -Blood sugar checks twice daily.  -Blood sugars satisfactory.     Paroxysmal atrial fibrillation (H)  -Continue with warfarin.  -INRs to be managed per the Coumadin clinic.    Depression with anxiety   -Continue prior to admit Luvox  and Abilify.    Active Ambulatory Problems     Diagnosis Date Noted     SIRS (systemic inflammatory response syndrome) (H) 09/09/2019     Adrenal incidentaloma (H24)      Diet-controlled diabetes mellitus (H)      Pericardial effusion      Lactic acidosis      Type 2 diabetes mellitus without complication, without long-term current use of insulin (H) 04/12/2020     Paroxysmal atrial fibrillation (H) 02/18/2020     Calculus of ureter 04/12/2020     Acute renal failure, unspecified acute renal failure type (H24) 04/12/2020     Acute renal failure (ARF) (H24) 04/12/2020     ATN (acute tubular necrosis) (H24) 04/12/2020     Sepsis due to urinary tract infection (H)      Shock circulatory (H)      Metformin overdose of undetermined intent, initial encounter      Acute respiratory failure with hypoxemia (H)      Metabolic acidosis      Hyperkalemia      Hematemesis, presence of nausea not specified 04/12/2020     Calculus of kidney      Syncope and collapse 06/30/2020     Hyperglycemia      After care 07/03/2012     Age-related cataract 03/27/2021     Anemia associated with acute blood loss 06/27/2012     Balanitis 03/27/2021     Cholelithiasis without obstruction 03/27/2021     Constipation 07/03/2012     Depression with anxiety 07/03/2012     Glaucoma 06/25/2012     Hemorrhoids without complication 03/27/2021     Hyperlipidemia 06/07/2018     Loss of appetite 03/27/2021     Major depressive disorder, recurrent episode, in partial remission (H24) 06/08/2018     Mixed personality disorder in adult (H) 06/08/2018     Obstructive sleep apnea 03/27/2021     Osteoarthrosis involving lower leg 06/23/2012     Periodic limb movement disorder 03/27/2021     Recurrent major depression (H24) 03/27/2021     S/P ureteral stent placement 03/27/2021     Unilateral small kidney 03/27/2021     Nephrolithiasis 03/27/2021     Type 2 diabetes mellitus (H) 06/25/2012     Osteoarthritis of knee 03/27/2021     Persistent atrial fibrillation  (H) 03/27/2021     Pyelonephritis 03/27/2021     Urinary tract infection without hematuria, site unspecified 04/30/2022     Sepsis without acute organ dysfunction (H) 04/30/2022     UTI (urinary tract infection) 05/01/2022     Benign prostatic hyperplasia with urinary frequency 05/07/2022     Hypomagnesemia 05/07/2022     Injury of head, initial encounter 07/31/2023     Fall at home, initial encounter 07/31/2023     E. coli urinary tract infection 08/02/2023     Septic encephalopathy 07/25/2024     DM (diabetes mellitus), type 2 with complications (H) 07/25/2024     Urinary tract infection with hematuria, site unspecified 07/26/2024     Altered mental status, unspecified altered mental status type 07/26/2024     Sepsis, due to unspecified organism, unspecified whether acute organ dysfunction present (H) 07/26/2024     Normocytic anemia 11/29/2023     Status post right knee replacement 11/29/2023     Resolved Ambulatory Problems     Diagnosis Date Noted     No Resolved Ambulatory Problems     Past Medical History:   Diagnosis Date     A-fib (H)      Acute hemodialysis encounter (H24)      Acute kidney injury (H24)      Asbestosis (H)      Atrophy of right kidney      Basal cell carcinoma      BPH without urinary obstruction      Cellulitis      Cholelithiasis      Diabetes mellitus, type 2 (H) 4/12/2020     Esophagitis      Gastritis      Hematemesis, presence of nausea not specified      Hyperlipemia      Kidney stone      Metformin overdose of undetermined intent      PTSD (post-traumatic stress disorder)      Seasonal allergies      Sleep apnea      Upper GI bleed      No Known Allergies    All Meds and Allergies reviewed in the record at the facility and is the most up-to-date.    Current Outpatient Medications   Medication Sig Dispense Refill     warfarin ANTICOAGULANT (COUMADIN) 5 MG tablet Take 5 to 7.5mg (1 to 1.5 tabs) by mouth daily OR AS DIRECTED.  Adjust dose based on INR. 100 tablet 1     acetaminophen  (TYLENOL) 500 MG tablet [ACETAMINOPHEN (TYLENOL) 500 MG TABLET] Take 500 mg by mouth every 6 (six) hours as needed for pain.       ARIPiprazole (ABILIFY) 2 MG tablet [ARIPIPRAZOLE (ABILIFY) 2 MG TABLET] Take 2 mg by mouth at bedtime.        brimonidine (ALPHAGAN) 0.2 % ophthalmic solution [BRIMONIDINE (ALPHAGAN) 0.2 % OPHTHALMIC SOLUTION] Administer 1 drop to both eyes 2 (two) times a day.        dorzolamide-timolol (COSOPT) 2-0.5 % ophthalmic solution Place 1 drop into both eyes 2 times daily       fluvoxaMINE (LUVOX) 50 MG tablet [FLUVOXAMINE (LUVOX) 50 MG TABLET] Take 50 mg by mouth at bedtime.        gabapentin (NEURONTIN) 300 MG capsule Take 300 mg by mouth 2 times daily       hydrOXYzine (ATARAX) 10 MG tablet Take 10 mg by mouth 3 times daily as needed       latanoprostene bunod 0.024 % Drop Place 1 drop into both eyes At Bedtime       levomefolate calcium (L-METHYLFOLATE ORAL) [LEVOMEFOLATE CALCIUM (L-METHYLFOLATE ORAL)] Take 1.25 mg by mouth daily.        metFORMIN (GLUCOPHAGE) 1000 MG tablet Take 1,000 mg by mouth 2 times daily (with meals)       Multiple Vitamins-Minerals (CENTRUM SILVER) CHEW Take 1 tablet by mouth daily       netarsudiL (RHOPRESSA) 0.02 % Drop Place 1 drop into both eyes every evening       WARFARIN SODIUM PO Take by mouth See Admin Instructions Dosing in coordination with INR results       No current facility-administered medications for this visit.       REVIEW OF SYSTEMS:   10 point review of systems reviewed and pertinent positives in the HPI.     PHYSICAL EXAMINATION:  Physical Exam     Vital signs: BP (!) 145/79   Pulse 76   Temp 98.1  F (36.7  C)   Resp 16   Ht 1.829 m (6')   Wt 89.2 kg (196 lb 9.6 oz)   SpO2 96%   BMI 26.66 kg/m    General: Awake, Alert, lying in bed,  conversant  HEENT:Pink conjunctiva, slight redness to right eye, patient legally blind in right eye, moist oral mucosa  NECK: Supple  CVS:  S1  S2, without murmur or gallop.   LUNG: Clear to auscultation, No  wheezes, rales or rhonci.  BACK: No kyphosis of the thoracic spine  ABDOMEN: Soft,with positive bowel sounds  EXTREMITIES: Curled in bed, no pedal edema, no calf tenderness  SKIN: Warm and dry  NEUROLOGIC: Intact, pulses palpable  PSYCHIATRIC: Pleasant affect.      Labs:  All labs reviewed in the nursing home record and Epic   @  Lab Results   Component Value Date    WBC 6.5 07/29/2024     Lab Results   Component Value Date    RBC 3.41 07/29/2024     Lab Results   Component Value Date    HGB 10.6 07/29/2024     Lab Results   Component Value Date    HCT 33.2 07/29/2024     Lab Results   Component Value Date    MCV 97 07/29/2024     Lab Results   Component Value Date    MCH 31.1 07/29/2024     Lab Results   Component Value Date    MCHC 31.9 07/29/2024     Lab Results   Component Value Date    RDW 12.9 07/29/2024     Lab Results   Component Value Date     07/29/2024        @Last Comprehensive Metabolic Panel:  Sodium   Date Value Ref Range Status   08/01/2024 143 135 - 145 mmol/L Final     Potassium   Date Value Ref Range Status   08/01/2024 4.5 3.4 - 5.3 mmol/L Final   05/05/2022 4.4 3.5 - 5.0 mmol/L Final     Chloride   Date Value Ref Range Status   08/01/2024 108 (H) 98 - 107 mmol/L Final   05/02/2022 109 (H) 98 - 107 mmol/L Final     Carbon Dioxide (CO2)   Date Value Ref Range Status   08/01/2024 24 22 - 29 mmol/L Final   05/02/2022 25 22 - 31 mmol/L Final     Anion Gap   Date Value Ref Range Status   08/01/2024 11 7 - 15 mmol/L Final   05/02/2022 8 5 - 18 mmol/L Final     Glucose   Date Value Ref Range Status   08/01/2024 178 (H) 70 - 99 mg/dL Final   05/02/2022 133 (H) 70 - 125 mg/dL Final     GLUCOSE BY METER POCT   Date Value Ref Range Status   07/29/2024 255 (H) 70 - 99 mg/dL Final     Urea Nitrogen   Date Value Ref Range Status   08/01/2024 13.4 8.0 - 23.0 mg/dL Final   05/02/2022 11 8 - 28 mg/dL Final     Creatinine   Date Value Ref Range Status   08/01/2024 0.78 0.67 - 1.17 mg/dL Final     GFR  Estimate   Date Value Ref Range Status   08/01/2024 >90 >60 mL/min/1.73m2 Final   06/10/2021 >60 >60 mL/min/1.73m2 Final     Calcium   Date Value Ref Range Status   08/01/2024 8.5 (L) 8.8 - 10.4 mg/dL Final     Comment:     Reference intervals for this test were updated on 7/16/2024 to reflect our healthy population more accurately. There may be differences in the flagging of prior results with similar values performed with this method. Those prior results can be interpreted in the context of the updated reference intervals.       This note has been dictated using voice recognition software. Any grammatical or context distortions are unintentional and inherent to the software    Electronically signed by: Kylie Gomez CNP       Sincerely,        Kylie Gomez NP

## 2024-08-07 LAB
ANION GAP SERPL CALCULATED.3IONS-SCNC: 12 MMOL/L (ref 7–15)
BUN SERPL-MCNC: 16 MG/DL (ref 8–23)
CALCIUM SERPL-MCNC: 9 MG/DL (ref 8.8–10.4)
CHLORIDE SERPL-SCNC: 103 MMOL/L (ref 98–107)
CREAT SERPL-MCNC: 0.85 MG/DL (ref 0.67–1.17)
EGFRCR SERPLBLD CKD-EPI 2021: >90 ML/MIN/1.73M2
ERYTHROCYTE [DISTWIDTH] IN BLOOD BY AUTOMATED COUNT: 12.9 % (ref 10–15)
GLUCOSE SERPL-MCNC: 170 MG/DL (ref 70–99)
HCO3 SERPL-SCNC: 26 MMOL/L (ref 22–29)
HCT VFR BLD AUTO: 40.2 % (ref 40–53)
HGB BLD-MCNC: 12.7 G/DL (ref 13.3–17.7)
MCH RBC QN AUTO: 31.5 PG (ref 26.5–33)
MCHC RBC AUTO-ENTMCNC: 31.6 G/DL (ref 31.5–36.5)
MCV RBC AUTO: 100 FL (ref 78–100)
PLATELET # BLD AUTO: 320 10E3/UL (ref 150–450)
POTASSIUM SERPL-SCNC: 4.7 MMOL/L (ref 3.4–5.3)
RBC # BLD AUTO: 4.03 10E6/UL (ref 4.4–5.9)
SODIUM SERPL-SCNC: 141 MMOL/L (ref 135–145)
WBC # BLD AUTO: 9.8 10E3/UL (ref 4–11)

## 2024-08-07 PROCEDURE — 80048 BASIC METABOLIC PNL TOTAL CA: CPT | Performed by: NURSE PRACTITIONER

## 2024-08-07 PROCEDURE — 36415 COLL VENOUS BLD VENIPUNCTURE: CPT | Performed by: NURSE PRACTITIONER

## 2024-08-07 PROCEDURE — 85027 COMPLETE CBC AUTOMATED: CPT | Performed by: NURSE PRACTITIONER

## 2024-08-07 PROCEDURE — P9604 ONE-WAY ALLOW PRORATED TRIP: HCPCS | Performed by: NURSE PRACTITIONER

## 2024-08-07 NOTE — PROGRESS NOTES
[facility] GERIATRIC SERVICES    Code Status:  DNR/DNI   Visit Type:   Chief Complaint   Patient presents with    TCU Follow Up     Facility:  [facility]) [[id number]]         HPI: Rakesh Samuels is a 73 year old male who I am seeing today for follow up on the TCU.  Patient recently hospitalized with sepsis due to UTI.  Patient had altered mental status.  Blood culture grew Enterococcus.  Patient treated with IV Zosyn and later switched to IV ampicillin then to oral amoxicillin for 10 days.  C  He initially had a Olsen catheter placed in the ER however passed a voiding trial.    Transitional Care Course: Today pt lying in bed. Pt with recent UTI.  He has completed his oral antibiotics.  He is currently afebrile.  He reports he is emptying his bladder.  No dysuria.  Legally blind.  A-fib on chronic anticoagulation with warfarin.  Patient denies any shortness of breath or chest pain.    Assessment/Plan:     Urinary tract infection without hematuria, site unspecified  Bacterial sepsis (H)  Hydronephrosis   -Amoxicillin complete.  -CT of the abdomen showed patchy subtle areas of decreased cortical enhancement within the right kidney which could either reflect pyelonephritis or perfusional changes related to chronic renal atrophy and multifocal scars.  No renal abscess.  -Follow-up with urology outpatient for chronic mild left hydronephrosis extending into the ureteropelvic junction.  -Repeat CBC and BMP.     Type 2 diabetes mellitus with hyperosmolarity without coma, without long-term current use of insulin (H)  -Consistent carb diet.  -Recent hemoglobin A1c 6.8.  -Metformin 1000 mg 2 times daily.  -Blood sugar checks twice daily.  -Blood sugars satisfactory.     Paroxysmal atrial fibrillation (H)  -Continue with warfarin.  -INRs to be managed per the Coumadin clinic.    Depression with anxiety   -Continue prior to admit Luvox and Abilify.    Active Ambulatory Problems     Diagnosis Date Noted    SIRS  (systemic inflammatory response syndrome) (H) 09/09/2019    Adrenal incidentaloma (H24)     Diet-controlled diabetes mellitus (H)     Pericardial effusion     Lactic acidosis     Type 2 diabetes mellitus without complication, without long-term current use of insulin (H) 04/12/2020    Paroxysmal atrial fibrillation (H) 02/18/2020    Calculus of ureter 04/12/2020    Acute renal failure, unspecified acute renal failure type (H24) 04/12/2020    Acute renal failure (ARF) (H24) 04/12/2020    ATN (acute tubular necrosis) (H24) 04/12/2020    Sepsis due to urinary tract infection (H)     Shock circulatory (H)     Metformin overdose of undetermined intent, initial encounter     Acute respiratory failure with hypoxemia (H)     Metabolic acidosis     Hyperkalemia     Hematemesis, presence of nausea not specified 04/12/2020    Calculus of kidney     Syncope and collapse 06/30/2020    Hyperglycemia     After care 07/03/2012    Age-related cataract 03/27/2021    Anemia associated with acute blood loss 06/27/2012    Balanitis 03/27/2021    Cholelithiasis without obstruction 03/27/2021    Constipation 07/03/2012    Depression with anxiety 07/03/2012    Glaucoma 06/25/2012    Hemorrhoids without complication 03/27/2021    Hyperlipidemia 06/07/2018    Loss of appetite 03/27/2021    Major depressive disorder, recurrent episode, in partial remission (H24) 06/08/2018    Mixed personality disorder in adult (H) 06/08/2018    Obstructive sleep apnea 03/27/2021    Osteoarthrosis involving lower leg 06/23/2012    Periodic limb movement disorder 03/27/2021    Recurrent major depression (H24) 03/27/2021    S/P ureteral stent placement 03/27/2021    Unilateral small kidney 03/27/2021    Nephrolithiasis 03/27/2021    Type 2 diabetes mellitus (H) 06/25/2012    Osteoarthritis of knee 03/27/2021    Persistent atrial fibrillation (H) 03/27/2021    Pyelonephritis 03/27/2021    Urinary tract infection without hematuria, site unspecified 04/30/2022     Sepsis without acute organ dysfunction (H) 04/30/2022    UTI (urinary tract infection) 05/01/2022    Benign prostatic hyperplasia with urinary frequency 05/07/2022    Hypomagnesemia 05/07/2022    Injury of head, initial encounter 07/31/2023    Fall at home, initial encounter 07/31/2023    E. coli urinary tract infection 08/02/2023    Septic encephalopathy 07/25/2024    DM (diabetes mellitus), type 2 with complications (H) 07/25/2024    Urinary tract infection with hematuria, site unspecified 07/26/2024    Altered mental status, unspecified altered mental status type 07/26/2024    Sepsis, due to unspecified organism, unspecified whether acute organ dysfunction present (H) 07/26/2024    Normocytic anemia 11/29/2023    Status post right knee replacement 11/29/2023     Resolved Ambulatory Problems     Diagnosis Date Noted    No Resolved Ambulatory Problems     Past Medical History:   Diagnosis Date    A-fib (H)     Acute hemodialysis encounter (H24)     Acute kidney injury (H24)     Asbestosis (H)     Atrophy of right kidney     Basal cell carcinoma     BPH without urinary obstruction     Cellulitis     Cholelithiasis     Diabetes mellitus, type 2 (H) 4/12/2020    Esophagitis     Gastritis     Hematemesis, presence of nausea not specified     Hyperlipemia     Kidney stone     Metformin overdose of undetermined intent     PTSD (post-traumatic stress disorder)     Seasonal allergies     Sleep apnea     Upper GI bleed      No Known Allergies    All Meds and Allergies reviewed in the record at the facility and is the most up-to-date.    Current Outpatient Medications   Medication Sig Dispense Refill    warfarin ANTICOAGULANT (COUMADIN) 5 MG tablet Take 5 to 7.5mg (1 to 1.5 tabs) by mouth daily OR AS DIRECTED.  Adjust dose based on INR. 100 tablet 1    acetaminophen (TYLENOL) 500 MG tablet [ACETAMINOPHEN (TYLENOL) 500 MG TABLET] Take 500 mg by mouth every 6 (six) hours as needed for pain.      ARIPiprazole (ABILIFY) 2 MG  tablet [ARIPIPRAZOLE (ABILIFY) 2 MG TABLET] Take 2 mg by mouth at bedtime.       brimonidine (ALPHAGAN) 0.2 % ophthalmic solution [BRIMONIDINE (ALPHAGAN) 0.2 % OPHTHALMIC SOLUTION] Administer 1 drop to both eyes 2 (two) times a day.       dorzolamide-timolol (COSOPT) 2-0.5 % ophthalmic solution Place 1 drop into both eyes 2 times daily      fluvoxaMINE (LUVOX) 50 MG tablet [FLUVOXAMINE (LUVOX) 50 MG TABLET] Take 50 mg by mouth at bedtime.       gabapentin (NEURONTIN) 300 MG capsule Take 300 mg by mouth 2 times daily      hydrOXYzine (ATARAX) 10 MG tablet Take 10 mg by mouth 3 times daily as needed      latanoprostene bunod 0.024 % Drop Place 1 drop into both eyes At Bedtime      levomefolate calcium (L-METHYLFOLATE ORAL) [LEVOMEFOLATE CALCIUM (L-METHYLFOLATE ORAL)] Take 1.25 mg by mouth daily.       metFORMIN (GLUCOPHAGE) 1000 MG tablet Take 1,000 mg by mouth 2 times daily (with meals)      Multiple Vitamins-Minerals (CENTRUM SILVER) CHEW Take 1 tablet by mouth daily      netarsudiL (RHOPRESSA) 0.02 % Drop Place 1 drop into both eyes every evening      WARFARIN SODIUM PO Take by mouth See Admin Instructions Dosing in coordination with INR results       No current facility-administered medications for this visit.       REVIEW OF SYSTEMS:   10 point review of systems reviewed and pertinent positives in the HPI.     PHYSICAL EXAMINATION:  Physical Exam     Vital signs: BP (!) 145/79   Pulse 76   Temp 98.1  F (36.7  C)   Resp 16   Ht 1.829 m (6')   Wt 89.2 kg (196 lb 9.6 oz)   SpO2 96%   BMI 26.66 kg/m    General: Awake, Alert, lying in bed,  conversant  HEENT:Pink conjunctiva, slight redness to right eye, patient legally blind in right eye, moist oral mucosa  NECK: Supple  CVS:  S1  S2, without murmur or gallop.   LUNG: Clear to auscultation, No wheezes, rales or rhonci.  BACK: No kyphosis of the thoracic spine  ABDOMEN: Soft,with positive bowel sounds  EXTREMITIES: Curled in bed, no pedal edema, no calf  tenderness  SKIN: Warm and dry  NEUROLOGIC: Intact, pulses palpable  PSYCHIATRIC: Pleasant affect.      Labs:  All labs reviewed in the nursing home record and Epic   @  Lab Results   Component Value Date    WBC 6.5 07/29/2024     Lab Results   Component Value Date    RBC 3.41 07/29/2024     Lab Results   Component Value Date    HGB 10.6 07/29/2024     Lab Results   Component Value Date    HCT 33.2 07/29/2024     Lab Results   Component Value Date    MCV 97 07/29/2024     Lab Results   Component Value Date    MCH 31.1 07/29/2024     Lab Results   Component Value Date    MCHC 31.9 07/29/2024     Lab Results   Component Value Date    RDW 12.9 07/29/2024     Lab Results   Component Value Date     07/29/2024        @Last Comprehensive Metabolic Panel:  Sodium   Date Value Ref Range Status   08/01/2024 143 135 - 145 mmol/L Final     Potassium   Date Value Ref Range Status   08/01/2024 4.5 3.4 - 5.3 mmol/L Final   05/05/2022 4.4 3.5 - 5.0 mmol/L Final     Chloride   Date Value Ref Range Status   08/01/2024 108 (H) 98 - 107 mmol/L Final   05/02/2022 109 (H) 98 - 107 mmol/L Final     Carbon Dioxide (CO2)   Date Value Ref Range Status   08/01/2024 24 22 - 29 mmol/L Final   05/02/2022 25 22 - 31 mmol/L Final     Anion Gap   Date Value Ref Range Status   08/01/2024 11 7 - 15 mmol/L Final   05/02/2022 8 5 - 18 mmol/L Final     Glucose   Date Value Ref Range Status   08/01/2024 178 (H) 70 - 99 mg/dL Final   05/02/2022 133 (H) 70 - 125 mg/dL Final     GLUCOSE BY METER POCT   Date Value Ref Range Status   07/29/2024 255 (H) 70 - 99 mg/dL Final     Urea Nitrogen   Date Value Ref Range Status   08/01/2024 13.4 8.0 - 23.0 mg/dL Final   05/02/2022 11 8 - 28 mg/dL Final     Creatinine   Date Value Ref Range Status   08/01/2024 0.78 0.67 - 1.17 mg/dL Final     GFR Estimate   Date Value Ref Range Status   08/01/2024 >90 >60 mL/min/1.73m2 Final   06/10/2021 >60 >60 mL/min/1.73m2 Final     Calcium   Date Value Ref Range Status    08/01/2024 8.5 (L) 8.8 - 10.4 mg/dL Final     Comment:     Reference intervals for this test were updated on 7/16/2024 to reflect our healthy population more accurately. There may be differences in the flagging of prior results with similar values performed with this method. Those prior results can be interpreted in the context of the updated reference intervals.       This note has been dictated using voice recognition software. Any grammatical or context distortions are unintentional and inherent to the software    Electronically signed by: Kylie Gomez CNP

## 2024-08-08 ENCOUNTER — TRANSITIONAL CARE UNIT VISIT (OUTPATIENT)
Dept: GERIATRICS | Facility: CLINIC | Age: 74
End: 2024-08-08
Payer: MEDICARE

## 2024-08-08 VITALS
TEMPERATURE: 97.7 F | HEIGHT: 72 IN | BODY MASS INDEX: 26.95 KG/M2 | DIASTOLIC BLOOD PRESSURE: 85 MMHG | HEART RATE: 89 BPM | WEIGHT: 199 LBS | RESPIRATION RATE: 18 BRPM | OXYGEN SATURATION: 96 % | SYSTOLIC BLOOD PRESSURE: 142 MMHG

## 2024-08-08 DIAGNOSIS — A41.9 BACTERIAL SEPSIS (H): ICD-10-CM

## 2024-08-08 DIAGNOSIS — I48.0 PAROXYSMAL ATRIAL FIBRILLATION (H): ICD-10-CM

## 2024-08-08 DIAGNOSIS — E11.00 TYPE 2 DIABETES MELLITUS WITH HYPEROSMOLARITY WITHOUT COMA, WITHOUT LONG-TERM CURRENT USE OF INSULIN (H): ICD-10-CM

## 2024-08-08 DIAGNOSIS — I10 ESSENTIAL HYPERTENSION: ICD-10-CM

## 2024-08-08 DIAGNOSIS — N13.30 HYDRONEPHROSIS, UNSPECIFIED HYDRONEPHROSIS TYPE: ICD-10-CM

## 2024-08-08 DIAGNOSIS — N39.0 URINARY TRACT INFECTION WITHOUT HEMATURIA, SITE UNSPECIFIED: Primary | ICD-10-CM

## 2024-08-08 PROCEDURE — 99309 SBSQ NF CARE MODERATE MDM 30: CPT | Performed by: NURSE PRACTITIONER

## 2024-08-08 NOTE — LETTER
8/8/2024      Rakesh Samuels  [address]  [facility] [id number]        [facility] GERIATRIC SERVICES    Code Status:  DNR/DNI   Visit Type:   Chief Complaint   Patient presents with     TCU Follow Up     Facility:  [facility]) [[id number]]         HPI: Rakesh Sameuls is a 73 year old male who I am seeing today for follow up on the TCU.  Patient recently hospitalized with sepsis due to UTI.  Patient had altered mental status.  Blood culture grew Enterococcus.  Patient treated with IV Zosyn and later switched to IV ampicillin then to oral amoxicillin for 10 days.  C  He initially had a Olsen catheter placed in the ER however passed a voiding trial.    Transitional Care Course: Today pt sitting up in wheelchair. Pt with recent UTI.  He has completed his oral antibiotics. Pt reports emptying his bladder. Bladder scan WNL. Pt denies any dysuria. Follow up labraotry unremarkable. However pt continues to weaken in therapy. A fib on chronic anticoagulation. No SOB or CP. Pt reports pain in his right hip today. He continues on tylenol. He is asking for a longer bed.     Assessment/Plan:     Urinary tract infection without hematuria, site unspecified  Bacterial sepsis (H)  Hydronephrosis   -Amoxicillin complete.  -CT of the abdomen showed patchy subtle areas of decreased cortical enhancement within the right kidney which could either reflect pyelonephritis or perfusional changes related to chronic renal atrophy and multifocal scars.  No renal abscess.  -Follow-up with urology outpatient for chronic mild left hydronephrosis extending into the ureteropelvic junction.  -Repeat CBC and BMP unremarkable.   -Bladder scan WNL.     Type 2 diabetes mellitus with hyperosmolarity without coma, without long-term current use of insulin (H)  -Consistent carb diet.  -Recent hemoglobin A1c 6.8.  -Metformin 1000 mg 2 times daily.  -Blood sugar checks twice daily.  -Blood sugars satisfactory.     Paroxysmal atrial  fibrillation (H)  -Continue with warfarin.  -INRs to be managed per the Coumadin clinic.  -INR 2.2.     Depression with anxiety   -Continue prior to admit Luvox and Abilify.    Active Ambulatory Problems     Diagnosis Date Noted     SIRS (systemic inflammatory response syndrome) (H) 09/09/2019     Adrenal incidentaloma (H24)      Diet-controlled diabetes mellitus (H)      Pericardial effusion      Lactic acidosis      Type 2 diabetes mellitus without complication, without long-term current use of insulin (H) 04/12/2020     Paroxysmal atrial fibrillation (H) 02/18/2020     Calculus of ureter 04/12/2020     Acute renal failure, unspecified acute renal failure type (H24) 04/12/2020     Acute renal failure (ARF) (H24) 04/12/2020     ATN (acute tubular necrosis) (H24) 04/12/2020     Sepsis due to urinary tract infection (H)      Shock circulatory (H)      Metformin overdose of undetermined intent, initial encounter      Acute respiratory failure with hypoxemia (H)      Metabolic acidosis      Hyperkalemia      Hematemesis, presence of nausea not specified 04/12/2020     Calculus of kidney      Syncope and collapse 06/30/2020     Hyperglycemia      After care 07/03/2012     Age-related cataract 03/27/2021     Anemia associated with acute blood loss 06/27/2012     Balanitis 03/27/2021     Cholelithiasis without obstruction 03/27/2021     Constipation 07/03/2012     Depression with anxiety 07/03/2012     Glaucoma 06/25/2012     Hemorrhoids without complication 03/27/2021     Hyperlipidemia 06/07/2018     Loss of appetite 03/27/2021     Major depressive disorder, recurrent episode, in partial remission (H24) 06/08/2018     Mixed personality disorder in adult (H) 06/08/2018     Obstructive sleep apnea 03/27/2021     Osteoarthrosis involving lower leg 06/23/2012     Periodic limb movement disorder 03/27/2021     Recurrent major depression (H24) 03/27/2021     S/P ureteral stent placement 03/27/2021     Unilateral small kidney  03/27/2021     Nephrolithiasis 03/27/2021     Type 2 diabetes mellitus (H) 06/25/2012     Osteoarthritis of knee 03/27/2021     Persistent atrial fibrillation (H) 03/27/2021     Pyelonephritis 03/27/2021     Urinary tract infection without hematuria, site unspecified 04/30/2022     Sepsis without acute organ dysfunction (H) 04/30/2022     UTI (urinary tract infection) 05/01/2022     Benign prostatic hyperplasia with urinary frequency 05/07/2022     Hypomagnesemia 05/07/2022     Injury of head, initial encounter 07/31/2023     Fall at home, initial encounter 07/31/2023     E. coli urinary tract infection 08/02/2023     Septic encephalopathy 07/25/2024     DM (diabetes mellitus), type 2 with complications (H) 07/25/2024     Urinary tract infection with hematuria, site unspecified 07/26/2024     Altered mental status, unspecified altered mental status type 07/26/2024     Sepsis, due to unspecified organism, unspecified whether acute organ dysfunction present (H) 07/26/2024     Normocytic anemia 11/29/2023     Status post right knee replacement 11/29/2023     Resolved Ambulatory Problems     Diagnosis Date Noted     No Resolved Ambulatory Problems     Past Medical History:   Diagnosis Date     A-fib (H)      Acute hemodialysis encounter (H24)      Acute kidney injury (H24)      Asbestosis (H)      Atrophy of right kidney      Basal cell carcinoma      BPH without urinary obstruction      Cellulitis      Cholelithiasis      Diabetes mellitus, type 2 (H) 4/12/2020     Esophagitis      Gastritis      Hematemesis, presence of nausea not specified      Hyperlipemia      Kidney stone      Metformin overdose of undetermined intent      PTSD (post-traumatic stress disorder)      Seasonal allergies      Sleep apnea      Upper GI bleed      No Known Allergies    All Meds and Allergies reviewed in the record at the facility and is the most up-to-date.    Current Outpatient Medications   Medication Sig Dispense Refill      acetaminophen (TYLENOL) 500 MG tablet [ACETAMINOPHEN (TYLENOL) 500 MG TABLET] Take 500 mg by mouth every 6 (six) hours as needed for pain.       ARIPiprazole (ABILIFY) 2 MG tablet [ARIPIPRAZOLE (ABILIFY) 2 MG TABLET] Take 2 mg by mouth at bedtime.        brimonidine (ALPHAGAN) 0.2 % ophthalmic solution [BRIMONIDINE (ALPHAGAN) 0.2 % OPHTHALMIC SOLUTION] Administer 1 drop to both eyes 2 (two) times a day.        dorzolamide-timolol (COSOPT) 2-0.5 % ophthalmic solution Place 1 drop into both eyes 2 times daily       fluvoxaMINE (LUVOX) 50 MG tablet [FLUVOXAMINE (LUVOX) 50 MG TABLET] Take 50 mg by mouth at bedtime.        gabapentin (NEURONTIN) 300 MG capsule Take 300 mg by mouth 2 times daily       latanoprostene bunod 0.024 % Drop Place 1 drop into both eyes At Bedtime       levomefolate calcium (L-METHYLFOLATE ORAL) [LEVOMEFOLATE CALCIUM (L-METHYLFOLATE ORAL)] Take 1.25 mg by mouth daily.        metFORMIN (GLUCOPHAGE) 1000 MG tablet Take 1,000 mg by mouth 2 times daily (with meals)       Multiple Vitamins-Minerals (CENTRUM SILVER) CHEW Take 1 tablet by mouth daily       netarsudiL (RHOPRESSA) 0.02 % Drop Place 1 drop into both eyes every evening       warfarin ANTICOAGULANT (COUMADIN) 5 MG tablet Take 5 to 7.5mg (1 to 1.5 tabs) by mouth daily OR AS DIRECTED.  Adjust dose based on INR. 100 tablet 1     WARFARIN SODIUM PO Take by mouth See Admin Instructions Dosing in coordination with INR results       No current facility-administered medications for this visit.       REVIEW OF SYSTEMS:   10 point review of systems reviewed and pertinent positives in the HPI.     PHYSICAL EXAMINATION:  Physical Exam     Vital signs: BP (!) 142/85   Pulse 89   Temp 97.7  F (36.5  C)   Resp 18   Ht 1.829 m (6')   Wt 90.3 kg (199 lb)   SpO2 96%   BMI 26.99 kg/m    General: Awake, Alert, sitting up in wheelchair,  conversant  HEENT:Pink conjunctiva, slight redness to right eye, patient legally blind in right eye, moist oral  mucosa  NECK: Supple  CVS:  S1  S2, without murmur or gallop.   LUNG: Clear to auscultation, No wheezes, rales or rhonci.  BACK: No kyphosis of the thoracic spine  ABDOMEN: Soft,with positive bowel sounds  EXTREMITIES:  no pedal edema, no calf tenderness  SKIN: Warm and dry  NEUROLOGIC: Intact, pulses palpable  PSYCHIATRIC: Pleasant affect.      Labs:  All labs reviewed in the nursing home record and Epic   @  Lab Results   Component Value Date    WBC 6.5 07/29/2024     Lab Results   Component Value Date    RBC 3.41 07/29/2024     Lab Results   Component Value Date    HGB 10.6 07/29/2024     Lab Results   Component Value Date    HCT 33.2 07/29/2024     Lab Results   Component Value Date    MCV 97 07/29/2024     Lab Results   Component Value Date    MCH 31.1 07/29/2024     Lab Results   Component Value Date    MCHC 31.9 07/29/2024     Lab Results   Component Value Date    RDW 12.9 07/29/2024     Lab Results   Component Value Date     07/29/2024        @Last Comprehensive Metabolic Panel:  Sodium   Date Value Ref Range Status   08/07/2024 141 135 - 145 mmol/L Final     Potassium   Date Value Ref Range Status   08/07/2024 4.7 3.4 - 5.3 mmol/L Final   05/05/2022 4.4 3.5 - 5.0 mmol/L Final     Chloride   Date Value Ref Range Status   08/07/2024 103 98 - 107 mmol/L Final   05/02/2022 109 (H) 98 - 107 mmol/L Final     Carbon Dioxide (CO2)   Date Value Ref Range Status   08/07/2024 26 22 - 29 mmol/L Final   05/02/2022 25 22 - 31 mmol/L Final     Anion Gap   Date Value Ref Range Status   08/07/2024 12 7 - 15 mmol/L Final   05/02/2022 8 5 - 18 mmol/L Final     Glucose   Date Value Ref Range Status   08/07/2024 170 (H) 70 - 99 mg/dL Final   05/02/2022 133 (H) 70 - 125 mg/dL Final     GLUCOSE BY METER POCT   Date Value Ref Range Status   07/29/2024 255 (H) 70 - 99 mg/dL Final     Urea Nitrogen   Date Value Ref Range Status   08/07/2024 16.0 8.0 - 23.0 mg/dL Final   05/02/2022 11 8 - 28 mg/dL Final     Creatinine   Date  Value Ref Range Status   08/07/2024 0.85 0.67 - 1.17 mg/dL Final     GFR Estimate   Date Value Ref Range Status   08/07/2024 >90 >60 mL/min/1.73m2 Final   06/10/2021 >60 >60 mL/min/1.73m2 Final     Calcium   Date Value Ref Range Status   08/07/2024 9.0 8.8 - 10.4 mg/dL Final     Comment:     Reference intervals for this test were updated on 7/16/2024 to reflect our healthy population more accurately. There may be differences in the flagging of prior results with similar values performed with this method. Those prior results can be interpreted in the context of the updated reference intervals.       This note has been dictated using voice recognition software. Any grammatical or context distortions are unintentional and inherent to the software    Electronically signed by: Kylie Gomez CNP       Sincerely,        Kylie Gomez NP

## 2024-08-09 NOTE — PROGRESS NOTES
[facility] GERIATRIC SERVICES    Code Status:  DNR/DNI   Visit Type:   Chief Complaint   Patient presents with    TCU Follow Up     Facility:  [facility] ([facility]) [[id number]]         HPI: Rakesh Samuels is a 73 year old male who I am seeing today for follow up on the TCU.  Patient recently hospitalized with sepsis due to UTI.  Patient had altered mental status.  Blood culture grew Enterococcus.  Patient treated with IV Zosyn and later switched to IV ampicillin then to oral amoxicillin for 10 days.  C  He initially had a Olsen catheter placed in the ER however passed a voiding trial.    Transitional Care Course: Today pt sitting up in wheelchair. Pt with recent UTI.  He has completed his oral antibiotics. Pt reports emptying his bladder. Bladder scan WNL. Pt denies any dysuria. Follow up labraotry unremarkable. However pt continues to weaken in therapy. A fib on chronic anticoagulation. No SOB or CP. Pt reports pain in his right hip today. He continues on tylenol. He is asking for a longer bed.     Assessment/Plan:     Urinary tract infection without hematuria, site unspecified  Bacterial sepsis (H)  Hydronephrosis   -Amoxicillin complete.  -CT of the abdomen showed patchy subtle areas of decreased cortical enhancement within the right kidney which could either reflect pyelonephritis or perfusional changes related to chronic renal atrophy and multifocal scars.  No renal abscess.  -Follow-up with urology outpatient for chronic mild left hydronephrosis extending into the ureteropelvic junction.  -Repeat CBC and BMP unremarkable.   -Bladder scan WNL.     Type 2 diabetes mellitus with hyperosmolarity without coma, without long-term current use of insulin (H)  -Consistent carb diet.  -Recent hemoglobin A1c 6.8.  -Metformin 1000 mg 2 times daily.  -Blood sugar checks twice daily.  -Blood sugars satisfactory.     Paroxysmal atrial fibrillation (H)  -Continue with warfarin.  -INRs to be managed per the Coumadin  clinic.  -INR 2.2.     Depression with anxiety   -Continue prior to admit Luvox and Abilify.    Active Ambulatory Problems     Diagnosis Date Noted    SIRS (systemic inflammatory response syndrome) (H) 09/09/2019    Adrenal incidentaloma (H24)     Diet-controlled diabetes mellitus (H)     Pericardial effusion     Lactic acidosis     Type 2 diabetes mellitus without complication, without long-term current use of insulin (H) 04/12/2020    Paroxysmal atrial fibrillation (H) 02/18/2020    Calculus of ureter 04/12/2020    Acute renal failure, unspecified acute renal failure type (H24) 04/12/2020    Acute renal failure (ARF) (H24) 04/12/2020    ATN (acute tubular necrosis) (H24) 04/12/2020    Sepsis due to urinary tract infection (H)     Shock circulatory (H)     Metformin overdose of undetermined intent, initial encounter     Acute respiratory failure with hypoxemia (H)     Metabolic acidosis     Hyperkalemia     Hematemesis, presence of nausea not specified 04/12/2020    Calculus of kidney     Syncope and collapse 06/30/2020    Hyperglycemia     After care 07/03/2012    Age-related cataract 03/27/2021    Anemia associated with acute blood loss 06/27/2012    Balanitis 03/27/2021    Cholelithiasis without obstruction 03/27/2021    Constipation 07/03/2012    Depression with anxiety 07/03/2012    Glaucoma 06/25/2012    Hemorrhoids without complication 03/27/2021    Hyperlipidemia 06/07/2018    Loss of appetite 03/27/2021    Major depressive disorder, recurrent episode, in partial remission (H24) 06/08/2018    Mixed personality disorder in adult (H) 06/08/2018    Obstructive sleep apnea 03/27/2021    Osteoarthrosis involving lower leg 06/23/2012    Periodic limb movement disorder 03/27/2021    Recurrent major depression (H24) 03/27/2021    S/P ureteral stent placement 03/27/2021    Unilateral small kidney 03/27/2021    Nephrolithiasis 03/27/2021    Type 2 diabetes mellitus (H) 06/25/2012    Osteoarthritis of knee 03/27/2021     Persistent atrial fibrillation (H) 03/27/2021    Pyelonephritis 03/27/2021    Urinary tract infection without hematuria, site unspecified 04/30/2022    Sepsis without acute organ dysfunction (H) 04/30/2022    UTI (urinary tract infection) 05/01/2022    Benign prostatic hyperplasia with urinary frequency 05/07/2022    Hypomagnesemia 05/07/2022    Injury of head, initial encounter 07/31/2023    Fall at home, initial encounter 07/31/2023    E. coli urinary tract infection 08/02/2023    Septic encephalopathy 07/25/2024    DM (diabetes mellitus), type 2 with complications (H) 07/25/2024    Urinary tract infection with hematuria, site unspecified 07/26/2024    Altered mental status, unspecified altered mental status type 07/26/2024    Sepsis, due to unspecified organism, unspecified whether acute organ dysfunction present (H) 07/26/2024    Normocytic anemia 11/29/2023    Status post right knee replacement 11/29/2023     Resolved Ambulatory Problems     Diagnosis Date Noted    No Resolved Ambulatory Problems     Past Medical History:   Diagnosis Date    A-fib (H)     Acute hemodialysis encounter (H24)     Acute kidney injury (H24)     Asbestosis (H)     Atrophy of right kidney     Basal cell carcinoma     BPH without urinary obstruction     Cellulitis     Cholelithiasis     Diabetes mellitus, type 2 (H) 4/12/2020    Esophagitis     Gastritis     Hematemesis, presence of nausea not specified     Hyperlipemia     Kidney stone     Metformin overdose of undetermined intent     PTSD (post-traumatic stress disorder)     Seasonal allergies     Sleep apnea     Upper GI bleed      No Known Allergies    All Meds and Allergies reviewed in the record at the facility and is the most up-to-date.    Current Outpatient Medications   Medication Sig Dispense Refill    acetaminophen (TYLENOL) 500 MG tablet [ACETAMINOPHEN (TYLENOL) 500 MG TABLET] Take 500 mg by mouth every 6 (six) hours as needed for pain.      ARIPiprazole (ABILIFY) 2 MG  tablet [ARIPIPRAZOLE (ABILIFY) 2 MG TABLET] Take 2 mg by mouth at bedtime.       brimonidine (ALPHAGAN) 0.2 % ophthalmic solution [BRIMONIDINE (ALPHAGAN) 0.2 % OPHTHALMIC SOLUTION] Administer 1 drop to both eyes 2 (two) times a day.       dorzolamide-timolol (COSOPT) 2-0.5 % ophthalmic solution Place 1 drop into both eyes 2 times daily      fluvoxaMINE (LUVOX) 50 MG tablet [FLUVOXAMINE (LUVOX) 50 MG TABLET] Take 50 mg by mouth at bedtime.       gabapentin (NEURONTIN) 300 MG capsule Take 300 mg by mouth 2 times daily      latanoprostene bunod 0.024 % Drop Place 1 drop into both eyes At Bedtime      levomefolate calcium (L-METHYLFOLATE ORAL) [LEVOMEFOLATE CALCIUM (L-METHYLFOLATE ORAL)] Take 1.25 mg by mouth daily.       metFORMIN (GLUCOPHAGE) 1000 MG tablet Take 1,000 mg by mouth 2 times daily (with meals)      Multiple Vitamins-Minerals (CENTRUM SILVER) CHEW Take 1 tablet by mouth daily      netarsudiL (RHOPRESSA) 0.02 % Drop Place 1 drop into both eyes every evening      warfarin ANTICOAGULANT (COUMADIN) 5 MG tablet Take 5 to 7.5mg (1 to 1.5 tabs) by mouth daily OR AS DIRECTED.  Adjust dose based on INR. 100 tablet 1    WARFARIN SODIUM PO Take by mouth See Admin Instructions Dosing in coordination with INR results       No current facility-administered medications for this visit.       REVIEW OF SYSTEMS:   10 point review of systems reviewed and pertinent positives in the HPI.     PHYSICAL EXAMINATION:  Physical Exam     Vital signs: BP (!) 142/85   Pulse 89   Temp 97.7  F (36.5  C)   Resp 18   Ht 1.829 m (6')   Wt 90.3 kg (199 lb)   SpO2 96%   BMI 26.99 kg/m    General: Awake, Alert, sitting up in wheelchair,  conversant  HEENT:Pink conjunctiva, slight redness to right eye, patient legally blind in right eye, moist oral mucosa  NECK: Supple  CVS:  S1  S2, without murmur or gallop.   LUNG: Clear to auscultation, No wheezes, rales or rhonci.  BACK: No kyphosis of the thoracic spine  ABDOMEN: Soft,with positive  bowel sounds  EXTREMITIES:  no pedal edema, no calf tenderness  SKIN: Warm and dry  NEUROLOGIC: Intact, pulses palpable  PSYCHIATRIC: Pleasant affect.      Labs:  All labs reviewed in the nursing home record and Epic   @  Lab Results   Component Value Date    WBC 6.5 07/29/2024     Lab Results   Component Value Date    RBC 3.41 07/29/2024     Lab Results   Component Value Date    HGB 10.6 07/29/2024     Lab Results   Component Value Date    HCT 33.2 07/29/2024     Lab Results   Component Value Date    MCV 97 07/29/2024     Lab Results   Component Value Date    MCH 31.1 07/29/2024     Lab Results   Component Value Date    MCHC 31.9 07/29/2024     Lab Results   Component Value Date    RDW 12.9 07/29/2024     Lab Results   Component Value Date     07/29/2024        @Last Comprehensive Metabolic Panel:  Sodium   Date Value Ref Range Status   08/07/2024 141 135 - 145 mmol/L Final     Potassium   Date Value Ref Range Status   08/07/2024 4.7 3.4 - 5.3 mmol/L Final   05/05/2022 4.4 3.5 - 5.0 mmol/L Final     Chloride   Date Value Ref Range Status   08/07/2024 103 98 - 107 mmol/L Final   05/02/2022 109 (H) 98 - 107 mmol/L Final     Carbon Dioxide (CO2)   Date Value Ref Range Status   08/07/2024 26 22 - 29 mmol/L Final   05/02/2022 25 22 - 31 mmol/L Final     Anion Gap   Date Value Ref Range Status   08/07/2024 12 7 - 15 mmol/L Final   05/02/2022 8 5 - 18 mmol/L Final     Glucose   Date Value Ref Range Status   08/07/2024 170 (H) 70 - 99 mg/dL Final   05/02/2022 133 (H) 70 - 125 mg/dL Final     GLUCOSE BY METER POCT   Date Value Ref Range Status   07/29/2024 255 (H) 70 - 99 mg/dL Final     Urea Nitrogen   Date Value Ref Range Status   08/07/2024 16.0 8.0 - 23.0 mg/dL Final   05/02/2022 11 8 - 28 mg/dL Final     Creatinine   Date Value Ref Range Status   08/07/2024 0.85 0.67 - 1.17 mg/dL Final     GFR Estimate   Date Value Ref Range Status   08/07/2024 >90 >60 mL/min/1.73m2 Final   06/10/2021 >60 >60 mL/min/1.73m2 Final      Calcium   Date Value Ref Range Status   08/07/2024 9.0 8.8 - 10.4 mg/dL Final     Comment:     Reference intervals for this test were updated on 7/16/2024 to reflect our healthy population more accurately. There may be differences in the flagging of prior results with similar values performed with this method. Those prior results can be interpreted in the context of the updated reference intervals.       This note has been dictated using voice recognition software. Any grammatical or context distortions are unintentional and inherent to the software    Electronically signed by: Kylie Gomez CNP

## 2024-08-12 ENCOUNTER — ANTICOAGULATION THERAPY VISIT (OUTPATIENT)
Dept: ANTICOAGULATION | Facility: CLINIC | Age: 74
End: 2024-08-12
Payer: MEDICARE

## 2024-08-12 DIAGNOSIS — I48.0 PAROXYSMAL ATRIAL FIBRILLATION (H): Primary | ICD-10-CM

## 2024-08-12 LAB — INR (EXTERNAL): 4.3

## 2024-08-12 NOTE — PROGRESS NOTES
ANTICOAGULATION MANAGEMENT     Rakesh Samuels 73 year old male is on warfarin with supratherapeutic INR result. (Goal INR 2.0-3.0)    Recent labs: (last 7 days)     08/12/24  0000   INR 4.3       ASSESSMENT     Source(s): Chart Review, Home Care/Facility Nurse, and Template     Warfarin doses taken: Warfarin taken as instructed  Diet: No new diet changes identified  Medication/supplement changes: None noted  New illness, injury, or hospitalization: No  Signs or symptoms of bleeding or clotting: No  Previous result: Therapeutic last 2(+) visits  Additional findings: None       PLAN     Recommended plan for no diet, medication or health factor changes affecting INR     Dosing Instructions: hold dose then decrease your warfarin dose (12.5% change) with next INR in 3 days       Summary  As of 8/12/2024      Full warfarin instructions:  8/12: Hold; Otherwise 5 mg every day   Next INR check:  8/15/2024               Telephone call with [facility] nurse (medical care for seniors) who agrees to plan and repeated back plan correctly    Orders given to  Homecare nurse/facility to recheck    Education provided: Please call back if any changes to your diet, medications or how you've been taking warfarin    Plan made per [facility] anticoagulation protocol    Jeannie Baldwin RN  Anticoagulation Clinic  8/12/2024    _______________________________________________________________________     Anticoagulation Episode Summary       Current INR goal:  2.0-3.0   TTR:  0.0% (3 d)   Target end date:  Indefinite   Send INR reminders to:  [facility] (U/LTC/[facility])    Indications    Paroxysmal atrial fibrillation (H) [I48.0]             Comments:  [facility]             Anticoagulation Care Providers       Provider Role Specialty Phone number    Kylie Gomez NP Referring Nurse Practitioner [phone]

## 2024-08-14 ENCOUNTER — TRANSITIONAL CARE UNIT VISIT (OUTPATIENT)
Dept: GERIATRICS | Facility: CLINIC | Age: 74
End: 2024-08-14
Payer: MEDICARE

## 2024-08-14 VITALS
OXYGEN SATURATION: 96 % | DIASTOLIC BLOOD PRESSURE: 84 MMHG | HEART RATE: 84 BPM | RESPIRATION RATE: 16 BRPM | TEMPERATURE: 97.7 F | HEIGHT: 72 IN | SYSTOLIC BLOOD PRESSURE: 142 MMHG | BODY MASS INDEX: 29.36 KG/M2 | WEIGHT: 216.8 LBS

## 2024-08-14 DIAGNOSIS — I10 ESSENTIAL HYPERTENSION: ICD-10-CM

## 2024-08-14 DIAGNOSIS — E11.00 TYPE 2 DIABETES MELLITUS WITH HYPEROSMOLARITY WITHOUT COMA, WITHOUT LONG-TERM CURRENT USE OF INSULIN (H): ICD-10-CM

## 2024-08-14 DIAGNOSIS — I48.0 PAROXYSMAL ATRIAL FIBRILLATION (H): ICD-10-CM

## 2024-08-14 DIAGNOSIS — N39.0 URINARY TRACT INFECTION WITHOUT HEMATURIA, SITE UNSPECIFIED: Primary | ICD-10-CM

## 2024-08-14 DIAGNOSIS — A41.9 BACTERIAL SEPSIS (H): ICD-10-CM

## 2024-08-14 DIAGNOSIS — N13.30 HYDRONEPHROSIS, UNSPECIFIED HYDRONEPHROSIS TYPE: ICD-10-CM

## 2024-08-14 PROCEDURE — 99309 SBSQ NF CARE MODERATE MDM 30: CPT | Performed by: NURSE PRACTITIONER

## 2024-08-14 NOTE — LETTER
8/14/2024      Rakesh Samuels  [address]  [facility] [address]        [facility] GERIATRIC SERVICES    Code Status:  DNR/DNI   Visit Type:   Chief Complaint   Patient presents with     TCU Follow Up     Facility:  [facility]) [[id number]]         HPI: Rakesh Samuels is a 73 year old male who I am seeing today for follow up on the TCU.  Patient recently hospitalized with sepsis due to UTI.  Patient had altered mental status.  Blood culture grew Enterococcus.  Patient treated with IV Zosyn and later switched to IV ampicillin then to oral amoxicillin for 10 days.  C  He initially had a Olsen catheter placed in the ER however passed a voiding trial.    Transitional Care Course: Today pt lying in bed.  Pt with recent UTI with hydronephrosis.  He has completed his oral antibiotics. Pt reports emptying his bladder.  No further evidence of urinary retention.  Patient denies shortness of breath or chest pain.  Reports right hip pain improving on today's visit.  Continues on Tylenol.  A-fib on chronic anticoagulation.  I do note supratherapeutic INR 2 days prior 4.3.  Coumadin clinic following for dosing.  Diabetes satisfactory control.      Assessment/Plan:     Urinary tract infection without hematuria, site unspecified  Bacterial sepsis (H)  Hydronephrosis   -Amoxicillin complete.  -CT of the abdomen showed patchy subtle areas of decreased cortical enhancement within the right kidney which could either reflect pyelonephritis or perfusional changes related to chronic renal atrophy and multifocal scars.  No renal abscess.  -Follow-up with urology outpatient for chronic mild left hydronephrosis extending into the ureteropelvic junction.  -Repeat CBC and BMP unremarkable.   -Bladder scan WNL.     Type 2 diabetes mellitus with hyperosmolarity without coma, without long-term current use of insulin (H)  -Consistent carb diet.  -Recent hemoglobin A1c 6.8.  -Metformin 1000 mg 2 times daily.  -Blood sugar  checks twice daily.  -Blood sugars satisfactory.     Paroxysmal atrial fibrillation (H)  -Continue with warfarin.  -INRs to be managed per the Coumadin clinic.  -INR 4.3.     Depression with anxiety   -Continue prior to admit Luvox and Abilify.    Active Ambulatory Problems     Diagnosis Date Noted     SIRS (systemic inflammatory response syndrome) (H) 09/09/2019     Adrenal incidentaloma (H24)      Diet-controlled diabetes mellitus (H)      Pericardial effusion      Lactic acidosis      Type 2 diabetes mellitus without complication, without long-term current use of insulin (H) 04/12/2020     Paroxysmal atrial fibrillation (H) 02/18/2020     Calculus of ureter 04/12/2020     Acute renal failure, unspecified acute renal failure type (H24) 04/12/2020     Acute renal failure (ARF) (H24) 04/12/2020     ATN (acute tubular necrosis) (H24) 04/12/2020     Sepsis due to urinary tract infection (H)      Shock circulatory (H)      Metformin overdose of undetermined intent, initial encounter      Acute respiratory failure with hypoxemia (H)      Metabolic acidosis      Hyperkalemia      Hematemesis, presence of nausea not specified 04/12/2020     Calculus of kidney      Syncope and collapse 06/30/2020     Hyperglycemia      After care 07/03/2012     Age-related cataract 03/27/2021     Anemia associated with acute blood loss 06/27/2012     Balanitis 03/27/2021     Cholelithiasis without obstruction 03/27/2021     Constipation 07/03/2012     Depression with anxiety 07/03/2012     Glaucoma 06/25/2012     Hemorrhoids without complication 03/27/2021     Hyperlipidemia 06/07/2018     Loss of appetite 03/27/2021     Major depressive disorder, recurrent episode, in partial remission (H24) 06/08/2018     Mixed personality disorder in adult (H) 06/08/2018     Obstructive sleep apnea 03/27/2021     Osteoarthrosis involving lower leg 06/23/2012     Periodic limb movement disorder 03/27/2021     Recurrent major depression (H24) 03/27/2021      S/P ureteral stent placement 03/27/2021     Unilateral small kidney 03/27/2021     Nephrolithiasis 03/27/2021     Type 2 diabetes mellitus (H) 06/25/2012     Osteoarthritis of knee 03/27/2021     Persistent atrial fibrillation (H) 03/27/2021     Pyelonephritis 03/27/2021     Urinary tract infection without hematuria, site unspecified 04/30/2022     Sepsis without acute organ dysfunction (H) 04/30/2022     UTI (urinary tract infection) 05/01/2022     Benign prostatic hyperplasia with urinary frequency 05/07/2022     Hypomagnesemia 05/07/2022     Injury of head, initial encounter 07/31/2023     Fall at home, initial encounter 07/31/2023     E. coli urinary tract infection 08/02/2023     Septic encephalopathy 07/25/2024     DM (diabetes mellitus), type 2 with complications (H) 07/25/2024     Urinary tract infection with hematuria, site unspecified 07/26/2024     Altered mental status, unspecified altered mental status type 07/26/2024     Sepsis, due to unspecified organism, unspecified whether acute organ dysfunction present (H) 07/26/2024     Normocytic anemia 11/29/2023     Status post right knee replacement 11/29/2023     Resolved Ambulatory Problems     Diagnosis Date Noted     No Resolved Ambulatory Problems     Past Medical History:   Diagnosis Date     A-fib (H)      Acute hemodialysis encounter (H24)      Acute kidney injury (H24)      Asbestosis (H)      Atrophy of right kidney      Basal cell carcinoma      BPH without urinary obstruction      Cellulitis      Cholelithiasis      Diabetes mellitus, type 2 (H) 4/12/2020     Esophagitis      Gastritis      Hematemesis, presence of nausea not specified      Hyperlipemia      Kidney stone      Metformin overdose of undetermined intent      PTSD (post-traumatic stress disorder)      Seasonal allergies      Sleep apnea      Upper GI bleed      No Known Allergies    All Meds and Allergies reviewed in the record at the facility and is the most  up-to-date.    Current Outpatient Medications   Medication Sig Dispense Refill     acetaminophen (TYLENOL) 500 MG tablet [ACETAMINOPHEN (TYLENOL) 500 MG TABLET] Take 500 mg by mouth every 6 (six) hours as needed for pain.       ARIPiprazole (ABILIFY) 2 MG tablet [ARIPIPRAZOLE (ABILIFY) 2 MG TABLET] Take 2 mg by mouth at bedtime.        brimonidine (ALPHAGAN) 0.2 % ophthalmic solution [BRIMONIDINE (ALPHAGAN) 0.2 % OPHTHALMIC SOLUTION] Administer 1 drop to both eyes 2 (two) times a day.        dorzolamide-timolol (COSOPT) 2-0.5 % ophthalmic solution Place 1 drop into both eyes 2 times daily       fluvoxaMINE (LUVOX) 50 MG tablet [FLUVOXAMINE (LUVOX) 50 MG TABLET] Take 50 mg by mouth at bedtime.        gabapentin (NEURONTIN) 300 MG capsule Take 300 mg by mouth 2 times daily       latanoprostene bunod 0.024 % Drop Place 1 drop into both eyes At Bedtime       levomefolate calcium (L-METHYLFOLATE ORAL) [LEVOMEFOLATE CALCIUM (L-METHYLFOLATE ORAL)] Take 1.25 mg by mouth daily.        metFORMIN (GLUCOPHAGE) 1000 MG tablet Take 1,000 mg by mouth 2 times daily (with meals)       Multiple Vitamins-Minerals (CENTRUM SILVER) CHEW Take 1 tablet by mouth daily       netarsudiL (RHOPRESSA) 0.02 % Drop Place 1 drop into both eyes every evening       warfarin ANTICOAGULANT (COUMADIN) 5 MG tablet Take 5 to 7.5mg (1 to 1.5 tabs) by mouth daily OR AS DIRECTED.  Adjust dose based on INR. 100 tablet 1     WARFARIN SODIUM PO Take by mouth See Admin Instructions Dosing in coordination with INR results       No current facility-administered medications for this visit.       REVIEW OF SYSTEMS:   10 point review of systems reviewed and pertinent positives in the HPI.     PHYSICAL EXAMINATION:  Physical Exam     Vital signs: BP (!) 142/84   Pulse 84   Temp 97.7  F (36.5  C)   Resp 16   Ht 1.829 m (6')   Wt 98.3 kg (216 lb 12.8 oz)   SpO2 96%   BMI 29.40 kg/m    General: Awake, Alert, sitting up in wheelchair,  conversant  HEENT:Pink  conjunctiva, slight redness to right eye, patient legally blind in right eye, moist oral mucosa  NECK: Supple  CVS:  S1  S2, without murmur or gallop.   LUNG: Clear to auscultation, No wheezes, rales or rhonci.  BACK: No kyphosis of the thoracic spine  ABDOMEN: Soft,with positive bowel sounds  EXTREMITIES:  no pedal edema, no calf tenderness  SKIN: Warm and dry  NEUROLOGIC: Intact, pulses palpable  PSYCHIATRIC: Pleasant affect.      Labs:  All labs reviewed in the nursing home record and Epic   @  Lab Results   Component Value Date    WBC 6.5 07/29/2024     Lab Results   Component Value Date    RBC 3.41 07/29/2024     Lab Results   Component Value Date    HGB 10.6 07/29/2024     Lab Results   Component Value Date    HCT 33.2 07/29/2024     Lab Results   Component Value Date    MCV 97 07/29/2024     Lab Results   Component Value Date    MCH 31.1 07/29/2024     Lab Results   Component Value Date    MCHC 31.9 07/29/2024     Lab Results   Component Value Date    RDW 12.9 07/29/2024     Lab Results   Component Value Date     07/29/2024        @Last Comprehensive Metabolic Panel:  Sodium   Date Value Ref Range Status   08/07/2024 141 135 - 145 mmol/L Final     Potassium   Date Value Ref Range Status   08/07/2024 4.7 3.4 - 5.3 mmol/L Final   05/05/2022 4.4 3.5 - 5.0 mmol/L Final     Chloride   Date Value Ref Range Status   08/07/2024 103 98 - 107 mmol/L Final   05/02/2022 109 (H) 98 - 107 mmol/L Final     Carbon Dioxide (CO2)   Date Value Ref Range Status   08/07/2024 26 22 - 29 mmol/L Final   05/02/2022 25 22 - 31 mmol/L Final     Anion Gap   Date Value Ref Range Status   08/07/2024 12 7 - 15 mmol/L Final   05/02/2022 8 5 - 18 mmol/L Final     Glucose   Date Value Ref Range Status   08/07/2024 170 (H) 70 - 99 mg/dL Final   05/02/2022 133 (H) 70 - 125 mg/dL Final     GLUCOSE BY METER POCT   Date Value Ref Range Status   07/29/2024 255 (H) 70 - 99 mg/dL Final     Urea Nitrogen   Date Value Ref Range Status    08/07/2024 16.0 8.0 - 23.0 mg/dL Final   05/02/2022 11 8 - 28 mg/dL Final     Creatinine   Date Value Ref Range Status   08/07/2024 0.85 0.67 - 1.17 mg/dL Final     GFR Estimate   Date Value Ref Range Status   08/07/2024 >90 >60 mL/min/1.73m2 Final   06/10/2021 >60 >60 mL/min/1.73m2 Final     Calcium   Date Value Ref Range Status   08/07/2024 9.0 8.8 - 10.4 mg/dL Final     Comment:     Reference intervals for this test were updated on 7/16/2024 to reflect our healthy population more accurately. There may be differences in the flagging of prior results with similar values performed with this method. Those prior results can be interpreted in the context of the updated reference intervals.       This note has been dictated using voice recognition software. Any grammatical or context distortions are unintentional and inherent to the software    Electronically signed by: Kylie Gomez CNP       Sincerely,        Kylie Gomez, NP

## 2024-08-15 ENCOUNTER — ANTICOAGULATION THERAPY VISIT (OUTPATIENT)
Dept: ANTICOAGULATION | Facility: CLINIC | Age: 74
End: 2024-08-15
Payer: MEDICARE

## 2024-08-15 DIAGNOSIS — I48.0 PAROXYSMAL ATRIAL FIBRILLATION (H): Primary | ICD-10-CM

## 2024-08-15 LAB — INR (EXTERNAL): 3.5 (ref 0.9–1.1)

## 2024-08-15 NOTE — PROGRESS NOTES
Incoming fax from     Date of result 8/15    INR result 3.5    Route result to: [facility] (TCU/LTC/[facility]ISA)

## 2024-08-15 NOTE — PROGRESS NOTES
ANTICOAGULATION MANAGEMENT     Rakesh Samuels 73 year old male is on warfarin with supratherapeutic INR result. (Goal INR 2.0-3.0)    Recent labs: (last 7 days)     08/15/24  1035   INR 3.5*       ASSESSMENT     Source(s): Chart Review and Home Care/Facility Nurse     Warfarin doses taken: Held for 4.3 INR  recently which may be affecting INR  Diet: No new diet changes identified  Medication/supplement changes:  Completed amoxicillin 8/4/24  New illness, injury, or hospitalization: No  Signs or symptoms of bleeding or clotting: No  Previous result: Supratherapeutic  Additional findings:  Patient held Monday for elevated INR, still elevated today. Patient completed antibiotic for UTI and this may be the cause of elevated INR. Will reduce Maintenance dose as patient UTI symptoms have resolved.  Will recheck in 3 days to keep close eye INR until patient has stabilized.       PLAN     Recommended plan for ongoing change(s) affecting INR     Dosing Instructions: decrease your warfarin dose (7.1% change) with next INR in 4 days       Summary  As of 8/15/2024      Full warfarin instructions:  2.5 mg every Thu; 5 mg all other days   Next INR check:  8/19/2024               Telephone call with [facility] nurse (medical care for seniors) who verbalizes understanding and agrees to plan    Orders given to  Homecare nurse/facility to recheck    Education provided: Please call back if any changes to your diet, medications or how you've been taking warfarin    Plan made per [facility] anticoagulation protocol    Heather Yanez, RN  Anticoagulation Clinic  8/15/2024    _______________________________________________________________________     Anticoagulation Episode Summary       Current INR goal:  2.0-3.0   TTR:  0.0% (6 d)   Target end date:  Indefinite   Send INR reminders to:  [facility] (TCU/LTC/[facility]ISA)    Indications    Paroxysmal atrial fibrillation (H) [I48.0]             Comments:   [facility]             Anticoagulation Care Providers       Provider Role Specialty Phone number    Kylie Gomez, NP Referring Nurse Practitioner [phone]          Left message for nurse to Anticoagulation clinic.  Heather Yanez RN

## 2024-08-15 NOTE — PROGRESS NOTES
[facility] GERIATRIC SERVICES    Code Status:  DNR/DNI   Visit Type:   Chief Complaint   Patient presents with    TCU Follow Up     Facility:  [facility]) [[id number]]         HPI: Rakesh Samuels is a 73 year old male who I am seeing today for follow up on the TCU.  Patient recently hospitalized with sepsis due to UTI.  Patient had altered mental status.  Blood culture grew Enterococcus.  Patient treated with IV Zosyn and later switched to IV ampicillin then to oral amoxicillin for 10 days.  C  He initially had a Olsen catheter placed in the ER however passed a voiding trial.    Transitional Care Course: Today pt lying in bed.  Pt with recent UTI with hydronephrosis.  He has completed his oral antibiotics. Pt reports emptying his bladder.  No further evidence of urinary retention.  Patient denies shortness of breath or chest pain.  Reports right hip pain improving on today's visit.  Continues on Tylenol.  A-fib on chronic anticoagulation.  I do note supratherapeutic INR 2 days prior 4.3.  Coumadin clinic following for dosing.  Diabetes satisfactory control.      Assessment/Plan:     Urinary tract infection without hematuria, site unspecified  Bacterial sepsis (H)  Hydronephrosis   -Amoxicillin complete.  -CT of the abdomen showed patchy subtle areas of decreased cortical enhancement within the right kidney which could either reflect pyelonephritis or perfusional changes related to chronic renal atrophy and multifocal scars.  No renal abscess.  -Follow-up with urology outpatient for chronic mild left hydronephrosis extending into the ureteropelvic junction.  -Repeat CBC and BMP unremarkable.   -Bladder scan WNL.     Type 2 diabetes mellitus with hyperosmolarity without coma, without long-term current use of insulin (H)  -Consistent carb diet.  -Recent hemoglobin A1c 6.8.  -Metformin 1000 mg 2 times daily.  -Blood sugar checks twice daily.  -Blood sugars satisfactory.     Paroxysmal atrial fibrillation  (H)  -Continue with warfarin.  -INRs to be managed per the Coumadin clinic.  -INR 4.3.     Depression with anxiety   -Continue prior to admit Luvox and Abilify.    Active Ambulatory Problems     Diagnosis Date Noted    SIRS (systemic inflammatory response syndrome) (H) 09/09/2019    Adrenal incidentaloma (H24)     Diet-controlled diabetes mellitus (H)     Pericardial effusion     Lactic acidosis     Type 2 diabetes mellitus without complication, without long-term current use of insulin (H) 04/12/2020    Paroxysmal atrial fibrillation (H) 02/18/2020    Calculus of ureter 04/12/2020    Acute renal failure, unspecified acute renal failure type (H24) 04/12/2020    Acute renal failure (ARF) (H24) 04/12/2020    ATN (acute tubular necrosis) (H24) 04/12/2020    Sepsis due to urinary tract infection (H)     Shock circulatory (H)     Metformin overdose of undetermined intent, initial encounter     Acute respiratory failure with hypoxemia (H)     Metabolic acidosis     Hyperkalemia     Hematemesis, presence of nausea not specified 04/12/2020    Calculus of kidney     Syncope and collapse 06/30/2020    Hyperglycemia     After care 07/03/2012    Age-related cataract 03/27/2021    Anemia associated with acute blood loss 06/27/2012    Balanitis 03/27/2021    Cholelithiasis without obstruction 03/27/2021    Constipation 07/03/2012    Depression with anxiety 07/03/2012    Glaucoma 06/25/2012    Hemorrhoids without complication 03/27/2021    Hyperlipidemia 06/07/2018    Loss of appetite 03/27/2021    Major depressive disorder, recurrent episode, in partial remission (H24) 06/08/2018    Mixed personality disorder in adult (H) 06/08/2018    Obstructive sleep apnea 03/27/2021    Osteoarthrosis involving lower leg 06/23/2012    Periodic limb movement disorder 03/27/2021    Recurrent major depression (H24) 03/27/2021    S/P ureteral stent placement 03/27/2021    Unilateral small kidney 03/27/2021    Nephrolithiasis 03/27/2021    Type 2  diabetes mellitus (H) 06/25/2012    Osteoarthritis of knee 03/27/2021    Persistent atrial fibrillation (H) 03/27/2021    Pyelonephritis 03/27/2021    Urinary tract infection without hematuria, site unspecified 04/30/2022    Sepsis without acute organ dysfunction (H) 04/30/2022    UTI (urinary tract infection) 05/01/2022    Benign prostatic hyperplasia with urinary frequency 05/07/2022    Hypomagnesemia 05/07/2022    Injury of head, initial encounter 07/31/2023    Fall at home, initial encounter 07/31/2023    E. coli urinary tract infection 08/02/2023    Septic encephalopathy 07/25/2024    DM (diabetes mellitus), type 2 with complications (H) 07/25/2024    Urinary tract infection with hematuria, site unspecified 07/26/2024    Altered mental status, unspecified altered mental status type 07/26/2024    Sepsis, due to unspecified organism, unspecified whether acute organ dysfunction present (H) 07/26/2024    Normocytic anemia 11/29/2023    Status post right knee replacement 11/29/2023     Resolved Ambulatory Problems     Diagnosis Date Noted    No Resolved Ambulatory Problems     Past Medical History:   Diagnosis Date    A-fib (H)     Acute hemodialysis encounter (H24)     Acute kidney injury (H24)     Asbestosis (H)     Atrophy of right kidney     Basal cell carcinoma     BPH without urinary obstruction     Cellulitis     Cholelithiasis     Diabetes mellitus, type 2 (H) 4/12/2020    Esophagitis     Gastritis     Hematemesis, presence of nausea not specified     Hyperlipemia     Kidney stone     Metformin overdose of undetermined intent     PTSD (post-traumatic stress disorder)     Seasonal allergies     Sleep apnea     Upper GI bleed      No Known Allergies    All Meds and Allergies reviewed in the record at the facility and is the most up-to-date.    Current Outpatient Medications   Medication Sig Dispense Refill    acetaminophen (TYLENOL) 500 MG tablet [ACETAMINOPHEN (TYLENOL) 500 MG TABLET] Take 500 mg by mouth  every 6 (six) hours as needed for pain.      ARIPiprazole (ABILIFY) 2 MG tablet [ARIPIPRAZOLE (ABILIFY) 2 MG TABLET] Take 2 mg by mouth at bedtime.       brimonidine (ALPHAGAN) 0.2 % ophthalmic solution [BRIMONIDINE (ALPHAGAN) 0.2 % OPHTHALMIC SOLUTION] Administer 1 drop to both eyes 2 (two) times a day.       dorzolamide-timolol (COSOPT) 2-0.5 % ophthalmic solution Place 1 drop into both eyes 2 times daily      fluvoxaMINE (LUVOX) 50 MG tablet [FLUVOXAMINE (LUVOX) 50 MG TABLET] Take 50 mg by mouth at bedtime.       gabapentin (NEURONTIN) 300 MG capsule Take 300 mg by mouth 2 times daily      latanoprostene bunod 0.024 % Drop Place 1 drop into both eyes At Bedtime      levomefolate calcium (L-METHYLFOLATE ORAL) [LEVOMEFOLATE CALCIUM (L-METHYLFOLATE ORAL)] Take 1.25 mg by mouth daily.       metFORMIN (GLUCOPHAGE) 1000 MG tablet Take 1,000 mg by mouth 2 times daily (with meals)      Multiple Vitamins-Minerals (CENTRUM SILVER) CHEW Take 1 tablet by mouth daily      netarsudiL (RHOPRESSA) 0.02 % Drop Place 1 drop into both eyes every evening      warfarin ANTICOAGULANT (COUMADIN) 5 MG tablet Take 5 to 7.5mg (1 to 1.5 tabs) by mouth daily OR AS DIRECTED.  Adjust dose based on INR. 100 tablet 1    WARFARIN SODIUM PO Take by mouth See Admin Instructions Dosing in coordination with INR results       No current facility-administered medications for this visit.       REVIEW OF SYSTEMS:   10 point review of systems reviewed and pertinent positives in the HPI.     PHYSICAL EXAMINATION:  Physical Exam     Vital signs: BP (!) 142/84   Pulse 84   Temp 97.7  F (36.5  C)   Resp 16   Ht 1.829 m (6')   Wt 98.3 kg (216 lb 12.8 oz)   SpO2 96%   BMI 29.40 kg/m    General: Awake, Alert, sitting up in wheelchair,  conversant  HEENT:Pink conjunctiva, slight redness to right eye, patient legally blind in right eye, moist oral mucosa  NECK: Supple  CVS:  S1  S2, without murmur or gallop.   LUNG: Clear to auscultation, No wheezes, rales  or rhonci.  BACK: No kyphosis of the thoracic spine  ABDOMEN: Soft,with positive bowel sounds  EXTREMITIES:  no pedal edema, no calf tenderness  SKIN: Warm and dry  NEUROLOGIC: Intact, pulses palpable  PSYCHIATRIC: Pleasant affect.      Labs:  All labs reviewed in the nursing home record and Epic   @  Lab Results   Component Value Date    WBC 6.5 07/29/2024     Lab Results   Component Value Date    RBC 3.41 07/29/2024     Lab Results   Component Value Date    HGB 10.6 07/29/2024     Lab Results   Component Value Date    HCT 33.2 07/29/2024     Lab Results   Component Value Date    MCV 97 07/29/2024     Lab Results   Component Value Date    MCH 31.1 07/29/2024     Lab Results   Component Value Date    MCHC 31.9 07/29/2024     Lab Results   Component Value Date    RDW 12.9 07/29/2024     Lab Results   Component Value Date     07/29/2024        @Last Comprehensive Metabolic Panel:  Sodium   Date Value Ref Range Status   08/07/2024 141 135 - 145 mmol/L Final     Potassium   Date Value Ref Range Status   08/07/2024 4.7 3.4 - 5.3 mmol/L Final   05/05/2022 4.4 3.5 - 5.0 mmol/L Final     Chloride   Date Value Ref Range Status   08/07/2024 103 98 - 107 mmol/L Final   05/02/2022 109 (H) 98 - 107 mmol/L Final     Carbon Dioxide (CO2)   Date Value Ref Range Status   08/07/2024 26 22 - 29 mmol/L Final   05/02/2022 25 22 - 31 mmol/L Final     Anion Gap   Date Value Ref Range Status   08/07/2024 12 7 - 15 mmol/L Final   05/02/2022 8 5 - 18 mmol/L Final     Glucose   Date Value Ref Range Status   08/07/2024 170 (H) 70 - 99 mg/dL Final   05/02/2022 133 (H) 70 - 125 mg/dL Final     GLUCOSE BY METER POCT   Date Value Ref Range Status   07/29/2024 255 (H) 70 - 99 mg/dL Final     Urea Nitrogen   Date Value Ref Range Status   08/07/2024 16.0 8.0 - 23.0 mg/dL Final   05/02/2022 11 8 - 28 mg/dL Final     Creatinine   Date Value Ref Range Status   08/07/2024 0.85 0.67 - 1.17 mg/dL Final     GFR Estimate   Date Value Ref Range Status    08/07/2024 >90 >60 mL/min/1.73m2 Final   06/10/2021 >60 >60 mL/min/1.73m2 Final     Calcium   Date Value Ref Range Status   08/07/2024 9.0 8.8 - 10.4 mg/dL Final     Comment:     Reference intervals for this test were updated on 7/16/2024 to reflect our healthy population more accurately. There may be differences in the flagging of prior results with similar values performed with this method. Those prior results can be interpreted in the context of the updated reference intervals.       This note has been dictated using voice recognition software. Any grammatical or context distortions are unintentional and inherent to the software    Electronically signed by: Kylie Gomez CNP

## 2024-08-19 ENCOUNTER — ANTICOAGULATION THERAPY VISIT (OUTPATIENT)
Dept: ANTICOAGULATION | Facility: CLINIC | Age: 74
End: 2024-08-19
Payer: MEDICARE

## 2024-08-19 VITALS
RESPIRATION RATE: 16 BRPM | OXYGEN SATURATION: 95 % | BODY MASS INDEX: 28.44 KG/M2 | WEIGHT: 210 LBS | HEIGHT: 72 IN | DIASTOLIC BLOOD PRESSURE: 80 MMHG | TEMPERATURE: 97.9 F | SYSTOLIC BLOOD PRESSURE: 142 MMHG | HEART RATE: 98 BPM

## 2024-08-19 DIAGNOSIS — I48.0 PAROXYSMAL ATRIAL FIBRILLATION (H): Primary | ICD-10-CM

## 2024-08-19 LAB — INR (EXTERNAL): 3.5 (ref 0.9–1.1)

## 2024-08-19 NOTE — PROGRESS NOTES
[facility] GERIATRIC SERVICES       Patient Rakesh Samuels  MRN: [id number]        Reason for Visit     Chief Complaint   Patient presents with    Follow Up       Code Status     DNR / DNI    Assessment /plan     Urosepsis/enterococcal bacteremia  Done with IV antibiotics and discharged on 10 days of amoxicillin  Continue to monitor clinically    History of recurrent falls   discharged to the TCU for strengthening and rehab    Diabetes type 2 with an A1c of 6.8 .  metformin continued.  Continue with his blood sugar checks twice daily    A-fib on warfarin  Monitor INRs-3.5 yest    Acute urinary retention requiring Olsen catheter  He did clear a voiding trial  Olsen catheter has been removed and urology referral given because of chronic left-sided hydronephrosis extending to the ureteropelvic junction.  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  Patient was admitted with UTI with sepsis.  He was noted to have enterococcal bacteremia with fever and confusion.  He was admitted and given IV antibiotics and now discharged on 10 days of p.o. antibiotics  Also has a history of falls with weakness and discharged to the TCU for strengthening and rehab      Past Medical & Surgical History     PAST MEDICAL HISTORY:   Past Medical History:   Diagnosis Date    A-fib (H)     Acute hemodialysis encounter (H24)     Due to CHIDI    Acute kidney injury (H24)     Acute respiratory failure with hypoxemia (H)     Adrenal incidentaloma (H24)     Asbestosis (H)     ATN (acute tubular  necrosis) (H24)     Atrophy of right kidney     Basal cell carcinoma     BPH without urinary obstruction     Cellulitis     Left foot    Cholelithiasis     Depression with anxiety     Diabetes mellitus, type 2 (H) 4/12/2020    Esophagitis     Gastritis     Glaucoma     Hematemesis, presence of nausea not specified     Hyperkalemia     Hyperlipemia     Kidney stone     Lactic acidosis     Metabolic acidosis     Metformin overdose of undetermined intent     Paroxysmal atrial fibrillation (H) 2/18/2020    Pericardial effusion     PTSD (post-traumatic stress disorder)     Seasonal allergies     Sepsis due to urinary tract infection (H)     Shock circulatory (H)     SIRS (systemic inflammatory response syndrome) (H)     Sleep apnea     uses a machine at night.     Upper GI bleed       PAST SURGICAL HISTORY:   has a past surgical history that includes CYSTOSCOPY,INSERT URETERAL STENT (Left, 4/12/2020); Pr Esophagogastroduodenoscopy Transoral Diagnostic (N/A, 4/13/2020); Eye surgery; and Replacement Total Knee (Left).      Past Social History     Reviewed,  reports that he has never smoked. He has never used smokeless tobacco. He reports that he does not currently use alcohol. He reports that he does not use drugs.    Family History     Reviewed, and family history includes Diabetes in his mother.    Medication List     Current Outpatient Medications   Medication Sig Dispense Refill    acetaminophen (TYLENOL) 500 MG tablet [ACETAMINOPHEN (TYLENOL) 500 MG TABLET] Take 500 mg by mouth every 6 (six) hours as needed for pain.      ARIPiprazole (ABILIFY) 2 MG tablet [ARIPIPRAZOLE (ABILIFY) 2 MG TABLET] Take 2 mg by mouth at bedtime.       brimonidine (ALPHAGAN) 0.2 % ophthalmic solution [BRIMONIDINE (ALPHAGAN) 0.2 % OPHTHALMIC SOLUTION] Administer 1 drop to both eyes 2 (two) times a day.       dorzolamide-timolol (COSOPT) 2-0.5 % ophthalmic solution Place 1 drop into both eyes 2 times daily      fluvoxaMINE (LUVOX) 50 MG tablet  [FLUVOXAMINE (LUVOX) 50 MG TABLET] Take 50 mg by mouth at bedtime.       gabapentin (NEURONTIN) 300 MG capsule Take 300 mg by mouth 2 times daily      latanoprostene bunod 0.024 % Drop Place 1 drop into both eyes At Bedtime      levomefolate calcium (L-METHYLFOLATE ORAL) [LEVOMEFOLATE CALCIUM (L-METHYLFOLATE ORAL)] Take 1.25 mg by mouth daily.       metFORMIN (GLUCOPHAGE) 1000 MG tablet Take 1,000 mg by mouth 2 times daily (with meals)      Multiple Vitamins-Minerals (CENTRUM SILVER) CHEW Take 1 tablet by mouth daily      netarsudiL (RHOPRESSA) 0.02 % Drop Place 1 drop into both eyes every evening      warfarin ANTICOAGULANT (COUMADIN) 5 MG tablet Take 5 to 7.5mg (1 to 1.5 tabs) by mouth daily OR AS DIRECTED.  Adjust dose based on INR. 100 tablet 1    WARFARIN SODIUM PO Take by mouth See Admin Instructions Dosing in coordination with INR results       No current facility-administered medications for this visit.      MED REC REQUIRED  Post Medication Reconciliation Status: discharge medications reconciled, continue medications without change       Allergies     No Known Allergies    Review of Systems   A comprehensive review of 14 systems was done. Pertinent findings noted here and in history of present illness. All the rest negative.  Constitutional: Negative.  Negative for fever, chills, he has  activity change, appetite change and fatigue.   HENT: Negative for congestion and facial swelling.    Eyes: Negative for photophobia, redness and visual disturbance.  Vision is impaired and patient is blind in right eye with limited vision in his left  Respiratory: Negative for cough and chest tightness.    Cardiovascular: Negative for chest pain, palpitations and leg swelling.   Gastrointestinal: Negative for nausea, diarrhea, constipation, blood in stool and abdominal distention.   Genitourinary: Negative.    Musculoskeletal: Negative.  Does have a history of falls  Skin: Negative.    Neurological: Negative for dizziness,  tremors, syncope, weakness, light-headedness and headaches.   Hematological: Does not bruise/bleed easily.   Psychiatric/Behavioral: Reports impaired mood and nightmares at nighttime  Has a poor sleep hygiene and frequent napping during daytime reported      Physical Exam   BP (!) 142/80   Pulse 98   Temp 97.9  F (36.6  C)   Resp 16   Ht 1.829 m (6')   Wt 95.3 kg (210 lb)   SpO2 95%   BMI 28.48 kg/m       Constitutional: Oriented to person, place, and time and appears well-developed.   HEENT:  Normocephalic and atraumatic.  Eyes: Conjunctivae and EOM are normal. Pupils are equal, round, and reactive to light. No discharge.  No scleral icterus. Nose normal. Mouth/Throat: Oropharynx is clear and moist. No oropharyngeal exudate.  Completely blind in the right eye limited vision in the left eye  NECK: Normal range of motion. Neck supple. No JVD present. No tracheal deviation present. No thyromegaly present.   CARDIOVASCULAR: Normal rate, regular rhythm and intact distal pulses.  Exam reveals no gallop and no friction rub.  Systolic murmur present.  PULMONARY: Effort normal and breath sounds normal. No respiratory distress.No Wheezing or rales.  ABDOMEN: Soft. Bowel sounds are normal. No distension and no mass.  There is no tenderness. There is no rebound and no guarding. No HSM.  MUSCULOSKELETAL: Normal range of motion. Mild kyphosis, no tenderness.  LYMPH NODES: Has no cervical, supraclavicular, axillary and groin adenopathy.   NEUROLOGICAL: Alert and oriented to person, place, and time. No cranial nerve deficit.  Normal muscle tone. Coordination normal.   GENITOURINARY: Deferred exam.  SKIN: Skin is warm and dry. No rash noted. No erythema. No pallor.   EXTREMITIES: No cyanosis, no clubbing, no edema. No Deformity.  PSYCHIATRIC: Normal mood, affect and behavior.      Lab Results     Recent Results (from the past 240 hour(s))   INR (External Result)    Collection Time: 08/12/24 12:00 AM   Result Value Ref Range     INR (External) 4.3    INR (External Result)    Collection Time: 08/15/24 10:35 AM   Result Value Ref Range    INR (External) 3.5 (A) 0.9 - 1.1   INR (External Result)    Collection Time: 08/19/24 10:42 AM   Result Value Ref Range    INR (External) 3.5 (A) 0.9 - 1.1             Electronically signed by    Anneliese Rees MD

## 2024-08-19 NOTE — PROGRESS NOTES
ANTICOAGULATION MANAGEMENT     Rakesh Samuels 73 year old male is on warfarin with supratherapeutic INR result. (Goal INR 2.0-3.0)    Recent labs: (last 7 days)     08/19/24  1042   INR 3.5*       ASSESSMENT     Source(s): Chart Review and Home Care/Facility Nurse     Warfarin doses taken: Warfarin taken as instructed  Diet: No new diet changes identified  Medication/supplement changes: None noted  New illness, injury, or hospitalization: No  Signs or symptoms of bleeding or clotting: No  Previous result: Supratherapeutic  Additional findings: None       PLAN     Recommended plan for no diet, medication or health factor changes affecting INR     Dosing Instructions: decrease your warfarin dose (15.4% change) with next INR in 4 days       Summary  As of 8/19/2024      Full warfarin instructions:  2.5 mg every Mon, Wed, Fri; 5 mg all other days   Next INR check:  8/23/2024               Telephone call with Tamir BROWNING [facility] nurse (medical care for seniors) who agrees to plan and repeated back plan correctly    Orders given to  Homecare nurse/facility to recheck    Education provided: None required    Plan made per [facility] anticoagulation protocol    Shirley Cruz, RN  Anticoagulation Clinic  8/19/2024    _______________________________________________________________________     Anticoagulation Episode Summary       Current INR goal:  2.0-3.0   TTR:  0.0% (1.4 wk)   Target end date:  Indefinite   Send INR reminders to:  [facility] (U/LTC/ELISA)    Indications    Paroxysmal atrial fibrillation (H) [I48.0]             Comments:  [facility]             Anticoagulation Care Providers       Provider Role Specialty Phone number    Kylie Gomez NP Referring Nurse Practitioner [phone]

## 2024-08-20 ENCOUNTER — TRANSITIONAL CARE UNIT VISIT (OUTPATIENT)
Dept: GERIATRICS | Facility: CLINIC | Age: 74
End: 2024-08-20
Payer: MEDICARE

## 2024-08-20 DIAGNOSIS — N39.0 URINARY TRACT INFECTION WITHOUT HEMATURIA, SITE UNSPECIFIED: ICD-10-CM

## 2024-08-20 DIAGNOSIS — I48.0 PAROXYSMAL ATRIAL FIBRILLATION (H): ICD-10-CM

## 2024-08-20 DIAGNOSIS — N13.30 HYDRONEPHROSIS, UNSPECIFIED HYDRONEPHROSIS TYPE: ICD-10-CM

## 2024-08-20 DIAGNOSIS — F60.89 MIXED PERSONALITY DISORDER IN ADULT (H): ICD-10-CM

## 2024-08-20 DIAGNOSIS — F33.41 MAJOR DEPRESSIVE DISORDER, RECURRENT EPISODE, IN PARTIAL REMISSION (H): ICD-10-CM

## 2024-08-20 DIAGNOSIS — E11.00 TYPE 2 DIABETES MELLITUS WITH HYPEROSMOLARITY WITHOUT COMA, WITHOUT LONG-TERM CURRENT USE OF INSULIN (H): ICD-10-CM

## 2024-08-20 DIAGNOSIS — E27.8 ADRENAL INCIDENTALOMA (H): ICD-10-CM

## 2024-08-20 DIAGNOSIS — I10 ESSENTIAL HYPERTENSION: ICD-10-CM

## 2024-08-20 DIAGNOSIS — G47.33 OBSTRUCTIVE SLEEP APNEA: ICD-10-CM

## 2024-08-20 DIAGNOSIS — I73.9 PERIPHERAL VASCULAR DISEASE, UNSPECIFIED (H): Primary | ICD-10-CM

## 2024-08-20 PROCEDURE — 99305 1ST NF CARE MODERATE MDM 35: CPT | Performed by: FAMILY MEDICINE

## 2024-08-20 NOTE — LETTER
8/20/2024      Rakesh Samuels  [address]  [facility] [address]        [facility] GERIATRIC SERVICES       Patient Rakesh Samuels  MRN: [id number]        Reason for Visit     Chief Complaint   Patient presents with     Follow Up       Code Status     DNR / DNI    Assessment /plan     Urosepsis/enterococcal bacteremia  Done with IV antibiotics and discharged on 10 days of amoxicillin  Continue to monitor clinically    History of recurrent falls   discharged to the TCU for strengthening and rehab    Diabetes type 2 with an A1c of 6.8 .  metformin continued.  Continue with his blood sugar checks twice daily    A-fib on warfarin  Monitor INRs-3.5 yest    Acute urinary retention requiring Olsen catheter  He did clear a voiding trial  Olsen catheter has been removed and urology referral given because of chronic left-sided hydronephrosis extending to the ureteropelvic junction.  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  Patient was admitted with UTI with sepsis.  He was noted to have enterococcal bacteremia with fever and confusion.  He was admitted and given IV antibiotics and now discharged on 10 days of p.o. antibiotics  Also has a history of falls with weakness and discharged to the TCU for strengthening and rehab      Past Medical & Surgical History     PAST MEDICAL HISTORY:   Past Medical History:   Diagnosis Date     A-fib (H)      Acute hemodialysis encounter (H24)     Due to CHIDI     Acute kidney injury (H24)      Acute respiratory failure with  hypoxemia (H)      Adrenal incidentaloma (H24)      Asbestosis (H)      ATN (acute tubular necrosis) (H24)      Atrophy of right kidney      Basal cell carcinoma      BPH without urinary obstruction      Cellulitis     Left foot     Cholelithiasis      Depression with anxiety      Diabetes mellitus, type 2 (H) 4/12/2020     Esophagitis      Gastritis      Glaucoma      Hematemesis, presence of nausea not specified      Hyperkalemia      Hyperlipemia      Kidney stone      Lactic acidosis      Metabolic acidosis      Metformin overdose of undetermined intent      Paroxysmal atrial fibrillation (H) 2/18/2020     Pericardial effusion      PTSD (post-traumatic stress disorder)      Seasonal allergies      Sepsis due to urinary tract infection (H)      Shock circulatory (H)      SIRS (systemic inflammatory response syndrome) (H)      Sleep apnea     uses a machine at night.      Upper GI bleed       PAST SURGICAL HISTORY:   has a past surgical history that includes CYSTOSCOPY,INSERT URETERAL STENT (Left, 4/12/2020); Pr Esophagogastroduodenoscopy Transoral Diagnostic (N/A, 4/13/2020); Eye surgery; and Replacement Total Knee (Left).      Past Social History     Reviewed,  reports that he has never smoked. He has never used smokeless tobacco. He reports that he does not currently use alcohol. He reports that he does not use drugs.    Family History     Reviewed, and family history includes Diabetes in his mother.    Medication List     Current Outpatient Medications   Medication Sig Dispense Refill     acetaminophen (TYLENOL) 500 MG tablet [ACETAMINOPHEN (TYLENOL) 500 MG TABLET] Take 500 mg by mouth every 6 (six) hours as needed for pain.       ARIPiprazole (ABILIFY) 2 MG tablet [ARIPIPRAZOLE (ABILIFY) 2 MG TABLET] Take 2 mg by mouth at bedtime.        brimonidine (ALPHAGAN) 0.2 % ophthalmic solution [BRIMONIDINE (ALPHAGAN) 0.2 % OPHTHALMIC SOLUTION] Administer 1 drop to both eyes 2 (two) times a day.         dorzolamide-timolol (COSOPT) 2-0.5 % ophthalmic solution Place 1 drop into both eyes 2 times daily       fluvoxaMINE (LUVOX) 50 MG tablet [FLUVOXAMINE (LUVOX) 50 MG TABLET] Take 50 mg by mouth at bedtime.        gabapentin (NEURONTIN) 300 MG capsule Take 300 mg by mouth 2 times daily       latanoprostene bunod 0.024 % Drop Place 1 drop into both eyes At Bedtime       levomefolate calcium (L-METHYLFOLATE ORAL) [LEVOMEFOLATE CALCIUM (L-METHYLFOLATE ORAL)] Take 1.25 mg by mouth daily.        metFORMIN (GLUCOPHAGE) 1000 MG tablet Take 1,000 mg by mouth 2 times daily (with meals)       Multiple Vitamins-Minerals (CENTRUM SILVER) CHEW Take 1 tablet by mouth daily       netarsudiL (RHOPRESSA) 0.02 % Drop Place 1 drop into both eyes every evening       warfarin ANTICOAGULANT (COUMADIN) 5 MG tablet Take 5 to 7.5mg (1 to 1.5 tabs) by mouth daily OR AS DIRECTED.  Adjust dose based on INR. 100 tablet 1     WARFARIN SODIUM PO Take by mouth See Admin Instructions Dosing in coordination with INR results       No current facility-administered medications for this visit.      MED REC REQUIRED  Post Medication Reconciliation Status: discharge medications reconciled, continue medications without change       Allergies     No Known Allergies    Review of Systems   A comprehensive review of 14 systems was done. Pertinent findings noted here and in history of present illness. All the rest negative.  Constitutional: Negative.  Negative for fever, chills, he has  activity change, appetite change and fatigue.   HENT: Negative for congestion and facial swelling.    Eyes: Negative for photophobia, redness and visual disturbance.  Vision is impaired and patient is blind in right eye with limited vision in his left  Respiratory: Negative for cough and chest tightness.    Cardiovascular: Negative for chest pain, palpitations and leg swelling.   Gastrointestinal: Negative for nausea, diarrhea, constipation, blood in stool and abdominal  distention.   Genitourinary: Negative.    Musculoskeletal: Negative.  Does have a history of falls  Skin: Negative.    Neurological: Negative for dizziness, tremors, syncope, weakness, light-headedness and headaches.   Hematological: Does not bruise/bleed easily.   Psychiatric/Behavioral: Reports impaired mood and nightmares at nighttime  Has a poor sleep hygiene and frequent napping during daytime reported      Physical Exam   BP (!) 142/80   Pulse 98   Temp 97.9  F (36.6  C)   Resp 16   Ht 1.829 m (6')   Wt 95.3 kg (210 lb)   SpO2 95%   BMI 28.48 kg/m       Constitutional: Oriented to person, place, and time and appears well-developed.   HEENT:  Normocephalic and atraumatic.  Eyes: Conjunctivae and EOM are normal. Pupils are equal, round, and reactive to light. No discharge.  No scleral icterus. Nose normal. Mouth/Throat: Oropharynx is clear and moist. No oropharyngeal exudate.  Completely blind in the right eye limited vision in the left eye  NECK: Normal range of motion. Neck supple. No JVD present. No tracheal deviation present. No thyromegaly present.   CARDIOVASCULAR: Normal rate, regular rhythm and intact distal pulses.  Exam reveals no gallop and no friction rub.  Systolic murmur present.  PULMONARY: Effort normal and breath sounds normal. No respiratory distress.No Wheezing or rales.  ABDOMEN: Soft. Bowel sounds are normal. No distension and no mass.  There is no tenderness. There is no rebound and no guarding. No HSM.  MUSCULOSKELETAL: Normal range of motion. Mild kyphosis, no tenderness.  LYMPH NODES: Has no cervical, supraclavicular, axillary and groin adenopathy.   NEUROLOGICAL: Alert and oriented to person, place, and time. No cranial nerve deficit.  Normal muscle tone. Coordination normal.   GENITOURINARY: Deferred exam.  SKIN: Skin is warm and dry. No rash noted. No erythema. No pallor.   EXTREMITIES: No cyanosis, no clubbing, no edema. No Deformity.  PSYCHIATRIC: Normal mood, affect and  behavior.      Lab Results     Recent Results (from the past 240 hour(s))   INR (External Result)    Collection Time: 08/12/24 12:00 AM   Result Value Ref Range    INR (External) 4.3    INR (External Result)    Collection Time: 08/15/24 10:35 AM   Result Value Ref Range    INR (External) 3.5 (A) 0.9 - 1.1   INR (External Result)    Collection Time: 08/19/24 10:42 AM   Result Value Ref Range    INR (External) 3.5 (A) 0.9 - 1.1             Electronically signed by    Anneliese Rees MD                            Sincerely,        FANNIE Davis

## 2024-08-21 ENCOUNTER — ANTICOAGULATION THERAPY VISIT (OUTPATIENT)
Dept: ANTICOAGULATION | Facility: CLINIC | Age: 74
End: 2024-08-21
Payer: MEDICARE

## 2024-08-21 ENCOUNTER — TRANSFERRED RECORDS (OUTPATIENT)
Dept: HEALTH INFORMATION MANAGEMENT | Facility: CLINIC | Age: 74
End: 2024-08-21
Payer: MEDICARE

## 2024-08-21 DIAGNOSIS — R69 DIAGNOSIS UNKNOWN: Primary | ICD-10-CM

## 2024-08-21 DIAGNOSIS — I48.0 PAROXYSMAL ATRIAL FIBRILLATION (H): Primary | ICD-10-CM

## 2024-08-21 LAB — INR (EXTERNAL): 3.3

## 2024-08-21 NOTE — PROGRESS NOTES
ANTICOAGULATION MANAGEMENT     Rakesh Samuels 73 year old male is on warfarin with supratherapeutic INR result. (Goal INR 2.0-3.0)    Recent labs: (last 7 days)     08/21/24  0000   INR 3.3       ASSESSMENT     Source(s): Chart Review and Home Care/Facility Nurse     Warfarin doses taken: Warfarin taken as instructed  Diet: No new diet changes identified  Medication/supplement changes: None noted  New illness, injury, or hospitalization: No  Signs or symptoms of bleeding or clotting: No  Previous result: Supratherapeutic  Additional findings:  INR was set to recheck on 8/23, however, provider saw patient today and ordered INR today.        PLAN     Recommended plan for no diet, medication or health factor changes affecting INR     Dosing Instructions: decrease your warfarin dose (9.1% change) with next INR in 5 days       Summary  As of 8/21/2024      Full warfarin instructions:  5 mg every Sun, Tue, Fri; 2.5 mg all other days   Next INR check:  8/26/2024               [facility] who verbalizes understanding and agrees to plan    Orders given to  Homecare nurse/facility to recheck    Education provided: Please call back if any changes to your diet, medications or how you've been taking warfarin    Plan made per [facility] anticoagulation protocol    Jeannie Baldwin RN  Anticoagulation Clinic  8/21/2024    _______________________________________________________________________     Anticoagulation Episode Summary       Current INR goal:  2.0-3.0   TTR:  0.0% (1.7 wk)   Target end date:  Indefinite   Send INR reminders to:  [facility] (TCU/LTC/long term)    Indications    Paroxysmal atrial fibrillation (H) [I48.0]             [facility]             Anticoagulation Care Providers       Provider Role Specialty Phone number    Kylie Gomez NP Referring Nurse Practitioner [phone]

## 2024-08-21 NOTE — PROGRESS NOTES
Incoming fax from [facility]    Date of result 8/21/24    INR result 3.3    Route result to: [facility]/LTC/[facility])

## 2024-08-26 ENCOUNTER — LAB REQUISITION (OUTPATIENT)
Dept: LAB | Facility: CLINIC | Age: 74
End: 2024-08-26
Payer: MEDICARE

## 2024-08-26 ENCOUNTER — ANTICOAGULATION THERAPY VISIT (OUTPATIENT)
Dept: ANTICOAGULATION | Facility: CLINIC | Age: 74
End: 2024-08-26
Payer: MEDICARE

## 2024-08-26 DIAGNOSIS — I48.0 PAROXYSMAL ATRIAL FIBRILLATION (H): Primary | ICD-10-CM

## 2024-08-26 DIAGNOSIS — Z74.09 OTHER REDUCED MOBILITY: ICD-10-CM

## 2024-08-26 LAB — INR (EXTERNAL): 2.7

## 2024-08-26 NOTE — PROGRESS NOTES
Incoming fax from [facility]    Date of result 8/26/24    INR result 2.7    Route result to: [facility] (TCU/LTC/ELISA)

## 2024-08-26 NOTE — PROGRESS NOTES
ANTICOAGULATION MANAGEMENT     Rakesh Samuels 74 year old male is on warfarin with therapeutic INR result. (Goal INR 2.0-3.0)    Recent labs: (last 7 days)     08/26/24  0000   INR 2.7       ASSESSMENT     Source(s): Chart Review and Home Care/Facility Nurse     Warfarin doses taken: Warfarin taken as instructed  Diet: No new diet changes identified  Medication/supplement changes: None noted  New illness, injury, or hospitalization: No  Signs or symptoms of bleeding or clotting: No  Previous result: Supratherapeutic  Additional findings: None       PLAN     Recommended plan for no diet, medication or health factor changes affecting INR     Dosing Instructions: Continue your current warfarin dose with next INR in 4 days       Summary  As of 8/26/2024      Full warfarin instructions:  5 mg every Sun, Tue, Fri; 2.5 mg all other days   Next INR check:  8/29/2024               [facility] who agrees to plan and repeated back plan correctly    Orders given to  Homecare nurse/facility to recheck    Education provided: Please call back if any changes to your diet, medications or how you've been taking warfarin    Plan made per [facility] anticoagulation protocol    Jeannie Baldwin RN  Anticoagulation Clinic  8/26/2024    _______________________________________________________________________     Anticoagulation Episode Summary       Current INR goal:  2.0-3.0   TTR:  14.7% (2.4 wk)   Target end date:  Indefinite   Send INR reminders to:  [facility] (TCU/LTC/jail)    Indications    Paroxysmal atrial fibrillation (H) [I48.0]             Comments:  [facility]             Anticoagulation Care Providers       Provider Role Specialty Phone number    Kylie Gomez NP Referring Nurse Practitioner [phone]

## 2024-08-27 ENCOUNTER — TRANSITIONAL CARE UNIT VISIT (OUTPATIENT)
Dept: GERIATRICS | Facility: CLINIC | Age: 74
End: 2024-08-27
Payer: MEDICARE

## 2024-08-27 VITALS
BODY MASS INDEX: 28.91 KG/M2 | TEMPERATURE: 98.2 F | SYSTOLIC BLOOD PRESSURE: 124 MMHG | HEIGHT: 72 IN | DIASTOLIC BLOOD PRESSURE: 81 MMHG | WEIGHT: 213.4 LBS | HEART RATE: 78 BPM | RESPIRATION RATE: 16 BRPM | OXYGEN SATURATION: 96 %

## 2024-08-27 DIAGNOSIS — F60.89 MIXED PERSONALITY DISORDER IN ADULT (H): Primary | ICD-10-CM

## 2024-08-27 DIAGNOSIS — I48.0 PAROXYSMAL ATRIAL FIBRILLATION (H): ICD-10-CM

## 2024-08-27 DIAGNOSIS — N39.0 URINARY TRACT INFECTION WITHOUT HEMATURIA, SITE UNSPECIFIED: ICD-10-CM

## 2024-08-27 DIAGNOSIS — I10 ESSENTIAL HYPERTENSION: ICD-10-CM

## 2024-08-27 DIAGNOSIS — F33.41 MAJOR DEPRESSIVE DISORDER, RECURRENT EPISODE, IN PARTIAL REMISSION (H): ICD-10-CM

## 2024-08-27 DIAGNOSIS — E11.00 TYPE 2 DIABETES MELLITUS WITH HYPEROSMOLARITY WITHOUT COMA, WITHOUT LONG-TERM CURRENT USE OF INSULIN (H): ICD-10-CM

## 2024-08-27 DIAGNOSIS — N13.30 HYDRONEPHROSIS, UNSPECIFIED HYDRONEPHROSIS TYPE: ICD-10-CM

## 2024-08-27 LAB
ANION GAP SERPL CALCULATED.3IONS-SCNC: 12 MMOL/L (ref 7–15)
BUN SERPL-MCNC: 19.2 MG/DL (ref 8–23)
CALCIUM SERPL-MCNC: 8.7 MG/DL (ref 8.8–10.4)
CHLORIDE SERPL-SCNC: 104 MMOL/L (ref 98–107)
CREAT SERPL-MCNC: 0.87 MG/DL (ref 0.67–1.17)
EGFRCR SERPLBLD CKD-EPI 2021: >90 ML/MIN/1.73M2
ERYTHROCYTE [DISTWIDTH] IN BLOOD BY AUTOMATED COUNT: 13.1 % (ref 10–15)
GLUCOSE SERPL-MCNC: 155 MG/DL (ref 70–99)
HCO3 SERPL-SCNC: 26 MMOL/L (ref 22–29)
HCT VFR BLD AUTO: 40.5 % (ref 40–53)
HGB BLD-MCNC: 12.7 G/DL (ref 13.3–17.7)
MCH RBC QN AUTO: 31.1 PG (ref 26.5–33)
MCHC RBC AUTO-ENTMCNC: 31.4 G/DL (ref 31.5–36.5)
MCV RBC AUTO: 99 FL (ref 78–100)
PLATELET # BLD AUTO: 219 10E3/UL (ref 150–450)
POTASSIUM SERPL-SCNC: 4.2 MMOL/L (ref 3.4–5.3)
RBC # BLD AUTO: 4.08 10E6/UL (ref 4.4–5.9)
SODIUM SERPL-SCNC: 142 MMOL/L (ref 135–145)
WBC # BLD AUTO: 8.8 10E3/UL (ref 4–11)

## 2024-08-27 PROCEDURE — 85027 COMPLETE CBC AUTOMATED: CPT | Performed by: NURSE PRACTITIONER

## 2024-08-27 PROCEDURE — 99316 NF DSCHRG MGMT 30 MIN+: CPT | Performed by: NURSE PRACTITIONER

## 2024-08-27 PROCEDURE — 80048 BASIC METABOLIC PNL TOTAL CA: CPT | Performed by: NURSE PRACTITIONER

## 2024-08-27 PROCEDURE — 36415 COLL VENOUS BLD VENIPUNCTURE: CPT | Performed by: NURSE PRACTITIONER

## 2024-08-27 PROCEDURE — P9604 ONE-WAY ALLOW PRORATED TRIP: HCPCS | Performed by: NURSE PRACTITIONER

## 2024-08-27 NOTE — LETTER
8/27/2024      Rakesh Samuels  [address]  [facility] [address]        [facility] GERIATRIC SERVICES    Code Status:  DNR/DNI   Visit Type:   Chief Complaint   Patient presents with     TCU Follow Up     Facility:  [facility]) [[id number]]         HPI: Rakesh Samuels is a 73 year old male who I am seeing today for follow up on the TCU.  Patient recently hospitalized with sepsis due to UTI.  Patient had altered mental status.  Blood culture grew Enterococcus.  Patient treated with IV Zosyn and later switched to IV ampicillin then to oral amoxicillin for 10 days.  C  He initially had a Olsen catheter placed in the ER however passed a voiding trial.    Transitional Care Course: Today pt sitting up in wheelchair.  Pt with recent UTI with hydronephrosis.  He has completed his oral antibiotics. Pt denies any urinary retention. Follow up PVR < 25 ml. He is a febrile. Recommended follow up with Urology however family feels this would most likely increase his anxiety and would like to avoid going if all possible. Nursing reporting decline in therapy with decreased mobility. Mobility according to staff on floor-fluctuates. He denies any pain. Laboratory today unremarkable. Repeat UA ordered. Nursing sent to lab but I do not see it being processed. Nursing called lab. No SOB or CP. Denies any pain today. He continues on Tylenol. A fib on chronic anticoagulation. Hx of mood disorder. Mood appears stable.       Assessment/Plan:     Urinary tract infection without hematuria, site unspecified  Bacterial sepsis (H)  Hydronephrosis   -Amoxicillin complete.  -CT of the abdomen showed patchy subtle areas of decreased cortical enhancement within the right kidney which could either reflect pyelonephritis or perfusional changes related to chronic renal atrophy and multifocal scars.  No renal abscess.  -Follow-up with urology outpatient for chronic mild left hydronephrosis extending into the ureteropelvic junction.  (Family would like to avoid if possible).   -Repeat CBC and BMP unremarkable today.   -Bladder scan < 25 ml.   -Repeat UA/ UC.     Type 2 diabetes mellitus with hyperosmolarity without coma, without long-term current use of insulin (H)  -Consistent carb diet.  -Recent hemoglobin A1c 6.8.  -Metformin 1000 mg 2 times daily.  -Blood sugar checks twice daily.  -Blood sugars satisfactory.     Paroxysmal atrial fibrillation (H)  -Continue with warfarin.  -INRs to be managed per the Coumadin clinic.  -INR 2.7.     Depression with anxiety   -Continue prior to admit Luvox and Abilify.  -mood appears stable.     -ok to discharge with current meds and treatments.   -Home PT, OT, HHA and RN for medication management.   -Follow up with PCP in 1-2 weeks.     Active Ambulatory Problems     Diagnosis Date Noted     SIRS (systemic inflammatory response syndrome) (H) 09/09/2019     Adrenal incidentaloma (H24)      Diet-controlled diabetes mellitus (H)      Pericardial effusion      Lactic acidosis      Type 2 diabetes mellitus without complication, without long-term current use of insulin (H) 04/12/2020     Paroxysmal atrial fibrillation (H) 02/18/2020     Calculus of ureter 04/12/2020     Acute renal failure, unspecified acute renal failure type (H24) 04/12/2020     Acute renal failure (ARF) (H24) 04/12/2020     ATN (acute tubular necrosis) (H24) 04/12/2020     Sepsis due to urinary tract infection (H)      Metformin overdose of undetermined intent, initial encounter      Metabolic acidosis      Hyperkalemia      Hematemesis, presence of nausea not specified 04/12/2020     Calculus of kidney      Syncope and collapse 06/30/2020     Hyperglycemia      After care 07/03/2012     Age-related cataract 03/27/2021     Anemia associated with acute blood loss 06/27/2012     Balanitis 03/27/2021     Cholelithiasis without obstruction 03/27/2021     Constipation 07/03/2012     Depression with anxiety 07/03/2012     Glaucoma 06/25/2012      Hemorrhoids without complication 03/27/2021     Hyperlipidemia 06/07/2018     Loss of appetite 03/27/2021     Major depressive disorder, recurrent episode, in partial remission (H24) 06/08/2018     Mixed personality disorder in adult (H) 06/08/2018     Obstructive sleep apnea 03/27/2021     Osteoarthrosis involving lower leg 06/23/2012     Periodic limb movement disorder 03/27/2021     Recurrent major depression (H24) 03/27/2021     S/P ureteral stent placement 03/27/2021     Unilateral small kidney 03/27/2021     Nephrolithiasis 03/27/2021     Type 2 diabetes mellitus (H) 06/25/2012     Osteoarthritis of knee 03/27/2021     Persistent atrial fibrillation (H) 03/27/2021     Pyelonephritis 03/27/2021     Urinary tract infection without hematuria, site unspecified 04/30/2022     Sepsis without acute organ dysfunction (H) 04/30/2022     UTI (urinary tract infection) 05/01/2022     Benign prostatic hyperplasia with urinary frequency 05/07/2022     Hypomagnesemia 05/07/2022     Injury of head, initial encounter 07/31/2023     Fall at home, initial encounter 07/31/2023     E. coli urinary tract infection 08/02/2023     Septic encephalopathy 07/25/2024     DM (diabetes mellitus), type 2 with complications (H) 07/25/2024     Urinary tract infection with hematuria, site unspecified 07/26/2024     Altered mental status, unspecified altered mental status type 07/26/2024     Sepsis, due to unspecified organism, unspecified whether acute organ dysfunction present (H) 07/26/2024     Normocytic anemia 11/29/2023     Status post right knee replacement 11/29/2023     Peripheral vascular disease, unspecified (H24) 08/20/2024     Resolved Ambulatory Problems     Diagnosis Date Noted     Shock circulatory (H)      Acute respiratory failure with hypoxemia (H)      Past Medical History:   Diagnosis Date     A-fib (H)      Acute hemodialysis encounter (H24)      Acute kidney injury (H24)      Asbestosis (H)      Atrophy of right kidney       Basal cell carcinoma      BPH without urinary obstruction      Cellulitis      Cholelithiasis      Diabetes mellitus, type 2 (H) 4/12/2020     Esophagitis      Gastritis      Hematemesis, presence of nausea not specified      Hyperlipemia      Kidney stone      Metformin overdose of undetermined intent      PTSD (post-traumatic stress disorder)      Seasonal allergies      Sleep apnea      Upper GI bleed      No Known Allergies    All Meds and Allergies reviewed in the record at the facility and is the most up-to-date.    Current Outpatient Medications   Medication Sig Dispense Refill     acetaminophen (TYLENOL) 500 MG tablet [ACETAMINOPHEN (TYLENOL) 500 MG TABLET] Take 500 mg by mouth every 6 (six) hours as needed for pain.       ARIPiprazole (ABILIFY) 2 MG tablet [ARIPIPRAZOLE (ABILIFY) 2 MG TABLET] Take 2 mg by mouth at bedtime.        brimonidine (ALPHAGAN) 0.2 % ophthalmic solution [BRIMONIDINE (ALPHAGAN) 0.2 % OPHTHALMIC SOLUTION] Administer 1 drop to both eyes 2 (two) times a day.        dorzolamide-timolol (COSOPT) 2-0.5 % ophthalmic solution Place 1 drop into both eyes 2 times daily       fluvoxaMINE (LUVOX) 50 MG tablet [FLUVOXAMINE (LUVOX) 50 MG TABLET] Take 50 mg by mouth at bedtime.        gabapentin (NEURONTIN) 300 MG capsule Take 300 mg by mouth 2 times daily       latanoprostene bunod 0.024 % Drop Place 1 drop into both eyes At Bedtime       levomefolate calcium (L-METHYLFOLATE ORAL) [LEVOMEFOLATE CALCIUM (L-METHYLFOLATE ORAL)] Take 1.25 mg by mouth daily.        metFORMIN (GLUCOPHAGE) 1000 MG tablet Take 1,000 mg by mouth 2 times daily (with meals)       Multiple Vitamins-Minerals (CENTRUM SILVER) CHEW Take 1 tablet by mouth daily       netarsudiL (RHOPRESSA) 0.02 % Drop Place 1 drop into both eyes every evening       warfarin ANTICOAGULANT (COUMADIN) 5 MG tablet Take 5 to 7.5mg (1 to 1.5 tabs) by mouth daily OR AS DIRECTED.  Adjust dose based on INR. 100 tablet 1     WARFARIN SODIUM PO Take by  mouth See Admin Instructions Dosing in coordination with INR results       No current facility-administered medications for this visit.       REVIEW OF SYSTEMS:   10 point review of systems reviewed and pertinent positives in the HPI.     PHYSICAL EXAMINATION:  Physical Exam     Vital signs: /81   Pulse 78   Temp 98.2  F (36.8  C)   Resp 16   Ht 1.829 m (6')   Wt 96.8 kg (213 lb 6.4 oz)   SpO2 96%   BMI 28.94 kg/m    General: Awake, Alert, sitting up in wheelchair,  conversant  HEENT:Pink conjunctiva, slight redness to right eye, patient legally blind in right eye, moist oral mucosa  NECK: Supple  CVS:  S1  S2, without murmur or gallop.   LUNG: Clear to auscultation, No wheezes, rales or rhonci.  BACK: No kyphosis of the thoracic spine  ABDOMEN: Soft,with positive bowel sounds  EXTREMITIES:  no pedal edema, no calf tenderness  SKIN: Warm and dry  NEUROLOGIC: Intact, pulses palpable  PSYCHIATRIC: Pleasant affect.      Labs:  All labs reviewed in the nursing home record and Epic   @  Lab Results   Component Value Date    WBC 6.5 07/29/2024     Lab Results   Component Value Date    RBC 3.41 07/29/2024     Lab Results   Component Value Date    HGB 10.6 07/29/2024     Lab Results   Component Value Date    HCT 33.2 07/29/2024     Lab Results   Component Value Date    MCV 97 07/29/2024     Lab Results   Component Value Date    MCH 31.1 07/29/2024     Lab Results   Component Value Date    MCHC 31.9 07/29/2024     Lab Results   Component Value Date    RDW 12.9 07/29/2024     Lab Results   Component Value Date     07/29/2024        @Last Comprehensive Metabolic Panel:  Sodium   Date Value Ref Range Status   08/27/2024 142 135 - 145 mmol/L Final     Potassium   Date Value Ref Range Status   08/27/2024 4.2 3.4 - 5.3 mmol/L Final   05/05/2022 4.4 3.5 - 5.0 mmol/L Final     Chloride   Date Value Ref Range Status   08/27/2024 104 98 - 107 mmol/L Final   05/02/2022 109 (H) 98 - 107 mmol/L Final     Carbon  Dioxide (CO2)   Date Value Ref Range Status   08/27/2024 26 22 - 29 mmol/L Final   05/02/2022 25 22 - 31 mmol/L Final     Anion Gap   Date Value Ref Range Status   08/27/2024 12 7 - 15 mmol/L Final   05/02/2022 8 5 - 18 mmol/L Final     Glucose   Date Value Ref Range Status   08/27/2024 155 (H) 70 - 99 mg/dL Final   05/02/2022 133 (H) 70 - 125 mg/dL Final     GLUCOSE BY METER POCT   Date Value Ref Range Status   07/29/2024 255 (H) 70 - 99 mg/dL Final     Urea Nitrogen   Date Value Ref Range Status   08/27/2024 19.2 8.0 - 23.0 mg/dL Final   05/02/2022 11 8 - 28 mg/dL Final     Creatinine   Date Value Ref Range Status   08/27/2024 0.87 0.67 - 1.17 mg/dL Final     GFR Estimate   Date Value Ref Range Status   08/27/2024 >90 >60 mL/min/1.73m2 Final   06/10/2021 >60 >60 mL/min/1.73m2 Final     Calcium   Date Value Ref Range Status   08/27/2024 8.7 (L) 8.8 - 10.4 mg/dL Final     Comment:     Reference intervals for this test were updated on 7/16/2024 to reflect our healthy population more accurately. There may be differences in the flagging of prior results with similar values performed with this method. Those prior results can be interpreted in the context of the updated reference intervals.     Msg left for daughter to discuss Urology follow up.     DISCHARGE PLAN/FACE TO FACE:  I certify that this patient is under my care and that I, or a nurse practitioner or physician's assistant working with me, had a face-to-face encounter that meets the physician face-to-face encounter requirements with this patient.       I certify that, based on my findings, the following services are medically necessary home health services.    My clinical findings support the need for the above skilled services.    This patient is homebound because: Recent hospitalization with UTI with sepsis.     The patient is, or has been, under my care and I have initiated the establishment of the plan of care. This patient will be followed by a physician  who will periodically review the plan of care.      This note has been dictated using voice recognition software. Any grammatical or context distortions are unintentional and inherent to the software    Electronically signed by: Kylie Gomez CNP       Sincerely,        Kylie Gomez NP

## 2024-08-28 ENCOUNTER — LAB REQUISITION (OUTPATIENT)
Dept: LAB | Facility: CLINIC | Age: 74
End: 2024-08-28

## 2024-08-28 DIAGNOSIS — N39.0 URINARY TRACT INFECTION, SITE NOT SPECIFIED: ICD-10-CM

## 2024-08-28 LAB
ALBUMIN UR-MCNC: 50 MG/DL
APPEARANCE UR: ABNORMAL
BACTERIA #/AREA URNS HPF: ABNORMAL /HPF
BILIRUB UR QL STRIP: NEGATIVE
COLOR UR AUTO: YELLOW
GLUCOSE UR STRIP-MCNC: 50 MG/DL
HGB UR QL STRIP: ABNORMAL
KETONES UR STRIP-MCNC: NEGATIVE MG/DL
LEUKOCYTE ESTERASE UR QL STRIP: ABNORMAL
MUCOUS THREADS #/AREA URNS LPF: PRESENT /LPF
NITRATE UR QL: NEGATIVE
PH UR STRIP: 5.5 [PH] (ref 5–7)
RBC URINE: 2 /HPF
SP GR UR STRIP: 1.02 (ref 1–1.03)
UROBILINOGEN UR STRIP-MCNC: NORMAL MG/DL
WBC CLUMPS #/AREA URNS HPF: PRESENT /HPF
WBC URINE: >182 /HPF

## 2024-08-28 PROCEDURE — 81001 URINALYSIS AUTO W/SCOPE: CPT | Performed by: FAMILY MEDICINE

## 2024-08-28 PROCEDURE — 87086 URINE CULTURE/COLONY COUNT: CPT | Performed by: FAMILY MEDICINE

## 2024-08-28 PROCEDURE — 87186 SC STD MICRODIL/AGAR DIL: CPT | Performed by: FAMILY MEDICINE

## 2024-08-28 NOTE — PROGRESS NOTES
[facility] GERIATRIC SERVICES    Code Status:  DNR/DNI   Visit Type:   Chief Complaint   Patient presents with    TCU discharge     Facility:  [facility] ([facility]) [[id number]]         HPI: Rakesh Samuels is a 73 year old male who I am seeing today for follow up on the TCU.  Patient recently hospitalized with sepsis due to UTI.  Patient had altered mental status.  Blood culture grew Enterococcus.  Patient treated with IV Zosyn and later switched to IV ampicillin then to oral amoxicillin for 10 days.  C  He initially had a Olsen catheter placed in the ER however passed a voiding trial.    Transitional Care Course: Today pt sitting up in wheelchair.  Pt with recent UTI with hydronephrosis.  He has completed his oral antibiotics. Pt denies any urinary retention. Follow up PVR < 25 ml. He is a febrile. Recommended follow up with Urology however family feels this would most likely increase his anxiety and would like to avoid going if all possible. Nursing reporting decline in therapy with decreased mobility. Mobility according to staff on floor-fluctuates. He denies any pain. Laboratory today unremarkable. Repeat UA ordered. Nursing sent to lab but I do not see it being processed. Nursing called lab. No SOB or CP. Denies any pain today. He continues on Tylenol. A fib on chronic anticoagulation. Hx of mood disorder. Mood appears stable.       Assessment/Plan:     Urinary tract infection without hematuria, site unspecified  Bacterial sepsis (H)  Hydronephrosis   -Amoxicillin complete.  -CT of the abdomen showed patchy subtle areas of decreased cortical enhancement within the right kidney which could either reflect pyelonephritis or perfusional changes related to chronic renal atrophy and multifocal scars.  No renal abscess.  -Follow-up with urology outpatient for chronic mild left hydronephrosis extending into the ureteropelvic junction. (Family would like to avoid if possible).   -Repeat CBC and BMP unremarkable today.    -Bladder scan < 25 ml.   -Repeat UA/ UC.     Type 2 diabetes mellitus with hyperosmolarity without coma, without long-term current use of insulin (H)  -Consistent carb diet.  -Recent hemoglobin A1c 6.8.  -Metformin 1000 mg 2 times daily.  -Blood sugar checks twice daily.  -Blood sugars satisfactory.     Paroxysmal atrial fibrillation (H)  -Continue with warfarin.  -INRs to be managed per the Coumadin clinic.  -INR 2.7.     Depression with anxiety   -Continue prior to admit Luvox and Abilify.  -mood appears stable.     -ok to discharge with current meds and treatments.   -Home PT, OT, HHA and RN for medication management.   -Follow up with PCP in 1-2 weeks.     Active Ambulatory Problems     Diagnosis Date Noted    SIRS (systemic inflammatory response syndrome) (H) 09/09/2019    Adrenal incidentaloma (H24)     Diet-controlled diabetes mellitus (H)     Pericardial effusion     Lactic acidosis     Type 2 diabetes mellitus without complication, without long-term current use of insulin (H) 04/12/2020    Paroxysmal atrial fibrillation (H) 02/18/2020    Calculus of ureter 04/12/2020    Acute renal failure, unspecified acute renal failure type (H24) 04/12/2020    Acute renal failure (ARF) (H24) 04/12/2020    ATN (acute tubular necrosis) (H24) 04/12/2020    Sepsis due to urinary tract infection (H)     Metformin overdose of undetermined intent, initial encounter     Metabolic acidosis     Hyperkalemia     Hematemesis, presence of nausea not specified 04/12/2020    Calculus of kidney     Syncope and collapse 06/30/2020    Hyperglycemia     After care 07/03/2012    Age-related cataract 03/27/2021    Anemia associated with acute blood loss 06/27/2012    Balanitis 03/27/2021    Cholelithiasis without obstruction 03/27/2021    Constipation 07/03/2012    Depression with anxiety 07/03/2012    Glaucoma 06/25/2012    Hemorrhoids without complication 03/27/2021    Hyperlipidemia 06/07/2018    Loss of appetite 03/27/2021    Major  depressive disorder, recurrent episode, in partial remission (H24) 06/08/2018    Mixed personality disorder in adult (H) 06/08/2018    Obstructive sleep apnea 03/27/2021    Osteoarthrosis involving lower leg 06/23/2012    Periodic limb movement disorder 03/27/2021    Recurrent major depression (H24) 03/27/2021    S/P ureteral stent placement 03/27/2021    Unilateral small kidney 03/27/2021    Nephrolithiasis 03/27/2021    Type 2 diabetes mellitus (H) 06/25/2012    Osteoarthritis of knee 03/27/2021    Persistent atrial fibrillation (H) 03/27/2021    Pyelonephritis 03/27/2021    Urinary tract infection without hematuria, site unspecified 04/30/2022    Sepsis without acute organ dysfunction (H) 04/30/2022    UTI (urinary tract infection) 05/01/2022    Benign prostatic hyperplasia with urinary frequency 05/07/2022    Hypomagnesemia 05/07/2022    Injury of head, initial encounter 07/31/2023    Fall at home, initial encounter 07/31/2023    E. coli urinary tract infection 08/02/2023    Septic encephalopathy 07/25/2024    DM (diabetes mellitus), type 2 with complications (H) 07/25/2024    Urinary tract infection with hematuria, site unspecified 07/26/2024    Altered mental status, unspecified altered mental status type 07/26/2024    Sepsis, due to unspecified organism, unspecified whether acute organ dysfunction present (H) 07/26/2024    Normocytic anemia 11/29/2023    Status post right knee replacement 11/29/2023    Peripheral vascular disease, unspecified (H24) 08/20/2024     Resolved Ambulatory Problems     Diagnosis Date Noted    Shock circulatory (H)     Acute respiratory failure with hypoxemia (H)      Past Medical History:   Diagnosis Date    A-fib (H)     Acute hemodialysis encounter (H24)     Acute kidney injury (H24)     Asbestosis (H)     Atrophy of right kidney     Basal cell carcinoma     BPH without urinary obstruction     Cellulitis     Cholelithiasis     Diabetes mellitus, type 2 (H) 4/12/2020    Esophagitis      Gastritis     Hematemesis, presence of nausea not specified     Hyperlipemia     Kidney stone     Metformin overdose of undetermined intent     PTSD (post-traumatic stress disorder)     Seasonal allergies     Sleep apnea     Upper GI bleed      No Known Allergies    All Meds and Allergies reviewed in the record at the facility and is the most up-to-date.    Current Outpatient Medications   Medication Sig Dispense Refill    acetaminophen (TYLENOL) 500 MG tablet [ACETAMINOPHEN (TYLENOL) 500 MG TABLET] Take 500 mg by mouth every 6 (six) hours as needed for pain.      ARIPiprazole (ABILIFY) 2 MG tablet [ARIPIPRAZOLE (ABILIFY) 2 MG TABLET] Take 2 mg by mouth at bedtime.       brimonidine (ALPHAGAN) 0.2 % ophthalmic solution [BRIMONIDINE (ALPHAGAN) 0.2 % OPHTHALMIC SOLUTION] Administer 1 drop to both eyes 2 (two) times a day.       dorzolamide-timolol (COSOPT) 2-0.5 % ophthalmic solution Place 1 drop into both eyes 2 times daily      fluvoxaMINE (LUVOX) 50 MG tablet [FLUVOXAMINE (LUVOX) 50 MG TABLET] Take 50 mg by mouth at bedtime.       gabapentin (NEURONTIN) 300 MG capsule Take 300 mg by mouth 2 times daily      latanoprostene bunod 0.024 % Drop Place 1 drop into both eyes At Bedtime      levomefolate calcium (L-METHYLFOLATE ORAL) [LEVOMEFOLATE CALCIUM (L-METHYLFOLATE ORAL)] Take 1.25 mg by mouth daily.       metFORMIN (GLUCOPHAGE) 1000 MG tablet Take 1,000 mg by mouth 2 times daily (with meals)      Multiple Vitamins-Minerals (CENTRUM SILVER) CHEW Take 1 tablet by mouth daily      netarsudiL (RHOPRESSA) 0.02 % Drop Place 1 drop into both eyes every evening      warfarin ANTICOAGULANT (COUMADIN) 5 MG tablet Take 5 to 7.5mg (1 to 1.5 tabs) by mouth daily OR AS DIRECTED.  Adjust dose based on INR. 100 tablet 1    WARFARIN SODIUM PO Take by mouth See Admin Instructions Dosing in coordination with INR results       No current facility-administered medications for this visit.       REVIEW OF SYSTEMS:   10 point review of  systems reviewed and pertinent positives in the HPI.     PHYSICAL EXAMINATION:  Physical Exam     Vital signs: /81   Pulse 78   Temp 98.2  F (36.8  C)   Resp 16   Ht 1.829 m (6')   Wt 96.8 kg (213 lb 6.4 oz)   SpO2 96%   BMI 28.94 kg/m    General: Awake, Alert, sitting up in wheelchair,  conversant  HEENT:Pink conjunctiva, slight redness to right eye, patient legally blind in right eye, moist oral mucosa  NECK: Supple  CVS:  S1  S2, without murmur or gallop.   LUNG: Clear to auscultation, No wheezes, rales or rhonci.  BACK: No kyphosis of the thoracic spine  ABDOMEN: Soft,with positive bowel sounds  EXTREMITIES:  no pedal edema, no calf tenderness  SKIN: Warm and dry  NEUROLOGIC: Intact, pulses palpable  PSYCHIATRIC: Pleasant affect.      Labs:  All labs reviewed in the nursing home record and [facility]   @  Lab Results   Component Value Date    WBC 6.5 07/29/2024     Lab Results   Component Value Date    RBC 3.41 07/29/2024     Lab Results   Component Value Date    HGB 10.6 07/29/2024     Lab Results   Component Value Date    HCT 33.2 07/29/2024     Lab Results   Component Value Date    MCV 97 07/29/2024     Lab Results   Component Value Date    MCH 31.1 07/29/2024     Lab Results   Component Value Date    MCHC 31.9 07/29/2024     Lab Results   Component Value Date    RDW 12.9 07/29/2024     Lab Results   Component Value Date     07/29/2024        @Last Comprehensive Metabolic Panel:  Sodium   Date Value Ref Range Status   08/27/2024 142 135 - 145 mmol/L Final     Potassium   Date Value Ref Range Status   08/27/2024 4.2 3.4 - 5.3 mmol/L Final   05/05/2022 4.4 3.5 - 5.0 mmol/L Final     Chloride   Date Value Ref Range Status   08/27/2024 104 98 - 107 mmol/L Final   05/02/2022 109 (H) 98 - 107 mmol/L Final     Carbon Dioxide (CO2)   Date Value Ref Range Status   08/27/2024 26 22 - 29 mmol/L Final   05/02/2022 25 22 - 31 mmol/L Final     Anion Gap   Date Value Ref Range Status   08/27/2024 12 7 - 15  mmol/L Final   05/02/2022 8 5 - 18 mmol/L Final     Glucose   Date Value Ref Range Status   08/27/2024 155 (H) 70 - 99 mg/dL Final   05/02/2022 133 (H) 70 - 125 mg/dL Final     GLUCOSE BY METER POCT   Date Value Ref Range Status   07/29/2024 255 (H) 70 - 99 mg/dL Final     Urea Nitrogen   Date Value Ref Range Status   08/27/2024 19.2 8.0 - 23.0 mg/dL Final   05/02/2022 11 8 - 28 mg/dL Final     Creatinine   Date Value Ref Range Status   08/27/2024 0.87 0.67 - 1.17 mg/dL Final     GFR Estimate   Date Value Ref Range Status   08/27/2024 >90 >60 mL/min/1.73m2 Final   06/10/2021 >60 >60 mL/min/1.73m2 Final     Calcium   Date Value Ref Range Status   08/27/2024 8.7 (L) 8.8 - 10.4 mg/dL Final     Comment:     Reference intervals for this test were updated on 7/16/2024 to reflect our healthy population more accurately. There may be differences in the flagging of prior results with similar values performed with this method. Those prior results can be interpreted in the context of the updated reference intervals.     Msg left for daughter to discuss Urology follow up.     DISCHARGE PLAN/FACE TO FACE:  I certify that this patient is under my care and that I, or a nurse practitioner or physician's assistant working with me, had a face-to-face encounter that meets the physician face-to-face encounter requirements with this patient.       I certify that, based on my findings, the following services are medically necessary home health services.    My clinical findings support the need for the above skilled services.    This patient is homebound because: Recent hospitalization with UTI with sepsis.     The patient is, or has been, under my care and I have initiated the establishment of the plan of care. This patient will be followed by a physician who will periodically review the plan of care.      This note has been dictated using voice recognition software. Any grammatical or context distortions are unintentional and inherent to  the software    Electronically signed by: Kylie Gomez, CNP

## 2024-08-29 ENCOUNTER — ANTICOAGULATION THERAPY VISIT (OUTPATIENT)
Dept: ANTICOAGULATION | Facility: CLINIC | Age: 74
End: 2024-08-29
Payer: MEDICARE

## 2024-08-29 ENCOUNTER — TRANSITIONAL CARE UNIT VISIT (OUTPATIENT)
Dept: GERIATRICS | Facility: CLINIC | Age: 74
End: 2024-08-29
Payer: MEDICARE

## 2024-08-29 VITALS
DIASTOLIC BLOOD PRESSURE: 88 MMHG | OXYGEN SATURATION: 96 % | BODY MASS INDEX: 28.74 KG/M2 | HEIGHT: 72 IN | RESPIRATION RATE: 20 BRPM | WEIGHT: 212.2 LBS | TEMPERATURE: 98.1 F | SYSTOLIC BLOOD PRESSURE: 150 MMHG | HEART RATE: 80 BPM

## 2024-08-29 DIAGNOSIS — N39.0 URINARY TRACT INFECTION WITHOUT HEMATURIA, SITE UNSPECIFIED: Primary | ICD-10-CM

## 2024-08-29 DIAGNOSIS — I10 ESSENTIAL HYPERTENSION: ICD-10-CM

## 2024-08-29 DIAGNOSIS — I48.0 PAROXYSMAL ATRIAL FIBRILLATION (H): ICD-10-CM

## 2024-08-29 DIAGNOSIS — N13.30 HYDRONEPHROSIS, UNSPECIFIED HYDRONEPHROSIS TYPE: ICD-10-CM

## 2024-08-29 DIAGNOSIS — F33.41 MAJOR DEPRESSIVE DISORDER, RECURRENT EPISODE, IN PARTIAL REMISSION (H): ICD-10-CM

## 2024-08-29 DIAGNOSIS — E11.00 TYPE 2 DIABETES MELLITUS WITH HYPEROSMOLARITY WITHOUT COMA, WITHOUT LONG-TERM CURRENT USE OF INSULIN (H): ICD-10-CM

## 2024-08-29 DIAGNOSIS — I48.0 PAROXYSMAL ATRIAL FIBRILLATION (H): Primary | ICD-10-CM

## 2024-08-29 LAB — INR (EXTERNAL): 3.3

## 2024-08-29 PROCEDURE — 99309 SBSQ NF CARE MODERATE MDM 30: CPT | Performed by: NURSE PRACTITIONER

## 2024-08-29 NOTE — LETTER
8/29/2024      Rakesh Samuels  [address]  [facility] [address]        [facility] GERIATRIC SERVICES    Code Status:  DNR/DNI   Visit Type:   Chief Complaint   Patient presents with     TCU Follow Up     Facility:  [facility]) [[id number]]         HPI: Rakesh Samuels is a 73 year old male who I am seeing today for follow up on the TCU.  Patient recently hospitalized with sepsis due to UTI.  Patient had altered mental status.  Blood culture grew Enterococcus.  Patient treated with IV Zosyn and later switched to IV ampicillin then to oral amoxicillin for 10 days.  C  He initially had a Olsen catheter placed in the ER however passed a voiding trial.    Transitional Care Course: Today pt sitting up in wheelchair.  Pt with recent UTI with hydronephrosis. He had completed his antibiotics. He is a febrile. He has hx of hydronephrosis. Recent checking of PVR < 25 ml. UA appears positive with moderate blood, WBC, bacteria and mucus. Pt a febrile. He has been declining in therapy with decreased mobility. However nursing staff report he is ambulatory with rolling with assist of 1 in his room. Hx of mood disorder. Mood appears stable. A fib on chronic anticoagulation.      Assessment/Plan:     Urinary tract infection without hematuria, site unspecified  Bacterial sepsis (H)  Hydronephrosis   -Amoxicillin complete.  -CT of the abdomen showed patchy subtle areas of decreased cortical enhancement within the right kidney which could either reflect pyelonephritis or perfusional changes related to chronic renal atrophy and multifocal scars.  No renal abscess.  -Follow-up with urology outpatient for chronic mild left hydronephrosis extending into the ureteropelvic junction. (Family would like to avoid if possible).   -Repeat CBC and BMP unremarkable today.   -Bladder scan < 25 ml.   -Repeat UA appears positive with blood, WBC and mucus.   -Start macrobid 100 mg BID. UC pending.   -CBC without leukocytosis.      Type 2 diabetes mellitus with hyperosmolarity without coma, without long-term current use of insulin (H)  -Consistent carb diet.  -Recent hemoglobin A1c 6.8.  -Metformin 1000 mg 2 times daily.  -Blood sugar checks twice daily.  -Blood sugars satisfactory.     Paroxysmal atrial fibrillation (H)  -Continue with warfarin.  -INRs to be managed per the Coumadin clinic. Monitor while on anbx.   -INR 2.7.     Depression with anxiety   -Continue prior to admit Luvox and Abilify.  -mood appears stable.     Active Ambulatory Problems     Diagnosis Date Noted     SIRS (systemic inflammatory response syndrome) (H) 09/09/2019     Adrenal incidentaloma (H24)      Diet-controlled diabetes mellitus (H)      Pericardial effusion      Lactic acidosis      Type 2 diabetes mellitus without complication, without long-term current use of insulin (H) 04/12/2020     Paroxysmal atrial fibrillation (H) 02/18/2020     Calculus of ureter 04/12/2020     Acute renal failure, unspecified acute renal failure type (H24) 04/12/2020     Acute renal failure (ARF) (H24) 04/12/2020     ATN (acute tubular necrosis) (H24) 04/12/2020     Sepsis due to urinary tract infection (H)      Metformin overdose of undetermined intent, initial encounter      Metabolic acidosis      Hyperkalemia      Hematemesis, presence of nausea not specified 04/12/2020     Calculus of kidney      Syncope and collapse 06/30/2020     Hyperglycemia      After care 07/03/2012     Age-related cataract 03/27/2021     Anemia associated with acute blood loss 06/27/2012     Balanitis 03/27/2021     Cholelithiasis without obstruction 03/27/2021     Constipation 07/03/2012     Depression with anxiety 07/03/2012     Glaucoma 06/25/2012     Hemorrhoids without complication 03/27/2021     Hyperlipidemia 06/07/2018     Loss of appetite 03/27/2021     Major depressive disorder, recurrent episode, in partial remission (H24) 06/08/2018     Mixed personality disorder in adult (H) 06/08/2018      Obstructive sleep apnea 03/27/2021     Osteoarthrosis involving lower leg 06/23/2012     Periodic limb movement disorder 03/27/2021     Recurrent major depression (H24) 03/27/2021     S/P ureteral stent placement 03/27/2021     Unilateral small kidney 03/27/2021     Nephrolithiasis 03/27/2021     Type 2 diabetes mellitus (H) 06/25/2012     Osteoarthritis of knee 03/27/2021     Persistent atrial fibrillation (H) 03/27/2021     Pyelonephritis 03/27/2021     Urinary tract infection without hematuria, site unspecified 04/30/2022     Sepsis without acute organ dysfunction (H) 04/30/2022     UTI (urinary tract infection) 05/01/2022     Benign prostatic hyperplasia with urinary frequency 05/07/2022     Hypomagnesemia 05/07/2022     Injury of head, initial encounter 07/31/2023     Fall at home, initial encounter 07/31/2023     E. coli urinary tract infection 08/02/2023     Septic encephalopathy 07/25/2024     DM (diabetes mellitus), type 2 with complications (H) 07/25/2024     Urinary tract infection with hematuria, site unspecified 07/26/2024     Altered mental status, unspecified altered mental status type 07/26/2024     Sepsis, due to unspecified organism, unspecified whether acute organ dysfunction present (H) 07/26/2024     Normocytic anemia 11/29/2023     Status post right knee replacement 11/29/2023     Peripheral vascular disease, unspecified (H24) 08/20/2024     Resolved Ambulatory Problems     Diagnosis Date Noted     Shock circulatory (H)      Acute respiratory failure with hypoxemia (H)      Past Medical History:   Diagnosis Date     A-fib (H)      Acute hemodialysis encounter (H24)      Acute kidney injury (H24)      Asbestosis (H)      Atrophy of right kidney      Basal cell carcinoma      BPH without urinary obstruction      Cellulitis      Cholelithiasis      Diabetes mellitus, type 2 (H) 4/12/2020     Esophagitis      Gastritis      Hematemesis, presence of nausea not specified      Hyperlipemia      Kidney  stone      Metformin overdose of undetermined intent      PTSD (post-traumatic stress disorder)      Seasonal allergies      Sleep apnea      Upper GI bleed      No Known Allergies    All Meds and Allergies reviewed in the record at the facility and is the most up-to-date.    Current Outpatient Medications   Medication Sig Dispense Refill     acetaminophen (TYLENOL) 500 MG tablet [ACETAMINOPHEN (TYLENOL) 500 MG TABLET] Take 500 mg by mouth every 6 (six) hours as needed for pain.       ARIPiprazole (ABILIFY) 2 MG tablet [ARIPIPRAZOLE (ABILIFY) 2 MG TABLET] Take 2 mg by mouth at bedtime.        brimonidine (ALPHAGAN) 0.2 % ophthalmic solution [BRIMONIDINE (ALPHAGAN) 0.2 % OPHTHALMIC SOLUTION] Administer 1 drop to both eyes 2 (two) times a day.        dorzolamide-timolol (COSOPT) 2-0.5 % ophthalmic solution Place 1 drop into both eyes 2 times daily       fluvoxaMINE (LUVOX) 50 MG tablet [FLUVOXAMINE (LUVOX) 50 MG TABLET] Take 50 mg by mouth at bedtime.        gabapentin (NEURONTIN) 300 MG capsule Take 300 mg by mouth 2 times daily       latanoprostene bunod 0.024 % Drop Place 1 drop into both eyes At Bedtime       levomefolate calcium (L-METHYLFOLATE ORAL) [LEVOMEFOLATE CALCIUM (L-METHYLFOLATE ORAL)] Take 1.25 mg by mouth daily.        metFORMIN (GLUCOPHAGE) 1000 MG tablet Take 1,000 mg by mouth 2 times daily (with meals)       Multiple Vitamins-Minerals (CENTRUM SILVER) CHEW Take 1 tablet by mouth daily       netarsudiL (RHOPRESSA) 0.02 % Drop Place 1 drop into both eyes every evening       warfarin ANTICOAGULANT (COUMADIN) 5 MG tablet Take 5 to 7.5mg (1 to 1.5 tabs) by mouth daily OR AS DIRECTED.  Adjust dose based on INR. 100 tablet 1     WARFARIN SODIUM PO Take by mouth See Admin Instructions Dosing in coordination with INR results       No current facility-administered medications for this visit.       REVIEW OF SYSTEMS:   10 point review of systems reviewed and pertinent positives in the HPI.     PHYSICAL  EXAMINATION:  Physical Exam     Vital signs: BP (!) 150/88   Pulse 80   Temp 98.1  F (36.7  C)   Resp 20   Ht 1.829 m (6')   Wt 96.3 kg (212 lb 3.2 oz)   SpO2 96%   BMI 28.78 kg/m    General: Awake, Alert, sitting up in wheelchair,  conversant  HEENT:Pink conjunctiva, slight redness to right eye, patient legally blind in right eye, moist oral mucosa  NECK: Supple  CVS:  S1  S2, without murmur or gallop.   LUNG: Clear to auscultation, No wheezes, rales or rhonci.  BACK: No kyphosis of the thoracic spine  ABDOMEN: Soft,with positive bowel sounds  EXTREMITIES:  no pedal edema, no calf tenderness  SKIN: Warm and dry  NEUROLOGIC: Intact, pulses palpable  PSYCHIATRIC: Pleasant affect.      Labs:  All labs reviewed in the nursing home record and [employer]   @  Lab Results   Component Value Date    WBC 6.5 07/29/2024     Lab Results   Component Value Date    RBC 3.41 07/29/2024     Lab Results   Component Value Date    HGB 10.6 07/29/2024     Lab Results   Component Value Date    HCT 33.2 07/29/2024     Lab Results   Component Value Date    MCV 97 07/29/2024     Lab Results   Component Value Date    MCH 31.1 07/29/2024     Lab Results   Component Value Date    MCHC 31.9 07/29/2024     Lab Results   Component Value Date    RDW 12.9 07/29/2024     Lab Results   Component Value Date     07/29/2024        @Last Comprehensive Metabolic Panel:  Sodium   Date Value Ref Range Status   08/27/2024 142 135 - 145 mmol/L Final     Potassium   Date Value Ref Range Status   08/27/2024 4.2 3.4 - 5.3 mmol/L Final   05/05/2022 4.4 3.5 - 5.0 mmol/L Final     Chloride   Date Value Ref Range Status   08/27/2024 104 98 - 107 mmol/L Final   05/02/2022 109 (H) 98 - 107 mmol/L Final     Carbon Dioxide (CO2)   Date Value Ref Range Status   08/27/2024 26 22 - 29 mmol/L Final   05/02/2022 25 22 - 31 mmol/L Final     Anion Gap   Date Value Ref Range Status   08/27/2024 12 7 - 15 mmol/L Final   05/02/2022 8 5 - 18 mmol/L Final     Glucose    Date Value Ref Range Status   08/27/2024 155 (H) 70 - 99 mg/dL Final   05/02/2022 133 (H) 70 - 125 mg/dL Final     GLUCOSE BY METER POCT   Date Value Ref Range Status   07/29/2024 255 (H) 70 - 99 mg/dL Final     Urea Nitrogen   Date Value Ref Range Status   08/27/2024 19.2 8.0 - 23.0 mg/dL Final   05/02/2022 11 8 - 28 mg/dL Final     Creatinine   Date Value Ref Range Status   08/27/2024 0.87 0.67 - 1.17 mg/dL Final     GFR Estimate   Date Value Ref Range Status   08/27/2024 >90 >60 mL/min/1.73m2 Final   06/10/2021 >60 >60 mL/min/1.73m2 Final     Calcium   Date Value Ref Range Status   08/27/2024 8.7 (L) 8.8 - 10.4 mg/dL Final     Comment:     Reference intervals for this test were updated on 7/16/2024 to reflect our healthy population more accurately. There may be differences in the flagging of prior results with similar values performed with this method. Those prior results can be interpreted in the context of the updated reference intervals.         This note has been dictated using voice recognition software. Any grammatical or context distortions are unintentional and inherent to the software    Electronically signed by: Kylie Gomez CNP       Sincerely,        Kylie Gomez NP

## 2024-08-29 NOTE — PROGRESS NOTES
ANTICOAGULATION MANAGEMENT     Rakesh Samuels 74 year old male is on warfarin with supratherapeutic INR result. (Goal INR 2.0-3.0)    Recent labs: (last 7 days)     08/29/24  0000   INR 3.3       ASSESSMENT     Source(s): Chart Review, Home Care/Facility Nurse, and Template     Warfarin doses taken: Warfarin taken as instructed  Diet: No new diet changes identified  Medication/supplement changes:  Macrobid started on early today for 7 days courses No interaction anticipated  New illness, injury, or hospitalization: Yes: UTI on Macrobid  Signs or symptoms of bleeding or clotting: No  Previous result: Therapeutic last visit; previously outside of goal range  Additional findings:  At this time, anticipate discharge on Tuesday 9/3/24.  Facility nurse believe patient will be discharge back to [facility]  (from 1/29/24 [facility] encounter, facility has in house provider managing INR).       PLAN     Recommended plan for temporary change(s) and ongoing change(s) affecting INR     Dosing Instructions: decrease your warfarin dose (10% change) with next INR in 4 days       Summary  As of 8/29/2024      Full warfarin instructions:  5 mg every Sun, Wed; 2.5 mg all other days   Next INR check:  9/3/2024               Telephone call with [facility] nurse (medical care for seniors) who agrees to plan and repeated back plan correctly    Orders given to  Homecare nurse/facility to recheck    Education provided: Please call back if any changes to your diet, medications or how you've been taking warfarin    Plan made per [facility] anticoagulation protocol    Jeannie Baldwin RN  Anticoagulation Clinic  8/29/2024    _______________________________________________________________________     Anticoagulation Episode Summary       Current INR goal:  2.0-3.0   TTR:  20.0% (2.9 wk)   Target end date:  Indefinite   Send INR reminders to:  [facility] (U/LTC/ELISA)    Indications    Paroxysmal atrial  fibrillation (H) [I48.0]             Comments:  [facility]             Anticoagulation Care Providers       Provider Role Specialty Phone number    Kylie Gomez NP Referring Nurse Practitioner [phone]

## 2024-08-30 LAB — BACTERIA UR CULT: ABNORMAL

## 2024-09-02 NOTE — PROGRESS NOTES
[facility] GERIATRIC SERVICES    Code Status:  DNR/DNI   Visit Type:   Chief Complaint   Patient presents with    TCU Follow Up     Facility:  [facility] ([facility]) [[id number]]         HPI: Rakesh Samuels is a 73 year old male who I am seeing today for follow up on the TCU.  Patient recently hospitalized with sepsis due to UTI.  Patient had altered mental status.  Blood culture grew Enterococcus.  Patient treated with IV Zosyn and later switched to IV ampicillin then to oral amoxicillin for 10 days.  C  He initially had a Olsen catheter placed in the ER however passed a voiding trial.    Transitional Care Course: Today pt sitting up in wheelchair.  Pt with recent UTI with hydronephrosis. He had completed his antibiotics. He is a febrile. He has hx of hydronephrosis. Recent checking of PVR < 25 ml. UA appears positive with moderate blood, WBC, bacteria and mucus. Pt a febrile. He has been declining in therapy with decreased mobility. However nursing staff report he is ambulatory with rolling with assist of 1 in his room. Hx of mood disorder. Mood appears stable. A fib on chronic anticoagulation.      Assessment/Plan:     Urinary tract infection without hematuria, site unspecified  Bacterial sepsis (H)  Hydronephrosis   -Amoxicillin complete.  -CT of the abdomen showed patchy subtle areas of decreased cortical enhancement within the right kidney which could either reflect pyelonephritis or perfusional changes related to chronic renal atrophy and multifocal scars.  No renal abscess.  -Follow-up with urology outpatient for chronic mild left hydronephrosis extending into the ureteropelvic junction. (Family would like to avoid if possible).   -Repeat CBC and BMP unremarkable today.   -Bladder scan < 25 ml.   -Repeat UA appears positive with blood, WBC and mucus.   -Start macrobid 100 mg BID. UC pending.   -CBC without leukocytosis.     Type 2 diabetes mellitus with hyperosmolarity without coma, without long-term  current use of insulin (H)  -Consistent carb diet.  -Recent hemoglobin A1c 6.8.  -Metformin 1000 mg 2 times daily.  -Blood sugar checks twice daily.  -Blood sugars satisfactory.     Paroxysmal atrial fibrillation (H)  -Continue with warfarin.  -INRs to be managed per the Coumadin clinic. Monitor while on anbx.   -INR 2.7.     Depression with anxiety   -Continue prior to admit Luvox and Abilify.  -mood appears stable.     Active Ambulatory Problems     Diagnosis Date Noted    SIRS (systemic inflammatory response syndrome) (H) 09/09/2019    Adrenal incidentaloma (H24)     Diet-controlled diabetes mellitus (H)     Pericardial effusion     Lactic acidosis     Type 2 diabetes mellitus without complication, without long-term current use of insulin (H) 04/12/2020    Paroxysmal atrial fibrillation (H) 02/18/2020    Calculus of ureter 04/12/2020    Acute renal failure, unspecified acute renal failure type (H24) 04/12/2020    Acute renal failure (ARF) (H24) 04/12/2020    ATN (acute tubular necrosis) (H24) 04/12/2020    Sepsis due to urinary tract infection (H)     Metformin overdose of undetermined intent, initial encounter     Metabolic acidosis     Hyperkalemia     Hematemesis, presence of nausea not specified 04/12/2020    Calculus of kidney     Syncope and collapse 06/30/2020    Hyperglycemia     After care 07/03/2012    Age-related cataract 03/27/2021    Anemia associated with acute blood loss 06/27/2012    Balanitis 03/27/2021    Cholelithiasis without obstruction 03/27/2021    Constipation 07/03/2012    Depression with anxiety 07/03/2012    Glaucoma 06/25/2012    Hemorrhoids without complication 03/27/2021    Hyperlipidemia 06/07/2018    Loss of appetite 03/27/2021    Major depressive disorder, recurrent episode, in partial remission (H24) 06/08/2018    Mixed personality disorder in adult (H) 06/08/2018    Obstructive sleep apnea 03/27/2021    Osteoarthrosis involving lower leg 06/23/2012    Periodic limb movement  disorder 03/27/2021    Recurrent major depression (H24) 03/27/2021    S/P ureteral stent placement 03/27/2021    Unilateral small kidney 03/27/2021    Nephrolithiasis 03/27/2021    Type 2 diabetes mellitus (H) 06/25/2012    Osteoarthritis of knee 03/27/2021    Persistent atrial fibrillation (H) 03/27/2021    Pyelonephritis 03/27/2021    Urinary tract infection without hematuria, site unspecified 04/30/2022    Sepsis without acute organ dysfunction (H) 04/30/2022    UTI (urinary tract infection) 05/01/2022    Benign prostatic hyperplasia with urinary frequency 05/07/2022    Hypomagnesemia 05/07/2022    Injury of head, initial encounter 07/31/2023    Fall at home, initial encounter 07/31/2023    E. coli urinary tract infection 08/02/2023    Septic encephalopathy 07/25/2024    DM (diabetes mellitus), type 2 with complications (H) 07/25/2024    Urinary tract infection with hematuria, site unspecified 07/26/2024    Altered mental status, unspecified altered mental status type 07/26/2024    Sepsis, due to unspecified organism, unspecified whether acute organ dysfunction present (H) 07/26/2024    Normocytic anemia 11/29/2023    Status post right knee replacement 11/29/2023    Peripheral vascular disease, unspecified (H24) 08/20/2024     Resolved Ambulatory Problems     Diagnosis Date Noted    Shock circulatory (H)     Acute respiratory failure with hypoxemia (H)      Past Medical History:   Diagnosis Date    A-fib (H)     Acute hemodialysis encounter (H24)     Acute kidney injury (H24)     Asbestosis (H)     Atrophy of right kidney     Basal cell carcinoma     BPH without urinary obstruction     Cellulitis     Cholelithiasis     Diabetes mellitus, type 2 (H) 4/12/2020    Esophagitis     Gastritis     Hematemesis, presence of nausea not specified     Hyperlipemia     Kidney stone     Metformin overdose of undetermined intent     PTSD (post-traumatic stress disorder)     Seasonal allergies     Sleep apnea     Upper GI bleed       No Known Allergies    All Meds and Allergies reviewed in the record at the facility and is the most up-to-date.    Current Outpatient Medications   Medication Sig Dispense Refill    acetaminophen (TYLENOL) 500 MG tablet [ACETAMINOPHEN (TYLENOL) 500 MG TABLET] Take 500 mg by mouth every 6 (six) hours as needed for pain.      ARIPiprazole (ABILIFY) 2 MG tablet [ARIPIPRAZOLE (ABILIFY) 2 MG TABLET] Take 2 mg by mouth at bedtime.       brimonidine (ALPHAGAN) 0.2 % ophthalmic solution [BRIMONIDINE (ALPHAGAN) 0.2 % OPHTHALMIC SOLUTION] Administer 1 drop to both eyes 2 (two) times a day.       dorzolamide-timolol (COSOPT) 2-0.5 % ophthalmic solution Place 1 drop into both eyes 2 times daily      fluvoxaMINE (LUVOX) 50 MG tablet [FLUVOXAMINE (LUVOX) 50 MG TABLET] Take 50 mg by mouth at bedtime.       gabapentin (NEURONTIN) 300 MG capsule Take 300 mg by mouth 2 times daily      latanoprostene bunod 0.024 % Drop Place 1 drop into both eyes At Bedtime      levomefolate calcium (L-METHYLFOLATE ORAL) [LEVOMEFOLATE CALCIUM (L-METHYLFOLATE ORAL)] Take 1.25 mg by mouth daily.       metFORMIN (GLUCOPHAGE) 1000 MG tablet Take 1,000 mg by mouth 2 times daily (with meals)      Multiple Vitamins-Minerals (CENTRUM SILVER) CHEW Take 1 tablet by mouth daily      netarsudiL (RHOPRESSA) 0.02 % Drop Place 1 drop into both eyes every evening      warfarin ANTICOAGULANT (COUMADIN) 5 MG tablet Take 5 to 7.5mg (1 to 1.5 tabs) by mouth daily OR AS DIRECTED.  Adjust dose based on INR. 100 tablet 1    WARFARIN SODIUM PO Take by mouth See Admin Instructions Dosing in coordination with INR results       No current facility-administered medications for this visit.       REVIEW OF SYSTEMS:   10 point review of systems reviewed and pertinent positives in the HPI.     PHYSICAL EXAMINATION:  Physical Exam     Vital signs: BP (!) 150/88   Pulse 80   Temp 98.1  F (36.7  C)   Resp 20   Ht 1.829 m (6')   Wt 96.3 kg (212 lb 3.2 oz)   SpO2 96%   BMI  28.78 kg/m    General: Awake, Alert, sitting up in wheelchair,  conversant  HEENT:Pink conjunctiva, slight redness to right eye, patient legally blind in right eye, moist oral mucosa  NECK: Supple  CVS:  S1  S2, without murmur or gallop.   LUNG: Clear to auscultation, No wheezes, rales or rhonci.  BACK: No kyphosis of the thoracic spine  ABDOMEN: Soft,with positive bowel sounds  EXTREMITIES:  no pedal edema, no calf tenderness  SKIN: Warm and dry  NEUROLOGIC: Intact, pulses palpable  PSYCHIATRIC: Pleasant affect.      Labs:  All labs reviewed in the nursing home record and Epic   @  Lab Results   Component Value Date    WBC 6.5 07/29/2024     Lab Results   Component Value Date    RBC 3.41 07/29/2024     Lab Results   Component Value Date    HGB 10.6 07/29/2024     Lab Results   Component Value Date    HCT 33.2 07/29/2024     Lab Results   Component Value Date    MCV 97 07/29/2024     Lab Results   Component Value Date    MCH 31.1 07/29/2024     Lab Results   Component Value Date    MCHC 31.9 07/29/2024     Lab Results   Component Value Date    RDW 12.9 07/29/2024     Lab Results   Component Value Date     07/29/2024        @Last Comprehensive Metabolic Panel:  Sodium   Date Value Ref Range Status   08/27/2024 142 135 - 145 mmol/L Final     Potassium   Date Value Ref Range Status   08/27/2024 4.2 3.4 - 5.3 mmol/L Final   05/05/2022 4.4 3.5 - 5.0 mmol/L Final     Chloride   Date Value Ref Range Status   08/27/2024 104 98 - 107 mmol/L Final   05/02/2022 109 (H) 98 - 107 mmol/L Final     Carbon Dioxide (CO2)   Date Value Ref Range Status   08/27/2024 26 22 - 29 mmol/L Final   05/02/2022 25 22 - 31 mmol/L Final     Anion Gap   Date Value Ref Range Status   08/27/2024 12 7 - 15 mmol/L Final   05/02/2022 8 5 - 18 mmol/L Final     Glucose   Date Value Ref Range Status   08/27/2024 155 (H) 70 - 99 mg/dL Final   05/02/2022 133 (H) 70 - 125 mg/dL Final     GLUCOSE BY METER POCT   Date Value Ref Range Status   07/29/2024  255 (H) 70 - 99 mg/dL Final     Urea Nitrogen   Date Value Ref Range Status   08/27/2024 19.2 8.0 - 23.0 mg/dL Final   05/02/2022 11 8 - 28 mg/dL Final     Creatinine   Date Value Ref Range Status   08/27/2024 0.87 0.67 - 1.17 mg/dL Final     GFR Estimate   Date Value Ref Range Status   08/27/2024 >90 >60 mL/min/1.73m2 Final   06/10/2021 >60 >60 mL/min/1.73m2 Final     Calcium   Date Value Ref Range Status   08/27/2024 8.7 (L) 8.8 - 10.4 mg/dL Final     Comment:     Reference intervals for this test were updated on 7/16/2024 to reflect our healthy population more accurately. There may be differences in the flagging of prior results with similar values performed with this method. Those prior results can be interpreted in the context of the updated reference intervals.         This note has been dictated using voice recognition software. Any grammatical or context distortions are unintentional and inherent to the software    Electronically signed by: Kylie Gomez, CNP

## 2024-09-03 ENCOUNTER — LAB REQUISITION (OUTPATIENT)
Dept: LAB | Facility: CLINIC | Age: 74
End: 2024-09-03
Payer: MEDICARE

## 2024-09-03 ENCOUNTER — TRANSITIONAL CARE UNIT VISIT (OUTPATIENT)
Dept: GERIATRICS | Facility: CLINIC | Age: 74
End: 2024-09-03
Payer: MEDICARE

## 2024-09-03 ENCOUNTER — ANTICOAGULATION THERAPY VISIT (OUTPATIENT)
Dept: ANTICOAGULATION | Facility: CLINIC | Age: 74
End: 2024-09-03

## 2024-09-03 VITALS
RESPIRATION RATE: 16 BRPM | TEMPERATURE: 98.2 F | OXYGEN SATURATION: 95 % | WEIGHT: 212.8 LBS | HEART RATE: 99 BPM | DIASTOLIC BLOOD PRESSURE: 80 MMHG | BODY MASS INDEX: 28.82 KG/M2 | SYSTOLIC BLOOD PRESSURE: 118 MMHG | HEIGHT: 72 IN

## 2024-09-03 DIAGNOSIS — F60.89 MIXED PERSONALITY DISORDER IN ADULT (H): ICD-10-CM

## 2024-09-03 DIAGNOSIS — N39.0 URINARY TRACT INFECTION, SITE NOT SPECIFIED: ICD-10-CM

## 2024-09-03 DIAGNOSIS — N13.30 HYDRONEPHROSIS, UNSPECIFIED HYDRONEPHROSIS TYPE: ICD-10-CM

## 2024-09-03 DIAGNOSIS — F33.41 MAJOR DEPRESSIVE DISORDER, RECURRENT EPISODE, IN PARTIAL REMISSION (H): ICD-10-CM

## 2024-09-03 DIAGNOSIS — I48.0 PAROXYSMAL ATRIAL FIBRILLATION (H): Primary | ICD-10-CM

## 2024-09-03 DIAGNOSIS — I10 ESSENTIAL HYPERTENSION: ICD-10-CM

## 2024-09-03 DIAGNOSIS — E11.00 TYPE 2 DIABETES MELLITUS WITH HYPEROSMOLARITY WITHOUT COMA, WITHOUT LONG-TERM CURRENT USE OF INSULIN (H): ICD-10-CM

## 2024-09-03 DIAGNOSIS — I48.0 PAROXYSMAL ATRIAL FIBRILLATION (H): ICD-10-CM

## 2024-09-03 DIAGNOSIS — N39.0 URINARY TRACT INFECTION WITHOUT HEMATURIA, SITE UNSPECIFIED: Primary | ICD-10-CM

## 2024-09-03 DIAGNOSIS — R44.3 HALLUCINATIONS: ICD-10-CM

## 2024-09-03 LAB — INR (EXTERNAL): 2.3

## 2024-09-03 PROCEDURE — 99309 SBSQ NF CARE MODERATE MDM 30: CPT | Performed by: NURSE PRACTITIONER

## 2024-09-03 NOTE — PROGRESS NOTES
[facility] GERIATRIC SERVICES    Code Status:  DNR/DNI   Visit Type:   Chief Complaint   Patient presents with    TCU Follow Up     Facility:  [facility] ([facility]) [[id number]]         HPI: Rakesh Samuels is a 73 year old male who I am seeing today for follow up on the TCU.  Patient recently hospitalized with sepsis due to UTI.  Patient had altered mental status.  Blood culture grew Enterococcus.  Patient treated with IV Zosyn and later switched to IV ampicillin then to oral amoxicillin for 10 days.  C  He initially had a Olsen catheter placed in the ER however passed a voiding trial.    Transitional Care Course: Today pt lying in bed.  Pt with recent UTI with hydronephrosis. He had completed his antibiotics. Pt with decline in mobility and weakness. CBC and BMP unremarkable. PVR < 25 ml. UA/UC grew > 100,000 Enterococcus faecalis. He continues on oral antibiotics. Today he reports worsening vivid, scary dreams and hallucinations. He is legally blind. Review of his meds today and side effects reviewed. Mood appears stable. He is able to ambulate with rolling walker with staff in his room. A fib on chronic anticoagulation.         Assessment/Plan:     Urinary tract infection without hematuria, site unspecified  Bacterial sepsis (H)  Hydronephrosis   -Amoxicillin complete.  -CT of the abdomen showed patchy subtle areas of decreased cortical enhancement within the right kidney which could either reflect pyelonephritis or perfusional changes related to chronic renal atrophy and multifocal scars.  No renal abscess.  -Follow-up with urology outpatient for chronic mild left hydronephrosis extending into the ureteropelvic junction. (Family would like to avoid if possible).   -Repeat CBC and BMP unremarkable today.   -Bladder scan < 25 ml.   -Repeat UA appears positive with blood, WBC and mucus. UC with > 100,000 Enterococcus Facaeilis.   -Continue macrobid 100 mg BID X 7 days.  -CBC without leukocytosis.     Type 2  diabetes mellitus with hyperosmolarity without coma, without long-term current use of insulin (H)  -Consistent carb diet.  -Recent hemoglobin A1c 6.8.  -Metformin 1000 mg 2 times daily.  -Blood sugar checks twice daily.  -Blood sugars satisfactory.     Paroxysmal atrial fibrillation (H)  -Continue with warfarin.  -INRs to be managed per the Coumadin clinic. Monitor while on anbx.   -INR 2.7.     Depression with anxiety   Hallucinations.   -Continues on  Luvox and Abilify.  -mood appears stable.   -Hold Luvox (SE include abnormal thoughts and dreams.)     Active Ambulatory Problems     Diagnosis Date Noted    SIRS (systemic inflammatory response syndrome) (H) 09/09/2019    Adrenal incidentaloma (H24)     Diet-controlled diabetes mellitus (H)     Pericardial effusion     Lactic acidosis     Type 2 diabetes mellitus without complication, without long-term current use of insulin (H) 04/12/2020    Paroxysmal atrial fibrillation (H) 02/18/2020    Calculus of ureter 04/12/2020    Acute renal failure, unspecified acute renal failure type (H24) 04/12/2020    Acute renal failure (ARF) (H24) 04/12/2020    ATN (acute tubular necrosis) (H24) 04/12/2020    Sepsis due to urinary tract infection (H)     Metformin overdose of undetermined intent, initial encounter     Metabolic acidosis     Hyperkalemia     Hematemesis, presence of nausea not specified 04/12/2020    Calculus of kidney     Syncope and collapse 06/30/2020    Hyperglycemia     After care 07/03/2012    Age-related cataract 03/27/2021    Anemia associated with acute blood loss 06/27/2012    Balanitis 03/27/2021    Cholelithiasis without obstruction 03/27/2021    Constipation 07/03/2012    Depression with anxiety 07/03/2012    Glaucoma 06/25/2012    Hemorrhoids without complication 03/27/2021    Hyperlipidemia 06/07/2018    Loss of appetite 03/27/2021    Major depressive disorder, recurrent episode, in partial remission (H24) 06/08/2018    Mixed personality disorder in  adult (H) 06/08/2018    Obstructive sleep apnea 03/27/2021    Osteoarthrosis involving lower leg 06/23/2012    Periodic limb movement disorder 03/27/2021    Recurrent major depression (H24) 03/27/2021    S/P ureteral stent placement 03/27/2021    Unilateral small kidney 03/27/2021    Nephrolithiasis 03/27/2021    Type 2 diabetes mellitus (H) 06/25/2012    Osteoarthritis of knee 03/27/2021    Persistent atrial fibrillation (H) 03/27/2021    Pyelonephritis 03/27/2021    Urinary tract infection without hematuria, site unspecified 04/30/2022    Sepsis without acute organ dysfunction (H) 04/30/2022    UTI (urinary tract infection) 05/01/2022    Benign prostatic hyperplasia with urinary frequency 05/07/2022    Hypomagnesemia 05/07/2022    Injury of head, initial encounter 07/31/2023    Fall at home, initial encounter 07/31/2023    E. coli urinary tract infection 08/02/2023    Septic encephalopathy 07/25/2024    DM (diabetes mellitus), type 2 with complications (H) 07/25/2024    Urinary tract infection with hematuria, site unspecified 07/26/2024    Altered mental status, unspecified altered mental status type 07/26/2024    Sepsis, due to unspecified organism, unspecified whether acute organ dysfunction present (H) 07/26/2024    Normocytic anemia 11/29/2023    Status post right knee replacement 11/29/2023    Peripheral vascular disease, unspecified (H24) 08/20/2024     Resolved Ambulatory Problems     Diagnosis Date Noted    Shock circulatory (H)     Acute respiratory failure with hypoxemia (H)      Past Medical History:   Diagnosis Date    A-fib (H)     Acute hemodialysis encounter (H24)     Acute kidney injury (H24)     Asbestosis (H)     Atrophy of right kidney     Basal cell carcinoma     BPH without urinary obstruction     Cellulitis     Cholelithiasis     Diabetes mellitus, type 2 (H) 4/12/2020    Esophagitis     Gastritis     Hematemesis, presence of nausea not specified     Hyperlipemia     Kidney stone      Metformin overdose of undetermined intent     PTSD (post-traumatic stress disorder)     Seasonal allergies     Sleep apnea     Upper GI bleed      No Known Allergies    All Meds and Allergies reviewed in the record at the facility and is the most up-to-date.    Current Outpatient Medications   Medication Sig Dispense Refill    acetaminophen (TYLENOL) 500 MG tablet [ACETAMINOPHEN (TYLENOL) 500 MG TABLET] Take 500 mg by mouth every 6 (six) hours as needed for pain.      ARIPiprazole (ABILIFY) 2 MG tablet [ARIPIPRAZOLE (ABILIFY) 2 MG TABLET] Take 2 mg by mouth at bedtime.       brimonidine (ALPHAGAN) 0.2 % ophthalmic solution [BRIMONIDINE (ALPHAGAN) 0.2 % OPHTHALMIC SOLUTION] Administer 1 drop to both eyes 2 (two) times a day.       dorzolamide-timolol (COSOPT) 2-0.5 % ophthalmic solution Place 1 drop into both eyes 2 times daily      fluvoxaMINE (LUVOX) 50 MG tablet [FLUVOXAMINE (LUVOX) 50 MG TABLET] Take 50 mg by mouth at bedtime.       gabapentin (NEURONTIN) 300 MG capsule Take 300 mg by mouth 2 times daily      latanoprostene bunod 0.024 % Drop Place 1 drop into both eyes At Bedtime      levomefolate calcium (L-METHYLFOLATE ORAL) [LEVOMEFOLATE CALCIUM (L-METHYLFOLATE ORAL)] Take 1.25 mg by mouth daily.       metFORMIN (GLUCOPHAGE) 1000 MG tablet Take 1,000 mg by mouth 2 times daily (with meals)      Multiple Vitamins-Minerals (CENTRUM SILVER) CHEW Take 1 tablet by mouth daily      netarsudiL (RHOPRESSA) 0.02 % Drop Place 1 drop into both eyes every evening      warfarin ANTICOAGULANT (COUMADIN) 5 MG tablet Take 5 to 7.5mg (1 to 1.5 tabs) by mouth daily OR AS DIRECTED.  Adjust dose based on INR. 100 tablet 1    WARFARIN SODIUM PO Take by mouth See Admin Instructions Dosing in coordination with INR results       No current facility-administered medications for this visit.       REVIEW OF SYSTEMS:   10 point review of systems reviewed and pertinent positives in the HPI.     PHYSICAL EXAMINATION:  Physical Exam      Vital signs: /80   Pulse 99   Temp 98.2  F (36.8  C)   Resp 16   Ht 1.829 m (6')   Wt 96.5 kg (212 lb 12.8 oz)   SpO2 95%   BMI 28.86 kg/m    General: Awake, Alert, sitting up in wheelchair,  conversant  HEENT:Pink conjunctiva, slight redness to right eye, patient legally blind in right eye, moist oral mucosa  NECK: Supple  CVS:  S1  S2, without murmur or gallop.   LUNG: Clear to auscultation, No wheezes, rales or rhonci.  BACK: No kyphosis of the thoracic spine  ABDOMEN: Soft,with positive bowel sounds  EXTREMITIES:  no pedal edema, no calf tenderness  SKIN: Warm and dry  NEUROLOGIC: Intact, pulses palpable  PSYCHIATRIC: Pleasant affect.      Labs:  All labs reviewed in the nursing home record and [employer] [facility]   @  Lab Results   Component Value Date    WBC 6.5 07/29/2024     Lab Results   Component Value Date    RBC 3.41 07/29/2024     Lab Results   Component Value Date    HGB 10.6 07/29/2024     Lab Results   Component Value Date    HCT 33.2 07/29/2024     Lab Results   Component Value Date    MCV 97 07/29/2024     Lab Results   Component Value Date    MCH 31.1 07/29/2024     Lab Results   Component Value Date    MCHC 31.9 07/29/2024     Lab Results   Component Value Date    RDW 12.9 07/29/2024     Lab Results   Component Value Date     07/29/2024        @Last Comprehensive Metabolic Panel:  Sodium   Date Value Ref Range Status   08/27/2024 142 135 - 145 mmol/L Final     Potassium   Date Value Ref Range Status   08/27/2024 4.2 3.4 - 5.3 mmol/L Final   05/05/2022 4.4 3.5 - 5.0 mmol/L Final     Chloride   Date Value Ref Range Status   08/27/2024 104 98 - 107 mmol/L Final   05/02/2022 109 (H) 98 - 107 mmol/L Final     Carbon Dioxide (CO2)   Date Value Ref Range Status   08/27/2024 26 22 - 29 mmol/L Final   05/02/2022 25 22 - 31 mmol/L Final     Anion Gap   Date Value Ref Range Status   08/27/2024 12 7 - 15 mmol/L Final   05/02/2022 8 5 - 18 mmol/L Final     Glucose   Date Value Ref Range Status    08/27/2024 155 (H) 70 - 99 mg/dL Final   05/02/2022 133 (H) 70 - 125 mg/dL Final     GLUCOSE BY METER POCT   Date Value Ref Range Status   07/29/2024 255 (H) 70 - 99 mg/dL Final     Urea Nitrogen   Date Value Ref Range Status   08/27/2024 19.2 8.0 - 23.0 mg/dL Final   05/02/2022 11 8 - 28 mg/dL Final     Creatinine   Date Value Ref Range Status   08/27/2024 0.87 0.67 - 1.17 mg/dL Final     GFR Estimate   Date Value Ref Range Status   08/27/2024 >90 >60 mL/min/1.73m2 Final   06/10/2021 >60 >60 mL/min/1.73m2 Final     Calcium   Date Value Ref Range Status   08/27/2024 8.7 (L) 8.8 - 10.4 mg/dL Final     Comment:     Reference intervals for this test were updated on 7/16/2024 to reflect our healthy population more accurately. There may be differences in the flagging of prior results with similar values performed with this method. Those prior results can be interpreted in the context of the updated reference intervals.         This note has been dictated using voice recognition software. Any grammatical or context distortions are unintentional and inherent to the software    Electronically signed by: Kylie Gomez CNP

## 2024-09-03 NOTE — PROGRESS NOTES
Incoming fax from [facility]    Date of result 9/3/24    INR result 2.3    Route result to: [facility] ([facility])

## 2024-09-03 NOTE — LETTER
9/3/2024      Rakesh Samuels  [address]  [facility] [address]        [facility] GERIATRIC SERVICES    Code Status:  DNR/DNI   Visit Type:   Chief Complaint   Patient presents with     TCU Follow Up     Facility:  [facility]) [[id number]]         HPI: Rakesh Samuels is a 73 year old male who I am seeing today for follow up on the TCU.  Patient recently hospitalized with sepsis due to UTI.  Patient had altered mental status.  Blood culture grew Enterococcus.  Patient treated with IV Zosyn and later switched to IV ampicillin then to oral amoxicillin for 10 days.  C  He initially had a Olsen catheter placed in the ER however passed a voiding trial.    Transitional Care Course: Today pt lying in bed.  Pt with recent UTI with hydronephrosis. He had completed his antibiotics. Pt with decline in mobility and weakness. CBC and BMP unremarkable. PVR < 25 ml. UA/UC grew > 100,000 Enterococcus faecalis. He continues on oral antibiotics. Today he reports worsening vivid, scary dreams and hallucinations. He is legally blind. Review of his meds today and side effects reviewed. Mood appears stable. He is able to ambulate with rolling walker with staff in his room. A fib on chronic anticoagulation.         Assessment/Plan:     Urinary tract infection without hematuria, site unspecified  Bacterial sepsis (H)  Hydronephrosis   -Amoxicillin complete.  -CT of the abdomen showed patchy subtle areas of decreased cortical enhancement within the right kidney which could either reflect pyelonephritis or perfusional changes related to chronic renal atrophy and multifocal scars.  No renal abscess.  -Follow-up with urology outpatient for chronic mild left hydronephrosis extending into the ureteropelvic junction. (Family would like to avoid if possible).   -Repeat CBC and BMP unremarkable today.   -Bladder scan < 25 ml.   -Repeat UA appears positive with blood, WBC and mucus. UC with > 100,000 Enterococcus Facaeilis.    -Continue macrobid 100 mg BID X 7 days.  -CBC without leukocytosis.     Type 2 diabetes mellitus with hyperosmolarity without coma, without long-term current use of insulin (H)  -Consistent carb diet.  -Recent hemoglobin A1c 6.8.  -Metformin 1000 mg 2 times daily.  -Blood sugar checks twice daily.  -Blood sugars satisfactory.     Paroxysmal atrial fibrillation (H)  -Continue with warfarin.  -INRs to be managed per the Coumadin clinic. Monitor while on anbx.   -INR 2.7.     Depression with anxiety   Hallucinations.   -Continues on  Luvox and Abilify.  -mood appears stable.   -Hold Luvox (SE include abnormal thoughts and dreams.)     Active Ambulatory Problems     Diagnosis Date Noted     SIRS (systemic inflammatory response syndrome) (H) 09/09/2019     Adrenal incidentaloma (H24)      Diet-controlled diabetes mellitus (H)      Pericardial effusion      Lactic acidosis      Type 2 diabetes mellitus without complication, without long-term current use of insulin (H) 04/12/2020     Paroxysmal atrial fibrillation (H) 02/18/2020     Calculus of ureter 04/12/2020     Acute renal failure, unspecified acute renal failure type (H24) 04/12/2020     Acute renal failure (ARF) (H24) 04/12/2020     ATN (acute tubular necrosis) (H24) 04/12/2020     Sepsis due to urinary tract infection (H)      Metformin overdose of undetermined intent, initial encounter      Metabolic acidosis      Hyperkalemia      Hematemesis, presence of nausea not specified 04/12/2020     Calculus of kidney      Syncope and collapse 06/30/2020     Hyperglycemia      After care 07/03/2012     Age-related cataract 03/27/2021     Anemia associated with acute blood loss 06/27/2012     Balanitis 03/27/2021     Cholelithiasis without obstruction 03/27/2021     Constipation 07/03/2012     Depression with anxiety 07/03/2012     Glaucoma 06/25/2012     Hemorrhoids without complication 03/27/2021     Hyperlipidemia 06/07/2018     Loss of appetite 03/27/2021     Major  depressive disorder, recurrent episode, in partial remission (H24) 06/08/2018     Mixed personality disorder in adult (H) 06/08/2018     Obstructive sleep apnea 03/27/2021     Osteoarthrosis involving lower leg 06/23/2012     Periodic limb movement disorder 03/27/2021     Recurrent major depression (H24) 03/27/2021     S/P ureteral stent placement 03/27/2021     Unilateral small kidney 03/27/2021     Nephrolithiasis 03/27/2021     Type 2 diabetes mellitus (H) 06/25/2012     Osteoarthritis of knee 03/27/2021     Persistent atrial fibrillation (H) 03/27/2021     Pyelonephritis 03/27/2021     Urinary tract infection without hematuria, site unspecified 04/30/2022     Sepsis without acute organ dysfunction (H) 04/30/2022     UTI (urinary tract infection) 05/01/2022     Benign prostatic hyperplasia with urinary frequency 05/07/2022     Hypomagnesemia 05/07/2022     Injury of head, initial encounter 07/31/2023     Fall at home, initial encounter 07/31/2023     E. coli urinary tract infection 08/02/2023     Septic encephalopathy 07/25/2024     DM (diabetes mellitus), type 2 with complications (H) 07/25/2024     Urinary tract infection with hematuria, site unspecified 07/26/2024     Altered mental status, unspecified altered mental status type 07/26/2024     Sepsis, due to unspecified organism, unspecified whether acute organ dysfunction present (H) 07/26/2024     Normocytic anemia 11/29/2023     Status post right knee replacement 11/29/2023     Peripheral vascular disease, unspecified (H24) 08/20/2024     Resolved Ambulatory Problems     Diagnosis Date Noted     Shock circulatory (H)      Acute respiratory failure with hypoxemia (H)      Past Medical History:   Diagnosis Date     A-fib (H)      Acute hemodialysis encounter (H24)      Acute kidney injury (H24)      Asbestosis (H)      Atrophy of right kidney      Basal cell carcinoma      BPH without urinary obstruction      Cellulitis      Cholelithiasis      Diabetes  mellitus, type 2 (H) 4/12/2020     Esophagitis      Gastritis      Hematemesis, presence of nausea not specified      Hyperlipemia      Kidney stone      Metformin overdose of undetermined intent      PTSD (post-traumatic stress disorder)      Seasonal allergies      Sleep apnea      Upper GI bleed      No Known Allergies    All Meds and Allergies reviewed in the record at the facility and is the most up-to-date.    Current Outpatient Medications   Medication Sig Dispense Refill     acetaminophen (TYLENOL) 500 MG tablet [ACETAMINOPHEN (TYLENOL) 500 MG TABLET] Take 500 mg by mouth every 6 (six) hours as needed for pain.       ARIPiprazole (ABILIFY) 2 MG tablet [ARIPIPRAZOLE (ABILIFY) 2 MG TABLET] Take 2 mg by mouth at bedtime.        brimonidine (ALPHAGAN) 0.2 % ophthalmic solution [BRIMONIDINE (ALPHAGAN) 0.2 % OPHTHALMIC SOLUTION] Administer 1 drop to both eyes 2 (two) times a day.        dorzolamide-timolol (COSOPT) 2-0.5 % ophthalmic solution Place 1 drop into both eyes 2 times daily       fluvoxaMINE (LUVOX) 50 MG tablet [FLUVOXAMINE (LUVOX) 50 MG TABLET] Take 50 mg by mouth at bedtime.        gabapentin (NEURONTIN) 300 MG capsule Take 300 mg by mouth 2 times daily       latanoprostene bunod 0.024 % Drop Place 1 drop into both eyes At Bedtime       levomefolate calcium (L-METHYLFOLATE ORAL) [LEVOMEFOLATE CALCIUM (L-METHYLFOLATE ORAL)] Take 1.25 mg by mouth daily.        metFORMIN (GLUCOPHAGE) 1000 MG tablet Take 1,000 mg by mouth 2 times daily (with meals)       Multiple Vitamins-Minerals (CENTRUM SILVER) CHEW Take 1 tablet by mouth daily       netarsudiL (RHOPRESSA) 0.02 % Drop Place 1 drop into both eyes every evening       warfarin ANTICOAGULANT (COUMADIN) 5 MG tablet Take 5 to 7.5mg (1 to 1.5 tabs) by mouth daily OR AS DIRECTED.  Adjust dose based on INR. 100 tablet 1     WARFARIN SODIUM PO Take by mouth See Admin Instructions Dosing in coordination with INR results       No current facility-administered  medications for this visit.       REVIEW OF SYSTEMS:   10 point review of systems reviewed and pertinent positives in the HPI.     PHYSICAL EXAMINATION:  Physical Exam     Vital signs: /80   Pulse 99   Temp 98.2  F (36.8  C)   Resp 16   Ht 1.829 m (6')   Wt 96.5 kg (212 lb 12.8 oz)   SpO2 95%   BMI 28.86 kg/m    General: Awake, Alert, sitting up in wheelchair,  conversant  HEENT:Pink conjunctiva, slight redness to right eye, patient legally blind in right eye, moist oral mucosa  NECK: Supple  CVS:  S1  S2, without murmur or gallop.   LUNG: Clear to auscultation, No wheezes, rales or rhonci.  BACK: No kyphosis of the thoracic spine  ABDOMEN: Soft,with positive bowel sounds  EXTREMITIES:  no pedal edema, no calf tenderness  SKIN: Warm and dry  NEUROLOGIC: Intact, pulses palpable  PSYCHIATRIC: Pleasant affect.      Labs:  All labs reviewed in the nursing home record and Epic   @  Lab Results   Component Value Date    WBC 6.5 07/29/2024     Lab Results   Component Value Date    RBC 3.41 07/29/2024     Lab Results   Component Value Date    HGB 10.6 07/29/2024     Lab Results   Component Value Date    HCT 33.2 07/29/2024     Lab Results   Component Value Date    MCV 97 07/29/2024     Lab Results   Component Value Date    MCH 31.1 07/29/2024     Lab Results   Component Value Date    MCHC 31.9 07/29/2024     Lab Results   Component Value Date    RDW 12.9 07/29/2024     Lab Results   Component Value Date     07/29/2024        @Last Comprehensive Metabolic Panel:  Sodium   Date Value Ref Range Status   08/27/2024 142 135 - 145 mmol/L Final     Potassium   Date Value Ref Range Status   08/27/2024 4.2 3.4 - 5.3 mmol/L Final   05/05/2022 4.4 3.5 - 5.0 mmol/L Final     Chloride   Date Value Ref Range Status   08/27/2024 104 98 - 107 mmol/L Final   05/02/2022 109 (H) 98 - 107 mmol/L Final     Carbon Dioxide (CO2)   Date Value Ref Range Status   08/27/2024 26 22 - 29 mmol/L Final   05/02/2022 25 22 - 31 mmol/L  Final     Anion Gap   Date Value Ref Range Status   08/27/2024 12 7 - 15 mmol/L Final   05/02/2022 8 5 - 18 mmol/L Final     Glucose   Date Value Ref Range Status   08/27/2024 155 (H) 70 - 99 mg/dL Final   05/02/2022 133 (H) 70 - 125 mg/dL Final     GLUCOSE BY METER POCT   Date Value Ref Range Status   07/29/2024 255 (H) 70 - 99 mg/dL Final     Urea Nitrogen   Date Value Ref Range Status   08/27/2024 19.2 8.0 - 23.0 mg/dL Final   05/02/2022 11 8 - 28 mg/dL Final     Creatinine   Date Value Ref Range Status   08/27/2024 0.87 0.67 - 1.17 mg/dL Final     GFR Estimate   Date Value Ref Range Status   08/27/2024 >90 >60 mL/min/1.73m2 Final   06/10/2021 >60 >60 mL/min/1.73m2 Final     Calcium   Date Value Ref Range Status   08/27/2024 8.7 (L) 8.8 - 10.4 mg/dL Final     Comment:     Reference intervals for this test were updated on 7/16/2024 to reflect our healthy population more accurately. There may be differences in the flagging of prior results with similar values performed with this method. Those prior results can be interpreted in the context of the updated reference intervals.         This note has been dictated using voice recognition software. Any grammatical or context distortions are unintentional and inherent to the software    Electronically signed by: Kylie Gomez CNP       Sincerely,        Kylie Gomez NP

## 2024-09-04 VITALS
TEMPERATURE: 98.5 F | WEIGHT: 212 LBS | HEART RATE: 87 BPM | RESPIRATION RATE: 18 BRPM | DIASTOLIC BLOOD PRESSURE: 77 MMHG | SYSTOLIC BLOOD PRESSURE: 123 MMHG | OXYGEN SATURATION: 94 % | BODY MASS INDEX: 28.75 KG/M2

## 2024-09-04 LAB
ANION GAP SERPL CALCULATED.3IONS-SCNC: 10 MMOL/L (ref 7–15)
BUN SERPL-MCNC: 16.4 MG/DL (ref 8–23)
CALCIUM SERPL-MCNC: 8.7 MG/DL (ref 8.8–10.4)
CHLORIDE SERPL-SCNC: 105 MMOL/L (ref 98–107)
CREAT SERPL-MCNC: 0.84 MG/DL (ref 0.67–1.17)
EGFRCR SERPLBLD CKD-EPI 2021: >90 ML/MIN/1.73M2
ERYTHROCYTE [DISTWIDTH] IN BLOOD BY AUTOMATED COUNT: 13.2 % (ref 10–15)
GLUCOSE SERPL-MCNC: 173 MG/DL (ref 70–99)
HCO3 SERPL-SCNC: 24 MMOL/L (ref 22–29)
HCT VFR BLD AUTO: 39.4 % (ref 40–53)
HGB BLD-MCNC: 12.7 G/DL (ref 13.3–17.7)
MCH RBC QN AUTO: 31.3 PG (ref 26.5–33)
MCHC RBC AUTO-ENTMCNC: 32.2 G/DL (ref 31.5–36.5)
MCV RBC AUTO: 97 FL (ref 78–100)
PLATELET # BLD AUTO: 252 10E3/UL (ref 150–450)
POTASSIUM SERPL-SCNC: 4.6 MMOL/L (ref 3.4–5.3)
RBC # BLD AUTO: 4.06 10E6/UL (ref 4.4–5.9)
SODIUM SERPL-SCNC: 139 MMOL/L (ref 135–145)
WBC # BLD AUTO: 10 10E3/UL (ref 4–11)

## 2024-09-04 PROCEDURE — 36415 COLL VENOUS BLD VENIPUNCTURE: CPT | Performed by: NURSE PRACTITIONER

## 2024-09-04 PROCEDURE — 85027 COMPLETE CBC AUTOMATED: CPT | Performed by: NURSE PRACTITIONER

## 2024-09-04 PROCEDURE — P9604 ONE-WAY ALLOW PRORATED TRIP: HCPCS | Performed by: NURSE PRACTITIONER

## 2024-09-04 PROCEDURE — 80048 BASIC METABOLIC PNL TOTAL CA: CPT | Performed by: NURSE PRACTITIONER

## 2024-09-05 ENCOUNTER — TRANSITIONAL CARE UNIT VISIT (OUTPATIENT)
Dept: GERIATRICS | Facility: CLINIC | Age: 74
End: 2024-09-05
Payer: MEDICARE

## 2024-09-05 ENCOUNTER — LAB REQUISITION (OUTPATIENT)
Dept: LAB | Facility: CLINIC | Age: 74
End: 2024-09-05

## 2024-09-05 ENCOUNTER — LAB REQUISITION (OUTPATIENT)
Dept: LAB | Facility: CLINIC | Age: 74
End: 2024-09-05
Payer: MEDICARE

## 2024-09-05 DIAGNOSIS — F33.41 MAJOR DEPRESSIVE DISORDER, RECURRENT EPISODE, IN PARTIAL REMISSION (H): ICD-10-CM

## 2024-09-05 DIAGNOSIS — R44.3 HALLUCINATIONS: ICD-10-CM

## 2024-09-05 DIAGNOSIS — I10 ESSENTIAL (PRIMARY) HYPERTENSION: ICD-10-CM

## 2024-09-05 DIAGNOSIS — F60.89 MIXED PERSONALITY DISORDER IN ADULT (H): ICD-10-CM

## 2024-09-05 DIAGNOSIS — I48.0 PAROXYSMAL ATRIAL FIBRILLATION (H): ICD-10-CM

## 2024-09-05 DIAGNOSIS — I10 ESSENTIAL HYPERTENSION: ICD-10-CM

## 2024-09-05 DIAGNOSIS — E11.00 TYPE 2 DIABETES MELLITUS WITH HYPEROSMOLARITY WITHOUT COMA, WITHOUT LONG-TERM CURRENT USE OF INSULIN (H): ICD-10-CM

## 2024-09-05 DIAGNOSIS — N13.30 HYDRONEPHROSIS, UNSPECIFIED HYDRONEPHROSIS TYPE: ICD-10-CM

## 2024-09-05 DIAGNOSIS — N39.0 URINARY TRACT INFECTION WITHOUT HEMATURIA, SITE UNSPECIFIED: Primary | ICD-10-CM

## 2024-09-05 LAB
ALBUMIN UR-MCNC: 20 MG/DL
APPEARANCE UR: CLEAR
BILIRUB UR QL STRIP: NEGATIVE
COLOR UR AUTO: YELLOW
GLUCOSE UR STRIP-MCNC: 200 MG/DL
HGB UR QL STRIP: NEGATIVE
KETONES UR STRIP-MCNC: NEGATIVE MG/DL
LEUKOCYTE ESTERASE UR QL STRIP: ABNORMAL
MUCOUS THREADS #/AREA URNS LPF: PRESENT /LPF
NITRATE UR QL: NEGATIVE
PH UR STRIP: 5 [PH] (ref 5–7)
RBC URINE: 1 /HPF
SP GR UR STRIP: 1.02 (ref 1–1.03)
SQUAMOUS EPITHELIAL: 1 /HPF
UROBILINOGEN UR STRIP-MCNC: NORMAL MG/DL
WBC URINE: 9 /HPF

## 2024-09-05 PROCEDURE — 87086 URINE CULTURE/COLONY COUNT: CPT

## 2024-09-05 PROCEDURE — 81001 URINALYSIS AUTO W/SCOPE: CPT

## 2024-09-05 PROCEDURE — 99309 SBSQ NF CARE MODERATE MDM 30: CPT | Performed by: NURSE PRACTITIONER

## 2024-09-05 NOTE — LETTER
9/5/2024      Rakesh Samuels  [address]  [facility] [address]        [facility] GERIATRIC SERVICES    Code Status:  DNR/DNI   Visit Type:   Chief Complaint   Patient presents with     Tcu Follow Up     Facility:  [facility]) [[id number]]         HPI: Rakesh Samuels is a 73 year old male who I am seeing today for follow up on the TCU.  Patient recently hospitalized with sepsis due to UTI.  Patient had altered mental status.  Blood culture grew Enterococcus.  Patient treated with IV Zosyn and later switched to IV ampicillin then to oral amoxicillin for 10 days.  C  He initially had a Olsen catheter placed in the ER however passed a voiding trial.    Transitional Care Course: Today pt sitting up in wheelchair.  Pt with recent UTI with hydronephrosis. He had completed his antibiotics. Pt with decline in mobility and weakness. CBC and BMP unremarkable. PVR < 25 ml. UA/UC grew > 100,000 Enterococcus faecalis.  He has completed his oral antibiotics.  He denies any further hallucinations.  His fluvoxamine is currently on hold due to vivid dreams which were scary to patient.  This may have been the source of his hallucinations and vivid dreams.  These are common side effects of medication.  Patient is legally blind which may have also added to symptomology with recent UTI.  Today patient reports she is overall not feeling well.  He is afebrile.  He reports he continues to be weak.  He is able to ambulate with rolling walker with staff in his room.  Mood is flat.  He continues on Abilify.  A-fib on chronic anticoagulation.    Assessment/Plan:     Urinary tract infection without hematuria, site unspecified  Bacterial sepsis (H)  Hydronephrosis   -Amoxicillin complete.  -CT of the abdomen showed patchy subtle areas of decreased cortical enhancement within the right kidney which could either reflect pyelonephritis or perfusional changes related to chronic renal atrophy and multifocal scars.  No renal  abscess.  -Follow-up with urology outpatient for chronic mild left hydronephrosis extending into the ureteropelvic junction. (Family would like to avoid if possible).   -Repeat CBC and BMP unremarkable today.   -Bladder scan < 25 ml.   -Repeat UA appears positive with blood, WBC and mucus. UC with > 100,000 Enterococcus Facaeilis.   -Continue macrobid 100 mg BID X 7 days.  Macrobid complete.  Follow-up UA UC.  -Repeat CBC and BMP in the AM.    Type 2 diabetes mellitus with hyperosmolarity without coma, without long-term current use of insulin (H)  -Consistent carb diet.  -Recent hemoglobin A1c 6.8.  -Metformin 1000 mg 2 times daily.  -Blood sugar checks twice daily.  -Blood sugars satisfactory.     Paroxysmal atrial fibrillation (H)  -Continue with warfarin.  -INRs to be managed per the Coumadin clinic. Monitor while on anbx.   -INR 2.3.      Depression with anxiety   Hallucinations.   -Continues on fluvoxamine and Abilify.  -mood appears stable.   -Hold Luvox (SE include abnormal thoughts and dreams.) no further hallucinations or abnormal thoughts with holding the fluvoxamine.    Active Ambulatory Problems     Diagnosis Date Noted     SIRS (systemic inflammatory response syndrome) (H) 09/09/2019     Adrenal incidentaloma (H24)      Diet-controlled diabetes mellitus (H)      Pericardial effusion      Lactic acidosis      Type 2 diabetes mellitus without complication, without long-term current use of insulin (H) 04/12/2020     Paroxysmal atrial fibrillation (H) 02/18/2020     Calculus of ureter 04/12/2020     Acute renal failure, unspecified acute renal failure type (H24) 04/12/2020     Acute renal failure (ARF) (H24) 04/12/2020     ATN (acute tubular necrosis) (H24) 04/12/2020     Sepsis due to urinary tract infection (H)      Metformin overdose of undetermined intent, initial encounter      Metabolic acidosis      Hyperkalemia      Hematemesis, presence of nausea not specified 04/12/2020     Calculus of kidney       Syncope and collapse 06/30/2020     Hyperglycemia      After care 07/03/2012     Age-related cataract 03/27/2021     Anemia associated with acute blood loss 06/27/2012     Balanitis 03/27/2021     Cholelithiasis without obstruction 03/27/2021     Constipation 07/03/2012     Depression with anxiety 07/03/2012     Glaucoma 06/25/2012     Hemorrhoids without complication 03/27/2021     Hyperlipidemia 06/07/2018     Loss of appetite 03/27/2021     Major depressive disorder, recurrent episode, in partial remission (H24) 06/08/2018     Mixed personality disorder in adult (H) 06/08/2018     Obstructive sleep apnea 03/27/2021     Osteoarthrosis involving lower leg 06/23/2012     Periodic limb movement disorder 03/27/2021     Recurrent major depression (H24) 03/27/2021     S/P ureteral stent placement 03/27/2021     Unilateral small kidney 03/27/2021     Nephrolithiasis 03/27/2021     Type 2 diabetes mellitus (H) 06/25/2012     Osteoarthritis of knee 03/27/2021     Persistent atrial fibrillation (H) 03/27/2021     Pyelonephritis 03/27/2021     Urinary tract infection without hematuria, site unspecified 04/30/2022     Sepsis without acute organ dysfunction (H) 04/30/2022     UTI (urinary tract infection) 05/01/2022     Benign prostatic hyperplasia with urinary frequency 05/07/2022     Hypomagnesemia 05/07/2022     Injury of head, initial encounter 07/31/2023     Fall at home, initial encounter 07/31/2023     E. coli urinary tract infection 08/02/2023     Septic encephalopathy 07/25/2024     DM (diabetes mellitus), type 2 with complications (H) 07/25/2024     Urinary tract infection with hematuria, site unspecified 07/26/2024     Altered mental status, unspecified altered mental status type 07/26/2024     Sepsis, due to unspecified organism, unspecified whether acute organ dysfunction present (H) 07/26/2024     Normocytic anemia 11/29/2023     Status post right knee replacement 11/29/2023     Peripheral vascular disease,  unspecified (H24) 08/20/2024     Resolved Ambulatory Problems     Diagnosis Date Noted     Shock circulatory (H)      Acute respiratory failure with hypoxemia (H)      Past Medical History:   Diagnosis Date     A-fib (H)      Acute hemodialysis encounter (H24)      Acute kidney injury (H24)      Asbestosis (H)      Atrophy of right kidney      Basal cell carcinoma      BPH without urinary obstruction      Cellulitis      Cholelithiasis      Diabetes mellitus, type 2 (H) 4/12/2020     Esophagitis      Gastritis      Hematemesis, presence of nausea not specified      Hyperlipemia      Kidney stone      Metformin overdose of undetermined intent      PTSD (post-traumatic stress disorder)      Seasonal allergies      Sleep apnea      Upper GI bleed      No Known Allergies    All Meds and Allergies reviewed in the record at the facility and is the most up-to-date.    Current Outpatient Medications   Medication Sig Dispense Refill     acetaminophen (TYLENOL) 500 MG tablet [ACETAMINOPHEN (TYLENOL) 500 MG TABLET] Take 500 mg by mouth every 6 (six) hours as needed for pain.       ARIPiprazole (ABILIFY) 2 MG tablet [ARIPIPRAZOLE (ABILIFY) 2 MG TABLET] Take 2 mg by mouth at bedtime.        brimonidine (ALPHAGAN) 0.2 % ophthalmic solution [BRIMONIDINE (ALPHAGAN) 0.2 % OPHTHALMIC SOLUTION] Administer 1 drop to both eyes 2 (two) times a day.        dorzolamide-timolol (COSOPT) 2-0.5 % ophthalmic solution Place 1 drop into both eyes 2 times daily       fluvoxaMINE (LUVOX) 50 MG tablet [FLUVOXAMINE (LUVOX) 50 MG TABLET] Take 50 mg by mouth at bedtime.        gabapentin (NEURONTIN) 300 MG capsule Take 300 mg by mouth 2 times daily       latanoprostene bunod 0.024 % Drop Place 1 drop into both eyes At Bedtime       levomefolate calcium (L-METHYLFOLATE ORAL) [LEVOMEFOLATE CALCIUM (L-METHYLFOLATE ORAL)] Take 1.25 mg by mouth daily.        metFORMIN (GLUCOPHAGE) 1000 MG tablet Take 1,000 mg by mouth 2 times daily (with meals)        Multiple Vitamins-Minerals (CENTRUM SILVER) CHEW Take 1 tablet by mouth daily       netarsudiL (RHOPRESSA) 0.02 % Drop Place 1 drop into both eyes every evening       warfarin ANTICOAGULANT (COUMADIN) 5 MG tablet Take 5 to 7.5mg (1 to 1.5 tabs) by mouth daily OR AS DIRECTED.  Adjust dose based on INR. 100 tablet 1     WARFARIN SODIUM PO Take by mouth See Admin Instructions Dosing in coordination with INR results       No current facility-administered medications for this visit.       REVIEW OF SYSTEMS:   10 point review of systems reviewed and pertinent positives in the HPI.     PHYSICAL EXAMINATION:  Physical Exam     Vital signs: /77   Pulse 87   Temp 98.5  F (36.9  C)   Resp 18   Wt 96.2 kg (212 lb)   SpO2 94%   BMI 28.75 kg/m    General: Awake, Alert, sitting up in wheelchair,  conversant  HEENT:Pink conjunctiva, slight redness to right eye, patient legally blind in right eye, moist oral mucosa  NECK: Supple  CVS:  S1  S2, without murmur or gallop.   LUNG: Clear to auscultation, No wheezes, rales or rhonci.  BACK: No kyphosis of the thoracic spine  ABDOMEN: Soft,with positive bowel sounds  EXTREMITIES: Moves all extremities with generalized weakness, no pedal edema, no calf tenderness  SKIN: Warm and dry  NEUROLOGIC: Intact, pulses palpable  PSYCHIATRIC: Flat affect.      Labs:  All labs reviewed in the nursing home record and Epic   @  Lab Results   Component Value Date    WBC 6.5 07/29/2024     Lab Results   Component Value Date    RBC 3.41 07/29/2024     Lab Results   Component Value Date    HGB 10.6 07/29/2024     Lab Results   Component Value Date    HCT 33.2 07/29/2024     Lab Results   Component Value Date    MCV 97 07/29/2024     Lab Results   Component Value Date    MCH 31.1 07/29/2024     Lab Results   Component Value Date    MCHC 31.9 07/29/2024     Lab Results   Component Value Date    RDW 12.9 07/29/2024     Lab Results   Component Value Date     07/29/2024        @Last  Comprehensive Metabolic Panel:  Sodium   Date Value Ref Range Status   09/04/2024 139 135 - 145 mmol/L Final     Potassium   Date Value Ref Range Status   09/04/2024 4.6 3.4 - 5.3 mmol/L Final   05/05/2022 4.4 3.5 - 5.0 mmol/L Final     Chloride   Date Value Ref Range Status   09/04/2024 105 98 - 107 mmol/L Final   05/02/2022 109 (H) 98 - 107 mmol/L Final     Carbon Dioxide (CO2)   Date Value Ref Range Status   09/04/2024 24 22 - 29 mmol/L Final   05/02/2022 25 22 - 31 mmol/L Final     Anion Gap   Date Value Ref Range Status   09/04/2024 10 7 - 15 mmol/L Final   05/02/2022 8 5 - 18 mmol/L Final     Glucose   Date Value Ref Range Status   09/04/2024 173 (H) 70 - 99 mg/dL Final   05/02/2022 133 (H) 70 - 125 mg/dL Final     GLUCOSE BY METER POCT   Date Value Ref Range Status   07/29/2024 255 (H) 70 - 99 mg/dL Final     Urea Nitrogen   Date Value Ref Range Status   09/04/2024 16.4 8.0 - 23.0 mg/dL Final   05/02/2022 11 8 - 28 mg/dL Final     Creatinine   Date Value Ref Range Status   09/04/2024 0.84 0.67 - 1.17 mg/dL Final     GFR Estimate   Date Value Ref Range Status   09/04/2024 >90 >60 mL/min/1.73m2 Final   06/10/2021 >60 >60 mL/min/1.73m2 Final     Calcium   Date Value Ref Range Status   09/04/2024 8.7 (L) 8.8 - 10.4 mg/dL Final     Comment:     Reference intervals for this test were updated on 7/16/2024 to reflect our healthy population more accurately. There may be differences in the flagging of prior results with similar values performed with this method. Those prior results can be interpreted in the context of the updated reference intervals.         This note has been dictated using voice recognition software. Any grammatical or context distortions are unintentional and inherent to the software    Electronically signed by: Kylie Gomez CNP       Sincerely,        Kylie Gomez, NP

## 2024-09-06 ENCOUNTER — TRANSITIONAL CARE UNIT VISIT (OUTPATIENT)
Dept: GERIATRICS | Facility: CLINIC | Age: 74
End: 2024-09-06
Payer: MEDICARE

## 2024-09-06 ENCOUNTER — ANTICOAGULATION THERAPY VISIT (OUTPATIENT)
Dept: ANTICOAGULATION | Facility: CLINIC | Age: 74
End: 2024-09-06

## 2024-09-06 VITALS
SYSTOLIC BLOOD PRESSURE: 136 MMHG | DIASTOLIC BLOOD PRESSURE: 78 MMHG | TEMPERATURE: 98 F | OXYGEN SATURATION: 96 % | RESPIRATION RATE: 18 BRPM | BODY MASS INDEX: 28.75 KG/M2 | HEART RATE: 93 BPM | WEIGHT: 212 LBS

## 2024-09-06 DIAGNOSIS — Z53.9 ERRONEOUS ENCOUNTER--DISREGARD: Primary | ICD-10-CM

## 2024-09-06 DIAGNOSIS — I48.0 PAROXYSMAL ATRIAL FIBRILLATION (H): Primary | ICD-10-CM

## 2024-09-06 LAB
ANION GAP SERPL CALCULATED.3IONS-SCNC: 9 MMOL/L (ref 7–15)
BUN SERPL-MCNC: 19.4 MG/DL (ref 8–23)
CALCIUM SERPL-MCNC: 8.8 MG/DL (ref 8.8–10.4)
CHLORIDE SERPL-SCNC: 107 MMOL/L (ref 98–107)
CREAT SERPL-MCNC: 0.94 MG/DL (ref 0.67–1.17)
EGFRCR SERPLBLD CKD-EPI 2021: 86 ML/MIN/1.73M2
ERYTHROCYTE [DISTWIDTH] IN BLOOD BY AUTOMATED COUNT: 13.1 % (ref 10–15)
GLUCOSE SERPL-MCNC: 176 MG/DL (ref 70–99)
HCO3 SERPL-SCNC: 25 MMOL/L (ref 22–29)
HCT VFR BLD AUTO: 40.1 % (ref 40–53)
HGB BLD-MCNC: 12.5 G/DL (ref 13.3–17.7)
INR (EXTERNAL): 2.2
MCH RBC QN AUTO: 30.9 PG (ref 26.5–33)
MCHC RBC AUTO-ENTMCNC: 31.2 G/DL (ref 31.5–36.5)
MCV RBC AUTO: 99 FL (ref 78–100)
PLATELET # BLD AUTO: 250 10E3/UL (ref 150–450)
POTASSIUM SERPL-SCNC: 5 MMOL/L (ref 3.4–5.3)
RBC # BLD AUTO: 4.05 10E6/UL (ref 4.4–5.9)
SODIUM SERPL-SCNC: 141 MMOL/L (ref 135–145)
WBC # BLD AUTO: 8.2 10E3/UL (ref 4–11)

## 2024-09-06 PROCEDURE — 80048 BASIC METABOLIC PNL TOTAL CA: CPT | Performed by: NURSE PRACTITIONER

## 2024-09-06 PROCEDURE — 85027 COMPLETE CBC AUTOMATED: CPT | Performed by: NURSE PRACTITIONER

## 2024-09-06 PROCEDURE — 36415 COLL VENOUS BLD VENIPUNCTURE: CPT | Performed by: NURSE PRACTITIONER

## 2024-09-06 PROCEDURE — P9604 ONE-WAY ALLOW PRORATED TRIP: HCPCS | Performed by: NURSE PRACTITIONER

## 2024-09-06 NOTE — PROGRESS NOTES
ANTICOAGULATION MANAGEMENT     Rakesh Samuels 74 year old male is on warfarin with therapeutic INR result. (Goal INR 2.0-3.0)    Recent labs: (last 7 days)     09/06/24  0417   INR 2.2       ASSESSMENT     Source(s): Chart Review, Home Care/Facility Nurse, and Template     Warfarin doses taken: Warfarin taken as instructed  Diet: No new diet changes identified  Medication/supplement changes: None noted  New illness, injury, or hospitalization: No  Signs or symptoms of bleeding or clotting: No  Previous result: Therapeutic last visit; previously outside of goal range  Additional findings:  Plan to discharge on 9/11 back to the [facility] where there will be in house provider managing INR       PLAN     Recommended plan for no diet, medication or health factor changes affecting INR     Dosing Instructions: Continue your current warfarin dose with next INR in 4 days before discharge    Summary  As of 9/6/2024      Full warfarin instructions:  5 mg every Sun, Wed; 2.5 mg all other days   Next INR check:  9/10/2024               [facility] who agrees to plan and repeated back plan correctly    Orders given to  Homecare nurse/facility to recheck    Education provided: Please call back if any changes to your diet, medications or how you've been taking warfarin    Plan made per [facility] anticoagulation protocol    Jeannie Baldwin RN  Anticoagulation Clinic  9/6/2024    _______________________________________________________________________     Anticoagulation Episode Summary       Current INR goal:  2.0-3.0   TTR:  37.7% (4 wk)   Target end date:  Indefinite   Send INR reminders to:  [facility] (U/LTC/ELISA)    Indications    Paroxysmal atrial fibrillation (H) [I48.0]             Comments:  [facility] [phone]             Anticoagulation Care Providers       Provider Role Specialty Phone number    Kylie Gomez NP Referring Nurse  Practitioner [phone]

## 2024-09-06 NOTE — PROGRESS NOTES
[facility] GERIATRIC SERVICES    Code Status:  DNR/DNI   Visit Type:   Chief Complaint   Patient presents with    Tcu Follow Up     Facility:  [place] [facility] ([facility]) [[id number]]         HPI: Rakesh Samuels is a 73 year old male who I am seeing today for follow up on the TCU.  Patient recently hospitalized with sepsis due to UTI.  Patient had altered mental status.  Blood culture grew Enterococcus.  Patient treated with IV Zosyn and later switched to IV ampicillin then to oral amoxicillin for 10 days.  C  He initially had a Olsen catheter placed in the ER however passed a voiding trial.    Transitional Care Course: Today pt sitting up in wheelchair.  Pt with recent UTI with hydronephrosis. He had completed his antibiotics. Pt with decline in mobility and weakness. CBC and BMP unremarkable. PVR < 25 ml. UA/UC grew > 100,000 Enterococcus faecalis.  He has completed his oral antibiotics.  He denies any further hallucinations.  His fluvoxamine is currently on hold due to vivid dreams which were scary to patient.  This may have been the source of his hallucinations and vivid dreams.  These are common side effects of medication.  Patient is legally blind which may have also added to symptomology with recent UTI.  Today patient reports she is overall not feeling well.  He is afebrile.  He reports he continues to be weak.  He is able to ambulate with rolling walker with staff in his room.  Mood is flat.  He continues on Abilify.  A-fib on chronic anticoagulation.    Assessment/Plan:     Urinary tract infection without hematuria, site unspecified  Bacterial sepsis (H)  Hydronephrosis   -Amoxicillin complete.  -CT of the abdomen showed patchy subtle areas of decreased cortical enhancement within the right kidney which could either reflect pyelonephritis or perfusional changes related to chronic renal atrophy and multifocal scars.  No renal abscess.  -Follow-up with urology outpatient for chronic mild left hydronephrosis  extending into the ureteropelvic junction. (Family would like to avoid if possible).   -Repeat CBC and BMP unremarkable today.   -Bladder scan < 25 ml.   -Repeat UA appears positive with blood, WBC and mucus. UC with > 100,000 Enterococcus Facaeilis.   -Continue macrobid 100 mg BID X 7 days.  Macrobid complete.  Follow-up UA UC.  -Repeat CBC and BMP in the AM.    Type 2 diabetes mellitus with hyperosmolarity without coma, without long-term current use of insulin (H)  -Consistent carb diet.  -Recent hemoglobin A1c 6.8.  -Metformin 1000 mg 2 times daily.  -Blood sugar checks twice daily.  -Blood sugars satisfactory.     Paroxysmal atrial fibrillation (H)  -Continue with warfarin.  -INRs to be managed per the Coumadin clinic. Monitor while on anbx.   -INR 2.3.      Depression with anxiety   Hallucinations.   -Continues on fluvoxamine and Abilify.  -mood appears stable.   -Hold Luvox (SE include abnormal thoughts and dreams.) no further hallucinations or abnormal thoughts with holding the fluvoxamine.    Active Ambulatory Problems     Diagnosis Date Noted    SIRS (systemic inflammatory response syndrome) (H) 09/09/2019    Adrenal incidentaloma (H24)     Diet-controlled diabetes mellitus (H)     Pericardial effusion     Lactic acidosis     Type 2 diabetes mellitus without complication, without long-term current use of insulin (H) 04/12/2020    Paroxysmal atrial fibrillation (H) 02/18/2020    Calculus of ureter 04/12/2020    Acute renal failure, unspecified acute renal failure type (H24) 04/12/2020    Acute renal failure (ARF) (H24) 04/12/2020    ATN (acute tubular necrosis) (H24) 04/12/2020    Sepsis due to urinary tract infection (H)     Metformin overdose of undetermined intent, initial encounter     Metabolic acidosis     Hyperkalemia     Hematemesis, presence of nausea not specified 04/12/2020    Calculus of kidney     Syncope and collapse 06/30/2020    Hyperglycemia     After care 07/03/2012    Age-related cataract  03/27/2021    Anemia associated with acute blood loss 06/27/2012    Balanitis 03/27/2021    Cholelithiasis without obstruction 03/27/2021    Constipation 07/03/2012    Depression with anxiety 07/03/2012    Glaucoma 06/25/2012    Hemorrhoids without complication 03/27/2021    Hyperlipidemia 06/07/2018    Loss of appetite 03/27/2021    Major depressive disorder, recurrent episode, in partial remission (H24) 06/08/2018    Mixed personality disorder in adult (H) 06/08/2018    Obstructive sleep apnea 03/27/2021    Osteoarthrosis involving lower leg 06/23/2012    Periodic limb movement disorder 03/27/2021    Recurrent major depression (H24) 03/27/2021    S/P ureteral stent placement 03/27/2021    Unilateral small kidney 03/27/2021    Nephrolithiasis 03/27/2021    Type 2 diabetes mellitus (H) 06/25/2012    Osteoarthritis of knee 03/27/2021    Persistent atrial fibrillation (H) 03/27/2021    Pyelonephritis 03/27/2021    Urinary tract infection without hematuria, site unspecified 04/30/2022    Sepsis without acute organ dysfunction (H) 04/30/2022    UTI (urinary tract infection) 05/01/2022    Benign prostatic hyperplasia with urinary frequency 05/07/2022    Hypomagnesemia 05/07/2022    Injury of head, initial encounter 07/31/2023    Fall at home, initial encounter 07/31/2023    E. coli urinary tract infection 08/02/2023    Septic encephalopathy 07/25/2024    DM (diabetes mellitus), type 2 with complications (H) 07/25/2024    Urinary tract infection with hematuria, site unspecified 07/26/2024    Altered mental status, unspecified altered mental status type 07/26/2024    Sepsis, due to unspecified organism, unspecified whether acute organ dysfunction present (H) 07/26/2024    Normocytic anemia 11/29/2023    Status post right knee replacement 11/29/2023    Peripheral vascular disease, unspecified (H24) 08/20/2024     Resolved Ambulatory Problems     Diagnosis Date Noted    Shock circulatory (H)     Acute respiratory failure  with hypoxemia (H)      Past Medical History:   Diagnosis Date    A-fib (H)     Acute hemodialysis encounter (H24)     Acute kidney injury (H24)     Asbestosis (H)     Atrophy of right kidney     Basal cell carcinoma     BPH without urinary obstruction     Cellulitis     Cholelithiasis     Diabetes mellitus, type 2 (H) 4/12/2020    Esophagitis     Gastritis     Hematemesis, presence of nausea not specified     Hyperlipemia     Kidney stone     Metformin overdose of undetermined intent     PTSD (post-traumatic stress disorder)     Seasonal allergies     Sleep apnea     Upper GI bleed      No Known Allergies    All Meds and Allergies reviewed in the record at the facility and is the most up-to-date.    Current Outpatient Medications   Medication Sig Dispense Refill    acetaminophen (TYLENOL) 500 MG tablet [ACETAMINOPHEN (TYLENOL) 500 MG TABLET] Take 500 mg by mouth every 6 (six) hours as needed for pain.      ARIPiprazole (ABILIFY) 2 MG tablet [ARIPIPRAZOLE (ABILIFY) 2 MG TABLET] Take 2 mg by mouth at bedtime.       brimonidine (ALPHAGAN) 0.2 % ophthalmic solution [BRIMONIDINE (ALPHAGAN) 0.2 % OPHTHALMIC SOLUTION] Administer 1 drop to both eyes 2 (two) times a day.       dorzolamide-timolol (COSOPT) 2-0.5 % ophthalmic solution Place 1 drop into both eyes 2 times daily      fluvoxaMINE (LUVOX) 50 MG tablet [FLUVOXAMINE (LUVOX) 50 MG TABLET] Take 50 mg by mouth at bedtime.       gabapentin (NEURONTIN) 300 MG capsule Take 300 mg by mouth 2 times daily      latanoprostene bunod 0.024 % Drop Place 1 drop into both eyes At Bedtime      levomefolate calcium (L-METHYLFOLATE ORAL) [LEVOMEFOLATE CALCIUM (L-METHYLFOLATE ORAL)] Take 1.25 mg by mouth daily.       metFORMIN (GLUCOPHAGE) 1000 MG tablet Take 1,000 mg by mouth 2 times daily (with meals)      Multiple Vitamins-Minerals (CENTRUM SILVER) CHEW Take 1 tablet by mouth daily      netarsudiL (RHOPRESSA) 0.02 % Drop Place 1 drop into both eyes every evening      warfarin  ANTICOAGULANT (COUMADIN) 5 MG tablet Take 5 to 7.5mg (1 to 1.5 tabs) by mouth daily OR AS DIRECTED.  Adjust dose based on INR. 100 tablet 1    WARFARIN SODIUM PO Take by mouth See Admin Instructions Dosing in coordination with INR results       No current facility-administered medications for this visit.       REVIEW OF SYSTEMS:   10 point review of systems reviewed and pertinent positives in the HPI.     PHYSICAL EXAMINATION:  Physical Exam     Vital signs: /77   Pulse 87   Temp 98.5  F (36.9  C)   Resp 18   Wt 96.2 kg (212 lb)   SpO2 94%   BMI 28.75 kg/m    General: Awake, Alert, sitting up in wheelchair,  conversant  HEENT:Pink conjunctiva, slight redness to right eye, patient legally blind in right eye, moist oral mucosa  NECK: Supple  CVS:  S1  S2, without murmur or gallop.   LUNG: Clear to auscultation, No wheezes, rales or rhonci.  BACK: No kyphosis of the thoracic spine  ABDOMEN: Soft,with positive bowel sounds  EXTREMITIES: Moves all extremities with generalized weakness, no pedal edema, no calf tenderness  SKIN: Warm and dry  NEUROLOGIC: Intact, pulses palpable  PSYCHIATRIC: Flat affect.      Labs:  All labs reviewed in the nursing home record and Epic   @  Lab Results   Component Value Date    WBC 6.5 07/29/2024     Lab Results   Component Value Date    RBC 3.41 07/29/2024     Lab Results   Component Value Date    HGB 10.6 07/29/2024     Lab Results   Component Value Date    HCT 33.2 07/29/2024     Lab Results   Component Value Date    MCV 97 07/29/2024     Lab Results   Component Value Date    MCH 31.1 07/29/2024     Lab Results   Component Value Date    MCHC 31.9 07/29/2024     Lab Results   Component Value Date    RDW 12.9 07/29/2024     Lab Results   Component Value Date     07/29/2024        @Last Comprehensive Metabolic Panel:  Sodium   Date Value Ref Range Status   09/04/2024 139 135 - 145 mmol/L Final     Potassium   Date Value Ref Range Status   09/04/2024 4.6 3.4 - 5.3 mmol/L  Final   05/05/2022 4.4 3.5 - 5.0 mmol/L Final     Chloride   Date Value Ref Range Status   09/04/2024 105 98 - 107 mmol/L Final   05/02/2022 109 (H) 98 - 107 mmol/L Final     Carbon Dioxide (CO2)   Date Value Ref Range Status   09/04/2024 24 22 - 29 mmol/L Final   05/02/2022 25 22 - 31 mmol/L Final     Anion Gap   Date Value Ref Range Status   09/04/2024 10 7 - 15 mmol/L Final   05/02/2022 8 5 - 18 mmol/L Final     Glucose   Date Value Ref Range Status   09/04/2024 173 (H) 70 - 99 mg/dL Final   05/02/2022 133 (H) 70 - 125 mg/dL Final     GLUCOSE BY METER POCT   Date Value Ref Range Status   07/29/2024 255 (H) 70 - 99 mg/dL Final     Urea Nitrogen   Date Value Ref Range Status   09/04/2024 16.4 8.0 - 23.0 mg/dL Final   05/02/2022 11 8 - 28 mg/dL Final     Creatinine   Date Value Ref Range Status   09/04/2024 0.84 0.67 - 1.17 mg/dL Final     GFR Estimate   Date Value Ref Range Status   09/04/2024 >90 >60 mL/min/1.73m2 Final   06/10/2021 >60 >60 mL/min/1.73m2 Final     Calcium   Date Value Ref Range Status   09/04/2024 8.7 (L) 8.8 - 10.4 mg/dL Final     Comment:     Reference intervals for this test were updated on 7/16/2024 to reflect our healthy population more accurately. There may be differences in the flagging of prior results with similar values performed with this method. Those prior results can be interpreted in the context of the updated reference intervals.         This note has been dictated using voice recognition software. Any grammatical or context distortions are unintentional and inherent to the software    Electronically signed by: Kylie Gomez CNP

## 2024-09-06 NOTE — PROGRESS NOTES
Incoming fax from [facility]    Date of result 9/6/24    INR result 2.2    Route result to: [facility] ([facility]U/LTC/longterm)

## 2024-09-06 NOTE — PROGRESS NOTES
[facility] GERIATRICS  ACUTE/EPISODIC VISIT    [facility] Medical Record Number:  [id number]  Place of Service where encounter took place:  [facility] () [[id number]]    Chief Complaint   Patient presents with    RECHECK       HPI:    Rakesh Samuels is a 74 year old  (1950), who is being seen today for an episodic care visit.  HPI information obtained from: {FGS HPI:[id number]}.    Today's concern is:      ALLERGIES:  No Known Allergies     MEDICATIONS:  Post Discharge Medication Reconciliation Status: {ACO Med Rec (Provider):284233}. ***    Current Outpatient Medications   Medication Sig Dispense Refill    acetaminophen (TYLENOL) 500 MG tablet [ACETAMINOPHEN (TYLENOL) 500 MG TABLET] Take 500 mg by mouth every 6 (six) hours as needed for pain.      ARIPiprazole (ABILIFY) 2 MG tablet [ARIPIPRAZOLE (ABILIFY) 2 MG TABLET] Take 2 mg by mouth at bedtime.       brimonidine (ALPHAGAN) 0.2 % ophthalmic solution [BRIMONIDINE (ALPHAGAN) 0.2 % OPHTHALMIC SOLUTION] Administer 1 drop to both eyes 2 (two) times a day.       dorzolamide-timolol (COSOPT) 2-0.5 % ophthalmic solution Place 1 drop into both eyes 2 times daily      fluvoxaMINE (LUVOX) 50 MG tablet [FLUVOXAMINE (LUVOX) 50 MG TABLET] Take 50 mg by mouth at bedtime.       gabapentin (NEURONTIN) 300 MG capsule Take 300 mg by mouth 2 times daily      latanoprostene bunod 0.024 % Drop Place 1 drop into both eyes At Bedtime      levomefolate calcium (L-METHYLFOLATE ORAL) [LEVOMEFOLATE CALCIUM (L-METHYLFOLATE ORAL)] Take 1.25 mg by mouth daily.       metFORMIN (GLUCOPHAGE) 1000 MG tablet Take 1,000 mg by mouth 2 times daily (with meals)      Multiple Vitamins-Minerals (CENTRUM SILVER) CHEW Take 1 tablet by mouth daily      netarsudiL (RHOPRESSA) 0.02 % Drop Place 1 drop into both eyes every evening      warfarin ANTICOAGULANT (COUMADIN) 5 MG tablet Take 5 to 7.5mg (1 to 1.5 tabs) by mouth daily OR AS DIRECTED.  Adjust dose based on INR. 100 tablet 1     WARFARIN SODIUM PO Take by mouth See Admin Instructions Dosing in coordination with INR results       Medications reviewed:  Medications reconciled to facility chart and changes were made to reflect current medications as identified as above med list. Below are the changes that were made:   Medications stopped since last EPIC medication reconciliation:   There are no discontinued medications.    Medications started since last [facility] medication reconciliation:  No orders of the defined types were placed in this encounter.    ***    REVIEW OF SYSTEMS:  4 point ROS neg other than the symptoms noted above in the HPI.***  Unable to be obtained due to cognitive impairment or aphasia.   ROS    PHYSICAL EXAM:  /78   Pulse 93   Temp 98  F (36.7  C)   Resp 18   Wt 96.2 kg (212 lb)   SpO2 96%   BMI 28.75 kg/m    Physical Exam      ASSESSMENT / PLAN:  {FGS DX:388887}    Orders:  ***    Electronically signed by  Veronica Arana

## 2024-09-06 NOTE — LETTER
9/6/2024      Rakesh Samuels  [address]  [place] [address]        No notes on file      Sincerely,        PAULA Huston CNP

## 2024-09-07 LAB — BACTERIA UR CULT: NORMAL

## 2024-09-09 ENCOUNTER — APPOINTMENT (OUTPATIENT)
Dept: CT IMAGING | Facility: HOSPITAL | Age: 74
DRG: 689 | End: 2024-09-09
Attending: EMERGENCY MEDICINE
Payer: MEDICARE

## 2024-09-09 ENCOUNTER — HOSPITAL ENCOUNTER (INPATIENT)
Facility: HOSPITAL | Age: 74
LOS: 7 days | Discharge: NURSING, REHAB OR LONG-TERM CARE | DRG: 689 | End: 2024-09-16
Attending: EMERGENCY MEDICINE | Admitting: STUDENT IN AN ORGANIZED HEALTH CARE EDUCATION/TRAINING PROGRAM
Payer: MEDICARE

## 2024-09-09 ENCOUNTER — MEDICAL CORRESPONDENCE (OUTPATIENT)
Dept: HEALTH INFORMATION MANAGEMENT | Facility: CLINIC | Age: 74
End: 2024-09-09

## 2024-09-09 ENCOUNTER — DISCHARGE SUMMARY NURSING HOME (OUTPATIENT)
Dept: GERIATRICS | Facility: CLINIC | Age: 74
End: 2024-09-09
Payer: MEDICARE

## 2024-09-09 VITALS
OXYGEN SATURATION: 96 % | SYSTOLIC BLOOD PRESSURE: 165 MMHG | WEIGHT: 213.6 LBS | HEIGHT: 72 IN | DIASTOLIC BLOOD PRESSURE: 80 MMHG | RESPIRATION RATE: 16 BRPM | TEMPERATURE: 97.7 F | BODY MASS INDEX: 28.93 KG/M2 | HEART RATE: 85 BPM

## 2024-09-09 DIAGNOSIS — Z79.01 WARFARIN ANTICOAGULATION: ICD-10-CM

## 2024-09-09 DIAGNOSIS — F33.41 MAJOR DEPRESSIVE DISORDER, RECURRENT EPISODE, IN PARTIAL REMISSION (H): ICD-10-CM

## 2024-09-09 DIAGNOSIS — N13.30 HYDRONEPHROSIS, UNSPECIFIED HYDRONEPHROSIS TYPE: ICD-10-CM

## 2024-09-09 DIAGNOSIS — I10 ESSENTIAL HYPERTENSION: ICD-10-CM

## 2024-09-09 DIAGNOSIS — N39.0 URINARY TRACT INFECTION WITHOUT HEMATURIA, SITE UNSPECIFIED: Primary | ICD-10-CM

## 2024-09-09 DIAGNOSIS — F60.89 MIXED PERSONALITY DISORDER IN ADULT (H): ICD-10-CM

## 2024-09-09 DIAGNOSIS — I48.0 PAROXYSMAL ATRIAL FIBRILLATION (H): ICD-10-CM

## 2024-09-09 DIAGNOSIS — G93.41 SEPTIC ENCEPHALOPATHY: ICD-10-CM

## 2024-09-09 DIAGNOSIS — R44.3 HALLUCINATIONS: Primary | ICD-10-CM

## 2024-09-09 DIAGNOSIS — G93.40 ACUTE ENCEPHALOPATHY: ICD-10-CM

## 2024-09-09 DIAGNOSIS — N39.0 URINARY TRACT INFECTION WITHOUT HEMATURIA, SITE UNSPECIFIED: ICD-10-CM

## 2024-09-09 DIAGNOSIS — E11.00 TYPE 2 DIABETES MELLITUS WITH HYPEROSMOLARITY WITHOUT COMA, WITHOUT LONG-TERM CURRENT USE OF INSULIN (H): ICD-10-CM

## 2024-09-09 LAB
ALBUMIN SERPL BCG-MCNC: 3.9 G/DL (ref 3.5–5.2)
ALBUMIN UR-MCNC: 30 MG/DL
ALP SERPL-CCNC: 91 U/L (ref 40–150)
ALT SERPL W P-5'-P-CCNC: 14 U/L (ref 0–70)
ANION GAP SERPL CALCULATED.3IONS-SCNC: 13 MMOL/L (ref 7–15)
APPEARANCE UR: ABNORMAL
AST SERPL W P-5'-P-CCNC: 9 U/L (ref 0–45)
BASE EXCESS BLDV CALC-SCNC: -2.4 MMOL/L (ref -3–3)
BASOPHILS # BLD AUTO: 0.1 10E3/UL (ref 0–0.2)
BASOPHILS NFR BLD AUTO: 1 %
BILIRUB DIRECT SERPL-MCNC: <0.2 MG/DL (ref 0–0.3)
BILIRUB SERPL-MCNC: 0.2 MG/DL
BILIRUB UR QL STRIP: NEGATIVE
BUN SERPL-MCNC: 15.5 MG/DL (ref 8–23)
CALCIUM SERPL-MCNC: 9.3 MG/DL (ref 8.8–10.4)
CHLORIDE SERPL-SCNC: 105 MMOL/L (ref 98–107)
COLOR UR AUTO: YELLOW
CREAT SERPL-MCNC: 0.8 MG/DL (ref 0.67–1.17)
CRP SERPL-MCNC: 11.8 MG/L
EGFRCR SERPLBLD CKD-EPI 2021: >90 ML/MIN/1.73M2
EOSINOPHIL # BLD AUTO: 0.3 10E3/UL (ref 0–0.7)
EOSINOPHIL NFR BLD AUTO: 3 %
ERYTHROCYTE [DISTWIDTH] IN BLOOD BY AUTOMATED COUNT: 13.1 % (ref 10–15)
FLUAV RNA SPEC QL NAA+PROBE: NEGATIVE
FLUBV RNA RESP QL NAA+PROBE: NEGATIVE
GLUCOSE BLDC GLUCOMTR-MCNC: 136 MG/DL (ref 70–99)
GLUCOSE SERPL-MCNC: 188 MG/DL (ref 70–99)
GLUCOSE UR STRIP-MCNC: 300 MG/DL
HBA1C MFR BLD: 7.5 %
HCO3 BLDV-SCNC: 23 MMOL/L (ref 21–28)
HCO3 SERPL-SCNC: 21 MMOL/L (ref 22–29)
HCT VFR BLD AUTO: 39.7 % (ref 40–53)
HGB BLD-MCNC: 13.2 G/DL (ref 13.3–17.7)
HGB UR QL STRIP: ABNORMAL
HYALINE CASTS: 3 /LPF
IMM GRANULOCYTES # BLD: 0 10E3/UL
IMM GRANULOCYTES NFR BLD: 0 %
INR PPP: 1.85 (ref 0.85–1.15)
INR PPP: 1.91 (ref 0.85–1.15)
IRON BINDING CAPACITY (ROCHE): 261 UG/DL (ref 240–430)
IRON SATN MFR SERPL: 12 % (ref 15–46)
IRON SERPL-MCNC: 32 UG/DL (ref 61–157)
KETONES UR STRIP-MCNC: NEGATIVE MG/DL
LEUKOCYTE ESTERASE UR QL STRIP: ABNORMAL
LIPASE SERPL-CCNC: 169 U/L (ref 13–60)
LYMPHOCYTES # BLD AUTO: 1.3 10E3/UL (ref 0.8–5.3)
LYMPHOCYTES NFR BLD AUTO: 14 %
MAGNESIUM SERPL-MCNC: 1.8 MG/DL (ref 1.7–2.3)
MAGNESIUM SERPL-MCNC: 1.9 MG/DL (ref 1.7–2.3)
MCH RBC QN AUTO: 32 PG (ref 26.5–33)
MCHC RBC AUTO-ENTMCNC: 33.2 G/DL (ref 31.5–36.5)
MCV RBC AUTO: 96 FL (ref 78–100)
MONOCYTES # BLD AUTO: 0.7 10E3/UL (ref 0–1.3)
MONOCYTES NFR BLD AUTO: 7 %
NEUTROPHILS # BLD AUTO: 7.1 10E3/UL (ref 1.6–8.3)
NEUTROPHILS NFR BLD AUTO: 75 %
NITRATE UR QL: NEGATIVE
NRBC # BLD AUTO: 0 10E3/UL
NRBC BLD AUTO-RTO: 0 /100
O2/TOTAL GAS SETTING VFR VENT: 21 %
OXYHGB MFR BLDV: 80 % (ref 70–75)
PCO2 BLDV: 42 MM HG (ref 40–50)
PH BLDV: 7.35 [PH] (ref 7.32–7.43)
PH UR STRIP: 5 [PH] (ref 5–7)
PHOSPHATE SERPL-MCNC: 2.9 MG/DL (ref 2.5–4.5)
PLATELET # BLD AUTO: 288 10E3/UL (ref 150–450)
PO2 BLDV: 45 MM HG (ref 25–47)
POTASSIUM SERPL-SCNC: 4.6 MMOL/L (ref 3.4–5.3)
PROCALCITONIN SERPL IA-MCNC: 0.06 NG/ML
PROT SERPL-MCNC: 7 G/DL (ref 6.4–8.3)
RBC # BLD AUTO: 4.12 10E6/UL (ref 4.4–5.9)
RBC URINE: >182 /HPF
RETICS # AUTO: 0.06 10E6/UL (ref 0.03–0.1)
RETICS/RBC NFR AUTO: 1.4 % (ref 0.5–2)
RSV RNA SPEC NAA+PROBE: NEGATIVE
SAO2 % BLDV: 80.9 % (ref 70–75)
SARS-COV-2 RNA RESP QL NAA+PROBE: NEGATIVE
SODIUM SERPL-SCNC: 139 MMOL/L (ref 135–145)
SP GR UR STRIP: 1.02 (ref 1–1.03)
SQUAMOUS EPITHELIAL: <1 /HPF
TROPONIN T SERPL HS-MCNC: 12 NG/L
TSH SERPL DL<=0.005 MIU/L-ACNC: 2.28 UIU/ML (ref 0.3–4.2)
UROBILINOGEN UR STRIP-MCNC: <2 MG/DL
VIT B12 SERPL-MCNC: 805 PG/ML (ref 232–1245)
WBC # BLD AUTO: 9.5 10E3/UL (ref 4–11)
WBC CLUMPS #/AREA URNS HPF: PRESENT /HPF
WBC URINE: 24 /HPF

## 2024-09-09 PROCEDURE — 81003 URINALYSIS AUTO W/O SCOPE: CPT | Performed by: EMERGENCY MEDICINE

## 2024-09-09 PROCEDURE — 83550 IRON BINDING TEST: CPT | Performed by: STUDENT IN AN ORGANIZED HEALTH CARE EDUCATION/TRAINING PROGRAM

## 2024-09-09 PROCEDURE — 250N000013 HC RX MED GY IP 250 OP 250 PS 637: Performed by: STUDENT IN AN ORGANIZED HEALTH CARE EDUCATION/TRAINING PROGRAM

## 2024-09-09 PROCEDURE — 99223 1ST HOSP IP/OBS HIGH 75: CPT | Performed by: STUDENT IN AN ORGANIZED HEALTH CARE EDUCATION/TRAINING PROGRAM

## 2024-09-09 PROCEDURE — 87637 SARSCOV2&INF A&B&RSV AMP PRB: CPT | Performed by: EMERGENCY MEDICINE

## 2024-09-09 PROCEDURE — 85610 PROTHROMBIN TIME: CPT | Performed by: EMERGENCY MEDICINE

## 2024-09-09 PROCEDURE — 84100 ASSAY OF PHOSPHORUS: CPT | Performed by: STUDENT IN AN ORGANIZED HEALTH CARE EDUCATION/TRAINING PROGRAM

## 2024-09-09 PROCEDURE — 87040 BLOOD CULTURE FOR BACTERIA: CPT | Performed by: EMERGENCY MEDICINE

## 2024-09-09 PROCEDURE — 99285 EMERGENCY DEPT VISIT HI MDM: CPT | Mod: 25

## 2024-09-09 PROCEDURE — 85048 AUTOMATED LEUKOCYTE COUNT: CPT | Performed by: EMERGENCY MEDICINE

## 2024-09-09 PROCEDURE — 83735 ASSAY OF MAGNESIUM: CPT | Performed by: EMERGENCY MEDICINE

## 2024-09-09 PROCEDURE — 36415 COLL VENOUS BLD VENIPUNCTURE: CPT | Performed by: EMERGENCY MEDICINE

## 2024-09-09 PROCEDURE — 85610 PROTHROMBIN TIME: CPT | Performed by: STUDENT IN AN ORGANIZED HEALTH CARE EDUCATION/TRAINING PROGRAM

## 2024-09-09 PROCEDURE — 93005 ELECTROCARDIOGRAM TRACING: CPT | Performed by: EMERGENCY MEDICINE

## 2024-09-09 PROCEDURE — 83036 HEMOGLOBIN GLYCOSYLATED A1C: CPT | Performed by: STUDENT IN AN ORGANIZED HEALTH CARE EDUCATION/TRAINING PROGRAM

## 2024-09-09 PROCEDURE — 250N000011 HC RX IP 250 OP 636: Performed by: STUDENT IN AN ORGANIZED HEALTH CARE EDUCATION/TRAINING PROGRAM

## 2024-09-09 PROCEDURE — 85045 AUTOMATED RETICULOCYTE COUNT: CPT | Performed by: STUDENT IN AN ORGANIZED HEALTH CARE EDUCATION/TRAINING PROGRAM

## 2024-09-09 PROCEDURE — 96365 THER/PROPH/DIAG IV INF INIT: CPT

## 2024-09-09 PROCEDURE — 250N000009 HC RX 250: Performed by: STUDENT IN AN ORGANIZED HEALTH CARE EDUCATION/TRAINING PROGRAM

## 2024-09-09 PROCEDURE — 87086 URINE CULTURE/COLONY COUNT: CPT | Performed by: EMERGENCY MEDICINE

## 2024-09-09 PROCEDURE — 83690 ASSAY OF LIPASE: CPT | Performed by: EMERGENCY MEDICINE

## 2024-09-09 PROCEDURE — 99310 SBSQ NF CARE HIGH MDM 45: CPT | Performed by: NURSE PRACTITIONER

## 2024-09-09 PROCEDURE — 86140 C-REACTIVE PROTEIN: CPT | Performed by: EMERGENCY MEDICINE

## 2024-09-09 PROCEDURE — 82728 ASSAY OF FERRITIN: CPT | Performed by: STUDENT IN AN ORGANIZED HEALTH CARE EDUCATION/TRAINING PROGRAM

## 2024-09-09 PROCEDURE — 84145 PROCALCITONIN (PCT): CPT | Performed by: EMERGENCY MEDICINE

## 2024-09-09 PROCEDURE — 84484 ASSAY OF TROPONIN QUANT: CPT | Performed by: EMERGENCY MEDICINE

## 2024-09-09 PROCEDURE — 71250 CT THORAX DX C-: CPT | Mod: MG

## 2024-09-09 PROCEDURE — 82607 VITAMIN B-12: CPT | Performed by: STUDENT IN AN ORGANIZED HEALTH CARE EDUCATION/TRAINING PROGRAM

## 2024-09-09 PROCEDURE — 120N000001 HC R&B MED SURG/OB

## 2024-09-09 PROCEDURE — 82565 ASSAY OF CREATININE: CPT | Performed by: EMERGENCY MEDICINE

## 2024-09-09 PROCEDURE — 83735 ASSAY OF MAGNESIUM: CPT | Performed by: STUDENT IN AN ORGANIZED HEALTH CARE EDUCATION/TRAINING PROGRAM

## 2024-09-09 PROCEDURE — 82805 BLOOD GASES W/O2 SATURATION: CPT | Performed by: EMERGENCY MEDICINE

## 2024-09-09 PROCEDURE — 82374 ASSAY BLOOD CARBON DIOXIDE: CPT | Performed by: EMERGENCY MEDICINE

## 2024-09-09 PROCEDURE — 250N000011 HC RX IP 250 OP 636: Performed by: EMERGENCY MEDICINE

## 2024-09-09 PROCEDURE — 84443 ASSAY THYROID STIM HORMONE: CPT | Performed by: EMERGENCY MEDICINE

## 2024-09-09 PROCEDURE — 70450 CT HEAD/BRAIN W/O DYE: CPT | Mod: MG

## 2024-09-09 PROCEDURE — 258N000003 HC RX IP 258 OP 636: Performed by: EMERGENCY MEDICINE

## 2024-09-09 PROCEDURE — 258N000003 HC RX IP 258 OP 636: Performed by: STUDENT IN AN ORGANIZED HEALTH CARE EDUCATION/TRAINING PROGRAM

## 2024-09-09 PROCEDURE — 84460 ALANINE AMINO (ALT) (SGPT): CPT | Performed by: EMERGENCY MEDICINE

## 2024-09-09 PROCEDURE — 36415 COLL VENOUS BLD VENIPUNCTURE: CPT | Performed by: STUDENT IN AN ORGANIZED HEALTH CARE EDUCATION/TRAINING PROGRAM

## 2024-09-09 RX ORDER — ACETAMINOPHEN 325 MG/1
650 TABLET ORAL 3 TIMES DAILY
COMMUNITY

## 2024-09-09 RX ORDER — FLUVOXAMINE MALEATE 50 MG
50 TABLET ORAL AT BEDTIME
Status: DISCONTINUED | OUTPATIENT
Start: 2024-09-09 | End: 2024-09-16 | Stop reason: HOSPADM

## 2024-09-09 RX ORDER — ACETAMINOPHEN 500 MG
500 TABLET ORAL EVERY 6 HOURS PRN
Status: DISCONTINUED | OUTPATIENT
Start: 2024-09-09 | End: 2024-09-16 | Stop reason: HOSPADM

## 2024-09-09 RX ORDER — BRIMONIDINE TARTRATE 2 MG/ML
1 SOLUTION/ DROPS OPHTHALMIC 2 TIMES DAILY
Status: DISCONTINUED | OUTPATIENT
Start: 2024-09-09 | End: 2024-09-16 | Stop reason: HOSPADM

## 2024-09-09 RX ORDER — LEVOMEFOLATE CALCIUM 15 MG
15 TABLET ORAL DAILY
Status: DISCONTINUED | OUTPATIENT
Start: 2024-09-09 | End: 2024-09-09

## 2024-09-09 RX ORDER — MULTIPLE VITAMINS W/ MINERALS TAB 9MG-400MCG
1 TAB ORAL DAILY
Status: DISCONTINUED | OUTPATIENT
Start: 2024-09-10 | End: 2024-09-16 | Stop reason: HOSPADM

## 2024-09-09 RX ORDER — POLYETHYLENE GLYCOL 3350 17 G/17G
17 POWDER, FOR SOLUTION ORAL EVERY EVENING
Status: DISCONTINUED | OUTPATIENT
Start: 2024-09-10 | End: 2024-09-16 | Stop reason: HOSPADM

## 2024-09-09 RX ORDER — CALCIUM CARBONATE 500 MG/1
1 TABLET, CHEWABLE ORAL 3 TIMES DAILY PRN
COMMUNITY

## 2024-09-09 RX ORDER — POLYETHYLENE GLYCOL 3350 17 G/17G
1 POWDER, FOR SOLUTION ORAL DAILY
COMMUNITY

## 2024-09-09 RX ORDER — LIDOCAINE 40 MG/G
CREAM TOPICAL
Status: DISCONTINUED | OUTPATIENT
Start: 2024-09-09 | End: 2024-09-16 | Stop reason: HOSPADM

## 2024-09-09 RX ORDER — DEXTROSE MONOHYDRATE 25 G/50ML
25-50 INJECTION, SOLUTION INTRAVENOUS
Status: DISCONTINUED | OUTPATIENT
Start: 2024-09-09 | End: 2024-09-16 | Stop reason: HOSPADM

## 2024-09-09 RX ORDER — ARIPIPRAZOLE 2 MG/1
2 TABLET ORAL AT BEDTIME
Status: DISCONTINUED | OUTPATIENT
Start: 2024-09-09 | End: 2024-09-16 | Stop reason: HOSPADM

## 2024-09-09 RX ORDER — MAGNESIUM SULFATE HEPTAHYDRATE 40 MG/ML
2 INJECTION, SOLUTION INTRAVENOUS ONCE
Status: COMPLETED | OUTPATIENT
Start: 2024-09-09 | End: 2024-09-09

## 2024-09-09 RX ORDER — PIPERACILLIN SODIUM, TAZOBACTAM SODIUM 3; .375 G/15ML; G/15ML
3.38 INJECTION, POWDER, LYOPHILIZED, FOR SOLUTION INTRAVENOUS ONCE
Status: COMPLETED | OUTPATIENT
Start: 2024-09-09 | End: 2024-09-09

## 2024-09-09 RX ORDER — PIPERACILLIN SODIUM, TAZOBACTAM SODIUM 3; .375 G/15ML; G/15ML
3.38 INJECTION, POWDER, LYOPHILIZED, FOR SOLUTION INTRAVENOUS EVERY 8 HOURS
Status: DISCONTINUED | OUTPATIENT
Start: 2024-09-10 | End: 2024-09-12

## 2024-09-09 RX ORDER — LATANOPROST 50 UG/ML
1 SOLUTION/ DROPS OPHTHALMIC AT BEDTIME
Status: DISCONTINUED | OUTPATIENT
Start: 2024-09-09 | End: 2024-09-16 | Stop reason: HOSPADM

## 2024-09-09 RX ORDER — NICOTINE POLACRILEX 4 MG
15-30 LOZENGE BUCCAL
Status: DISCONTINUED | OUTPATIENT
Start: 2024-09-09 | End: 2024-09-16 | Stop reason: HOSPADM

## 2024-09-09 RX ORDER — SODIUM CHLORIDE 9 MG/ML
INJECTION, SOLUTION INTRAVENOUS CONTINUOUS
Status: DISCONTINUED | OUTPATIENT
Start: 2024-09-09 | End: 2024-09-10

## 2024-09-09 RX ORDER — ONDANSETRON 2 MG/ML
4 INJECTION INTRAMUSCULAR; INTRAVENOUS EVERY 6 HOURS PRN
Status: DISCONTINUED | OUTPATIENT
Start: 2024-09-09 | End: 2024-09-16 | Stop reason: HOSPADM

## 2024-09-09 RX ORDER — HYDRALAZINE HYDROCHLORIDE 20 MG/ML
10 INJECTION INTRAMUSCULAR; INTRAVENOUS EVERY 4 HOURS PRN
Status: DISCONTINUED | OUTPATIENT
Start: 2024-09-09 | End: 2024-09-16 | Stop reason: HOSPADM

## 2024-09-09 RX ORDER — AMOXICILLIN 250 MG
2 CAPSULE ORAL 2 TIMES DAILY PRN
Status: DISCONTINUED | OUTPATIENT
Start: 2024-09-09 | End: 2024-09-16 | Stop reason: HOSPADM

## 2024-09-09 RX ORDER — WARFARIN SODIUM 5 MG/1
5 TABLET ORAL ONCE
Status: COMPLETED | OUTPATIENT
Start: 2024-09-09 | End: 2024-09-09

## 2024-09-09 RX ORDER — CALCIUM CARBONATE 500 MG/1
1000 TABLET, CHEWABLE ORAL 4 TIMES DAILY PRN
Status: DISCONTINUED | OUTPATIENT
Start: 2024-09-09 | End: 2024-09-16 | Stop reason: HOSPADM

## 2024-09-09 RX ORDER — GABAPENTIN 300 MG/1
300 CAPSULE ORAL 2 TIMES DAILY
Status: DISCONTINUED | OUTPATIENT
Start: 2024-09-09 | End: 2024-09-16 | Stop reason: HOSPADM

## 2024-09-09 RX ORDER — HYDRALAZINE HYDROCHLORIDE 10 MG/1
10 TABLET, FILM COATED ORAL EVERY 4 HOURS PRN
Status: DISCONTINUED | OUTPATIENT
Start: 2024-09-09 | End: 2024-09-16 | Stop reason: HOSPADM

## 2024-09-09 RX ORDER — CEFTRIAXONE 2 G/1
2 INJECTION, POWDER, FOR SOLUTION INTRAMUSCULAR; INTRAVENOUS ONCE
Status: DISCONTINUED | OUTPATIENT
Start: 2024-09-09 | End: 2024-09-09

## 2024-09-09 RX ORDER — DORZOLAMIDE HYDROCHLORIDE AND TIMOLOL MALEATE 20; 5 MG/ML; MG/ML
1 SOLUTION/ DROPS OPHTHALMIC 2 TIMES DAILY
Status: DISCONTINUED | OUTPATIENT
Start: 2024-09-09 | End: 2024-09-16 | Stop reason: HOSPADM

## 2024-09-09 RX ORDER — AMOXICILLIN 250 MG
1 CAPSULE ORAL 2 TIMES DAILY PRN
Status: DISCONTINUED | OUTPATIENT
Start: 2024-09-09 | End: 2024-09-16 | Stop reason: HOSPADM

## 2024-09-09 RX ADMIN — NETARSUDIL 1 DROP: 0.2 SOLUTION/ DROPS OPHTHALMIC; TOPICAL at 23:16

## 2024-09-09 RX ADMIN — SODIUM CHLORIDE: 9 INJECTION, SOLUTION INTRAVENOUS at 21:35

## 2024-09-09 RX ADMIN — ARIPIPRAZOLE 2 MG: 2 TABLET ORAL at 21:08

## 2024-09-09 RX ADMIN — PIPERACILLIN AND TAZOBACTAM 3.38 G: 3; .375 INJECTION, POWDER, FOR SOLUTION INTRAVENOUS at 17:50

## 2024-09-09 RX ADMIN — LATANOPROST 1 DROP: 50 SOLUTION/ DROPS OPHTHALMIC at 23:16

## 2024-09-09 RX ADMIN — FLUVOXAMINE MALEATE 50 MG: 50 TABLET ORAL at 21:08

## 2024-09-09 RX ADMIN — SODIUM CHLORIDE 1000 ML: 9 INJECTION, SOLUTION INTRAVENOUS at 18:23

## 2024-09-09 RX ADMIN — GABAPENTIN 300 MG: 300 CAPSULE ORAL at 21:08

## 2024-09-09 RX ADMIN — WARFARIN SODIUM 5 MG: 5 TABLET ORAL at 21:14

## 2024-09-09 RX ADMIN — MAGNESIUM SULFATE HEPTAHYDRATE 2 G: 40 INJECTION, SOLUTION INTRAVENOUS at 20:19

## 2024-09-09 RX ADMIN — BRIMONIDINE TARTRATE 1 DROP: 2 SOLUTION OPHTHALMIC at 21:07

## 2024-09-09 RX ADMIN — DORZOLAMIDE HYDROCHLORIDE AND TIMOLOL MALEATE 1 DROP: 20; 5 SOLUTION/ DROPS OPHTHALMIC at 21:07

## 2024-09-09 ASSESSMENT — ACTIVITIES OF DAILY LIVING (ADL)
ADLS_ACUITY_SCORE: 43
DESCRIBE_HEARING_LOSS: BILATERAL HEARING LOSS
TOILETING_ISSUES: YES
WALKING_OR_CLIMBING_STAIRS: OTHER (SEE COMMENTS)
ADLS_ACUITY_SCORE: 43
DRESSING/BATHING: BATHING DIFFICULTY, DEPENDENT
ADLS_ACUITY_SCORE: 43
CHANGE_IN_FUNCTIONAL_STATUS_SINCE_ONSET_OF_CURRENT_ILLNESS/INJURY: YES
WEAR_GLASSES_OR_BLIND: YES
ADLS_ACUITY_SCORE: 43
THE_FOLLOWING_AIDS_WERE_PROVIDED;: PATIENT DECLINED OFFER OF AUXILIARY AIDS
DRESSING/BATHING_DIFFICULTY: YES
FALL_HISTORY_WITHIN_LAST_SIX_MONTHS: YES
ADLS_ACUITY_SCORE: 59
DIFFICULTY_COMMUNICATING: NO
ADLS_ACUITY_SCORE: 43
WALKING_OR_CLIMBING_STAIRS_DIFFICULTY: YES
DOING_ERRANDS_INDEPENDENTLY_DIFFICULTY: YES
ADLS_ACUITY_SCORE: 51
TOILETING: 2-->COMPLETELY DEPENDENT
TOILETING: 2-->COMPLETELY DEPENDENT (NOT DEVELOPMENTALLY APPROPRIATE)
ADLS_ACUITY_SCORE: 55
PATIENT'S_PREFERRED_MEANS_OF_COMMUNICATION: ENGLISH SPEAKER WITH HEARING LOSS, NO SPEECH PROBLEMS.;VERBAL
WERE_AUXILIARY_AIDS_OFFERED?: YES
HEARING_DIFFICULTY_OR_DEAF: YES
CONCENTRATING,_REMEMBERING_OR_MAKING_DECISIONS_DIFFICULTY: YES
ADLS_ACUITY_SCORE: 43
DIFFICULTY_EATING/SWALLOWING: NO
TOILETING_ASSISTANCE: TOILETING DIFFICULTY, DEPENDENT

## 2024-09-09 ASSESSMENT — COLUMBIA-SUICIDE SEVERITY RATING SCALE - C-SSRS
2. HAVE YOU ACTUALLY HAD ANY THOUGHTS OF KILLING YOURSELF IN THE PAST MONTH?: NO
1. IN THE PAST MONTH, HAVE YOU WISHED YOU WERE DEAD OR WISHED YOU COULD GO TO SLEEP AND NOT WAKE UP?: NO
6. HAVE YOU EVER DONE ANYTHING, STARTED TO DO ANYTHING, OR PREPARED TO DO ANYTHING TO END YOUR LIFE?: NO

## 2024-09-09 NOTE — LETTER
9/9/2024      Rakesh Samuels  [address]  [facility] [address]        [facility]    Code Status:  DNR/DNI   Visit Type:   Chief Complaint   Patient presents with     TCU Discharge     Facility:  [facility]) [[id number]]         HPI: Rakesh Samuels is a 73 year old male who I am seeing today for follow up on the TCU.  Patient recently hospitalized with sepsis due to UTI.  Patient had altered mental status.  Blood culture grew Enterococcus.  Patient treated with IV Zosyn and later switched to IV ampicillin then to oral amoxicillin for 10 days.  C  He initially had a Olsen catheter placed in the ER however passed a voiding trial.    Transitional Care Course: Today pt sitting up in wheelchair.  Patient with acute encephalopathy.  He is usually able to recognize me however he does not know who I am today.  Patient with recent hallucinations.  Family has felt these are worse when he has infection however repeat UA UC on 9/5/2024 was negative.  He was treated recently in hospital for UTI with sepsis.  During his TCU stay he was treated again for UTI which grew greater than 100,000  Enterococcus faecalis.  He has completed his oral antibiotics.  Postvoid residual less than 25.  No evidence of hydronephrosis.  Patient reported hallucinations and vivid dreams which were scary earlier last week.  He does have history of mood disorder/chronic depression with anxiety.  He is followed outpatient by psychiatry.  Message was left regarding his current medications.  He continues on Abilify and fluvoxamine.  Common side effects of fluvoxamine can be vivid dreams.  This was placed on hold and symptoms seem to be improving however this was resumed by another practitioner on 9/6/2024.  When talking with daughter in regards to above symptomology family reported patient very confused over the weekend and leaning to the left side.  He does have underlying atrial fibs.  He is moving all extremities  with generalized weakness.  However is not tracking visually.  anticoagulation.    Assessment/Plan:     Acute encephalopathy  Hallucinations  -Has been treated for UTI x 2.  Follow-up UA UC on 9/5/2024 negative.  Antibiotics complete.  -Increased confusion on today's visit.  Continues with hallucinations despite holding Fluvoxamine.   -Okay to send to ER for further evaluation.  Would recommend head CT.    Urinary tract infection without hematuria, site unspecified  Bacterial sepsis (H)  Hydronephrosis   -Amoxicillin complete.  -CT of the abdomen showed patchy subtle areas of decreased cortical enhancement within the right kidney which could either reflect pyelonephritis or perfusional changes related to chronic renal atrophy and multifocal scars.  No renal abscess.  -Follow-up with urology outpatient for chronic mild left hydronephrosis extending into the ureteropelvic junction. (Family would like to avoid if possible).   -Repeat CBC and BMP unremarkable today.   -Bladder scan < 25 ml.   -Repeat UA appears positive with blood, WBC and mucus. UC with > 100,000 Enterococcus Facaeilis.   - Macrobid complete.   - Follow-up UA UC on 9/5/24 negative/   -Repeat CBC and BMPunremarkable.     Type 2 diabetes mellitus with hyperosmolarity without coma, without long-term current use of insulin (H)  -Consistent carb diet.  -Recent hemoglobin A1c 6.8.  -Metformin 1000 mg 2 times daily.  -Blood sugar checks twice daily.  -Blood sugars satisfactory.     Paroxysmal atrial fibrillation (H)  -Continue with warfarin.  -INRs to be managed per the Coumadin clinic. Monitor while on anbx.   -INR 2.3.      Depression with anxiety   Hallucinations.   -Continues on fluvoxamine and Abilify.  -mood appears stable.   -Held Luvox (SE include abnormal thoughts and dreams.) no further hallucinations or abnormal thoughts with holding the fluvoxamine. However resumed on 9/6/24 by another provider.   -Pt followed by out pt psychiatry. Dr. Villalobos.        -ok to Send to [facility] ER for further eval.     Active Ambulatory Problems     Diagnosis Date Noted     SIRS (systemic inflammatory response syndrome) (H) 09/09/2019     Adrenal incidentaloma (H24)      Diet-controlled diabetes mellitus (H)      Pericardial effusion      Lactic acidosis      Type 2 diabetes mellitus without complication, without long-term current use of insulin (H) 04/12/2020     Paroxysmal atrial fibrillation (H) 02/18/2020     Calculus of ureter 04/12/2020     Acute renal failure, unspecified acute renal failure type (H24) 04/12/2020     Acute renal failure (ARF) (H24) 04/12/2020     ATN (acute tubular necrosis) (H24) 04/12/2020     Sepsis due to urinary tract infection (H)      Metformin overdose of undetermined intent, initial encounter      Metabolic acidosis      Hyperkalemia      Hematemesis, presence of nausea not specified 04/12/2020     Calculus of kidney      Syncope and collapse 06/30/2020     Hyperglycemia      After care 07/03/2012     Age-related cataract 03/27/2021     Anemia associated with acute blood loss 06/27/2012     Balanitis 03/27/2021     Cholelithiasis without obstruction 03/27/2021     Constipation 07/03/2012     Depression with anxiety 07/03/2012     Glaucoma 06/25/2012     Hemorrhoids without complication 03/27/2021     Hyperlipidemia 06/07/2018     Loss of appetite 03/27/2021     Major depressive disorder, recurrent episode, in partial remission (H24) 06/08/2018     Mixed personality disorder in adult (H) 06/08/2018     Obstructive sleep apnea 03/27/2021     Osteoarthrosis involving lower leg 06/23/2012     Periodic limb movement disorder 03/27/2021     Recurrent major depression (H24) 03/27/2021     S/P ureteral stent placement 03/27/2021     Unilateral small kidney 03/27/2021     Nephrolithiasis 03/27/2021     Type 2 diabetes mellitus (H) 06/25/2012     Osteoarthritis of knee 03/27/2021     Persistent atrial fibrillation (H) 03/27/2021     Pyelonephritis  03/27/2021     Urinary tract infection without hematuria, site unspecified 04/30/2022     Sepsis without acute organ dysfunction (H) 04/30/2022     UTI (urinary tract infection) 05/01/2022     Benign prostatic hyperplasia with urinary frequency 05/07/2022     Hypomagnesemia 05/07/2022     Injury of head, initial encounter 07/31/2023     Fall at home, initial encounter 07/31/2023     E. coli urinary tract infection 08/02/2023     Septic encephalopathy 07/25/2024     DM (diabetes mellitus), type 2 with complications (H) 07/25/2024     Urinary tract infection with hematuria, site unspecified 07/26/2024     Altered mental status, unspecified altered mental status type 07/26/2024     Sepsis, due to unspecified organism, unspecified whether acute organ dysfunction present (H) 07/26/2024     Normocytic anemia 11/29/2023     Status post right knee replacement 11/29/2023     Peripheral vascular disease, unspecified (H24) 08/20/2024     Warfarin anticoagulation 09/09/2024     Resolved Ambulatory Problems     Diagnosis Date Noted     Shock circulatory (H)      Acute respiratory failure with hypoxemia (H)      Past Medical History:   Diagnosis Date     A-fib (H)      Acute hemodialysis encounter (H24)      Acute kidney injury (H24)      Asbestosis (H)      Atrophy of right kidney      Basal cell carcinoma      BPH without urinary obstruction      Cellulitis      Cholelithiasis      Diabetes mellitus, type 2 (H) 4/12/2020     Esophagitis      Gastritis      Hematemesis, presence of nausea not specified      Hyperlipemia      Kidney stone      Metformin overdose of undetermined intent      PTSD (post-traumatic stress disorder)      Seasonal allergies      Sleep apnea      Upper GI bleed      No Known Allergies    All Meds and Allergies reviewed in the record at the facility and is the most up-to-date.    No current facility-administered medications for this visit.     No current outpatient medications on file.      Facility-Administered Medications Ordered in Other Visits   Medication Dose Route Frequency Provider Last Rate Last Admin     acetaminophen (TYLENOL) tablet 500 mg  500 mg Oral Q6H PRN Gondal, Saad J, MD         ARIPiprazole (ABILIFY) tablet 2 mg  2 mg Oral At Bedtime Gondal, Saad J, MD   2 mg at 09/09/24 2108     brimonidine (ALPHAGAN) 0.2 % ophthalmic solution 1 drop  1 drop Both Eyes BID Gondal, Saad J, MD   1 drop at 09/09/24 2107     calcium carbonate (TUMS) chewable tablet 1,000 mg  1,000 mg Oral 4x Daily PRN Gondal, Saad J, MD         glucose gel 15-30 g  15-30 g Oral Q15 Min PRN Gondal, Saad J, MD        Or     dextrose 50 % injection 25-50 mL  25-50 mL Intravenous Q15 Min PRN Gondal, Saad J, MD        Or     glucagon injection 1 mg  1 mg Subcutaneous Q15 Min PRN Gondal, Saad J, MD         dorzolamide-timolol (COSOPT) ophthalmic solution 1 drop  1 drop Both Eyes BID Gondal, Saad J, MD   1 drop at 09/09/24 2107     fluvoxaMINE (LUVOX) tablet 50 mg  50 mg Oral At Bedtime Gondal, Saad J, MD   50 mg at 09/09/24 2108     gabapentin (NEURONTIN) capsule 300 mg  300 mg Oral BID Gondal, Saad J, MD   300 mg at 09/09/24 2108     hydrALAZINE (APRESOLINE) tablet 10 mg  10 mg Oral Q4H PRN Gondal, Saad J, MD        Or     hydrALAZINE (APRESOLINE) injection 10 mg  10 mg Intravenous Q4H PRN Gondal, Saad J, MD         [START ON 9/10/2024] insulin aspart (NovoLOG) injection (RAPID ACTING)  1-7 Units Subcutaneous TID AC Gondal, Saad J, MD         insulin aspart (NovoLOG) injection (RAPID ACTING)  1-5 Units Subcutaneous At Bedtime Gondal, Saad J, MD         latanoprost (XALATAN) 0.005 % ophthalmic solution 1 drop  1 drop Both Eyes At Bedtime Gondal, Saad J, MD         lidocaine (LMX4) cream   Topical Q1H PRN Gondal, Saad J, MD         lidocaine 1 % 0.1-1 mL  0.1-1 mL Other Q1H PRN Gondal, Saad J, MD         [START ON 9/10/2024] multivitamin w/minerals (THERA-VIT-M) tablet 1 tablet  1 tablet Oral Daily Gondal, Saad J, MD          netarsudil (RHOPRESSA) 0.02 % ophthalmic solution 1 drop  1 drop Both Eyes QPM Gondal, Saad J, MD         ondansetron (ZOFRAN) injection 4 mg  4 mg Intravenous Q6H PRN Gondal, Saad J, MD         [START ON 9/10/2024] piperacillin-tazobactam (ZOSYN) 3.375 g vial to attach to  mL bag  3.375 g Intravenous Q8H Gondal, Saad J, MD         [START ON 9/10/2024] polyethylene glycol (MIRALAX) Packet 17 g  17 g Oral QPM Gondal, Saad J, MD         senna-docusate (SENOKOT-S/PERICOLACE) 8.6-50 MG per tablet 1 tablet  1 tablet Oral BID PRN Gondal, Saad J, MD        Or     senna-docusate (SENOKOT-S/PERICOLACE) 8.6-50 MG per tablet 2 tablet  2 tablet Oral BID PRN Gondal, Saad J, MD         sodium chloride (PF) 0.9% PF flush 3 mL  3 mL Intracatheter Q8H Gondal, Saad J, MD   3 mL at 09/09/24 1955     sodium chloride (PF) 0.9% PF flush 3 mL  3 mL Intracatheter q1 min prn Gondal, Saad J, MD         sodium chloride 0.9 % infusion   Intravenous Continuous Gondal, Saad J, MD 75 mL/hr at 09/09/24 2135 New Bag at 09/09/24 2135     Warfarin Dose Required Daily - Pharmacist Managed  1 each Oral See Admin Instructions Gondal, Saad J, MD           REVIEW OF SYSTEMS:   10 point review of systems reviewed and pertinent positives in the HPI.     PHYSICAL EXAMINATION:  Physical Exam     Vital signs: BP (!) 165/80   Pulse 85   Temp 97.7  F (36.5  C)   Resp 16   Ht 1.829 m (6')   Wt 96.9 kg (213 lb 9.6 oz)   SpO2 96%   BMI 28.97 kg/m    General: Awake, Alert, sitting up in wheelchair,  conversant   HEENT:Pink conjunctiva, slight redness to right eye, patient legally blind in right eye, does not follow voice with eyes or attempt to look at me, moist oral mucosa  NECK: Supple  CVS:  S1  S2, without murmur or gallop.   LUNG: Clear to auscultation, No wheezes, rales or rhonci.  BACK: No kyphosis of the thoracic spine  ABDOMEN: Soft,with positive bowel sounds  EXTREMITIES: Moves all extremities with generalized weakness, no pedal edema, no  calf tenderness  SKIN: Warm and dry  NEUROLOGIC: pulses palpable  PSYCHIATRIC: Very confused.       Labs:  All labs reviewed in the nursing home record and Epic   @  Lab Results   Component Value Date    WBC 6.5 07/29/2024     Lab Results   Component Value Date    RBC 3.41 07/29/2024     Lab Results   Component Value Date    HGB 10.6 07/29/2024     Lab Results   Component Value Date    HCT 33.2 07/29/2024     Lab Results   Component Value Date    MCV 97 07/29/2024     Lab Results   Component Value Date    MCH 31.1 07/29/2024     Lab Results   Component Value Date    MCHC 31.9 07/29/2024     Lab Results   Component Value Date    RDW 12.9 07/29/2024     Lab Results   Component Value Date     07/29/2024        @Last Comprehensive Metabolic Panel:  Sodium   Date Value Ref Range Status   09/09/2024 139 135 - 145 mmol/L Final     Potassium   Date Value Ref Range Status   09/09/2024 4.6 3.4 - 5.3 mmol/L Final   05/05/2022 4.4 3.5 - 5.0 mmol/L Final     Chloride   Date Value Ref Range Status   09/09/2024 105 98 - 107 mmol/L Final   05/02/2022 109 (H) 98 - 107 mmol/L Final     Carbon Dioxide (CO2)   Date Value Ref Range Status   09/09/2024 21 (L) 22 - 29 mmol/L Final   05/02/2022 25 22 - 31 mmol/L Final     Anion Gap   Date Value Ref Range Status   09/09/2024 13 7 - 15 mmol/L Final   05/02/2022 8 5 - 18 mmol/L Final     Glucose   Date Value Ref Range Status   05/02/2022 133 (H) 70 - 125 mg/dL Final     GLUCOSE BY METER POCT   Date Value Ref Range Status   09/09/2024 136 (H) 70 - 99 mg/dL Final     Urea Nitrogen   Date Value Ref Range Status   09/09/2024 15.5 8.0 - 23.0 mg/dL Final   05/02/2022 11 8 - 28 mg/dL Final     Creatinine   Date Value Ref Range Status   09/09/2024 0.80 0.67 - 1.17 mg/dL Final     GFR Estimate   Date Value Ref Range Status   09/09/2024 >90 >60 mL/min/1.73m2 Final     Comment:     eGFR calculated using 2021 CKD-EPI equation.   06/10/2021 >60 >60 mL/min/1.73m2 Final     Calcium   Date Value Ref  Range Status   09/09/2024 9.3 8.8 - 10.4 mg/dL Final     Comment:     Reference intervals for this test were updated on 7/16/2024 to reflect our healthy population more accurately. There may be differences in the flagging of prior results with similar values performed with this method. Those prior results can be interpreted in the context of the updated reference intervals.       This note has been dictated using voice recognition software. Any grammatical or context distortions are unintentional and inherent to the software    Electronically signed by: Kylie Gomez CNP       Sincerely,        Kylie Gomez NP

## 2024-09-09 NOTE — ED PROVIDER NOTES
EMERGENCY DEPARTMENT ENCOUNTER      NAME: Rakesh Samuels  AGE: 74 year old male  YOB: 1950  MRN: [id number]  EVALUATION DATE & TIME: 9/9/2024  2:43 PM    PCP: Rashida Madison    ED PROVIDER: Azam Sharma M.D.      Chief Complaint   Patient presents with    Hallucinations         IMPRESSION  1. Urinary tract infection without hematuria, site unspecified    2. Septic encephalopathy    3. Warfarin anticoagulation        PLAN  - admit to hospitalist for further care; med/surg admit    ED COURSE & MEDICAL DECISION MAKING    ED Course as of 09/09/24 1839   Mon Sep 09, 2024   1628 CT head independently reviewed & interpreted by me: no ICH, no mass, no cerebral edema.   1820 I talked with Dr. Gondal, hospitalist.    1839 74yoM with history of a-fib (takes warfarin), T2DM presenting from TCU (there after head injury) for evaluation of visual hallucinations (seeing his parents, sister, wife in the room); denies any other symptoms. Calm & cooperative here in the ED; aware he was seeing hallucinations. Does have mild ongoing encephalopathy; no focal neuro deficits. Stable vitals; does not meet SIRS criteria or sepsis 2 criteria. UA with UTI; suspect this driving his encephalopathy. CT head with no ICH. CT chest/abdomen/pelvis with no acute abnormality or explanatory pathology either. Blood tests otherwise unremarkable. Stable for floor. Given Zosyn here in the ED. Consulted hospitalist for admission; they agreed. Patient understood and agreed with the plan; no further questions at the time of admission.       --------------------------------------------------------------------------------   --------------------------------------------------------------------------------     5:54 PM I met with the patient for the initial interview and physical examination. Discussed plan for treatment and workup in the ED.      This patient involved a high degree of complexity in medical decision making, as significant risks  were present and assessed. Recent encounters & results in medical record reviewed by me.    All workup (i.e. any EKG/labs/imaging as per charting below) reviewed and independently interpreted by me. See respective sections for details.        See additional MDM below if interested.      MEDICATIONS GIVEN IN THE EMERGENCY DEPARTMENT  Medications   piperacillin-tazobactam (ZOSYN) 3.375 g vial to attach to  mL bag (has no administration in time range)   sodium chloride 0.9 % infusion (has no administration in time range)   piperacillin-tazobactam (ZOSYN) 3.375 g vial to attach to  mL bag (0 g Intravenous Stopped 9/9/24 1823)   sodium chloride 0.9% BOLUS 1,000 mL (1,000 mLs Intravenous $New Bag 9/9/24 1823)               =================================================================      HPI  Use of : N/A     Patient's history of present illness is limited due to patient's confused status.     Rakesh Samuels is a 74 year old male with a pertinent history of type 2 diabetes, sepsis, UTI, and kidney stones who presents to this ED via EMS for evaluation of hallucinations.    Patient reports that he feels lousy right now. When asked where he was, he said that he was at [place]. Says that he has been confused for a month now. He saw his brother and father that both passed away 10 years ago. Says he has seen people that passed away before.           --------------- MEDICAL HISTORY ---------------  PAST MEDICAL HISTORY:  Reviewed independently by me.  Past Medical History:   Diagnosis Date    A-fib (H)     Acute hemodialysis encounter (H24)     Due to CHIDI    Acute kidney injury (H24)     Acute respiratory failure with hypoxemia (H)     Adrenal incidentaloma (H24)     Asbestosis (H)     ATN (acute tubular necrosis) (H24)     Atrophy of right kidney     Basal cell carcinoma     BPH without urinary obstruction     Cellulitis     Left foot    Cholelithiasis     Depression with anxiety     Diabetes  mellitus, type 2 (H) 4/12/2020    Esophagitis     Gastritis     Glaucoma     Hematemesis, presence of nausea not specified     Hyperkalemia     Hyperlipemia     Kidney stone     Lactic acidosis     Metabolic acidosis     Metformin overdose of undetermined intent     Paroxysmal atrial fibrillation (H) 2/18/2020    Pericardial effusion     PTSD (post-traumatic stress disorder)     Seasonal allergies     Sepsis due to urinary tract infection (H)     Shock circulatory (H)     SIRS (systemic inflammatory response syndrome) (H)     Sleep apnea     uses a machine at night.     Upper GI bleed      Patient Active Problem List   Diagnosis    SIRS (systemic inflammatory response syndrome) (H)    Adrenal incidentaloma (H24)    Diet-controlled diabetes mellitus (H)    Pericardial effusion    Lactic acidosis    Type 2 diabetes mellitus without complication, without long-term current use of insulin (H)    Paroxysmal atrial fibrillation (H)    Calculus of ureter    Acute renal failure, unspecified acute renal failure type (H24)    Acute renal failure (ARF) (H24)    ATN (acute tubular necrosis) (H24)    Sepsis due to urinary tract infection (H)    Metformin overdose of undetermined intent, initial encounter    Metabolic acidosis    Hyperkalemia    Hematemesis, presence of nausea not specified    Calculus of kidney    Syncope and collapse    Hyperglycemia    After care    Age-related cataract    Anemia associated with acute blood loss    Balanitis    Cholelithiasis without obstruction    Constipation    Depression with anxiety    Glaucoma    Hemorrhoids without complication    Hyperlipidemia    Loss of appetite    Major depressive disorder, recurrent episode, in partial remission (H24)    Mixed personality disorder in adult (H)    Obstructive sleep apnea    Osteoarthrosis involving lower leg    Periodic limb movement disorder    Recurrent major depression (H24)    S/P ureteral stent placement    Unilateral small kidney     Nephrolithiasis    Type 2 diabetes mellitus (H)    Osteoarthritis of knee    Persistent atrial fibrillation (H)    Pyelonephritis    Urinary tract infection without hematuria, site unspecified    Sepsis without acute organ dysfunction (H)    UTI (urinary tract infection)    Benign prostatic hyperplasia with urinary frequency    Hypomagnesemia    Injury of head, initial encounter    Fall at home, initial encounter    E. coli urinary tract infection    Septic encephalopathy    DM (diabetes mellitus), type 2 with complications (H)    Urinary tract infection with hematuria, site unspecified    Altered mental status, unspecified altered mental status type    Sepsis, due to unspecified organism, unspecified whether acute organ dysfunction present (H)    Normocytic anemia    Status post right knee replacement    Peripheral vascular disease, unspecified (H24)    Warfarin anticoagulation       PAST SURGICAL HISTORY:  Reviewed independently by me.  Past Surgical History:   Procedure Laterality Date    EYE SURGERY      congenital ptosis right upper lid    HC CYSTOSCOPY,INSERT URETERAL STENT Left 4/12/2020    Procedure: CYSTOSCOPY, WITH URETERAL STENT INSERTION;  Surgeon: Christopher Yates MD;  Location: [facility] OR;  Service: Urology    WA ESOPHAGOGASTRODUODENOSCOPY TRANSORAL DIAGNOSTIC N/A 4/13/2020    Procedure: ESOPHAGOGASTRODUODENOSCOPY (EGD);  Surgeon: Robert Rico MD;  Location: [facility] GI;  Service: Gastroenterology    REPLACEMENT TOTAL KNEE Left        CURRENT MEDICATIONS:    Reviewed independently by me.    Current Facility-Administered Medications:     piperacillin-tazobactam (ZOSYN) 3.375 g vial to attach to  mL bag, 3.375 g, Intravenous, Q8H, Gondal, Saad J, MD    sodium chloride 0.9 % infusion, , Intravenous, Continuous, Gondal, Saad J, MD    Current Outpatient Medications:     acetaminophen (TYLENOL) 325 MG tablet, Take 650 mg by mouth 3 times daily., Disp: , Rfl:     acetaminophen (TYLENOL)  500 MG tablet, [ACETAMINOPHEN (TYLENOL) 500 MG TABLET] Take 500 mg by mouth every 6 (six) hours as needed for pain., Disp: , Rfl:     ARIPiprazole (ABILIFY) 2 MG tablet, [ARIPIPRAZOLE (ABILIFY) 2 MG TABLET] Take 2 mg by mouth at bedtime. , Disp: , Rfl:     brimonidine (ALPHAGAN) 0.2 % ophthalmic solution, [BRIMONIDINE (ALPHAGAN) 0.2 % OPHTHALMIC SOLUTION] Administer 1 drop to both eyes 2 (two) times a day. , Disp: , Rfl:     calcium carbonate (TUMS) 500 MG chewable tablet, Take 1 chew tab by mouth 3 times daily as needed for heartburn., Disp: , Rfl:     dorzolamide-timolol (COSOPT) 2-0.5 % ophthalmic solution, Place 1 drop into both eyes 2 times daily, Disp: , Rfl:     fluvoxaMINE (LUVOX) 50 MG tablet, [FLUVOXAMINE (LUVOX) 50 MG TABLET] Take 50 mg by mouth at bedtime. , Disp: , Rfl:     gabapentin (NEURONTIN) 300 MG capsule, Take 300 mg by mouth 2 times daily, Disp: , Rfl:     latanoprostene bunod 0.024 % Drop, Place 1 drop into both eyes At Bedtime, Disp: , Rfl:     levomefolate calcium (L-METHYLFOLATE ORAL), Take 15 mg by mouth daily., Disp: , Rfl:     metFORMIN (GLUCOPHAGE) 1000 MG tablet, Take 1,000 mg by mouth 2 times daily (with meals), Disp: , Rfl:     Multiple Vitamins-Minerals (CENTRUM SILVER) CHEW, Take 1 tablet by mouth daily, Disp: , Rfl:     netarsudiL (RHOPRESSA) 0.02 % Drop, Place 1 drop into both eyes every evening, Disp: , Rfl:     polyethylene glycol (MIRALAX) 17 g packet, Take 1 packet by mouth daily., Disp: , Rfl:     warfarin ANTICOAGULANT (COUMADIN) 2.5 MG tablet, Take 2.5-5 mg by mouth See Admin Instructions. 9/9-Take 5mg on Sunday, Wednesday. Take 2.5mg all other days of week, Disp: , Rfl:     ALLERGIES:  Reviewed independently by me.  No Known Allergies    FAMILY HISTORY:  Reviewed independently by me.  Family History   Problem Relation Age of Onset    Diabetes Mother        SOCIAL HISTORY:   Reviewed independently by me.  Social History     Socioeconomic History    Marital status:     Tobacco Use    Smoking status: Never    Smokeless tobacco: Never   Substance and Sexual Activity    Alcohol use: Not Currently    Drug use: Never    Sexual activity: Not Currently     Social Determinants of Health      Received from [facility], [facility]    Social Connections       --------------- PHYSICAL EXAM ---------------  Nursing notes and vitals independently reviewed by me.  VITALS:  Vitals:    09/09/24 1437   BP: 132/68   Pulse: 98   Resp: 18   Temp: 98.3  F (36.8  C)   TempSrc: Oral   SpO2: 94%   Weight: 96.9 kg (213 lb 10 oz)   Height: 1.829 m (6')       PHYSICAL EXAM:    General:  alert, interactive, no distress  Eyes:  conjunctivae clear, conjugate gaze  HENT:  atraumatic, nose with no rhinorrhea, oropharynx clear  Neck:  no meningismus  Cardiovascular:  HR 80s during exam, regular rhythm, no murmurs, brisk cap refill  Chest:  no chest wall tenderness  Pulmonary:  no stridor, normal phonation, normal work of breathing, clear lungs bilaterally  Abdomen:  soft, nondistended, nontender  :  no CVA tenderness  Back:  no midline spinal tenderness  Musculoskeletal:  no pretibial edema, no calf tenderness. Gross ROM intact to joints of extremities with no obvious deformities.  Skin:  warm, dry, no rash  Neuro:  awake, alert, answers questions appropriately, follows commands, moves all limbs, confused speech, 5/5 muscle strength    Psych:  calm, normal affect      --------------- RESULTS ---------------  EKG:    Reviewed and independently interpreted by me.  - NSR at 83bpm, no ST or T wave changes, normal intervals  - unchanged from prior on 7/25/24  My read.    LAB:  Reviewed and independently interpreted by me.  Results for orders placed or performed during the hospital encounter of 09/09/24   CT Head w/o Contrast    Impression    IMPRESSION:  1.  No CT evidence for acute intracranial process.  2.  Brain atrophy and presumed chronic  microvascular ischemic changes as above.   CT Chest Abdomen Pelvis w/o Contrast    Impression    IMPRESSION:  1.  No acute pulmonary disease.  2.  Gallstones.  3.  Severe right renal cortical atrophy, nonobstructing stones both kidneys, and mild left hydronephrosis, unchanged.  4.  Sigmoid diverticulosis.  5.  Bladder wall thickening could represent hypertrophy from outlet obstruction secondary to an enlarged prostate gland, unchanged.   Symptomatic Influenza A/B, RSV, & SARS-CoV2 PCR (COVID-19) Nasopharyngeal    Specimen: Nasopharyngeal; Swab   Result Value Ref Range    Influenza A PCR Negative Negative    Influenza B PCR Negative Negative    RSV PCR Negative Negative    SARS CoV2 PCR Negative Negative   UA with Microscopic reflex to Culture    Specimen: Urine, Catheter   Result Value Ref Range    Color Urine Yellow Colorless, Straw, Light Yellow, Yellow    Appearance Urine Turbid (A) Clear    Glucose Urine 300 (A) Negative mg/dL    Bilirubin Urine Negative Negative    Ketones Urine Negative Negative mg/dL    Specific Gravity Urine 1.020 1.001 - 1.030    Blood Urine >1.0 mg/dL (A) Negative    pH Urine 5.0 5.0 - 7.0    Protein Albumin Urine 30 (A) Negative mg/dL    Urobilinogen Urine <2.0 <2.0 mg/dL    Nitrite Urine Negative Negative    Leukocyte Esterase Urine 250 Janis/uL (A) Negative    WBC Clumps Urine Present (A) None Seen /HPF    RBC Urine >182 (H) <=2 /HPF    WBC Urine 24 (H) <=5 /HPF    Squamous Epithelials Urine <1 <=1 /HPF    Hyaline Casts Urine 3 (H) <=2 /LPF   Result Value Ref Range    INR 1.85 (H) 0.85 - 1.15   Basic metabolic panel   Result Value Ref Range    Sodium 139 135 - 145 mmol/L    Potassium 4.6 3.4 - 5.3 mmol/L    Chloride 105 98 - 107 mmol/L    Carbon Dioxide (CO2) 21 (L) 22 - 29 mmol/L    Anion Gap 13 7 - 15 mmol/L    Urea Nitrogen 15.5 8.0 - 23.0 mg/dL    Creatinine 0.80 0.67 - 1.17 mg/dL    GFR Estimate >90 >60 mL/min/1.73m2    Calcium 9.3 8.8 - 10.4 mg/dL    Glucose 188 (H) 70 - 99 mg/dL    Hepatic function panel   Result Value Ref Range    Protein Total 7.0 6.4 - 8.3 g/dL    Albumin 3.9 3.5 - 5.2 g/dL    Bilirubin Total 0.2 <=1.2 mg/dL    Alkaline Phosphatase 91 40 - 150 U/L    AST 9 0 - 45 U/L    ALT 14 0 - 70 U/L    Bilirubin Direct <0.20 0.00 - 0.30 mg/dL   Result Value Ref Range    Lipase 169 (H) 13 - 60 U/L   Result Value Ref Range    Magnesium 1.9 1.7 - 2.3 mg/dL   TSH with free T4 reflex   Result Value Ref Range    TSH 2.28 0.30 - 4.20 uIU/mL   Result Value Ref Range    Troponin T, High Sensitivity 12 <=22 ng/L   Blood gas venous   Result Value Ref Range    pH Venous 7.35 7.32 - 7.43    pCO2 Venous 42 40 - 50 mm Hg    pO2 Venous 45 25 - 47 mm Hg    Bicarbonate Venous 23 21 - 28 mmol/L    Base Excess/Deficit Venous -2.4 -3.0 - 3.0 mmol/L    FIO2 21     Oxyhemoglobin Venous 80 (H) 70 - 75 %    O2 Sat, Venous 80.9 (H) 70.0 - 75.0 %   Result Value Ref Range    CRP Inflammation 11.80 (H) <5.00 mg/L   Result Value Ref Range    Procalcitonin 0.06 <0.50 ng/mL   CBC with platelets and differential   Result Value Ref Range    WBC Count 9.5 4.0 - 11.0 10e3/uL    RBC Count 4.12 (L) 4.40 - 5.90 10e6/uL    Hemoglobin 13.2 (L) 13.3 - 17.7 g/dL    Hematocrit 39.7 (L) 40.0 - 53.0 %    MCV 96 78 - 100 fL    MCH 32.0 26.5 - 33.0 pg    MCHC 33.2 31.5 - 36.5 g/dL    RDW 13.1 10.0 - 15.0 %    Platelet Count 288 150 - 450 10e3/uL    % Neutrophils 75 %    % Lymphocytes 14 %    % Monocytes 7 %    % Eosinophils 3 %    % Basophils 1 %    % Immature Granulocytes 0 %    NRBCs per 100 WBC 0 <1 /100    Absolute Neutrophils 7.1 1.6 - 8.3 10e3/uL    Absolute Lymphocytes 1.3 0.8 - 5.3 10e3/uL    Absolute Monocytes 0.7 0.0 - 1.3 10e3/uL    Absolute Eosinophils 0.3 0.0 - 0.7 10e3/uL    Absolute Basophils 0.1 0.0 - 0.2 10e3/uL    Absolute Immature Granulocytes 0.0 <=0.4 10e3/uL    Absolute NRBCs 0.0 10e3/uL         RADIOLOGY:  Reviewed and independently interpreted by me. Please see official radiology report.  Recent Results  (from the past 24 hour(s))   CT Head w/o Contrast    Narrative    EXAM: CT HEAD W/O CONTRAST  LOCATION: [facility]  DATE: 9/9/2024    INDICATION: confusion, on warfarin  COMPARISON: July 25, 2024  TECHNIQUE: Routine CT Head without IV contrast. Multiplanar reformats. Dose reduction techniques were used.    FINDINGS:  INTRACRANIAL CONTENTS: No intracranial hemorrhage, extraaxial collection, or mass effect.  No CT evidence of acute infarct. Mild presumed chronic small vessel ischemic changes. Moderate generalized volume loss. No hydrocephalus.     VISUALIZED ORBITS/SINUSES/MASTOIDS: Prior bilateral cataract surgery. Visualized portions of the orbits are otherwise unremarkable. Left orbital floor, device No paranasal sinus mucosal disease. No middle ear or mastoid effusion.    BONES/SOFT TISSUES: No acute abnormality.      Impression    IMPRESSION:  1.  No CT evidence for acute intracranial process.  2.  Brain atrophy and presumed chronic microvascular ischemic changes as above.   CT Chest Abdomen Pelvis w/o Contrast    Narrative    EXAM: CT CHEST ABDOMEN PELVIS W/O CONTRAST  LOCATION: [facility]  DATE: 9/9/2024    INDICATION: confusion  COMPARISON: CT abdomen and pelvis 7/26/2024, CT abdomen and pelvis 4/30/2022, and CT chest 3/26/2020  TECHNIQUE: CT scan of the chest, abdomen, and pelvis was performed without IV contrast. Multiplanar reformats were obtained. Dose reduction techniques were used.   CONTRAST: None.    FINDINGS:   LUNGS AND PLEURA: Curvilinear scarring right middle lobe, unchanged. Minimal dependent atelectasis. Lungs otherwise clear. No acute infiltrates or pleural effusions.    MEDIASTINUM/AXILLAE: Normal.    CORONARY ARTERY CALCIFICATION: Mild.    HEPATOBILIARY: Hepatomegaly measuring 21 cm. Multiple gallstones.    PANCREAS: Normal.    SPLEEN: Normal.    ADRENAL GLANDS: 3.3 x 2.4 cm fat-containing lesion left adrenal gland is unchanged and compatible  with a myelolipoma. 8 mm hypodense nodule right adrenal gland also unchanged.    KIDNEYS/BLADDER: Mildly dilated left intrarenal collecting system and ureter, unchanged. Severe right renal cortical atrophy. Several nonobstructing stones both kidneys, the largest measuring 4 mm. Small left renal cyst. No bladder stones. Diffuse   bladder wall thickening. Prostatic impression along lateral base.    BOWEL: Normal appendix. No evidence for bowel obstruction. Diverticula sigmoid colon, without evidence for diverticulitis.    LYMPH NODES: Normal.    VASCULATURE: Normal.    PELVIC ORGANS: Mild prostatic hypertrophy. No free pelvic fluid.    MUSCULOSKELETAL: Hypertrophic changes thoracic and lumbar spine. Osteopenia.      Impression    IMPRESSION:  1.  No acute pulmonary disease.  2.  Gallstones.  3.  Severe right renal cortical atrophy, nonobstructing stones both kidneys, and mild left hydronephrosis, unchanged.  4.  Sigmoid diverticulosis.  5.  Bladder wall thickening could represent hypertrophy from outlet obstruction secondary to an enlarged prostate gland, unchanged.         PROCEDURES:   Procedures   --------------------------------------------------------------------------------   Cardiac telemetry monitoring ordered by me secondary to the patient's history of confusion and to monitor the patient for dysrhythmia. Reviewed & independently interpreted by me. Revealed normal sinus rhythm.  --------------------------------------------------------------------------------                 --------------- ADDITIONAL MDM ---------------      History:  - I considered systemic symptoms of the presenting illness.  - Supplemental history from:       -- patient, EMS  - External Record(s) reviewed:       -- Inpatient/outpatient record, prior labs, prior imaging       -- see above ED course & MDM for further details    Workup:  - Chart documentation above includes differential considered and any EKGs or imaging independently  interpreted by provider.  - In additional to work up documented, I considered the following work up:       -- see above ED course & MDM for further details    External Consultation:  - Discussion of management with another provider:       -- see above charting for additional    Complicating Factors:  - Care impacted by chronic illness:       -- see above MDM, past medical history, & problem list  - Care affected by social determinants of health:       -- see above social history       -- Access to Affordable Healthcare       -- Access to Medical Care    Disposition Considerations:  - Admit       I, Marixalora England, am serving as a scribe to document services personally performed by Dr. Azam Sharma based on my observation and the provider's statements to me. I, Azam Sharma MD attest that Marixa England is acting in a scribe capacity, has observed my performance of the services and has documented them in accordance with my direction.      Azam Sharma MD  09/09/24  Emergency Medicine  [facility] EMERGENCY DEPARTMENT  [address] [place] [id number]  [phone]  Dept: [phone]      Azam Sharma MD  09/09/24 1270

## 2024-09-09 NOTE — PHARMACY-ADMISSION MEDICATION HISTORY
Pharmacist Admission Medication History    Admission medication history is complete. The information provided in this note is only as accurate as the sources available at the time of the update.    Information Source(s): Facility (TCU/NH/) medication list/MAR via N/A    Pertinent Information:   Fluvoxamine was restarted on 9/6 per MAR and per TCU report    Changes made to PTA medication list:  Added: scheduled APAP, tums, miralax  Deleted: None  Changed: warfarin dose    Allergies reviewed with patient and updates made in EHR: unable to assess    Medication History Completed By: Marleni De Leon RPH 9/9/2024 4:48 PM    PTA Med List   Medication Sig Last Dose    acetaminophen (TYLENOL) 325 MG tablet Take 650 mg by mouth 3 times daily. 9/9/2024 at x1    acetaminophen (TYLENOL) 500 MG tablet [ACETAMINOPHEN (TYLENOL) 500 MG TABLET] Take 500 mg by mouth every 6 (six) hours as needed for pain.  at PRN    ARIPiprazole (ABILIFY) 2 MG tablet [ARIPIPRAZOLE (ABILIFY) 2 MG TABLET] Take 2 mg by mouth at bedtime.  9/8/2024 at PM    brimonidine (ALPHAGAN) 0.2 % ophthalmic solution [BRIMONIDINE (ALPHAGAN) 0.2 % OPHTHALMIC SOLUTION] Administer 1 drop to both eyes 2 (two) times a day.  9/9/2024 at AM    calcium carbonate (TUMS) 500 MG chewable tablet Take 1 chew tab by mouth 3 times daily as needed for heartburn.  at PRN    dorzolamide-timolol (COSOPT) 2-0.5 % ophthalmic solution Place 1 drop into both eyes 2 times daily 9/9/2024 at AM    gabapentin (NEURONTIN) 300 MG capsule Take 300 mg by mouth 2 times daily 9/9/2024 at AM    latanoprostene bunod 0.024 % Drop Place 1 drop into both eyes At Bedtime 9/8/2024 at PM    levomefolate calcium (L-METHYLFOLATE ORAL) Take 15 mg by mouth daily. 9/9/2024 at AM    Multiple Vitamins-Minerals (CENTRUM SILVER) CHEW Take 1 tablet by mouth daily 9/9/2024 at AM    netarsudiL (RHOPRESSA) 0.02 % Drop Place 1 drop into both eyes every evening 9/8/2024 at PM    polyethylene glycol (MIRALAX) 17 g packet  Take 1 packet by mouth daily. 9/8/2024 at PM    warfarin ANTICOAGULANT (COUMADIN) 2.5 MG tablet Take 2.5-5 mg by mouth See Admin Instructions. 9/9-Take 5mg on Sunday, Wednesday. Take 2.5mg all other days of week 9/8/2024 at PM-5mg

## 2024-09-09 NOTE — H&P
[facility]    History and Physical - Hospitalist Service       Date of Admission:  2024    Assessment & Plan    Assessment:   Rakesh Samuels is a 74 year old male admitted on 2024. He presented to the ER via EMS for evaluation of hallucinations, urine appeared to be grossly infected with microscopic hematuria, elevated leukocyte esterase, pyuria, blood and urine cultures were drawn, viral panel was negative for COVID, flu, respiratory syncytial virus, CT scan of the head was negative for acute process, CT scan of the chest abdomen pelvis showed No acute pulmonary disease. Gallstones. Severe right renal cortical atrophy, nonobstructing stones both kidneys, and mild left hydronephrosis, unchanged. Sigmoid diverticulosis. Bladder wall thickening could represent hypertrophy from outlet obstruction secondary to an enlarged prostate gland, unchanged.  Patient received Zosyn and fluids in the ED and is going to be admitted for complicated UTI, septic encephalopathy.    Problem List and plan:   Complicated UTI  Septic encephalopathy  Patient presented to the ER via EMS for evaluation of hallucinations  Patient did not provide much history and stated to get information from what is already charted  Patient appeared to be chronically ill-appearing and frail and did not know where he was but did mention that he came here because his doctor told him as he had a spell of delusions/hallucinations  Patient was seeing  family members as per chart review  Urine appeared to be grossly infected with microscopic hematuria, elevated leukocyte esterase, pyuria  Blood and urine cultures were drawn, currently pending results  Viral panel was negative for COVID, flu, respiratory syncytial virus  CT scan of the head was negative for acute process  CT scan of the chest abdomen pelvis showed No acute pulmonary disease. Bladder wall thickening could represent hypertrophy from outlet obstruction secondary  to an enlarged prostate gland, unchanged  Patient received Zosyn and fluids in the ED   Elevated CRP secondary to above, trend  Will continue with Zosyn and fluids  Monitor mental status closely    Imaging evidence of right renal cortical atrophy, mild left unchanged hydronephrosis  Ct showed Severe right renal cortical atrophy, nonobstructing stones both kidneys, and mild left hydronephrosis, unchanged.     Consider urology consult if needed    Electrolyte abnormality/hypomagnesemia  Anion gap metabolic acidosis  Repleted magnesium, monitor magnesium levels  Monitor for worsening acidosis  Gentle IV hydration    Non-insulin-dependent type 2 diabetes mellitus with hyperglycemia  Hemoglobin A1c 7.5  Holding metformin  Continue sliding scale  Monitor blood sugar level  Hypoglycemia protocol    Mild lipemia  Trend lipase levels    Normocytic anemia  Monitor CBC, follow-up iron panel    Elevated blood pressure without a diagnosis of hypertension  Monitor vital signs as per protocol  IV hydralazine as needed for elevated blood pressure    History of paroxysmal atrial fibrillation  Secondary hypercoagulable state  Subtherapeutic INR  Continue with warfarin, pharmacy to dose    History of mood disorder  Continue with Abilify, fluvoxamine    History of glaucoma  Continue with Alphagan, Cosopt, latanoprostene, Rhopressa    Miscellaneous meds  Continue with gabapentin    DVT PPx  Intermittent pneumatic compression    CODE STATUS  DNR/DNI as per previous documentation, patient was confused so could not discuss with patient clearly        Diet: Advance Diet as Tolerated: Clear Liquid Diet; Moderate Consistent Carb (60 g CHO per Meal) Diet  DVT Prophylaxis: Pneumatic Compression Devices  Olsen Catheter: Not present  Lines: None     Cardiac Monitoring: None  Code Status: No CPR- Do NOT Intubate    Mental status: Patient was alert and awake but was confused and did not know where he was, most of the history was obtained from chart  review and discussion with the ER physician  Clinically Significant Risk Factors Present on Admission               # Drug Induced Coagulation Defect: home medication list includes an anticoagulant medication           # DMII: A1C = 6.8 % (Ref range: <5.7 %) within past 6 months    # Overweight: Estimated body mass index is 28.97 kg/m  as calculated from the following:    Height as of this encounter: 1.829 m (6').    Weight as of this encounter: 96.9 kg (213 lb 10 oz).         # Financial/Environmental Concerns:         Disposition Plan     Medically Ready for Discharge: Anticipated in 2-4 Days     Saad J. Gondal, MD  Hospitalist Service  [facility]  Securely message with [employer] (more info)  Text page via [employer] Paging/Directory     ______________________________________________________________________    Chief Complaint   Altered mental status, confusion, hallucinations    History is obtained from chart review    History of Present Illness   Rakesh Samuels is a 74 year old male with a past medical history documented below presented to the ER via EMS for evaluation of hallucinations, patient did not provide much history and stated to get information from what is already charted, patient appeared to be chronically ill-appearing and frail and did not know where he was but did mention that he came here because his doctor told him as he had a spell of delusions/hallucinations, patient was seeing  family members, in the ER vitals were significant for elevated blood pressure which has since improved, labs significant for anion gap metabolic acidosis, elevated CRP, hyperglycemia, mild lipemia, normocytic anemia, subtherapeutic INR, urine appeared to be grossly infected with microscopic hematuria, elevated leukocyte esterase, pyuria, blood and urine cultures were drawn, viral panel was negative for COVID, flu, respiratory syncytial virus, CT scan of the head was negative for acute process, CT  scan of the chest abdomen pelvis showed No acute pulmonary disease. Gallstones. Severe right renal cortical atrophy, nonobstructing stones both kidneys, and mild left hydronephrosis, unchanged. Sigmoid diverticulosis. Bladder wall thickening could represent hypertrophy from outlet obstruction secondary to an enlarged prostate gland, unchanged.  Patient received Zosyn and fluids in the ED and is going to be admitted for complicated UTI, septic encephalopathy.      Past Medical History    Past Medical History:   Diagnosis Date    A-fib (H)     Acute hemodialysis encounter (H24)     Due to CHIDI    Acute kidney injury (H24)     Acute respiratory failure with hypoxemia (H)     Adrenal incidentaloma (H24)     Asbestosis (H)     ATN (acute tubular necrosis) (H24)     Atrophy of right kidney     Basal cell carcinoma     BPH without urinary obstruction     Cellulitis     Left foot    Cholelithiasis     Depression with anxiety     Diabetes mellitus, type 2 (H) 4/12/2020    Esophagitis     Gastritis     Glaucoma     Hematemesis, presence of nausea not specified     Hyperkalemia     Hyperlipemia     Kidney stone     Lactic acidosis     Metabolic acidosis     Metformin overdose of undetermined intent     Paroxysmal atrial fibrillation (H) 2/18/2020    Pericardial effusion     PTSD (post-traumatic stress disorder)     Seasonal allergies     Sepsis due to urinary tract infection (H)     Shock circulatory (H)     SIRS (systemic inflammatory response syndrome) (H)     Sleep apnea     uses a machine at night.     Upper GI bleed        Past Surgical History   Past Surgical History:   Procedure Laterality Date    EYE SURGERY      congenital ptosis right upper lid    HC CYSTOSCOPY,INSERT URETERAL STENT Left 4/12/2020    Procedure: CYSTOSCOPY, WITH URETERAL STENT INSERTION;  Surgeon: Christopher Yates MD;  [place] [facility] [place]    [place] ESOPHAGOGASTRODUODENOSCOPY TRANSORAL DIAGNOSTIC N/A 4/13/2020    Procedure:  ESOPHAGOGASTRODUODENOSCOPY (EGD);  Surgeon: Robert Rico MD;  Location: [facility];  Service: Gastroenterology    REPLACEMENT TOTAL KNEE Left        Prior to Admission Medications   Prior to Admission Medications   Prescriptions Last Dose Informant Patient Reported? Taking?   ARIPiprazole (ABILIFY) 2 MG tablet 9/8/2024 at PM  Yes Yes   Sig: [ARIPIPRAZOLE (ABILIFY) 2 MG TABLET] Take 2 mg by mouth at bedtime.    Multiple Vitamins-Minerals (CENTRUM SILVER) CHEW 9/9/2024 at AM  Yes Yes   Sig: Take 1 tablet by mouth daily   acetaminophen (TYLENOL) 325 MG tablet 9/9/2024 at x1  Yes Yes   Sig: Take 650 mg by mouth 3 times daily.   acetaminophen (TYLENOL) 500 MG tablet  at PRN  Yes Yes   Sig: [ACETAMINOPHEN (TYLENOL) 500 MG TABLET] Take 500 mg by mouth every 6 (six) hours as needed for pain.   brimonidine (ALPHAGAN) 0.2 % ophthalmic solution 9/9/2024 at AM  Yes Yes   Sig: [BRIMONIDINE (ALPHAGAN) 0.2 % OPHTHALMIC SOLUTION] Administer 1 drop to both eyes 2 (two) times a day.    calcium carbonate (TUMS) 500 MG chewable tablet  at PRN  Yes Yes   Sig: Take 1 chew tab by mouth 3 times daily as needed for heartburn.   dorzolamide-timolol (COSOPT) 2-0.5 % ophthalmic solution 9/9/2024 at AM  Yes Yes   Sig: Place 1 drop into both eyes 2 times daily   fluvoxaMINE (LUVOX) 50 MG tablet 9/8/2024 at PM  Yes Yes   Sig: [FLUVOXAMINE (LUVOX) 50 MG TABLET] Take 50 mg by mouth at bedtime.    gabapentin (NEURONTIN) 300 MG capsule 9/9/2024 at AM  Yes Yes   Sig: Take 300 mg by mouth 2 times daily   latanoprostene bunod 0.024 % Drop 9/8/2024 at PM  Yes Yes   Sig: Place 1 drop into both eyes At Bedtime   levomefolate calcium (L-METHYLFOLATE ORAL) 9/9/2024 at AM  Yes Yes   Sig: Take 15 mg by mouth daily.   metFORMIN (GLUCOPHAGE) 1000 MG tablet 9/9/2024 at AM  Yes Yes   Sig: Take 1,000 mg by mouth 2 times daily (with meals)   netarsudiL (RHOPRESSA) 0.02 % Drop 9/8/2024 at PM  Yes Yes   Sig: Place 1 drop into both eyes every evening    polyethylene glycol (MIRALAX) 17 g packet 9/8/2024 at PM  Yes Yes   Sig: Take 1 packet by mouth daily.   warfarin ANTICOAGULANT (COUMADIN) 2.5 MG tablet 9/8/2024 at PM-5mg  Yes Yes   Sig: Take 2.5-5 mg by mouth See Admin Instructions. 9/9-Take 5mg on Sunday, Wednesday. Take 2.5mg all other days of week      Facility-Administered Medications: None        Review of Systems    The 10 point Review of Systems is negative other than noted in the HPI or here. Altered mental status, confusion, hallucinations    Physical Exam   Vital Signs: Temp: 98.3  F (36.8  C) Temp src: Oral BP: 132/68 Pulse: 87   Resp: 18 SpO2: 97 % O2 Device: None (Room air)    Weight: 213 lbs 10.01 oz    GENERAL: Patient was seen and examined at bedside with no acute distress, chronically ill-appearing and frail  HENT:  Head is normocephalic, atraumatic.   EYES:  Eyes have anicteric sclerae without conjunctival injection, sunken eyes, pale conjunctival mucosa  EXT: no edema    SKIN:  No acute rashes.  Sunken eyes, pale conjunctival mucosa  NEUROLOGIC:  Grossly nonfocal.  Blank face, alert and awake but disoriented and confused      Medical Decision Making       80 MINUTES SPENT BY ME on the date of service doing chart review, history, exam, documentation & further activities per the note.      Data     I have personally reviewed the following data over the past 24 hrs:    9.5  \   13.2 (L)   / 288     139 105 15.5 /  188 (H)   4.6 21 (L) 0.80 \     ALT: 14 AST: 9 AP: 91 TBILI: 0.2   ALB: 3.9 TOT PROTEIN: 7.0 LIPASE: 169 (H)     Trop: 12 BNP: N/A     TSH: 2.28 T4: N/A A1C: 7.5 (H)     Procal: 0.06 CRP: 11.80 (H) Lactic Acid: N/A       INR:  1.85 (H) PTT:  N/A   D-dimer:  N/A Fibrinogen:  N/A     Ferritin:  N/A % Retic:  1.4 LDH:  N/A       Imaging results reviewed over the past 24 hrs:   Recent Results (from the past 24 hour(s))   CT Head w/o Contrast    Narrative    EXAM: CT HEAD W/O CONTRAST  LOCATION: [facility]  DATE:  9/9/2024    INDICATION: confusion, on warfarin  COMPARISON: July 25, 2024  TECHNIQUE: Routine CT Head without IV contrast. Multiplanar reformats. Dose reduction techniques were used.    FINDINGS:  INTRACRANIAL CONTENTS: No intracranial hemorrhage, extraaxial collection, or mass effect.  No CT evidence of acute infarct. Mild presumed chronic small vessel ischemic changes. Moderate generalized volume loss. No hydrocephalus.     VISUALIZED ORBITS/SINUSES/MASTOIDS: Prior bilateral cataract surgery. Visualized portions of the orbits are otherwise unremarkable. Left orbital floor, device No paranasal sinus mucosal disease. No middle ear or mastoid effusion.    BONES/SOFT TISSUES: No acute abnormality.      Impression    IMPRESSION:  1.  No CT evidence for acute intracranial process.  2.  Brain atrophy and presumed chronic microvascular ischemic changes as above.   CT Chest Abdomen Pelvis w/o Contrast    Narrative    EXAM: CT CHEST ABDOMEN PELVIS W/O CONTRAST  LOCATION: [facility]  DATE: 9/9/2024    INDICATION: confusion  COMPARISON: CT abdomen and pelvis 7/26/2024, CT abdomen and pelvis 4/30/2022, and CT chest 3/26/2020  TECHNIQUE: CT scan of the chest, abdomen, and pelvis was performed without IV contrast. Multiplanar reformats were obtained. Dose reduction techniques were used.   CONTRAST: None.    FINDINGS:   LUNGS AND PLEURA: Curvilinear scarring right middle lobe, unchanged. Minimal dependent atelectasis. Lungs otherwise clear. No acute infiltrates or pleural effusions.    MEDIASTINUM/AXILLAE: Normal.    CORONARY ARTERY CALCIFICATION: Mild.    HEPATOBILIARY: Hepatomegaly measuring 21 cm. Multiple gallstones.    PANCREAS: Normal.    SPLEEN: Normal.    ADRENAL GLANDS: 3.3 x 2.4 cm fat-containing lesion left adrenal gland is unchanged and compatible with a myelolipoma. 8 mm hypodense nodule right adrenal gland also unchanged.    KIDNEYS/BLADDER: Mildly dilated left intrarenal collecting system  and ureter, unchanged. Severe right renal cortical atrophy. Several nonobstructing stones both kidneys, the largest measuring 4 mm. Small left renal cyst. No bladder stones. Diffuse   bladder wall thickening. Prostatic impression along lateral base.    BOWEL: Normal appendix. No evidence for bowel obstruction. Diverticula sigmoid colon, without evidence for diverticulitis.    LYMPH NODES: Normal.    VASCULATURE: Normal.    PELVIC ORGANS: Mild prostatic hypertrophy. No free pelvic fluid.    MUSCULOSKELETAL: Hypertrophic changes thoracic and lumbar spine. Osteopenia.      Impression    IMPRESSION:  1.  No acute pulmonary disease.  2.  Gallstones.  3.  Severe right renal cortical atrophy, nonobstructing stones both kidneys, and mild left hydronephrosis, unchanged.  4.  Sigmoid diverticulosis.  5.  Bladder wall thickening could represent hypertrophy from outlet obstruction secondary to an enlarged prostate gland, unchanged.

## 2024-09-09 NOTE — PHARMACY-ANTICOAGULATION SERVICE
Clinical Pharmacy - Warfarin Dosing Consult     Pharmacy has been consulted to manage this patient s warfarin therapy.  Indication: Atrial Fibrillation  Therapy Goal: INR 2-3  Warfarin Prior to Admission: Yes  Warfarin PTA Regimen: 5 mg Sun, Wed, 2.5 mg all other days  Significant drug interactions: zosyn  Recent documented change in oral intake/nutrition: Unknown  Dose Comments: INR subtherapeutic, will give boost today with higher end of dose range, 5 mg    INR   Date Value Ref Range Status   09/09/2024 1.85 (H) 0.85 - 1.15 Final     INR (External)   Date Value Ref Range Status   09/06/2024 2.2  Final       Recommend warfarin 5 mg today.  Pharmacy will monitor Rakesh Samuels daily and order warfarin doses to achieve specified goal.      Please contact pharmacy as soon as possible if the warfarin needs to be held for a procedure or if the warfarin goals change.      Niru Schmid, MylesD

## 2024-09-09 NOTE — ED NOTES
Bed: JNED-07  Expected date: 9/9/24  Expected time: 2:22 PM  Means of arrival:   Comments:  Hallucinations/cooperative/wbl

## 2024-09-09 NOTE — ED NOTES
[facility]  ED Nurse Handoff Report    ED Chief complaint: Hallucinations      ED Diagnosis:   Final diagnoses:   Septic encephalopathy   Urinary tract infection without hematuria, site unspecified   Warfarin anticoagulation       Code Status: none    Allergies: No Known Allergies    Patient Story:   Patient arrives via EMS from [facility] for hallucinations. It was noted yesterday by staff that patient was seeing family members who are . Patient states late wife, father, mother and brother are currently in his room. Patient states wife has passed out on the floor but is now getting up. Patient is not upset/fearful of hallucinations. Patient is cooperative with cares.     Triage Assessment (Adult)       Row Name 24 [id number]          Triage Assessment    Airway WDL WDL        Respiratory WDL    Respiratory WDL WDL        Skin Circulation/Temperature WDL    Skin Circulation/Temperature WDL WDL        Cardiac WDL    Cardiac WDL WDL        Peripheral/Neurovascular WDL    Peripheral Neurovascular WDL WDL        Cognitive/Neuro/Behavioral WDL    Cognitive/Neuro/Behavioral WDL X     Arousal Level opens eyes spontaneously     Orientation disoriented to;situation     Speech clear;spontaneous;logical     Mood/Behavior calm;cooperative        Pupils (CN II)    Pupil PERRLA yes     Pupil Size Left 2 mm     Pupil Size Right 2 mm        [place] Coma Scale    Best Eye Response 4-->(E4) spontaneous     Best Motor Response 6-->(M6) obeys commands     Best Verbal Response 5-->(V5) oriented     [place] Coma Scale Score 15                      Focused Assessment:    Neuro: Alert, oriented x 2  Respiratory:Clear lung sounds, on room air   Cardiology:  wnl   Gastrointestinal: soft, non tender, non distended   Genitourinary/Renal:    Musculoskeletal: moves all extremities   Skin: Intact skin where visualized  Lines: [id number]    Labs Ordered and Resulted from Time of ED Arrival to Time of ED Departure   ROUTINE  UA WITH MICROSCOPIC REFLEX TO CULTURE - Abnormal       Result Value    Color Urine Yellow      Appearance Urine Turbid (*)     Glucose Urine 300 (*)     Bilirubin Urine Negative      Ketones Urine Negative      Specific Gravity Urine 1.020      Blood Urine >1.0 mg/dL (*)     pH Urine 5.0      Protein Albumin Urine 30 (*)     Urobilinogen Urine <2.0      Nitrite Urine Negative      Leukocyte Esterase Urine 250 Janis/uL (*)     WBC Clumps Urine Present (*)     RBC Urine >182 (*)     WBC Urine 24 (*)     Squamous Epithelials Urine <1      Hyaline Casts Urine 3 (*)    INR - Abnormal    INR 1.85 (*)    BASIC METABOLIC PANEL - Abnormal    Sodium 139      Potassium 4.6      Chloride 105      Carbon Dioxide (CO2) 21 (*)     Anion Gap 13      Urea Nitrogen 15.5      Creatinine 0.80      GFR Estimate >90      Calcium 9.3      Glucose 188 (*)    LIPASE - Abnormal    Lipase 169 (*)    BLOOD GAS VENOUS - Abnormal    pH Venous 7.35      pCO2 Venous 42      pO2 Venous 45      Bicarbonate Venous 23      Base Excess/Deficit Venous -2.4      FIO2 21      Oxyhemoglobin Venous 80 (*)     O2 Sat, Venous 80.9 (*)    CRP INFLAMMATION - Abnormal    CRP Inflammation 11.80 (*)    CBC WITH PLATELETS AND DIFFERENTIAL - Abnormal    WBC Count 9.5      RBC Count 4.12 (*)     Hemoglobin 13.2 (*)     Hematocrit 39.7 (*)     MCV 96      MCH 32.0      MCHC 33.2      RDW 13.1      Platelet Count 288      % Neutrophils 75      % Lymphocytes 14      % Monocytes 7      % Eosinophils 3      % Basophils 1      % Immature Granulocytes 0      NRBCs per 100 WBC 0      Absolute Neutrophils 7.1      Absolute Lymphocytes 1.3      Absolute Monocytes 0.7      Absolute Eosinophils 0.3      Absolute Basophils 0.1      Absolute Immature Granulocytes 0.0      Absolute NRBCs 0.0     INFLUENZA A/B, RSV, & SARS-COV2 PCR - Normal    Influenza A PCR Negative      Influenza B PCR Negative      RSV PCR Negative      SARS CoV2 PCR Negative     HEPATIC FUNCTION PANEL - Normal     Protein Total 7.0      Albumin 3.9      Bilirubin Total 0.2      Alkaline Phosphatase 91      AST 9      ALT 14      Bilirubin Direct <0.20     MAGNESIUM - Normal    Magnesium 1.9     TSH WITH FREE T4 REFLEX - Normal    TSH 2.28     TROPONIN T, HIGH SENSITIVITY - Normal    Troponin T, High Sensitivity 12     PROCALCITONIN - Normal    Procalcitonin 0.06     URINE CULTURE   BLOOD CULTURE        CT Chest Abdomen Pelvis w/o Contrast   Final Result   IMPRESSION:   1.  No acute pulmonary disease.   2.  Gallstones.   3.  Severe right renal cortical atrophy, nonobstructing stones both kidneys, and mild left hydronephrosis, unchanged.   4.  Sigmoid diverticulosis.   5.  Bladder wall thickening could represent hypertrophy from outlet obstruction secondary to an enlarged prostate gland, unchanged.      CT Head w/o Contrast   Final Result   IMPRESSION:   1.  No CT evidence for acute intracranial process.   2.  Brain atrophy and presumed chronic microvascular ischemic changes as above.            Treatments and/or interventions provided:      Medications   piperacillin-tazobactam (ZOSYN) 3.375 g vial to attach to  mL bag (has no administration in time range)   sodium chloride 0.9 % infusion (has no administration in time range)   piperacillin-tazobactam (ZOSYN) 3.375 g vial to attach to  mL bag (0 g Intravenous Stopped 9/9/24 1823)   sodium chloride 0.9% BOLUS 1,000 mL (1,000 mLs Intravenous $New Bag 9/9/24 1823)        Patient's response to treatments and/or interventions:   Resting comfortably    To be done/followed up on inpatient unit:    See any in-patient orders    Does this patient have any cognitive concerns?: Forgetful, Disoriented to time, Disoriented to place, and Disoriented to situation    Activity level - Baseline/Home:    Unknown    Activity Level - Current:     Stand with assist x2    Patient's Preferred language: English     Needed?: No    Isolation: None  Infection: Not  Applicable  Patient tested for COVID 19 prior to admission: NO    Bariatric?: No    Vital Signs:   Vitals:    09/09/24 1437   BP: 132/68   Pulse: 98   Resp: 18   Temp: 98.3  F (36.8  C)   TempSrc: Oral   SpO2: 94%   Weight: 96.9 kg (213 lb 10 oz)   Height: 1.829 m (6')       Cardiac Rhythm:     Was the PSS-3 completed:   No -    Family Comments:     For the majority of the shift this patient's behavior was Green.       ED NURSE Urbano Valle RN on 9/9/2024 at 6:33 PM

## 2024-09-10 ENCOUNTER — APPOINTMENT (OUTPATIENT)
Dept: RADIOLOGY | Facility: HOSPITAL | Age: 74
DRG: 689 | End: 2024-09-10
Attending: STUDENT IN AN ORGANIZED HEALTH CARE EDUCATION/TRAINING PROGRAM
Payer: MEDICARE

## 2024-09-10 ENCOUNTER — APPOINTMENT (OUTPATIENT)
Dept: PHYSICAL THERAPY | Facility: HOSPITAL | Age: 74
DRG: 689 | End: 2024-09-10
Attending: STUDENT IN AN ORGANIZED HEALTH CARE EDUCATION/TRAINING PROGRAM
Payer: MEDICARE

## 2024-09-10 ENCOUNTER — APPOINTMENT (OUTPATIENT)
Dept: OCCUPATIONAL THERAPY | Facility: HOSPITAL | Age: 74
DRG: 689 | End: 2024-09-10
Attending: STUDENT IN AN ORGANIZED HEALTH CARE EDUCATION/TRAINING PROGRAM
Payer: MEDICARE

## 2024-09-10 LAB
ALBUMIN SERPL BCG-MCNC: 3.7 G/DL (ref 3.5–5.2)
ALP SERPL-CCNC: 83 U/L (ref 40–150)
ALT SERPL W P-5'-P-CCNC: 13 U/L (ref 0–70)
ANION GAP SERPL CALCULATED.3IONS-SCNC: 8 MMOL/L (ref 7–15)
AST SERPL W P-5'-P-CCNC: 8 U/L (ref 0–45)
BILIRUB SERPL-MCNC: 0.3 MG/DL
BUN SERPL-MCNC: 12.1 MG/DL (ref 8–23)
CALCIUM SERPL-MCNC: 8.4 MG/DL (ref 8.8–10.4)
CHLORIDE SERPL-SCNC: 109 MMOL/L (ref 98–107)
CREAT SERPL-MCNC: 0.83 MG/DL (ref 0.67–1.17)
CRP SERPL-MCNC: 11.3 MG/L
EGFRCR SERPLBLD CKD-EPI 2021: >90 ML/MIN/1.73M2
ERYTHROCYTE [DISTWIDTH] IN BLOOD BY AUTOMATED COUNT: 13.2 % (ref 10–15)
FERRITIN SERPL-MCNC: 66 NG/ML (ref 31–409)
GLUCOSE BLDC GLUCOMTR-MCNC: 162 MG/DL (ref 70–99)
GLUCOSE BLDC GLUCOMTR-MCNC: 182 MG/DL (ref 70–99)
GLUCOSE BLDC GLUCOMTR-MCNC: 216 MG/DL (ref 70–99)
GLUCOSE BLDC GLUCOMTR-MCNC: 228 MG/DL (ref 70–99)
GLUCOSE BLDC GLUCOMTR-MCNC: 230 MG/DL (ref 70–99)
GLUCOSE SERPL-MCNC: 184 MG/DL (ref 70–99)
HCO3 SERPL-SCNC: 24 MMOL/L (ref 22–29)
HCT VFR BLD AUTO: 36.5 % (ref 40–53)
HGB BLD-MCNC: 12 G/DL (ref 13.3–17.7)
INR PPP: 1.95 (ref 0.85–1.15)
LIPASE SERPL-CCNC: 212 U/L (ref 13–60)
MAGNESIUM SERPL-MCNC: 2 MG/DL (ref 1.7–2.3)
MCH RBC QN AUTO: 31.3 PG (ref 26.5–33)
MCHC RBC AUTO-ENTMCNC: 32.9 G/DL (ref 31.5–36.5)
MCV RBC AUTO: 95 FL (ref 78–100)
PHOSPHATE SERPL-MCNC: 2.9 MG/DL (ref 2.5–4.5)
PLATELET # BLD AUTO: 269 10E3/UL (ref 150–450)
POTASSIUM SERPL-SCNC: 4.2 MMOL/L (ref 3.4–5.3)
PROT SERPL-MCNC: 6.3 G/DL (ref 6.4–8.3)
RBC # BLD AUTO: 3.84 10E6/UL (ref 4.4–5.9)
SODIUM SERPL-SCNC: 141 MMOL/L (ref 135–145)
WBC # BLD AUTO: 8.2 10E3/UL (ref 4–11)

## 2024-09-10 PROCEDURE — 99233 SBSQ HOSP IP/OBS HIGH 50: CPT | Performed by: STUDENT IN AN ORGANIZED HEALTH CARE EDUCATION/TRAINING PROGRAM

## 2024-09-10 PROCEDURE — 83690 ASSAY OF LIPASE: CPT | Performed by: STUDENT IN AN ORGANIZED HEALTH CARE EDUCATION/TRAINING PROGRAM

## 2024-09-10 PROCEDURE — 94660 CPAP INITIATION&MGMT: CPT

## 2024-09-10 PROCEDURE — 5A09357 ASSISTANCE WITH RESPIRATORY VENTILATION, LESS THAN 24 CONSECUTIVE HOURS, CONTINUOUS POSITIVE AIRWAY PRESSURE: ICD-10-PCS | Performed by: INTERNAL MEDICINE

## 2024-09-10 PROCEDURE — 36415 COLL VENOUS BLD VENIPUNCTURE: CPT | Performed by: STUDENT IN AN ORGANIZED HEALTH CARE EDUCATION/TRAINING PROGRAM

## 2024-09-10 PROCEDURE — 83735 ASSAY OF MAGNESIUM: CPT | Performed by: STUDENT IN AN ORGANIZED HEALTH CARE EDUCATION/TRAINING PROGRAM

## 2024-09-10 PROCEDURE — 84100 ASSAY OF PHOSPHORUS: CPT | Performed by: STUDENT IN AN ORGANIZED HEALTH CARE EDUCATION/TRAINING PROGRAM

## 2024-09-10 PROCEDURE — 250N000013 HC RX MED GY IP 250 OP 250 PS 637: Performed by: STUDENT IN AN ORGANIZED HEALTH CARE EDUCATION/TRAINING PROGRAM

## 2024-09-10 PROCEDURE — 86140 C-REACTIVE PROTEIN: CPT | Performed by: STUDENT IN AN ORGANIZED HEALTH CARE EDUCATION/TRAINING PROGRAM

## 2024-09-10 PROCEDURE — 97162 PT EVAL MOD COMPLEX 30 MIN: CPT | Mod: GP | Performed by: PHYSICAL THERAPIST

## 2024-09-10 PROCEDURE — 250N000011 HC RX IP 250 OP 636: Performed by: STUDENT IN AN ORGANIZED HEALTH CARE EDUCATION/TRAINING PROGRAM

## 2024-09-10 PROCEDURE — 97166 OT EVAL MOD COMPLEX 45 MIN: CPT | Mod: GO

## 2024-09-10 PROCEDURE — 120N000001 HC R&B MED SURG/OB

## 2024-09-10 PROCEDURE — 97535 SELF CARE MNGMENT TRAINING: CPT | Mod: GO

## 2024-09-10 PROCEDURE — 85610 PROTHROMBIN TIME: CPT | Performed by: STUDENT IN AN ORGANIZED HEALTH CARE EDUCATION/TRAINING PROGRAM

## 2024-09-10 PROCEDURE — 85041 AUTOMATED RBC COUNT: CPT | Performed by: STUDENT IN AN ORGANIZED HEALTH CARE EDUCATION/TRAINING PROGRAM

## 2024-09-10 PROCEDURE — 250N000012 HC RX MED GY IP 250 OP 636 PS 637: Performed by: STUDENT IN AN ORGANIZED HEALTH CARE EDUCATION/TRAINING PROGRAM

## 2024-09-10 PROCEDURE — 258N000003 HC RX IP 258 OP 636: Performed by: STUDENT IN AN ORGANIZED HEALTH CARE EDUCATION/TRAINING PROGRAM

## 2024-09-10 PROCEDURE — 97110 THERAPEUTIC EXERCISES: CPT | Mod: GP | Performed by: PHYSICAL THERAPIST

## 2024-09-10 PROCEDURE — 999N000157 HC STATISTIC RCP TIME EA 10 MIN

## 2024-09-10 PROCEDURE — 73560 X-RAY EXAM OF KNEE 1 OR 2: CPT | Mod: RT

## 2024-09-10 PROCEDURE — 84075 ASSAY ALKALINE PHOSPHATASE: CPT | Performed by: STUDENT IN AN ORGANIZED HEALTH CARE EDUCATION/TRAINING PROGRAM

## 2024-09-10 RX ORDER — SODIUM CHLORIDE, SODIUM LACTATE, POTASSIUM CHLORIDE, CALCIUM CHLORIDE 600; 310; 30; 20 MG/100ML; MG/100ML; MG/100ML; MG/100ML
INJECTION, SOLUTION INTRAVENOUS CONTINUOUS
Status: DISCONTINUED | OUTPATIENT
Start: 2024-09-10 | End: 2024-09-10

## 2024-09-10 RX ORDER — WARFARIN SODIUM 2.5 MG/1
2.5 TABLET ORAL
Status: COMPLETED | OUTPATIENT
Start: 2024-09-10 | End: 2024-09-10

## 2024-09-10 RX ADMIN — Medication 1 TABLET: at 08:52

## 2024-09-10 RX ADMIN — WARFARIN SODIUM 2.5 MG: 2.5 TABLET ORAL at 18:19

## 2024-09-10 RX ADMIN — ARIPIPRAZOLE 2 MG: 2 TABLET ORAL at 21:13

## 2024-09-10 RX ADMIN — SODIUM CHLORIDE, POTASSIUM CHLORIDE, SODIUM LACTATE AND CALCIUM CHLORIDE: 600; 310; 30; 20 INJECTION, SOLUTION INTRAVENOUS at 10:14

## 2024-09-10 RX ADMIN — INSULIN ASPART 2 UNITS: 100 INJECTION, SOLUTION INTRAVENOUS; SUBCUTANEOUS at 16:45

## 2024-09-10 RX ADMIN — BRIMONIDINE TARTRATE 1 DROP: 2 SOLUTION OPHTHALMIC at 21:21

## 2024-09-10 RX ADMIN — PIPERACILLIN AND TAZOBACTAM 3.38 G: 3; .375 INJECTION, POWDER, FOR SOLUTION INTRAVENOUS at 00:01

## 2024-09-10 RX ADMIN — POLYETHYLENE GLYCOL 3350 17 G: 17 POWDER, FOR SOLUTION ORAL at 21:13

## 2024-09-10 RX ADMIN — LATANOPROST 1 DROP: 50 SOLUTION/ DROPS OPHTHALMIC at 21:29

## 2024-09-10 RX ADMIN — PIPERACILLIN AND TAZOBACTAM 3.38 G: 3; .375 INJECTION, POWDER, FOR SOLUTION INTRAVENOUS at 16:49

## 2024-09-10 RX ADMIN — DORZOLAMIDE HYDROCHLORIDE AND TIMOLOL MALEATE 1 DROP: 20; 5 SOLUTION/ DROPS OPHTHALMIC at 21:18

## 2024-09-10 RX ADMIN — INSULIN ASPART 1 UNITS: 100 INJECTION, SOLUTION INTRAVENOUS; SUBCUTANEOUS at 08:52

## 2024-09-10 RX ADMIN — DORZOLAMIDE HYDROCHLORIDE AND TIMOLOL MALEATE 1 DROP: 20; 5 SOLUTION/ DROPS OPHTHALMIC at 08:51

## 2024-09-10 RX ADMIN — INSULIN ASPART 2 UNITS: 100 INJECTION, SOLUTION INTRAVENOUS; SUBCUTANEOUS at 12:35

## 2024-09-10 RX ADMIN — PIPERACILLIN AND TAZOBACTAM 3.38 G: 3; .375 INJECTION, POWDER, FOR SOLUTION INTRAVENOUS at 08:50

## 2024-09-10 RX ADMIN — GABAPENTIN 300 MG: 300 CAPSULE ORAL at 08:51

## 2024-09-10 RX ADMIN — FLUVOXAMINE MALEATE 50 MG: 50 TABLET ORAL at 21:13

## 2024-09-10 RX ADMIN — PIPERACILLIN AND TAZOBACTAM 3.38 G: 3; .375 INJECTION, POWDER, FOR SOLUTION INTRAVENOUS at 23:47

## 2024-09-10 RX ADMIN — NETARSUDIL 1 DROP: 0.2 SOLUTION/ DROPS OPHTHALMIC; TOPICAL at 21:44

## 2024-09-10 RX ADMIN — GABAPENTIN 300 MG: 300 CAPSULE ORAL at 21:15

## 2024-09-10 RX ADMIN — BRIMONIDINE TARTRATE 1 DROP: 2 SOLUTION OPHTHALMIC at 08:51

## 2024-09-10 ASSESSMENT — ACTIVITIES OF DAILY LIVING (ADL)
ADLS_ACUITY_SCORE: 55
ADLS_ACUITY_SCORE: 55
ADLS_ACUITY_SCORE: 56
ADLS_ACUITY_SCORE: 56
ADLS_ACUITY_SCORE: 59
ADLS_ACUITY_SCORE: 54
ADLS_ACUITY_SCORE: 59
ADLS_ACUITY_SCORE: 59
ADLS_ACUITY_SCORE: 58
ADLS_ACUITY_SCORE: 54
ADLS_ACUITY_SCORE: 56
ADLS_ACUITY_SCORE: 53
ADLS_ACUITY_SCORE: 56
ADLS_ACUITY_SCORE: 54
ADLS_ACUITY_SCORE: 56
ADLS_ACUITY_SCORE: 59
ADLS_ACUITY_SCORE: 54
ADLS_ACUITY_SCORE: 55
ADLS_ACUITY_SCORE: 56
ADLS_ACUITY_SCORE: 59
ADLS_ACUITY_SCORE: 58
ADLS_ACUITY_SCORE: 55
ADLS_ACUITY_SCORE: 59

## 2024-09-10 NOTE — PROGRESS NOTES
[facility]    Medicine Progress Note - Hospitalist Service    Date of Admission:  9/9/2024    Assessment & Plan   Rakesh Samuels is a 74 year old male admitted on 9/9/2024. Patient presented with AMS and work up revealed UTI, admitted for septic encephalopathy and complicated UTI    Complicated UTI  Septic encephalopathy  - Presented with AMS, hallucinations  - UA consistent with UTI  - CT head negative for acute changes  - CT C/A/P no acute changes, Bladder wall thickening could represent hypertrophy from outlet obstruction secondary to an enlarged prostate gland, unchanged  - Small blood clots at urethra, no visible bleed ongoing  - Continue IV Zosyn, stop IV fluids  - Follow blood culture, urine culture    Right renal cortical atrophy, mild left unchanged hydronephrosis  Ct showed Severe right renal cortical atrophy, nonobstructing stones both kidneys, and mild left hydronephrosis, unchanged.     Cr normal  Consider urology consult if needed     DM 2  Hemoglobin A1c 7.5  Holding metformin  Continue sliding scale     Mild lipemia  Lipase elevated  No abdominal pain     Normocytic anemia  Hb 13.2 -> 12.0  Likely dilutional with IV fluids  Monitor Hb     Elevated blood pressure without a diagnosis of hypertension  Monitor vital signs as per protocol  If persistently elevated, will add antihypertensives  IV hydralazine as needed for elevated blood pressure     Paroxysmal atrial fibrillation  Subtherapeutic INR  Continue with warfarin, pharmacy to dose    Mood disorder  Continue with Abilify, fluvoxamine     Glaucoma  Continue with Alphagan, Cosopt, latanoprostene, Rhopressa    Incidental findings:   Gall stones  Sigmoid diverticulosis    Right knee pain  No erythema, swelling  ROM normal  XR R knee    PT recommend TCU          Diet: Advance Diet as Tolerated: Regular Diet Adult; Moderate Consistent Carb (60 g CHO per Meal) Diet    DVT Prophylaxis: Pneumatic Compression Devices  Olsen  Catheter: Not present  Lines: None     Cardiac Monitoring: None  Code Status: No CPR- Do NOT Intubate      Clinically Significant Risk Factors Present on Admission          # Hypocalcemia: Lowest Ca = 8.4 mg/dL in last 2 days, will monitor and replace as appropriate      # Drug Induced Coagulation Defect: home medication list includes an anticoagulant medication           # DMII: A1C = 7.5 % (Ref range: <5.7 %) within past 6 months        # Financial/Environmental Concerns:                 Disposition Plan     Medically Ready for Discharge: Anticipated in 2-4 Days             Ann Marie Holland MD  Hospitalist Service  [facility]  Securely message with [employer] (more info)  Text page via [employer] Paging/Directory   ______________________________________________________________________    Interval History   Patient was examined at the bedside, new to me today. AO x 1-2, pleasant. On IV antibiotics, pending cultures  Called Eloisa, daughter discussed ongoing clinical course and answered all questions,  On 1:1    Physical Exam   Vital Signs: Temp: 97.1  F (36.2  C) Temp src: Axillary BP: (!) 151/87 Pulse: 69   Resp: 18 SpO2: 96 % O2 Device: Nasal cannula Oxygen Delivery: 2 LPM  Weight: 212 lbs 15.43 oz    General: Patient was seen and examined at bedside with no acute distress, chronically ill-appearing and frail  Ext: no edema    Skin:  No acute rashes. Sunken eyes, pale conjunctival mucosa  Neuro:  Grossly nonfocal. AO x 1-2, confused    Medical Decision Making       55 MINUTES SPENT BY ME on the date of service doing chart review, history, exam, documentation & further activities per the note.      Data     I have personally reviewed the following data over the past 24 hrs:    8.2  \   12.0 (L)   / 269     141 109 (H) 12.1 /  228 (H)   4.2 24 0.83 \     ALT: 13 AST: 8 AP: 83 TBILI: 0.3   ALB: 3.7 TOT PROTEIN: 6.3 (L) LIPASE: 212 (H)     Trop: N/A BNP: N/A     TSH: N/A T4: N/A A1C: N/A     Procal:  N/A CRP: 11.30 (H) Lactic Acid: N/A       INR:  1.95 (H) PTT:  N/A   D-dimer:  N/A Fibrinogen:  N/A     Ferritin:  N/A % Retic:  N/A LDH:  N/A       Imaging results reviewed over the past 24 hrs:   Recent Results (from the past 24 hour(s))   CT Head w/o Contrast    Narrative    EXAM: CT HEAD W/O CONTRAST  LOCATION: [facility]  DATE: 9/9/2024    INDICATION: confusion, on warfarin  COMPARISON: July 25, 2024  TECHNIQUE: Routine CT Head without IV contrast. Multiplanar reformats. Dose reduction techniques were used.    FINDINGS:  INTRACRANIAL CONTENTS: No intracranial hemorrhage, extraaxial collection, or mass effect.  No CT evidence of acute infarct. Mild presumed chronic small vessel ischemic changes. Moderate generalized volume loss. No hydrocephalus.     VISUALIZED ORBITS/SINUSES/MASTOIDS: Prior bilateral cataract surgery. Visualized portions of the orbits are otherwise unremarkable. Left orbital floor, device No paranasal sinus mucosal disease. No middle ear or mastoid effusion.    BONES/SOFT TISSUES: No acute abnormality.      Impression    IMPRESSION:  1.  No CT evidence for acute intracranial process.  2.  Brain atrophy and presumed chronic microvascular ischemic changes as above.   CT Chest Abdomen Pelvis w/o Contrast    Narrative    EXAM: CT CHEST ABDOMEN PELVIS W/O CONTRAST  LOCATION: [facility]  DATE: 9/9/2024    INDICATION: confusion  COMPARISON: CT abdomen and pelvis 7/26/2024, CT abdomen and pelvis 4/30/2022, and CT chest 3/26/2020  TECHNIQUE: CT scan of the chest, abdomen, and pelvis was performed without IV contrast. Multiplanar reformats were obtained. Dose reduction techniques were used.   CONTRAST: None.    FINDINGS:   LUNGS AND PLEURA: Curvilinear scarring right middle lobe, unchanged. Minimal dependent atelectasis. Lungs otherwise clear. No acute infiltrates or pleural effusions.    MEDIASTINUM/AXILLAE: Normal.    CORONARY ARTERY CALCIFICATION:  Mild.    HEPATOBILIARY: Hepatomegaly measuring 21 cm. Multiple gallstones.    PANCREAS: Normal.    SPLEEN: Normal.    ADRENAL GLANDS: 3.3 x 2.4 cm fat-containing lesion left adrenal gland is unchanged and compatible with a myelolipoma. 8 mm hypodense nodule right adrenal gland also unchanged.    KIDNEYS/BLADDER: Mildly dilated left intrarenal collecting system and ureter, unchanged. Severe right renal cortical atrophy. Several nonobstructing stones both kidneys, the largest measuring 4 mm. Small left renal cyst. No bladder stones. Diffuse   bladder wall thickening. Prostatic impression along lateral base.    BOWEL: Normal appendix. No evidence for bowel obstruction. Diverticula sigmoid colon, without evidence for diverticulitis.    LYMPH NODES: Normal.    VASCULATURE: Normal.    PELVIC ORGANS: Mild prostatic hypertrophy. No free pelvic fluid.    MUSCULOSKELETAL: Hypertrophic changes thoracic and lumbar spine. Osteopenia.      Impression    IMPRESSION:  1.  No acute pulmonary disease.  2.  Gallstones.  3.  Severe right renal cortical atrophy, nonobstructing stones both kidneys, and mild left hydronephrosis, unchanged.  4.  Sigmoid diverticulosis.  5.  Bladder wall thickening could represent hypertrophy from outlet obstruction secondary to an enlarged prostate gland, unchanged.

## 2024-09-10 NOTE — PROGRESS NOTES
"   09/10/24 0930   Appointment Info   Signing Clinician's Name / Credentials (PT) Nichol Joya, PT, DPT   Living Environment   People in Home alone   Current Living Arrangements independent living facility   Home Accessibility no concerns   Living Environment Comments Pt admitted from TCU.   Self-Care   Equipment Currently Used at Home walker, rolling   Activity/Exercise/Self-Care Comment Patient receives meals at facility and receives assist with laundry and driving.   General Information   Onset of Illness/Injury or Date of Surgery 09/09/24   Referring Physician Gondal, Saad J, MD   Patient/Family Therapy Goals Statement (PT) None stated.   Pertinent History of Current Problem (include personal factors and/or comorbidities that impact the POC) Per H&P: \"74 year old male admitted on 9/9/2024. He presented to the ER via EMS for evaluation of hallucinations, urine appeared to be grossly infected with microscopic hematuria, elevated leukocyte esterase, pyuria, blood and urine cultures were drawn, viral panel was negative for COVID, flu, respiratory syncytial virus, CT scan of the head was negative for acute process, CT scan of the chest abdomen pelvis showed No acute pulmonary disease. Gallstones. Severe right renal cortical atrophy, nonobstructing stones both kidneys, and mild left hydronephrosis, unchanged. Sigmoid diverticulosis. Bladder wall thickening could represent hypertrophy from outlet obstruction secondary to an enlarged prostate gland, unchanged.  Patient received Zosyn and fluids in the ED and is going to be admitted for complicated UTI, septic encephalopathy.\"   Existing Precautions/Restrictions fall   Cognition   Affect/Mental Status (Cognition) confused;low arousal/lethargic   Orientation Status (Cognition) disoriented to;place;situation   Range of Motion (ROM)   Range of Motion ROM deficits secondary to weakness   Strength (Manual Muscle Testing)   Strength (Manual Muscle Testing) Deficits observed " during functional mobility   Bed Mobility   Comment, (Bed Mobility) Pt seated in chair at start of session.   Transfers   Transfers sit-stand transfer   Transfer Safety Concerns Noted decreased weight-shifting ability   Impairments Contributing to Impaired Transfers impaired balance;decreased strength   Sit-Stand Transfer   Sit-Stand Frontier (Transfers) maximum assist (25% patient effort);verbal cues   Assistive Device (Sit-Stand Transfers) walker, front-wheeled   Gait/Stairs (Locomotion)   Comment, (Gait/Stairs) Unable to take steps this session.   Clinical Impression   Criteria for Skilled Therapeutic Intervention Yes, treatment indicated   PT Diagnosis (PT) impaired functional mobility   Influenced by the following impairments impaired balance, decreased strength   Functional limitations due to impairments transfers, ambulation   Clinical Presentation (PT Evaluation Complexity) evolving   Clinical Presentation Rationale Clinical judgment.   Clinical Decision Making (Complexity) moderate complexity   Planned Therapy Interventions (PT) balance training;gait training;home exercise program;neuromuscular re-education;patient/family education;ROM (range of motion);strengthening;transfer training   Risk & Benefits of therapy have been explained evaluation/treatment results reviewed;participants voiced agreement with care plan;participants included;patient   PT Total Evaluation Time   PT Eval, Moderate Complexity Minutes (58286) 10   Physical Therapy Goals   PT Frequency 5x/week   PT Predicted Duration/Target Date for Goal Attainment 09/17/24   PT Goals Bed Mobility;Transfers;Gait   PT: Bed Mobility Minimal assist;Supine to/from sit   PT: Transfers Minimal assist;Sit to/from stand;Assistive device   PT: Gait Minimal assist;Assistive device;Rolling walker;25 feet   Interventions   Interventions Quick Adds Therapeutic Procedure   Therapeutic Procedure/Exercise   Ther. Procedure: strength, endurance, ROM, flexibillity  Minutes (36931) 15   Symptoms Noted During/After Treatment fatigue   Treatment Detail/Skilled Intervention Pt completes 5 reps of seated push-ups with mod assist in order to strengthen LEs and UEs for sit <> stand transfers. Patient also completes seated ex x10 reps B LE: ankle pumps, LAQ, hip flexion, hip abduction. Demo and cues for technique, min assist for full ROM for hip flexion and LAQ.   PT Discharge Planning   PT Plan sit <> stand, progress to gait as able, LE strengthening   PT Discharge Recommendation (DC Rec) Transitional Care Facility   PT Rationale for DC Rec Pt requiring assist of 1-2 for transfers and mobility and is unable to take steps at this time. Recommend TCU at discharge.   PT Brief overview of current status Sit <> stand, max assist of 1. Unable to ambulate.   Total Session Time   Timed Code Treatment Minutes 15   Total Session Time (sum of timed and untimed services) 25

## 2024-09-10 NOTE — CONSULTS
Care Management Initial Consult    General Information  Assessment completed with: Children, Nanette  Type of CM/SW Visit: Initial Assessment    Primary Care Provider verified and updated as needed: Yes   Readmission within the last 30 days: no previous admission in last 30 days      Reason for Consult: discharge planning  Advance Care Planning:            Communication Assessment  Patient's communication style: spoken language (English or Bilingual)    Hearing Difficulty or Deaf: yes   Wear Glasses or Blind: yes    Cognitive  Cognitive/Neuro/Behavioral: .WDL except, orientation  Level of Consciousness: intermittent confusion  Arousal Level: opens eyes spontaneously  Orientation: disoriented to, situation, time  Mood/Behavior: calm, cooperative  Best Language: 0 - No aphasia  Speech: spontaneous    Living Environment:   People in home: alone     Current living Arrangements: independent living facility ([facility]      Able to return to prior arrangements: yes       Family/Social Support:  Care provided by: self  Provides care for: no one  Marital Status:   Support system: Children          Description of Support System: Supportive, Involved    Support Assessment: Adequate family and caregiver support    Current Resources:   Patient receiving home care services: No        Community Resources: Skilled Nursing Facility  Equipment currently used at home: walker, rolling, shower chair  Supplies currently used at home: Other (CPAP, home INR)    Employment/Financial:  Employment Status: retired        Financial Concerns: none           Does the patient's insurance plan have a 3 day qualifying hospital stay waiver?  Yes     Which insurance plan 3 day waiver is available? [employer]    Will the waiver be used for post-acute placement? Undetermined at this time    Lifestyle & Psychosocial Needs:  Social Determinants of Health     Food Insecurity: Not on file   Depression: At risk (3/2/2019)    [facility]  [facility]    PHQ-2     PHQ-2 Score: 6   Housing Stability: Not on file   Tobacco Use: Low Risk  (9/9/2024)    Patient History     Smoking Tobacco Use: Never     Smokeless Tobacco Use: Never     Passive Exposure: Not on file   Financial Resource Strain: Not on file   Alcohol Use: Not on file   Transportation Needs: Not on file   Physical Activity: Not on file   Interpersonal Safety: Not on file   Stress: Not on file   Social Connections: Unknown (11/29/2023)    [facility] from [facility], [facility]    Social Connections     Frequency of Communication with Friends and Family: Not on file   Health Literacy: Not on file       Functional Status:  Prior to admission patient needed assistance:              Mental Health Status:  Mental Health Status: No Current Concerns       Chemical Dependency Status:  Chemical Dependency Status: No Current Concerns             Values/Beliefs:  Spiritual, Cultural Beliefs, Caodaism Practices, Values that affect care: no               Discussed  Partnership in Safe Discharge Planning  document with patient/family: No    Additional Information:  JENNY spoke with Pt's daughter Nanette to complete initial assessment due to Pt's current confusion. JENNY introduced self and explained CM role. Nanette confirms Pt came from [facility]. Pt lives at the [facility] independent living facility in [place]. Naentte reports Pt uses a walker and is independent with I/ADLs at baseline. Nanette denies any current services for Pt. Pt uses a shower chair, CPAP, and home INR. Nanette reports Pt has had UTIs in the past and that Pt being confused/hallucinating is typical when he has a UTI. Nanette confirmed Pt will need medical transport at discharge.     JENNY spoke with Alida at [facility] who confirmed Pt has a bed hold.     Next Steps:   Follow for PT/OT recs. Coordinate discharge  when Pt is med ready.     REUBEN JeanW

## 2024-09-10 NOTE — PROGRESS NOTES
09/10/24 0844   Appointment Info   Signing Clinician's Name / Credentials (OT) Aletha John/L   Living Environment   Living Environment Comments Pt not able to give history  Per chart review, pt from [facility].  Unclear if pt is in apt or LTC.  Pt says he lives in apt.   Self-Care   Current Activity Tolerance moderate   Equipment Currently Used at Home walker, rolling   Activity/Exercise/Self-Care Comment Pt was not able to give information regarding ADLs   General Information   Onset of Illness/Injury or Date of Surgery 09/09/24   Referring Physician Gondal   Patient/Family Therapy Goal Statement (OT) none stated   Additional Occupational Profile Info/Pertinent History of Current Problem Pt admitted with complicated UTI.  Pt was having hallucinations. and confusion.  Pt legally blind at baseline.   Existing Precautions/Restrictions fall   Limitations/Impairments visual   Cognitive Status Examination   Cognitive Status Comments Imparied, pt oriented to self and to place.  Pt follows simple directions   Visual Perception   Visual Impairment/Limitations legally blind   Sensory   Sensory Quick Adds sensation intact   Range of Motion Comprehensive   Comment, General Range of Motion WFL for ADLs   Strength Comprehensive (MMT)   Comment, General Manual Muscle Testing (MMT) Assessment not formally tested   Coordination   Upper Extremity Coordination No deficits were identified   Bed Mobility   Comment (Bed Mobility) Min A   Transfers   Transfer Comments Min A of 2   Balance   Balance Comments Min A   Activities of Daily Living   BADL Assessment/Intervention lower body dressing   Lower Body Dressing Assessment/Training   Comment, (Lower Body Dressing) Max A to don socks   Clinical Impression   Criteria for Skilled Therapeutic Interventions Met (OT) Yes, treatment indicated   OT Diagnosis Impaired ADL independence   OT Problem List-Impairments impacting ADL cognition;balance;mobility;vision   Assessment  of Occupational Performance 3-5 Performance Deficits   Planned Therapy Interventions (OT) ADL retraining;balance training;bed mobility training;cognition;transfer training   Clinical Decision Making Complexity (OT) comprehensive assessment/high complexity   Risk & Benefits of therapy have been explained evaluation/treatment results reviewed;participants included;patient   Clinical Impression Comments Pt seen bedside for OT eval and treatment.  Pt demonstrates decrased abilty to complete ADLs and mobility as well as impaired cognition. Unclear of pt's baseline.  Will continue OT at this time until more history is available regarding baseline.   OT Total Evaluation Time   OT Eval, Moderate Complexity Minutes (68733) 10   OT Goals   Therapy Frequency (OT) 3 times/week   OT Predicted Duration/Target Date for Goal Attainment 09/17/24   OT Goals Hygiene/Grooming;Transfers;Toilet Transfer/Toileting;Upper Body Bathing   OT: Hygiene/Grooming minimal assist   OT: Upper Body Bathing Minimal assist   OT: Transfer Minimal assist;with assistive device   OT: Toilet Transfer/Toileting Minimal assist   OT Discharge Planning   OT Plan close transfers, basic ADLs, pt is legally bline   OT Discharge Recommendation (DC Rec) other (see comments)   OT Rationale for DC Rec Unclar where pt is from.  Per cristofer review, [facility] but not clear if LT or nursing home.  Pt states e lives in apt.  Pt will need 24 hour assist and supervision for all ADLs and mobility.  If pt from LTC, pt will be able to return to LTC.  If pt is from [facility], he will likely need more therapy prior to return home.

## 2024-09-10 NOTE — PROGRESS NOTES
[facility] GERIATRIC SERVICES    Code Status:  DNR/DNI   Visit Type:   Chief Complaint   Patient presents with    TCU Discharge     Facility:  [facility] ([facility]) [[id number]]         HPI: Rakesh Samuels is a 73 year old male who I am seeing today for follow up on the TCU.  Patient recently hospitalized with sepsis due to UTI.  Patient had altered mental status.  Blood culture grew Enterococcus.  Patient treated with IV Zosyn and later switched to IV ampicillin then to oral amoxicillin for 10 days.  C  He initially had a Olsen catheter placed in the ER however passed a voiding trial.    Transitional Care Course: Today pt sitting up in wheelchair.  Patient with acute encephalopathy.  He is usually able to recognize me however he does not know who I am today.  Patient with recent hallucinations.  Family has felt these are worse when he has infection however repeat UA UC on 9/5/2024 was negative.  He was treated recently in hospital for UTI with sepsis.  During his TCU stay he was treated again for UTI which grew greater than 100,000  Enterococcus faecalis.  He has completed his oral antibiotics.  Postvoid residual less than 25.  No evidence of hydronephrosis.  Patient reported hallucinations and vivid dreams which were scary earlier last week.  He does have history of mood disorder/chronic depression with anxiety.  He is followed outpatient by psychiatry.  Message was left regarding his current medications.  He continues on Abilify and fluvoxamine.  Common side effects of fluvoxamine can be vivid dreams.  This was placed on hold and symptoms seem to be improving however this was resumed by another practitioner on 9/6/2024.  When talking with daughter in regards to above symptomology family reported patient very confused over the weekend and leaning to the left side.  He does have underlying atrial fibs.  He is moving all extremities with generalized weakness.  However is not tracking  visually.  anticoagulation.    Assessment/Plan:     Acute encephalopathy  Hallucinations  -Has been treated for UTI x 2.  Follow-up UA UC on 9/5/2024 negative.  Antibiotics complete.  -Increased confusion on today's visit.  Continues with hallucinations despite holding Fluvoxamine.   -Okay to send to ER for further evaluation.  Would recommend head CT.    Urinary tract infection without hematuria, site unspecified  Bacterial sepsis (H)  Hydronephrosis   -Amoxicillin complete.  -CT of the abdomen showed patchy subtle areas of decreased cortical enhancement within the right kidney which could either reflect pyelonephritis or perfusional changes related to chronic renal atrophy and multifocal scars.  No renal abscess.  -Follow-up with urology outpatient for chronic mild left hydronephrosis extending into the ureteropelvic junction. (Family would like to avoid if possible).   -Repeat CBC and BMP unremarkable today.   -Bladder scan < 25 ml.   -Repeat UA appears positive with blood, WBC and mucus. UC with > 100,000 Enterococcus Facaeilis.   - Macrobid complete.   - Follow-up UA UC on 9/5/24 negative/   -Repeat CBC and BMPunremarkable.     Type 2 diabetes mellitus with hyperosmolarity without coma, without long-term current use of insulin (H)  -Consistent carb diet.  -Recent hemoglobin A1c 6.8.  -Metformin 1000 mg 2 times daily.  -Blood sugar checks twice daily.  -Blood sugars satisfactory.     Paroxysmal atrial fibrillation (H)  -Continue with warfarin.  -INRs to be managed per the Coumadin clinic. Monitor while on anbx.   -INR 2.3.      Depression with anxiety   Hallucinations.   -Continues on fluvoxamine and Abilify.  -mood appears stable.   -Held Luvox (SE include abnormal thoughts and dreams.) no further hallucinations or abnormal thoughts with holding the fluvoxamine. However resumed on 9/6/24 by another provider.   -Pt followed by out pt psychiatry. Dr. Villalobos.       -ok to Send to [facility] ER for further eval.      Active Ambulatory Problems     Diagnosis Date Noted    SIRS (systemic inflammatory response syndrome) (H) 09/09/2019    Adrenal incidentaloma (H24)     Diet-controlled diabetes mellitus (H)     Pericardial effusion     Lactic acidosis     Type 2 diabetes mellitus without complication, without long-term current use of insulin (H) 04/12/2020    Paroxysmal atrial fibrillation (H) 02/18/2020    Calculus of ureter 04/12/2020    Acute renal failure, unspecified acute renal failure type (H24) 04/12/2020    Acute renal failure (ARF) (H24) 04/12/2020    ATN (acute tubular necrosis) (H24) 04/12/2020    Sepsis due to urinary tract infection (H)     Metformin overdose of undetermined intent, initial encounter     Metabolic acidosis     Hyperkalemia     Hematemesis, presence of nausea not specified 04/12/2020    Calculus of kidney     Syncope and collapse 06/30/2020    Hyperglycemia     After care 07/03/2012    Age-related cataract 03/27/2021    Anemia associated with acute blood loss 06/27/2012    Balanitis 03/27/2021    Cholelithiasis without obstruction 03/27/2021    Constipation 07/03/2012    Depression with anxiety 07/03/2012    Glaucoma 06/25/2012    Hemorrhoids without complication 03/27/2021    Hyperlipidemia 06/07/2018    Loss of appetite 03/27/2021    Major depressive disorder, recurrent episode, in partial remission (H24) 06/08/2018    Mixed personality disorder in adult (H) 06/08/2018    Obstructive sleep apnea 03/27/2021    Osteoarthrosis involving lower leg 06/23/2012    Periodic limb movement disorder 03/27/2021    Recurrent major depression (H24) 03/27/2021    S/P ureteral stent placement 03/27/2021    Unilateral small kidney 03/27/2021    Nephrolithiasis 03/27/2021    Type 2 diabetes mellitus (H) 06/25/2012    Osteoarthritis of knee 03/27/2021    Persistent atrial fibrillation (H) 03/27/2021    Pyelonephritis 03/27/2021    Urinary tract infection without hematuria, site unspecified 04/30/2022    Sepsis  without acute organ dysfunction (H) 04/30/2022    UTI (urinary tract infection) 05/01/2022    Benign prostatic hyperplasia with urinary frequency 05/07/2022    Hypomagnesemia 05/07/2022    Injury of head, initial encounter 07/31/2023    Fall at home, initial encounter 07/31/2023    E. coli urinary tract infection 08/02/2023    Septic encephalopathy 07/25/2024    DM (diabetes mellitus), type 2 with complications (H) 07/25/2024    Urinary tract infection with hematuria, site unspecified 07/26/2024    Altered mental status, unspecified altered mental status type 07/26/2024    Sepsis, due to unspecified organism, unspecified whether acute organ dysfunction present (H) 07/26/2024    Normocytic anemia 11/29/2023    Status post right knee replacement 11/29/2023    Peripheral vascular disease, unspecified (H24) 08/20/2024    Warfarin anticoagulation 09/09/2024     Resolved Ambulatory Problems     Diagnosis Date Noted    Shock circulatory (H)     Acute respiratory failure with hypoxemia (H)      Past Medical History:   Diagnosis Date    A-fib (H)     Acute hemodialysis encounter (H24)     Acute kidney injury (H24)     Asbestosis (H)     Atrophy of right kidney     Basal cell carcinoma     BPH without urinary obstruction     Cellulitis     Cholelithiasis     Diabetes mellitus, type 2 (H) 4/12/2020    Esophagitis     Gastritis     Hematemesis, presence of nausea not specified     Hyperlipemia     Kidney stone     Metformin overdose of undetermined intent     PTSD (post-traumatic stress disorder)     Seasonal allergies     Sleep apnea     Upper GI bleed      No Known Allergies    All Meds and Allergies reviewed in the record at the facility and is the most up-to-date.    No current facility-administered medications for this visit.     No current outpatient medications on file.     Facility-Administered Medications Ordered in Other Visits   Medication Dose Route Frequency Provider Last Rate Last Admin    acetaminophen (TYLENOL)  tablet 500 mg  500 mg Oral Q6H PRN Gondal, Saad J, MD        ARIPiprazole (ABILIFY) tablet 2 mg  2 mg Oral At Bedtime Gondal, Saad J, MD   2 mg at 09/09/24 2108    brimonidine (ALPHAGAN) 0.2 % ophthalmic solution 1 drop  1 drop Both Eyes BID Gondal, Saad J, MD   1 drop at 09/09/24 2107    calcium carbonate (TUMS) chewable tablet 1,000 mg  1,000 mg Oral 4x Daily PRN Gondal, Saad J, MD        glucose gel 15-30 g  15-30 g Oral Q15 Min PRN Gondal, Saad J, MD        Or    dextrose 50 % injection 25-50 mL  25-50 mL Intravenous Q15 Min PRN Gondal, Saad J, MD        Or    glucagon injection 1 mg  1 mg Subcutaneous Q15 Min PRN Gondal, Saad J, MD        dorzolamide-timolol (COSOPT) ophthalmic solution 1 drop  1 drop Both Eyes BID Gondal, Saad J, MD   1 drop at 09/09/24 2107    fluvoxaMINE (LUVOX) tablet 50 mg  50 mg Oral At Bedtime Gondal, Saad J, MD   50 mg at 09/09/24 2108    gabapentin (NEURONTIN) capsule 300 mg  300 mg Oral BID Gondal, Saad J, MD   300 mg at 09/09/24 2108    hydrALAZINE (APRESOLINE) tablet 10 mg  10 mg Oral Q4H PRN Gondal, Saad J, MD        Or    hydrALAZINE (APRESOLINE) injection 10 mg  10 mg Intravenous Q4H PRN Gondal, Saad J, MD        [START ON 9/10/2024] insulin aspart (NovoLOG) injection (RAPID ACTING)  1-7 Units Subcutaneous TID AC Gondal, Saad J, MD        insulin aspart (NovoLOG) injection (RAPID ACTING)  1-5 Units Subcutaneous At Bedtime Gondal, Saad J, MD        latanoprost (XALATAN) 0.005 % ophthalmic solution 1 drop  1 drop Both Eyes At Bedtime Gondal, Saad J, MD        lidocaine (LMX4) cream   Topical Q1H PRN Gondal, Saad J, MD        lidocaine 1 % 0.1-1 mL  0.1-1 mL Other Q1H PRN Gondal, Saad J, MD        [START ON 9/10/2024] multivitamin w/minerals (THERA-VIT-M) tablet 1 tablet  1 tablet Oral Daily Gondal, Saad J, MD        netarsudil (RHOPRESSA) 0.02 % ophthalmic solution 1 drop  1 drop Both Eyes QPM Gondal, Saad J, MD        ondansetron (ZOFRAN) injection 4 mg  4 mg Intravenous Q6H PRN  Gondal, Saad J, MD        [START ON 9/10/2024] piperacillin-tazobactam (ZOSYN) 3.375 g vial to attach to  mL bag  3.375 g Intravenous Q8H Gondal, Saad J, MD        [START ON 9/10/2024] polyethylene glycol (MIRALAX) Packet 17 g  17 g Oral QPM Gondal, Saad J, MD        senna-docusate (SENOKOT-S/PERICOLACE) 8.6-50 MG per tablet 1 tablet  1 tablet Oral BID PRN Gondal, Saad J, MD        Or    senna-docusate (SENOKOT-S/PERICOLACE) 8.6-50 MG per tablet 2 tablet  2 tablet Oral BID PRN Gondal, Saad J, MD        sodium chloride (PF) 0.9% PF flush 3 mL  3 mL Intracatheter Q8H Gondal, Saad J, MD   3 mL at 09/09/24 1955    sodium chloride (PF) 0.9% PF flush 3 mL  3 mL Intracatheter q1 min prn Gondal, Saad J, MD        sodium chloride 0.9 % infusion   Intravenous Continuous Gondal, Saad J, MD 75 mL/hr at 09/09/24 2135 New Bag at 09/09/24 2135    Warfarin Dose Required Daily - Pharmacist Managed  1 each Oral See Admin Instructions Gondal, Saad J, MD           REVIEW OF SYSTEMS:   10 point review of systems reviewed and pertinent positives in the HPI.     PHYSICAL EXAMINATION:  Physical Exam     Vital signs: BP (!) 165/80   Pulse 85   Temp 97.7  F (36.5  C)   Resp 16   Ht 1.829 m (6')   Wt 96.9 kg (213 lb 9.6 oz)   SpO2 96%   BMI 28.97 kg/m    General: Awake, Alert, sitting up in wheelchair,  conversant   HEENT:Pink conjunctiva, slight redness to right eye, patient legally blind in right eye, does not follow voice with eyes or attempt to look at me, moist oral mucosa  NECK: Supple  CVS:  S1  S2, without murmur or gallop.   LUNG: Clear to auscultation, No wheezes, rales or rhonci.  BACK: No kyphosis of the thoracic spine  ABDOMEN: Soft,with positive bowel sounds  EXTREMITIES: Moves all extremities with generalized weakness, no pedal edema, no calf tenderness  SKIN: Warm and dry  NEUROLOGIC: pulses palpable  PSYCHIATRIC: Very confused.       Labs:  All labs reviewed in the nursing home record and [facility]   @  Lab Results    Component Value Date    WBC 6.5 07/29/2024     Lab Results   Component Value Date    RBC 3.41 07/29/2024     Lab Results   Component Value Date    HGB 10.6 07/29/2024     Lab Results   Component Value Date    HCT 33.2 07/29/2024     Lab Results   Component Value Date    MCV 97 07/29/2024     Lab Results   Component Value Date    MCH 31.1 07/29/2024     Lab Results   Component Value Date    MCHC 31.9 07/29/2024     Lab Results   Component Value Date    RDW 12.9 07/29/2024     Lab Results   Component Value Date     07/29/2024        @Last Comprehensive Metabolic Panel:  Sodium   Date Value Ref Range Status   09/09/2024 139 135 - 145 mmol/L Final     Potassium   Date Value Ref Range Status   09/09/2024 4.6 3.4 - 5.3 mmol/L Final   05/05/2022 4.4 3.5 - 5.0 mmol/L Final     Chloride   Date Value Ref Range Status   09/09/2024 105 98 - 107 mmol/L Final   05/02/2022 109 (H) 98 - 107 mmol/L Final     Carbon Dioxide (CO2)   Date Value Ref Range Status   09/09/2024 21 (L) 22 - 29 mmol/L Final   05/02/2022 25 22 - 31 mmol/L Final     Anion Gap   Date Value Ref Range Status   09/09/2024 13 7 - 15 mmol/L Final   05/02/2022 8 5 - 18 mmol/L Final     Glucose   Date Value Ref Range Status   05/02/2022 133 (H) 70 - 125 mg/dL Final     GLUCOSE BY METER POCT   Date Value Ref Range Status   09/09/2024 136 (H) 70 - 99 mg/dL Final     Urea Nitrogen   Date Value Ref Range Status   09/09/2024 15.5 8.0 - 23.0 mg/dL Final   05/02/2022 11 8 - 28 mg/dL Final     Creatinine   Date Value Ref Range Status   09/09/2024 0.80 0.67 - 1.17 mg/dL Final     GFR Estimate   Date Value Ref Range Status   09/09/2024 >90 >60 mL/min/1.73m2 Final     Comment:     eGFR calculated using 2021 CKD-EPI equation.   06/10/2021 >60 >60 mL/min/1.73m2 Final     Calcium   Date Value Ref Range Status   09/09/2024 9.3 8.8 - 10.4 mg/dL Final     Comment:     Reference intervals for this test were updated on 7/16/2024 to reflect our healthy population more  accurately. There may be differences in the flagging of prior results with similar values performed with this method. Those prior results can be interpreted in the context of the updated reference intervals.       This note has been dictated using voice recognition software. Any grammatical or context distortions are unintentional and inherent to the software    Electronically signed by: Kylie Gomez CNP

## 2024-09-10 NOTE — PROGRESS NOTES
Dual Skin Assessment Note:    Patient  from ED to P2.     Comprehensive skin inspection completed by myself and Emily Abdullahi.    LDA Initiated for skin breakdown/non-blanchable redness: No    Provider notified: No     If yes, WOC Consult order obtained: No

## 2024-09-10 NOTE — PLAN OF CARE
"  Problem: Adult Inpatient Plan of Care  Goal: Plan of Care Review  Description: The Plan of Care Review/Shift note should be completed every shift.  The Outcome Evaluation is a brief statement about your assessment that the patient is improving, declining, or no change.  This information will be displayed automatically on your shift  note.  Flowsheets (Taken 9/9/2024 9649)  Plan of Care Reviewed With: patient  Overall Patient Progress: no change  Goal: Patient-Specific Goal (Individualized)  Description: You can add care plan individualizations to a care plan. Examples of Individualization might be:  \"Parent requests to be called daily at 9am for status\", \"I have a hard time hearing out of my right ear\", or \"Do not touch me to wake me up as it startles  me\".  Outcome: Progressing  Goal: Absence of Hospital-Acquired Illness or Injury  Outcome: Progressing  Intervention: Identify and Manage Fall Risk  Recent Flowsheet Documentation  Taken 9/9/2024 2100 by Emily Abdullahi RN  Safety Promotion/Fall Prevention: bedside attendant  Taken 9/9/2024 2012 by Emily Abdullahi RN  Safety Promotion/Fall Prevention:   activity supervised   bedside attendant   clutter free environment maintained   lighting adjusted   patient and family education   room near nurse's station   room door open   room organization consistent   safety round/check completed  Intervention: Prevent Skin Injury  Recent Flowsheet Documentation  Taken 9/9/2024 2012 by Emily Abdullahi RN  Body Position: supine  Device Skin Pressure Protection: absorbent pad utilized/changed  Intervention: Prevent and Manage VTE (Venous Thromboembolism) Risk  Recent Flowsheet Documentation  Taken 9/9/2024 2012 by Emily Abdullahi RN  VTE Prevention/Management: SCDs on (sequential compression devices)  Intervention: Prevent Infection  Recent Flowsheet Documentation  Taken 9/9/2024 2012 by Emily Abdullahi, RN  Infection Prevention:   personal protective equipment utilized   single " "patient room provided  Goal: Optimal Comfort and Wellbeing  Outcome: Progressing  Goal: Readiness for Transition of Care  Outcome: Progressing     Problem: Adult Inpatient Plan of Care  Goal: Patient-Specific Goal (Individualized)  Description: You can add care plan individualizations to a care plan. Examples of Individualization might be:  \"Parent requests to be called daily at 9am for status\", \"I have a hard time hearing out of my right ear\", or \"Do not touch me to wake me up as it startles  me\".  Outcome: Progressing     Problem: Adult Inpatient Plan of Care  Goal: Absence of Hospital-Acquired Illness or Injury  Outcome: Progressing  Intervention: Identify and Manage Fall Risk  Recent Flowsheet Documentation  Taken 9/9/2024 2100 by Emily Abdullahi RN  Safety Promotion/Fall Prevention: bedside attendant  Taken 9/9/2024 2012 by Emily Abdullahi RN  Safety Promotion/Fall Prevention:   activity supervised   bedside attendant   clutter free environment maintained   lighting adjusted   patient and family education   room near nurse's station   room door open   room organization consistent   safety round/check completed  Intervention: Prevent Skin Injury  Recent Flowsheet Documentation  Taken 9/9/2024 2012 by Emily Abdullahi RN  Body Position: supine  Device Skin Pressure Protection: absorbent pad utilized/changed  Intervention: Prevent and Manage VTE (Venous Thromboembolism) Risk  Recent Flowsheet Documentation  Taken 9/9/2024 2012 by Emily Abdullahi RN  VTE Prevention/Management: SCDs on (sequential compression devices)  Intervention: Prevent Infection  Recent Flowsheet Documentation  Taken 9/9/2024 2012 by Emily Abdullahi RN  Infection Prevention:   personal protective equipment utilized   single patient room provided     Problem: Risk for Delirium  Goal: Optimal Coping  Intervention: Optimize Psychosocial Adjustment to Delirium  Recent Flowsheet Documentation  Taken 9/9/2024 2012 by Emily Abdullahi, MARILUZ  Supportive " Measures:   active listening utilized   positive reinforcement provided   verbalization of feelings encouraged  Goal: Improved Behavioral Control  Intervention: Minimize Safety Risk  Recent Flowsheet Documentation  Taken 9/9/2024 2012 by Emily Abdullahi RN  Enhanced Safety Measures:   review medications for side effects with activity   room near unit station    at bedside  Goal: Improved Attention and Thought Clarity  Intervention: Maximize Cognitive Function  Recent Flowsheet Documentation  Taken 9/9/2024 2012 by Emily Abdullahi RN  Sensory Stimulation Regulation:   quiet environment promoted   care clustered  Reorientation Measures: reorientation provided     Problem: Comorbidity Management  Goal: Maintenance of Behavioral Health Symptom Control  Outcome: Progressing  Intervention: Maintain Behavioral Health Symptom Control  Recent Flowsheet Documentation  Taken 9/9/2024 2012 by Emily Abdullahi RN  Medication Review/Management: medications reviewed  Goal: Blood Glucose Levels Within Targeted Range  Outcome: Progressing  Intervention: Monitor and Manage Glycemia  Recent Flowsheet Documentation  Taken 9/9/2024 2100 by Emily Abdullahi RN  Glycemic Management:   blood glucose monitored   oral hydration promoted  Taken 9/9/2024 2012 by Emily Abdullahi RN  Medication Review/Management: medications reviewed  Goal: Blood Pressure in Desired Range  Outcome: Progressing  Intervention: Maintain Blood Pressure Management  Recent Flowsheet Documentation  Taken 9/9/2024 2012 by Emily Abdullahi RN  Medication Review/Management: medications reviewed     Problem: UTI (Urinary Tract Infection)  Goal: Improved Infection Symptoms  Outcome: Progressing     Problem: Delirium  Goal: Optimal Coping  Intervention: Optimize Psychosocial Adjustment to Delirium  Recent Flowsheet Documentation  Taken 9/9/2024 2012 by Emiyl Abdullahi RN  Supportive Measures:   active listening utilized   positive reinforcement provided    verbalization of feelings encouraged  Goal: Improved Behavioral Control  Intervention: Minimize Safety Risk  Recent Flowsheet Documentation  Taken 9/9/2024 2012 by Emily Abdullahi RN  Enhanced Safety Measures:   review medications for side effects with activity   room near unit station    at bedside  Goal: Improved Attention and Thought Clarity  Intervention: Maximize Cognitive Function  Recent Flowsheet Documentation  Taken 9/9/2024 2012 by Emily Abdullahi RN  Sensory Stimulation Regulation:   quiet environment promoted   care clustered  Reorientation Measures: reorientation provided     Problem: Skin Injury Risk Increased  Goal: Skin Health and Integrity  Outcome: Progressing  Intervention: Plan: Nurse Driven Intervention: Moisture Management  Recent Flowsheet Documentation  Taken 9/9/2024 2012 by Emily Abdullahi RN  Moisture Interventions: Urinary collection device  Intervention: Plan: Nurse Driven Intervention: Friction and Shear  Recent Flowsheet Documentation  Taken 9/9/2024 2012 by Emily Abdullahi RN  Friction/Shear Interventions: HOB 30 degrees or less  Intervention: Optimize Skin Protection  Recent Flowsheet Documentation  Taken 9/9/2024 2012 by Emily Abdullahi RN  Pressure Reduction Techniques: heels elevated off bed  Activity Management: bedrest  Head of Bed (HOB) Positioning: HOB at 15 degrees  Intervention: Promote and Optimize Oral Intake  Recent Flowsheet Documentation  Taken 9/9/2024 2100 by Emily Abdullahi RN  Oral Nutrition Promotion: social interaction promoted     Problem: Fall Injury Risk  Goal: Absence of Fall and Fall-Related Injury  Outcome: Progressing  Intervention: Identify and Manage Contributors  Recent Flowsheet Documentation  Taken 9/9/2024 2012 by Emily Abdullahi, RN  Medication Review/Management: medications reviewed  Intervention: Promote Injury-Free Environment  Recent Flowsheet Documentation  Taken 9/9/2024 2100 by Emily Abdullahi, RN  Safety Promotion/Fall  Prevention: bedside attendant  Taken 9/9/2024 2012 by Emily Abdullahi, RN  Safety Promotion/Fall Prevention:   activity supervised   bedside attendant   clutter free environment maintained   lighting adjusted   patient and family education   room near nurse's station   room door open   room organization consistent   safety round/check completed   Goal Outcome Evaluation:      Plan of Care Reviewed With: patient    Overall Patient Progress: no changeOverall Patient Progress: no change     Pt arrived to room 233 via stretcher at approx 1945, settled in room, external cath applied, mian care done. Pt denies pain. Intermittently confused. When first arrived to unit said wanted to call wife to make sure she was ok with him being here. Did not know where was at first. But remembered later on after staff informed him where he was. Also knew month and year, forgetful re date. At times pulling at sheets and  near iv site. 1-1 supervision at bedside. Pt also noted pulling at wrist at times saying the clamp was on there and that the wire was there. Later wondered who the 2nd nurse was next to me, no one was there. Also thought someone was touching his feet when no one was. Did say he liked the leg massage, thought someone was doing it, writer explained is [employer] machine. Pt took pills w/ water. Ate a jello cup, with assist. Drank some water w/ pills, declined to brush teeth. Declined juice, broth. Iv fluid and magnesium infused as ordered this haseeb. At times pt noted to be reaching his arms out while sleeping. Pt sleeping in bed at this time, snoring w eyes closed. Bed alarm on. Staff continuing to monitor pt closely on 1/1 supervision.

## 2024-09-10 NOTE — PLAN OF CARE
Goal Outcome Evaluation:      Plan of Care Reviewed With: child          Outcome Evaluation: Plan for Pt to return to [facility].    REUBEN JeanW

## 2024-09-10 NOTE — PLAN OF CARE
"Goal Outcome Evaluation:  Patient talks to self and repeatedly raises his right arm as if reaching out for something. Patient states that believes he saw \"water in a pan on the floor.\" He is calm but occasionally fidgety, restless in bed. Often moves his fingers and feet. Disoriented to situation and time. At 2 am he said, \"I want to get up.\" Writer asked pt where he intends to go and pt responded, \"Is 9 am, I need to get up for breakfast.\" Pt reoriented to time and room darkened. Pt tried to remove his gown and noted to be pulling bed sheets repeatedly. Pt said he feels \"hot.\" Room temp adjusted for comfort. Body temp 97.5. Extra blankets taken off. Jackie-sleeve applied to right arm to protect his peripheral iv but pt removed the sleeve. PCD's to bilateral lower extremities on and then off to minimize tactile disturbances. 2 am . BP's 177/77, 158/75, does not meet criteria to receive prn hydralazine. Given iv Zosyn, afebrile. Continues to receive normal saline 75 ml/hr. Sleeps between cares, noted to be snoring with 10-15 seconds periods of apnea in every 2-3 minutes cycles . O2 sats dropped once to 89% room air while sleeping. Provided with supplemental Oxygen 2 L via NC and sats remained 96-98%. Bladder scan at 2 am revealed 137 ml. Continues to be on 1:1 observation for safety.   Problem: Risk for Delirium  Goal: Improved Sleep  Outcome: Progressing     Problem: Comorbidity Management  Goal: Blood Glucose Levels Within Targeted Range  Outcome: Progressing  Intervention: Monitor and Manage Glycemia  Recent Flowsheet Documentation  Taken 9/10/2024 0012 by Audi Patel, RN  Medication Review/Management: medications reviewed     Problem: Comorbidity Management  Goal: Blood Pressure in Desired Range  Outcome: Progressing  Intervention: Maintain Blood Pressure Management  Recent Flowsheet Documentation  Taken 9/10/2024 0012 by Audi Patel, RN  Medication Review/Management: medications reviewed   "   Problem: UTI (Urinary Tract Infection)  Goal: Improved Infection Symptoms  Outcome: Progressing

## 2024-09-10 NOTE — PROVIDER NOTIFICATION
Small blood clots noted near urethral orifice and on brief at 11 pm. Was using primo fit, leaked. The primo fit removed. No blood in urine that was collected in canister. Again at 2 am, small amount of blood noted in brief with medium amount of urinary incontinence. Writer not able to visually see source of traces of blood. Dr. Jennings notified about the findings. MD recommended to continue monitoring situation.

## 2024-09-10 NOTE — PLAN OF CARE
Problem: Adult Inpatient Plan of Care  Goal: Readiness for Transition of Care  Outcome: Progressing     Problem: Risk for Delirium  Goal: Optimal Coping  Outcome: Progressing  Intervention: Optimize Psychosocial Adjustment to Delirium  Recent Flowsheet Documentation  Taken 9/10/2024 1020 by Loedan Duke RN  Supportive Measures:   active listening utilized   verbalization of feelings encouraged   relaxation techniques promoted     Problem: Comorbidity Management  Goal: Maintenance of Behavioral Health Symptom Control  Outcome: Progressing  Intervention: Maintain Behavioral Health Symptom Control  Recent Flowsheet Documentation  Taken 9/10/2024 1020 by Leodan Duke RN  Medication Review/Management: medications reviewed     Problem: UTI (Urinary Tract Infection)  Goal: Improved Infection Symptoms  Outcome: Progressing     Problem: Delirium  Goal: Optimal Coping  Outcome: Progressing  Intervention: Optimize Psychosocial Adjustment to Delirium  Recent Flowsheet Documentation  Taken 9/10/2024 1020 by Leodan Duke RN  Supportive Measures:   active listening utilized   verbalization of feelings encouraged   relaxation techniques promoted     Problem: Skin Injury Risk Increased  Goal: Skin Health and Integrity  Outcome: Progressing  Intervention: Plan: Nurse Driven Intervention: Moisture Management  Recent Flowsheet Documentation  Taken 9/10/2024 1020 by Leodan Duke RN  Moisture Interventions:   Encourage regular toileting   Incontinence pad  Bathing/Skin Care:   incontinence care   wipes, CHG  Intervention: Plan: Nurse Driven Intervention: Friction and Shear  Recent Flowsheet Documentation  Taken 9/10/2024 1020 by Leodan Duke RN  Friction/Shear Interventions: HOB 30 degrees or less  Intervention: Optimize Skin Protection  Recent Flowsheet Documentation  Taken 9/10/2024 1020 by Leodan Duke RN  Pressure Reduction Techniques: heels elevated off bed  Activity Management: activity adjusted per  tolerance  Head of Bed (HOB) Positioning: HOB at 15 degrees  Taken 9/10/2024 0837 by Leodan Duke RN  Head of Bed (HOB) Positioning: HOB at 60-90 degrees  Intervention: Promote and Optimize Oral Intake  Recent Flowsheet Documentation  Taken 9/10/2024 1020 by Leodan Duke RN  Oral Nutrition Promotion: social interaction promoted     Problem: Fall Injury Risk  Goal: Absence of Fall and Fall-Related Injury  Outcome: Progressing  Intervention: Identify and Manage Contributors  Recent Flowsheet Documentation  Taken 9/10/2024 1020 by Leodan Duke RN  Medication Review/Management: medications reviewed  Intervention: Promote Injury-Free Environment  Recent Flowsheet Documentation  Taken 9/10/2024 1020 by Leodan Duke RN  Safety Promotion/Fall Prevention:   activity supervised   bedside attendant   clutter free environment maintained   lighting adjusted   patient and family education   room near nurse's station   room door open   room organization consistent   safety round/check completed     Problem: Comorbidity Management  Goal: Maintenance of Behavioral Health Symptom Control  Outcome: Progressing  Intervention: Maintain Behavioral Health Symptom Control  Recent Flowsheet Documentation  Taken 9/10/2024 1020 by Leodan Duke RN  Medication Review/Management: medications reviewed   Goal Outcome Evaluation:       Patient having a conversation with his mother and wife who have been , forgetful at times, staff assist with feeding, appetite is good, Worked with therapy today, sat up in the chair and tolerated well. Afebrile, CLINT Duke RN

## 2024-09-10 NOTE — PROGRESS NOTES
Patient will use oxygen for tonight if needed due to hospital CPAPs all being used . Will see if family can bring Patients in . RN & provider notified.            Carole Castañeda, RT

## 2024-09-11 ENCOUNTER — DOCUMENTATION ONLY (OUTPATIENT)
Dept: ANTICOAGULATION | Facility: CLINIC | Age: 74
End: 2024-09-11

## 2024-09-11 LAB
ANION GAP SERPL CALCULATED.3IONS-SCNC: 10 MMOL/L (ref 7–15)
BACTERIA UR CULT: NORMAL
BUN SERPL-MCNC: 11.2 MG/DL (ref 8–23)
CALCIUM SERPL-MCNC: 8.3 MG/DL (ref 8.8–10.4)
CHLORIDE SERPL-SCNC: 108 MMOL/L (ref 98–107)
CREAT SERPL-MCNC: 0.88 MG/DL (ref 0.67–1.17)
EGFRCR SERPLBLD CKD-EPI 2021: >90 ML/MIN/1.73M2
ERYTHROCYTE [DISTWIDTH] IN BLOOD BY AUTOMATED COUNT: 13.1 % (ref 10–15)
GLUCOSE BLDC GLUCOMTR-MCNC: 167 MG/DL (ref 70–99)
GLUCOSE BLDC GLUCOMTR-MCNC: 190 MG/DL (ref 70–99)
GLUCOSE BLDC GLUCOMTR-MCNC: 261 MG/DL (ref 70–99)
GLUCOSE BLDC GLUCOMTR-MCNC: 263 MG/DL (ref 70–99)
GLUCOSE SERPL-MCNC: 205 MG/DL (ref 70–99)
HCO3 SERPL-SCNC: 24 MMOL/L (ref 22–29)
HCT VFR BLD AUTO: 36.3 % (ref 40–53)
HGB BLD-MCNC: 11.8 G/DL (ref 13.3–17.7)
INR PPP: 2.27 (ref 0.85–1.15)
MCH RBC QN AUTO: 30.7 PG (ref 26.5–33)
MCHC RBC AUTO-ENTMCNC: 32.5 G/DL (ref 31.5–36.5)
MCV RBC AUTO: 95 FL (ref 78–100)
PLATELET # BLD AUTO: 246 10E3/UL (ref 150–450)
POTASSIUM SERPL-SCNC: 4 MMOL/L (ref 3.4–5.3)
RBC # BLD AUTO: 3.84 10E6/UL (ref 4.4–5.9)
SODIUM SERPL-SCNC: 142 MMOL/L (ref 135–145)
WBC # BLD AUTO: 7.8 10E3/UL (ref 4–11)

## 2024-09-11 PROCEDURE — 85610 PROTHROMBIN TIME: CPT | Performed by: STUDENT IN AN ORGANIZED HEALTH CARE EDUCATION/TRAINING PROGRAM

## 2024-09-11 PROCEDURE — 99233 SBSQ HOSP IP/OBS HIGH 50: CPT | Performed by: STUDENT IN AN ORGANIZED HEALTH CARE EDUCATION/TRAINING PROGRAM

## 2024-09-11 PROCEDURE — 82374 ASSAY BLOOD CARBON DIOXIDE: CPT | Performed by: STUDENT IN AN ORGANIZED HEALTH CARE EDUCATION/TRAINING PROGRAM

## 2024-09-11 PROCEDURE — 94660 CPAP INITIATION&MGMT: CPT

## 2024-09-11 PROCEDURE — 120N000001 HC R&B MED SURG/OB

## 2024-09-11 PROCEDURE — 250N000013 HC RX MED GY IP 250 OP 250 PS 637: Performed by: STUDENT IN AN ORGANIZED HEALTH CARE EDUCATION/TRAINING PROGRAM

## 2024-09-11 PROCEDURE — 85014 HEMATOCRIT: CPT | Performed by: STUDENT IN AN ORGANIZED HEALTH CARE EDUCATION/TRAINING PROGRAM

## 2024-09-11 PROCEDURE — 36415 COLL VENOUS BLD VENIPUNCTURE: CPT | Performed by: STUDENT IN AN ORGANIZED HEALTH CARE EDUCATION/TRAINING PROGRAM

## 2024-09-11 PROCEDURE — 250N000011 HC RX IP 250 OP 636: Performed by: STUDENT IN AN ORGANIZED HEALTH CARE EDUCATION/TRAINING PROGRAM

## 2024-09-11 PROCEDURE — 999N000157 HC STATISTIC RCP TIME EA 10 MIN

## 2024-09-11 RX ORDER — WARFARIN SODIUM 2.5 MG/1
2.5 TABLET ORAL
Status: COMPLETED | OUTPATIENT
Start: 2024-09-11 | End: 2024-09-11

## 2024-09-11 RX ADMIN — NETARSUDIL 1 DROP: 0.2 SOLUTION/ DROPS OPHTHALMIC; TOPICAL at 22:47

## 2024-09-11 RX ADMIN — BRIMONIDINE TARTRATE 1 DROP: 2 SOLUTION OPHTHALMIC at 20:52

## 2024-09-11 RX ADMIN — INSULIN ASPART 1 UNITS: 100 INJECTION, SOLUTION INTRAVENOUS; SUBCUTANEOUS at 17:21

## 2024-09-11 RX ADMIN — PIPERACILLIN AND TAZOBACTAM 3.38 G: 3; .375 INJECTION, POWDER, FOR SOLUTION INTRAVENOUS at 15:58

## 2024-09-11 RX ADMIN — Medication 1 TABLET: at 09:15

## 2024-09-11 RX ADMIN — GABAPENTIN 300 MG: 300 CAPSULE ORAL at 09:15

## 2024-09-11 RX ADMIN — LATANOPROST 1 DROP: 50 SOLUTION/ DROPS OPHTHALMIC at 20:51

## 2024-09-11 RX ADMIN — GABAPENTIN 300 MG: 300 CAPSULE ORAL at 20:53

## 2024-09-11 RX ADMIN — BRIMONIDINE TARTRATE 1 DROP: 2 SOLUTION OPHTHALMIC at 09:17

## 2024-09-11 RX ADMIN — POLYETHYLENE GLYCOL 3350 17 G: 17 POWDER, FOR SOLUTION ORAL at 20:53

## 2024-09-11 RX ADMIN — ARIPIPRAZOLE 2 MG: 2 TABLET ORAL at 21:00

## 2024-09-11 RX ADMIN — PIPERACILLIN AND TAZOBACTAM 3.38 G: 3; .375 INJECTION, POWDER, FOR SOLUTION INTRAVENOUS at 09:15

## 2024-09-11 RX ADMIN — INSULIN ASPART 2 UNITS: 100 INJECTION, SOLUTION INTRAVENOUS; SUBCUTANEOUS at 09:18

## 2024-09-11 RX ADMIN — WARFARIN SODIUM 2.5 MG: 2.5 TABLET ORAL at 17:20

## 2024-09-11 RX ADMIN — FLUVOXAMINE MALEATE 50 MG: 50 TABLET ORAL at 21:00

## 2024-09-11 RX ADMIN — DORZOLAMIDE HYDROCHLORIDE AND TIMOLOL MALEATE 1 DROP: 20; 5 SOLUTION/ DROPS OPHTHALMIC at 09:16

## 2024-09-11 RX ADMIN — DORZOLAMIDE HYDROCHLORIDE AND TIMOLOL MALEATE 1 DROP: 20; 5 SOLUTION/ DROPS OPHTHALMIC at 20:47

## 2024-09-11 RX ADMIN — INSULIN ASPART 3 UNITS: 100 INJECTION, SOLUTION INTRAVENOUS; SUBCUTANEOUS at 12:30

## 2024-09-11 ASSESSMENT — ACTIVITIES OF DAILY LIVING (ADL)
ADLS_ACUITY_SCORE: 56
ADLS_ACUITY_SCORE: 63
ADLS_ACUITY_SCORE: 58
ADLS_ACUITY_SCORE: 56
ADLS_ACUITY_SCORE: 63
ADLS_ACUITY_SCORE: 58
ADLS_ACUITY_SCORE: 63
ADLS_ACUITY_SCORE: 59
ADLS_ACUITY_SCORE: 59
ADLS_ACUITY_SCORE: 63
ADLS_ACUITY_SCORE: 59
ADLS_ACUITY_SCORE: 63
ADLS_ACUITY_SCORE: 59
ADLS_ACUITY_SCORE: 58
ADLS_ACUITY_SCORE: 59
ADLS_ACUITY_SCORE: 59
ADLS_ACUITY_SCORE: 63
ADLS_ACUITY_SCORE: 59
ADLS_ACUITY_SCORE: 59
ADLS_ACUITY_SCORE: 58
ADLS_ACUITY_SCORE: 60

## 2024-09-11 NOTE — PROGRESS NOTES
[facility]    Medicine Progress Note - Hospitalist Service    Date of Admission:  9/9/2024    Assessment & Plan   Rakesh Samuels is a 74 year old male admitted on 9/9/2024. Patient presented with AMS and work up revealed UTI, admitted for septic encephalopathy and complicated UTI    Complicated UTI  Septic encephalopathy, resolved  - Presented with AMS, hallucinations - resolved  - UA consistent with UTI  - CT head negative for acute changes  - CT C/A/P no acute changes, Bladder wall thickening could represent hypertrophy from outlet obstruction secondary to an enlarged prostate gland, unchanged  - Small blood clots at urethra - resolved  - Ucx: Urogenital shravan  - Bcx: NGTD  - Continue IV Zosyn  - Will continue to treat with antibiotics as patient had similar presentation earlier with Enterococcus faecalis bacteremia and UTI, and now has improved with encephalopathy with antibiotic    Right renal cortical atrophy, mild left unchanged hydronephrosis  Ct showed Severe right renal cortical atrophy, nonobstructing stones both kidneys, and mild left hydronephrosis, unchanged.     Cr normal  Urology referral outpatient     DM 2  Hemoglobin A1c 7.5  Holding metformin  Continue sliding scale     Mild lipemia  Lipase elevated  No abdominal pain     Normocytic anemia  Hb 13.2 -> 12.0 -> 11.8, stable  Likely dilutional with IV fluids  Monitor Hb     Elevated blood pressure without a diagnosis of hypertension  Monitor vital signs as per protocol  If persistently elevated, will add antihypertensives  IV hydralazine as needed for elevated blood pressure     Paroxysmal atrial fibrillation  Subtherapeutic INR  Continue with warfarin, pharmacy to dose    Mood disorder  Continue with Abilify, fluvoxamine     Glaucoma  Continue with Alphagan, Cosopt, latanoprostene, Rhopressa    Incidental findings:   Gall stones  Sigmoid diverticulosis    Right knee pain  No erythema, swelling  ROM normal  XR R knee  negative  Supportive care    PT recommend TCU          Diet: Advance Diet as Tolerated: Regular Diet Adult; Moderate Consistent Carb (60 g CHO per Meal) Diet    DVT Prophylaxis: Pneumatic Compression Devices  Olsen Catheter: Not present  Lines: None     Cardiac Monitoring: None  Code Status: No CPR- Do NOT Intubate      Clinically Significant Risk Factors          # Hypocalcemia: Lowest Ca = 8.3 mg/dL in last 2 days, will monitor and replace as appropriate                  # DMII: A1C = 7.5 % (Ref range: <5.7 %) within past 6 months, PRESENT ON ADMISSION      # Financial/Environmental Concerns: none               Disposition Plan     Medically Ready for Discharge: Anticipated Tomorrow             Ann Marie Holland MD  Hospitalist Service  [facility]  Securely message with [employer] (more info)  Text page via [facility] Paging/Directory   ______________________________________________________________________    Interval History   Patient was examined at the bedside, denies hallucinations, improving, appears to be back at baseline. No traces of blood or bleeding from urethra. Called daughter Eloisa, discussed ongoing clinical course and anticipate discharge tomorrow    Physical Exam   Vital Signs: Temp: 97.5  F (36.4  C) Temp src: Oral BP: 131/64 Pulse: 78   Resp: 18 SpO2: 96 % O2 Device: Nasal cannula    Weight: 212 lbs 15.43 oz    General: Patient was seen and examined at bedside with no acute distress, chronically ill-appearing and frail  Ext: no edema    Skin:  No acute rashes. Sunken eyes, pale conjunctival mucosa  Neuro:  Grossly nonfocal. AO x 2-3,improving    Medical Decision Making       50 MINUTES SPENT BY ME on the date of service doing chart review, history, exam, documentation & further activities per the note.      Data     I have personally reviewed the following data over the past 24 hrs:    7.8  \   11.8 (L)   / 246     142 108 (H) 11.2 /  263 (H)   4.0 24 0.88 \     INR:  2.27 (H)  PTT:  N/A   D-dimer:  N/A Fibrinogen:  N/A       Imaging results reviewed over the past 24 hrs:   Recent Results (from the past 24 hour(s))   XR Knee Right 1/2 Views    Narrative    EXAM: XR KNEE RIGHT 1/2 VIEWS  LOCATION: [facility]  DATE: 9/10/2024    INDICATION: Right knee pain  COMPARISON: 11/29/2023      Impression    IMPRESSION: Status post right total knee arthroplasty. No hardware complication. No acute fracture or malalignment. No significant knee joint effusion. Chronic ossicle at the inferior patella. Atherosclerosis.

## 2024-09-11 NOTE — PLAN OF CARE
Problem: Risk for Delirium  Goal: Improved Behavioral Control  Outcome: Not Progressing  Intervention: Minimize Safety Risk  Recent Flowsheet Documentation  Taken 9/11/2024 0000 by Leonardo Samano RN  Enhanced Safety Measures:   monitor patients coagulation values   pain management   review medications for side effects with activity  Goal: Improved Attention and Thought Clarity  Outcome: Not Progressing  Intervention: Maximize Cognitive Function  Recent Flowsheet Documentation  Taken 9/11/2024 0000 by Leonardo Samano RN  Reorientation Measures: reorientation provided     Problem: Delirium  Goal: Improved Behavioral Control  Outcome: Not Progressing  Intervention: Minimize Safety Risk  Recent Flowsheet Documentation  Taken 9/11/2024 0000 by Leonardo Samano RN  Enhanced Safety Measures:   monitor patients coagulation values   pain management   review medications for side effects with activity  Goal: Improved Attention and Thought Clarity  Outcome: Not Progressing  Intervention: Maximize Cognitive Function  Recent Flowsheet Documentation  Taken 9/11/2024 0000 by Leonardo Samano RN  Reorientation Measures: reorientation provided   Goal Outcome Evaluation:    Patient is alert, confused and disoriented, he was hallucination, he was attempting to take off CPAP and pull out Iv tubing, 1:1 sitter at bedside for safety.    Hemodynamically stable, Iv antibiotics administered.

## 2024-09-11 NOTE — PROGRESS NOTES
ANTICOAGULATION     Rakesh Samuels is overdue for an INR check. Per chart review, patient is currenlty in patient at [place]. Will postpone reminder one week.  Heather Yanez RN  9/11/2024  Anticoagulation Clinic  [facility] for routing messages: [facility] (TCU/LTC/jail)  [facility] patient phone line: [phone]

## 2024-09-11 NOTE — PLAN OF CARE
Goal Outcome Evaluation:    1:1 monitoring for pulling a lines/tubes. Disoriented to place, situation, time. Very pleasant and redirectable. Minimal pulling at IV while connected to ABX and some pulling at CPAP line. Lethargic this shift. Wakes easily and falls asleep just about as quickly with snoring   - Needs to be fed r/t being blind.  - Not OOB this shift  - Incontinent urine a few times. No further hematuria or blood clots this evening.   - Blood sugars pre-meal and HS were 216 and 230. Coverage given as ordered.  - xray of right knee completed this evening.      Problem: Comorbidity Management  Goal: Blood Glucose Levels Within Targeted Range  Outcome: Not Progressing  Intervention: Monitor and Manage Glycemia  Recent Flowsheet Documentation  Taken 9/10/2024 1630 by Sheri Sexton RN  Medication Review/Management:   medications reviewed   high-risk medications identified     Problem: Risk for Delirium  Goal: Optimal Coping  Outcome: Progressing  Goal: Improved Behavioral Control  Outcome: Progressing  Intervention: Minimize Safety Risk  Recent Flowsheet Documentation  Taken 9/10/2024 1630 by Sheri Sexton RN  Enhanced Safety Measures:   monitor patients coagulation values   pain management   review medications for side effects with activity  Goal: Improved Attention and Thought Clarity  Outcome: Progressing  Intervention: Maximize Cognitive Function  Recent Flowsheet Documentation  Taken 9/10/2024 1630 by Sheri Sexton RN  Reorientation Measures: reorientation provided  Goal: Improved Sleep  Outcome: Progressing

## 2024-09-11 NOTE — PLAN OF CARE
"  Problem: Adult Inpatient Plan of Care  Goal: Plan of Care Review  Description: The Plan of Care Review/Shift note should be completed every shift.  The Outcome Evaluation is a brief statement about your assessment that the patient is improving, declining, or no change.  This information will be displayed automatically on your shift  note.  Outcome: Progressing  Goal: Patient-Specific Goal (Individualized)  Description: You can add care plan individualizations to a care plan. Examples of Individualization might be:  \"Parent requests to be called daily at 9am for status\", \"I have a hard time hearing out of my right ear\", or \"Do not touch me to wake me up as it startles  me\".  Outcome: Progressing  Goal: Absence of Hospital-Acquired Illness or Injury  Outcome: Progressing  Intervention: Identify and Manage Fall Risk  Recent Flowsheet Documentation  Taken 9/11/2024 1200 by Felisa Davalos RN  Safety Promotion/Fall Prevention:   bedside attendant   activity supervised   assistive device/personal items within reach   room near nurse's station  Goal: Optimal Comfort and Wellbeing  Outcome: Progressing  Goal: Readiness for Transition of Care  Outcome: Progressing     Problem: Risk for Delirium  Goal: Optimal Coping  Outcome: Progressing  Intervention: Optimize Psychosocial Adjustment to Delirium  Recent Flowsheet Documentation  Taken 9/11/2024 1200 by Felisa Davalos RN  Supportive Measures: active listening utilized  Goal: Improved Behavioral Control  Outcome: Progressing  Intervention: Minimize Safety Risk  Recent Flowsheet Documentation  Taken 9/11/2024 1200 by Felisa Davalos RN  Communication Enhancement Strategies:   call light answered in person   extra time allowed for response   one-step directions provided   repetition utilized  Goal: Improved Attention and Thought Clarity  Outcome: Progressing  Intervention: Maximize Cognitive Function  Recent Flowsheet Documentation  Taken 9/11/2024 1200 by Felisa Davalos" RN  Reorientation Measures:   clock in view   reorientation provided  Goal: Improved Sleep  Outcome: Progressing     Problem: Comorbidity Management  Goal: Maintenance of Behavioral Health Symptom Control  Outcome: Progressing  Goal: Blood Glucose Levels Within Targeted Range  Outcome: Progressing  Goal: Blood Pressure in Desired Range  Outcome: Progressing     Problem: UTI (Urinary Tract Infection)  Goal: Improved Infection Symptoms  Outcome: Progressing     Problem: Delirium  Goal: Optimal Coping  Outcome: Progressing  Intervention: Optimize Psychosocial Adjustment to Delirium  Recent Flowsheet Documentation  Taken 9/11/2024 1200 by Felisa Davalos RN  Supportive Measures: active listening utilized  Goal: Improved Behavioral Control  Outcome: Progressing  Intervention: Minimize Safety Risk  Recent Flowsheet Documentation  Taken 9/11/2024 1200 by Felisa Davalos RN  Communication Enhancement Strategies:   call light answered in person   extra time allowed for response   one-step directions provided   repetition utilized  Goal: Improved Attention and Thought Clarity  Outcome: Progressing  Intervention: Maximize Cognitive Function  Recent Flowsheet Documentation  Taken 9/11/2024 1200 by Felisa Davalos RN  Reorientation Measures:   clock in view   reorientation provided  Goal: Improved Sleep  Outcome: Progressing     Problem: Skin Injury Risk Increased  Goal: Skin Health and Integrity  Outcome: Progressing     Problem: Fall Injury Risk  Goal: Absence of Fall and Fall-Related Injury  Outcome: Progressing  Intervention: Promote Injury-Free Environment  Recent Flowsheet Documentation  Taken 9/11/2024 1200 by Felisa Davalos RN  Safety Promotion/Fall Prevention:   bedside attendant   activity supervised   assistive device/personal items within reach   room near nurse's station   Goal Outcome Evaluation:             Pt on 1:1 for pulling at lines and trying to get out of bed on own. Confused conversation. Cooperative overall.  Oriented to self and knows he is in the hospital. Pt tired and sleeping off and on. Wakes to voice easily. Received IV zosyn. Incontinent urine. RA, sats 94.

## 2024-09-12 ENCOUNTER — APPOINTMENT (OUTPATIENT)
Dept: PHYSICAL THERAPY | Facility: HOSPITAL | Age: 74
DRG: 689 | End: 2024-09-12
Payer: MEDICARE

## 2024-09-12 LAB
AMMONIA PLAS-SCNC: 24 UMOL/L (ref 16–60)
CREAT SERPL-MCNC: 0.9 MG/DL (ref 0.67–1.17)
EGFRCR SERPLBLD CKD-EPI 2021: 90 ML/MIN/1.73M2
GLUCOSE BLDC GLUCOMTR-MCNC: 174 MG/DL (ref 70–99)
GLUCOSE BLDC GLUCOMTR-MCNC: 189 MG/DL (ref 70–99)
GLUCOSE BLDC GLUCOMTR-MCNC: 243 MG/DL (ref 70–99)
GLUCOSE BLDC GLUCOMTR-MCNC: 246 MG/DL (ref 70–99)
GLUCOSE BLDC GLUCOMTR-MCNC: 275 MG/DL (ref 70–99)
HGB BLD-MCNC: 12.4 G/DL (ref 13.3–17.7)
INR PPP: 2.17 (ref 0.85–1.15)

## 2024-09-12 PROCEDURE — 120N000001 HC R&B MED SURG/OB

## 2024-09-12 PROCEDURE — 250N000013 HC RX MED GY IP 250 OP 250 PS 637: Performed by: STUDENT IN AN ORGANIZED HEALTH CARE EDUCATION/TRAINING PROGRAM

## 2024-09-12 PROCEDURE — 97110 THERAPEUTIC EXERCISES: CPT | Mod: GP

## 2024-09-12 PROCEDURE — 97116 GAIT TRAINING THERAPY: CPT | Mod: GP

## 2024-09-12 PROCEDURE — 99233 SBSQ HOSP IP/OBS HIGH 50: CPT | Performed by: STUDENT IN AN ORGANIZED HEALTH CARE EDUCATION/TRAINING PROGRAM

## 2024-09-12 PROCEDURE — 250N000011 HC RX IP 250 OP 636: Performed by: STUDENT IN AN ORGANIZED HEALTH CARE EDUCATION/TRAINING PROGRAM

## 2024-09-12 PROCEDURE — 85018 HEMOGLOBIN: CPT | Performed by: STUDENT IN AN ORGANIZED HEALTH CARE EDUCATION/TRAINING PROGRAM

## 2024-09-12 PROCEDURE — 82565 ASSAY OF CREATININE: CPT | Performed by: STUDENT IN AN ORGANIZED HEALTH CARE EDUCATION/TRAINING PROGRAM

## 2024-09-12 PROCEDURE — 85610 PROTHROMBIN TIME: CPT | Performed by: STUDENT IN AN ORGANIZED HEALTH CARE EDUCATION/TRAINING PROGRAM

## 2024-09-12 PROCEDURE — 94660 CPAP INITIATION&MGMT: CPT

## 2024-09-12 PROCEDURE — 36415 COLL VENOUS BLD VENIPUNCTURE: CPT | Performed by: STUDENT IN AN ORGANIZED HEALTH CARE EDUCATION/TRAINING PROGRAM

## 2024-09-12 PROCEDURE — 97530 THERAPEUTIC ACTIVITIES: CPT | Mod: GP

## 2024-09-12 PROCEDURE — 999N000157 HC STATISTIC RCP TIME EA 10 MIN

## 2024-09-12 PROCEDURE — 82140 ASSAY OF AMMONIA: CPT | Performed by: STUDENT IN AN ORGANIZED HEALTH CARE EDUCATION/TRAINING PROGRAM

## 2024-09-12 RX ORDER — AMPICILLIN AND SULBACTAM 2; 1 G/1; G/1
3 INJECTION, POWDER, FOR SOLUTION INTRAMUSCULAR; INTRAVENOUS EVERY 6 HOURS
Status: COMPLETED | OUTPATIENT
Start: 2024-09-12 | End: 2024-09-13

## 2024-09-12 RX ORDER — WARFARIN SODIUM 5 MG/1
5 TABLET ORAL
Status: COMPLETED | OUTPATIENT
Start: 2024-09-12 | End: 2024-09-12

## 2024-09-12 RX ORDER — AMPICILLIN AND SULBACTAM 2; 1 G/1; G/1
3 INJECTION, POWDER, FOR SOLUTION INTRAMUSCULAR; INTRAVENOUS EVERY 6 HOURS
Status: DISCONTINUED | OUTPATIENT
Start: 2024-09-12 | End: 2024-09-12

## 2024-09-12 RX ADMIN — GABAPENTIN 300 MG: 300 CAPSULE ORAL at 20:36

## 2024-09-12 RX ADMIN — INSULIN ASPART 3 UNITS: 100 INJECTION, SOLUTION INTRAVENOUS; SUBCUTANEOUS at 17:28

## 2024-09-12 RX ADMIN — BRIMONIDINE TARTRATE 1 DROP: 2 SOLUTION OPHTHALMIC at 20:36

## 2024-09-12 RX ADMIN — BRIMONIDINE TARTRATE 1 DROP: 2 SOLUTION OPHTHALMIC at 08:52

## 2024-09-12 RX ADMIN — FLUVOXAMINE MALEATE 50 MG: 50 TABLET ORAL at 21:19

## 2024-09-12 RX ADMIN — PIPERACILLIN AND TAZOBACTAM 3.38 G: 3; .375 INJECTION, POWDER, FOR SOLUTION INTRAVENOUS at 08:52

## 2024-09-12 RX ADMIN — NETARSUDIL 1 DROP: 0.2 SOLUTION/ DROPS OPHTHALMIC; TOPICAL at 20:44

## 2024-09-12 RX ADMIN — Medication 1 TABLET: at 08:51

## 2024-09-12 RX ADMIN — AMPICILLIN SODIUM AND SULBACTAM SODIUM 3 G: 2; 1 INJECTION, POWDER, FOR SOLUTION INTRAMUSCULAR; INTRAVENOUS at 11:20

## 2024-09-12 RX ADMIN — AMPICILLIN SODIUM AND SULBACTAM SODIUM 3 G: 2; 1 INJECTION, POWDER, FOR SOLUTION INTRAMUSCULAR; INTRAVENOUS at 16:10

## 2024-09-12 RX ADMIN — ARIPIPRAZOLE 2 MG: 2 TABLET ORAL at 21:19

## 2024-09-12 RX ADMIN — DORZOLAMIDE HYDROCHLORIDE AND TIMOLOL MALEATE 1 DROP: 20; 5 SOLUTION/ DROPS OPHTHALMIC at 20:42

## 2024-09-12 RX ADMIN — DORZOLAMIDE HYDROCHLORIDE AND TIMOLOL MALEATE 1 DROP: 20; 5 SOLUTION/ DROPS OPHTHALMIC at 08:52

## 2024-09-12 RX ADMIN — WARFARIN SODIUM 5 MG: 5 TABLET ORAL at 17:25

## 2024-09-12 RX ADMIN — AMPICILLIN SODIUM AND SULBACTAM SODIUM 3 G: 2; 1 INJECTION, POWDER, FOR SOLUTION INTRAMUSCULAR; INTRAVENOUS at 22:29

## 2024-09-12 RX ADMIN — PIPERACILLIN AND TAZOBACTAM 3.38 G: 3; .375 INJECTION, POWDER, FOR SOLUTION INTRAVENOUS at 00:32

## 2024-09-12 RX ADMIN — INSULIN ASPART 1 UNITS: 100 INJECTION, SOLUTION INTRAVENOUS; SUBCUTANEOUS at 08:52

## 2024-09-12 RX ADMIN — INSULIN ASPART 3 UNITS: 100 INJECTION, SOLUTION INTRAVENOUS; SUBCUTANEOUS at 11:56

## 2024-09-12 RX ADMIN — LATANOPROST 1 DROP: 50 SOLUTION/ DROPS OPHTHALMIC at 20:42

## 2024-09-12 RX ADMIN — GABAPENTIN 300 MG: 300 CAPSULE ORAL at 08:51

## 2024-09-12 RX ADMIN — POLYETHYLENE GLYCOL 3350 17 G: 17 POWDER, FOR SOLUTION ORAL at 20:36

## 2024-09-12 ASSESSMENT — ACTIVITIES OF DAILY LIVING (ADL)
ADLS_ACUITY_SCORE: 58
ADLS_ACUITY_SCORE: 63
ADLS_ACUITY_SCORE: 63
ADLS_ACUITY_SCORE: 58
ADLS_ACUITY_SCORE: 63
ADLS_ACUITY_SCORE: 63
ADLS_ACUITY_SCORE: 58
ADLS_ACUITY_SCORE: 63
ADLS_ACUITY_SCORE: 58
ADLS_ACUITY_SCORE: 58
ADLS_ACUITY_SCORE: 63
ADLS_ACUITY_SCORE: 58
ADLS_ACUITY_SCORE: 63
ADLS_ACUITY_SCORE: 58
ADLS_ACUITY_SCORE: 58
ADLS_ACUITY_SCORE: 63
ADLS_ACUITY_SCORE: 63
ADLS_ACUITY_SCORE: 58

## 2024-09-12 NOTE — PLAN OF CARE
Problem: Adult Inpatient Plan of Care  Goal: Optimal Comfort and Wellbeing  Outcome: Progressing     Problem: Comorbidity Management  Goal: Maintenance of Behavioral Health Symptom Control  Outcome: Progressing     Problem: Comorbidity Management  Goal: Blood Glucose Levels Within Targeted Range  Outcome: Progressing     Problem: UTI (Urinary Tract Infection)  Goal: Improved Infection Symptoms  Outcome: Progressing   Goal Outcome Evaluation:       Patient denies pain or discomfort, remains 1:1 for pulling on lines and impulsivity, no active behaviors, blood sugars were 167 and 261, had coverage, was disoriented to time and situation, vitals stable.  , Was med and care compliant.

## 2024-09-12 NOTE — PLAN OF CARE
Problem: Adult Inpatient Plan of Care  Goal: Optimal Comfort and Wellbeing  Outcome: Progressing     Problem: Risk for Delirium  Goal: Improved Attention and Thought Clarity  Outcome: Progressing  Goal: Improved Sleep  Outcome: Progressing     Problem: Delirium  Goal: Improved Attention and Thought Clarity  Outcome: Progressing  Goal: Improved Sleep  Outcome: Progressing   Goal Outcome Evaluation:    Alert; disoriented to time and situation. No behavioral issues overnight. Redirectable.    VSS.    Denied pain and nausea.     CPAP on overnight. Slept between cares.     Remains on 1:1.          Kenny Reyes RN

## 2024-09-12 NOTE — PLAN OF CARE
"Goal Outcome Evaluation:      Plan of Care Reviewed With: patient    Overall Patient Progress: no change    Outcome Evaluation: Patient continues to have disorientation and forgetfulness    Problem: Adult Inpatient Plan of Care  Goal: Readiness for Transition of Care  Outcome: Not Progressing  Flowsheets (Taken 9/12/2024 1401)  Barriers to Discharge: Mentation/orientation, reported dizziness with standing/ambulation     Problem: Risk for Delirium  Goal: Improved Attention and Thought Clarity  Outcome: Not Progressing  Intervention: Maximize Cognitive Function  Recent Flowsheet Documentation  Taken 9/12/2024 2044 by Demetri Harper, RN  Sensory Stimulation Regulation: quiet environment promoted  Reorientation Measures: reorientation provided     Patient VSS on CPAP this morning, O2 stable on RA while awake. Alert to self and place, disoriented to time and situation, very forgetful. Denying pain. Blanchable redness noted over bridge of nose upon removal of CPAP mask this AM, RT notified and writer requested adjustment to CPAP mask type and/or addition of skin barrier tonight if appropriate. Patient reporting dizziness during provider rounds today, reports dizziness w/ standing and ambulating to writer, later wanted writer to update provider on his \"seizures,\" appeared to be referring to conversation about dizziness. Up with assist of 2 and walker. Writer completed MRI checklist via phone with assistance from pt's daughter Nanette, updates provided to Nanette, form faxed to MRI dept. Patient off 1:1 observation since 1000 this morning, bed/chair alarms utilized for safety, adaptive call light provided to pt and secured to gown.     For detailed vital signs and assessments, please see documentation flowsheets. For detailed medication administrations, please see LALO Harper, MARILUZ 9/12/2024  [id number]    "

## 2024-09-12 NOTE — PROGRESS NOTES
[facility]    Medicine Progress Note - Hospitalist Service    Date of Admission:  9/9/2024    Assessment & Plan   Rakesh Samuels is a 74 year old male admitted on 9/9/2024. Patient presented with AMS and work up revealed UTI, admitted for septic encephalopathy and complicated UTI    Septic encephalopathy, improving  Suspect if 2/2 UTI vs other etiology  - Presented with AMS, hallucinations - resolved  - Spoke with daughter Eloisa, states Rakesh has improved, but not at baseline.  - Patient complaining of dizziness today  - TSH, B12, Ammonia wnl  - UA consistent with UTI  - CT head negative for acute changes  - CT C/A/P no acute changes, Bladder wall thickening could represent hypertrophy from outlet obstruction secondary to an enlarged prostate gland, unchanged  - Small blood clots at urethra - resolved  - Ucx: Urogenital shravan  - Bcx: NGTD  - IV Zosyn changed to IV unasyn   - Will continue to treat with antibiotics as patient had similar presentation earlier with Enterococcus faecalis bacteremia and UTI, and now has improvement with encephalopathy with antibiotics  - MRI brain r/o stroke, has dizziness and AMS    Right renal cortical atrophy, mild left unchanged hydronephrosis  Ct showed Severe right renal cortical atrophy, nonobstructing stones both kidneys, and mild left hydronephrosis, unchanged.     Cr normal  Urology referral outpatient     DM 2  Hemoglobin A1c 7.5  Holding metformin  Add 1:15 carb coverage  Continue sliding scale     Mild lipemia  Lipase elevated  No abdominal pain     Normocytic anemia  Hb 12.4, stable  Likely dilutional s/p IV fluids  Monitor Hb    Elevated blood pressure without a diagnosis of hypertension  Monitor vital signs as per protocol  If persistently elevated, will add antihypertensives  IV hydralazine as needed for elevated blood pressure     Paroxysmal atrial fibrillation  Subtherapeutic INR  Continue with warfarin, pharmacy to dose    Mood  Problem: Wound:  Goal: Will show signs of wound healing; wound closure and no evidence of infection  Description: Will show signs of wound healing; wound closure and no evidence of infection  Outcome: Progressing   Patient seen virtually today for oral antibiotics. See AVS. Follow up in 3 week/s. Care plan reviewed with patient. Patient verbalize understanding of the plan of care and contribute to goal setting. disorder  Continue with Abilify, fluvoxamine     Glaucoma  Continue with Alphagan, Cosopt, latanoprostene, Rhopressa    Incidental findings:   Gall stones  Sigmoid diverticulosis    Right knee pain  No erythema, swelling  ROM normal  XR R knee negative  Supportive care    PT recommend TCU          Diet: Advance Diet as Tolerated: Regular Diet Adult; Moderate Consistent Carb (60 g CHO per Meal) Diet    DVT Prophylaxis: Pneumatic Compression Devices  Olsen Catheter: Not present  Lines: None     Cardiac Monitoring: None  Code Status: No CPR- Do NOT Intubate      Clinically Significant Risk Factors                           # DMII: A1C = 7.5 % (Ref range: <5.7 %) within past 6 months, PRESENT ON ADMISSION      # Financial/Environmental Concerns: none               Disposition Plan     Medically Ready for Discharge: Anticipated Tomorrow             Ann Marie Holland MD  Hospitalist Service  [facility]  Securely message with [employer] (more info)  Text page via [employer] Paging/Directory   ______________________________________________________________________    Interval History   Patient was examined at the bedside, states he does not have hallucinations anymore.  Complains of some confusion, sleepiness and also has dizziness.  Daughter Eloisa stated that patient appears to be more sleepy and confused, not at baseline yet.  Daughter Nanette visiting patient today evening.    Physical Exam   Vital Signs: Temp: 97.9  F (36.6  C) Temp src: Oral BP: 124/78 Pulse: 84   Resp: 18 SpO2: 94 % O2 Device: None (Room air) Oxygen Delivery: 2 LPM  Weight: 212 lbs 15.43 oz    General: Patient was seen and examined at bedside with no acute distress, chronically ill-appearing and frail  Ext: no edema    Skin:  No acute rashes. Sunken eyes, pale conjunctival mucosa  Neuro:  Grossly nonfocal. AO x 1-2,improving    Medical Decision Making       55 MINUTES SPENT BY ME on the date of service doing chart review, history, exam,  documentation & further activities per the note.      Data     I have personally reviewed the following data over the past 24 hrs:    N/A  \   12.4 (L)   / N/A     N/A N/A N/A /  246 (H)   N/A N/A 0.90 \     INR:  2.17 (H) PTT:  N/A   D-dimer:  N/A Fibrinogen:  N/A       Imaging results reviewed over the past 24 hrs:   No results found for this or any previous visit (from the past 24 hour(s)).

## 2024-09-13 ENCOUNTER — APPOINTMENT (OUTPATIENT)
Dept: PHYSICAL THERAPY | Facility: HOSPITAL | Age: 74
DRG: 689 | End: 2024-09-13
Payer: MEDICARE

## 2024-09-13 ENCOUNTER — APPOINTMENT (OUTPATIENT)
Dept: MRI IMAGING | Facility: HOSPITAL | Age: 74
DRG: 689 | End: 2024-09-13
Attending: STUDENT IN AN ORGANIZED HEALTH CARE EDUCATION/TRAINING PROGRAM
Payer: MEDICARE

## 2024-09-13 LAB
ANION GAP SERPL CALCULATED.3IONS-SCNC: 11 MMOL/L (ref 7–15)
BUN SERPL-MCNC: 13.2 MG/DL (ref 8–23)
CALCIUM SERPL-MCNC: 8.9 MG/DL (ref 8.8–10.4)
CHLORIDE SERPL-SCNC: 107 MMOL/L (ref 98–107)
CREAT SERPL-MCNC: 0.82 MG/DL (ref 0.67–1.17)
EGFRCR SERPLBLD CKD-EPI 2021: >90 ML/MIN/1.73M2
ERYTHROCYTE [DISTWIDTH] IN BLOOD BY AUTOMATED COUNT: 13.2 % (ref 10–15)
GLUCOSE BLDC GLUCOMTR-MCNC: 175 MG/DL (ref 70–99)
GLUCOSE BLDC GLUCOMTR-MCNC: 179 MG/DL (ref 70–99)
GLUCOSE BLDC GLUCOMTR-MCNC: 187 MG/DL (ref 70–99)
GLUCOSE BLDC GLUCOMTR-MCNC: 190 MG/DL (ref 70–99)
GLUCOSE BLDC GLUCOMTR-MCNC: 296 MG/DL (ref 70–99)
GLUCOSE SERPL-MCNC: 217 MG/DL (ref 70–99)
HCO3 SERPL-SCNC: 23 MMOL/L (ref 22–29)
HCT VFR BLD AUTO: 40.1 % (ref 40–53)
HGB BLD-MCNC: 13.2 G/DL (ref 13.3–17.7)
INR PPP: 2.11 (ref 0.85–1.15)
MCH RBC QN AUTO: 31.2 PG (ref 26.5–33)
MCHC RBC AUTO-ENTMCNC: 32.9 G/DL (ref 31.5–36.5)
MCV RBC AUTO: 95 FL (ref 78–100)
PLATELET # BLD AUTO: 287 10E3/UL (ref 150–450)
POTASSIUM SERPL-SCNC: 4.3 MMOL/L (ref 3.4–5.3)
RBC # BLD AUTO: 4.23 10E6/UL (ref 4.4–5.9)
SODIUM SERPL-SCNC: 141 MMOL/L (ref 135–145)
WBC # BLD AUTO: 8.4 10E3/UL (ref 4–11)

## 2024-09-13 PROCEDURE — 82374 ASSAY BLOOD CARBON DIOXIDE: CPT | Performed by: STUDENT IN AN ORGANIZED HEALTH CARE EDUCATION/TRAINING PROGRAM

## 2024-09-13 PROCEDURE — 97116 GAIT TRAINING THERAPY: CPT | Mod: GP

## 2024-09-13 PROCEDURE — 250N000013 HC RX MED GY IP 250 OP 250 PS 637: Performed by: STUDENT IN AN ORGANIZED HEALTH CARE EDUCATION/TRAINING PROGRAM

## 2024-09-13 PROCEDURE — 99232 SBSQ HOSP IP/OBS MODERATE 35: CPT | Performed by: INTERNAL MEDICINE

## 2024-09-13 PROCEDURE — 97530 THERAPEUTIC ACTIVITIES: CPT | Mod: GP

## 2024-09-13 PROCEDURE — 36415 COLL VENOUS BLD VENIPUNCTURE: CPT | Performed by: STUDENT IN AN ORGANIZED HEALTH CARE EDUCATION/TRAINING PROGRAM

## 2024-09-13 PROCEDURE — 120N000001 HC R&B MED SURG/OB

## 2024-09-13 PROCEDURE — 85027 COMPLETE CBC AUTOMATED: CPT | Performed by: STUDENT IN AN ORGANIZED HEALTH CARE EDUCATION/TRAINING PROGRAM

## 2024-09-13 PROCEDURE — 250N000013 HC RX MED GY IP 250 OP 250 PS 637: Performed by: INTERNAL MEDICINE

## 2024-09-13 PROCEDURE — 999N000157 HC STATISTIC RCP TIME EA 10 MIN

## 2024-09-13 PROCEDURE — 250N000011 HC RX IP 250 OP 636: Performed by: STUDENT IN AN ORGANIZED HEALTH CARE EDUCATION/TRAINING PROGRAM

## 2024-09-13 PROCEDURE — 70551 MRI BRAIN STEM W/O DYE: CPT | Mod: MG

## 2024-09-13 PROCEDURE — 94660 CPAP INITIATION&MGMT: CPT

## 2024-09-13 PROCEDURE — 82565 ASSAY OF CREATININE: CPT | Performed by: STUDENT IN AN ORGANIZED HEALTH CARE EDUCATION/TRAINING PROGRAM

## 2024-09-13 PROCEDURE — 250N000011 HC RX IP 250 OP 636: Performed by: INTERNAL MEDICINE

## 2024-09-13 PROCEDURE — 85610 PROTHROMBIN TIME: CPT | Performed by: STUDENT IN AN ORGANIZED HEALTH CARE EDUCATION/TRAINING PROGRAM

## 2024-09-13 RX ORDER — WARFARIN SODIUM 2.5 MG/1
2.5 TABLET ORAL
Status: COMPLETED | OUTPATIENT
Start: 2024-09-13 | End: 2024-09-13

## 2024-09-13 RX ADMIN — DORZOLAMIDE HYDROCHLORIDE AND TIMOLOL MALEATE 1 DROP: 20; 5 SOLUTION/ DROPS OPHTHALMIC at 08:49

## 2024-09-13 RX ADMIN — METFORMIN HYDROCHLORIDE 1000 MG: 500 TABLET, FILM COATED ORAL at 17:59

## 2024-09-13 RX ADMIN — AMPICILLIN SODIUM AND SULBACTAM SODIUM 3 G: 2; 1 INJECTION, POWDER, FOR SOLUTION INTRAMUSCULAR; INTRAVENOUS at 10:50

## 2024-09-13 RX ADMIN — BRIMONIDINE TARTRATE 1 DROP: 2 SOLUTION OPHTHALMIC at 20:08

## 2024-09-13 RX ADMIN — Medication 1 TABLET: at 08:48

## 2024-09-13 RX ADMIN — DORZOLAMIDE HYDROCHLORIDE AND TIMOLOL MALEATE 1 DROP: 20; 5 SOLUTION/ DROPS OPHTHALMIC at 20:08

## 2024-09-13 RX ADMIN — INSULIN ASPART 2 UNITS: 100 INJECTION, SOLUTION INTRAVENOUS; SUBCUTANEOUS at 13:05

## 2024-09-13 RX ADMIN — POLYETHYLENE GLYCOL 3350 17 G: 17 POWDER, FOR SOLUTION ORAL at 20:08

## 2024-09-13 RX ADMIN — GABAPENTIN 300 MG: 300 CAPSULE ORAL at 20:08

## 2024-09-13 RX ADMIN — BRIMONIDINE TARTRATE 1 DROP: 2 SOLUTION OPHTHALMIC at 08:52

## 2024-09-13 RX ADMIN — ARIPIPRAZOLE 2 MG: 2 TABLET ORAL at 21:08

## 2024-09-13 RX ADMIN — WARFARIN SODIUM 2.5 MG: 2.5 TABLET ORAL at 17:59

## 2024-09-13 RX ADMIN — AMPICILLIN SODIUM AND SULBACTAM SODIUM 3 G: 2; 1 INJECTION, POWDER, FOR SOLUTION INTRAMUSCULAR; INTRAVENOUS at 22:35

## 2024-09-13 RX ADMIN — AMPICILLIN SODIUM AND SULBACTAM SODIUM 3 G: 2; 1 INJECTION, POWDER, FOR SOLUTION INTRAMUSCULAR; INTRAVENOUS at 16:35

## 2024-09-13 RX ADMIN — INSULIN ASPART 1 UNITS: 100 INJECTION, SOLUTION INTRAVENOUS; SUBCUTANEOUS at 08:52

## 2024-09-13 RX ADMIN — FLUVOXAMINE MALEATE 50 MG: 50 TABLET ORAL at 21:08

## 2024-09-13 RX ADMIN — NETARSUDIL 1 DROP: 0.2 SOLUTION/ DROPS OPHTHALMIC; TOPICAL at 20:08

## 2024-09-13 RX ADMIN — AMPICILLIN SODIUM AND SULBACTAM SODIUM 3 G: 2; 1 INJECTION, POWDER, FOR SOLUTION INTRAMUSCULAR; INTRAVENOUS at 04:45

## 2024-09-13 RX ADMIN — LATANOPROST 1 DROP: 50 SOLUTION/ DROPS OPHTHALMIC at 20:08

## 2024-09-13 RX ADMIN — GABAPENTIN 300 MG: 300 CAPSULE ORAL at 08:48

## 2024-09-13 RX ADMIN — INSULIN ASPART 1 UNITS: 100 INJECTION, SOLUTION INTRAVENOUS; SUBCUTANEOUS at 17:59

## 2024-09-13 ASSESSMENT — ACTIVITIES OF DAILY LIVING (ADL)
ADLS_ACUITY_SCORE: 55
ADLS_ACUITY_SCORE: 55
ADLS_ACUITY_SCORE: 56
ADLS_ACUITY_SCORE: 58
ADLS_ACUITY_SCORE: 55
ADLS_ACUITY_SCORE: 56
ADLS_ACUITY_SCORE: 58
ADLS_ACUITY_SCORE: 55
ADLS_ACUITY_SCORE: 58
ADLS_ACUITY_SCORE: 55
ADLS_ACUITY_SCORE: 55
ADLS_ACUITY_SCORE: 58
ADLS_ACUITY_SCORE: 55
ADLS_ACUITY_SCORE: 56
ADLS_ACUITY_SCORE: 55
ADLS_ACUITY_SCORE: 56
ADLS_ACUITY_SCORE: 56
ADLS_ACUITY_SCORE: 55

## 2024-09-13 NOTE — PROGRESS NOTES
[facility] Progress Note - Hospitalist Service    Date of Admission:  9/9/2024    Assessment & Plan   74 year old male admitted on 9/9/2024. Patient presented with AMS and work up revealed UTI, admitted for septic encephalopathy and complicated UTI     Septic encephalopathy, improving  Suspect if 2/2 UTI vs other etiology  Presented with AMS, hallucinations - resolved  - CT head negative for acute changes. MRI brain with small vessel ischemic disease  - TSH, B12, Ammonia wnl  - UA consistent with UTI but urine culture negative, blood cultures remain NGTD  - CT C/A/P no acute changes, Bladder wall thickening could represent hypertrophy from outlet obstruction secondary to an enlarged prostate gland, unchanged  - Small blood clots at urethra - resolved  - IV Zosyn changed to IV unasyn   - Will continue to treat with antibiotics as patient had similar presentation earlier with Enterococcus faecalis bacteremia and UTI, and now has improvement with encephalopathy with antibiotics  - plan 1 additional day of IV antibiotics to complete 5 day course and likely discharge in the AM       Right renal cortical atrophy, mild left unchanged hydronephrosis  Ct showed Severe right renal cortical atrophy, nonobstructing stones both kidneys, and mild left hydronephrosis, unchanged.     Cr normal  Urology referral outpatient     DM 2  Hemoglobin A1c 7.5  Resume metformin   Add 1:15 carb coverage  Continue sliding scale insulin     Mild lipemia  Lipase elevated  No abdominal pain     Normocytic anemia  Hb stable  Likely dilutional s/p IV fluids     Elevated blood pressure without a diagnosis of hypertension  IV hydralazine as needed for elevated blood pressure     Paroxysmal atrial fibrillation  Subtherapeutic INR  Continue with warfarin      Mood disorder  Continue with Abilify, fluvoxamine     Glaucoma  Continue with Alphagan, Cosopt, latanoprostene, Rhopressa     Incidental findings:   Gall  stones  Sigmoid diverticulosis     Right knee pain  No erythema, swelling  ROM normal  XR R knee negative  - Supportive care                   Diet: Advance Diet as Tolerated: Regular Diet Adult; Moderate Consistent Carb (60 g CHO per Meal) Diet    DVT Prophylaxis: Pneumatic Compression Devices  Olsen Catheter: Not present  Lines: None     Cardiac Monitoring: None  Code Status: No CPR- Do NOT Intubate      Clinically Significant Risk Factors                           # DMII: A1C = 7.5 % (Ref range: <5.7 %) within past 6 months, PRESENT ON ADMISSION      # Financial/Environmental Concerns: none               Disposition Plan   Medically Ready for Discharge: Anticipated Tomorrow      Tu Ruby DO  Hospitalist Service  [facility]  Securely message with [employer] (more info)  Text page via [employer] Paging/Directory   ______________________________________________________________________    Interval History   NAD. Denies any complaints, feels lightheadedness improved     Physical Exam   Vital Signs: Temp: 98.9  F (37.2  C) Temp src: Oral BP: 131/75 Pulse: 70   Resp: 18 SpO2: 92 % O2 Device: None (Room air)    Weight: 216 lbs .81 oz  General: NAD  RESPIRATORY: Breathing nonlabored  CARDIOVASCULAR: No le edema bilat.   NEUROLOGIC: Motor and sensory intact, speech clear         >45 MINUTES SPENT BY ME on the date of service doing chart review, history, exam, documentation & further activities per the note.  Data

## 2024-09-13 NOTE — PROGRESS NOTES
Care Management Follow Up    Length of Stay (days): 4    Expected Discharge Date: 09/14/2024     Concerns to be Addressed: discharge planning     Patient plan of care discussed at interdisciplinary rounds: Yes    Anticipated Discharge Disposition:  transitional care              Anticipated Discharge Services:  rehab  Anticipated Discharge DME:      Patient/family educated on Medicare website which has current facility and service quality ratings:  yes  Education Provided on the Discharge Plan:  yes  Patient/Family in Agreement with the Plan:  yes, Eloisa franco    Referrals Placed by CM/SW: return to TCU at [facility]   Private pay costs discussed: transportation costs    Discussed  Partnership in Safe Discharge Planning  document with patient/family: No     Handoff Completed: no    Additional Information:  Patient  to return to TCU at [facility] 9/14. [facility] chair [phone]. [facility] updated. Daughter Eloisa updated. Eloisa will be out of town and for any further needs she requests that we call her sister, Nanette      [facility] Steps[facility]    REJI Wallace

## 2024-09-13 NOTE — PLAN OF CARE
Problem: Adult Inpatient Plan of Care  Goal: Plan of Care Review  Description: The Plan of Care Review/Shift note should be completed every shift.  The Outcome Evaluation is a brief statement about your assessment that the patient is improving, declining, or no change.  This information will be displayed automatically on your shift  note.  Outcome: Progressing  Flowsheets (Taken 9/13/2024 6808)  Plan of Care Reviewed With: patient   Goal Outcome Evaluation:      Plan of Care Reviewed With: patient      Patient completing IV antibiotic course tonight.  Probable discharge tomorrow.    Confusion fluctuating.  Alarms in place, frequent rounding.  Patient did not call RN for needs just verbalized when rounding.      Generalized weakness.  Definitely needs walker, gait belt and 1-2 to help with ambulation.

## 2024-09-13 NOTE — PLAN OF CARE
Problem: Comorbidity Management  Goal: Blood Glucose Levels Within Targeted Range  Outcome: Not Progressing  Intervention: Monitor and Manage Glycemia  Recent Flowsheet Documentation  Taken 9/13/2024 0050 by Kenny Reyes, RN  Medication Review/Management: medications reviewed     Problem: Adult Inpatient Plan of Care  Goal: Optimal Comfort and Wellbeing  Outcome: Progressing     Problem: Risk for Delirium  Goal: Improved Attention and Thought Clarity  Outcome: Progressing  Intervention: Maximize Cognitive Function  Recent Flowsheet Documentation  Taken 9/13/2024 0050 by Kenny Reyes, RN  Reorientation Measures:   calendar in view   clock in view   reorientation provided  Goal: Improved Sleep  Outcome: Progressing     Problem: Delirium  Goal: Improved Attention and Thought Clarity  Outcome: Progressing  Intervention: Maximize Cognitive Function  Recent Flowsheet Documentation  Taken 9/13/2024 0050 by Kenny Reyes, RN  Reorientation Measures:   calendar in view   clock in view   reorientation provided  Goal: Improved Sleep  Outcome: Progressing   Goal Outcome Evaluation:    Alert; disoriented to time of day. Requires reminders regarding reason for hospitalization. Pleasant and cooperative, no behavioral issues.     VSS on RA.    Denied pain and nausea.    Incontinent of urine.     Slept between cares. Off of CPAP for latter part of shift at patient request.     BG of 190.    Sent to MRI 0700          Kenny Reyes, RN

## 2024-09-13 NOTE — PLAN OF CARE
Problem: Comorbidity Management  Goal: Blood Glucose Levels Within Targeted Range  Outcome: Not Progressing     Problem: Adult Inpatient Plan of Care  Goal: Optimal Comfort and Wellbeing  Outcome: Progressing     Problem: Risk for Delirium  Goal: Improved Behavioral Control  Outcome: Progressing   Goal Outcome Evaluation:       Blood sugars were 246 and 243, had coverage, denies pain or discomfort, no attempts to get out of bed, was hallucinating at the start of the shift, no further hallucinations, was med and care compliant, daughter came to see, states there is no need to put the eye drop in the fridge if it is opened, was disoriented to time and situation, vitals stable.

## 2024-09-14 ENCOUNTER — APPOINTMENT (OUTPATIENT)
Dept: ULTRASOUND IMAGING | Facility: HOSPITAL | Age: 74
DRG: 689 | End: 2024-09-14
Attending: INTERNAL MEDICINE
Payer: MEDICARE

## 2024-09-14 ENCOUNTER — APPOINTMENT (OUTPATIENT)
Dept: CT IMAGING | Facility: HOSPITAL | Age: 74
DRG: 689 | End: 2024-09-14
Attending: INTERNAL MEDICINE
Payer: MEDICARE

## 2024-09-14 LAB
ALBUMIN SERPL BCG-MCNC: 3.8 G/DL (ref 3.5–5.2)
ALBUMIN UR-MCNC: 20 MG/DL
ALP SERPL-CCNC: 97 U/L (ref 40–150)
ALT SERPL W P-5'-P-CCNC: 89 U/L (ref 0–70)
ANION GAP SERPL CALCULATED.3IONS-SCNC: 14 MMOL/L (ref 7–15)
APPEARANCE UR: CLEAR
AST SERPL W P-5'-P-CCNC: 53 U/L (ref 0–45)
BACTERIA #/AREA URNS HPF: ABNORMAL /HPF
BACTERIA BLD CULT: NO GROWTH
BASE EXCESS BLDV CALC-SCNC: -2 MMOL/L (ref -3–3)
BILIRUB DIRECT SERPL-MCNC: <0.2 MG/DL (ref 0–0.3)
BILIRUB SERPL-MCNC: 0.5 MG/DL
BILIRUB UR QL STRIP: NEGATIVE
BUN SERPL-MCNC: 13.9 MG/DL (ref 8–23)
CALCIUM SERPL-MCNC: 8.8 MG/DL (ref 8.8–10.4)
CHLORIDE SERPL-SCNC: 105 MMOL/L (ref 98–107)
COLOR UR AUTO: YELLOW
CREAT SERPL-MCNC: 0.9 MG/DL (ref 0.67–1.17)
EGFRCR SERPLBLD CKD-EPI 2021: 90 ML/MIN/1.73M2
ERYTHROCYTE [DISTWIDTH] IN BLOOD BY AUTOMATED COUNT: 12.9 % (ref 10–15)
GLUCOSE BLDC GLUCOMTR-MCNC: 149 MG/DL (ref 70–99)
GLUCOSE BLDC GLUCOMTR-MCNC: 168 MG/DL (ref 70–99)
GLUCOSE BLDC GLUCOMTR-MCNC: 173 MG/DL (ref 70–99)
GLUCOSE BLDC GLUCOMTR-MCNC: 220 MG/DL (ref 70–99)
GLUCOSE BLDC GLUCOMTR-MCNC: 245 MG/DL (ref 70–99)
GLUCOSE SERPL-MCNC: 293 MG/DL (ref 70–99)
GLUCOSE UR STRIP-MCNC: 300 MG/DL
HCO3 BLDV-SCNC: 23 MMOL/L (ref 21–28)
HCO3 SERPL-SCNC: 20 MMOL/L (ref 22–29)
HCT VFR BLD AUTO: 38.8 % (ref 40–53)
HGB BLD-MCNC: 12.5 G/DL (ref 13.3–17.7)
HGB UR QL STRIP: ABNORMAL
HOLD SPECIMEN: NORMAL
INR PPP: 2.26 (ref 0.85–1.15)
KETONES UR STRIP-MCNC: NEGATIVE MG/DL
LEUKOCYTE ESTERASE UR QL STRIP: ABNORMAL
LIPASE SERPL-CCNC: 138 U/L (ref 13–60)
MAGNESIUM SERPL-MCNC: 1.7 MG/DL (ref 1.7–2.3)
MCH RBC QN AUTO: 31.1 PG (ref 26.5–33)
MCHC RBC AUTO-ENTMCNC: 32.2 G/DL (ref 31.5–36.5)
MCV RBC AUTO: 97 FL (ref 78–100)
MUCOUS THREADS #/AREA URNS LPF: PRESENT /LPF
NITRATE UR QL: NEGATIVE
O2/TOTAL GAS SETTING VFR VENT: 0 %
OXYHGB MFR BLDV: 85 % (ref 70–75)
PCO2 BLDV: 39 MM HG (ref 40–50)
PH BLDV: 7.38 [PH] (ref 7.32–7.43)
PH UR STRIP: 6 [PH] (ref 5–7)
PLATELET # BLD AUTO: 269 10E3/UL (ref 150–450)
PO2 BLDV: 53 MM HG (ref 25–47)
POTASSIUM SERPL-SCNC: 4.5 MMOL/L (ref 3.4–5.3)
PROCALCITONIN SERPL IA-MCNC: 0.08 NG/ML
PROT SERPL-MCNC: 7.1 G/DL (ref 6.4–8.3)
RBC # BLD AUTO: 4.02 10E6/UL (ref 4.4–5.9)
RBC URINE: 2 /HPF
SAO2 % BLDV: 86.6 % (ref 70–75)
SODIUM SERPL-SCNC: 139 MMOL/L (ref 135–145)
SP GR UR STRIP: 1.02 (ref 1–1.03)
TRANSITIONAL EPI: <1 /HPF
UROBILINOGEN UR STRIP-MCNC: 2 MG/DL
WBC # BLD AUTO: 10.5 10E3/UL (ref 4–11)
WBC URINE: 102 /HPF

## 2024-09-14 PROCEDURE — 85027 COMPLETE CBC AUTOMATED: CPT | Performed by: INTERNAL MEDICINE

## 2024-09-14 PROCEDURE — 83690 ASSAY OF LIPASE: CPT | Performed by: INTERNAL MEDICINE

## 2024-09-14 PROCEDURE — 36415 COLL VENOUS BLD VENIPUNCTURE: CPT | Performed by: INTERNAL MEDICINE

## 2024-09-14 PROCEDURE — 99233 SBSQ HOSP IP/OBS HIGH 50: CPT | Performed by: INTERNAL MEDICINE

## 2024-09-14 PROCEDURE — 87086 URINE CULTURE/COLONY COUNT: CPT | Performed by: INTERNAL MEDICINE

## 2024-09-14 PROCEDURE — 70450 CT HEAD/BRAIN W/O DYE: CPT | Mod: MA

## 2024-09-14 PROCEDURE — 250N000011 HC RX IP 250 OP 636: Performed by: INTERNAL MEDICINE

## 2024-09-14 PROCEDURE — 120N000001 HC R&B MED SURG/OB

## 2024-09-14 PROCEDURE — 80048 BASIC METABOLIC PNL TOTAL CA: CPT | Performed by: INTERNAL MEDICINE

## 2024-09-14 PROCEDURE — 250N000013 HC RX MED GY IP 250 OP 250 PS 637: Performed by: INTERNAL MEDICINE

## 2024-09-14 PROCEDURE — 36415 COLL VENOUS BLD VENIPUNCTURE: CPT | Performed by: STUDENT IN AN ORGANIZED HEALTH CARE EDUCATION/TRAINING PROGRAM

## 2024-09-14 PROCEDURE — 76705 ECHO EXAM OF ABDOMEN: CPT

## 2024-09-14 PROCEDURE — 82805 BLOOD GASES W/O2 SATURATION: CPT | Performed by: INTERNAL MEDICINE

## 2024-09-14 PROCEDURE — 82248 BILIRUBIN DIRECT: CPT | Performed by: INTERNAL MEDICINE

## 2024-09-14 PROCEDURE — 94660 CPAP INITIATION&MGMT: CPT

## 2024-09-14 PROCEDURE — 84145 PROCALCITONIN (PCT): CPT | Performed by: INTERNAL MEDICINE

## 2024-09-14 PROCEDURE — 258N000003 HC RX IP 258 OP 636: Performed by: INTERNAL MEDICINE

## 2024-09-14 PROCEDURE — 250N000013 HC RX MED GY IP 250 OP 250 PS 637: Performed by: STUDENT IN AN ORGANIZED HEALTH CARE EDUCATION/TRAINING PROGRAM

## 2024-09-14 PROCEDURE — 83735 ASSAY OF MAGNESIUM: CPT | Performed by: INTERNAL MEDICINE

## 2024-09-14 PROCEDURE — 81001 URINALYSIS AUTO W/SCOPE: CPT | Performed by: INTERNAL MEDICINE

## 2024-09-14 PROCEDURE — 999N000157 HC STATISTIC RCP TIME EA 10 MIN

## 2024-09-14 PROCEDURE — 85610 PROTHROMBIN TIME: CPT | Performed by: STUDENT IN AN ORGANIZED HEALTH CARE EDUCATION/TRAINING PROGRAM

## 2024-09-14 RX ORDER — AMPICILLIN AND SULBACTAM 2; 1 G/1; G/1
3 INJECTION, POWDER, FOR SOLUTION INTRAMUSCULAR; INTRAVENOUS EVERY 6 HOURS
Status: DISPENSED | OUTPATIENT
Start: 2024-09-14 | End: 2024-09-16

## 2024-09-14 RX ORDER — WARFARIN SODIUM 2.5 MG/1
2.5 TABLET ORAL
Status: COMPLETED | OUTPATIENT
Start: 2024-09-14 | End: 2024-09-14

## 2024-09-14 RX ADMIN — DORZOLAMIDE HYDROCHLORIDE AND TIMOLOL MALEATE 1 DROP: 20; 5 SOLUTION/ DROPS OPHTHALMIC at 08:10

## 2024-09-14 RX ADMIN — INSULIN ASPART 3 UNITS: 100 INJECTION, SOLUTION INTRAVENOUS; SUBCUTANEOUS at 12:29

## 2024-09-14 RX ADMIN — GABAPENTIN 300 MG: 300 CAPSULE ORAL at 08:11

## 2024-09-14 RX ADMIN — POLYETHYLENE GLYCOL 3350 17 G: 17 POWDER, FOR SOLUTION ORAL at 21:26

## 2024-09-14 RX ADMIN — LATANOPROST 1 DROP: 50 SOLUTION/ DROPS OPHTHALMIC at 21:26

## 2024-09-14 RX ADMIN — INSULIN ASPART 1 UNITS: 100 INJECTION, SOLUTION INTRAVENOUS; SUBCUTANEOUS at 08:10

## 2024-09-14 RX ADMIN — MAGNESIUM SULFATE HEPTAHYDRATE 1 G: 500 INJECTION, SOLUTION INTRAMUSCULAR; INTRAVENOUS at 13:56

## 2024-09-14 RX ADMIN — BRIMONIDINE TARTRATE 1 DROP: 2 SOLUTION OPHTHALMIC at 08:10

## 2024-09-14 RX ADMIN — Medication 1 TABLET: at 08:14

## 2024-09-14 RX ADMIN — WARFARIN SODIUM 2.5 MG: 2.5 TABLET ORAL at 17:58

## 2024-09-14 RX ADMIN — NETARSUDIL 1 DROP: 0.2 SOLUTION/ DROPS OPHTHALMIC; TOPICAL at 21:26

## 2024-09-14 RX ADMIN — ACETAMINOPHEN 500 MG: 500 TABLET ORAL at 10:18

## 2024-09-14 RX ADMIN — METFORMIN HYDROCHLORIDE 1000 MG: 500 TABLET, FILM COATED ORAL at 08:14

## 2024-09-14 RX ADMIN — DORZOLAMIDE HYDROCHLORIDE AND TIMOLOL MALEATE 1 DROP: 20; 5 SOLUTION/ DROPS OPHTHALMIC at 21:28

## 2024-09-14 RX ADMIN — GABAPENTIN 300 MG: 300 CAPSULE ORAL at 21:27

## 2024-09-14 RX ADMIN — BRIMONIDINE TARTRATE 1 DROP: 2 SOLUTION OPHTHALMIC at 21:27

## 2024-09-14 RX ADMIN — METFORMIN HYDROCHLORIDE 1000 MG: 500 TABLET, FILM COATED ORAL at 17:58

## 2024-09-14 ASSESSMENT — ACTIVITIES OF DAILY LIVING (ADL)
ADLS_ACUITY_SCORE: 63
ADLS_ACUITY_SCORE: 60
ADLS_ACUITY_SCORE: 63
ADLS_ACUITY_SCORE: 63
ADLS_ACUITY_SCORE: 56
ADLS_ACUITY_SCORE: 63
ADLS_ACUITY_SCORE: 63
ADLS_ACUITY_SCORE: 56
ADLS_ACUITY_SCORE: 56
ADLS_ACUITY_SCORE: 63
ADLS_ACUITY_SCORE: 56
ADLS_ACUITY_SCORE: 63
ADLS_ACUITY_SCORE: 56
ADLS_ACUITY_SCORE: 60
ADLS_ACUITY_SCORE: 60
ADLS_ACUITY_SCORE: 63
ADLS_ACUITY_SCORE: 63

## 2024-09-14 NOTE — PROGRESS NOTES
[facility]    Medicine Progress Note - Hospitalist Service    Date of Admission:  9/9/2024    Assessment & Plan   74 year old male admitted on 9/9/2024. Patient presented with AMS and work up revealed UTI, admitted for septic encephalopathy and complicated UTI     Septic encephalopathy   Initially suspected due to UTI vs other etiology  Presented with AMS, hallucinations - resolved  Initial CT head negative for acute changes. MRI brain with small vessel ischemic disease  TSH, B12, Ammonia wnl  UA consistent with UTI but urine culture negative, blood cultures remain NGTD  CT C/A/P no acute changes, Bladder wall thickening could represent hypertrophy from outlet obstruction secondary to an enlarged prostate gland, unchanged. Nonobstructing stones both kidneys, and mild left hydronephrosis, unchanged. Gallstones noted  Small blood clots at urethra - resolved  Was on IV Zosyn changed to IV unasyn   Continued to treat with antibiotics as patient had similar presentation earlier with Enterococcus faecalis bacteremia and UTI, and initially had improvement with encephalopathy with antibiotics however all cultures are negative   Completed 5 day course of IV antibiotics 9/14  On 9/14 was having more confusion, not following commands and was having more problems ambulating  Re-assessment shows somnolence in setting of not using CPAP  VBG was without overt respiratory acidosis  Liver function shows mild transaminase elevation  BMP, CBC, Mag and procalcitonin otherwise normal  Recheck UA is abnormal but concern for contaminated sample  Repeat head CT unremarkable  --check RUQ US to eval for elevated transaminases given known gallstones  -- ensure patient does not sleep without CPAP  -- hold home abilify and luvox in case this is leading to increased somnolence  -- hold off restarting Unasyn for now    -- check lipase given history of recent elevation however patient does not endorse abdominal pain      "  Right renal cortical atrophy, mild left unchanged hydronephrosis  Ct showed Severe right renal cortical atrophy, nonobstructing stones both kidneys, and mild left hydronephrosis, unchanged.     Cr normal  --Urology referral outpatient       DM 2  Hemoglobin A1c 7.5  --Resume metformin   -- Increase to 1:10 carb coverage  -- Continue sliding scale insulin       Mild lipemia  Lipase elevated, will recheck  --No abdominal pain       Normocytic anemia  Hb stable       Elevated blood pressure without a diagnosis of hypertension  IV hydralazine as needed for elevated blood pressure       Paroxysmal atrial fibrillation  Subtherapeutic INR  Continue with warfarin        Mood disorder  Holding home Abilify and fluvoxamine as above     Glaucoma  Continue with Alphagan, Cosopt, latanoprostene, Rhopressa       Incidental findings:   Gall stones  Sigmoid diverticulosis       Right knee pain  No erythema, swelling  ROM normal  XR R knee negative  -- Supportive care        RADHA: continue CPAP per home settings, do not allow patient to sleep without CPAP in case respiratory acidosis is contributing to above issues               Diet: Advance Diet as Tolerated: Regular Diet Adult; Moderate Consistent Carb (60 g CHO per Meal) Diet  Diet    DVT Prophylaxis: Pneumatic Compression Devices  Olsen Catheter: Not present  Lines: None     Cardiac Monitoring: None  Code Status: No CPR- Do NOT Intubate      Clinically Significant Risk Factors                           # DMII: A1C = 7.5 % (Ref range: <5.7 %) within past 6 months         # Financial/Environmental Concerns: none               Disposition Plan   Medically Ready for Discharge: Anticipated Tomorrow      Tu Ruby DO  Hospitalist Service  [facility]  Securely message with [employer] (more info)  Text page via [employer] Paging/Directory   ______________________________________________________________________    Interval History   NAD. Feels \"off\", tired, " slightly confused    Physical Exam   Vital Signs: Temp: 97.8  F (36.6  C) Temp src: Oral BP: 132/76 Pulse: 67   Resp: 18 SpO2: 94 % O2 Device: None (Room air)    Weight: 216 lbs .81 oz  General: NAD  RESPIRATORY: Breathing nonlabored  CARDIOVASCULAR: No le edema bilat.   NEUROLOGIC: Motor and sensory intact, speech clear         >50 MINUTES SPENT BY ME on the date of service doing chart review, history, exam, documentation & further activities per the note.  Data

## 2024-09-14 NOTE — PROGRESS NOTES
Care Management Discharge Note    Discharge Date: 09/14/2024       Discharge Disposition: Transitional Care    Discharge Services: None    Discharge DME:      Discharge Transportation: health plan transportation    Private pay costs discussed: Not applicable    Does the patient's insurance plan have a 3 day qualifying hospital stay waiver?  yes    PAS Confirmation Code:    Patient/family educated on Medicare website which has current facility and service quality ratings:      Education Provided on the Discharge Plan:    Persons Notified of Discharge Plans: Patient and family  Patient/Family in Agreement with the Plan: yes    Handoff Referral Completed: No, handoff not indicated or clinically appropriate    Additional Information:    SW was notified that pt is ready for discharge. Pt will be returning to TCU at [facility]. Pt will be transported by [facility] between 10:40-11:20. No additional CM needs anticipated.    9:27 AM  SW was asked by nursing to move transportation to this afternoon. Pt is confused. SW moved transportation and will now be arriving between 12:37 - 13:22.     11:52 AM   SW was notified by doctor that pt is not ready to discharge today. He's hoping for tomorrow. JENNY moved transportation to 9/15 from 11:35-12:20.     Left  flaco Irwin -- Nurse  to inform her of this change.     MARTIN Pederson

## 2024-09-14 NOTE — PLAN OF CARE
Problem: Adult Inpatient Plan of Care  Goal: Plan of Care Review  Description: The Plan of Care Review/Shift note should be completed every shift.  The Outcome Evaluation is a brief statement about your assessment that the patient is improving, declining, or no change.  This information will be displayed automatically on your shift  note.  Outcome: Progressing  Flowsheets (Taken 9/14/2024 9340)  Plan of Care Reviewed With: patient   Goal Outcome Evaluation:      Plan of Care Reviewed With: patient      Patient having increased confusion and difficulty tracking directions to complete basic tasks.  Patient had considerable difficulty going to the chair with staff.  Patient unable to get back to bed, michelle back to bed.      CPAP utilized while patient sleeping.  Patient incontinent difficulty collecting ua.  Will update md.

## 2024-09-14 NOTE — PLAN OF CARE
Problem: Fall Injury Risk  Goal: Absence of Fall and Fall-Related Injury  Intervention: Identify and Manage Contributors  Recent Flowsheet Documentation  Taken 9/14/2024 0023 by Magalie Vilchis RN  Medication Review/Management: medications reviewed  Intervention: Promote Injury-Free Environment  Recent Flowsheet Documentation  Taken 9/14/2024 0023 by Magalie Vilchis RN  Safety Promotion/Fall Prevention:   activity supervised   assistive device/personal items within reach   clutter free environment maintained   mobility aid in reach   nonskid shoes/slippers when out of bed   room door open   safety round/check completed   Goal Outcome Evaluation:       Pt alert and oriented x3, slept through the night without the Cpap per pt request, arouses to care by voice.Urine incontinent, blood sugar check 149 at 0200, safety measures in place.  Vital signs:  Temp: 97.4  F (36.3  C) Temp src: Oral BP: 136/70 Pulse: 71   Resp: 18 SpO2: 92 % O2 Device: None (Room air) and stable.

## 2024-09-14 NOTE — PLAN OF CARE
Pt is A&Ox3, disoriented to time only off by a few days. Pt VSS, on RA with stable sats. Pt denies pain this shift. Was up in the chair for dinner for a couple of hours. Up with 2 assist with walker and gait belt. Pt repositioned PRN and incontinence changed. IV abx given per orders. Pt to discharge tomorrow to TCU.    Problem: Risk for Delirium  Goal: Optimal Coping  Outcome: Progressing  Intervention: Optimize Psychosocial Adjustment to Delirium  Recent Flowsheet Documentation  Taken 9/13/2024 1610 by Fior Amaya RN  Supportive Measures: active listening utilized  Goal: Improved Behavioral Control  Outcome: Progressing  Intervention: Prevent and Manage Agitation  Recent Flowsheet Documentation  Taken 9/13/2024 1610 by Fior Amaya RN  Environment Familiarity/Consistency: daily routine followed  Intervention: Minimize Safety Risk  Recent Flowsheet Documentation  Taken 9/13/2024 1610 by Fior Amaya RN  Communication Enhancement Strategies: call light answered in person  Enhanced Safety Measures:   pain management   review medications for side effects with activity   room near unit station     Problem: Comorbidity Management  Goal: Blood Glucose Levels Within Targeted Range  Outcome: Progressing  Intervention: Monitor and Manage Glycemia  Recent Flowsheet Documentation  Taken 9/13/2024 1610 by Fior Amaya RN  Medication Review/Management: medications reviewed     Problem: UTI (Urinary Tract Infection)  Goal: Improved Infection Symptoms  Outcome: Progressing     Problem: Delirium  Goal: Optimal Coping  Outcome: Progressing  Intervention: Optimize Psychosocial Adjustment to Delirium  Recent Flowsheet Documentation  Taken 9/13/2024 1610 by Fior Amaya RN  Supportive Measures: active listening utilized  Goal: Improved Behavioral Control  Outcome: Progressing  Intervention: Prevent and Manage Agitation  Recent Flowsheet Documentation  Taken 9/13/2024 1610 by Fior Amaya  RN  Environment Familiarity/Consistency: daily routine followed  Intervention: Minimize Safety Risk  Recent Flowsheet Documentation  Taken 9/13/2024 1610 by Fior Amaya RN  Communication Enhancement Strategies: call light answered in person  Enhanced Safety Measures:   pain management   review medications for side effects with activity   room near unit station     Problem: Skin Injury Risk Increased  Goal: Skin Health and Integrity  Outcome: Progressing  Intervention: Plan: Nurse Driven Intervention: Moisture Management  Recent Flowsheet Documentation  Taken 9/13/2024 1610 by Fior Amaya RN  Moisture Interventions: Encourage regular toileting  Intervention: Plan: Nurse Driven Intervention: Friction and Shear  Recent Flowsheet Documentation  Taken 9/13/2024 1610 by Fior Amaya RN  Friction/Shear Interventions: HOB 30 degrees or less     Problem: Adult Inpatient Plan of Care  Goal: Absence of Hospital-Acquired Illness or Injury  Intervention: Identify and Manage Fall Risk  Recent Flowsheet Documentation  Taken 9/13/2024 1610 by Fior Amaya RN  Safety Promotion/Fall Prevention:   activity supervised   assistive device/personal items within reach   clutter free environment maintained   mobility aid in reach   nonskid shoes/slippers when out of bed   room door open   safety round/check completed  Intervention: Prevent and Manage VTE (Venous Thromboembolism) Risk  Recent Flowsheet Documentation  Taken 9/13/2024 1610 by Fior Amaya RN  VTE Prevention/Management: SCDs off (sequential compression devices)  Intervention: Prevent Infection  Recent Flowsheet Documentation  Taken 9/13/2024 1610 by Fior Amaya RN  Infection Prevention:   hand hygiene promoted   rest/sleep promoted   single patient room provided     Problem: Risk for Delirium  Goal: Improved Attention and Thought Clarity  Intervention: Maximize Cognitive Function  Recent Flowsheet Documentation  Taken 9/13/2024 1610  by Fior Amaya RN  Reorientation Measures: clock in view     Problem: Comorbidity Management  Goal: Maintenance of Behavioral Health Symptom Control  Intervention: Maintain Behavioral Health Symptom Control  Recent Flowsheet Documentation  Taken 9/13/2024 1610 by Fior Amaya, RN  Medication Review/Management: medications reviewed  Goal: Blood Pressure in Desired Range  Intervention: Maintain Blood Pressure Management  Recent Flowsheet Documentation  Taken 9/13/2024 1610 by Fior Amaya RN  Medication Review/Management: medications reviewed     Problem: Delirium  Goal: Improved Attention and Thought Clarity  Intervention: Maximize Cognitive Function  Recent Flowsheet Documentation  Taken 9/13/2024 1610 by Fior Amaya RN  Reorientation Measures: clock in view     Problem: Fall Injury Risk  Goal: Absence of Fall and Fall-Related Injury  Intervention: Identify and Manage Contributors  Recent Flowsheet Documentation  Taken 9/13/2024 1610 by Fior Amaya RN  Medication Review/Management: medications reviewed  Intervention: Promote Injury-Free Environment  Recent Flowsheet Documentation  Taken 9/13/2024 1610 by Fior Amaya RN  Safety Promotion/Fall Prevention:   activity supervised   assistive device/personal items within reach   clutter free environment maintained   mobility aid in reach   nonskid shoes/slippers when out of bed   room door open   safety round/check completed   Goal Outcome Evaluation:

## 2024-09-15 ENCOUNTER — APPOINTMENT (OUTPATIENT)
Dept: RADIOLOGY | Facility: HOSPITAL | Age: 74
DRG: 689 | End: 2024-09-15
Attending: INTERNAL MEDICINE
Payer: MEDICARE

## 2024-09-15 LAB
ALBUMIN SERPL BCG-MCNC: 3.7 G/DL (ref 3.5–5.2)
ALP SERPL-CCNC: 91 U/L (ref 40–150)
ALT SERPL W P-5'-P-CCNC: 72 U/L (ref 0–70)
AMMONIA PLAS-SCNC: 19 UMOL/L (ref 16–60)
ANION GAP SERPL CALCULATED.3IONS-SCNC: 11 MMOL/L (ref 7–15)
AST SERPL W P-5'-P-CCNC: 22 U/L (ref 0–45)
BILIRUB SERPL-MCNC: 0.5 MG/DL
BUN SERPL-MCNC: 14.6 MG/DL (ref 8–23)
CALCIUM SERPL-MCNC: 8.8 MG/DL (ref 8.8–10.4)
CHLORIDE SERPL-SCNC: 107 MMOL/L (ref 98–107)
CREAT SERPL-MCNC: 0.97 MG/DL (ref 0.67–1.17)
CRP SERPL-MCNC: 49.2 MG/L
EGFRCR SERPLBLD CKD-EPI 2021: 82 ML/MIN/1.73M2
GLUCOSE BLDC GLUCOMTR-MCNC: 161 MG/DL (ref 70–99)
GLUCOSE BLDC GLUCOMTR-MCNC: 167 MG/DL (ref 70–99)
GLUCOSE BLDC GLUCOMTR-MCNC: 171 MG/DL (ref 70–99)
GLUCOSE BLDC GLUCOMTR-MCNC: 188 MG/DL (ref 70–99)
GLUCOSE BLDC GLUCOMTR-MCNC: 220 MG/DL (ref 70–99)
GLUCOSE SERPL-MCNC: 182 MG/DL (ref 70–99)
HCO3 SERPL-SCNC: 23 MMOL/L (ref 22–29)
HOLD SPECIMEN: NORMAL
INR PPP: 1.92 (ref 0.85–1.15)
POTASSIUM SERPL-SCNC: 4.4 MMOL/L (ref 3.4–5.3)
PROT SERPL-MCNC: 6.8 G/DL (ref 6.4–8.3)
SODIUM SERPL-SCNC: 141 MMOL/L (ref 135–145)
URATE SERPL-MCNC: 6.5 MG/DL (ref 3.4–7)

## 2024-09-15 PROCEDURE — 94660 CPAP INITIATION&MGMT: CPT

## 2024-09-15 PROCEDURE — 36415 COLL VENOUS BLD VENIPUNCTURE: CPT | Performed by: INTERNAL MEDICINE

## 2024-09-15 PROCEDURE — 99232 SBSQ HOSP IP/OBS MODERATE 35: CPT | Performed by: INTERNAL MEDICINE

## 2024-09-15 PROCEDURE — 82140 ASSAY OF AMMONIA: CPT | Performed by: INTERNAL MEDICINE

## 2024-09-15 PROCEDURE — 84550 ASSAY OF BLOOD/URIC ACID: CPT | Performed by: PHYSICIAN ASSISTANT

## 2024-09-15 PROCEDURE — 85610 PROTHROMBIN TIME: CPT | Performed by: STUDENT IN AN ORGANIZED HEALTH CARE EDUCATION/TRAINING PROGRAM

## 2024-09-15 PROCEDURE — 250N000013 HC RX MED GY IP 250 OP 250 PS 637: Performed by: STUDENT IN AN ORGANIZED HEALTH CARE EDUCATION/TRAINING PROGRAM

## 2024-09-15 PROCEDURE — 86140 C-REACTIVE PROTEIN: CPT | Performed by: INTERNAL MEDICINE

## 2024-09-15 PROCEDURE — 250N000013 HC RX MED GY IP 250 OP 250 PS 637: Performed by: INTERNAL MEDICINE

## 2024-09-15 PROCEDURE — 999N000157 HC STATISTIC RCP TIME EA 10 MIN

## 2024-09-15 PROCEDURE — 120N000001 HC R&B MED SURG/OB

## 2024-09-15 PROCEDURE — 80053 COMPREHEN METABOLIC PANEL: CPT | Performed by: INTERNAL MEDICINE

## 2024-09-15 PROCEDURE — 73620 X-RAY EXAM OF FOOT: CPT | Mod: LT

## 2024-09-15 PROCEDURE — 250N000013 HC RX MED GY IP 250 OP 250 PS 637: Performed by: PHYSICIAN ASSISTANT

## 2024-09-15 RX ORDER — COLCHICINE 0.6 MG/1
0.6 TABLET ORAL DAILY
Status: COMPLETED | OUTPATIENT
Start: 2024-09-15 | End: 2024-09-15

## 2024-09-15 RX ORDER — COLCHICINE 0.6 MG/1
1.2 TABLET ORAL DAILY
Status: COMPLETED | OUTPATIENT
Start: 2024-09-15 | End: 2024-09-15

## 2024-09-15 RX ORDER — WARFARIN SODIUM 5 MG/1
5 TABLET ORAL
Status: COMPLETED | OUTPATIENT
Start: 2024-09-15 | End: 2024-09-15

## 2024-09-15 RX ADMIN — METFORMIN HYDROCHLORIDE 1000 MG: 500 TABLET, FILM COATED ORAL at 17:06

## 2024-09-15 RX ADMIN — BRIMONIDINE TARTRATE 1 DROP: 2 SOLUTION OPHTHALMIC at 19:29

## 2024-09-15 RX ADMIN — COLCHICINE 1.2 MG: 0.6 TABLET, FILM COATED ORAL at 12:24

## 2024-09-15 RX ADMIN — WARFARIN SODIUM 5 MG: 5 TABLET ORAL at 17:06

## 2024-09-15 RX ADMIN — NETARSUDIL 1 DROP: 0.2 SOLUTION/ DROPS OPHTHALMIC; TOPICAL at 19:28

## 2024-09-15 RX ADMIN — BRIMONIDINE TARTRATE 1 DROP: 2 SOLUTION OPHTHALMIC at 08:30

## 2024-09-15 RX ADMIN — DORZOLAMIDE HYDROCHLORIDE AND TIMOLOL MALEATE 1 DROP: 20; 5 SOLUTION/ DROPS OPHTHALMIC at 08:30

## 2024-09-15 RX ADMIN — POLYETHYLENE GLYCOL 3350 17 G: 17 POWDER, FOR SOLUTION ORAL at 19:27

## 2024-09-15 RX ADMIN — METFORMIN HYDROCHLORIDE 1000 MG: 500 TABLET, FILM COATED ORAL at 08:29

## 2024-09-15 RX ADMIN — COLCHICINE 0.6 MG: 0.6 TABLET, FILM COATED ORAL at 12:55

## 2024-09-15 RX ADMIN — LATANOPROST 1 DROP: 50 SOLUTION/ DROPS OPHTHALMIC at 20:31

## 2024-09-15 RX ADMIN — GABAPENTIN 300 MG: 300 CAPSULE ORAL at 19:27

## 2024-09-15 RX ADMIN — DORZOLAMIDE HYDROCHLORIDE AND TIMOLOL MALEATE 1 DROP: 20; 5 SOLUTION/ DROPS OPHTHALMIC at 19:32

## 2024-09-15 RX ADMIN — GABAPENTIN 300 MG: 300 CAPSULE ORAL at 08:29

## 2024-09-15 RX ADMIN — Medication 1 TABLET: at 08:29

## 2024-09-15 ASSESSMENT — ACTIVITIES OF DAILY LIVING (ADL)
ADLS_ACUITY_SCORE: 63
ADLS_ACUITY_SCORE: 58
ADLS_ACUITY_SCORE: 63
ADLS_ACUITY_SCORE: 58
ADLS_ACUITY_SCORE: 63

## 2024-09-15 NOTE — PLAN OF CARE
Problem: Adult Inpatient Plan of Care  Goal: Plan of Care Review  Description: The Plan of Care Review/Shift note should be completed every shift.  The Outcome Evaluation is a brief statement about your assessment that the patient is improving, declining, or no change.  This information will be displayed automatically on your shift  note.  Outcome: Progressing  Flowsheets (Taken 9/15/2024 1316)  Plan of Care Reviewed With: patient   Goal Outcome Evaluation:      Plan of Care Reviewed With: patient      Patient reporting mild to moderate foot pain.   Minor swelling noted.  MD assessing no further changes noted.    Patient continues to have intermittent confusion but appears more alert today.  Patient was able to follow directions today with activity.    CPAP placed on patient when napping.  Tolerated well.

## 2024-09-15 NOTE — CONSULTS
[facility] Orthopedic Consultation    Rakesh Samuels MRN# [id number]   Age: 74 year old YOB: 1950     Date of Admission:  9/9/2024    Reason for consult: Left great toe pain, acute       Requesting physician: Dr. Tu Ruby       Level of consult: Consult, follow and place orders           Assessment and Plan:   Assessment:   Left toe pain with erythema and swelling; suspect possible gout.       Plan:   --At this time recommend a XR of the left foot to rule out any acute abnormalities. Order was placed and now completed.  See below.   --Placed order for blood uric acid  --Placed order to begin Colchicine, 1.2 mg x 1 does then 0.6 mg x1 dose 1 hour later if ok with medicine.  --If symptoms progress or worsen over the next 24-48 hours would recommend aspiration of 1st MTP to rule out infection and confirm gout before proceeding in possible oral vs injectable steroid.   --WBAT.  Ice as needed.   --Ortho will continue to follow, thank you for this consultation.                 Chief Complaint:   Left great toe pain       History is obtained from the patient and electronic health record         History of Present Illness:   This patient is a 74 year old male who has been in the hospital for septic encephalopathy and complicated UTI.  Currently on Zosyn.     On visitation today patient is sleeping in the recliner but easily awakened.  States that he has had left toe pain for a day or two.  No specific injury.  Denies any trouble with the toe in the past.  No history of gout. Denies any numbness or tingling in the distal LLE.        DM Type 2, A1c 7.5, paroxysmal a. Fib. And normocytic anemia with stable hemoglobin.            Past Medical History:     Past Medical History:   Diagnosis Date    A-fib (H)     Acute hemodialysis encounter (H24)     Due to CHIDI    Acute kidney injury (H24)     Acute respiratory failure with hypoxemia (H)     Adrenal incidentaloma (H24)      Asbestosis (H)     ATN (acute tubular necrosis) (H24)     Atrophy of right kidney     Basal cell carcinoma     BPH without urinary obstruction     Cellulitis     Left foot    Cholelithiasis     Depression with anxiety     Diabetes mellitus, type 2 (H) 4/12/2020    Esophagitis     Gastritis     Glaucoma     Hematemesis, presence of nausea not specified     Hyperkalemia     Hyperlipemia     Kidney stone     Lactic acidosis     Metabolic acidosis     Metformin overdose of undetermined intent     Paroxysmal atrial fibrillation (H) 2/18/2020    Pericardial effusion     PTSD (post-traumatic stress disorder)     Seasonal allergies     Sepsis due to urinary tract infection (H)     Shock circulatory (H)     SIRS (systemic inflammatory response syndrome) (H)     Sleep apnea     uses a machine at night.     Upper GI bleed              Past Surgical History:     Past Surgical History:   Procedure Laterality Date    EYE SURGERY      congenital ptosis right upper lid    HC CYSTOSCOPY,INSERT URETERAL STENT Left 4/12/2020    Procedure: CYSTOSCOPY, WITH URETERAL STENT INSERTION;  Surgeon: Christopher Yates MD;  Location: [facility] OR;  Service: Urology    UT ESOPHAGOGASTRODUODENOSCOPY TRANSORAL DIAGNOSTIC N/A 4/13/2020    Procedure: ESOPHAGOGASTRODUODENOSCOPY (EGD);  Surgeon: Robert Rico MD;  Location: [facility] GI;  Service: Gastroenterology    REPLACEMENT TOTAL KNEE Left              Social History:     Social History     Tobacco Use    Smoking status: Never    Smokeless tobacco: Never   Substance Use Topics    Alcohol use: Not Currently             Family History:     Family History   Problem Relation Age of Onset    Diabetes Mother      Family history reviewed          Allergies:   No Known Allergies          Medications:     Current Facility-Administered Medications   Medication Dose Route Frequency Provider Last Rate Last Admin    acetaminophen (TYLENOL) tablet 500 mg  500 mg Oral Q6H PRN Gondal, Saad J, MD   500 mg  at 09/14/24 1018    [Held by provider] ampicillin-sulbactam (UNASYN) 3 g vial to attach to  mL bag  3 g Intravenous Q6H Tu Ruby DO        [Held by provider] ARIPiprazole (ABILIFY) tablet 2 mg  2 mg Oral At Bedtime Gondal, Saad J, MD   2 mg at 09/13/24 2108    brimonidine (ALPHAGAN) 0.2 % ophthalmic solution 1 drop  1 drop Both Eyes BID Gondal, Saad J, MD   1 drop at 09/15/24 0830    calcium carbonate (TUMS) chewable tablet 1,000 mg  1,000 mg Oral 4x Daily PRN Gondal, Saad J, MD        colchicine (COLCRYS) tablet 0.6 mg  0.6 mg Oral Daily Sandra Reilly PA-C        colchicine (COLCRYS) tablet 1.2 mg  1.2 mg Oral Daily Sandar Reilly PA-C        glucose gel 15-30 g  15-30 g Oral Q15 Min PRN Gondal, Saad J, MD        Or    dextrose 50 % injection 25-50 mL  25-50 mL Intravenous Q15 Min PRN Gondal, Saad J, MD        Or    glucagon injection 1 mg  1 mg Subcutaneous Q15 Min PRN Gondal, Saad J, MD        dorzolamide-timolol (COSOPT) ophthalmic solution 1 drop  1 drop Both Eyes BID Gondal, Saad J, MD   1 drop at 09/15/24 0830    [Held by provider] fluvoxaMINE (LUVOX) tablet 50 mg  50 mg Oral At Bedtime Gondal, Saad J, MD   50 mg at 09/13/24 2108    gabapentin (NEURONTIN) capsule 300 mg  300 mg Oral BID Gondal, Saad J, MD   300 mg at 09/15/24 0829    hydrALAZINE (APRESOLINE) tablet 10 mg  10 mg Oral Q4H PRN Gondal, Saad J, MD        Or    hydrALAZINE (APRESOLINE) injection 10 mg  10 mg Intravenous Q4H PRN Gondal, Saad J, MD        insulin aspart (NovoLOG) injection (RAPID ACTING)  1-10 Units Subcutaneous TID AC Tu Ruby DO   2 Units at 09/15/24 0828    insulin aspart (NovoLOG) injection (RAPID ACTING)  1-7 Units Subcutaneous At Bedtime Tu Ruby DO   1 Units at 09/14/24 2127    insulin aspart (NovoLOG) injection (RAPID ACTING)   Subcutaneous TID w/meals Tu Ruby DO   6 Units at 09/15/24 0829    insulin aspart (NovoLOG) injection (RAPID ACTING)    Subcutaneous With Snacks or Supplements Ann Marie Holland MD        latanoprost (XALATAN) 0.005 % ophthalmic solution 1 drop  1 drop Both Eyes At Bedtime Gondal, Saad J, MD   1 drop at 09/14/24 2126    lidocaine (LMX4) cream   Topical Q1H PRN Gondal, Saad J, MD        lidocaine 1 % 0.1-1 mL  0.1-1 mL Other Q1H PRN Gondal, Saad J, MD        metFORMIN (GLUCOPHAGE) tablet 1,000 mg  1,000 mg Oral BID w/meals Tu Ruby,    1,000 mg at 09/15/24 0829    multivitamin w/minerals (THERA-VIT-M) tablet 1 tablet  1 tablet Oral Daily Gondal, Saad J, MD   1 tablet at 09/15/24 0829    netarsudil (RHOPRESSA) 0.02 % ophthalmic solution 1 drop  1 drop Both Eyes QPM Gondal, Saad J, MD   1 drop at 09/14/24 2126    ondansetron (ZOFRAN) injection 4 mg  4 mg Intravenous Q6H PRN Gondal, Saad J, MD        polyethylene glycol (MIRALAX) Packet 17 g  17 g Oral QPM Gondal, Saad J, MD   17 g at 09/14/24 2126    senna-docusate (SENOKOT-S/PERICOLACE) 8.6-50 MG per tablet 1 tablet  1 tablet Oral BID PRN Gondal, Saad J, MD        Or    senna-docusate (SENOKOT-S/PERICOLACE) 8.6-50 MG per tablet 2 tablet  2 tablet Oral BID PRN Gondal, Saad J, MD        sodium chloride (PF) 0.9% PF flush 3 mL  3 mL Intracatheter Q8H Gondal, Saad J, MD   3 mL at 09/15/24 0237    sodium chloride (PF) 0.9% PF flush 3 mL  3 mL Intracatheter q1 min prn Gondal, Saad J, MD   3 mL at 09/10/24 1649    warfarin ANTICOAGULANT (COUMADIN) tablet 5 mg  5 mg Oral ONCE at 18:00 Tu Ruby DO        Warfarin Dose Required Daily - Pharmacist Managed  1 each Oral See Admin Instructions Gondal, Saad J, MD                 Review of Systems:   The Review of Systems is negative other than noted in the HPI          Physical Exam:   Vitals were reviewed  Temp: 97.8  F (36.6  C) Temp src: Oral BP: 127/77 Pulse: 75   Resp: 18 SpO2: 94 % O2 Device: None (Room air)        On exam of the left foot there is erythema of the great toe that extends laterally to the 2nd and 3rd MTP  area.  Patient able to actively wiggle toes, CMS intact.  Tenderness to palpation and increased pain with movement of the 1st MTP joint.  Intact dorsi/plantar flexion.  Calf is soft and non tender.            Data:     Lab Results   Component Value Date    WBC 10.5 09/14/2024    HGB 12.5 (L) 09/14/2024    HCT 38.8 (L) 09/14/2024     09/14/2024     09/15/2024    POTASSIUM 4.4 09/15/2024    CHLORIDE 107 09/15/2024    CO2 23 09/15/2024    BUN 14.6 09/15/2024    CR 0.97 09/15/2024     (H) 09/15/2024    DD 0.47 07/31/2023    AST 22 09/15/2024    ALT 72 (H) 09/15/2024    ALKPHOS 91 09/15/2024    BILITOTAL 0.5 09/15/2024    MARÍA 19 09/15/2024    INR 1.92 (H) 09/15/2024       CRP : 49.2    Imaging:  Left Foot XR 9/15/2024    IMPRESSION: Negative for fracture, erosion, or bone lesion. Flexion deformity in the second toe. Mild degenerative arthrosis at the first MTP joint, throughout the IP joints, and in the TMT joints. Mild generalized soft tissue swelling.           Sandra Reilly PA-C     [facility]    [phone]

## 2024-09-15 NOTE — PROGRESS NOTES
Care Management Follow Up    Length of Stay (days): 6    Expected Discharge Date: 09/16/2024    Anticipated Discharge Plan:  Transitional Care    Transportation: Anticipate Wheelchair. Transportation costs discussed? Yes. Discussed with family     PT Recommendations: Transitional Care Facility, Per plan established by the PT  OT Recommendations:  other (see comments)     Barriers to Discharge: medical stability    Prior Living Situation: independent living facility ([facility]) with alone    Discussed  Partnership in Safe Discharge Planning  document with patient/family: No     Handoff Completed: No, handoff not indicated or clinically appropriate    Patient/Spokesperson Updated: No    Additional Information:  See below     Next Steps: continue to follow     Emily Diallo RN        Notified that patient is not ready for discharge today. Transport rescheduled for tomorrow [id number]    Message left for admissions at [facility] TCU

## 2024-09-15 NOTE — PLAN OF CARE
Problem: Risk for Delirium  Goal: Improved Sleep  Outcome: Progressing     Problem: Fall Injury Risk  Goal: Absence of Fall and Fall-Related Injury  Outcome: Progressing  Intervention: Identify and Manage Contributors  Recent Flowsheet Documentation  Taken 9/15/2024 0016 by Magalie Vilchis RN  Medication Review/Management: medications reviewed  Intervention: Promote Injury-Free Environment  Recent Flowsheet Documentation  Taken 9/15/2024 0016 by Magalie Vilchis RN  Safety Promotion/Fall Prevention:   activity supervised   assistive device/personal items within reach   clutter free environment maintained   mobility aid in reach   nonskid shoes/slippers when out of bed   room door open   safety round/check completed   Goal Outcome Evaluation:         Pt slept through the night with Cpap on, arouses to care by voice. Urine incontinence, bed pad changed. No significant event. Continue to monitor.  Vital signs:  Temp: 99.9  F (37.7  C) Temp src: Axillary BP: (!) 169/99 Pulse: 89   Resp: 22 SpO2: 96 % O2 Device: BiPAP/CPAP

## 2024-09-15 NOTE — PLAN OF CARE
Goal Outcome Evaluation:    [id number].... Pt is confused but manageable with cares, no s/s of pain noted, assist of 2 with turning and repositioning, incontinent of urine,. B G= 173, sliding scale and carb count insulin given. Will continue to monitor.

## 2024-09-15 NOTE — PROGRESS NOTES
[facility]    Medicine Progress Note - Hospitalist Service    Date of Admission:  9/9/2024    Assessment & Plan   74 year old male admitted on 9/9/2024. Patient presented with AMS and work up revealed UTI, admitted for septic encephalopathy and complicated UTI     Septic encephalopathy   Initially suspected due to UTI vs other etiology  Presented with AMS, hallucinations - resolved  Initial CT head negative for acute changes. MRI brain with small vessel ischemic disease  TSH, B12, Ammonia wnl  UA consistent with UTI but urine culture negative, blood cultures remain NGTD  CT C/A/P no acute changes, Bladder wall thickening could represent hypertrophy from outlet obstruction secondary to an enlarged prostate gland, unchanged. Nonobstructing stones both kidneys, and mild left hydronephrosis, unchanged. Gallstones noted  Small blood clots at urethra - resolved  Was on IV Zosyn changed to IV unasyn   Continued to treat with antibiotics as patient had similar presentation earlier with Enterococcus faecalis bacteremia and UTI, and initially had improvement with encephalopathy with antibiotics however all cultures are negative   Completed 5 day course of IV antibiotics 9/14  On 9/14 was having more confusion, not following commands and was having more problems ambulating  Re-assessment shows somnolence in setting of not using CPAP  VBG was without overt respiratory acidosis  Liver function shows mild transaminase elevation  BMP, CBC, Lipase, Mag and procalcitonin otherwise normal  Recheck UA is abnormal but concern for contaminated sample and UC remains NGTD  Repeat head CT unremarkable  RUQ US to eval for elevated transaminases shows known gallstones without evidence of cholecystitis   -- LFT's improved  -- ammonia normal  -- ensure patient does not sleep without CPAP  -- hold home abilify and luvox in case this is leading to increased somnolence  -- hold off restarting Unasyn for now        Left  foot pain, possible gout left 1st MTP  -- new complaint on 9/15  -- exam shows mild eryhtema, warmth and TTP over 1st MTP  -- plain film negative  -- appreciate ortho opinion regarding plan for colchicine and follow up in AM       Right renal cortical atrophy, mild left unchanged hydronephrosis  Ct showed Severe right renal cortical atrophy, nonobstructing stones both kidneys, and mild left hydronephrosis, unchanged.     Cr normal  --Urology referral outpatient       DM 2  Hemoglobin A1c 7.5  -- Resumed metformin   -- Increased to 1:10 carb coverage  -- Continue sliding scale insulin       Mild lipemia  Lipase elevated, will recheck  --No abdominal pain       Normocytic anemia  --Hb stable       Elevated blood pressure without a diagnosis of hypertension  --IV hydralazine as needed for elevated blood pressure       Paroxysmal atrial fibrillation  --Continue with warfarin        Mood disorder  Holding home Abilify and fluvoxamine as above     Glaucoma  Continue with Alphagan, Cosopt, latanoprostene, Rhopressa       Incidental findings:   Gall stones  Sigmoid diverticulosis       Right knee pain: resolved  No erythema, swelling  ROM normal  XR R knee negative        RADHA: continue CPAP per home settings, do not allow patient to sleep without CPAP in case respiratory acidosis is contributing to above issues               Diet: Advance Diet as Tolerated: Regular Diet Adult; Moderate Consistent Carb (60 g CHO per Meal) Diet  Diet    DVT Prophylaxis: Pneumatic Compression Devices  Olsen Catheter: Not present  Lines: None     Cardiac Monitoring: None  Code Status: No CPR- Do NOT Intubate      Clinically Significant Risk Factors                           # DMII: A1C = 7.5 % (Ref range: <5.7 %) within past 6 months         # Financial/Environmental Concerns: none               Disposition Plan   Medically Ready for Discharge: Anticipated Tomorrow      Tu Ruby DO  Hospitalist Service  [facility]  Hospital  Securely message with [employer] (more info)  Text page via [facility] Paging/Directory   ______________________________________________________________________    Interval History   NAD. Feels more alert today    Physical Exam   Vital Signs: Temp: 97.8  F (36.6  C) Temp src: Oral BP: 127/77 Pulse: 75   Resp: 18 SpO2: 94 % O2 Device: None (Room air)    Weight: 210 lbs 8 oz  General: NAD  RESPIRATORY: Breathing nonlabored  CARDIOVASCULAR: No le edema bilat.   NEUROLOGIC: Motor and sensory intact, speech clear, alert         >50 MINUTES SPENT BY ME on the date of service doing chart review, history, exam, documentation & further activities per the note.  Data

## 2024-09-16 ENCOUNTER — ANTICOAGULATION THERAPY VISIT (OUTPATIENT)
Dept: ANTICOAGULATION | Facility: CLINIC | Age: 74
End: 2024-09-16
Payer: MEDICARE

## 2024-09-16 VITALS
BODY MASS INDEX: 24 KG/M2 | SYSTOLIC BLOOD PRESSURE: 139 MMHG | TEMPERATURE: 98.1 F | HEIGHT: 78 IN | RESPIRATION RATE: 18 BRPM | WEIGHT: 207.4 LBS | DIASTOLIC BLOOD PRESSURE: 76 MMHG | OXYGEN SATURATION: 96 % | HEART RATE: 84 BPM

## 2024-09-16 DIAGNOSIS — I48.0 PAROXYSMAL ATRIAL FIBRILLATION (H): Primary | ICD-10-CM

## 2024-09-16 LAB
BACTERIA UR CULT: NORMAL
GLUCOSE BLDC GLUCOMTR-MCNC: 168 MG/DL (ref 70–99)
GLUCOSE BLDC GLUCOMTR-MCNC: 172 MG/DL (ref 70–99)
INR PPP: 1.64 (ref 0.85–1.15)

## 2024-09-16 PROCEDURE — 36415 COLL VENOUS BLD VENIPUNCTURE: CPT | Performed by: STUDENT IN AN ORGANIZED HEALTH CARE EDUCATION/TRAINING PROGRAM

## 2024-09-16 PROCEDURE — 85610 PROTHROMBIN TIME: CPT | Performed by: STUDENT IN AN ORGANIZED HEALTH CARE EDUCATION/TRAINING PROGRAM

## 2024-09-16 PROCEDURE — 250N000013 HC RX MED GY IP 250 OP 250 PS 637: Performed by: STUDENT IN AN ORGANIZED HEALTH CARE EDUCATION/TRAINING PROGRAM

## 2024-09-16 PROCEDURE — 250N000013 HC RX MED GY IP 250 OP 250 PS 637: Performed by: INTERNAL MEDICINE

## 2024-09-16 RX ORDER — WARFARIN SODIUM 5 MG/1
5 TABLET ORAL
Status: DISCONTINUED | OUTPATIENT
Start: 2024-09-16 | End: 2024-09-16 | Stop reason: HOSPADM

## 2024-09-16 RX ADMIN — DORZOLAMIDE HYDROCHLORIDE AND TIMOLOL MALEATE 1 DROP: 20; 5 SOLUTION/ DROPS OPHTHALMIC at 08:10

## 2024-09-16 RX ADMIN — Medication 1 TABLET: at 08:23

## 2024-09-16 RX ADMIN — BRIMONIDINE TARTRATE 1 DROP: 2 SOLUTION OPHTHALMIC at 08:05

## 2024-09-16 RX ADMIN — METFORMIN HYDROCHLORIDE 1000 MG: 500 TABLET, FILM COATED ORAL at 08:23

## 2024-09-16 RX ADMIN — GABAPENTIN 300 MG: 300 CAPSULE ORAL at 08:14

## 2024-09-16 ASSESSMENT — ACTIVITIES OF DAILY LIVING (ADL)
ADLS_ACUITY_SCORE: 59
ADLS_ACUITY_SCORE: 58
ADLS_ACUITY_SCORE: 59
ADLS_ACUITY_SCORE: 58
ADLS_ACUITY_SCORE: 59
ADLS_ACUITY_SCORE: 59
ADLS_ACUITY_SCORE: 58
ADLS_ACUITY_SCORE: 58

## 2024-09-16 NOTE — PROGRESS NOTES
"Orthopedic Progress Note      Assessment:    Left great tow MTP pain likely gout    Plan:   - Improvement in pain today.  Low suspicion for septic joint. Would not recommend further evaluation of toe pain.  Okay to discharge from ortho standpoint, follow up with PCP for further gout management      Subjective:    Patient reports feeling well today. Feels pain has improved today.  Denies fevers/chills.  All questions/concerns answered.      Objective:  /76 (BP Location: Right arm)   Pulse 84   Temp 98.1  F (36.7  C) (Oral)   Resp 18   Ht 1.981 m (6' 6\")   Wt 95.5 kg (210 lb 8 oz)   SpO2 96%   BMI 24.33 kg/m      On exam of the left foot there is erythema of the great toe over the 1st MTP. Patient able to actively wiggle toes, CMS intact. Tenderness to palpation and increased pain with movement of the 1st MTP joint.  No pain with passive MTP manipulation. Intact dorsi/plantar flexion. Calf is soft and non tender.       Pertinent Labs   Lab Results: personally reviewed.   Lab Results   Component Value Date    INR 1.64 (H) 09/16/2024    INR 1.92 (H) 09/15/2024    INR 2.26 (H) 09/14/2024     Lab Results   Component Value Date    WBC 10.5 09/14/2024    HGB 12.5 (L) 09/14/2024    HCT 38.8 (L) 09/14/2024    MCV 97 09/14/2024     09/14/2024     Lab Results   Component Value Date     09/15/2024    CO2 23 09/15/2024         Report completed by:  SLOANE SEN PA-C  Date: 09/16/2024  [facility]    "

## 2024-09-16 NOTE — PLAN OF CARE
Goal Outcome Evaluation:       Patient is alert and oriented, repositioned Q2, slept through the night with Cpap, arouses to care by voice. No significant event.  Vital signs:  Temp: 98  F (36.7  C) Temp src: Oral BP: (!) 145/82 Pulse: 87   Resp: 23 SpO2: 93 % O2 Device: BiPAP/CPAP

## 2024-09-16 NOTE — PROGRESS NOTES
Care Management Discharge Note    Discharge Date: 09/16/2024       Discharge Disposition: Transitional Care    Discharge Services: rehab    Discharge DME:      Discharge Transportation: health plan transportation    Private pay costs discussed: transportation costs    Does the patient's insurance plan have a 3 day qualifying hospital stay waiver?  No  PAS Confirmation Code:  N/A pt returning to facility  Education Provided on the Discharge Plan:    Persons Notified of Discharge Plans: pt daughter Nanette and Eloisa  Patient/Family in Agreement with the Plan: yes    Handoff Referral Completed: No, handoff not indicated or clinically appropriate    Additional Information:  Patient is returning to [facility] today.   VM left for daughter Nanette, SW did talk to daughter Eloisa by phone for update.  Admissions department at [facility] Updated and in agreement with pt's return today.    Callie Hurt, LATRICESW

## 2024-09-16 NOTE — PLAN OF CARE
Patient is alert and oriented. Patient has been turned/repositioned Q2 and on CPAP overnight for CPAP.       Isha Baldwin RN    Problem: Adult Inpatient Plan of Care  Goal: Plan of Care Review  Description: The Plan of Care Review/Shift note should be completed every shift.  The Outcome Evaluation is a brief statement about your assessment that the patient is improving, declining, or no change.  This information will be displayed automatically on your shift  note.  Outcome: Progressing   Goal Outcome Evaluation:

## 2024-09-16 NOTE — PROGRESS NOTES
ANTICOAGULATION MANAGEMENT     Rakesh Samuels 74 year old male is on warfarin with subtherapeutic INR result. (Goal INR 2.0-3.0)    Recent labs: (last 7 days)     09/16/24  0843   INR 1.64*       ASSESSMENT     Source(s): Chart Review and Home Care/Facility Nurse     Warfarin doses taken: While hospitalized on 9/9/24 - 9/16/24: anticoagulation calendar updated with inpatient dosing.  Discharged on: home regimen continued  Diet: No new diet changes identified  Medication/supplement changes:  Unasyn was given while inpatient - Concurrent use of AMPICILLIN and WARFARIN may result in an increased risk of bleeding.  Depli - no interaction anticipated  New illness, injury, or hospitalization: Yes: admission from 9/9/24 - 9/16/24 for septic encephalopathy and complicated UTI  Signs or symptoms of bleeding or clotting: No  Previous result:  therapeutic prior to admission  Additional findings: None       PLAN     Recommended plan for temporary change(s) affecting INR     Dosing Instructions: booster dose then continue your current warfarin dose with next INR in 3 days       Summary  As of 9/16/2024      Full warfarin instructions:  9/16: 5 mg; Otherwise 5 mg every Sun, Wed; 2.5 mg all other days   Next INR check:  9/19/2024               Telephone call with Tiffany, [facility] [place], operated by [facility] nurse (medical care for seniors) who agrees to plan and repeated back plan correctly    Orders given to  Homecare nurse/facility to recheck    Education provided: Please call back if any changes to your diet, medications or how you've been taking warfarin    Plan made per [facility] anticoagulation protocol    Jeannie Baldwin RN  9/16/2024  Anticoagulation Clinic  [employer] for routing messages: [facility] (TCU/LTC/half-way)  [facility] patient phone line: [phone]        _______________________________________________________________________     Anticoagulation Episode Summary       Current INR goal:  2.0-3.0   TTR:  39.8% (1 mo)   Target  end date:  Indefinite   Send INR reminders to:  [facility] (TCU/LTC/ELISA)    Indications    Paroxysmal atrial fibrillation (H) [I48.0]             Comments:  Neetu NAJERA [phone]             Anticoagulation Care Providers       Provider Role Specialty Phone number    Kylie Gomez NP Referring Nurse Practitioner [phone]

## 2024-09-16 NOTE — PLAN OF CARE
Occupational Therapy Discharge Summary    Reason for therapy discharge:    Discharged to transitional care facility.    Progress towards therapy goal(s). See goals on Care Plan in [facility] electronic health record for goal details.  Goals partially met.  Barriers to achieving goals:   discharge from facility.    Therapy recommendation(s):    Continued therapy is recommended.  Rationale/Recommendations:  to maximize ADL independence.

## 2024-09-16 NOTE — DISCHARGE SUMMARY
[facility]  Hospitalist Discharge Summary      Date of Admission:  9/9/2024  Date of Discharge:  9/16/2024  Discharging Provider: Tu Ruby DO  Discharge Service: Hospitalist Service        Follow-ups Needed After Discharge   Follow-up Appointments     Follow Up and recommended labs and tests      Follow up with half-way physician.  The following labs/tests are   recommended: INR in 2-3 days to ensure stability and then trend twice   weekly thereafter. Consider chronic gout therapy, monitor glucoses for any need for increased diabetes control.           Unresulted Labs Ordered in the Past 30 Days of this Admission       No orders found from 8/10/2024 to 9/10/2024.        These results will be followed up by Hospitalist results pool    Discharge Disposition   Discharged to short-term care facility  Condition at discharge: Stable      Hospital Course   74 year old male admitted on 9/9/2024. Patient presented with AMS and work up revealed UTI, admitted for septic encephalopathy and complicated UTI     Septic encephalopathy   Initially suspected due to UTI vs other etiology  Presented with AMS, hallucinations - resolved  Initial CT head negative for acute changes. MRI brain with small vessel ischemic disease  TSH, B12, Ammonia wnl  UA consistent with UTI but urine culture negative, blood cultures remain NGTD  CT C/A/P no acute changes, Bladder wall thickening could represent hypertrophy from outlet obstruction secondary to an enlarged prostate gland, unchanged. Nonobstructing stones both kidneys, and mild left hydronephrosis, unchanged. Gallstones noted  Small blood clots at urethra - resolved  Was on IV Zosyn changed to IV unasyn   Continued to treat with antibiotics as patient had similar presentation earlier with Enterococcus faecalis bacteremia and UTI, and initially had improvement with encephalopathy with antibiotics however all cultures are negative   Completed 5 day  course of IV antibiotics 9/14  On 9/14 was having more confusion, not following commands and was having more problems ambulating  Re-assessment shows somnolence in setting of not using CPAP  VBG was without overt respiratory acidosis  Liver function shows mild transaminase elevation  BMP, CBC, Lipase, Mag and procalcitonin otherwise normal  Recheck UA is abnormal but concern for contaminated sample and UC remains NGTD  Repeat head CT unremarkable  RUQ US to eval for elevated transaminases shows known gallstones without evidence of cholecystitis   -- LFT's improved  -- ammonia normal  -- ensure patient does not sleep without CPAP  -- ok to resume home abilify and luvox. These were briefly held but likely not contributory given rapid improvement in mental status with treatment of left toe inflammatory process as discussed below          Left foot pain, possible gout left 1st MTP however Uric acid normal, CRP elevated, could still compatible with inflammatory arthritis   Plain film imaging unremarkable  -- symptoms resolved with short course of cholchicine  -- ortho does not recommend any further workup  -- consider chronic gout therapy upon follow up        Right renal cortical atrophy, mild left unchanged hydronephrosis  Ct showed Severe right renal cortical atrophy, nonobstructing stones both kidneys, and mild left hydronephrosis, unchanged.     Cr normal  --Urology referral outpatient        DM 2  Hemoglobin A1c 7.5  -- continue metformin   -- was intermittently on insulin while inpatient but glucoses likely being driven from toe inflammation that is now improved  -- monitor glucoses after discharge, consider increased diabetes management if warranted        Mild lipemia  Lipase elevated but trended down  --No abdominal pain        Normocytic anemia  --Hb stable        Elevated blood pressure without a diagnosis of hypertension  --BP at goal        Paroxysmal atrial fibrillation  --Continue with warfarin          Mood disorder  Holding home Abilify and fluvoxamine as above       Glaucoma  Continue with Alphagan, Cosopt, latanoprostene, Rhopressa        Incidental findings:   Gall stones  Sigmoid diverticulosis          Right knee pain: resolved  No erythema, swelling  ROM normal  XR R knee negative        RADHA: continue CPAP per home settings     Consultations This Hospital Stay   PHARMACY TO DOSE WARFARIN  PHYSICAL THERAPY ADULT IP CONSULT  OCCUPATIONAL THERAPY ADULT IP CONSULT  CARE MANAGEMENT / SOCIAL WORK IP CONSULT  PHYSICAL THERAPY ADULT IP CONSULT  OCCUPATIONAL THERAPY ADULT IP CONSULT  ORTHOPEDIC SURGERY IP CONSULT    Code Status   No CPR- Do NOT Intubate    Time Spent on this Encounter   ITu DO, personally saw the patient today and spent greater than 30 minutes discharging this patient.       Tu Ruby DO  [address] [address]  Phone: [phone]  Fax: [phone]  ______________________________________________________________________    Physical Exam   Vital Signs: Temp: 98.1  F (36.7  C) Temp src: Oral BP: 139/76 Pulse: 84   Resp: 18 SpO2: 96 % O2 Device: BiPAP/CPAP    Weight: 207 lbs 6.4 oz    General: NAD  RESPIRATORY: Respirations nonlabored  CARDIOVASCULAR: No le edema bilat.  ABDOMEN: soft, non-tender   MUSCULOSKELETAL: Muscle and joints without inflammation.  NEUROLOGIC: No focal arm or leg weakness, alert    Primary Care Physician   Rashida Madison    Discharge Orders      Adult Urology [facility] Referral      General info for SNF    Length of Stay Estimate: Short Term Care: Estimated # of Days <30  Condition at Discharge: Improving  Level of care:skilled   Rehabilitation Potential: Good  Admission H&P remains valid and up-to-date: Yes  Recent Chemotherapy: N/A  Use Nursing Home Standing Orders: Yes     Mantoux instructions    Give two-step Mantoux (PPD) Per Facility Policy Yes     Follow Up and recommended labs  and tests    Follow up with halfway physician.  The following labs/tests are recommended: INR in 2-3 days to ensure stability and then trend twice weekly thereafter.     Reason for your hospital stay    Altered mental status     Activity - Up with nursing assistance     No CPR- Do NOT Intubate     Physical Therapy Adult Consult    Evaluate and treat as clinically indicated.    Reason:  Deconditioning     Occupational Therapy Adult Consult    Evaluate and treat as clinically indicated.    Reason: Deconditioning     Diet    Follow this diet upon discharge: Moderate Consistent Carb (60 g CHO per Meal) Diet     \    Discharge Medications   Current Discharge Medication List        CONTINUE these medications which have NOT CHANGED    Details   !! acetaminophen (TYLENOL) 325 MG tablet Take 650 mg by mouth 3 times daily.      !! acetaminophen (TYLENOL) 500 MG tablet [ACETAMINOPHEN (TYLENOL) 500 MG TABLET] Take 500 mg by mouth every 6 (six) hours as needed for pain.      ARIPiprazole (ABILIFY) 2 MG tablet [ARIPIPRAZOLE (ABILIFY) 2 MG TABLET] Take 2 mg by mouth at bedtime.       brimonidine (ALPHAGAN) 0.2 % ophthalmic solution [BRIMONIDINE (ALPHAGAN) 0.2 % OPHTHALMIC SOLUTION] Administer 1 drop to both eyes 2 (two) times a day.       calcium carbonate (TUMS) 500 MG chewable tablet Take 1 chew tab by mouth 3 times daily as needed for heartburn.      dorzolamide-timolol (COSOPT) 2-0.5 % ophthalmic solution Place 1 drop into both eyes 2 times daily      fluvoxaMINE (LUVOX) 50 MG tablet [FLUVOXAMINE (LUVOX) 50 MG TABLET] Take 50 mg by mouth at bedtime.       gabapentin (NEURONTIN) 300 MG capsule Take 300 mg by mouth 2 times daily      latanoprostene bunod 0.024 % Drop Place 1 drop into both eyes At Bedtime      levomefolate calcium (L-METHYLFOLATE ORAL) Take 15 mg by mouth daily.      metFORMIN (GLUCOPHAGE) 1000 MG tablet Take 1,000 mg by mouth 2 times daily (with meals)      Multiple Vitamins-Minerals (CENTRUM SILVER) CHEW  Take 1 tablet by mouth daily      netarsudiL (RHOPRESSA) 0.02 % Drop Place 1 drop into both eyes every evening      polyethylene glycol (MIRALAX) 17 g packet Take 1 packet by mouth daily.      warfarin ANTICOAGULANT (COUMADIN) 2.5 MG tablet Take 2.5-5 mg by mouth See Admin Instructions. 9/9-Take 5mg on Sunday, Wednesday. Take 2.5mg all other days of week       !! - Potential duplicate medications found. Please discuss with provider.        Allergies   No Known Allergies

## 2024-09-16 NOTE — PLAN OF CARE
RN discussed discharge and return to facility.  Patient verbalized understanding and voiced no concerns at this time.  AVS and packet put together for facility transfer.  Belongings sent with patient.  Patient discharging per wheelchair transport.

## 2024-09-17 ENCOUNTER — PATIENT OUTREACH (OUTPATIENT)
Dept: CARE COORDINATION | Facility: CLINIC | Age: 74
End: 2024-09-17

## 2024-09-17 ENCOUNTER — DOCUMENTATION ONLY (OUTPATIENT)
Dept: GERIATRICS | Facility: CLINIC | Age: 74
End: 2024-09-17

## 2024-09-17 ENCOUNTER — TRANSITIONAL CARE UNIT VISIT (OUTPATIENT)
Dept: GERIATRICS | Facility: CLINIC | Age: 74
End: 2024-09-17
Payer: MEDICARE

## 2024-09-17 VITALS
HEIGHT: 78 IN | SYSTOLIC BLOOD PRESSURE: 141 MMHG | OXYGEN SATURATION: 95 % | RESPIRATION RATE: 16 BRPM | DIASTOLIC BLOOD PRESSURE: 90 MMHG | HEART RATE: 91 BPM | TEMPERATURE: 97.4 F | BODY MASS INDEX: 24.83 KG/M2 | WEIGHT: 214.6 LBS

## 2024-09-17 DIAGNOSIS — I10 ESSENTIAL HYPERTENSION: ICD-10-CM

## 2024-09-17 DIAGNOSIS — E11.00 TYPE 2 DIABETES MELLITUS WITH HYPEROSMOLARITY WITHOUT COMA, WITHOUT LONG-TERM CURRENT USE OF INSULIN (H): ICD-10-CM

## 2024-09-17 DIAGNOSIS — G93.40 ACUTE ENCEPHALOPATHY: Primary | ICD-10-CM

## 2024-09-17 DIAGNOSIS — F33.41 MAJOR DEPRESSIVE DISORDER, RECURRENT EPISODE, IN PARTIAL REMISSION (H): ICD-10-CM

## 2024-09-17 DIAGNOSIS — I48.0 PAROXYSMAL ATRIAL FIBRILLATION (H): ICD-10-CM

## 2024-09-17 DIAGNOSIS — N39.0 URINARY TRACT INFECTION WITHOUT HEMATURIA, SITE UNSPECIFIED: ICD-10-CM

## 2024-09-17 DIAGNOSIS — N20.0 NEPHROLITHIASIS: ICD-10-CM

## 2024-09-17 DIAGNOSIS — F60.89 MIXED PERSONALITY DISORDER IN ADULT (H): ICD-10-CM

## 2024-09-17 PROCEDURE — 99310 SBSQ NF CARE HIGH MDM 45: CPT | Performed by: NURSE PRACTITIONER

## 2024-09-17 NOTE — PROGRESS NOTES
[facility] GERIATRIC SERVICES    Code Status:  DNR/DNI   Visit Type:   Chief Complaint   Patient presents with    TCU Hospital F/U     [facility] 9/9/2024 - 9/16/2024     Facility:  [facility] ([facility]) [[id number]]         HPI: Rakesh Samuels is a 74 year old male who I am seeing today for readmit to the TCU. Past medical history includes recurrent UTI, nephrolithiasis,   right renal cortical atrophy, mild left unchanged hydronephrosis, gout, personality disorder, DM2, HL, glaucoma, RADHA on cpap and A fib. Pt recently re hospitalized due to hallucinations with acute encephalopathy. Work up revealed UTI, pt hospitalized for septic encephalopathy and complicated UTI however UC negative and blood cultures NGTD.     Transitional Care Course: Today pt lying in bed. He is alert and remembers me on visit. Pt has completed his antibiotics. He denies any dysuria. He denies blood in his urine. He denies any SOB or CP. He reports he is eating well and having regular bowel movements. He denies pain in his knee today. He denies pain in his toe today. Denies any further hallucinations. Mood appears stable.       Assessment/Plan:     Recurrent UTI  Encephalopathy  -CT head negative for acute changes.   -MRI brain with small vessel ischemic disease  -Urine CX negative. Blood Cx NGTD.   -pt has completed his antibiotics.   -Follow up CBC.     Neptholiathiasis  Mild left hydronephrosis  -CT C/A/P no acute changes, Bladder wall thickening could represent hypertrophy from outlet obstruction secondary to an enlarged prostate gland, unchanged.   -Non obstructing stones both kidneys, and mild left hydronephrosis, unchanged.  -Follow up with Urology.   -Follow up BMP.     Gallstones   -RUQ US to eval for elevated transaminases shows known gallstones without evidence of cholecystitis   -Follow up LFTS.      Gout   -Left foot pain, possible gout left 1st MTP however Uric acid normal, CRP elevated, could still compatible with inflammatory  arthritis   -Xray negative   -pt treated with brief course of colchicine  -consider chronic gout therapy upon follow up     DM 2  Hemoglobin A1c 7.5  -consistent carb diet.   -Blood sugars checks BID.   -continue metformin     Paroxysmal atrial fibrillation  -Continue with warfarin   -INR 1.64.   -Coumadin Clinic to manage INRs.      Mood disorder  -continue home Abilify and fluvoxamine    -these were held briefly in hospital.      Glaucoma  -Continue with Alphagan, Cosopt, latanoprostene, Rhopressa     RADHA  -continue CPAP per home settings      -OK to PT, OT, and ST to eval and treat.       Active Ambulatory Problems     Diagnosis Date Noted    SIRS (systemic inflammatory response syndrome) (H) 09/09/2019    Adrenal incidentaloma (H24)     Diet-controlled diabetes mellitus (H)     Pericardial effusion     Lactic acidosis     Type 2 diabetes mellitus without complication, without long-term current use of insulin (H) 04/12/2020    Paroxysmal atrial fibrillation (H) 02/18/2020    Calculus of ureter 04/12/2020    Acute renal failure, unspecified acute renal failure type (H24) 04/12/2020    Acute renal failure (ARF) (H24) 04/12/2020    ATN (acute tubular necrosis) (H24) 04/12/2020    Sepsis due to urinary tract infection (H)     Metformin overdose of undetermined intent, initial encounter     Metabolic acidosis     Hyperkalemia     Hematemesis, presence of nausea not specified 04/12/2020    Calculus of kidney     Syncope and collapse 06/30/2020    Hyperglycemia     After care 07/03/2012    Age-related cataract 03/27/2021    Anemia associated with acute blood loss 06/27/2012    Balanitis 03/27/2021    Cholelithiasis without obstruction 03/27/2021    Constipation 07/03/2012    Depression with anxiety 07/03/2012    Glaucoma 06/25/2012    Hemorrhoids without complication 03/27/2021    Hyperlipidemia 06/07/2018    Loss of appetite 03/27/2021    Major depressive disorder, recurrent episode, in partial remission (H24)  06/08/2018    Mixed personality disorder in adult (H) 06/08/2018    Obstructive sleep apnea 03/27/2021    Osteoarthrosis involving lower leg 06/23/2012    Periodic limb movement disorder 03/27/2021    Recurrent major depression (H24) 03/27/2021    S/P ureteral stent placement 03/27/2021    Unilateral small kidney 03/27/2021    Nephrolithiasis 03/27/2021    Type 2 diabetes mellitus (H) 06/25/2012    Osteoarthritis of knee 03/27/2021    Persistent atrial fibrillation (H) 03/27/2021    Pyelonephritis 03/27/2021    Urinary tract infection without hematuria, site unspecified 04/30/2022    Sepsis without acute organ dysfunction (H) 04/30/2022    UTI (urinary tract infection) 05/01/2022    Benign prostatic hyperplasia with urinary frequency 05/07/2022    Hypomagnesemia 05/07/2022    Injury of head, initial encounter 07/31/2023    Fall at home, initial encounter 07/31/2023    E. coli urinary tract infection 08/02/2023    Septic encephalopathy 07/25/2024    DM (diabetes mellitus), type 2 with complications (H) 07/25/2024    Urinary tract infection with hematuria, site unspecified 07/26/2024    Altered mental status, unspecified altered mental status type 07/26/2024    Sepsis, due to unspecified organism, unspecified whether acute organ dysfunction present (H) 07/26/2024    Normocytic anemia 11/29/2023    Status post right knee replacement 11/29/2023    Peripheral vascular disease, unspecified (H24) 08/20/2024    Warfarin anticoagulation 09/09/2024     Resolved Ambulatory Problems     Diagnosis Date Noted    Shock circulatory (H)     Acute respiratory failure with hypoxemia (H)      Past Medical History:   Diagnosis Date    A-fib (H)     Acute hemodialysis encounter (H24)     Acute kidney injury (H24)     Asbestosis (H)     Atrophy of right kidney     Basal cell carcinoma     BPH without urinary obstruction     Cellulitis     Cholelithiasis     Diabetes mellitus, type 2 (H) 4/12/2020    Esophagitis     Gastritis      Hematemesis, presence of nausea not specified     Hyperlipemia     Kidney stone     Metformin overdose of undetermined intent     PTSD (post-traumatic stress disorder)     Seasonal allergies     Sleep apnea     Upper GI bleed      No Known Allergies    All Meds and Allergies reviewed in the record at the facility and is the most up-to-date.    Post Discharge Medication Reconciliation Status: discharge medications reconciled, continue medications without change  Current Outpatient Medications   Medication Sig Dispense Refill    acetaminophen (TYLENOL) 325 MG tablet Take 650 mg by mouth 3 times daily.      acetaminophen (TYLENOL) 500 MG tablet [ACETAMINOPHEN (TYLENOL) 500 MG TABLET] Take 500 mg by mouth every 6 (six) hours as needed for pain.      ARIPiprazole (ABILIFY) 2 MG tablet [ARIPIPRAZOLE (ABILIFY) 2 MG TABLET] Take 2 mg by mouth at bedtime.       brimonidine (ALPHAGAN) 0.2 % ophthalmic solution [BRIMONIDINE (ALPHAGAN) 0.2 % OPHTHALMIC SOLUTION] Administer 1 drop to both eyes 2 (two) times a day.       calcium carbonate (TUMS) 500 MG chewable tablet Take 1 chew tab by mouth 3 times daily as needed for heartburn.      dorzolamide-timolol (COSOPT) 2-0.5 % ophthalmic solution Place 1 drop into both eyes 2 times daily      fluvoxaMINE (LUVOX) 50 MG tablet [FLUVOXAMINE (LUVOX) 50 MG TABLET] Take 50 mg by mouth at bedtime.       gabapentin (NEURONTIN) 300 MG capsule Take 300 mg by mouth 2 times daily      latanoprostene bunod 0.024 % Drop Place 1 drop into both eyes At Bedtime      levomefolate calcium (L-METHYLFOLATE ORAL) Take 15 mg by mouth daily.      metFORMIN (GLUCOPHAGE) 1000 MG tablet Take 1,000 mg by mouth 2 times daily (with meals)      Multiple Vitamins-Minerals (CENTRUM SILVER) CHEW Take 1 tablet by mouth daily      netarsudiL (RHOPRESSA) 0.02 % Drop Place 1 drop into both eyes every evening      polyethylene glycol (MIRALAX) 17 g packet Take 1 packet by mouth daily.      WARFARIN SODIUM PO Take by mouth  "See Admin Instructions. Dosing in coordination with INR results       No current facility-administered medications for this visit.       REVIEW OF SYSTEMS:   10 point review of systems reviewed and pertinent positives in the HPI.     PHYSICAL EXAMINATION:  Physical Exam     Vital signs: BP (!) 141/90   Pulse 91   Temp 97.4  F (36.3  C)   Resp 16   Ht 1.981 m (6' 6\")   Wt 97.3 kg (214 lb 9.6 oz)   SpO2 95%   BMI 24.80 kg/m    General: Awake, Alert, oriented x3, lying in bed, appropriately, follows simple commands, conversant  HEENT:Pink conjunctiva,right eye with redness, anicteric sclerae, moist oral mucosa  NECK: Supple  CVS:  S1  S2, without murmur or gallop.   LUNG: Clear to auscultation, No wheezes, rales or rhonci.  BACK: No kyphosis of the thoracic spine  ABDOMEN: Soft, obese, nontender to palpation, with positive bowel sounds  EXTREMITIES: Moves both upper and lower extremities with generalized weakness, no pedal edema, no calf tenderness  SKIN: Warm and dry  NEUROLOGIC: Intact, pulses palpable  PSYCHIATRIC: pleasant affect.       Labs:  All labs reviewed in the nursing home record and Epic   @  Lab Results   Component Value Date    WBC 10.5 09/14/2024     Lab Results   Component Value Date    RBC 4.02 09/14/2024     Lab Results   Component Value Date    HGB 12.5 09/14/2024     Lab Results   Component Value Date    HCT 38.8 09/14/2024     Lab Results   Component Value Date    MCV 97 09/14/2024     Lab Results   Component Value Date    MCH 31.1 09/14/2024     Lab Results   Component Value Date    MCHC 32.2 09/14/2024     Lab Results   Component Value Date    RDW 12.9 09/14/2024     Lab Results   Component Value Date     09/14/2024        @Last Comprehensive Metabolic Panel:  Sodium   Date Value Ref Range Status   09/15/2024 141 135 - 145 mmol/L Final     Potassium   Date Value Ref Range Status   09/15/2024 4.4 3.4 - 5.3 mmol/L Final   05/05/2022 4.4 3.5 - 5.0 mmol/L Final     Chloride   Date " Value Ref Range Status   09/15/2024 107 98 - 107 mmol/L Final   05/02/2022 109 (H) 98 - 107 mmol/L Final     Carbon Dioxide (CO2)   Date Value Ref Range Status   09/15/2024 23 22 - 29 mmol/L Final   05/02/2022 25 22 - 31 mmol/L Final     Anion Gap   Date Value Ref Range Status   09/15/2024 11 7 - 15 mmol/L Final   05/02/2022 8 5 - 18 mmol/L Final     Glucose   Date Value Ref Range Status   05/02/2022 133 (H) 70 - 125 mg/dL Final     GLUCOSE BY METER POCT   Date Value Ref Range Status   09/16/2024 172 (H) 70 - 99 mg/dL Final     Urea Nitrogen   Date Value Ref Range Status   09/15/2024 14.6 8.0 - 23.0 mg/dL Final   05/02/2022 11 8 - 28 mg/dL Final     Creatinine   Date Value Ref Range Status   09/15/2024 0.97 0.67 - 1.17 mg/dL Final     GFR Estimate   Date Value Ref Range Status   09/15/2024 82 >60 mL/min/1.73m2 Final     Comment:     eGFR calculated using 2021 CKD-EPI equation.   06/10/2021 >60 >60 mL/min/1.73m2 Final     Calcium   Date Value Ref Range Status   09/15/2024 8.8 8.8 - 10.4 mg/dL Final     Comment:     Reference intervals for this test were updated on 7/16/2024 to reflect our healthy population more accurately. There may be differences in the flagging of prior results with similar values performed with this method. Those prior results can be interpreted in the context of the updated reference intervals.     > 45 minutes spent for this visit, which included reviewing hospital records, consults, imaging, labs, meds as well as face to face spent with pt and collaborating with nursing.       This note has been dictated using voice recognition software. Any grammatical or context distortions are unintentional and inherent to the software    Electronically signed by: Kylie Gomez CNP

## 2024-09-17 NOTE — PROGRESS NOTES
[facility]    Background: Transitional Care Management program identified per system criteria and reviewed by [facility] team for possible outreach.    Assessment: Upon chart review, CCRC Team member will not proceed with patient outreach related to this episode of Transitional Care Management program due to reason below:    Non-[facility] TCU: CCRC team member noted patient discharged to TCU/ARU/LTACH. Patient is not established with a [facility] currently supported by Primary Care-Care Coordination therefore handoff to Primary Care-Care Coordination is not appropriate at this time.    Plan: Transitional Care Management episode addressed appropriately per reason noted above.      KIKO Jackson  [phone]  [facility]     *Connected Care Resource Team does NOT follow patient ongoing. Referrals are identified based on internal discharge reports and the outreach is to ensure patient has an understanding of their discharge instructions.

## 2024-09-17 NOTE — PLAN OF CARE
Physical Therapy Discharge Summary    Reason for therapy discharge:    Discharged to transitional care facility.    Progress towards therapy goal(s). See goals on Care Plan in [facility] electronic health record for goal details.  Goals partially met.  Barriers to achieving goals:   discharge from facility.    Therapy recommendation(s):    Continued therapy is recommended.  Rationale/Recommendations:  recommend continued PT at TCU.    Nichol Joya, PT  9/17/2024

## 2024-09-17 NOTE — LETTER
9/17/2024      Rakesh Samuels  [address]  [facility] [address]         HEALTH GERIATRIC SERVICES    Code Status:  DNR/DNI   Visit Type:   Chief Complaint   Patient presents with     TCU Hospital F/U     [facility] 9/9/2024 - 9/16/2024     Facility:  [facility] ([facility]) [[id number]]         HPI: Rakesh Samuels is a 74 year old male who I am seeing today for readmit to the TCU. Past medical history includes recurrent UTI, nephrolithiasis,   right renal cortical atrophy, mild left unchanged hydronephrosis, gout, personality disorder, DM2, HL, glaucoma, RADHA on cpap and A fib. Pt recently re hospitalized due to hallucinations with acute encephalopathy. Work up revealed UTI, pt hospitalized for septic encephalopathy and complicated UTI however UC negative and blood cultures NGTD.     Transitional Care Course: Today pt lying in bed. He is alert and remembers me on visit. Pt has completed his antibiotics. He denies any dysuria. He denies blood in his urine. He denies any SOB or CP. He reports he is eating well and having regular bowel movements. He denies pain in his knee today. He denies pain in his toe today. Denies any further hallucinations. Mood appears stable.       Assessment/Plan:     Recurrent UTI  Encephalopathy  -CT head negative for acute changes.   -MRI brain with small vessel ischemic disease  -Urine CX negative. Blood Cx NGTD.   -pt has completed his antibiotics.   -Follow up CBC.     Neptholiathiasis  Mild left hydronephrosis  -CT C/A/P no acute changes, Bladder wall thickening could represent hypertrophy from outlet obstruction secondary to an enlarged prostate gland, unchanged.   -Non obstructing stones both kidneys, and mild left hydronephrosis, unchanged.  -Follow up with Urology.   -Follow up BMP.     Gallstones   -RUQ US to eval for elevated transaminases shows known gallstones without evidence of cholecystitis   -Follow up LFTS.      Gout   -Left foot pain, possible gout left 1st MTP  however Uric acid normal, CRP elevated, could still compatible with inflammatory arthritis   -Xray negative   -pt treated with brief course of colchicine  -consider chronic gout therapy upon follow up     DM 2  Hemoglobin A1c 7.5  -consistent carb diet.   -Blood sugars checks BID.   -continue metformin     Paroxysmal atrial fibrillation  -Continue with warfarin   -INR 1.64.   -Coumadin Clinic to manage INRs.      Mood disorder  -continue home Abilify and fluvoxamine    -these were held briefly in hospital.      Glaucoma  -Continue with Alphagan, Cosopt, latanoprostene, Rhopressa     RADHA  -continue CPAP per home settings      -OK to PT, OT, and ST to eval and treat.       Active Ambulatory Problems     Diagnosis Date Noted     SIRS (systemic inflammatory response syndrome) (H) 09/09/2019     Adrenal incidentaloma (H24)      Diet-controlled diabetes mellitus (H)      Pericardial effusion      Lactic acidosis      Type 2 diabetes mellitus without complication, without long-term current use of insulin (H) 04/12/2020     Paroxysmal atrial fibrillation (H) 02/18/2020     Calculus of ureter 04/12/2020     Acute renal failure, unspecified acute renal failure type (H24) 04/12/2020     Acute renal failure (ARF) (H24) 04/12/2020     ATN (acute tubular necrosis) (H24) 04/12/2020     Sepsis due to urinary tract infection (H)      Metformin overdose of undetermined intent, initial encounter      Metabolic acidosis      Hyperkalemia      Hematemesis, presence of nausea not specified 04/12/2020     Calculus of kidney      Syncope and collapse 06/30/2020     Hyperglycemia      After care 07/03/2012     Age-related cataract 03/27/2021     Anemia associated with acute blood loss 06/27/2012     Balanitis 03/27/2021     Cholelithiasis without obstruction 03/27/2021     Constipation 07/03/2012     Depression with anxiety 07/03/2012     Glaucoma 06/25/2012     Hemorrhoids without complication 03/27/2021     Hyperlipidemia 06/07/2018      Loss of appetite 03/27/2021     Major depressive disorder, recurrent episode, in partial remission (H24) 06/08/2018     Mixed personality disorder in adult (H) 06/08/2018     Obstructive sleep apnea 03/27/2021     Osteoarthrosis involving lower leg 06/23/2012     Periodic limb movement disorder 03/27/2021     Recurrent major depression (H24) 03/27/2021     S/P ureteral stent placement 03/27/2021     Unilateral small kidney 03/27/2021     Nephrolithiasis 03/27/2021     Type 2 diabetes mellitus (H) 06/25/2012     Osteoarthritis of knee 03/27/2021     Persistent atrial fibrillation (H) 03/27/2021     Pyelonephritis 03/27/2021     Urinary tract infection without hematuria, site unspecified 04/30/2022     Sepsis without acute organ dysfunction (H) 04/30/2022     UTI (urinary tract infection) 05/01/2022     Benign prostatic hyperplasia with urinary frequency 05/07/2022     Hypomagnesemia 05/07/2022     Injury of head, initial encounter 07/31/2023     Fall at home, initial encounter 07/31/2023     E. coli urinary tract infection 08/02/2023     Septic encephalopathy 07/25/2024     DM (diabetes mellitus), type 2 with complications (H) 07/25/2024     Urinary tract infection with hematuria, site unspecified 07/26/2024     Altered mental status, unspecified altered mental status type 07/26/2024     Sepsis, due to unspecified organism, unspecified whether acute organ dysfunction present (H) 07/26/2024     Normocytic anemia 11/29/2023     Status post right knee replacement 11/29/2023     Peripheral vascular disease, unspecified (H24) 08/20/2024     Warfarin anticoagulation 09/09/2024     Resolved Ambulatory Problems     Diagnosis Date Noted     Shock circulatory (H)      Acute respiratory failure with hypoxemia (H)      Past Medical History:   Diagnosis Date     A-fib (H)      Acute hemodialysis encounter (H24)      Acute kidney injury (H24)      Asbestosis (H)      Atrophy of right kidney      Basal cell carcinoma      BPH  without urinary obstruction      Cellulitis      Cholelithiasis      Diabetes mellitus, type 2 (H) 4/12/2020     Esophagitis      Gastritis      Hematemesis, presence of nausea not specified      Hyperlipemia      Kidney stone      Metformin overdose of undetermined intent      PTSD (post-traumatic stress disorder)      Seasonal allergies      Sleep apnea      Upper GI bleed      No Known Allergies    All Meds and Allergies reviewed in the record at the facility and is the most up-to-date.    Post Discharge Medication Reconciliation Status: discharge medications reconciled, continue medications without change  Current Outpatient Medications   Medication Sig Dispense Refill     acetaminophen (TYLENOL) 325 MG tablet Take 650 mg by mouth 3 times daily.       acetaminophen (TYLENOL) 500 MG tablet [ACETAMINOPHEN (TYLENOL) 500 MG TABLET] Take 500 mg by mouth every 6 (six) hours as needed for pain.       ARIPiprazole (ABILIFY) 2 MG tablet [ARIPIPRAZOLE (ABILIFY) 2 MG TABLET] Take 2 mg by mouth at bedtime.        brimonidine (ALPHAGAN) 0.2 % ophthalmic solution [BRIMONIDINE (ALPHAGAN) 0.2 % OPHTHALMIC SOLUTION] Administer 1 drop to both eyes 2 (two) times a day.        calcium carbonate (TUMS) 500 MG chewable tablet Take 1 chew tab by mouth 3 times daily as needed for heartburn.       dorzolamide-timolol (COSOPT) 2-0.5 % ophthalmic solution Place 1 drop into both eyes 2 times daily       fluvoxaMINE (LUVOX) 50 MG tablet [FLUVOXAMINE (LUVOX) 50 MG TABLET] Take 50 mg by mouth at bedtime.        gabapentin (NEURONTIN) 300 MG capsule Take 300 mg by mouth 2 times daily       latanoprostene bunod 0.024 % Drop Place 1 drop into both eyes At Bedtime       levomefolate calcium (L-METHYLFOLATE ORAL) Take 15 mg by mouth daily.       metFORMIN (GLUCOPHAGE) 1000 MG tablet Take 1,000 mg by mouth 2 times daily (with meals)       Multiple Vitamins-Minerals (CENTRUM SILVER) CHEW Take 1 tablet by mouth daily       netarsudiL (RHOPRESSA) 0.02  "% Drop Place 1 drop into both eyes every evening       polyethylene glycol (MIRALAX) 17 g packet Take 1 packet by mouth daily.       WARFARIN SODIUM PO Take by mouth See Admin Instructions. Dosing in coordination with INR results       No current facility-administered medications for this visit.       REVIEW OF SYSTEMS:   10 point review of systems reviewed and pertinent positives in the HPI.     PHYSICAL EXAMINATION:  Physical Exam     Vital signs: BP (!) 141/90   Pulse 91   Temp 97.4  F (36.3  C)   Resp 16   Ht 1.981 m (6' 6\")   Wt 97.3 kg (214 lb 9.6 oz)   SpO2 95%   BMI 24.80 kg/m    General: Awake, Alert, oriented x3, lying in bed, appropriately, follows simple commands, conversant  HEENT:Pink conjunctiva,right eye with redness, anicteric sclerae, moist oral mucosa  NECK: Supple  CVS:  S1  S2, without murmur or gallop.   LUNG: Clear to auscultation, No wheezes, rales or rhonci.  BACK: No kyphosis of the thoracic spine  ABDOMEN: Soft, obese, nontender to palpation, with positive bowel sounds  EXTREMITIES: Moves both upper and lower extremities with generalized weakness, no pedal edema, no calf tenderness  SKIN: Warm and dry  NEUROLOGIC: Intact, pulses palpable  PSYCHIATRIC: pleasant affect.       Labs:  All labs reviewed in the nursing home record and Epic   @  Lab Results   Component Value Date    WBC 10.5 09/14/2024     Lab Results   Component Value Date    RBC 4.02 09/14/2024     Lab Results   Component Value Date    HGB 12.5 09/14/2024     Lab Results   Component Value Date    HCT 38.8 09/14/2024     Lab Results   Component Value Date    MCV 97 09/14/2024     Lab Results   Component Value Date    MCH 31.1 09/14/2024     Lab Results   Component Value Date    MCHC 32.2 09/14/2024     Lab Results   Component Value Date    RDW 12.9 09/14/2024     Lab Results   Component Value Date     09/14/2024        @Last Comprehensive Metabolic Panel:  Sodium   Date Value Ref Range Status   09/15/2024 141 135 - " 145 mmol/L Final     Potassium   Date Value Ref Range Status   09/15/2024 4.4 3.4 - 5.3 mmol/L Final   05/05/2022 4.4 3.5 - 5.0 mmol/L Final     Chloride   Date Value Ref Range Status   09/15/2024 107 98 - 107 mmol/L Final   05/02/2022 109 (H) 98 - 107 mmol/L Final     Carbon Dioxide (CO2)   Date Value Ref Range Status   09/15/2024 23 22 - 29 mmol/L Final   05/02/2022 25 22 - 31 mmol/L Final     Anion Gap   Date Value Ref Range Status   09/15/2024 11 7 - 15 mmol/L Final   05/02/2022 8 5 - 18 mmol/L Final     Glucose   Date Value Ref Range Status   05/02/2022 133 (H) 70 - 125 mg/dL Final     GLUCOSE BY METER POCT   Date Value Ref Range Status   09/16/2024 172 (H) 70 - 99 mg/dL Final     Urea Nitrogen   Date Value Ref Range Status   09/15/2024 14.6 8.0 - 23.0 mg/dL Final   05/02/2022 11 8 - 28 mg/dL Final     Creatinine   Date Value Ref Range Status   09/15/2024 0.97 0.67 - 1.17 mg/dL Final     GFR Estimate   Date Value Ref Range Status   09/15/2024 82 >60 mL/min/1.73m2 Final     Comment:     eGFR calculated using 2021 CKD-EPI equation.   06/10/2021 >60 >60 mL/min/1.73m2 Final     Calcium   Date Value Ref Range Status   09/15/2024 8.8 8.8 - 10.4 mg/dL Final     Comment:     Reference intervals for this test were updated on 7/16/2024 to reflect our healthy population more accurately. There may be differences in the flagging of prior results with similar values performed with this method. Those prior results can be interpreted in the context of the updated reference intervals.     > 45 minutes spent for this visit, which included reviewing hospital records, consults, imaging, labs, meds as well as face to face spent with pt and collaborating with nursing.       This note has been dictated using voice recognition software. Any grammatical or context distortions are unintentional and inherent to the software    Electronically signed by: Kylie Gomez CNP       Sincerely,        Kylie Gomez NP

## 2024-09-19 ENCOUNTER — ANTICOAGULATION THERAPY VISIT (OUTPATIENT)
Dept: ANTICOAGULATION | Facility: CLINIC | Age: 74
End: 2024-09-19

## 2024-09-19 ENCOUNTER — TRANSITIONAL CARE UNIT VISIT (OUTPATIENT)
Dept: GERIATRICS | Facility: CLINIC | Age: 74
End: 2024-09-19
Payer: MEDICARE

## 2024-09-19 VITALS
TEMPERATURE: 97.4 F | DIASTOLIC BLOOD PRESSURE: 88 MMHG | BODY MASS INDEX: 22.93 KG/M2 | SYSTOLIC BLOOD PRESSURE: 148 MMHG | WEIGHT: 198.2 LBS | HEART RATE: 86 BPM | OXYGEN SATURATION: 94 % | RESPIRATION RATE: 16 BRPM | HEIGHT: 78 IN

## 2024-09-19 DIAGNOSIS — N20.0 NEPHROLITHIASIS: ICD-10-CM

## 2024-09-19 DIAGNOSIS — E11.00 TYPE 2 DIABETES MELLITUS WITH HYPEROSMOLARITY WITHOUT COMA, WITHOUT LONG-TERM CURRENT USE OF INSULIN (H): ICD-10-CM

## 2024-09-19 DIAGNOSIS — I48.0 PAROXYSMAL ATRIAL FIBRILLATION (H): Primary | ICD-10-CM

## 2024-09-19 DIAGNOSIS — F60.89 MIXED PERSONALITY DISORDER IN ADULT (H): ICD-10-CM

## 2024-09-19 DIAGNOSIS — N39.0 URINARY TRACT INFECTION WITHOUT HEMATURIA, SITE UNSPECIFIED: Primary | ICD-10-CM

## 2024-09-19 DIAGNOSIS — I48.0 PAROXYSMAL ATRIAL FIBRILLATION (H): ICD-10-CM

## 2024-09-19 DIAGNOSIS — I10 ESSENTIAL HYPERTENSION: ICD-10-CM

## 2024-09-19 DIAGNOSIS — F33.41 MAJOR DEPRESSIVE DISORDER, RECURRENT EPISODE, IN PARTIAL REMISSION (H): ICD-10-CM

## 2024-09-19 DIAGNOSIS — N13.30 HYDRONEPHROSIS, UNSPECIFIED HYDRONEPHROSIS TYPE: ICD-10-CM

## 2024-09-19 LAB — INR (EXTERNAL): 2.4

## 2024-09-19 PROCEDURE — 99309 SBSQ NF CARE MODERATE MDM 30: CPT | Performed by: NURSE PRACTITIONER

## 2024-09-19 NOTE — PROGRESS NOTES
ANTICOAGULATION MANAGEMENT     Rakesh Samuels 74 year old male is on warfarin with therapeutic INR result. (Goal INR 2.0-3.0)    Recent labs: (last 7 days)     09/19/24  0000   INR 2.4       ASSESSMENT     Source(s): Chart Review and Home Care/Facility Nurse     Warfarin doses taken: Warfarin taken as instructed  Diet: No new diet changes identified  Medication/supplement changes: None noted  New illness, injury, or hospitalization: No  Signs or symptoms of bleeding or clotting: No  Previous result: Subtherapeutic  Additional findings: None       PLAN     Recommended plan for no diet, medication or health factor changes affecting INR     Dosing Instructions: Continue your current warfarin dose with next INR in 5 days       Summary  As of 9/19/2024      Full warfarin instructions:  5 mg every Sun, Wed; 2.5 mg all other days   Next INR check:  9/24/2024               Telephone call with [facility] nurse (medical care for seniors) who verbalizes understanding and agrees to plan    Orders given to  Homecare nurse/facility to recheck    Education provided: Please call back if any changes to your diet, medications or how you've been taking warfarin    Plan made per [facility] anticoagulation protocol    Jeannie Baldwin RN  9/19/2024  Anticoagulation Clinic  [employer] for routing messages: p [facility] (U/LTC/long term)  [facility] patient phone line: [phone]        _______________________________________________________________________     Anticoagulation Episode Summary       Current INR goal:  2.0-3.0   TTR:  39.8% (1 mo)   Target end date:  Indefinite   Send INR reminders to:  [facility] (U/LTC/long term)    Indications    Paroxysmal atrial fibrillation (H) [I48.0]             Comments:  [facility] [phone]             Anticoagulation Care Providers       Provider Role Specialty Phone number    Kylie Gomez NP Referring Nurse Practitioner [phone]

## 2024-09-19 NOTE — LETTER
9/19/2024      Rakesh Samuels  [address]  [facility] [address]        [facility] GERIATRIC SERVICES    Code Status:  DNR/DNI   Visit Type:   Chief Complaint   Patient presents with     TCU Follow Up     Facility:  [facility]) [[id number]]         HPI: Rakesh Samuels is a 74 year old male who I am seeing today for follow up on the TCU. Past medical history includes recurrent UTI, nephrolithiasis,   right renal cortical atrophy, mild left unchanged hydronephrosis, gout, personality disorder, DM2, HL, glaucoma, RADHA on cpap and A fib. Pt recently re hospitalized due to hallucinations with acute encephalopathy. Work up revealed UTI, pt hospitalized for septic encephalopathy and complicated UTI however UC negative and blood cultures NGTD.     Transitional Care Course: Today pt lying in bed. Pt alert and oriented X 3. He denies any further hallucinations. He denies any dysuria, burning or pain. He has completed his antibiotics. He denies any blood in his urine. He eating well. He is having regular Bms. Mood appears stable.       Assessment/Plan:     Recurrent UTI  Encephalopathy-resolved.   -CT head negative for acute changes.   -MRI brain with small vessel ischemic disease  -Urine CX negative. Blood Cx NGTD.   -pt has completed his antibiotics.   -Follow up CBC on Monday.     Neptholiathiasis  Mild left hydronephrosis  -CT C/A/P no acute changes, Bladder wall thickening could represent hypertrophy from outlet obstruction secondary to an enlarged prostate gland, unchanged.   -Non obstructing stones both kidneys, and mild left hydronephrosis, unchanged.  -Follow up with Urology.   -Follow up BMP on Monday.     Gallstones   -RUQ US to eval for elevated transaminases shows known gallstones without evidence of cholecystitis   -Follow up LFTS.     DM 2  Hemoglobin A1c 7.5  -consistent carb diet.   -Blood sugars checks BID.   -continue metformin     Paroxysmal atrial fibrillation  -Continue with warfarin    -INR 2.4.  -Coumadin Clinic to manage INRs.      Mood disorder  -continue home Abilify and fluvoxamine    -these were held briefly in hospital.      Active Ambulatory Problems     Diagnosis Date Noted     SIRS (systemic inflammatory response syndrome) (H) 09/09/2019     Adrenal incidentaloma (H24)      Diet-controlled diabetes mellitus (H)      Pericardial effusion      Lactic acidosis      Type 2 diabetes mellitus without complication, without long-term current use of insulin (H) 04/12/2020     Paroxysmal atrial fibrillation (H) 02/18/2020     Calculus of ureter 04/12/2020     Acute renal failure, unspecified acute renal failure type (H24) 04/12/2020     Acute renal failure (ARF) (H24) 04/12/2020     ATN (acute tubular necrosis) (H24) 04/12/2020     Sepsis due to urinary tract infection (H)      Metformin overdose of undetermined intent, initial encounter      Metabolic acidosis      Hyperkalemia      Hematemesis, presence of nausea not specified 04/12/2020     Calculus of kidney      Syncope and collapse 06/30/2020     Hyperglycemia      After care 07/03/2012     Age-related cataract 03/27/2021     Anemia associated with acute blood loss 06/27/2012     Balanitis 03/27/2021     Cholelithiasis without obstruction 03/27/2021     Constipation 07/03/2012     Depression with anxiety 07/03/2012     Glaucoma 06/25/2012     Hemorrhoids without complication 03/27/2021     Hyperlipidemia 06/07/2018     Loss of appetite 03/27/2021     Major depressive disorder, recurrent episode, in partial remission (H24) 06/08/2018     Mixed personality disorder in adult (H) 06/08/2018     Obstructive sleep apnea 03/27/2021     Osteoarthrosis involving lower leg 06/23/2012     Periodic limb movement disorder 03/27/2021     Recurrent major depression (H24) 03/27/2021     S/P ureteral stent placement 03/27/2021     Unilateral small kidney 03/27/2021     Nephrolithiasis 03/27/2021     Type 2 diabetes mellitus (H) 06/25/2012      Osteoarthritis of knee 03/27/2021     Persistent atrial fibrillation (H) 03/27/2021     Pyelonephritis 03/27/2021     Urinary tract infection without hematuria, site unspecified 04/30/2022     Sepsis without acute organ dysfunction (H) 04/30/2022     UTI (urinary tract infection) 05/01/2022     Benign prostatic hyperplasia with urinary frequency 05/07/2022     Hypomagnesemia 05/07/2022     Injury of head, initial encounter 07/31/2023     Fall at home, initial encounter 07/31/2023     E. coli urinary tract infection 08/02/2023     Septic encephalopathy 07/25/2024     DM (diabetes mellitus), type 2 with complications (H) 07/25/2024     Urinary tract infection with hematuria, site unspecified 07/26/2024     Altered mental status, unspecified altered mental status type 07/26/2024     Sepsis, due to unspecified organism, unspecified whether acute organ dysfunction present (H) 07/26/2024     Normocytic anemia 11/29/2023     Status post right knee replacement 11/29/2023     Peripheral vascular disease, unspecified (H24) 08/20/2024     Warfarin anticoagulation 09/09/2024     Resolved Ambulatory Problems     Diagnosis Date Noted     Shock circulatory (H)      Acute respiratory failure with hypoxemia (H)      Past Medical History:   Diagnosis Date     A-fib (H)      Acute hemodialysis encounter (H24)      Acute kidney injury (H24)      Asbestosis (H)      Atrophy of right kidney      Basal cell carcinoma      BPH without urinary obstruction      Cellulitis      Cholelithiasis      Diabetes mellitus, type 2 (H) 4/12/2020     Esophagitis      Gastritis      Hematemesis, presence of nausea not specified      Hyperlipemia      Kidney stone      Metformin overdose of undetermined intent      PTSD (post-traumatic stress disorder)      Seasonal allergies      Sleep apnea      Upper GI bleed      No Known Allergies    All Meds and Allergies reviewed in the record at the facility and is the most up-to-date.    Current Outpatient  "Medications   Medication Sig Dispense Refill     acetaminophen (TYLENOL) 325 MG tablet Take 650 mg by mouth 3 times daily.       acetaminophen (TYLENOL) 500 MG tablet [ACETAMINOPHEN (TYLENOL) 500 MG TABLET] Take 500 mg by mouth every 6 (six) hours as needed for pain.       ARIPiprazole (ABILIFY) 2 MG tablet [ARIPIPRAZOLE (ABILIFY) 2 MG TABLET] Take 2 mg by mouth at bedtime.        brimonidine (ALPHAGAN) 0.2 % ophthalmic solution [BRIMONIDINE (ALPHAGAN) 0.2 % OPHTHALMIC SOLUTION] Administer 1 drop to both eyes 2 (two) times a day.        calcium carbonate (TUMS) 500 MG chewable tablet Take 1 chew tab by mouth 3 times daily as needed for heartburn.       dorzolamide-timolol (COSOPT) 2-0.5 % ophthalmic solution Place 1 drop into both eyes 2 times daily       fluvoxaMINE (LUVOX) 50 MG tablet [FLUVOXAMINE (LUVOX) 50 MG TABLET] Take 50 mg by mouth at bedtime.        gabapentin (NEURONTIN) 300 MG capsule Take 300 mg by mouth 2 times daily       latanoprostene bunod 0.024 % Drop Place 1 drop into both eyes At Bedtime       levomefolate calcium (L-METHYLFOLATE ORAL) Take 15 mg by mouth daily.       metFORMIN (GLUCOPHAGE) 1000 MG tablet Take 1,000 mg by mouth 2 times daily (with meals)       Multiple Vitamins-Minerals (CENTRUM SILVER) CHEW Take 1 tablet by mouth daily       netarsudiL (RHOPRESSA) 0.02 % Drop Place 1 drop into both eyes every evening       polyethylene glycol (MIRALAX) 17 g packet Take 1 packet by mouth daily.       WARFARIN SODIUM PO Take by mouth See Admin Instructions. Dosing in coordination with INR results       No current facility-administered medications for this visit.       REVIEW OF SYSTEMS:   10 point review of systems reviewed and pertinent positives in the HPI.     PHYSICAL EXAMINATION:  Physical Exam     Vital signs: BP (!) 148/88   Pulse 86   Temp 97.4  F (36.3  C)   Resp 16   Ht 1.981 m (6' 6\")   Wt 89.9 kg (198 lb 3.2 oz)   SpO2 94%   BMI 22.90 kg/m    General: Awake, Alert, oriented " x3, lying in bed, appropriately, follows simple commands, conversant  HEENT:Pink conjunctiva,right eye with redness, anicteric sclerae, moist oral mucosa  NECK: Supple  CVS:  S1  S2, without murmur or gallop.   LUNG: Clear to auscultation, No wheezes, rales or rhonci.  BACK: No kyphosis of the thoracic spine  ABDOMEN: Soft, obese, nontender to palpation, with positive bowel sounds  EXTREMITIES: Moves both upper and lower extremities with generalized weakness, no pedal edema, no calf tenderness  SKIN: Warm and dry  NEUROLOGIC: Intact, pulses palpable  PSYCHIATRIC: pleasant affect.       Labs:  All labs reviewed in the nursing home record and Epic   @  Lab Results   Component Value Date    WBC 10.5 09/14/2024     Lab Results   Component Value Date    RBC 4.02 09/14/2024     Lab Results   Component Value Date    HGB 12.5 09/14/2024     Lab Results   Component Value Date    HCT 38.8 09/14/2024     Lab Results   Component Value Date    MCV 97 09/14/2024     Lab Results   Component Value Date    MCH 31.1 09/14/2024     Lab Results   Component Value Date    MCHC 32.2 09/14/2024     Lab Results   Component Value Date    RDW 12.9 09/14/2024     Lab Results   Component Value Date     09/14/2024        @Last Comprehensive Metabolic Panel:  Sodium   Date Value Ref Range Status   09/15/2024 141 135 - 145 mmol/L Final     Potassium   Date Value Ref Range Status   09/15/2024 4.4 3.4 - 5.3 mmol/L Final   05/05/2022 4.4 3.5 - 5.0 mmol/L Final     Chloride   Date Value Ref Range Status   09/15/2024 107 98 - 107 mmol/L Final   05/02/2022 109 (H) 98 - 107 mmol/L Final     Carbon Dioxide (CO2)   Date Value Ref Range Status   09/15/2024 23 22 - 29 mmol/L Final   05/02/2022 25 22 - 31 mmol/L Final     Anion Gap   Date Value Ref Range Status   09/15/2024 11 7 - 15 mmol/L Final   05/02/2022 8 5 - 18 mmol/L Final     Glucose   Date Value Ref Range Status   05/02/2022 133 (H) 70 - 125 mg/dL Final     GLUCOSE BY METER POCT   Date Value  Ref Range Status   09/16/2024 172 (H) 70 - 99 mg/dL Final     Urea Nitrogen   Date Value Ref Range Status   09/15/2024 14.6 8.0 - 23.0 mg/dL Final   05/02/2022 11 8 - 28 mg/dL Final     Creatinine   Date Value Ref Range Status   09/15/2024 0.97 0.67 - 1.17 mg/dL Final     GFR Estimate   Date Value Ref Range Status   09/15/2024 82 >60 mL/min/1.73m2 Final     Comment:     eGFR calculated using 2021 CKD-EPI equation.   06/10/2021 >60 >60 mL/min/1.73m2 Final     Calcium   Date Value Ref Range Status   09/15/2024 8.8 8.8 - 10.4 mg/dL Final     Comment:     Reference intervals for this test were updated on 7/16/2024 to reflect our healthy population more accurately. There may be differences in the flagging of prior results with similar values performed with this method. Those prior results can be interpreted in the context of the updated reference intervals.       This note has been dictated using voice recognition software. Any grammatical or context distortions are unintentional and inherent to the software    Electronically signed by: Kylie Gomez CNP       Sincerely,        Kylie Gomez, NP

## 2024-09-20 NOTE — PROGRESS NOTES
[facility] GERIATRIC SERVICES    Code Status:  DNR/DNI   Visit Type:   Chief Complaint   Patient presents with    TCU Follow Up     Facility:  [facility]) [[id number]]         HPI: Rakesh Samuels is a 74 year old male who I am seeing today for follow up on the TCU. Past medical history includes recurrent UTI, nephrolithiasis,   right renal cortical atrophy, mild left unchanged hydronephrosis, gout, personality disorder, DM2, HL, glaucoma, RADHA on cpap and A fib. Pt recently re hospitalized due to hallucinations with acute encephalopathy. Work up revealed UTI, pt hospitalized for septic encephalopathy and complicated UTI however UC negative and blood cultures NGTD.     Transitional Care Course: Today pt lying in bed. Pt alert and oriented X 3. He denies any further hallucinations. He denies any dysuria, burning or pain. He has completed his antibiotics. He denies any blood in his urine. He eating well. He is having regular Bms. Mood appears stable.       Assessment/Plan:     Recurrent UTI  Encephalopathy-resolved.   -CT head negative for acute changes.   -MRI brain with small vessel ischemic disease  -Urine CX negative. Blood Cx NGTD.   -pt has completed his antibiotics.   -Follow up CBC on Monday.     Neptholiathiasis  Mild left hydronephrosis  -CT C/A/P no acute changes, Bladder wall thickening could represent hypertrophy from outlet obstruction secondary to an enlarged prostate gland, unchanged.   -Non obstructing stones both kidneys, and mild left hydronephrosis, unchanged.  -Follow up with Urology.   -Follow up BMP on Monday.     Gallstones   -RUQ US to eval for elevated transaminases shows known gallstones without evidence of cholecystitis   -Follow up LFTS.     DM 2  Hemoglobin A1c 7.5  -consistent carb diet.   -Blood sugars checks BID.   -continue metformin     Paroxysmal atrial fibrillation  -Continue with warfarin   -INR 2.4.  -Coumadin Clinic to manage INRs.      Mood disorder  -continue home  Abilify and fluvoxamine    -these were held briefly in hospital.      Active Ambulatory Problems     Diagnosis Date Noted    SIRS (systemic inflammatory response syndrome) (H) 09/09/2019    Adrenal incidentaloma (H24)     Diet-controlled diabetes mellitus (H)     Pericardial effusion     Lactic acidosis     Type 2 diabetes mellitus without complication, without long-term current use of insulin (H) 04/12/2020    Paroxysmal atrial fibrillation (H) 02/18/2020    Calculus of ureter 04/12/2020    Acute renal failure, unspecified acute renal failure type (H24) 04/12/2020    Acute renal failure (ARF) (H24) 04/12/2020    ATN (acute tubular necrosis) (H24) 04/12/2020    Sepsis due to urinary tract infection (H)     Metformin overdose of undetermined intent, initial encounter     Metabolic acidosis     Hyperkalemia     Hematemesis, presence of nausea not specified 04/12/2020    Calculus of kidney     Syncope and collapse 06/30/2020    Hyperglycemia     After care 07/03/2012    Age-related cataract 03/27/2021    Anemia associated with acute blood loss 06/27/2012    Balanitis 03/27/2021    Cholelithiasis without obstruction 03/27/2021    Constipation 07/03/2012    Depression with anxiety 07/03/2012    Glaucoma 06/25/2012    Hemorrhoids without complication 03/27/2021    Hyperlipidemia 06/07/2018    Loss of appetite 03/27/2021    Major depressive disorder, recurrent episode, in partial remission (H24) 06/08/2018    Mixed personality disorder in adult (H) 06/08/2018    Obstructive sleep apnea 03/27/2021    Osteoarthrosis involving lower leg 06/23/2012    Periodic limb movement disorder 03/27/2021    Recurrent major depression (H24) 03/27/2021    S/P ureteral stent placement 03/27/2021    Unilateral small kidney 03/27/2021    Nephrolithiasis 03/27/2021    Type 2 diabetes mellitus (H) 06/25/2012    Osteoarthritis of knee 03/27/2021    Persistent atrial fibrillation (H) 03/27/2021    Pyelonephritis 03/27/2021    Urinary tract  infection without hematuria, site unspecified 04/30/2022    Sepsis without acute organ dysfunction (H) 04/30/2022    UTI (urinary tract infection) 05/01/2022    Benign prostatic hyperplasia with urinary frequency 05/07/2022    Hypomagnesemia 05/07/2022    Injury of head, initial encounter 07/31/2023    Fall at home, initial encounter 07/31/2023    E. coli urinary tract infection 08/02/2023    Septic encephalopathy 07/25/2024    DM (diabetes mellitus), type 2 with complications (H) 07/25/2024    Urinary tract infection with hematuria, site unspecified 07/26/2024    Altered mental status, unspecified altered mental status type 07/26/2024    Sepsis, due to unspecified organism, unspecified whether acute organ dysfunction present (H) 07/26/2024    Normocytic anemia 11/29/2023    Status post right knee replacement 11/29/2023    Peripheral vascular disease, unspecified (H24) 08/20/2024    Warfarin anticoagulation 09/09/2024     Resolved Ambulatory Problems     Diagnosis Date Noted    Shock circulatory (H)     Acute respiratory failure with hypoxemia (H)      Past Medical History:   Diagnosis Date    A-fib (H)     Acute hemodialysis encounter (H24)     Acute kidney injury (H24)     Asbestosis (H)     Atrophy of right kidney     Basal cell carcinoma     BPH without urinary obstruction     Cellulitis     Cholelithiasis     Diabetes mellitus, type 2 (H) 4/12/2020    Esophagitis     Gastritis     Hematemesis, presence of nausea not specified     Hyperlipemia     Kidney stone     Metformin overdose of undetermined intent     PTSD (post-traumatic stress disorder)     Seasonal allergies     Sleep apnea     Upper GI bleed      No Known Allergies    All Meds and Allergies reviewed in the record at the facility and is the most up-to-date.    Current Outpatient Medications   Medication Sig Dispense Refill    acetaminophen (TYLENOL) 325 MG tablet Take 650 mg by mouth 3 times daily.      acetaminophen (TYLENOL) 500 MG tablet  "[ACETAMINOPHEN (TYLENOL) 500 MG TABLET] Take 500 mg by mouth every 6 (six) hours as needed for pain.      ARIPiprazole (ABILIFY) 2 MG tablet [ARIPIPRAZOLE (ABILIFY) 2 MG TABLET] Take 2 mg by mouth at bedtime.       brimonidine (ALPHAGAN) 0.2 % ophthalmic solution [BRIMONIDINE (ALPHAGAN) 0.2 % OPHTHALMIC SOLUTION] Administer 1 drop to both eyes 2 (two) times a day.       calcium carbonate (TUMS) 500 MG chewable tablet Take 1 chew tab by mouth 3 times daily as needed for heartburn.      dorzolamide-timolol (COSOPT) 2-0.5 % ophthalmic solution Place 1 drop into both eyes 2 times daily      fluvoxaMINE (LUVOX) 50 MG tablet [FLUVOXAMINE (LUVOX) 50 MG TABLET] Take 50 mg by mouth at bedtime.       gabapentin (NEURONTIN) 300 MG capsule Take 300 mg by mouth 2 times daily      latanoprostene bunod 0.024 % Drop Place 1 drop into both eyes At Bedtime      levomefolate calcium (L-METHYLFOLATE ORAL) Take 15 mg by mouth daily.      metFORMIN (GLUCOPHAGE) 1000 MG tablet Take 1,000 mg by mouth 2 times daily (with meals)      Multiple Vitamins-Minerals (CENTRUM SILVER) CHEW Take 1 tablet by mouth daily      netarsudiL (RHOPRESSA) 0.02 % Drop Place 1 drop into both eyes every evening      polyethylene glycol (MIRALAX) 17 g packet Take 1 packet by mouth daily.      WARFARIN SODIUM PO Take by mouth See Admin Instructions. Dosing in coordination with INR results       No current facility-administered medications for this visit.       REVIEW OF SYSTEMS:   10 point review of systems reviewed and pertinent positives in the HPI.     PHYSICAL EXAMINATION:  Physical Exam     Vital signs: BP (!) 148/88   Pulse 86   Temp 97.4  F (36.3  C)   Resp 16   Ht 1.981 m (6' 6\")   Wt 89.9 kg (198 lb 3.2 oz)   SpO2 94%   BMI 22.90 kg/m    General: Awake, Alert, oriented x3, lying in bed, appropriately, follows simple commands, conversant  HEENT:Pink conjunctiva,right eye with redness, anicteric sclerae, moist oral mucosa  NECK: Supple  CVS:  S1  S2, " without murmur or gallop.   LUNG: Clear to auscultation, No wheezes, rales or rhonci.  BACK: No kyphosis of the thoracic spine  ABDOMEN: Soft, obese, nontender to palpation, with positive bowel sounds  EXTREMITIES: Moves both upper and lower extremities with generalized weakness, no pedal edema, no calf tenderness  SKIN: Warm and dry  NEUROLOGIC: Intact, pulses palpable  PSYCHIATRIC: pleasant affect.       Labs:  All labs reviewed in the nursing home record and Epic   @  Lab Results   Component Value Date    WBC 10.5 09/14/2024     Lab Results   Component Value Date    RBC 4.02 09/14/2024     Lab Results   Component Value Date    HGB 12.5 09/14/2024     Lab Results   Component Value Date    HCT 38.8 09/14/2024     Lab Results   Component Value Date    MCV 97 09/14/2024     Lab Results   Component Value Date    MCH 31.1 09/14/2024     Lab Results   Component Value Date    MCHC 32.2 09/14/2024     Lab Results   Component Value Date    RDW 12.9 09/14/2024     Lab Results   Component Value Date     09/14/2024        @Last Comprehensive Metabolic Panel:  Sodium   Date Value Ref Range Status   09/15/2024 141 135 - 145 mmol/L Final     Potassium   Date Value Ref Range Status   09/15/2024 4.4 3.4 - 5.3 mmol/L Final   05/05/2022 4.4 3.5 - 5.0 mmol/L Final     Chloride   Date Value Ref Range Status   09/15/2024 107 98 - 107 mmol/L Final   05/02/2022 109 (H) 98 - 107 mmol/L Final     Carbon Dioxide (CO2)   Date Value Ref Range Status   09/15/2024 23 22 - 29 mmol/L Final   05/02/2022 25 22 - 31 mmol/L Final     Anion Gap   Date Value Ref Range Status   09/15/2024 11 7 - 15 mmol/L Final   05/02/2022 8 5 - 18 mmol/L Final     Glucose   Date Value Ref Range Status   05/02/2022 133 (H) 70 - 125 mg/dL Final     GLUCOSE BY METER POCT   Date Value Ref Range Status   09/16/2024 172 (H) 70 - 99 mg/dL Final     Urea Nitrogen   Date Value Ref Range Status   09/15/2024 14.6 8.0 - 23.0 mg/dL Final   05/02/2022 11 8 - 28 mg/dL Final      Creatinine   Date Value Ref Range Status   09/15/2024 0.97 0.67 - 1.17 mg/dL Final     GFR Estimate   Date Value Ref Range Status   09/15/2024 82 >60 mL/min/1.73m2 Final     Comment:     eGFR calculated using 2021 CKD-EPI equation.   06/10/2021 >60 >60 mL/min/1.73m2 Final     Calcium   Date Value Ref Range Status   09/15/2024 8.8 8.8 - 10.4 mg/dL Final     Comment:     Reference intervals for this test were updated on 7/16/2024 to reflect our healthy population more accurately. There may be differences in the flagging of prior results with similar values performed with this method. Those prior results can be interpreted in the context of the updated reference intervals.       This note has been dictated using voice recognition software. Any grammatical or context distortions are unintentional and inherent to the software    Electronically signed by: Kylie Gomez, CNP

## 2024-09-23 ENCOUNTER — LAB REQUISITION (OUTPATIENT)
Dept: LAB | Facility: CLINIC | Age: 74
End: 2024-09-23
Payer: MEDICARE

## 2024-09-23 DIAGNOSIS — G93.40 ENCEPHALOPATHY, UNSPECIFIED: ICD-10-CM

## 2024-09-24 ENCOUNTER — ANTICOAGULATION THERAPY VISIT (OUTPATIENT)
Dept: ANTICOAGULATION | Facility: CLINIC | Age: 74
End: 2024-09-24

## 2024-09-24 ENCOUNTER — TRANSITIONAL CARE UNIT VISIT (OUTPATIENT)
Dept: GERIATRICS | Facility: CLINIC | Age: 74
End: 2024-09-24
Payer: MEDICARE

## 2024-09-24 VITALS
TEMPERATURE: 97.9 F | BODY MASS INDEX: 29.12 KG/M2 | SYSTOLIC BLOOD PRESSURE: 154 MMHG | RESPIRATION RATE: 14 BRPM | DIASTOLIC BLOOD PRESSURE: 88 MMHG | HEART RATE: 74 BPM | OXYGEN SATURATION: 95 % | WEIGHT: 215 LBS | HEIGHT: 72 IN

## 2024-09-24 DIAGNOSIS — F33.41 MAJOR DEPRESSIVE DISORDER, RECURRENT EPISODE, IN PARTIAL REMISSION (H): ICD-10-CM

## 2024-09-24 DIAGNOSIS — I10 ESSENTIAL HYPERTENSION: ICD-10-CM

## 2024-09-24 DIAGNOSIS — E11.00 TYPE 2 DIABETES MELLITUS WITH HYPEROSMOLARITY WITHOUT COMA, WITHOUT LONG-TERM CURRENT USE OF INSULIN (H): ICD-10-CM

## 2024-09-24 DIAGNOSIS — N13.30 HYDRONEPHROSIS, UNSPECIFIED HYDRONEPHROSIS TYPE: ICD-10-CM

## 2024-09-24 DIAGNOSIS — I48.0 PAROXYSMAL ATRIAL FIBRILLATION (H): ICD-10-CM

## 2024-09-24 DIAGNOSIS — I48.0 PAROXYSMAL ATRIAL FIBRILLATION (H): Primary | ICD-10-CM

## 2024-09-24 DIAGNOSIS — N20.0 NEPHROLITHIASIS: ICD-10-CM

## 2024-09-24 DIAGNOSIS — N39.0 URINARY TRACT INFECTION WITHOUT HEMATURIA, SITE UNSPECIFIED: Primary | ICD-10-CM

## 2024-09-24 DIAGNOSIS — F60.89 MIXED PERSONALITY DISORDER IN ADULT (H): ICD-10-CM

## 2024-09-24 LAB
ALBUMIN SERPL BCG-MCNC: 3.5 G/DL (ref 3.5–5.2)
ALP SERPL-CCNC: 74 U/L (ref 40–150)
ALT SERPL W P-5'-P-CCNC: 10 U/L (ref 0–70)
ANION GAP SERPL CALCULATED.3IONS-SCNC: 7 MMOL/L (ref 7–15)
AST SERPL W P-5'-P-CCNC: 17 U/L (ref 0–45)
BILIRUB DIRECT SERPL-MCNC: <0.2 MG/DL (ref 0–0.3)
BILIRUB SERPL-MCNC: 0.2 MG/DL
BUN SERPL-MCNC: 12.6 MG/DL (ref 8–23)
CALCIUM SERPL-MCNC: 8.8 MG/DL (ref 8.8–10.4)
CHLORIDE SERPL-SCNC: 109 MMOL/L (ref 98–107)
CREAT SERPL-MCNC: 0.72 MG/DL (ref 0.67–1.17)
EGFRCR SERPLBLD CKD-EPI 2021: >90 ML/MIN/1.73M2
ERYTHROCYTE [DISTWIDTH] IN BLOOD BY AUTOMATED COUNT: 13 % (ref 10–15)
GLUCOSE SERPL-MCNC: 156 MG/DL (ref 70–99)
HCO3 SERPL-SCNC: 25 MMOL/L (ref 22–29)
HCT VFR BLD AUTO: 37.6 % (ref 40–53)
HGB BLD-MCNC: 12.1 G/DL (ref 13.3–17.7)
INR (EXTERNAL): 1.8 (ref 0.9–1.1)
MCH RBC QN AUTO: 30.9 PG (ref 26.5–33)
MCHC RBC AUTO-ENTMCNC: 32.2 G/DL (ref 31.5–36.5)
MCV RBC AUTO: 96 FL (ref 78–100)
PLATELET # BLD AUTO: 279 10E3/UL (ref 150–450)
POTASSIUM SERPL-SCNC: 4.1 MMOL/L (ref 3.4–5.3)
PROT SERPL-MCNC: 6 G/DL (ref 6.4–8.3)
RBC # BLD AUTO: 3.91 10E6/UL (ref 4.4–5.9)
SODIUM SERPL-SCNC: 141 MMOL/L (ref 135–145)
WBC # BLD AUTO: 6 10E3/UL (ref 4–11)

## 2024-09-24 PROCEDURE — P9604 ONE-WAY ALLOW PRORATED TRIP: HCPCS | Performed by: NURSE PRACTITIONER

## 2024-09-24 PROCEDURE — 36415 COLL VENOUS BLD VENIPUNCTURE: CPT | Performed by: NURSE PRACTITIONER

## 2024-09-24 PROCEDURE — 85027 COMPLETE CBC AUTOMATED: CPT | Performed by: NURSE PRACTITIONER

## 2024-09-24 PROCEDURE — 99309 SBSQ NF CARE MODERATE MDM 30: CPT | Performed by: NURSE PRACTITIONER

## 2024-09-24 PROCEDURE — 82248 BILIRUBIN DIRECT: CPT | Performed by: NURSE PRACTITIONER

## 2024-09-24 NOTE — PROGRESS NOTES
Incoming fax from [facility]    Date of result 9/24/24    INR result 1.8    Route result to: [facility] (TCU/LTC/[facility])

## 2024-09-24 NOTE — LETTER
9/24/2024      Rakesh Samuels  [address]  [facility] [id number]        [facility] GERIATRIC SERVICES    Code Status:  DNR/DNI   Visit Type:   Chief Complaint   Patient presents with     TCU Follow Up     Facility:  [facility]) [[id number]]         HPI: Rakesh Samuels is a 74 year old male who I am seeing today for follow up on the TCU. Past medical history includes recurrent UTI, nephrolithiasis,   right renal cortical atrophy, mild left unchanged hydronephrosis, gout, personality disorder, DM2, HL, glaucoma, RADHA on cpap and A fib. Pt recently re hospitalized due to hallucinations with acute encephalopathy. Work up revealed UTI, pt hospitalized for septic encephalopathy and complicated UTI however UC negative and blood cultures NGTD.     Transitional Care Course: Today pt lying in bed. Recent UTI with sepsis. He has completed his antibiotics. Pt with known kidney stones. He reports he is voiding adequately. No blood in his urine. Pt alert and oriented X 3. He denies any further hallucinations. Mood appears stable.       Assessment/Plan:     Recurrent UTI  Encephalopathy-resolved.   -CT head negative for acute changes.   -MRI brain with small vessel ischemic disease  -Urine CX negative. Blood Cx NGTD.   -pt has completed his antibiotics.   -Follow up CBC without leukocytosis.     Neptholiathiasis  Mild left hydronephrosis  -CT C/A/P no acute changes, Bladder wall thickening could represent hypertrophy from outlet obstruction secondary to an enlarged prostate gland, unchanged.   -Non obstructing stones both kidneys, and mild left hydronephrosis, unchanged.  -Follow up with Urology.   -Follow up BMP with Creatine 0.72.     Gallstones   -RUQ US to eval for elevated transaminases shows known gallstones without evidence of cholecystitis   -Follow up LFTs WNL.   -Denies any abdominal pain.     DM 2  Hemoglobin A1c 7.5  -consistent carb diet.   -Blood sugars checks BID.   -continue metformin      Paroxysmal atrial fibrillation  -Continue with warfarin   -INR 1.8.   -Coumadin Clinic to manage INRs.      Mood disorder  -continue home Abilify and fluvoxamine    -these were held briefly in hospital.      Active Ambulatory Problems     Diagnosis Date Noted     SIRS (systemic inflammatory response syndrome) (H) 09/09/2019     Adrenal incidentaloma (H24)      Diet-controlled diabetes mellitus (H)      Pericardial effusion      Lactic acidosis      Type 2 diabetes mellitus without complication, without long-term current use of insulin (H) 04/12/2020     Paroxysmal atrial fibrillation (H) 02/18/2020     Calculus of ureter 04/12/2020     Acute renal failure, unspecified acute renal failure type (H24) 04/12/2020     Acute renal failure (ARF) (H24) 04/12/2020     ATN (acute tubular necrosis) (H24) 04/12/2020     Sepsis due to urinary tract infection (H)      Metformin overdose of undetermined intent, initial encounter      Metabolic acidosis      Hyperkalemia      Hematemesis, presence of nausea not specified 04/12/2020     Calculus of kidney      Syncope and collapse 06/30/2020     Hyperglycemia      After care 07/03/2012     Age-related cataract 03/27/2021     Anemia associated with acute blood loss 06/27/2012     Balanitis 03/27/2021     Cholelithiasis without obstruction 03/27/2021     Constipation 07/03/2012     Depression with anxiety 07/03/2012     Glaucoma 06/25/2012     Hemorrhoids without complication 03/27/2021     Hyperlipidemia 06/07/2018     Loss of appetite 03/27/2021     Major depressive disorder, recurrent episode, in partial remission (H24) 06/08/2018     Mixed personality disorder in adult (H) 06/08/2018     Obstructive sleep apnea 03/27/2021     Osteoarthrosis involving lower leg 06/23/2012     Periodic limb movement disorder 03/27/2021     Recurrent major depression (H24) 03/27/2021     S/P ureteral stent placement 03/27/2021     Unilateral small kidney 03/27/2021     Nephrolithiasis 03/27/2021      Type 2 diabetes mellitus (H) 06/25/2012     Osteoarthritis of knee 03/27/2021     Persistent atrial fibrillation (H) 03/27/2021     Pyelonephritis 03/27/2021     Urinary tract infection without hematuria, site unspecified 04/30/2022     Sepsis without acute organ dysfunction (H) 04/30/2022     UTI (urinary tract infection) 05/01/2022     Benign prostatic hyperplasia with urinary frequency 05/07/2022     Hypomagnesemia 05/07/2022     Injury of head, initial encounter 07/31/2023     Fall at home, initial encounter 07/31/2023     E. coli urinary tract infection 08/02/2023     Septic encephalopathy 07/25/2024     DM (diabetes mellitus), type 2 with complications (H) 07/25/2024     Urinary tract infection with hematuria, site unspecified 07/26/2024     Altered mental status, unspecified altered mental status type 07/26/2024     Sepsis, due to unspecified organism, unspecified whether acute organ dysfunction present (H) 07/26/2024     Normocytic anemia 11/29/2023     Status post right knee replacement 11/29/2023     Peripheral vascular disease, unspecified (H24) 08/20/2024     Warfarin anticoagulation 09/09/2024     Resolved Ambulatory Problems     Diagnosis Date Noted     Shock circulatory (H)      Acute respiratory failure with hypoxemia (H)      Past Medical History:   Diagnosis Date     A-fib (H)      Acute hemodialysis encounter (H24)      Acute kidney injury (H24)      Asbestosis (H)      Atrophy of right kidney      Basal cell carcinoma      BPH without urinary obstruction      Cellulitis      Cholelithiasis      Diabetes mellitus, type 2 (H) 4/12/2020     Esophagitis      Gastritis      Hematemesis, presence of nausea not specified      Hyperlipemia      Kidney stone      Metformin overdose of undetermined intent      PTSD (post-traumatic stress disorder)      Seasonal allergies      Sleep apnea      Upper GI bleed      No Known Allergies    All Meds and Allergies reviewed in the record at the facility and is  the most up-to-date.    Current Outpatient Medications   Medication Sig Dispense Refill     acetaminophen (TYLENOL) 325 MG tablet Take 650 mg by mouth 3 times daily.       acetaminophen (TYLENOL) 500 MG tablet [ACETAMINOPHEN (TYLENOL) 500 MG TABLET] Take 500 mg by mouth every 6 (six) hours as needed for pain.       ARIPiprazole (ABILIFY) 2 MG tablet [ARIPIPRAZOLE (ABILIFY) 2 MG TABLET] Take 2 mg by mouth at bedtime.        brimonidine (ALPHAGAN) 0.2 % ophthalmic solution [BRIMONIDINE (ALPHAGAN) 0.2 % OPHTHALMIC SOLUTION] Administer 1 drop to both eyes 2 (two) times a day.        calcium carbonate (TUMS) 500 MG chewable tablet Take 1 chew tab by mouth 3 times daily as needed for heartburn.       dorzolamide-timolol (COSOPT) 2-0.5 % ophthalmic solution Place 1 drop into both eyes 2 times daily       fluvoxaMINE (LUVOX) 50 MG tablet [FLUVOXAMINE (LUVOX) 50 MG TABLET] Take 50 mg by mouth at bedtime.        gabapentin (NEURONTIN) 300 MG capsule Take 300 mg by mouth 2 times daily       latanoprostene bunod 0.024 % Drop Place 1 drop into both eyes At Bedtime       levomefolate calcium (L-METHYLFOLATE ORAL) Take 15 mg by mouth daily.       metFORMIN (GLUCOPHAGE) 1000 MG tablet Take 1,000 mg by mouth 2 times daily (with meals)       Multiple Vitamins-Minerals (CENTRUM SILVER) CHEW Take 1 tablet by mouth daily       netarsudiL (RHOPRESSA) 0.02 % Drop Place 1 drop into both eyes every evening       polyethylene glycol (MIRALAX) 17 g packet Take 1 packet by mouth daily.       WARFARIN SODIUM PO Take by mouth See Admin Instructions. Dosing in coordination with INR results       No current facility-administered medications for this visit.       REVIEW OF SYSTEMS:   10 point review of systems reviewed and pertinent positives in the HPI.     PHYSICAL EXAMINATION:  Physical Exam     Vital signs: BP (!) 154/88   Pulse 74   Temp 97.9  F (36.6  C)   Resp 14   Ht 1.829 m (6')   Wt 97.5 kg (215 lb)   SpO2 95%   BMI 29.16 kg/m     General: Awake, Alert, oriented x3, lying in bed, appropriately, follows simple commands, conversant  HEENT:Pink conjunctiva,right eye with redness, anicteric sclerae, moist oral mucosa  NECK: Supple  CVS:  S1  S2, without murmur or gallop.   LUNG: Clear to auscultation, No wheezes, rales or rhonci.  BACK: No kyphosis of the thoracic spine  ABDOMEN: Soft, obese, nontender to palpation, with positive bowel sounds  EXTREMITIES: Moves both upper and lower extremities with generalized weakness, no pedal edema, no calf tenderness  SKIN: Warm and dry  NEUROLOGIC: Intact, pulses palpable  PSYCHIATRIC: pleasant affect.       Labs:  All labs reviewed in the nursing home record and Epic   @  Lab Results   Component Value Date    WBC 10.5 09/14/2024     Lab Results   Component Value Date    RBC 4.02 09/14/2024     Lab Results   Component Value Date    HGB 12.5 09/14/2024     Lab Results   Component Value Date    HCT 38.8 09/14/2024     Lab Results   Component Value Date    MCV 97 09/14/2024     Lab Results   Component Value Date    MCH 31.1 09/14/2024     Lab Results   Component Value Date    MCHC 32.2 09/14/2024     Lab Results   Component Value Date    RDW 12.9 09/14/2024     Lab Results   Component Value Date     09/14/2024        @Last Comprehensive Metabolic Panel:  Sodium   Date Value Ref Range Status   09/24/2024 141 135 - 145 mmol/L Final     Potassium   Date Value Ref Range Status   09/24/2024 4.1 3.4 - 5.3 mmol/L Final   05/05/2022 4.4 3.5 - 5.0 mmol/L Final     Chloride   Date Value Ref Range Status   09/24/2024 109 (H) 98 - 107 mmol/L Final   05/02/2022 109 (H) 98 - 107 mmol/L Final     Carbon Dioxide (CO2)   Date Value Ref Range Status   09/24/2024 25 22 - 29 mmol/L Final   05/02/2022 25 22 - 31 mmol/L Final     Anion Gap   Date Value Ref Range Status   09/24/2024 7 7 - 15 mmol/L Final   05/02/2022 8 5 - 18 mmol/L Final     Glucose   Date Value Ref Range Status   09/24/2024 156 (H) 70 - 99 mg/dL Final    05/02/2022 133 (H) 70 - 125 mg/dL Final     GLUCOSE BY METER POCT   Date Value Ref Range Status   09/16/2024 172 (H) 70 - 99 mg/dL Final     Urea Nitrogen   Date Value Ref Range Status   09/24/2024 12.6 8.0 - 23.0 mg/dL Final   05/02/2022 11 8 - 28 mg/dL Final     Creatinine   Date Value Ref Range Status   09/24/2024 0.72 0.67 - 1.17 mg/dL Final     GFR Estimate   Date Value Ref Range Status   09/24/2024 >90 >60 mL/min/1.73m2 Final   06/10/2021 >60 >60 mL/min/1.73m2 Final     Calcium   Date Value Ref Range Status   09/24/2024 8.8 8.8 - 10.4 mg/dL Final     Comment:     Reference intervals for this test were updated on 7/16/2024 to reflect our healthy population more accurately. There may be differences in the flagging of prior results with similar values performed with this method. Those prior results can be interpreted in the context of the updated reference intervals.       This note has been dictated using voice recognition software. Any grammatical or context distortions are unintentional and inherent to the software    Electronically signed by: Kylie Gomez CNP       Sincerely,        Kylie Gomez NP

## 2024-09-24 NOTE — PROGRESS NOTES
ANTICOAGULATION MANAGEMENT     Rakesh Samuels 74 year old male is on warfarin with subtherapeutic INR result. (Goal INR 2.0-3.0)    Recent labs: (last 7 days)     09/24/24  0000   INR 1.8*       ASSESSMENT     Source(s): Chart Review and Home Care/Facility Nurse     Warfarin doses taken: Warfarin taken as instructed  Diet: No new diet changes identified  Medication/supplement changes: antibiotics completed  New illness, injury, or hospitalization: Yes: admission from 9/9/24 - 9/16/24 for septic encephalopathy and complicated UTI   Signs or symptoms of bleeding or clotting: No  Previous result: Therapeutic last visit; previously outside of goal range  Additional findings:INR level trended down. Pt off antibiotics now       PLAN     Recommended plan for ongoing change(s) affecting INR     Dosing Instructions: Increase your warfarin dose (11.1% change) with next INR in 6 days       Summary  As of 9/24/2024      Full warfarin instructions:  5 mg every Tue, Thu, Sat; 2.5 mg all other days   Next INR check:  9/30/2024               Telephone call with Radha clayton [phone] facility nurse who agrees to plan and repeated back plan correctly    Orders given to  Homecare nurse/facility to recheck    Education provided: Contact [phone] with any changes, questions or concerns.     Plan made per [facility] anticoagulation protocol    Brittany Breaux RN  9/24/2024  Anticoagulation Clinic  [facility] for routing messages: p [facility] (U/LTC/[facility])  [facility] patient phone line: [phone]        _______________________________________________________________________     Anticoagulation Episode Summary       Current INR goal:  2.0-3.0   TTR:  43.5% (1.2 mo)   Target end date:  Indefinite   Send INR reminders to:  [facility] (TCU/LTC/[facility])    Indications    Paroxysmal atrial fibrillation (H) [I48.0]             Comments:  Neetu CARBAJAL -[phone]             Anticoagulation Care  Providers       Provider Role Specialty Phone number    Kylie Gomez NP Referring Nurse Practitioner [phone]

## 2024-09-25 NOTE — PROGRESS NOTES
[facility] GERIATRIC SERVICES    Code Status:  DNR/DNI   Visit Type:   Chief Complaint   Patient presents with    TCU Follow Up     Facility:  [facility]) [[id number]]         HPI: Rakesh Samuels is a 74 year old male who I am seeing today for follow up on the TCU. Past medical history includes recurrent UTI, nephrolithiasis,   right renal cortical atrophy, mild left unchanged hydronephrosis, gout, personality disorder, DM2, HL, glaucoma, RADHA on cpap and A fib. Pt recently re hospitalized due to hallucinations with acute encephalopathy. Work up revealed UTI, pt hospitalized for septic encephalopathy and complicated UTI however UC negative and blood cultures NGTD.     Transitional Care Course: Today pt lying in bed. Recent UTI with sepsis. He has completed his antibiotics. Pt with known kidney stones. He reports he is voiding adequately. No blood in his urine. Pt alert and oriented X 3. He denies any further hallucinations. Mood appears stable.       Assessment/Plan:     Recurrent UTI  Encephalopathy-resolved.   -CT head negative for acute changes.   -MRI brain with small vessel ischemic disease  -Urine CX negative. Blood Cx NGTD.   -pt has completed his antibiotics.   -Follow up CBC without leukocytosis.     Neptholiathiasis  Mild left hydronephrosis  -CT C/A/P no acute changes, Bladder wall thickening could represent hypertrophy from outlet obstruction secondary to an enlarged prostate gland, unchanged.   -Non obstructing stones both kidneys, and mild left hydronephrosis, unchanged.  -Follow up with Urology.   -Follow up BMP with Creatine 0.72.     Gallstones   -RUQ US to eval for elevated transaminases shows known gallstones without evidence of cholecystitis   -Follow up LFTs WNL.   -Denies any abdominal pain.     DM 2  Hemoglobin A1c 7.5  -consistent carb diet.   -Blood sugars checks BID.   -continue metformin     Paroxysmal atrial fibrillation  -Continue with warfarin   -INR 1.8.   -Coumadin Clinic  to manage INRs.      Mood disorder  -continue home Abilify and fluvoxamine    -these were held briefly in hospital.      Active Ambulatory Problems     Diagnosis Date Noted    SIRS (systemic inflammatory response syndrome) (H) 09/09/2019    Adrenal incidentaloma (H24)     Diet-controlled diabetes mellitus (H)     Pericardial effusion     Lactic acidosis     Type 2 diabetes mellitus without complication, without long-term current use of insulin (H) 04/12/2020    Paroxysmal atrial fibrillation (H) 02/18/2020    Calculus of ureter 04/12/2020    Acute renal failure, unspecified acute renal failure type (H24) 04/12/2020    Acute renal failure (ARF) (H24) 04/12/2020    ATN (acute tubular necrosis) (H24) 04/12/2020    Sepsis due to urinary tract infection (H)     Metformin overdose of undetermined intent, initial encounter     Metabolic acidosis     Hyperkalemia     Hematemesis, presence of nausea not specified 04/12/2020    Calculus of kidney     Syncope and collapse 06/30/2020    Hyperglycemia     After care 07/03/2012    Age-related cataract 03/27/2021    Anemia associated with acute blood loss 06/27/2012    Balanitis 03/27/2021    Cholelithiasis without obstruction 03/27/2021    Constipation 07/03/2012    Depression with anxiety 07/03/2012    Glaucoma 06/25/2012    Hemorrhoids without complication 03/27/2021    Hyperlipidemia 06/07/2018    Loss of appetite 03/27/2021    Major depressive disorder, recurrent episode, in partial remission (H24) 06/08/2018    Mixed personality disorder in adult (H) 06/08/2018    Obstructive sleep apnea 03/27/2021    Osteoarthrosis involving lower leg 06/23/2012    Periodic limb movement disorder 03/27/2021    Recurrent major depression (H24) 03/27/2021    S/P ureteral stent placement 03/27/2021    Unilateral small kidney 03/27/2021    Nephrolithiasis 03/27/2021    Type 2 diabetes mellitus (H) 06/25/2012    Osteoarthritis of knee 03/27/2021    Persistent atrial fibrillation (H) 03/27/2021     Pyelonephritis 03/27/2021    Urinary tract infection without hematuria, site unspecified 04/30/2022    Sepsis without acute organ dysfunction (H) 04/30/2022    UTI (urinary tract infection) 05/01/2022    Benign prostatic hyperplasia with urinary frequency 05/07/2022    Hypomagnesemia 05/07/2022    Injury of head, initial encounter 07/31/2023    Fall at home, initial encounter 07/31/2023    E. coli urinary tract infection 08/02/2023    Septic encephalopathy 07/25/2024    DM (diabetes mellitus), type 2 with complications (H) 07/25/2024    Urinary tract infection with hematuria, site unspecified 07/26/2024    Altered mental status, unspecified altered mental status type 07/26/2024    Sepsis, due to unspecified organism, unspecified whether acute organ dysfunction present (H) 07/26/2024    Normocytic anemia 11/29/2023    Status post right knee replacement 11/29/2023    Peripheral vascular disease, unspecified (H24) 08/20/2024    Warfarin anticoagulation 09/09/2024     Resolved Ambulatory Problems     Diagnosis Date Noted    Shock circulatory (H)     Acute respiratory failure with hypoxemia (H)      Past Medical History:   Diagnosis Date    A-fib (H)     Acute hemodialysis encounter (H24)     Acute kidney injury (H24)     Asbestosis (H)     Atrophy of right kidney     Basal cell carcinoma     BPH without urinary obstruction     Cellulitis     Cholelithiasis     Diabetes mellitus, type 2 (H) 4/12/2020    Esophagitis     Gastritis     Hematemesis, presence of nausea not specified     Hyperlipemia     Kidney stone     Metformin overdose of undetermined intent     PTSD (post-traumatic stress disorder)     Seasonal allergies     Sleep apnea     Upper GI bleed      No Known Allergies    All Meds and Allergies reviewed in the record at the facility and is the most up-to-date.    Current Outpatient Medications   Medication Sig Dispense Refill    acetaminophen (TYLENOL) 325 MG tablet Take 650 mg by mouth 3 times daily.       acetaminophen (TYLENOL) 500 MG tablet [ACETAMINOPHEN (TYLENOL) 500 MG TABLET] Take 500 mg by mouth every 6 (six) hours as needed for pain.      ARIPiprazole (ABILIFY) 2 MG tablet [ARIPIPRAZOLE (ABILIFY) 2 MG TABLET] Take 2 mg by mouth at bedtime.       brimonidine (ALPHAGAN) 0.2 % ophthalmic solution [BRIMONIDINE (ALPHAGAN) 0.2 % OPHTHALMIC SOLUTION] Administer 1 drop to both eyes 2 (two) times a day.       calcium carbonate (TUMS) 500 MG chewable tablet Take 1 chew tab by mouth 3 times daily as needed for heartburn.      dorzolamide-timolol (COSOPT) 2-0.5 % ophthalmic solution Place 1 drop into both eyes 2 times daily      fluvoxaMINE (LUVOX) 50 MG tablet [FLUVOXAMINE (LUVOX) 50 MG TABLET] Take 50 mg by mouth at bedtime.       gabapentin (NEURONTIN) 300 MG capsule Take 300 mg by mouth 2 times daily      latanoprostene bunod 0.024 % Drop Place 1 drop into both eyes At Bedtime      levomefolate calcium (L-METHYLFOLATE ORAL) Take 15 mg by mouth daily.      metFORMIN (GLUCOPHAGE) 1000 MG tablet Take 1,000 mg by mouth 2 times daily (with meals)      Multiple Vitamins-Minerals (CENTRUM SILVER) CHEW Take 1 tablet by mouth daily      netarsudiL (RHOPRESSA) 0.02 % Drop Place 1 drop into both eyes every evening      polyethylene glycol (MIRALAX) 17 g packet Take 1 packet by mouth daily.      WARFARIN SODIUM PO Take by mouth See Admin Instructions. Dosing in coordination with INR results       No current facility-administered medications for this visit.       REVIEW OF SYSTEMS:   10 point review of systems reviewed and pertinent positives in the HPI.     PHYSICAL EXAMINATION:  Physical Exam     Vital signs: BP (!) 154/88   Pulse 74   Temp 97.9  F (36.6  C)   Resp 14   Ht 1.829 m (6')   Wt 97.5 kg (215 lb)   SpO2 95%   BMI 29.16 kg/m    General: Awake, Alert, oriented x3, lying in bed, appropriately, follows simple commands, conversant  HEENT:Pink conjunctiva,right eye with redness, anicteric sclerae, moist oral  mucosa  NECK: Supple  CVS:  S1  S2, without murmur or gallop.   LUNG: Clear to auscultation, No wheezes, rales or rhonci.  BACK: No kyphosis of the thoracic spine  ABDOMEN: Soft, obese, nontender to palpation, with positive bowel sounds  EXTREMITIES: Moves both upper and lower extremities with generalized weakness, no pedal edema, no calf tenderness  SKIN: Warm and dry  NEUROLOGIC: Intact, pulses palpable  PSYCHIATRIC: pleasant affect.       Labs:  All labs reviewed in the nursing home record and Epic   @  Lab Results   Component Value Date    WBC 10.5 09/14/2024     Lab Results   Component Value Date    RBC 4.02 09/14/2024     Lab Results   Component Value Date    HGB 12.5 09/14/2024     Lab Results   Component Value Date    HCT 38.8 09/14/2024     Lab Results   Component Value Date    MCV 97 09/14/2024     Lab Results   Component Value Date    MCH 31.1 09/14/2024     Lab Results   Component Value Date    MCHC 32.2 09/14/2024     Lab Results   Component Value Date    RDW 12.9 09/14/2024     Lab Results   Component Value Date     09/14/2024        @Last Comprehensive Metabolic Panel:  Sodium   Date Value Ref Range Status   09/24/2024 141 135 - 145 mmol/L Final     Potassium   Date Value Ref Range Status   09/24/2024 4.1 3.4 - 5.3 mmol/L Final   05/05/2022 4.4 3.5 - 5.0 mmol/L Final     Chloride   Date Value Ref Range Status   09/24/2024 109 (H) 98 - 107 mmol/L Final   05/02/2022 109 (H) 98 - 107 mmol/L Final     Carbon Dioxide (CO2)   Date Value Ref Range Status   09/24/2024 25 22 - 29 mmol/L Final   05/02/2022 25 22 - 31 mmol/L Final     Anion Gap   Date Value Ref Range Status   09/24/2024 7 7 - 15 mmol/L Final   05/02/2022 8 5 - 18 mmol/L Final     Glucose   Date Value Ref Range Status   09/24/2024 156 (H) 70 - 99 mg/dL Final   05/02/2022 133 (H) 70 - 125 mg/dL Final     GLUCOSE BY METER POCT   Date Value Ref Range Status   09/16/2024 172 (H) 70 - 99 mg/dL Final     Urea Nitrogen   Date Value Ref Range  Status   09/24/2024 12.6 8.0 - 23.0 mg/dL Final   05/02/2022 11 8 - 28 mg/dL Final     Creatinine   Date Value Ref Range Status   09/24/2024 0.72 0.67 - 1.17 mg/dL Final     GFR Estimate   Date Value Ref Range Status   09/24/2024 >90 >60 mL/min/1.73m2 Final   06/10/2021 >60 >60 mL/min/1.73m2 Final     Calcium   Date Value Ref Range Status   09/24/2024 8.8 8.8 - 10.4 mg/dL Final     Comment:     Reference intervals for this test were updated on 7/16/2024 to reflect our healthy population more accurately. There may be differences in the flagging of prior results with similar values performed with this method. Those prior results can be interpreted in the context of the updated reference intervals.       This note has been dictated using voice recognition software. Any grammatical or context distortions are unintentional and inherent to the software    Electronically signed by: Kylie Gomez, CNP

## 2024-09-30 ENCOUNTER — ANTICOAGULATION THERAPY VISIT (OUTPATIENT)
Dept: ANTICOAGULATION | Facility: CLINIC | Age: 74
End: 2024-09-30
Payer: MEDICARE

## 2024-09-30 DIAGNOSIS — I48.0 PAROXYSMAL ATRIAL FIBRILLATION (H): Primary | ICD-10-CM

## 2024-09-30 LAB
ATRIAL RATE - MUSE: 83 BPM
DIASTOLIC BLOOD PRESSURE - MUSE: NORMAL MMHG
INR (EXTERNAL): 1.8
INTERPRETATION ECG - MUSE: NORMAL
P AXIS - MUSE: 16 DEGREES
PR INTERVAL - MUSE: 162 MS
QRS DURATION - MUSE: 74 MS
QT - MUSE: 366 MS
QTC - MUSE: 430 MS
R AXIS - MUSE: -7 DEGREES
SYSTOLIC BLOOD PRESSURE - MUSE: NORMAL MMHG
T AXIS - MUSE: 18 DEGREES
VENTRICULAR RATE- MUSE: 83 BPM

## 2024-09-30 PROCEDURE — 81001 URINALYSIS AUTO W/SCOPE: CPT | Performed by: NURSE PRACTITIONER

## 2024-09-30 PROCEDURE — 87086 URINE CULTURE/COLONY COUNT: CPT | Performed by: NURSE PRACTITIONER

## 2024-09-30 NOTE — PROGRESS NOTES
ANTICOAGULATION MANAGEMENT     Rakesh Samuels 74 year old male is on warfarin with subtherapeutic INR result. (Goal INR 2.0-3.0)    Recent labs: (last 7 days)     09/30/24  0000   INR 1.8       ASSESSMENT     Source(s): Chart Review, Home Care/Facility Nurse, and Template     Warfarin doses taken: Warfarin taken as instructed  Diet: No new diet changes identified  Medication/supplement changes: None noted  New illness, injury, or hospitalization: No  Signs or symptoms of bleeding or clotting: No  Previous result: Subtherapeutic  Additional findings: None       PLAN     Recommended plan for no diet, medication or health factor changes affecting INR     Dosing Instructions: Increase your warfarin dose (10% change) with next INR in 1 week       Summary  As of 9/30/2024      Full warfarin instructions:  2.5 mg every Sun, Tue, Thu; 5 mg all other days   Next INR check:  10/7/2024               Telephone call with [facility] nurse (medical care for seniors) who agrees to plan and repeated back plan correctly    Orders given to  Homecare nurse/facility to recheck    Education provided: Please call back if any changes to your diet, medications or how you've been taking warfarin    Plan made per [facility] anticoagulation protocol    Jeannie Baldwin RN  9/30/2024  Anticoagulation Clinic  [facility] for routing messages: [facility] (U/C/ELISA)  [facility] patient phone line: [phone]        _______________________________________________________________________     Anticoagulation Episode Summary       Current INR goal:  2.0-3.0   TTR:  37.4% (1.4 mo)   Target end date:  Indefinite   Send INR reminders to:  [facility] MED[facility] (U/LTC/ELISA)    Indications    Paroxysmal atrial fibrillation (H) [I48.0]             Comments:  [facility] -[phone]             Anticoagulation Care Providers       Provider Role Specialty Phone number    Kylie Gomez NP Referring Nurse Practitioner  [phone]

## 2024-10-01 ENCOUNTER — LAB REQUISITION (OUTPATIENT)
Dept: LAB | Facility: CLINIC | Age: 74
End: 2024-10-01

## 2024-10-01 ENCOUNTER — TRANSITIONAL CARE UNIT VISIT (OUTPATIENT)
Dept: GERIATRICS | Facility: CLINIC | Age: 74
End: 2024-10-01
Payer: MEDICARE

## 2024-10-01 ENCOUNTER — LAB REQUISITION (OUTPATIENT)
Dept: LAB | Facility: CLINIC | Age: 74
End: 2024-10-01
Payer: MEDICARE

## 2024-10-01 VITALS
RESPIRATION RATE: 18 BRPM | TEMPERATURE: 97.9 F | HEIGHT: 72 IN | OXYGEN SATURATION: 95 % | HEART RATE: 72 BPM | SYSTOLIC BLOOD PRESSURE: 151 MMHG | BODY MASS INDEX: 28.93 KG/M2 | WEIGHT: 213.6 LBS | DIASTOLIC BLOOD PRESSURE: 82 MMHG

## 2024-10-01 DIAGNOSIS — I48.0 PAROXYSMAL ATRIAL FIBRILLATION (H): ICD-10-CM

## 2024-10-01 DIAGNOSIS — R41.0 DISORIENTATION, UNSPECIFIED: ICD-10-CM

## 2024-10-01 DIAGNOSIS — F33.41 MAJOR DEPRESSIVE DISORDER, RECURRENT EPISODE, IN PARTIAL REMISSION (H): ICD-10-CM

## 2024-10-01 DIAGNOSIS — F60.89 MIXED PERSONALITY DISORDER IN ADULT (H): ICD-10-CM

## 2024-10-01 DIAGNOSIS — I10 ESSENTIAL HYPERTENSION: ICD-10-CM

## 2024-10-01 DIAGNOSIS — N39.0 URINARY TRACT INFECTION WITHOUT HEMATURIA, SITE UNSPECIFIED: Primary | ICD-10-CM

## 2024-10-01 DIAGNOSIS — N13.30 HYDRONEPHROSIS, UNSPECIFIED HYDRONEPHROSIS TYPE: ICD-10-CM

## 2024-10-01 DIAGNOSIS — N20.0 NEPHROLITHIASIS: ICD-10-CM

## 2024-10-01 DIAGNOSIS — R41.0 CONFUSION: ICD-10-CM

## 2024-10-01 DIAGNOSIS — E11.00 TYPE 2 DIABETES MELLITUS WITH HYPEROSMOLARITY WITHOUT COMA, WITHOUT LONG-TERM CURRENT USE OF INSULIN (H): ICD-10-CM

## 2024-10-01 DIAGNOSIS — N39.0 URINARY TRACT INFECTION, SITE NOT SPECIFIED: ICD-10-CM

## 2024-10-01 LAB
ALBUMIN UR-MCNC: NEGATIVE MG/DL
APPEARANCE UR: CLEAR
BILIRUB UR QL STRIP: NEGATIVE
COLOR UR AUTO: ABNORMAL
GLUCOSE UR STRIP-MCNC: 30 MG/DL
HGB UR QL STRIP: NEGATIVE
KETONES UR STRIP-MCNC: NEGATIVE MG/DL
LEUKOCYTE ESTERASE UR QL STRIP: ABNORMAL
NITRATE UR QL: NEGATIVE
PH UR STRIP: 5 [PH] (ref 5–7)
RBC URINE: 1 /HPF
SP GR UR STRIP: 1 (ref 1–1.03)
UROBILINOGEN UR STRIP-MCNC: NORMAL MG/DL
WBC URINE: 1 /HPF

## 2024-10-01 PROCEDURE — 99309 SBSQ NF CARE MODERATE MDM 30: CPT | Performed by: NURSE PRACTITIONER

## 2024-10-01 NOTE — LETTER
10/1/2024      Rakesh Samuels  [address]  [facility] [id number]        [facility]    Code Status:  DNR/DNI   Visit Type:   Chief Complaint   Patient presents with     TCU Follow Up     Facility:  [facility]) [[id number]]         HPI: Rakesh Samuels is a 74 year old male who I am seeing today for follow up on the TCU. Past medical history includes recurrent UTI, nephrolithiasis,   right renal cortical atrophy, mild left unchanged hydronephrosis, gout, personality disorder, DM2, HL, glaucoma, RADHA on cpap and A fib. Pt recently re hospitalized due to hallucinations with acute encephalopathy. Work up revealed UTI, pt hospitalized for septic encephalopathy and complicated UTI however UC negative and blood cultures NGTD.     Transitional Care Course: Today pt lying in bed. Recent UTI with sepsis. He has completed his antibiotics. Pt with known kidney stones with hx of hydronephrosis. No evidence of urinary retention. Pt voiding adequately.  No blood in his urine. Pt alert and oriented X 2. He denies any hallucinations however appears to be confused today making odd statements. He is a febrile. UA obtained and has trace leukocytes. Laboratory pending.     Assessment/Plan:     Recurrent UTI  Now with occasional episodic confusion  -CT head negative for acute changes.   -MRI brain with small vessel ischemic disease  -Urine CX negative. Blood Cx NGTD.    -Repeat UA today with trace leukocytes. UC pending.   -pt has completed his antibiotics.   -Follow up CBC, BMP, CRP and ESR.   -Question if kidney stones are source of infection. He has follow up with urology on 10/4/24.    Neptholiathiasis  Mild left hydronephrosis  -CT C/A/P no acute changes, Bladder wall thickening could represent hypertrophy from outlet obstruction secondary to an enlarged prostate gland, unchanged.   -Non obstructing stones both kidneys, and mild left hydronephrosis, unchanged.  -Follow up with Urology 10/4/24.    -Follow up BMP with Creatine 0.72.     Gallstones   -RUQ US to eval for elevated transaminases shows known gallstones without evidence of cholecystitis   -Follow up LFTs WNL.   -Denies any abdominal pain.     DM 2  Hemoglobin A1c 7.5  -consistent carb diet.   -Blood sugars checks BID.   -continue metformin     Paroxysmal atrial fibrillation  -Continue with warfarin   -INR 1.8.   -Coumadin Clinic to manage INRs.      Mood disorder  -continue home Abilify and fluvoxamine    -these were held briefly in hospital.      Active Ambulatory Problems     Diagnosis Date Noted     SIRS (systemic inflammatory response syndrome) (H) 09/09/2019     Adrenal incidentaloma (H)      Diet-controlled diabetes mellitus (H)      Pericardial effusion      Lactic acidosis      Type 2 diabetes mellitus without complication, without long-term current use of insulin (H) 04/12/2020     Paroxysmal atrial fibrillation (H) 02/18/2020     Calculus of ureter 04/12/2020     Acute renal failure, unspecified acute renal failure type (H) 04/12/2020     Acute renal failure (ARF) (H) 04/12/2020     ATN (acute tubular necrosis) (H) 04/12/2020     Sepsis due to urinary tract infection (H)      Metformin overdose of undetermined intent, initial encounter      Metabolic acidosis      Hyperkalemia      Hematemesis, presence of nausea not specified 04/12/2020     Calculus of kidney      Syncope and collapse 06/30/2020     Hyperglycemia      After care 07/03/2012     Age-related cataract 03/27/2021     Anemia associated with acute blood loss 06/27/2012     Balanitis 03/27/2021     Cholelithiasis without obstruction 03/27/2021     Constipation 07/03/2012     Depression with anxiety 07/03/2012     Glaucoma 06/25/2012     Hemorrhoids without complication 03/27/2021     Hyperlipidemia 06/07/2018     Loss of appetite 03/27/2021     Major depressive disorder, recurrent episode, in partial remission (H) 06/08/2018     Mixed personality disorder in adult (H)  06/08/2018     Obstructive sleep apnea 03/27/2021     Osteoarthrosis involving lower leg 06/23/2012     Periodic limb movement disorder 03/27/2021     Recurrent major depression (H) 03/27/2021     S/P ureteral stent placement 03/27/2021     Unilateral small kidney 03/27/2021     Nephrolithiasis 03/27/2021     Type 2 diabetes mellitus (H) 06/25/2012     Osteoarthritis of knee 03/27/2021     Persistent atrial fibrillation (H) 03/27/2021     Pyelonephritis 03/27/2021     Urinary tract infection without hematuria, site unspecified 04/30/2022     Sepsis without acute organ dysfunction (H) 04/30/2022     UTI (urinary tract infection) 05/01/2022     Benign prostatic hyperplasia with urinary frequency 05/07/2022     Hypomagnesemia 05/07/2022     Injury of head, initial encounter 07/31/2023     Fall at home, initial encounter 07/31/2023     E. coli urinary tract infection 08/02/2023     Septic encephalopathy 07/25/2024     DM (diabetes mellitus), type 2 with complications (H) 07/25/2024     Urinary tract infection with hematuria, site unspecified 07/26/2024     Altered mental status, unspecified altered mental status type 07/26/2024     Sepsis, due to unspecified organism, unspecified whether acute organ dysfunction present (H) 07/26/2024     Normocytic anemia 11/29/2023     Status post right knee replacement 11/29/2023     Peripheral vascular disease, unspecified (H) 08/20/2024     Warfarin anticoagulation 09/09/2024     Resolved Ambulatory Problems     Diagnosis Date Noted     Shock circulatory (H)      Acute respiratory failure with hypoxemia (H)      Past Medical History:   Diagnosis Date     A-fib (H)      Acute hemodialysis encounter (H)      Acute kidney injury (H)      Asbestosis (H)      Atrophy of right kidney      Basal cell carcinoma      BPH without urinary obstruction      Cellulitis      Cholelithiasis      Diabetes mellitus, type 2 (H) 4/12/2020     Esophagitis      Gastritis      Hematemesis, presence of  nausea not specified      Hyperlipemia      Kidney stone      Metformin overdose of undetermined intent      PTSD (post-traumatic stress disorder)      Seasonal allergies      Sleep apnea      Upper GI bleed      No Known Allergies    All Meds and Allergies reviewed in the record at the facility and is the most up-to-date.    Current Outpatient Medications   Medication Sig Dispense Refill     acetaminophen (TYLENOL) 325 MG tablet Take 650 mg by mouth 3 times daily.       acetaminophen (TYLENOL) 500 MG tablet [ACETAMINOPHEN (TYLENOL) 500 MG TABLET] Take 500 mg by mouth every 6 (six) hours as needed for pain.       ARIPiprazole (ABILIFY) 2 MG tablet [ARIPIPRAZOLE (ABILIFY) 2 MG TABLET] Take 2 mg by mouth at bedtime.        brimonidine (ALPHAGAN) 0.2 % ophthalmic solution [BRIMONIDINE (ALPHAGAN) 0.2 % OPHTHALMIC SOLUTION] Administer 1 drop to both eyes 2 (two) times a day.        calcium carbonate (TUMS) 500 MG chewable tablet Take 1 chew tab by mouth 3 times daily as needed for heartburn.       dorzolamide-timolol (COSOPT) 2-0.5 % ophthalmic solution Place 1 drop into both eyes 2 times daily       fluvoxaMINE (LUVOX) 50 MG tablet [FLUVOXAMINE (LUVOX) 50 MG TABLET] Take 50 mg by mouth at bedtime.        gabapentin (NEURONTIN) 300 MG capsule Take 300 mg by mouth 2 times daily       latanoprostene bunod 0.024 % Drop Place 1 drop into both eyes At Bedtime       levomefolate calcium (L-METHYLFOLATE ORAL) Take 15 mg by mouth daily.       metFORMIN (GLUCOPHAGE) 1000 MG tablet Take 1,000 mg by mouth 2 times daily (with meals)       Multiple Vitamins-Minerals (CENTRUM SILVER) CHEW Take 1 tablet by mouth daily       netarsudiL (RHOPRESSA) 0.02 % Drop Place 1 drop into both eyes every evening       polyethylene glycol (MIRALAX) 17 g packet Take 1 packet by mouth daily.       WARFARIN SODIUM PO Take by mouth See Admin Instructions. Dosing in coordination with INR results       No current facility-administered medications for this  visit.       REVIEW OF SYSTEMS:   10 point review of systems reviewed and pertinent positives in the HPI.     PHYSICAL EXAMINATION:  Physical Exam     Vital signs: BP (!) 151/82   Pulse 72   Temp 97.9  F (36.6  C)   Resp 18   Ht 1.829 m (6')   Wt 96.9 kg (213 lb 9.6 oz)   SpO2 95%   BMI 28.97 kg/m    General: Awake, Alert, oriented x2, lying in bed, appropriately, follows simple commands, conversant  HEENT:Pink conjunctiva,right eye with redness, anicteric sclerae, moist oral mucosa  NECK: Supple  CVS:  S1  S2, without murmur or gallop.   LUNG: Clear to auscultation, No wheezes, rales or rhonci.  BACK: No kyphosis of the thoracic spine  ABDOMEN: Soft, obese, nontender to palpation, with positive bowel sounds  EXTREMITIES: Moves both upper and lower extremities with generalized weakness, no pedal edema, no calf tenderness  SKIN: Warm and dry  NEUROLOGIC: Intact, pulses palpable  PSYCHIATRIC: pleasant affect. Episodic periods with odd statements.       Labs:  All labs reviewed in the nursing home record and Epic   @  Lab Results   Component Value Date    WBC 10.5 09/14/2024     Lab Results   Component Value Date    RBC 4.02 09/14/2024     Lab Results   Component Value Date    HGB 12.5 09/14/2024     Lab Results   Component Value Date    HCT 38.8 09/14/2024     Lab Results   Component Value Date    MCV 97 09/14/2024     Lab Results   Component Value Date    MCH 31.1 09/14/2024     Lab Results   Component Value Date    MCHC 32.2 09/14/2024     Lab Results   Component Value Date    RDW 12.9 09/14/2024     Lab Results   Component Value Date     09/14/2024        @Last Comprehensive Metabolic Panel:  Sodium   Date Value Ref Range Status   09/24/2024 141 135 - 145 mmol/L Final     Potassium   Date Value Ref Range Status   09/24/2024 4.1 3.4 - 5.3 mmol/L Final   05/05/2022 4.4 3.5 - 5.0 mmol/L Final     Chloride   Date Value Ref Range Status   09/24/2024 109 (H) 98 - 107 mmol/L Final   05/02/2022 109 (H) 98 -  107 mmol/L Final     Carbon Dioxide (CO2)   Date Value Ref Range Status   09/24/2024 25 22 - 29 mmol/L Final   05/02/2022 25 22 - 31 mmol/L Final     Anion Gap   Date Value Ref Range Status   09/24/2024 7 7 - 15 mmol/L Final   05/02/2022 8 5 - 18 mmol/L Final     Glucose   Date Value Ref Range Status   09/24/2024 156 (H) 70 - 99 mg/dL Final   05/02/2022 133 (H) 70 - 125 mg/dL Final     GLUCOSE BY METER POCT   Date Value Ref Range Status   09/16/2024 172 (H) 70 - 99 mg/dL Final     Urea Nitrogen   Date Value Ref Range Status   09/24/2024 12.6 8.0 - 23.0 mg/dL Final   05/02/2022 11 8 - 28 mg/dL Final     Creatinine   Date Value Ref Range Status   09/24/2024 0.72 0.67 - 1.17 mg/dL Final     GFR Estimate   Date Value Ref Range Status   09/24/2024 >90 >60 mL/min/1.73m2 Final   06/10/2021 >60 >60 mL/min/1.73m2 Final     Calcium   Date Value Ref Range Status   09/24/2024 8.8 8.8 - 10.4 mg/dL Final     Comment:     Reference intervals for this test were updated on 7/16/2024 to reflect our healthy population more accurately. There may be differences in the flagging of prior results with similar values performed with this method. Those prior results can be interpreted in the context of the updated reference intervals.       This note has been dictated using voice recognition software. Any grammatical or context distortions are unintentional and inherent to the software    Electronically signed by: Kylie Gomez CNP       Sincerely,        Kylie Gomez, NP

## 2024-10-02 LAB
ANION GAP SERPL CALCULATED.3IONS-SCNC: 11 MMOL/L (ref 7–15)
BACTERIA UR CULT: NORMAL
BUN SERPL-MCNC: 16.6 MG/DL (ref 8–23)
CALCIUM SERPL-MCNC: 8.8 MG/DL (ref 8.8–10.4)
CHLORIDE SERPL-SCNC: 105 MMOL/L (ref 98–107)
CREAT SERPL-MCNC: 0.77 MG/DL (ref 0.67–1.17)
CRP SERPL-MCNC: 5.59 MG/L
EGFRCR SERPLBLD CKD-EPI 2021: >90 ML/MIN/1.73M2
ERYTHROCYTE [DISTWIDTH] IN BLOOD BY AUTOMATED COUNT: 13.1 % (ref 10–15)
ERYTHROCYTE [SEDIMENTATION RATE] IN BLOOD BY WESTERGREN METHOD: 8 MM/HR (ref 0–20)
GLUCOSE SERPL-MCNC: 171 MG/DL (ref 70–99)
HCO3 SERPL-SCNC: 25 MMOL/L (ref 22–29)
HCT VFR BLD AUTO: 40.2 % (ref 40–53)
HGB BLD-MCNC: 12.9 G/DL (ref 13.3–17.7)
MCH RBC QN AUTO: 31 PG (ref 26.5–33)
MCHC RBC AUTO-ENTMCNC: 32.1 G/DL (ref 31.5–36.5)
MCV RBC AUTO: 97 FL (ref 78–100)
PLATELET # BLD AUTO: 265 10E3/UL (ref 150–450)
POTASSIUM SERPL-SCNC: 4.5 MMOL/L (ref 3.4–5.3)
RBC # BLD AUTO: 4.16 10E6/UL (ref 4.4–5.9)
SODIUM SERPL-SCNC: 141 MMOL/L (ref 135–145)
WBC # BLD AUTO: 7.5 10E3/UL (ref 4–11)

## 2024-10-02 PROCEDURE — 80048 BASIC METABOLIC PNL TOTAL CA: CPT | Performed by: FAMILY MEDICINE

## 2024-10-02 PROCEDURE — 85027 COMPLETE CBC AUTOMATED: CPT | Performed by: FAMILY MEDICINE

## 2024-10-02 PROCEDURE — 36415 COLL VENOUS BLD VENIPUNCTURE: CPT | Performed by: FAMILY MEDICINE

## 2024-10-02 PROCEDURE — 85652 RBC SED RATE AUTOMATED: CPT | Performed by: FAMILY MEDICINE

## 2024-10-02 PROCEDURE — P9604 ONE-WAY ALLOW PRORATED TRIP: HCPCS | Performed by: FAMILY MEDICINE

## 2024-10-02 PROCEDURE — 86140 C-REACTIVE PROTEIN: CPT | Performed by: FAMILY MEDICINE

## 2024-10-02 NOTE — PROGRESS NOTES
[facility] GERIATRIC SERVICES    Code Status:  DNR/DNI   Visit Type:   Chief Complaint   Patient presents with    TCU Follow Up     Facility:  [facility] ([facility]) [[id number]]         HPI: Rakesh Samuels is a 74 year old male who I am seeing today for follow up on the TCU. Past medical history includes recurrent UTI, nephrolithiasis,   right renal cortical atrophy, mild left unchanged hydronephrosis, gout, personality disorder, DM2, HL, glaucoma, RADHA on cpap and A fib. Pt recently re hospitalized due to hallucinations with acute encephalopathy. Work up revealed UTI, pt hospitalized for septic encephalopathy and complicated UTI however UC negative and blood cultures NGTD.     Transitional Care Course: Today pt lying in bed. Recent UTI with sepsis. He has completed his antibiotics. Pt with known kidney stones with hx of hydronephrosis. No evidence of urinary retention. Pt voiding adequately.  No blood in his urine. Pt alert and oriented X 2. He denies any hallucinations however appears to be confused today making odd statements. He is a febrile. UA obtained and has trace leukocytes. Laboratory pending.     Assessment/Plan:     Recurrent UTI  Now with occasional episodic confusion  -CT head negative for acute changes.   -MRI brain with small vessel ischemic disease  -Urine CX negative. Blood Cx NGTD.    -Repeat UA today with trace leukocytes. UC pending.   -pt has completed his antibiotics.   -Follow up CBC, BMP, CRP and ESR.   -Question if kidney stones are source of infection. He has follow up with urology on 10/4/24.    Neptholiathiasis  Mild left hydronephrosis  -CT C/A/P no acute changes, Bladder wall thickening could represent hypertrophy from outlet obstruction secondary to an enlarged prostate gland, unchanged.   -Non obstructing stones both kidneys, and mild left hydronephrosis, unchanged.  -Follow up with Urology 10/4/24.   -Follow up BMP with Creatine 0.72.     Gallstones   -RUQ US to eval for elevated  transaminases shows known gallstones without evidence of cholecystitis   -Follow up LFTs WNL.   -Denies any abdominal pain.     DM 2  Hemoglobin A1c 7.5  -consistent carb diet.   -Blood sugars checks BID.   -continue metformin     Paroxysmal atrial fibrillation  -Continue with warfarin   -INR 1.8.   -Coumadin Clinic to manage INRs.      Mood disorder  -continue home Abilify and fluvoxamine    -these were held briefly in hospital.      Active Ambulatory Problems     Diagnosis Date Noted    SIRS (systemic inflammatory response syndrome) (H) 09/09/2019    Adrenal incidentaloma (H)     Diet-controlled diabetes mellitus (H)     Pericardial effusion     Lactic acidosis     Type 2 diabetes mellitus without complication, without long-term current use of insulin (H) 04/12/2020    Paroxysmal atrial fibrillation (H) 02/18/2020    Calculus of ureter 04/12/2020    Acute renal failure, unspecified acute renal failure type (H) 04/12/2020    Acute renal failure (ARF) (H) 04/12/2020    ATN (acute tubular necrosis) (H) 04/12/2020    Sepsis due to urinary tract infection (H)     Metformin overdose of undetermined intent, initial encounter     Metabolic acidosis     Hyperkalemia     Hematemesis, presence of nausea not specified 04/12/2020    Calculus of kidney     Syncope and collapse 06/30/2020    Hyperglycemia     After care 07/03/2012    Age-related cataract 03/27/2021    Anemia associated with acute blood loss 06/27/2012    Balanitis 03/27/2021    Cholelithiasis without obstruction 03/27/2021    Constipation 07/03/2012    Depression with anxiety 07/03/2012    Glaucoma 06/25/2012    Hemorrhoids without complication 03/27/2021    Hyperlipidemia 06/07/2018    Loss of appetite 03/27/2021    Major depressive disorder, recurrent episode, in partial remission (H) 06/08/2018    Mixed personality disorder in adult (H) 06/08/2018    Obstructive sleep apnea 03/27/2021    Osteoarthrosis involving lower leg 06/23/2012    Periodic limb movement  disorder 03/27/2021    Recurrent major depression (H) 03/27/2021    S/P ureteral stent placement 03/27/2021    Unilateral small kidney 03/27/2021    Nephrolithiasis 03/27/2021    Type 2 diabetes mellitus (H) 06/25/2012    Osteoarthritis of knee 03/27/2021    Persistent atrial fibrillation (H) 03/27/2021    Pyelonephritis 03/27/2021    Urinary tract infection without hematuria, site unspecified 04/30/2022    Sepsis without acute organ dysfunction (H) 04/30/2022    UTI (urinary tract infection) 05/01/2022    Benign prostatic hyperplasia with urinary frequency 05/07/2022    Hypomagnesemia 05/07/2022    Injury of head, initial encounter 07/31/2023    Fall at home, initial encounter 07/31/2023    E. coli urinary tract infection 08/02/2023    Septic encephalopathy 07/25/2024    DM (diabetes mellitus), type 2 with complications (H) 07/25/2024    Urinary tract infection with hematuria, site unspecified 07/26/2024    Altered mental status, unspecified altered mental status type 07/26/2024    Sepsis, due to unspecified organism, unspecified whether acute organ dysfunction present (H) 07/26/2024    Normocytic anemia 11/29/2023    Status post right knee replacement 11/29/2023    Peripheral vascular disease, unspecified (H) 08/20/2024    Warfarin anticoagulation 09/09/2024     Resolved Ambulatory Problems     Diagnosis Date Noted    Shock circulatory (H)     Acute respiratory failure with hypoxemia (H)      Past Medical History:   Diagnosis Date    A-fib (H)     Acute hemodialysis encounter (H)     Acute kidney injury (H)     Asbestosis (H)     Atrophy of right kidney     Basal cell carcinoma     BPH without urinary obstruction     Cellulitis     Cholelithiasis     Diabetes mellitus, type 2 (H) 4/12/2020    Esophagitis     Gastritis     Hematemesis, presence of nausea not specified     Hyperlipemia     Kidney stone     Metformin overdose of undetermined intent     PTSD (post-traumatic stress disorder)     Seasonal allergies      Sleep apnea     Upper GI bleed      No Known Allergies    All Meds and Allergies reviewed in the record at the facility and is the most up-to-date.    Current Outpatient Medications   Medication Sig Dispense Refill    acetaminophen (TYLENOL) 325 MG tablet Take 650 mg by mouth 3 times daily.      acetaminophen (TYLENOL) 500 MG tablet [ACETAMINOPHEN (TYLENOL) 500 MG TABLET] Take 500 mg by mouth every 6 (six) hours as needed for pain.      ARIPiprazole (ABILIFY) 2 MG tablet [ARIPIPRAZOLE (ABILIFY) 2 MG TABLET] Take 2 mg by mouth at bedtime.       brimonidine (ALPHAGAN) 0.2 % ophthalmic solution [BRIMONIDINE (ALPHAGAN) 0.2 % OPHTHALMIC SOLUTION] Administer 1 drop to both eyes 2 (two) times a day.       calcium carbonate (TUMS) 500 MG chewable tablet Take 1 chew tab by mouth 3 times daily as needed for heartburn.      dorzolamide-timolol (COSOPT) 2-0.5 % ophthalmic solution Place 1 drop into both eyes 2 times daily      fluvoxaMINE (LUVOX) 50 MG tablet [FLUVOXAMINE (LUVOX) 50 MG TABLET] Take 50 mg by mouth at bedtime.       gabapentin (NEURONTIN) 300 MG capsule Take 300 mg by mouth 2 times daily      latanoprostene bunod 0.024 % Drop Place 1 drop into both eyes At Bedtime      levomefolate calcium (L-METHYLFOLATE ORAL) Take 15 mg by mouth daily.      metFORMIN (GLUCOPHAGE) 1000 MG tablet Take 1,000 mg by mouth 2 times daily (with meals)      Multiple Vitamins-Minerals (CENTRUM SILVER) CHEW Take 1 tablet by mouth daily      netarsudiL (RHOPRESSA) 0.02 % Drop Place 1 drop into both eyes every evening      polyethylene glycol (MIRALAX) 17 g packet Take 1 packet by mouth daily.      WARFARIN SODIUM PO Take by mouth See Admin Instructions. Dosing in coordination with INR results       No current facility-administered medications for this visit.       REVIEW OF SYSTEMS:   10 point review of systems reviewed and pertinent positives in the HPI.     PHYSICAL EXAMINATION:  Physical Exam     Vital signs: BP (!) 151/82   Pulse 72    Temp 97.9  F (36.6  C)   Resp 18   Ht 1.829 m (6')   Wt 96.9 kg (213 lb 9.6 oz)   SpO2 95%   BMI 28.97 kg/m    General: Awake, Alert, oriented x2, lying in bed, appropriately, follows simple commands, conversant  HEENT:Pink conjunctiva,right eye with redness, anicteric sclerae, moist oral mucosa  NECK: Supple  CVS:  S1  S2, without murmur or gallop.   LUNG: Clear to auscultation, No wheezes, rales or rhonci.  BACK: No kyphosis of the thoracic spine  ABDOMEN: Soft, obese, nontender to palpation, with positive bowel sounds  EXTREMITIES: Moves both upper and lower extremities with generalized weakness, no pedal edema, no calf tenderness  SKIN: Warm and dry  NEUROLOGIC: Intact, pulses palpable  PSYCHIATRIC: pleasant affect. Episodic periods with odd statements.       Labs:  All labs reviewed in the nursing home record and Epic   @  Lab Results   Component Value Date    WBC 10.5 09/14/2024     Lab Results   Component Value Date    RBC 4.02 09/14/2024     Lab Results   Component Value Date    HGB 12.5 09/14/2024     Lab Results   Component Value Date    HCT 38.8 09/14/2024     Lab Results   Component Value Date    MCV 97 09/14/2024     Lab Results   Component Value Date    MCH 31.1 09/14/2024     Lab Results   Component Value Date    MCHC 32.2 09/14/2024     Lab Results   Component Value Date    RDW 12.9 09/14/2024     Lab Results   Component Value Date     09/14/2024        @Last Comprehensive Metabolic Panel:  Sodium   Date Value Ref Range Status   09/24/2024 141 135 - 145 mmol/L Final     Potassium   Date Value Ref Range Status   09/24/2024 4.1 3.4 - 5.3 mmol/L Final   05/05/2022 4.4 3.5 - 5.0 mmol/L Final     Chloride   Date Value Ref Range Status   09/24/2024 109 (H) 98 - 107 mmol/L Final   05/02/2022 109 (H) 98 - 107 mmol/L Final     Carbon Dioxide (CO2)   Date Value Ref Range Status   09/24/2024 25 22 - 29 mmol/L Final   05/02/2022 25 22 - 31 mmol/L Final     Anion Gap   Date Value Ref Range Status    09/24/2024 7 7 - 15 mmol/L Final   05/02/2022 8 5 - 18 mmol/L Final     Glucose   Date Value Ref Range Status   09/24/2024 156 (H) 70 - 99 mg/dL Final   05/02/2022 133 (H) 70 - 125 mg/dL Final     GLUCOSE BY METER POCT   Date Value Ref Range Status   09/16/2024 172 (H) 70 - 99 mg/dL Final     Urea Nitrogen   Date Value Ref Range Status   09/24/2024 12.6 8.0 - 23.0 mg/dL Final   05/02/2022 11 8 - 28 mg/dL Final     Creatinine   Date Value Ref Range Status   09/24/2024 0.72 0.67 - 1.17 mg/dL Final     GFR Estimate   Date Value Ref Range Status   09/24/2024 >90 >60 mL/min/1.73m2 Final   06/10/2021 >60 >60 mL/min/1.73m2 Final     Calcium   Date Value Ref Range Status   09/24/2024 8.8 8.8 - 10.4 mg/dL Final     Comment:     Reference intervals for this test were updated on 7/16/2024 to reflect our healthy population more accurately. There may be differences in the flagging of prior results with similar values performed with this method. Those prior results can be interpreted in the context of the updated reference intervals.       This note has been dictated using voice recognition software. Any grammatical or context distortions are unintentional and inherent to the software    Electronically signed by: Kylie Gomez, CNP

## 2024-10-03 ENCOUNTER — VIRTUAL VISIT (OUTPATIENT)
Dept: UROLOGY | Facility: CLINIC | Age: 74
End: 2024-10-03
Payer: MEDICARE

## 2024-10-03 ENCOUNTER — TRANSITIONAL CARE UNIT VISIT (OUTPATIENT)
Dept: GERIATRICS | Facility: CLINIC | Age: 74
End: 2024-10-03
Payer: MEDICARE

## 2024-10-03 VITALS — BODY MASS INDEX: 29.8 KG/M2 | WEIGHT: 220 LBS | HEIGHT: 72 IN

## 2024-10-03 VITALS
TEMPERATURE: 98.1 F | SYSTOLIC BLOOD PRESSURE: 135 MMHG | WEIGHT: 211 LBS | OXYGEN SATURATION: 95 % | HEART RATE: 91 BPM | DIASTOLIC BLOOD PRESSURE: 86 MMHG | HEIGHT: 72 IN | RESPIRATION RATE: 18 BRPM | BODY MASS INDEX: 28.58 KG/M2

## 2024-10-03 DIAGNOSIS — I48.0 PAROXYSMAL ATRIAL FIBRILLATION (H): ICD-10-CM

## 2024-10-03 DIAGNOSIS — F60.89 MIXED PERSONALITY DISORDER IN ADULT (H): ICD-10-CM

## 2024-10-03 DIAGNOSIS — N20.0 NEPHROLITHIASIS: ICD-10-CM

## 2024-10-03 DIAGNOSIS — N20.0 NEPHROLITHIASIS: Primary | ICD-10-CM

## 2024-10-03 DIAGNOSIS — E11.00 TYPE 2 DIABETES MELLITUS WITH HYPEROSMOLARITY WITHOUT COMA, WITHOUT LONG-TERM CURRENT USE OF INSULIN (H): ICD-10-CM

## 2024-10-03 DIAGNOSIS — F33.41 MAJOR DEPRESSIVE DISORDER, RECURRENT EPISODE, IN PARTIAL REMISSION (H): ICD-10-CM

## 2024-10-03 DIAGNOSIS — N13.30 HYDRONEPHROSIS, UNSPECIFIED HYDRONEPHROSIS TYPE: ICD-10-CM

## 2024-10-03 DIAGNOSIS — I10 ESSENTIAL HYPERTENSION: ICD-10-CM

## 2024-10-03 DIAGNOSIS — N26.1 ATROPHY OF RIGHT KIDNEY: ICD-10-CM

## 2024-10-03 DIAGNOSIS — N39.0 URINARY TRACT INFECTION WITHOUT HEMATURIA, SITE UNSPECIFIED: Primary | ICD-10-CM

## 2024-10-03 PROCEDURE — 99203 OFFICE O/P NEW LOW 30 MIN: CPT | Mod: 95 | Performed by: NURSE PRACTITIONER

## 2024-10-03 PROCEDURE — 99309 SBSQ NF CARE MODERATE MDM 30: CPT | Performed by: NURSE PRACTITIONER

## 2024-10-03 ASSESSMENT — PAIN SCALES - GENERAL: PAINLEVEL: NO PAIN (0)

## 2024-10-03 ASSESSMENT — PATIENT HEALTH QUESTIONNAIRE - PHQ9: SUM OF ALL RESPONSES TO PHQ QUESTIONS 1-9: 20

## 2024-10-03 NOTE — NURSING NOTE
Current patient location: [address]   [place] [address]    Is the patient currently in the state of [place]? YES    Visit mode:VIDEO    If the visit is dropped, the patient can be reconnected by: VIDEO VISIT: Send to e-mail at: [email or web address]    Will anyone else be joining the visit? NO  (If patient encounters technical issues they should call [phone]    Are changes needed to the allergy or medication list? Pt stated no changes to allergies and Pt stated no med changes    Are refills needed on medications prescribed by this physician? NO    Rooming Documentation:  Questionnaire(s) completed    Reason for visit: Consult  Depression Response    Patient completed the PHQ-9 assessment for depression and scored >9? Yes-20  Question 9 on the PHQ-9 was positive for suicidality? No  Does patient have current mental health provider? Yes    Is this a virtual visit? Yes   Does patient have suicidal ideation (positive question 9)? No - offer to place Mental Health Referral.  Patient declined referral/not neededper dtr is seeing someone for this    I personally notified the following: visit provider          Yisel GRAJEDA

## 2024-10-03 NOTE — LETTER
10/3/2024      Rakesh Samuels  [address]  [place] [facility] [address]        [facility] GERIATRIC SERVICES    Code Status:  DNR/DNI   Visit Type:   Chief Complaint   Patient presents with     TCU Follow Up     Facility:  [facility]) [[id number]]         HPI: Rakesh Samuels is a 74 year old male who I am seeing today for follow up on the TCU. Past medical history includes recurrent UTI, nephrolithiasis,   right renal cortical atrophy, mild left unchanged hydronephrosis, gout, personality disorder, DM2, HL, glaucoma, RADHA on cpap and A fib. Pt recently re hospitalized due to hallucinations with acute encephalopathy. Work up revealed UTI, pt hospitalized for septic encephalopathy and complicated UTI however UC negative and blood cultures NGTD.     Transitional Care Course: Today pt lying in bed. Prior to visit I spoke with his daughter about follow up with Urology. They feel his hydronephrosis is stable. They feel his kidney stones are not contributing to his recent recurrent UTIs. Pt with occasional odd statements. He is physically doing well in therapy per PT today. We did discuss his antipsychotics. Meds were held briefly in hospital and condition improved. UA repeated today and negative. No leukocytosis. Message left for his psychiatrist.       Assessment/Plan:     Recurrent UTI  Now with occasional episodic confusion  -CT head negative for acute changes.   -MRI brain with small vessel ischemic disease  -Urine CX negative. Blood Cx NGTD.    -Repeat UA today with trace leukocytes. UC negative.   -pt has completed his antibiotics.   -Follow up CBC, BMP, CRP and ESR.   -Question if kidney stones are source of infection. He has follow up with urology today 10/4/24. They do not feel so.     Neptholiathiasis  Mild left hydronephrosis  -CT C/A/P no acute changes, Bladder wall thickening could represent hypertrophy from outlet obstruction secondary to an enlarged prostate gland, unchanged.   -Non  obstructing stones both kidneys, and mild left hydronephrosis, unchanged.  -Follow up with Urology 10/4/24.   -Follow up BMP with Creatine 0.72.     Gallstones   -RUQ US to eval for elevated transaminases shows known gallstones without evidence of cholecystitis   -Follow up LFTs WNL.   -Denies any abdominal pain.     DM 2  Hemoglobin A1c 7.5  -consistent carb diet.   -Blood sugars checks BID.   -continue metformin     Paroxysmal atrial fibrillation  -Continue with warfarin   -INR 1.8.   -Coumadin Clinic to manage INRs.      Mood disorder  -continue home Abilify and fluvoxamine    -these were held briefly in hospital.   -Message left for his psychiatrist Dr. Villalobos.      Active Ambulatory Problems     Diagnosis Date Noted     SIRS (systemic inflammatory response syndrome) (H) 09/09/2019     Adrenal incidentaloma (H)      Diet-controlled diabetes mellitus (H)      Pericardial effusion      Lactic acidosis      Type 2 diabetes mellitus without complication, without long-term current use of insulin (H) 04/12/2020     Paroxysmal atrial fibrillation (H) 02/18/2020     Calculus of ureter 04/12/2020     Acute renal failure, unspecified acute renal failure type (H) 04/12/2020     Acute renal failure (ARF) (H) 04/12/2020     ATN (acute tubular necrosis) (H) 04/12/2020     Sepsis due to urinary tract infection (H)      Metformin overdose of undetermined intent, initial encounter      Metabolic acidosis      Hyperkalemia      Hematemesis, presence of nausea not specified 04/12/2020     Calculus of kidney      Syncope and collapse 06/30/2020     Hyperglycemia      After care 07/03/2012     Age-related cataract 03/27/2021     Anemia associated with acute blood loss 06/27/2012     Balanitis 03/27/2021     Cholelithiasis without obstruction 03/27/2021     Constipation 07/03/2012     Depression with anxiety 07/03/2012     Glaucoma 06/25/2012     Hemorrhoids without complication 03/27/2021     Hyperlipidemia 06/07/2018     Loss of  appetite 03/27/2021     Major depressive disorder, recurrent episode, in partial remission (H) 06/08/2018     Mixed personality disorder in adult (H) 06/08/2018     Obstructive sleep apnea 03/27/2021     Osteoarthrosis involving lower leg 06/23/2012     Periodic limb movement disorder 03/27/2021     Recurrent major depression (H) 03/27/2021     S/P ureteral stent placement 03/27/2021     Unilateral small kidney 03/27/2021     Nephrolithiasis 03/27/2021     Type 2 diabetes mellitus (H) 06/25/2012     Osteoarthritis of knee 03/27/2021     Persistent atrial fibrillation (H) 03/27/2021     Pyelonephritis 03/27/2021     Urinary tract infection without hematuria, site unspecified 04/30/2022     Sepsis without acute organ dysfunction (H) 04/30/2022     UTI (urinary tract infection) 05/01/2022     Benign prostatic hyperplasia with urinary frequency 05/07/2022     Hypomagnesemia 05/07/2022     Injury of head, initial encounter 07/31/2023     Fall at home, initial encounter 07/31/2023     E. coli urinary tract infection 08/02/2023     Septic encephalopathy 07/25/2024     DM (diabetes mellitus), type 2 with complications (H) 07/25/2024     Urinary tract infection with hematuria, site unspecified 07/26/2024     Altered mental status, unspecified altered mental status type 07/26/2024     Sepsis, due to unspecified organism, unspecified whether acute organ dysfunction present (H) 07/26/2024     Normocytic anemia 11/29/2023     Status post right knee replacement 11/29/2023     Peripheral vascular disease, unspecified (H) 08/20/2024     Warfarin anticoagulation 09/09/2024     Resolved Ambulatory Problems     Diagnosis Date Noted     Shock circulatory (H)      Acute respiratory failure with hypoxemia (H)      Past Medical History:   Diagnosis Date     A-fib (H)      Acute hemodialysis encounter (H)      Acute kidney injury (H)      Asbestosis (H)      Atrophy of right kidney      Basal cell carcinoma      BPH without urinary  obstruction      Cellulitis      Cholelithiasis      Diabetes mellitus, type 2 (H) 4/12/2020     Esophagitis      Gastritis      Hematemesis, presence of nausea not specified      Hyperlipemia      Kidney stone      Metformin overdose of undetermined intent      PTSD (post-traumatic stress disorder)      Seasonal allergies      Sleep apnea      Upper GI bleed      No Known Allergies    All Meds and Allergies reviewed in the record at the facility and is the most up-to-date.    Current Outpatient Medications   Medication Sig Dispense Refill     acetaminophen (TYLENOL) 325 MG tablet Take 650 mg by mouth 3 times daily.       acetaminophen (TYLENOL) 500 MG tablet [ACETAMINOPHEN (TYLENOL) 500 MG TABLET] Take 500 mg by mouth every 6 (six) hours as needed for pain.       ARIPiprazole (ABILIFY) 2 MG tablet [ARIPIPRAZOLE (ABILIFY) 2 MG TABLET] Take 2 mg by mouth at bedtime.        brimonidine (ALPHAGAN) 0.2 % ophthalmic solution [BRIMONIDINE (ALPHAGAN) 0.2 % OPHTHALMIC SOLUTION] Administer 1 drop to both eyes 2 (two) times a day.        calcium carbonate (TUMS) 500 MG chewable tablet Take 1 chew tab by mouth 3 times daily as needed for heartburn.       dorzolamide-timolol (COSOPT) 2-0.5 % ophthalmic solution Place 1 drop into both eyes 2 times daily       fluvoxaMINE (LUVOX) 50 MG tablet [FLUVOXAMINE (LUVOX) 50 MG TABLET] Take 50 mg by mouth at bedtime.        gabapentin (NEURONTIN) 300 MG capsule Take 300 mg by mouth 2 times daily       latanoprostene bunod 0.024 % Drop Place 1 drop into both eyes At Bedtime       levomefolate calcium (L-METHYLFOLATE ORAL) Take 15 mg by mouth daily.       metFORMIN (GLUCOPHAGE) 1000 MG tablet Take 1,000 mg by mouth 2 times daily (with meals)       Multiple Vitamins-Minerals (CENTRUM SILVER) CHEW Take 1 tablet by mouth daily       netarsudiL (RHOPRESSA) 0.02 % Drop Place 1 drop into both eyes every evening       polyethylene glycol (MIRALAX) 17 g packet Take 1 packet by mouth daily.        WARFARIN SODIUM PO Take by mouth See Admin Instructions. Dosing in coordination with INR results       No current facility-administered medications for this visit.       REVIEW OF SYSTEMS:   10 point review of systems reviewed and pertinent positives in the HPI.     PHYSICAL EXAMINATION:  Physical Exam     Vital signs: /86   Pulse 91   Temp 98.1  F (36.7  C)   Resp 18   Ht 1.829 m (6')   Wt 95.7 kg (211 lb)   SpO2 95%   BMI 28.62 kg/m    General: Awake, Alert, oriented x2, lying in bed, appropriately, follows simple commands, conversant  HEENT:Pink conjunctiva,right eye with redness, anicteric sclerae, moist oral mucosa  NECK: Supple  CVS:  S1  S2, without murmur or gallop.   LUNG: Clear to auscultation, No wheezes, rales or rhonci.  BACK: No kyphosis of the thoracic spine  ABDOMEN: Soft, obese, nontender to palpation, with positive bowel sounds  EXTREMITIES: Moves both upper and lower extremities with generalized weakness, no pedal edema, no calf tenderness  SKIN: Warm and dry  NEUROLOGIC: Intact, pulses palpable  PSYCHIATRIC: pleasant affect. Episodic periods with odd statements.       Labs:  All labs reviewed in the nursing home record and Epic   @  Lab Results   Component Value Date    WBC 10.5 09/14/2024     Lab Results   Component Value Date    RBC 4.02 09/14/2024     Lab Results   Component Value Date    HGB 12.5 09/14/2024     Lab Results   Component Value Date    HCT 38.8 09/14/2024     Lab Results   Component Value Date    MCV 97 09/14/2024     Lab Results   Component Value Date    MCH 31.1 09/14/2024     Lab Results   Component Value Date    MCHC 32.2 09/14/2024     Lab Results   Component Value Date    RDW 12.9 09/14/2024     Lab Results   Component Value Date     09/14/2024        @Last Comprehensive Metabolic Panel:  Sodium   Date Value Ref Range Status   10/02/2024 141 135 - 145 mmol/L Final     Potassium   Date Value Ref Range Status   10/02/2024 4.5 3.4 - 5.3 mmol/L Final    05/05/2022 4.4 3.5 - 5.0 mmol/L Final     Chloride   Date Value Ref Range Status   10/02/2024 105 98 - 107 mmol/L Final   05/02/2022 109 (H) 98 - 107 mmol/L Final     Carbon Dioxide (CO2)   Date Value Ref Range Status   10/02/2024 25 22 - 29 mmol/L Final   05/02/2022 25 22 - 31 mmol/L Final     Anion Gap   Date Value Ref Range Status   10/02/2024 11 7 - 15 mmol/L Final   05/02/2022 8 5 - 18 mmol/L Final     Glucose   Date Value Ref Range Status   10/02/2024 171 (H) 70 - 99 mg/dL Final   05/02/2022 133 (H) 70 - 125 mg/dL Final     GLUCOSE BY METER POCT   Date Value Ref Range Status   09/16/2024 172 (H) 70 - 99 mg/dL Final     Urea Nitrogen   Date Value Ref Range Status   10/02/2024 16.6 8.0 - 23.0 mg/dL Final   05/02/2022 11 8 - 28 mg/dL Final     Creatinine   Date Value Ref Range Status   10/02/2024 0.77 0.67 - 1.17 mg/dL Final     GFR Estimate   Date Value Ref Range Status   10/02/2024 >90 >60 mL/min/1.73m2 Final   06/10/2021 >60 >60 mL/min/1.73m2 Final     Calcium   Date Value Ref Range Status   10/02/2024 8.8 8.8 - 10.4 mg/dL Final     Comment:     Reference intervals for this test were updated on 7/16/2024 to reflect our healthy population more accurately. There may be differences in the flagging of prior results with similar values performed with this method. Those prior results can be interpreted in the context of the updated reference intervals.       This note has been dictated using voice recognition software. Any grammatical or context distortions are unintentional and inherent to the software    Electronically signed by: Kylie Gomez CNP       Sincerely,        Kylie Gomez NP

## 2024-10-03 NOTE — PROGRESS NOTES
Urology Video Office Visit    Video-Visit Details    Type of service:  Video Visit    Video Start Time: 1032    Video End Time:10:59 AM    Originating Location (pt. Location): Home    Distant Location (provider location):  Off-site     Platform used for Video Visit: Erwin           Assessment and Plan:     Assessment:74 year old male with concerns of intermittent right flank pain, history of UTI's, history of CaOx/Uric acid nephrolithiasis, chronic atrophy of right kidney, and UUI.      Plan:  -Reviewed CT scan with patient and daughter. Noted atrophic right kidney and left hydronephrosis. He is noted to have a left extrarenal pelvis. No noted ureterolithiasis at this time.  He would like to continue with observation of his stones at this time.   -Discussed possible etiologies for urinary urgency including BPH with LUTS vs OAB. Recommend to obtain bladder scan to ensure complete bladder emptying. Discussed the diagnosis and natural history of benign prostatic hyperplasia and lower urinary tract symptoms. We discussed treatment options including observation vs. medical therapy.  -RTC in 6 months with CT scan    Angela Serrato CNP  Department of Urology  October 3, 2024    I spent a total of 30 minutes spent on the date of the encounter doing chart review, history and exam, documentation, and further activities as noted above.          Chief Complaint:   Right flank pain, nephrolithiasis, and history of UTI         History of Present Illness:    Rakesh Samuels is a pleasant 74 year old male who presents with his daughter for concerns of a nephrolithiasis and right flank pain.     Mr. Samuels was admitted from 07/25-07/29/24 fro concerns of sepsis d/t UTI.   Urine culture positive for >100k enterococcus faecalis. Blood culture positive for enterococcus faecalis. He was discharge to TCU and was readmitted on 09/09-09/16/24 for concerns of UTI. Urine culture on 08/28/24 revealed >100k enterococcus faecalis.      Urine Culture:   09/30/24: <10k mixed shravan  09/14/24: <1k mixed shravan  09/09/24: <1k mixed shravan   09/05/24: <10k mixed shravan    No history of urinary retention. History of urinary incontinence with urge incontinence. He does wear a brief. He is circumcised.     Daughter notes that symptoms of UTI include hallucination and altered mental status.     Mr. Samuels notes that 90% of the time he is feeling well but 10% of the time with have a shooting pain in the right kidney which lasts for 5 seconds.     First stone episode was around 31 years of age. History of about 4 stone procedures.          Past Medical History:     Past Medical History:   Diagnosis Date    A-fib (H)     Acute hemodialysis encounter (H)     Due to CHIDI    Acute kidney injury (H)     Acute respiratory failure with hypoxemia (H)     Adrenal incidentaloma (H)     Asbestosis (H)     ATN (acute tubular necrosis) (H)     Atrophy of right kidney     Basal cell carcinoma     BPH without urinary obstruction     Cellulitis     Left foot    Cholelithiasis     Depression with anxiety     Diabetes mellitus, type 2 (H) 4/12/2020    Esophagitis     Gastritis     Glaucoma     Hematemesis, presence of nausea not specified     Hyperkalemia     Hyperlipemia     Kidney stone     Lactic acidosis     Metabolic acidosis     Metformin overdose of undetermined intent     Paroxysmal atrial fibrillation (H) 2/18/2020    Pericardial effusion     PTSD (post-traumatic stress disorder)     Seasonal allergies     Sepsis due to urinary tract infection (H)     Shock circulatory (H)     SIRS (systemic inflammatory response syndrome) (H)     Sleep apnea     uses a machine at night.     Upper GI bleed             Past Surgical History:     Past Surgical History:   Procedure Laterality Date    EYE SURGERY      congenital ptosis right upper lid    HC CYSTOSCOPY,INSERT URETERAL STENT Left 4/12/2020    Procedure: CYSTOSCOPY, WITH URETERAL STENT INSERTION;  Surgeon: Christopher Yates,  MD;  Location: [facility] OR;  Service: Urology    WA ESOPHAGOGASTRODUODENOSCOPY TRANSORAL DIAGNOSTIC N/A 4/13/2020    Procedure: ESOPHAGOGASTRODUODENOSCOPY (EGD);  Surgeon: Robert Rico MD;  Location: [facility] GI;  Service: Gastroenterology    REPLACEMENT TOTAL KNEE Left             Medications     Current Outpatient Medications   Medication Sig Dispense Refill    acetaminophen (TYLENOL) 325 MG tablet Take 650 mg by mouth 3 times daily.      acetaminophen (TYLENOL) 500 MG tablet [ACETAMINOPHEN (TYLENOL) 500 MG TABLET] Take 500 mg by mouth every 6 (six) hours as needed for pain.      ARIPiprazole (ABILIFY) 2 MG tablet [ARIPIPRAZOLE (ABILIFY) 2 MG TABLET] Take 2 mg by mouth at bedtime.       brimonidine (ALPHAGAN) 0.2 % ophthalmic solution [BRIMONIDINE (ALPHAGAN) 0.2 % OPHTHALMIC SOLUTION] Administer 1 drop to both eyes 2 (two) times a day.       calcium carbonate (TUMS) 500 MG chewable tablet Take 1 chew tab by mouth 3 times daily as needed for heartburn.      dorzolamide-timolol (COSOPT) 2-0.5 % ophthalmic solution Place 1 drop into both eyes 2 times daily      fluvoxaMINE (LUVOX) 50 MG tablet [FLUVOXAMINE (LUVOX) 50 MG TABLET] Take 50 mg by mouth at bedtime.       gabapentin (NEURONTIN) 300 MG capsule Take 300 mg by mouth 2 times daily      latanoprostene bunod 0.024 % Drop Place 1 drop into both eyes At Bedtime      levomefolate calcium (L-METHYLFOLATE ORAL) Take 15 mg by mouth daily.      metFORMIN (GLUCOPHAGE) 1000 MG tablet Take 1,000 mg by mouth 2 times daily (with meals)      Multiple Vitamins-Minerals (CENTRUM SILVER) CHEW Take 1 tablet by mouth daily      netarsudiL (RHOPRESSA) 0.02 % Drop Place 1 drop into both eyes every evening      polyethylene glycol (MIRALAX) 17 g packet Take 1 packet by mouth daily.      WARFARIN SODIUM PO Take by mouth See Admin Instructions. Dosing in coordination with INR results       No current facility-administered medications for this visit.            Family  History:     Family History   Problem Relation Age of Onset    Diabetes Mother             Social History:     Social History     Socioeconomic History    Marital status:      Spouse name: Not on file    Number of children: Not on file    Years of education: Not on file    Highest education level: Not on file   Occupational History    Not on file   Tobacco Use    Smoking status: Never    Smokeless tobacco: Never   Substance and Sexual Activity    Alcohol use: Not Currently    Drug use: Never    Sexual activity: Not Currently   Other Topics Concern    Not on file   Social History Narrative    Not on file     Social Determinants of Health     Financial Resource Strain: Low Risk  (9/13/2024)    Financial Resource Strain     Within the past 12 months, have you or your family members you live with been unable to get utilities (heat, electricity) when it was really needed?: No   Food Insecurity: Low Risk  (9/13/2024)    Food Insecurity     Within the past 12 months, did you worry that your food would run out before you got money to buy more?: No     Within the past 12 months, did the food you bought just not last and you didn t have money to get more?: No   Transportation Needs: Low Risk  (9/13/2024)    Transportation Needs     Within the past 12 months, has lack of transportation kept you from medical appointments, getting your medicines, non-medical meetings or appointments, work, or from getting things that you need?: No   Physical Activity: Not on file   Stress: Not on file   Social Connections: Unknown (11/29/2023)    Received from [facility]    Social Connections     Frequency of Communication with Friends and Family: Not on file   Interpersonal Safety: High Risk (9/10/2024)    Interpersonal Safety     Do you feel physically and emotionally safe where you currently live?: No     Within the past 12 months, have you been hit,  slapped, kicked or otherwise physically hurt by someone?: No     Within the past 12 months, have you been humiliated or emotionally abused in other ways by your partner or ex-partner?: No   Housing Stability: High Risk (9/13/2024)    Housing Stability     Do you have housing? : No     Are you worried about losing your housing?: No            Allergies:   Patient has no known allergies.         Review of Systems:  From intake questionnaire   Negative 14 system review except as noted on HPI, nurse's note.         Physical Exam:   General Appearance: Well groomed, hygenic  Eyes: No redness, discharge  Respiratory: No cough, no respiratory distress or labored breathing  Musculoskeletal: Grossly normal, full range of motion in upper extremities, no gross deficits  Skin: No discoloration or apparent rashes  Neurologic - No tremors  Psychiatric - Alert and oriented  The rest of a comprehensive physical examination is deferred due to video visit restrictions        Labs:    I personally reviewed all applicable laboratory data and went over findings with patient  Significant for:    CBC RESULTS:  Recent Labs   Lab Test 10/02/24  0544 09/24/24  0643 09/14/24  0629 09/13/24  0923   WBC 7.5 6.0 10.5 8.4   HGB 12.9* 12.1* 12.5* 13.2*    279 269 287        BMP RESULTS:  Recent Labs   Lab Test 10/02/24  0544 09/24/24  0643 09/16/24  0758 09/16/24  0246 09/15/24  0823 09/15/24  0714 09/14/24  1207 09/14/24  1056 04/30/22  0304 06/10/21  1145 04/01/21  0749 03/30/21  0742 03/30/21  0601 03/29/21  0757 03/29/21  0610    141  --   --   --  141  --  139   < > 143 144  --  143  --  142   POTASSIUM 4.5 4.1  --   --   --  4.4  --  4.5   < > 4.4 4.1  --  4.4  --  4.2   CHLORIDE 105 109*  --   --   --  107  --  105   < > 106 110*  --  114*  --  113*   CO2 25 25  --   --   --  23  --  20*   < > 21* 25  --  23  --  24   ANIONGAP 11 7  --   --   --  11  --  14   < > 16 9  --  6  --  5   * 156* 172* 168*   < > 182*   < >  293*   < > 163* 71   < > 145*   < > 146*   BUN 16.6 12.6  --   --   --  14.6  --  13.9   < > 19 11  --  8  --  11   CR 0.77 0.72  --   --   --  0.97  --  0.90   < > 0.96 0.73  --  0.71  --  0.74   GFRESTIMATED >90 >90  --   --   --  82  --  90   < > >60 >60  --  >60  --  >60   GFRESTBLACK  --   --   --   --   --   --   --   --   --  >60 >60  --  >60  --  >60   APRYL 8.8 8.8  --   --   --  8.8  --  8.8   < > 9.0 9.1  --  7.9*  --  7.9*    < > = values in this interval not displayed.       UA RESULTS:   Recent Labs   Lab Test 09/30/24  2200 09/14/24  1401 09/09/24  1504   SG 1.005 1.021 1.020   URINEPH 5.0 6.0 5.0   NITRITE Negative Negative Negative   RBCU 1 2 >182*   WBCU 1 102* 24*       CALCIUM RESULTS  Lab Results   Component Value Date    APRYL 8.8 10/02/2024    APRYL 8.8 09/24/2024    APRYL 8.8 09/15/2024           Imaging:    I personally reviewed all applicable imaging and went over the below findings with patient.    EXAM: CT CHEST ABDOMEN PELVIS W/O CONTRAST  LOCATION: [facility]  DATE: 9/9/2024     INDICATION: confusion  COMPARISON: CT abdomen and pelvis 7/26/2024, CT abdomen and pelvis 4/30/2022, and CT chest 3/26/2020  TECHNIQUE: CT scan of the chest, abdomen, and pelvis was performed without IV contrast. Multiplanar reformats were obtained. Dose reduction techniques were used.   CONTRAST: None.     FINDINGS:   LUNGS AND PLEURA: Curvilinear scarring right middle lobe, unchanged. Minimal dependent atelectasis. Lungs otherwise clear. No acute infiltrates or pleural effusions.     MEDIASTINUM/AXILLAE: Normal.     CORONARY ARTERY CALCIFICATION: Mild.     HEPATOBILIARY: Hepatomegaly measuring 21 cm. Multiple gallstones.     PANCREAS: Normal.     SPLEEN: Normal.     ADRENAL GLANDS: 3.3 x 2.4 cm fat-containing lesion left adrenal gland is unchanged and compatible with a myelolipoma. 8 mm hypodense nodule right adrenal gland also unchanged.     KIDNEYS/BLADDER: Mildly dilated left intrarenal  collecting system and ureter, unchanged. Severe right renal cortical atrophy. Several nonobstructing stones both kidneys, the largest measuring 4 mm. Small left renal cyst. No bladder stones. Diffuse   bladder wall thickening. Prostatic impression along lateral base.     BOWEL: Normal appendix. No evidence for bowel obstruction. Diverticula sigmoid colon, without evidence for diverticulitis.     LYMPH NODES: Normal.     VASCULATURE: Normal.     PELVIC ORGANS: Mild prostatic hypertrophy. No free pelvic fluid.     MUSCULOSKELETAL: Hypertrophic changes thoracic and lumbar spine. Osteopenia.                                                                      IMPRESSION:  1.  No acute pulmonary disease.  2.  Gallstones.  3.  Severe right renal cortical atrophy, nonobstructing stones both kidneys, and mild left hydronephrosis, unchanged.  4.  Sigmoid diverticulosis.  5.  Bladder wall thickening could represent hypertrophy from outlet obstruction secondary to an enlarged prostate gland, unchanged.      Narrative & Impression   EXAM: CT ABDOMEN PELVIS W/O and W CONTRAST  LOCATION: [facility]  DATE: 7/26/2024     INDICATION: Complicated urinary tract infection.   COMPARISON: CT abdomen pelvis 4/30/2022.   TECHNIQUE: CT scan of the abdomen and pelvis was performed before and after injection of IV contrast. Multiplanar reformats were obtained. Dose reduction techniques were used.  CONTRAST: IsoVue 370 90mL      FINDINGS:     LOWER CHEST: Bibasilar atelectasis. Small hiatal hernia.      HEPATOBILIARY: Cholelithiasis. No biliary ductal dilation. No liver lesions.      PANCREAS: Normal.     SPLEEN: Normal.     ADRENAL GLANDS: Benign 1.3 cm right adrenal adenoma and a benign 3.4 cm left adrenal myelolipoma are unchanged.      KIDNEYS/BLADDER: Chronic asymmetric atrophy of the right kidney with multifocal cortical scars. Nonobstructing 6 mm calculus within the right renal pelvis. At least 5 nonobstructing  right intrarenal calculi measuring up to 4 mm at the superior pole.   Punctate 1 mm left renal calculus at the upper pole. Patchy subtle areas of decreased cortical enhancement within the right kidney. Normal left renal enhancement. Small left renal cyst, which does not require follow-up. Mild left hydronephrosis extending   to the ureteropelvic junction, similar to prior exams. No ureteral calculi. Bladder is decompressed by a Olsen catheter.      BOWEL: No bowel obstruction or inflammation. Normal appendix. Scattered noninflamed descending and sigmoid colonic diverticuli.      LYMPH NODES: No enlarged lymph node.      VASCULATURE: Patent portal, splenic, and superior mesenteric veins. Mild aortic atherosclerosis. No abdominal aortic aneurysm.      PELVIC ORGANS: Enlarged prostate.      MUSCULOSKELETAL: Multilevel degenerative changes of the spine. No acute bony abnormality.                                                                       IMPRESSION:     1.  Patchy subtle areas of decreased cortical enhancement within the right kidney, which could either reflect pyelonephritis or perfusional changes related to chronic right renal atrophy and multifocal scars. Correlate with clinical symptoms. No renal   abscess.     2.  Nonobstructing 6 mm calculus within the right renal pelvis, with multiple additional nonobstructing right intrarenal calculi measuring up to 4 mm. No right ureteral calculi or hydronephrosis.     3.  Chronic mild left hydronephrosis extending to the ureteropelvic junction, likely reflecting chronic ureteropelvic junction obstruction. There is a punctate 1 mm left intrarenal calculus, but there are no obstructing left ureteral calculi.     4.  Cholelithiasis.     5.  Small hiatal hernia.     6.  Distal colonic diverticulosis. No inflamed diverticuli.    EXAM: CT ABDOMEN PELVIS W CONTRAST  LOCATION: [facility]  DATE/TIME: 4/30/2022 4:19 AM     INDICATION: Sepsis with  loose stools  COMPARISON: 03/27/2021  TECHNIQUE: CT scan of the abdomen and pelvis was performed following injection of IV contrast. Multiplanar reformats were obtained. Dose reduction techniques were used.  CONTRAST: isovue 370 100ml     FINDINGS:   LOWER CHEST: No acute abnormalities.     HEPATOBILIARY: The gallbladder is mildly contracted and contains numerous stones, but is otherwise normal with no gallbladder wall thickening or pericholecystic fluid. The liver and bile ducts are normal.     PANCREAS: Normal.     SPLEEN: Normal.     ADRENAL GLANDS: There is a stable benign adenoma involving the right adrenal gland measuring 1.0 cm x 1.2 cm. There is benign enlargement of the left adrenal gland to include underlying changes of a myelolipoma.     KIDNEYS/BLADDER: There is significant atrophy seen of the right kidney. The left kidney. There is a 3 nonobstructive stone seen in the right kidney with the largest measuring approximate 4.5 mm x 4 mm. Normal variant prominent left extrarenal pelvis.   Minute simple cyst left kidney with no follow-up needed. The kidneys, ureters, bladder are otherwise normal.     BOWEL: There are mild findings of diverticulosis with no evidence for diverticulitis.     LYMPH NODES: Normal.     VASCULATURE: Mild calcification.     PELVIC ORGANS: Normal.     MUSCULOSKELETAL: Mild to moderate scattered hypertrophic changes of the spine most marked in the facet joints.                                                                      IMPRESSION:   1.  Since prior CT 03/27/2021 the abnormal stranding surrounding the atrophic right kidney has resolved, otherwise there've been no other significant changes. There are no acute abnormalities on this study to explain patient's findings clinically.     2.  Cholelithiasis with no cholecystitis.     3.  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     4.  Atrophic right kidney.     5.  Mild findings of diverticulosis.     6.  Nonobstructive  nephrolithiasis.

## 2024-10-04 NOTE — PROGRESS NOTES
[facility] GERIATRIC SERVICES    Code Status:  DNR/DNI   Visit Type:   Chief Complaint   Patient presents with    TCU Follow Up     Facility:  [facility] ([facility]) [[id number]]         HPI: Rakesh Samuels is a 74 year old male who I am seeing today for follow up on the TCU. Past medical history includes recurrent UTI, nephrolithiasis,   right renal cortical atrophy, mild left unchanged hydronephrosis, gout, personality disorder, DM2, HL, glaucoma, RADHA on cpap and A fib. Pt recently re hospitalized due to hallucinations with acute encephalopathy. Work up revealed UTI, pt hospitalized for septic encephalopathy and complicated UTI however UC negative and blood cultures NGTD.     Transitional Care Course: Today pt lying in bed. Prior to visit I spoke with his daughter about follow up with Urology. They feel his hydronephrosis is stable. They feel his kidney stones are not contributing to his recent recurrent UTIs. Pt with occasional odd statements. He is physically doing well in therapy per PT today. We did discuss his antipsychotics. Meds were held briefly in hospital and condition improved. UA repeated today and negative. No leukocytosis. Message left for his psychiatrist.       Assessment/Plan:     Recurrent UTI  Now with occasional episodic confusion  -CT head negative for acute changes.   -MRI brain with small vessel ischemic disease  -Urine CX negative. Blood Cx NGTD.    -Repeat UA today with trace leukocytes. UC negative.   -pt has completed his antibiotics.   -Follow up CBC, BMP, CRP and ESR.   -Question if kidney stones are source of infection. He has follow up with urology today 10/4/24. They do not feel so.     Neptholiathiasis  Mild left hydronephrosis  -CT C/A/P no acute changes, Bladder wall thickening could represent hypertrophy from outlet obstruction secondary to an enlarged prostate gland, unchanged.   -Non obstructing stones both kidneys, and mild left hydronephrosis, unchanged.  -Follow up with  Urology 10/4/24.   -Follow up BMP with Creatine 0.72.     Gallstones   -RUQ US to eval for elevated transaminases shows known gallstones without evidence of cholecystitis   -Follow up LFTs WNL.   -Denies any abdominal pain.     DM 2  Hemoglobin A1c 7.5  -consistent carb diet.   -Blood sugars checks BID.   -continue metformin     Paroxysmal atrial fibrillation  -Continue with warfarin   -INR 1.8.   -Coumadin Clinic to manage INRs.      Mood disorder  -continue home Abilify and fluvoxamine    -these were held briefly in hospital.   -Message left for his psychiatrist Dr. Villalobos.      Active Ambulatory Problems     Diagnosis Date Noted    SIRS (systemic inflammatory response syndrome) (H) 09/09/2019    Adrenal incidentaloma (H)     Diet-controlled diabetes mellitus (H)     Pericardial effusion     Lactic acidosis     Type 2 diabetes mellitus without complication, without long-term current use of insulin (H) 04/12/2020    Paroxysmal atrial fibrillation (H) 02/18/2020    Calculus of ureter 04/12/2020    Acute renal failure, unspecified acute renal failure type (H) 04/12/2020    Acute renal failure (ARF) (H) 04/12/2020    ATN (acute tubular necrosis) (H) 04/12/2020    Sepsis due to urinary tract infection (H)     Metformin overdose of undetermined intent, initial encounter     Metabolic acidosis     Hyperkalemia     Hematemesis, presence of nausea not specified 04/12/2020    Calculus of kidney     Syncope and collapse 06/30/2020    Hyperglycemia     After care 07/03/2012    Age-related cataract 03/27/2021    Anemia associated with acute blood loss 06/27/2012    Balanitis 03/27/2021    Cholelithiasis without obstruction 03/27/2021    Constipation 07/03/2012    Depression with anxiety 07/03/2012    Glaucoma 06/25/2012    Hemorrhoids without complication 03/27/2021    Hyperlipidemia 06/07/2018    Loss of appetite 03/27/2021    Major depressive disorder, recurrent episode, in partial remission (H) 06/08/2018    Mixed  personality disorder in adult (H) 06/08/2018    Obstructive sleep apnea 03/27/2021    Osteoarthrosis involving lower leg 06/23/2012    Periodic limb movement disorder 03/27/2021    Recurrent major depression (H) 03/27/2021    S/P ureteral stent placement 03/27/2021    Unilateral small kidney 03/27/2021    Nephrolithiasis 03/27/2021    Type 2 diabetes mellitus (H) 06/25/2012    Osteoarthritis of knee 03/27/2021    Persistent atrial fibrillation (H) 03/27/2021    Pyelonephritis 03/27/2021    Urinary tract infection without hematuria, site unspecified 04/30/2022    Sepsis without acute organ dysfunction (H) 04/30/2022    UTI (urinary tract infection) 05/01/2022    Benign prostatic hyperplasia with urinary frequency 05/07/2022    Hypomagnesemia 05/07/2022    Injury of head, initial encounter 07/31/2023    Fall at home, initial encounter 07/31/2023    E. coli urinary tract infection 08/02/2023    Septic encephalopathy 07/25/2024    DM (diabetes mellitus), type 2 with complications (H) 07/25/2024    Urinary tract infection with hematuria, site unspecified 07/26/2024    Altered mental status, unspecified altered mental status type 07/26/2024    Sepsis, due to unspecified organism, unspecified whether acute organ dysfunction present (H) 07/26/2024    Normocytic anemia 11/29/2023    Status post right knee replacement 11/29/2023    Peripheral vascular disease, unspecified (H) 08/20/2024    Warfarin anticoagulation 09/09/2024     Resolved Ambulatory Problems     Diagnosis Date Noted    Shock circulatory (H)     Acute respiratory failure with hypoxemia (H)      Past Medical History:   Diagnosis Date    A-fib (H)     Acute hemodialysis encounter (H)     Acute kidney injury (H)     Asbestosis (H)     Atrophy of right kidney     Basal cell carcinoma     BPH without urinary obstruction     Cellulitis     Cholelithiasis     Diabetes mellitus, type 2 (H) 4/12/2020    Esophagitis     Gastritis     Hematemesis, presence of nausea not  specified     Hyperlipemia     Kidney stone     Metformin overdose of undetermined intent     PTSD (post-traumatic stress disorder)     Seasonal allergies     Sleep apnea     Upper GI bleed      No Known Allergies    All Meds and Allergies reviewed in the record at the facility and is the most up-to-date.    Current Outpatient Medications   Medication Sig Dispense Refill    acetaminophen (TYLENOL) 325 MG tablet Take 650 mg by mouth 3 times daily.      acetaminophen (TYLENOL) 500 MG tablet [ACETAMINOPHEN (TYLENOL) 500 MG TABLET] Take 500 mg by mouth every 6 (six) hours as needed for pain.      ARIPiprazole (ABILIFY) 2 MG tablet [ARIPIPRAZOLE (ABILIFY) 2 MG TABLET] Take 2 mg by mouth at bedtime.       brimonidine (ALPHAGAN) 0.2 % ophthalmic solution [BRIMONIDINE (ALPHAGAN) 0.2 % OPHTHALMIC SOLUTION] Administer 1 drop to both eyes 2 (two) times a day.       calcium carbonate (TUMS) 500 MG chewable tablet Take 1 chew tab by mouth 3 times daily as needed for heartburn.      dorzolamide-timolol (COSOPT) 2-0.5 % ophthalmic solution Place 1 drop into both eyes 2 times daily      fluvoxaMINE (LUVOX) 50 MG tablet [FLUVOXAMINE (LUVOX) 50 MG TABLET] Take 50 mg by mouth at bedtime.       gabapentin (NEURONTIN) 300 MG capsule Take 300 mg by mouth 2 times daily      latanoprostene bunod 0.024 % Drop Place 1 drop into both eyes At Bedtime      levomefolate calcium (L-METHYLFOLATE ORAL) Take 15 mg by mouth daily.      metFORMIN (GLUCOPHAGE) 1000 MG tablet Take 1,000 mg by mouth 2 times daily (with meals)      Multiple Vitamins-Minerals (CENTRUM SILVER) CHEW Take 1 tablet by mouth daily      netarsudiL (RHOPRESSA) 0.02 % Drop Place 1 drop into both eyes every evening      polyethylene glycol (MIRALAX) 17 g packet Take 1 packet by mouth daily.      WARFARIN SODIUM PO Take by mouth See Admin Instructions. Dosing in coordination with INR results       No current facility-administered medications for this visit.       REVIEW OF SYSTEMS:    10 point review of systems reviewed and pertinent positives in the HPI.     PHYSICAL EXAMINATION:  Physical Exam     Vital signs: /86   Pulse 91   Temp 98.1  F (36.7  C)   Resp 18   Ht 1.829 m (6')   Wt 95.7 kg (211 lb)   SpO2 95%   BMI 28.62 kg/m    General: Awake, Alert, oriented x2, lying in bed, appropriately, follows simple commands, conversant  HEENT:Pink conjunctiva,right eye with redness, anicteric sclerae, moist oral mucosa  NECK: Supple  CVS:  S1  S2, without murmur or gallop.   LUNG: Clear to auscultation, No wheezes, rales or rhonci.  BACK: No kyphosis of the thoracic spine  ABDOMEN: Soft, obese, nontender to palpation, with positive bowel sounds  EXTREMITIES: Moves both upper and lower extremities with generalized weakness, no pedal edema, no calf tenderness  SKIN: Warm and dry  NEUROLOGIC: Intact, pulses palpable  PSYCHIATRIC: pleasant affect. Episodic periods with odd statements.       Labs:  All labs reviewed in the nursing home record and Epic   @  Lab Results   Component Value Date    WBC 10.5 09/14/2024     Lab Results   Component Value Date    RBC 4.02 09/14/2024     Lab Results   Component Value Date    HGB 12.5 09/14/2024     Lab Results   Component Value Date    HCT 38.8 09/14/2024     Lab Results   Component Value Date    MCV 97 09/14/2024     Lab Results   Component Value Date    MCH 31.1 09/14/2024     Lab Results   Component Value Date    MCHC 32.2 09/14/2024     Lab Results   Component Value Date    RDW 12.9 09/14/2024     Lab Results   Component Value Date     09/14/2024        @Last Comprehensive Metabolic Panel:  Sodium   Date Value Ref Range Status   10/02/2024 141 135 - 145 mmol/L Final     Potassium   Date Value Ref Range Status   10/02/2024 4.5 3.4 - 5.3 mmol/L Final   05/05/2022 4.4 3.5 - 5.0 mmol/L Final     Chloride   Date Value Ref Range Status   10/02/2024 105 98 - 107 mmol/L Final   05/02/2022 109 (H) 98 - 107 mmol/L Final     Carbon Dioxide (CO2)   Date  Value Ref Range Status   10/02/2024 25 22 - 29 mmol/L Final   05/02/2022 25 22 - 31 mmol/L Final     Anion Gap   Date Value Ref Range Status   10/02/2024 11 7 - 15 mmol/L Final   05/02/2022 8 5 - 18 mmol/L Final     Glucose   Date Value Ref Range Status   10/02/2024 171 (H) 70 - 99 mg/dL Final   05/02/2022 133 (H) 70 - 125 mg/dL Final     GLUCOSE BY METER POCT   Date Value Ref Range Status   09/16/2024 172 (H) 70 - 99 mg/dL Final     Urea Nitrogen   Date Value Ref Range Status   10/02/2024 16.6 8.0 - 23.0 mg/dL Final   05/02/2022 11 8 - 28 mg/dL Final     Creatinine   Date Value Ref Range Status   10/02/2024 0.77 0.67 - 1.17 mg/dL Final     GFR Estimate   Date Value Ref Range Status   10/02/2024 >90 >60 mL/min/1.73m2 Final   06/10/2021 >60 >60 mL/min/1.73m2 Final     Calcium   Date Value Ref Range Status   10/02/2024 8.8 8.8 - 10.4 mg/dL Final     Comment:     Reference intervals for this test were updated on 7/16/2024 to reflect our healthy population more accurately. There may be differences in the flagging of prior results with similar values performed with this method. Those prior results can be interpreted in the context of the updated reference intervals.       This note has been dictated using voice recognition software. Any grammatical or context distortions are unintentional and inherent to the software    Electronically signed by: Kylie Gomez, CNP

## 2024-10-07 ENCOUNTER — ANTICOAGULATION THERAPY VISIT (OUTPATIENT)
Dept: ANTICOAGULATION | Facility: CLINIC | Age: 74
End: 2024-10-07
Payer: MEDICARE

## 2024-10-07 DIAGNOSIS — I48.0 PAROXYSMAL ATRIAL FIBRILLATION (H): Primary | ICD-10-CM

## 2024-10-07 LAB — INR (EXTERNAL): 2.4 (ref 0.9–1.1)

## 2024-10-07 NOTE — PROGRESS NOTES
Incoming fax from [facility]    Date of result 10/7/24    INR result 2.4    Route result to: [facility] (TCU/LTC/[facility])

## 2024-10-07 NOTE — PROGRESS NOTES
ANTICOAGULATION MANAGEMENT     Rakesh Samuels 74 year old male is on warfarin with therapeutic INR result. (Goal INR 2.0-3.0)    Recent labs: (last 7 days)     10/07/24  0000   INR 2.4*       ASSESSMENT     Source(s): Chart Review and Home Care/Facility Nurse     Warfarin doses taken: Warfarin taken as instructed  Diet: No new diet changes identified  Medication/supplement changes: None noted  New illness, injury, or hospitalization: No  Signs or symptoms of bleeding or clotting: No  Previous result: Subtherapeutic  Additional findings: MD increased on 9/30/24       PLAN     Recommended plan for ongoing change(s) affecting INR     Dosing Instructions: Continue your current warfarin dose with next INR in 1 week       Summary  As of 10/7/2024      Full warfarin instructions:  2.5 mg every Sun, Tue, Thu; 5 mg all other days   Next INR check:  10/14/2024               Telephone call with Tiffany at [phone] [facility] nurse ([facility]) who agrees to plan and repeated back plan correctly    Orders given to  Homecare nurse/facility to recheck    Education provided: Contact [phone] with any changes, questions or concerns.     Plan made per [facility] anticoagulation protocol    Brittany Breaux RN  10/7/2024  Anticoagulation Clinic  Mobivity pool for routing messages: p [facility] ([facility]/C/group home)  [facility] patient phone line: [phone]        _______________________________________________________________________     Anticoagulation Episode Summary       Current INR goal:  2.0-3.0   TTR:  41.5% (1.6 mo)   Target end date:  Indefinite   Send INR reminders to:  [facility] (U/C/group home)    Indications    Paroxysmal atrial fibrillation (H) [I48.0]             Comments:  [facility] [phone]             Anticoagulation Care Providers       Provider Role Specialty Phone number    Kylie Gomez NP Referring Nurse Practitioner [phone]

## 2024-10-08 ENCOUNTER — TRANSITIONAL CARE UNIT VISIT (OUTPATIENT)
Dept: GERIATRICS | Facility: CLINIC | Age: 74
End: 2024-10-08
Payer: MEDICARE

## 2024-10-08 VITALS
RESPIRATION RATE: 18 BRPM | TEMPERATURE: 98.2 F | HEIGHT: 72 IN | HEART RATE: 78 BPM | WEIGHT: 213.4 LBS | DIASTOLIC BLOOD PRESSURE: 72 MMHG | OXYGEN SATURATION: 97 % | BODY MASS INDEX: 28.91 KG/M2 | SYSTOLIC BLOOD PRESSURE: 153 MMHG

## 2024-10-08 DIAGNOSIS — E11.00 TYPE 2 DIABETES MELLITUS WITH HYPEROSMOLARITY WITHOUT COMA, WITHOUT LONG-TERM CURRENT USE OF INSULIN (H): ICD-10-CM

## 2024-10-08 DIAGNOSIS — N20.0 NEPHROLITHIASIS: ICD-10-CM

## 2024-10-08 DIAGNOSIS — I10 ESSENTIAL HYPERTENSION: ICD-10-CM

## 2024-10-08 DIAGNOSIS — N39.0 URINARY TRACT INFECTION WITHOUT HEMATURIA, SITE UNSPECIFIED: Primary | ICD-10-CM

## 2024-10-08 DIAGNOSIS — F33.41 MAJOR DEPRESSIVE DISORDER, RECURRENT EPISODE, IN PARTIAL REMISSION (H): ICD-10-CM

## 2024-10-08 DIAGNOSIS — F60.89 MIXED PERSONALITY DISORDER IN ADULT (H): ICD-10-CM

## 2024-10-08 DIAGNOSIS — I48.0 PAROXYSMAL ATRIAL FIBRILLATION (H): ICD-10-CM

## 2024-10-08 DIAGNOSIS — N13.30 HYDRONEPHROSIS, UNSPECIFIED HYDRONEPHROSIS TYPE: ICD-10-CM

## 2024-10-08 PROCEDURE — 99309 SBSQ NF CARE MODERATE MDM 30: CPT | Performed by: NURSE PRACTITIONER

## 2024-10-08 NOTE — LETTER
10/8/2024      Rakesh Samuels  [address]  [facility] [address]        [facility] GERIATRIC [facility]    Code Status:  DNR/DNI   Visit Type:   Chief Complaint   Patient presents with     TCU discharge     Facility:  [facility]) [[id number]]         HPI: Rakesh Samuels is a 74 year old male who I am seeing today for discharge from the TCU. Past medical history includes recurrent UTI, nephrolithiasis, right renal cortical atrophy, mild left unchanged hydronephrosis, gout, personality disorder, DM2, HL, glaucoma, RADHA on cpap and A fib. Pt recently re hospitalized due to hallucinations with acute encephalopathy. Work up revealed UTI, pt hospitalized for septic encephalopathy and complicated UTI however UC negative and blood cultures NGTD.     Transitional Care Course: Today pt lying in bed. Pt progress seems to wax and wane. He can walk up to 65 feet one day then not be able to take 4 steps the next day. Today he is having weakness. Cognition intact today. He denies any hallucinations. He is a febrile. Recent laboratory unremarkable. Repeat UA/UC negative. He has underlying personality disorder. Some question is this a psychological response. He is followed out pt by psych. Phone message left last week for his psychiatrist. He continues on Abilify and fluvoxamine. He is legally blind. Today when discussing upcoming discharge back to his ELISA he reports he is not sure he wants to go there. He told the OT he would like to explore our LTC. This was shared with the .         Assessment/Plan:     Recurrent UTI  Now with occasional episodic confusion  -CT head negative for acute changes.   -MRI brain with small vessel ischemic disease  -Urine CX negative. Blood Cx NGTD.    -Repeat UA with trace leukocytes. UC negative.   -pt has completed his antibiotics.   -Follow up CBC, BMP, CRP and ESR unremarkable.   -Question if kidney stones are source of infection. He has follow up with urology today  10/4/24. They do not feel so.     Neptholiathiasis  Mild left hydronephrosis  -CT C/A/P no acute changes, Bladder wall thickening could represent hypertrophy from outlet obstruction secondary to an enlarged prostate gland, unchanged.   -Non obstructing stones both kidneys, and mild left hydronephrosis, unchanged.  -Follow up with Urology 10/4/24.   -Follow up BMP with Creatine 0.72.     Gallstones   -RUQ US to eval for elevated transaminases shows known gallstones without evidence of cholecystitis   -Follow up LFTs WNL.   -Denies any abdominal pain.     DM 2  Hemoglobin A1c 7.5  -consistent carb diet.   -Blood sugars checks BID.   -continue metformin     Paroxysmal atrial fibrillation  -Continue with warfarin   -INR 2.4.   -Coumadin Clinic to manage INRs.      Mood disorder  -continue home Abilify and fluvoxamine    -these were held briefly in hospital.   -Message left for his psychiatrist Dr. Villalobos.     -ok to discharge with current meds and treatments.   -Home PT, OT, HHA and RN for medication management.   -Follow up with PCP in 1-2 weeks.      Active Ambulatory Problems     Diagnosis Date Noted     SIRS (systemic inflammatory response syndrome) (H) 09/09/2019     Adrenal incidentaloma (H)      Diet-controlled diabetes mellitus (H)      Pericardial effusion      Lactic acidosis      Type 2 diabetes mellitus without complication, without long-term current use of insulin (H) 04/12/2020     Paroxysmal atrial fibrillation (H) 02/18/2020     Calculus of ureter 04/12/2020     Acute renal failure, unspecified acute renal failure type (H) 04/12/2020     Acute renal failure (ARF) (H) 04/12/2020     ATN (acute tubular necrosis) (H) 04/12/2020     Sepsis due to urinary tract infection (H)      Metformin overdose of undetermined intent, initial encounter      Metabolic acidosis      Hyperkalemia      Hematemesis, presence of nausea not specified 04/12/2020     Calculus of kidney      Syncope and collapse 06/30/2020      Hyperglycemia      After care 07/03/2012     Age-related cataract 03/27/2021     Anemia associated with acute blood loss 06/27/2012     Balanitis 03/27/2021     Cholelithiasis without obstruction 03/27/2021     Constipation 07/03/2012     Depression with anxiety 07/03/2012     Glaucoma 06/25/2012     Hemorrhoids without complication 03/27/2021     Hyperlipidemia 06/07/2018     Loss of appetite 03/27/2021     Major depressive disorder, recurrent episode, in partial remission (H) 06/08/2018     Mixed personality disorder in adult (H) 06/08/2018     Obstructive sleep apnea 03/27/2021     Osteoarthrosis involving lower leg 06/23/2012     Periodic limb movement disorder 03/27/2021     Recurrent major depression (H) 03/27/2021     S/P ureteral stent placement 03/27/2021     Unilateral small kidney 03/27/2021     Nephrolithiasis 03/27/2021     Type 2 diabetes mellitus (H) 06/25/2012     Osteoarthritis of knee 03/27/2021     Persistent atrial fibrillation (H) 03/27/2021     Pyelonephritis 03/27/2021     Urinary tract infection without hematuria, site unspecified 04/30/2022     Sepsis without acute organ dysfunction (H) 04/30/2022     UTI (urinary tract infection) 05/01/2022     Benign prostatic hyperplasia with urinary frequency 05/07/2022     Hypomagnesemia 05/07/2022     Injury of head, initial encounter 07/31/2023     Fall at home, initial encounter 07/31/2023     E. coli urinary tract infection 08/02/2023     Septic encephalopathy 07/25/2024     DM (diabetes mellitus), type 2 with complications (H) 07/25/2024     Urinary tract infection with hematuria, site unspecified 07/26/2024     Altered mental status, unspecified altered mental status type 07/26/2024     Sepsis, due to unspecified organism, unspecified whether acute organ dysfunction present (H) 07/26/2024     Normocytic anemia 11/29/2023     Status post right knee replacement 11/29/2023     Peripheral vascular disease, unspecified (H) 08/20/2024     Warfarin  anticoagulation 09/09/2024     Resolved Ambulatory Problems     Diagnosis Date Noted     Shock circulatory (H)      Acute respiratory failure with hypoxemia (H)      Past Medical History:   Diagnosis Date     A-fib (H)      Acute hemodialysis encounter (H)      Acute kidney injury (H)      Asbestosis (H)      Atrophy of right kidney      Basal cell carcinoma      BPH without urinary obstruction      Cellulitis      Cholelithiasis      Diabetes mellitus, type 2 (H) 4/12/2020     Esophagitis      Gastritis      Hematemesis, presence of nausea not specified      Hyperlipemia      Kidney stone      Metformin overdose of undetermined intent      PTSD (post-traumatic stress disorder)      Seasonal allergies      Sleep apnea      Upper GI bleed      No Known Allergies    All Meds and Allergies reviewed in the record at the facility and is the most up-to-date.    Current Outpatient Medications   Medication Sig Dispense Refill     acetaminophen (TYLENOL) 325 MG tablet Take 650 mg by mouth 3 times daily.       acetaminophen (TYLENOL) 500 MG tablet [ACETAMINOPHEN (TYLENOL) 500 MG TABLET] Take 500 mg by mouth every 6 (six) hours as needed for pain.       ARIPiprazole (ABILIFY) 2 MG tablet [ARIPIPRAZOLE (ABILIFY) 2 MG TABLET] Take 2 mg by mouth at bedtime.        brimonidine (ALPHAGAN) 0.2 % ophthalmic solution [BRIMONIDINE (ALPHAGAN) 0.2 % OPHTHALMIC SOLUTION] Administer 1 drop to both eyes 2 (two) times a day.        calcium carbonate (TUMS) 500 MG chewable tablet Take 1 chew tab by mouth 3 times daily as needed for heartburn.       dorzolamide-timolol (COSOPT) 2-0.5 % ophthalmic solution Place 1 drop into both eyes 2 times daily       fluvoxaMINE (LUVOX) 50 MG tablet [FLUVOXAMINE (LUVOX) 50 MG TABLET] Take 50 mg by mouth at bedtime.        gabapentin (NEURONTIN) 300 MG capsule Take 300 mg by mouth 2 times daily       latanoprostene bunod 0.024 % Drop Place 1 drop into both eyes At Bedtime       levomefolate calcium  (L-METHYLFOLATE ORAL) Take 15 mg by mouth daily.       metFORMIN (GLUCOPHAGE) 1000 MG tablet Take 1,000 mg by mouth 2 times daily (with meals)       Multiple Vitamins-Minerals (CENTRUM SILVER) CHEW Take 1 tablet by mouth daily       netarsudiL (RHOPRESSA) 0.02 % Drop Place 1 drop into both eyes every evening       polyethylene glycol (MIRALAX) 17 g packet Take 1 packet by mouth daily.       WARFARIN SODIUM PO Take by mouth See Admin Instructions. Dosing in coordination with INR results       No current facility-administered medications for this visit.       REVIEW OF SYSTEMS:   10 point review of systems reviewed and pertinent positives in the HPI.     PHYSICAL EXAMINATION:  Physical Exam     Vital signs: BP (!) 153/72   Pulse 78   Temp 98.2  F (36.8  C)   Resp 18   Ht 1.829 m (6')   Wt 96.8 kg (213 lb 6.4 oz)   SpO2 97%   BMI 28.94 kg/m    General: Awake, Alert, oriented x2, lying in bed, appropriately, follows simple commands, conversant  HEENT:Pink conjunctiva,right eye with redness, anicteric sclerae, moist oral mucosa  NECK: Supple  CVS:  S1  S2, without murmur or gallop.   LUNG: Clear to auscultation, No wheezes, rales or rhonci.  BACK: No kyphosis of the thoracic spine  ABDOMEN: Soft, obese, nontender to palpation, with positive bowel sounds  EXTREMITIES: Moves both upper and lower extremities with generalized weakness, no pedal edema, no calf tenderness  SKIN: Warm and dry  NEUROLOGIC: Intact, pulses palpable  PSYCHIATRIC: pleasant affect. He remembers my name on visit today.       Labs:  All labs reviewed in the nursing home record and [facility]   @  Lab Results   Component Value Date    WBC 10.5 09/14/2024     Lab Results   Component Value Date    RBC 4.02 09/14/2024     Lab Results   Component Value Date    HGB 12.5 09/14/2024     Lab Results   Component Value Date    HCT 38.8 09/14/2024     Lab Results   Component Value Date    MCV 97 09/14/2024     Lab Results   Component Value Date    MCH 31.1  09/14/2024     Lab Results   Component Value Date    MCHC 32.2 09/14/2024     Lab Results   Component Value Date    RDW 12.9 09/14/2024     Lab Results   Component Value Date     09/14/2024        @Last Comprehensive Metabolic Panel:  Sodium   Date Value Ref Range Status   10/02/2024 141 135 - 145 mmol/L Final     Potassium   Date Value Ref Range Status   10/02/2024 4.5 3.4 - 5.3 mmol/L Final   05/05/2022 4.4 3.5 - 5.0 mmol/L Final     Chloride   Date Value Ref Range Status   10/02/2024 105 98 - 107 mmol/L Final   05/02/2022 109 (H) 98 - 107 mmol/L Final     Carbon Dioxide (CO2)   Date Value Ref Range Status   10/02/2024 25 22 - 29 mmol/L Final   05/02/2022 25 22 - 31 mmol/L Final     Anion Gap   Date Value Ref Range Status   10/02/2024 11 7 - 15 mmol/L Final   05/02/2022 8 5 - 18 mmol/L Final     Glucose   Date Value Ref Range Status   10/02/2024 171 (H) 70 - 99 mg/dL Final   05/02/2022 133 (H) 70 - 125 mg/dL Final     GLUCOSE BY METER POCT   Date Value Ref Range Status   09/16/2024 172 (H) 70 - 99 mg/dL Final     Urea Nitrogen   Date Value Ref Range Status   10/02/2024 16.6 8.0 - 23.0 mg/dL Final   05/02/2022 11 8 - 28 mg/dL Final     Creatinine   Date Value Ref Range Status   10/02/2024 0.77 0.67 - 1.17 mg/dL Final     GFR Estimate   Date Value Ref Range Status   10/02/2024 >90 >60 mL/min/1.73m2 Final   06/10/2021 >60 >60 mL/min/1.73m2 Final     Calcium   Date Value Ref Range Status   10/02/2024 8.8 8.8 - 10.4 mg/dL Final     Comment:     Reference intervals for this test were updated on 7/16/2024 to reflect our healthy population more accurately. There may be differences in the flagging of prior results with similar values performed with this method. Those prior results can be interpreted in the context of the updated reference intervals.     DISCHARGE PLAN/FACE TO FACE:  I certify that this patient is under my care and that I, or a nurse practitioner or physician's assistant working with me, had a  face-to-face encounter that meets the physician face-to-face encounter requirements with this patient.       I certify that, based on my findings, the following services are medically necessary home health services.    My clinical findings support the need for the above skilled services.    This patient is homebound because: Recent mx hospitalizations for recurrent UTI's.     The patient is, or has been, under my care and I have initiated the establishment of the plan of care. This patient will be followed by a physician who will periodically review the plan of care.    > 35 minutes spent reviewing behaviors, medications, home care services and follow ups.     This note has been dictated using voice recognition software. Any grammatical or context distortions are unintentional and inherent to the software    Electronically signed by: Kylie Gomez CNP       Sincerely,        Kylie Gomez NP

## 2024-10-09 NOTE — PROGRESS NOTES
[facility] GERIATRIC SERVICES    Code Status:  DNR/DNI   Visit Type:   Chief Complaint   Patient presents with    TCU discharge     Facility:  [facility] ([facility]) [[id number]]         HPI: Rakesh Samuels is a 74 year old male who I am seeing today for discharge from the TCU. Past medical history includes recurrent UTI, nephrolithiasis, right renal cortical atrophy, mild left unchanged hydronephrosis, gout, personality disorder, DM2, HL, glaucoma, RADHA on cpap and A fib. Pt recently re hospitalized due to hallucinations with acute encephalopathy. Work up revealed UTI, pt hospitalized for septic encephalopathy and complicated UTI however UC negative and blood cultures NGTD.     Transitional Care Course: Today pt lying in bed. Pt progress seems to wax and wane. He can walk up to 65 feet one day then not be able to take 4 steps the next day. Today he is having weakness. Cognition intact today. He denies any hallucinations. He is a febrile. Recent laboratory unremarkable. Repeat UA/UC negative. He has underlying personality disorder. Some question is this a psychological response. He is followed out pt by psych. Phone message left last week for his psychiatrist. He continues on Abilify and fluvoxamine. He is legally blind. Today when discussing upcoming discharge back to his intermediate he reports he is not sure he wants to go there. He told the OT he would like to explore our LTC. This was shared with the .         Assessment/Plan:     Recurrent UTI  Now with occasional episodic confusion  -CT head negative for acute changes.   -MRI brain with small vessel ischemic disease  -Urine CX negative. Blood Cx NGTD.    -Repeat UA with trace leukocytes. UC negative.   -pt has completed his antibiotics.   -Follow up CBC, BMP, CRP and ESR unremarkable.   -Question if kidney stones are source of infection. He has follow up with urology today 10/4/24. They do not feel so.     Neptholiathiasis  Mild left hydronephrosis  -CT  C/A/P no acute changes, Bladder wall thickening could represent hypertrophy from outlet obstruction secondary to an enlarged prostate gland, unchanged.   -Non obstructing stones both kidneys, and mild left hydronephrosis, unchanged.  -Follow up with Urology 10/4/24.   -Follow up BMP with Creatine 0.72.     Gallstones   -RUQ US to eval for elevated transaminases shows known gallstones without evidence of cholecystitis   -Follow up LFTs WNL.   -Denies any abdominal pain.     DM 2  Hemoglobin A1c 7.5  -consistent carb diet.   -Blood sugars checks BID.   -continue metformin     Paroxysmal atrial fibrillation  -Continue with warfarin   -INR 2.4.   -Coumadin Clinic to manage INRs.      Mood disorder  -continue home Abilify and fluvoxamine    -these were held briefly in hospital.   -Message left for his psychiatrist Dr. Villalobos.     -ok to discharge with current meds and treatments.   -Home PT, OT, HHA and RN for medication management.   -Follow up with PCP in 1-2 weeks.      Active Ambulatory Problems     Diagnosis Date Noted    SIRS (systemic inflammatory response syndrome) (H) 09/09/2019    Adrenal incidentaloma (H)     Diet-controlled diabetes mellitus (H)     Pericardial effusion     Lactic acidosis     Type 2 diabetes mellitus without complication, without long-term current use of insulin (H) 04/12/2020    Paroxysmal atrial fibrillation (H) 02/18/2020    Calculus of ureter 04/12/2020    Acute renal failure, unspecified acute renal failure type (H) 04/12/2020    Acute renal failure (ARF) (H) 04/12/2020    ATN (acute tubular necrosis) (H) 04/12/2020    Sepsis due to urinary tract infection (H)     Metformin overdose of undetermined intent, initial encounter     Metabolic acidosis     Hyperkalemia     Hematemesis, presence of nausea not specified 04/12/2020    Calculus of kidney     Syncope and collapse 06/30/2020    Hyperglycemia     After care 07/03/2012    Age-related cataract 03/27/2021    Anemia associated with  acute blood loss 06/27/2012    Balanitis 03/27/2021    Cholelithiasis without obstruction 03/27/2021    Constipation 07/03/2012    Depression with anxiety 07/03/2012    Glaucoma 06/25/2012    Hemorrhoids without complication 03/27/2021    Hyperlipidemia 06/07/2018    Loss of appetite 03/27/2021    Major depressive disorder, recurrent episode, in partial remission (H) 06/08/2018    Mixed personality disorder in adult (H) 06/08/2018    Obstructive sleep apnea 03/27/2021    Osteoarthrosis involving lower leg 06/23/2012    Periodic limb movement disorder 03/27/2021    Recurrent major depression (H) 03/27/2021    S/P ureteral stent placement 03/27/2021    Unilateral small kidney 03/27/2021    Nephrolithiasis 03/27/2021    Type 2 diabetes mellitus (H) 06/25/2012    Osteoarthritis of knee 03/27/2021    Persistent atrial fibrillation (H) 03/27/2021    Pyelonephritis 03/27/2021    Urinary tract infection without hematuria, site unspecified 04/30/2022    Sepsis without acute organ dysfunction (H) 04/30/2022    UTI (urinary tract infection) 05/01/2022    Benign prostatic hyperplasia with urinary frequency 05/07/2022    Hypomagnesemia 05/07/2022    Injury of head, initial encounter 07/31/2023    Fall at home, initial encounter 07/31/2023    E. coli urinary tract infection 08/02/2023    Septic encephalopathy 07/25/2024    DM (diabetes mellitus), type 2 with complications (H) 07/25/2024    Urinary tract infection with hematuria, site unspecified 07/26/2024    Altered mental status, unspecified altered mental status type 07/26/2024    Sepsis, due to unspecified organism, unspecified whether acute organ dysfunction present (H) 07/26/2024    Normocytic anemia 11/29/2023    Status post right knee replacement 11/29/2023    Peripheral vascular disease, unspecified (H) 08/20/2024    Warfarin anticoagulation 09/09/2024     Resolved Ambulatory Problems     Diagnosis Date Noted    Shock circulatory (H)     Acute respiratory failure with  hypoxemia (H)      Past Medical History:   Diagnosis Date    A-fib (H)     Acute hemodialysis encounter (H)     Acute kidney injury (H)     Asbestosis (H)     Atrophy of right kidney     Basal cell carcinoma     BPH without urinary obstruction     Cellulitis     Cholelithiasis     Diabetes mellitus, type 2 (H) 4/12/2020    Esophagitis     Gastritis     Hematemesis, presence of nausea not specified     Hyperlipemia     Kidney stone     Metformin overdose of undetermined intent     PTSD (post-traumatic stress disorder)     Seasonal allergies     Sleep apnea     Upper GI bleed      No Known Allergies    All Meds and Allergies reviewed in the record at the facility and is the most up-to-date.    Current Outpatient Medications   Medication Sig Dispense Refill    acetaminophen (TYLENOL) 325 MG tablet Take 650 mg by mouth 3 times daily.      acetaminophen (TYLENOL) 500 MG tablet [ACETAMINOPHEN (TYLENOL) 500 MG TABLET] Take 500 mg by mouth every 6 (six) hours as needed for pain.      ARIPiprazole (ABILIFY) 2 MG tablet [ARIPIPRAZOLE (ABILIFY) 2 MG TABLET] Take 2 mg by mouth at bedtime.       brimonidine (ALPHAGAN) 0.2 % ophthalmic solution [BRIMONIDINE (ALPHAGAN) 0.2 % OPHTHALMIC SOLUTION] Administer 1 drop to both eyes 2 (two) times a day.       calcium carbonate (TUMS) 500 MG chewable tablet Take 1 chew tab by mouth 3 times daily as needed for heartburn.      dorzolamide-timolol (COSOPT) 2-0.5 % ophthalmic solution Place 1 drop into both eyes 2 times daily      fluvoxaMINE (LUVOX) 50 MG tablet [FLUVOXAMINE (LUVOX) 50 MG TABLET] Take 50 mg by mouth at bedtime.       gabapentin (NEURONTIN) 300 MG capsule Take 300 mg by mouth 2 times daily      latanoprostene bunod 0.024 % Drop Place 1 drop into both eyes At Bedtime      levomefolate calcium (L-METHYLFOLATE ORAL) Take 15 mg by mouth daily.      metFORMIN (GLUCOPHAGE) 1000 MG tablet Take 1,000 mg by mouth 2 times daily (with meals)      Multiple Vitamins-Minerals (CENTRUM  SILVER) CHEW Take 1 tablet by mouth daily      netarsudiL (RHOPRESSA) 0.02 % Drop Place 1 drop into both eyes every evening      polyethylene glycol (MIRALAX) 17 g packet Take 1 packet by mouth daily.      WARFARIN SODIUM PO Take by mouth See Admin Instructions. Dosing in coordination with INR results       No current facility-administered medications for this visit.       REVIEW OF SYSTEMS:   10 point review of systems reviewed and pertinent positives in the HPI.     PHYSICAL EXAMINATION:  Physical Exam     Vital signs: BP (!) 153/72   Pulse 78   Temp 98.2  F (36.8  C)   Resp 18   Ht 1.829 m (6')   Wt 96.8 kg (213 lb 6.4 oz)   SpO2 97%   BMI 28.94 kg/m    General: Awake, Alert, oriented x2, lying in bed, appropriately, follows simple commands, conversant  HEENT:Pink conjunctiva,right eye with redness, anicteric sclerae, moist oral mucosa  NECK: Supple  CVS:  S1  S2, without murmur or gallop.   LUNG: Clear to auscultation, No wheezes, rales or rhonci.  BACK: No kyphosis of the thoracic spine  ABDOMEN: Soft, obese, nontender to palpation, with positive bowel sounds  EXTREMITIES: Moves both upper and lower extremities with generalized weakness, no pedal edema, no calf tenderness  SKIN: Warm and dry  NEUROLOGIC: Intact, pulses palpable  PSYCHIATRIC: pleasant affect. He remembers my name on visit today.       Labs:  All labs reviewed in the nursing home record and Epic   @  Lab Results   Component Value Date    WBC 10.5 09/14/2024     Lab Results   Component Value Date    RBC 4.02 09/14/2024     Lab Results   Component Value Date    HGB 12.5 09/14/2024     Lab Results   Component Value Date    HCT 38.8 09/14/2024     Lab Results   Component Value Date    MCV 97 09/14/2024     Lab Results   Component Value Date    MCH 31.1 09/14/2024     Lab Results   Component Value Date    MCHC 32.2 09/14/2024     Lab Results   Component Value Date    RDW 12.9 09/14/2024     Lab Results   Component Value Date      09/14/2024        @Last Comprehensive Metabolic Panel:  Sodium   Date Value Ref Range Status   10/02/2024 141 135 - 145 mmol/L Final     Potassium   Date Value Ref Range Status   10/02/2024 4.5 3.4 - 5.3 mmol/L Final   05/05/2022 4.4 3.5 - 5.0 mmol/L Final     Chloride   Date Value Ref Range Status   10/02/2024 105 98 - 107 mmol/L Final   05/02/2022 109 (H) 98 - 107 mmol/L Final     Carbon Dioxide (CO2)   Date Value Ref Range Status   10/02/2024 25 22 - 29 mmol/L Final   05/02/2022 25 22 - 31 mmol/L Final     Anion Gap   Date Value Ref Range Status   10/02/2024 11 7 - 15 mmol/L Final   05/02/2022 8 5 - 18 mmol/L Final     Glucose   Date Value Ref Range Status   10/02/2024 171 (H) 70 - 99 mg/dL Final   05/02/2022 133 (H) 70 - 125 mg/dL Final     GLUCOSE BY METER POCT   Date Value Ref Range Status   09/16/2024 172 (H) 70 - 99 mg/dL Final     Urea Nitrogen   Date Value Ref Range Status   10/02/2024 16.6 8.0 - 23.0 mg/dL Final   05/02/2022 11 8 - 28 mg/dL Final     Creatinine   Date Value Ref Range Status   10/02/2024 0.77 0.67 - 1.17 mg/dL Final     GFR Estimate   Date Value Ref Range Status   10/02/2024 >90 >60 mL/min/1.73m2 Final   06/10/2021 >60 >60 mL/min/1.73m2 Final     Calcium   Date Value Ref Range Status   10/02/2024 8.8 8.8 - 10.4 mg/dL Final     Comment:     Reference intervals for this test were updated on 7/16/2024 to reflect our healthy population more accurately. There may be differences in the flagging of prior results with similar values performed with this method. Those prior results can be interpreted in the context of the updated reference intervals.     DISCHARGE PLAN/FACE TO FACE:  I certify that this patient is under my care and that I, or a nurse practitioner or physician's assistant working with me, had a face-to-face encounter that meets the physician face-to-face encounter requirements with this patient.       I certify that, based on my findings, the following services are medically  necessary home health services.    My clinical findings support the need for the above skilled services.    This patient is homebound because: Recent mx hospitalizations for recurrent UTI's.     The patient is, or has been, under my care and I have initiated the establishment of the plan of care. This patient will be followed by a physician who will periodically review the plan of care.    > 35 minutes spent reviewing behaviors, medications, home care services and follow ups.     This note has been dictated using voice recognition software. Any grammatical or context distortions are unintentional and inherent to the software    Electronically signed by: Kylie Gomez CNP

## 2024-10-16 ENCOUNTER — TELEPHONE (OUTPATIENT)
Dept: ANTICOAGULATION | Facility: CLINIC | Age: 74
End: 2024-10-16
Payer: MEDICARE

## 2024-10-16 DIAGNOSIS — I48.0 PAROXYSMAL ATRIAL FIBRILLATION (H): Primary | ICD-10-CM

## 2024-10-16 NOTE — TELEPHONE ENCOUNTER
ANTICOAGULATION  MANAGEMENT    Rakesh Samuels is being discharged from the [facility] ([facility]).    Reason for discharge: discharged from [facility]CU[facility]; returning to pre-admission warfarin management    Anticoagulation episode resolved and ACC referral closed    If patient needs warfarin management in the future, please send a new referral    Heather Yanez RN        ANTICOAGULATION     Rakesh Samuels is overdue for an INR check.     Per nurse at [facility] TCU patient is no longer with them, per facility he discharged to [facility]. Per notes prior to admission to TCU patient INR's managed by prison provider. Will discharge from Anticoagulation clinic.     Heather Yanez RN  10/16/2024  Anticoagulation Clinic  TwitJump for routing messages: [facility]ton [facility] (TCU/LTC/prison)  [facility] patient phone line: [phone]

## 2024-12-28 ENCOUNTER — HEALTH MAINTENANCE LETTER (OUTPATIENT)
Age: 74
End: 2024-12-28

## 2025-01-05 ENCOUNTER — HOSPITAL ENCOUNTER (INPATIENT)
Facility: HOSPITAL | Age: 75
LOS: 3 days | Discharge: NURSING, REHAB OR LONG-TERM CARE | End: 2025-01-09
Attending: EMERGENCY MEDICINE
Payer: MEDICARE

## 2025-01-05 ENCOUNTER — APPOINTMENT (OUTPATIENT)
Dept: CT IMAGING | Facility: HOSPITAL | Age: 75
DRG: 065 | End: 2025-01-05
Attending: EMERGENCY MEDICINE
Payer: MEDICARE

## 2025-01-05 ENCOUNTER — APPOINTMENT (OUTPATIENT)
Dept: MRI IMAGING | Facility: HOSPITAL | Age: 75
DRG: 065 | End: 2025-01-05
Attending: EMERGENCY MEDICINE
Payer: MEDICARE

## 2025-01-05 DIAGNOSIS — E83.42 HYPOMAGNESEMIA: ICD-10-CM

## 2025-01-05 DIAGNOSIS — A41.9 SEPTIC SHOCK (H): ICD-10-CM

## 2025-01-05 DIAGNOSIS — R79.89 ELEVATED LACTIC ACID LEVEL: ICD-10-CM

## 2025-01-05 DIAGNOSIS — I63.9 ISCHEMIC STROKE (H): ICD-10-CM

## 2025-01-05 DIAGNOSIS — E11.65 TYPE 2 DIABETES MELLITUS WITH HYPERGLYCEMIA, WITH LONG-TERM CURRENT USE OF INSULIN (H): ICD-10-CM

## 2025-01-05 DIAGNOSIS — R52 PAIN: Primary | ICD-10-CM

## 2025-01-05 DIAGNOSIS — R53.1 RIGHT SIDED WEAKNESS: ICD-10-CM

## 2025-01-05 DIAGNOSIS — I10 BENIGN ESSENTIAL HYPERTENSION: ICD-10-CM

## 2025-01-05 DIAGNOSIS — R73.9 HYPERGLYCEMIA: ICD-10-CM

## 2025-01-05 DIAGNOSIS — R65.21 SEPTIC SHOCK (H): ICD-10-CM

## 2025-01-05 DIAGNOSIS — Z79.4 TYPE 2 DIABETES MELLITUS WITH HYPERGLYCEMIA, WITH LONG-TERM CURRENT USE OF INSULIN (H): ICD-10-CM

## 2025-01-05 LAB
ALBUMIN SERPL BCG-MCNC: 3.7 G/DL (ref 3.5–5.2)
ALBUMIN UR-MCNC: 30 MG/DL
ALP SERPL-CCNC: 93 U/L (ref 40–150)
ALT SERPL W P-5'-P-CCNC: 10 U/L (ref 0–70)
ANION GAP SERPL CALCULATED.3IONS-SCNC: 17 MMOL/L (ref 7–15)
APPEARANCE UR: CLEAR
APTT PPP: 28 SECONDS (ref 22–38)
AST SERPL W P-5'-P-CCNC: 10 U/L (ref 0–45)
B-OH-BUTYR SERPL-SCNC: 0.45 MMOL/L
BACTERIA #/AREA URNS HPF: ABNORMAL /HPF
BASE EXCESS BLDV CALC-SCNC: -3.9 MMOL/L (ref -3–3)
BASOPHILS # BLD AUTO: 0 10E3/UL (ref 0–0.2)
BASOPHILS NFR BLD AUTO: 0 %
BILIRUB DIRECT SERPL-MCNC: <0.2 MG/DL (ref 0–0.3)
BILIRUB SERPL-MCNC: 0.4 MG/DL
BILIRUB UR QL STRIP: NEGATIVE
BUN SERPL-MCNC: 20.4 MG/DL (ref 8–23)
CALCIUM SERPL-MCNC: 8.5 MG/DL (ref 8.8–10.4)
CHLORIDE SERPL-SCNC: 103 MMOL/L (ref 98–107)
COLOR UR AUTO: ABNORMAL
CREAT SERPL-MCNC: 0.95 MG/DL (ref 0.67–1.17)
CRP SERPL-MCNC: 37.6 MG/L
EGFRCR SERPLBLD CKD-EPI 2021: 85 ML/MIN/1.73M2
EOSINOPHIL # BLD AUTO: 0 10E3/UL (ref 0–0.7)
EOSINOPHIL NFR BLD AUTO: 0 %
ERYTHROCYTE [DISTWIDTH] IN BLOOD BY AUTOMATED COUNT: 12.9 % (ref 10–15)
GLUCOSE SERPL-MCNC: 311 MG/DL (ref 70–99)
GLUCOSE UR STRIP-MCNC: >1000 MG/DL
HCO3 BLDV-SCNC: 21 MMOL/L (ref 21–28)
HCO3 SERPL-SCNC: 20 MMOL/L (ref 22–29)
HCT VFR BLD AUTO: 46.5 % (ref 40–53)
HGB BLD-MCNC: 15.4 G/DL (ref 13.3–17.7)
HGB UR QL STRIP: ABNORMAL
IMM GRANULOCYTES # BLD: 0.1 10E3/UL
IMM GRANULOCYTES NFR BLD: 0 %
INR PPP: 1.28 (ref 0.85–1.15)
KETONES UR STRIP-MCNC: 10 MG/DL
LACTATE SERPL-SCNC: 3.6 MMOL/L (ref 0.7–2)
LACTATE SERPL-SCNC: 5 MMOL/L (ref 0.7–2)
LEUKOCYTE ESTERASE UR QL STRIP: NEGATIVE
LIPASE SERPL-CCNC: 15 U/L (ref 13–60)
LYMPHOCYTES # BLD AUTO: 0.5 10E3/UL (ref 0.8–5.3)
LYMPHOCYTES NFR BLD AUTO: 4 %
MAGNESIUM SERPL-MCNC: 1.5 MG/DL (ref 1.7–2.3)
MCH RBC QN AUTO: 31.2 PG (ref 26.5–33)
MCHC RBC AUTO-ENTMCNC: 33.1 G/DL (ref 31.5–36.5)
MCV RBC AUTO: 94 FL (ref 78–100)
MONOCYTES # BLD AUTO: 0.5 10E3/UL (ref 0–1.3)
MONOCYTES NFR BLD AUTO: 4 %
MUCOUS THREADS #/AREA URNS LPF: PRESENT /LPF
NEUTROPHILS # BLD AUTO: 11.5 10E3/UL (ref 1.6–8.3)
NEUTROPHILS NFR BLD AUTO: 92 %
NITRATE UR QL: NEGATIVE
NRBC # BLD AUTO: 0 10E3/UL
NRBC BLD AUTO-RTO: 0 /100
O2/TOTAL GAS SETTING VFR VENT: 21 %
OXYHGB MFR BLDV: 75 % (ref 70–75)
PCO2 BLDV: 38 MM HG (ref 40–50)
PH BLDV: 7.35 [PH] (ref 7.32–7.43)
PH UR STRIP: 5 [PH] (ref 5–7)
PLATELET # BLD AUTO: 250 10E3/UL (ref 150–450)
PO2 BLDV: 42 MM HG (ref 25–47)
POTASSIUM SERPL-SCNC: 4.5 MMOL/L (ref 3.4–5.3)
PROCALCITONIN SERPL IA-MCNC: 0.35 NG/ML
PROT SERPL-MCNC: 6.6 G/DL (ref 6.4–8.3)
RBC # BLD AUTO: 4.94 10E6/UL (ref 4.4–5.9)
RBC URINE: 5 /HPF
SAO2 % BLDV: 75.8 % (ref 70–75)
SODIUM SERPL-SCNC: 140 MMOL/L (ref 135–145)
SP GR UR STRIP: 1.03 (ref 1–1.03)
SQUAMOUS EPITHELIAL: <1 /HPF
TROPONIN T SERPL HS-MCNC: 11 NG/L
TSH SERPL DL<=0.005 MIU/L-ACNC: 1.72 UIU/ML (ref 0.3–4.2)
UROBILINOGEN UR STRIP-MCNC: <2 MG/DL
WBC # BLD AUTO: 12.6 10E3/UL (ref 4–11)
WBC URINE: 1 /HPF

## 2025-01-05 PROCEDURE — 85610 PROTHROMBIN TIME: CPT | Performed by: EMERGENCY MEDICINE

## 2025-01-05 PROCEDURE — 80076 HEPATIC FUNCTION PANEL: CPT | Performed by: EMERGENCY MEDICINE

## 2025-01-05 PROCEDURE — 81001 URINALYSIS AUTO W/SCOPE: CPT | Performed by: EMERGENCY MEDICINE

## 2025-01-05 PROCEDURE — 82805 BLOOD GASES W/O2 SATURATION: CPT | Performed by: EMERGENCY MEDICINE

## 2025-01-05 PROCEDURE — 87040 BLOOD CULTURE FOR BACTERIA: CPT | Performed by: EMERGENCY MEDICINE

## 2025-01-05 PROCEDURE — 99292 CRITICAL CARE ADDL 30 MIN: CPT

## 2025-01-05 PROCEDURE — 85730 THROMBOPLASTIN TIME PARTIAL: CPT | Performed by: EMERGENCY MEDICINE

## 2025-01-05 PROCEDURE — 87106 FUNGI IDENTIFICATION YEAST: CPT | Performed by: INTERNAL MEDICINE

## 2025-01-05 PROCEDURE — 84145 PROCALCITONIN (PCT): CPT | Performed by: EMERGENCY MEDICINE

## 2025-01-05 PROCEDURE — 83735 ASSAY OF MAGNESIUM: CPT | Performed by: EMERGENCY MEDICINE

## 2025-01-05 PROCEDURE — 36415 COLL VENOUS BLD VENIPUNCTURE: CPT | Performed by: EMERGENCY MEDICINE

## 2025-01-05 PROCEDURE — 93005 ELECTROCARDIOGRAM TRACING: CPT | Performed by: EMERGENCY MEDICINE

## 2025-01-05 PROCEDURE — 83036 HEMOGLOBIN GLYCOSYLATED A1C: CPT | Performed by: INTERNAL MEDICINE

## 2025-01-05 PROCEDURE — 85025 COMPLETE CBC W/AUTO DIFF WBC: CPT | Performed by: EMERGENCY MEDICINE

## 2025-01-05 PROCEDURE — 70551 MRI BRAIN STEM W/O DYE: CPT

## 2025-01-05 PROCEDURE — 82310 ASSAY OF CALCIUM: CPT | Performed by: EMERGENCY MEDICINE

## 2025-01-05 PROCEDURE — 82248 BILIRUBIN DIRECT: CPT | Performed by: EMERGENCY MEDICINE

## 2025-01-05 PROCEDURE — 71250 CT THORAX DX C-: CPT

## 2025-01-05 PROCEDURE — 84443 ASSAY THYROID STIM HORMONE: CPT | Performed by: EMERGENCY MEDICINE

## 2025-01-05 PROCEDURE — 83605 ASSAY OF LACTIC ACID: CPT | Performed by: EMERGENCY MEDICINE

## 2025-01-05 PROCEDURE — 258N000003 HC RX IP 258 OP 636: Performed by: EMERGENCY MEDICINE

## 2025-01-05 PROCEDURE — 82010 KETONE BODYS QUAN: CPT | Performed by: EMERGENCY MEDICINE

## 2025-01-05 PROCEDURE — 96366 THER/PROPH/DIAG IV INF ADDON: CPT

## 2025-01-05 PROCEDURE — 86140 C-REACTIVE PROTEIN: CPT | Performed by: EMERGENCY MEDICINE

## 2025-01-05 PROCEDURE — 70496 CT ANGIOGRAPHY HEAD: CPT

## 2025-01-05 PROCEDURE — 80053 COMPREHEN METABOLIC PANEL: CPT | Performed by: EMERGENCY MEDICINE

## 2025-01-05 PROCEDURE — 80061 LIPID PANEL: CPT | Performed by: INTERNAL MEDICINE

## 2025-01-05 PROCEDURE — 99291 CRITICAL CARE FIRST HOUR: CPT | Mod: 25

## 2025-01-05 PROCEDURE — 84484 ASSAY OF TROPONIN QUANT: CPT | Performed by: EMERGENCY MEDICINE

## 2025-01-05 PROCEDURE — 96365 THER/PROPH/DIAG IV INF INIT: CPT

## 2025-01-05 PROCEDURE — 83690 ASSAY OF LIPASE: CPT | Performed by: EMERGENCY MEDICINE

## 2025-01-05 PROCEDURE — 250N000011 HC RX IP 250 OP 636: Performed by: EMERGENCY MEDICINE

## 2025-01-05 PROCEDURE — 96368 THER/DIAG CONCURRENT INF: CPT

## 2025-01-05 RX ORDER — FAMOTIDINE 20 MG/1
20 TABLET, FILM COATED ORAL DAILY
COMMUNITY

## 2025-01-05 RX ORDER — IOPAMIDOL 755 MG/ML
67 INJECTION, SOLUTION INTRAVASCULAR ONCE
Status: DISCONTINUED | OUTPATIENT
Start: 2025-01-05 | End: 2025-01-05

## 2025-01-05 RX ORDER — MAGNESIUM SULFATE HEPTAHYDRATE 40 MG/ML
2 INJECTION, SOLUTION INTRAVENOUS ONCE
Status: COMPLETED | OUTPATIENT
Start: 2025-01-05 | End: 2025-01-06

## 2025-01-05 RX ORDER — PIPERACILLIN SODIUM, TAZOBACTAM SODIUM 3; .375 G/15ML; G/15ML
3.38 INJECTION, POWDER, LYOPHILIZED, FOR SOLUTION INTRAVENOUS ONCE
Status: COMPLETED | OUTPATIENT
Start: 2025-01-05 | End: 2025-01-05

## 2025-01-05 RX ORDER — IOPAMIDOL 755 MG/ML
75 INJECTION, SOLUTION INTRAVASCULAR ONCE
Status: COMPLETED | OUTPATIENT
Start: 2025-01-05 | End: 2025-01-05

## 2025-01-05 RX ORDER — CIPROFLOXACIN 500 MG/1
500 TABLET, FILM COATED ORAL 2 TIMES DAILY
Status: ON HOLD | COMMUNITY
Start: 2025-01-04 | End: 2025-01-09

## 2025-01-05 RX ADMIN — IOPAMIDOL 75 ML: 755 INJECTION, SOLUTION INTRAVENOUS at 20:35

## 2025-01-05 RX ADMIN — SODIUM CHLORIDE 2994 ML: 9 INJECTION, SOLUTION INTRAVENOUS at 21:40

## 2025-01-05 RX ADMIN — SODIUM CHLORIDE 2500 MG: 9 INJECTION, SOLUTION INTRAVENOUS at 21:40

## 2025-01-05 RX ADMIN — PIPERACILLIN AND TAZOBACTAM 3.38 G: 3; .375 INJECTION, POWDER, FOR SOLUTION INTRAVENOUS at 21:25

## 2025-01-05 ASSESSMENT — ACTIVITIES OF DAILY LIVING (ADL)
ADLS_ACUITY_SCORE: 59

## 2025-01-06 ENCOUNTER — APPOINTMENT (OUTPATIENT)
Dept: PHYSICAL THERAPY | Facility: HOSPITAL | Age: 75
DRG: 065 | End: 2025-01-06
Attending: INTERNAL MEDICINE
Payer: MEDICARE

## 2025-01-06 ENCOUNTER — APPOINTMENT (OUTPATIENT)
Dept: OCCUPATIONAL THERAPY | Facility: HOSPITAL | Age: 75
DRG: 065 | End: 2025-01-06
Attending: INTERNAL MEDICINE
Payer: MEDICARE

## 2025-01-06 ENCOUNTER — APPOINTMENT (OUTPATIENT)
Dept: SPEECH THERAPY | Facility: HOSPITAL | Age: 75
DRG: 065 | End: 2025-01-06
Attending: INTERNAL MEDICINE
Payer: MEDICARE

## 2025-01-06 ENCOUNTER — APPOINTMENT (OUTPATIENT)
Dept: CARDIOLOGY | Facility: HOSPITAL | Age: 75
DRG: 065 | End: 2025-01-06
Attending: INTERNAL MEDICINE
Payer: MEDICARE

## 2025-01-06 PROBLEM — R53.1 RIGHT SIDED WEAKNESS: Status: ACTIVE | Noted: 2025-01-06

## 2025-01-06 PROBLEM — I63.9 ISCHEMIC STROKE (H): Status: ACTIVE | Noted: 2025-01-06

## 2025-01-06 PROBLEM — R65.21 SEPTIC SHOCK (H): Status: ACTIVE | Noted: 2025-01-06

## 2025-01-06 PROBLEM — E83.42 HYPOMAGNESEMIA: Status: ACTIVE | Noted: 2022-05-07

## 2025-01-06 PROBLEM — R79.89 ELEVATED LACTIC ACID LEVEL: Status: ACTIVE | Noted: 2025-01-06

## 2025-01-06 PROBLEM — A41.9 SEPTIC SHOCK (H): Status: ACTIVE | Noted: 2025-01-06

## 2025-01-06 LAB
ATRIAL RATE - MUSE: 111 BPM
B-OH-BUTYR SERPL-SCNC: 0.23 MMOL/L
BASE EXCESS BLDV CALC-SCNC: -2.7 MMOL/L (ref -3–3)
CHOLEST SERPL-MCNC: 131 MG/DL
DIASTOLIC BLOOD PRESSURE - MUSE: 71 MMHG
EST. AVERAGE GLUCOSE BLD GHB EST-MCNC: 214 MG/DL
GLUCOSE BLDC GLUCOMTR-MCNC: 186 MG/DL (ref 70–99)
GLUCOSE BLDC GLUCOMTR-MCNC: 231 MG/DL (ref 70–99)
GLUCOSE BLDC GLUCOMTR-MCNC: 234 MG/DL (ref 70–99)
GLUCOSE BLDC GLUCOMTR-MCNC: 253 MG/DL (ref 70–99)
GLUCOSE BLDC GLUCOMTR-MCNC: 302 MG/DL (ref 70–99)
HBA1C MFR BLD: 9.1 %
HCO3 BLDV-SCNC: 23 MMOL/L (ref 21–28)
HDLC SERPL-MCNC: 30 MG/DL
HOLD SPECIMEN: NORMAL
INTERPRETATION ECG - MUSE: NORMAL
LACTATE SERPL-SCNC: 2.7 MMOL/L (ref 0.7–2)
LDLC SERPL CALC-MCNC: 69 MG/DL
LVEF ECHO: NORMAL
MAGNESIUM SERPL-MCNC: 1.7 MG/DL (ref 1.7–2.3)
NONHDLC SERPL-MCNC: 101 MG/DL
O2/TOTAL GAS SETTING VFR VENT: 21 %
OXYHGB MFR BLDV: 63 % (ref 70–75)
P AXIS - MUSE: 40 DEGREES
PCO2 BLDV: 41 MM HG (ref 40–50)
PH BLDV: 7.35 [PH] (ref 7.32–7.43)
PO2 BLDV: 33 MM HG (ref 25–47)
POTASSIUM SERPL-SCNC: 3.9 MMOL/L (ref 3.4–5.3)
PR INTERVAL - MUSE: 176 MS
QRS DURATION - MUSE: 82 MS
QT - MUSE: 324 MS
QTC - MUSE: 440 MS
R AXIS - MUSE: -23 DEGREES
SAO2 % BLDV: 63.7 % (ref 70–75)
SYSTOLIC BLOOD PRESSURE - MUSE: 139 MMHG
T AXIS - MUSE: 54 DEGREES
TRIGL SERPL-MCNC: 160 MG/DL
VENTRICULAR RATE- MUSE: 111 BPM

## 2025-01-06 PROCEDURE — 250N000012 HC RX MED GY IP 250 OP 636 PS 637: Performed by: INTERNAL MEDICINE

## 2025-01-06 PROCEDURE — 999N000208 ECHOCARDIOGRAM COMPLETE

## 2025-01-06 PROCEDURE — 82962 GLUCOSE BLOOD TEST: CPT

## 2025-01-06 PROCEDURE — 99222 1ST HOSP IP/OBS MODERATE 55: CPT | Performed by: INTERNAL MEDICINE

## 2025-01-06 PROCEDURE — G0378 HOSPITAL OBSERVATION PER HR: HCPCS

## 2025-01-06 PROCEDURE — 97530 THERAPEUTIC ACTIVITIES: CPT | Mod: GP

## 2025-01-06 PROCEDURE — 99223 1ST HOSP IP/OBS HIGH 75: CPT | Performed by: PSYCHIATRY & NEUROLOGY

## 2025-01-06 PROCEDURE — 82805 BLOOD GASES W/O2 SATURATION: CPT | Performed by: INTERNAL MEDICINE

## 2025-01-06 PROCEDURE — 120N000001 HC R&B MED SURG/OB

## 2025-01-06 PROCEDURE — 250N000011 HC RX IP 250 OP 636: Performed by: INTERNAL MEDICINE

## 2025-01-06 PROCEDURE — 255N000002 HC RX 255 OP 636: Performed by: INTERNAL MEDICINE

## 2025-01-06 PROCEDURE — 96366 THER/PROPH/DIAG IV INF ADDON: CPT

## 2025-01-06 PROCEDURE — 97535 SELF CARE MNGMENT TRAINING: CPT | Mod: GO

## 2025-01-06 PROCEDURE — 96367 TX/PROPH/DG ADDL SEQ IV INF: CPT

## 2025-01-06 PROCEDURE — 84132 ASSAY OF SERUM POTASSIUM: CPT | Performed by: INTERNAL MEDICINE

## 2025-01-06 PROCEDURE — 93306 TTE W/DOPPLER COMPLETE: CPT | Mod: 26 | Performed by: INTERNAL MEDICINE

## 2025-01-06 PROCEDURE — 250N000013 HC RX MED GY IP 250 OP 250 PS 637: Performed by: INTERNAL MEDICINE

## 2025-01-06 PROCEDURE — 92612 ENDOSCOPY SWALLOW (FEES) VID: CPT | Mod: GN

## 2025-01-06 PROCEDURE — 250N000011 HC RX IP 250 OP 636: Performed by: EMERGENCY MEDICINE

## 2025-01-06 PROCEDURE — 250N000009 HC RX 250: Performed by: INTERNAL MEDICINE

## 2025-01-06 PROCEDURE — C8929 TTE W OR WO FOL WCON,DOPPLER: HCPCS

## 2025-01-06 PROCEDURE — 82010 KETONE BODYS QUAN: CPT | Performed by: INTERNAL MEDICINE

## 2025-01-06 PROCEDURE — 97166 OT EVAL MOD COMPLEX 45 MIN: CPT | Mod: GO

## 2025-01-06 PROCEDURE — 36415 COLL VENOUS BLD VENIPUNCTURE: CPT | Performed by: INTERNAL MEDICINE

## 2025-01-06 PROCEDURE — 83735 ASSAY OF MAGNESIUM: CPT | Performed by: INTERNAL MEDICINE

## 2025-01-06 PROCEDURE — 99207 PR NO CHARGE LOS: CPT | Performed by: INTERNAL MEDICINE

## 2025-01-06 PROCEDURE — 97162 PT EVAL MOD COMPLEX 30 MIN: CPT | Mod: GP

## 2025-01-06 PROCEDURE — 92610 EVALUATE SWALLOWING FUNCTION: CPT | Mod: GN

## 2025-01-06 PROCEDURE — 83605 ASSAY OF LACTIC ACID: CPT | Performed by: INTERNAL MEDICINE

## 2025-01-06 PROCEDURE — 258N000003 HC RX IP 258 OP 636: Performed by: INTERNAL MEDICINE

## 2025-01-06 RX ORDER — DEXTROSE MONOHYDRATE 25 G/50ML
25-50 INJECTION, SOLUTION INTRAVENOUS
Status: DISCONTINUED | OUTPATIENT
Start: 2025-01-06 | End: 2025-01-09 | Stop reason: HOSPADM

## 2025-01-06 RX ORDER — GABAPENTIN 300 MG/1
300 CAPSULE ORAL 2 TIMES DAILY
Status: DISCONTINUED | OUTPATIENT
Start: 2025-01-06 | End: 2025-01-09 | Stop reason: HOSPADM

## 2025-01-06 RX ORDER — ONDANSETRON 2 MG/ML
4 INJECTION INTRAMUSCULAR; INTRAVENOUS EVERY 6 HOURS PRN
Status: DISCONTINUED | OUTPATIENT
Start: 2025-01-06 | End: 2025-01-09 | Stop reason: HOSPADM

## 2025-01-06 RX ORDER — NICOTINE POLACRILEX 4 MG
15-30 LOZENGE BUCCAL
Status: DISCONTINUED | OUTPATIENT
Start: 2025-01-06 | End: 2025-01-09 | Stop reason: HOSPADM

## 2025-01-06 RX ORDER — ONDANSETRON 4 MG/1
4 TABLET, ORALLY DISINTEGRATING ORAL EVERY 6 HOURS PRN
Status: DISCONTINUED | OUTPATIENT
Start: 2025-01-06 | End: 2025-01-09 | Stop reason: HOSPADM

## 2025-01-06 RX ORDER — ACETAMINOPHEN 325 MG/1
650 TABLET ORAL EVERY 4 HOURS PRN
Status: DISCONTINUED | OUTPATIENT
Start: 2025-01-06 | End: 2025-01-09 | Stop reason: HOSPADM

## 2025-01-06 RX ORDER — SODIUM CHLORIDE 9 MG/ML
INJECTION, SOLUTION INTRAVENOUS CONTINUOUS
Status: ACTIVE | OUTPATIENT
Start: 2025-01-06 | End: 2025-01-07

## 2025-01-06 RX ORDER — PIPERACILLIN SODIUM, TAZOBACTAM SODIUM 3; .375 G/15ML; G/15ML
3.38 INJECTION, POWDER, LYOPHILIZED, FOR SOLUTION INTRAVENOUS EVERY 8 HOURS
Status: DISCONTINUED | OUTPATIENT
Start: 2025-01-06 | End: 2025-01-06

## 2025-01-06 RX ORDER — ACETAMINOPHEN 650 MG/1
650 SUPPOSITORY RECTAL EVERY 4 HOURS PRN
Status: DISCONTINUED | OUTPATIENT
Start: 2025-01-06 | End: 2025-01-09 | Stop reason: HOSPADM

## 2025-01-06 RX ORDER — PIPERACILLIN SODIUM, TAZOBACTAM SODIUM 3; .375 G/15ML; G/15ML
3.38 INJECTION, POWDER, LYOPHILIZED, FOR SOLUTION INTRAVENOUS EVERY 8 HOURS
Status: DISCONTINUED | OUTPATIENT
Start: 2025-01-06 | End: 2025-01-07

## 2025-01-06 RX ORDER — ARIPIPRAZOLE 2 MG/1
2 TABLET ORAL AT BEDTIME
Status: DISCONTINUED | OUTPATIENT
Start: 2025-01-06 | End: 2025-01-09 | Stop reason: HOSPADM

## 2025-01-06 RX ORDER — PROCHLORPERAZINE MALEATE 5 MG/1
5 TABLET ORAL EVERY 6 HOURS PRN
Status: DISCONTINUED | OUTPATIENT
Start: 2025-01-06 | End: 2025-01-09 | Stop reason: HOSPADM

## 2025-01-06 RX ORDER — ACETAMINOPHEN 325 MG/10.15ML
650 LIQUID ORAL EVERY 4 HOURS PRN
Status: DISCONTINUED | OUTPATIENT
Start: 2025-01-06 | End: 2025-01-09 | Stop reason: HOSPADM

## 2025-01-06 RX ORDER — BRIMONIDINE TARTRATE 2 MG/ML
1 SOLUTION/ DROPS OPHTHALMIC 2 TIMES DAILY
Status: DISCONTINUED | OUTPATIENT
Start: 2025-01-06 | End: 2025-01-09 | Stop reason: HOSPADM

## 2025-01-06 RX ORDER — FAMOTIDINE 20 MG/1
20 TABLET, FILM COATED ORAL DAILY
Status: DISCONTINUED | OUTPATIENT
Start: 2025-01-06 | End: 2025-01-09 | Stop reason: HOSPADM

## 2025-01-06 RX ORDER — FLUVOXAMINE MALEATE 50 MG
50 TABLET ORAL AT BEDTIME
Status: DISCONTINUED | OUTPATIENT
Start: 2025-01-06 | End: 2025-01-09 | Stop reason: HOSPADM

## 2025-01-06 RX ORDER — LIDOCAINE 40 MG/G
CREAM TOPICAL
Status: DISCONTINUED | OUTPATIENT
Start: 2025-01-06 | End: 2025-01-09 | Stop reason: HOSPADM

## 2025-01-06 RX ORDER — VANCOMYCIN HYDROCHLORIDE 1 G/200ML
1000 INJECTION, SOLUTION INTRAVENOUS EVERY 12 HOURS
Status: DISCONTINUED | OUTPATIENT
Start: 2025-01-06 | End: 2025-01-06

## 2025-01-06 RX ORDER — DORZOLAMIDE HYDROCHLORIDE AND TIMOLOL MALEATE 20; 5 MG/ML; MG/ML
1 SOLUTION/ DROPS OPHTHALMIC 2 TIMES DAILY
Status: DISCONTINUED | OUTPATIENT
Start: 2025-01-06 | End: 2025-01-09 | Stop reason: HOSPADM

## 2025-01-06 RX ADMIN — APIXABAN 5 MG: 5 TABLET, FILM COATED ORAL at 21:05

## 2025-01-06 RX ADMIN — FLUVOXAMINE MALEATE 50 MG: 50 TABLET ORAL at 21:05

## 2025-01-06 RX ADMIN — DORZOLAMIDE HYDROCHLORIDE AND TIMOLOL MALEATE 1 DROP: 20; 5 SOLUTION/ DROPS OPHTHALMIC at 21:02

## 2025-01-06 RX ADMIN — GABAPENTIN 300 MG: 300 CAPSULE ORAL at 08:16

## 2025-01-06 RX ADMIN — APIXABAN 5 MG: 5 TABLET, FILM COATED ORAL at 08:17

## 2025-01-06 RX ADMIN — INSULIN ASPART 2 UNITS: 100 INJECTION, SOLUTION INTRAVENOUS; SUBCUTANEOUS at 14:33

## 2025-01-06 RX ADMIN — INSULIN ASPART 3 UNITS: 100 INJECTION, SOLUTION INTRAVENOUS; SUBCUTANEOUS at 08:18

## 2025-01-06 RX ADMIN — APIXABAN 5 MG: 5 TABLET, FILM COATED ORAL at 03:21

## 2025-01-06 RX ADMIN — PIPERACILLIN AND TAZOBACTAM 3.38 G: 3; .375 INJECTION, POWDER, FOR SOLUTION INTRAVENOUS at 21:04

## 2025-01-06 RX ADMIN — VANCOMYCIN HYDROCHLORIDE 1000 MG: 1 INJECTION, SOLUTION INTRAVENOUS at 09:53

## 2025-01-06 RX ADMIN — FAMOTIDINE 20 MG: 20 TABLET, FILM COATED ORAL at 08:17

## 2025-01-06 RX ADMIN — BRIMONIDINE TARTRATE 1 DROP: 2 SOLUTION OPHTHALMIC at 21:02

## 2025-01-06 RX ADMIN — SODIUM CHLORIDE, PRESERVATIVE FREE: 5 INJECTION INTRAVENOUS at 02:44

## 2025-01-06 RX ADMIN — BRIMONIDINE TARTRATE 1 DROP: 2 SOLUTION OPHTHALMIC at 08:20

## 2025-01-06 RX ADMIN — INSULIN GLARGINE 10 UNITS: 100 INJECTION, SOLUTION SUBCUTANEOUS at 09:54

## 2025-01-06 RX ADMIN — GABAPENTIN 300 MG: 300 CAPSULE ORAL at 21:04

## 2025-01-06 RX ADMIN — PIPERACILLIN AND TAZOBACTAM 3.38 G: 3; .375 INJECTION, POWDER, FOR SOLUTION INTRAVENOUS at 14:32

## 2025-01-06 RX ADMIN — PERFLUTREN 2 ML: 6.52 INJECTION, SUSPENSION INTRAVENOUS at 13:30

## 2025-01-06 RX ADMIN — ARIPIPRAZOLE 2 MG: 2 TABLET ORAL at 21:05

## 2025-01-06 RX ADMIN — MAGNESIUM SULFATE HEPTAHYDRATE 2 G: 40 INJECTION, SOLUTION INTRAVENOUS at 01:27

## 2025-01-06 RX ADMIN — DORZOLAMIDE HYDROCHLORIDE AND TIMOLOL MALEATE 1 DROP: 20; 5 SOLUTION/ DROPS OPHTHALMIC at 08:19

## 2025-01-06 RX ADMIN — PIPERACILLIN AND TAZOBACTAM 3.38 G: 3; .375 INJECTION, POWDER, FOR SOLUTION INTRAVENOUS at 03:08

## 2025-01-06 RX ADMIN — INSULIN ASPART 2 UNITS: 100 INJECTION, SOLUTION INTRAVENOUS; SUBCUTANEOUS at 17:57

## 2025-01-06 ASSESSMENT — ACTIVITIES OF DAILY LIVING (ADL)
ADLS_ACUITY_SCORE: 75
DEPENDENT_IADLS:: CLEANING;COOKING;LAUNDRY;SHOPPING;MEAL PREPARATION;MEDICATION MANAGEMENT;MONEY MANAGEMENT;TRANSPORTATION;INCONTINENCE
ADLS_ACUITY_SCORE: 75
ADLS_ACUITY_SCORE: 83
ADLS_ACUITY_SCORE: 75
ADLS_ACUITY_SCORE: 59
ADLS_ACUITY_SCORE: 75
ADLS_ACUITY_SCORE: 83
ADLS_ACUITY_SCORE: 79
ADLS_ACUITY_SCORE: 83
ADLS_ACUITY_SCORE: 75
ADLS_ACUITY_SCORE: 59
ADLS_ACUITY_SCORE: 75

## 2025-01-06 NOTE — PHARMACY-VANCOMYCIN DOSING SERVICE
"Pharmacy Vancomycin Initial Note  Date of Service 2025  Patient's  1950  74 year old, male    Indication: Sepsis    Current estimated CrCl = Estimated Creatinine Clearance: 84.7 mL/min (based on SCr of 0.95 mg/dL).    Creatinine for last 3 days  2025:  8:36 PM Creatinine 0.95 mg/dL    Recent Vancomycin Level(s) for last 3 days  No results found for requested labs within last 3 days.      Vancomycin IV Administrations (past 72 hours)                     vancomycin (VANCOCIN) 2,500 mg in 0.9% NaCl 525 mL intermittent infusion (mg) 2,500 mg New Bag 25 2140                    Nephrotoxins and other renal medications (From now, onward)      Start     Dose/Rate Route Frequency Ordered Stop    25 0800  vancomycin (VANCOCIN) 1,000 mg in 200 mL dextrose intermittent infusion         1,000 mg  200 mL/hr over 1 Hours Intravenous EVERY 12 HOURS 25 0232      25 0330  piperacillin-tazobactam (ZOSYN) 3.375 g vial to attach to  mL bag        Note to Pharmacy: For [facility], [facility] and [facility]: For Zosyn-naive patients, use the \"Zosyn initial dose + extended infusion\" order panel.    3.375 g  over 240 Minutes Intravenous EVERY 8 HOURS 25 0215              Contrast Orders - past 72 hours (72h ago, onward)      Start     Dose/Rate Route Frequency Stop    25 2100  iopamidol (ISOVUE-370) solution 75 mL         75 mL Intravenous ONCE 25 2030  iopamidol (ISOVUE-370) solution 67 mL  Status:  Discontinued        Placed in \"And\" Linked Group    67 mL Intravenous ONCE 25            InsightRX Prediction of Planned Initial Vancomycin Regimen  Regimen: 1000 mg IV every 12 hours.  Start time: 09:40 on 2025  Exposure target: AUC24 (range)400-600 mg/L.hr   AUC24,ss: 519 mg/L.hr  Probability of AUC24 > 400: 76 %  Ctrough,ss: 17.3 mg/L  Probability of Ctrough,ss > 20: 37 %  Probability of nephrotoxicity (Lodise DANICA ): 13 %          Plan:  Start vancomycin  " 1000 mg IV q12h.   Vancomycin monitoring method: AUC  Vancomycin therapeutic monitoring goal: 400-600 mg*h/L  Pharmacy will check vancomycin levels as appropriate in 1-3 Days.    Serum creatinine levels will be ordered daily for the first week of therapy and at least twice weekly for subsequent weeks.      Robert Canales [facility]

## 2025-01-06 NOTE — PROGRESS NOTES
Patient sent via zoom cart.  Patient transferred via hover mat.  Patient belongings and medications sent with the patient.  Patient daughter updated of room transfer.

## 2025-01-06 NOTE — PLAN OF CARE
Goal Outcome Evaluation:      Plan of Care Reviewed With: child          Outcome Evaluation: Anticipate Pt to discharge to TCU when medically stable.    REUBEN JeanW

## 2025-01-06 NOTE — PHARMACY-VANCOMYCIN DOSING SERVICE
"Pharmacy Vancomycin Initial Note  Date of Service 2025  Patient's  1950  74 year old, male    Indication: Sepsis    Current estimated CrCl = Estimated Creatinine Clearance: 104.5 mL/min (based on SCr of 0.77 mg/dL).    Creatinine for last 3 days  No results found for requested labs within last 3 days.    Recent Vancomycin Level(s) for last 3 days  No results found for requested labs within last 3 days.      Vancomycin IV Administrations (past 72 hours)        No vancomycin orders with administrations in past 72 hours.                    Nephrotoxins and other renal medications (From now, onward)      Start     Dose/Rate Route Frequency Ordered Stop    25  vancomycin (VANCOCIN) 2,500 mg in 0.9% NaCl 525 mL intermittent infusion         2,500 mg  over 120 Minutes Intravenous ONCE 25  piperacillin-tazobactam (ZOSYN) 3.375 g vial to attach to  mL bag         3.375 g  over 30 Minutes Intravenous ONCE 25              Contrast Orders - past 72 hours (72h ago, onward)      Start     Dose/Rate Route Frequency Stop    25  iopamidol (ISOVUE-370) solution 75 mL         75 mL Intravenous ONCE 25  iopamidol (ISOVUE-370) solution 67 mL  Status:  Discontinued        Placed in \"And\" Linked Group    67 mL Intravenous ONCE 25            Plan:  Start vancomycin 2500 mg IV as a one time 25 mg/kg loading dose.    Yandel [place], [facility]    "

## 2025-01-06 NOTE — CONSULTS
Care Management Initial Consult    General Information  Assessment completed with: Izabel, daughter lEoisa  Type of CM/SW Visit: Initial Assessment    Primary Care Provider verified and updated as needed: No   Readmission within the last 30 days: no previous admission in last 30 days      Reason for Consult: discharge planning  Advance Care Planning:            Communication Assessment  Patient's communication style: spoken language (English or Bilingual)             Cognitive  Cognitive/Neuro/Behavioral: .WDL except, orientation  Level of Consciousness: confused (intermittent)  Arousal Level: arouses to voice  Orientation: disoriented to, place, time        Speech: clear    Living Environment:   People in home: facility resident     Current living Arrangements: assisted living  Name of Facility: [facility]   Able to return to prior arrangements: no       Family/Social Support:  Care provided by: other (see comments) (facility staff)  Provides care for: no one, unable/limited ability to care for self  Marital Status:   Support system: Children, Facility resident(s)/Staff          Description of Support System: Supportive, Involved    Support Assessment: Adequate family and caregiver support    Current Resources:   Patient receiving home care services: No        Community Resources: None  Equipment currently used at home: walker, rolling, wheelchair, manual  Supplies currently used at home: Incontinence Supplies    Employment/Financial:  Employment Status: retired        Financial Concerns: none           Does the patient's insurance plan have a 3 day qualifying hospital stay waiver?  Yes     Which insurance plan 3 day waiver is available? ACO REACH    Will the waiver be used for post-acute placement? Undetermined at this time    Lifestyle & Psychosocial Needs:  Social Drivers of Health     Food Insecurity: Low Risk  (9/13/2024)    Food Insecurity     Within the past 12 months, did you worry  that your food would run out before you got money to buy more?: No     Within the past 12 months, did the food you bought just not last and you didn t have money to get more?: No   Depression: At risk (10/3/2024)    PHQ-2     PHQ-2 Score: 6   Housing Stability: High Risk (9/13/2024)    Housing Stability     Do you have housing? : No     Are you worried about losing your housing?: No   Tobacco Use: Low Risk  (1/5/2025)    Patient History     Smoking Tobacco Use: Never     Smokeless Tobacco Use: Never     Passive Exposure: Not on file   Financial Resource Strain: Low Risk  (9/13/2024)    Financial Resource Strain     Within the past 12 months, have you or your family members you live with been unable to get utilities (heat, electricity) when it was really needed?: No   Alcohol Use: Not on file   Transportation Needs: Low Risk  (9/13/2024)    Transportation Needs     Within the past 12 months, has lack of transportation kept you from medical appointments, getting your medicines, non-medical meetings or appointments, work, or from getting things that you need?: No   Physical Activity: Not on file   Interpersonal Safety: High Risk (9/10/2024)    Interpersonal Safety     Do you feel physically and emotionally safe where you currently live?: No     Within the past 12 months, have you been hit, slapped, kicked or otherwise physically hurt by someone?: No     Within the past 12 months, have you been humiliated or emotionally abused in other ways by your partner or ex-partner?: No   Stress: Not on file   Social Connections: Unknown (11/29/2023)    [facility]    Social Connections     Frequency of Communication with Friends and Family: Not on file   Health Literacy: Not on file       Functional Status:  Prior to admission patient needed assistance:   Dependent ADLs:: Ambulation-walker, Bathing, Dressing, Grooming, Transfers,  Toileting, Incontinence  Dependent IADLs:: Cleaning, Cooking, Laundry, Shopping, Meal Preparation, Medication Management, Money Management, Transportation, Incontinence       Mental Health Status:  Mental Health Status: No Current Concerns       Chemical Dependency Status:  Chemical Dependency Status: No Current Concerns             Values/Beliefs:  Spiritual, Cultural Beliefs, Taoism Practices, Values that affect care: no               Discussed  Partnership in Safe Discharge Planning  document with patient/family: No    Additional Information:  SW spoke with Pt's daughter Eloisa via phone to complete initial assessment. Eloisa confirms that Pt was discharged from [facility]CU in October and then transitioned to the [facility] at the [facility]. Eloisa states Pt is able to transfer himself and ambulate with a walker with SBA, and he receives assist with all other I/ADLs currently. Eloisa denies Pt having any other current services or DME.     SW discussed PT/OT recommendation for TCU. Eloisa is in agreement and would like referral sent to [facility] and [facility]. [facility] is her top choice; she does NOT want referral sent to [facility] [place]. SW sent referral to [facility] and [facility], pending. Eloisa confirms Pt will need wheelchair transport at discharge and is agreeable to costs.     Of note, as of this writing, Pt does not currently have 3-day inpatient stay, so Pt would need to use Medicare ACO REACH if he discharges prior to 3-day inpatient stay. [facility] and [facility] are not ACO REACH facilities, so additional referrals would need to be sent to ACO REACH facilities.    Next Steps:   Secure placement.     FAITH Jean

## 2025-01-06 NOTE — CONSULTS
[facility]    Stroke Telephone Note    I was called by Azam Sharma on 01/05/25 regarding patient Rakesh Samuels. The patient is a 74 year old male with past medical hx of Afib on anticoagulation per most recent records per Ed provider on Eliquis, he is also legally blind is coming in from facility for R sided weakness. According to the Ed provider pt was last seen normal at facility at 1615 01/05/25 when he went for a nap. He missed his supper so staff checked on him and noticed weakness with R sided weakness and R sided facial droop. Per ED provider he appreciated subtle R sided arm and leg drift but no clear facial droop on the R.    Vitals                       Stroke Code Data (for stroke code without tele)  Stroke code activated 01/05/25 2018   Stroke provider first response 01/05/25 2020   Last known normal 01/05/25  1615      Time of discovery (or onset of symptoms) 01/05/25 2000   Head CT read by Stroke Neuro Provider 01/05/25 2032   Was stroke code de-escalated? Yes  01/05/25 2034     Imaging Findings  CT head: no acute abnormalities  CTA head/neck: No lvo , mild bilateral carotid bifurcation stenosis    Intravenous Thrombolysis  Not given due to:   - unclear or unfavorable risk-benefit profile for extended window thrombolysis beyond the conventional 4.5 hour time window    Endovascular Treatment  Not initiated due to absence of proximal vessel occlusion    Impression  R sided weakness      Recommendations   MRI brain with and without contrast to rule out acute stroke  Toxo metabolic and infectious work up.      1:52 AM  Update  MRI showed R IC infarct  Ok to resume apixiban  TTE  LDL Hba1c  PT OT SLP     My recommendations are based on the information provided over the phone by Rakesh Samuels's in-person providers. They are not intended to replace the clinical judgment of his in-person providers. I was not requested to personally see or examine the patient at this time.  "    Amina Arita MD  Vascular Neurology    To page me or covering stroke neurology team member, click here: AMCOM  Choose \"On Call\" tab at top, then select \"NEUROLOGY/ALL SITES\" from middle drop-down box, press Enter, then look for \"stroke\" or \"telestroke\" for your site.        "

## 2025-01-06 NOTE — PLAN OF CARE
Problem: Adult Inpatient Plan of Care  Goal: Plan of Care Review  Description: The Plan of Care Review/Shift note should be completed every shift.  The Outcome Evaluation is a brief statement about your assessment that the patient is improving, declining, or no change.  This information will be displayed automatically on your shift  note.  Outcome: Progressing   Goal Outcome Evaluation:    - Patient is oriented x2, vitally stable on RA. Pt is legally blind. Neuros Q4h.   - Afib on tele. Denies pain.   - IVF infusing. IV zosyn administered.   - K and mag protocol in place. Mag replaced, recheck in AM.   - Passed bedside swallow screen, took eliquis without any issues. Speech consult   - Pt is incontinent of bowel and bladder. Incontinent care provided. BM x1 this shift. External cath in place.   - Blood glucose 302, corrected.

## 2025-01-06 NOTE — ED NOTES
Bed: [facility]NED[facility]-I  Expected date: 1/5/25  Expected time:   Means of arrival:   Comments:  [facility]   71 male  Stroke Code patient

## 2025-01-06 NOTE — H&P
[facility]    History and Physical - Hospitalist Service       Date of Admission:  1/5/2025    Assessment & Plan      Rakesh Samuels is a 74 year old male admitted on 1/5/2025. He has PMH most notable for HLD, DM2, atrial fibrillation on apixaban, blindness secondary to glaucoma, MDD that presents with right-sided weakness concerning for CVA.    #Right-sided weakness  DDx: CVA v TIA v recrudescence of previous stroke in the setting of infection.  CTA head neck with no large vessel occlusion or intracranial bleed.  Telestroke contacted by the ER and recommended MRI brain which is pending.  - Stroke neurology consult  - MRI brain pending  - PT/OT/SLP consult  - Lipids/A1c  - TTE  - Neurochecks every 4 hours, telemetry monitoring    Update 01:40  MRI did not show a stroke in the genu of the right internal capsule.  Discussed with stroke neurology.  No antiplatelets needed at this time but did recommend restarting apixaban.    #SIRS  #Suspected sepsis  #Elevated lactic acid  Unclear source at this time.  On presentation he is AF, HR 100s, RR 20s, BP 140s/70s and on RA.  WBC 12.6.  Initial lactic acid 5 which decreased to 3.6 after IVF.  CRP elevated at 37.6.  Pro-Peter 0.35.  UA with 1 WBC, negative nitrates, negative leukocyte esterase, few bacteria.  Procalcitonin 0.35. No headache, neck stiffness/pain and he can easily flex and extend his neck making meningitis unlikely. No findings of pneumonia on CT chest.  No infectious source on CT abdomen pelvis.  Elevated lactic acid could be due to metformin use v sepsis.  - Continue Vanco and Zosyn started in the ED  - S/p 3 L IVF in the ED  - Follow blood cultures, trend inflammatory markers  - Will add on a urine culture as his UA did not reflex to culture    #DM2  #AGMA  #Elevated serum ketones  The patient has elevated serum ketones to 0.45.  Glucose is 311.  Anion gap is 17, bicarb 20.  However his potassium and pH are normal.  Lower suspicion for  DKA especially since the patient is not on empglifzosin and pH is normal.  Elevated anion gap could be due to ketones versus lactic acidosis.  - Medium intensity SSI  - Trend ketones, BMP, VBG, start insulin drip if patient becomes acidotic    #Hypomagnesemia  - Repleted in the ED, recheck in the a.m.    #Atrial fibrillation  - Restart apixaban as noted above.    #Mood  - Continue Abilify, fluvoxamine    #GERD  - Continue PTA famotidine    #Neuropathy  - Continue gabapentin    #Glaucoma  #Legally blind  - Continue PTA eyedrops    #BPH  - Does not appear to be on any medications  - Bladder scan every shift          Diet: NPO for Medical/Clinical Reasons Except for: No Exceptions due to his encephalopathy.  S/p 3 L IVF in ED.  Will start MIVF with NS at 100 mL/hour for 1 day  Olsen Catheter: Not present  Lines: None     Cardiac Monitoring: None  Code Status:  DNR/DNI, the patient is too confused to answer this question.  During previous hospitalizations he has been DNR/DNI.  I called the patient's daughter Eloisa Rivas who confirms she is DNR/DNI.    Clinically Significant Risk Factors Present on Admission           # Hypocalcemia: Lowest Ca = 8.5 mg/dL in last 2 days, will monitor and replace as appropriate   # Hypomagnesemia: Lowest Mg = 1.5 mg/dL in last 2 days, will replace as needed    # Drug Induced Coagulation Defect: home medication list includes an anticoagulant medication             # DMII: A1C = N/A within past 6 months        # Financial/Environmental Concerns:           Disposition Plan     Medically Ready for Discharge: Anticipated Tomorrow           Helio Curtis MD  Hospitalist Service  [facility]  Securely message with [employer] (more info)  Text page via [facility] Paging/Directory     ______________________________________________________________________    Chief Complaint   Weakness    History is obtained from the patient    History of Present Illness   Rakesh Samuels is a 74  year old male who  PMH most notable for HLD, DM2, atrial fibrillation on apixaban, blindness secondary to glaucoma, MDD that presents with right-sided weakness.    The patient is encephalopathic during the interview and cannot provide a history.  Per ER providers report the patient was last seen at his facility at 1615 on 2025 when he went to take a nap.  He did not come to dinner so staff checked on him and noted that he had right-sided weakness and right-sided facial droop.  EMS was called and he presented to the emergency department.  The patient is awake, alert however he again he is encephalopathic.  He states it is 2023.  He cannot tell me where he is at right now.  He denies chest pain, dyspnea, abdominal pain, vomiting, diarrhea, urinary complaints.  No headache or neck stiffness/pain. Denies recent fevers.  No cough, runny nose, URI symptoms.  No dental pain.  He states he last ate 2 days ago.    I called the patient's daughter Eloisa who tells me that the patient is DNR/DNI.  He frequently gets urinary tract infections which makes him confused and weak.  He usually recovers and is conversant and not confused at baseline.  He ambulates with a walker with standby assist.    ER course:  - AF, HR 100s, RR 20s, BP 140s/70s and on RA  - K 4.5, AG 17, bicarb 20, creatinine 0.95, magnesium 1.5, glucose 311, ketones 0.45  - WBC 12.6, Hgb, PLT normal  - VB.35/38  - UA with 1 WBC, negative nitrates, negative leukocyte esterase, 5 RBCs  - CTA head and neck with no acute intracranial process, no large vessel occlusion  - CT chest abdomen pelvis with dependent atelectasis but otherwise no consolidation/infiltrates.  Also noted mild left hydronephrosis and severe right cortical atrophy  - MRI brain ordered  -Given Vanco, Zosyn and 3 L IVF in addition to 2 g mag sulfate        Past Medical History    Past Medical History:   Diagnosis Date    A-fib (H)     Acute hemodialysis encounter (H)     Due to CHIDI     Acute kidney injury (H)     Acute respiratory failure with hypoxemia (H)     Adrenal incidentaloma (H)     Asbestosis (H)     ATN (acute tubular necrosis) (H)     Atrophy of right kidney     Basal cell carcinoma     BPH without urinary obstruction     Cellulitis     Left foot    Cholelithiasis     Depression with anxiety     Diabetes mellitus, type 2 (H) 4/12/2020    Esophagitis     Gastritis     Glaucoma     Hematemesis, presence of nausea not specified     Hyperkalemia     Hyperlipemia     Kidney stone     Lactic acidosis     Metabolic acidosis     Metformin overdose of undetermined intent     Paroxysmal atrial fibrillation (H) 2/18/2020    Pericardial effusion     PTSD (post-traumatic stress disorder)     Seasonal allergies     Sepsis due to urinary tract infection (H)     Shock circulatory (H)     SIRS (systemic inflammatory response syndrome) (H)     Sleep apnea     uses a machine at night.     Upper GI bleed        Past Surgical History   Past Surgical History:   Procedure Laterality Date    EYE SURGERY      congenital ptosis right upper lid    HC CYSTOSCOPY,INSERT URETERAL STENT Left 4/12/2020    Procedure: CYSTOSCOPY, WITH URETERAL STENT INSERTION;  Surgeon: Christopher Yates MD;  Location: [facility] OR;  Service: Urology    MI ESOPHAGOGASTRODUODENOSCOPY TRANSORAL DIAGNOSTIC N/A 4/13/2020    Procedure: ESOPHAGOGASTRODUODENOSCOPY (EGD);  Surgeon: Robert Rico MD;  Location: [facility] GI;  Service: Gastroenterology    REPLACEMENT TOTAL KNEE Left        Prior to Admission Medications   Prior to Admission Medications   Prescriptions Last Dose Informant Patient Reported? Taking?   ARIPiprazole (ABILIFY) 2 MG tablet 1/4/2025 at  8:00 PM  Yes Yes   Sig: [ARIPIPRAZOLE (ABILIFY) 2 MG TABLET] Take 2 mg by mouth at bedtime.    acetaminophen (TYLENOL) 325 MG tablet   Yes Yes   Sig: Take 650 mg by mouth 3 times daily as needed for pain.   apixaban ANTICOAGULANT (ELIQUIS) 5 MG tablet 1/5/2025 Morning  Yes Yes    Sig: Take 5 mg by mouth 2 times daily.   brimonidine (ALPHAGAN) 0.2 % ophthalmic solution 1/5/2025 Morning  Yes Yes   Sig: [BRIMONIDINE (ALPHAGAN) 0.2 % OPHTHALMIC SOLUTION] Administer 1 drop to both eyes 2 (two) times a day.    calcium carbonate (TUMS) 500 MG chewable tablet   Yes Yes   Sig: Take 1 chew tab by mouth 3 times daily as needed for heartburn.   ciprofloxacin (CIPRO) 500 MG tablet 1/5/2025 Morning  Yes Yes   Sig: Take 500 mg by mouth 2 times daily.   dorzolamide-timolol (COSOPT) 2-0.5 % ophthalmic solution 1/5/2025 Morning  Yes Yes   Sig: Place 1 drop into both eyes 2 times daily   famotidine (PEPCID) 20 MG tablet 1/5/2025 Morning  Yes Yes   Sig: Take 20 mg by mouth daily.   fluvoxaMINE (LUVOX) 50 MG tablet 1/4/2025 Evening  Yes Yes   Sig: [FLUVOXAMINE (LUVOX) 50 MG TABLET] Take 50 mg by mouth at bedtime.    gabapentin (NEURONTIN) 300 MG capsule 1/5/2025 Morning  Yes Yes   Sig: Take 300 mg by mouth 2 times daily   latanoprostene bunod 0.024 % Drop 1/4/2025 Bedtime  Yes Yes   Sig: Place 1 drop into both eyes At Bedtime   levomefolate calcium (L-METHYLFOLATE ORAL) 1/5/2025 Morning  Yes Yes   Sig: Take 15 mg by mouth daily.   metFORMIN (GLUCOPHAGE) 1000 MG tablet 1/5/2025 Morning  Yes Yes   Sig: Take 1,000 mg by mouth 2 times daily.   netarsudiL (RHOPRESSA) 0.02 % Drop 1/4/2025 at  8:00 PM  Yes Yes   Sig: Place 1 drop into both eyes at bedtime.   polyethylene glycol (MIRALAX) 17 g packet 1/5/2025 Morning  Yes Yes   Sig: Take 1 packet by mouth daily.      Facility-Administered Medications: None        Review of Systems    The 10 point Review of Systems is negative other than noted in the HPI or here.     Social History   I have reviewed this patient's social history and updated it with pertinent information if needed.  Social History     Tobacco Use    Smoking status: Never    Smokeless tobacco: Never   Substance Use Topics    Alcohol use: Not Currently    Drug use: Never         Family History   I have  reviewed this patient's family history and updated it with pertinent information if needed.  Family History   Problem Relation Age of Onset    Diabetes Mother          Allergies   No Known Allergies     Physical Exam   Vital Signs: Temp: 99.6  F (37.6  C) Temp src: Oral BP: (!) 142/70 Pulse: 102   Resp: 18 SpO2: 97 % O2 Device: None (Room air)    Weight: 220 lbs 0 oz    GENERAL: Lying in bed, no distress, appears stated age   HEENT: NC/AT, sclera anicteric  NECK: Easily able to flex and extend neck  CV: Tachycardic but regular,  PULM: CTAB, no wheezes, rales, rhonchi   GI: Abd soft, NT, ND, no involuntary or voluntary guarding, no pain when I bumped the bed, not an acute abdomen   MSK: WWP, mild LE edema  SKIN: no rash      NEURO:  -Awake, alert, oriented to name and December 2023, does not know where he is currently  - Cannot participate in cranial nerve exam due to confusion, no facial asymmetry noted  - Moving all of extremities, too confused to participate in extremity neuroexam      Medical Decision Making       65 MINUTES SPENT BY ME on the date of service doing chart review, history, exam, documentation & further activities per the note.      Data     I have personally reviewed the following data over the past 24 hrs:    12.6 (H)  \   15.4   / 250     140 103 20.4 /  311 (H)   4.5 20 (L) 0.95 \     ALT: 10 AST: 10 AP: 93 TBILI: 0.4   ALB: 3.7 TOT PROTEIN: 6.6 LIPASE: 15     Trop: 11 BNP: N/A     TSH: 1.72 T4: N/A A1C: N/A     Procal: 0.35 CRP: 37.60 (H) Lactic Acid: 3.6 (H)       INR:  1.28 (H) PTT:  28   D-dimer:  N/A Fibrinogen:  N/A       Imaging results reviewed over the past 24 hrs:   Recent Results (from the past 24 hours)   CTA Head Neck with Contrast    Narrative    EXAM: CTA HEAD NECK W CONTRAST  LOCATION: [facility]  DATE: 1/5/2025    INDICATION: Code Stroke to evaluate for potential thrombolysis and thrombectomy. PLEASE READ IMMEDIATELY. Right-sided facial droop,  right-sided weakness  COMPARISON: CT head 9/14/2024  CONTRAST: 75ml isovue 370  TECHNIQUE: Head and neck CT angiogram with IV contrast. Noncontrast head CT followed by axial helical CT images of the head and neck vessels obtained during the arterial phase of intravenous contrast administration. Axial 2D reconstructed images and   multiplanar 3D MIP reconstructed images of the head and neck vessels were performed by the technologist. Dose reduction techniques were used. All stenosis measurements made according to NASCET criteria unless otherwise specified.    FINDINGS:   NONCONTRAST HEAD CT:   INTRACRANIAL CONTENTS: No intracranial hemorrhage, extraaxial collection, or mass effect.  No CT evidence of acute infarct. Moderate presumed chronic small vessel ischemic changes. Moderate generalized volume loss. No hydrocephalus.     VISUALIZED ORBITS/SINUSES/MASTOIDS: No intraorbital abnormality. No paranasal sinus mucosal disease. No middle ear or mastoid effusion.    BONES/SOFT TISSUES: No acute abnormality.    HEAD CTA:  ANTERIOR CIRCULATION: Mild atherosclerotic changes cavernous and supraclinoid ICAs bilaterally. Standard [place] anatomy.    POSTERIOR CIRCULATION: Mild atherosclerotic changes throughout branches of posterior circulation. Dominant left and smaller right vertebral artery contribute to a normal basilar artery.     DURAL VENOUS SINUSES: Not well opacified to be evaluated.    NECK CTA:  RIGHT CAROTID: Less than 50% narrowing.    LEFT CAROTID: Less than 50% narrowing.    VERTEBRAL ARTERIES: Mild narrowing at the origin/proximal aspect of right vertebral artery. Dominant left and smaller right vertebral arteries.    AORTIC ARCH: Classic aortic arch anatomy with no significant stenosis at the origin of the great vessels.    NONVASCULAR STRUCTURES: Slight depression superior endplate of T2, appears chronic.      Impression    IMPRESSION:   HEAD CT:  1.  No acute intracranial process.    HEAD CTA:   1.   Mild atherosclerotic changes, otherwise normal intracranial circulation.    NECK CTA:  1.  No definite hemodynamically significant narrowing throughout major neck vessels.    Results were called to Dr. Sharma at 1/5/2025 8:42 PM CST.   CT Chest Abdomen Pelvis w/o Contrast    Narrative    EXAM: CT CHEST ABDOMEN PELVIS W/O CONTRAST  LOCATION: [facility]  DATE: 1/5/2025    INDICATION: sepsis, cough, hx UTI w  ureteral stones  COMPARISON: CT chest, abdomen and pelvis 09/09/2024  TECHNIQUE: CT scan of the chest, abdomen, and pelvis was performed without IV contrast. Multiplanar reformats were obtained. Dose reduction techniques were used.   CONTRAST: None. IV contrast administered for a CTA head exam performed earlier today.    FINDINGS:   LUNGS AND PLEURA: Dependent atelectasis both lower lobes. The lungs otherwise are clear. No acute infiltrates or effusions.    MEDIASTINUM/AXILLAE: No enlarged mediastinal or hilar nodes.    CORONARY ARTERY CALCIFICATION: Mild.    HEPATOBILIARY: The liver is unremarkable. Stones in the gallbladder.    PANCREAS: Normal.    SPLEEN: Normal.    ADRENAL GLANDS: 3.3 x 2.2 cm left adrenal myelolipoma, and 11 mm nodule right adrenal gland, unchanged.     KIDNEYS/BLADDER: Severe right renal cortical atrophy. Mild left hydronephrosis and dilatation of the left renal pelvis. The nonobstructing stones seen within both kidneys on yesterday's nonenhanced exam are obscured by excreted contrast. Prostatic   impression along the bladder base. Mild diffuse bladder wall thickening.    BOWEL: A few diverticula sigmoid colon, without evidence for diverticulitis or bowel obstruction.    LYMPH NODES: Normal.    VASCULATURE: Normal.    PELVIC ORGANS: Normal. No fluid collections.    MUSCULOSKELETAL: Hypertrophic changes thoracic spine.      Impression    IMPRESSION:  1.  Dependent atelectasis both lower lobes. Otherwise no acute pulmonary disease.   2.  Mild left hydronephrosis,  unchanged.  3.  Severe right renal cortical atrophy.  4.  Gallstones.   MR Brain w/o Contrast    Narrative    EXAM: MR BRAIN W/O CONTRAST  LOCATION: [facility]  DATE: 1/6/2025    INDICATION: right sided weaknes, stroke code, CTA head neck negative  COMPARISON: Head CT and CTA of the head and neck obtained earlier today and MRI of the brain dated 09/13/2024  TECHNIQUE: Routine multiplanar multisequence head MRI without intravenous contrast.    FINDINGS:  Somewhat motion degraded examination.    INTRACRANIAL CONTENTS: New small focus of mildly increased diffusion signal involving the genu of the internal capsule on the right side (images 52 and 53 series 7 and images 52 and 53 series 15) with normal to slightly increased signal on the ADC map   and corresponding FLAIR hyperintensity likely late subacute infarct. Otherwise no acute or subacute infarct. No mass, acute hemorrhage, or extra-axial fluid collections. Very mild T2/FLAIR hyperintensity in the periventricular white matter and a few   scattered nonspecific T2/FLAIR hyperintensities within the cerebral white matter most consistent with very mild chronic microvascular ischemic change. Moderate generalized cerebral atrophy. No hydrocephalus. Normal position of the cerebellar tonsils.     SELLA: No abnormality accounting for technique.    OSSEOUS STRUCTURES/SOFT TISSUES: Normal marrow signal. The major intracranial vascular flow voids are maintained.     ORBITS: Prior bilateral cataract surgery. Scleral buckle on the right side. Both optic nerves and optic chiasm again appear markedly atrophic.     SINUSES/MASTOIDS: No paranasal sinus mucosal disease. No middle ear or mastoid effusion.       Impression    IMPRESSION:  1.  Probable small late subacute infarct involving the genu of the internal capsule on the right side.  2.  Generalized brain atrophy and presumed microvascular ischemic changes as detailed above.  3.  Markedly atrophic optic  nerves and optic chiasm.

## 2025-01-06 NOTE — PROGRESS NOTES
Speech-Language Pathology: Clinical Swallow Evaluation  Speech-Language Pathology: Speech, Language, and Cognitive Evaluation    01/06/25 1000   Appointment Info   Signing Clinician's Name / Credentials (SLP) MARIALUISA Church   General Information   Onset of Illness/Injury or Date of Surgery 01/05/25   Pertinent History of Current Problem Stroke Pathway order set to eval speech/swallow. Per H&P: Rakesh Samuels is a 74 year old male admitted on 1/5/2025. He has PMH most notable for HLD, DM2, atrial fibrillation on apixaban, blindness secondary to glaucoma, MDD that presents with right-sided weakness concerning for CVA.   General Observations alert, a little tired, cooperative, talkative   Type of Evaluation   Type of Evaluation Swallow Evaluation;Speech, Language, Cognition   Oral Motor   Oral Musculature generally intact   Dentition (Oral Motor)   Dentition (Oral Motor) adequate dentition;some missing teeth;other (see comments)  (missing back molars)   Facial Symmetry (Oral Motor)   Facial Symmetry (Oral Motor) WNL   Lip Function (Oral Motor)   Lip Range of Motion (Oral Motor) WNL   Lip Strength (Oral Motor) WNL   Lip Coordination (Oral Motor) other (see comments)  (WNL)   Tongue Function (Oral Motor)   Tongue Strength (Oral Motor) WNL   Tongue Coordination/Speed (Oral Motor) WNL   Tongue ROM (Oral Motor) WNL   Jaw Function (Oral Motor)   Jaw Function (Oral Motor) WNL   Cough/Swallow/Gag Reflex (Oral Motor)   Volitional Swallow (Oral Motor) WNL   Vocal Quality/Secretion Management (Oral Motor)   Vocal Quality (Oral Motor) WNL   General Swallowing Observations   Past History of Dysphagia none   Current Diet/Method of Nutritional Intake (General Swallowing Observations, NIS) NPO   Swallowing Evaluation Clinical swallow evaluation   Clinical Swallow Evaluation   Feeding Assistance other (see comments)  (pt is legally blind)   Clinical Swallow Evaluation Textures Trialed thin liquids;solid foods   Clinical Swallow  Eval: Thin Liquid Texture Trial   Mode of Presentation, Thin Liquids straw;self-fed   Volume of Liquid or Food Presented 6 oz   Oral Phase of Swallow WFL   Pharyngeal Phase of Swallow intact   Clinical Swallow Evaluation: Solid Food Texture Trial   Mode of Presentation self-fed   Volume Presented re cracker   Oral Phase WFL   Pharyngeal Phase intact   Esophageal Phase of Swallow   Patient reports or presents with symptoms of esophageal dysphagia No   Swallowing Recommendations   Diet Consistency Recommendations thin liquids (level 0);regular diet   Comment, Swallowing Recommendations regular with thin liquids - legally blind, needs help eating   Motor Speech   Speech Intelligibility (Motor Speech) WNL   Comment, Motor Speech Assessment No dysarthria noted during connected speech   Auditory Comprehension   Comment, Assessment (Auditory Comprehension) No issue with auditory comprehension as he was easily able to answer questions and follow simple commands   Verbal Expression   Comment, Assessment (Verbal Expression) Pt is able to fluently answer questions with no word finding errors and no concern for expressive language deficit   Reading Comprehension   Comment, Assessment (Reading Comprehension) pt legally blind/unable to assess   Written Language   Comment, Assessment (Written Language) pt legally blind/unable to assess   Cognition   Cognitive Status Exam Comments informally assesssed cognition. Pt was independently oriented to name, , name of hospital, and month. He stated the year to be . He was verbally given correct year and able to demonstrate retention by stating correct year 1 and 5 minutes after correction.   Clinical Impression   Criteria for Skilled Therapeutic Interventions Met (SLP Romeo) Evaluation only   Clinical Impression Comments Speech and swallowing appear baseline. Pt states he uses  his cell phone to locate the date. He was unable to use his cell phone when placed in his hands d/t UE  weakness. PT/OT will be addressing that weakness. No further ST warranted at this time.   SLP Total Evaluation Time   Eval: oral/pharyngeal swallow function, clinical swallow Minutes (50089) 10   Eval: Flexible Fiberoptic Endoscopic Evaluation of Swallowing by Cine or Video Recording Minutes (72127) 18   SLP Time and Intention   Total Session Time (sum of timed and untimed services) 28

## 2025-01-06 NOTE — CONSULTS
NEUROLOGY CONSULTATION NOTE     Rakesh Samuels,  1950, MRN [id number] Date: 2025     [facility]   Code status:  No CPR- Do NOT Intubate   PCP: Rashida Madison, [phone]      ASSESSMENT & PLAN   Diagnosis code: Stroke    Stroke  Right-sided weakness  Show atrial fibrillation on anticoagulation  History of hyperlipidemia and diabetes type 2    MRI shows acute infarct in the right internal capsule.  Given his symptoms are also on the right side this would not fit with that.  CT angiogram negative for significant large vessel occlusion/stenosis  Echocardiogram  Continue Eliquis  Blood pressure can slowly be normalized.  Allow permissive hypertension for 24 hours  LDL 69 and at goal.  Can hold off on statin.  PT/OT       Chief Complaint   Patient presents with    Stroke Symptoms        HISTORY OF PRESENT ILLNESS     We have been requested by Dr. Curtis to evaluate Rakesh Samuels who is a 74 year old  male for evaluation of possible stroke.  This is 74-year-old with past medical history of atrial fibrillation on anticoagulation compliant with medication intake who is legally blind and presented with right-sided weakness.  He also has a history of hyperlipidemia, diabetes type 2, atrial fibrillation.  His blindness at baseline is from glaucoma.  Patient went for a nap and did not return for supper causing the group home staff to check on him.  They noticed right-sided weakness with right facial droop and subtle right arm and leg weakness for which she was brought to the emergency room.  No thrombolytics were given.  MRI did show right internal capsule infarct which would not fit with his symptoms.  No chest pains palpitations or headaches.     PAST MEDICAL & SURGICAL HISTORY     Medical History  Past Medical History:   Diagnosis Date    A-fib (H)     Acute hemodialysis encounter (H)     Due to CHDII    Acute kidney injury (H)     Acute respiratory failure with hypoxemia (H)     Adrenal incidentaloma  (H)     Asbestosis (H)     ATN (acute tubular necrosis) (H)     Atrophy of right kidney     Basal cell carcinoma     BPH without urinary obstruction     Cellulitis     Left foot    Cholelithiasis     Depression with anxiety     Diabetes mellitus, type 2 (H) 4/12/2020    Esophagitis     Gastritis     Glaucoma     Hematemesis, presence of nausea not specified     Hyperkalemia     Hyperlipemia     Kidney stone     Lactic acidosis     Metabolic acidosis     Metformin overdose of undetermined intent     Paroxysmal atrial fibrillation (H) 2/18/2020    Pericardial effusion     PTSD (post-traumatic stress disorder)     Seasonal allergies     Sepsis due to urinary tract infection (H)     Shock circulatory (H)     SIRS (systemic inflammatory response syndrome) (H)     Sleep apnea     uses a machine at night.     Upper GI bleed      Surgical History  Past Surgical History:   Procedure Laterality Date    EYE SURGERY      congenital ptosis right upper lid    HC CYSTOSCOPY,INSERT URETERAL STENT Left 4/12/2020    Procedure: CYSTOSCOPY, WITH URETERAL STENT INSERTION;  Surgeon: Christopher Yates MD;  Location: [facility] OR;  Service: Urology    TX ESOPHAGOGASTRODUODENOSCOPY TRANSORAL DIAGNOSTIC N/A 4/13/2020    Procedure: ESOPHAGOGASTRODUODENOSCOPY (EGD);  Surgeon: Robert Rico MD;  Location: [facility] GI;  Service: Gastroenterology    REPLACEMENT TOTAL KNEE Left         SOCIAL HISTORY     Social History     Tobacco Use    Smoking status: Never    Smokeless tobacco: Never   Substance Use Topics    Alcohol use: Not Currently    Drug use: Never        FAMILY HISTORY     Reviewed, and family history includes Diabetes in his mother.     ALLERGIES     No Known Allergies     REVIEW OF SYSTEMS     12 system ROS was done other than HPI this was negative.  Pertinent positives noted in the HPI.     HOME & HOSPITAL MEDICATIONS     Prior to Admission Medications  (Not in a hospital admission)      Hospital Medications  Current  Facility-Administered Medications   Medication Dose Route Frequency Provider Last Rate Last Admin    apixaban ANTICOAGULANT (ELIQUIS) tablet 5 mg  5 mg Oral BID Helio Curtis MD   5 mg at 01/06/25 0817    ARIPiprazole (ABILIFY) tablet 2 mg  2 mg Oral At Bedtime Helio Curtis MD        brimonidine (ALPHAGAN) 0.2 % ophthalmic solution 1 drop  1 drop Both Eyes BID Helio Curtis MD   1 drop at 01/06/25 0820    dorzolamide-timolol (COSOPT) ophthalmic solution 1 drop  1 drop Both Eyes BID Helio Curtis MD   1 drop at 01/06/25 0819    famotidine (PEPCID) tablet 20 mg  20 mg Oral Daily Helio Curtis MD   20 mg at 01/06/25 0817    fluvoxaMINE (LUVOX) tablet 50 mg  50 mg Oral At Bedtime Helio Curtis MD        gabapentin (NEURONTIN) capsule 300 mg  300 mg Oral BID Helio Curtis MD   300 mg at 01/06/25 0816    insulin aspart (NovoLOG) injection (RAPID ACTING)  1-7 Units Subcutaneous TID AC Helio Curtis MD   3 Units at 01/06/25 0818    insulin aspart (NovoLOG) injection (RAPID ACTING)  1-5 Units Subcutaneous At Bedtime Helio Curtis MD   3 Units at 01/06/25 0306    insulin glargine (LANTUS PEN) injection 10 Units  10 Units Subcutaneous QAM AC He Perez MD        latanoprostene bunod (VYZULTA) 0.024 % ophthalmic solution 1 drop  1 drop Both Eyes At Bedtime Helio Curtis MD        netarsudil (RHOPRESSA) 0.02 % ophthalmic solution 1 drop  1 drop Both Eyes At Bedtime Helio Curtis MD        piperacillin-tazobactam (ZOSYN) 3.375 g vial to attach to  mL bag  3.375 g Intravenous Q8H Helio Curtis MD   3.375 g at 01/06/25 0308    sodium chloride (PF) 0.9% PF flush 3 mL  3 mL Intracatheter Q8H Helio Curtis MD   3 mL at 01/06/25 0247    vancomycin (VANCOCIN) 1,000 mg in 200 mL dextrose intermittent infusion  1,000 mg Intravenous Q12H Helio Curtis MD            PHYSICAL EXAM     Vital signs  Temp:  [98  F (36.7   C)-99.6  F (37.6  C)] 98  F (36.7  C)  Pulse:  [] 95  Resp:  [17-31] 20  BP: (129-161)/(66-78) 143/66  SpO2:  [92 %-98 %] 94 %    PHYSICAL EXAMINATION  VITALS: BP (!) 143/66 (BP Location: Left arm)   Pulse 95   Temp 98  F (36.7  C) (Oral)   Resp 20   Wt 99.8 kg (220 lb)   SpO2 94%   BMI 29.84 kg/m    GENERAL -Health appearing, No apparent distress  EYES- No scleral icterus, no eyelid droop, Pupils - see Neuro section  HEENT - Normocephalic, atraumatic, Hearing grossly intact; Oral mucosa moist and pink in color. External Ears and nose intact.   Neck - supple   PULM - Good spontaneous respiratory effort  CV-extremities warm to touch.  MSK- Gait - see Neuro section; Strength and tone- see Neuro section; Range of motion grossly intact.  PSYCH -cooperative    Neurological  Mental status - Patient is awake and partially oriented to self, place and time. Attention span is normal. Language is fluent and follows commands appropriately.   Cranial nerves - CN II-XII intact. Pupils are symmetric; EOMI, NLF symmetric.  He is legally blind.  Motor - Motor exam shows 4/5 strength in bilateral upper extremities.  3/5 strength in both lower extremities.  Possibly due to poor effort.  Tone - Tone is symmetric bilaterally in upper and lower extremities.  Reflexes - Reflexes are symmetric/absent.  Sensation - Sensory exam is grossly intact to light touch, pain.  Coordination - Finger to nose is without dysmetria.  Unable to do heel-to-shin due to weakness.  Gait and station --unable to safely ambulate.  Formal gait testing cannot be done for his safety concerns from ongoing issues.       DIAGNOSTIC STUDIES     Pertinent Radiology   MRI brain  IMPRESSION:  1.  Probable small late subacute infarct involving the genu of the internal capsule on the right side.  2.  Generalized brain atrophy and presumed microvascular ischemic changes as detailed above.  3.  Markedly atrophic optic nerves and optic chiasm.    CTA  HEAD CT:  1.   No acute intracranial process.     HEAD CTA:   1.  Mild atherosclerotic changes, otherwise normal intracranial circulation.     NECK CTA:  1.  No definite hemodynamically significant narrowing throughout major neck vessels.    MRI sept 2023  IMPRESSION:        1. No evidence of cerebral infarction.  2. Age-related volume loss and a mild degree of presumable small vessel ischemic disease.    Component      Latest Ref Rng 1/5/2025  8:36 PM   Cholesterol      <200 mg/dL 131    Triglycerides      <150 mg/dL 160 (H)    HDL Cholesterol      >=40 mg/dL 30 (L)    LDL Cholesterol Calculated      <100 mg/dL 69    Non HDL Cholesterol      <130 mg/dL 101    Estimated Average Glucose      <117 mg/dL 214 (H)    Hemoglobin A1C      <5.7 % 9.1 (H)       Legend:  (H) High  (L) Low    Recent Results (from the past 24 hours)   Basic metabolic panel    Collection Time: 01/05/25  8:36 PM   Result Value Ref Range    Sodium 140 135 - 145 mmol/L    Potassium 4.5 3.4 - 5.3 mmol/L    Chloride 103 98 - 107 mmol/L    Carbon Dioxide (CO2) 20 (L) 22 - 29 mmol/L    Anion Gap 17 (H) 7 - 15 mmol/L    Urea Nitrogen 20.4 8.0 - 23.0 mg/dL    Creatinine 0.95 0.67 - 1.17 mg/dL    GFR Estimate 85 >60 mL/min/1.73m2    Calcium 8.5 (L) 8.8 - 10.4 mg/dL    Glucose 311 (H) 70 - 99 mg/dL   INR    Collection Time: 01/05/25  8:36 PM   Result Value Ref Range    INR 1.28 (H) 0.85 - 1.15   Partial thromboplastin time    Collection Time: 01/05/25  8:36 PM   Result Value Ref Range    aPTT 28 22 - 38 Seconds   Troponin T, High Sensitivity    Collection Time: 01/05/25  8:36 PM   Result Value Ref Range    Troponin T, High Sensitivity 11 <=22 ng/L   Hepatic function panel    Collection Time: 01/05/25  8:36 PM   Result Value Ref Range    Protein Total 6.6 6.4 - 8.3 g/dL    Albumin 3.7 3.5 - 5.2 g/dL    Bilirubin Total 0.4 <=1.2 mg/dL    Alkaline Phosphatase 93 40 - 150 U/L    AST 10 0 - 45 U/L    ALT 10 0 - 70 U/L    Bilirubin Direct <0.20 0.00 - 0.30 mg/dL   Lipase     Collection Time: 01/05/25  8:36 PM   Result Value Ref Range    Lipase 15 13 - 60 U/L   Procalcitonin    Collection Time: 01/05/25  8:36 PM   Result Value Ref Range    Procalcitonin 0.35 <0.50 ng/mL   Magnesium    Collection Time: 01/05/25  8:36 PM   Result Value Ref Range    Magnesium 1.5 (L) 1.7 - 2.3 mg/dL   TSH with free T4 reflex    Collection Time: 01/05/25  8:36 PM   Result Value Ref Range    TSH 1.72 0.30 - 4.20 uIU/mL   CRP inflammation    Collection Time: 01/05/25  8:36 PM   Result Value Ref Range    CRP Inflammation 37.60 (H) <5.00 mg/L   Ketone Beta-Hydroxybutyrate Quantitative    Collection Time: 01/05/25  8:36 PM   Result Value Ref Range    Ketone (Beta-Hydroxybutyrate) Quantitative 0.45 (H) <=0.30 mmol/L   CBC with platelets and differential    Collection Time: 01/05/25  8:36 PM   Result Value Ref Range    WBC Count 12.6 (H) 4.0 - 11.0 10e3/uL    RBC Count 4.94 4.40 - 5.90 10e6/uL    Hemoglobin 15.4 13.3 - 17.7 g/dL    Hematocrit 46.5 40.0 - 53.0 %    MCV 94 78 - 100 fL    MCH 31.2 26.5 - 33.0 pg    MCHC 33.1 31.5 - 36.5 g/dL    RDW 12.9 10.0 - 15.0 %    Platelet Count 250 150 - 450 10e3/uL    % Neutrophils 92 %    % Lymphocytes 4 %    % Monocytes 4 %    % Eosinophils 0 %    % Basophils 0 %    % Immature Granulocytes 0 %    NRBCs per 100 WBC 0 <1 /100    Absolute Neutrophils 11.5 (H) 1.6 - 8.3 10e3/uL    Absolute Lymphocytes 0.5 (L) 0.8 - 5.3 10e3/uL    Absolute Monocytes 0.5 0.0 - 1.3 10e3/uL    Absolute Eosinophils 0.0 0.0 - 0.7 10e3/uL    Absolute Basophils 0.0 0.0 - 0.2 10e3/uL    Absolute Immature Granulocytes 0.1 <=0.4 10e3/uL    Absolute NRBCs 0.0 10e3/uL   Hemoglobin A1c    Collection Time: 01/05/25  8:36 PM   Result Value Ref Range    Estimated Average Glucose 214 (H) <117 mg/dL    Hemoglobin A1C 9.1 (H) <5.7 %   Lipid panel reflex to direct LDL: Non-fasting    Collection Time: 01/05/25  8:36 PM   Result Value Ref Range    Cholesterol 131 <200 mg/dL    Triglycerides 160 (H) <150 mg/dL     Direct Measure HDL 30 (L) >=40 mg/dL    LDL Cholesterol Calculated 69 <100 mg/dL    Non HDL Cholesterol 101 <130 mg/dL   Lactic acid whole blood with 1x repeat in 2 hr when >2    Collection Time: 01/05/25  8:43 PM   Result Value Ref Range    Lactic Acid, Initial 5.0 (HH) 0.7 - 2.0 mmol/L   Blood gas venous    Collection Time: 01/05/25  8:43 PM   Result Value Ref Range    pH Venous 7.35 7.32 - 7.43    pCO2 Venous 38 (L) 40 - 50 mm Hg    pO2 Venous 42 25 - 47 mm Hg    Bicarbonate Venous 21 21 - 28 mmol/L    Base Excess/Deficit Venous -3.9 (L) -3.0 - 3.0 mmol/L    FIO2 21     Oxyhemoglobin Venous 75 70 - 75 %    O2 Sat, Venous 75.8 (H) 70.0 - 75.0 %   ECG 12-LEAD WITH MUSE (L[facility])    Collection Time: 01/05/25  8:50 PM   Result Value Ref Range    Systolic Blood Pressure 139 mmHg    Diastolic Blood Pressure 71 mmHg    Ventricular Rate 111 BPM    Atrial Rate 111 BPM    NJ Interval 176 ms    QRS Duration 82 ms     ms    QTc 440 ms    P Axis 40 degrees    R AXIS -23 degrees    T Axis 54 degrees    Interpretation ECG       Sinus tachycardia  Otherwise normal ECG  When compared with ECG of 09-Sep-2024 15:40,  No significant change was found  Confirmed by SEE ED PROVIDER NOTE FOR, ECG INTERPRETATION ([id number]),  IVA MCKEON ([id number]) on 1/6/2025 4:37:53 AM     UA with Microscopic reflex to Culture    Collection Time: 01/05/25  9:22 PM    Specimen: Urine, Catheter   Result Value Ref Range    Color Urine Light Yellow Colorless, Straw, Light Yellow, Yellow    Appearance Urine Clear Clear    Glucose Urine >1000 (A) Negative mg/dL    Bilirubin Urine Negative Negative    Ketones Urine 10 (A) Negative mg/dL    Specific Gravity Urine 1.032 (H) 1.001 - 1.030    Blood Urine 0.06 mg/dL (A) Negative    pH Urine 5.0 5.0 - 7.0    Protein Albumin Urine 30 (A) Negative mg/dL    Urobilinogen Urine <2.0 <2.0 mg/dL    Nitrite Urine Negative Negative    Leukocyte Esterase Urine Negative Negative    Bacteria Urine Few (A) None Seen /HPF     Mucus Urine Present (A) None Seen /LPF    RBC Urine 5 (H) <=2 /HPF    WBC Urine 1 <=5 /HPF    Squamous Epithelials Urine <1 <=1 /HPF   Extra Urine Collection    Collection Time: 01/05/25  9:22 PM   Result Value Ref Range    Hold Specimen JIC    Lactic acid whole blood    Collection Time: 01/05/25 10:54 PM   Result Value Ref Range    Lactic Acid 3.6 (H) 0.7 - 2.0 mmol/L   Glucose by meter    Collection Time: 01/06/25  2:39 AM   Result Value Ref Range    GLUCOSE BY METER POCT 302 (H) 70 - 99 mg/dL   Glucose by meter    Collection Time: 01/06/25  7:51 AM   Result Value Ref Range    GLUCOSE BY METER POCT 253 (H) 70 - 99 mg/dL   Lactic acid whole blood    Collection Time: 01/06/25  8:03 AM   Result Value Ref Range    Lactic Acid 2.7 (H) 0.7 - 2.0 mmol/L   Ketone Beta-Hydroxybutyrate Quantitative    Collection Time: 01/06/25  8:03 AM   Result Value Ref Range    Ketone (Beta-Hydroxybutyrate) Quantitative 0.23 <=0.30 mmol/L   Blood gas venous    Collection Time: 01/06/25  8:03 AM   Result Value Ref Range    pH Venous 7.35 7.32 - 7.43    pCO2 Venous 41 40 - 50 mm Hg    pO2 Venous 33 25 - 47 mm Hg    Bicarbonate Venous 23 21 - 28 mmol/L    Base Excess/Deficit Venous -2.7 -3.0 - 3.0 mmol/L    FIO2 21     Oxyhemoglobin Venous 63 (L) 70 - 75 %    O2 Sat, Venous 63.7 (L) 70.0 - 75.0 %   Magnesium    Collection Time: 01/06/25  8:03 AM   Result Value Ref Range    Magnesium 1.7 1.7 - 2.3 mg/dL       Total time spent for face to face visit, reviewing labs/imaging studies, counseling and coordination of care was: Over 80 min More than 50% of this time was spent on counseling and coordination of care.    Counseling patient.  Coordination of care with the primary team.  Patient new to me.  Reviewing chart/imaging.  High risk.    Paul Abel MD  Neurologist  [facility]  Tel:- [phone]

## 2025-01-06 NOTE — ED TRIAGE NOTES
Pt to [facility] after CT via [facility] d/t stroke Sx. LKWT 1615. Found at 1630 to have right sided deficits, facial droop, and paralysis. Pt noted to be pale by EMS. PMH: afib. On Eloquis. . DNR.     Triage Assessment (Adult)       Row Name 01/05/25 2041          Triage Assessment    Airway WDL WDL        Respiratory WDL    Respiratory WDL WDL        Skin Circulation/Temperature WDL    Skin Circulation/Temperature WDL circulation  pale

## 2025-01-06 NOTE — PROGRESS NOTES
01/06/25 0855   Appointment Info   Signing Clinician's Name / Credentials (OT) Fawn Ricci OTR/L   Living Environment   People in Home facility resident   Current Living Arrangements assisted living   Home Accessibility no concerns   Self-Care   Usual Activity Tolerance moderate   Current Activity Tolerance poor   Equipment Currently Used at Home walker, rolling;wheelchair, manual   Activity/Exercise/Self-Care Comment Pt. is legally blind but is able feed self with orientation but has assist of 1 for other cares like dressing/bathing/toileting per pt. report   Instrumental Activities of Daily Living (IADL)   IADL Comments takes wheelchair to meals but use walker in apt. with assist   General Information   Onset of Illness/Injury or Date of Surgery 01/05/25   Referring Physician Helio Curtis MD   Additional Occupational Profile Info/Pertinent History of Current Problem Rakesh Samuels is a 74 year old male who  PMH most notable for HLD, DM2, atrial fibrillation on apixaban, blindness secondary to glaucoma, MDD that presents with right-sided weakness.     The patient is encephalopathic during the interview and cannot provide a history.  Per ER providers report the patient was last seen at his facility at 1615 on 01/2025 when he went to take a nap.  He did not come to dinner so staff checked on him and noted that he had right-sided weakness and right-sided facial droop.   Existing Precautions/Restrictions fall   Cognitive Status Examination   Orientation Status place  (and aware of month and year)   Visual Perception   Visual Impairment/Limitations legally blind   Pain Assessment   Patient Currently in Pain No   Range of Motion Comprehensive   General Range of Motion upper extremity range of motion deficits identified   General Upper Extremity Assessment (Range of Motion)   Comment: Upper Extremity ROM Pt. demonstarted difficulty lifting R arm from bed but able to reach head with difficulty, distally WNL    Upper Extremity: Range of Motion shoulder, right: UE ROM   Bed Mobility   Bed Mobility supine-sit   Supine-Sit [place] (Bed Mobility) maximum assist (25% patient effort);2 person assist   Assistive Device (Bed Mobility) bed rails;draw sheet   Transfers   Transfers sit-stand transfer   Sit-Stand Transfer   Sit-Stand [place] (Transfers) maximum assist (25% patient effort);2 person assist   Assistive Device (Sit-Stand Transfers) other (see comments)  (HHA and transfer belt)   Balance   Balance Assessment sitting static balance;standing static balance   Sitting Balance: Static minimal assist;moderate assist   Position, Sitting Balance sitting edge of bed   Standing Balance: Static maximum assist;2-person assist   Position/Device Used, Standing Balance   (HHA)   Lower Body Dressing Assessment/Training   [place] Level (Lower Body Dressing) dependent (less than 25% patient effort);other (see comments)  (based on clinical judgement, assist of 2)   Grooming Assessment/Training   [place] Level (Grooming) unable to assess;other (see comments)  (due to fatique/decreased sitting balance at EOB)   Toileting   [place] Level (Toileting) dependent (less than 25% patient effort);other (see comments)  (assist of 2 in bed, incont.)   Clinical Impression   Criteria for Skilled Therapeutic Interventions Met (OT) Yes, treatment indicated   OT Diagnosis decreased ADL's due to elevated lactic acid level/sepsis   Influenced by the following impairments weakness/fatique, impaired balance, cognition   OT Problem List-Impairments impacting ADL activity tolerance impaired;balance;cognition;communication;inability to direct their own care;mobility;range of motion (ROM);strength   Assessment of Occupational Performance 3-5 Performance Deficits   Identified Performance Deficits bed mobility, transfers, toileting   Planned Therapy Interventions (OT) ADL retraining;balance training;bed mobility  training;cognition;progressive activity/exercise;transfer training;strengthening;ROM   Clinical Decision Making Complexity (OT) detailed assessment/moderate complexity   Risk & Benefits of therapy have been explained evaluation/treatment results reviewed;patient;daughter;participants voiced agreement with care plan   OT Total Evaluation Time   OT Eval, Moderate Complexity Minutes (47441) 20   OT Goals   Therapy Frequency (OT) 5 times/week   OT Predicted Duration/Target Date for Goal Attainment 01/13/25   OT Goals Transfers;Toilet Transfer/Toileting;Cognition;Hygiene/Grooming   OT: Hygiene/Grooming supervision/stand-by assist   OT: Transfer Moderate assist   OT: Toilet Transfer/Toileting Maximum assist   OT: Cognitive Patient/caregiver will verbalize understanding of cognitive assessment results/recommendations as needed for safe discharge planning   Interventions   Interventions Quick Adds Self-Care/Home Management   Self-Care/Home Management   Self-Care/Home Mgmt/ADL, Compensatory, Meal Prep Minutes (25125) 23   Symptoms Noted During/After Treatment (Meal Preparation/Planning Training) fatigue   Treatment Detail/Skilled Intervention Pt. very motivated to try therapy but requires sig. assist of 2 for all functional mobility, pt. agreeable to further standing and attempt at transfer after initial eval. Attempted sit to stand x 2 with mod-max assist/HHA/transfer belt, with attempt at progression to transfer to chair but unable to move legs, then attempted transfer with walker with mod-max assist of 2/cues for safety/hnad placement but unable to again advance legs, attempted Kiah steady as pt. wanted to get up in chair but unable to stand due to fatique, pt. returned to bed with total assist of 2. Pt. incont. in depends requiring mod assist of 2 for rolling with dependency for pericare and doffing and donning depends. Total assist of 2 to  up in bed.   OT Discharge Planning   OT Plan bed mobility, sitting balance  EOB, assist of 2 standing ?co-tx), attempt transfers as able with 2, G/H tasks, cog.   OT Discharge Recommendation (DC Rec) (S)  Transitional Care Facility   OT Rationale for DC Rec Pt. in need of michelle for transfers and assist of 2 for standing, well below baseline status with assist of 1 for cares   OT Brief overview of current status max assist of 2   Total Session Time   Timed Code Treatment Minutes 23   Total Session Time (sum of timed and untimed services) 43

## 2025-01-06 NOTE — PROGRESS NOTES
01/06/25 0900   Appointment Info   Signing Clinician's Name / Credentials (PT) Xochitl Pennington DPT   Living Environment   People in Home facility resident   Current Living Arrangements assisted living   Home Accessibility no concerns   Self-Care   Usual Activity Tolerance moderate   Current Activity Tolerance fair   Equipment Currently Used at Home walker, rolling;wheelchair, manual   Activity/Exercise/Self-Care Comment per family and pt, pt has inc services at [facility]. Legally blind, needs escorts to meals and within apartment is an A x1 with his walker normally. Sometimes uses w/c to get to meals. Has been in and out of TCUs this past fall d/t multiple UTIs.   General Information   Onset of Illness/Injury or Date of Surgery 01/05/24   Referring Physician Helio Curtis MD   Patient/Family Therapy Goals Statement (PT) dtr wants TCU   Pertinent History of Current Problem (include personal factors and/or comorbidities that impact the POC) Rakesh Samuels is a 74 year old male admitted on 1/5/2025. He has PMH most notable for HLD, DM2, atrial fibrillation on apixaban, blindness secondary to glaucoma, MDD that presents with right-sided weakness concerning for CVA.   Existing Precautions/Restrictions fall   Cognition   Affect/Mental Status (Cognition) WFL  (slow to answer)   Pain Assessment   Patient Currently in Pain No   Range of Motion (ROM)   Range of Motion ROM deficits secondary to weakness   Strength (Manual Muscle Testing)   Strength (Manual Muscle Testing) Deficits observed during functional mobility   Bed Mobility   Bed Mobility supine-sit;sit-supine   Supine-Sit [place] (Bed Mobility) moderate assist (50% patient effort);maximum assist (25% patient effort);2 person assist   Sit-Supine [place] (Bed Mobility) maximum assist (25% patient effort);2 person assist   Impairments Contributing to Impaired Bed Mobility decreased strength   Assistive Device (Bed Mobility) bed rails;draw sheet    Comment, (Bed Mobility) getting pt back into bed at end of session, pt very fatigued, and needing maxA x 2 for trunk and BLEs into bed. Dependent supine scoot.   Transfers   Transfers sit-stand transfer   Sit-Stand Transfer   Sit-Stand [place] (Transfers) moderate assist (50% patient effort);2 person assist   Assistive Device (Sit-Stand Transfers)   (arm in arm)   Comment, (Sit-Stand Transfer) unable to come to full stand.   Gait/Stairs (Locomotion)   Comment, (Gait/Stairs) deferred, pt unable to come to full stand   Clinical Impression   Criteria for Skilled Therapeutic Intervention Yes, treatment indicated   PT Diagnosis (PT) impaired functional mobility, gait abnormality   Influenced by the following impairments decreased strength, decreased endurance   Functional limitations due to impairments gait, transfers, bed mob   Clinical Presentation (PT Evaluation Complexity) evolving   Clinical Presentation Rationale pt presents as medically diagnosed   Clinical Decision Making (Complexity) moderate complexity   Planned Therapy Interventions (PT) balance training;bed mobility training;gait training;home exercise program;neuromuscular re-education;patient/family education;strengthening;transfer training;stair training   Risk & Benefits of therapy have been explained evaluation/treatment results reviewed;patient   PT Total Evaluation Time   PT Eval, Moderate Complexity Minutes (42255) 10   Physical Therapy Goals   PT Frequency 1x/week   PT Predicted Duration/Target Date for Goal Attainment 01/13/25   PT Goals Bed Mobility;Transfers;Gait   PT: Bed Mobility Minimal assist;Supine to/from sit   PT: Transfers Minimal assist;Sit to/from stand;Bed to/from chair;Assistive device   PT: Gait Moderate assist;Assistive device;Rolling walker;25 feet   Interventions   Interventions Quick Adds Therapeutic Activity   Therapeutic Activity   Therapeutic Activities: dynamic activities to improve functional performance Minutes  (71660) 30   Symptoms Noted During/After Treatment Fatigue   Treatment Detail/Skilled Intervention with inc time sitting EOB, pt needing more A for static sitting. initially needing SBA but with more attempts at standing, needing modA for maintaining upright posture at EOB. Attempted sit <> stand again with modA x 2 arm in arm, able to stand upright, unable to move BLEs towards chair on L to sit in chair. Sit <> stand mod-maxA x 2 with FWW, pt able to stand x 30 seconds with modA x 2, unable to take steps towards chair. Per dtr, pt does better with manual cues, unable to take hands off patient for safety to give manual cues on legs for stepping towards chair. Attempted sit <> stand x 1 from EOB modA x 2 arm in arm, unable to stand upright, pt fatigued. Attempted sit <> stand with SaraStedy, pt unable to clear hips from EOB with maxA x 2 d/t fatigue. Once back in bed, rolling each direction for mian-cares modA x 1 x 3 reps each side. Dependent supine scoot. In bed with alarm on and call light in hand following session.   PT Discharge Planning   PT Plan bring SaraStedy, attempt sit <> stand and to chair via SaraStedy   PT Discharge Recommendation (DC Rec) Transitional Care Facility   PT Rationale for DC Rec possible need for LTC following TCU stay d/t multiple TCU stays recently. But currently A x 2 for transfers, would recommend TCU for improvement in strength and endurance needed for functional mobility at [facility]   PT Brief overview of current status multiple attempts at getting to chair unsuccessful d/t inc weakness throughout session. mod-maxA x 2 for mobility   PT Equipment Needed at Discharge walker, rolling;lift device;wheelchair   Physical Therapy Time and Intention   Timed Code Treatment Minutes 30   Total Session Time (sum of timed and untimed services) 40       [facility]                                                                                   OUTPATIENT PHYSICAL  THERAPY    PLAN OF TREATMENT FOR OUTPATIENT REHABILITATION   Patient's Last Name, First Name, M.I.  Rakesh Samuels YOB: 1950   Provider's Name   [facility]   Medical Record No.  [id number]     Onset Date: 01/05/24 Start of Care Date: 01/06/25     Medical Diagnosis:  elevated lactic acid level               PT Diagnosis:  impaired functional mobility, gait abnormality Certification Dates:  From: 01/06/25  To: 01/13/25       See note for plan of treatment, functional goals, and certification details.    I CERTIFY THE NEED FOR THESE SERVICES FURNISHED UNDER        THIS PLAN OF TREATMENT AND WHILE UNDER MY CARE (Physician co-signature of this document indicates review and certification of the therapy plan).

## 2025-01-06 NOTE — PHARMACY-ADMISSION MEDICATION HISTORY
Pharmacist Admission Medication History    Admission medication history is complete. The information provided in this note is only as accurate as the sources available at the time of the update.    Information Source(s): Clinic records, Facility ([facility] medication list/MAR, and CareEverywhere/SureScripts via [facility]    Pertinent Information: new start ciprofloxacin yesterday.     Changes made to PTA medication list:  Added: apixaban, ciprofloxacin, famotidine   Deleted: scheduled APAP, MVI, warfarin  Changed: prn APAP, metformin    Allergies reviewed with patient and updates made in EHR: yes    Medication History Completed By: Yandel Toledo [facility] 1/5/2025 9:38 PM    PTA Med List   Medication Sig Last Dose/Taking    acetaminophen (TYLENOL) 325 MG tablet Take 650 mg by mouth 3 times daily as needed for pain. Taking As Needed    apixaban ANTICOAGULANT (ELIQUIS) 5 MG tablet Take 5 mg by mouth 2 times daily. 1/5/2025 Morning    ARIPiprazole (ABILIFY) 2 MG tablet [ARIPIPRAZOLE (ABILIFY) 2 MG TABLET] Take 2 mg by mouth at bedtime.  1/4/2025 at  8:00 PM    brimonidine (ALPHAGAN) 0.2 % ophthalmic solution [BRIMONIDINE (ALPHAGAN) 0.2 % OPHTHALMIC SOLUTION] Administer 1 drop to both eyes 2 (two) times a day.  1/5/2025 Morning    calcium carbonate (TUMS) 500 MG chewable tablet Take 1 chew tab by mouth 3 times daily as needed for heartburn. Taking As Needed    ciprofloxacin (CIPRO) 500 MG tablet Take 500 mg by mouth 2 times daily. 1/5/2025 Morning    dorzolamide-timolol (COSOPT) 2-0.5 % ophthalmic solution Place 1 drop into both eyes 2 times daily 1/5/2025 Morning    famotidine (PEPCID) 20 MG tablet Take 20 mg by mouth daily. 1/5/2025 Morning    fluvoxaMINE (LUVOX) 50 MG tablet [FLUVOXAMINE (LUVOX) 50 MG TABLET] Take 50 mg by mouth at bedtime.  1/4/2025 Evening    gabapentin (NEURONTIN) 300 MG capsule Take 300 mg by mouth 2 times daily 1/5/2025 Morning    latanoprostene bunod 0.024 % Drop  Place 1 drop into both eyes At Bedtime 1/4/2025 Bedtime    levomefolate calcium (L-METHYLFOLATE ORAL) Take 15 mg by mouth daily. 1/5/2025 Morning    metFORMIN (GLUCOPHAGE) 1000 MG tablet Take 1,000 mg by mouth 2 times daily. 1/5/2025 Morning    netarsudiL (RHOPRESSA) 0.02 % Drop Place 1 drop into both eyes at bedtime. 1/4/2025 at  8:00 PM    polyethylene glycol (MIRALAX) 17 g packet Take 1 packet by mouth daily. 1/5/2025 Morning

## 2025-01-06 NOTE — ED PROVIDER NOTES
EMERGENCY DEPARTMENT ENCOUNTER      NAME: Rakesh Samuels  AGE: 74 year old male  YOB: 1950  MRN: [id number]  EVALUATION DATE & TIME: No admission date for patient encounter.    PCP: Rashida Madison    ED PROVIDER: Azam Sharma M.D.      Chief Complaint   Patient presents with    Stroke Symptoms         IMPRESSION  1. Right sided weakness    2. Septic shock (H)    3. Elevated lactic acid level    4. Hypomagnesemia    5. Hyperglycemia    6. Ischemic stroke (H)        PLAN  - admit to hospitalist for further care; neuro tele obs    ED COURSE & MEDICAL DECISION MAKING    ED Course as of 01/06/25 0113   Sun Jan 05, 2025 2035 Stroke neurology called back: CT head & CTA head/neck negative. De-escalate stroke code at this time. Recommends MRI brain.   2056 Lab called: lactic acid 5.0. Will activate code sepsis.     74yoM with history of chronic a-fib (takes Eliquis), legal blindness (glaucoma), T2DM (not on insulin), HTN, no prior stroke, DNR/DNI code status presenting per EMS from assisted living for evaluation of right-sided weakness. Last known well 4:15pm today per staff before he went down for a nap; did not wake up for supper as usual. Found to have right-sided weakness & facial droop. No facial droop to my exam but does have mild RUE & RLE weakness; mild tachycardia on exam as well. Stroke code activated. CTA head/neck unremarkable; MRI obtained and pending at time of admission. Not a lytics or MARIBELL candidate.    Concern for possible infectious process as well given prior similar presentations   with UTI. Actually meets septic shock criteria with lactic acid 5.0 initially (improved to 3.6 with 30mL/kg IVF bolus; no hypotension so no pressors given), procal 0.35. CT chest/abdomen/pelvis obtained and no obvious pneumonia; no ureteral stone or other acute intraabdominal process. UA with no UTI. No clear infectious source. Covered with vanc/Zosyn here in the ED.    Consulted hospitalist for  admission; they agreed. Patient understood and agreed with the plan; no further questions at the time of admission. MRI then resulted with subacute stroke; no acute interventions for this and stroke neurology planning to follow while inpatient.    --------------------------------------------------------------------------------   --------------------------------------------------------------------------------     8:18 PM I met with the patient for the initial interview and physical examination. Discussed plan for treatment and workup in the ED.  8:24 PM I talked with stroke neurology.   8:35 PM I talked with stroke neurology. Stroke code de-escalated at this time due to negative CT head and CT/CTA head/neck.   12:24 AM I talked with Dr. Curtis, hospitalist, who accepts the patient; requests observation admission.        This patient involved a high degree of complexity in medical decision making, as significant risks were present and assessed. Recent encounters & results in medical record reviewed by me.    All workup (i.e. any EKG/labs/imaging as per charting below) reviewed and independently interpreted by me. See respective sections for details.        See additional MDM below if interested.      MEDICATIONS GIVEN IN THE EMERGENCY DEPARTMENT  Medications   magnesium sulfate 2 g in 50 mL sterile water intermittent infusion (has no administration in time range)   iopamidol (ISOVUE-370) solution 75 mL (75 mLs Intravenous $Given 1/5/25 2035)   sodium chloride 0.9% BOLUS 2,994 mL (0 mLs Intravenous Stopped 1/5/25 2330)   piperacillin-tazobactam (ZOSYN) 3.375 g vial to attach to  mL bag (0 g Intravenous Stopped 1/5/25 2247)   vancomycin (VANCOCIN) 2,500 mg in 0.9% NaCl 525 mL intermittent infusion (0 mg Intravenous Stopped 1/6/25 0107)                 =================================================================      HPI  Use of : N/A         Rakesh Hendricksonabbie is a 74 year old male with a pertinent  history of hyperlipidemia, A-fib on Eliquis, type 2 diabetes, glaucoma, and pericardial effusion who presents to this ED via EMS for evaluation of right-sided deficits, paralysis, and facial droop.     Per EMS, patient comes from an assisted living facility where he is independent. At 1630 staff checked on him as he was supposed to come downstairs but he didn't. They found him with right-sided deficits, paralysis, and facial droop. He does not have a history of stroke but he is legally blind and on eliquis. He was alert and oriented upon EMS arrival.       --------------- MEDICAL HISTORY ---------------  PAST MEDICAL HISTORY:  Reviewed independently by me.  Past Medical History:   Diagnosis Date    A-fib (H)     Acute hemodialysis encounter (H)     Due to CHIDI    Acute kidney injury (H)     Acute respiratory failure with hypoxemia (H)     Adrenal incidentaloma (H)     Asbestosis (H)     ATN (acute tubular necrosis) (H)     Atrophy of right kidney     Basal cell carcinoma     BPH without urinary obstruction     Cellulitis     Left foot    Cholelithiasis     Depression with anxiety     Diabetes mellitus, type 2 (H) 4/12/2020    Esophagitis     Gastritis     Glaucoma     Hematemesis, presence of nausea not specified     Hyperkalemia     Hyperlipemia     Kidney stone     Lactic acidosis     Metabolic acidosis     Metformin overdose of undetermined intent     Paroxysmal atrial fibrillation (H) 2/18/2020    Pericardial effusion     PTSD (post-traumatic stress disorder)     Seasonal allergies     Sepsis due to urinary tract infection (H)     Shock circulatory (H)     SIRS (systemic inflammatory response syndrome) (H)     Sleep apnea     uses a machine at night.     Upper GI bleed      Patient Active Problem List   Diagnosis    SIRS (systemic inflammatory response syndrome) (H)    Adrenal incidentaloma (H)    Diet-controlled diabetes mellitus (H)    Pericardial effusion    Lactic acidosis    Type 2 diabetes mellitus without  complication, without long-term current use of insulin (H)    Paroxysmal atrial fibrillation (H)    Calculus of ureter    Acute renal failure, unspecified acute renal failure type (H)    Acute renal failure (ARF) (H)    ATN (acute tubular necrosis) (H)    Sepsis due to urinary tract infection (H)    Metformin overdose of undetermined intent, initial encounter    Metabolic acidosis    Hyperkalemia    Hematemesis, presence of nausea not specified    Calculus of kidney    Syncope and collapse    Hyperglycemia    After care    Age-related cataract    Anemia associated with acute blood loss    Balanitis    Cholelithiasis without obstruction    Constipation    Depression with anxiety    Glaucoma    Hemorrhoids without complication    Hyperlipidemia    Loss of appetite    Major depressive disorder, recurrent episode, in partial remission (H)    Mixed personality disorder in adult (H)    Obstructive sleep apnea    Osteoarthrosis involving lower leg    Periodic limb movement disorder    Recurrent major depression (H)    S/P ureteral stent placement    Unilateral small kidney    Nephrolithiasis    Type 2 diabetes mellitus (H)    Osteoarthritis of knee    Persistent atrial fibrillation (H)    Pyelonephritis    Urinary tract infection without hematuria, site unspecified    Sepsis without acute organ dysfunction (H)    UTI (urinary tract infection)    Benign prostatic hyperplasia with urinary frequency    Hypomagnesemia    Injury of head, initial encounter    Fall at home, initial encounter    E. coli urinary tract infection    Septic encephalopathy    DM (diabetes mellitus), type 2 with complications (H)    Urinary tract infection with hematuria, site unspecified    Altered mental status, unspecified altered mental status type    Sepsis, due to unspecified organism, unspecified whether acute organ dysfunction present (H)    Normocytic anemia    Status post right knee replacement    Peripheral vascular disease, unspecified (H)     Warfarin anticoagulation    Elevated lactic acid level    Right sided weakness    Septic shock (H)       PAST SURGICAL HISTORY:  Reviewed independently by me.  Past Surgical History:   Procedure Laterality Date    EYE SURGERY      congenital ptosis right upper lid    HC CYSTOSCOPY,INSERT URETERAL STENT Left 4/12/2020    Procedure: CYSTOSCOPY, WITH URETERAL STENT INSERTION;  Surgeon: Christopher Yates MD;  Location: [facility] OR;  Service: Urology    WA ESOPHAGOGASTRODUODENOSCOPY TRANSORAL DIAGNOSTIC N/A 4/13/2020    Procedure: ESOPHAGOGASTRODUODENOSCOPY (EGD);  Surgeon: Robert Rico MD;  Location: [facility] GI;  Service: Gastroenterology    REPLACEMENT TOTAL KNEE Left        CURRENT MEDICATIONS:    Reviewed independently by me.    Current Facility-Administered Medications:     magnesium sulfate 2 g in 50 mL sterile water intermittent infusion, 2 g, Intravenous, Once, Azam Sharma MD    Current Outpatient Medications:     acetaminophen (TYLENOL) 325 MG tablet, Take 650 mg by mouth 3 times daily as needed for pain., Disp: , Rfl:     apixaban ANTICOAGULANT (ELIQUIS) 5 MG tablet, Take 5 mg by mouth 2 times daily., Disp: , Rfl:     ARIPiprazole (ABILIFY) 2 MG tablet, [ARIPIPRAZOLE (ABILIFY) 2 MG TABLET] Take 2 mg by mouth at bedtime. , Disp: , Rfl:     brimonidine (ALPHAGAN) 0.2 % ophthalmic solution, [BRIMONIDINE (ALPHAGAN) 0.2 % OPHTHALMIC SOLUTION] Administer 1 drop to both eyes 2 (two) times a day. , Disp: , Rfl:     calcium carbonate (TUMS) 500 MG chewable tablet, Take 1 chew tab by mouth 3 times daily as needed for heartburn., Disp: , Rfl:     ciprofloxacin (CIPRO) 500 MG tablet, Take 500 mg by mouth 2 times daily., Disp: , Rfl:     dorzolamide-timolol (COSOPT) 2-0.5 % ophthalmic solution, Place 1 drop into both eyes 2 times daily, Disp: , Rfl:     famotidine (PEPCID) 20 MG tablet, Take 20 mg by mouth daily., Disp: , Rfl:     fluvoxaMINE (LUVOX) 50 MG tablet, [FLUVOXAMINE (LUVOX) 50 MG TABLET]  Take 50 mg by mouth at bedtime. , Disp: , Rfl:     gabapentin (NEURONTIN) 300 MG capsule, Take 300 mg by mouth 2 times daily, Disp: , Rfl:     latanoprostene bunod 0.024 % Drop, Place 1 drop into both eyes At Bedtime, Disp: , Rfl:     levomefolate calcium (L-METHYLFOLATE ORAL), Take 15 mg by mouth daily., Disp: , Rfl:     metFORMIN (GLUCOPHAGE) 1000 MG tablet, Take 1,000 mg by mouth 2 times daily., Disp: , Rfl:     netarsudiL (RHOPRESSA) 0.02 % Drop, Place 1 drop into both eyes at bedtime., Disp: , Rfl:     polyethylene glycol (MIRALAX) 17 g packet, Take 1 packet by mouth daily., Disp: , Rfl:     ALLERGIES:  Reviewed independently by me.  No Known Allergies    FAMILY HISTORY:  Reviewed independently by me.  Family History   Problem Relation Age of Onset    Diabetes Mother          SOCIAL HISTORY:   Reviewed independently by me.  Social History     Socioeconomic History    Marital status:    Tobacco Use    Smoking status: Never    Smokeless tobacco: Never   Substance and Sexual Activity    Alcohol use: Not Currently    Drug use: Never    Sexual activity: Not Currently     Social Drivers of Health     Financial Resource Strain: Low Risk  (9/13/2024)    Financial Resource Strain     Within the past 12 months, have you or your family members you live with been unable to get utilities (heat, electricity) when it was really needed?: No   Food Insecurity: Low Risk  (9/13/2024)    Food Insecurity     Within the past 12 months, did you worry that your food would run out before you got money to buy more?: No     Within the past 12 months, did the food you bought just not last and you didn t have money to get more?: No   Transportation Needs: Low Risk  (9/13/2024)    Transportation Needs     Within the past 12 months, has lack of transportation kept you from medical appointments, getting your medicines, non-medical meetings or appointments, work, or from getting things that you need?: No    Received from [facility]  [facility], [facility]    Social Connections   Interpersonal Safety: High Risk (9/10/2024)    Interpersonal Safety     Do you feel physically and emotionally safe where you currently live?: No     Within the past 12 months, have you been hit, slapped, kicked or otherwise physically hurt by someone?: No     Within the past 12 months, have you been humiliated or emotionally abused in other ways by your partner or ex-partner?: No   Housing Stability: High Risk (9/13/2024)    Housing Stability     Do you have housing? : No     Are you worried about losing your housing?: No       --------------- PHYSICAL EXAM ---------------  Nursing notes and vitals independently reviewed by me.  VITALS:  Vitals:    01/05/25 2215 01/05/25 2230 01/05/25 2245 01/05/25 2300   BP: (!) 141/69 (!) 151/72 (!) 146/73 (!) 142/70   Pulse: 101 104 104 102   Resp: 25 28 21 18   Temp:       TempSrc:       SpO2: 96% 98% 98% 97%   Weight:           PHYSICAL EXAM:    General:  alert, interactive, no distress  Eyes:  baseline bilateral blindess  HENT:  atraumatic, nose with no rhinorrhea, oropharynx clear  Neck:  no meningismus  Cardiovascular:  HR 100s during exam, regular rhythm, no murmurs, brisk cap refill  Chest:  no chest wall tenderness  Pulmonary:  no stridor, normal phonation, normal work of breathing, clear lungs bilaterally  Abdomen:  soft, nondistended, nontender  :  no CVA tenderness  Back:  no midline spinal tenderness  Musculoskeletal:  no pretibial edema, no calf tenderness. Gross ROM intact to joints of extremities with no obvious deformities.  Skin:  warm, dry, no rash  Neuro:  awake, alert, answers questions appropriately, follows commands, moves all limbs, mild drift to RUE & RLE compared to LUE/LLE with diffuse 4/5 strength, no tremor, no ankle clonus  Psych:  calm, normal affect      --------------- RESULTS ---------------  EKG:    Reviewed and independently interpreted by  me.  - sinus tachycardia at 11bpm, no ST or T wave changes, normal intervals  - increased rate from 83bpm prior with otherwise no changes from 9/9/24  My read.    LAB:  Reviewed and independently interpreted by me.  Results for orders placed or performed during the hospital encounter of 01/05/25   CTA Head Neck with Contrast    Impression    IMPRESSION:   HEAD CT:  1.  No acute intracranial process.    HEAD CTA:   1.  Mild atherosclerotic changes, otherwise normal intracranial circulation.    NECK CTA:  1.  No definite hemodynamically significant narrowing throughout major neck vessels.    Results were called to Dr. Sharma at 1/5/2025 8:42 PM CST.   MR Brain w/o Contrast    Impression    IMPRESSION:  1.  Probable small late subacute infarct involving the genu of the internal capsule on the right side.  2.  Generalized brain atrophy and presumed microvascular ischemic changes as detailed above.  3.  Markedly atrophic optic nerves and optic chiasm.   CT Chest Abdomen Pelvis w/o Contrast    Impression    IMPRESSION:  1.  Dependent atelectasis both lower lobes. Otherwise no acute pulmonary disease.   2.  Mild left hydronephrosis, unchanged.  3.  Severe right renal cortical atrophy.  4.  Gallstones.   Basic metabolic panel   Result Value Ref Range    Sodium 140 135 - 145 mmol/L    Potassium 4.5 3.4 - 5.3 mmol/L    Chloride 103 98 - 107 mmol/L    Carbon Dioxide (CO2) 20 (L) 22 - 29 mmol/L    Anion Gap 17 (H) 7 - 15 mmol/L    Urea Nitrogen 20.4 8.0 - 23.0 mg/dL    Creatinine 0.95 0.67 - 1.17 mg/dL    GFR Estimate 85 >60 mL/min/1.73m2    Calcium 8.5 (L) 8.8 - 10.4 mg/dL    Glucose 311 (H) 70 - 99 mg/dL   Result Value Ref Range    INR 1.28 (H) 0.85 - 1.15   Partial thromboplastin time   Result Value Ref Range    aPTT 28 22 - 38 Seconds   Result Value Ref Range    Troponin T, High Sensitivity 11 <=22 ng/L   Hepatic function panel   Result Value Ref Range    Protein Total 6.6 6.4 - 8.3 g/dL    Albumin 3.7 3.5 - 5.2 g/dL     Bilirubin Total 0.4 <=1.2 mg/dL    Alkaline Phosphatase 93 40 - 150 U/L    AST 10 0 - 45 U/L    ALT 10 0 - 70 U/L    Bilirubin Direct <0.20 0.00 - 0.30 mg/dL   Result Value Ref Range    Lipase 15 13 - 60 U/L   Lactic acid whole blood with 1x repeat in 2 hr when >2   Result Value Ref Range    Lactic Acid, Initial 5.0 (HH) 0.7 - 2.0 mmol/L   Result Value Ref Range    Procalcitonin 0.35 <0.50 ng/mL   Result Value Ref Range    Magnesium 1.5 (L) 1.7 - 2.3 mg/dL   TSH with free T4 reflex   Result Value Ref Range    TSH 1.72 0.30 - 4.20 uIU/mL   Blood gas venous   Result Value Ref Range    pH Venous 7.35 7.32 - 7.43    pCO2 Venous 38 (L) 40 - 50 mm Hg    pO2 Venous 42 25 - 47 mm Hg    Bicarbonate Venous 21 21 - 28 mmol/L    Base Excess/Deficit Venous -3.9 (L) -3.0 - 3.0 mmol/L    FIO2 21     Oxyhemoglobin Venous 75 70 - 75 %    O2 Sat, Venous 75.8 (H) 70.0 - 75.0 %   Result Value Ref Range    CRP Inflammation 37.60 (H) <5.00 mg/L   Ketone Beta-Hydroxybutyrate Quantitative   Result Value Ref Range    Ketone (Beta-Hydroxybutyrate) Quantitative 0.45 (H) <=0.30 mmol/L   UA with Microscopic reflex to Culture    Specimen: Urine, Catheter   Result Value Ref Range    Color Urine Light Yellow Colorless, Straw, Light Yellow, Yellow    Appearance Urine Clear Clear    Glucose Urine >1000 (A) Negative mg/dL    Bilirubin Urine Negative Negative    Ketones Urine 10 (A) Negative mg/dL    Specific Gravity Urine 1.032 (H) 1.001 - 1.030    Blood Urine 0.06 mg/dL (A) Negative    pH Urine 5.0 5.0 - 7.0    Protein Albumin Urine 30 (A) Negative mg/dL    Urobilinogen Urine <2.0 <2.0 mg/dL    Nitrite Urine Negative Negative    Leukocyte Esterase Urine Negative Negative    Bacteria Urine Few (A) None Seen /HPF    Mucus Urine Present (A) None Seen /LPF    RBC Urine 5 (H) <=2 /HPF    WBC Urine 1 <=5 /HPF    Squamous Epithelials Urine <1 <=1 /HPF   CBC with platelets and differential   Result Value Ref Range    WBC Count 12.6 (H) 4.0 - 11.0 10e3/uL     RBC Count 4.94 4.40 - 5.90 10e6/uL    Hemoglobin 15.4 13.3 - 17.7 g/dL    Hematocrit 46.5 40.0 - 53.0 %    MCV 94 78 - 100 fL    MCH 31.2 26.5 - 33.0 pg    MCHC 33.1 31.5 - 36.5 g/dL    RDW 12.9 10.0 - 15.0 %    Platelet Count 250 150 - 450 10e3/uL    % Neutrophils 92 %    % Lymphocytes 4 %    % Monocytes 4 %    % Eosinophils 0 %    % Basophils 0 %    % Immature Granulocytes 0 %    NRBCs per 100 WBC 0 <1 /100    Absolute Neutrophils 11.5 (H) 1.6 - 8.3 10e3/uL    Absolute Lymphocytes 0.5 (L) 0.8 - 5.3 10e3/uL    Absolute Monocytes 0.5 0.0 - 1.3 10e3/uL    Absolute Eosinophils 0.0 0.0 - 0.7 10e3/uL    Absolute Basophils 0.0 0.0 - 0.2 10e3/uL    Absolute Immature Granulocytes 0.1 <=0.4 10e3/uL    Absolute NRBCs 0.0 10e3/uL   Lactic acid whole blood   Result Value Ref Range    Lactic Acid 3.6 (H) 0.7 - 2.0 mmol/L       RADIOLOGY:  Reviewed and independently interpreted by me. Please see official radiology report.  Recent Results (from the past 24 hours)   CTA Head Neck with Contrast    Narrative    EXAM: CTA HEAD NECK W CONTRAST  LOCATION: [facility]  DATE: 1/5/2025    INDICATION: Code Stroke to evaluate for potential thrombolysis and thrombectomy. PLEASE READ IMMEDIATELY. Right-sided facial droop, right-sided weakness  COMPARISON: CT head 9/14/2024  CONTRAST: 75ml isovue 370  TECHNIQUE: Head and neck CT angiogram with IV contrast. Noncontrast head CT followed by axial helical CT images of the head and neck vessels obtained during the arterial phase of intravenous contrast administration. Axial 2D reconstructed images and   multiplanar 3D MIP reconstructed images of the head and neck vessels were performed by the technologist. Dose reduction techniques were used. All stenosis measurements made according to NASCET criteria unless otherwise specified.    FINDINGS:   NONCONTRAST HEAD CT:   INTRACRANIAL CONTENTS: No intracranial hemorrhage, extraaxial collection, or mass effect.  No CT evidence of  acute infarct. Moderate presumed chronic small vessel ischemic changes. Moderate generalized volume loss. No hydrocephalus.     VISUALIZED ORBITS/SINUSES/MASTOIDS: No intraorbital abnormality. No paranasal sinus mucosal disease. No middle ear or mastoid effusion.    BONES/SOFT TISSUES: No acute abnormality.    HEAD CTA:  ANTERIOR CIRCULATION: Mild atherosclerotic changes cavernous and supraclinoid ICAs bilaterally. Standard [place] of [place] anatomy.    POSTERIOR CIRCULATION: Mild atherosclerotic changes throughout branches of posterior circulation. Dominant left and smaller right vertebral artery contribute to a normal basilar artery.     DURAL VENOUS SINUSES: Not well opacified to be evaluated.    NECK CTA:  RIGHT CAROTID: Less than 50% narrowing.    LEFT CAROTID: Less than 50% narrowing.    VERTEBRAL ARTERIES: Mild narrowing at the origin/proximal aspect of right vertebral artery. Dominant left and smaller right vertebral arteries.    AORTIC ARCH: Classic aortic arch anatomy with no significant stenosis at the origin of the great vessels.    NONVASCULAR STRUCTURES: Slight depression superior endplate of T2, appears chronic.      Impression    IMPRESSION:   HEAD CT:  1.  No acute intracranial process.    HEAD CTA:   1.  Mild atherosclerotic changes, otherwise normal intracranial circulation.    NECK CTA:  1.  No definite hemodynamically significant narrowing throughout major neck vessels.    Results were called to Dr. Sharma at 1/5/2025 8:42 PM CST.   CT Chest Abdomen Pelvis w/o Contrast    Narrative    EXAM: CT CHEST ABDOMEN PELVIS W/O CONTRAST  LOCATION: [facility]  DATE: 1/5/2025    INDICATION: sepsis, cough, hx UTI w  ureteral stones  COMPARISON: CT chest, abdomen and pelvis 09/09/2024  TECHNIQUE: CT scan of the chest, abdomen, and pelvis was performed without IV contrast. Multiplanar reformats were obtained. Dose reduction techniques were used.   CONTRAST: None. IV contrast  administered for a CTA head exam performed earlier today.    FINDINGS:   LUNGS AND PLEURA: Dependent atelectasis both lower lobes. The lungs otherwise are clear. No acute infiltrates or effusions.    MEDIASTINUM/AXILLAE: No enlarged mediastinal or hilar nodes.    CORONARY ARTERY CALCIFICATION: Mild.    HEPATOBILIARY: The liver is unremarkable. Stones in the gallbladder.    PANCREAS: Normal.    SPLEEN: Normal.    ADRENAL GLANDS: 3.3 x 2.2 cm left adrenal myelolipoma, and 11 mm nodule right adrenal gland, unchanged.     KIDNEYS/BLADDER: Severe right renal cortical atrophy. Mild left hydronephrosis and dilatation of the left renal pelvis. The nonobstructing stones seen within both kidneys on yesterday's nonenhanced exam are obscured by excreted contrast. Prostatic   impression along the bladder base. Mild diffuse bladder wall thickening.    BOWEL: A few diverticula sigmoid colon, without evidence for diverticulitis or bowel obstruction.    LYMPH NODES: Normal.    VASCULATURE: Normal.    PELVIC ORGANS: Normal. No fluid collections.    MUSCULOSKELETAL: Hypertrophic changes thoracic spine.      Impression    IMPRESSION:  1.  Dependent atelectasis both lower lobes. Otherwise no acute pulmonary disease.   2.  Mild left hydronephrosis, unchanged.  3.  Severe right renal cortical atrophy.  4.  Gallstones.   MR Brain w/o Contrast    Narrative    EXAM: MR BRAIN W/O CONTRAST  LOCATION: [facility]  DATE: 1/6/2025    INDICATION: right sided weaknes, stroke code, CTA head neck negative  COMPARISON: Head CT and CTA of the head and neck obtained earlier today and MRI of the brain dated 09/13/2024  TECHNIQUE: Routine multiplanar multisequence head MRI without intravenous contrast.    FINDINGS:  Somewhat motion degraded examination.    INTRACRANIAL CONTENTS: New small focus of mildly increased diffusion signal involving the genu of the internal capsule on the right side (images 52 and 53 series 7 and images 52  and 53 series 15) with normal to slightly increased signal on the ADC map   and corresponding FLAIR hyperintensity likely late subacute infarct. Otherwise no acute or subacute infarct. No mass, acute hemorrhage, or extra-axial fluid collections. Very mild T2/FLAIR hyperintensity in the periventricular white matter and a few   scattered nonspecific T2/FLAIR hyperintensities within the cerebral white matter most consistent with very mild chronic microvascular ischemic change. Moderate generalized cerebral atrophy. No hydrocephalus. Normal position of the cerebellar tonsils.     SELLA: No abnormality accounting for technique.    OSSEOUS STRUCTURES/SOFT TISSUES: Normal marrow signal. The major intracranial vascular flow voids are maintained.     ORBITS: Prior bilateral cataract surgery. Scleral buckle on the right side. Both optic nerves and optic chiasm again appear markedly atrophic.     SINUSES/MASTOIDS: No paranasal sinus mucosal disease. No middle ear or mastoid effusion.       Impression    IMPRESSION:  1.  Probable small late subacute infarct involving the genu of the internal capsule on the right side.  2.  Generalized brain atrophy and presumed microvascular ischemic changes as detailed above.  3.  Markedly atrophic optic nerves and optic chiasm.       PROCEDURES:   Procedures   --------------------------------------------------------------------------------   Cardiac telemetry monitoring ordered by me secondary to the patient's history of stroke like symptoms and to monitor the patient for dysrhythmia. Reviewed & independently interpreted by me. Revealed sinus rhythm.  --------------------------------------------------------------------------------       The patient has stroke symptoms:         ED Stroke specific documentation           NIHSS PDF     Patient last known well time: 4:15pm on 1/5/25  ED Provider first to bedside at: 8:18pm as EMS entered the ED  CT Results received at: 8:35pm    Thrombolytics:    Not given due to:   - DOAC dose within 48 hours or INR > 1.7    If treating with thrombolytics: Ensure SBP<180 and DBP<105 prior to treatment with thrombolytics.  Administering thrombolytics after treatment with IV labetalol, hydralazine, or nicardipine is reasonable once BP control is established.    Endovascular Retrieval:  Not initiated due to absence of proximal vessel occlusion    National Institutes of Health Stroke Scale (Baseline)  Time Performed: 8:18pm     Score    Level of consciousness: (0)   Alert, keenly responsive    LOC questions: (0)   Answers both questions correctly    LOC commands: (0)   Performs both tasks correctly    Best gaze: (0)   Normal    Visual: (3)   baseline blindness    Facial palsy: (0)   Normal symmetrical movements    Motor arm (left): (0)   No drift    Motor arm (right): (1)   Drift    Motor leg (left): (0)   No drift    Motor leg (right): (1)   Drift    Limb ataxia: (0)   Absent    Sensory: (0)   Normal- no sensory loss    Best language: (0)   Normal- no aphasia    Dysarthria: (0)   Normal    Extinction and inattention: (0)   No abnormality        Total Score:  5        Stroke Mimics were considered (including migraine headache, seizure disorder, hypoglycemia (or hyperglycemia), head or spinal trauma, CNS infection, Toxin ingestion and shock state (e.g. sepsis) .          --------------------------------------------------------------------------------   --------------------------------------------------------------------------------     The patient has signs of sepsis   Sepsis ED evaluation   The patient has signs of sepsis as evidenced by:  1. Presence of 2 SIRS criteria, suspected infection, AND  2. Organ dysfunction: Lactic Acidosis with value >2.0 due to sepsis    Time zero:  8:57pm  on 01/06/25 as this was the time when Lactate was resulted and the level was greater than 2.    Lactic Acid Results:  Recent Labs   Lab Test 01/05/25  2254 01/05/25  2043 07/26/24  0603   LACT  3.6* 5.0* 1.9       3 Hour Bundle 6 Hour Bundle (Reassessment)   Blood Cultures before IV Antibiotics: Yes  Antibiotics given: see below  Prehospital fluid volume (mL):                     Total fluids given (ED +Pre-hospital):  Full 30 mL/kg bolus given based on weight: 2,990 mL   Repeat Lactic Acid Level: Ordered by reflex for 2 hours after initial lactic acid collection.  Vasopressors: MAP>65 after initial IVF bolus, will continue to monitor fluid status and vital signs.  Repeat perfusion exam: I attest to having performed a repeat sepsis exam and assessment of perfusion at 12:53 AM .   BMI Readings from Last 1 Encounters:   01/05/25 29.84 kg/m        Anti-infectives (From admission through now)      Start     Dose/Rate Route Frequency Ordered Stop    01/05/25 2130  vancomycin (VANCOCIN) 2,500 mg in 0.9% NaCl 525 mL intermittent infusion         2,500 mg  over 120 Minutes Intravenous ONCE 01/05/25 2100 01/06/25 0107    01/05/25 2100  piperacillin-tazobactam (ZOSYN) 3.375 g vial to attach to  mL bag         3.375 g  over 30 Minutes Intravenous ONCE 01/05/25 2058 01/05/25 2247                Critical Care     Performed by:   Azam Sharma MD   Authorized by:   Azam Sharma MD  Total critical care time: 165 minutes (Critical care time was exclusive of separately billable procedures and treating other patients.)    Critical care was necessary to treat or prevent imminent or life-threatening deterioration of the following conditions: Septic shock with lactic acid 5.0 requiring IVF, broad-spectrum antibiotics, admission    Critical care was time spent personally by me on the following activities:  - obtaining history from patient or surrogate  - examination of patient  - development of treatment plan with patient or surrogate  - ordering and performing treatments and interventions  - ordering and review of laboratory studies  - ordering and review of radiographic studies  - re-evaluation of patient's  condition  - monitoring for potential decompensation  - discussion with consultants    ---------------------------------------------------------------------------------------------------------------------  ---------------------------------------------------------------------------------------------------------------------        --------------- ADDITIONAL MDM ---------------  MIPS:  Not Applicable    History:  - I considered systemic symptoms of the presenting illness.  - Supplemental history from:       -- patient, EMS  - External Record(s) reviewed:       -- Inpatient/outpatient record, prior labs, prior imaging       -- see above ED course & MDM for further details    Workup:  - Chart documentation above includes differential considered and any EKGs or imaging independently interpreted by provider.  - In additional to work up documented, I considered the following work up:       -- see above ED course & MDM for further details    External Consultation:  - Discussion of management with another provider:       -- see above charting for additional    Complicating Factors:  - Care impacted by chronic illness:       -- see above MDM, past medical history, & problem list    Disposition Considerations:  - Admit           I, Marixa England, am serving as a scribe to document services personally performed by Dr. Azam Sharma based on my observation and the provider's statements to me. I, Azam Sharma MD attest that Marixa England is acting in a scribe capacity, has observed my performance of the services and has documented them in accordance with my direction.      Azam Sharma MD  01/05/25  Emergency Medicine  [facility] EMERGENCY DEPARTMENT  [address] [place] [id number] [phone]  Dept: [phone]      Azam Sharma MD  01/06/25 0134

## 2025-01-06 NOTE — PROGRESS NOTES
Patient seen by Dr. Curtis this morning.  Lactic acid is trending down.  Echocardiogram is negative for endocarditis.  No focus of infection found.  De-escalate antibiotics to Zosyn only.  Continue to monitor cultures.  Ordered 10 units of Lantus and NovoLog ratio 1 is to 10 with meals.  A1c is uncontrolled at 9.  He is legally blind due to glaucoma.  General neurology consulted for right IC ischemia

## 2025-01-06 NOTE — PHARMACY-CONSULT NOTE
Pharmacy Consult to evaluate for medication related stroke core measures    Rakesh Samuels, 74 year old male admitted for Right-sided weakness on 1/5/2025.    Thrombolytic was not given because of Time from onset contraindications    VTE Prophylaxis  apixaban given on 1/6/25 as appropriate prior to end of hospital day 2.    Antithrombotic: apixaban started on 1/6/25, as appropriate by end of hospital day 2. Continue antithrombotic therapy on discharge to meet quality measures, unless contraindicated.    Anticoagulation if history of A-fib/flutter: Patient on apixaban (Eliquis); continue anticoagulation on discharge to meet quality measures, unless contraindicated.    LDL Cholesterol Calculated   Date Value Ref Range Status   01/05/2025 69 <100 mg/dL Final       LDL at goal, statin not indicated per Neurologist.    Recommendations: None at this time    Thank you for the consult.    Juliette Schultz, [facility] 1/6/2025 1:03 PM

## 2025-01-07 ENCOUNTER — APPOINTMENT (OUTPATIENT)
Dept: PHYSICAL THERAPY | Facility: HOSPITAL | Age: 75
DRG: 065 | End: 2025-01-07
Payer: MEDICARE

## 2025-01-07 ENCOUNTER — APPOINTMENT (OUTPATIENT)
Dept: OCCUPATIONAL THERAPY | Facility: HOSPITAL | Age: 75
DRG: 065 | End: 2025-01-07
Payer: MEDICARE

## 2025-01-07 PROBLEM — R65.21 SEPTIC SHOCK (H): Status: RESOLVED | Noted: 2025-01-06 | Resolved: 2025-01-07

## 2025-01-07 PROBLEM — A41.9 SEPTIC SHOCK (H): Status: RESOLVED | Noted: 2025-01-06 | Resolved: 2025-01-07

## 2025-01-07 PROBLEM — Z79.4 TYPE 2 DIABETES MELLITUS WITH HYPERGLYCEMIA, WITH LONG-TERM CURRENT USE OF INSULIN (H): Status: ACTIVE | Noted: 2025-01-07

## 2025-01-07 PROBLEM — I10 BENIGN ESSENTIAL HYPERTENSION: Status: ACTIVE | Noted: 2025-01-07

## 2025-01-07 PROBLEM — E11.65 TYPE 2 DIABETES MELLITUS WITH HYPERGLYCEMIA, WITH LONG-TERM CURRENT USE OF INSULIN (H): Status: ACTIVE | Noted: 2025-01-07

## 2025-01-07 LAB
ANION GAP SERPL CALCULATED.3IONS-SCNC: 10 MMOL/L (ref 7–15)
BUN SERPL-MCNC: 10.3 MG/DL (ref 8–23)
CALCIUM SERPL-MCNC: 7.4 MG/DL (ref 8.8–10.4)
CHLORIDE SERPL-SCNC: 107 MMOL/L (ref 98–107)
CREAT SERPL-MCNC: 0.94 MG/DL (ref 0.67–1.17)
EGFRCR SERPLBLD CKD-EPI 2021: 86 ML/MIN/1.73M2
ERYTHROCYTE [DISTWIDTH] IN BLOOD BY AUTOMATED COUNT: 13.1 % (ref 10–15)
GLUCOSE BLDC GLUCOMTR-MCNC: 161 MG/DL (ref 70–99)
GLUCOSE BLDC GLUCOMTR-MCNC: 185 MG/DL (ref 70–99)
GLUCOSE BLDC GLUCOMTR-MCNC: 189 MG/DL (ref 70–99)
GLUCOSE BLDC GLUCOMTR-MCNC: 196 MG/DL (ref 70–99)
GLUCOSE BLDC GLUCOMTR-MCNC: 197 MG/DL (ref 70–99)
GLUCOSE BLDC GLUCOMTR-MCNC: 207 MG/DL (ref 70–99)
GLUCOSE SERPL-MCNC: 201 MG/DL (ref 70–99)
HCO3 SERPL-SCNC: 22 MMOL/L (ref 22–29)
HCT VFR BLD AUTO: 37.7 % (ref 40–53)
HGB BLD-MCNC: 12.7 G/DL (ref 13.3–17.7)
INR PPP: 1.42 (ref 0.85–1.15)
MAGNESIUM SERPL-MCNC: 1.8 MG/DL (ref 1.7–2.3)
MCH RBC QN AUTO: 31.2 PG (ref 26.5–33)
MCHC RBC AUTO-ENTMCNC: 33.7 G/DL (ref 31.5–36.5)
MCV RBC AUTO: 93 FL (ref 78–100)
PLATELET # BLD AUTO: 194 10E3/UL (ref 150–450)
POTASSIUM SERPL-SCNC: 4.2 MMOL/L (ref 3.4–5.3)
RBC # BLD AUTO: 4.07 10E6/UL (ref 4.4–5.9)
SODIUM SERPL-SCNC: 139 MMOL/L (ref 135–145)
WBC # BLD AUTO: 7.3 10E3/UL (ref 4–11)

## 2025-01-07 PROCEDURE — 82310 ASSAY OF CALCIUM: CPT | Performed by: INTERNAL MEDICINE

## 2025-01-07 PROCEDURE — 250N000013 HC RX MED GY IP 250 OP 250 PS 637: Performed by: INTERNAL MEDICINE

## 2025-01-07 PROCEDURE — 36415 COLL VENOUS BLD VENIPUNCTURE: CPT | Performed by: INTERNAL MEDICINE

## 2025-01-07 PROCEDURE — 85014 HEMATOCRIT: CPT | Performed by: INTERNAL MEDICINE

## 2025-01-07 PROCEDURE — 99232 SBSQ HOSP IP/OBS MODERATE 35: CPT | Performed by: PSYCHIATRY & NEUROLOGY

## 2025-01-07 PROCEDURE — 83735 ASSAY OF MAGNESIUM: CPT | Performed by: INTERNAL MEDICINE

## 2025-01-07 PROCEDURE — 258N000003 HC RX IP 258 OP 636: Performed by: INTERNAL MEDICINE

## 2025-01-07 PROCEDURE — 80048 BASIC METABOLIC PNL TOTAL CA: CPT | Performed by: INTERNAL MEDICINE

## 2025-01-07 PROCEDURE — 120N000001 HC R&B MED SURG/OB

## 2025-01-07 PROCEDURE — 250N000011 HC RX IP 250 OP 636: Performed by: INTERNAL MEDICINE

## 2025-01-07 PROCEDURE — 85610 PROTHROMBIN TIME: CPT | Performed by: INTERNAL MEDICINE

## 2025-01-07 PROCEDURE — 97530 THERAPEUTIC ACTIVITIES: CPT | Mod: GP

## 2025-01-07 PROCEDURE — 97535 SELF CARE MNGMENT TRAINING: CPT | Mod: GO

## 2025-01-07 PROCEDURE — 99232 SBSQ HOSP IP/OBS MODERATE 35: CPT | Performed by: INTERNAL MEDICINE

## 2025-01-07 RX ADMIN — GABAPENTIN 300 MG: 300 CAPSULE ORAL at 21:45

## 2025-01-07 RX ADMIN — INSULIN GLARGINE 10 UNITS: 100 INJECTION, SOLUTION SUBCUTANEOUS at 09:36

## 2025-01-07 RX ADMIN — BRIMONIDINE TARTRATE 1 DROP: 2 SOLUTION OPHTHALMIC at 09:37

## 2025-01-07 RX ADMIN — ACETAMINOPHEN 650 MG: 325 TABLET ORAL at 18:15

## 2025-01-07 RX ADMIN — SODIUM CHLORIDE, PRESERVATIVE FREE: 5 INJECTION INTRAVENOUS at 00:23

## 2025-01-07 RX ADMIN — FAMOTIDINE 20 MG: 20 TABLET, FILM COATED ORAL at 09:36

## 2025-01-07 RX ADMIN — DORZOLAMIDE HYDROCHLORIDE AND TIMOLOL MALEATE 1 DROP: 20; 5 SOLUTION/ DROPS OPHTHALMIC at 21:50

## 2025-01-07 RX ADMIN — INSULIN ASPART 1 UNITS: 100 INJECTION, SOLUTION INTRAVENOUS; SUBCUTANEOUS at 09:36

## 2025-01-07 RX ADMIN — PIPERACILLIN AND TAZOBACTAM 3.38 G: 3; .375 INJECTION, POWDER, FOR SOLUTION INTRAVENOUS at 14:10

## 2025-01-07 RX ADMIN — DORZOLAMIDE HYDROCHLORIDE AND TIMOLOL MALEATE 1 DROP: 20; 5 SOLUTION/ DROPS OPHTHALMIC at 09:37

## 2025-01-07 RX ADMIN — INSULIN ASPART 1 UNITS: 100 INJECTION, SOLUTION INTRAVENOUS; SUBCUTANEOUS at 18:18

## 2025-01-07 RX ADMIN — APIXABAN 5 MG: 5 TABLET, FILM COATED ORAL at 09:36

## 2025-01-07 RX ADMIN — ARIPIPRAZOLE 2 MG: 2 TABLET ORAL at 21:46

## 2025-01-07 RX ADMIN — INSULIN ASPART 1 UNITS: 100 INJECTION, SOLUTION INTRAVENOUS; SUBCUTANEOUS at 14:23

## 2025-01-07 RX ADMIN — BRIMONIDINE TARTRATE 1 DROP: 2 SOLUTION OPHTHALMIC at 21:50

## 2025-01-07 RX ADMIN — PIPERACILLIN AND TAZOBACTAM 3.38 G: 3; .375 INJECTION, POWDER, FOR SOLUTION INTRAVENOUS at 06:04

## 2025-01-07 RX ADMIN — GABAPENTIN 300 MG: 300 CAPSULE ORAL at 09:35

## 2025-01-07 RX ADMIN — FLUVOXAMINE MALEATE 50 MG: 50 TABLET ORAL at 21:47

## 2025-01-07 RX ADMIN — APIXABAN 5 MG: 5 TABLET, FILM COATED ORAL at 21:45

## 2025-01-07 ASSESSMENT — ACTIVITIES OF DAILY LIVING (ADL)
ADLS_ACUITY_SCORE: 77
ADLS_ACUITY_SCORE: 71
ADLS_ACUITY_SCORE: 71
ADLS_ACUITY_SCORE: 80
ADLS_ACUITY_SCORE: 83
ADLS_ACUITY_SCORE: 71
ADLS_ACUITY_SCORE: 75
ADLS_ACUITY_SCORE: 83
ADLS_ACUITY_SCORE: 82
ADLS_ACUITY_SCORE: 80
ADLS_ACUITY_SCORE: 71
ADLS_ACUITY_SCORE: 83
ADLS_ACUITY_SCORE: 71
ADLS_ACUITY_SCORE: 83
ADLS_ACUITY_SCORE: 78
ADLS_ACUITY_SCORE: 71
ADLS_ACUITY_SCORE: 71
ADLS_ACUITY_SCORE: 80

## 2025-01-07 NOTE — PLAN OF CARE
Goal Outcome Evaluation:       Patient denies pain this shift.  Very sleepy all day.  Attempted to arouse with ceiling lights, open blinds and TV on.  He arouses to voice.  Tolerating PO for breakfast.  Per family he does not eat lunch.  Bed alarm in place, soft call light.  Family at bedside.

## 2025-01-07 NOTE — PLAN OF CARE
Problem: Stroke, Ischemic (Includes Transient Ischemic Attack)  Goal: Optimal Coping  Outcome: Progressing     Problem: UTI (Urinary Tract Infection)  Goal: Improved Infection Symptoms  Outcome: Progressing     Problem: Urinary Retention  Goal: Effective Urinary Elimination  Outcome: Progressing     Problem: Comorbidity Management  Goal: Blood Glucose Levels Within Targeted Range  Problem: Adult Inpatient Plan of Care  Goal: Optimal Comfort and Wellbeing  Intervention: Monitor Pain and Promote Comfort  Recent Flowsheet Documentation  Taken 1/6/2025 2000 by Karthik Jolly RN  Pain Management Interventions: declines    Pt disoriented to time, A/Ox3, speech clear, at times able to communicate needs, forgetful, call light reach. VS onn RA, Blood pressure (!) 150/69, pulse 90, temperature 98.5  F (36.9  C), temperature source Oral, resp. rate 20, weight 99.8 kg (220 lb), SpO2 93%.   Pt is incontinent of BB,  takes pills whole, cooperative with cares Pt is legally blind, unable to see images, hard to follow instructions on NIHS , scored 6 on NIHS for Minor R facial paralysis, drift on  L Arm and blindness noted.  Pt denies pain, NSR on Tele. Lactic Acid is improving 2.7,  Zosyn ATB was administered, no s/s of allergies noted.

## 2025-01-07 NOTE — PLAN OF CARE
"  Problem: Adult Inpatient Plan of Care  Goal: Plan of Care Review  Description: The Plan of Care Review/Shift note should be completed every shift.  The Outcome Evaluation is a brief statement about your assessment that the patient is improving, declining, or no change.  This information will be displayed automatically on your shift  note.  Outcome: Progressing   Goal Outcome Evaluation:               Pt admitted to the unit at 1639. Unable to do a full NIH as the pt is blind and unable to read the assessment pages. This writer scored as best as possible 3. Right facial droop noted.    Pt was a total feeder at dinner time. Ate well no s/s of aspiration. Sat upright in bed 90 degrees.    PIV in right and left forearms. NS running at 100 ml/hr cont through right and is P/I. Zostn was administered as per MD orders.    Pt is alert, denied pain, VSS, confused as to time.    Tele- NSR    Mg and K protocols will be rechecked in the am.    Asp and falls precautions maintained.    Pt on eloquis and requested to not wear the PCD's as \"they are taina hot on my legs\". Explained rationale for pt needing the PCD's especially after a stroke Dx. Pt stated he would think about it.    POC is ongoing             "

## 2025-01-07 NOTE — PROGRESS NOTES
Care Management Follow Up    Length of Stay (days): 1    Expected Discharge Date: 01/08/2025     Concerns to be Addressed: discharge planning     Patient plan of care discussed at interdisciplinary rounds: Yes    Anticipated Discharge Disposition:  TCU              Anticipated Discharge Services: TCU   Anticipated Discharge DME:  na    Patient/family educated on Medicare website which has current facility and service quality ratings:  yes  Education Provided on the Discharge Plan:  yes  Patient/Family in Agreement with the Plan:  yes    Referrals Placed by CM/SW:    Private pay costs discussed: Not applicable    Discussed  Partnership in Safe Discharge Planning  document with patient/family: No     Handoff Completed: No, handoff not indicated or clinically appropriate    Additional Information:  Met with patient and daughter in his room. Gave them [facility] TCU list. Explained Medicare.gov website. Requested they choose some additional TCU's. They asked that referrals b e sent to [facility], [facility] on [address] and [facility] on [place].CM sent referrals  Next Steps: await accepting TCU    Jessica Mejias RN

## 2025-01-07 NOTE — PROGRESS NOTES
[facility]    Medicine Progress Note - Hospitalist Service    Date of Admission:  1/5/2025    Assessment & Plan     Rakesh Samuels is a 74 year old male admitted on 1/5/2025. He has PMH most notable for HLD, DM2, atrial fibrillation on apixaban, blindness secondary to glaucoma, who presents with right-sided weakness concerning for CVA. MRI brain reveals probable small late subacute infarct involving the genu of the internal capsule on the right side. He also had lactic acidosis on admission, concerning sepsis.    Right sided weakness:  Patient reports having weakness on the right side. However. MRI brain reveals probable small late subacute infarct involving the genu of the internal capsule on the right side, which does not fit into his clinical presentation.   - PT/OT: recommends TCU    Probable small late subacute infarct of right internal capsule:  CT angiogram negative for significant large vessel occlusion/stenosis.  Echo shows normal LVEF.  - Neurology consulted and input appreciated  - Continue PTA eliquis  - LDL 69 and at goal. No statin indicated  - Permissive hypertension for 24 hours     Lactic acidosis:  On admission, he had elevated lactic acid. It has returned to normal after IVF.  Infection workup negative so far.  CT chest, abdomen and pelvis show no acute pathology.   Per family, has history of frequent UTI.  However, UA this admission does not indicate UTI.  Mild leukocytosis on admission, has now returned to normal.  - Discontinue Zosyn.    - Follow-up blood culture result.     Hypomagnesemia: Magnesium 1.5 on admission.  Has returned to normal after replacement    Essential hypertension: Blood pressure not controlled.  Not on medication at home.  Currently on permissive hypertension for acute stroke.  Plan to start antihypertensive medication after 24 hours.    Type 2 diabetes with hyperglycemia: Recent HbA1C 9.1. Blood sugar elevated on admission.   - Continue home  regimen.     Plan of care was discussed with her daughter Eloisa over the phone today.         Diet: Combination Diet Moderate Consistent Carb (60 g CHO per Meal) Diet; Regular Diet; Thin Liquids (level 0)    DVT Prophylaxis: DOAC  Olsen Catheter: Not present  Lines: None     Cardiac Monitoring: None  Code Status: No CPR- Do NOT Intubate      Clinically Significant Risk Factors           # Hypocalcemia: Lowest Ca = 7.4 mg/dL in last 2 days, will monitor and replace as appropriate   # Hypomagnesemia: Lowest Mg = 1.5 mg/dL in last 2 days, will replace as needed    # Coagulation Defect: INR = 1.42 (Ref range: 0.85 - 1.15) and/or PTT = 28 Seconds (Ref range: 22 - 38 Seconds), will monitor for bleeding              # DMII: A1C = 9.1 % (Ref range: <5.7 %) within past 6 months, PRESENT ON ADMISSION      # Financial/Environmental Concerns: none         Social Drivers of Health    Depression: At risk (10/3/2024)    PHQ-2     PHQ-2 Score: 6   Housing Stability: High Risk (1/7/2025)    Housing Stability     Do you have housing? : No     Are you worried about losing your housing?: No    [facility] [facility] [employer] [facility] [employer] [facility]    Social Connections          Disposition Plan     Medically Ready for Discharge: Anticipated Tomorrow             Marialuisa Fonseca MD  Hospitalist Service  [facility]  Securely message with [employer] (more info)  Text page via [employer] Paging/Directory   ______________________________________________________________________    Interval History    Patient reports that he continues to feel weak on his right side.  He reports that he normally uses walker at baseline.  Per family, he is able to do to transfer himself.    Physical Exam   Vital Signs: Temp: 98.5  F (36.9  C) Temp src: Oral BP: (!) 155/84 Pulse: 75   Resp: 19 SpO2: 94 % O2 Device: None (Room air)    Weight: 220 lbs 0 oz    General appearance: not in acute  distress  HEENT: PERRL, EOMI  Lungs: Clear breath sounds in bilateral lung fields  Cardiovascular: Regular rate and rhythm, normal S1-S2  Abdomen: Soft, non tender, no distension  Musculoskeletal: No joint swelling  Skin: No rash and no edema  Neurology: AAO ×3.  Cranial nerves II - XII normal.  Grossly normal muscle strength in bilateral upper extremities.  Chronic bilateral lower extremity weakness.    Medical Decision Making       48 MINUTES SPENT BY ME on the date of service doing chart review, history, exam, documentation & further activities per the note.      Data     I have personally reviewed the following data over the past 24 hrs:    7.3  \   12.7 (L)   / 194     139 107 10.3 /  189 (H)   4.2 22 0.94 \     INR:  1.42 (H) PTT:  N/A   D-dimer:  N/A Fibrinogen:  N/A       Imaging results reviewed over the past 24 hrs:   No results found for this or any previous visit (from the past 24 hours).

## 2025-01-07 NOTE — PROGRESS NOTES
NEUROLOGY PROGRESS NOTE     Rakesh Samuels,  1950, MRN [id number] Date: 2025     [facility]   Code status:  No CPR- Do NOT Intubate   PCP: Rashida Madison, [phone]      ASSESSMENT & PLAN   Diagnosis code: Stroke    Stroke  Right-sided weakness  Show atrial fibrillation on anticoagulation  History of hyperlipidemia and diabetes type 2    MRI shows acute infarct in the right internal capsule.  Given his symptoms are also on the right side this would not fit with that.  CT angiogram negative for significant large vessel occlusion/stenosis  Echocardiogram shows 60 to 65% ejection fraction.  Continue Eliquis  Blood pressure can slowly be normalized.  Allow permissive hypertension for 24 hours  LDL 69 and at goal.  Can hold off on statin.  PT/OT    Will sign off.  Please call if there are further questions       Chief Complaint   Patient presents with    Stroke Symptoms        HISTORY OF PRESENT ILLNESS     We have been requested by Dr. Curtis to evaluate Rakesh Samuels who is a 74 year old  male for evaluation of possible stroke.  This is 74-year-old with past medical history of atrial fibrillation on anticoagulation compliant with medication intake who is legally blind and presented with right-sided weakness.  He also has a history of hyperlipidemia, diabetes type 2, atrial fibrillation.  His blindness at baseline is from glaucoma.  Patient went for a nap and did not return for supper causing the group home staff to check on him.  They noticed right-sided weakness with right facial droop and subtle right arm and leg weakness for which she was brought to the emergency room.  No thrombolytics were given.  MRI did show right internal capsule infarct which would not fit with his symptoms.  No chest pains palpitations or headaches.    25  No new neurological symptoms.  Continues to have weakness in his legs that is chronic for him.     PAST MEDICAL & SURGICAL HISTORY     Medical History  Past  Medical History:   Diagnosis Date    A-fib (H)     Acute hemodialysis encounter (H)     Due to CHIDI    Acute kidney injury (H)     Acute respiratory failure with hypoxemia (H)     Adrenal incidentaloma (H)     Asbestosis (H)     ATN (acute tubular necrosis) (H)     Atrophy of right kidney     Basal cell carcinoma     BPH without urinary obstruction     Cellulitis     Left foot    Cholelithiasis     Depression with anxiety     Diabetes mellitus, type 2 (H) 4/12/2020    Esophagitis     Gastritis     Glaucoma     Hematemesis, presence of nausea not specified     Hyperkalemia     Hyperlipemia     Kidney stone     Lactic acidosis     Metabolic acidosis     Metformin overdose of undetermined intent     Paroxysmal atrial fibrillation (H) 2/18/2020    Pericardial effusion     PTSD (post-traumatic stress disorder)     Seasonal allergies     Sepsis due to urinary tract infection (H)     Shock circulatory (H)     SIRS (systemic inflammatory response syndrome) (H)     Sleep apnea     uses a machine at night.     Upper GI bleed      Surgical History  Past Surgical History:   Procedure Laterality Date    EYE SURGERY      congenital ptosis right upper lid    HC CYSTOSCOPY,INSERT URETERAL STENT Left 4/12/2020    Procedure: CYSTOSCOPY, WITH URETERAL STENT INSERTION;  Surgeon: Christopher Yates MD;  Location: [facility] OR;  Service: Urology    OR ESOPHAGOGASTRODUODENOSCOPY TRANSORAL DIAGNOSTIC N/A 4/13/2020    Procedure: ESOPHAGOGASTRODUODENOSCOPY (EGD);  Surgeon: Robert Rico MD;  Location: [facility] GI;  Service: Gastroenterology    REPLACEMENT TOTAL KNEE Left         SOCIAL HISTORY     Social History     Tobacco Use    Smoking status: Never    Smokeless tobacco: Never   Substance Use Topics    Alcohol use: Not Currently    Drug use: Never        FAMILY HISTORY     Reviewed, and family history includes Diabetes in his mother.     ALLERGIES     No Known Allergies     REVIEW OF SYSTEMS     12 system ROS was done other than  HPI this was negative.  Pertinent positives noted in the HPI.     HOME & HOSPITAL MEDICATIONS     Prior to Admission Medications  Medications Prior to Admission   Medication Sig Dispense Refill Last Dose/Taking    acetaminophen (TYLENOL) 325 MG tablet Take 650 mg by mouth 3 times daily as needed for pain.   Taking As Needed    apixaban ANTICOAGULANT (ELIQUIS) 5 MG tablet Take 5 mg by mouth 2 times daily.   1/5/2025 Morning    ARIPiprazole (ABILIFY) 2 MG tablet [ARIPIPRAZOLE (ABILIFY) 2 MG TABLET] Take 2 mg by mouth at bedtime.    1/4/2025 at  8:00 PM    brimonidine (ALPHAGAN) 0.2 % ophthalmic solution [BRIMONIDINE (ALPHAGAN) 0.2 % OPHTHALMIC SOLUTION] Administer 1 drop to both eyes 2 (two) times a day.    1/5/2025 Morning    calcium carbonate (TUMS) 500 MG chewable tablet Take 1 chew tab by mouth 3 times daily as needed for heartburn.   Taking As Needed    ciprofloxacin (CIPRO) 500 MG tablet Take 500 mg by mouth 2 times daily.   1/5/2025 Morning    dorzolamide-timolol (COSOPT) 2-0.5 % ophthalmic solution Place 1 drop into both eyes 2 times daily   1/5/2025 Morning    famotidine (PEPCID) 20 MG tablet Take 20 mg by mouth daily.   1/5/2025 Morning    fluvoxaMINE (LUVOX) 50 MG tablet [FLUVOXAMINE (LUVOX) 50 MG TABLET] Take 50 mg by mouth at bedtime.    1/4/2025 Evening    gabapentin (NEURONTIN) 300 MG capsule Take 300 mg by mouth 2 times daily   1/5/2025 Morning    latanoprostene bunod 0.024 % Drop Place 1 drop into both eyes At Bedtime   1/4/2025 Bedtime    levomefolate calcium (L-METHYLFOLATE ORAL) Take 15 mg by mouth daily.   1/5/2025 Morning    metFORMIN (GLUCOPHAGE) 1000 MG tablet Take 1,000 mg by mouth 2 times daily.   1/5/2025 Morning    netarsudiL (RHOPRESSA) 0.02 % Drop Place 1 drop into both eyes at bedtime.   1/4/2025 at  8:00 PM    polyethylene glycol (MIRALAX) 17 g packet Take 1 packet by mouth daily.   1/5/2025 Morning       Hospital Medications  Current Facility-Administered Medications   Medication Dose  Route Frequency Provider Last Rate Last Admin    apixaban ANTICOAGULANT (ELIQUIS) tablet 5 mg  5 mg Oral BID Helio Curtis MD   5 mg at 01/07/25 0936    ARIPiprazole (ABILIFY) tablet 2 mg  2 mg Oral At Bedtime Helio Curtis MD   2 mg at 01/06/25 2105    brimonidine (ALPHAGAN) 0.2 % ophthalmic solution 1 drop  1 drop Both Eyes BID Helio Curtis MD   1 drop at 01/07/25 0937    dorzolamide-timolol (COSOPT) ophthalmic solution 1 drop  1 drop Both Eyes BID Helio Curtis MD   1 drop at 01/07/25 0937    famotidine (PEPCID) tablet 20 mg  20 mg Oral Daily Helio Curtis MD   20 mg at 01/07/25 0936    fluvoxaMINE (LUVOX) tablet 50 mg  50 mg Oral At Bedtime Helio Curtis MD   50 mg at 01/06/25 2105    gabapentin (NEURONTIN) capsule 300 mg  300 mg Oral BID Helio Curtis MD   300 mg at 01/07/25 0935    insulin aspart (NovoLOG) injection (RAPID ACTING)  1-7 Units Subcutaneous TID AC Helio Curtis MD   1 Units at 01/07/25 0936    insulin aspart (NovoLOG) injection (RAPID ACTING)  1-5 Units Subcutaneous At Bedtime Helio Curtis MD   3 Units at 01/06/25 0306    insulin aspart (NovoLOG) injection (RAPID ACTING)   Subcutaneous TID w/meals He Perez MD   3 Units at 01/07/25 0939    insulin glargine (LANTUS PEN) injection 10 Units  10 Units Subcutaneous QAM AC He Perez MD   10 Units at 01/07/25 0936    latanoprostene bunod (VYZULTA) 0.024 % ophthalmic solution 1 drop  1 drop Both Eyes At Bedtime He Perez MD   1 drop at 01/06/25 2104    netarsudil (RHOPRESSA) 0.02 % ophthalmic solution 1 drop  1 drop Both Eyes At Bedtime Helio Curtis MD   1 drop at 01/06/25 2103    piperacillin-tazobactam (ZOSYN) 3.375 g vial to attach to  mL bag  3.375 g Intravenous Q8H He Perez MD   3.375 g at 01/07/25 0604    sodium chloride (PF) 0.9% PF flush 3 mL  3 mL Intracatheter Q8H Helio Curtis MD   3 mL at 01/07/25 0620        PHYSICAL EXAM      Vital signs  Temp:  [97.8  F (36.6  C)-98.5  F (36.9  C)] 98.5  F (36.9  C)  Pulse:  [75-90] 75  Resp:  [19-20] 19  BP: (148-163)/(69-84) 155/84  SpO2:  [92 %-94 %] 94 %    PHYSICAL EXAMINATION  VITALS: BP (!) 155/84 (BP Location: Left arm)   Pulse 75   Temp 98.5  F (36.9  C) (Oral)   Resp 19   Wt 99.8 kg (220 lb)   SpO2 94%   BMI 29.84 kg/m    GENERAL -Health appearing, No apparent distress  EYES- No scleral icterus, no eyelid droop, Pupils - see Neuro section  HEENT - Normocephalic, atraumatic, Hearing grossly intact; Oral mucosa moist and pink in color. External Ears and nose intact.   Neck - supple   PULM - Good spontaneous respiratory effort  CV-extremities warm to touch.  MSK- Gait - see Neuro section; Strength and tone- see Neuro section; Range of motion grossly intact.  PSYCH -cooperative    Neurological  Mental status - Patient is awake and partially oriented to self, place and time. Attention span is normal. Language is fluent and follows commands appropriately.   Cranial nerves - CN II-XII intact. Pupils are symmetric; EOMI, NLF symmetric.  He is legally blind.  Motor - Motor exam shows 4/5 strength in bilateral upper extremities.  3/5 strength in both lower extremities.  Possibly due to poor effort.  Tone - Tone is symmetric bilaterally in upper and lower extremities.  Reflexes - Reflexes are symmetric/absent.  Sensation - Sensory exam is grossly intact to light touch, pain.  Coordination - Finger to nose is without dysmetria.  Unable to do heel-to-shin due to weakness.  Gait and station --unable to safely ambulate.  Formal gait testing cannot be done for his safety concerns from ongoing issues.  Exam stable.     DIAGNOSTIC STUDIES     Pertinent Radiology   MRI brain  IMPRESSION:  1.  Probable small late subacute infarct involving the genu of the internal capsule on the right side.  2.  Generalized brain atrophy and presumed microvascular ischemic changes as detailed above.  3.  Markedly  atrophic optic nerves and optic chiasm.    CTA  HEAD CT:  1.  No acute intracranial process.     HEAD CTA:   1.  Mild atherosclerotic changes, otherwise normal intracranial circulation.     NECK CTA:  1.  No definite hemodynamically significant narrowing throughout major neck vessels.    MRI sept 2023  IMPRESSION:        1. No evidence of cerebral infarction.  2. Age-related volume loss and a mild degree of presumable small vessel ischemic disease.    ECHO  Left ventricular size, wall motion and function are normal. The ejection  fraction is 60-65%.  Normal right ventricle size and systolic function.  No hemodynamically significant valvular abnormalities on 2D or color flow  imaging.  Compared to prior study, there is no significant change.    Component      Latest Ref Rng 1/5/2025  8:36 PM   Cholesterol      <200 mg/dL 131    Triglycerides      <150 mg/dL 160 (H)    HDL Cholesterol      >=40 mg/dL 30 (L)    LDL Cholesterol Calculated      <100 mg/dL 69    Non HDL Cholesterol      <130 mg/dL 101    Estimated Average Glucose      <117 mg/dL 214 (H)    Hemoglobin A1C      <5.7 % 9.1 (H)       Legend:  (H) High  (L) Low    Recent Results (from the past 24 hours)   Glucose by meter    Collection Time: 01/06/25  2:26 PM   Result Value Ref Range    GLUCOSE BY METER POCT 231 (H) 70 - 99 mg/dL   Glucose by meter    Collection Time: 01/06/25  5:31 PM   Result Value Ref Range    GLUCOSE BY METER POCT 234 (H) 70 - 99 mg/dL   Glucose by meter    Collection Time: 01/06/25  8:59 PM   Result Value Ref Range    GLUCOSE BY METER POCT 186 (H) 70 - 99 mg/dL   Glucose by meter    Collection Time: 01/07/25  2:27 AM   Result Value Ref Range    GLUCOSE BY METER POCT 197 (H) 70 - 99 mg/dL   CBC with platelets    Collection Time: 01/07/25  6:52 AM   Result Value Ref Range    WBC Count 7.3 4.0 - 11.0 10e3/uL    RBC Count 4.07 (L) 4.40 - 5.90 10e6/uL    Hemoglobin 12.7 (L) 13.3 - 17.7 g/dL    Hematocrit 37.7 (L) 40.0 - 53.0 %    MCV 93 78 -  100 fL    MCH 31.2 26.5 - 33.0 pg    MCHC 33.7 31.5 - 36.5 g/dL    RDW 13.1 10.0 - 15.0 %    Platelet Count 194 150 - 450 10e3/uL   Basic metabolic panel    Collection Time: 01/07/25  6:52 AM   Result Value Ref Range    Sodium 139 135 - 145 mmol/L    Potassium 4.2 3.4 - 5.3 mmol/L    Chloride 107 98 - 107 mmol/L    Carbon Dioxide (CO2) 22 22 - 29 mmol/L    Anion Gap 10 7 - 15 mmol/L    Urea Nitrogen 10.3 8.0 - 23.0 mg/dL    Creatinine 0.94 0.67 - 1.17 mg/dL    GFR Estimate 86 >60 mL/min/1.73m2    Calcium 7.4 (L) 8.8 - 10.4 mg/dL    Glucose 201 (H) 70 - 99 mg/dL   INR    Collection Time: 01/07/25  6:52 AM   Result Value Ref Range    INR 1.42 (H) 0.85 - 1.15   Magnesium    Collection Time: 01/07/25  6:52 AM   Result Value Ref Range    Magnesium 1.8 1.7 - 2.3 mg/dL   Glucose by meter    Collection Time: 01/07/25  8:10 AM   Result Value Ref Range    GLUCOSE BY METER POCT 185 (H) 70 - 99 mg/dL   Glucose by meter    Collection Time: 01/07/25 11:06 AM   Result Value Ref Range    GLUCOSE BY METER POCT 207 (H) 70 - 99 mg/dL       Total time spent for face to face visit, reviewing labs/imaging studies, counseling and coordination of care was: Over 35 min More than 50% of this time was spent on counseling and coordination of care.    Counseling patient.  Reviewing chart/test    Paul Abel MD  Neurologist  [facility]  Tel:- [phone]

## 2025-01-08 ENCOUNTER — APPOINTMENT (OUTPATIENT)
Dept: PHYSICAL THERAPY | Facility: HOSPITAL | Age: 75
DRG: 065 | End: 2025-01-08
Payer: MEDICARE

## 2025-01-08 LAB
BACTERIA UR CULT: ABNORMAL
CREAT SERPL-MCNC: 0.77 MG/DL (ref 0.67–1.17)
EGFRCR SERPLBLD CKD-EPI 2021: >90 ML/MIN/1.73M2
GLUCOSE BLDC GLUCOMTR-MCNC: 156 MG/DL (ref 70–99)
GLUCOSE BLDC GLUCOMTR-MCNC: 157 MG/DL (ref 70–99)
GLUCOSE BLDC GLUCOMTR-MCNC: 174 MG/DL (ref 70–99)
GLUCOSE BLDC GLUCOMTR-MCNC: 198 MG/DL (ref 70–99)
HOLD SPECIMEN: NORMAL
MAGNESIUM SERPL-MCNC: 1.8 MG/DL (ref 1.7–2.3)
POTASSIUM SERPL-SCNC: 3.8 MMOL/L (ref 3.4–5.3)

## 2025-01-08 PROCEDURE — 83735 ASSAY OF MAGNESIUM: CPT | Performed by: INTERNAL MEDICINE

## 2025-01-08 PROCEDURE — 99232 SBSQ HOSP IP/OBS MODERATE 35: CPT | Performed by: INTERNAL MEDICINE

## 2025-01-08 PROCEDURE — 120N000001 HC R&B MED SURG/OB

## 2025-01-08 PROCEDURE — 84132 ASSAY OF SERUM POTASSIUM: CPT | Performed by: INTERNAL MEDICINE

## 2025-01-08 PROCEDURE — 82565 ASSAY OF CREATININE: CPT | Performed by: INTERNAL MEDICINE

## 2025-01-08 PROCEDURE — 97530 THERAPEUTIC ACTIVITIES: CPT | Mod: GP

## 2025-01-08 PROCEDURE — 36415 COLL VENOUS BLD VENIPUNCTURE: CPT | Performed by: INTERNAL MEDICINE

## 2025-01-08 PROCEDURE — 250N000013 HC RX MED GY IP 250 OP 250 PS 637: Performed by: INTERNAL MEDICINE

## 2025-01-08 RX ORDER — AMLODIPINE BESYLATE 5 MG/1
5 TABLET ORAL DAILY
Status: DISCONTINUED | OUTPATIENT
Start: 2025-01-08 | End: 2025-01-09 | Stop reason: HOSPADM

## 2025-01-08 RX ADMIN — INSULIN ASPART 1 UNITS: 100 INJECTION, SOLUTION INTRAVENOUS; SUBCUTANEOUS at 09:03

## 2025-01-08 RX ADMIN — INSULIN ASPART 1 UNITS: 100 INJECTION, SOLUTION INTRAVENOUS; SUBCUTANEOUS at 13:53

## 2025-01-08 RX ADMIN — BRIMONIDINE TARTRATE 1 DROP: 2 SOLUTION OPHTHALMIC at 09:07

## 2025-01-08 RX ADMIN — GABAPENTIN 300 MG: 300 CAPSULE ORAL at 09:07

## 2025-01-08 RX ADMIN — DORZOLAMIDE HYDROCHLORIDE AND TIMOLOL MALEATE 1 DROP: 20; 5 SOLUTION/ DROPS OPHTHALMIC at 21:06

## 2025-01-08 RX ADMIN — BRIMONIDINE TARTRATE 1 DROP: 2 SOLUTION OPHTHALMIC at 21:06

## 2025-01-08 RX ADMIN — FLUVOXAMINE MALEATE 50 MG: 50 TABLET ORAL at 21:00

## 2025-01-08 RX ADMIN — APIXABAN 5 MG: 5 TABLET, FILM COATED ORAL at 09:07

## 2025-01-08 RX ADMIN — FAMOTIDINE 20 MG: 20 TABLET, FILM COATED ORAL at 09:07

## 2025-01-08 RX ADMIN — DORZOLAMIDE HYDROCHLORIDE AND TIMOLOL MALEATE 1 DROP: 20; 5 SOLUTION/ DROPS OPHTHALMIC at 09:07

## 2025-01-08 RX ADMIN — INSULIN ASPART 1 UNITS: 100 INJECTION, SOLUTION INTRAVENOUS; SUBCUTANEOUS at 16:48

## 2025-01-08 RX ADMIN — INSULIN GLARGINE 10 UNITS: 100 INJECTION, SOLUTION SUBCUTANEOUS at 09:03

## 2025-01-08 RX ADMIN — ACETAMINOPHEN 650 MG: 325 TABLET ORAL at 06:00

## 2025-01-08 RX ADMIN — AMLODIPINE BESYLATE 5 MG: 5 TABLET ORAL at 16:47

## 2025-01-08 RX ADMIN — ARIPIPRAZOLE 2 MG: 2 TABLET ORAL at 21:00

## 2025-01-08 RX ADMIN — APIXABAN 5 MG: 5 TABLET, FILM COATED ORAL at 21:00

## 2025-01-08 RX ADMIN — GABAPENTIN 300 MG: 300 CAPSULE ORAL at 21:00

## 2025-01-08 ASSESSMENT — ACTIVITIES OF DAILY LIVING (ADL)
ADLS_ACUITY_SCORE: 78
ADLS_ACUITY_SCORE: 81
ADLS_ACUITY_SCORE: 80
ADLS_ACUITY_SCORE: 78
ADLS_ACUITY_SCORE: 78
ADLS_ACUITY_SCORE: 80
ADLS_ACUITY_SCORE: 83
ADLS_ACUITY_SCORE: 78
ADLS_ACUITY_SCORE: 79
ADLS_ACUITY_SCORE: 83
ADLS_ACUITY_SCORE: 78
ADLS_ACUITY_SCORE: 83
ADLS_ACUITY_SCORE: 81
ADLS_ACUITY_SCORE: 80
ADLS_ACUITY_SCORE: 80
ADLS_ACUITY_SCORE: 83
ADLS_ACUITY_SCORE: 83
ADLS_ACUITY_SCORE: 78
ADLS_ACUITY_SCORE: 80
ADLS_ACUITY_SCORE: 78
ADLS_ACUITY_SCORE: 83
ADLS_ACUITY_SCORE: 81
ADLS_ACUITY_SCORE: 80

## 2025-01-08 NOTE — PLAN OF CARE
"Pt was A/O x 4.  Very pleasant and polite. Complained of throat pain and tylenol was given. Takes pill whole with apple sauce, though he wanted to take them 1 at a time w/ water this morning.  Primofit is in place. Is on K and Mag protocol.       Problem: Adult Inpatient Plan of Care  Goal: Plan of Care Review  Description: The Plan of Care Review/Shift note should be completed every shift.  The Outcome Evaluation is a brief statement about your assessment that the patient is improving, declining, or no change.  This information will be displayed automatically on your shift  note.  Outcome: Progressing  Goal: Patient-Specific Goal (Individualized)  Description: You can add care plan individualizations to a care plan. Examples of Individualization might be:  \"Parent requests to be called daily at 9am for status\", \"I have a hard time hearing out of my right ear\", or \"Do not touch me to wake me up as it startles  me\".  Outcome: Progressing  Goal: Absence of Hospital-Acquired Illness or Injury  Outcome: Progressing  Intervention: Identify and Manage Fall Risk  Recent Flowsheet Documentation  Taken 1/8/2025 0300 by Naga Arias RN  Safety Promotion/Fall Prevention:   activity supervised   nonskid shoes/slippers when out of bed   room door open   room organization consistent   clutter free environment maintained  Intervention: Prevent Skin Injury  Recent Flowsheet Documentation  Taken 1/8/2025 0300 by Naga Arias RN  Body Position:   supine   supine, head elevated  Intervention: Prevent and Manage VTE (Venous Thromboembolism) Risk  Recent Flowsheet Documentation  Taken 1/8/2025 0300 by Naga Arias RN  VTE Prevention/Management: (On Eliquis) SCDs off (sequential compression devices)  Intervention: Prevent Infection  Recent Flowsheet Documentation  Taken 1/8/2025 0300 by Naga Arias RN  Infection Prevention:   hand hygiene promoted   rest/sleep promoted  Goal: Optimal Comfort and Wellbeing  Outcome: Progressing  Goal: " Readiness for Transition of Care  Outcome: Progressing     Problem: Comorbidity Management  Goal: Blood Glucose Levels Within Targeted Range  Outcome: Progressing  Intervention: Monitor and Manage Glycemia  Recent Flowsheet Documentation  Taken 1/8/2025 0300 by Naga Arias RN  Medication Review/Management: medications reviewed  Goal: Blood Pressure in Desired Range  Outcome: Progressing  Intervention: Maintain Blood Pressure Management  Recent Flowsheet Documentation  Taken 1/8/2025 0300 by Naga Arias RN  Medication Review/Management: medications reviewed     Problem: UTI (Urinary Tract Infection)  Goal: Improved Infection Symptoms  Outcome: Progressing     Problem: Urinary Retention  Goal: Effective Urinary Elimination  Outcome: Progressing     Problem: Stroke, Ischemic (Includes Transient Ischemic Attack)  Goal: Optimal Coping  Outcome: Progressing  Goal: Effective Bowel Elimination  Outcome: Progressing  Goal: Optimal Cerebral Tissue Perfusion  Outcome: Progressing  Goal: Optimal Cognitive Function  Outcome: Progressing  Goal: Improved Communication Skills  Outcome: Progressing  Goal: Optimal Functional Ability  Outcome: Progressing  Goal: Optimal Nutrition Intake  Outcome: Progressing  Goal: Effective Oxygenation and Ventilation  Outcome: Progressing  Intervention: Optimize Oxygenation and Ventilation  Recent Flowsheet Documentation  Taken 1/8/2025 0300 by Naga Arias RN  Head of Bed (HOB) Positioning: HOB at 20-30 degrees  Goal: Improved Sensorimotor Function  Outcome: Progressing  Intervention: Optimize Range of Motion, Motor Control and Function  Recent Flowsheet Documentation  Taken 1/8/2025 0300 by Naga Arias RN  Positioning/Transfer Devices:   pillows   in use  Goal: Safe and Effective Swallow  Outcome: Progressing  Goal: Effective Urinary Elimination  Outcome: Progressing

## 2025-01-08 NOTE — PROGRESS NOTES
[facility]    Medicine Progress Note - Hospitalist Service    Date of Admission:  1/5/2025    Assessment & Plan     Rakesh Samuels is a 74 year old male admitted on 1/5/2025. He has PMH most notable for HLD, DM2, atrial fibrillation on apixaban, blindness secondary to glaucoma, who presents with right-sided weakness concerning for CVA. MRI brain reveals probable small late subacute infarct involving the genu of the internal capsule on the right side. He also had lactic acidosis on admission, concerning sepsis. However, no infection found so far.     Right sided weakness:  Patient reports having weakness on the right side. However. MRI brain reveals probable small late subacute infarct involving the genu of the internal capsule on the right side, which does not fit into his clinical presentation.   - PT/OT: recommends TCU. Plan to discharge to TCU tomorrow.     Probable small late subacute infarct of right internal capsule:  CT angiogram negative for significant large vessel occlusion/stenosis.  Echo shows normal LVEF.  - Neurology consulted and input appreciated  - Continue PTA eliquis  - LDL 69 and at goal. No statin indicated  - Permissive hypertension for 24 hours     Lactic acidosis:  On admission, he had elevated lactic acid. It has returned to normal after IVF.  Infection workup negative so far.  CT chest, abdomen and pelvis show no acute pathology.   Per family, has history of frequent UTI.  However, UA this admission does not indicate UTI.  Mild leukocytosis on admission, has now returned to normal.  - Discontinue Zosyn.    - Follow-up blood culture result. No growth so far.     Hypomagnesemia: Magnesium 1.5 on admission.  Has returned to normal after replacement    Essential hypertension: Blood pressure not controlled.  Not on medication at home.  - Start amlodipine.    Type 2 diabetes with hyperglycemia: Recent HbA1C 9.1. Blood sugar elevated on admission.   - Continue home  regimen. Outpatient follow up to optimize home regimen.    Paroxysmal atrial fibrillation: HR controlled without rate control medication. On Eliquis.       Plan of care was discussed with her daughter Eloisa over the phone.         Diet: Combination Diet Moderate Consistent Carb (60 g CHO per Meal) Diet; Regular Diet; Thin Liquids (level 0)    DVT Prophylaxis: DOAC  Olsen Catheter: Not present  Lines: None     Cardiac Monitoring: None  Code Status: No CPR- Do NOT Intubate      Clinically Significant Risk Factors                  # Coagulation Defect: INR = 1.42 (Ref range: 0.85 - 1.15) and/or PTT = 28 Seconds (Ref range: 22 - 38 Seconds), will monitor for bleeding    # Hypertension: Noted on problem list           # DMII: A1C = 9.1 % (Ref range: <5.7 %) within past 6 months, PRESENT ON ADMISSION        # Financial/Environmental Concerns: none         Social Drivers of Health    Depression: At risk (10/3/2024)    PHQ-2     PHQ-2 Score: 6   Housing Stability: Low Risk  (1/8/2025)    Housing Stability     Do you have housing? : Yes     Are you worried about losing your housing?: No   Recent Concern: Housing Stability - High Risk (1/7/2025)    Housing Stability     Do you have housing? : No     Are you worried about losing your housing?: No    Received from [email or web address] [facility], [email or web address] & [facility]    Social Connections          Disposition Plan     Medically Ready for Discharge: Anticipated Tomorrow Plan to discharge to TCU tomorrow.              Marialuisa Fonseca MD  Hospitalist Service  [facility]  Securely message with [employer] (more info)  Text page via [employer] Paging/Directory   ______________________________________________________________________    Interval History    Patient reports that he right arm has returned to his normal                  strength. He has no discomfort today.     Physical Exam   Vital Signs: Temp: 98  F (36.7  C) Temp src:  Oral BP: 131/65 Pulse: 60   Resp: 18 SpO2: 96 % O2 Device: None (Room air)    Weight: 220 lbs 0 oz    General appearance: not in acute distress  HEENT: PERRL, EOMI  Lungs: Clear breath sounds in bilateral lung fields  Cardiovascular: Regular rate and rhythm, normal S1-S2  Abdomen: Soft, non tender, no distension  Musculoskeletal: No joint swelling  Skin: No rash and no edema  Neurology: AAO ×3.  Cranial nerves II - XII normal.  Grossly normal muscle strength in bilateral upper extremities.  Chronic bilateral lower extremity weakness.    Medical Decision Making       48 MINUTES SPENT BY ME on the date of service doing chart review, history, exam, documentation & further activities per the note.      Data     I have personally reviewed the following data over the past 24 hrs:    N/A  \   N/A   / N/A     N/A N/A N/A /  174 (H)   3.8 N/A 0.77 \       Imaging results reviewed over the past 24 hrs:   No results found for this or any previous visit (from the past 24 hours).

## 2025-01-08 NOTE — PLAN OF CARE
"Goal Outcome Evaluation:    Pt c/o of a sore throat 8/10 PRN Tylenol given and pain decreased to a 2/10.  Pt lethargic and sleepy during this shift.  Awakens to voice and questions.  Disoriented to place.  [place] \"[place]\".  Takes pills whole w/ applesauce.  Poor appetite for dinner ate 25% of a chicken breast cut up and drank cranberry juice. 14gr. CHO's.  Primofit in place w/ good output.   and 196      Problem: Adult Inpatient Plan of Care  Goal: Plan of Care Review  Description: The Plan of Care Review/Shift note should be completed every shift.  The Outcome Evaluation is a brief statement about your assessment that the patient is improving, declining, or no change.  This information will be displayed automatically on your shift  note.  Outcome: Progressing  Flowsheets (Taken 1/8/2025 0014)  Plan of Care Reviewed With: patient  Overall Patient Progress: improving     Problem: Adult Inpatient Plan of Care  Goal: Absence of Hospital-Acquired Illness or Injury  Intervention: Prevent and Manage VTE (Venous Thromboembolism) Risk  Recent Flowsheet Documentation  Taken 1/7/2025 1615 by Winifred Morton, RN  VTE Prevention/Management: (receives eliquis) SCDs off (sequential compression devices)     Problem: Adult Inpatient Plan of Care  Goal: Optimal Comfort and Wellbeing  Outcome: Progressing     Problem: Comorbidity Management  Goal: Blood Glucose Levels Within Targeted Range  Intervention: Monitor and Manage Glycemia  Recent Flowsheet Documentation  Taken 1/7/2025 1615 by Winifred Morton, RN  Medication Review/Management: medications reviewed     Problem: Comorbidity Management  Goal: Blood Pressure in Desired Range  Outcome: Progressing     Problem: Stroke, Ischemic (Includes Transient Ischemic Attack)  Goal: Safe and Effective Swallow  Outcome: Progressing     Problem: Stroke, Ischemic (Includes Transient Ischemic Attack)  Goal: Effective Urinary " Elimination  Outcome: Progressing         Plan of Care Reviewed With: patient    Overall Patient Progress: improvingOverall Patient Progress: improving

## 2025-01-08 NOTE — CONSULTS
Care Management Follow Up    Length of Stay (days): 2    Expected Discharge Date: 01/09/2025    Anticipated Discharge Plan: [facility] (TCU)      Transportation: Confirmed Stretcher. Transportation costs discussed? Yes. Discussed with daughter     PT Recommendations: Transitional Care Facility  OT Recommendations:  Transitional Care Facility     Barriers to Discharge: placement    Prior Living Situation: assisted living with facility resident    Discussed  Partnership in Safe Discharge Planning  document with patient/family: No     Handoff Completed: No, handoff not indicated or clinically appropriate    Patient/Spokesperson Updated: Yes. Who? Daughter Eloisa    Additional Information:  CM is working on TCU placement at discharge. Referrals are pending at this time.     10:44 AM  JENNY called and left a voicemail with [facility] requesting a return phone call back to discuss Pt's referral.     10:54 AM  SW attempted to meet with Pt to discuss TCU placement but Pt was sleeping heavily. SW will try to visit Pt again later today.     JENNY called and left a voicemail with [facility] requesting a return phone call back to discuss Pt's referral.     12:32 PM  JENNY called and spoke to [facility] FATEMEH Bernabe who reported that she is working to review Pt's referral and will call CM back once she has finished reviewing.    1:05 PM  [facility] called back stating that she can accept Pt tomorrow anytime after 12 PM. JENNY called Pt's daughter Eloisa who confirmed family would want to accept the bed at the facility and would like hospital transportation set-up at discharge. Potential costs discussed. JENNY informed Eloisa that CM would set-up discharge for noon. Eloisa was agreeable with the plan. Eloisa has Norovirus so she asked for CM to contact her sister Nanette to confirm plans for discharge in the morning.     1:37 PM  Nursing is recommending stretcher transport for safety due to  generalized weakness. [facility] stretcher transport scheduled for tomorrow (1/9) with a pick-up window between [phone]. JENNY called and left a voicemail with [facility] confirming transport timing for the morning.    3:07 PM  PAS completed. [id number]. PCS completed. JENNY met with Pt and updated him regarding progress with discharge planning. Pt was agreeable with the plan to discharge to TCU tomorrow and denied any further questions for CM at this time.     Next Steps: CM to coordinate final plans for Pt's discharge to TCU tomorrow.       REJI Greenberg

## 2025-01-08 NOTE — PLAN OF CARE
Goal Outcome Evaluation:         Problem: Adult Inpatient Plan of Care  Goal: Optimal Comfort and Wellbeing  Outcome: Progressing     Problem: Adult Inpatient Plan of Care  Goal: Readiness for Transition of Care  Outcome: Progressing     Problem: Comorbidity Management  Goal: Blood Glucose Levels Within Targeted Range  Outcome: Progressing         Pt alert but didn't know the month when I asked him.  Pt is blind in one eye, eyes are reddened and draining.  Primo fit in place, draining yellow urine.  Pt takes pills whole in applesauce.  Blood sugars were 156 and 175.  Pt didn't want lunch today after late breakfast.  PT saw pt, he did much better, up with 1-2 assist and walker, didn't want to stay up in the chair this afternoon.  Plan is discharge to TCU tomorrow.  Anisa Alexander, RN

## 2025-01-09 ENCOUNTER — DOCUMENTATION ONLY (OUTPATIENT)
Dept: GERIATRICS | Facility: CLINIC | Age: 75
End: 2025-01-09
Payer: MEDICARE

## 2025-01-09 VITALS
HEART RATE: 59 BPM | DIASTOLIC BLOOD PRESSURE: 81 MMHG | OXYGEN SATURATION: 95 % | TEMPERATURE: 98 F | WEIGHT: 220 LBS | RESPIRATION RATE: 18 BRPM | SYSTOLIC BLOOD PRESSURE: 170 MMHG | BODY MASS INDEX: 29.84 KG/M2

## 2025-01-09 LAB
BACTERIA BLD CULT: NORMAL
CREAT SERPL-MCNC: 0.77 MG/DL (ref 0.67–1.17)
EGFRCR SERPLBLD CKD-EPI 2021: >90 ML/MIN/1.73M2
GLUCOSE BLDC GLUCOMTR-MCNC: 149 MG/DL (ref 70–99)
GLUCOSE BLDC GLUCOMTR-MCNC: 150 MG/DL (ref 70–99)
HOLD SPECIMEN: NORMAL
MAGNESIUM SERPL-MCNC: 1.8 MG/DL (ref 1.7–2.3)
POTASSIUM SERPL-SCNC: 3.7 MMOL/L (ref 3.4–5.3)

## 2025-01-09 PROCEDURE — 250N000013 HC RX MED GY IP 250 OP 250 PS 637: Performed by: INTERNAL MEDICINE

## 2025-01-09 PROCEDURE — 82565 ASSAY OF CREATININE: CPT | Performed by: INTERNAL MEDICINE

## 2025-01-09 PROCEDURE — 99239 HOSP IP/OBS DSCHRG MGMT >30: CPT | Performed by: INTERNAL MEDICINE

## 2025-01-09 PROCEDURE — 84132 ASSAY OF SERUM POTASSIUM: CPT | Performed by: INTERNAL MEDICINE

## 2025-01-09 PROCEDURE — 83735 ASSAY OF MAGNESIUM: CPT | Performed by: INTERNAL MEDICINE

## 2025-01-09 PROCEDURE — 36415 COLL VENOUS BLD VENIPUNCTURE: CPT | Performed by: INTERNAL MEDICINE

## 2025-01-09 RX ORDER — AMLODIPINE BESYLATE 5 MG/1
5 TABLET ORAL DAILY
DISCHARGE
Start: 2025-01-09

## 2025-01-09 RX ORDER — ACETAMINOPHEN 325 MG/1
650 TABLET ORAL EVERY 4 HOURS PRN
DISCHARGE
Start: 2025-01-09

## 2025-01-09 RX ADMIN — GABAPENTIN 300 MG: 300 CAPSULE ORAL at 08:18

## 2025-01-09 RX ADMIN — APIXABAN 5 MG: 5 TABLET, FILM COATED ORAL at 08:18

## 2025-01-09 RX ADMIN — INSULIN ASPART 1 UNITS: 100 INJECTION, SOLUTION INTRAVENOUS; SUBCUTANEOUS at 08:18

## 2025-01-09 RX ADMIN — INSULIN ASPART 1 UNITS: 100 INJECTION, SOLUTION INTRAVENOUS; SUBCUTANEOUS at 12:28

## 2025-01-09 RX ADMIN — FAMOTIDINE 20 MG: 20 TABLET, FILM COATED ORAL at 08:19

## 2025-01-09 RX ADMIN — BRIMONIDINE TARTRATE 1 DROP: 2 SOLUTION OPHTHALMIC at 08:19

## 2025-01-09 RX ADMIN — AMLODIPINE BESYLATE 5 MG: 5 TABLET ORAL at 08:19

## 2025-01-09 RX ADMIN — INSULIN GLARGINE 10 UNITS: 100 INJECTION, SOLUTION SUBCUTANEOUS at 08:18

## 2025-01-09 RX ADMIN — DORZOLAMIDE HYDROCHLORIDE AND TIMOLOL MALEATE 1 DROP: 20; 5 SOLUTION/ DROPS OPHTHALMIC at 08:20

## 2025-01-09 RX ADMIN — ACETAMINOPHEN 650 MG: 325 TABLET ORAL at 07:35

## 2025-01-09 RX ADMIN — ACETAMINOPHEN 650 MG: 325 TABLET ORAL at 11:52

## 2025-01-09 ASSESSMENT — ACTIVITIES OF DAILY LIVING (ADL)
ADLS_ACUITY_SCORE: 81
ADLS_ACUITY_SCORE: 80
ADLS_ACUITY_SCORE: 81
ADLS_ACUITY_SCORE: 80
ADLS_ACUITY_SCORE: 81
ADLS_ACUITY_SCORE: 80

## 2025-01-09 NOTE — PLAN OF CARE
Goal Outcome Evaluation:    Pt is discharge to TCU via stretcher. Report given to EMT staffs. Send 2 eye drop home meds with pt. Ate lunch before leaving. Alert and oriented x 4. Able to communicate needs.

## 2025-01-09 NOTE — PROGRESS NOTES
Care Management Discharge Note    Discharge Date: 01/09/2025       Discharge Disposition: Transitional Care    Discharge Services: None    Discharge DME:      Discharge Transportation: health plan transportation    Private pay costs discussed: transportation costs    Does the patient's insurance plan have a 3 day qualifying hospital stay waiver?  Yes     Which insurance plan 3 day waiver is available? [employer]    Will the waiver be used for post-acute placement? No    PAS Confirmation Code: [id number]  Patient/family educated on Medicare website which has current facility and service quality ratings: yes    Education Provided on the Discharge Plan: Yes  Persons Notified of Discharge Plans: Patient & Family  Patient/Family in Agreement with the Plan: yes    Handoff Referral Completed: No, handoff not indicated or clinically appropriate    Additional Information:  Plan is for Pt to discharge to [facility] today. [facility] transport pick-up window: [phone]. PAS and PCS completed.    7:49 AM  JENNY reached out to Hospitalist and confirmed Pt is medically ready for discharge today. Nursing updated regarding discharge plan.     8:20 AM  Discharge orders sent. JENNY called and confirmed discharge timing with Alida at [facility].     9:39 AM  JENNY called and updated Pt's daughter Nanette that Pt would be discharging today at noon. Nanette asked for the Care Team to make sure Pt's eye drops from home are sent with at discharge. JENNY confirmed with Nursing that Pt's eye drops were packed with Pt's belongings for discharge.     10:38 AM  JENNY called and updated Nanette that Pt's eyedrops were packed with his belongings. Nanette thanked CM and denied any further questions prior to discharge. JENNY called and left a voicemail regarding Pt's discharge with the [facility] Nursing Office.     11:18 AM  JENNY received a phone call back from [facility] FATEMEH Joshua who thanked  for the update and  requested CM fax a copy of Pt's discharge summary. Fax sent.     CM will sign off. Please contact CM if any additional needs arise prior to discharge.       REJI Greenberg

## 2025-01-09 NOTE — PLAN OF CARE
"Pt is A/O x4. Asked for tylenol for headache 8/10 pain this morning. Incontinent of stool/urine, had a primofit in place in the evening and had large bm in bed overnight. Changed bed and put brief on pt. VSS on RA.   Awaiting discharge to TCU.     Problem: Adult Inpatient Plan of Care  Goal: Plan of Care Review  Description: The Plan of Care Review/Shift note should be completed every shift.  The Outcome Evaluation is a brief statement about your assessment that the patient is improving, declining, or no change.  This information will be displayed automatically on your shift  note.  Outcome: Progressing  Goal: Patient-Specific Goal (Individualized)  Description: You can add care plan individualizations to a care plan. Examples of Individualization might be:  \"Parent requests to be called daily at 9am for status\", \"I have a hard time hearing out of my right ear\", or \"Do not touch me to wake me up as it startles  me\".  Outcome: Progressing  Goal: Absence of Hospital-Acquired Illness or Injury  Outcome: Progressing  Intervention: Identify and Manage Fall Risk  Recent Flowsheet Documentation  Taken 1/9/2025 0359 by Naga Arias, MARILUZ  Safety Promotion/Fall Prevention:   activity supervised   nonskid shoes/slippers when out of bed   room organization consistent   clutter free environment maintained  Intervention: Prevent Skin Injury  Recent Flowsheet Documentation  Taken 1/9/2025 0359 by Naga Arias, RN  Body Position:   weight shifting   supine  Intervention: Prevent and Manage VTE (Venous Thromboembolism) Risk  Recent Flowsheet Documentation  Taken 1/9/2025 0359 by Naga Arias, RN  VTE Prevention/Management: (on Eliquis) SCDs off (sequential compression devices)  Intervention: Prevent Infection  Recent Flowsheet Documentation  Taken 1/9/2025 0359 by Naga Arias, RN  Infection Prevention:   hand hygiene promoted   rest/sleep promoted  Goal: Optimal Comfort and Wellbeing  Outcome: Progressing  Goal: Readiness for Transition of " Care  Outcome: Progressing     Problem: Comorbidity Management  Goal: Blood Glucose Levels Within Targeted Range  Outcome: Progressing  Intervention: Monitor and Manage Glycemia  Recent Flowsheet Documentation  Taken 1/9/2025 0359 by Naga Arias RN  Medication Review/Management: medications reviewed  Goal: Blood Pressure in Desired Range  Outcome: Progressing  Intervention: Maintain Blood Pressure Management  Recent Flowsheet Documentation  Taken 1/9/2025 0359 by Naga Arias, RN  Medication Review/Management: medications reviewed     Problem: UTI (Urinary Tract Infection)  Goal: Improved Infection Symptoms  Outcome: Progressing     Problem: Urinary Retention  Goal: Effective Urinary Elimination  Outcome: Progressing     Problem: Stroke, Ischemic (Includes Transient Ischemic Attack)  Goal: Optimal Coping  Outcome: Progressing  Goal: Effective Bowel Elimination  Outcome: Progressing  Goal: Optimal Cerebral Tissue Perfusion  Outcome: Progressing  Goal: Optimal Cognitive Function  Outcome: Progressing  Goal: Improved Communication Skills  Outcome: Progressing  Goal: Optimal Functional Ability  Outcome: Progressing  Goal: Optimal Nutrition Intake  Outcome: Progressing  Goal: Effective Oxygenation and Ventilation  Outcome: Progressing  Intervention: Optimize Oxygenation and Ventilation  Recent Flowsheet Documentation  Taken 1/9/2025 0359 by Naga Arias RN  Head of Bed (HOB) Positioning: HOB at 30-45 degrees  Goal: Improved Sensorimotor Function  Outcome: Progressing  Intervention: Optimize Range of Motion, Motor Control and Function  Recent Flowsheet Documentation  Taken 1/9/2025 0359 by Naga Arias, RN  Positioning/Transfer Devices:   pillows   in use  Goal: Safe and Effective Swallow  Outcome: Progressing  Goal: Effective Urinary Elimination  Outcome: Progressing

## 2025-01-09 NOTE — DISCHARGE SUMMARY
"[facility]  Hospitalist Discharge Summary      Date of Admission:  1/5/2025  Date of Discharge:  1/9/2025  Discharging Provider: Michelet Cha DO, DO  Discharge Service: Hospitalist Service    Discharge Diagnoses       Clinically Significant Risk Factors     # DMII: A1C = 9.1 % (Ref range: <5.7 %) within past 6 months       Follow-ups Needed After Discharge   Follow-up Appointments       Follow Up and recommended labs and tests      Follow up with Nursing home physician.  No follow up labs or test are needed.            {Additional follow-up instructions/to-do's for PCP    :***}    Unresulted Labs Ordered in the Past 30 Days of this Admission       Date and Time Order Name Status Description    1/9/2025 12:01 AM Magnesium In process     1/9/2025 12:01 AM Potassium In process     1/9/2025 12:01 AM Creatinine In process     1/5/2025  8:59 PM Blood Culture Peripheral Blood Preliminary     1/5/2025  8:59 PM Blood Culture Line, venous Preliminary         These results will be followed up by ***    Discharge Disposition   {Discharge to:9493865::\"Discharged to home\"}  Condition at discharge: {CONDITION:506411::\"Stable\"}    Hospital Course   {OPTIONAL -- use to select A&P section   :092061}    Consultations This Hospital Stay   PHARMACY TO DOSE VANCO  NEUROLOGY IP STROKE CONSULT  SPEECH LANGUAGE PATH ADULT IP CONSULT  PHARMACY IP CONSULT  PHARMACY IP CONSULT  PHARMACY IP CONSULT  PHYSICAL THERAPY ADULT IP CONSULT  OCCUPATIONAL THERAPY ADULT IP CONSULT  REHAB ADMISSIONS LIAISON IP CONSULT  CARE MANAGEMENT / SOCIAL WORK IP CONSULT  PHARMACY TO DOSE VANCO  CARE MANAGEMENT / SOCIAL WORK IP CONSULT  NEUROLOGY IP CONSULT  CARE MANAGEMENT / SOCIAL WORK IP CONSULT  NUTRITION SERVICES ADULT IP CONSULT  PHYSICAL THERAPY ADULT IP CONSULT  OCCUPATIONAL THERAPY ADULT IP CONSULT  SMOKING CESSATION PROGRAM IP CONSULT    Code Status   No CPR- Do NOT Intubate    Time Spent on this Encounter   {How much time " did you spend on discharge?:80103504}       Michelet Cha, DO, DO  [address] [place] [id number] [facility] [address]  Phone: [phone]  Fax: [phone]  ______________________________________________________________________    Physical Exam   Vital Signs: Temp: 98  F (36.7  C) Temp src: Oral BP: (!) 170/81 (nurse notified) Pulse: 59   Resp: 18 SpO2: 95 % O2 Device: None (Room air)    Weight: 220 lbs 0 oz  {Recommend personal SmartPhrase or Notewriter for exam (OPTIONAL)   :747633}       Primary Care Physician   Rashida Madison    Discharge Orders      General info for SNF    Length of Stay Estimate: Short Term Care: Estimated # of Days <30  Condition at Discharge: Stable  Level of care:skilled   Rehabilitation Potential: Fair  Admission H&P remains valid and up-to-date: Yes  Recent Chemotherapy: N/A  Use Nursing Home Standing Orders: Yes     Mantoux instructions    Give two-step Mantoux (PPD) Per Facility Policy Yes     Follow Up and recommended labs and tests    Follow up with Nursing home physician.  No follow up labs or test are needed.     Reason for your hospital stay    Right sided weakness     Glucose monitor nursing POCT    Before meals and at bedtime     Intake and output    Every shift     Daily weights    Call Provider for weight gain of more than 2 pounds per day or 5 pounds per week.     Activity - Up with nursing assistance     Weight bearing status    WBAT with assist     No CPR- Do NOT Intubate     Nutrition Services Adult IP Consult    Reason:  Nutrition monitoring     Physical Therapy Adult Consult    Evaluate and treat as clinically indicated.    Reason:  weakness     Occupational Therapy Adult Consult    Evaluate and treat as clinically indicated.    Reason:  weakness     Fall precautions     Diet    Follow this diet upon discharge: Current Diet:Orders Placed This Encounter      Combination Diet Moderate Consistent Carb (60 g CHO per Meal) Diet; Regular  Diet; Thin Liquids (level 0)       Significant Results and Procedures   {Data for Discharge Summary:653178}    Discharge Medications   Current Discharge Medication List        START taking these medications    Details   amLODIPine (NORVASC) 5 MG tablet Take 1 tablet (5 mg) by mouth daily.    Comments: Hold for SBP < 110  Associated Diagnoses: Benign essential hypertension      !! insulin aspart (NOVOLOG PEN) 100 UNIT/ML pen Inject 1-7 Units subcutaneously 3 times daily (before meals). Correction Scale - MEDIUM INSULIN RESISTANCE DOSING   Do Not give Correction Insulin if Pre-Meal BG less than 140. For Pre-Meal  - 189 give 1 unit. For Pre-Meal  - 239 give 2 units. For Pre-Meal  - 289 give 3 units. For Pre-Meal  - 339 give 4 units. For Pre-Meal - 389 give 5 units. For Pre-Meal -439 give 6 units For Pre-Meal BG greater than or equal to 440 give 7 units. To be given with prandial insulin, and based on pre-meal blood glucose. Administering insulin within 5 minutes of the start of the meal is ideal. Administer insulin no more than 30 minutes after the start of the meal, unless directed otherwise by provider.   Notify provider if glucose greater than or equal to 350 mg/dL after administration of correction dose.    Associated Diagnoses: Type 2 diabetes mellitus with hyperglycemia, with long-term current use of insulin (H)      !! insulin aspart (NOVOLOG PEN) 100 UNIT/ML pen Inject 4 Units subcutaneously 3 times daily (with meals).    Associated Diagnoses: Type 2 diabetes mellitus with hyperglycemia, with long-term current use of insulin (H)      insulin glargine (LANTUS PEN) 100 UNIT/ML pen Inject 10 Units subcutaneously every morning (before breakfast).    Comments: If Lantus is not covered by insurance, may substitute Basaglar or Semglee or other insulin glargine product per insurance preference at same dose and frequency.    Associated Diagnoses: Type 2 diabetes mellitus with  hyperglycemia, with long-term current use of insulin (H)       !! - Potential duplicate medications found. Please discuss with provider.        CONTINUE these medications which have CHANGED    Details   acetaminophen (TYLENOL) 325 MG tablet Take 2 tablets (650 mg) by mouth every 4 hours as needed for mild pain or fever (temperature greater than 100.4  F (38  C)).    Associated Diagnoses: Pain           CONTINUE these medications which have NOT CHANGED    Details   apixaban ANTICOAGULANT (ELIQUIS) 5 MG tablet Take 5 mg by mouth 2 times daily.      ARIPiprazole (ABILIFY) 2 MG tablet [ARIPIPRAZOLE (ABILIFY) 2 MG TABLET] Take 2 mg by mouth at bedtime.       brimonidine (ALPHAGAN) 0.2 % ophthalmic solution [BRIMONIDINE (ALPHAGAN) 0.2 % OPHTHALMIC SOLUTION] Administer 1 drop to both eyes 2 (two) times a day.       dorzolamide-timolol (COSOPT) 2-0.5 % ophthalmic solution Place 1 drop into both eyes 2 times daily      famotidine (PEPCID) 20 MG tablet Take 20 mg by mouth daily.      fluvoxaMINE (LUVOX) 50 MG tablet [FLUVOXAMINE (LUVOX) 50 MG TABLET] Take 50 mg by mouth at bedtime.       gabapentin (NEURONTIN) 300 MG capsule Take 300 mg by mouth 2 times daily      latanoprostene bunod 0.024 % Drop Place 1 drop into both eyes At Bedtime      levomefolate calcium (L-METHYLFOLATE ORAL) Take 15 mg by mouth daily.      netarsudiL (RHOPRESSA) 0.02 % Drop Place 1 drop into both eyes at bedtime.           STOP taking these medications       calcium carbonate (TUMS) 500 MG chewable tablet Comments:   Reason for Stopping:         ciprofloxacin (CIPRO) 500 MG tablet Comments:   Reason for Stopping:         metFORMIN (GLUCOPHAGE) 1000 MG tablet Comments:   Reason for Stopping:         polyethylene glycol (MIRALAX) 17 g packet Comments:   Reason for Stopping:             Allergies   No Known Allergies   tablet Take 2 tablets (650 mg) by mouth every 4 hours as needed for mild pain or fever (temperature greater than 100.4  F (38  C)).    Associated Diagnoses: Pain           CONTINUE these medications which have NOT CHANGED    Details   apixaban ANTICOAGULANT (ELIQUIS) 5 MG tablet Take 5 mg by mouth 2 times daily.      ARIPiprazole (ABILIFY) 2 MG tablet [ARIPIPRAZOLE (ABILIFY) 2 MG TABLET] Take 2 mg by mouth at bedtime.       brimonidine (ALPHAGAN) 0.2 % ophthalmic solution [BRIMONIDINE (ALPHAGAN) 0.2 % OPHTHALMIC SOLUTION] Administer 1 drop to both eyes 2 (two) times a day.       dorzolamide-timolol (COSOPT) 2-0.5 % ophthalmic solution Place 1 drop into both eyes 2 times daily      famotidine (PEPCID) 20 MG tablet Take 20 mg by mouth daily.      fluvoxaMINE (LUVOX) 50 MG tablet [FLUVOXAMINE (LUVOX) 50 MG TABLET] Take 50 mg by mouth at bedtime.       gabapentin (NEURONTIN) 300 MG capsule Take 300 mg by mouth 2 times daily      latanoprostene bunod 0.024 % Drop Place 1 drop into both eyes At Bedtime      levomefolate calcium (L-METHYLFOLATE ORAL) Take 15 mg by mouth daily.      netarsudiL (RHOPRESSA) 0.02 % Drop Place 1 drop into both eyes at bedtime.           STOP taking these medications       calcium carbonate (TUMS) 500 MG chewable tablet Comments:   Reason for Stopping:         ciprofloxacin (CIPRO) 500 MG tablet Comments:   Reason for Stopping:         metFORMIN (GLUCOPHAGE) 1000 MG tablet Comments:   Reason for Stopping:         polyethylene glycol (MIRALAX) 17 g packet Comments:   Reason for Stopping:             Allergies   No Known Allergies

## 2025-01-09 NOTE — PLAN OF CARE
Occupational Therapy Discharge Summary    Reason for therapy discharge:    Discharged to transitional care facility.    Progress towards therapy goal(s). See goals on Care Plan in [facility] electronic health record for goal details.  Goals not met.  Barriers to achieving goals:   discharge from facility.    Therapy recommendation(s):    Continued therapy is recommended.  Rationale/Recommendations:  recommend continued OT services at TCU.    Casandra Mack OTR/L 1/9/25

## 2025-01-09 NOTE — PLAN OF CARE
Physical Therapy Discharge Summary    Reason for therapy discharge:    Discharged to transitional care facility.    Progress towards therapy goal(s). See goals on Care Plan in [facility] electronic health record for goal details.  Goals not met.  Barriers to achieving goals:   discharge from facility.    Therapy recommendation(s):    Continued therapy is recommended.  Rationale/Recommendations:  to improve functional mobility.                         patient feels "great" s/p gi cocktail. will send home with GI follow up and diet recommendations - Jennifer Pinon PA-C

## 2025-01-09 NOTE — PLAN OF CARE
Problem: Stroke, Ischemic (Includes Transient Ischemic Attack)  Goal: Optimal Coping  Outcome: Progressing     Problem: Adult Inpatient Plan of Care  Goal: Absence of Hospital-Acquired Illness or Injury  Intervention: Identify and Manage Fall Risk  Recent Flowsheet Documentation  Taken 1/8/2025 1630 by Ruben Burden, RN  Safety Promotion/Fall Prevention:   activity supervised   assistive device/personal items within reach   clutter free environment maintained   increased rounding and observation   increase visualization of patient   lighting adjusted   nonskid shoes/slippers when out of bed   patient and family education   room door open   room organization consistent   safety round/check completed   Goal Outcome Evaluation:       VSS on RA. A&Ox4. Neuros intact. Denies pain. Up w/ 2 & W. Blind, feeder.

## 2025-01-10 ENCOUNTER — TRANSITIONAL CARE UNIT VISIT (OUTPATIENT)
Dept: GERIATRICS | Facility: CLINIC | Age: 75
End: 2025-01-10
Payer: MEDICARE

## 2025-01-10 ENCOUNTER — LAB REQUISITION (OUTPATIENT)
Dept: LAB | Facility: CLINIC | Age: 75
End: 2025-01-10
Payer: MEDICARE

## 2025-01-10 VITALS
OXYGEN SATURATION: 96 % | HEART RATE: 72 BPM | RESPIRATION RATE: 16 BRPM | WEIGHT: 209 LBS | SYSTOLIC BLOOD PRESSURE: 149 MMHG | TEMPERATURE: 97.9 F | BODY MASS INDEX: 28.35 KG/M2 | DIASTOLIC BLOOD PRESSURE: 81 MMHG

## 2025-01-10 DIAGNOSIS — E11.42 TYPE 2 DIABETES MELLITUS WITH DIABETIC POLYNEUROPATHY, WITH LONG-TERM CURRENT USE OF INSULIN (H): ICD-10-CM

## 2025-01-10 DIAGNOSIS — E11.65 TYPE 2 DIABETES MELLITUS WITH HYPERGLYCEMIA (H): ICD-10-CM

## 2025-01-10 DIAGNOSIS — I48.0 PAROXYSMAL ATRIAL FIBRILLATION (H): ICD-10-CM

## 2025-01-10 DIAGNOSIS — H40.003 GLAUCOMA SUSPECT, BILATERAL: ICD-10-CM

## 2025-01-10 DIAGNOSIS — R53.1 RIGHT SIDED WEAKNESS: ICD-10-CM

## 2025-01-10 DIAGNOSIS — I63.9 CEREBROVASCULAR ACCIDENT (CVA), UNSPECIFIED MECHANISM (H): Primary | ICD-10-CM

## 2025-01-10 DIAGNOSIS — I10 ESSENTIAL HYPERTENSION: ICD-10-CM

## 2025-01-10 DIAGNOSIS — E83.42 HYPOMAGNESEMIA: ICD-10-CM

## 2025-01-10 DIAGNOSIS — F33.41 MAJOR DEPRESSIVE DISORDER, RECURRENT EPISODE, IN PARTIAL REMISSION: ICD-10-CM

## 2025-01-10 DIAGNOSIS — Z79.4 TYPE 2 DIABETES MELLITUS WITH DIABETIC POLYNEUROPATHY, WITH LONG-TERM CURRENT USE OF INSULIN (H): ICD-10-CM

## 2025-01-10 LAB — BACTERIA BLD CULT: NORMAL

## 2025-01-10 PROCEDURE — 99310 SBSQ NF CARE HIGH MDM 45: CPT | Performed by: NURSE PRACTITIONER

## 2025-01-10 NOTE — LETTER
1/10/2025      Rakesh Samuels  [address]  [facility] [address]        [facility] GERIATRICS  INITIAL VISIT NOTE  January 10, 2025      PRIMARY CARE PROVIDER AND CLINIC:  Rashida Madison [address] / [place] [address]    [facility] Medical Record Number:  [id number]  Place of Service where encounter took place:  [facility] ([facility]) [[id number]]    Chief Complaint   Patient presents with     Hospital F/U       HPI:    Rakesh Samuels is a 74 year old  (1950) male was admitted to the above facility from  [facility]. Hospital stay 1/5/2025 through 1/9/2025 where they were admitted for probably subacute infarct involving right side internal capsule with right side weakness  Now admitted to this facility for  rehab, medical management, and nursing care.      History obtained from: facility chart records, facility staff, patient report, [facility] chart review, and discharge summary .      Brief Hospital Course:   Rakesh is a 74 year old gentleman with chronic health conditions that include Paroxysmal atrial fibrillation, major depression, bilateral glaucoma, type 2 DM, and Hypertension.    On 1/5/2025, Rakesh presented to the emergency room with right-sided weakness which was a concern for an acute stroke.  MRI of the brain revealed probable small late subacute infarct involving the genu of the internal capsule on the right side.  He was also found to have lactic acidosis on admission which was a concern for sepsis.  No infection was found.  Rakesh reported having weakness on the right side but findings on MRI of the brain with probable small late subacute infarct of right internal capsule did not fit his clinical presentation.  PT and OT became involved and recommended TCU for further rehab before returning to his community setting    Further workup with a CT angiogram was negative for significant large vessel occlusions/stenosis.  Echo showed  normal LVEF.  Neurology was consulted.  Was not felt that a statin was indicated as his LDL was at 69.  Continued with his Eliquis that he was taken at home for atrial fibrillation.    Did have a low magnesium while in the hospital and so this was replaced.  Magnesium before discharge was at 2.1.    Upon admission Rakesh had a elevated lactic acid.  It did return to normal with IV fluids.  He did receive a CT of his chest, abdomen and pelvis which did not show any acute pathology.  He does have a history of frequent UTIs, but his UA on admission was negative.  The mild leukocytosis that was seen on initial assessment, returned to normal.  Blood cultures were done and so far showed no results.    Emergency room found that his blood sugar was elevated upon admission.  The latest A1c was greater than 9%.  It was suggested to continue with his outpatient therapy and follow-up with primary provider to optimize better blood sugar control.       TCU Course:   Meeting with Rakesh today as he is laying on his bed on his right side.  Pulled up a chair in order to face him to have a conversation.  Alert and oriented x 3 with minor forgetfulness noted.  Eventually he would think of the answer to the question and abruptly called the answer.    Rakesh states he is considered legally blind due to his glaucoma.  He does have glasses.  Asked him if he sees details when he is looking at this nurse practitioner or just objects.  He stated just objects.  Rakesh does live in the [facility] in [place] in which he can get assistance if needed.    Currently Rakesh does not have any pain.  Reviewed his medications with him.  Facility checks his blood sugars.  Asked him if he typically has a sliding scale insulin.  Rakesh denied taking any insulin and stated that he typically was on oral medications.  Rakesh is only on insulin here at the TCU.  Goal of care is to go back to his previous living condition when he is sufficiently ready.    CODE  STATUS/ADVANCE DIRECTIVES: DNR / DNI    ALLERGIES:  No Known Allergies    PAST MEDICAL HISTORY:   Past Medical History:   Diagnosis Date     A-fib (H)      Acute hemodialysis encounter     Due to CHIDI     Acute kidney injury      Acute respiratory failure with hypoxemia (H)      Adrenal incidentaloma      Asbestosis (H)      ATN (acute tubular necrosis)      Atrophy of right kidney      Basal cell carcinoma      BPH without urinary obstruction      Cellulitis     Left foot     Cholelithiasis      Depression with anxiety      Diabetes mellitus, type 2 (H) 4/12/2020     Esophagitis      Gastritis      Glaucoma      Hematemesis, presence of nausea not specified      Hyperkalemia      Hyperlipemia      Kidney stone      Lactic acidosis      Metabolic acidosis      Metformin overdose of undetermined intent      Paroxysmal atrial fibrillation (H) 2/18/2020     Pericardial effusion      PTSD (post-traumatic stress disorder)      Seasonal allergies      Sepsis due to urinary tract infection (H)      Shock circulatory (H)      SIRS (systemic inflammatory response syndrome) (H)      Sleep apnea     uses a machine at night.      Upper GI bleed        PAST SURGICAL HISTORY:   Past Surgical History:   Procedure Laterality Date     EYE SURGERY      congenital ptosis right upper lid     HC CYSTOSCOPY,INSERT URETERAL STENT Left 4/12/2020    Procedure: CYSTOSCOPY, WITH URETERAL STENT INSERTION;  Surgeon: Christopher Yates MD;  Location: [facility];  Service: Urology     MT ESOPHAGOGASTRODUODENOSCOPY TRANSORAL DIAGNOSTIC N/A 4/13/2020    Procedure: ESOPHAGOGASTRODUODENOSCOPY (EGD);  Surgeon: Robert Rico MD;  Location: [facility];  Service: Gastroenterology     REPLACEMENT TOTAL KNEE Left        FAMILY HISTORY:   Family History   Problem Relation Age of Onset     Diabetes Mother          SOCIAL HISTORY:   Patient's living condition: lives in an assisted living facility    MEDICATIONS  Post Discharge Medication  Reconciliation Status: discharge medications reconciled and changed, per note/orders.  Current Outpatient Medications   Medication Sig Dispense Refill     insulin aspart (NOVOLOG PEN) 100 UNIT/ML pen Inject 1-7 Units subcutaneously 3 times daily (before meals). Correction Scale - MEDIUM INSULIN RESISTANCE DOSING   Do Not give Correction Insulin if Pre-Meal BG less than 140. For Pre-Meal  - 189 give 1 unit. For Pre-Meal  - 239 give 2 units. For Pre-Meal  - 289 give 3 units. For Pre-Meal  - 339 give 4 units. For Pre-Meal - 389 give 5 units. For Pre-Meal -439 give 6 units For Pre-Meal BG greater than or equal to 440 give 7 units. To be given with prandial insulin, and based on pre-meal blood glucose. Administering insulin within 5 minutes of the start of the meal is ideal. Administer insulin no more than 30 minutes after the start of the meal, unless directed otherwise by provider.   Notify provider if glucose greater than or equal to 350 mg/dL after administration of correction dose.       insulin glargine (LANTUS PEN) 100 UNIT/ML pen Inject 12 Units subcutaneously every morning (before breakfast).       acetaminophen (TYLENOL) 325 MG tablet Take 2 tablets (650 mg) by mouth every 4 hours as needed for mild pain or fever (temperature greater than 100.4  F (38  C)).       amLODIPine (NORVASC) 5 MG tablet Take 1 tablet (5 mg) by mouth daily.       apixaban ANTICOAGULANT (ELIQUIS) 5 MG tablet Take 5 mg by mouth 2 times daily.       ARIPiprazole (ABILIFY) 2 MG tablet [ARIPIPRAZOLE (ABILIFY) 2 MG TABLET] Take 2 mg by mouth at bedtime.        brimonidine (ALPHAGAN) 0.2 % ophthalmic solution [BRIMONIDINE (ALPHAGAN) 0.2 % OPHTHALMIC SOLUTION] Administer 1 drop to both eyes 2 (two) times a day.        dorzolamide-timolol (COSOPT) 2-0.5 % ophthalmic solution Place 1 drop into both eyes 2 times daily       famotidine (PEPCID) 20 MG tablet Take 20 mg by mouth daily.       fluvoxaMINE (LUVOX) 50  MG tablet [FLUVOXAMINE (LUVOX) 50 MG TABLET] Take 50 mg by mouth at bedtime.        gabapentin (NEURONTIN) 300 MG capsule Take 300 mg by mouth 2 times daily       insulin aspart (NOVOLOG PEN) 100 UNIT/ML pen Inject 4 Units subcutaneously 3 times daily (with meals).       latanoprostene bunod 0.024 % Drop Place 1 drop into both eyes At Bedtime       levomefolate calcium (L-METHYLFOLATE ORAL) Take 15 mg by mouth daily.       netarsudiL (RHOPRESSA) 0.02 % Drop Place 1 drop into both eyes at bedtime.         ROS:  10 point ROS neg other than the symptoms noted above in the HPI.  No chest pain, no SOB.  No stomach discomfort.      PHYSICAL EXAM:  /81   Pulse 72   Temp 97.9  F (36.6  C)   Resp 16   Wt 94.8 kg (209 lb)   SpO2 96%   BMI 28.35 kg/m    Physical Exam  Vitals and nursing note reviewed.   Constitutional:       Appearance: Normal appearance.   HENT:      Head: Normocephalic.      Right Ear: External ear normal.      Left Ear: External ear normal.      Ears:      Comments: No hearing aides.     Nose: Nose normal.      Mouth/Throat:      Mouth: Mucous membranes are moist.      Pharynx: Oropharynx is clear.      Comments: No dentures  Eyes:      Conjunctiva/sclera: Conjunctivae normal.      Comments: Legally blind, sclera's clear.  Not wearing his glasses.  Can see objects   Cardiovascular:      Rate and Rhythm: Normal rate. Rhythm irregular.      Heart sounds: Normal heart sounds.   Pulmonary:      Effort: Pulmonary effort is normal.      Breath sounds: Normal breath sounds.   Abdominal:      General: Bowel sounds are normal.      Palpations: Abdomen is soft.   Genitourinary:     Comments: Continent of bowel  Incontinent of bladder  Musculoskeletal:      Cervical back: Normal range of motion.      Comments: Mild right side weakness when squeezing hands.  Uses a wheelchair and a 4WW at home.     Skin:     General: Skin is warm and dry.      Coloration: Skin is pale.   Neurological:      Mental Status: He  is alert and oriented to person, place, and time.   Psychiatric:         Mood and Affect: Mood normal.         Behavior: Behavior normal.         Thought Content: Thought content normal.      LABORATORY/IMAGING DATA:  Reviewed as per [facility] and/or [facility]    ASSESSMENT/PLAN:  (I63.9) Cerebrovascular accident (CVA), unspecified mechanism (H)  (primary encounter diagnosis)  (R53.1) Right sided weakness  Comment: At this time Rakesh will be receiving PT and OT.  Today usually is a day of assessments after they have been admitted to the unit.  Would be nice to see him to become independent again with a four-wheel walker or the wheelchair.  He is on Eliquis 5 mg twice a day.  It was felt a statin was not necessary.  No plans for follow-up with neurology    (I48.0) Paroxysmal atrial fibrillation (H)  Comment: Already was on Eliquis 5 mg daily and so will remain on this medication.  Monitor for any excessive bleeding or bruising but at this time seems to be tolerating well.    (E11.42,  Z79.4) Type 2 diabetes mellitus with diabetic polyneuropathy, with long-term current use of insulin (H)  Comment: Jb only had 4 blood sugars taken so far.  The range has been from 158 up to 253.  Both of those numbers were yesterday supper and bedtime readings.  For breakfast and lunch today he was 175 and 244 respectively.  Will proceed to increase his Lantus up to 12 units every morning.  He does get a set dose of NovoLog 4 units 3 times a day with the sliding scale.  Will add to the 4 units of NovoLog order to hold if blood sugar is under 110.  The sliding scale does not start until his blood sugar is at 140 or above.  Will continue to monitor his blood sugars while he is here and adjust accordingly.  Since he does live at [facility] most likely they will not want the sliding scale upon readmission.    (I10) Essential hypertension  Comment: 120-150's/80-90's so far recorded.  Rakesh does receive amlodipine 5 mg every a.m. which targets  the diastolic blood pressure.  No other blood pressure medications.  Vitals are done on a daily basis while here    (E83.42) Hypomagnesemia  Comment: Did not come with any magnesium orders as his magnesium level was corrected in the hospital did not write to check a magnesium level on Monday.  He will be seen by the regular rounding NP who may order a level at some point.  Otherwise followed by outpatient    (F33.41) Major depressive disorder, recurrent episode, in partial remission  Comment: Rakesh does receive Abilify 2 mg at bedtime as well as fluvoxamine 50mg at HS. no change at this time.  Expect right now his behaviors to be more apprehensive due to a new environment and learning a different routine.  Continue to let him vent to any feelings but also told him to make sure he uses his call button to let staff know if he needs anything.    (H40.003) Glaucoma suspect, bilateral  Comment: Rakesh does take multiple eye medications for his glaucoma.  He does have glasses but not on him at this moment.  Explained to him where things are and make his bedside table close to him so that he can manipulate his own environment.        Orders:    CBC and BMP on Monday for Type 2 DM, Atrial Fibrillation  Increase Lantus to 12 units subcutaneous every am for type 2 DM  Add to Novolog 4 units subcutaneous TID order:  hold if BS below 110  - type 2 DM    Total time spent with patient visit at the skilled nursing facility was 45 min including patient visit and review of past records. Greater than 50% of total time spent with counseling and coordinating care due to gathering information, discussion of care with staff, goals of care and anticipation of discharge with patient.      Electronically signed by:  PAULA Huston CNP                Sincerely,        PAULA Huston CNP    Electronically signed

## 2025-01-10 NOTE — PROGRESS NOTES
[facility]  INITIAL VISIT NOTE  January 10, 2025      PRIMARY CARE PROVIDER AND CLINIC:  Rashida Madison [address] [address] / [place] [address]    [facility] Medical Record Number:  [id number]  Place of Service where encounter took place:  [facility] ([facility]) [[id number]]    Chief Complaint   Patient presents with    Hospital F/U       HPI:    Rakesh Samuels is a 74 year old  (1950) male was admitted to the above facility from  [facility]. Hospital stay 1/5/2025 through 1/9/2025 where they were admitted for probably subacute infarct involving right side internal capsule with right side weakness  Now admitted to this facility for  rehab, medical management, and nursing care.      History obtained from: facility chart records, facility staff, patient report, [facility] chart review, and discharge summary .      Brief Hospital Course:   Rakesh is a 74 year old gentleman with chronic health conditions that include Paroxysmal atrial fibrillation, major depression, bilateral glaucoma, type 2 DM, and Hypertension.    On 1/5/2025, Rakesh presented to the emergency room with right-sided weakness which was a concern for an acute stroke.  MRI of the brain revealed probable small late subacute infarct involving the genu of the internal capsule on the right side.  He was also found to have lactic acidosis on admission which was a concern for sepsis.  No infection was found.  Rakesh reported having weakness on the right side but findings on MRI of the brain with probable small late subacute infarct of right internal capsule did not fit his clinical presentation.  PT and OT became involved and recommended TCU for further rehab before returning to his community setting    Further workup with a CT angiogram was negative for significant large vessel occlusions/stenosis.  Echo showed normal LVEF.  Neurology was consulted.  Was not felt that a statin was indicated as  his LDL was at 69.  Continued with his Eliquis that he was taken at home for atrial fibrillation.    Did have a low magnesium while in the hospital and so this was replaced.  Magnesium before discharge was at 2.1.    Upon admission Rakesh had a elevated lactic acid.  It did return to normal with IV fluids.  He did receive a CT of his chest, abdomen and pelvis which did not show any acute pathology.  He does have a history of frequent UTIs, but his UA on admission was negative.  The mild leukocytosis that was seen on initial assessment, returned to normal.  Blood cultures were done and so far showed no results.    Emergency room found that his blood sugar was elevated upon admission.  The latest A1c was greater than 9%.  It was suggested to continue with his outpatient therapy and follow-up with primary provider to optimize better blood sugar control.       TCU Course:   Meeting with Rakesh today as he is laying on his bed on his right side.  Pulled up a chair in order to face him to have a conversation.  Alert and oriented x 3 with minor forgetfulness noted.  Eventually he would think of the answer to the question and abruptly called the answer.    Rakesh states he is considered legally blind due to his glaucoma.  He does have glasses.  Asked him if he sees details when he is looking at this nurse practitioner or just objects.  He stated just objects.  Rakesh does live in the [facility] in [place] in which he can get assistance if needed.    Currently Rakesh does not have any pain.  Reviewed his medications with him.  Facility checks his blood sugars.  Asked him if he typically has a sliding scale insulin.  Rakesh denied taking any insulin and stated that he typically was on oral medications.  Rakesh is only on insulin here at the TCU.  Goal of care is to go back to his previous living condition when he is sufficiently ready.    CODE STATUS/ADVANCE DIRECTIVES: DNR / DNI    ALLERGIES:  No Known Allergies    PAST MEDICAL HISTORY:    Past Medical History:   Diagnosis Date    A-fib (H)     Acute hemodialysis encounter     Due to CHIDI    Acute kidney injury     Acute respiratory failure with hypoxemia (H)     Adrenal incidentaloma     Asbestosis (H)     ATN (acute tubular necrosis)     Atrophy of right kidney     Basal cell carcinoma     BPH without urinary obstruction     Cellulitis     Left foot    Cholelithiasis     Depression with anxiety     Diabetes mellitus, type 2 (H) 4/12/2020    Esophagitis     Gastritis     Glaucoma     Hematemesis, presence of nausea not specified     Hyperkalemia     Hyperlipemia     Kidney stone     Lactic acidosis     Metabolic acidosis     Metformin overdose of undetermined intent     Paroxysmal atrial fibrillation (H) 2/18/2020    Pericardial effusion     PTSD (post-traumatic stress disorder)     Seasonal allergies     Sepsis due to urinary tract infection (H)     Shock circulatory (H)     SIRS (systemic inflammatory response syndrome) (H)     Sleep apnea     uses a machine at night.     Upper GI bleed        PAST SURGICAL HISTORY:   Past Surgical History:   Procedure Laterality Date    EYE SURGERY      congenital ptosis right upper lid    HC CYSTOSCOPY,INSERT URETERAL STENT Left 4/12/2020    Procedure: CYSTOSCOPY, WITH URETERAL STENT INSERTION;  Surgeon: Christopher Yates MD;  Location: [facility] OR;  Service: Urology    MI ESOPHAGOGASTRODUODENOSCOPY TRANSORAL DIAGNOSTIC N/A 4/13/2020    Procedure: ESOPHAGOGASTRODUODENOSCOPY (EGD);  Surgeon: Robert Rico MD;  Location: [facility];  Service: Gastroenterology    REPLACEMENT TOTAL KNEE Left        FAMILY HISTORY:   Family History   Problem Relation Age of Onset    Diabetes Mother          SOCIAL HISTORY:   Patient's living condition: lives in an assisted living facility    MEDICATIONS  Post Discharge Medication Reconciliation Status: discharge medications reconciled and changed, per note/orders.  Current Outpatient Medications   Medication Sig Dispense  Refill    insulin aspart (NOVOLOG PEN) 100 UNIT/ML pen Inject 1-7 Units subcutaneously 3 times daily (before meals). Correction Scale - MEDIUM INSULIN RESISTANCE DOSING   Do Not give Correction Insulin if Pre-Meal BG less than 140. For Pre-Meal  - 189 give 1 unit. For Pre-Meal  - 239 give 2 units. For Pre-Meal  - 289 give 3 units. For Pre-Meal  - 339 give 4 units. For Pre-Meal - 389 give 5 units. For Pre-Meal -439 give 6 units For Pre-Meal BG greater than or equal to 440 give 7 units. To be given with prandial insulin, and based on pre-meal blood glucose. Administering insulin within 5 minutes of the start of the meal is ideal. Administer insulin no more than 30 minutes after the start of the meal, unless directed otherwise by provider.   Notify provider if glucose greater than or equal to 350 mg/dL after administration of correction dose.      insulin glargine (LANTUS PEN) 100 UNIT/ML pen Inject 12 Units subcutaneously every morning (before breakfast).      acetaminophen (TYLENOL) 325 MG tablet Take 2 tablets (650 mg) by mouth every 4 hours as needed for mild pain or fever (temperature greater than 100.4  F (38  C)).      amLODIPine (NORVASC) 5 MG tablet Take 1 tablet (5 mg) by mouth daily.      apixaban ANTICOAGULANT (ELIQUIS) 5 MG tablet Take 5 mg by mouth 2 times daily.      ARIPiprazole (ABILIFY) 2 MG tablet [ARIPIPRAZOLE (ABILIFY) 2 MG TABLET] Take 2 mg by mouth at bedtime.       brimonidine (ALPHAGAN) 0.2 % ophthalmic solution [BRIMONIDINE (ALPHAGAN) 0.2 % OPHTHALMIC SOLUTION] Administer 1 drop to both eyes 2 (two) times a day.       dorzolamide-timolol (COSOPT) 2-0.5 % ophthalmic solution Place 1 drop into both eyes 2 times daily      famotidine (PEPCID) 20 MG tablet Take 20 mg by mouth daily.      fluvoxaMINE (LUVOX) 50 MG tablet [FLUVOXAMINE (LUVOX) 50 MG TABLET] Take 50 mg by mouth at bedtime.       gabapentin (NEURONTIN) 300 MG capsule Take 300 mg by mouth 2 times  daily      insulin aspart (NOVOLOG PEN) 100 UNIT/ML pen Inject 4 Units subcutaneously 3 times daily (with meals).      latanoprostene bunod 0.024 % Drop Place 1 drop into both eyes At Bedtime      levomefolate calcium (L-METHYLFOLATE ORAL) Take 15 mg by mouth daily.      netarsudiL (RHOPRESSA) 0.02 % Drop Place 1 drop into both eyes at bedtime.         ROS:  10 point ROS neg other than the symptoms noted above in the HPI.  No chest pain, no SOB.  No stomach discomfort.      PHYSICAL EXAM:  /81   Pulse 72   Temp 97.9  F (36.6  C)   Resp 16   Wt 94.8 kg (209 lb)   SpO2 96%   BMI 28.35 kg/m    Physical Exam  Vitals and nursing note reviewed.   Constitutional:       Appearance: Normal appearance.   HENT:      Head: Normocephalic.      Right Ear: External ear normal.      Left Ear: External ear normal.      Ears:      Comments: No hearing aides.     Nose: Nose normal.      Mouth/Throat:      Mouth: Mucous membranes are moist.      Pharynx: Oropharynx is clear.      Comments: No dentures  Eyes:      Conjunctiva/sclera: Conjunctivae normal.      Comments: Legally blind, sclera's clear.  Not wearing his glasses.  Can see objects   Cardiovascular:      Rate and Rhythm: Normal rate. Rhythm irregular.      Heart sounds: Normal heart sounds.   Pulmonary:      Effort: Pulmonary effort is normal.      Breath sounds: Normal breath sounds.   Abdominal:      General: Bowel sounds are normal.      Palpations: Abdomen is soft.   Genitourinary:     Comments: Continent of bowel  Incontinent of bladder  Musculoskeletal:      Cervical back: Normal range of motion.      Comments: Mild right side weakness when squeezing hands.  Uses a wheelchair and a 4WW at home.     Skin:     General: Skin is warm and dry.      Coloration: Skin is pale.   Neurological:      Mental Status: He is alert and oriented to person, place, and time.   Psychiatric:         Mood and Affect: Mood normal.         Behavior: Behavior normal.         Thought  Content: Thought content normal.      LABORATORY/IMAGING DATA:  Reviewed as per [facility] and/or [facility]    ASSESSMENT/PLAN:  (I63.9) Cerebrovascular accident (CVA), unspecified mechanism (H)  (primary encounter diagnosis)  (R53.1) Right sided weakness  Comment: At this time Rakesh will be receiving PT and OT.  Today usually is a day of assessments after they have been admitted to the unit.  Would be nice to see him to become independent again with a four-wheel walker or the wheelchair.  He is on Eliquis 5 mg twice a day.  It was felt a statin was not necessary.  No plans for follow-up with neurology    (I48.0) Paroxysmal atrial fibrillation (H)  Comment: Already was on Eliquis 5 mg daily and so will remain on this medication.  Monitor for any excessive bleeding or bruising but at this time seems to be tolerating well.    (E11.42,  Z79.4) Type 2 diabetes mellitus with diabetic polyneuropathy, with long-term current use of insulin (H)  Comment: Jb only had 4 blood sugars taken so far.  The range has been from 158 up to 253.  Both of those numbers were yesterday supper and bedtime readings.  For breakfast and lunch today he was 175 and 244 respectively.  Will proceed to increase his Lantus up to 12 units every morning.  He does get a set dose of NovoLog 4 units 3 times a day with the sliding scale.  Will add to the 4 units of NovoLog order to hold if blood sugar is under 110.  The sliding scale does not start until his blood sugar is at 140 or above.  Will continue to monitor his blood sugars while he is here and adjust accordingly.  Since he does live at [facility] most likely they will not want the sliding scale upon readmission.    (I10) Essential hypertension  Comment: 120-150's/80-90's so far recorded.  Rakesh does receive amlodipine 5 mg every a.m. which targets the diastolic blood pressure.  No other blood pressure medications.  Vitals are done on a daily basis while here    (E83.42) Hypomagnesemia  Comment: Did  not come with any magnesium orders as his magnesium level was corrected in the hospital did not write to check a magnesium level on Monday.  He will be seen by the regular rounding NP who may order a level at some point.  Otherwise followed by outpatient    (F33.41) Major depressive disorder, recurrent episode, in partial remission  Comment: Rakesh does receive Abilify 2 mg at bedtime as well as fluvoxamine 50mg at HS. no change at this time.  Expect right now his behaviors to be more apprehensive due to a new environment and learning a different routine.  Continue to let him vent to any feelings but also told him to make sure he uses his call button to let staff know if he needs anything.    (H40.003) Glaucoma suspect, bilateral  Comment: Rakesh does take multiple eye medications for his glaucoma.  He does have glasses but not on him at this moment.  Explained to him where things are and make his bedside table close to him so that he can manipulate his own environment.        Orders:    CBC and BMP on Monday for Type 2 DM, Atrial Fibrillation  Increase Lantus to 12 units subcutaneous every am for type 2 DM  Add to Novolog 4 units subcutaneous TID order:  hold if BS below 110  - type 2 DM    Total time spent with patient visit at the skilled nursing facility was 45 min including patient visit and review of past records. Greater than 50% of total time spent with counseling and coordinating care due to gathering information, discussion of care with staff, goals of care and anticipation of discharge with patient.      Electronically signed by:  PAULA Huston CNP

## 2025-01-11 LAB
BACTERIA BLD CULT: NO GROWTH
BACTERIA BLD CULT: NO GROWTH

## 2025-01-13 ENCOUNTER — TRANSITIONAL CARE UNIT VISIT (OUTPATIENT)
Dept: GERIATRICS | Facility: CLINIC | Age: 75
End: 2025-01-13
Payer: MEDICARE

## 2025-01-13 VITALS
HEIGHT: 72 IN | TEMPERATURE: 45.7 F | DIASTOLIC BLOOD PRESSURE: 86 MMHG | RESPIRATION RATE: 16 BRPM | HEART RATE: 65 BPM | BODY MASS INDEX: 27.93 KG/M2 | WEIGHT: 206.2 LBS | OXYGEN SATURATION: 96 % | SYSTOLIC BLOOD PRESSURE: 164 MMHG

## 2025-01-13 DIAGNOSIS — E83.42 HYPOMAGNESEMIA: ICD-10-CM

## 2025-01-13 DIAGNOSIS — I48.0 PAROXYSMAL ATRIAL FIBRILLATION (H): ICD-10-CM

## 2025-01-13 DIAGNOSIS — Z79.4 TYPE 2 DIABETES MELLITUS WITH DIABETIC POLYNEUROPATHY, WITH LONG-TERM CURRENT USE OF INSULIN (H): ICD-10-CM

## 2025-01-13 DIAGNOSIS — R53.1 RIGHT SIDED WEAKNESS: ICD-10-CM

## 2025-01-13 DIAGNOSIS — I10 ESSENTIAL HYPERTENSION: ICD-10-CM

## 2025-01-13 DIAGNOSIS — H40.003 GLAUCOMA SUSPECT, BILATERAL: ICD-10-CM

## 2025-01-13 DIAGNOSIS — F33.41 MAJOR DEPRESSIVE DISORDER, RECURRENT EPISODE, IN PARTIAL REMISSION: ICD-10-CM

## 2025-01-13 DIAGNOSIS — E11.42 TYPE 2 DIABETES MELLITUS WITH DIABETIC POLYNEUROPATHY, WITH LONG-TERM CURRENT USE OF INSULIN (H): ICD-10-CM

## 2025-01-13 DIAGNOSIS — I63.9 CEREBROVASCULAR ACCIDENT (CVA), UNSPECIFIED MECHANISM (H): Primary | ICD-10-CM

## 2025-01-13 LAB
ANION GAP SERPL CALCULATED.3IONS-SCNC: 11 MMOL/L (ref 7–15)
BUN SERPL-MCNC: 9.1 MG/DL (ref 8–23)
CALCIUM SERPL-MCNC: 8.7 MG/DL (ref 8.8–10.4)
CHLORIDE SERPL-SCNC: 107 MMOL/L (ref 98–107)
CREAT SERPL-MCNC: 0.82 MG/DL (ref 0.67–1.17)
EGFRCR SERPLBLD CKD-EPI 2021: >90 ML/MIN/1.73M2
ERYTHROCYTE [DISTWIDTH] IN BLOOD BY AUTOMATED COUNT: 12.7 % (ref 10–15)
GLUCOSE SERPL-MCNC: 236 MG/DL (ref 70–99)
HCO3 SERPL-SCNC: 25 MMOL/L (ref 22–29)
HCT VFR BLD AUTO: 41.4 % (ref 40–53)
HGB BLD-MCNC: 13.1 G/DL (ref 13.3–17.7)
MCH RBC QN AUTO: 29.8 PG (ref 26.5–33)
MCHC RBC AUTO-ENTMCNC: 31.6 G/DL (ref 31.5–36.5)
MCV RBC AUTO: 94 FL (ref 78–100)
PLATELET # BLD AUTO: 252 10E3/UL (ref 150–450)
POTASSIUM SERPL-SCNC: 4.6 MMOL/L (ref 3.4–5.3)
RBC # BLD AUTO: 4.39 10E6/UL (ref 4.4–5.9)
SODIUM SERPL-SCNC: 143 MMOL/L (ref 135–145)
WBC # BLD AUTO: 6.7 10E3/UL (ref 4–11)

## 2025-01-13 PROCEDURE — P9604 ONE-WAY ALLOW PRORATED TRIP: HCPCS | Performed by: FAMILY MEDICINE

## 2025-01-13 PROCEDURE — 36415 COLL VENOUS BLD VENIPUNCTURE: CPT | Performed by: FAMILY MEDICINE

## 2025-01-13 PROCEDURE — 80048 BASIC METABOLIC PNL TOTAL CA: CPT | Performed by: FAMILY MEDICINE

## 2025-01-13 PROCEDURE — 99310 SBSQ NF CARE HIGH MDM 45: CPT | Performed by: NURSE PRACTITIONER

## 2025-01-13 PROCEDURE — 85027 COMPLETE CBC AUTOMATED: CPT | Performed by: FAMILY MEDICINE

## 2025-01-13 NOTE — LETTER
1/13/2025      Rakesh Samuels  [address]  [facility] [address]        [facility] GERIATRIC SERVICES    Code Status:  DNR/DNI   Visit Type:   Chief Complaint   Patient presents with     TCU Follow Up     Facility:  [facility]) [[id number]]               HPI: Rakesh Samuels is a 74 year old male seen today for follow up on the TCU. 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 MRI brain revealed probable small late subacute infarct involving the genu of the internal capsule on the right side. He also had lactic acidosis on admission, concerning sepsis. However, no infection found.    Transitional Care Course: Today pt lying in bed. He denies any headache or dizziness. No SOB or CP. Recent CVA with slight right sided residual weakness. BP satisfactory. He denies any dysphagia. Some mild cognitive impairment noted with poor recall. He reports he is emptying his bladder. Blood sugars occasionally elevated in the 200s. He continues on insulin.       Assessment/Plan:     CVA with right sided weakness   -MRI brain reveals probable small late subacute infarct involving the genu of the internal capsule on the right side  -CT angiogram negative for significant large vessel occlusion/stenosis.  -Echo showed normal LVEF.  -Eliquis 5 mg BID.   - LDL 69 and at goal. No statin indicated  -Hgb today 13.1.      Essential hypertension:   -amlodipine 5 mg daily.   -Monitor bp.   -Follow up BMP unremarkable.      Type 2 diabetes   -Recent HbA1C 9.1.   -consistent carb diet.   -Lantus 12 Q AM.   -Novolog 4 units + sliding scale insulin with meals.   -Blood sugar checks QID.      Paroxysmal atrial fibrillation  - Eliquis 5 mg BID.     Mood disorder  -fluvoxamine 50 mg daily.   -aripiprazole 2 mg Q HS.     Glaucoma with blindness  -continue home eye gtts.         Active Ambulatory Problems      Diagnosis Date Noted     SIRS (systemic inflammatory response syndrome) (H) 09/09/2019     Adrenal incidentaloma      Diet-controlled diabetes mellitus (H)      Pericardial effusion      Lactic acidosis      Type 2 diabetes mellitus without complication, without long-term current use of insulin (H) 04/12/2020     Paroxysmal atrial fibrillation (H) 02/18/2020     Calculus of ureter 04/12/2020     Acute renal failure, unspecified acute renal failure type 04/12/2020     Acute renal failure (ARF) 04/12/2020     ATN (acute tubular necrosis) 04/12/2020     Sepsis due to urinary tract infection (H)      Metformin overdose of undetermined intent, initial encounter      Metabolic acidosis      Hyperkalemia      Hematemesis, presence of nausea not specified 04/12/2020     Calculus of kidney      Syncope and collapse 06/30/2020     Hyperglycemia      After care 07/03/2012     Age-related cataract 03/27/2021     Anemia associated with acute blood loss 06/27/2012     Balanitis 03/27/2021     Cholelithiasis without obstruction 03/27/2021     Constipation 07/03/2012     Depression with anxiety 07/03/2012     Glaucoma 06/25/2012     Hemorrhoids without complication 03/27/2021     Hyperlipidemia 06/07/2018     Loss of appetite 03/27/2021     Major depressive disorder, recurrent episode, in partial remission 06/08/2018     Mixed personality disorder in adult (H) 06/08/2018     Obstructive sleep apnea 03/27/2021     Osteoarthrosis involving lower leg 06/23/2012     Periodic limb movement disorder 03/27/2021     Recurrent major depression 03/27/2021     S/P ureteral stent placement 03/27/2021     Unilateral small kidney 03/27/2021     Nephrolithiasis 03/27/2021     Type 2 diabetes mellitus (H) 06/25/2012     Osteoarthritis of knee 03/27/2021     Persistent atrial fibrillation (H) 03/27/2021     Pyelonephritis 03/27/2021     Urinary tract infection without hematuria, site unspecified 04/30/2022     Sepsis without acute organ dysfunction  (H) 04/30/2022     UTI (urinary tract infection) 05/01/2022     Benign prostatic hyperplasia with urinary frequency 05/07/2022     Hypomagnesemia 05/07/2022     Injury of head, initial encounter 07/31/2023     Fall at home, initial encounter 07/31/2023     E. coli urinary tract infection 08/02/2023     Septic encephalopathy 07/25/2024     DM (diabetes mellitus), type 2 with complications (H) 07/25/2024     Urinary tract infection with hematuria, site unspecified 07/26/2024     Altered mental status, unspecified altered mental status type 07/26/2024     Sepsis, due to unspecified organism, unspecified whether acute organ dysfunction present (H) 07/26/2024     Normocytic anemia 11/29/2023     Status post right knee replacement 11/29/2023     Peripheral vascular disease, unspecified 08/20/2024     Warfarin anticoagulation 09/09/2024     Elevated lactic acid level 01/06/2025     Right sided weakness 01/06/2025     Ischemic stroke (H) 01/06/2025     Type 2 diabetes mellitus with hyperglycemia, with long-term current use of insulin (H) 01/07/2025     Benign essential hypertension 01/07/2025     Resolved Ambulatory Problems     Diagnosis Date Noted     Shock circulatory (H)      Acute respiratory failure with hypoxemia (H)      Septic shock (H) 01/06/2025     Past Medical History:   Diagnosis Date     A-fib (H)      Acute hemodialysis encounter      Acute kidney injury      Asbestosis (H)      Atrophy of right kidney      Basal cell carcinoma      BPH without urinary obstruction      Cellulitis      Cholelithiasis      Diabetes mellitus, type 2 (H) 4/12/2020     Esophagitis      Gastritis      Hematemesis, presence of nausea not specified      Hyperlipemia      Kidney stone      Metformin overdose of undetermined intent      PTSD (post-traumatic stress disorder)      Seasonal allergies      Sleep apnea      Upper GI bleed      No Known Allergies    All Meds and Allergies reviewed in the record at the facility and is the  most up-to-date.    Post Discharge Medication Reconciliation Status: discharge medications reconciled, continue medications without change  Current Outpatient Medications   Medication Sig Dispense Refill     acetaminophen (TYLENOL) 325 MG tablet Take 2 tablets (650 mg) by mouth every 4 hours as needed for mild pain or fever (temperature greater than 100.4  F (38  C)).       amLODIPine (NORVASC) 5 MG tablet Take 1 tablet (5 mg) by mouth daily.       apixaban ANTICOAGULANT (ELIQUIS) 5 MG tablet Take 5 mg by mouth 2 times daily.       ARIPiprazole (ABILIFY) 2 MG tablet [ARIPIPRAZOLE (ABILIFY) 2 MG TABLET] Take 2 mg by mouth at bedtime.        brimonidine (ALPHAGAN) 0.2 % ophthalmic solution [BRIMONIDINE (ALPHAGAN) 0.2 % OPHTHALMIC SOLUTION] Administer 1 drop to both eyes 2 (two) times a day.        dorzolamide-timolol (COSOPT) 2-0.5 % ophthalmic solution Place 1 drop into both eyes 2 times daily       famotidine (PEPCID) 20 MG tablet Take 20 mg by mouth daily.       fluvoxaMINE (LUVOX) 50 MG tablet [FLUVOXAMINE (LUVOX) 50 MG TABLET] Take 50 mg by mouth at bedtime.        gabapentin (NEURONTIN) 300 MG capsule Take 300 mg by mouth 2 times daily       insulin aspart (NOVOLOG PEN) 100 UNIT/ML pen Inject 1-7 Units subcutaneously 3 times daily (before meals). Correction Scale - MEDIUM INSULIN RESISTANCE DOSING   Do Not give Correction Insulin if Pre-Meal BG less than 140. For Pre-Meal  - 189 give 1 unit. For Pre-Meal  - 239 give 2 units. For Pre-Meal  - 289 give 3 units. For Pre-Meal  - 339 give 4 units. For Pre-Meal - 389 give 5 units. For Pre-Meal -439 give 6 units For Pre-Meal BG greater than or equal to 440 give 7 units. To be given with prandial insulin, and based on pre-meal blood glucose. Administering insulin within 5 minutes of the start of the meal is ideal. Administer insulin no more than 30 minutes after the start of the meal, unless directed otherwise by provider.   Notify  provider if glucose greater than or equal to 350 mg/dL after administration of correction dose.       insulin aspart (NOVOLOG PEN) 100 UNIT/ML pen Inject 4 Units subcutaneously 3 times daily (with meals).       insulin glargine (LANTUS PEN) 100 UNIT/ML pen Inject 10 Units subcutaneously every morning (before breakfast).       latanoprostene bunod 0.024 % Drop Place 1 drop into both eyes At Bedtime       levomefolate calcium (L-METHYLFOLATE ORAL) Take 15 mg by mouth daily.       netarsudiL (RHOPRESSA) 0.02 % Drop Place 1 drop into both eyes at bedtime.       No current facility-administered medications for this visit.       REVIEW OF SYSTEMS:   10 point review of systems reviewed and pertinent positives in the HPI.     PHYSICAL EXAMINATION:  Physical Exam     Vital signs: BP (!) 164/86   Pulse 65   Temp (!) 45.7  F (7.6  C)   Resp 16   Ht 1.829 m (6')   Wt 93.5 kg (206 lb 3.2 oz)   SpO2 96%   BMI 27.97 kg/m    General: Awake, Alert, oriented x3, lying in bed, appropriately, follows simple commands, conversant  HEENT:PERRLA, Red conjunctiva right eye, anicteric sclerae, moist oral mucosa. Facial symmetry noted. Tongue is midline.   NECK: Supple  CVS:  S1  S2, without murmur or gallop.   LUNG: Clear to auscultation, No wheezes, rales or rhonci.  BACK: No kyphosis of the thoracic spine  ABDOMEN: Soft, nontender to palpation, with positive bowel sounds  EXTREMITIES: Moves both upper and lower extremities with slight right sided weakness to RLE, no pedal edema, no calf tenderness  SKIN: Warm and dry  NEUROLOGIC: Intact, pulses palpable  PSYCHIATRIC: Cognitive impairment noted. Some poor recall.       Labs:  All labs reviewed in the nursing home record and Epic   @  Lab Results   Component Value Date    WBC 6.7 01/13/2025     Lab Results   Component Value Date    RBC 4.39 01/13/2025     Lab Results   Component Value Date    HGB 13.1 01/13/2025     Lab Results   Component Value Date    HCT 41.4 01/13/2025     Lab  Results   Component Value Date    MCV 94 01/13/2025     Lab Results   Component Value Date    MCH 29.8 01/13/2025     Lab Results   Component Value Date    MCHC 31.6 01/13/2025     Lab Results   Component Value Date    RDW 12.7 01/13/2025     Lab Results   Component Value Date     01/13/2025        @Last Comprehensive Metabolic Panel:  Sodium   Date Value Ref Range Status   01/13/2025 143 135 - 145 mmol/L Final     Potassium   Date Value Ref Range Status   01/13/2025 4.6 3.4 - 5.3 mmol/L Final   05/05/2022 4.4 3.5 - 5.0 mmol/L Final     Chloride   Date Value Ref Range Status   01/13/2025 107 98 - 107 mmol/L Final   05/02/2022 109 (H) 98 - 107 mmol/L Final     Carbon Dioxide (CO2)   Date Value Ref Range Status   01/13/2025 25 22 - 29 mmol/L Final   05/02/2022 25 22 - 31 mmol/L Final     Anion Gap   Date Value Ref Range Status   01/13/2025 11 7 - 15 mmol/L Final   05/02/2022 8 5 - 18 mmol/L Final     Glucose   Date Value Ref Range Status   01/13/2025 236 (H) 70 - 99 mg/dL Final   05/02/2022 133 (H) 70 - 125 mg/dL Final     GLUCOSE BY METER POCT   Date Value Ref Range Status   01/09/2025 150 (H) 70 - 99 mg/dL Final     Urea Nitrogen   Date Value Ref Range Status   01/13/2025 9.1 8.0 - 23.0 mg/dL Final   05/02/2022 11 8 - 28 mg/dL Final     Creatinine   Date Value Ref Range Status   01/13/2025 0.82 0.67 - 1.17 mg/dL Final     GFR Estimate   Date Value Ref Range Status   01/13/2025 >90 >60 mL/min/1.73m2 Final   06/10/2021 >60 >60 mL/min/1.73m2 Final     Calcium   Date Value Ref Range Status   01/13/2025 8.7 (L) 8.8 - 10.4 mg/dL Final     Comment:     Reference intervals for this test were updated on 7/16/2024 to reflect our healthy population more accurately. There may be differences in the flagging of prior results with similar values performed with this method. Those prior results can be interpreted in the context of the updated reference intervals.       > 45 minutes spent preparing for this visit which  included reviewing hospital records, labs, imaging, meds, consults as well as face to face time with pt and collaborating with nursing.       This note has been dictated using voice recognition software. Any grammatical or context distortions are unintentional and inherent to the software    Electronically signed by: Kylie Gomez CNP       Sincerely,        Kylie Gomez NP    Electronically signed

## 2025-01-14 NOTE — PROGRESS NOTES
[facility] GERIATRIC SERVICES    Code Status:  DNR/DNI   Visit Type:   Chief Complaint   Patient presents with    TCU Follow Up     Facility:  [facility] ([facility]) [[id number]]               HPI: Rakesh Samuels is a 74 year old male seen today for follow up on the TCU. 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 MRI brain revealed probable small late subacute infarct involving the genu of the internal capsule on the right side. He also had lactic acidosis on admission, concerning sepsis. However, no infection found.    Transitional Care Course: Today pt lying in bed. He denies any headache or dizziness. No SOB or CP. Recent CVA with slight right sided residual weakness. BP satisfactory. He denies any dysphagia. Some mild cognitive impairment noted with poor recall. He reports he is emptying his bladder. Blood sugars occasionally elevated in the 200s. He continues on insulin.       Assessment/Plan:     CVA with right sided weakness   -MRI brain reveals probable small late subacute infarct involving the genu of the internal capsule on the right side  -CT angiogram negative for significant large vessel occlusion/stenosis.  -Echo showed normal LVEF.  -Eliquis 5 mg BID.   - LDL 69 and at goal. No statin indicated  -Hgb today 13.1.      Essential hypertension:   -amlodipine 5 mg daily.   -Monitor bp.   -Follow up BMP unremarkable.      Type 2 diabetes   -Recent HbA1C 9.1.   -consistent carb diet.   -Lantus 12 Q AM.   -Novolog 4 units + sliding scale insulin with meals.   -Blood sugar checks QID.      Paroxysmal atrial fibrillation  - Eliquis 5 mg BID.     Mood disorder  -fluvoxamine 50 mg daily.   -aripiprazole 2 mg Q HS.     Glaucoma with blindness  -continue home eye gtts.         Active Ambulatory Problems     Diagnosis Date Noted    SIRS (systemic inflammatory response syndrome) (H) 09/09/2019     Adrenal incidentaloma     Diet-controlled diabetes mellitus (H)     Pericardial effusion     Lactic acidosis     Type 2 diabetes mellitus without complication, without long-term current use of insulin (H) 04/12/2020    Paroxysmal atrial fibrillation (H) 02/18/2020    Calculus of ureter 04/12/2020    Acute renal failure, unspecified acute renal failure type 04/12/2020    Acute renal failure (ARF) 04/12/2020    ATN (acute tubular necrosis) 04/12/2020    Sepsis due to urinary tract infection (H)     Metformin overdose of undetermined intent, initial encounter     Metabolic acidosis     Hyperkalemia     Hematemesis, presence of nausea not specified 04/12/2020    Calculus of kidney     Syncope and collapse 06/30/2020    Hyperglycemia     After care 07/03/2012    Age-related cataract 03/27/2021    Anemia associated with acute blood loss 06/27/2012    Balanitis 03/27/2021    Cholelithiasis without obstruction 03/27/2021    Constipation 07/03/2012    Depression with anxiety 07/03/2012    Glaucoma 06/25/2012    Hemorrhoids without complication 03/27/2021    Hyperlipidemia 06/07/2018    Loss of appetite 03/27/2021    Major depressive disorder, recurrent episode, in partial remission 06/08/2018    Mixed personality disorder in adult (H) 06/08/2018    Obstructive sleep apnea 03/27/2021    Osteoarthrosis involving lower leg 06/23/2012    Periodic limb movement disorder 03/27/2021    Recurrent major depression 03/27/2021    S/P ureteral stent placement 03/27/2021    Unilateral small kidney 03/27/2021    Nephrolithiasis 03/27/2021    Type 2 diabetes mellitus (H) 06/25/2012    Osteoarthritis of knee 03/27/2021    Persistent atrial fibrillation (H) 03/27/2021    Pyelonephritis 03/27/2021    Urinary tract infection without hematuria, site unspecified 04/30/2022    Sepsis without acute organ dysfunction (H) 04/30/2022    UTI (urinary tract infection) 05/01/2022    Benign prostatic hyperplasia with urinary frequency 05/07/2022     Hypomagnesemia 05/07/2022    Injury of head, initial encounter 07/31/2023    Fall at home, initial encounter 07/31/2023    E. coli urinary tract infection 08/02/2023    Septic encephalopathy 07/25/2024    DM (diabetes mellitus), type 2 with complications (H) 07/25/2024    Urinary tract infection with hematuria, site unspecified 07/26/2024    Altered mental status, unspecified altered mental status type 07/26/2024    Sepsis, due to unspecified organism, unspecified whether acute organ dysfunction present (H) 07/26/2024    Normocytic anemia 11/29/2023    Status post right knee replacement 11/29/2023    Peripheral vascular disease, unspecified 08/20/2024    Warfarin anticoagulation 09/09/2024    Elevated lactic acid level 01/06/2025    Right sided weakness 01/06/2025    Ischemic stroke (H) 01/06/2025    Type 2 diabetes mellitus with hyperglycemia, with long-term current use of insulin (H) 01/07/2025    Benign essential hypertension 01/07/2025     Resolved Ambulatory Problems     Diagnosis Date Noted    Shock circulatory (H)     Acute respiratory failure with hypoxemia (H)     Septic shock (H) 01/06/2025     Past Medical History:   Diagnosis Date    A-fib (H)     Acute hemodialysis encounter     Acute kidney injury     Asbestosis (H)     Atrophy of right kidney     Basal cell carcinoma     BPH without urinary obstruction     Cellulitis     Cholelithiasis     Diabetes mellitus, type 2 (H) 4/12/2020    Esophagitis     Gastritis     Hematemesis, presence of nausea not specified     Hyperlipemia     Kidney stone     Metformin overdose of undetermined intent     PTSD (post-traumatic stress disorder)     Seasonal allergies     Sleep apnea     Upper GI bleed      No Known Allergies    All Meds and Allergies reviewed in the record at the facility and is the most up-to-date.    Post Discharge Medication Reconciliation Status: discharge medications reconciled, continue medications without change  Current Outpatient Medications    Medication Sig Dispense Refill    acetaminophen (TYLENOL) 325 MG tablet Take 2 tablets (650 mg) by mouth every 4 hours as needed for mild pain or fever (temperature greater than 100.4  F (38  C)).      amLODIPine (NORVASC) 5 MG tablet Take 1 tablet (5 mg) by mouth daily.      apixaban ANTICOAGULANT (ELIQUIS) 5 MG tablet Take 5 mg by mouth 2 times daily.      ARIPiprazole (ABILIFY) 2 MG tablet [ARIPIPRAZOLE (ABILIFY) 2 MG TABLET] Take 2 mg by mouth at bedtime.       brimonidine (ALPHAGAN) 0.2 % ophthalmic solution [BRIMONIDINE (ALPHAGAN) 0.2 % OPHTHALMIC SOLUTION] Administer 1 drop to both eyes 2 (two) times a day.       dorzolamide-timolol (COSOPT) 2-0.5 % ophthalmic solution Place 1 drop into both eyes 2 times daily      famotidine (PEPCID) 20 MG tablet Take 20 mg by mouth daily.      fluvoxaMINE (LUVOX) 50 MG tablet [FLUVOXAMINE (LUVOX) 50 MG TABLET] Take 50 mg by mouth at bedtime.       gabapentin (NEURONTIN) 300 MG capsule Take 300 mg by mouth 2 times daily      insulin aspart (NOVOLOG PEN) 100 UNIT/ML pen Inject 1-7 Units subcutaneously 3 times daily (before meals). Correction Scale - MEDIUM INSULIN RESISTANCE DOSING   Do Not give Correction Insulin if Pre-Meal BG less than 140. For Pre-Meal  - 189 give 1 unit. For Pre-Meal  - 239 give 2 units. For Pre-Meal  - 289 give 3 units. For Pre-Meal  - 339 give 4 units. For Pre-Meal - 389 give 5 units. For Pre-Meal -439 give 6 units For Pre-Meal BG greater than or equal to 440 give 7 units. To be given with prandial insulin, and based on pre-meal blood glucose. Administering insulin within 5 minutes of the start of the meal is ideal. Administer insulin no more than 30 minutes after the start of the meal, unless directed otherwise by provider.   Notify provider if glucose greater than or equal to 350 mg/dL after administration of correction dose.      insulin aspart (NOVOLOG PEN) 100 UNIT/ML pen Inject 4 Units subcutaneously 3 times  daily (with meals).      insulin glargine (LANTUS PEN) 100 UNIT/ML pen Inject 10 Units subcutaneously every morning (before breakfast).      latanoprostene bunod 0.024 % Drop Place 1 drop into both eyes At Bedtime      levomefolate calcium (L-METHYLFOLATE ORAL) Take 15 mg by mouth daily.      netarsudiL (RHOPRESSA) 0.02 % Drop Place 1 drop into both eyes at bedtime.       No current facility-administered medications for this visit.       REVIEW OF SYSTEMS:   10 point review of systems reviewed and pertinent positives in the HPI.     PHYSICAL EXAMINATION:  Physical Exam     Vital signs: BP (!) 164/86   Pulse 65   Temp (!) 45.7  F (7.6  C)   Resp 16   Ht 1.829 m (6')   Wt 93.5 kg (206 lb 3.2 oz)   SpO2 96%   BMI 27.97 kg/m    General: Awake, Alert, oriented x3, lying in bed, appropriately, follows simple commands, conversant  HEENT:PERRLA, Red conjunctiva right eye, anicteric sclerae, moist oral mucosa. Facial symmetry noted. Tongue is midline.   NECK: Supple  CVS:  S1  S2, without murmur or gallop.   LUNG: Clear to auscultation, No wheezes, rales or rhonci.  BACK: No kyphosis of the thoracic spine  ABDOMEN: Soft, nontender to palpation, with positive bowel sounds  EXTREMITIES: Moves both upper and lower extremities with slight right sided weakness to RLE, no pedal edema, no calf tenderness  SKIN: Warm and dry  NEUROLOGIC: Intact, pulses palpable  PSYCHIATRIC: Cognitive impairment noted. Some poor recall.       Labs:  All labs reviewed in the nursing home record and [facility]   @  Lab Results   Component Value Date    WBC 6.7 01/13/2025     Lab Results   Component Value Date    RBC 4.39 01/13/2025     Lab Results   Component Value Date    HGB 13.1 01/13/2025     Lab Results   Component Value Date    HCT 41.4 01/13/2025     Lab Results   Component Value Date    MCV 94 01/13/2025     Lab Results   Component Value Date    MCH 29.8 01/13/2025     Lab Results   Component Value Date    MCHC 31.6 01/13/2025     Lab Results    Component Value Date    RDW 12.7 01/13/2025     Lab Results   Component Value Date     01/13/2025        @Last Comprehensive Metabolic Panel:  Sodium   Date Value Ref Range Status   01/13/2025 143 135 - 145 mmol/L Final     Potassium   Date Value Ref Range Status   01/13/2025 4.6 3.4 - 5.3 mmol/L Final   05/05/2022 4.4 3.5 - 5.0 mmol/L Final     Chloride   Date Value Ref Range Status   01/13/2025 107 98 - 107 mmol/L Final   05/02/2022 109 (H) 98 - 107 mmol/L Final     Carbon Dioxide (CO2)   Date Value Ref Range Status   01/13/2025 25 22 - 29 mmol/L Final   05/02/2022 25 22 - 31 mmol/L Final     Anion Gap   Date Value Ref Range Status   01/13/2025 11 7 - 15 mmol/L Final   05/02/2022 8 5 - 18 mmol/L Final     Glucose   Date Value Ref Range Status   01/13/2025 236 (H) 70 - 99 mg/dL Final   05/02/2022 133 (H) 70 - 125 mg/dL Final     GLUCOSE BY METER POCT   Date Value Ref Range Status   01/09/2025 150 (H) 70 - 99 mg/dL Final     Urea Nitrogen   Date Value Ref Range Status   01/13/2025 9.1 8.0 - 23.0 mg/dL Final   05/02/2022 11 8 - 28 mg/dL Final     Creatinine   Date Value Ref Range Status   01/13/2025 0.82 0.67 - 1.17 mg/dL Final     GFR Estimate   Date Value Ref Range Status   01/13/2025 >90 >60 mL/min/1.73m2 Final   06/10/2021 >60 >60 mL/min/1.73m2 Final     Calcium   Date Value Ref Range Status   01/13/2025 8.7 (L) 8.8 - 10.4 mg/dL Final     Comment:     Reference intervals for this test were updated on 7/16/2024 to reflect our healthy population more accurately. There may be differences in the flagging of prior results with similar values performed with this method. Those prior results can be interpreted in the context of the updated reference intervals.       > 45 minutes spent preparing for this visit which included reviewing hospital records, labs, imaging, meds, consults as well as face to face time with pt and collaborating with nursing.       This note has been dictated using voice recognition  software. Any grammatical or context distortions are unintentional and inherent to the software    Electronically signed by: Kylie Gomez CNP

## 2025-01-15 ENCOUNTER — TRANSITIONAL CARE UNIT VISIT (OUTPATIENT)
Dept: GERIATRICS | Facility: CLINIC | Age: 75
End: 2025-01-15
Payer: MEDICARE

## 2025-01-15 VITALS
SYSTOLIC BLOOD PRESSURE: 142 MMHG | OXYGEN SATURATION: 95 % | RESPIRATION RATE: 16 BRPM | DIASTOLIC BLOOD PRESSURE: 79 MMHG | WEIGHT: 205.4 LBS | HEART RATE: 68 BPM | HEIGHT: 72 IN | BODY MASS INDEX: 27.82 KG/M2 | TEMPERATURE: 97.4 F

## 2025-01-15 DIAGNOSIS — I10 ESSENTIAL HYPERTENSION: ICD-10-CM

## 2025-01-15 DIAGNOSIS — I63.9 CEREBROVASCULAR ACCIDENT (CVA), UNSPECIFIED MECHANISM (H): Primary | ICD-10-CM

## 2025-01-15 DIAGNOSIS — H40.003 GLAUCOMA SUSPECT, BILATERAL: ICD-10-CM

## 2025-01-15 DIAGNOSIS — R53.1 RIGHT SIDED WEAKNESS: ICD-10-CM

## 2025-01-15 DIAGNOSIS — E11.42 TYPE 2 DIABETES MELLITUS WITH DIABETIC POLYNEUROPATHY, WITH LONG-TERM CURRENT USE OF INSULIN (H): ICD-10-CM

## 2025-01-15 DIAGNOSIS — N39.0 URINARY TRACT INFECTION WITHOUT HEMATURIA, SITE UNSPECIFIED: ICD-10-CM

## 2025-01-15 DIAGNOSIS — I48.0 PAROXYSMAL ATRIAL FIBRILLATION (H): ICD-10-CM

## 2025-01-15 DIAGNOSIS — F33.41 MAJOR DEPRESSIVE DISORDER, RECURRENT EPISODE, IN PARTIAL REMISSION: ICD-10-CM

## 2025-01-15 DIAGNOSIS — Z79.4 TYPE 2 DIABETES MELLITUS WITH DIABETIC POLYNEUROPATHY, WITH LONG-TERM CURRENT USE OF INSULIN (H): ICD-10-CM

## 2025-01-15 PROCEDURE — 99309 SBSQ NF CARE MODERATE MDM 30: CPT | Performed by: NURSE PRACTITIONER

## 2025-01-15 NOTE — PROGRESS NOTES
[facility] GERIATRIC SERVICES    Code Status:  DNR/DNI   Visit Type:   Chief Complaint   Patient presents with    TCU Follow Up     Facility:  [facility] ([facility]) [[id number]]         HPI: Rakesh Samuels is a 74 year old male seen today for follow up on the TCU. 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 MRI brain revealed probable small late subacute infarct involving the genu of the internal capsule on the right side. He also had lactic acidosis on admission, concerning sepsis. However, no infection found.    Transitional Care Course: Today pt sitting up in bedside chair. Some mild cognitive impairment noted with poor recall. Recent CVA with slight right sided residual weakness. Pt with redness to left eye. Hx of glaucoma. He continues in mx eye gtts. BP satisfactory. Pt denies any SOB or CP. No dysphagia. He reports he is emptying his bladder. Blood sugars occasionally elevated in the 200s. He continues on insulin.       Assessment/Plan:     CVA with right sided weakness   -MRI brain reveals probable small late subacute infarct involving the genu of the internal capsule on the right side  -CT angiogram negative for significant large vessel occlusion/stenosis.  -Echo showed normal LVEF.  -Eliquis 5 mg BID.   - LDL 69 and at goal. No statin indicated  -Hgb today 13.1.      Essential hypertension:   -amlodipine 5 mg daily.   -Monitor bp.   -Follow up BMP unremarkable.      Type 2 diabetes   -Recent HbA1C 9.1.   -consistent carb diet.   -Lantus 12 Q AM.   -Novolog 4 units + sliding scale insulin with meals.   -Blood sugar checks QID.      Paroxysmal atrial fibrillation  - Eliquis 5 mg BID.     Mood disorder  -fluvoxamine 50 mg daily.   -aripiprazole 2 mg Q HS.     Glaucoma with blindness  -continue home eye gtts.   -cleanse eyes with baby shampoo every day.     Active Ambulatory Problems      Diagnosis Date Noted    SIRS (systemic inflammatory response syndrome) (H) 09/09/2019    Adrenal incidentaloma     Diet-controlled diabetes mellitus (H)     Pericardial effusion     Lactic acidosis     Type 2 diabetes mellitus without complication, without long-term current use of insulin (H) 04/12/2020    Paroxysmal atrial fibrillation (H) 02/18/2020    Calculus of ureter 04/12/2020    Acute renal failure, unspecified acute renal failure type 04/12/2020    Acute renal failure (ARF) 04/12/2020    ATN (acute tubular necrosis) 04/12/2020    Sepsis due to urinary tract infection (H)     Metformin overdose of undetermined intent, initial encounter     Metabolic acidosis     Hyperkalemia     Hematemesis, presence of nausea not specified 04/12/2020    Calculus of kidney     Syncope and collapse 06/30/2020    Hyperglycemia     After care 07/03/2012    Age-related cataract 03/27/2021    Anemia associated with acute blood loss 06/27/2012    Balanitis 03/27/2021    Cholelithiasis without obstruction 03/27/2021    Constipation 07/03/2012    Depression with anxiety 07/03/2012    Glaucoma 06/25/2012    Hemorrhoids without complication 03/27/2021    Hyperlipidemia 06/07/2018    Loss of appetite 03/27/2021    Major depressive disorder, recurrent episode, in partial remission 06/08/2018    Mixed personality disorder in adult (H) 06/08/2018    Obstructive sleep apnea 03/27/2021    Osteoarthrosis involving lower leg 06/23/2012    Periodic limb movement disorder 03/27/2021    Recurrent major depression 03/27/2021    S/P ureteral stent placement 03/27/2021    Unilateral small kidney 03/27/2021    Nephrolithiasis 03/27/2021    Type 2 diabetes mellitus (H) 06/25/2012    Osteoarthritis of knee 03/27/2021    Persistent atrial fibrillation (H) 03/27/2021    Pyelonephritis 03/27/2021    Urinary tract infection without hematuria, site unspecified 04/30/2022    Sepsis without acute organ dysfunction (H) 04/30/2022    UTI (urinary tract  infection) 05/01/2022    Benign prostatic hyperplasia with urinary frequency 05/07/2022    Hypomagnesemia 05/07/2022    Injury of head, initial encounter 07/31/2023    Fall at home, initial encounter 07/31/2023    E. coli urinary tract infection 08/02/2023    Septic encephalopathy 07/25/2024    DM (diabetes mellitus), type 2 with complications (H) 07/25/2024    Urinary tract infection with hematuria, site unspecified 07/26/2024    Altered mental status, unspecified altered mental status type 07/26/2024    Sepsis, due to unspecified organism, unspecified whether acute organ dysfunction present (H) 07/26/2024    Normocytic anemia 11/29/2023    Status post right knee replacement 11/29/2023    Peripheral vascular disease, unspecified 08/20/2024    Warfarin anticoagulation 09/09/2024    Elevated lactic acid level 01/06/2025    Right sided weakness 01/06/2025    Ischemic stroke (H) 01/06/2025    Type 2 diabetes mellitus with hyperglycemia, with long-term current use of insulin (H) 01/07/2025    Benign essential hypertension 01/07/2025     Resolved Ambulatory Problems     Diagnosis Date Noted    Shock circulatory (H)     Acute respiratory failure with hypoxemia (H)     Septic shock (H) 01/06/2025     Past Medical History:   Diagnosis Date    A-fib (H)     Acute hemodialysis encounter     Acute kidney injury     Asbestosis (H)     Atrophy of right kidney     Basal cell carcinoma     BPH without urinary obstruction     Cellulitis     Cholelithiasis     Diabetes mellitus, type 2 (H) 4/12/2020    Esophagitis     Gastritis     Hematemesis, presence of nausea not specified     Hyperlipemia     Kidney stone     Metformin overdose of undetermined intent     PTSD (post-traumatic stress disorder)     Seasonal allergies     Sleep apnea     Upper GI bleed      No Known Allergies    All Meds and Allergies reviewed in the record at the facility and is the most up-to-date.    Current Outpatient Medications   Medication Sig Dispense  Refill    acetaminophen (TYLENOL) 325 MG tablet Take 2 tablets (650 mg) by mouth every 4 hours as needed for mild pain or fever (temperature greater than 100.4  F (38  C)).      amLODIPine (NORVASC) 5 MG tablet Take 1 tablet (5 mg) by mouth daily.      apixaban ANTICOAGULANT (ELIQUIS) 5 MG tablet Take 5 mg by mouth 2 times daily.      ARIPiprazole (ABILIFY) 2 MG tablet [ARIPIPRAZOLE (ABILIFY) 2 MG TABLET] Take 2 mg by mouth at bedtime.       brimonidine (ALPHAGAN) 0.2 % ophthalmic solution [BRIMONIDINE (ALPHAGAN) 0.2 % OPHTHALMIC SOLUTION] Administer 1 drop to both eyes 2 (two) times a day.       dorzolamide-timolol (COSOPT) 2-0.5 % ophthalmic solution Place 1 drop into both eyes 2 times daily      famotidine (PEPCID) 20 MG tablet Take 20 mg by mouth daily.      fluvoxaMINE (LUVOX) 50 MG tablet [FLUVOXAMINE (LUVOX) 50 MG TABLET] Take 50 mg by mouth at bedtime.       gabapentin (NEURONTIN) 300 MG capsule Take 300 mg by mouth 2 times daily      insulin aspart (NOVOLOG PEN) 100 UNIT/ML pen Inject 1-7 Units subcutaneously 3 times daily (before meals). Correction Scale - MEDIUM INSULIN RESISTANCE DOSING   Do Not give Correction Insulin if Pre-Meal BG less than 140. For Pre-Meal  - 189 give 1 unit. For Pre-Meal  - 239 give 2 units. For Pre-Meal  - 289 give 3 units. For Pre-Meal  - 339 give 4 units. For Pre-Meal - 389 give 5 units. For Pre-Meal -439 give 6 units For Pre-Meal BG greater than or equal to 440 give 7 units. To be given with prandial insulin, and based on pre-meal blood glucose. Administering insulin within 5 minutes of the start of the meal is ideal. Administer insulin no more than 30 minutes after the start of the meal, unless directed otherwise by provider.   Notify provider if glucose greater than or equal to 350 mg/dL after administration of correction dose.      insulin aspart (NOVOLOG PEN) 100 UNIT/ML pen Inject 4 Units subcutaneously 3 times daily (with meals).       insulin glargine (LANTUS PEN) 100 UNIT/ML pen Inject 12 Units subcutaneously every morning (before breakfast).      latanoprostene bunod 0.024 % Drop Place 1 drop into both eyes At Bedtime      levomefolate calcium (L-METHYLFOLATE ORAL) Take 15 mg by mouth daily.      netarsudiL (RHOPRESSA) 0.02 % Drop Place 1 drop into both eyes at bedtime.       No current facility-administered medications for this visit.       REVIEW OF SYSTEMS:   10 point review of systems reviewed and pertinent positives in the HPI.     PHYSICAL EXAMINATION:  Physical Exam     Vital signs: BP (!) 142/79   Pulse 68   Temp 97.4  F (36.3  C)   Resp 16   Ht 1.829 m (6')   Wt 93.2 kg (205 lb 6.4 oz)   SpO2 95%   BMI 27.86 kg/m    General: Awake, Alert, oriented x3, lying in bed, appropriately, follows simple commands, conversant  HEENT:PERRLA, Red conjunctiva right eye, anicteric sclerae, moist oral mucosa. Facial symmetry noted. Tongue is midline.   NECK: Supple  CVS:  S1  S2, without murmur or gallop.   LUNG: Clear to auscultation, No wheezes, rales or rhonci.  BACK: No kyphosis of the thoracic spine  ABDOMEN: Soft, nontender to palpation, with positive bowel sounds  EXTREMITIES: Moves both upper and lower extremities with slight right sided weakness to RLE, no pedal edema, no calf tenderness  SKIN: Warm and dry  NEUROLOGIC: Intact, pulses palpable  PSYCHIATRIC: Cognitive impairment noted. Some poor recall.       Labs:  All labs reviewed in the nursing home record and [employer]   @  Lab Results   Component Value Date    WBC 6.7 01/13/2025     Lab Results   Component Value Date    RBC 4.39 01/13/2025     Lab Results   Component Value Date    HGB 13.1 01/13/2025     Lab Results   Component Value Date    HCT 41.4 01/13/2025     Lab Results   Component Value Date    MCV 94 01/13/2025     Lab Results   Component Value Date    MCH 29.8 01/13/2025     Lab Results   Component Value Date    MCHC 31.6 01/13/2025     Lab Results   Component Value Date     RDW 12.7 01/13/2025     Lab Results   Component Value Date     01/13/2025        @Last Comprehensive Metabolic Panel:  Sodium   Date Value Ref Range Status   01/13/2025 143 135 - 145 mmol/L Final     Potassium   Date Value Ref Range Status   01/13/2025 4.6 3.4 - 5.3 mmol/L Final   05/05/2022 4.4 3.5 - 5.0 mmol/L Final     Chloride   Date Value Ref Range Status   01/13/2025 107 98 - 107 mmol/L Final   05/02/2022 109 (H) 98 - 107 mmol/L Final     Carbon Dioxide (CO2)   Date Value Ref Range Status   01/13/2025 25 22 - 29 mmol/L Final   05/02/2022 25 22 - 31 mmol/L Final     Anion Gap   Date Value Ref Range Status   01/13/2025 11 7 - 15 mmol/L Final   05/02/2022 8 5 - 18 mmol/L Final     Glucose   Date Value Ref Range Status   01/13/2025 236 (H) 70 - 99 mg/dL Final   05/02/2022 133 (H) 70 - 125 mg/dL Final     GLUCOSE BY METER POCT   Date Value Ref Range Status   01/09/2025 150 (H) 70 - 99 mg/dL Final     Urea Nitrogen   Date Value Ref Range Status   01/13/2025 9.1 8.0 - 23.0 mg/dL Final   05/02/2022 11 8 - 28 mg/dL Final     Creatinine   Date Value Ref Range Status   01/13/2025 0.82 0.67 - 1.17 mg/dL Final     GFR Estimate   Date Value Ref Range Status   01/13/2025 >90 >60 mL/min/1.73m2 Final   06/10/2021 >60 >60 mL/min/1.73m2 Final     Calcium   Date Value Ref Range Status   01/13/2025 8.7 (L) 8.8 - 10.4 mg/dL Final     Comment:     Reference intervals for this test were updated on 7/16/2024 to reflect our healthy population more accurately. There may be differences in the flagging of prior results with similar values performed with this method. Those prior results can be interpreted in the context of the updated reference intervals.     This note has been dictated using voice recognition software. Any grammatical or context distortions are unintentional and inherent to the software    Electronically signed by: Kylie Gomez, CNP

## 2025-01-15 NOTE — LETTER
1/15/2025      Rakesh Samuels  [address]  [facility] [address]        [facility] GERIATRIC SERVICES    Code Status:  DNR/DNI   Visit Type:   Chief Complaint   Patient presents with     TCU Follow Up     Facility:  [facility]) [[id number]]         HPI: Rakesh Samuels is a 74 year old male seen today for follow up on the TCU. 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 MRI brain revealed probable small late subacute infarct involving the genu of the internal capsule on the right side. He also had lactic acidosis on admission, concerning sepsis. However, no infection found.    Transitional Care Course: Today pt sitting up in bedside chair. Some mild cognitive impairment noted with poor recall. Recent CVA with slight right sided residual weakness. Pt with redness to left eye. Hx of glaucoma. He continues in mx eye gtts. BP satisfactory. Pt denies any SOB or CP. No dysphagia. He reports he is emptying his bladder. Blood sugars occasionally elevated in the 200s. He continues on insulin.       Assessment/Plan:     CVA with right sided weakness   -MRI brain reveals probable small late subacute infarct involving the genu of the internal capsule on the right side  -CT angiogram negative for significant large vessel occlusion/stenosis.  -Echo showed normal LVEF.  -Eliquis 5 mg BID.   - LDL 69 and at goal. No statin indicated  -Hgb today 13.1.      Essential hypertension:   -amlodipine 5 mg daily.   -Monitor bp.   -Follow up BMP unremarkable.      Type 2 diabetes   -Recent HbA1C 9.1.   -consistent carb diet.   -Lantus 12 Q AM.   -Novolog 4 units + sliding scale insulin with meals.   -Blood sugar checks QID.      Paroxysmal atrial fibrillation  - Eliquis 5 mg BID.     Mood disorder  -fluvoxamine 50 mg daily.   -aripiprazole 2 mg Q HS.     Glaucoma with blindness  -continue home eye  gtts.   -cleanse eyes with baby shampoo every day.     Active Ambulatory Problems     Diagnosis Date Noted     SIRS (systemic inflammatory response syndrome) (H) 09/09/2019     Adrenal incidentaloma      Diet-controlled diabetes mellitus (H)      Pericardial effusion      Lactic acidosis      Type 2 diabetes mellitus without complication, without long-term current use of insulin (H) 04/12/2020     Paroxysmal atrial fibrillation (H) 02/18/2020     Calculus of ureter 04/12/2020     Acute renal failure, unspecified acute renal failure type 04/12/2020     Acute renal failure (ARF) 04/12/2020     ATN (acute tubular necrosis) 04/12/2020     Sepsis due to urinary tract infection (H)      Metformin overdose of undetermined intent, initial encounter      Metabolic acidosis      Hyperkalemia      Hematemesis, presence of nausea not specified 04/12/2020     Calculus of kidney      Syncope and collapse 06/30/2020     Hyperglycemia      After care 07/03/2012     Age-related cataract 03/27/2021     Anemia associated with acute blood loss 06/27/2012     Balanitis 03/27/2021     Cholelithiasis without obstruction 03/27/2021     Constipation 07/03/2012     Depression with anxiety 07/03/2012     Glaucoma 06/25/2012     Hemorrhoids without complication 03/27/2021     Hyperlipidemia 06/07/2018     Loss of appetite 03/27/2021     Major depressive disorder, recurrent episode, in partial remission 06/08/2018     Mixed personality disorder in adult (H) 06/08/2018     Obstructive sleep apnea 03/27/2021     Osteoarthrosis involving lower leg 06/23/2012     Periodic limb movement disorder 03/27/2021     Recurrent major depression 03/27/2021     S/P ureteral stent placement 03/27/2021     Unilateral small kidney 03/27/2021     Nephrolithiasis 03/27/2021     Type 2 diabetes mellitus (H) 06/25/2012     Osteoarthritis of knee 03/27/2021     Persistent atrial fibrillation (H) 03/27/2021     Pyelonephritis 03/27/2021     Urinary tract infection  without hematuria, site unspecified 04/30/2022     Sepsis without acute organ dysfunction (H) 04/30/2022     UTI (urinary tract infection) 05/01/2022     Benign prostatic hyperplasia with urinary frequency 05/07/2022     Hypomagnesemia 05/07/2022     Injury of head, initial encounter 07/31/2023     Fall at home, initial encounter 07/31/2023     E. coli urinary tract infection 08/02/2023     Septic encephalopathy 07/25/2024     DM (diabetes mellitus), type 2 with complications (H) 07/25/2024     Urinary tract infection with hematuria, site unspecified 07/26/2024     Altered mental status, unspecified altered mental status type 07/26/2024     Sepsis, due to unspecified organism, unspecified whether acute organ dysfunction present (H) 07/26/2024     Normocytic anemia 11/29/2023     Status post right knee replacement 11/29/2023     Peripheral vascular disease, unspecified 08/20/2024     Warfarin anticoagulation 09/09/2024     Elevated lactic acid level 01/06/2025     Right sided weakness 01/06/2025     Ischemic stroke (H) 01/06/2025     Type 2 diabetes mellitus with hyperglycemia, with long-term current use of insulin (H) 01/07/2025     Benign essential hypertension 01/07/2025     Resolved Ambulatory Problems     Diagnosis Date Noted     Shock circulatory (H)      Acute respiratory failure with hypoxemia (H)      Septic shock (H) 01/06/2025     Past Medical History:   Diagnosis Date     A-fib (H)      Acute hemodialysis encounter      Acute kidney injury      Asbestosis (H)      Atrophy of right kidney      Basal cell carcinoma      BPH without urinary obstruction      Cellulitis      Cholelithiasis      Diabetes mellitus, type 2 (H) 4/12/2020     Esophagitis      Gastritis      Hematemesis, presence of nausea not specified      Hyperlipemia      Kidney stone      Metformin overdose of undetermined intent      PTSD (post-traumatic stress disorder)      Seasonal allergies      Sleep apnea      Upper GI bleed      No  Known Allergies    All Meds and Allergies reviewed in the record at the facility and is the most up-to-date.    Current Outpatient Medications   Medication Sig Dispense Refill     acetaminophen (TYLENOL) 325 MG tablet Take 2 tablets (650 mg) by mouth every 4 hours as needed for mild pain or fever (temperature greater than 100.4  F (38  C)).       amLODIPine (NORVASC) 5 MG tablet Take 1 tablet (5 mg) by mouth daily.       apixaban ANTICOAGULANT (ELIQUIS) 5 MG tablet Take 5 mg by mouth 2 times daily.       ARIPiprazole (ABILIFY) 2 MG tablet [ARIPIPRAZOLE (ABILIFY) 2 MG TABLET] Take 2 mg by mouth at bedtime.        brimonidine (ALPHAGAN) 0.2 % ophthalmic solution [BRIMONIDINE (ALPHAGAN) 0.2 % OPHTHALMIC SOLUTION] Administer 1 drop to both eyes 2 (two) times a day.        dorzolamide-timolol (COSOPT) 2-0.5 % ophthalmic solution Place 1 drop into both eyes 2 times daily       famotidine (PEPCID) 20 MG tablet Take 20 mg by mouth daily.       fluvoxaMINE (LUVOX) 50 MG tablet [FLUVOXAMINE (LUVOX) 50 MG TABLET] Take 50 mg by mouth at bedtime.        gabapentin (NEURONTIN) 300 MG capsule Take 300 mg by mouth 2 times daily       insulin aspart (NOVOLOG PEN) 100 UNIT/ML pen Inject 1-7 Units subcutaneously 3 times daily (before meals). Correction Scale - MEDIUM INSULIN RESISTANCE DOSING   Do Not give Correction Insulin if Pre-Meal BG less than 140. For Pre-Meal  - 189 give 1 unit. For Pre-Meal  - 239 give 2 units. For Pre-Meal  - 289 give 3 units. For Pre-Meal  - 339 give 4 units. For Pre-Meal - 389 give 5 units. For Pre-Meal -439 give 6 units For Pre-Meal BG greater than or equal to 440 give 7 units. To be given with prandial insulin, and based on pre-meal blood glucose. Administering insulin within 5 minutes of the start of the meal is ideal. Administer insulin no more than 30 minutes after the start of the meal, unless directed otherwise by provider.   Notify provider if glucose greater  than or equal to 350 mg/dL after administration of correction dose.       insulin aspart (NOVOLOG PEN) 100 UNIT/ML pen Inject 4 Units subcutaneously 3 times daily (with meals).       insulin glargine (LANTUS PEN) 100 UNIT/ML pen Inject 12 Units subcutaneously every morning (before breakfast).       latanoprostene bunod 0.024 % Drop Place 1 drop into both eyes At Bedtime       levomefolate calcium (L-METHYLFOLATE ORAL) Take 15 mg by mouth daily.       netarsudiL (RHOPRESSA) 0.02 % Drop Place 1 drop into both eyes at bedtime.       No current facility-administered medications for this visit.       REVIEW OF SYSTEMS:   10 point review of systems reviewed and pertinent positives in the HPI.     PHYSICAL EXAMINATION:  Physical Exam     Vital signs: BP (!) 142/79   Pulse 68   Temp 97.4  F (36.3  C)   Resp 16   Ht 1.829 m (6')   Wt 93.2 kg (205 lb 6.4 oz)   SpO2 95%   BMI 27.86 kg/m    General: Awake, Alert, oriented x3, lying in bed, appropriately, follows simple commands, conversant  HEENT:PERRLA, Red conjunctiva right eye, anicteric sclerae, moist oral mucosa. Facial symmetry noted. Tongue is midline.   NECK: Supple  CVS:  S1  S2, without murmur or gallop.   LUNG: Clear to auscultation, No wheezes, rales or rhonci.  BACK: No kyphosis of the thoracic spine  ABDOMEN: Soft, nontender to palpation, with positive bowel sounds  EXTREMITIES: Moves both upper and lower extremities with slight right sided weakness to RLE, no pedal edema, no calf tenderness  SKIN: Warm and dry  NEUROLOGIC: Intact, pulses palpable  PSYCHIATRIC: Cognitive impairment noted. Some poor recall.       Labs:  All labs reviewed in the nursing home record and [employer]   @  Lab Results   Component Value Date    WBC 6.7 01/13/2025     Lab Results   Component Value Date    RBC 4.39 01/13/2025     Lab Results   Component Value Date    HGB 13.1 01/13/2025     Lab Results   Component Value Date    HCT 41.4 01/13/2025     Lab Results   Component Value Date     MCV 94 01/13/2025     Lab Results   Component Value Date    MCH 29.8 01/13/2025     Lab Results   Component Value Date    MCHC 31.6 01/13/2025     Lab Results   Component Value Date    RDW 12.7 01/13/2025     Lab Results   Component Value Date     01/13/2025        @Last Comprehensive Metabolic Panel:  Sodium   Date Value Ref Range Status   01/13/2025 143 135 - 145 mmol/L Final     Potassium   Date Value Ref Range Status   01/13/2025 4.6 3.4 - 5.3 mmol/L Final   05/05/2022 4.4 3.5 - 5.0 mmol/L Final     Chloride   Date Value Ref Range Status   01/13/2025 107 98 - 107 mmol/L Final   05/02/2022 109 (H) 98 - 107 mmol/L Final     Carbon Dioxide (CO2)   Date Value Ref Range Status   01/13/2025 25 22 - 29 mmol/L Final   05/02/2022 25 22 - 31 mmol/L Final     Anion Gap   Date Value Ref Range Status   01/13/2025 11 7 - 15 mmol/L Final   05/02/2022 8 5 - 18 mmol/L Final     Glucose   Date Value Ref Range Status   01/13/2025 236 (H) 70 - 99 mg/dL Final   05/02/2022 133 (H) 70 - 125 mg/dL Final     GLUCOSE BY METER POCT   Date Value Ref Range Status   01/09/2025 150 (H) 70 - 99 mg/dL Final     Urea Nitrogen   Date Value Ref Range Status   01/13/2025 9.1 8.0 - 23.0 mg/dL Final   05/02/2022 11 8 - 28 mg/dL Final     Creatinine   Date Value Ref Range Status   01/13/2025 0.82 0.67 - 1.17 mg/dL Final     GFR Estimate   Date Value Ref Range Status   01/13/2025 >90 >60 mL/min/1.73m2 Final   06/10/2021 >60 >60 mL/min/1.73m2 Final     Calcium   Date Value Ref Range Status   01/13/2025 8.7 (L) 8.8 - 10.4 mg/dL Final     Comment:     Reference intervals for this test were updated on 7/16/2024 to reflect our healthy population more accurately. There may be differences in the flagging of prior results with similar values performed with this method. Those prior results can be interpreted in the context of the updated reference intervals.     This note has been dictated using voice recognition software. Any grammatical or context  distortions are unintentional and inherent to the software    Electronically signed by: Kylie Gomez CNP       Sincerely,        Kylie Gomez NP    Electronically signed

## 2025-01-20 ENCOUNTER — TRANSITIONAL CARE UNIT VISIT (OUTPATIENT)
Dept: GERIATRICS | Facility: CLINIC | Age: 75
End: 2025-01-20
Payer: MEDICARE

## 2025-01-20 ENCOUNTER — LAB REQUISITION (OUTPATIENT)
Dept: LAB | Facility: CLINIC | Age: 75
End: 2025-01-20

## 2025-01-20 ENCOUNTER — LAB REQUISITION (OUTPATIENT)
Dept: LAB | Facility: CLINIC | Age: 75
End: 2025-01-20
Payer: MEDICARE

## 2025-01-20 VITALS
OXYGEN SATURATION: 96 % | SYSTOLIC BLOOD PRESSURE: 133 MMHG | DIASTOLIC BLOOD PRESSURE: 79 MMHG | HEART RATE: 82 BPM | RESPIRATION RATE: 16 BRPM | BODY MASS INDEX: 27.63 KG/M2 | HEIGHT: 72 IN | WEIGHT: 204 LBS | TEMPERATURE: 97.8 F

## 2025-01-20 DIAGNOSIS — I10 ESSENTIAL HYPERTENSION: ICD-10-CM

## 2025-01-20 DIAGNOSIS — I10 ESSENTIAL (PRIMARY) HYPERTENSION: ICD-10-CM

## 2025-01-20 DIAGNOSIS — I63.9 CEREBROVASCULAR ACCIDENT (CVA), UNSPECIFIED MECHANISM (H): Primary | ICD-10-CM

## 2025-01-20 DIAGNOSIS — D64.9 ANEMIA, UNSPECIFIED: ICD-10-CM

## 2025-01-20 DIAGNOSIS — F33.41 MAJOR DEPRESSIVE DISORDER, RECURRENT EPISODE, IN PARTIAL REMISSION: ICD-10-CM

## 2025-01-20 DIAGNOSIS — Z79.4 TYPE 2 DIABETES MELLITUS WITH DIABETIC POLYNEUROPATHY, WITH LONG-TERM CURRENT USE OF INSULIN (H): ICD-10-CM

## 2025-01-20 DIAGNOSIS — R53.1 RIGHT SIDED WEAKNESS: ICD-10-CM

## 2025-01-20 DIAGNOSIS — E11.42 TYPE 2 DIABETES MELLITUS WITH DIABETIC POLYNEUROPATHY, WITH LONG-TERM CURRENT USE OF INSULIN (H): ICD-10-CM

## 2025-01-20 DIAGNOSIS — I48.0 PAROXYSMAL ATRIAL FIBRILLATION (H): ICD-10-CM

## 2025-01-20 DIAGNOSIS — H40.003 GLAUCOMA SUSPECT, BILATERAL: ICD-10-CM

## 2025-01-20 DIAGNOSIS — Z87.440 HISTORY OF RECURRENT UTIS: ICD-10-CM

## 2025-01-20 LAB
ALBUMIN UR-MCNC: 20 MG/DL
APPEARANCE UR: CLEAR
BILIRUB UR QL STRIP: NEGATIVE
CAOX CRY #/AREA URNS HPF: ABNORMAL /HPF
COLOR UR AUTO: YELLOW
GLUCOSE UR STRIP-MCNC: 500 MG/DL
HGB UR QL STRIP: NEGATIVE
KETONES UR STRIP-MCNC: NEGATIVE MG/DL
LEUKOCYTE ESTERASE UR QL STRIP: NEGATIVE
MUCOUS THREADS #/AREA URNS LPF: PRESENT /LPF
NITRATE UR QL: NEGATIVE
PH UR STRIP: 5 [PH] (ref 5–7)
RBC URINE: <1 /HPF
SP GR UR STRIP: 1.02 (ref 1–1.03)
URATE CRY #/AREA URNS HPF: ABNORMAL /HPF
UROBILINOGEN UR STRIP-MCNC: NORMAL MG/DL
WBC URINE: 1 /HPF

## 2025-01-20 PROCEDURE — 99309 SBSQ NF CARE MODERATE MDM 30: CPT | Performed by: NURSE PRACTITIONER

## 2025-01-20 PROCEDURE — 81001 URINALYSIS AUTO W/SCOPE: CPT

## 2025-01-20 PROCEDURE — 87086 URINE CULTURE/COLONY COUNT: CPT

## 2025-01-20 NOTE — LETTER
1/20/2025      Rakesh Samuels  [address]  [facility] [address]        [facility] GERIATRIC SERVICES    Code Status:  DNR/DNI   Visit Type:   Chief Complaint   Patient presents with     TCU Follow Up     Facility:  [facility]) [[id number]]         HPI: Rakesh Samuels is a 74 year old male seen today for follow up on the TCU. 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 MRI brain revealed probable small late subacute infarct involving the genu of the internal capsule on the right side. He also had lactic acidosis on admission, concerning sepsis. However, no infection found.    Transitional Care Course: Today pt lying in bed. Some mild cognitive impairment noted with poor recall. Recent CVA with slight right sided residual weakness.  Nursing staff reported failure to thrive like symptoms with spending more time in bed and decreased appetite.  Patient afebrile.  He does have a history of recurrent UTIs.  BP satisfactory.  No shortness of breath or chest pain.  No dysphagia.  He reports he is emptying his bladder.  Denies any dysuria.  Diabetes mellitus type 2.  Blood sugars occasionally in the 200s.  He continues on insulin.      Assessment/Plan:     CVA with right sided weakness   -MRI brain reveals probable small late subacute infarct involving the genu of the internal capsule on the right side  -CT angiogram negative for significant large vessel occlusion/stenosis.  -Echo showed normal LVEF.  -Eliquis 5 mg BID.   - LDL 69 and at goal. No statin indicated  -Hgb today 13.1.     History of recurrent UTIs  -Reporting some failure to thrive like symptoms.  -Rule out UTI.  Obtain UA UC.  -Follow-up CBC and BMP in the AM.     Essential hypertension:   -amlodipine 5 mg daily.   -Monitor bp.      Type 2 diabetes   -Recent HbA1C 9.1.   -consistent carb diet.   -Lantus 12 Q AM.    -Novolog 4 units + sliding scale insulin with meals.   -Blood sugar checks QID.  Blood sugars occasionally in the 200s.  Rule out infection.     Paroxysmal atrial fibrillation  - Eliquis 5 mg BID.     Mood disorder  -fluvoxamine 50 mg daily.   -aripiprazole 2 mg Q HS.     Glaucoma with blindness  -continue home eye gtts.   -cleanse eyes with baby shampoo every day.     Active Ambulatory Problems     Diagnosis Date Noted     SIRS (systemic inflammatory response syndrome) (H) 09/09/2019     Adrenal incidentaloma      Diet-controlled diabetes mellitus (H)      Pericardial effusion      Lactic acidosis      Type 2 diabetes mellitus without complication, without long-term current use of insulin (H) 04/12/2020     Paroxysmal atrial fibrillation (H) 02/18/2020     Calculus of ureter 04/12/2020     Acute renal failure, unspecified acute renal failure type 04/12/2020     Acute renal failure (ARF) 04/12/2020     ATN (acute tubular necrosis) 04/12/2020     Sepsis due to urinary tract infection (H)      Metformin overdose of undetermined intent, initial encounter      Metabolic acidosis      Hyperkalemia      Hematemesis, presence of nausea not specified 04/12/2020     Calculus of kidney      Syncope and collapse 06/30/2020     Hyperglycemia      After care 07/03/2012     Age-related cataract 03/27/2021     Anemia associated with acute blood loss 06/27/2012     Balanitis 03/27/2021     Cholelithiasis without obstruction 03/27/2021     Constipation 07/03/2012     Depression with anxiety 07/03/2012     Glaucoma 06/25/2012     Hemorrhoids without complication 03/27/2021     Hyperlipidemia 06/07/2018     Loss of appetite 03/27/2021     Major depressive disorder, recurrent episode, in partial remission 06/08/2018     Mixed personality disorder in adult (H) 06/08/2018     Obstructive sleep apnea 03/27/2021     Osteoarthrosis involving lower leg 06/23/2012     Periodic limb movement disorder 03/27/2021     Recurrent major depression  03/27/2021     S/P ureteral stent placement 03/27/2021     Unilateral small kidney 03/27/2021     Nephrolithiasis 03/27/2021     Type 2 diabetes mellitus (H) 06/25/2012     Osteoarthritis of knee 03/27/2021     Persistent atrial fibrillation (H) 03/27/2021     Pyelonephritis 03/27/2021     Urinary tract infection without hematuria, site unspecified 04/30/2022     Sepsis without acute organ dysfunction (H) 04/30/2022     UTI (urinary tract infection) 05/01/2022     Benign prostatic hyperplasia with urinary frequency 05/07/2022     Hypomagnesemia 05/07/2022     Injury of head, initial encounter 07/31/2023     Fall at home, initial encounter 07/31/2023     E. coli urinary tract infection 08/02/2023     Septic encephalopathy 07/25/2024     DM (diabetes mellitus), type 2 with complications (H) 07/25/2024     Urinary tract infection with hematuria, site unspecified 07/26/2024     Altered mental status, unspecified altered mental status type 07/26/2024     Sepsis, due to unspecified organism, unspecified whether acute organ dysfunction present (H) 07/26/2024     Normocytic anemia 11/29/2023     Status post right knee replacement 11/29/2023     Peripheral vascular disease, unspecified 08/20/2024     Warfarin anticoagulation 09/09/2024     Elevated lactic acid level 01/06/2025     Right sided weakness 01/06/2025     Ischemic stroke (H) 01/06/2025     Type 2 diabetes mellitus with hyperglycemia, with long-term current use of insulin (H) 01/07/2025     Benign essential hypertension 01/07/2025     Resolved Ambulatory Problems     Diagnosis Date Noted     Shock circulatory (H)      Acute respiratory failure with hypoxemia (H)      Septic shock (H) 01/06/2025     Past Medical History:   Diagnosis Date     A-fib (H)      Acute hemodialysis encounter      Acute kidney injury      Asbestosis (H)      Atrophy of right kidney      Basal cell carcinoma      BPH without urinary obstruction      Cellulitis      Cholelithiasis       Diabetes mellitus, type 2 (H) 4/12/2020     Esophagitis      Gastritis      Hematemesis, presence of nausea not specified      Hyperlipemia      Kidney stone      Metformin overdose of undetermined intent      PTSD (post-traumatic stress disorder)      Seasonal allergies      Sleep apnea      Upper GI bleed      No Known Allergies    All Meds and Allergies reviewed in the record at the facility and is the most up-to-date.    Current Outpatient Medications   Medication Sig Dispense Refill     acetaminophen (TYLENOL) 325 MG tablet Take 2 tablets (650 mg) by mouth every 4 hours as needed for mild pain or fever (temperature greater than 100.4  F (38  C)).       amLODIPine (NORVASC) 5 MG tablet Take 1 tablet (5 mg) by mouth daily.       apixaban ANTICOAGULANT (ELIQUIS) 5 MG tablet Take 5 mg by mouth 2 times daily.       ARIPiprazole (ABILIFY) 2 MG tablet [ARIPIPRAZOLE (ABILIFY) 2 MG TABLET] Take 2 mg by mouth at bedtime.        brimonidine (ALPHAGAN) 0.2 % ophthalmic solution [BRIMONIDINE (ALPHAGAN) 0.2 % OPHTHALMIC SOLUTION] Administer 1 drop to both eyes 2 (two) times a day.        dorzolamide-timolol (COSOPT) 2-0.5 % ophthalmic solution Place 1 drop into both eyes 2 times daily       famotidine (PEPCID) 20 MG tablet Take 20 mg by mouth daily.       fluvoxaMINE (LUVOX) 50 MG tablet [FLUVOXAMINE (LUVOX) 50 MG TABLET] Take 50 mg by mouth at bedtime.        gabapentin (NEURONTIN) 300 MG capsule Take 300 mg by mouth 2 times daily       insulin aspart (NOVOLOG PEN) 100 UNIT/ML pen Inject 1-7 Units subcutaneously 3 times daily (before meals). Correction Scale - MEDIUM INSULIN RESISTANCE DOSING   Do Not give Correction Insulin if Pre-Meal BG less than 140. For Pre-Meal  - 189 give 1 unit. For Pre-Meal  - 239 give 2 units. For Pre-Meal  - 289 give 3 units. For Pre-Meal  - 339 give 4 units. For Pre-Meal - 389 give 5 units. For Pre-Meal -439 give 6 units For Pre-Meal BG greater than or equal  to 440 give 7 units. To be given with prandial insulin, and based on pre-meal blood glucose. Administering insulin within 5 minutes of the start of the meal is ideal. Administer insulin no more than 30 minutes after the start of the meal, unless directed otherwise by provider.   Notify provider if glucose greater than or equal to 350 mg/dL after administration of correction dose.       insulin aspart (NOVOLOG PEN) 100 UNIT/ML pen Inject 4 Units subcutaneously 3 times daily (with meals).       insulin glargine (LANTUS PEN) 100 UNIT/ML pen Inject 12 Units subcutaneously every morning (before breakfast).       latanoprostene bunod 0.024 % Drop Place 1 drop into both eyes At Bedtime       levomefolate calcium (L-METHYLFOLATE ORAL) Take 15 mg by mouth daily.       netarsudiL (RHOPRESSA) 0.02 % Drop Place 1 drop into both eyes at bedtime.       No current facility-administered medications for this visit.       REVIEW OF SYSTEMS:   10 point review of systems reviewed and pertinent positives in the HPI.     PHYSICAL EXAMINATION:  Physical Exam     Vital signs: /79   Pulse 82   Temp 97.8  F (36.6  C)   Resp 16   Ht 1.829 m (6')   Wt 92.5 kg (204 lb)   SpO2 96%   BMI 27.67 kg/m    General: Awake, Alert, oriented x3, lying in bed, appropriately, follows simple commands, conversant  HEENT:PERRLA, Red conjunctiva right eye, anicteric sclerae, moist oral mucosa. Facial symmetry noted. Tongue is midline.   NECK: Supple  CVS:  S1  S2, without murmur or gallop.   LUNG: Clear to auscultation, No wheezes, rales or rhonci.  BACK: No kyphosis of the thoracic spine  ABDOMEN: Soft, nontender to palpation, with positive bowel sounds  EXTREMITIES: Moves both upper and lower extremities with slight right sided weakness to RLE, no pedal edema, no calf tenderness  SKIN: Warm and dry  NEUROLOGIC: Intact, pulses palpable  PSYCHIATRIC: Cognitive impairment noted. Some poor recall.       Labs:  All labs reviewed in the nursing home  record and Epic   @  Lab Results   Component Value Date    WBC 6.7 01/13/2025     Lab Results   Component Value Date    RBC 4.39 01/13/2025     Lab Results   Component Value Date    HGB 13.1 01/13/2025     Lab Results   Component Value Date    HCT 41.4 01/13/2025     Lab Results   Component Value Date    MCV 94 01/13/2025     Lab Results   Component Value Date    MCH 29.8 01/13/2025     Lab Results   Component Value Date    MCHC 31.6 01/13/2025     Lab Results   Component Value Date    RDW 12.7 01/13/2025     Lab Results   Component Value Date     01/13/2025        @Last Comprehensive Metabolic Panel:  Sodium   Date Value Ref Range Status   01/13/2025 143 135 - 145 mmol/L Final     Potassium   Date Value Ref Range Status   01/13/2025 4.6 3.4 - 5.3 mmol/L Final   05/05/2022 4.4 3.5 - 5.0 mmol/L Final     Chloride   Date Value Ref Range Status   01/13/2025 107 98 - 107 mmol/L Final   05/02/2022 109 (H) 98 - 107 mmol/L Final     Carbon Dioxide (CO2)   Date Value Ref Range Status   01/13/2025 25 22 - 29 mmol/L Final   05/02/2022 25 22 - 31 mmol/L Final     Anion Gap   Date Value Ref Range Status   01/13/2025 11 7 - 15 mmol/L Final   05/02/2022 8 5 - 18 mmol/L Final     Glucose   Date Value Ref Range Status   01/13/2025 236 (H) 70 - 99 mg/dL Final   05/02/2022 133 (H) 70 - 125 mg/dL Final     GLUCOSE BY METER POCT   Date Value Ref Range Status   01/09/2025 150 (H) 70 - 99 mg/dL Final     Urea Nitrogen   Date Value Ref Range Status   01/13/2025 9.1 8.0 - 23.0 mg/dL Final   05/02/2022 11 8 - 28 mg/dL Final     Creatinine   Date Value Ref Range Status   01/13/2025 0.82 0.67 - 1.17 mg/dL Final     GFR Estimate   Date Value Ref Range Status   01/13/2025 >90 >60 mL/min/1.73m2 Final   06/10/2021 >60 >60 mL/min/1.73m2 Final     Calcium   Date Value Ref Range Status   01/13/2025 8.7 (L) 8.8 - 10.4 mg/dL Final     Comment:     Reference intervals for this test were updated on 7/16/2024 to reflect our healthy population more  accurately. There may be differences in the flagging of prior results with similar values performed with this method. Those prior results can be interpreted in the context of the updated reference intervals.     This note has been dictated using voice recognition software. Any grammatical or context distortions are unintentional and inherent to the software    Electronically signed by: Kylie Gomez CNP       Sincerely,        Kylie Gomez NP    Electronically signed

## 2025-01-21 LAB
ANION GAP SERPL CALCULATED.3IONS-SCNC: 10 MMOL/L (ref 7–15)
BUN SERPL-MCNC: 17 MG/DL (ref 8–23)
CALCIUM SERPL-MCNC: 9.2 MG/DL (ref 8.8–10.4)
CHLORIDE SERPL-SCNC: 108 MMOL/L (ref 98–107)
CREAT SERPL-MCNC: 0.93 MG/DL (ref 0.67–1.17)
EGFRCR SERPLBLD CKD-EPI 2021: 87 ML/MIN/1.73M2
ERYTHROCYTE [DISTWIDTH] IN BLOOD BY AUTOMATED COUNT: 13.1 % (ref 10–15)
GLUCOSE SERPL-MCNC: 255 MG/DL (ref 70–99)
HCO3 SERPL-SCNC: 25 MMOL/L (ref 22–29)
HCT VFR BLD AUTO: 45.2 % (ref 40–53)
HGB BLD-MCNC: 14.1 G/DL (ref 13.3–17.7)
MCH RBC QN AUTO: 30.1 PG (ref 26.5–33)
MCHC RBC AUTO-ENTMCNC: 31.2 G/DL (ref 31.5–36.5)
MCV RBC AUTO: 97 FL (ref 78–100)
PLATELET # BLD AUTO: 298 10E3/UL (ref 150–450)
POTASSIUM SERPL-SCNC: 5.1 MMOL/L (ref 3.4–5.3)
RBC # BLD AUTO: 4.68 10E6/UL (ref 4.4–5.9)
SODIUM SERPL-SCNC: 143 MMOL/L (ref 135–145)
WBC # BLD AUTO: 7.3 10E3/UL (ref 4–11)

## 2025-01-21 PROCEDURE — 85027 COMPLETE CBC AUTOMATED: CPT | Performed by: NURSE PRACTITIONER

## 2025-01-21 PROCEDURE — P9604 ONE-WAY ALLOW PRORATED TRIP: HCPCS | Performed by: NURSE PRACTITIONER

## 2025-01-21 PROCEDURE — 80048 BASIC METABOLIC PNL TOTAL CA: CPT | Performed by: NURSE PRACTITIONER

## 2025-01-21 PROCEDURE — 36415 COLL VENOUS BLD VENIPUNCTURE: CPT | Performed by: NURSE PRACTITIONER

## 2025-01-21 NOTE — PROGRESS NOTES
[facility] GERIATRIC SERVICES    Code Status:  DNR/DNI   Visit Type:   Chief Complaint   Patient presents with    TCU Follow Up     Facility:  [facility] ([facility]) [[id number]]         HPI: Rakesh Samuels is a 74 year old male seen today for follow up on the TCU. 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 MRI brain revealed probable small late subacute infarct involving the genu of the internal capsule on the right side. He also had lactic acidosis on admission, concerning sepsis. However, no infection found.    Transitional Care Course: Today pt lying in bed. Some mild cognitive impairment noted with poor recall. Recent CVA with slight right sided residual weakness.  Nursing staff reported failure to thrive like symptoms with spending more time in bed and decreased appetite.  Patient afebrile.  He does have a history of recurrent UTIs.  BP satisfactory.  No shortness of breath or chest pain.  No dysphagia.  He reports he is emptying his bladder.  Denies any dysuria.  Diabetes mellitus type 2.  Blood sugars occasionally in the 200s.  He continues on insulin.      Assessment/Plan:     CVA with right sided weakness   -MRI brain reveals probable small late subacute infarct involving the genu of the internal capsule on the right side  -CT angiogram negative for significant large vessel occlusion/stenosis.  -Echo showed normal LVEF.  -Eliquis 5 mg BID.   - LDL 69 and at goal. No statin indicated  -Hgb today 13.1.     History of recurrent UTIs  -Reporting some failure to thrive like symptoms.  -Rule out UTI.  Obtain UA UC.  -Follow-up CBC and BMP in the AM.     Essential hypertension:   -amlodipine 5 mg daily.   -Monitor bp.      Type 2 diabetes   -Recent HbA1C 9.1.   -consistent carb diet.   -Lantus 12 Q AM.   -Novolog 4 units + sliding scale insulin with meals.   -Blood sugar checks QID.  Blood  sugars occasionally in the 200s.  Rule out infection.     Paroxysmal atrial fibrillation  - Eliquis 5 mg BID.     Mood disorder  -fluvoxamine 50 mg daily.   -aripiprazole 2 mg Q HS.     Glaucoma with blindness  -continue home eye gtts.   -cleanse eyes with baby shampoo every day.     Active Ambulatory Problems     Diagnosis Date Noted    SIRS (systemic inflammatory response syndrome) (H) 09/09/2019    Adrenal incidentaloma     Diet-controlled diabetes mellitus (H)     Pericardial effusion     Lactic acidosis     Type 2 diabetes mellitus without complication, without long-term current use of insulin (H) 04/12/2020    Paroxysmal atrial fibrillation (H) 02/18/2020    Calculus of ureter 04/12/2020    Acute renal failure, unspecified acute renal failure type 04/12/2020    Acute renal failure (ARF) 04/12/2020    ATN (acute tubular necrosis) 04/12/2020    Sepsis due to urinary tract infection (H)     Metformin overdose of undetermined intent, initial encounter     Metabolic acidosis     Hyperkalemia     Hematemesis, presence of nausea not specified 04/12/2020    Calculus of kidney     Syncope and collapse 06/30/2020    Hyperglycemia     After care 07/03/2012    Age-related cataract 03/27/2021    Anemia associated with acute blood loss 06/27/2012    Balanitis 03/27/2021    Cholelithiasis without obstruction 03/27/2021    Constipation 07/03/2012    Depression with anxiety 07/03/2012    Glaucoma 06/25/2012    Hemorrhoids without complication 03/27/2021    Hyperlipidemia 06/07/2018    Loss of appetite 03/27/2021    Major depressive disorder, recurrent episode, in partial remission 06/08/2018    Mixed personality disorder in adult (H) 06/08/2018    Obstructive sleep apnea 03/27/2021    Osteoarthrosis involving lower leg 06/23/2012    Periodic limb movement disorder 03/27/2021    Recurrent major depression 03/27/2021    S/P ureteral stent placement 03/27/2021    Unilateral small kidney 03/27/2021    Nephrolithiasis 03/27/2021     Type 2 diabetes mellitus (H) 06/25/2012    Osteoarthritis of knee 03/27/2021    Persistent atrial fibrillation (H) 03/27/2021    Pyelonephritis 03/27/2021    Urinary tract infection without hematuria, site unspecified 04/30/2022    Sepsis without acute organ dysfunction (H) 04/30/2022    UTI (urinary tract infection) 05/01/2022    Benign prostatic hyperplasia with urinary frequency 05/07/2022    Hypomagnesemia 05/07/2022    Injury of head, initial encounter 07/31/2023    Fall at home, initial encounter 07/31/2023    E. coli urinary tract infection 08/02/2023    Septic encephalopathy 07/25/2024    DM (diabetes mellitus), type 2 with complications (H) 07/25/2024    Urinary tract infection with hematuria, site unspecified 07/26/2024    Altered mental status, unspecified altered mental status type 07/26/2024    Sepsis, due to unspecified organism, unspecified whether acute organ dysfunction present (H) 07/26/2024    Normocytic anemia 11/29/2023    Status post right knee replacement 11/29/2023    Peripheral vascular disease, unspecified 08/20/2024    Warfarin anticoagulation 09/09/2024    Elevated lactic acid level 01/06/2025    Right sided weakness 01/06/2025    Ischemic stroke (H) 01/06/2025    Type 2 diabetes mellitus with hyperglycemia, with long-term current use of insulin (H) 01/07/2025    Benign essential hypertension 01/07/2025     Resolved Ambulatory Problems     Diagnosis Date Noted    Shock circulatory (H)     Acute respiratory failure with hypoxemia (H)     Septic shock (H) 01/06/2025     Past Medical History:   Diagnosis Date    A-fib (H)     Acute hemodialysis encounter     Acute kidney injury     Asbestosis (H)     Atrophy of right kidney     Basal cell carcinoma     BPH without urinary obstruction     Cellulitis     Cholelithiasis     Diabetes mellitus, type 2 (H) 4/12/2020    Esophagitis     Gastritis     Hematemesis, presence of nausea not specified     Hyperlipemia     Kidney stone     Metformin  overdose of undetermined intent     PTSD (post-traumatic stress disorder)     Seasonal allergies     Sleep apnea     Upper GI bleed      No Known Allergies    All Meds and Allergies reviewed in the record at the facility and is the most up-to-date.    Current Outpatient Medications   Medication Sig Dispense Refill    acetaminophen (TYLENOL) 325 MG tablet Take 2 tablets (650 mg) by mouth every 4 hours as needed for mild pain or fever (temperature greater than 100.4  F (38  C)).      amLODIPine (NORVASC) 5 MG tablet Take 1 tablet (5 mg) by mouth daily.      apixaban ANTICOAGULANT (ELIQUIS) 5 MG tablet Take 5 mg by mouth 2 times daily.      ARIPiprazole (ABILIFY) 2 MG tablet [ARIPIPRAZOLE (ABILIFY) 2 MG TABLET] Take 2 mg by mouth at bedtime.       brimonidine (ALPHAGAN) 0.2 % ophthalmic solution [BRIMONIDINE (ALPHAGAN) 0.2 % OPHTHALMIC SOLUTION] Administer 1 drop to both eyes 2 (two) times a day.       dorzolamide-timolol (COSOPT) 2-0.5 % ophthalmic solution Place 1 drop into both eyes 2 times daily      famotidine (PEPCID) 20 MG tablet Take 20 mg by mouth daily.      fluvoxaMINE (LUVOX) 50 MG tablet [FLUVOXAMINE (LUVOX) 50 MG TABLET] Take 50 mg by mouth at bedtime.       gabapentin (NEURONTIN) 300 MG capsule Take 300 mg by mouth 2 times daily      insulin aspart (NOVOLOG PEN) 100 UNIT/ML pen Inject 1-7 Units subcutaneously 3 times daily (before meals). Correction Scale - MEDIUM INSULIN RESISTANCE DOSING   Do Not give Correction Insulin if Pre-Meal BG less than 140. For Pre-Meal  - 189 give 1 unit. For Pre-Meal  - 239 give 2 units. For Pre-Meal  - 289 give 3 units. For Pre-Meal  - 339 give 4 units. For Pre-Meal - 389 give 5 units. For Pre-Meal -439 give 6 units For Pre-Meal BG greater than or equal to 440 give 7 units. To be given with prandial insulin, and based on pre-meal blood glucose. Administering insulin within 5 minutes of the start of the meal is ideal. Administer insulin no  more than 30 minutes after the start of the meal, unless directed otherwise by provider.   Notify provider if glucose greater than or equal to 350 mg/dL after administration of correction dose.      insulin aspart (NOVOLOG PEN) 100 UNIT/ML pen Inject 4 Units subcutaneously 3 times daily (with meals).      insulin glargine (LANTUS PEN) 100 UNIT/ML pen Inject 12 Units subcutaneously every morning (before breakfast).      latanoprostene bunod 0.024 % Drop Place 1 drop into both eyes At Bedtime      levomefolate calcium (L-METHYLFOLATE ORAL) Take 15 mg by mouth daily.      netarsudiL (RHOPRESSA) 0.02 % Drop Place 1 drop into both eyes at bedtime.       No current facility-administered medications for this visit.       REVIEW OF SYSTEMS:   10 point review of systems reviewed and pertinent positives in the HPI.     PHYSICAL EXAMINATION:  Physical Exam     Vital signs: /79   Pulse 82   Temp 97.8  F (36.6  C)   Resp 16   Ht 1.829 m (6')   Wt 92.5 kg (204 lb)   SpO2 96%   BMI 27.67 kg/m    General: Awake, Alert, oriented x3, lying in bed, appropriately, follows simple commands, conversant  HEENT:PERRLA, Red conjunctiva right eye, anicteric sclerae, moist oral mucosa. Facial symmetry noted. Tongue is midline.   NECK: Supple  CVS:  S1  S2, without murmur or gallop.   LUNG: Clear to auscultation, No wheezes, rales or rhonci.  BACK: No kyphosis of the thoracic spine  ABDOMEN: Soft, nontender to palpation, with positive bowel sounds  EXTREMITIES: Moves both upper and lower extremities with slight right sided weakness to RLE, no pedal edema, no calf tenderness  SKIN: Warm and dry  NEUROLOGIC: Intact, pulses palpable  PSYCHIATRIC: Cognitive impairment noted. Some poor recall.       Labs:  All labs reviewed in the nursing home record and [facility]   @  Lab Results   Component Value Date    WBC 6.7 01/13/2025     Lab Results   Component Value Date    RBC 4.39 01/13/2025     Lab Results   Component Value Date    HGB 13.1  01/13/2025     Lab Results   Component Value Date    HCT 41.4 01/13/2025     Lab Results   Component Value Date    MCV 94 01/13/2025     Lab Results   Component Value Date    MCH 29.8 01/13/2025     Lab Results   Component Value Date    MCHC 31.6 01/13/2025     Lab Results   Component Value Date    RDW 12.7 01/13/2025     Lab Results   Component Value Date     01/13/2025        @Last Comprehensive Metabolic Panel:  Sodium   Date Value Ref Range Status   01/13/2025 143 135 - 145 mmol/L Final     Potassium   Date Value Ref Range Status   01/13/2025 4.6 3.4 - 5.3 mmol/L Final   05/05/2022 4.4 3.5 - 5.0 mmol/L Final     Chloride   Date Value Ref Range Status   01/13/2025 107 98 - 107 mmol/L Final   05/02/2022 109 (H) 98 - 107 mmol/L Final     Carbon Dioxide (CO2)   Date Value Ref Range Status   01/13/2025 25 22 - 29 mmol/L Final   05/02/2022 25 22 - 31 mmol/L Final     Anion Gap   Date Value Ref Range Status   01/13/2025 11 7 - 15 mmol/L Final   05/02/2022 8 5 - 18 mmol/L Final     Glucose   Date Value Ref Range Status   01/13/2025 236 (H) 70 - 99 mg/dL Final   05/02/2022 133 (H) 70 - 125 mg/dL Final     GLUCOSE BY METER POCT   Date Value Ref Range Status   01/09/2025 150 (H) 70 - 99 mg/dL Final     Urea Nitrogen   Date Value Ref Range Status   01/13/2025 9.1 8.0 - 23.0 mg/dL Final   05/02/2022 11 8 - 28 mg/dL Final     Creatinine   Date Value Ref Range Status   01/13/2025 0.82 0.67 - 1.17 mg/dL Final     GFR Estimate   Date Value Ref Range Status   01/13/2025 >90 >60 mL/min/1.73m2 Final   06/10/2021 >60 >60 mL/min/1.73m2 Final     Calcium   Date Value Ref Range Status   01/13/2025 8.7 (L) 8.8 - 10.4 mg/dL Final     Comment:     Reference intervals for this test were updated on 7/16/2024 to reflect our healthy population more accurately. There may be differences in the flagging of prior results with similar values performed with this method. Those prior results can be interpreted in the context of the updated  reference intervals.     This note has been dictated using voice recognition software. Any grammatical or context distortions are unintentional and inherent to the software    Electronically signed by: Kylie Gomez CNP

## 2025-01-22 ENCOUNTER — TRANSITIONAL CARE UNIT VISIT (OUTPATIENT)
Dept: GERIATRICS | Facility: CLINIC | Age: 75
End: 2025-01-22
Payer: MEDICARE

## 2025-01-22 VITALS
WEIGHT: 208.6 LBS | DIASTOLIC BLOOD PRESSURE: 84 MMHG | HEIGHT: 72 IN | HEART RATE: 80 BPM | SYSTOLIC BLOOD PRESSURE: 132 MMHG | TEMPERATURE: 97.8 F | OXYGEN SATURATION: 97 % | BODY MASS INDEX: 28.25 KG/M2 | RESPIRATION RATE: 16 BRPM

## 2025-01-22 DIAGNOSIS — R53.1 RIGHT SIDED WEAKNESS: ICD-10-CM

## 2025-01-22 DIAGNOSIS — I10 ESSENTIAL HYPERTENSION: ICD-10-CM

## 2025-01-22 DIAGNOSIS — F33.41 MAJOR DEPRESSIVE DISORDER, RECURRENT EPISODE, IN PARTIAL REMISSION: ICD-10-CM

## 2025-01-22 DIAGNOSIS — H40.003 GLAUCOMA SUSPECT, BILATERAL: ICD-10-CM

## 2025-01-22 DIAGNOSIS — Z79.4 TYPE 2 DIABETES MELLITUS WITH DIABETIC POLYNEUROPATHY, WITH LONG-TERM CURRENT USE OF INSULIN (H): ICD-10-CM

## 2025-01-22 DIAGNOSIS — Z87.440 HISTORY OF RECURRENT UTIS: ICD-10-CM

## 2025-01-22 DIAGNOSIS — I63.9 CEREBROVASCULAR ACCIDENT (CVA), UNSPECIFIED MECHANISM (H): Primary | ICD-10-CM

## 2025-01-22 DIAGNOSIS — E11.42 TYPE 2 DIABETES MELLITUS WITH DIABETIC POLYNEUROPATHY, WITH LONG-TERM CURRENT USE OF INSULIN (H): ICD-10-CM

## 2025-01-22 DIAGNOSIS — I48.0 PAROXYSMAL ATRIAL FIBRILLATION (H): ICD-10-CM

## 2025-01-22 LAB — BACTERIA UR CULT: NO GROWTH

## 2025-01-22 PROCEDURE — 99309 SBSQ NF CARE MODERATE MDM 30: CPT | Performed by: NURSE PRACTITIONER

## 2025-01-22 NOTE — LETTER
1/22/2025      Rakesh Samuels  [address]  [facility] [address]        [facility] GERIATRIC SERVICES    Code Status:  DNR/DNI   Visit Type:   Chief Complaint   Patient presents with     TCU Follow Up     Facility:  [facility]) [[id number]]         HPI: Rakesh Samuels is a 74 year old male seen today for follow up on the TCU. 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 MRI brain revealed probable small late subacute infarct involving the genu of the internal capsule on the right side. He also had lactic acidosis on admission, concerning sepsis. However, no infection found.    Transitional Care Course: Today pt sitting up in wheelchair.  Some mild cognitive impairment noted with poor recall. Recent CVA with slight right sided residual weakness.  Continues with generalized weakness.  Patient afebrile.  He does have a history of recurrent UTIs.  Repeat UA UC was negative.  Laboratory unremarkable.  No leukocytosis.  BP satisfactory.  No shortness of breath or chest pain.  No dysphagia.  Diabetes mellitus type 2.  Blood sugars in the 200s.  Increased urine glucose noted.  He continues on insulin.      Assessment/Plan:     CVA with right sided weakness   -MRI brain reveals probable small late subacute infarct involving the genu of the internal capsule on the right side  -CT angiogram negative for significant large vessel occlusion/stenosis.  -Echo showed normal LVEF.  -Eliquis 5 mg BID.   - LDL 69 and at goal. No statin indicated  -Hgb  13.1.     History of recurrent UTIs  -Reporting some failure to thrive like symptoms.  -Follow-up UA UC negative.  -Follow-up CBC and BMP unremarkable.     Essential hypertension:   -amlodipine 5 mg daily.   -Monitor bp.      Type 2 diabetes   -Recent HbA1C 9.1.   -consistent carb diet.   -Blood sugars in the 200s.  -Increase Lantus 16 Q AM.    -Novolog 4 units + sliding scale insulin with meals. Hold if BS <110.   -Blood sugar checks QID.      Paroxysmal atrial fibrillation  - Eliquis 5 mg BID.     Mood disorder  -fluvoxamine 50 mg daily.   -aripiprazole 2 mg Q HS.     Glaucoma with blindness  -continue home eye gtts.   -cleanse eyes with baby shampoo every day.     Active Ambulatory Problems     Diagnosis Date Noted     SIRS (systemic inflammatory response syndrome) (H) 09/09/2019     Adrenal incidentaloma      Diet-controlled diabetes mellitus (H)      Pericardial effusion      Lactic acidosis      Type 2 diabetes mellitus without complication, without long-term current use of insulin (H) 04/12/2020     Paroxysmal atrial fibrillation (H) 02/18/2020     Calculus of ureter 04/12/2020     Acute renal failure, unspecified acute renal failure type 04/12/2020     Acute renal failure (ARF) 04/12/2020     ATN (acute tubular necrosis) 04/12/2020     Sepsis due to urinary tract infection (H)      Metformin overdose of undetermined intent, initial encounter      Metabolic acidosis      Hyperkalemia      Hematemesis, presence of nausea not specified 04/12/2020     Calculus of kidney      Syncope and collapse 06/30/2020     Hyperglycemia      After care 07/03/2012     Age-related cataract 03/27/2021     Anemia associated with acute blood loss 06/27/2012     Balanitis 03/27/2021     Cholelithiasis without obstruction 03/27/2021     Constipation 07/03/2012     Depression with anxiety 07/03/2012     Glaucoma 06/25/2012     Hemorrhoids without complication 03/27/2021     Hyperlipidemia 06/07/2018     Loss of appetite 03/27/2021     Major depressive disorder, recurrent episode, in partial remission 06/08/2018     Mixed personality disorder in adult (H) 06/08/2018     Obstructive sleep apnea 03/27/2021     Osteoarthrosis involving lower leg 06/23/2012     Periodic limb movement disorder 03/27/2021     Recurrent major depression 03/27/2021     S/P ureteral stent  placement 03/27/2021     Unilateral small kidney 03/27/2021     Nephrolithiasis 03/27/2021     Type 2 diabetes mellitus (H) 06/25/2012     Osteoarthritis of knee 03/27/2021     Persistent atrial fibrillation (H) 03/27/2021     Pyelonephritis 03/27/2021     Urinary tract infection without hematuria, site unspecified 04/30/2022     Sepsis without acute organ dysfunction (H) 04/30/2022     UTI (urinary tract infection) 05/01/2022     Benign prostatic hyperplasia with urinary frequency 05/07/2022     Hypomagnesemia 05/07/2022     Injury of head, initial encounter 07/31/2023     Fall at home, initial encounter 07/31/2023     E. coli urinary tract infection 08/02/2023     Septic encephalopathy 07/25/2024     DM (diabetes mellitus), type 2 with complications (H) 07/25/2024     Urinary tract infection with hematuria, site unspecified 07/26/2024     Altered mental status, unspecified altered mental status type 07/26/2024     Sepsis, due to unspecified organism, unspecified whether acute organ dysfunction present (H) 07/26/2024     Normocytic anemia 11/29/2023     Status post right knee replacement 11/29/2023     Peripheral vascular disease, unspecified 08/20/2024     Warfarin anticoagulation 09/09/2024     Elevated lactic acid level 01/06/2025     Right sided weakness 01/06/2025     Ischemic stroke (H) 01/06/2025     Type 2 diabetes mellitus with hyperglycemia, with long-term current use of insulin (H) 01/07/2025     Benign essential hypertension 01/07/2025     Resolved Ambulatory Problems     Diagnosis Date Noted     Shock circulatory (H)      Acute respiratory failure with hypoxemia (H)      Septic shock (H) 01/06/2025     Past Medical History:   Diagnosis Date     A-fib (H)      Acute hemodialysis encounter      Acute kidney injury      Asbestosis (H)      Atrophy of right kidney      Basal cell carcinoma      BPH without urinary obstruction      Cellulitis      Cholelithiasis      Diabetes mellitus, type 2 (H) 4/12/2020      Esophagitis      Gastritis      Hematemesis, presence of nausea not specified      Hyperlipemia      Kidney stone      Metformin overdose of undetermined intent      PTSD (post-traumatic stress disorder)      Seasonal allergies      Sleep apnea      Upper GI bleed      No Known Allergies    All Meds and Allergies reviewed in the record at the facility and is the most up-to-date.    Current Outpatient Medications   Medication Sig Dispense Refill     acetaminophen (TYLENOL) 325 MG tablet Take 2 tablets (650 mg) by mouth every 4 hours as needed for mild pain or fever (temperature greater than 100.4  F (38  C)).       amLODIPine (NORVASC) 5 MG tablet Take 1 tablet (5 mg) by mouth daily.       apixaban ANTICOAGULANT (ELIQUIS) 5 MG tablet Take 5 mg by mouth 2 times daily.       ARIPiprazole (ABILIFY) 2 MG tablet [ARIPIPRAZOLE (ABILIFY) 2 MG TABLET] Take 2 mg by mouth at bedtime.        brimonidine (ALPHAGAN) 0.2 % ophthalmic solution [BRIMONIDINE (ALPHAGAN) 0.2 % OPHTHALMIC SOLUTION] Administer 1 drop to both eyes 2 (two) times a day.        dorzolamide-timolol (COSOPT) 2-0.5 % ophthalmic solution Place 1 drop into both eyes 2 times daily       famotidine (PEPCID) 20 MG tablet Take 20 mg by mouth daily.       fluvoxaMINE (LUVOX) 50 MG tablet [FLUVOXAMINE (LUVOX) 50 MG TABLET] Take 50 mg by mouth at bedtime.        gabapentin (NEURONTIN) 300 MG capsule Take 300 mg by mouth 2 times daily       insulin aspart (NOVOLOG PEN) 100 UNIT/ML pen Inject 1-7 Units subcutaneously 3 times daily (before meals). Correction Scale - MEDIUM INSULIN RESISTANCE DOSING   Do Not give Correction Insulin if Pre-Meal BG less than 140. For Pre-Meal  - 189 give 1 unit. For Pre-Meal  - 239 give 2 units. For Pre-Meal  - 289 give 3 units. For Pre-Meal  - 339 give 4 units. For Pre-Meal - 389 give 5 units. For Pre-Meal -439 give 6 units For Pre-Meal BG greater than or equal to 440 give 7 units. To be given with  prandial insulin, and based on pre-meal blood glucose. Administering insulin within 5 minutes of the start of the meal is ideal. Administer insulin no more than 30 minutes after the start of the meal, unless directed otherwise by provider.   Notify provider if glucose greater than or equal to 350 mg/dL after administration of correction dose.       insulin aspart (NOVOLOG PEN) 100 UNIT/ML pen Inject 4 Units subcutaneously 3 times daily (with meals).       insulin glargine (LANTUS PEN) 100 UNIT/ML pen Inject 12 Units subcutaneously every morning (before breakfast).       latanoprostene bunod 0.024 % Drop Place 1 drop into both eyes At Bedtime       levomefolate calcium (L-METHYLFOLATE ORAL) Take 15 mg by mouth daily.       netarsudiL (RHOPRESSA) 0.02 % Drop Place 1 drop into both eyes at bedtime.       No current facility-administered medications for this visit.       REVIEW OF SYSTEMS:   10 point review of systems reviewed and pertinent positives in the HPI.     PHYSICAL EXAMINATION:  Physical Exam     Vital signs: /84   Pulse 80   Temp 97.8  F (36.6  C)   Resp 16   Ht 1.829 m (6')   Wt 94.6 kg (208 lb 9.6 oz)   SpO2 97%   BMI 28.29 kg/m    General: Awake, Alert, oriented x3, lying in bed, appropriately, follows simple commands, conversant  HEENT:PERRLA, Red conjunctiva right eye, anicteric sclerae, moist oral mucosa. Facial symmetry noted. Tongue is midline.   NECK: Supple  CVS:  S1  S2, without murmur or gallop.   LUNG: Clear to auscultation, No wheezes, rales or rhonci.  BACK: No kyphosis of the thoracic spine  ABDOMEN: Soft, nontender to palpation, with positive bowel sounds  EXTREMITIES: Moves both upper and lower extremities with slight right sided weakness to RLE, no pedal edema, no calf tenderness  SKIN: Warm and dry  NEUROLOGIC: Intact, pulses palpable  PSYCHIATRIC: Cognitive impairment noted. Some poor recall.       Labs:  All labs reviewed in the nursing home record and [facility]   @  Lab Results    Component Value Date    WBC 6.7 01/13/2025     Lab Results   Component Value Date    RBC 4.39 01/13/2025     Lab Results   Component Value Date    HGB 13.1 01/13/2025     Lab Results   Component Value Date    HCT 41.4 01/13/2025     Lab Results   Component Value Date    MCV 94 01/13/2025     Lab Results   Component Value Date    MCH 29.8 01/13/2025     Lab Results   Component Value Date    MCHC 31.6 01/13/2025     Lab Results   Component Value Date    RDW 12.7 01/13/2025     Lab Results   Component Value Date     01/13/2025        @Last Comprehensive Metabolic Panel:  Sodium   Date Value Ref Range Status   01/21/2025 143 135 - 145 mmol/L Final     Potassium   Date Value Ref Range Status   01/21/2025 5.1 3.4 - 5.3 mmol/L Final   05/05/2022 4.4 3.5 - 5.0 mmol/L Final     Chloride   Date Value Ref Range Status   01/21/2025 108 (H) 98 - 107 mmol/L Final   05/02/2022 109 (H) 98 - 107 mmol/L Final     Carbon Dioxide (CO2)   Date Value Ref Range Status   01/21/2025 25 22 - 29 mmol/L Final   05/02/2022 25 22 - 31 mmol/L Final     Anion Gap   Date Value Ref Range Status   01/21/2025 10 7 - 15 mmol/L Final   05/02/2022 8 5 - 18 mmol/L Final     Glucose   Date Value Ref Range Status   01/21/2025 255 (H) 70 - 99 mg/dL Final   05/02/2022 133 (H) 70 - 125 mg/dL Final     GLUCOSE BY METER POCT   Date Value Ref Range Status   01/09/2025 150 (H) 70 - 99 mg/dL Final     Urea Nitrogen   Date Value Ref Range Status   01/21/2025 17.0 8.0 - 23.0 mg/dL Final   05/02/2022 11 8 - 28 mg/dL Final     Creatinine   Date Value Ref Range Status   01/21/2025 0.93 0.67 - 1.17 mg/dL Final     GFR Estimate   Date Value Ref Range Status   01/21/2025 87 >60 mL/min/1.73m2 Final   06/10/2021 >60 >60 mL/min/1.73m2 Final     Calcium   Date Value Ref Range Status   01/21/2025 9.2 8.8 - 10.4 mg/dL Final     Comment:     Reference intervals for this test were updated on 7/16/2024 to reflect our healthy population more accurately. There may be  differences in the flagging of prior results with similar values performed with this method. Those prior results can be interpreted in the context of the updated reference intervals.     This note has been dictated using voice recognition software. Any grammatical or context distortions are unintentional and inherent to the software    Electronically signed by: Kylie Gomez CNP       Sincerely,        Kylie Gomez NP    Electronically signed

## 2025-01-23 NOTE — PROGRESS NOTES
[facility] GERIATRIC SERVICES    Code Status:  DNR/DNI   Visit Type:   Chief Complaint   Patient presents with    TCU Follow Up     Facility:  [facility] ([facility]) [[id number]]         HPI: Rakesh Samuels is a 74 year old male seen today for follow up on the TCU. 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 MRI brain revealed probable small late subacute infarct involving the genu of the internal capsule on the right side. He also had lactic acidosis on admission, concerning sepsis. However, no infection found.    Transitional Care Course: Today pt sitting up in wheelchair.  Some mild cognitive impairment noted with poor recall. Recent CVA with slight right sided residual weakness.  Continues with generalized weakness.  Patient afebrile.  He does have a history of recurrent UTIs.  Repeat UA UC was negative.  Laboratory unremarkable.  No leukocytosis.  BP satisfactory.  No shortness of breath or chest pain.  No dysphagia.  Diabetes mellitus type 2.  Blood sugars in the 200s.  Increased urine glucose noted.  He continues on insulin.      Assessment/Plan:     CVA with right sided weakness   -MRI brain reveals probable small late subacute infarct involving the genu of the internal capsule on the right side  -CT angiogram negative for significant large vessel occlusion/stenosis.  -Echo showed normal LVEF.  -Eliquis 5 mg BID.   - LDL 69 and at goal. No statin indicated  -Hgb  13.1.     History of recurrent UTIs  -Reporting some failure to thrive like symptoms.  -Follow-up UA UC negative.  -Follow-up CBC and BMP unremarkable.     Essential hypertension:   -amlodipine 5 mg daily.   -Monitor bp.      Type 2 diabetes   -Recent HbA1C 9.1.   -consistent carb diet.   -Blood sugars in the 200s.  -Increase Lantus 16 Q AM.   -Novolog 4 units + sliding scale insulin with meals. Hold if BS <110.   -Blood sugar  checks QID.      Paroxysmal atrial fibrillation  - Eliquis 5 mg BID.     Mood disorder  -fluvoxamine 50 mg daily.   -aripiprazole 2 mg Q HS.     Glaucoma with blindness  -continue home eye gtts.   -cleanse eyes with baby shampoo every day.     Active Ambulatory Problems     Diagnosis Date Noted    SIRS (systemic inflammatory response syndrome) (H) 09/09/2019    Adrenal incidentaloma     Diet-controlled diabetes mellitus (H)     Pericardial effusion     Lactic acidosis     Type 2 diabetes mellitus without complication, without long-term current use of insulin (H) 04/12/2020    Paroxysmal atrial fibrillation (H) 02/18/2020    Calculus of ureter 04/12/2020    Acute renal failure, unspecified acute renal failure type 04/12/2020    Acute renal failure (ARF) 04/12/2020    ATN (acute tubular necrosis) 04/12/2020    Sepsis due to urinary tract infection (H)     Metformin overdose of undetermined intent, initial encounter     Metabolic acidosis     Hyperkalemia     Hematemesis, presence of nausea not specified 04/12/2020    Calculus of kidney     Syncope and collapse 06/30/2020    Hyperglycemia     After care 07/03/2012    Age-related cataract 03/27/2021    Anemia associated with acute blood loss 06/27/2012    Balanitis 03/27/2021    Cholelithiasis without obstruction 03/27/2021    Constipation 07/03/2012    Depression with anxiety 07/03/2012    Glaucoma 06/25/2012    Hemorrhoids without complication 03/27/2021    Hyperlipidemia 06/07/2018    Loss of appetite 03/27/2021    Major depressive disorder, recurrent episode, in partial remission 06/08/2018    Mixed personality disorder in adult (H) 06/08/2018    Obstructive sleep apnea 03/27/2021    Osteoarthrosis involving lower leg 06/23/2012    Periodic limb movement disorder 03/27/2021    Recurrent major depression 03/27/2021    S/P ureteral stent placement 03/27/2021    Unilateral small kidney 03/27/2021    Nephrolithiasis 03/27/2021    Type 2 diabetes mellitus (H) 06/25/2012     Osteoarthritis of knee 03/27/2021    Persistent atrial fibrillation (H) 03/27/2021    Pyelonephritis 03/27/2021    Urinary tract infection without hematuria, site unspecified 04/30/2022    Sepsis without acute organ dysfunction (H) 04/30/2022    UTI (urinary tract infection) 05/01/2022    Benign prostatic hyperplasia with urinary frequency 05/07/2022    Hypomagnesemia 05/07/2022    Injury of head, initial encounter 07/31/2023    Fall at home, initial encounter 07/31/2023    E. coli urinary tract infection 08/02/2023    Septic encephalopathy 07/25/2024    DM (diabetes mellitus), type 2 with complications (H) 07/25/2024    Urinary tract infection with hematuria, site unspecified 07/26/2024    Altered mental status, unspecified altered mental status type 07/26/2024    Sepsis, due to unspecified organism, unspecified whether acute organ dysfunction present (H) 07/26/2024    Normocytic anemia 11/29/2023    Status post right knee replacement 11/29/2023    Peripheral vascular disease, unspecified 08/20/2024    Warfarin anticoagulation 09/09/2024    Elevated lactic acid level 01/06/2025    Right sided weakness 01/06/2025    Ischemic stroke (H) 01/06/2025    Type 2 diabetes mellitus with hyperglycemia, with long-term current use of insulin (H) 01/07/2025    Benign essential hypertension 01/07/2025     Resolved Ambulatory Problems     Diagnosis Date Noted    Shock circulatory (H)     Acute respiratory failure with hypoxemia (H)     Septic shock (H) 01/06/2025     Past Medical History:   Diagnosis Date    A-fib (H)     Acute hemodialysis encounter     Acute kidney injury     Asbestosis (H)     Atrophy of right kidney     Basal cell carcinoma     BPH without urinary obstruction     Cellulitis     Cholelithiasis     Diabetes mellitus, type 2 (H) 4/12/2020    Esophagitis     Gastritis     Hematemesis, presence of nausea not specified     Hyperlipemia     Kidney stone     Metformin overdose of undetermined intent     PTSD  (post-traumatic stress disorder)     Seasonal allergies     Sleep apnea     Upper GI bleed      No Known Allergies    All Meds and Allergies reviewed in the record at the facility and is the most up-to-date.    Current Outpatient Medications   Medication Sig Dispense Refill    acetaminophen (TYLENOL) 325 MG tablet Take 2 tablets (650 mg) by mouth every 4 hours as needed for mild pain or fever (temperature greater than 100.4  F (38  C)).      amLODIPine (NORVASC) 5 MG tablet Take 1 tablet (5 mg) by mouth daily.      apixaban ANTICOAGULANT (ELIQUIS) 5 MG tablet Take 5 mg by mouth 2 times daily.      ARIPiprazole (ABILIFY) 2 MG tablet [ARIPIPRAZOLE (ABILIFY) 2 MG TABLET] Take 2 mg by mouth at bedtime.       brimonidine (ALPHAGAN) 0.2 % ophthalmic solution [BRIMONIDINE (ALPHAGAN) 0.2 % OPHTHALMIC SOLUTION] Administer 1 drop to both eyes 2 (two) times a day.       dorzolamide-timolol (COSOPT) 2-0.5 % ophthalmic solution Place 1 drop into both eyes 2 times daily      famotidine (PEPCID) 20 MG tablet Take 20 mg by mouth daily.      fluvoxaMINE (LUVOX) 50 MG tablet [FLUVOXAMINE (LUVOX) 50 MG TABLET] Take 50 mg by mouth at bedtime.       gabapentin (NEURONTIN) 300 MG capsule Take 300 mg by mouth 2 times daily      insulin aspart (NOVOLOG PEN) 100 UNIT/ML pen Inject 1-7 Units subcutaneously 3 times daily (before meals). Correction Scale - MEDIUM INSULIN RESISTANCE DOSING   Do Not give Correction Insulin if Pre-Meal BG less than 140. For Pre-Meal  - 189 give 1 unit. For Pre-Meal  - 239 give 2 units. For Pre-Meal  - 289 give 3 units. For Pre-Meal  - 339 give 4 units. For Pre-Meal - 389 give 5 units. For Pre-Meal -439 give 6 units For Pre-Meal BG greater than or equal to 440 give 7 units. To be given with prandial insulin, and based on pre-meal blood glucose. Administering insulin within 5 minutes of the start of the meal is ideal. Administer insulin no more than 30 minutes after the start of  the meal, unless directed otherwise by provider.   Notify provider if glucose greater than or equal to 350 mg/dL after administration of correction dose.      insulin aspart (NOVOLOG PEN) 100 UNIT/ML pen Inject 4 Units subcutaneously 3 times daily (with meals).      insulin glargine (LANTUS PEN) 100 UNIT/ML pen Inject 12 Units subcutaneously every morning (before breakfast).      latanoprostene bunod 0.024 % Drop Place 1 drop into both eyes At Bedtime      levomefolate calcium (L-METHYLFOLATE ORAL) Take 15 mg by mouth daily.      netarsudiL (RHOPRESSA) 0.02 % Drop Place 1 drop into both eyes at bedtime.       No current facility-administered medications for this visit.       REVIEW OF SYSTEMS:   10 point review of systems reviewed and pertinent positives in the HPI.     PHYSICAL EXAMINATION:  Physical Exam     Vital signs: /84   Pulse 80   Temp 97.8  F (36.6  C)   Resp 16   Ht 1.829 m (6')   Wt 94.6 kg (208 lb 9.6 oz)   SpO2 97%   BMI 28.29 kg/m    General: Awake, Alert, oriented x3, lying in bed, appropriately, follows simple commands, conversant  HEENT:PERRLA, Red conjunctiva right eye, anicteric sclerae, moist oral mucosa. Facial symmetry noted. Tongue is midline.   NECK: Supple  CVS:  S1  S2, without murmur or gallop.   LUNG: Clear to auscultation, No wheezes, rales or rhonci.  BACK: No kyphosis of the thoracic spine  ABDOMEN: Soft, nontender to palpation, with positive bowel sounds  EXTREMITIES: Moves both upper and lower extremities with slight right sided weakness to RLE, no pedal edema, no calf tenderness  SKIN: Warm and dry  NEUROLOGIC: Intact, pulses palpable  PSYCHIATRIC: Cognitive impairment noted. Some poor recall.       Labs:  All labs reviewed in the nursing home record and Epic   @  Lab Results   Component Value Date    WBC 6.7 01/13/2025     Lab Results   Component Value Date    RBC 4.39 01/13/2025     Lab Results   Component Value Date    HGB 13.1 01/13/2025     Lab Results   Component  Value Date    HCT 41.4 01/13/2025     Lab Results   Component Value Date    MCV 94 01/13/2025     Lab Results   Component Value Date    MCH 29.8 01/13/2025     Lab Results   Component Value Date    MCHC 31.6 01/13/2025     Lab Results   Component Value Date    RDW 12.7 01/13/2025     Lab Results   Component Value Date     01/13/2025        @Last Comprehensive Metabolic Panel:  Sodium   Date Value Ref Range Status   01/21/2025 143 135 - 145 mmol/L Final     Potassium   Date Value Ref Range Status   01/21/2025 5.1 3.4 - 5.3 mmol/L Final   05/05/2022 4.4 3.5 - 5.0 mmol/L Final     Chloride   Date Value Ref Range Status   01/21/2025 108 (H) 98 - 107 mmol/L Final   05/02/2022 109 (H) 98 - 107 mmol/L Final     Carbon Dioxide (CO2)   Date Value Ref Range Status   01/21/2025 25 22 - 29 mmol/L Final   05/02/2022 25 22 - 31 mmol/L Final     Anion Gap   Date Value Ref Range Status   01/21/2025 10 7 - 15 mmol/L Final   05/02/2022 8 5 - 18 mmol/L Final     Glucose   Date Value Ref Range Status   01/21/2025 255 (H) 70 - 99 mg/dL Final   05/02/2022 133 (H) 70 - 125 mg/dL Final     GLUCOSE BY METER POCT   Date Value Ref Range Status   01/09/2025 150 (H) 70 - 99 mg/dL Final     Urea Nitrogen   Date Value Ref Range Status   01/21/2025 17.0 8.0 - 23.0 mg/dL Final   05/02/2022 11 8 - 28 mg/dL Final     Creatinine   Date Value Ref Range Status   01/21/2025 0.93 0.67 - 1.17 mg/dL Final     GFR Estimate   Date Value Ref Range Status   01/21/2025 87 >60 mL/min/1.73m2 Final   06/10/2021 >60 >60 mL/min/1.73m2 Final     Calcium   Date Value Ref Range Status   01/21/2025 9.2 8.8 - 10.4 mg/dL Final     Comment:     Reference intervals for this test were updated on 7/16/2024 to reflect our healthy population more accurately. There may be differences in the flagging of prior results with similar values performed with this method. Those prior results can be interpreted in the context of the updated reference intervals.     This note has  been dictated using voice recognition software. Any grammatical or context distortions are unintentional and inherent to the software    Electronically signed by: Kylie Gomez CNP

## 2025-01-28 ENCOUNTER — DOCUMENTATION ONLY (OUTPATIENT)
Dept: GERIATRICS | Facility: CLINIC | Age: 75
End: 2025-01-28

## 2025-01-28 ENCOUNTER — NURSING HOME VISIT (OUTPATIENT)
Dept: GERIATRICS | Facility: CLINIC | Age: 75
End: 2025-01-28
Payer: MEDICARE

## 2025-01-28 VITALS
TEMPERATURE: 98.2 F | BODY MASS INDEX: 28.48 KG/M2 | DIASTOLIC BLOOD PRESSURE: 87 MMHG | WEIGHT: 210 LBS | SYSTOLIC BLOOD PRESSURE: 136 MMHG | HEART RATE: 78 BPM | OXYGEN SATURATION: 98 % | RESPIRATION RATE: 20 BRPM

## 2025-01-28 DIAGNOSIS — I10 ESSENTIAL HYPERTENSION: ICD-10-CM

## 2025-01-28 DIAGNOSIS — F33.41 MAJOR DEPRESSIVE DISORDER, RECURRENT EPISODE, IN PARTIAL REMISSION: ICD-10-CM

## 2025-01-28 DIAGNOSIS — I48.0 PAROXYSMAL ATRIAL FIBRILLATION (H): ICD-10-CM

## 2025-01-28 DIAGNOSIS — Z79.4 TYPE 2 DIABETES MELLITUS WITH DIABETIC POLYNEUROPATHY, WITH LONG-TERM CURRENT USE OF INSULIN (H): Primary | ICD-10-CM

## 2025-01-28 DIAGNOSIS — E11.42 TYPE 2 DIABETES MELLITUS WITH DIABETIC POLYNEUROPATHY, WITH LONG-TERM CURRENT USE OF INSULIN (H): Primary | ICD-10-CM

## 2025-01-28 DIAGNOSIS — R53.1 RIGHT SIDED WEAKNESS: ICD-10-CM

## 2025-01-28 DIAGNOSIS — Z86.73 CEREBROVASCULAR ACCIDENT, OLD: ICD-10-CM

## 2025-01-28 PROCEDURE — 99310 SBSQ NF CARE HIGH MDM 45: CPT | Performed by: NURSE PRACTITIONER

## 2025-01-28 NOTE — PROGRESS NOTES
[facility] GERIATRICS  ACUTE/EPISODIC VISIT    [facility] Medical Record Number:  [id number]  Place of Service where encounter took place:  [facility] [place] () [[id number]]    Chief Complaint   Patient presents with    RECHECK       HPI:    Rakesh Samuels is a 74 year old  (1950), who is being seen today for an episodic care visit.  HPI information obtained from: {FGS HPI:[id number]}.    Today's concern is:      ALLERGIES:  No Known Allergies     MEDICATIONS:  Post Discharge Medication Reconciliation Status: {ACO Med Rec (Provider):767870}. ***    Current Outpatient Medications   Medication Sig Dispense Refill    acetaminophen (TYLENOL) 325 MG tablet Take 2 tablets (650 mg) by mouth every 4 hours as needed for mild pain or fever (temperature greater than 100.4  F (38  C)).      amLODIPine (NORVASC) 5 MG tablet Take 1 tablet (5 mg) by mouth daily.      apixaban ANTICOAGULANT (ELIQUIS) 5 MG tablet Take 5 mg by mouth 2 times daily.      ARIPiprazole (ABILIFY) 2 MG tablet [ARIPIPRAZOLE (ABILIFY) 2 MG TABLET] Take 2 mg by mouth at bedtime.       brimonidine (ALPHAGAN) 0.2 % ophthalmic solution [BRIMONIDINE (ALPHAGAN) 0.2 % OPHTHALMIC SOLUTION] Administer 1 drop to both eyes 2 (two) times a day.       dorzolamide-timolol (COSOPT) 2-0.5 % ophthalmic solution Place 1 drop into both eyes 2 times daily      famotidine (PEPCID) 20 MG tablet Take 20 mg by mouth daily.      fluvoxaMINE (LUVOX) 50 MG tablet [FLUVOXAMINE (LUVOX) 50 MG TABLET] Take 50 mg by mouth at bedtime.       gabapentin (NEURONTIN) 300 MG capsule Take 300 mg by mouth 2 times daily      insulin aspart (NOVOLOG PEN) 100 UNIT/ML pen Inject 1-7 Units subcutaneously 3 times daily (before meals). Correction Scale - MEDIUM INSULIN RESISTANCE DOSING   Do Not give Correction Insulin if Pre-Meal BG less than 140. For Pre-Meal  - 189 give 1 unit. For Pre-Meal  - 239 give 2 units. For Pre-Meal  - 289 give 3 units. For Pre-Meal   - 339 give 4 units. For Pre-Meal - 389 give 5 units. For Pre-Meal -439 give 6 units For Pre-Meal BG greater than or equal to 440 give 7 units. To be given with prandial insulin, and based on pre-meal blood glucose. Administering insulin within 5 minutes of the start of the meal is ideal. Administer insulin no more than 30 minutes after the start of the meal, unless directed otherwise by provider.   Notify provider if glucose greater than or equal to 350 mg/dL after administration of correction dose.      insulin aspart (NOVOLOG PEN) 100 UNIT/ML pen Inject 4 Units subcutaneously 3 times daily (with meals).      insulin glargine (LANTUS PEN) 100 UNIT/ML pen Inject 12 Units subcutaneously every morning (before breakfast).      latanoprostene bunod 0.024 % Drop Place 1 drop into both eyes At Bedtime      levomefolate calcium (L-METHYLFOLATE ORAL) Take 15 mg by mouth daily.      netarsudiL (RHOPRESSA) 0.02 % Drop Place 1 drop into both eyes at bedtime.       Medications reviewed:  Medications reconciled to facility chart and changes were made to reflect current medications as identified as above med list. Below are the changes that were made:   Medications stopped since last EPIC medication reconciliation:   There are no discontinued medications.    Medications started since last [facility] medication reconciliation:  No orders of the defined types were placed in this encounter.    ***    REVIEW OF SYSTEMS:  4 point ROS neg other than the symptoms noted above in the HPI.***  Unable to be obtained due to cognitive impairment or aphasia.   ROS    PHYSICAL EXAM:  /87   Pulse 78   Temp 98.2  F (36.8  C)   Resp 20   Wt 95.3 kg (210 lb)   SpO2 98%   BMI 28.48 kg/m    Physical Exam      ASSESSMENT / PLAN:  {FGS DX:894237}    Orders:  ***    Electronically signed by  Veronica Arana         blood glucose. Administering insulin within 5 minutes of the start of the meal is ideal. Administer insulin no more than 30 minutes after the start of the meal, unless directed otherwise by provider.   Notify provider if glucose greater than or equal to 350 mg/dL after administration of correction dose.      insulin aspart (NOVOLOG PEN) 100 UNIT/ML pen Inject 6 Units subcutaneously 3 times daily (with meals).      insulin glargine (LANTUS PEN) 100 UNIT/ML pen Inject 12 Units subcutaneously every morning (before breakfast) and 5 units subcutaneous at bedtime      latanoprostene bunod 0.024 % Drop Place 1 drop into both eyes At Bedtime      levomefolate calcium (L-METHYLFOLATE ORAL) Take 15 mg by mouth daily.      netarsudiL (RHOPRESSA) 0.02 % Drop Place 1 drop into both eyes at bedtime.           REVIEW OF SYSTEMS:  4 point ROS neg other than the symptoms noted above in the HPI.  Always sad       PHYSICAL EXAM:  /87   Pulse 78   Temp 98.2  F (36.8  C)   Resp 20   Wt 95.3 kg (210 lb)   SpO2 98%   BMI 28.48 kg/m    Physical Exam  Vitals and nursing note reviewed.   Constitutional:       Appearance: Normal appearance.   HENT:      Head: Normocephalic.      Right Ear: External ear normal.      Left Ear: External ear normal.      Nose: Nose normal.      Mouth/Throat:      Mouth: Mucous membranes are moist.      Pharynx: Oropharynx is clear.   Eyes:      Extraocular Movements: Extraocular movements intact.      Conjunctiva/sclera: Conjunctivae normal.      Comments: No glasses worn.   Cardiovascular:      Rate and Rhythm: Normal rate. Rhythm irregular.      Heart sounds: Normal heart sounds.   Pulmonary:      Effort: Pulmonary effort is normal.      Breath sounds: Normal breath sounds.   Abdominal:      General: Bowel sounds are normal.      Palpations: Abdomen is soft.   Musculoskeletal:      Cervical back: Normal range of motion.      Comments: Wheelchair for mobility.  Assist with transfers from surface to  surface.  Needs help with bathing, bed mobility.   Skin:     General: Skin is warm and dry.   Neurological:      General: No focal deficit present.      Mental Status: He is alert.      Comments: At the end of his medicare part A stay his BIMS 11/15 with some cognitive impairment   Psychiatric:         Behavior: Behavior normal.         Thought Content: Thought content normal.         Judgment: Judgment normal.      Comments: Mood seemed appropriate.  No crying or sadness noted.     Blood pressures range from 120-150s/60-80's    Blood sugar range from:    8am : 220-340's  12N = 260-360's  5pm = 210-270's  HS = 160's -350's.    Lab Results   Component Value Date    A1C 9.1 01/05/2025     Lab Results   Component Value Date    HGB 14.1 01/21/2025         ASSESSMENT / PLAN:  (E11.42,  Z79.4) Type 2 diabetes mellitus with diabetic polyneuropathy, with long-term current use of insulin (H)  (primary encounter diagnosis)  Comment: currently taking Lantus in AM, sliding scale, and base Novolog with each meal.    AM sugars are over 200's and feel safe adding 5 units of Lantus at HS  Will increase the base Novolog to 6 units with meals and keep the sliding scale.  No change to the AM Lantus.  Will evaluate new numbers at a later visit.  Goal would be to get off the sliding scale insulin and just have base Novolog and Lantus.    As for neuropathy, rakesh receives Gabapentin 300mg twice a day.  Plan: insulin aspart (NOVOLOG PEN) 100 UNIT/ML pen    (I10) Essential hypertension  Comment: overall B/P's are stable.  Weekly vitals when in LTC.  Remains on Amlodipine 5mg daily.      (I48.0) Paroxysmal atrial fibrillation (H)  Comment: remains on Eliquis 5mg BID.  No acute heart palpitations reported when asked.  No acute bleeding.    (R53.1) Right sided weakness  (Z86.73) Cerebrovascular accident, old  Comment: dependent on staff for mobility of transfers from surface to surface.  Rakesh stated he uses his call light to ask for help.   Has the eliquis as far as a bleed thinner.      (F33.41) Major depressive disorder, recurrent episode, in partial remission  Comment: currently on Abilify 2mg at HS and Luvox 50mg at HS.  Getting to know him first.  Seemed to be in no distress.  He is getting to know a new routine as much as staff getting to know him.       Orders:  Add Lantus 5 units at HS for type 2 DM  Increase Novolog to 6 units with meals and hold if BS < 110    Electronically signed by  PAULA Huston CNP

## 2025-01-28 NOTE — LETTER
1/28/2025      Rakesh Samuels  [address]  [facility] [address]        [facility] GERIATRICS  ACUTE/EPISODIC VISIT    [facility] Medical Record Number:  [id number]  Place of Service where encounter took place:  [facility] () [[id number]]    Chief Complaint   Patient presents with     RECHECK       HPI:    Rakesh Samuels is a 74 year old  (1950), who is being seen today for an episodic care visit, but new to the LTC unit and this NP.  HPI information obtained from: facility chart records, facility staff, patient report, and [facility] chart review.    Today's concern is:    Diagnoses         Codes Comments    Type 2 diabetes mellitus with diabetic polyneuropathy, with long-term current use of insulin (H)    -  Primary E11.42, Z79.4     Essential hypertension     I10     Paroxysmal atrial fibrillation (H)     I48.0     Right sided weakness     R53.1     Cerebrovascular accident, old     Z86.73     Major depressive disorder, recurrent episode, in partial remission     F33.41           Rakesh is new to the [facility] 2nd floor as he moved over yesterday from the TCU unit.  Will be long term care here as unable to care for self in the community.      Rakesh was in the hospital - [facility] 1-5 to 1-9-25 due to right side weakness, suspect CVA, septic shock.  MRI of the brain showed probable small late subacute infarct involving the genu of the internal capsule on the right side.  Rakesh's lactic acid was also increased on admission suspecting sepsis but in the end, no infection found.  Rakesh was recommended for TCU for rehab, strengthening and mobility.    Blood pressures were not controlled and started on amlodipine  Blood sugars were not greatly controlled and so advised to monitor while in TCU.  A1c at time was 9.1%    Now has moved to [facility] and so seeing him today in his new room as he is comfortable in his new recliner (well may be the room's recliner).  In no acute distress.  Alert  and talking to this NP, sometimes with his eyes closed.  Able to answer questions in order to get to know him better.    Reviewed medications with him.  Informed him that this NP was going to change his insulin some in order to attempt to get his sugars under better control.       ALLERGIES:  No Known Allergies     MEDICATIONS:  Post Discharge Medication Reconciliation Status: patient was not discharged from an inpatient facility or TCU.     Current Outpatient Medications   Medication Sig Dispense Refill     acetaminophen (TYLENOL) 325 MG tablet Take 2 tablets (650 mg) by mouth every 4 hours as needed for mild pain or fever (temperature greater than 100.4  F (38  C)).       amLODIPine (NORVASC) 5 MG tablet Take 1 tablet (5 mg) by mouth daily.       apixaban ANTICOAGULANT (ELIQUIS) 5 MG tablet Take 5 mg by mouth 2 times daily.       ARIPiprazole (ABILIFY) 2 MG tablet [ARIPIPRAZOLE (ABILIFY) 2 MG TABLET] Take 2 mg by mouth at bedtime.        brimonidine (ALPHAGAN) 0.2 % ophthalmic solution [BRIMONIDINE (ALPHAGAN) 0.2 % OPHTHALMIC SOLUTION] Administer 1 drop to both eyes 2 (two) times a day.        dorzolamide-timolol (COSOPT) 2-0.5 % ophthalmic solution Place 1 drop into both eyes 2 times daily       famotidine (PEPCID) 20 MG tablet Take 20 mg by mouth daily.       fluvoxaMINE (LUVOX) 50 MG tablet [FLUVOXAMINE (LUVOX) 50 MG TABLET] Take 50 mg by mouth at bedtime.        gabapentin (NEURONTIN) 300 MG capsule Take 300 mg by mouth 2 times daily       insulin aspart (NOVOLOG PEN) 100 UNIT/ML pen Inject 1-7 Units subcutaneously 3 times daily (before meals). Correction Scale - MEDIUM INSULIN RESISTANCE DOSING   Do Not give Correction Insulin if Pre-Meal BG less than 140. For Pre-Meal  - 189 give 1 unit. For Pre-Meal  - 239 give 2 units. For Pre-Meal  - 289 give 3 units. For Pre-Meal  - 339 give 4 units. For Pre-Meal - 389 give 5 units. For Pre-Meal -439 give 6 units For Pre-Meal BG  greater than or equal to 440 give 7 units. To be given with prandial insulin, and based on pre-meal blood glucose. Administering insulin within 5 minutes of the start of the meal is ideal. Administer insulin no more than 30 minutes after the start of the meal, unless directed otherwise by provider.   Notify provider if glucose greater than or equal to 350 mg/dL after administration of correction dose.       insulin aspart (NOVOLOG PEN) 100 UNIT/ML pen Inject 6 Units subcutaneously 3 times daily (with meals).       insulin glargine (LANTUS PEN) 100 UNIT/ML pen Inject 12 Units subcutaneously every morning (before breakfast) and 5 units subcutaneous at bedtime       latanoprostene bunod 0.024 % Drop Place 1 drop into both eyes At Bedtime       levomefolate calcium (L-METHYLFOLATE ORAL) Take 15 mg by mouth daily.       netarsudiL (RHOPRESSA) 0.02 % Drop Place 1 drop into both eyes at bedtime.           REVIEW OF SYSTEMS:  4 point ROS neg other than the symptoms noted above in the HPI.  Always sad       PHYSICAL EXAM:  /87   Pulse 78   Temp 98.2  F (36.8  C)   Resp 20   Wt 95.3 kg (210 lb)   SpO2 98%   BMI 28.48 kg/m    Physical Exam  Vitals and nursing note reviewed.   Constitutional:       Appearance: Normal appearance.   HENT:      Head: Normocephalic.      Right Ear: External ear normal.      Left Ear: External ear normal.      Nose: Nose normal.      Mouth/Throat:      Mouth: Mucous membranes are moist.      Pharynx: Oropharynx is clear.   Eyes:      Extraocular Movements: Extraocular movements intact.      Conjunctiva/sclera: Conjunctivae normal.      Comments: No glasses worn.   Cardiovascular:      Rate and Rhythm: Normal rate. Rhythm irregular.      Heart sounds: Normal heart sounds.   Pulmonary:      Effort: Pulmonary effort is normal.      Breath sounds: Normal breath sounds.   Abdominal:      General: Bowel sounds are normal.      Palpations: Abdomen is soft.   Musculoskeletal:      Cervical back:  Normal range of motion.      Comments: Wheelchair for mobility.  Assist with transfers from surface to surface.  Needs help with bathing, bed mobility.   Skin:     General: Skin is warm and dry.   Neurological:      General: No focal deficit present.      Mental Status: He is alert.      Comments: At the end of his medicare part A stay his BIMS 11/15 with some cognitive impairment   Psychiatric:         Behavior: Behavior normal.         Thought Content: Thought content normal.         Judgment: Judgment normal.      Comments: Mood seemed appropriate.  No crying or sadness noted.     Blood pressures range from 120-150s/60-80's    Blood sugar range from:    8am : 220-340's  12N = 260-360's  5pm = 210-270's  HS = 160's -350's.    Lab Results   Component Value Date    A1C 9.1 01/05/2025     Lab Results   Component Value Date    HGB 14.1 01/21/2025         ASSESSMENT / PLAN:  (E11.42,  Z79.4) Type 2 diabetes mellitus with diabetic polyneuropathy, with long-term current use of insulin (H)  (primary encounter diagnosis)  Comment: currently taking Lantus in AM, sliding scale, and base Novolog with each meal.    AM sugars are over 200's and feel safe adding 5 units of Lantus at HS  Will increase the base Novolog to 6 units with meals and keep the sliding scale.  No change to the AM Lantus.  Will evaluate new numbers at a later visit.  Goal would be to get off the sliding scale insulin and just have base Novolog and Lantus.    As for neuropathy, osmany receives Gabapentin 300mg twice a day.  Plan: insulin aspart (NOVOLOG PEN) 100 UNIT/ML pen    (I10) Essential hypertension  Comment: overall B/P's are stable.  Weekly vitals when in LTC.  Remains on Amlodipine 5mg daily.      (I48.0) Paroxysmal atrial fibrillation (H)  Comment: remains on Eliquis 5mg BID.  No acute heart palpitations reported when asked.  No acute bleeding.    (R53.1) Right sided weakness  (Z86.73) Cerebrovascular accident, old  Comment: dependent on staff for  mobility of transfers from surface to surface.  Rakesh stated he uses his call light to ask for help.  Has the eliquis as far as a bleed thinner.      (F33.41) Major depressive disorder, recurrent episode, in partial remission  Comment: currently on Abilify 2mg at HS and Luvox 50mg at HS.  Getting to know him first.  Seemed to be in no distress.  He is getting to know a new routine as much as staff getting to know him.       Orders:  Add Lantus 5 units at HS for type 2 DM  Increase Novolog to 6 units with meals and hold if BS < 110    Electronically signed by  PAULA Huston CNP            Sincerely,        PAULA Huston CNP    Electronically signed

## 2025-01-28 NOTE — LETTER
1/28/2025      Rakesh Samuels  [address]  [facility] [address]        No notes on file      Sincerely,        PAULA Huston CNP    Electronically signed

## 2025-02-04 ENCOUNTER — NURSING HOME VISIT (OUTPATIENT)
Dept: GERIATRICS | Facility: CLINIC | Age: 75
End: 2025-02-04
Payer: MEDICARE

## 2025-02-04 VITALS
BODY MASS INDEX: 28.48 KG/M2 | RESPIRATION RATE: 18 BRPM | HEART RATE: 81 BPM | TEMPERATURE: 98 F | SYSTOLIC BLOOD PRESSURE: 135 MMHG | OXYGEN SATURATION: 93 % | WEIGHT: 210 LBS | DIASTOLIC BLOOD PRESSURE: 79 MMHG

## 2025-02-04 DIAGNOSIS — F60.89 MIXED PERSONALITY DISORDER IN ADULT (H): ICD-10-CM

## 2025-02-04 DIAGNOSIS — F33.41 MAJOR DEPRESSIVE DISORDER, RECURRENT EPISODE, IN PARTIAL REMISSION: ICD-10-CM

## 2025-02-04 DIAGNOSIS — Z79.4 TYPE 2 DIABETES MELLITUS WITH DIABETIC POLYNEUROPATHY, WITH LONG-TERM CURRENT USE OF INSULIN (H): Primary | ICD-10-CM

## 2025-02-04 DIAGNOSIS — H40.003 GLAUCOMA SUSPECT, BILATERAL: ICD-10-CM

## 2025-02-04 DIAGNOSIS — E11.42 TYPE 2 DIABETES MELLITUS WITH DIABETIC POLYNEUROPATHY, WITH LONG-TERM CURRENT USE OF INSULIN (H): Primary | ICD-10-CM

## 2025-02-04 PROCEDURE — 99309 SBSQ NF CARE MODERATE MDM 30: CPT | Performed by: NURSE PRACTITIONER

## 2025-02-04 NOTE — PROGRESS NOTES
[facility] GERIATRICS  ACUTE/EPISODIC VISIT    [facility] Medical Record Number:  [id number]  Place of Service where encounter took place:  [facility] () [[id number]]    Chief Complaint   Patient presents with    RECHECK       HPI:    Rakesh Samuels is a 74 year old  (1950), who is being seen today for an episodic care visit.  HPI information obtained from: facility chart records, facility staff, patient report, [facility] chart review, and family/first contact 2nd contact daughter report.    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  Do believe it was 20 out of 27 for result.    Found Rakesh in his room sitting in the recliner with his eyes closed.  He did not really open them too much but obviously he could hear this nurse practitioner and was not sleeping.  He did answer questions appropriately.  Asked him if he has been feeling pretty sad and he says most of the time he is feeling this way.  Rakesh happens to be on Luvox and Abilify.  When looking up the history of the medication, he does have a personality disorder and has a history of being seen by psychiatry as outpatient.    Placed a phone call to the first daughter on the list but unable to get in contact with that person and so then called the second daughter.  Second daughter does have some psych background and so she was very good historian to talk about her dad's mental condition.  As he has gotten older and more difficult to take out, video visits  have been done with psychiatry.  Daughter stated that it is not uncommon that he will score high on a depression scale as he has been like this most of his life.  Did talk about increasing or trying a change in the medication but wanted the daughter's opinion if psychiatry should be involved with any dose related changes.  At this point daughter would like to talk to her sister and see if they can get a release of information so that this nurse practitioner can speak with [facility].    Also looking at [facility] today as did some changes last week with his insulin.    ALLERGIES:  No Known Allergies     MEDICATIONS:  Post Discharge Medication Reconciliation Status: patient was not discharged from an inpatient facility or TCU.     Current Outpatient Medications   Medication Sig Dispense Refill    insulin glargine (LANTUS PEN) 100 UNIT/ML pen Inject 18 Units subcutaneously every morning (before breakfast) AND 8 Units at bedtime.      acetaminophen (TYLENOL) 325 MG tablet Take 2 tablets (650 mg) by mouth every 4 hours as needed for mild pain or fever (temperature greater than 100.4  F (38  C)).      amLODIPine (NORVASC) 5 MG tablet Take 1 tablet (5 mg) by mouth daily.      apixaban ANTICOAGULANT (ELIQUIS) 5 MG tablet Take 5 mg by mouth 2 times daily.      ARIPiprazole (ABILIFY) 2 MG tablet [ARIPIPRAZOLE (ABILIFY) 2 MG TABLET] Take 2 mg by mouth at bedtime.       brimonidine (ALPHAGAN) 0.2 % ophthalmic solution [BRIMONIDINE (ALPHAGAN) 0.2 % OPHTHALMIC SOLUTION] Administer 1 drop to both eyes 2 (two) times a day.       dorzolamide-timolol (COSOPT) 2-0.5 % ophthalmic solution Place 1 drop into both eyes 2 times daily      famotidine (PEPCID) 20 MG tablet Take 20 mg by mouth daily.      fluvoxaMINE (LUVOX) 50 MG tablet [FLUVOXAMINE (LUVOX) 50 MG TABLET] Take 50 mg by mouth at bedtime.       gabapentin (NEURONTIN) 300 MG capsule Take 300 mg by mouth 2 times daily      insulin aspart (NOVOLOG PEN) 100  UNIT/ML pen Inject 1-7 Units subcutaneously 3 times daily (before meals). Correction Scale - MEDIUM INSULIN RESISTANCE DOSING   Do Not give Correction Insulin if Pre-Meal BG less than 140. For Pre-Meal  - 189 give 1 unit. For Pre-Meal  - 239 give 2 units. For Pre-Meal  - 289 give 3 units. For Pre-Meal  - 339 give 4 units. For Pre-Meal - 389 give 5 units. For Pre-Meal -439 give 6 units For Pre-Meal BG greater than or equal to 440 give 7 units. To be given with prandial insulin, and based on pre-meal blood glucose. Administering insulin within 5 minutes of the start of the meal is ideal. Administer insulin no more than 30 minutes after the start of the meal, unless directed otherwise by provider.   Notify provider if glucose greater than or equal to 350 mg/dL after administration of correction dose.      insulin aspart (NOVOLOG PEN) 100 UNIT/ML pen Inject 4 Units subcutaneously 3 times daily (with meals).      latanoprostene bunod 0.024 % Drop Place 1 drop into both eyes At Bedtime      levomefolate calcium (L-METHYLFOLATE ORAL) Take 15 mg by mouth daily.      netarsudiL (RHOPRESSA) 0.02 % Drop Place 1 drop into both eyes at bedtime.           REVIEW OF SYSTEMS:  4 point ROS neg other than the symptoms noted above in the HPI.  Denied any chest pain or SOB.        PHYSICAL EXAM:  /79   Pulse 81   Temp 98  F (36.7  C)   Resp 18   Wt 95.3 kg (210 lb)   SpO2 93%   BMI 28.48 kg/m    Up and dressed as sitting in his recliner.  Near lunch time and so assuming staff will help get him to the dining room.    Has his eyes shut and does not open them to make eye contact.  Does answer questions with a clear voice.  Obviously not sleeping.      No respiratory distress.  No use of oxygen.  Diminished lung bases  Heart rate is regular and strong today  Abdomen is round, semifirm, decreased bowel sounds but nontender to touch with palpation    No evidence of crying or seeming to be sad.   Positive for loneliness and obviously does not have any hobbies to distract himself.    Blood sugars QID:   8am = 180-210's  12N = 240-330's  5pm = 110-230's  HS =150-260's      ASSESSMENT / PLAN:  (E11.42,  Z79.4) Type 2 diabetes mellitus with diabetic polyneuropathy, with long-term current use of insulin (H) (primary encounter diagnosis)  Comment: do see some improvement with the numbers were above 300.    Continues with 4 units baseline of short acting with meals and sliding scale insulin on top of that.  Last week added HS Lantus to his regimen and now will increase both AM and HS dose.  18 units in AM and 8 units in PM.    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  Family will be in contact with [facility] to get a release in order to talk with each other and can express results of the PHQ-9.      (H40.003) Glaucoma suspect, bilateral  Comment: Did speak with the daughter in regards to any upcoming eye doctor appointments.  He is on eyedrops at this time and will continue with them.  Daughter states that is not uncommon that 1 eye is partially open when he sleeps so in the morning he will have crusting on the eyelashes.  Will leave it up to the family to bring him in to have an updated eye appointment to make sure his pressures are stable with the right medications.    Orders:   Increase morning insulin of Lantus to 18 units subcu  Increase bedtime Lantus to 8 units subcu    Electronically signed by  PAULA Huston CNP

## 2025-02-04 NOTE — LETTER
2/4/2025      Rakesh Samuels  [address]  [facility] [address]        [facility] GERIATRICS  ACUTE/EPISODIC VISIT    [facility] Medical Record Number:  [id number]  Place of Service where encounter took place:  [facility] () [[id number]]    Chief Complaint   Patient presents with     RECHECK       HPI:    Rakesh Samuels is a 74 year old  (1950), who is being seen today for an episodic care visit.  HPI information obtained from: facility chart records, facility staff, patient report, [facility] chart review, and family/first contact 2nd contact daughter report.    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  Do believe it was 20 out of 27 for result.    Found Rakesh in his room sitting in the recliner with his eyes closed.  He did not really open them too much but obviously he could hear this nurse practitioner and was not sleeping.  He did answer questions appropriately.  Asked him if he has been feeling pretty sad and he says most of the time he is feeling this way.  Rakesh happens to be on Luvox and Abilify.  When looking up the history of the medication, he does have a personality disorder and has a history of being seen by psychiatry as outpatient.    Placed a phone call to the first daughter on the list but unable to get in contact with that person and so then called the second daughter.  Second daughter does have some psych background and so she was very good historian to talk about her dad's mental  condition.  As he has gotten older and more difficult to take out, video visits have been done with psychiatry.  Daughter stated that it is not uncommon that he will score high on a depression scale as he has been like this most of his life.  Did talk about increasing or trying a change in the medication but wanted the daughter's opinion if psychiatry should be involved with any dose related changes.  At this point daughter would like to talk to her sister and see if they can get a release of information so that this nurse practitioner can speak with [facility].    Also looking at [facility] today as did some changes last week with his insulin.    ALLERGIES:  No Known Allergies     MEDICATIONS:  Post Discharge Medication Reconciliation Status: patient was not discharged from an inpatient facility or TCU.     Current Outpatient Medications   Medication Sig Dispense Refill     insulin glargine (LANTUS PEN) 100 UNIT/ML pen Inject 18 Units subcutaneously every morning (before breakfast) AND 8 Units at bedtime.       acetaminophen (TYLENOL) 325 MG tablet Take 2 tablets (650 mg) by mouth every 4 hours as needed for mild pain or fever (temperature greater than 100.4  F (38  C)).       amLODIPine (NORVASC) 5 MG tablet Take 1 tablet (5 mg) by mouth daily.       apixaban ANTICOAGULANT (ELIQUIS) 5 MG tablet Take 5 mg by mouth 2 times daily.       ARIPiprazole (ABILIFY) 2 MG tablet [ARIPIPRAZOLE (ABILIFY) 2 MG TABLET] Take 2 mg by mouth at bedtime.        brimonidine (ALPHAGAN) 0.2 % ophthalmic solution [BRIMONIDINE (ALPHAGAN) 0.2 % OPHTHALMIC SOLUTION] Administer 1 drop to both eyes 2 (two) times a day.        dorzolamide-timolol (COSOPT) 2-0.5 % ophthalmic solution Place 1 drop into both eyes 2 times daily       famotidine (PEPCID) 20 MG tablet Take 20 mg by mouth daily.       fluvoxaMINE (LUVOX) 50 MG tablet [FLUVOXAMINE (LUVOX) 50 MG TABLET] Take 50 mg by mouth at bedtime.        gabapentin (NEURONTIN) 300  MG capsule Take 300 mg by mouth 2 times daily       insulin aspart (NOVOLOG PEN) 100 UNIT/ML pen Inject 1-7 Units subcutaneously 3 times daily (before meals). Correction Scale - MEDIUM INSULIN RESISTANCE DOSING   Do Not give Correction Insulin if Pre-Meal BG less than 140. For Pre-Meal  - 189 give 1 unit. For Pre-Meal  - 239 give 2 units. For Pre-Meal  - 289 give 3 units. For Pre-Meal  - 339 give 4 units. For Pre-Meal - 389 give 5 units. For Pre-Meal -439 give 6 units For Pre-Meal BG greater than or equal to 440 give 7 units. To be given with prandial insulin, and based on pre-meal blood glucose. Administering insulin within 5 minutes of the start of the meal is ideal. Administer insulin no more than 30 minutes after the start of the meal, unless directed otherwise by provider.   Notify provider if glucose greater than or equal to 350 mg/dL after administration of correction dose.       insulin aspart (NOVOLOG PEN) 100 UNIT/ML pen Inject 4 Units subcutaneously 3 times daily (with meals).       latanoprostene bunod 0.024 % Drop Place 1 drop into both eyes At Bedtime       levomefolate calcium (L-METHYLFOLATE ORAL) Take 15 mg by mouth daily.       netarsudiL (RHOPRESSA) 0.02 % Drop Place 1 drop into both eyes at bedtime.           REVIEW OF SYSTEMS:  4 point ROS neg other than the symptoms noted above in the HPI.  Denied any chest pain or SOB.        PHYSICAL EXAM:  /79   Pulse 81   Temp 98  F (36.7  C)   Resp 18   Wt 95.3 kg (210 lb)   SpO2 93%   BMI 28.48 kg/m    Up and dressed as sitting in his recliner.  Near lunch time and so assuming staff will help get him to the dining room.    Has his eyes shut and does not open them to make eye contact.  Does answer questions with a clear voice.  Obviously not sleeping.      No respiratory distress.  No use of oxygen.  Diminished lung bases  Heart rate is regular and strong today  Abdomen is round, semifirm, decreased bowel  sounds but nontender to touch with palpation    No evidence of crying or seeming to be sad.  Positive for loneliness and obviously does not have any hobbies to distract himself.    Blood sugars QID:   8am = 180-210's  12N = 240-330's  5pm = 110-230's  HS =150-260's      ASSESSMENT / PLAN:  (E11.42,  Z79.4) Type 2 diabetes mellitus with diabetic polyneuropathy, with long-term current use of insulin (H) (primary encounter diagnosis)  Comment: do see some improvement with the numbers were above 300.    Continues with 4 units baseline of short acting with meals and sliding scale insulin on top of that.  Last week added HS Lantus to his regimen and now will increase both AM and HS dose.  18 units in AM and 8 units in PM.    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  Family will be in contact with [facility] to get a release in order to talk with each other and can express results of the PHQ-9.      (H40.003) Glaucoma suspect, bilateral  Comment: Did speak with the daughter in regards to any upcoming eye doctor appointments.  He is on eyedrops at this time and will continue with them.  Daughter states that is not uncommon that 1 eye is partially open when he sleeps so in the morning he will have crusting on the eyelashes.  Will leave it up to the family to bring him in to have an updated eye appointment to make sure his pressures are stable with the right medications.    Orders:   Increase morning insulin of Lantus to 18 units subcu  Increase bedtime Lantus to 8 units subcu    Electronically signed by  PAULA Huston CNP            Sincerely,        PAULA Huston CNP    Electronically signed

## 2025-02-25 NOTE — PROGRESS NOTES
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  Was recently in the hospital with late versus subacute infarct of the right internal capsule,  Evaluated by neurology  Cont eliquis.  And discharged to the TCU but now has been moved to long-term care.  At baseline has poor mobility and remains a falls risk with underlying history of very poor vision/legal blindness.  Diabetes appears to be poorly controlled and his insulin dosage was increased recently.  Monitor trends and check A1c.  There has been concern of ongoing depression and mood is being monitored he is on psych meds and follows with psychiatry closely   concerned about ongoing cognitive impairment with poor recall noted on exam  Continue current care plan      History     Patient is a very pleasant 74 year old male who is admitted to LTC as a transfer from the TCU  Patient was recently admitted to the hospital on 1/5/2025 with increasing weakness on the right side.  MRI did show a small cyst subacute infarct involving internal capsule on the right side CT angiogram did show  Significant large vessel occlusion with stenoses.  Neurology recommended continuing Eliquis.  Since his LDL was 69 they did not recommend a statin.  He was medically stabilized and discharged to the TCU.  Due to lack of progress he has been moved to long-term care      Past Medical & Surgical History     PAST MEDICAL HISTORY:    Past Medical History:   Diagnosis Date    A-fib (H)     Acute hemodialysis encounter     Due to CHIDI    Acute kidney injury     Acute respiratory failure with hypoxemia (H)     Adrenal incidentaloma     Asbestosis (H)     ATN (acute tubular necrosis)     Atrophy of right kidney     Basal cell carcinoma     BPH without urinary obstruction     Cellulitis     Left foot    Cholelithiasis     Depression with anxiety     Diabetes mellitus, type 2 (H) 4/12/2020    Esophagitis     Gastritis     Glaucoma     Hematemesis, presence of nausea not specified     Hyperkalemia     Hyperlipemia     Kidney stone     Lactic acidosis     Metabolic acidosis     Metformin overdose of undetermined intent     Paroxysmal atrial fibrillation (H) 2/18/2020    Pericardial effusion     PTSD (post-traumatic stress disorder)     Seasonal allergies     Sepsis due to urinary tract infection (H)     Shock circulatory (H)     SIRS (systemic inflammatory response syndrome) (H)     Sleep apnea     uses a machine at night.     Upper GI bleed       PAST SURGICAL HISTORY:   has a past surgical history that includes CYSTOSCOPY,INSERT URETERAL STENT (Left, 4/12/2020); Pr Esophagogastroduodenoscopy Transoral Diagnostic (N/A, 4/13/2020); Eye surgery; and Replacement Total Knee (Left).      Past Social History     Reviewed,  reports that he has never smoked. He has never used smokeless tobacco. He reports that he does not currently use alcohol. He reports that he does not use drugs.    Family History     Reviewed, and family history includes Diabetes in his mother.    Medication List     Current Outpatient Medications   Medication Sig Dispense Refill    acetaminophen (TYLENOL) 325 MG tablet Take 2 tablets (650 mg) by mouth every 4 hours as needed for mild pain or fever (temperature greater than 100.4  F (38  C)).      amLODIPine (NORVASC) 5 MG tablet Take 1 tablet (5 mg) by mouth daily.      apixaban ANTICOAGULANT (ELIQUIS) 5 MG tablet Take 5 mg by mouth 2  times daily.      ARIPiprazole (ABILIFY) 2 MG tablet [ARIPIPRAZOLE (ABILIFY) 2 MG TABLET] Take 2 mg by mouth at bedtime.       brimonidine (ALPHAGAN) 0.2 % ophthalmic solution [BRIMONIDINE (ALPHAGAN) 0.2 % OPHTHALMIC SOLUTION] Administer 1 drop to both eyes 2 (two) times a day.       dorzolamide-timolol (COSOPT) 2-0.5 % ophthalmic solution Place 1 drop into both eyes 2 times daily      famotidine (PEPCID) 20 MG tablet Take 20 mg by mouth daily.      fluvoxaMINE (LUVOX) 50 MG tablet [FLUVOXAMINE (LUVOX) 50 MG TABLET] Take 50 mg by mouth at bedtime.       gabapentin (NEURONTIN) 300 MG capsule Take 300 mg by mouth 2 times daily      insulin aspart (NOVOLOG PEN) 100 UNIT/ML pen Inject 6 Units subcutaneously 3 times daily (with meals).      insulin aspart (NOVOLOG PEN) 100 UNIT/ML pen Inject 1-7 Units subcutaneously 3 times daily (before meals). Correction Scale - MEDIUM INSULIN RESISTANCE DOSING   Do Not give Correction Insulin if Pre-Meal BG less than 140. For Pre-Meal  - 189 give 1 unit. For Pre-Meal  - 239 give 2 units. For Pre-Meal  - 289 give 3 units. For Pre-Meal  - 339 give 4 units. For Pre-Meal - 389 give 5 units. For Pre-Meal -439 give 6 units For Pre-Meal BG greater than or equal to 440 give 7 units. To be given with prandial insulin, and based on pre-meal blood glucose. Administering insulin within 5 minutes of the start of the meal is ideal. Administer insulin no more than 30 minutes after the start of the meal, unless directed otherwise by provider.   Notify provider if glucose greater than or equal to 350 mg/dL after administration of correction dose.      insulin glargine (LANTUS PEN) 100 UNIT/ML pen Inject 18 Units subcutaneously every morning (before breakfast) AND 8 Units at bedtime.      latanoprostene bunod 0.024 % Drop Place 1 drop into both eyes At Bedtime      levomefolate calcium (L-METHYLFOLATE ORAL) Take 15 mg by mouth daily.      netarsudiL (RHOPRESSA) 0.02  % Drop Place 1 drop into both eyes at bedtime.       No current facility-administered medications for this visit.      MED REC REQUIRED  Post Medication Reconciliation Status: discharge medications reconciled, continue medications without change       Allergies     No Known Allergies    Review of Systems   A comprehensive review of 14 systems was done. Pertinent findings noted here and in history of present illness. All the rest negative.  Constitutional: Negative.  Negative for fever, chills, he has  activity change, appetite change and fatigue.   HENT: Negative for congestion and facial swelling.    Eyes: Negative for photophobia, redness and visual disturbance.  Vision is impaired and patient is blind in right eye with limited vision in his left  Respiratory: Negative for cough and chest tightness.    Cardiovascular: Negative for chest pain, palpitations and leg swelling.   Gastrointestinal: Negative for nausea, diarrhea, constipation, blood in stool and abdominal distention.   Genitourinary: Negative.    Musculoskeletal: Negative.  Does have a history of falls  Skin: Negative.    Neurological: Negative for dizziness, tremors, syncope, weakness, light-headedness and headaches.   Hematological: Does not bruise/bleed easily.   Psychiatric/Behavioral: Reports impaired mood and has limited recall  Has a poor sleep hygiene and frequent napping during daytime reported      Physical Exam   /76   Pulse 66   Temp 97.4  F (36.3  C)   Resp 18   Ht 1.829 m (6')   Wt 94.3 kg (208 lb)   SpO2 94%   BMI 28.21 kg/m       Constitutional: Oriented to person, place,  and appears well-developed.   HEENT:  Normocephalic and atraumatic.  Eyes: Conjunctivae and EOM are normal. Pupils are equal, round, and reactive to light. No discharge.  No scleral icterus. Nose normal. Mouth/Throat: Oropharynx is clear and moist. No oropharyngeal exudate.  Impaired vision and red sclera noted in the right eye.  Minimal vision in the left  eye  Completely blind in the right eye limited vision in the left eye  NECK: Normal range of motion. Neck supple. No JVD present. No tracheal deviation present. No thyromegaly present.   CARDIOVASCULAR: Normal rate, regular rhythm and intact distal pulses.  Exam reveals no gallop and no friction rub.  Systolic murmur present.  PULMONARY: Effort normal and breath sounds normal. No respiratory distress.No Wheezing or rales.  ABDOMEN: Soft. Bowel sounds are normal. No distension and no mass.  There is no tenderness. There is no rebound and no guarding. No HSM.  MUSCULOSKELETAL: Normal range of motion. Mild kyphosis, no tenderness.  LYMPH NODES: Has no cervical, supraclavicular, axillary and groin adenopathy.   NEUROLOGICAL: Alert and oriented to person, place, . No cranial nerve deficit.  Normal muscle tone. Coordination normal.   GENITOURINARY: Deferred exam.  SKIN: Skin is warm and dry. No rash noted. No erythema. No pallor.   EXTREMITIES: No cyanosis, no clubbing, no edema. No Deformity.  PSYCHIATRIC: Normal mood, affect and behavior.  Recall is impaired      Lab Results     Last Comprehensive Metabolic Panel:  Lab Results   Component Value Date     01/21/2025    POTASSIUM 5.1 01/21/2025    CHLORIDE 108 (H) 01/21/2025    CO2 25 01/21/2025    ANIONGAP 10 01/21/2025     (H) 01/21/2025    BUN 17.0 01/21/2025    CR 0.93 01/21/2025    GFRESTIMATED 87 01/21/2025    APRYL 9.2 01/21/2025                  Electronically signed by    Anneliese Rees MD

## 2025-02-26 ENCOUNTER — NURSING HOME VISIT (OUTPATIENT)
Dept: GERIATRICS | Facility: CLINIC | Age: 75
End: 2025-02-26
Payer: MEDICARE

## 2025-02-26 VITALS
WEIGHT: 208 LBS | TEMPERATURE: 97.4 F | DIASTOLIC BLOOD PRESSURE: 76 MMHG | OXYGEN SATURATION: 94 % | BODY MASS INDEX: 28.17 KG/M2 | HEIGHT: 72 IN | SYSTOLIC BLOOD PRESSURE: 125 MMHG | RESPIRATION RATE: 18 BRPM | HEART RATE: 66 BPM

## 2025-02-26 DIAGNOSIS — I48.0 PAROXYSMAL ATRIAL FIBRILLATION (H): ICD-10-CM

## 2025-02-26 DIAGNOSIS — F60.89 MIXED PERSONALITY DISORDER IN ADULT (H): ICD-10-CM

## 2025-02-26 DIAGNOSIS — Z79.4 TYPE 2 DIABETES MELLITUS WITH DIABETIC POLYNEUROPATHY, WITH LONG-TERM CURRENT USE OF INSULIN (H): Primary | ICD-10-CM

## 2025-02-26 DIAGNOSIS — E11.42 TYPE 2 DIABETES MELLITUS WITH DIABETIC POLYNEUROPATHY, WITH LONG-TERM CURRENT USE OF INSULIN (H): Primary | ICD-10-CM

## 2025-02-26 DIAGNOSIS — R53.1 RIGHT SIDED WEAKNESS: ICD-10-CM

## 2025-02-26 DIAGNOSIS — F33.41 MAJOR DEPRESSIVE DISORDER, RECURRENT EPISODE, IN PARTIAL REMISSION: ICD-10-CM

## 2025-02-26 DIAGNOSIS — Z86.73 CEREBROVASCULAR ACCIDENT, OLD: ICD-10-CM

## 2025-02-26 DIAGNOSIS — I10 ESSENTIAL HYPERTENSION: ICD-10-CM

## 2025-02-26 DIAGNOSIS — R41.0 CONFUSION: ICD-10-CM

## 2025-02-26 NOTE — LETTER
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  Was recently in the hospital with late versus subacute infarct of the right internal capsule,  Evaluated by neurology  Cont eliquis.  And discharged to the TCU but now has been moved to long-term care.  At baseline has poor mobility and remains a falls risk with underlying history of very poor vision/legal blindness.  Diabetes appears to be poorly controlled and his insulin dosage was increased recently.  Monitor trends and check A1c.  There has been concern of ongoing depression and mood is being monitored he is on psych meds and follows with psychiatry closely   concerned about ongoing cognitive impairment with poor recall noted on exam  Continue current care plan      History     Patient is a very pleasant 74 year old male who is admitted to LTC as a transfer from the TCU  Patient was recently admitted to the hospital on 1/5/2025 with increasing weakness on the right side.  MRI did show a small cyst subacute infarct involving internal capsule on the right side CT angiogram did show  Significant large vessel occlusion with stenoses.  Neurology recommended continuing Eliquis.  Since his LDL was 69 they did not recommend a statin.  He was medically stabilized and discharged to the TCU.  Due to lack of progress he has been moved  to long-term care      Past Medical & Surgical History     PAST MEDICAL HISTORY:   Past Medical History:   Diagnosis Date     A-fib (H)      Acute hemodialysis encounter     Due to HCIDI     Acute kidney injury      Acute respiratory failure with hypoxemia (H)      Adrenal incidentaloma      Asbestosis (H)      ATN (acute tubular necrosis)      Atrophy of right kidney      Basal cell carcinoma      BPH without urinary obstruction      Cellulitis     Left foot     Cholelithiasis      Depression with anxiety      Diabetes mellitus, type 2 (H) 4/12/2020     Esophagitis      Gastritis      Glaucoma      Hematemesis, presence of nausea not specified      Hyperkalemia      Hyperlipemia      Kidney stone      Lactic acidosis      Metabolic acidosis      Metformin overdose of undetermined intent      Paroxysmal atrial fibrillation (H) 2/18/2020     Pericardial effusion      PTSD (post-traumatic stress disorder)      Seasonal allergies      Sepsis due to urinary tract infection (H)      Shock circulatory (H)      SIRS (systemic inflammatory response syndrome) (H)      Sleep apnea     uses a machine at night.      Upper GI bleed       PAST SURGICAL HISTORY:   has a past surgical history that includes CYSTOSCOPY,INSERT URETERAL STENT (Left, 4/12/2020); Pr Esophagogastroduodenoscopy Transoral Diagnostic (N/A, 4/13/2020); Eye surgery; and Replacement Total Knee (Left).      Past Social History     Reviewed,  reports that he has never smoked. He has never used smokeless tobacco. He reports that he does not currently use alcohol. He reports that he does not use drugs.    Family History     Reviewed, and family history includes Diabetes in his mother.    Medication List     Current Outpatient Medications   Medication Sig Dispense Refill     acetaminophen (TYLENOL) 325 MG tablet Take 2 tablets (650 mg) by mouth every 4 hours as needed for mild pain or fever (temperature greater than 100.4  F (38  C)).       amLODIPine (NORVASC) 5 MG  tablet Take 1 tablet (5 mg) by mouth daily.       apixaban ANTICOAGULANT (ELIQUIS) 5 MG tablet Take 5 mg by mouth 2 times daily.       ARIPiprazole (ABILIFY) 2 MG tablet [ARIPIPRAZOLE (ABILIFY) 2 MG TABLET] Take 2 mg by mouth at bedtime.        brimonidine (ALPHAGAN) 0.2 % ophthalmic solution [BRIMONIDINE (ALPHAGAN) 0.2 % OPHTHALMIC SOLUTION] Administer 1 drop to both eyes 2 (two) times a day.        dorzolamide-timolol (COSOPT) 2-0.5 % ophthalmic solution Place 1 drop into both eyes 2 times daily       famotidine (PEPCID) 20 MG tablet Take 20 mg by mouth daily.       fluvoxaMINE (LUVOX) 50 MG tablet [FLUVOXAMINE (LUVOX) 50 MG TABLET] Take 50 mg by mouth at bedtime.        gabapentin (NEURONTIN) 300 MG capsule Take 300 mg by mouth 2 times daily       insulin aspart (NOVOLOG PEN) 100 UNIT/ML pen Inject 6 Units subcutaneously 3 times daily (with meals).       insulin aspart (NOVOLOG PEN) 100 UNIT/ML pen Inject 1-7 Units subcutaneously 3 times daily (before meals). Correction Scale - MEDIUM INSULIN RESISTANCE DOSING   Do Not give Correction Insulin if Pre-Meal BG less than 140. For Pre-Meal  - 189 give 1 unit. For Pre-Meal  - 239 give 2 units. For Pre-Meal  - 289 give 3 units. For Pre-Meal  - 339 give 4 units. For Pre-Meal - 389 give 5 units. For Pre-Meal -439 give 6 units For Pre-Meal BG greater than or equal to 440 give 7 units. To be given with prandial insulin, and based on pre-meal blood glucose. Administering insulin within 5 minutes of the start of the meal is ideal. Administer insulin no more than 30 minutes after the start of the meal, unless directed otherwise by provider.   Notify provider if glucose greater than or equal to 350 mg/dL after administration of correction dose.       insulin glargine (LANTUS PEN) 100 UNIT/ML pen Inject 18 Units subcutaneously every morning (before breakfast) AND 8 Units at bedtime.       latanoprostene bunod 0.024 % Drop Place 1 drop into  both eyes At Bedtime       levomefolate calcium (L-METHYLFOLATE ORAL) Take 15 mg by mouth daily.       netarsudiL (RHOPRESSA) 0.02 % Drop Place 1 drop into both eyes at bedtime.       No current facility-administered medications for this visit.      MED REC REQUIRED  Post Medication Reconciliation Status: discharge medications reconciled, continue medications without change       Allergies     No Known Allergies    Review of Systems   A comprehensive review of 14 systems was done. Pertinent findings noted here and in history of present illness. All the rest negative.  Constitutional: Negative.  Negative for fever, chills, he has  activity change, appetite change and fatigue.   HENT: Negative for congestion and facial swelling.    Eyes: Negative for photophobia, redness and visual disturbance.  Vision is impaired and patient is blind in right eye with limited vision in his left  Respiratory: Negative for cough and chest tightness.    Cardiovascular: Negative for chest pain, palpitations and leg swelling.   Gastrointestinal: Negative for nausea, diarrhea, constipation, blood in stool and abdominal distention.   Genitourinary: Negative.    Musculoskeletal: Negative.  Does have a history of falls  Skin: Negative.    Neurological: Negative for dizziness, tremors, syncope, weakness, light-headedness and headaches.   Hematological: Does not bruise/bleed easily.   Psychiatric/Behavioral: Reports impaired mood and has limited recall  Has a poor sleep hygiene and frequent napping during daytime reported      Physical Exam   /76   Pulse 66   Temp 97.4  F (36.3  C)   Resp 18   Ht 1.829 m (6')   Wt 94.3 kg (208 lb)   SpO2 94%   BMI 28.21 kg/m       Constitutional: Oriented to person, place,  and appears well-developed.   HEENT:  Normocephalic and atraumatic.  Eyes: Conjunctivae and EOM are normal. Pupils are equal, round, and reactive to light. No discharge.  No scleral icterus. Nose normal. Mouth/Throat: Oropharynx  is clear and moist. No oropharyngeal exudate.  Impaired vision and red sclera noted in the right eye.  Minimal vision in the left eye  Completely blind in the right eye limited vision in the left eye  NECK: Normal range of motion. Neck supple. No JVD present. No tracheal deviation present. No thyromegaly present.   CARDIOVASCULAR: Normal rate, regular rhythm and intact distal pulses.  Exam reveals no gallop and no friction rub.  Systolic murmur present.  PULMONARY: Effort normal and breath sounds normal. No respiratory distress.No Wheezing or rales.  ABDOMEN: Soft. Bowel sounds are normal. No distension and no mass.  There is no tenderness. There is no rebound and no guarding. No HSM.  MUSCULOSKELETAL: Normal range of motion. Mild kyphosis, no tenderness.  LYMPH NODES: Has no cervical, supraclavicular, axillary and groin adenopathy.   NEUROLOGICAL: Alert and oriented to person, place, . No cranial nerve deficit.  Normal muscle tone. Coordination normal.   GENITOURINARY: Deferred exam.  SKIN: Skin is warm and dry. No rash noted. No erythema. No pallor.   EXTREMITIES: No cyanosis, no clubbing, no edema. No Deformity.  PSYCHIATRIC: Normal mood, affect and behavior.  Recall is impaired      Lab Results     Last Comprehensive Metabolic Panel:  Lab Results   Component Value Date     01/21/2025    POTASSIUM 5.1 01/21/2025    CHLORIDE 108 (H) 01/21/2025    CO2 25 01/21/2025    ANIONGAP 10 01/21/2025     (H) 01/21/2025    BUN 17.0 01/21/2025    CR 0.93 01/21/2025    GFRESTIMATED 87 01/21/2025    APRYL 9.2 01/21/2025                  Electronically signed by    Anneliese Rees MD                            Sincerely,        FANNIE Davis    Electronically signed

## 2025-02-27 ENCOUNTER — LAB REQUISITION (OUTPATIENT)
Dept: LAB | Facility: CLINIC | Age: 75
End: 2025-02-27
Payer: MEDICARE

## 2025-02-27 DIAGNOSIS — E11.65 TYPE 2 DIABETES MELLITUS WITH HYPERGLYCEMIA (H): ICD-10-CM

## 2025-03-03 LAB
EST. AVERAGE GLUCOSE BLD GHB EST-MCNC: 212 MG/DL
HBA1C MFR BLD: 9 %

## 2025-03-03 PROCEDURE — P9604 ONE-WAY ALLOW PRORATED TRIP: HCPCS | Performed by: FAMILY MEDICINE

## 2025-03-03 PROCEDURE — 83036 HEMOGLOBIN GLYCOSYLATED A1C: CPT | Performed by: FAMILY MEDICINE

## 2025-03-03 PROCEDURE — 36415 COLL VENOUS BLD VENIPUNCTURE: CPT | Performed by: FAMILY MEDICINE

## 2025-03-11 ENCOUNTER — NURSING HOME VISIT (OUTPATIENT)
Dept: GERIATRICS | Facility: CLINIC | Age: 75
End: 2025-03-11
Payer: MEDICARE

## 2025-03-11 VITALS
DIASTOLIC BLOOD PRESSURE: 75 MMHG | RESPIRATION RATE: 18 BRPM | TEMPERATURE: 98.6 F | SYSTOLIC BLOOD PRESSURE: 108 MMHG | OXYGEN SATURATION: 96 % | WEIGHT: 210 LBS | HEART RATE: 83 BPM | BODY MASS INDEX: 28.48 KG/M2

## 2025-03-11 DIAGNOSIS — E11.42 TYPE 2 DIABETES MELLITUS WITH DIABETIC POLYNEUROPATHY, WITH LONG-TERM CURRENT USE OF INSULIN (H): Primary | ICD-10-CM

## 2025-03-11 DIAGNOSIS — Z79.4 TYPE 2 DIABETES MELLITUS WITH DIABETIC POLYNEUROPATHY, WITH LONG-TERM CURRENT USE OF INSULIN (H): Primary | ICD-10-CM

## 2025-03-11 DIAGNOSIS — I10 ESSENTIAL HYPERTENSION: ICD-10-CM

## 2025-03-11 DIAGNOSIS — F33.41 MAJOR DEPRESSIVE DISORDER, RECURRENT EPISODE, IN PARTIAL REMISSION: ICD-10-CM

## 2025-03-11 DIAGNOSIS — I48.0 PAROXYSMAL ATRIAL FIBRILLATION (H): ICD-10-CM

## 2025-03-11 PROCEDURE — 99309 SBSQ NF CARE MODERATE MDM 30: CPT | Performed by: NURSE PRACTITIONER

## 2025-03-11 NOTE — PROGRESS NOTES
[facility] GERIATRICS  REGULATORY VISIT  March 11, 2025      [facility] Medical Record Number:  [id number]  Place of Service where encounter took place:  [facility] () [[id number]]    Chief Complaint   Patient presents with    assisted Regulatory       HPI:    Rakesh Samuels is a 74 year old  (1950), who is being seen today for a federally mandated E/M visit. HPI information obtained from: facility chart records, facility staff, patient report, and [facility] chart review.    Today's concern is:  Diagnoses         Codes Comments    Type 2 diabetes mellitus with diabetic polyneuropathy, with long-term current use of insulin (H)    -  Primary E11.42, Z79.4     Paroxysmal atrial fibrillation (H)     I48.0     Essential hypertension     I10     Major depressive disorder, recurrent episode, in partial remission     F33.41           Came to see Rakesh as he is due for a routine visit today.  Found him in his room reclined back in the recliner with a blanket tucked around him.  Eyes closed but answers questions.  Do not feel he was sleeping.      Rakesh had no complaints.  Denied pain.  He depends on staff to assist with cares.  Nursing stated he wants to use a continuous glucose monitoring system as finger tips sensitive to touch.  Feel that was reasonable to try given he is insulin dependent and sugars checked QID.  Informed him of updating his insulin as well for better control.      ALLERGIES:  No Known Allergies     Past Medical, Surgical, Family and Social History: Reviewed and updated in EPIC.    MEDICATIONS:  Post Discharge Medication Reconciliation Status: patient was not discharged from an inpatient facility or TCU.     Current Outpatient Medications   Medication Sig Dispense Refill    Continuous Glucose  (FREESTYLE TIFFANY 2 READER) RAJESH Use to read blood sugars 4 times a day. 1 each 0    Continuous Glucose Sensor (FREESTYLE TIFFANY 2 SENSOR) MISC Change every 14 days. 2  each 11    insulin glargine (LANTUS PEN) 100 UNIT/ML pen Inject 18 Units subcutaneously every morning (before breakfast) AND 6 Units at bedtime.      acetaminophen (TYLENOL) 325 MG tablet Take 2 tablets (650 mg) by mouth every 4 hours as needed for mild pain or fever (temperature greater than 100.4  F (38  C)).      amLODIPine (NORVASC) 5 MG tablet Take 1 tablet (5 mg) by mouth daily.      apixaban ANTICOAGULANT (ELIQUIS) 5 MG tablet Take 5 mg by mouth 2 times daily.      ARIPiprazole (ABILIFY) 2 MG tablet [ARIPIPRAZOLE (ABILIFY) 2 MG TABLET] Take 2 mg by mouth at bedtime.       brimonidine (ALPHAGAN) 0.2 % ophthalmic solution [BRIMONIDINE (ALPHAGAN) 0.2 % OPHTHALMIC SOLUTION] Administer 1 drop to both eyes 2 (two) times a day.       dorzolamide-timolol (COSOPT) 2-0.5 % ophthalmic solution Place 1 drop into both eyes 2 times daily      famotidine (PEPCID) 20 MG tablet Take 20 mg by mouth daily.      fluvoxaMINE (LUVOX) 50 MG tablet [FLUVOXAMINE (LUVOX) 50 MG TABLET] Take 50 mg by mouth at bedtime.       gabapentin (NEURONTIN) 300 MG capsule Take 300 mg by mouth 2 times daily      insulin aspart (NOVOLOG PEN) 100 UNIT/ML pen Inject 6 Units subcutaneously 3 times daily (with meals).      insulin aspart (NOVOLOG PEN) 100 UNIT/ML pen Inject 1-7 Units subcutaneously 3 times daily (before meals). Correction Scale - MEDIUM INSULIN RESISTANCE DOSING   Do Not give Correction Insulin if Pre-Meal BG less than 140. For Pre-Meal  - 189 give 1 unit. For Pre-Meal  - 239 give 2 units. For Pre-Meal  - 289 give 3 units. For Pre-Meal  - 339 give 4 units. For Pre-Meal - 389 give 5 units. For Pre-Meal -439 give 6 units For Pre-Meal BG greater than or equal to 440 give 7 units. To be given with prandial insulin, and based on pre-meal blood glucose. Administering insulin within 5 minutes of the start of the meal is ideal. Administer insulin no more than 30 minutes after the start of the meal, unless  directed otherwise by provider.   Notify provider if glucose greater than or equal to 350 mg/dL after administration of correction dose.      latanoprostene bunod 0.024 % Drop Place 1 drop into both eyes At Bedtime      levomefolate calcium (L-METHYLFOLATE ORAL) Take 15 mg by mouth daily.      netarsudiL (RHOPRESSA) 0.02 % Drop Place 1 drop into both eyes at bedtime.       Medications reviewed:  Medications reconciled to facility chart and changes were made to reflect current medications as identified as above med list. Below are the changes that were made:   Medications stopped since last EPIC medication reconciliation:   Medications Discontinued During This Encounter   Medication Reason    insulin glargine (LANTUS PEN) 100 UNIT/ML pen        Medications started since last [facility] medication reconciliation:  Orders Placed This Encounter   Medications    insulin glargine (LANTUS PEN) 100 UNIT/ML pen     Sig: Inject 18 Units subcutaneously every morning (before breakfast) AND 6 Units at bedtime.     If Lantus is not covered by insurance, may substitute Basaglar or Semglee or other insulin glargine product per insurance preference at same dose and frequency.      Continuous Glucose  (FREESTYLE TIFFANY 2 READER) RAJESH     Sig: Use to read blood sugars 4 times a day.     Dispense:  1 each     Refill:  0    Continuous Glucose Sensor (FREESTYLE TIFFANY 2 SENSOR) MISC     Sig: Change every 14 days.     Dispense:  2 each     Refill:  11         REVIEW OF SYSTEMS:  4 point ROS neg other than the symptoms noted above in the HPI.  No chest pain, no shortness of breath.      PHYSICAL EXAM:  /75   Pulse 83   Temp 98.6  F (37  C)   Resp 18   Wt 95.3 kg (210 lb)   SpO2 96%   BMI 28.48 kg/m    Rakesh is alert and pleasant.  Does not contribute to conversation unless asked.    Skin is pale, dry and warm.    No glasses.  Eyelids pale, no swelling.  Kept eyes shut  No nasal drip from nose  Speech is clear.  Answered  questions appropriately.  BIMS on 2/25 was 11/15  Heart rate regular/irregular and strong  Lungs are clear.  No cough.    Abdomen is round, soft, and non-tender  No edema in lower legs.    Blood pressures range from 100-130's/60-80's.    Blood sugars:  120-180's  8am  170-240's  12N  110-140's   4pm  250-300's HS    LABS/IMAGING: Reviewed as per Epic and/or [facility]    Lab Results   Component Value Date    A1C 9.0 03/03/2025       ASSESSMENT / PLAN:  (E11.42,  Z79.4) Type 2 diabetes mellitus with diabetic polyneuropathy, with long-term current use of insulin (H)  (primary encounter diagnosis)  Comment: attempting to get Jessees blood sugars under control and A1c down around 8%.  Would like to see him off the sliding scale insulin at some point.  Will keep set dose of 6 units with meals of the Novolog and sliding scale insulin.  Increase the Latus in AM to 18 units and HS dose to 6 units.  QID blood sugars.  Will put in scripts for the ana system to check his sugars and see if they passes.  Plan: insulin glargine (LANTUS PEN) 100 UNIT/ML pen,         Continuous Glucose  (FREESTYLE ANA 2         READER) RAJESH, Continuous Glucose Sensor         (FREESTYLE ANA 2 SENSOR) MISC    (I48.0) Paroxysmal atrial fibrillation (H)  Comment: remains on Eliquis 5mg twice a day.  No symptoms noted.  Continue with current treatment.    (I10) Essential hypertension  Comment: blood pressures within good range.  Keep on amlodipine 5mg in AM.  Vitals weekly    (F33.41) Major depressive disorder, recurrent episode, in partial remission  Comment: daughters help manage Rakesh's psych medications as he has been with an psych group for some time.  Remains on fluvoxamine 50mg at HS, aripiprazole 2mg at HS.  Has gabapentin as well but that is more related to his Diabetes order  Have not heard from staff that he has issues.  His roommate watches over him and so staff have to separate out whom manages his cares.      Orders:  Freestyle Renny 2 Greig to check sugars 4x a day  Freestyle Renny 2 sensors to be placed topically every 2 weeks   type 2 DM insulin dependent  Increase AM insulin to 18 units subcutaneous and 6 units at HS type 2 DM      Electronically signed by  PAULA Huston CNP

## 2025-03-11 NOTE — LETTER
3/11/2025      Rakesh Samuels  [address]  [facility] [address]        [facility] GERIATRICS  REGULATORY VISIT  March 11, 2025      [facility] Medical Record Number:  [id number]  [facility] [[id number]    Chief Complaint   Patient presents with     [facility]       HPI:    Rakesh Samuels is a 74 year old  (1950), who is being seen today for a federally mandated E/M visit. HPI information obtained from: facility chart records, facility staff, patient report, and [facility] chart review.    Today's concern is:  Diagnoses         Codes Comments    Type 2 diabetes mellitus with diabetic polyneuropathy, with long-term current use of insulin (H)    -  Primary E11.42, Z79.4     Paroxysmal atrial fibrillation (H)     I48.0     Essential hypertension     I10     Major depressive disorder, recurrent episode, in partial remission     F33.41           Came to see Rakesh as he is due for a routine visit today.  Found him in his room reclined back in the recliner with a blanket tucked around him.  Eyes closed but answers questions.  Do not feel he was sleeping.      Rakesh had no complaints.  Denied pain.  He depends on staff to assist with cares.  Nursing stated he wants to use a continuous glucose monitoring system as finger tips sensitive to touch.  Feel that was reasonable to try given he is insulin dependent and sugars checked QID.  Informed him of updating his insulin as well for better control.      ALLERGIES:  No Known Allergies     Past Medical, Surgical, Family and Social History: Reviewed and updated in EPIC.    MEDICATIONS:  Post Discharge Medication Reconciliation Status: patient was not discharged from an inpatient facility or TCU.     Current Outpatient Medications   Medication Sig Dispense Refill     Continuous Glucose  (FREESTYLE TIFFANY 2 READER) RAJESH Use to read blood sugars 4 times a day. 1 each 0      Continuous Glucose Sensor (FREESTYLE TIFFANY 2 SENSOR) [facility] Change every 14 days. 2 each 11     insulin glargine (LANTUS PEN) 100 UNIT/ML pen Inject 18 Units subcutaneously every morning (before breakfast) AND 6 Units at bedtime.       acetaminophen (TYLENOL) 325 MG tablet Take 2 tablets (650 mg) by mouth every 4 hours as needed for mild pain or fever (temperature greater than 100.4  F (38  C)).       amLODIPine (NORVASC) 5 MG tablet Take 1 tablet (5 mg) by mouth daily.       apixaban ANTICOAGULANT (ELIQUIS) 5 MG tablet Take 5 mg by mouth 2 times daily.       ARIPiprazole (ABILIFY) 2 MG tablet [ARIPIPRAZOLE (ABILIFY) 2 MG TABLET] Take 2 mg by mouth at bedtime.        brimonidine (ALPHAGAN) 0.2 % ophthalmic solution [BRIMONIDINE (ALPHAGAN) 0.2 % OPHTHALMIC SOLUTION] Administer 1 drop to both eyes 2 (two) times a day.        dorzolamide-timolol (COSOPT) 2-0.5 % ophthalmic solution Place 1 drop into both eyes 2 times daily       famotidine (PEPCID) 20 MG tablet Take 20 mg by mouth daily.       fluvoxaMINE (LUVOX) 50 MG tablet [FLUVOXAMINE (LUVOX) 50 MG TABLET] Take 50 mg by mouth at bedtime.        gabapentin (NEURONTIN) 300 MG capsule Take 300 mg by mouth 2 times daily       insulin aspart (NOVOLOG PEN) 100 UNIT/ML pen Inject 6 Units subcutaneously 3 times daily (with meals).       insulin aspart (NOVOLOG PEN) 100 UNIT/ML pen Inject 1-7 Units subcutaneously 3 times daily (before meals). Correction Scale - MEDIUM INSULIN RESISTANCE DOSING   Do Not give Correction Insulin if Pre-Meal BG less than 140. For Pre-Meal  - 189 give 1 unit. For Pre-Meal  - 239 give 2 units. For Pre-Meal  - 289 give 3 units. For Pre-Meal  - 339 give 4 units. For Pre-Meal - 389 give 5 units. For Pre-Meal -439 give 6 units For Pre-Meal BG greater than or equal to 440 give 7 units. To be given with prandial insulin, and based on pre-meal blood glucose. Administering insulin within 5 minutes of the start of the  meal is ideal. Administer insulin no more than 30 minutes after the start of the meal, unless directed otherwise by provider.   Notify provider if glucose greater than or equal to 350 mg/dL after administration of correction dose.       latanoprostene bunod 0.024 % Drop Place 1 drop into both eyes At Bedtime       levomefolate calcium (L-METHYLFOLATE ORAL) Take 15 mg by mouth daily.       netarsudiL (RHOPRESSA) 0.02 % Drop Place 1 drop into both eyes at bedtime.       Medications reviewed:  Medications reconciled to facility chart and changes were made to reflect current medications as identified as above med list. Below are the changes that were made:   Medications stopped since last EPIC medication reconciliation:   Medications Discontinued During This Encounter   Medication Reason     insulin glargine (LANTUS PEN) 100 UNIT/ML pen        Medications started since last [facility] medication reconciliation:  Orders Placed This Encounter   Medications     insulin glargine (LANTUS PEN) 100 UNIT/ML pen     Sig: Inject 18 Units subcutaneously every morning (before breakfast) AND 6 Units at bedtime.     If Lantus is not covered by insurance, may substitute Basaglar or Semglee or other insulin glargine product per insurance preference at same dose and frequency.       Continuous Glucose  (FREESTYLE TIFFANY 2 READER) RAJESH     Sig: Use to read blood sugars 4 times a day.     Dispense:  1 each     Refill:  0     Continuous Glucose Sensor (FREESTYLE TIFFANY 2 SENSOR) MISC     Sig: Change every 14 days.     Dispense:  2 each     Refill:  11         REVIEW OF SYSTEMS:  4 point ROS neg other than the symptoms noted above in the HPI.  No chest pain, no shortness of breath.      PHYSICAL EXAM:  /75   Pulse 83   Temp 98.6  F (37  C)   Resp 18   Wt 95.3 kg (210 lb)   SpO2 96%   BMI 28.48 kg/m    Rakesh is alert and pleasant.  Does not contribute to conversation unless asked.    Skin is pale, dry and warm.    No glasses.   Eyelids pale, no swelling.  Kept eyes shut  No nasal drip from nose  Speech is clear.  Answered questions appropriately.  BIMS on 2/25 was 11/15  Heart rate regular/irregular and strong  Lungs are clear.  No cough.    Abdomen is round, soft, and non-tender  No edema in lower legs.    Blood pressures range from 100-130's/60-80's.    Blood sugars:  120-180's  8am  170-240's  12N  110-140's   4pm  250-300's HS    LABS/IMAGING: [facility] Epic [facility]    Lab Results   Component Value Date    A1C 9.0 03/03/2025       ASSESSMENT / PLAN:  (E11.42,  Z79.4) Type 2 diabetes mellitus with diabetic polyneuropathy, with long-term current use of insulin (H)  (primary encounter diagnosis)  Comment: attempting to get Dedrick blood sugars under control and A1c down around 8%.  Would like to see him off the sliding scale insulin at some point.  Will keep set dose of 6 units with meals of the Novolog and sliding scale insulin.  Increase the Latus in AM to 18 units and HS dose to 6 units.  QID blood sugars.  Will put in scripts for the ana system to check his sugars and see if they passes.  Plan: insulin glargine (LANTUS PEN) 100 UNIT/ML pen,         Continuous Glucose  (FREESTYLE ANA 2         READER) RAJESH, Continuous Glucose Sensor         (FREESTYLE ANA 2 SENSOR) MISC    (I48.0) Paroxysmal atrial fibrillation (H)  Comment: remains on Eliquis 5mg twice a day.  No symptoms noted.  Continue with current treatment.    (I10) Essential hypertension  Comment: blood pressures within good range.  Keep on amlodipine 5mg in AM.  Vitals weekly    (F33.41) Major depressive disorder, recurrent episode, in partial remission  Comment: daughters help manage Rakesh's psych medications as he has been with an psych group for some time.  Remains on fluvoxamine 50mg at HS, aripiprazole 2mg at HS.  Has gabapentin as well but that is more related to his Diabetes order  Have not heard from staff that he has issues.  His  roommate watches over him and so staff have to separate out whom manages his cares.     Orders:  Freestyle Renny 2 Marty to check sugars 4x a day  Freestyle Renny 2 sensors to be placed topically every 2 weeks   type 2 DM insulin dependent  Increase AM insulin to 18 units subcutaneous and 6 units at HS type 2 DM      Electronically signed by  PAULA Huston CNP            Sincerely,        PAULA Huston CNP    Electronically signed

## 2025-04-01 NOTE — PROGRESS NOTES
Progress Notes by Alicja Verduzco, Pharmacy Intern at 4/12/2020  1:10 PM     Author: Alicja Verduzco, Pharmacy Intern Service: Pharmacy Author Type: Pharmacy Intern    Filed: 4/12/2020  1:16 PM Date of Service: 4/12/2020  1:10 PM Status: Attested    : Alicja Verduzco, Pharmacy Intern (Pharmacy Intern)    Related Notes: Original Note by Alicja Verduzco, Pharmacy Intern (Pharmacy Intern) filed at 4/12/2020  1:13 PM    Cosigner: Chapis Higgins RPh at 4/12/2020  1:26 PM    Attestation signed by Chapis Higgins RPh at 4/12/2020  1:26 PM    Although I was not present for the medication history interview, to my knowledge, the intern has compiled the PTA medication list to the best of her ability given the information available at the present time.    Chapis Higgins RPh     4/12/2020     1:26 PM                 Pharmacy Note - Admission Medication History    Pertinent Provider Information: patient taking Eliquis 5mg two times a day      ______________________________________________________________________    Prior To Admission (PTA) med list completed and updated in EMR.       PTA Med List   Medication Sig Note Last Dose   ? apixaban ANTICOAGULANT (ELIQUIS) 5 mg Tab tablet Take 1 tablet (5 mg total) by mouth 2 (two) times a day.  4/12/2020 at Unknown time   ? ARIPiprazole (ABILIFY) 2 MG tablet Take 2 mg by mouth every evening. 4/12/2020: Take with 5mg for total dose of 7mg  4/11/2020 at Unknown time   ? ARIPiprazole (ABILIFY) 5 MG tablet Take 5 mg by mouth every evening.  4/12/2020: Take with 2mg for total dose of 7mg  4/11/2020 at Unknown time   ? aspirin 81 MG EC tablet Take 81 mg by mouth daily.  4/11/2020 at Unknown time   ? atorvastatin (LIPITOR) 10 MG tablet Take 10 mg by mouth at bedtime.  4/11/2020 at Unknown time   ? dorzolamide-timolol (COSOPT) 22.3-6.8 mg/mL ophthalmic solution Administer 1 drop to both eyes 2 (two) times a day.  4/12/2020 at Unknown time    ? fluvoxaMINE (LUVOX) 100 MG tablet Take 200 mg by mouth at bedtime.  4/11/2020 at Unknown time   ? glipiZIDE (GLUCOTROL XL) 5 MG 24 hr tablet Take 5 mg by mouth daily.   4/11/2020 at Unknown time   ? latanoprostene bunod 0.024 % Drop Administer 1 drop to both eyes at bedtime.  4/11/2020 at Unknown time   ? metFORMIN (GLUCOPHAGE) 1000 MG tablet Take 1,000 mg by mouth 2 (two) times a day with meals.  4/12/2020 at Unknown time   ? multivitamin therapeutic tablet Take 1 tablet by mouth daily.  4/12/2020 at Unknown time   ? netarsudiL (RHOPRESSA) 0.02 % Drop Administer 1 drop to both eyes daily.  4/12/2020 at Unknown time   ? pioglitazone (ACTOS) 30 MG tablet Take 30 mg by mouth daily.  4/12/2020 at Unknown time   ? UNABLE TO FIND Take 1.25 mg by mouth daily. Med Name: I-methylfolate  4/12/2020 at Unknown time   ? VENTOLIN HFA 90 mcg/actuation inhaler Inhale 2 puffs every 4 (four) hours as needed.  Unknown at Unknown time       Information source(s): Family member    Summary of Changes to PTA Med List  New: none  Discontinued: none  Changed: glipizide, Abilify    Patient was asked about OTC/herbal products specifically.  PTA med list reflects this.    Based on the pharmacists assessment, the PTA med list information appears reliable    Patient appears adherent: Yes    Allergies were reviewed, assessed, and updated with the patient.      Medications available for use during hospital stay: All eye drops .     Thank you for the opportunity to participate in the care of this patient.    Alicja Soto, Pharmacy Intern  4/12/2020 1:16 PM          never

## 2025-04-08 ENCOUNTER — NURSING HOME VISIT (OUTPATIENT)
Dept: GERIATRICS | Facility: CLINIC | Age: 75
End: 2025-04-08
Payer: MEDICARE

## 2025-04-08 VITALS
HEART RATE: 86 BPM | SYSTOLIC BLOOD PRESSURE: 148 MMHG | RESPIRATION RATE: 16 BRPM | TEMPERATURE: 98.6 F | BODY MASS INDEX: 28.35 KG/M2 | OXYGEN SATURATION: 96 % | WEIGHT: 209 LBS | DIASTOLIC BLOOD PRESSURE: 83 MMHG

## 2025-04-08 DIAGNOSIS — F60.89 MIXED PERSONALITY DISORDER IN ADULT (H): ICD-10-CM

## 2025-04-08 DIAGNOSIS — K59.01 SLOW TRANSIT CONSTIPATION: ICD-10-CM

## 2025-04-08 DIAGNOSIS — F33.1 MODERATE EPISODE OF RECURRENT MAJOR DEPRESSIVE DISORDER (H): ICD-10-CM

## 2025-04-08 DIAGNOSIS — H61.23 BILATERAL IMPACTED CERUMEN: ICD-10-CM

## 2025-04-08 DIAGNOSIS — H91.13 PRESBYCUSIS OF BOTH EARS: Primary | ICD-10-CM

## 2025-04-08 PROCEDURE — 99309 SBSQ NF CARE MODERATE MDM 30: CPT | Mod: 25 | Performed by: NURSE PRACTITIONER

## 2025-04-08 PROCEDURE — 69209 REMOVE IMPACTED EAR WAX UNI: CPT | Mod: RT | Performed by: NURSE PRACTITIONER

## 2025-04-08 RX ORDER — POLYETHYLENE GLYCOL 3350 17 G/17G
17 POWDER, FOR SOLUTION ORAL 2 TIMES DAILY
Status: SHIPPED
Start: 2025-03-26

## 2025-04-08 RX ORDER — SENNA AND DOCUSATE SODIUM 50; 8.6 MG/1; MG/1
2 TABLET, FILM COATED ORAL 2 TIMES DAILY
Status: SHIPPED
Start: 2025-02-18

## 2025-04-08 NOTE — LETTER
4/8/2025      Rakesh Samuels  [address]  [facility] [address]        [facility] GERIATRICS  ACUTE/EPISODIC VISIT    [facility] Medical Record Number:  [id number]  Place of Service where encounter took place[facility]  [facility] [place] () [[id number]]    Chief Complaint   Patient presents with     RECHECK       HPI:    Rakesh Samuels is a 74 year old  (1950), who is being seen today for an episodic care visit.  HPI information obtained from: facility chart records, facility staff, and patient report.    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  When asked if the flushing was a success, it appears that it did not get done and so coming today to possible flush the ears.    Same day that ear drops initiated, nursing called to express concerns about his bowels.  Rakesh was asking for Miralax to be daily as he took it from home.  Gave the ok for the medication but nurse wrote it up as twice a day vs the once a day they mentioned in their note.  Had no complaints today.    Came into his room today and he was laying in bed with the aide taking his lunch order.  Rakesh knew of things he wanted and could request them without prompting.    Explained to him what was going to happen and so nurse helped him sit on the edge of his bed.      ALLERGIES:  No Known Allergies     MEDICATIONS:  Post Discharge Medication Reconciliation Status: patient was not discharged from an inpatient facility or TCU.  Medications reconciled    Current Outpatient Medications   Medication Sig Dispense  Refill     polyethylene glycol (MIRALAX) 17 GM/Dose powder Take 17 g by mouth 2 times daily.       SENNA-docusate sodium (SENNA S) 8.6-50 MG tablet Take 2 tablets by mouth 2 times daily.       acetaminophen (TYLENOL) 325 MG tablet Take 2 tablets (650 mg) by mouth every 4 hours as needed for mild pain or fever (temperature greater than 100.4  F (38  C)).       amLODIPine (NORVASC) 5 MG tablet Take 1 tablet (5 mg) by mouth daily.       apixaban ANTICOAGULANT (ELIQUIS) 5 MG tablet Take 5 mg by mouth 2 times daily.       ARIPiprazole (ABILIFY) 2 MG tablet [ARIPIPRAZOLE (ABILIFY) 2 MG TABLET] Take 2 mg by mouth at bedtime.        brimonidine (ALPHAGAN) 0.2 % ophthalmic solution [BRIMONIDINE (ALPHAGAN) 0.2 % OPHTHALMIC SOLUTION] Administer 1 drop to both eyes 2 (two) times a day.        Continuous Glucose  (FREESTYLE TIFFANY 2 READER) RAJESH Use to read blood sugars 4 times a day. 1 each 0     Continuous Glucose Sensor (FREESTYLE TIFFANY 2 SENSOR) MISC Change every 14 days. 2 each 11     dorzolamide-timolol (COSOPT) 2-0.5 % ophthalmic solution Place 1 drop into both eyes 2 times daily       famotidine (PEPCID) 20 MG tablet Take 20 mg by mouth daily.       fluvoxaMINE (LUVOX) 50 MG tablet [FLUVOXAMINE (LUVOX) 50 MG TABLET] Take 50 mg by mouth at bedtime.        gabapentin (NEURONTIN) 300 MG capsule Take 300 mg by mouth 2 times daily       insulin aspart (NOVOLOG PEN) 100 UNIT/ML pen Inject 6 Units subcutaneously 3 times daily (with meals).       insulin aspart (NOVOLOG PEN) 100 UNIT/ML pen Inject 1-7 Units subcutaneously 3 times daily (before meals). Correction Scale - MEDIUM INSULIN RESISTANCE DOSING   Do Not give Correction Insulin if Pre-Meal BG less than 140. For Pre-Meal  - 189 give 1 unit. For Pre-Meal  - 239 give 2 units. For Pre-Meal  - 289 give 3 units. For Pre-Meal  - 339 give 4 units. For Pre-Meal - 389 give 5 units. For Pre-Meal -439 give 6 units For Pre-Meal BG greater  than or equal to 440 give 7 units. To be given with prandial insulin, and based on pre-meal blood glucose. Administering insulin within 5 minutes of the start of the meal is ideal. Administer insulin no more than 30 minutes after the start of the meal, unless directed otherwise by provider.   Notify provider if glucose greater than or equal to 350 mg/dL after administration of correction dose.       insulin glargine (LANTUS PEN) 100 UNIT/ML pen Inject 18 Units subcutaneously every morning (before breakfast) AND 6 Units at bedtime.       latanoprostene bunod 0.024 % Drop Place 1 drop into both eyes At Bedtime       levomefolate calcium (L-METHYLFOLATE ORAL) Take 15 mg by mouth daily.       netarsudiL (RHOPRESSA) 0.02 % Drop Place 1 drop into both eyes at bedtime.           REVIEW OF SYSTEMS:  4 point ROS neg other than the symptoms noted above in the HPI.  Denied chest pain or SOB.        PHYSICAL EXAM:  BP (!) 148/83   Pulse 86   Temp 98.6  F (37  C)   Resp 16   Wt 94.8 kg (209 lb)   SpO2 96%   BMI 28.35 kg/m    Alert and pleasant.  Made some jokes with his roommate during the process of cleaning his ears.  Tolerated the process without troubles.  Skin is pink, warm and dry.  Keeps eyes closed most of the time.    Heart rate regular and strong  Lungs are clear.      First ear attempted to flush was the left ear.  Able to remove a moderate amount of brown cerumen that was not soft.  Cerumen still impacted and covering the TM.  No bleeding.  Second ear had cerumen along the wall of the canal but not covering the TM.  That appeared pearly gray.  Did not attempt to flush that canal.      ASSESSMENT / PLAN:  (H91.13) Presbycusis of both ears  (primary encounter diagnosis)  (H61.23) Bilateral impacted cerumen  Comment: decision to redo the debrox drops to both ears for 3 days before coming back next Tuesday and can attempt another flush to both ears.  Maybe the debrox will melt the wax out of the right ear and  soften the left side.    Debrox drops 3 drops twice a day to both canals from Sat - Monday.  NP will come back on Tuesday.  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  Leaving this to the daughters as he has seen a psych doctor.  Do not see any recent changes made to his Abilify and Luvox.  Mood seems stable today and pleased to see his orders his wants with his meals when order is taken.      (K59.01) Slow transit constipation  Comment: just updating this medication list with the scheduled constipation medication he is getting that started after his admission.  In reviewing his bowel patterns, he is going about every 1-2 days.  Felt he went more before adding the Miralax.  Will continue to monitor.  Plan: polyethylene glycol (MIRALAX) 17 GM/Dose         powder, SENNA-docusate sodium (SENNA S) 8.6-50         MG tablet     Orders:  Debrox drops 3 drops twice a day to both canals from Sat - Monday.  NP will come back on Tuesday.    Electronically signed by  PAULA Huston CNP            Sincerely,        PAULA Huston CNP    Electronically signed

## 2025-04-08 NOTE — PROGRESS NOTES
[facility] GERIATRICS  ACUTE/EPISODIC VISIT    [facility] Medical Record Number:  [id number]  Place of Service where encounter took place:  [facility] () [[id number]]    Chief Complaint   Patient presents with    RECHECK       HPI:    Rakesh Samuels is a 74 year old  (1950), who is being seen today for an episodic care visit.  HPI information obtained from: facility chart records, facility staff, and patient report.    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  When asked if the flushing was a success, it appears that it did not get done and so coming today to possible flush the ears.    Same day that ear drops initiated, nursing called to express concerns about his bowels.  Rakesh was asking for Miralax to be daily as he took it from home.  Gave the ok for the medication but nurse wrote it up as twice a day vs the once a day they mentioned in their note.  Had no complaints today.    Came into his room today and he was laying in bed with the aide taking his lunch order.  Rakesh knew of things he wanted and could request them without prompting.    Explained to him what was going to happen and so nurse helped him sit on the edge of his bed.      ALLERGIES:  No Known Allergies     MEDICATIONS:  Post Discharge Medication Reconciliation Status: patient was not discharged from an inpatient facility or TCU.  Medications reconciled    Current Outpatient Medications   Medication Sig Dispense Refill    polyethylene glycol (MIRALAX) 17 GM/Dose powder Take 17 g by mouth 2  times daily.      SENNA-docusate sodium (SENNA S) 8.6-50 MG tablet Take 2 tablets by mouth 2 times daily.      acetaminophen (TYLENOL) 325 MG tablet Take 2 tablets (650 mg) by mouth every 4 hours as needed for mild pain or fever (temperature greater than 100.4  F (38  C)).      amLODIPine (NORVASC) 5 MG tablet Take 1 tablet (5 mg) by mouth daily.      apixaban ANTICOAGULANT (ELIQUIS) 5 MG tablet Take 5 mg by mouth 2 times daily.      ARIPiprazole (ABILIFY) 2 MG tablet [ARIPIPRAZOLE (ABILIFY) 2 MG TABLET] Take 2 mg by mouth at bedtime.       brimonidine (ALPHAGAN) 0.2 % ophthalmic solution [BRIMONIDINE (ALPHAGAN) 0.2 % OPHTHALMIC SOLUTION] Administer 1 drop to both eyes 2 (two) times a day.       Continuous Glucose  (FREESTYLE TIFFANY 2 READER) RAJESH Use to read blood sugars 4 times a day. 1 each 0    Continuous Glucose Sensor (FREESTYLE TIFFANY 2 SENSOR) MISC Change every 14 days. 2 each 11    dorzolamide-timolol (COSOPT) 2-0.5 % ophthalmic solution Place 1 drop into both eyes 2 times daily      famotidine (PEPCID) 20 MG tablet Take 20 mg by mouth daily.      fluvoxaMINE (LUVOX) 50 MG tablet [FLUVOXAMINE (LUVOX) 50 MG TABLET] Take 50 mg by mouth at bedtime.       gabapentin (NEURONTIN) 300 MG capsule Take 300 mg by mouth 2 times daily      insulin aspart (NOVOLOG PEN) 100 UNIT/ML pen Inject 6 Units subcutaneously 3 times daily (with meals).      insulin aspart (NOVOLOG PEN) 100 UNIT/ML pen Inject 1-7 Units subcutaneously 3 times daily (before meals). Correction Scale - MEDIUM INSULIN RESISTANCE DOSING   Do Not give Correction Insulin if Pre-Meal BG less than 140. For Pre-Meal  - 189 give 1 unit. For Pre-Meal  - 239 give 2 units. For Pre-Meal  - 289 give 3 units. For Pre-Meal  - 339 give 4 units. For Pre-Meal - 389 give 5 units. For Pre-Meal -439 give 6 units For Pre-Meal BG greater than or equal to 440 give 7 units. To be given with prandial insulin, and based on pre-meal blood  glucose. Administering insulin within 5 minutes of the start of the meal is ideal. Administer insulin no more than 30 minutes after the start of the meal, unless directed otherwise by provider.   Notify provider if glucose greater than or equal to 350 mg/dL after administration of correction dose.      insulin glargine (LANTUS PEN) 100 UNIT/ML pen Inject 18 Units subcutaneously every morning (before breakfast) AND 6 Units at bedtime.      latanoprostene bunod 0.024 % Drop Place 1 drop into both eyes At Bedtime      levomefolate calcium (L-METHYLFOLATE ORAL) Take 15 mg by mouth daily.      netarsudiL (RHOPRESSA) 0.02 % Drop Place 1 drop into both eyes at bedtime.           REVIEW OF SYSTEMS:  4 point ROS neg other than the symptoms noted above in the HPI.  Denied chest pain or SOB.        PHYSICAL EXAM:  BP (!) 148/83   Pulse 86   Temp 98.6  F (37  C)   Resp 16   Wt 94.8 kg (209 lb)   SpO2 96%   BMI 28.35 kg/m    Alert and pleasant.  Made some jokes with his roommate during the process of cleaning his ears.  Tolerated the process without troubles.  Skin is pink, warm and dry.  Keeps eyes closed most of the time.    Heart rate regular and strong  Lungs are clear.      First ear attempted to flush was the left ear.  Able to remove a moderate amount of brown cerumen that was not soft.  Cerumen still impacted and covering the TM.  No bleeding.  Second ear had cerumen along the wall of the canal but not covering the TM.  That appeared pearly gray.  Did not attempt to flush that canal.      ASSESSMENT / PLAN:  (H91.13) Presbycusis of both ears  (primary encounter diagnosis)  (H61.23) Bilateral impacted cerumen  Comment: decision to redo the debrox drops to both ears for 3 days before coming back next Tuesday and can attempt another flush to both ears.  Maybe the debrox will melt the wax out of the right ear and soften the left side.    Debrox drops 3 drops twice a day to both canals from Sat - Monday.  NP will come  back on Tuesday.  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  Leaving this to the daughters as he has seen a psych doctor.  Do not see any recent changes made to his Abilify and Luvox.  Mood seems stable today and pleased to see his orders his wants with his meals when order is taken.      (K59.01) Slow transit constipation  Comment: just updating this medication list with the scheduled constipation medication he is getting that started after his admission.  In reviewing his bowel patterns, he is going about every 1-2 days.  Felt he went more before adding the Miralax.  Will continue to monitor.  Plan: polyethylene glycol (MIRALAX) 17 GM/Dose         powder, SENNA-docusate sodium (SENNA S) 8.6-50         MG tablet     Orders:  Debrox drops 3 drops twice a day to both canals from Sat - Monday.  NP will come back on Tuesday.    Electronically signed by  PAULA Huston CNP

## 2025-05-07 ENCOUNTER — LAB REQUISITION (OUTPATIENT)
Dept: LAB | Facility: CLINIC | Age: 75
End: 2025-05-07
Payer: MEDICARE

## 2025-05-07 DIAGNOSIS — R41.0 DISORIENTATION, UNSPECIFIED: ICD-10-CM

## 2025-05-07 DIAGNOSIS — R39.15 URGENCY OF URINATION: ICD-10-CM

## 2025-05-07 LAB
ALBUMIN UR-MCNC: 10 MG/DL
APPEARANCE UR: ABNORMAL
BACTERIA #/AREA URNS HPF: ABNORMAL /HPF
BILIRUB UR QL STRIP: NEGATIVE
COLOR UR AUTO: YELLOW
GLUCOSE UR STRIP-MCNC: NEGATIVE MG/DL
HGB UR QL STRIP: ABNORMAL
HYALINE CASTS: 1 /LPF
KETONES UR STRIP-MCNC: NEGATIVE MG/DL
LEUKOCYTE ESTERASE UR QL STRIP: ABNORMAL
MUCOUS THREADS #/AREA URNS LPF: PRESENT /LPF
NITRATE UR QL: NEGATIVE
PH UR STRIP: 5 [PH] (ref 5–7)
RBC URINE: 2 /HPF
SP GR UR STRIP: 1.02 (ref 1–1.03)
UROBILINOGEN UR STRIP-MCNC: NORMAL MG/DL
WBC CLUMPS #/AREA URNS HPF: PRESENT /HPF
WBC URINE: 49 /HPF

## 2025-05-07 PROCEDURE — 87088 URINE BACTERIA CULTURE: CPT | Mod: ORL | Performed by: NURSE PRACTITIONER

## 2025-05-07 PROCEDURE — 81001 URINALYSIS AUTO W/SCOPE: CPT | Mod: ORL | Performed by: NURSE PRACTITIONER

## 2025-05-08 ENCOUNTER — RESULTS FOLLOW-UP (OUTPATIENT)
Dept: GERIATRICS | Facility: CLINIC | Age: 75
End: 2025-05-08
Payer: MEDICARE

## 2025-05-08 ENCOUNTER — RESULTS FOLLOW-UP (OUTPATIENT)
Dept: GERIATRICS | Facility: CLINIC | Age: 75
End: 2025-05-08

## 2025-05-08 LAB
ANION GAP SERPL CALCULATED.3IONS-SCNC: 10 MMOL/L (ref 7–15)
BACTERIA UR CULT: NORMAL
BUN SERPL-MCNC: 18 MG/DL (ref 8–23)
CALCIUM SERPL-MCNC: 8.5 MG/DL (ref 8.8–10.4)
CHLORIDE SERPL-SCNC: 104 MMOL/L (ref 98–107)
CREAT SERPL-MCNC: 1.01 MG/DL (ref 0.67–1.17)
EGFRCR SERPLBLD CKD-EPI 2021: 79 ML/MIN/1.73M2
ERYTHROCYTE [DISTWIDTH] IN BLOOD BY AUTOMATED COUNT: 13.6 % (ref 10–15)
GLUCOSE SERPL-MCNC: 173 MG/DL (ref 70–99)
HCO3 SERPL-SCNC: 25 MMOL/L (ref 22–29)
HCT VFR BLD AUTO: 39.9 % (ref 40–53)
HGB BLD-MCNC: 12.9 G/DL (ref 13.3–17.7)
MCH RBC QN AUTO: 30.2 PG (ref 26.5–33)
MCHC RBC AUTO-ENTMCNC: 32.3 G/DL (ref 31.5–36.5)
MCV RBC AUTO: 93 FL (ref 78–100)
PLATELET # BLD AUTO: 227 10E3/UL (ref 150–450)
POTASSIUM SERPL-SCNC: 4 MMOL/L (ref 3.4–5.3)
RBC # BLD AUTO: 4.27 10E6/UL (ref 4.4–5.9)
SODIUM SERPL-SCNC: 139 MMOL/L (ref 135–145)
WBC # BLD AUTO: 8.9 10E3/UL (ref 4–11)

## 2025-05-08 PROCEDURE — 36415 COLL VENOUS BLD VENIPUNCTURE: CPT | Mod: ORL | Performed by: FAMILY MEDICINE

## 2025-05-08 PROCEDURE — P9604 ONE-WAY ALLOW PRORATED TRIP: HCPCS | Mod: ORL | Performed by: FAMILY MEDICINE

## 2025-05-08 PROCEDURE — 85027 COMPLETE CBC AUTOMATED: CPT | Mod: ORL | Performed by: FAMILY MEDICINE

## 2025-05-08 PROCEDURE — 80048 BASIC METABOLIC PNL TOTAL CA: CPT | Mod: ORL | Performed by: FAMILY MEDICINE

## 2025-05-10 ENCOUNTER — DOCUMENTATION ONLY (OUTPATIENT)
Dept: GERIATRICS | Facility: CLINIC | Age: 75
End: 2025-05-10
Payer: MEDICARE

## 2025-05-10 LAB
BACTERIA UR CULT: ABNORMAL
BACTERIA UR CULT: ABNORMAL

## 2025-05-10 NOTE — PROGRESS NOTES
On call note:     Epic chart and notes reviewed. Triage note reviewed.     Assessment :  Nurse reports patient weak, UA/UC collected last week. No orders given.   Reviewed UC again with growth to 100k. Patient had fever the last 2 days, continues with hallucinations and extremely weak.      Plan: Will treat for UTI given symptoms/weakness, fever.  Start Linezolid 600mg po Q 12 hours x 7 days.   Please call back if worsening symptoms or concerns.     Electronically signed by  PAULA Salamanca, GNP/ANP-BC  May 10, 2025

## 2025-05-12 VITALS
TEMPERATURE: 98.8 F | BODY MASS INDEX: 29.26 KG/M2 | DIASTOLIC BLOOD PRESSURE: 67 MMHG | HEIGHT: 72 IN | HEART RATE: 97 BPM | WEIGHT: 216 LBS | RESPIRATION RATE: 18 BRPM | OXYGEN SATURATION: 95 % | SYSTOLIC BLOOD PRESSURE: 113 MMHG

## 2025-05-13 ENCOUNTER — NURSING HOME VISIT (OUTPATIENT)
Dept: GERIATRICS | Facility: CLINIC | Age: 75
End: 2025-05-13
Payer: MEDICARE

## 2025-05-13 ENCOUNTER — LAB REQUISITION (OUTPATIENT)
Dept: LAB | Facility: CLINIC | Age: 75
End: 2025-05-13
Payer: MEDICARE

## 2025-05-13 DIAGNOSIS — R78.81 BACTEREMIA: ICD-10-CM

## 2025-05-13 DIAGNOSIS — N39.0 URINARY TRACT INFECTION WITHOUT HEMATURIA, SITE UNSPECIFIED: ICD-10-CM

## 2025-05-13 DIAGNOSIS — E11.42 TYPE 2 DIABETES MELLITUS WITH DIABETIC POLYNEUROPATHY, WITH LONG-TERM CURRENT USE OF INSULIN (H): Primary | ICD-10-CM

## 2025-05-13 DIAGNOSIS — F33.1 MODERATE EPISODE OF RECURRENT MAJOR DEPRESSIVE DISORDER (H): ICD-10-CM

## 2025-05-13 DIAGNOSIS — I48.0 PAROXYSMAL ATRIAL FIBRILLATION (H): ICD-10-CM

## 2025-05-13 DIAGNOSIS — G47.33 OBSTRUCTIVE SLEEP APNEA: ICD-10-CM

## 2025-05-13 DIAGNOSIS — R53.1 RIGHT SIDED WEAKNESS: ICD-10-CM

## 2025-05-13 DIAGNOSIS — I10 ESSENTIAL HYPERTENSION: ICD-10-CM

## 2025-05-13 DIAGNOSIS — Z79.4 TYPE 2 DIABETES MELLITUS WITH DIABETIC POLYNEUROPATHY, WITH LONG-TERM CURRENT USE OF INSULIN (H): Primary | ICD-10-CM

## 2025-05-13 DIAGNOSIS — Z86.73 CEREBROVASCULAR ACCIDENT, OLD: ICD-10-CM

## 2025-05-13 PROCEDURE — 99309 SBSQ NF CARE MODERATE MDM 30: CPT | Performed by: FAMILY MEDICINE

## 2025-05-13 NOTE — PROGRESS NOTES
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  Was recently in the hospital with late versus subacute infarct of the right internal capsule,  Evaluated by neurology  Cont eliquis.  And discharged to the TCU but now has been moved to long-term care.  At baseline has poor mobility and remains a falls risk with underlying history of very poor vision/legal blindness.  Diabetes appears to be poorly controlled and his insulin dosage was increased recently.  Monitor trends and check A1c.  BG stable to occasional high noted.  He is fed at meal times,   There has been concern of ongoing depression and mood is being monitored   he is on psych meds and follows with psychiatry closely   concerned about ongoing cognitive impairment with poor recall noted on exam  Is on linezolid due to recent UTI with some improvement.  Continue current care plan-needs supervision due to impaired vision and confusion      History     Patient is a very pleasant 74 year old male who is admitted to LTC as a transfer from the TCU  Patient was recently admitted to the hospital on 1/5/2025 with increasing weakness on the right side.  MRI did show a small cyst subacute infarct involving internal capsule on the right side CT angiogram did show  Significant large vessel occlusion with stenoses.  Neurology recommended continuing Eliquis.  Since his  LDL was 69 they did not recommend a statin.  He was medically stabilized and discharged to the TCU.  Due to lack of progress he has been moved to long-term care  He is legally blind and needs cognitive supervision  Recently diagnosed with UTI and is on abx  Wts are stable      Past Medical & Surgical History     PAST MEDICAL HISTORY:   Past Medical History:   Diagnosis Date    A-fib (H)     Acute hemodialysis encounter     Due to CHIDI    Acute kidney injury     Acute respiratory failure with hypoxemia (H)     Adrenal incidentaloma     Asbestosis (H)     ATN (acute tubular necrosis)     Atrophy of right kidney     Basal cell carcinoma     BPH without urinary obstruction     Cellulitis     Left foot    Cholelithiasis     Depression with anxiety     Diabetes mellitus, type 2 (H) 4/12/2020    Esophagitis     Gastritis     Glaucoma     Hematemesis, presence of nausea not specified     Hyperkalemia     Hyperlipemia     Kidney stone     Lactic acidosis     Metabolic acidosis     Metformin overdose of undetermined intent     Paroxysmal atrial fibrillation (H) 2/18/2020    Pericardial effusion     PTSD (post-traumatic stress disorder)     Seasonal allergies     Sepsis due to urinary tract infection (H)     Shock circulatory (H)     SIRS (systemic inflammatory response syndrome) (H)     Sleep apnea     uses a machine at night.     Upper GI bleed       PAST SURGICAL HISTORY:   has a past surgical history that includes CYSTOSCOPY,INSERT URETERAL STENT (Left, 4/12/2020); Pr Esophagogastroduodenoscopy Transoral Diagnostic (N/A, 4/13/2020); Eye surgery; and Replacement Total Knee (Left).      Past Social History     Reviewed,  reports that he has never smoked. He has never used smokeless tobacco. He reports that he does not currently use alcohol. He reports that he does not use drugs.    Family History     Reviewed, and family history includes Diabetes in his mother.    Medication List     Current Outpatient Medications    Medication Sig Dispense Refill    acetaminophen (TYLENOL) 325 MG tablet Take 2 tablets (650 mg) by mouth every 4 hours as needed for mild pain or fever (temperature greater than 100.4  F (38  C)).      amLODIPine (NORVASC) 5 MG tablet Take 1 tablet (5 mg) by mouth daily.      apixaban ANTICOAGULANT (ELIQUIS) 5 MG tablet Take 5 mg by mouth 2 times daily.      ARIPiprazole (ABILIFY) 2 MG tablet [ARIPIPRAZOLE (ABILIFY) 2 MG TABLET] Take 2 mg by mouth at bedtime.       brimonidine (ALPHAGAN) 0.2 % ophthalmic solution [BRIMONIDINE (ALPHAGAN) 0.2 % OPHTHALMIC SOLUTION] Administer 1 drop to both eyes 2 (two) times a day.       Continuous Glucose  (FREESTYLE TIFFANY 2 READER) RAJESH Use to read blood sugars 4 times a day. 1 each 0    Continuous Glucose Sensor (FREESTYLE TIFFANY 2 SENSOR) MISC Change every 14 days. 2 each 11    dorzolamide-timolol (COSOPT) 2-0.5 % ophthalmic solution Place 1 drop into both eyes 2 times daily      famotidine (PEPCID) 20 MG tablet Take 20 mg by mouth daily.      fluvoxaMINE (LUVOX) 50 MG tablet [FLUVOXAMINE (LUVOX) 50 MG TABLET] Take 50 mg by mouth at bedtime.       gabapentin (NEURONTIN) 300 MG capsule Take 300 mg by mouth 2 times daily      insulin aspart (NOVOLOG PEN) 100 UNIT/ML pen Inject 6 Units subcutaneously 3 times daily (with meals).      insulin aspart (NOVOLOG PEN) 100 UNIT/ML pen Inject 1-7 Units subcutaneously 3 times daily (before meals). Correction Scale - MEDIUM INSULIN RESISTANCE DOSING   Do Not give Correction Insulin if Pre-Meal BG less than 140. For Pre-Meal  - 189 give 1 unit. For Pre-Meal  - 239 give 2 units. For Pre-Meal  - 289 give 3 units. For Pre-Meal  - 339 give 4 units. For Pre-Meal - 389 give 5 units. For Pre-Meal -439 give 6 units For Pre-Meal BG greater than or equal to 440 give 7 units. To be given with prandial insulin, and based on pre-meal blood glucose. Administering insulin within 5 minutes of the start of the meal  is ideal. Administer insulin no more than 30 minutes after the start of the meal, unless directed otherwise by provider.   Notify provider if glucose greater than or equal to 350 mg/dL after administration of correction dose.      insulin glargine (LANTUS PEN) 100 UNIT/ML pen Inject 18 Units subcutaneously every morning (before breakfast) AND 6 Units at bedtime.      latanoprostene bunod 0.024 % Drop Place 1 drop into both eyes At Bedtime      levomefolate calcium (L-METHYLFOLATE ORAL) Take 15 mg by mouth daily.      netarsudiL (RHOPRESSA) 0.02 % Drop Place 1 drop into both eyes at bedtime.      polyethylene glycol (MIRALAX) 17 GM/Dose powder Take 17 g by mouth 2 times daily.      SENNA-docusate sodium (SENNA S) 8.6-50 MG tablet Take 2 tablets by mouth 2 times daily.       No current facility-administered medications for this visit.      MED REC REQUIRED  Post Medication Reconciliation Status: discharge medications reconciled, continue medications without change       Allergies     No Known Allergies    Review of Systems   A comprehensive review of 14 systems was done. Pertinent findings noted here and in history of present illness. All the rest negative.  Constitutional: Negative.  Negative for fever, chills, he has  activity change, appetite change and fatigue.   HENT: Negative for congestion and facial swelling.    Eyes: Negative for photophobia, redness and visual disturbance.  Vision is impaired and patient is blind in right eye with limited vision in his left  Respiratory: Negative for cough and chest tightness.    Cardiovascular: Negative for chest pain, palpitations and leg swelling.   Gastrointestinal: Negative for nausea, diarrhea, constipation, blood in stool and abdominal distention.   Genitourinary: Negative.    Musculoskeletal: Negative.  Does have a history of falls  Skin: Negative.    Neurological: Negative for dizziness, tremors, syncope, weakness, light-headedness and headaches.   Hematological:  Does not bruise/bleed easily.   Psychiatric/Behavioral: Reports impaired mood and has limited recall  Has a poor sleep hygiene and frequent napping during daytime reported  Worsening confusion due to UTI      Physical Exam   /67   Pulse 97   Temp 98.8  F (37.1  C)   Resp 18   Ht 1.829 m (6')   Wt 98 kg (216 lb)   SpO2 95%   BMI 29.29 kg/m       Constitutional: Oriented to person, place,  and appears well-developed.   HEENT:  Normocephalic and atraumatic.  Eyes: Conjunctivae and EOM are normal. Pupils are equal, round, and reactive to light. No discharge.  No scleral icterus. Nose normal. Mouth/Throat: Oropharynx is clear and moist. No oropharyngeal exudate.  Impaired vision and red sclera noted in the right eye.  Minimal vision in the left eye  Completely blind in the right eye limited vision in the left eye  NECK: Normal range of motion. Neck supple. No JVD present. No tracheal deviation present. No thyromegaly present.   CARDIOVASCULAR: Normal rate, regular rhythm and intact distal pulses.  Exam reveals no gallop and no friction rub.  Systolic murmur present.  PULMONARY: Effort normal and breath sounds normal. No respiratory distress.No Wheezing or rales.  ABDOMEN: Soft. Bowel sounds are normal. No distension and no mass.  There is no tenderness. There is no rebound and no guarding. No HSM.  MUSCULOSKELETAL: Normal range of motion. Mild kyphosis, no tenderness.  LYMPH NODES: Has no cervical, supraclavicular, axillary and groin adenopathy.   NEUROLOGICAL: Alert and oriented to person, place, . No cranial nerve deficit.  Normal muscle tone. Coordination normal.   GENITOURINARY: Deferred exam.  SKIN: Skin is warm and dry. No rash noted. No erythema. No pallor.   EXTREMITIES: No cyanosis, no clubbing, no edema. No Deformity.  PSYCHIATRIC: Normal mood, affect and behavior.  Recall is impaired  Pt is very confused      Lab Results     Last Comprehensive Metabolic Panel:  Lab Results   Component Value Date      05/08/2025    POTASSIUM 4.0 05/08/2025    CHLORIDE 104 05/08/2025    CO2 25 05/08/2025    ANIONGAP 10 05/08/2025     (H) 05/08/2025    BUN 18.0 05/08/2025    CR 1.01 05/08/2025    GFRESTIMATED 79 05/08/2025    APRYL 8.5 (L) 05/08/2025                  Electronically signed by    Anneliese Rees MD

## 2025-05-13 NOTE — LETTER
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  Was recently in the hospital with late versus subacute infarct of the right internal capsule,  Evaluated by neurology  Cont eliquis.  And discharged to the TCU but now has been moved to long-term care.  At baseline has poor mobility and remains a falls risk with underlying history of very poor vision/legal blindness.  Diabetes appears to be poorly controlled and his insulin dosage was increased recently.  Monitor trends and check A1c.  BG stable to occasional high noted.  He is fed at meal times,   There has been concern of ongoing depression and mood is being monitored   he is on psych meds and follows with psychiatry closely   concerned about ongoing cognitive impairment with poor recall noted on exam  Is on linezolid due to recent UTI with some improvement.  Continue current care plan-needs supervision due to impaired vision and confusion      History     Patient is a very pleasant 74 year old male who is admitted to LTC as a transfer from the TCU  Patient was recently admitted to the hospital on 1/5/2025 with increasing weakness on the right side.  MRI did show a small cyst subacute infarct involving internal capsule on the right side CT angiogram did show  Significant large  vessel occlusion with stenoses.  Neurology recommended continuing Eliquis.  Since his LDL was 69 they did not recommend a statin.  He was medically stabilized and discharged to the TCU.  Due to lack of progress he has been moved to long-term care  He is legally blind and needs cognitive supervision  Recently diagnosed with UTI and is on abx  Wts are stable      Past Medical & Surgical History     PAST MEDICAL HISTORY:   Past Medical History:   Diagnosis Date     A-fib (H)      Acute hemodialysis encounter     Due to CHIDI     Acute kidney injury      Acute respiratory failure with hypoxemia (H)      Adrenal incidentaloma      Asbestosis (H)      ATN (acute tubular necrosis)      Atrophy of right kidney      Basal cell carcinoma      BPH without urinary obstruction      Cellulitis     Left foot     Cholelithiasis      Depression with anxiety      Diabetes mellitus, type 2 (H) 4/12/2020     Esophagitis      Gastritis      Glaucoma      Hematemesis, presence of nausea not specified      Hyperkalemia      Hyperlipemia      Kidney stone      Lactic acidosis      Metabolic acidosis      Metformin overdose of undetermined intent      Paroxysmal atrial fibrillation (H) 2/18/2020     Pericardial effusion      PTSD (post-traumatic stress disorder)      Seasonal allergies      Sepsis due to urinary tract infection (H)      Shock circulatory (H)      SIRS (systemic inflammatory response syndrome) (H)      Sleep apnea     uses a machine at night.      Upper GI bleed       PAST SURGICAL HISTORY:   has a past surgical history that includes CYSTOSCOPY,INSERT URETERAL STENT (Left, 4/12/2020); Pr Esophagogastroduodenoscopy Transoral Diagnostic (N/A, 4/13/2020); Eye surgery; and Replacement Total Knee (Left).      Past Social History     Reviewed,  reports that he has never smoked. He has never used smokeless tobacco. He reports that he does not currently use alcohol. He reports that he does not use drugs.    Family History      Reviewed, and family history includes Diabetes in his mother.    Medication List     Current Outpatient Medications   Medication Sig Dispense Refill     acetaminophen (TYLENOL) 325 MG tablet Take 2 tablets (650 mg) by mouth every 4 hours as needed for mild pain or fever (temperature greater than 100.4  F (38  C)).       amLODIPine (NORVASC) 5 MG tablet Take 1 tablet (5 mg) by mouth daily.       apixaban ANTICOAGULANT (ELIQUIS) 5 MG tablet Take 5 mg by mouth 2 times daily.       ARIPiprazole (ABILIFY) 2 MG tablet [ARIPIPRAZOLE (ABILIFY) 2 MG TABLET] Take 2 mg by mouth at bedtime.        brimonidine (ALPHAGAN) 0.2 % ophthalmic solution [BRIMONIDINE (ALPHAGAN) 0.2 % OPHTHALMIC SOLUTION] Administer 1 drop to both eyes 2 (two) times a day.        Continuous Glucose  (FREESTYLE TIFFANY 2 READER) RAJESH Use to read blood sugars 4 times a day. 1 each 0     Continuous Glucose Sensor (FREESTYLE TIFFANY 2 SENSOR) MISC Change every 14 days. 2 each 11     dorzolamide-timolol (COSOPT) 2-0.5 % ophthalmic solution Place 1 drop into both eyes 2 times daily       famotidine (PEPCID) 20 MG tablet Take 20 mg by mouth daily.       fluvoxaMINE (LUVOX) 50 MG tablet [FLUVOXAMINE (LUVOX) 50 MG TABLET] Take 50 mg by mouth at bedtime.        gabapentin (NEURONTIN) 300 MG capsule Take 300 mg by mouth 2 times daily       insulin aspart (NOVOLOG PEN) 100 UNIT/ML pen Inject 6 Units subcutaneously 3 times daily (with meals).       insulin aspart (NOVOLOG PEN) 100 UNIT/ML pen Inject 1-7 Units subcutaneously 3 times daily (before meals). Correction Scale - MEDIUM INSULIN RESISTANCE DOSING   Do Not give Correction Insulin if Pre-Meal BG less than 140. For Pre-Meal  - 189 give 1 unit. For Pre-Meal  - 239 give 2 units. For Pre-Meal  - 289 give 3 units. For Pre-Meal  - 339 give 4 units. For Pre-Meal - 389 give 5 units. For Pre-Meal -439 give 6 units For Pre-Meal BG greater than or equal to 440 give 7 units. To  be given with prandial insulin, and based on pre-meal blood glucose. Administering insulin within 5 minutes of the start of the meal is ideal. Administer insulin no more than 30 minutes after the start of the meal, unless directed otherwise by provider.   Notify provider if glucose greater than or equal to 350 mg/dL after administration of correction dose.       insulin glargine (LANTUS PEN) 100 UNIT/ML pen Inject 18 Units subcutaneously every morning (before breakfast) AND 6 Units at bedtime.       latanoprostene bunod 0.024 % Drop Place 1 drop into both eyes At Bedtime       levomefolate calcium (L-METHYLFOLATE ORAL) Take 15 mg by mouth daily.       netarsudiL (RHOPRESSA) 0.02 % Drop Place 1 drop into both eyes at bedtime.       polyethylene glycol (MIRALAX) 17 GM/Dose powder Take 17 g by mouth 2 times daily.       SENNA-docusate sodium (SENNA S) 8.6-50 MG tablet Take 2 tablets by mouth 2 times daily.       No current facility-administered medications for this visit.      MED REC REQUIRED  Post Medication Reconciliation Status: discharge medications reconciled, continue medications without change       Allergies     No Known Allergies    Review of Systems   A comprehensive review of 14 systems was done. Pertinent findings noted here and in history of present illness. All the rest negative.  Constitutional: Negative.  Negative for fever, chills, he has  activity change, appetite change and fatigue.   HENT: Negative for congestion and facial swelling.    Eyes: Negative for photophobia, redness and visual disturbance.  Vision is impaired and patient is blind in right eye with limited vision in his left  Respiratory: Negative for cough and chest tightness.    Cardiovascular: Negative for chest pain, palpitations and leg swelling.   Gastrointestinal: Negative for nausea, diarrhea, constipation, blood in stool and abdominal distention.   Genitourinary: Negative.    Musculoskeletal: Negative.  Does have a history of  falls  Skin: Negative.    Neurological: Negative for dizziness, tremors, syncope, weakness, light-headedness and headaches.   Hematological: Does not bruise/bleed easily.   Psychiatric/Behavioral: Reports impaired mood and has limited recall  Has a poor sleep hygiene and frequent napping during daytime reported  Worsening confusion due to UTI      Physical Exam   /67   Pulse 97   Temp 98.8  F (37.1  C)   Resp 18   Ht 1.829 m (6')   Wt 98 kg (216 lb)   SpO2 95%   BMI 29.29 kg/m       Constitutional: Oriented to person, place,  and appears well-developed.   HEENT:  Normocephalic and atraumatic.  Eyes: Conjunctivae and EOM are normal. Pupils are equal, round, and reactive to light. No discharge.  No scleral icterus. Nose normal. Mouth/Throat: Oropharynx is clear and moist. No oropharyngeal exudate.  Impaired vision and red sclera noted in the right eye.  Minimal vision in the left eye  Completely blind in the right eye limited vision in the left eye  NECK: Normal range of motion. Neck supple. No JVD present. No tracheal deviation present. No thyromegaly present.   CARDIOVASCULAR: Normal rate, regular rhythm and intact distal pulses.  Exam reveals no gallop and no friction rub.  Systolic murmur present.  PULMONARY: Effort normal and breath sounds normal. No respiratory distress.No Wheezing or rales.  ABDOMEN: Soft. Bowel sounds are normal. No distension and no mass.  There is no tenderness. There is no rebound and no guarding. No HSM.  MUSCULOSKELETAL: Normal range of motion. Mild kyphosis, no tenderness.  LYMPH NODES: Has no cervical, supraclavicular, axillary and groin adenopathy.   NEUROLOGICAL: Alert and oriented to person, place, . No cranial nerve deficit.  Normal muscle tone. Coordination normal.   GENITOURINARY: Deferred exam.  SKIN: Skin is warm and dry. No rash noted. No erythema. No pallor.   EXTREMITIES: No cyanosis, no clubbing, no edema. No Deformity.  PSYCHIATRIC: Normal mood, affect and  behavior.  Recall is impaired  Pt is very confused      Lab Results     Last Comprehensive Metabolic Panel:  Lab Results   Component Value Date     05/08/2025    POTASSIUM 4.0 05/08/2025    CHLORIDE 104 05/08/2025    CO2 25 05/08/2025    ANIONGAP 10 05/08/2025     (H) 05/08/2025    BUN 18.0 05/08/2025    CR 1.01 05/08/2025    GFRESTIMATED 79 05/08/2025    APRYL 8.5 (L) 05/08/2025                  Electronically signed by    Anneliese Rees MD                            Sincerely,        FANNIE Davis    Electronically signed

## 2025-05-15 LAB
ANION GAP SERPL CALCULATED.3IONS-SCNC: 12 MMOL/L (ref 7–15)
BUN SERPL-MCNC: 17.7 MG/DL (ref 8–23)
CALCIUM SERPL-MCNC: 8.8 MG/DL (ref 8.8–10.4)
CHLORIDE SERPL-SCNC: 108 MMOL/L (ref 98–107)
CREAT SERPL-MCNC: 1.08 MG/DL (ref 0.67–1.17)
EGFRCR SERPLBLD CKD-EPI 2021: 72 ML/MIN/1.73M2
GLUCOSE SERPL-MCNC: 147 MG/DL (ref 70–99)
HCO3 SERPL-SCNC: 23 MMOL/L (ref 22–29)
POTASSIUM SERPL-SCNC: 4.2 MMOL/L (ref 3.4–5.3)
SODIUM SERPL-SCNC: 143 MMOL/L (ref 135–145)

## 2025-05-15 PROCEDURE — 36415 COLL VENOUS BLD VENIPUNCTURE: CPT | Mod: ORL | Performed by: FAMILY MEDICINE

## 2025-05-15 PROCEDURE — 80048 BASIC METABOLIC PNL TOTAL CA: CPT | Mod: ORL | Performed by: FAMILY MEDICINE

## 2025-05-15 PROCEDURE — P9604 ONE-WAY ALLOW PRORATED TRIP: HCPCS | Mod: ORL | Performed by: FAMILY MEDICINE

## 2025-07-15 ENCOUNTER — NURSING HOME VISIT (OUTPATIENT)
Dept: GERIATRICS | Facility: CLINIC | Age: 75
End: 2025-07-15
Payer: MEDICARE

## 2025-07-15 VITALS
HEART RATE: 86 BPM | WEIGHT: 214 LBS | TEMPERATURE: 98.1 F | RESPIRATION RATE: 18 BRPM | OXYGEN SATURATION: 96 % | SYSTOLIC BLOOD PRESSURE: 116 MMHG | BODY MASS INDEX: 29.02 KG/M2 | DIASTOLIC BLOOD PRESSURE: 72 MMHG

## 2025-07-15 DIAGNOSIS — E11.42 TYPE 2 DIABETES MELLITUS WITH DIABETIC POLYNEUROPATHY, WITH LONG-TERM CURRENT USE OF INSULIN (H): Primary | ICD-10-CM

## 2025-07-15 DIAGNOSIS — Z86.73 CEREBROVASCULAR ACCIDENT, OLD: ICD-10-CM

## 2025-07-15 DIAGNOSIS — E11.42 DIABETIC POLYNEUROPATHY ASSOCIATED WITH TYPE 2 DIABETES MELLITUS (H): ICD-10-CM

## 2025-07-15 DIAGNOSIS — H40.003 GLAUCOMA SUSPECT, BILATERAL: ICD-10-CM

## 2025-07-15 DIAGNOSIS — F33.1 MODERATE EPISODE OF RECURRENT MAJOR DEPRESSIVE DISORDER (H): ICD-10-CM

## 2025-07-15 DIAGNOSIS — Z79.4 TYPE 2 DIABETES MELLITUS WITH DIABETIC POLYNEUROPATHY, WITH LONG-TERM CURRENT USE OF INSULIN (H): Primary | ICD-10-CM

## 2025-07-15 DIAGNOSIS — I10 ESSENTIAL HYPERTENSION: ICD-10-CM

## 2025-07-15 DIAGNOSIS — R53.1 RIGHT SIDED WEAKNESS: ICD-10-CM

## 2025-07-15 DIAGNOSIS — I48.0 PAROXYSMAL ATRIAL FIBRILLATION (H): ICD-10-CM

## 2025-07-15 PROCEDURE — G0438 PPPS, INITIAL VISIT: HCPCS | Performed by: NURSE PRACTITIONER

## 2025-07-15 PROCEDURE — 99309 SBSQ NF CARE MODERATE MDM 30: CPT | Mod: 25 | Performed by: NURSE PRACTITIONER

## 2025-07-15 NOTE — PROGRESS NOTES
Preventive Care Visit  [facility]  PAULA Huston CNP, Geriatric Medicine  Jul 15, 2025      Assessment & Plan     (E11.42,  Z79.4) Type 2 diabetes mellitus with diabetic polyneuropathy, with long-term current use of insulin (H)  (primary encounter diagnosis)  (E11.42) Diabetic polyneuropathy associated with type 2 diabetes mellitus (H)  Comment: Currently Rakesh receives Lantus 18 units in the morning and 6 units at bedtime along with NovoLog 6 units with each meal and a sliding scale on top of that.  Due for a A1c to be done as well.  Overall sugars are above desired range and feel there is room to move with his insulin.  Would like to see his sliding scale insulin be removed eventually if he is not getting extra units.  Does have a order for bedtime snacks to help avoid low blood sugars during the night  As far as the polyneuropathy, Rakesh receives gabapentin 300 mg twice a day.  He really did not have any complaints about this today.  Plan: Today giving orders to increase his Lantus to 20 units in the morning and 7 units at bedtime.  Will continue with the NovoLog 6 units with meals along with the sliding scale.  Will try to remember to evaluate his sugars in the next coming weeks to see if there is any improvement.  Next lab day will have A1c, CBC, and BMP drawn    (I48.0) Paroxysmal atrial fibrillation (H)  Comment: Rakesh receives Eliquis 5 mg twice a day.  Does not show any complications with bleeding or heart palpitations.      (I10) Essential hypertension  Comment: Blood pressures range from 110s-120s/70s.  Vitals are done on a weekly basis with his bath.  Currently receives amlodipine 5 mg in the morning with parameters to hold if his SBP is less than 110.  No changes at this time.  Labs will be done on the next lab day      (Z86.73) Cerebrovascular accident, old  (R53.1) Right sided weakness  Comment: Rakseh does have the Eliquis 5 mg twice a day.  Has not shown any  new neurological changes in regards to right sided weakness.  Uses a wheelchair for mobility.  Part of that is because of his vision is poor that ambulating is difficult  Plan: No changes.    (F33.1) Moderate episode of recurrent major depressive disorder (H)  Comment: Rakesh receives aripiprazole 2mg at HS and Fluvoxamine 50mg po in evening.  Long history of psych issues in which his daughters manage any outside psychiatry visits.  He seems to be very content with his roommate and his environment.  Do feel that because of his poor vision that influences his mood for what he can do in his environment.  Plan: No changes or dose reductions to be made without consulting family    (H40.003) Glaucoma suspect, bilateral  Comment: Rakesh receives multiple eyedrops.  This nurse practitioner does not prescribe them as this is done by ophthalmology.  No complaints of pain in his eyes at this time      BMI  Estimated body mass index is 29.02 kg/m  as calculated from the following:    Height as of 5/12/25: 1.829 m (6').    Weight as of this encounter: 97.1 kg (214 lb).     Reviewed preventive health counseling, as reflected in patient instructions       Healthy diet/nutrition       Vision screening       Hearing screening        Subjective   Rakesh is a 74 year old, presenting for the following:  Wellness Visit       HPI  Rakesh is a 74-year-old gentleman who is resided at [facility] since January of this year.  Chronic conditions include old CVA with right side weakness, type 2 diabetes with polyneuropathy, paroxysmal atrial fib, anxiety/depression, GERD, and glaucoma.  Rakesh is unable to care for himself out in the community and family is unable to do his well.  He has adjusted well to his environment here.  His roommate looks out for him which is a added bonus.  Rakesh had no concerns today about his health    Advance Care Planning  Document on file is a Health Care Directive or POLST.  Confirmed today that he  was DNR DNI      PHQ-2 Score:         7/15/2025     1:15 PM 10/3/2024    10:16 AM   PHQ-2 ( 1999 [employer])   Q1: Little interest or pleasure in doing things 2 3   Q2: Feeling down, depressed or hopeless 0 3   PHQ-2 Score 2 6       Social History     Tobacco Use    Smoking status: Never    Smokeless tobacco: Never   Substance Use Topics    Alcohol use: Not Currently    Drug use: Never       ASCVD Risk   The ASCVD Risk score (Gene DK, et al., 2019) failed to calculate for the following reasons:    Risk score cannot be calculated because patient has a medical history suggesting prior/existing ASCVD                        Labs reviewed in EPIC  Current providers sharing in care for this patient include:  Patient Care Team:  Pat Bah APRN CNP as PCP - General (Geriatric Medicine)  Patt John MD as MD (Cardiovascular Disease)  Angela Serrato APRN CNP as Nurse Practitioner (Urology)  Angela Serrato APRN CNP as Assigned Surgical Provider  Kylie Gomez NP as Nurse Practitioner (Nurse Practitioner)  [facility]  Pat Bah APRN CNP as Nurse Practitioner (Geriatric Medicine)  Veronica Arana Cynthia Dianne, APRN CNP as Assigned PCP    The following health maintenance items are reviewed in Epic and correct as of today:  Health Maintenance   Topic Date Due    MICROALBUMIN  Never done    DIABETIC FOOT EXAM  Never done    HEPATITIS C SCREENING  Never done    ZOSTER VACCINE (1 of 2) Never done    FALL RISK ASSESSMENT  Never done    MEDICARE ANNUAL WELLNESS VISIT  09/30/2022    EYE EXAM  07/24/2025    INFLUENZA VACCINE (1) 09/01/2025    A1C  09/03/2025    LIPID  01/05/2026    BMP  01/07/2026    DTAP/TDAP/TD VACCINE (2 - Td or Tdap) 11/07/2026    ADVANCE CARE PLANNING  08/16/2028    COLORECTAL CANCER SCREENING  03/03/2033    PNEUMOCOCCAL VACCINE 50+ YEARS  Completed    RSV VACCINE  Completed    COVID-19 VACCINE  Completed    HPV  VACCINE  Aged Out    MENINGITIS VACCINE  Aged Out         Review of Systems  Constitutional, neuro, ENT, endocrine, pulmonary, cardiac, gastrointestinal, genitourinary, musculoskeletal, integument and psychiatric systems are negative, except as otherwise noted.     Objective    Exam  /72   Pulse 86   Temp 98.1  F (36.7  C)   Resp 18   Wt 97.1 kg (214 lb)   SpO2 96%   BMI 29.02 kg/m     Estimated body mass index is 29.02 kg/m  as calculated from the following:    Height as of 5/12/25: 1.829 m (6').    Weight as of this encounter: 97.1 kg (214 lb).    Physical Exam  GENERAL: alert and no distress  EYES: Eyes grossly normal to inspection and asked if able to see this NP and details of face - answer was no.  Words written down would be fuzzy.  HENT: ear canals and TM's normal, nose and mouth without ulcers or lesions  NECK: no adenopathy, no asymmetry, masses, or scars  RESP: lungs clear to auscultation - no rales, rhonchi or wheezes  CV: irregularly irregular rhythm, no murmur, click or rub, peripheral pulses strong, and no peripheral edema  ABDOMEN: soft, nontender, no hepatosplenomegaly, no masses and bowel sounds normal  MS: no edema and laying on the bed on his right side with arms folded in front of him.  Legs crossed at ankles  SKIN: no suspicious lesions or rashes  NEURO: weakness of right side due to old stroke, sensory exam grossly normal, and speech normal  PSYCH: affect normal/bright and forgetful but knew day of week, state in which he lives in and town but not name of facility          7/15/2025   Mini Cog   Clock Draw Score 0 Abnormal    0 Abnormal   3 Item Recall 2 objects recalled    2 objects recalled   Mini Cog Total Score 2    2       Multiple values from one day are sorted in reverse-chronological order       He did not draw a clock as he can not see.       Signed Electronically by: PAULA Huston CNP

## 2025-07-15 NOTE — LETTER
7/15/2025      Rakesh Samuels  [address]  [facility] [address]        Preventive Care Visit  [facility]  PAULA Huston CNP, Geriatric Medicine  Jul 15, 2025      Assessment & Plan    (E11.42,  Z79.4) Type 2 diabetes mellitus with diabetic polyneuropathy, with long-term current use of insulin (H)  (primary encounter diagnosis)  (E11.42) Diabetic polyneuropathy associated with type 2 diabetes mellitus (H)  Comment: Currently Rakesh receives Lantus 18 units in the morning and 6 units at bedtime along with NovoLog 6 units with each meal and a sliding scale on top of that.  Due for a A1c to be done as well.  Overall sugars are above desired range and feel there is room to move with his insulin.  Would like to see his sliding scale insulin be removed eventually if he is not getting extra units.  Does have a order for bedtime snacks to help avoid low blood sugars during the night  As far as the polyneuropathy, Rakesh receives gabapentin 300 mg twice a day.  He really did not have any complaints about this today.  Plan: Today giving orders to increase his Lantus to 20 units in the morning and 7 units at bedtime.  Will continue with the NovoLog 6 units with meals along with the sliding scale.  Will try to remember to evaluate his sugars in the next coming weeks to see if there is any improvement.  Next lab day will have A1c, CBC, and BMP drawn    (I48.0) Paroxysmal atrial fibrillation (H)  Comment: Rakesh receives Eliquis 5 mg twice a day.  Does not show any complications with bleeding or heart palpitations.      (I10) Essential hypertension  Comment: Blood pressures range from 110s-120s/70s.  Vitals are done on a weekly basis with his bath.  Currently receives amlodipine 5 mg in the morning with parameters to hold if his SBP is less than 110.  No changes at this time.  Labs will be done on the next lab day      (Z86.73) Cerebrovascular accident, old  (R53.1) Right sided  weakness  Comment: Rakesh does have the Eliquis 5 mg twice a day.  Has not shown any new neurological changes in regards to right sided weakness.  Uses a wheelchair for mobility.  Part of that is because of his vision is poor that ambulating is difficult  Plan: No changes.    (F33.1) Moderate episode of recurrent major depressive disorder (H)  Comment: Rakesh receives aripiprazole 2mg at HS and Fluvoxamine 50mg po in evening.  Long history of psych issues in which his daughters manage any outside psychiatry visits.  He seems to be very content with his roommate and his environment.  Do feel that because of his poor vision that influences his mood for what he can do in his environment.  Plan: No changes or dose reductions to be made without consulting family    (H40.003) Glaucoma suspect, bilateral  Comment: Rakesh receives multiple eyedrops.  This nurse practitioner does not prescribe them as this is done by ophthalmology.  No complaints of pain in his eyes at this time      BMI  Estimated body mass index is 29.02 kg/m  as calculated from the following:    Height as of 5/12/25: 1.829 m (6').    Weight as of this encounter: 97.1 kg (214 lb).     Reviewed preventive health counseling, as reflected in patient instructions       Healthy diet/nutrition       Vision screening       Hearing screening        Subjective  Rakesh is a 74 year old, presenting for the following:  Wellness Visit       HPI  Rakesh is a 74-year-old gentleman who is resided at [facility] since January of this year.  Chronic conditions include old CVA with right side weakness, type 2 diabetes with polyneuropathy, paroxysmal atrial fib, anxiety/depression, GERD, and glaucoma.  Rakesh is unable to care for himself out in the community and family is unable to do his well.  He has adjusted well to his environment here.  His roommate looks out for him which is a added bonus.  Rakesh had no concerns today about his health    Advance Care  Planning  Document on file is a Health Care Directive or POLST.  Confirmed today that he was DNR DNI      PHQ-2 Score:         7/15/2025     1:15 PM 10/3/2024    10:16 AM   PHQ-2 ( 1999 [employer])   Q1: Little interest or pleasure in doing things 2 3   Q2: Feeling down, depressed or hopeless 0 3   PHQ-2 Score 2 6       Social History     Tobacco Use     Smoking status: Never     Smokeless tobacco: Never   Substance Use Topics     Alcohol use: Not Currently     Drug use: Never       ASCVD Risk   The ASCVD Risk score (Gene LUGO, et al., 2019) failed to calculate for the following reasons:    Risk score cannot be calculated because patient has a medical history suggesting prior/existing ASCVD                        Labs reviewed in EPIC  Current providers sharing in care for this patient include:  Patient Care Team:  Pat Bah APRN CNP as PCP - General (Geriatric Medicine)  Patt John MD as MD (Cardiovascular Disease)  Angela Serrato APRN CNP as Nurse Practitioner (Urology)  Angela Serrato APRN CNP as Assigned Surgical Provider  Kylie Gomez NP as Nurse Practitioner (Nurse Practitioner)  [facility]  Pat Bah APRN CNP as Nurse Practitioner (Geriatric Medicine)  Veronica Arana Cynthia Dianne, APRN CNP as Assigned PCP    The following health maintenance items are reviewed in Epic and correct as of today:  Health Maintenance   Topic Date Due     MICROALBUMIN  Never done     DIABETIC FOOT EXAM  Never done     HEPATITIS C SCREENING  Never done     ZOSTER VACCINE (1 of 2) Never done     FALL RISK ASSESSMENT  Never done     MEDICARE ANNUAL WELLNESS VISIT  09/30/2022     EYE EXAM  07/24/2025     INFLUENZA VACCINE (1) 09/01/2025     A1C  09/03/2025     LIPID  01/05/2026     BMP  01/07/2026     DTAP/TDAP/TD VACCINE (2 - Td or Tdap) 11/07/2026     ADVANCE CARE PLANNING  08/16/2028     COLORECTAL CANCER SCREENING  03/03/2033      PNEUMOCOCCAL VACCINE 50+ YEARS  Completed     RSV VACCINE  Completed     COVID-19 VACCINE  Completed     HPV VACCINE  Aged Out     MENINGITIS VACCINE  Aged Out         Review of Systems  Constitutional, neuro, ENT, endocrine, pulmonary, cardiac, gastrointestinal, genitourinary, musculoskeletal, integument and psychiatric systems are negative, except as otherwise noted.     Objective   Exam  /72   Pulse 86   Temp 98.1  F (36.7  C)   Resp 18   Wt 97.1 kg (214 lb)   SpO2 96%   BMI 29.02 kg/m     Estimated body mass index is 29.02 kg/m  as calculated from the following:    Height as of 5/12/25: 1.829 m (6').    Weight as of this encounter: 97.1 kg (214 lb).    Physical Exam  GENERAL: alert and no distress  EYES: Eyes grossly normal to inspection and asked if able to see this NP and details of face - answer was no.  Words written down would be fuzzy.  HENT: ear canals and TM's normal, nose and mouth without ulcers or lesions  NECK: no adenopathy, no asymmetry, masses, or scars  RESP: lungs clear to auscultation - no rales, rhonchi or wheezes  CV: irregularly irregular rhythm, no murmur, click or rub, peripheral pulses strong, and no peripheral edema  ABDOMEN: soft, nontender, no hepatosplenomegaly, no masses and bowel sounds normal  MS: no edema and laying on the bed on his right side with arms folded in front of him.  Legs crossed at ankles  SKIN: no suspicious lesions or rashes  NEURO: weakness of right side due to old stroke, sensory exam grossly normal, and speech normal  PSYCH: affect normal/bright and forgetful but knew day of week, state in which he lives in and town but not name of facility          7/15/2025   Mini Cog   Clock Draw Score 0 Abnormal    0 Abnormal   3 Item Recall 2 objects recalled    2 objects recalled   Mini Cog Total Score 2    2       Multiple values from one day are sorted in reverse-chronological order       He did not draw a clock as he can not see.       Signed Electronically  by: PAULA Huston CNP      Sincerely,        PAULA Huston CNP    Electronically signed

## 2025-07-16 PROBLEM — E11.42 DIABETIC POLYNEUROPATHY ASSOCIATED WITH TYPE 2 DIABETES MELLITUS (H): Status: ACTIVE | Noted: 2025-07-16
